# Patient Record
Sex: FEMALE | Race: OTHER | Employment: PART TIME | URBAN - METROPOLITAN AREA
[De-identification: names, ages, dates, MRNs, and addresses within clinical notes are randomized per-mention and may not be internally consistent; named-entity substitution may affect disease eponyms.]

---

## 2017-01-11 ENCOUNTER — HOSPITAL ENCOUNTER (OUTPATIENT)
Dept: ULTRASOUND IMAGING | Age: 62
Discharge: HOME | End: 2017-01-11
Attending: FAMILY MEDICINE | Admitting: FAMILY MEDICINE

## 2017-01-11 DIAGNOSIS — R74.8 ABNORMAL LEVELS OF OTHER SERUM ENZYMES: ICD-10-CM

## 2017-03-17 RX ORDER — METOPROLOL SUCCINATE 25 MG/1
TABLET, EXTENDED RELEASE ORAL
Qty: 60 TABLET | Refills: 1 | Status: SHIPPED | OUTPATIENT
Start: 2017-03-17 | End: 2017-09-05 | Stop reason: SDUPTHER

## 2017-09-05 ENCOUNTER — OFFICE VISIT (OUTPATIENT)
Dept: CARDIOLOGY CLINIC | Age: 62
End: 2017-09-05

## 2017-09-05 VITALS
HEART RATE: 68 BPM | BODY MASS INDEX: 33.97 KG/M2 | HEIGHT: 64 IN | SYSTOLIC BLOOD PRESSURE: 132 MMHG | WEIGHT: 199 LBS | DIASTOLIC BLOOD PRESSURE: 90 MMHG

## 2017-09-05 DIAGNOSIS — I10 ESSENTIAL HYPERTENSION: ICD-10-CM

## 2017-09-05 DIAGNOSIS — R00.2 PALPITATIONS: Primary | ICD-10-CM

## 2017-09-05 DIAGNOSIS — E78.2 MIXED HYPERLIPIDEMIA: ICD-10-CM

## 2017-09-05 PROCEDURE — 99215 OFFICE O/P EST HI 40 MIN: CPT | Performed by: INTERNAL MEDICINE

## 2017-09-05 RX ORDER — METOPROLOL SUCCINATE 25 MG/1
TABLET, EXTENDED RELEASE ORAL
Qty: 60 TABLET | Refills: 1
Start: 2017-09-05 | End: 2017-11-20 | Stop reason: SDUPTHER

## 2017-09-05 RX ORDER — AMLODIPINE BESYLATE 2.5 MG/1
2.5 TABLET ORAL DAILY
COMMUNITY
End: 2017-11-20 | Stop reason: SDUPTHER

## 2017-09-11 PROCEDURE — 93228 REMOTE 30 DAY ECG REV/REPORT: CPT | Performed by: INTERNAL MEDICINE

## 2017-11-20 ENCOUNTER — OFFICE VISIT (OUTPATIENT)
Dept: CARDIOLOGY CLINIC | Age: 62
End: 2017-11-20

## 2017-11-20 VITALS
SYSTOLIC BLOOD PRESSURE: 126 MMHG | BODY MASS INDEX: 34.43 KG/M2 | DIASTOLIC BLOOD PRESSURE: 74 MMHG | WEIGHT: 200.6 LBS | HEART RATE: 84 BPM

## 2017-11-20 DIAGNOSIS — E78.2 MIXED HYPERLIPIDEMIA: Primary | ICD-10-CM

## 2017-11-20 DIAGNOSIS — I10 ESSENTIAL HYPERTENSION: ICD-10-CM

## 2017-11-20 PROCEDURE — 3014F SCREEN MAMMO DOC REV: CPT | Performed by: INTERNAL MEDICINE

## 2017-11-20 PROCEDURE — G8427 DOCREV CUR MEDS BY ELIG CLIN: HCPCS | Performed by: INTERNAL MEDICINE

## 2017-11-20 PROCEDURE — 99214 OFFICE O/P EST MOD 30 MIN: CPT | Performed by: INTERNAL MEDICINE

## 2017-11-20 PROCEDURE — G8598 ASA/ANTIPLAT THER USED: HCPCS | Performed by: INTERNAL MEDICINE

## 2017-11-20 PROCEDURE — 3017F COLORECTAL CA SCREEN DOC REV: CPT | Performed by: INTERNAL MEDICINE

## 2017-11-20 PROCEDURE — G8484 FLU IMMUNIZE NO ADMIN: HCPCS | Performed by: INTERNAL MEDICINE

## 2017-11-20 PROCEDURE — G8417 CALC BMI ABV UP PARAM F/U: HCPCS | Performed by: INTERNAL MEDICINE

## 2017-11-20 PROCEDURE — 1036F TOBACCO NON-USER: CPT | Performed by: INTERNAL MEDICINE

## 2017-11-20 RX ORDER — METOPROLOL SUCCINATE 25 MG/1
TABLET, EXTENDED RELEASE ORAL
Qty: 90 TABLET | Refills: 3 | Status: SHIPPED | OUTPATIENT
Start: 2017-11-20 | End: 2017-12-11 | Stop reason: SDUPTHER

## 2017-11-20 RX ORDER — LOSARTAN POTASSIUM 25 MG/1
TABLET ORAL
Qty: 90 TABLET | Refills: 3 | Status: SHIPPED | OUTPATIENT
Start: 2017-11-20 | End: 2019-01-28 | Stop reason: SDUPTHER

## 2017-11-20 RX ORDER — ATORVASTATIN CALCIUM 40 MG/1
40 TABLET, FILM COATED ORAL DAILY
Qty: 90 TABLET | Refills: 3 | Status: SHIPPED | OUTPATIENT
Start: 2017-11-20 | End: 2018-12-07 | Stop reason: SDUPTHER

## 2017-11-20 RX ORDER — FUROSEMIDE 20 MG/1
TABLET ORAL
Qty: 90 TABLET | Refills: 1 | Status: SHIPPED | OUTPATIENT
Start: 2017-11-20 | End: 2018-07-05 | Stop reason: SDUPTHER

## 2017-11-20 RX ORDER — AMLODIPINE BESYLATE 2.5 MG/1
2.5 TABLET ORAL DAILY
Qty: 90 TABLET | Refills: 3 | Status: SHIPPED | OUTPATIENT
Start: 2017-11-20 | End: 2018-06-18 | Stop reason: ALTCHOICE

## 2017-11-20 NOTE — PROGRESS NOTES
daily Yes Historical Provider, MD   canagliflozin (INVOKANA) 100 MG TABS tablet Take 100 mg by mouth every morning (before breakfast) Yes Historical Provider, MD   metFORMIN (GLUCOPHAGE) 1000 MG tablet Take 1,000 mg by mouth 2 times daily (with meals) Yes Historical Provider, MD   metoprolol succinate (TOPROL XL) 25 MG extended release tablet TAKE ONE TABLET BY MOUTH DAILY FOR BLOOD PRESSURE AND HEART RATE Yes Ramon Cowart MD   losartan (COZAAR) 25 MG tablet TAKE ONE TABLET BY MOUTH DAILY Yes Yamile Hopkins MD   FREESTYLE LANCETS MISC USE ONE LANCET TO TEST BLOOD SUGAR TWICE A DAY Yes Yamile Hopkins MD   atorvastatin (LIPITOR) 40 MG tablet Take 1 tablet by mouth daily Yes Abran Birch MD   FANAPT 1 MG TABS tablet Take 1 tablet by mouth nightly Yes Historical Provider, MD   omeprazole (PRILOSEC) 20 MG capsule TAKE ONE CAPSULE BY MOUTH TWICE A DAY BEFORE MEALS Yes Yamile Hopkins MD   glucose monitoring kit (FREESTYLE) monitoring kit Test twice daily  Diag: E11.9 Yes Yamile Hopkins MD   glucose blood VI test strips (FREESTYLE LITE) strip TEST BLOOD SUGAR TWO TIMES A DAY  Diag: E11.9 Yes Yamile Hopkins MD   glimepiride (AMARYL) 1 MG tablet TAKE ONE TABLET BY MOUTH EVERY MORNING BEFORE BREAKFAST Yes Yamile Hopkins MD   albuterol (PROVENTIL HFA;VENTOLIN HFA) 108 (90 BASE) MCG/ACT inhaler Inhale 2 puffs into the lungs every 6 hours as needed for Wheezing Yes Yamile Hopkins MD   furosemide (LASIX) 20 MG tablet TAKE ONE TABLET BY MOUTH DAILY Yes Yamile Hopkins MD   aspirin 81 MG EC tablet Take 1 tablet by mouth daily.  Yes Yamile Hopkins MD   traZODone (DESYREL) 100 MG tablet Take 100 mg by mouth nightly Takes 1/2 pill nightly Yes Historical Provider, MD   duloxetine (CYMBALTA) 30 MG capsule Take 60 mg by mouth Takes 3 pills once daily Yes Historical Provider, MD   metFORMIN (GLUCOPHAGE) 500 MG tablet Take 1 tablet by mouth 2 times daily (with meals)  Yamile Hopkins MD     Family History  Family History   Problem Relation Age of Onset    Heart Disease Mother     High Blood Pressure Mother     Stroke Mother        Current Medications  Current Outpatient Prescriptions   Medication Sig Dispense Refill    ranitidine (ZANTAC) 300 MG tablet Take 1 tablet by mouth nightly 30 tablet 0    amLODIPine (NORVASC) 2.5 MG tablet Take 2.5 mg by mouth daily      canagliflozin (INVOKANA) 100 MG TABS tablet Take 100 mg by mouth every morning (before breakfast)      metFORMIN (GLUCOPHAGE) 1000 MG tablet Take 1,000 mg by mouth 2 times daily (with meals)      metoprolol succinate (TOPROL XL) 25 MG extended release tablet TAKE ONE TABLET BY MOUTH DAILY FOR BLOOD PRESSURE AND HEART RATE 60 tablet 1    losartan (COZAAR) 25 MG tablet TAKE ONE TABLET BY MOUTH DAILY 90 tablet 3    FREESTYLE LANCETS MISC USE ONE LANCET TO TEST BLOOD SUGAR TWICE A  each 3    atorvastatin (LIPITOR) 40 MG tablet Take 1 tablet by mouth daily 90 tablet 3    FANAPT 1 MG TABS tablet Take 1 tablet by mouth nightly      omeprazole (PRILOSEC) 20 MG capsule TAKE ONE CAPSULE BY MOUTH TWICE A DAY BEFORE MEALS 120 capsule 3    glucose monitoring kit (FREESTYLE) monitoring kit Test twice daily  Diag: E11.9 1 kit 0    glucose blood VI test strips (FREESTYLE LITE) strip TEST BLOOD SUGAR TWO TIMES A DAY  Diag: E11.9 100 each 4    glimepiride (AMARYL) 1 MG tablet TAKE ONE TABLET BY MOUTH EVERY MORNING BEFORE BREAKFAST 30 tablet 3    albuterol (PROVENTIL HFA;VENTOLIN HFA) 108 (90 BASE) MCG/ACT inhaler Inhale 2 puffs into the lungs every 6 hours as needed for Wheezing 1 Inhaler 1    furosemide (LASIX) 20 MG tablet TAKE ONE TABLET BY MOUTH DAILY 60 tablet 3    aspirin 81 MG EC tablet Take 1 tablet by mouth daily.  60 tablet 3    traZODone (DESYREL) 100 MG tablet Take 100 mg by mouth nightly Takes 1/2 pill nightly      duloxetine (CYMBALTA) 30 MG capsule Take 60 mg by mouth Takes 3 pills once daily      metFORMIN (GLUCOPHAGE) 500 MG tablet Take 1 tablet by mouth 2 times daily (with meals) 60 tablet 5     No current facility-administered medications for this visit. REVIEW OF SYSTEMS:    CONSTITUTIONAL: No major weight gain or loss, fatigue, weakness, night sweats or fever. HEENT: No new vision difficulties or ringing in the ears. RESPIRATORY: No new SOB, PND, orthopnea or cough. CARDIOVASCULAR: See HPI  GI: No nausea, vomiting, diarrhea, constipation, abdominal pain or changes in bowel habits. : No urinary frequency, urgency, incontinence hematuria or dysuria. SKIN: No cyanosis or skin lesions. MUSCULOSKELETAL: No new muscle or joint pain. NEUROLOGICAL: No syncope or TIA-like symptoms. PSYCHIATRIC: No anxiety, pain, insomnia or depression    Objective:   PHYSICAL EXAM:        VITALS:    Wt Readings from Last 3 Encounters:   11/20/17 200 lb 9.6 oz (91 kg)   10/26/17 206 lb 5.6 oz (93.6 kg)   09/05/17 199 lb (90.3 kg)     BP Readings from Last 3 Encounters:   11/20/17 126/74   10/26/17 (!) 134/94   09/05/17 (!) 132/90     Pulse Readings from Last 3 Encounters:   11/20/17 84   10/26/17 78   09/05/17 68       CONSTITUTIONAL: Cooperative, no apparent distress, and appears well nourished / developed  NEUROLOGIC:  Awake and orientated to person, place and time. PSYCH: Calm affect. SKIN: Warm and dry. HEENT: Sclera non-icteric, normocephalic, neck supple, no elevation of JVP, normal carotid pulses with no bruits and thyroid normal size. LUNGS:  No increased work of breathing and clear to auscultation, no crackles or wheezing  CARDIOVASCULAR:  Regular rate and rhythm with no murmurs, gallops, rubs, or abnormal heart sounds, normal PMI. The apical impulses not displaced  Heart tones are crisp and normal  Cervical veins are not engorged  The carotid upstroke is normal in amplitude and contour without delay or bruit  JVP is not elevated  ABDOMEN:  Normal bowel sounds, non-distended and non-tender to palpation  EXT: No edema, no calf tenderness.  Pulses are present bilaterally. DATA:    Lab Results   Component Value Date    ALT 21 10/26/2017    AST 29 10/26/2017    ALKPHOS 75 10/26/2017    BILITOT <0.2 10/26/2017     Lab Results   Component Value Date    CREATININE 0.8 10/26/2017    BUN 14 10/26/2017     10/26/2017    K 3.7 10/26/2017     10/26/2017    CO2 26 10/26/2017     Lab Results   Component Value Date    TSH 5.30 01/14/2013     Lab Results   Component Value Date    WBC 8.2 10/26/2017    HGB 12.5 10/26/2017    HCT 37.4 10/26/2017    MCV 85.4 10/26/2017     10/26/2017     No components found for: CHLPL  Lab Results   Component Value Date    TRIG 118 08/23/2016    TRIG 88 07/29/2015    TRIG 85 06/26/2014     Lab Results   Component Value Date    HDL 57 08/23/2016    HDL 61 (H) 07/29/2015    HDL 81 (H) 06/26/2014     Lab Results   Component Value Date    LDLCALC 72 08/23/2016    LDLCALC 93 07/29/2015    LDLCALC 110 (H) 06/26/2014     Lab Results   Component Value Date    LABVLDL 24 08/23/2016    LABVLDL 18 07/29/2015    LABVLDL 17 06/26/2014     Radiology Review:  Pertinent images / reports were reviewed as a part of this visit and reveals the following:  IMPRESSIONS and RECOMMENDATIONS:  Sleep srudy 11/11/17  Obstructive Sleep Apnea Syndrome [ICD-10 CM# G47.33]  Auto-CPAP at 6-9 cm H2O is recommended since supine R sleep was not seen at the final setting. Heated humidification will be arranged to improve compliance with positive pressure therapy. Adequate follow up is needed to assure compliance and response to treatment. Compliance with positive pressure therapy should be assessed at regular intervals. The patient will derive benefit from treatment as long as regular and adequate hours of usage are maintained. If the patient's symptoms recur or persist, a repeat titration study may be indicated.      PAP Device PAP* Level NREM (min) REM (min) Time (min) OA MA CA Ap Hyp RERA AHI Sup AHI Lat REM AHI Sup  REM AHI Sup  NREM AHI RDI # RDI Index Ar Index Min O2 Sat Ave           Last Echo:    Last Stress Test / Angiogram: 3/30/16  FINAL INTERPRETATION   Nondiagnostic with equivocal evidence for inferolateral ischemia plus equivocal evidence for    transient ischemic dilation. Overall study quality is fair. Jonathan Scout is study artifact related to    diaphragmatic attenuation. Test sensitivity is reduced with testing on antianginal drugs. Angiographic Findings:  Right dominant system CATH 4/  Left Main:  Minimal luminal irregularities.     Left Anterior Descending:  No significant CAD     Circumflex:  No significant CAD     Right Coronary:  No significant CAD     Left Ventriculogram:  LVEF 55-60%     Femoral Angiogram:  No plaque     Hemodynamics (mm Hg):  Left Ventricular Pressure:  132/2, 13  Central Aortic Pressure:  132/68     Conclusions:  -No significant CAD  -Normal LVEF  -Borderline LVEDP  Last ECG:    Assessment:    Sleep apnea with recent evaluation. Atypical chest discomfort. Plan:   I have advised her to make sure she receives the CPAP machine and to use it. Cardiac-wise there were no signs of ischemia on angiogram in July 2016. I think all else looks fairly stable we'll plan to see her back in about a year or sooner if problems develop. Please call if we can assist further [phone]. Christina Boyer.  Marta EDWARD, [facility]      This note was likely completed using voice recognition technology and may contain unintended errors

## 2017-12-08 ENCOUNTER — TELEPHONE (OUTPATIENT)
Dept: FAMILY MEDICINE CLINIC | Age: 62
End: 2017-12-08

## 2017-12-08 NOTE — TELEPHONE ENCOUNTER
Pt originally had her NP appointment on 12/1. It was cancelled via provider. It was r/s for jan 8th. Pt thought it was 12/8th. .      Pt is going out of the country for 3 1/2 months on wed 12/13. Pt will run out of her medications and she is a diabetic. Would there be any way we can see her for a NP appointment on Monday 12/11?     Please advise

## 2017-12-11 ENCOUNTER — OFFICE VISIT (OUTPATIENT)
Dept: FAMILY MEDICINE CLINIC | Age: 62
End: 2017-12-11

## 2017-12-11 ENCOUNTER — TELEPHONE (OUTPATIENT)
Dept: FAMILY MEDICINE CLINIC | Age: 62
End: 2017-12-11

## 2017-12-11 VITALS
BODY MASS INDEX: 35.97 KG/M2 | SYSTOLIC BLOOD PRESSURE: 124 MMHG | HEIGHT: 63 IN | DIASTOLIC BLOOD PRESSURE: 78 MMHG | WEIGHT: 203 LBS | TEMPERATURE: 98.7 F | HEART RATE: 86 BPM | RESPIRATION RATE: 16 BRPM

## 2017-12-11 DIAGNOSIS — K21.9 GASTROESOPHAGEAL REFLUX DISEASE, ESOPHAGITIS PRESENCE NOT SPECIFIED: ICD-10-CM

## 2017-12-11 DIAGNOSIS — E11.8 TYPE 2 DIABETES MELLITUS WITH COMPLICATION, UNSPECIFIED LONG TERM INSULIN USE STATUS: ICD-10-CM

## 2017-12-11 DIAGNOSIS — E11.8 TYPE 2 DIABETES MELLITUS WITH COMPLICATION, UNSPECIFIED LONG TERM INSULIN USE STATUS: Primary | ICD-10-CM

## 2017-12-11 DIAGNOSIS — E11.8 TYPE 2 DIABETES MELLITUS WITH COMPLICATION, WITHOUT LONG-TERM CURRENT USE OF INSULIN (HCC): Primary | ICD-10-CM

## 2017-12-11 DIAGNOSIS — G47.30 SLEEP APNEA, UNSPECIFIED TYPE: ICD-10-CM

## 2017-12-11 DIAGNOSIS — F39 MOOD DISORDER (HCC): ICD-10-CM

## 2017-12-11 DIAGNOSIS — I10 ESSENTIAL HYPERTENSION: ICD-10-CM

## 2017-12-11 LAB
A/G RATIO: 1.6 (ref 1.1–2.2)
ALBUMIN SERPL-MCNC: 4.2 G/DL (ref 3.4–5)
ALP BLD-CCNC: 86 U/L (ref 40–129)
ALT SERPL-CCNC: 23 U/L (ref 10–40)
ANION GAP SERPL CALCULATED.3IONS-SCNC: 14 MMOL/L (ref 3–16)
AST SERPL-CCNC: 25 U/L (ref 15–37)
BASOPHILS ABSOLUTE: 0 K/UL (ref 0–0.2)
BASOPHILS RELATIVE PERCENT: 0.5 %
BILIRUB SERPL-MCNC: <0.2 MG/DL (ref 0–1)
BUN BLDV-MCNC: 22 MG/DL (ref 7–20)
CALCIUM SERPL-MCNC: 10.2 MG/DL (ref 8.3–10.6)
CHLORIDE BLD-SCNC: 105 MMOL/L (ref 99–110)
CHOLESTEROL, TOTAL: 176 MG/DL (ref 0–199)
CO2: 23 MMOL/L (ref 21–32)
CREAT SERPL-MCNC: 0.9 MG/DL (ref 0.6–1.2)
CREATININE URINE: 44.6 MG/DL (ref 28–259)
EOSINOPHILS ABSOLUTE: 0.3 K/UL (ref 0–0.6)
EOSINOPHILS RELATIVE PERCENT: 4.1 %
ESTIMATED AVERAGE GLUCOSE: 205.9 MG/DL
GFR AFRICAN AMERICAN: >60
GFR NON-AFRICAN AMERICAN: >60
GLOBULIN: 2.7 G/DL
GLUCOSE BLD-MCNC: 213 MG/DL (ref 70–99)
HBA1C MFR BLD: 8.8 %
HCT VFR BLD CALC: 40.2 % (ref 36–48)
HDLC SERPL-MCNC: 74 MG/DL (ref 40–60)
HEMOGLOBIN: 13.1 G/DL (ref 12–16)
LDL CHOLESTEROL CALCULATED: 85 MG/DL
LYMPHOCYTES ABSOLUTE: 2.5 K/UL (ref 1–5.1)
LYMPHOCYTES RELATIVE PERCENT: 39 %
MCH RBC QN AUTO: 28.3 PG (ref 26–34)
MCHC RBC AUTO-ENTMCNC: 32.5 G/DL (ref 31–36)
MCV RBC AUTO: 86.9 FL (ref 80–100)
MICROALBUMIN UR-MCNC: <1.2 MG/DL
MICROALBUMIN/CREAT UR-RTO: NORMAL MG/G (ref 0–30)
MONOCYTES ABSOLUTE: 0.3 K/UL (ref 0–1.3)
MONOCYTES RELATIVE PERCENT: 5.4 %
NEUTROPHILS ABSOLUTE: 3.2 K/UL (ref 1.7–7.7)
NEUTROPHILS RELATIVE PERCENT: 51 %
PDW BLD-RTO: 13.4 % (ref 12.4–15.4)
PLATELET # BLD: 251 K/UL (ref 135–450)
PMV BLD AUTO: 8.8 FL (ref 5–10.5)
POTASSIUM SERPL-SCNC: 4.7 MMOL/L (ref 3.5–5.1)
RBC # BLD: 4.63 M/UL (ref 4–5.2)
SODIUM BLD-SCNC: 142 MMOL/L (ref 136–145)
TOTAL PROTEIN: 6.9 G/DL (ref 6.4–8.2)
TRIGL SERPL-MCNC: 83 MG/DL (ref 0–150)
TSH SERPL DL<=0.05 MIU/L-ACNC: 3.97 UIU/ML (ref 0.27–4.2)
VITAMIN B-12: 769 PG/ML (ref 211–911)
VLDLC SERPL CALC-MCNC: 17 MG/DL
WBC # BLD: 6.3 K/UL (ref 4–11)

## 2017-12-11 PROCEDURE — 99215 OFFICE O/P EST HI 40 MIN: CPT | Performed by: FAMILY MEDICINE

## 2017-12-11 RX ORDER — OMEPRAZOLE 40 MG/1
CAPSULE, DELAYED RELEASE ORAL
Qty: 90 CAPSULE | Refills: 1 | Status: SHIPPED | OUTPATIENT
Start: 2017-12-11 | End: 2018-03-28 | Stop reason: SDUPTHER

## 2017-12-11 RX ORDER — METOPROLOL SUCCINATE 25 MG/1
TABLET, EXTENDED RELEASE ORAL
Qty: 90 TABLET | Refills: 3 | Status: SHIPPED | OUTPATIENT
Start: 2017-12-11 | End: 2018-07-02 | Stop reason: DRUGHIGH

## 2017-12-11 ASSESSMENT — ENCOUNTER SYMPTOMS
VOMITING: 0
EYE REDNESS: 0
DIARRHEA: 0
COUGH: 0
SORE THROAT: 0
SHORTNESS OF BREATH: 0
COLOR CHANGE: 0
NAUSEA: 0
SINUS PRESSURE: 0

## 2017-12-11 ASSESSMENT — PATIENT HEALTH QUESTIONNAIRE - PHQ9
2. FEELING DOWN, DEPRESSED OR HOPELESS: 1
SUM OF ALL RESPONSES TO PHQ9 QUESTIONS 1 & 2: 2
1. LITTLE INTEREST OR PLEASURE IN DOING THINGS: 1
SUM OF ALL RESPONSES TO PHQ QUESTIONS 1-9: 2

## 2017-12-11 NOTE — PROGRESS NOTES
difficulty urinating. Skin: Negative for color change and rash. Neurological: Positive for weakness. Negative for dizziness, numbness and headaches. Psychiatric/Behavioral: Negative for confusion. The patient is not nervous/anxious. Prior to Visit Medications    Medication Sig Taking?  Authorizing Provider   omeprazole (PRILOSEC) 40 MG delayed release capsule TAKE ONE CAPSULE BY MOUTH TWICE A DAY BEFORE MEALS Yes Lizzette Finnegan MD   metoprolol succinate (TOPROL XL) 25 MG extended release tablet TAKE ONE TABLET BY MOUTH DAILY FOR BLOOD PRESSURE AND HEART RATE Yes Lizzette Finnegan MD   glucose blood VI test strips (FREESTYLE LITE) strip TEST BLOOD SUGAR TWO TIMES A DAY Diag: E11.9 Yes Lizzette Finnegan MD   dapagliflozin (FARXIGA) 10 MG tablet Take 1 tablet by mouth every morning Yes Silke Velasquez MD   naproxen (NAPROSYN) 500 MG tablet Take 1 tablet by mouth 2 times daily (with meals) As needed for pain Yes Ly Santana DO   ondansetron (ZOFRAN ODT) 4 MG disintegrating tablet Take 1 tablet by mouth every 6 hours as needed for Nausea or Vomiting Yes Ly Santana DO   amLODIPine (NORVASC) 2.5 MG tablet Take 1 tablet by mouth daily Yes Chasity Herrera MD   losartan (COZAAR) 25 MG tablet TAKE ONE TABLET BY MOUTH DAILY Yes Chasity Herrera MD   furosemide (LASIX) 20 MG tablet TAKE ONE TABLET BY MOUTH DAILY Yes Chasity Herrera MD   atorvastatin (LIPITOR) 40 MG tablet Take 1 tablet by mouth daily Yes Chasity Herrera MD   ranitidine (ZANTAC) 300 MG tablet Take 1 tablet by mouth nightly Yes Mackenzie Salas PA-C   FREESTYLE LANCETS MISC USE ONE LANCET TO TEST BLOOD SUGAR TWICE A DAY Yes Star Ny MD   FANAPT 1 MG TABS tablet Take 1 tablet by mouth nightly Yes Historical Provider, MD   metFORMIN (GLUCOPHAGE) 500 MG tablet Take 1 tablet by mouth 2 times daily (with meals)  Patient taking differently: Take 1,000 mg by mouth 2 times daily (with meals)  Yes Star Ny MD   glucose monitoring kit (FREESTYLE) Originally from \[facility]\""     is seen here    10 grandchildren     Allergies   Allergen Reactions    Codeine Nausea And Vomiting and Rash    Vicodin [Hydrocodone-Acetaminophen] Nausea And Vomiting    Oxycontin [Oxycodone Hcl] Itching     Health Maintenance   Topic Date Due    DTaP/Tdap/Td vaccine (1 - Tdap) 11/04/1973    Zostavax vaccine  11/04/2014    Diabetic foot exam  02/12/2015    Diabetic microalbuminuria test  02/12/2015    Diabetic retinal exam  02/12/2015    Cervical cancer screen  04/30/2016    Diabetic hemoglobin A1C test  08/23/2017    Lipid screen  08/23/2017    Breast cancer screen  08/18/2018    Colon cancer screen colonoscopy  07/15/2021    Flu vaccine  Completed    Pneumococcal med risk  Completed    Hepatitis C screen  Completed    HIV screen  Completed      Patient Active Problem List   Diagnosis    Hypertension    Bipolar 1 disorder, depressed ([address]    Diabetes mellitus ([address]    Hyperlipidemia    Obesity    Dystonia    Cerebral thrombosis with cerebral infarction ([address]    Sleep apnea    Right sided weakness    Trigger finger, acquired    Elevated CK    Primary osteoarthritis involving multiple joints    Primary osteoarthritis of both knees    Mood disorder ([address] - follows with Psych Dr. Anshul Roman'      LABS:  Lab Results   Component Value Date    GLUCOSE 218 (H) 10/26/2017     Lab Results   Component Value Date     10/26/2017    K 3.7 10/26/2017    CREATININE 0.8 10/26/2017     Cholesterol, Total   Date Value Ref Range Status   08/23/2016 153 0 - 199 mg/dL Final     LDL Calculated   Date Value Ref Range Status   08/23/2016 72 <100 mg/dL Final     HDL   Date Value Ref Range Status   08/23/2016 57 40 - 60 mg/dL Final   01/26/2012 56 40 - 60 mg/dl Final     Triglycerides   Date Value Ref Range Status   08/23/2016 118 0 - 150 mg/dL Final     Lab Results   Component Value Date    ALT 21 10/26/2017    AST 29 10/26/2017    ALKPHOS 75 10/26/2017    BILITOT

## 2017-12-11 NOTE — TELEPHONE ENCOUNTER
Called and left VM. Pt's labs show her diabetes number is elevated, so we need to start a new medicine called Januvia to help bring this down. I'd like her to start this before leaving for her trip, and then we can discuss this further when she is back in the country. Thanks.

## 2017-12-11 NOTE — LETTER
[address]  [address] [place] [address]  Phone: [phone]  Fax: [phone]    Kathleen Mcburney, MD    December 11, 2017     Patient: Hannah Wood   YOB: 1955   Date of Visit: 12/11/2017     To Whom it May Concern:    Migdalia Ho was seen in my clinic on 12/11/2017. She has a medical condition and benefits from the use of a medical device called a CPAP for a breathing condition during sleep. I would recommend her having this device on her upcoming trip that includes an airplane flight. If you have any questions or concerns, please don't hesitate to call.     Sincerely,         Kathleen Mcburney, MD

## 2017-12-28 ENCOUNTER — TELEPHONE (OUTPATIENT)
Dept: FAMILY MEDICINE CLINIC | Age: 62
End: 2017-12-28

## 2017-12-28 DIAGNOSIS — E11.8 TYPE 2 DIABETES MELLITUS WITH COMPLICATION, UNSPECIFIED LONG TERM INSULIN USE STATUS: Primary | ICD-10-CM

## 2018-03-23 DIAGNOSIS — E11.8 TYPE 2 DIABETES MELLITUS WITH COMPLICATION, WITHOUT LONG-TERM CURRENT USE OF INSULIN (HCC): ICD-10-CM

## 2018-03-26 ENCOUNTER — OFFICE VISIT (OUTPATIENT)
Dept: FAMILY MEDICINE CLINIC | Age: 63
End: 2018-03-26

## 2018-03-26 VITALS
TEMPERATURE: 97.9 F | DIASTOLIC BLOOD PRESSURE: 80 MMHG | HEIGHT: 63 IN | RESPIRATION RATE: 18 BRPM | OXYGEN SATURATION: 98 % | HEART RATE: 94 BPM | BODY MASS INDEX: 36.86 KG/M2 | SYSTOLIC BLOOD PRESSURE: 130 MMHG | WEIGHT: 208 LBS

## 2018-03-26 DIAGNOSIS — M16.11 PRIMARY OSTEOARTHRITIS OF RIGHT HIP: ICD-10-CM

## 2018-03-26 DIAGNOSIS — R11.0 NAUSEA: ICD-10-CM

## 2018-03-26 DIAGNOSIS — E78.2 MIXED HYPERLIPIDEMIA: ICD-10-CM

## 2018-03-26 DIAGNOSIS — I10 ESSENTIAL HYPERTENSION: ICD-10-CM

## 2018-03-26 DIAGNOSIS — E11.8 TYPE 2 DIABETES MELLITUS WITH COMPLICATION, UNSPECIFIED LONG TERM INSULIN USE STATUS: Primary | ICD-10-CM

## 2018-03-26 LAB — HBA1C MFR BLD: 9 %

## 2018-03-26 PROCEDURE — G8599 NO ASA/ANTIPLAT THER USE RNG: HCPCS | Performed by: FAMILY MEDICINE

## 2018-03-26 PROCEDURE — G8427 DOCREV CUR MEDS BY ELIG CLIN: HCPCS | Performed by: FAMILY MEDICINE

## 2018-03-26 PROCEDURE — G8482 FLU IMMUNIZE ORDER/ADMIN: HCPCS | Performed by: FAMILY MEDICINE

## 2018-03-26 PROCEDURE — G8417 CALC BMI ABV UP PARAM F/U: HCPCS | Performed by: FAMILY MEDICINE

## 2018-03-26 PROCEDURE — 3045F PR MOST RECENT HEMOGLOBIN A1C LEVEL 7.0-9.0%: CPT | Performed by: FAMILY MEDICINE

## 2018-03-26 PROCEDURE — 3017F COLORECTAL CA SCREEN DOC REV: CPT | Performed by: FAMILY MEDICINE

## 2018-03-26 PROCEDURE — 3014F SCREEN MAMMO DOC REV: CPT | Performed by: FAMILY MEDICINE

## 2018-03-26 PROCEDURE — 99214 OFFICE O/P EST MOD 30 MIN: CPT | Performed by: FAMILY MEDICINE

## 2018-03-26 PROCEDURE — 83036 HEMOGLOBIN GLYCOSYLATED A1C: CPT | Performed by: FAMILY MEDICINE

## 2018-03-26 PROCEDURE — 1036F TOBACCO NON-USER: CPT | Performed by: FAMILY MEDICINE

## 2018-03-26 ASSESSMENT — ENCOUNTER SYMPTOMS
NAUSEA: 1
BLOOD IN STOOL: 0

## 2018-03-26 NOTE — PROGRESS NOTES
3/26/2018    Jennifer Rodriguez is a 60 y.o. female here for follow up    HPI   Recently returned from a 3 month trip. DM - reports her blood sugars are running in the upper 100s, lower 200s. Occasionally 300. Is on max dose metformin. On Ruby Balloon. Reports has been out of [address] for a few weeks. Lab Results   Component Value Date    LABA1C 9.0 03/26/2018     Lab Results   Component Value Date    .9 12/11/2017   She has been on insulin in the past but did not like the way it made her feel. She does not want to be on insulin. Pain - in R hip/groin area. Going on for over a year or so. She has seen [facility] ortho and had an MRI, was told she had OA and marrow edema. Nausea - reports epigastric discomfort going on for the past couple of months. She is on Naproxen twice daily for pain. She was also put on a steroid a couple of months ago for leg pain. No vomiting. No blood in stool. Had a colonoscopy last year and reports needing a repeat in 3 years. HTN - well-controlled on current regimen. Review of Systems   Constitutional: Positive for fatigue. Negative for appetite change. Gastrointestinal: Positive for nausea. Negative for blood in stool. Musculoskeletal: Positive for arthralgias. Neurological: Negative for weakness. Patient Active Problem List   Diagnosis    Hypertension    Bipolar 1 disorder, depressed ([address]    Diabetes mellitus ([address]    Hyperlipidemia    Obesity    Dystonia    Cerebral thrombosis with cerebral infarction ([address]    Sleep apnea    Right sided weakness    Trigger finger, acquired    Elevated CK    Primary osteoarthritis involving multiple joints    Primary osteoarthritis of both knees    Mood disorder ([address] - follows with Psych Dr. Aurora Mayo'     Prior to Visit Medications    Medication Sig Taking?  Authorizing Provider   dapagliflozin (FARXIGA) 10 MG tablet Take 1 tablet by mouth every morning Yes Hung Finnegan MD   Dulaglutide 0.75 MG/0.5ML [facility] Historical Provider, MD   duloxetine (CYMBALTA) 30 MG capsule Take 60 mg by mouth Takes 3 pills once daily Yes Historical Provider, MD     Health Maintenance   Topic Date Due    DTaP/Tdap/Td vaccine (1 - Tdap) 11/04/1973    Shingles Vaccine (1 of 2 - 2 Dose Series) 11/04/2004    Diabetic foot exam  02/12/2015    Diabetic retinal exam  02/12/2015    Cervical cancer screen  04/30/2016    A1C test (Diabetic or Prediabetic)  03/11/2018    Breast cancer screen  08/18/2018    Diabetic microalbuminuria test  12/11/2018    Lipid screen  12/11/2018    Potassium monitoring  12/11/2018    Creatinine monitoring  12/11/2018    Colon cancer screen colonoscopy  07/15/2021    Flu vaccine  Completed    Pneumococcal med risk  Completed    Hepatitis C screen  Completed    HIV screen  Completed     Past Medical History:   Diagnosis Date    Arthritis     Diabetes     GERD (gastroesophageal reflux disease)     Hyperlipidemia     Hypertension     Lumbar disc disease     Sleep apnea     pt states does use a Cpap machine at night    Unspecified cerebral artery occlusion with cerebral infarction 2014    right side weak    Wears glasses      Social History     Social History    Marital status:      Spouse name: Guzman Canales, seen here    Number of children: 3    Years of education: N/A     Social History Main Topics    Smoking status: Former Smoker     Packs/day: 0.00     Quit date: 1/1/1992    Smokeless tobacco: Never Used    Alcohol use No    Drug use: No    Sexual activity: Yes     Partners: Male     Other Topics Concern    Not on file     Social History Narrative    Originally from [facility]     is seen here    10 grandchildren     Allergies   Allergen Reactions    Codeine Nausea And Vomiting and Rash    Vicodin [Hydrocodone-Acetaminophen] Nausea And Vomiting    Oxycontin [Oxycodone Hcl] Itching       LABS:  Lab Results   Component Value Date    GLUCOSE 213 (H) 12/11/2017     Lab Results   Component Value Date     12/11/2017    K 4.7 12/11/2017    CREATININE 0.9 12/11/2017     Cholesterol, Total   Date Value Ref Range Status   12/11/2017 176 0 - 199 mg/dL Final     LDL Calculated   Date Value Ref Range Status   12/11/2017 85 <100 mg/dL Final     HDL   Date Value Ref Range Status   12/11/2017 74 (H) 40 - 60 mg/dL Final   01/26/2012 56 40 - 60 mg/dl Final     Triglycerides   Date Value Ref Range Status   12/11/2017 83 0 - 150 mg/dL Final     Lab Results   Component Value Date    ALT 23 12/11/2017    AST 25 12/11/2017    ALKPHOS 86 12/11/2017    BILITOT <0.2 12/11/2017      Lab Results   Component Value Date    WBC 6.3 12/11/2017    HGB 13.1 12/11/2017    HCT 40.2 12/11/2017    MCV 86.9 12/11/2017     12/11/2017     TSH (uIU/mL)   Date Value   12/11/2017 3.97     Lab Results   Component Value Date    LABA1C 9.0 03/26/2018     No results found for: PSA, PSADIA      PHYSICAL EXAM  /80 (Site: Left Arm, Position: Sitting, Cuff Size: Large Adult)   Pulse 94   Temp 97.9 °F (36.6 °C) (Oral)   Resp 18   Ht 5' 3\" (1.6 m)   Wt 208 lb (94.3 kg)   SpO2 98%   BMI 36.85 kg/m²     BP Readings from Last 5 Encounters:   03/26/18 130/80   12/11/17 124/78   11/30/17 124/76   11/20/17 126/74   10/26/17 (!) 134/94       Wt Readings from Last 5 Encounters:   03/26/18 208 lb (94.3 kg)   12/11/17 203 lb (92.1 kg)   11/29/17 204 lb 9.4 oz (92.8 kg)   11/20/17 200 lb 9.6 oz (91 kg)   10/26/17 206 lb 5.6 oz (93.6 kg)        Physical Exam   Constitutional: She is oriented to person, place, and time. She appears well-developed and well-nourished. HENT:   Head: Normocephalic and atraumatic. Eyes: EOM are normal.   Neck: Normal range of motion. Cardiovascular: Normal rate, regular rhythm and normal heart sounds. Pulmonary/Chest: Effort normal and breath sounds normal.   Abdominal: Soft. There is no rebound and no guarding. Musculoskeletal: Normal range of motion.    Positive log roll test

## 2018-03-28 DIAGNOSIS — E11.8 TYPE 2 DIABETES MELLITUS WITH COMPLICATION, WITHOUT LONG-TERM CURRENT USE OF INSULIN (HCC): ICD-10-CM

## 2018-03-28 RX ORDER — OMEPRAZOLE 40 MG/1
CAPSULE, DELAYED RELEASE ORAL
Qty: 90 CAPSULE | Refills: 1 | Status: SHIPPED | OUTPATIENT
Start: 2018-03-28 | End: 2018-06-18 | Stop reason: SDUPTHER

## 2018-04-11 ENCOUNTER — OFFICE VISIT (OUTPATIENT)
Dept: FAMILY MEDICINE CLINIC | Age: 63
End: 2018-04-11

## 2018-04-11 ENCOUNTER — HOSPITAL ENCOUNTER (OUTPATIENT)
Dept: OTHER | Age: 63
Discharge: HOME | End: 2018-04-11
Attending: FAMILY MEDICINE | Admitting: FAMILY MEDICINE

## 2018-04-11 VITALS
HEART RATE: 80 BPM | WEIGHT: 205.8 LBS | TEMPERATURE: 98 F | BODY MASS INDEX: 36.46 KG/M2 | RESPIRATION RATE: 18 BRPM | HEIGHT: 63 IN | OXYGEN SATURATION: 98 % | SYSTOLIC BLOOD PRESSURE: 104 MMHG | DIASTOLIC BLOOD PRESSURE: 66 MMHG

## 2018-04-11 DIAGNOSIS — M25.511 ACUTE PAIN OF RIGHT SHOULDER: ICD-10-CM

## 2018-04-11 DIAGNOSIS — M62.838 TRAPEZIUS MUSCLE SPASM: ICD-10-CM

## 2018-04-11 DIAGNOSIS — M25.511 ACUTE PAIN OF RIGHT SHOULDER: Primary | ICD-10-CM

## 2018-04-11 PROCEDURE — 3017F COLORECTAL CA SCREEN DOC REV: CPT | Performed by: FAMILY MEDICINE

## 2018-04-11 PROCEDURE — G8599 NO ASA/ANTIPLAT THER USE RNG: HCPCS | Performed by: FAMILY MEDICINE

## 2018-04-11 PROCEDURE — G8417 CALC BMI ABV UP PARAM F/U: HCPCS | Performed by: FAMILY MEDICINE

## 2018-04-11 PROCEDURE — 99213 OFFICE O/P EST LOW 20 MIN: CPT | Performed by: FAMILY MEDICINE

## 2018-04-11 PROCEDURE — G8427 DOCREV CUR MEDS BY ELIG CLIN: HCPCS | Performed by: FAMILY MEDICINE

## 2018-04-11 PROCEDURE — 3014F SCREEN MAMMO DOC REV: CPT | Performed by: FAMILY MEDICINE

## 2018-04-11 PROCEDURE — 1036F TOBACCO NON-USER: CPT | Performed by: FAMILY MEDICINE

## 2018-04-11 RX ORDER — TIZANIDINE 4 MG/1
4 TABLET ORAL 3 TIMES DAILY
Qty: 60 TABLET | Refills: 0 | Status: SHIPPED | OUTPATIENT
Start: 2018-04-11 | End: 2019-01-28 | Stop reason: SDUPTHER

## 2018-04-11 RX ORDER — NAPROXEN 500 MG/1
500 TABLET ORAL 2 TIMES DAILY WITH MEALS
Qty: 60 TABLET | Refills: 0 | Status: SHIPPED | OUTPATIENT
Start: 2018-04-11 | End: 2018-05-07 | Stop reason: SDUPTHER

## 2018-04-13 ENCOUNTER — OFFICE VISIT (OUTPATIENT)
Dept: ORTHOPEDIC SURGERY | Age: 63
End: 2018-04-13

## 2018-04-13 VITALS
HEIGHT: 63 IN | BODY MASS INDEX: 36.48 KG/M2 | WEIGHT: 205.91 LBS | DIASTOLIC BLOOD PRESSURE: 78 MMHG | SYSTOLIC BLOOD PRESSURE: 116 MMHG | HEART RATE: 79 BPM

## 2018-04-13 DIAGNOSIS — M25.551 RIGHT HIP PAIN: ICD-10-CM

## 2018-04-13 DIAGNOSIS — M16.11 PRIMARY OSTEOARTHRITIS OF RIGHT HIP: Primary | ICD-10-CM

## 2018-04-13 PROCEDURE — G8427 DOCREV CUR MEDS BY ELIG CLIN: HCPCS | Performed by: ORTHOPAEDIC SURGERY

## 2018-04-13 PROCEDURE — 99214 OFFICE O/P EST MOD 30 MIN: CPT | Performed by: ORTHOPAEDIC SURGERY

## 2018-04-13 PROCEDURE — G8417 CALC BMI ABV UP PARAM F/U: HCPCS | Performed by: ORTHOPAEDIC SURGERY

## 2018-04-13 PROCEDURE — 3014F SCREEN MAMMO DOC REV: CPT | Performed by: ORTHOPAEDIC SURGERY

## 2018-04-13 PROCEDURE — 3017F COLORECTAL CA SCREEN DOC REV: CPT | Performed by: ORTHOPAEDIC SURGERY

## 2018-04-24 ENCOUNTER — OFFICE VISIT (OUTPATIENT)
Dept: ORTHOPEDIC SURGERY | Age: 63
End: 2018-04-24

## 2018-04-24 VITALS
BODY MASS INDEX: 33.8 KG/M2 | HEART RATE: 85 BPM | DIASTOLIC BLOOD PRESSURE: 80 MMHG | HEIGHT: 64 IN | SYSTOLIC BLOOD PRESSURE: 132 MMHG | WEIGHT: 198 LBS

## 2018-04-24 DIAGNOSIS — M16.11 PRIMARY OSTEOARTHRITIS OF RIGHT HIP: Primary | ICD-10-CM

## 2018-04-24 PROCEDURE — 20611 DRAIN/INJ JOINT/BURSA W/US: CPT | Performed by: ORTHOPAEDIC SURGERY

## 2018-04-24 PROCEDURE — 99999 PR OFFICE/OUTPT VISIT,PROCEDURE ONLY: CPT | Performed by: ORTHOPAEDIC SURGERY

## 2018-04-30 ENCOUNTER — OFFICE VISIT (OUTPATIENT)
Dept: FAMILY MEDICINE CLINIC | Age: 63
End: 2018-04-30

## 2018-04-30 VITALS
OXYGEN SATURATION: 97 % | DIASTOLIC BLOOD PRESSURE: 68 MMHG | WEIGHT: 204.6 LBS | HEIGHT: 64 IN | BODY MASS INDEX: 34.93 KG/M2 | SYSTOLIC BLOOD PRESSURE: 110 MMHG | RESPIRATION RATE: 18 BRPM | TEMPERATURE: 97.7 F | HEART RATE: 77 BPM

## 2018-04-30 DIAGNOSIS — E11.8 TYPE 2 DIABETES MELLITUS WITH COMPLICATION, UNSPECIFIED LONG TERM INSULIN USE STATUS: ICD-10-CM

## 2018-04-30 DIAGNOSIS — L02.429: ICD-10-CM

## 2018-04-30 DIAGNOSIS — M75.31 CALCIFIC TENDONITIS OF RIGHT SHOULDER: Primary | ICD-10-CM

## 2018-04-30 DIAGNOSIS — M54.6 ACUTE LEFT-SIDED THORACIC BACK PAIN: ICD-10-CM

## 2018-04-30 PROCEDURE — 3017F COLORECTAL CA SCREEN DOC REV: CPT | Performed by: FAMILY MEDICINE

## 2018-04-30 PROCEDURE — G8599 NO ASA/ANTIPLAT THER USE RNG: HCPCS | Performed by: FAMILY MEDICINE

## 2018-04-30 PROCEDURE — 99214 OFFICE O/P EST MOD 30 MIN: CPT | Performed by: FAMILY MEDICINE

## 2018-04-30 PROCEDURE — G8427 DOCREV CUR MEDS BY ELIG CLIN: HCPCS | Performed by: FAMILY MEDICINE

## 2018-04-30 PROCEDURE — 1036F TOBACCO NON-USER: CPT | Performed by: FAMILY MEDICINE

## 2018-04-30 PROCEDURE — G8417 CALC BMI ABV UP PARAM F/U: HCPCS | Performed by: FAMILY MEDICINE

## 2018-04-30 PROCEDURE — 3045F PR MOST RECENT HEMOGLOBIN A1C LEVEL 7.0-9.0%: CPT | Performed by: FAMILY MEDICINE

## 2018-04-30 PROCEDURE — 2022F DILAT RTA XM EVC RTNOPTHY: CPT | Performed by: FAMILY MEDICINE

## 2018-04-30 RX ORDER — SULFAMETHOXAZOLE AND TRIMETHOPRIM 800; 160 MG/1; MG/1
1 TABLET ORAL 2 TIMES DAILY
Qty: 14 TABLET | Refills: 0 | Status: SHIPPED | OUTPATIENT
Start: 2018-04-30 | End: 2018-05-07

## 2018-04-30 ASSESSMENT — ENCOUNTER SYMPTOMS
SHORTNESS OF BREATH: 0
COUGH: 0

## 2018-05-04 RX ORDER — OMEPRAZOLE 40 MG/1
CAPSULE, DELAYED RELEASE ORAL
Qty: 180 CAPSULE | Refills: 0 | Status: SHIPPED | OUTPATIENT
Start: 2018-05-04 | End: 2018-06-18

## 2018-05-07 ENCOUNTER — HOSPITAL ENCOUNTER (OUTPATIENT)
Dept: OTHER | Age: 63
Discharge: HOME | End: 2018-05-07
Attending: FAMILY MEDICINE | Admitting: FAMILY MEDICINE

## 2018-05-07 ENCOUNTER — OFFICE VISIT (OUTPATIENT)
Dept: FAMILY MEDICINE CLINIC | Age: 63
End: 2018-05-07

## 2018-05-07 VITALS
SYSTOLIC BLOOD PRESSURE: 128 MMHG | HEIGHT: 64 IN | BODY MASS INDEX: 34.72 KG/M2 | DIASTOLIC BLOOD PRESSURE: 76 MMHG | TEMPERATURE: 98.3 F | OXYGEN SATURATION: 97 % | RESPIRATION RATE: 18 BRPM | HEART RATE: 80 BPM | WEIGHT: 203.4 LBS

## 2018-05-07 DIAGNOSIS — M25.511 ACUTE PAIN OF RIGHT SHOULDER: ICD-10-CM

## 2018-05-07 DIAGNOSIS — L02.419 ABSCESS OF ARM: Primary | ICD-10-CM

## 2018-05-07 DIAGNOSIS — M54.6 ACUTE LEFT-SIDED THORACIC BACK PAIN: ICD-10-CM

## 2018-05-07 PROCEDURE — 10060 I&D ABSCESS SIMPLE/SINGLE: CPT | Performed by: FAMILY MEDICINE

## 2018-05-09 LAB
BODY FLUID CULTURE, STERILE: ABNORMAL
BODY FLUID CULTURE, STERILE: ABNORMAL
GRAM STAIN RESULT: ABNORMAL
ORGANISM: ABNORMAL

## 2018-05-11 ENCOUNTER — OFFICE VISIT (OUTPATIENT)
Dept: FAMILY MEDICINE CLINIC | Age: 63
End: 2018-05-11

## 2018-05-11 VITALS
BODY MASS INDEX: 34.79 KG/M2 | WEIGHT: 203.8 LBS | HEART RATE: 86 BPM | DIASTOLIC BLOOD PRESSURE: 78 MMHG | SYSTOLIC BLOOD PRESSURE: 116 MMHG | TEMPERATURE: 98.6 F | OXYGEN SATURATION: 97 % | HEIGHT: 64 IN | RESPIRATION RATE: 18 BRPM

## 2018-05-11 DIAGNOSIS — M75.31 CALCIFIC TENDONITIS OF RIGHT SHOULDER: ICD-10-CM

## 2018-05-11 DIAGNOSIS — L02.91 ABSCESS: Primary | ICD-10-CM

## 2018-05-11 PROCEDURE — 10060 I&D ABSCESS SIMPLE/SINGLE: CPT | Performed by: FAMILY MEDICINE

## 2018-05-15 ENCOUNTER — OFFICE VISIT (OUTPATIENT)
Dept: ORTHOPEDIC SURGERY | Age: 63
End: 2018-05-15

## 2018-05-15 VITALS
HEIGHT: 64 IN | HEART RATE: 89 BPM | WEIGHT: 200 LBS | BODY MASS INDEX: 34.15 KG/M2 | DIASTOLIC BLOOD PRESSURE: 81 MMHG | SYSTOLIC BLOOD PRESSURE: 126 MMHG

## 2018-05-15 DIAGNOSIS — M75.81 ROTATOR CUFF TENDINITIS, RIGHT: Primary | ICD-10-CM

## 2018-05-15 DIAGNOSIS — M75.21 BICEPS TENDINITIS, RIGHT: ICD-10-CM

## 2018-05-15 DIAGNOSIS — M75.31 CALCIFIC TENDINITIS OF RIGHT SHOULDER: ICD-10-CM

## 2018-05-15 PROCEDURE — 20610 DRAIN/INJ JOINT/BURSA W/O US: CPT | Performed by: ORTHOPAEDIC SURGERY

## 2018-05-15 PROCEDURE — G8427 DOCREV CUR MEDS BY ELIG CLIN: HCPCS | Performed by: ORTHOPAEDIC SURGERY

## 2018-05-15 PROCEDURE — G8417 CALC BMI ABV UP PARAM F/U: HCPCS | Performed by: ORTHOPAEDIC SURGERY

## 2018-05-15 PROCEDURE — 1036F TOBACCO NON-USER: CPT | Performed by: ORTHOPAEDIC SURGERY

## 2018-05-15 PROCEDURE — 99213 OFFICE O/P EST LOW 20 MIN: CPT | Performed by: ORTHOPAEDIC SURGERY

## 2018-05-15 PROCEDURE — G8599 NO ASA/ANTIPLAT THER USE RNG: HCPCS | Performed by: ORTHOPAEDIC SURGERY

## 2018-05-15 PROCEDURE — 3017F COLORECTAL CA SCREEN DOC REV: CPT | Performed by: ORTHOPAEDIC SURGERY

## 2018-05-17 ENCOUNTER — HOSPITAL ENCOUNTER (OUTPATIENT)
Dept: OTHER | Age: 63
Discharge: HOME | End: 2018-05-31
Attending: FAMILY MEDICINE | Admitting: FAMILY MEDICINE

## 2018-05-17 ASSESSMENT — PAIN DESCRIPTION - PROGRESSION: CLINICAL_PROGRESSION: GRADUALLY WORSENING

## 2018-05-17 ASSESSMENT — PAIN DESCRIPTION - FREQUENCY: FREQUENCY: CONTINUOUS

## 2018-05-17 ASSESSMENT — ACTIVITIES OF DAILY LIVING (ADL): EFFECT OF PAIN ON DAILY ACTIVITIES: LIMITS USE OF RIGHT UE

## 2018-05-17 ASSESSMENT — PAIN DESCRIPTION - PAIN TYPE: TYPE: CHRONIC PAIN

## 2018-05-17 ASSESSMENT — PAIN DESCRIPTION - LOCATION: LOCATION: SHOULDER

## 2018-05-17 ASSESSMENT — PAIN DESCRIPTION - ORIENTATION: ORIENTATION: RIGHT

## 2018-05-17 ASSESSMENT — PAIN DESCRIPTION - ONSET: ONSET: ON-GOING

## 2018-05-17 ASSESSMENT — PAIN SCALES - GENERAL: PAINLEVEL_OUTOF10: 9

## 2018-05-21 ENCOUNTER — OFFICE VISIT (OUTPATIENT)
Dept: ORTHOPEDIC SURGERY | Age: 63
End: 2018-05-21

## 2018-05-21 VITALS — WEIGHT: 199.96 LBS | HEIGHT: 64 IN | BODY MASS INDEX: 34.14 KG/M2

## 2018-05-21 DIAGNOSIS — M16.11 PRIMARY OSTEOARTHRITIS OF RIGHT HIP: Primary | ICD-10-CM

## 2018-05-21 PROCEDURE — 99213 OFFICE O/P EST LOW 20 MIN: CPT | Performed by: ORTHOPAEDIC SURGERY

## 2018-05-21 PROCEDURE — 3017F COLORECTAL CA SCREEN DOC REV: CPT | Performed by: ORTHOPAEDIC SURGERY

## 2018-05-21 PROCEDURE — G8599 NO ASA/ANTIPLAT THER USE RNG: HCPCS | Performed by: ORTHOPAEDIC SURGERY

## 2018-05-21 PROCEDURE — G8427 DOCREV CUR MEDS BY ELIG CLIN: HCPCS | Performed by: ORTHOPAEDIC SURGERY

## 2018-05-21 PROCEDURE — G8417 CALC BMI ABV UP PARAM F/U: HCPCS | Performed by: ORTHOPAEDIC SURGERY

## 2018-05-21 PROCEDURE — 1036F TOBACCO NON-USER: CPT | Performed by: ORTHOPAEDIC SURGERY

## 2018-05-22 ENCOUNTER — HOSPITAL ENCOUNTER (OUTPATIENT)
Dept: PHYSICAL THERAPY | Age: 63
Discharge: HOME | End: 2018-05-23
Admitting: FAMILY MEDICINE

## 2018-05-22 NOTE — FLOWSHEET NOTE
Physical Therapy Daily Treatment Note  Date:  2018    Patient Name:  Abimael Menjivar    :  1955  MRN: [id number]  Restrictions/Precautions:  VERY TENDER AROUND ENTIRE SHOULDER AND SCAP  GO EASY  Pertinent Medical History:  Medical/Treatment Diagnosis Information:  · Diagnosis: Calcific tendonitis of the right shoulder  · Treatment Diagnosis: decreased ability to use right ue for adl's  Insurance/Certification information:  PT Insurance Information: [facility]  Physician Information:  Referring Practitioner: Dr. Eunice Eli of care signed (Y/N):  routed  Visit# / total visits2/12  Pain level: 5-8/10     G-Code (if applicable):  CL,  18     Date / Visit # G-Code Applied:  /  PT G-Codes  Functional Assessment Tool Used: quick dash  Score: 45  Functional Limitation: Carrying, moving and handling objects  Carrying, Moving and Handling Objects Current Status (): At least 60 percent but less than 80 percent impaired, limited or restricted  Carrying, Moving and Handling Objects Goal Status (): At least 40 percent but less than 60 percent impaired, limited or restricted    Progress Note: []  Yes  []  No  Next due by: Visit #10      History of Injury: Pt is a 61 y/o female with a hx of right shoulder pain since . Her shoullder pain went away for a feew years and then it returned a month or so ago. She now c/o constant pain in her right shoulder, cerv , and thoracic area which is severe all of the time. She wakes due to pain. She gets a stabbing pain in her shoulder if she moves or touches it. She says her neck hurts all the time as well. She gets a little n=t on occasion. She says cortisone shots have helped in the past.  She had a shot on 5/15/18 but it did not help much. . She mainly does adl's using her left arm. She is not able to do much with her arm at this time.   She hpopes to decrease pain and resume normal activities    Subjective:   Pt states, \" I had this in the past and it returned about a month ago and has gotten severe \"  5/22/18  Pt states, \" doing a little better \"    Objective:  Initial- flex- 80, abd- 70, ir-50, er-30, ext- 10  Strength- 4-/5     Exercises:  Exercise/Equipment Resistance/Repetitions Other comments   Nu step if able S7 L2 x 5 min   Swiss ball roll if no nu step X 2 min    Overhead pulley X 2 min    Gentle prom All ranges    Cane flex X 20    isos End ranges x 10    Sl scap retraction     Sl ranger X 2 min    Add/ext Yellow x 20    Ir/er                         HEP     Codmans all motions X 30     scap retraction X 20    Table flex X 20    shrugs X 20                Other Therapeutic Activities:      Home Exercise Program:  Patient demonstrated proper technique, good tolerance,  and was given written instructions for the above exercises      Manual Treatments:  Very gentle prom or right shoulder, gentle mfr to rtc, traps x 12 min    Modalities: hp x 15 seated to shoulder and traps    Timed Code Treatment Minutes:  43    Total Treatment Minutes: 60     Treatment/Activity Tolerance:  [x] Patient tolerated treatment well [] Patient limited by fatigue  [] Patient limited by pain  [] Patient limited by other medical complications  [] Other:     Prognosis: [] Good [x] Fair  [] Poor    Patient Requires Follow-up: [x] Yes  [] No    Plan:   [] Continue per plan of care [] Alter current plan (see comments)  [x] Plan of care initiated [] Hold pending MD visit [] Discharge  Plan for Next Session:  Very gentle right shoulder rom, strength, mfr, mods for pain, will try us, scap strength, cervical stretching, hep, scap and shoulder stab.   GO EASY SHE COULD NOT TOLERATE MUCH PALPATION,  TRY US TO START, Seems to have an AC irritation, also has a cervical proble,m on the right    Electronically signed by:  Trevor Kwan, PT

## 2018-05-24 ENCOUNTER — HOSPITAL ENCOUNTER (OUTPATIENT)
Dept: PHYSICAL THERAPY | Age: 63
Discharge: HOME | End: 2018-05-25
Admitting: FAMILY MEDICINE

## 2018-05-24 NOTE — FLOWSHEET NOTE
Physical Therapy Daily Treatment Note  Date:  2018    Patient Name:  Abimael Menjivar    :  1955  MRN: [id number]  Restrictions/Precautions:  VERY TENDER AROUND ENTIRE SHOULDER AND SCAP  GO EASY  Pertinent Medical History:  Medical/Treatment Diagnosis Information:  · Diagnosis: Calcific tendonitis of the right shoulder  · Treatment Diagnosis: decreased ability to use right ue for adl's  Insurance/Certification information:  PT Insurance Information: [facility]  Physician Information:  Referring Practitioner: Dr. Eunice Eli of care signed (Y/N):  routed  Visit# / total visits3/12  Pain level: 5-8/10     G-Code (if applicable):  CL,  18     Date / Visit # G-Code Applied:  /  PT G-Codes  Functional Assessment Tool Used: quick dash  Score: 45  Functional Limitation: Carrying, moving and handling objects  Carrying, Moving and Handling Objects Current Status (): At least 60 percent but less than 80 percent impaired, limited or restricted  Carrying, Moving and Handling Objects Goal Status (): At least 40 percent but less than 60 percent impaired, limited or restricted    Progress Note: []  Yes  []  No  Next due by: Visit #10      History of Injury: Pt is a 61 y/o female with a hx of right shoulder pain since . Her shoullder pain went away for a feew years and then it returned a month or so ago. She now c/o constant pain in her right shoulder, cerv , and thoracic area which is severe all of the time. She wakes due to pain. She gets a stabbing pain in her shoulder if she moves or touches it. She says her neck hurts all the time as well. She gets a little n=t on occasion. She says cortisone shots have helped in the past.  She had a shot on 5/15/18 but it did not help much. . She mainly does adl's using her left arm. She is not able to do much with her arm at this time.   She hpopes to decrease pain and resume normal activities    Subjective:   Pt states, \" I had this in the past and it

## 2018-06-01 ENCOUNTER — HOSPITAL ENCOUNTER (OUTPATIENT)
Dept: OTHER | Age: 63
Discharge: HOME | End: 2018-06-30
Attending: FAMILY MEDICINE | Admitting: FAMILY MEDICINE

## 2018-06-05 ENCOUNTER — HOSPITAL ENCOUNTER (OUTPATIENT)
Dept: PHYSICAL THERAPY | Age: 63
Discharge: HOME | End: 2018-06-06
Admitting: FAMILY MEDICINE

## 2018-06-05 ENCOUNTER — OFFICE VISIT (OUTPATIENT)
Dept: ORTHOPEDIC SURGERY | Age: 63
End: 2018-06-05

## 2018-06-05 VITALS
DIASTOLIC BLOOD PRESSURE: 92 MMHG | HEART RATE: 72 BPM | WEIGHT: 200 LBS | SYSTOLIC BLOOD PRESSURE: 153 MMHG | HEIGHT: 64 IN | BODY MASS INDEX: 34.15 KG/M2

## 2018-06-05 DIAGNOSIS — M75.31 CALCIFIC TENDONITIS OF RIGHT SHOULDER: ICD-10-CM

## 2018-06-05 DIAGNOSIS — M25.511 ACUTE PAIN OF RIGHT SHOULDER: ICD-10-CM

## 2018-06-05 DIAGNOSIS — M75.21 BICEPS TENDINITIS, RIGHT: ICD-10-CM

## 2018-06-05 DIAGNOSIS — M75.81 ROTATOR CUFF TENDINITIS, RIGHT: ICD-10-CM

## 2018-06-05 PROBLEM — M75.101 TEAR OF RIGHT SUPRASPINATUS TENDON: Status: ACTIVE | Noted: 2018-06-05

## 2018-06-05 PROCEDURE — G8427 DOCREV CUR MEDS BY ELIG CLIN: HCPCS | Performed by: ORTHOPAEDIC SURGERY

## 2018-06-05 PROCEDURE — 99213 OFFICE O/P EST LOW 20 MIN: CPT | Performed by: ORTHOPAEDIC SURGERY

## 2018-06-05 PROCEDURE — G8599 NO ASA/ANTIPLAT THER USE RNG: HCPCS | Performed by: ORTHOPAEDIC SURGERY

## 2018-06-05 PROCEDURE — G8417 CALC BMI ABV UP PARAM F/U: HCPCS | Performed by: ORTHOPAEDIC SURGERY

## 2018-06-05 PROCEDURE — 1036F TOBACCO NON-USER: CPT | Performed by: ORTHOPAEDIC SURGERY

## 2018-06-05 PROCEDURE — 3017F COLORECTAL CA SCREEN DOC REV: CPT | Performed by: ORTHOPAEDIC SURGERY

## 2018-06-05 NOTE — FLOWSHEET NOTE
returned about a month ago and has gotten severe \"  5/22/18  Pt states, \" doing a little better \"  5/24/18  Pt states, \" sore from doing too much at home \"  5/29/18  Pt states, \" doing a little better \"  6/5/18  Pt states, \" MD says that I need surgery \"    Objective:  Initial- flex- 80, abd- 70, ir-50, er-30, ext- 10  Strength- 4-/5     Exercises:  Exercise/Equipment Resistance/Repetitions Other comments   Nu step if able S7 L2 x 5 min   Swiss ball roll if no nu step     Overhead pulley X 2 min    Gentle prom All ranges    supine flex/sl abd X 20    isos End ranges x 10     scap retraction 10 x 20    Sl ranger X 2 min    Add/ext Yellow x 20    Ir/er     Door stretch 30 x 3                   HEP     Codmans all motions X 30     scap retraction X 20    Table flex X 20    shrugs X 20    Wall slides X 10 5/29   Biceps stretch suline 60 x 3  5/29     Other Therapeutic Activities:      Home Exercise Program:  Patient demonstrated proper technique, good tolerance,  and was given written instructions for the above exercises      Manual Treatments:  Very gentle prom or right shoulder, gentle mfr to rtc, traps, ac mobs gr 3, pecs stretch in supine 30 x 3 to open ac joint x 15 min    Modalities: hp x 15 seated to shoulder and traps    Timed Code Treatment Minutes:  43    Total Treatment Minutes: 60     Treatment/Activity Tolerance:  [x] Patient tolerated treatment well [] Patient limited by fatigue  [] Patient limited by pain  [] Patient limited by other medical complications  [] Other:     Prognosis: [] Good [x] Fair  [] Poor    Patient Requires Follow-up: [x] Yes  [] No    Plan:   [] Continue per plan of care [] Alter current plan (see comments)  [x] Plan of care initiated [] Hold pending MD visit [] Discharge  Plan for Next Session:  Very gentle right shoulder rom, strength, mfr, mods for pain, will try us, scap strength, cervical stretching, hep, scap and shoulder stab.   GO EASY SHE COULD NOT TOLERATE MUCH PALPATION,  TRY

## 2018-06-06 ENCOUNTER — TELEPHONE (OUTPATIENT)
Dept: ORTHOPEDIC SURGERY | Age: 63
End: 2018-06-06

## 2018-06-06 ENCOUNTER — OFFICE VISIT (OUTPATIENT)
Dept: ORTHOPEDIC SURGERY | Age: 63
End: 2018-06-06

## 2018-06-06 ENCOUNTER — PRE-EVALUATION (OUTPATIENT)
Dept: ORTHOPEDIC SURGERY | Age: 63
End: 2018-06-06

## 2018-06-06 VITALS
WEIGHT: 199.96 LBS | SYSTOLIC BLOOD PRESSURE: 140 MMHG | HEIGHT: 64 IN | BODY MASS INDEX: 34.14 KG/M2 | DIASTOLIC BLOOD PRESSURE: 85 MMHG | HEART RATE: 78 BPM

## 2018-06-06 DIAGNOSIS — M75.101 TEAR OF RIGHT ROTATOR CUFF, UNSPECIFIED TEAR EXTENT: ICD-10-CM

## 2018-06-06 DIAGNOSIS — M19.019 ACROMIOCLAVICULAR JOINT ARTHRITIS: ICD-10-CM

## 2018-06-06 DIAGNOSIS — M75.121 COMPLETE TEAR OF RIGHT ROTATOR CUFF: Primary | ICD-10-CM

## 2018-06-06 PROCEDURE — G8417 CALC BMI ABV UP PARAM F/U: HCPCS | Performed by: ORTHOPAEDIC SURGERY

## 2018-06-06 PROCEDURE — 3017F COLORECTAL CA SCREEN DOC REV: CPT | Performed by: ORTHOPAEDIC SURGERY

## 2018-06-06 PROCEDURE — G8427 DOCREV CUR MEDS BY ELIG CLIN: HCPCS | Performed by: ORTHOPAEDIC SURGERY

## 2018-06-06 PROCEDURE — APPSS15 APP SPLIT SHARED TIME 0-15 MINUTES: Performed by: NURSE PRACTITIONER

## 2018-06-06 PROCEDURE — G8599 NO ASA/ANTIPLAT THER USE RNG: HCPCS | Performed by: ORTHOPAEDIC SURGERY

## 2018-06-06 PROCEDURE — 1036F TOBACCO NON-USER: CPT | Performed by: ORTHOPAEDIC SURGERY

## 2018-06-06 PROCEDURE — 99213 OFFICE O/P EST LOW 20 MIN: CPT | Performed by: ORTHOPAEDIC SURGERY

## 2018-06-07 ENCOUNTER — TELEPHONE (OUTPATIENT)
Dept: FAMILY MEDICINE CLINIC | Age: 63
End: 2018-06-07

## 2018-06-11 ENCOUNTER — OFFICE VISIT (OUTPATIENT)
Dept: ORTHOPEDIC SURGERY | Age: 63
End: 2018-06-11

## 2018-06-11 VITALS
DIASTOLIC BLOOD PRESSURE: 72 MMHG | HEART RATE: 85 BPM | WEIGHT: 199.96 LBS | BODY MASS INDEX: 34.14 KG/M2 | HEIGHT: 64 IN | SYSTOLIC BLOOD PRESSURE: 109 MMHG

## 2018-06-11 DIAGNOSIS — S43.431A SUPERIOR GLENOID LABRUM LESION OF RIGHT SHOULDER, INITIAL ENCOUNTER: ICD-10-CM

## 2018-06-11 DIAGNOSIS — M19.019 ACROMIOCLAVICULAR JOINT ARTHRITIS: ICD-10-CM

## 2018-06-11 DIAGNOSIS — M25.811 OS ACROMIALE OF RIGHT SHOULDER: ICD-10-CM

## 2018-06-11 DIAGNOSIS — M75.121 COMPLETE TEAR OF RIGHT ROTATOR CUFF: Primary | ICD-10-CM

## 2018-06-11 PROCEDURE — G8599 NO ASA/ANTIPLAT THER USE RNG: HCPCS | Performed by: ORTHOPAEDIC SURGERY

## 2018-06-11 PROCEDURE — G8427 DOCREV CUR MEDS BY ELIG CLIN: HCPCS | Performed by: ORTHOPAEDIC SURGERY

## 2018-06-11 PROCEDURE — 1036F TOBACCO NON-USER: CPT | Performed by: ORTHOPAEDIC SURGERY

## 2018-06-11 PROCEDURE — 99214 OFFICE O/P EST MOD 30 MIN: CPT | Performed by: ORTHOPAEDIC SURGERY

## 2018-06-11 PROCEDURE — G8417 CALC BMI ABV UP PARAM F/U: HCPCS | Performed by: ORTHOPAEDIC SURGERY

## 2018-06-11 PROCEDURE — 3017F COLORECTAL CA SCREEN DOC REV: CPT | Performed by: ORTHOPAEDIC SURGERY

## 2018-06-18 ENCOUNTER — OFFICE VISIT (OUTPATIENT)
Dept: FAMILY MEDICINE CLINIC | Age: 63
End: 2018-06-18

## 2018-06-18 ENCOUNTER — PAT TELEPHONE (OUTPATIENT)
Dept: PREADMISSION TESTING | Age: 63
End: 2018-06-18

## 2018-06-18 VITALS — WEIGHT: 200 LBS | HEIGHT: 64 IN | BODY MASS INDEX: 34.15 KG/M2

## 2018-06-18 VITALS
TEMPERATURE: 98.2 F | RESPIRATION RATE: 16 BRPM | OXYGEN SATURATION: 98 % | WEIGHT: 205.4 LBS | SYSTOLIC BLOOD PRESSURE: 116 MMHG | HEIGHT: 64 IN | BODY MASS INDEX: 35.07 KG/M2 | HEART RATE: 84 BPM | DIASTOLIC BLOOD PRESSURE: 84 MMHG

## 2018-06-18 DIAGNOSIS — M75.121 COMPLETE TEAR OF RIGHT ROTATOR CUFF: ICD-10-CM

## 2018-06-18 DIAGNOSIS — Z01.818 PRE-OP EXAM: Primary | ICD-10-CM

## 2018-06-18 DIAGNOSIS — E11.8 TYPE 2 DIABETES MELLITUS WITH COMPLICATION, WITHOUT LONG-TERM CURRENT USE OF INSULIN (HCC): ICD-10-CM

## 2018-06-18 DIAGNOSIS — I10 ESSENTIAL HYPERTENSION: ICD-10-CM

## 2018-06-18 PROCEDURE — 93000 ELECTROCARDIOGRAM COMPLETE: CPT | Performed by: FAMILY MEDICINE

## 2018-06-18 PROCEDURE — G8427 DOCREV CUR MEDS BY ELIG CLIN: HCPCS | Performed by: FAMILY MEDICINE

## 2018-06-18 PROCEDURE — 2022F DILAT RTA XM EVC RTNOPTHY: CPT | Performed by: FAMILY MEDICINE

## 2018-06-18 PROCEDURE — 99213 OFFICE O/P EST LOW 20 MIN: CPT | Performed by: FAMILY MEDICINE

## 2018-06-18 PROCEDURE — 3017F COLORECTAL CA SCREEN DOC REV: CPT | Performed by: FAMILY MEDICINE

## 2018-06-18 PROCEDURE — G8417 CALC BMI ABV UP PARAM F/U: HCPCS | Performed by: FAMILY MEDICINE

## 2018-06-18 RX ORDER — OMEPRAZOLE 40 MG/1
CAPSULE, DELAYED RELEASE ORAL
Qty: 180 CAPSULE | Refills: 1 | Status: SHIPPED | OUTPATIENT
Start: 2018-06-18 | End: 2019-01-28 | Stop reason: SDUPTHER

## 2018-06-18 ASSESSMENT — ENCOUNTER SYMPTOMS
COLOR CHANGE: 0
VOMITING: 0
DIARRHEA: 0
COUGH: 0
EYE REDNESS: 0
SORE THROAT: 0
SINUS PRESSURE: 0
NAUSEA: 1
SHORTNESS OF BREATH: 0

## 2018-06-18 ASSESSMENT — PAIN DESCRIPTION - PAIN TYPE: TYPE: CHRONIC PAIN

## 2018-06-18 ASSESSMENT — PAIN SCALES - GENERAL: PAINLEVEL_OUTOF10: 7

## 2018-06-18 ASSESSMENT — PAIN DESCRIPTION - ORIENTATION: ORIENTATION: RIGHT

## 2018-06-18 ASSESSMENT — PAIN - FUNCTIONAL ASSESSMENT: PAIN_FUNCTIONAL_ASSESSMENT: 0-10

## 2018-06-18 ASSESSMENT — PAIN DESCRIPTION - LOCATION: LOCATION: SHOULDER

## 2018-06-19 ENCOUNTER — TELEPHONE (OUTPATIENT)
Dept: FAMILY MEDICINE CLINIC | Age: 63
End: 2018-06-19

## 2018-06-19 ENCOUNTER — PAT TELEPHONE (OUTPATIENT)
Dept: PREADMISSION TESTING | Age: 63
End: 2018-06-19

## 2018-06-19 RX ORDER — ALBUTEROL SULFATE 90 UG/1
2 AEROSOL, METERED RESPIRATORY (INHALATION) EVERY 6 HOURS PRN
Qty: 1 INHALER | Refills: 1 | Status: SHIPPED | OUTPATIENT
Start: 2018-06-19 | End: 2019-06-25

## 2018-06-20 ENCOUNTER — HOSPITAL ENCOUNTER (OUTPATIENT)
Dept: SURGERY | Age: 63
Discharge: HOME | End: 2018-06-20
Attending: ORTHOPAEDIC SURGERY | Admitting: ORTHOPAEDIC SURGERY

## 2018-06-20 VITALS
SYSTOLIC BLOOD PRESSURE: 125 MMHG | DIASTOLIC BLOOD PRESSURE: 80 MMHG | HEART RATE: 81 BPM | WEIGHT: 204.92 LBS | OXYGEN SATURATION: 92 % | RESPIRATION RATE: 16 BRPM | BODY MASS INDEX: 34.98 KG/M2 | TEMPERATURE: 97.3 F | HEIGHT: 64 IN

## 2018-06-20 DIAGNOSIS — Z98.890 S/P ROTATOR CUFF REPAIR: Primary | ICD-10-CM

## 2018-06-20 LAB
ANION GAP SERPL CALCULATED.3IONS-SCNC: 13 MMOL/L (ref 3–16)
BUN BLDV-MCNC: 14 MG/DL (ref 7–20)
CALCIUM SERPL-MCNC: 9.5 MG/DL (ref 8.3–10.6)
CHLORIDE BLD-SCNC: 105 MMOL/L (ref 99–110)
CO2: 24 MMOL/L (ref 21–32)
CREAT SERPL-MCNC: 0.7 MG/DL (ref 0.6–1.2)
GFR AFRICAN AMERICAN: >60
GFR NON-AFRICAN AMERICAN: >60
GLUCOSE BLD-MCNC: 105 MG/DL (ref 70–99)
GLUCOSE BLD-MCNC: 107 MG/DL (ref 70–99)
GLUCOSE BLD-MCNC: 142 MG/DL (ref 70–99)
HCT VFR BLD CALC: 40.2 % (ref 36–48)
HEMOGLOBIN: 13.3 G/DL (ref 12–16)
MCH RBC QN AUTO: 27.8 PG (ref 26–34)
MCHC RBC AUTO-ENTMCNC: 33 G/DL (ref 31–36)
MCV RBC AUTO: 84.2 FL (ref 80–100)
PDW BLD-RTO: 13.4 % (ref 12.4–15.4)
PERFORMED ON: ABNORMAL
PERFORMED ON: ABNORMAL
PLATELET # BLD: 270 K/UL (ref 135–450)
PMV BLD AUTO: 7.8 FL (ref 5–10.5)
POTASSIUM REFLEX MAGNESIUM: 3.8 MMOL/L (ref 3.5–5.1)
RBC # BLD: 4.78 M/UL (ref 4–5.2)
SODIUM BLD-SCNC: 142 MMOL/L (ref 136–145)
WBC # BLD: 7 K/UL (ref 4–11)

## 2018-06-20 RX ORDER — IPRATROPIUM BROMIDE AND ALBUTEROL SULFATE 2.5; .5 MG/3ML; MG/3ML
1 SOLUTION RESPIRATORY (INHALATION)
Status: DISCONTINUED | OUTPATIENT
Start: 2018-06-20 | End: 2018-06-20 | Stop reason: CLARIF

## 2018-06-20 RX ORDER — FENTANYL CITRATE 50 UG/ML
25 INJECTION, SOLUTION INTRAMUSCULAR; INTRAVENOUS EVERY 5 MIN PRN
Status: DISCONTINUED | OUTPATIENT
Start: 2018-06-20 | End: 2018-06-21 | Stop reason: HOSPADM

## 2018-06-20 RX ORDER — ONDANSETRON 2 MG/ML
4 INJECTION INTRAMUSCULAR; INTRAVENOUS
Status: ACTIVE | OUTPATIENT
Start: 2018-06-20 | End: 2018-06-20

## 2018-06-20 RX ORDER — SODIUM CHLORIDE 0.9 % (FLUSH) 0.9 %
10 SYRINGE (ML) INJECTION EVERY 12 HOURS SCHEDULED
Status: DISCONTINUED | OUTPATIENT
Start: 2018-06-20 | End: 2018-06-21 | Stop reason: HOSPADM

## 2018-06-20 RX ORDER — OXYCODONE HYDROCHLORIDE AND ACETAMINOPHEN 5; 325 MG/1; MG/1
1-2 TABLET ORAL EVERY 6 HOURS PRN
Qty: 35 TABLET | Refills: 0 | Status: SHIPPED | OUTPATIENT
Start: 2018-06-20 | End: 2018-06-29 | Stop reason: SDUPTHER

## 2018-06-20 RX ORDER — SODIUM CHLORIDE 9 MG/ML
INJECTION, SOLUTION INTRAVENOUS CONTINUOUS
Status: DISCONTINUED | OUTPATIENT
Start: 2018-06-20 | End: 2018-06-21 | Stop reason: HOSPADM

## 2018-06-20 RX ORDER — SODIUM CHLORIDE 0.9 % (FLUSH) 0.9 %
10 SYRINGE (ML) INJECTION PRN
Status: DISCONTINUED | OUTPATIENT
Start: 2018-06-20 | End: 2018-06-21 | Stop reason: HOSPADM

## 2018-06-20 RX ADMIN — IPRATROPIUM BROMIDE AND ALBUTEROL SULFATE 1 AMPULE: 2.5; .5 SOLUTION RESPIRATORY (INHALATION) at 15:09

## 2018-06-20 RX ADMIN — SODIUM CHLORIDE: 9 INJECTION, SOLUTION INTRAVENOUS at 10:48

## 2018-06-20 ASSESSMENT — LIFESTYLE VARIABLES: SMOKING_STATUS: 0

## 2018-06-20 ASSESSMENT — PAIN DESCRIPTION - DESCRIPTORS: DESCRIPTORS: ACHING;SHOOTING;BURNING

## 2018-06-20 ASSESSMENT — ENCOUNTER SYMPTOMS: SHORTNESS OF BREATH: 1

## 2018-06-20 ASSESSMENT — PAIN - FUNCTIONAL ASSESSMENT: PAIN_FUNCTIONAL_ASSESSMENT: 0-10

## 2018-06-21 ENCOUNTER — TELEPHONE (OUTPATIENT)
Dept: ORTHOPEDIC SURGERY | Age: 63
End: 2018-06-21

## 2018-06-21 RX ORDER — CYCLOBENZAPRINE HCL 10 MG
10 TABLET ORAL 3 TIMES DAILY PRN
Qty: 40 TABLET | Refills: 0 | Status: SHIPPED | OUTPATIENT
Start: 2018-06-21 | End: 2018-07-01

## 2018-06-25 ENCOUNTER — TELEPHONE (OUTPATIENT)
Dept: FAMILY MEDICINE CLINIC | Age: 63
End: 2018-06-25

## 2018-06-27 ENCOUNTER — TELEPHONE (OUTPATIENT)
Dept: FAMILY MEDICINE CLINIC | Age: 63
End: 2018-06-27

## 2018-06-29 ENCOUNTER — TELEPHONE (OUTPATIENT)
Dept: ORTHOPEDIC SURGERY | Age: 63
End: 2018-06-29

## 2018-06-29 ENCOUNTER — OFFICE VISIT (OUTPATIENT)
Dept: ORTHOPEDIC SURGERY | Age: 63
End: 2018-06-29

## 2018-06-29 VITALS
SYSTOLIC BLOOD PRESSURE: 127 MMHG | HEIGHT: 64 IN | HEART RATE: 84 BPM | WEIGHT: 199 LBS | RESPIRATION RATE: 16 BRPM | BODY MASS INDEX: 33.97 KG/M2 | DIASTOLIC BLOOD PRESSURE: 71 MMHG

## 2018-06-29 DIAGNOSIS — M25.511 ACUTE PAIN OF RIGHT SHOULDER: Primary | ICD-10-CM

## 2018-06-29 DIAGNOSIS — Z98.890 S/P ROTATOR CUFF REPAIR: ICD-10-CM

## 2018-06-29 PROCEDURE — 99024 POSTOP FOLLOW-UP VISIT: CPT | Performed by: ORTHOPAEDIC SURGERY

## 2018-06-29 RX ORDER — OXYCODONE HYDROCHLORIDE AND ACETAMINOPHEN 5; 325 MG/1; MG/1
1-2 TABLET ORAL EVERY 8 HOURS PRN
Qty: 42 TABLET | Refills: 0 | Status: SHIPPED | OUTPATIENT
Start: 2018-06-29 | End: 2018-07-06

## 2018-06-29 RX ORDER — OXYCODONE HYDROCHLORIDE AND ACETAMINOPHEN 5; 325 MG/1; MG/1
1-2 TABLET ORAL EVERY 8 HOURS PRN
Qty: 42 TABLET | Refills: 0 | Status: SHIPPED | OUTPATIENT
Start: 2018-06-29 | End: 2018-06-29 | Stop reason: CLARIF

## 2018-06-29 RX ORDER — OXYCODONE HYDROCHLORIDE AND ACETAMINOPHEN 5; 325 MG/1; MG/1
1-2 TABLET ORAL EVERY 8 HOURS PRN
Qty: 42 TABLET | Refills: 0
Start: 2018-06-29 | End: 2018-06-29 | Stop reason: SDUPTHER

## 2018-06-29 RX ORDER — OXYCODONE HYDROCHLORIDE AND ACETAMINOPHEN 5; 325 MG/1; MG/1
1-2 TABLET ORAL EVERY 8 HOURS PRN
Qty: 42 TABLET | Refills: 0 | Status: SHIPPED | OUTPATIENT
Start: 2018-06-29 | End: 2018-06-29 | Stop reason: SDUPTHER

## 2018-07-01 ENCOUNTER — HOSPITAL ENCOUNTER (OUTPATIENT)
Dept: OTHER | Age: 63
Discharge: HOME | End: 2018-07-31
Attending: FAMILY MEDICINE | Admitting: FAMILY MEDICINE

## 2018-07-02 ENCOUNTER — HOSPITAL ENCOUNTER (OUTPATIENT)
Dept: OTHER | Age: 63
Discharge: HOME | End: 2018-07-31
Attending: ORTHOPAEDIC SURGERY | Admitting: ORTHOPAEDIC SURGERY

## 2018-07-02 ENCOUNTER — OFFICE VISIT (OUTPATIENT)
Dept: FAMILY MEDICINE CLINIC | Age: 63
End: 2018-07-02

## 2018-07-02 VITALS
BODY MASS INDEX: 37.25 KG/M2 | WEIGHT: 218.2 LBS | SYSTOLIC BLOOD PRESSURE: 100 MMHG | TEMPERATURE: 98 F | DIASTOLIC BLOOD PRESSURE: 68 MMHG | HEIGHT: 64 IN | OXYGEN SATURATION: 98 % | RESPIRATION RATE: 18 BRPM | HEART RATE: 78 BPM

## 2018-07-02 DIAGNOSIS — F39 MOOD DISORDER (HCC): ICD-10-CM

## 2018-07-02 DIAGNOSIS — R68.2 DRY MOUTH: ICD-10-CM

## 2018-07-02 DIAGNOSIS — I10 ESSENTIAL HYPERTENSION: ICD-10-CM

## 2018-07-02 DIAGNOSIS — E11.8 TYPE 2 DIABETES MELLITUS WITH COMPLICATION, WITHOUT LONG-TERM CURRENT USE OF INSULIN (HCC): Primary | ICD-10-CM

## 2018-07-02 DIAGNOSIS — R42 LIGHT HEADEDNESS: ICD-10-CM

## 2018-07-02 LAB — HBA1C MFR BLD: 8.3 %

## 2018-07-02 PROCEDURE — G0444 DEPRESSION SCREEN ANNUAL: HCPCS | Performed by: FAMILY MEDICINE

## 2018-07-02 PROCEDURE — 2022F DILAT RTA XM EVC RTNOPTHY: CPT | Performed by: FAMILY MEDICINE

## 2018-07-02 PROCEDURE — 3045F PR MOST RECENT HEMOGLOBIN A1C LEVEL 7.0-9.0%: CPT | Performed by: FAMILY MEDICINE

## 2018-07-02 PROCEDURE — G8599 NO ASA/ANTIPLAT THER USE RNG: HCPCS | Performed by: FAMILY MEDICINE

## 2018-07-02 PROCEDURE — 99214 OFFICE O/P EST MOD 30 MIN: CPT | Performed by: FAMILY MEDICINE

## 2018-07-02 PROCEDURE — 3017F COLORECTAL CA SCREEN DOC REV: CPT | Performed by: FAMILY MEDICINE

## 2018-07-02 PROCEDURE — G8417 CALC BMI ABV UP PARAM F/U: HCPCS | Performed by: FAMILY MEDICINE

## 2018-07-02 PROCEDURE — G8427 DOCREV CUR MEDS BY ELIG CLIN: HCPCS | Performed by: FAMILY MEDICINE

## 2018-07-02 PROCEDURE — 83036 HEMOGLOBIN GLYCOSYLATED A1C: CPT | Performed by: FAMILY MEDICINE

## 2018-07-02 PROCEDURE — 1036F TOBACCO NON-USER: CPT | Performed by: FAMILY MEDICINE

## 2018-07-02 RX ORDER — METOPROLOL SUCCINATE 25 MG/1
TABLET, EXTENDED RELEASE ORAL
Qty: 90 TABLET | Refills: 3 | Status: SHIPPED | OUTPATIENT
Start: 2018-07-02 | End: 2019-01-28 | Stop reason: SDUPTHER

## 2018-07-02 ASSESSMENT — PATIENT HEALTH QUESTIONNAIRE - PHQ9
4. FEELING TIRED OR HAVING LITTLE ENERGY: 3
SUM OF ALL RESPONSES TO PHQ QUESTIONS 1-9: 19
7. TROUBLE CONCENTRATING ON THINGS, SUCH AS READING THE NEWSPAPER OR WATCHING TELEVISION: 3
1. LITTLE INTEREST OR PLEASURE IN DOING THINGS: 3
9. THOUGHTS THAT YOU WOULD BE BETTER OFF DEAD, OR OF HURTING YOURSELF: 0
SUM OF ALL RESPONSES TO PHQ9 QUESTIONS 1 & 2: 6
5. POOR APPETITE OR OVEREATING: 1
6. FEELING BAD ABOUT YOURSELF - OR THAT YOU ARE A FAILURE OR HAVE LET YOURSELF OR YOUR FAMILY DOWN: 0
8. MOVING OR SPEAKING SO SLOWLY THAT OTHER PEOPLE COULD HAVE NOTICED. OR THE OPPOSITE, BEING SO FIGETY OR RESTLESS THAT YOU HAVE BEEN MOVING AROUND A LOT MORE THAN USUAL: 3
2. FEELING DOWN, DEPRESSED OR HOPELESS: 3
10. IF YOU CHECKED OFF ANY PROBLEMS, HOW DIFFICULT HAVE THESE PROBLEMS MADE IT FOR YOU TO DO YOUR WORK, TAKE CARE OF THINGS AT HOME, OR GET ALONG WITH OTHER PEOPLE: 2
3. TROUBLE FALLING OR STAYING ASLEEP: 3

## 2018-07-02 NOTE — PROGRESS NOTES
7/2/2018    Rob Monday is a 60 y.o. female here for follow up    HPI   DM meds - metformin, Januvia, dulaglutide  Urine Micro - on ARB  Statin - on statin  Blood sugars - usually runs 150s. Recently in the 200s with pain, recent surgery, etc.   Neuropathy - none  Foot exam - today  Eye exam - due  Lab Results   Component Value Date    LABA1C 8.3 07/02/2018     Lab Results   Component Value Date    .9 12/11/2017   Her A1C is improving now to 8.3.   - Has cut way back on soda  - Limits most carbs, has cut back on rice portion. HTN - since surgery has been running a little low - sometimes 50I systolic. Does get dizzy and light-headed at times when it is low. Mood - continues to follow with Psychiatry. Is on Cymbalta and trazodone. She is c/o dry mouth. Going on for over a year. Worse in the morning, gets better throughout the day. Does snore at night. Uses CPAP. Review of Systems   Constitutional: Negative for chills and fever. HENT: Positive for postnasal drip. Cardiovascular: Negative for chest pain and leg swelling. Skin: Negative for rash. Patient Active Problem List   Diagnosis    Hypertension    Bipolar 1 disorder, depressed ([address]    Diabetes mellitus ([address]    Hyperlipidemia    Obesity    Dystonia    Cerebral thrombosis with cerebral infarction ([address]    Sleep apnea    Right sided weakness    Trigger finger, acquired    Elevated CK    Primary osteoarthritis involving multiple joints    Primary osteoarthritis of both knees    Mood disorder ([address] - follows with Psych Dr. Tamela James'    Calcific tendonitis of right shoulder    Rotator cuff tendinitis, right    Biceps tendinitis, right    Tear of right supraspinatus tendon     Prior to Visit Medications    Medication Sig Taking?  Authorizing Provider   metoprolol succinate (TOPROL XL) 25 MG extended release tablet TAKE ONE HALF TABLET BY MOUTH DAILY FOR BLOOD PRESSURE AND HEART RATE Yes Cristino Finnegan MD   ibuprofen oxyCODONE-acetaminophen (PERCOCET) 5-325 MG per tablet Take 1-2 tablets by mouth every 8 hours as needed for Pain for up to 7 days. Erika Shell MD     Health Maintenance   Topic Date Due    DTaP/Tdap/Td vaccine (1 - Tdap) 11/04/1973    Shingles Vaccine (1 of 2 - 2 Dose Series) 11/04/2004    Diabetic foot exam  02/12/2015    Diabetic retinal exam  02/12/2015    Breast cancer screen  08/18/2018    Flu vaccine (1) 09/01/2018    Diabetic microalbuminuria test  12/11/2018    Lipid screen  12/11/2018    Cervical cancer screen  02/08/2019    A1C test (Diabetic or Prediabetic)  03/26/2019    Potassium monitoring  06/20/2019    Creatinine monitoring  06/20/2019    Colon cancer screen colonoscopy  07/15/2021    Pneumococcal med risk  Completed    Hepatitis C screen  Completed    HIV screen  Completed     Past Medical History:   Diagnosis Date    Arthritis     Diabetes     GERD (gastroesophageal reflux disease)     Hyperlipidemia     Hypertension     Lumbar disc disease     Sleep apnea     pt states does use a Cpap machine at night    Unspecified cerebral artery occlusion with cerebral infarction 2014    right side weak    Wears glasses      Social History     Social History    Marital status:      Spouse name: Freedom Quezada, seen here    Number of children: 3    Years of education: N/A     Social History Main Topics    Smoking status: Former Smoker     Packs/day: 0.00     Years: 3.00     Quit date: 1/1/1992    Smokeless tobacco: Never Used    Alcohol use No    Drug use: No    Sexual activity: Yes     Partners: Male     Other Topics Concern    Not on file     Social History Narrative    Originally from [facility]     is seen here    10 grandchildren     Allergies   Allergen Reactions    Codeine Nausea And Vomiting and Rash    Vicodin [Hydrocodone-Acetaminophen] Nausea And Vomiting    Oxycontin [Oxycodone Hcl] Itching       LABS:  Lab Results   Component Value

## 2018-07-02 NOTE — PLAN OF CARE
Outpatient Physical Therapy  [x] [facility]    Phone: [phone]   Fax: [phone]   [] [facility]  Phone: [phone]              Fax: [phone]  [] [facility]   Phone: [phone]   Fax: [phone]     To: Referring Practitioner: Luann Augustine      Patient: Ted Penn   : 1955   MRN: [id number]  Evaluation Date: 2018      Diagnosis Information:  · Diagnosis: shoulder scope, open amarilys, RCR - medium tear of entire SS and portion of the IS. · Treatment Diagnosis: Decreased functional mobility post right RCR     Physical Therapy Certification  Dear Dr. Luann Augustine  The following patient has been evaluated for physical therapy services and for therapy to continue, Medicare requires monthly physician review of the treatment plan. Please review the attached evaluation and/or summary of the patient's plan of care, and verify that you agree therapy should continue by signing the attached document and sending it back to our office. Plan of Care/Treatment to date:  [x] Therapeutic Exercise    [x] Modalities:  [x] Therapeutic Activity     [] Ultrasound  [] Electrical Stimulation  [] Gait Training      [] Cervical Traction [] Lumbar Traction  [] Neuromuscular Re-education    [] Cold/hotpack [] Iontophoresis   [x] Instruction in HEP     Other:  [x] Manual Therapy      []             [] Aquatic Therapy      []           ? Frequency/Duration:  # Days per week: [] 1 day # Weeks: [] 1 week [] 5 weeks     [x] 2 days? [] 2 weeks [] 6 weeks     [] 3 days   [] 3 weeks [] 7 weeks     [] 4 days   [] 4 weeks [x] 8 weeks    Rehab Potential: [x] Excellent [] Good [] Fair  [] Poor       Electronically signed by:  Shai Richard, PT  [id number]      If you have any questions or concerns, please don't hesitate to call.   Thank you for your referral.      Physician Signature:________________________________Date:__________________  By signing above, therapists plan is approved by physician

## 2018-07-02 NOTE — FLOWSHEET NOTE
Physical Therapy Daily Treatment Note  Date:  2018    Patient Name:  Keara Dobbs    :  1955  MRN: [id number]    Restrictions/Precautions: Position Activity Restriction  Other position/activity restrictions: PROM x 3 weeks, may begin AAROM in 3 weeks from , no sig fall risk    Pertinent Medical History: Additional Pertinent Hx: arthritis, DM, GERD, HLD, HTN, lumbar disc disease, CVA  with right side weakness CTrelease,     Medical/Treatment Diagnosis Information:  · Diagnosis: shoulder scope, open amarilys, RCR - medium tear of entire SS and portion of the IS. · Treatment Diagnosis: Decreased functional mobility post right RCR    Insurance/Certification information:  PT Insurance Information: [facility]  Physician Information:  Referring Practitioner: Joanell Aase of care signed (Y/N):       Visit# / total visits:    Pain level: 6-7/10     G-Code (if applicable):   49  CM   ]    History of Injury:    Pt post 18 RCR, currently in sling. Subjective:   Normally right hand dominant, unable to use RUE at this time. Reports difficulty sleeping, limited in all adl, self care and homemaking. Objective:  RUE General PROM: flexion 88 abduction  50, IR in neutral to stomach,  ER in neutral to 25 deg. PROM till  - then start AAROM  Exercise/Equipment Resistance/Repetitions Other comments        arom  Hand/wrist  Elbow  gripper   10 each  10 each 4 way PROM shoulder till         UE ranger With PT moving - onfloor              Recliner  PROM all dir per protocol   10 min         Shoulder shrugs add    scap retr add         Pulleys - facing Facing        add         premod with cp Recliner post exercise 15 min                 Other Therapeutic Activities:  Pt was educated on PT POC, Diagnosis, Prognosis, pathomechanics as well as frequency and duration of scheduling future physical therapy appointments.  Time was also taken on this day to answer all patient questions and participation in PT. Reviewed appointment policy in detail with patient and patient verbalized understanding. Home Exercise Program:  Patient instructed in the following for HEP:   Patient verbalized/demonstrated understanding and was issued written HEP.     Timed Code Treatment Minutes:  45    Total Treatment Minutes:  75    Treatment/Activity Tolerance:  [] Patient tolerated treatment well [] Patient limited by fatigue  [] Patient limited by pain  [] Patient limited by other medical complications  [] Other:     Prognosis: [x] Good [] Fair  [] Poor    Goals:    Short term goals  Time Frame for Short term goals: 2Weeks  Short term goal 1: inidep with hep - progress per protocol      Long term goals  Time Frame for Long term goals : discharge  Long term goal 1: Normalize PROM for indep dressing  Long term goal 2: Normalize AROM for indep self care  Long term goal 3: Normalize strength for indep homemaking  Long term goal 4: Decrease pain on VAS to occasional 2-3 to allow normal functional mobility with RUE     Patient Requires Follow-up: [x] Yes  [] No    Plan:   [] Continue per plan of care [] Alter current plan (see comments)  [x] Plan of care initiated [] Hold pending MD visit [] Discharge    Plan for Next Session:  Add above as stated    Electronically signed by:  Candace Mcneill, [address]

## 2018-07-02 NOTE — PROGRESS NOTES
Functions, Activity limitations: Decreased functional mobility ; Decreased ROM; Decreased strength;Decreased endurance  Assessment: Pt currently post right medium RCR. Prior function - indep with pain. Current function - limited self care, adl, homemaking  Treatment Diagnosis: Decreased functional mobility post right RCR  Prognosis: Good;Excellent  Decision Making: Medium Complexity  Patient Education: role of PT, anatomy, protocol  Barriers to Learning: none noted  REQUIRES PT FOLLOW UP: Yes  Treatment Initiated : POC  Activity Tolerance  Activity Tolerance: Patient limited by pain         Plan   Plan  Times per week: 2x/week for 8-12 weeks per protocol  Current Treatment Recommendations: Strengthening, ROM, Functional Mobility Training, Manual Therapy - Soft Tissue Mobilization, Pain Management, Safety Education & Training, Home Exercise Program    G-Code  PT G-Codes  Functional Assessment Tool Used: QuickDash  Score: 49  Functional Limitation: Carrying, moving and handling objects  Carrying, Moving and Handling Objects Current Status (): At least 80 percent but less than 100 percent impaired, limited or restricted  Carrying, Moving and Handling Objects Goal Status (): At least 60 percent but less than 80 percent impaired, limited or restricted                   QuickDASH Total Score: 49       QuickDASH Disability/Symptom Score : 86.36 %                 Goals  Short term goals  Time Frame for Short term goals: 2Weeks  Short term goal 1: inidep with hep - progress per protocol  Long term goals  Time Frame for Long term goals : discharge  Long term goal 1: Normalize PROM for indep dressing  Long term goal 2: Normalize AROM for indep self care  Long term goal 3: Normalize strength for indep homemaking  Long term goal 4: Decrease pain on VAS to occasional 2-3 to allow normal functional mobility with RUE  Patient Goals   Patient goals :  \"To get moving again\"       Timed Code Treatment Minutes:   45    Total

## 2018-07-03 NOTE — DISCHARGE SUMMARY
Outpatient Physical Therapy  [] [facility]    Phone: [phone]   Fax: [phone]   [x] [facility]  Phone: [phone]   Fax: [phone]  [] [facility]              Phone: [phone]   Fax: [phone]     Physical Therapy Progress/Discharge Note  Date: 7/3/2018        Patient Name:  Krishna Gaona    :  1955  MRN: [id number]  Restrictions/Precautions:  VERY TENDER AROUND ENTIRE SHOULDER AND SCAP  GO EASY  Pertinent Medical History:  Medical/Treatment Diagnosis Information:  · Diagnosis: Calcific tendonitis of the right shoulder  · Treatment Diagnosis: decreased ability to use right ue for adl's  Insurance/Certification information:  PT Insurance Information: [facility]  Physician Information:  Referring Practitioner: Dr. Candy Ley of care signed (Y/N):  routed  Visit# / total visits  Pain level:      5-8/10       G-Code (if applicable):  ,  18                                                Date / Visit # G-Code Applied:  /  PT G-Codes  Functional Assessment Tool Used: quick dash  Score: 45  Functional Limitation: Carrying, moving and handling objects  Carrying, Moving and Handling Objects Current Status (): At least 60 percent but less than 80 percent impaired, limited or restricted  Carrying, Moving and Handling Objects Goal Status (): At least 40 percent but less than 60 percent impaired, limited or restricted      Plan of Care/Treatment to date:  [x] Therapeutic Exercise    [] Modalities:  [x] Therapeutic Activity     [x] Ultrasound  [x] Electrical Stimulation  [] Gait Training      [] Cervical Traction    [] Lumbar Traction  [x] Neuromuscular Re-education  [x] Cold/hotpack [] Iontophoresis  [x] Instruction in HEP      Other:  [x] Manual Therapy       []    [] Aquatic Therapy       []                    ? Significant Findings At Last Visit/Comments:    Subjective:          Objective:  Not present     Assessment:  Pt was seen fro 5 rx with a little improvement.

## 2018-07-05 RX ORDER — FUROSEMIDE 20 MG/1
TABLET ORAL
Qty: 90 TABLET | Refills: 0 | Status: SHIPPED | OUTPATIENT
Start: 2018-07-05 | End: 2018-10-04 | Stop reason: SDUPTHER

## 2018-07-06 ENCOUNTER — HOSPITAL ENCOUNTER (OUTPATIENT)
Dept: PHYSICAL THERAPY | Age: 63
Discharge: HOME | End: 2018-07-07
Admitting: ORTHOPAEDIC SURGERY

## 2018-07-06 ENCOUNTER — OFFICE VISIT (OUTPATIENT)
Dept: ORTHOPEDIC SURGERY | Age: 63
End: 2018-07-06

## 2018-07-06 ENCOUNTER — TELEPHONE (OUTPATIENT)
Dept: ORTHOPEDIC SURGERY | Age: 63
End: 2018-07-06

## 2018-07-06 VITALS
HEART RATE: 89 BPM | SYSTOLIC BLOOD PRESSURE: 110 MMHG | BODY MASS INDEX: 34.15 KG/M2 | DIASTOLIC BLOOD PRESSURE: 77 MMHG | HEIGHT: 64 IN | WEIGHT: 200 LBS

## 2018-07-06 DIAGNOSIS — M16.11 PRIMARY OSTEOARTHRITIS OF RIGHT HIP: Primary | ICD-10-CM

## 2018-07-06 PROCEDURE — 99999 PR OFFICE/OUTPT VISIT,PROCEDURE ONLY: CPT | Performed by: ORTHOPAEDIC SURGERY

## 2018-07-06 PROCEDURE — 20611 DRAIN/INJ JOINT/BURSA W/US: CPT | Performed by: ORTHOPAEDIC SURGERY

## 2018-07-06 NOTE — PROGRESS NOTES
ULTRASOUND GUIDED HIP INJECTION    John Wood    July 6, 2018    Chief Complaint   Patient presents with    Hip Pain     RT Hip Injection with UltraSound   LOV 4/24/18       /77   Pulse 89   Ht 5' 4\" (1.626 m)   Wt 200 lb (90.7 kg)   BMI 34.33 kg/m²     John returns requesting a repeat ultrasound directed intra-articular steroid injection into her right hip. She last underwent a hip injection by myself in April and had good relief of her pain for over 2 months. Her pain has returned and she now desires another injection. She complains of pain in her right groin which is a constant ache 4 at rest and 8 with activities including walking. The pain does limit her activities of daily living. She is now approximately 2 weeks status post right shoulder rotator cuff repair by Dr. Igor Pinedo and does have some difficulty in transfers because of a combination of limitations imposed on her right shoulder and her painful right hip. Review of systems reviewed from patient history dated on  4/13/18  and available in the patient's chart under media tab. Physical Exam:   Examination of the righthip shows a positive logroll. No Lesion of the skin is noted over the injection site    Impression:  right hip osteoarthritis. Plan:  1. Injection with cortisone was recommended into  right   hip joint using ultrasound visualization. She understands the risks and benefits of the injection and describes no potential allergies. Time out was performed to verify correct person, correct procedure, and correct site. 2. Ultrasound visualization was first performed utilizing the [facility] Ultrasound unit using an 5/2 MHz Curved Probe. finding the femoral neck head junction. Next the femoral artery and vein were visualized with ultrasound medial to the femoral head.  The location of the needle insertion was determined well lateral to the neurovascular structures and the site was anesthetized with 5 mL of 1% Lidocaine. The site was then prepped with ChloraPrep. A sterile cover was placed over the transducer head and the femoral head neck junction once again visualized. A 20-gauge spinal needle was then introduced under ultrasound control to the head neck junction. Once the needle was intracapsular the hip joint was injected with 5 mL  of 1% Lidocaine mixed with 2 ml of 40 mg Depo Medrol. John tolerated the procedure well and  did report partial relief of  hip pain within 5 minutes of the injection. 3. She is returned to Dr. Horvath [facility].      Adrianna Vargas MD  7/6/2018     Depo-Medrol:  NDC: 9697-3884-57  Lot #: [id number]  Expiration Date: 5/20

## 2018-07-06 NOTE — FLOWSHEET NOTE
Physical Therapy Daily Treatment Note  Date:  2018    Patient Name:  Gagandeep Young    :  1955  MRN: [id number]    Restrictions/Precautions: Position Activity Restriction  Other position/activity restrictions: PROM x 3 weeks, may begin AAROM in 3 weeks from , no sig fall risk    Pertinent Medical History: Additional Pertinent Hx: arthritis, DM, GERD, HLD, HTN, lumbar disc disease, CVA  with right side weakness CTrelease,     Medical/Treatment Diagnosis Information:  · Diagnosis: shoulder scope, open amarilys, RCR - medium tear of entire SS and portion of the IS. · Treatment Diagnosis: Decreased functional mobility post right RCR    Insurance/Certification information:  PT Insurance Information: [facility] Dual  Physician Information:  Referring Practitioner: Partha Caputo of care signed (Y/N):       Visit# / total visits:    Pain level: /10     G-Code (if applicable):   49  CM   ]    History of Injury:    Pt post 18 RCR, currently in sling. Subjective:   Has been in pain, but is taking pain medication. Was sore when she left after the eval      Objective:  RUE General PROM: flexion 88 abduction  50, IR in neutral to stomach,  ER in neutral to 25 deg. PROM till  - then start AAROM  Exercise/Equipment Resistance/Repetitions Other comments        arom  Hand/wrist  Elbow  gripper   10 each  10 each 4 way  Yellow x 10 all / x 10 each PROM shoulder till         UE ranger With PT moving - onfloor              Recliner  PROM all dir per protocol   10x         Shoulder shrugs x10    scap retr x10         Pulleys - facing Facing       x10         premod with cp Recliner post exercise 15 min                 Other Therapeutic Activities:  Pt was educated on PT POC, Diagnosis, Prognosis, pathomechanics as well as frequency and duration of scheduling future physical therapy appointments.  Time was also taken on this day to answer all patient questions and participation in

## 2018-07-06 NOTE — TELEPHONE ENCOUNTER
Pt is calling about some type of chair that she is needed to send over a order for it. She stated that see saw DR Wilfredo Canales today, but she is not sure what to do from here.  [phone]

## 2018-07-06 NOTE — TELEPHONE ENCOUNTER
This message is for Dr. Tam Irwin team.  This patient had RTC surgery by Gilma Arvizu and she wants someone to send an order to her for a recliner as she was told in the hospital that she should sleep in a recliner. She said her insurance company needs a order as she thinks they will pay for one. Please contact patient. Dr. Melvin Gooden just injected her hip today but this message has nothing to do with getting a recliner for her shoulder surgery.

## 2018-07-23 ENCOUNTER — OFFICE VISIT (OUTPATIENT)
Dept: ORTHOPEDIC SURGERY | Age: 63
End: 2018-07-23

## 2018-07-23 VITALS — WEIGHT: 199 LBS | HEIGHT: 64 IN | BODY MASS INDEX: 33.97 KG/M2

## 2018-07-23 DIAGNOSIS — Z98.890 STATUS POST ROTATOR CUFF REPAIR: Primary | ICD-10-CM

## 2018-07-23 PROCEDURE — 99024 POSTOP FOLLOW-UP VISIT: CPT | Performed by: NURSE PRACTITIONER

## 2018-07-23 RX ORDER — CYCLOBENZAPRINE HCL 10 MG
10 TABLET ORAL NIGHTLY PRN
Qty: 20 TABLET | Refills: 0 | Status: SHIPPED | OUTPATIENT
Start: 2018-07-23 | End: 2018-08-02

## 2018-07-24 ENCOUNTER — HOSPITAL ENCOUNTER (OUTPATIENT)
Dept: PHYSICAL THERAPY | Age: 63
Discharge: HOME | End: 2018-07-25
Admitting: ORTHOPAEDIC SURGERY

## 2018-07-24 NOTE — FLOWSHEET NOTE
Physical Therapy Daily Treatment Note  Date:  2018    Patient Name:  Ranjeet Ferrell    :  1955  MRN: [id number]    Restrictions/Precautions: Position Activity Restriction  Other position/activity restrictions: PROM x 3 weeks, may begin AAROM in 3 weeks from , no sig fall risk    Pertinent Medical History: Additional Pertinent Hx: arthritis, DM, GERD, HLD, HTN, lumbar disc disease, CVA  with right side weakness CTrelease,     Medical/Treatment Diagnosis Information:  · Diagnosis: shoulder scope, open amarilys, RCR - medium tear of entire SS and portion of the IS. · Treatment Diagnosis: Decreased functional mobility post right RCR    Insurance/Certification information:  PT Insurance Information: [id number] [place] Dual  Physician Information:  Referring Practitioner: Blessing Reilly   - new order to continue  12 visits  Plan of care signed (Y/N):       Visit# / total visits:       Pain level: 3-4/10     G-Code (if applicable):   49  CM   ]    History of Injury:    Pt post 18 RCR, currently in sling. Subjective:   Pt states that she feels like she is getting better, but it still hurts. How bad it hurts depends on what she's doing    Objective:   PROM: flexion 140 abduction 90 , IR in neutral to stomach,  ER  45 deg at 45 deepak. Amanda Gobble PROM till  - then start [address]    Per  MD She will continue to avoid overhead activities and limit her pushing, pulling and lifting - discontinue use of sling. Limit activities till full ROM and comfort is regained. Exercise/Equipment Resistance/Repetitions Other comments   NT 0# with PT assist, right arm to body  2 min          UE ranger  4-way On floor x15 ea    Pulleys seated facing away 20    Wand ER/IR standing 10         standing elbow flex/ext 2x10    Recliner  PROM all dir per protocol - reclined   10x    AAROM all dir per protocol reclined  10x              arom wrist/hand.  gripper  1# x 10  Red   x 10               premod with cp

## 2018-07-25 ENCOUNTER — TELEPHONE (OUTPATIENT)
Dept: ORTHOPEDIC SURGERY | Age: 63
End: 2018-07-25

## 2018-07-27 ENCOUNTER — HOSPITAL ENCOUNTER (OUTPATIENT)
Dept: PHYSICAL THERAPY | Age: 63
Discharge: HOME | End: 2018-07-28
Admitting: ORTHOPAEDIC SURGERY

## 2018-07-31 ENCOUNTER — HOSPITAL ENCOUNTER (OUTPATIENT)
Dept: PHYSICAL THERAPY | Age: 63
Discharge: HOME | End: 2018-08-31
Admitting: ORTHOPAEDIC SURGERY

## 2018-08-01 ENCOUNTER — HOSPITAL ENCOUNTER (OUTPATIENT)
Dept: OTHER | Age: 63
Discharge: HOME | End: 2018-08-01
Attending: ORTHOPAEDIC SURGERY | Admitting: ORTHOPAEDIC SURGERY

## 2018-08-07 ENCOUNTER — HOSPITAL ENCOUNTER (OUTPATIENT)
Dept: PHYSICAL THERAPY | Age: 63
Discharge: HOME | End: 2018-08-08
Admitting: ORTHOPAEDIC SURGERY

## 2018-08-07 NOTE — FLOWSHEET NOTE
Physical Therapy Daily Treatment Note  Date:  2018    Patient Name:  Denisa Bhatti    :  1955  MRN: [id number]    Restrictions/Precautions: Position Activity Restriction  Other position/activity restrictions: PROM x 3 weeks, may begin AAROM in 3 weeks from , no sig fall risk    Pertinent Medical History: Additional Pertinent Hx: arthritis, DM, GERD, HLD, HTN, lumbar disc disease, CVA  with right side weakness CTrelease,     Medical/Treatment Diagnosis Information:  · Diagnosis: shoulder scope, open amarilys, RCR - medium tear of entire SS and portion of the IS. · Treatment Diagnosis: Decreased functional mobility post right RCR    Insurance/Certification information:  PT Insurance Information: [facility] Dual  Physician Information:  Referring Practitioner: Gianni Patton   - ciara order to continue  12 visits  Plan of care signed (Y/N):       Visit# / total visits:   /  [facility] next visit   MD on   Pain level: 4/10     G-Code (if applicable): visit 1   52  CM   ]    History of Injury:    Pt post 18 RCR, currently in sling. Subjective:   Still painful. Has still been trying to sleep on R shld, per her understanding of instructions per MD>   Pain has been worse since 8-3-18. Also only only using ice 1 x / day. Objective:   PROM: flexion 152 abduction 90 , IR 65 deg in 60 abd  ER  55 deg at 60 abd. Sabrina Doni PROM till  - then start [address]    Per  MD She will continue to avoid overhead activities and limit her pushing, pulling and lifting - discontinue use of sling. Limit activities till full ROM and comfort is regained.      Exercise/Equipment Resistance/Repetitions Other comments   NT 0# x 3 min     UBE X 2 min  L arm doing most of work    UE ranger  4-way X 10 ea  R on floor    Pulleys 20    Wand   ER/IR standing  Ext  IR     10  10  10    Tband  Rows  lats   Yellow x 12  Yellow x 12    Wall wash with LUE assist 10x ea     AAROM seated 4 way x 10

## 2018-08-14 ENCOUNTER — HOSPITAL ENCOUNTER (OUTPATIENT)
Dept: PHYSICAL THERAPY | Age: 63
Discharge: HOME | End: 2018-08-15
Admitting: ORTHOPAEDIC SURGERY

## 2018-08-14 NOTE — FLOWSHEET NOTE
Physical Therapy Daily Treatment Note  Date:  2018    Patient Name:  Ranjeet Ferrell    :  1955  MRN: [id number]    Restrictions/Precautions: Position Activity Restriction  Other position/activity restrictions: PROM x 3 weeks, may begin AAROM in 3 weeks from , no sig fall risk    Pertinent Medical History: Additional Pertinent Hx: arthritis, DM, GERD, HLD, HTN, lumbar disc disease, CVA  with right side weakness CTrelease,     Medical/Treatment Diagnosis Information:  · Diagnosis: shoulder scope, open amarilys, RCR - medium tear of entire SS and portion of the IS. · Treatment Diagnosis: Decreased functional mobility post right RCR    Insurance/Certification information:  PT Insurance Information: [id number] [employer] [place] Dual  Physician Information:  Referring Practitioner: Blessing Reilly   - new order to continue  12 visits  Plan of care signed (Y/N):       Visit# / total visits:  15 / 25    MD on   Pain level: 3/10     G-Code (if applicable): visit 10  Score 34  (40-59% disability)  CK   49  CM   ]    History of Injury:    Pt post 18 RCR, currently in sling. Subjective:     Pt notes soreness in lateral arm, rescheduled MD appt to this Thursday as she is scheduled to RTW next week and does not feel ready  Objective:   PROM: flexion 152 abduction 90 , IR 65 deg in 60 abd  ER  55 deg at 60 abd. .   - PROM    Flexion 158, abduction  150,  ER  72 at 90 abd,  IR  65 at 90 abd. PROM till  - then start [address]    Per  MD She will continue to avoid overhead activities and limit her pushing, pulling and lifting - discontinue use of sling. Limit activities till full ROM and comfort is regained.      Exercise/Equipment Resistance/Repetitions Other comments   NT 0# x 3 min     UBE X 2 min  L arm doing most of work    UE ranger  4-way X 10 ea  R on floor    Pulleys 20    Wand   Ext  IR  Pull across  IR with belt behind back     10  10  15  15    Tband  Rows  Lats  IR  ER   Red x 10  Red

## 2018-08-16 ENCOUNTER — OFFICE VISIT (OUTPATIENT)
Dept: ORTHOPEDIC SURGERY | Age: 63
End: 2018-08-16

## 2018-08-16 VITALS — HEIGHT: 64 IN | BODY MASS INDEX: 33.97 KG/M2 | WEIGHT: 199 LBS

## 2018-08-16 DIAGNOSIS — Z98.890 S/P RIGHT ROTATOR CUFF REPAIR: Primary | ICD-10-CM

## 2018-08-16 PROCEDURE — 99024 POSTOP FOLLOW-UP VISIT: CPT | Performed by: NURSE PRACTITIONER

## 2018-08-16 RX ORDER — DICLOFENAC SODIUM 75 MG/1
75 TABLET, DELAYED RELEASE ORAL 2 TIMES DAILY
Qty: 60 TABLET | Refills: 1 | Status: SHIPPED | OUTPATIENT
Start: 2018-08-16 | End: 2019-07-05

## 2018-08-16 NOTE — PROGRESS NOTES
Jessee AGGARWAL [facility]  [id number]  August 16, 2018    Chief Complaint   Patient presents with    Post-Op Check     Right shoulder RCR 6/20/18       History: The patient is here follow-up regarding her right shoulder. She is now 7 weeks status post right shoulder rotator cuff repair. She continues to have mild to moderate pain that is worse at night. She denies any numbness or tingling. She has remained out of work as a . She is working with physical therapy. She is not taking any pain medications. She is no longer using the sling. The patient's  past medical history, medications, allergies,  family history, social history, and review of systems have been reviewed, and dated and are recorded in the chart. Ht 5' 4\" (1.626 m)   Wt 199 lb (90.3 kg)   Breastfeeding? No   BMI 34.16 kg/m²     Physical:  Ms. Krishna Gaona appears well, she is in no apparent distress, she demonstrates appropriate mood & affect. She is alert and oriented to person, place and time. She has mild mid humeral swelling. She is neurovascularly intact distally. Sensation is intact in the axillary nerve distribution. right shoulder range of motion: 130 degrees abduction, 145 degrees forward flexion, 40 degrees external rotation and internal rotation to L5. She has minimal pain with light range of motion of the shoulder. The incisions are clean, dry and intact and without erythema. Strength in the shoulder is: 4/5    Impression: status post right shoulder arthroscopy, [place] and rotator cuff repair    Plan: She was given an extension of her physical therapy. She will remain out of work until 9/10/18 after which she will return light-duty. She was given a script for diclofenac. She will follow-up in 6 weeks. She is aware that activities should be limited until full range of motion and comfort have been regained. I have explained the time course and likely expectations for maximal recovery of motion and function.

## 2018-08-17 ENCOUNTER — HOSPITAL ENCOUNTER (OUTPATIENT)
Dept: PHYSICAL THERAPY | Age: 63
Discharge: HOME | End: 2018-08-18
Admitting: ORTHOPAEDIC SURGERY

## 2018-08-21 ENCOUNTER — HOSPITAL ENCOUNTER (OUTPATIENT)
Dept: PHYSICAL THERAPY | Age: 63
Discharge: HOME | End: 2018-08-22
Admitting: ORTHOPAEDIC SURGERY

## 2018-08-24 ENCOUNTER — HOSPITAL ENCOUNTER (OUTPATIENT)
Dept: PHYSICAL THERAPY | Age: 63
Discharge: HOME | End: 2018-08-25
Admitting: ORTHOPAEDIC SURGERY

## 2018-08-28 ENCOUNTER — HOSPITAL ENCOUNTER (OUTPATIENT)
Dept: PHYSICAL THERAPY | Age: 63
Discharge: HOME | End: 2018-08-29
Admitting: ORTHOPAEDIC SURGERY

## 2018-08-31 ENCOUNTER — HOSPITAL ENCOUNTER (OUTPATIENT)
Dept: PHYSICAL THERAPY | Age: 63
Discharge: HOME | End: 2018-09-01
Admitting: ORTHOPAEDIC SURGERY

## 2018-09-01 ENCOUNTER — HOSPITAL ENCOUNTER (OUTPATIENT)
Dept: OTHER | Age: 63
Discharge: HOME | End: 2018-09-01
Attending: ORTHOPAEDIC SURGERY | Admitting: ORTHOPAEDIC SURGERY

## 2018-09-04 ENCOUNTER — HOSPITAL ENCOUNTER (OUTPATIENT)
Dept: PHYSICAL THERAPY | Age: 63
Discharge: HOME | End: 2018-09-05
Admitting: ORTHOPAEDIC SURGERY

## 2018-09-04 NOTE — FLOWSHEET NOTE
Seated  Flexion  scaption    Supine  Flexion  0-90  Flexion   Serratus  ER/IR    Sidelying  Sleeper stretch    ER     1x10  1x10       2# 2 x 10      2#  2 x 10  2# 2 x 10  1# 2 x 10      2 min    1# 2 x  10      Supine  PROM   4 way x 10 R    premod with cp Post exercise 15 min  recliner                Other Therapeutic Activities:  Pt was educated on PT POC, Diagnosis, Prognosis, pathomechanics as well as frequency and duration of scheduling future physical therapy appointments. Time was also taken on this day to answer all patient questions and participation in PT. Reviewed appointment policy in detail with patient and patient verbalized understanding.   8-7-18  Instructed to not try to sleep on R shld. Reinforced use of ice 10-15 min multiple times a day. Home Exercise Program:  Patient instructed in the following for HEP:   Patient verbalized/demonstrated understanding and was issued written HEP.     Timed Code Treatment Minutes:35    Total Treatment Minutes: 50    Treatment/Activity Tolerance:  [x] Patient tolerated treatment well [] Patient limited by fatigue  [x] Patient limited by pain  [] Patient limited by other medical complications  [] Other:     Prognosis: [x] Good [] Fair  [] Poor    Goals:    Short term goals  Time Frame for Short term goals: 2Weeks  Short term goal 1: inidep with hep - progress per protocol      Long term goals  Time Frame for Long term goals : discharge  Long term goal 1: Normalize PROM for indep dressing  Long term goal 2: Normalize AROM for indep self care  Long term goal 3: Normalize strength for indep homemaking  Long term goal 4: Decrease pain on VAS to occasional 2-3 to allow normal functional mobility with RUE     Patient Requires Follow-up: [x] Yes  [] No    Plan:   [x] Continue per plan of care [] Alter current plan (see comments)  [] Plan of care initiated [] Hold pending MD visit [] Discharge    Plan for Next Session: add  standing scaption x 10    Electronically signed by:  Jess Patton, [address] [place] [address] Pky

## 2018-09-06 ENCOUNTER — HOSPITAL ENCOUNTER (OUTPATIENT)
Dept: PHYSICAL THERAPY | Age: 63
Discharge: HOME | End: 2018-09-07
Admitting: ORTHOPAEDIC SURGERY

## 2018-09-06 NOTE — FLOWSHEET NOTE
Physical Therapy Daily Treatment Note  Date:  2018    Patient Name:  Linwood Dutton    :  1955  MRN: [id number]    Restrictions/Precautions: Position Activity Restriction  Other position/activity restrictions: PROM x 3 weeks, may begin AAROM in 3 weeks from , no sig fall risk    Pertinent Medical History: Additional Pertinent Hx: arthritis, DM, GERD, HLD, HTN, lumbar disc disease, CVA  with right side weakness CTrelease,     Medical/Treatment Diagnosis Information:  · Diagnosis: shoulder scope, open amarilys, RCR - medium tear of entire SS and portion of the IS. · Treatment Diagnosis: Decreased functional mobility post right RCR    Insurance/Certification information:  PT Insurance Information: [facility] Dual  Physician Information:  Referring Practitioner: Joel Medicine of care signed (Y/N):       Visit# / total visits:       Pain level: 3/10     G-Code (if applicable): visit 10  Score 49  (40-59% disability)  CK   Goal  CM   ]    History of Injury:    Pt post 18 RCR, currently in sling. Subjective:    Still difficulty IR behind back. See MD 18    Objective:    - PROM    Flexion 170, abduction  165  ER  80 at 90 abd,  IR  65 at 90 abd. Strength     Flexion 4-/5, abduction 4-/5  IR 4/5  ER  4-/5    Per  MD She will continue to avoid overhead activities and limit her pushing, pulling and lifting - discontinue use of sling. Limit activities till full ROM and comfort is regained.      Exercise/Equipment Resistance/Repetitions Other comments     Right RCR     UBE X 2 min      UE ranger  4-way X 10 ea  R only 1#     Pulleys 20    Standing  IR with belt behind back   scaption   15    0# x 10 R    Tband  Rows  Lats  IR  ER   Green 3 x 10 B   green 3 x 10 B  Red   x  15 R  Red   x 15 R       Increase reps /sets as prosper   Seated  Flexion  scaption    Supine  Flexion  0-90  Flexion   Serratus  ER/IR    Sidelying  Sleeper stretch  ER     1x10 R

## 2018-09-07 ENCOUNTER — OFFICE VISIT (OUTPATIENT)
Dept: ORTHOPEDIC SURGERY | Age: 63
End: 2018-09-07

## 2018-09-07 ENCOUNTER — OFFICE VISIT (OUTPATIENT)
Dept: FAMILY MEDICINE CLINIC | Age: 63
End: 2018-09-07

## 2018-09-07 VITALS
SYSTOLIC BLOOD PRESSURE: 128 MMHG | TEMPERATURE: 99.4 F | DIASTOLIC BLOOD PRESSURE: 84 MMHG | HEIGHT: 64 IN | BODY MASS INDEX: 37.28 KG/M2 | RESPIRATION RATE: 20 BRPM | OXYGEN SATURATION: 98 % | HEART RATE: 104 BPM | WEIGHT: 218.4 LBS

## 2018-09-07 VITALS — HEIGHT: 64 IN | WEIGHT: 199 LBS | BODY MASS INDEX: 33.97 KG/M2

## 2018-09-07 DIAGNOSIS — Z12.31 ENCOUNTER FOR SCREENING MAMMOGRAM FOR BREAST CANCER: ICD-10-CM

## 2018-09-07 DIAGNOSIS — E11.8 TYPE 2 DIABETES MELLITUS WITH COMPLICATION, WITHOUT LONG-TERM CURRENT USE OF INSULIN (HCC): Primary | ICD-10-CM

## 2018-09-07 DIAGNOSIS — E78.2 MIXED HYPERLIPIDEMIA: ICD-10-CM

## 2018-09-07 DIAGNOSIS — F32.89 OTHER DEPRESSION: ICD-10-CM

## 2018-09-07 DIAGNOSIS — Z98.890 S/P RIGHT ROTATOR CUFF REPAIR: Primary | ICD-10-CM

## 2018-09-07 DIAGNOSIS — M19.90 ARTHRITIS: ICD-10-CM

## 2018-09-07 DIAGNOSIS — I10 ESSENTIAL HYPERTENSION: ICD-10-CM

## 2018-09-07 LAB — HBA1C MFR BLD: 8.8 %

## 2018-09-07 PROCEDURE — 83036 HEMOGLOBIN GLYCOSYLATED A1C: CPT | Performed by: FAMILY MEDICINE

## 2018-09-07 PROCEDURE — 3045F PR MOST RECENT HEMOGLOBIN A1C LEVEL 7.0-9.0%: CPT | Performed by: FAMILY MEDICINE

## 2018-09-07 PROCEDURE — G8599 NO ASA/ANTIPLAT THER USE RNG: HCPCS | Performed by: FAMILY MEDICINE

## 2018-09-07 PROCEDURE — 99024 POSTOP FOLLOW-UP VISIT: CPT | Performed by: NURSE PRACTITIONER

## 2018-09-07 PROCEDURE — 2022F DILAT RTA XM EVC RTNOPTHY: CPT | Performed by: FAMILY MEDICINE

## 2018-09-07 PROCEDURE — G8417 CALC BMI ABV UP PARAM F/U: HCPCS | Performed by: FAMILY MEDICINE

## 2018-09-07 PROCEDURE — G8427 DOCREV CUR MEDS BY ELIG CLIN: HCPCS | Performed by: FAMILY MEDICINE

## 2018-09-07 PROCEDURE — 1036F TOBACCO NON-USER: CPT | Performed by: FAMILY MEDICINE

## 2018-09-07 PROCEDURE — 3017F COLORECTAL CA SCREEN DOC REV: CPT | Performed by: FAMILY MEDICINE

## 2018-09-07 PROCEDURE — 99214 OFFICE O/P EST MOD 30 MIN: CPT | Performed by: FAMILY MEDICINE

## 2018-09-07 RX ORDER — DULOXETIN HYDROCHLORIDE 30 MG/1
CAPSULE, DELAYED RELEASE ORAL
Qty: 53 CAPSULE | Refills: 5 | Status: SHIPPED | OUTPATIENT
Start: 2018-09-07 | End: 2019-01-28 | Stop reason: SDUPTHER

## 2018-09-07 ASSESSMENT — ENCOUNTER SYMPTOMS
VOMITING: 1
SHORTNESS OF BREATH: 0
COUGH: 0
ABDOMINAL PAIN: 0

## 2018-09-07 NOTE — PROGRESS NOTES
Mary AGGARWAL [facility]  [id number]  September 7, 2018    Chief Complaint   Patient presents with    Follow-up     R shoulder RCR DOS: 6/20/18       History: The patient is here follow-up regarding her right shoulder. She is now 2.5-3 months status post right shoulder rotator cuff repair. The patient reports significant improvement of her pain. She notes mild discomfort shoulder mainly with hygiene movements. She has completed her physical therapy. She is not taking any pain medications other than that naproxen. She is scheduled to return to work on Monday which she feels she is ready for. The patient's  past medical history, medications, allergies,  family history, social history, and review of systems have been reviewed, and dated and are recorded in the chart. Ht 5' 4\" (1.626 m)   Wt 199 lb (90.3 kg)   BMI 34.16 kg/m²     Physical:  Ms. Robinson Barone appears well, she is in no apparent distress, she demonstrates appropriate mood & affect. She is alert and oriented to person, place and time. She has no further swelling. She is neurovascularly intact distally. Sensation is intact in the axillary nerve distribution. right shoulder range of motion: 180 degrees abduction, 180 degrees forward flexion, 45 degrees external rotation and internal rotation to L5. She has no pain with light range of motion of the shoulder. The incisions are clean, dry and intact and without erythema. Strength in the shoulder is: 4+/5    Impression: status post right shoulder arthroscopy, [place] and rotator cuff repair    Plan: The patient will continue on her naproxen. She will continue her home exercises. She is cleared to return to work on Monday. She'll follow-up with us in 6-8 weeks for final evaluation. Hopefully we'll be able to release her at that time.

## 2018-09-07 NOTE — PROGRESS NOTES
2 Dose Series) 11/04/2004    Diabetic retinal exam  02/12/2015    Flu vaccine (1) 09/01/2018    Breast cancer screen  08/18/2018    Diabetic microalbuminuria test  12/11/2018    Lipid screen  12/11/2018    Cervical cancer screen  02/08/2019    Potassium monitoring  06/20/2019    Creatinine monitoring  06/20/2019    Diabetic foot exam  07/02/2019    A1C test (Diabetic or Prediabetic)  07/02/2019    Colon cancer screen colonoscopy  07/15/2021    Pneumococcal med risk  Completed    Hepatitis C screen  Completed    HIV screen  Completed     Past Medical History:   Diagnosis Date    Arthritis     Diabetes     GERD (gastroesophageal reflux disease)     Hyperlipidemia     Hypertension     Lumbar disc disease     Sleep apnea     pt states does use a Cpap machine at night    Unspecified cerebral artery occlusion with cerebral infarction 2014    right side weak    Wears glasses      Social History     Social History    Marital status:      Spouse name: Freedom Quezada, seen here    Number of children: 3    Years of education: N/A     Social History Main Topics    Smoking status: Former Smoker     Packs/day: 0.00     Years: 3.00     Quit date: 1/1/1992    Smokeless tobacco: Never Used    Alcohol use No    Drug use: No    Sexual activity: Yes     Partners: Male     Other Topics Concern    Not on file     Social History Narrative    Originally from [facility]     is seen here    10 grandchildren     Allergies   Allergen Reactions    Codeine Nausea And Vomiting and Rash    Vicodin [Hydrocodone-Acetaminophen] Nausea And Vomiting    Oxycontin [Oxycodone Hcl] Itching       LABS:  Lab Results   Component Value Date    GLUCOSE 107 (H) 06/20/2018     Lab Results   Component Value Date     06/20/2018    K 3.8 06/20/2018    CREATININE 0.7 06/20/2018     Cholesterol, Total   Date Value Ref Range Status   12/11/2017 176 0 - 199 mg/dL Final     LDL Calculated   Date Value Ref Range

## 2018-09-12 ENCOUNTER — TELEPHONE (OUTPATIENT)
Dept: FAMILY MEDICINE CLINIC | Age: 63
End: 2018-09-12

## 2018-09-12 DIAGNOSIS — M19.90 ARTHRITIS: Primary | ICD-10-CM

## 2018-09-13 NOTE — TELEPHONE ENCOUNTER
Pt states she does need a RX for a walker.     RX needs to say \" [facility]"    Please print RX and I will fax it to # [phone]     Attn: Anastacio Salguero

## 2018-09-14 ENCOUNTER — TELEPHONE (OUTPATIENT)
Dept: ORTHOPEDIC SURGERY | Age: 63
End: 2018-09-14

## 2018-09-25 ENCOUNTER — HOSPITAL ENCOUNTER (OUTPATIENT)
Dept: PHYSICAL THERAPY | Age: 63
Setting detail: THERAPIES SERIES
Discharge: HOME | End: 2018-09-25
Payer: COMMERCIAL

## 2018-09-25 PROCEDURE — 97110 THERAPEUTIC EXERCISES: CPT

## 2018-09-25 PROCEDURE — G0283 ELEC STIM OTHER THAN WOUND: HCPCS

## 2018-09-28 ENCOUNTER — HOSPITAL ENCOUNTER (OUTPATIENT)
Dept: PHYSICAL THERAPY | Age: 63
Setting detail: THERAPIES SERIES
Discharge: HOME | End: 2018-09-28
Payer: COMMERCIAL

## 2018-09-28 PROCEDURE — G0283 ELEC STIM OTHER THAN WOUND: HCPCS | Performed by: CHIROPRACTOR

## 2018-09-28 PROCEDURE — 97110 THERAPEUTIC EXERCISES: CPT | Performed by: CHIROPRACTOR

## 2018-09-28 NOTE — FLOWSHEET NOTE
Physical Therapy Daily Treatment Note  Date:  2018    Patient Name:  Dimitri Dominguez    :  1955  MRN: [id number]    Restrictions/Precautions: Position Activity Restriction  Other position/activity restrictions: PROM x 3 weeks, may begin AAROM in 3 weeks from , no sig fall risk    Pertinent Medical History: Additional Pertinent Hx: arthritis, DM, GERD, HLD, HTN, lumbar disc disease, CVA  with right side weakness CTrelease,     Medical/Treatment Diagnosis Information:  · Diagnosis: shoulder scope, open amarilys, RCR - medium tear of entire SS and portion of the IS. · Treatment Diagnosis: Decreased functional mobility post right RCR    Insurance/Certification information:  PT Insurance Information: [id number] Dual  Physician Information:  Referring Practitioner: Bianca Molina  18 MD dimas +8     Visit# / total visits:         Pain level: 3/10     G-Code (if applicable): visit 20 Score 22 (20-39% disability. CJ   Goal  CI   ]    History of Injury:    Pt post 18 RCR,    Subjective:    Notes \"bone pain\" in the shoulder and down the arm for the past few days. Has been using ice and meds.  - noted that pain was much better after Friday visit , good through the weekend, now 3-4/10   9/21/18 doing a little bit better than she was  Objective:    - PROM    Flexion 170, abduction  165  ER  80 at 90 abd,  IR  65 at 90 abd.               Strength     Flexion 4-/5, abduction 4-/5  IR 4/5  ER  4-/5      Exercise/Equipment Resistance/Repetitions Other comments     Right RCR     UBE X 2 min      UE ranger  4-way X 10 ea  R only 1#     Pulleys flexion   20    Standing  Pull across  IR Pulleys     15  15        Tband  Rows  Lats  IR  ER   Green 2 x 10 B   green 2 x 10 B  Red  2 x 10  R  Red  2 x 10 R      Seated  Flexion  scaption    Supine  Flexion  0-90  Flexion   Serratus   ER/IR  Sleeper stretch    Sidelying       1x10 R  1 x 10 R           2# 2 x 10      2#  2 x 10  3# 2 x 10  1# 2 x 10  2  min      resume Friday 9/28     Supine  PROM, emphasis on IR    4 way x 10 R    STM left GH joint/lat brachial region    premod with cp 5 min       Post exercise 15 min  recliner                Other Therapeutic Activities:  Pt was educated on PT POC, Diagnosis, Prognosis, pathomechanics as well as frequency and duration of scheduling future physical therapy appointments. Time was also taken on this day to answer all patient questions and participation in PT. Reviewed appointment policy in detail with patient and patient verbalized understanding. Home Exercise Program:  Patient instructed in the following for HEP:   Patient verbalized/demonstrated understanding and was issued written HEP.     Timed Code Treatment Minutes: 35    Total Treatment Minutes: 50    Treatment/Activity Tolerance:  [x] Patient tolerated treatment well [] Patient limited by fatigue  [x] Patient limited by pain  [] Patient limited by other medical complications  [] Other:     Prognosis: [x] Good [] Fair  [] Poor    Goals:    Short term goals  Time Frame for Short term goals: 2Weeks  Short term goal 1: inidep with hep - progress per protocol      Long term goals  Time Frame for Long term goals : discharge  Long term goal 1: Normalize PROM for indep dressing  Long term goal 2: Normalize AROM for indep self care  Long term goal 3: Normalize strength for indep homemaking  Long term goal 4: Decrease pain on VAS to occasional 2-3 to allow normal functional mobility with RUE     Patient Requires Follow-up: [x] Yes  [] No    Plan:   [x] Continue per plan of care [] Alter current plan (see comments)  [] Plan of care initiated [] Hold pending MD visit [] Discharge    Plan for Next Session:   Assess pain, resume ex as indicated    Electronically signed by:  Doe VAZQUEZ#[id number]

## 2018-10-02 ENCOUNTER — HOSPITAL ENCOUNTER (OUTPATIENT)
Dept: PHYSICAL THERAPY | Age: 63
Setting detail: THERAPIES SERIES
Discharge: HOME | End: 2018-10-02
Payer: COMMERCIAL

## 2018-10-02 PROCEDURE — G0283 ELEC STIM OTHER THAN WOUND: HCPCS | Performed by: CHIROPRACTOR

## 2018-10-02 PROCEDURE — 97110 THERAPEUTIC EXERCISES: CPT | Performed by: CHIROPRACTOR

## 2018-10-04 ENCOUNTER — HOSPITAL ENCOUNTER (OUTPATIENT)
Dept: PHYSICAL THERAPY | Age: 63
Setting detail: THERAPIES SERIES
Discharge: HOME | End: 2018-10-04
Payer: COMMERCIAL

## 2018-10-04 ENCOUNTER — OFFICE VISIT (OUTPATIENT)
Dept: FAMILY MEDICINE CLINIC | Age: 63
End: 2018-10-04
Payer: COMMERCIAL

## 2018-10-04 VITALS
HEIGHT: 64 IN | DIASTOLIC BLOOD PRESSURE: 72 MMHG | OXYGEN SATURATION: 98 % | RESPIRATION RATE: 18 BRPM | WEIGHT: 220.8 LBS | HEART RATE: 82 BPM | TEMPERATURE: 98.3 F | BODY MASS INDEX: 37.69 KG/M2 | SYSTOLIC BLOOD PRESSURE: 106 MMHG

## 2018-10-04 DIAGNOSIS — Z12.31 ENCOUNTER FOR SCREENING MAMMOGRAM FOR BREAST CANCER: ICD-10-CM

## 2018-10-04 DIAGNOSIS — E11.8 TYPE 2 DIABETES MELLITUS WITH COMPLICATION, WITHOUT LONG-TERM CURRENT USE OF INSULIN (HCC): ICD-10-CM

## 2018-10-04 DIAGNOSIS — G89.29 OTHER CHRONIC PAIN: ICD-10-CM

## 2018-10-04 DIAGNOSIS — M17.0 PRIMARY OSTEOARTHRITIS OF BOTH KNEES: Primary | ICD-10-CM

## 2018-10-04 PROCEDURE — 97110 THERAPEUTIC EXERCISES: CPT | Performed by: CHIROPRACTOR

## 2018-10-04 PROCEDURE — 3045F PR MOST RECENT HEMOGLOBIN A1C LEVEL 7.0-9.0%: CPT | Performed by: FAMILY MEDICINE

## 2018-10-04 PROCEDURE — G8427 DOCREV CUR MEDS BY ELIG CLIN: HCPCS | Performed by: FAMILY MEDICINE

## 2018-10-04 PROCEDURE — 3017F COLORECTAL CA SCREEN DOC REV: CPT | Performed by: FAMILY MEDICINE

## 2018-10-04 PROCEDURE — G8417 CALC BMI ABV UP PARAM F/U: HCPCS | Performed by: FAMILY MEDICINE

## 2018-10-04 PROCEDURE — G8599 NO ASA/ANTIPLAT THER USE RNG: HCPCS | Performed by: FAMILY MEDICINE

## 2018-10-04 PROCEDURE — 90682 RIV4 VACC RECOMBINANT DNA IM: CPT | Performed by: FAMILY MEDICINE

## 2018-10-04 PROCEDURE — 2022F DILAT RTA XM EVC RTNOPTHY: CPT | Performed by: FAMILY MEDICINE

## 2018-10-04 PROCEDURE — 1036F TOBACCO NON-USER: CPT | Performed by: FAMILY MEDICINE

## 2018-10-04 PROCEDURE — G0283 ELEC STIM OTHER THAN WOUND: HCPCS | Performed by: CHIROPRACTOR

## 2018-10-04 PROCEDURE — 99213 OFFICE O/P EST LOW 20 MIN: CPT | Performed by: FAMILY MEDICINE

## 2018-10-04 PROCEDURE — G8482 FLU IMMUNIZE ORDER/ADMIN: HCPCS | Performed by: FAMILY MEDICINE

## 2018-10-04 PROCEDURE — G0008 ADMIN INFLUENZA VIRUS VAC: HCPCS | Performed by: FAMILY MEDICINE

## 2018-10-04 RX ORDER — FUROSEMIDE 20 MG/1
TABLET ORAL
Qty: 30 TABLET | Refills: 0 | Status: SHIPPED | OUTPATIENT
Start: 2018-10-04 | End: 2018-11-04 | Stop reason: SDUPTHER

## 2018-10-04 ASSESSMENT — ENCOUNTER SYMPTOMS
NAUSEA: 0
COUGH: 0
SHORTNESS OF BREATH: 0

## 2018-10-04 NOTE — PROGRESS NOTES
10/4/2018    Jade Ruiz is a 60 y.o. female here for follow up  Chief Complaint   Patient presents with    Diabetes     Pt is here for a DM f/u Pt's last A1c was 8.8 on 09/07/18    Other     pt c/o pain in both of her legs for the past two weeks. HPI  Diabetes   - on metformin, Januvia, and Trulicity. Recently increased dose of Trulicity less than one month ago. Reports doing well on these without side effects.  - on ARB, statin  - Reports blood sugars are usually around 130-140 in the morning, sometimes lower. - No soda, almost no bread, avoids fruit juice. Bilateral leg pain - hurts the most when she is trying to get up from a seated position if she has been resting for more than a few minutes. Feels like she gets stiff. Mostly knees, sometimes lower legs. Has been a chronic issue for years, but worse over the past few weeks. In the past she has had steroid injections in her knees which she feels like helped significantly. Reports has been more than a year since her last steroid injections. Believes she had this done twice. No fall or recent injury. Has known OA in both knees. - Takes Diclofenac as needed for shoulder pain. Review of Systems   Constitutional: Negative for chills and fever. Respiratory: Negative for cough and shortness of breath. Cardiovascular: Negative for chest pain. Gastrointestinal: Negative for nausea. Musculoskeletal: Positive for arthralgias.        Patient Active Problem List   Diagnosis    Hypertension    Bipolar 1 disorder, depressed ([address]    Diabetes mellitus ([address]    Hyperlipidemia    Obesity    Dystonia    Cerebral thrombosis with cerebral infarction ([address]    Sleep apnea    Right sided weakness    Trigger finger, acquired    Elevated CK    Primary osteoarthritis involving multiple joints    Primary osteoarthritis of both knees    Mood disorder ([address] - follows with Psych Dr. Real Cast Calcific tendonitis of right shoulder    Rotator cuff tendinitis, right    Biceps tendinitis, right    Tear of right supraspinatus tendon     Prior to Visit Medications    Medication Sig Taking? Authorizing Provider   Tens Unit MISC by Does not apply route Yes Huey Butsos MD   [facility].  Devices Homar Zhang) MISC One four wheel walker Yes Jack Finnegan MD   Dulaglutide 1.5 MG/0.5ML SOPN Inject 0.5 mLs into the skin once a week Yes Jack Finnegan MD   DULoxetine (CYMBALTA) 30 MG extended release capsule Take 1 capsule by mouth daily for one week, followed by 2 capsules daily Yes Jack Havesim Finnegan, MD   diclofenac (VOLTAREN) 75 MG EC tablet Take 1 tablet by mouth 2 times daily Yes JEISON Weir - CNP   metFORMIN (GLUCOPHAGE) 500 MG tablet Take 2 tablets by mouth 2 times daily (with meals) Yes Jack Havesim Finnegan MD   furosemide (LASIX) 20 MG tablet TAKE ONE TABLET BY MOUTH DAILY Yes Army Ryan MD   metoprolol succinate (TOPROL XL) 25 MG extended release tablet TAKE ONE HALF TABLET BY MOUTH DAILY FOR BLOOD PRESSURE AND HEART RATE Yes Jack Finnegan MD   albuterol sulfate  (90 Base) MCG/ACT inhaler Inhale 2 puffs into the lungs every 6 hours as needed for Wheezing Yes Jack Finnegan MD   omeprazole (PRILOSEC) 40 MG delayed release capsule TAKE ONE CAPSULE BY MOUTH TWICE A DAY BEFORE MEALS Yes Jack Havesim Finnegan MD   tiZANidine (ZANAFLEX) 4 MG tablet Take 1 tablet by mouth 3 times daily Yes Jack Finnegan MD   SITagliptin (JANUVIA) 100 MG tablet Take 1 tablet by mouth daily Yes Huey Bustos MD   glucose blood VI test strips (FREESTYLE LITE) strip TEST BLOOD SUGAR TWO TIMES A DAY Diag: E11.9 Yes Jack Finnegan MD   ondansetron (ZOFRAN ODT) 4 MG disintegrating tablet Take 1 tablet by mouth every 6 hours as needed for Nausea or Vomiting Yes Era Santana,    losartan (COZAAR) 25 MG tablet TAKE ONE TABLET BY MOUTH DAILY  Patient taking differently: every evening TAKE ONE TABLET BY MOUTH DAILY Yes Army Ryan MD   atorvastatin (LIPITOR) 40 MG tablet Take 1 tablet by mouth daily Yes Claudette Kurk

## 2018-10-09 ENCOUNTER — HOSPITAL ENCOUNTER (OUTPATIENT)
Dept: PHYSICAL THERAPY | Age: 63
Setting detail: THERAPIES SERIES
Discharge: HOME | End: 2018-10-09
Payer: COMMERCIAL

## 2018-10-09 PROCEDURE — G0283 ELEC STIM OTHER THAN WOUND: HCPCS

## 2018-10-09 PROCEDURE — 97110 THERAPEUTIC EXERCISES: CPT

## 2018-10-09 NOTE — FLOWSHEET NOTE
Tband  Rows  Lats  IR  ER   Green 2 x 10 B   green 2 x 10 B  Red  2 x 10  R  Red  2 x 10 R      Seated  Flexion  scaption    Supine  Flexion  0-90  Flexion   Serratus   ER/IR  Sleeper stretch    Sidelying       1x10 R  1 x 10 R           2# 2 x 10      2#  2 x 10  3# 2 x 10  1# 2 x 10  2  min      resume Friday 9/28     Supine  PROM, emphasis on IR    4 way x 10 R    STM left GH joint/lat brachial region    premod with cp 5 min       Post exercise 15 min  recliner                Other Therapeutic Activities:  Pt was educated on PT POC, Diagnosis, Prognosis, pathomechanics as well as frequency and duration of scheduling future physical therapy appointments. Time was also taken on this day to answer all patient questions and participation in PT. Reviewed appointment policy in detail with patient and patient verbalized understanding. Home Exercise Program:  Patient instructed in the following for HEP:   Patient verbalized/demonstrated understanding and was issued written HEP.     Timed Code Treatment Minutes: 45    Total Treatment Minutes: 60    Treatment/Activity Tolerance:  [x] Patient tolerated treatment well [] Patient limited by fatigue  [x] Patient limited by pain  [] Patient limited by other medical complications  [] Other:     Prognosis: [x] Good [] Fair  [] Poor    Goals:    Short term goals  Time Frame for Short term goals: 2Weeks  Short term goal 1: inidep with hep - progress per protocol      Long term goals  Time Frame for Long term goals : discharge  Long term goal 1: Normalize PROM for indep dressing  Long term goal 2: Normalize AROM for indep self care  Long term goal 3: Normalize strength for indep homemaking  Long term goal 4: Decrease pain on VAS to occasional 2-3 to allow normal functional mobility with RUE     Patient Requires Follow-up: [x] Yes  [] No    Plan:   [x] Continue per plan of care [] Alter current plan (see comments)  [] Plan of care initiated [] Hold pending MD visit []

## 2018-10-11 ENCOUNTER — HOSPITAL ENCOUNTER (OUTPATIENT)
Dept: PHYSICAL THERAPY | Age: 63
Setting detail: THERAPIES SERIES
Discharge: HOME | End: 2018-10-11
Payer: COMMERCIAL

## 2018-10-11 PROCEDURE — 97140 MANUAL THERAPY 1/> REGIONS: CPT

## 2018-10-11 PROCEDURE — 97110 THERAPEUTIC EXERCISES: CPT

## 2018-10-11 PROCEDURE — G0283 ELEC STIM OTHER THAN WOUND: HCPCS

## 2018-10-16 ENCOUNTER — HOSPITAL ENCOUNTER (OUTPATIENT)
Dept: PHYSICAL THERAPY | Age: 63
Setting detail: THERAPIES SERIES
Discharge: HOME | End: 2018-10-16
Payer: COMMERCIAL

## 2018-10-16 PROCEDURE — G0283 ELEC STIM OTHER THAN WOUND: HCPCS

## 2018-10-16 PROCEDURE — 97110 THERAPEUTIC EXERCISES: CPT

## 2018-10-16 NOTE — FLOWSHEET NOTE
Physical Therapy Daily Treatment Note  Date:  10/16/2018    Patient Name:  Baldev Osorio    :  1955  MRN: [id number]    Restrictions/Precautions: Position Activity Restriction  Other position/activity restrictions: PROM x 3 weeks, may begin AAROM in 3 weeks from , no sig fall risk    Pertinent Medical History: Additional Pertinent Hx: arthritis, DM, GERD, HLD, HTN, lumbar disc disease, CVA  with right side weakness CTrelease,     Medical/Treatment Diagnosis Information:  · Diagnosis: shoulder scope, open amarilys, RCR - medium tear of entire SS and portion of the IS. · Treatment Diagnosis: Decreased functional mobility post right RCR    Insurance/Certification information:  PT Insurance Information: [id number] [email or web address] [place] [employer]  Physician Information:  Referring Practitioner: Sanchez Thakur  18 MD dimas +8     Visit# / total visits: 30   Pain level: 3/10     G-Code (if applicable): visit 20 Score 22 (20-39% disability. CJ   Goal  CI   ]    History of Injury:    Pt post 18 RCR,    Subjective:    Notes \"bone pain\" in the shoulder and down the arm for the past few days. Has been using ice and meds.  - noted that pain was much better after Friday visit , good through the weekend, now 3-4/10   9/21/18 doing a little bit better than she was  10/2 - Can not attribute increased pain to anything specific   10/4 - Feeling better today  10/9/18 - Overall pain is better, more of a pressure over the weekend. Mostly c/o fatigue in shoulder with trying to do things. Still can't lay on R side. 10/11/18 - The job she has to do makes her use her arm a lot and it gets sore during/after work day. Objective:    - PROM    Flexion 170, abduction  165  ER  80 at 90 abd,  IR  65 at 90 abd.               Strength     Flexion 4-/5, abduction 4-/5  IR 4/5  ER  4-/5  10/16  PROM    Flexion   175            Abduction        170             ER   86        IR  75            Strength    Flexion   4/5

## 2018-10-18 ENCOUNTER — HOSPITAL ENCOUNTER (OUTPATIENT)
Dept: PHYSICAL THERAPY | Age: 63
Setting detail: THERAPIES SERIES
End: 2018-10-18
Payer: COMMERCIAL

## 2018-11-07 ENCOUNTER — OFFICE VISIT (OUTPATIENT)
Dept: ORTHOPEDIC SURGERY | Age: 63
End: 2018-11-07
Payer: COMMERCIAL

## 2018-11-07 VITALS
BODY MASS INDEX: 37.56 KG/M2 | WEIGHT: 220 LBS | SYSTOLIC BLOOD PRESSURE: 147 MMHG | HEIGHT: 64 IN | DIASTOLIC BLOOD PRESSURE: 85 MMHG | HEART RATE: 93 BPM

## 2018-11-07 DIAGNOSIS — Z98.890 S/P RIGHT ROTATOR CUFF REPAIR: Primary | ICD-10-CM

## 2018-11-07 PROCEDURE — 1036F TOBACCO NON-USER: CPT | Performed by: NURSE PRACTITIONER

## 2018-11-07 PROCEDURE — G8482 FLU IMMUNIZE ORDER/ADMIN: HCPCS | Performed by: NURSE PRACTITIONER

## 2018-11-07 PROCEDURE — 99213 OFFICE O/P EST LOW 20 MIN: CPT | Performed by: NURSE PRACTITIONER

## 2018-11-07 PROCEDURE — G8427 DOCREV CUR MEDS BY ELIG CLIN: HCPCS | Performed by: NURSE PRACTITIONER

## 2018-11-07 PROCEDURE — G8599 NO ASA/ANTIPLAT THER USE RNG: HCPCS | Performed by: NURSE PRACTITIONER

## 2018-11-07 PROCEDURE — 3017F COLORECTAL CA SCREEN DOC REV: CPT | Performed by: NURSE PRACTITIONER

## 2018-11-07 PROCEDURE — G8417 CALC BMI ABV UP PARAM F/U: HCPCS | Performed by: NURSE PRACTITIONER

## 2018-11-07 RX ORDER — METHYLPREDNISOLONE 4 MG/1
TABLET ORAL
Qty: 1 KIT | Refills: 0 | Status: SHIPPED | OUTPATIENT
Start: 2018-11-07 | End: 2018-12-07

## 2018-11-07 RX ORDER — FUROSEMIDE 20 MG/1
TABLET ORAL
Qty: 7 TABLET | Refills: 0 | Status: SHIPPED | OUTPATIENT
Start: 2018-11-07 | End: 2019-01-10 | Stop reason: SDUPTHER

## 2018-11-07 NOTE — PROGRESS NOTES
this and to avoid NSAIDs. She would like to return to physical therapy and she was given a new prescription for this. She will follow-up with us in 2-3 months for reevaluation. Could consider an injection at that time if she continues having significant pain.

## 2018-11-12 RX ORDER — FUROSEMIDE 20 MG/1
TABLET ORAL
Qty: 7 TABLET | Refills: 0 | OUTPATIENT
Start: 2018-11-12

## 2018-12-03 ENCOUNTER — HOSPITAL ENCOUNTER (OUTPATIENT)
Dept: PHYSICAL THERAPY | Age: 63
Setting detail: THERAPIES SERIES
Discharge: HOME | End: 2018-12-03
Payer: COMMERCIAL

## 2018-12-03 PROCEDURE — 97140 MANUAL THERAPY 1/> REGIONS: CPT

## 2018-12-03 PROCEDURE — 97035 APP MDLTY 1+ULTRASOUND EA 15: CPT

## 2018-12-03 PROCEDURE — 97110 THERAPEUTIC EXERCISES: CPT

## 2018-12-03 NOTE — FLOWSHEET NOTE
Physical Therapy Daily Treatment Note  Date:  12/3/2018    Patient Name:  Santino Kemp    :  1955  MRN: [id number]    Restrictions/Precautions: Position Activity Restriction  Other position/activity restrictions: PROM x 3 weeks, may begin AAROM in 3 weeks from , no sig fall risk    Pertinent Medical History: Additional Pertinent Hx: arthritis, DM, GERD, HLD, HTN, lumbar disc disease, CVA  with right side weakness CTrelease,     Medical/Treatment Diagnosis Information:  · Diagnosis: shoulder scope, open amarilys, RCR - medium tear of entire SS and portion of the IS. · Treatment Diagnosis: Decreased functional mobility post right RCR    Insurance/Certification information:  PT Insurance Information: [id number] [place] [employer]  Physician Information:  Referring Practitioner: Oneida Hernández  18 MD dimas +8     Visit# / total visits: 30   Pain level: 3/10     G-Code (if applicable): visit 20 Score 22 (20-39% disability. CJ   Goal  CI   ]    History of Injury:    Pt post 18 RCR,    Subjective:    Notes \"bone pain\" in the shoulder and down the arm for the past few days. Has been using ice and meds.  - noted that pain was much better after Friday visit , good through the weekend, now 3-4/10   9/21/18 doing a little bit better than she was  10/2 - Can not attribute increased pain to anything specific   10/4 - Feeling better today  10/9/18 - Overall pain is better, more of a pressure over the weekend. Mostly c/o fatigue in shoulder with trying to do things. Still can't lay on R side. 10/11/18 - The job she has to do makes her use her arm a lot and it gets sore during/after work day. Objective:    - PROM    Flexion 170, abduction  165  ER  80 at 90 abd,  IR  65 at 90 abd.               Strength     Flexion 4-/5, abduction 4-/5  IR 4/5  ER  4-/5  10/16  PROM    Flexion   175            Abduction        170             ER   86        IR  75            Strength    Flexion   4/5 occasional 2-3 to allow normal functional mobility with RUE     Patient Requires Follow-up: [x] Yes  [] No    Plan:   [x] Continue per plan of care [] Alter current plan (see comments)  [] Plan of care initiated [] Hold pending MD visit [] Discharge    Plan for Next Session:   Increase exer as tolerated; reeval per primary PT    Electronically signed by:   Dilcia [place] [id number]

## 2018-12-06 ENCOUNTER — HOSPITAL ENCOUNTER (OUTPATIENT)
Dept: PHYSICAL THERAPY | Age: 63
Setting detail: THERAPIES SERIES
Discharge: HOME | End: 2018-12-06
Payer: COMMERCIAL

## 2018-12-06 PROCEDURE — 97140 MANUAL THERAPY 1/> REGIONS: CPT | Performed by: CHIROPRACTOR

## 2018-12-06 PROCEDURE — 97035 APP MDLTY 1+ULTRASOUND EA 15: CPT | Performed by: CHIROPRACTOR

## 2018-12-06 PROCEDURE — 97110 THERAPEUTIC EXERCISES: CPT | Performed by: CHIROPRACTOR

## 2018-12-06 NOTE — FLOWSHEET NOTE
Physical Therapy Daily Treatment Note/Re-eval  Date:  2018    Patient Name:  Markus Fuller    :  1955  MRN: [id number]    Restrictions/Precautions: Position Activity Restriction  Other position/activity restrictions: PROM x 3 weeks, may begin AAROM in 3 weeks from , no sig fall risk    Pertinent Medical History: Additional Pertinent Hx: arthritis, DM, GERD, HLD, HTN, lumbar disc disease, CVA  with right side weakness CTrelease,     Medical/Treatment Diagnosis Information:  · Diagnosis: shoulder scope, open amarilys, RCR - medium tear of entire SS and portion of the IS. · Treatment Diagnosis: Decreased functional mobility post right RCR    Insurance/Certification information:  PT Insurance Information: [id number] [employer]  Physician Information:  Referring Practitioner: Dominique Paiz  18 MD dimas +8     Visit# / total visits: 30  +2  Pain level: 10     G-Code (if applicable): visit 20 Score 22 (20-39% disability. CJ   Goal  CI   ]    History of Injury:    Pt post 18 RCR,    Subjective:     Still c/o \"deep\" shoulder pain     Objective:    - PROM    Flexion 170, abduction  165  ER  80 at 90 abd,  IR  65 at 90 abd.               Strength     Flexion 4-/5, abduction 4-/5  IR 4/5  ER  4-/5  10/16  PROM    Flexion   175            Abduction        170             ER   86        IR  75            Strength    Flexion   4/5              Abduction      4-/5              ER 4/5       IR4+/5  12/3  AROM - flexion  175, abduction  175,  ER  88,  IR 75            Strength   Flexion  4/5, abduction  4/5,  ER  4/5,  IR  4+/5      Exercise/Equipment Resistance/Repetitions Other comments     Right RCR     UBE X 2 min      Pulleys flexion   20    Tband  Flexion to 90  ER /IR   Red x 10  Red x 10           Seated  Overhead press  scaption   2# - 1x10   2#- 1x10           US 1.5 w/cm2 100%  STM same   Right GH jnt.  8 min  Right GH jnt  10 min    MHP seated `15 min      Other Therapeutic

## 2018-12-07 ENCOUNTER — OFFICE VISIT (OUTPATIENT)
Dept: FAMILY MEDICINE CLINIC | Age: 63
End: 2018-12-07
Payer: COMMERCIAL

## 2018-12-07 VITALS
DIASTOLIC BLOOD PRESSURE: 70 MMHG | RESPIRATION RATE: 16 BRPM | SYSTOLIC BLOOD PRESSURE: 116 MMHG | HEIGHT: 64 IN | WEIGHT: 220.8 LBS | TEMPERATURE: 98.7 F | BODY MASS INDEX: 37.69 KG/M2 | OXYGEN SATURATION: 97 % | HEART RATE: 72 BPM

## 2018-12-07 DIAGNOSIS — Z13.220 SCREENING FOR HYPERLIPIDEMIA: ICD-10-CM

## 2018-12-07 DIAGNOSIS — R52 DIFFUSE PAIN: ICD-10-CM

## 2018-12-07 DIAGNOSIS — R92.8 ABNORMAL MAMMOGRAM: ICD-10-CM

## 2018-12-07 DIAGNOSIS — Z86.2 HISTORY OF ANEMIA: ICD-10-CM

## 2018-12-07 DIAGNOSIS — E11.8 TYPE 2 DIABETES MELLITUS WITH COMPLICATION, WITHOUT LONG-TERM CURRENT USE OF INSULIN (HCC): Primary | ICD-10-CM

## 2018-12-07 DIAGNOSIS — E11.8 TYPE 2 DIABETES MELLITUS WITH COMPLICATION, WITHOUT LONG-TERM CURRENT USE OF INSULIN (HCC): ICD-10-CM

## 2018-12-07 LAB
BASOPHILS ABSOLUTE: 0 K/UL (ref 0–0.2)
BASOPHILS RELATIVE PERCENT: 0.3 %
C-REACTIVE PROTEIN: 0.7 MG/L (ref 0–5.1)
CHOLESTEROL, TOTAL: 166 MG/DL (ref 0–199)
EOSINOPHILS ABSOLUTE: 0.4 K/UL (ref 0–0.6)
EOSINOPHILS RELATIVE PERCENT: 4.2 %
HBA1C MFR BLD: 8.9 %
HCT VFR BLD CALC: 40.5 % (ref 36–48)
HDLC SERPL-MCNC: 57 MG/DL (ref 40–60)
HEMOGLOBIN: 13.2 G/DL (ref 12–16)
LDL CHOLESTEROL CALCULATED: 90 MG/DL
LYMPHOCYTES ABSOLUTE: 4.7 K/UL (ref 1–5.1)
LYMPHOCYTES RELATIVE PERCENT: 56.3 %
MCH RBC QN AUTO: 27.9 PG (ref 26–34)
MCHC RBC AUTO-ENTMCNC: 32.5 G/DL (ref 31–36)
MCV RBC AUTO: 85.8 FL (ref 80–100)
MONOCYTES ABSOLUTE: 0.6 K/UL (ref 0–1.3)
MONOCYTES RELATIVE PERCENT: 6.9 %
NEUTROPHILS ABSOLUTE: 2.7 K/UL (ref 1.7–7.7)
NEUTROPHILS RELATIVE PERCENT: 32.3 %
PDW BLD-RTO: 13.1 % (ref 12.4–15.4)
PLATELET # BLD: 310 K/UL (ref 135–450)
PMV BLD AUTO: 8.1 FL (ref 5–10.5)
RBC # BLD: 4.72 M/UL (ref 4–5.2)
SEDIMENTATION RATE, ERYTHROCYTE: 14 MM/HR (ref 0–30)
TOTAL CK: 484 U/L (ref 26–192)
TRIGL SERPL-MCNC: 94 MG/DL (ref 0–150)
VLDLC SERPL CALC-MCNC: 19 MG/DL
WBC # BLD: 8.4 K/UL (ref 4–11)

## 2018-12-07 PROCEDURE — 99214 OFFICE O/P EST MOD 30 MIN: CPT | Performed by: FAMILY MEDICINE

## 2018-12-07 PROCEDURE — G8417 CALC BMI ABV UP PARAM F/U: HCPCS | Performed by: FAMILY MEDICINE

## 2018-12-07 PROCEDURE — 2022F DILAT RTA XM EVC RTNOPTHY: CPT | Performed by: FAMILY MEDICINE

## 2018-12-07 PROCEDURE — 83036 HEMOGLOBIN GLYCOSYLATED A1C: CPT | Performed by: FAMILY MEDICINE

## 2018-12-07 PROCEDURE — G8482 FLU IMMUNIZE ORDER/ADMIN: HCPCS | Performed by: FAMILY MEDICINE

## 2018-12-07 PROCEDURE — 3017F COLORECTAL CA SCREEN DOC REV: CPT | Performed by: FAMILY MEDICINE

## 2018-12-07 PROCEDURE — G8599 NO ASA/ANTIPLAT THER USE RNG: HCPCS | Performed by: FAMILY MEDICINE

## 2018-12-07 PROCEDURE — G8427 DOCREV CUR MEDS BY ELIG CLIN: HCPCS | Performed by: FAMILY MEDICINE

## 2018-12-07 PROCEDURE — 1036F TOBACCO NON-USER: CPT | Performed by: FAMILY MEDICINE

## 2018-12-07 PROCEDURE — 3045F PR MOST RECENT HEMOGLOBIN A1C LEVEL 7.0-9.0%: CPT | Performed by: FAMILY MEDICINE

## 2018-12-07 RX ORDER — GABAPENTIN 100 MG/1
100 CAPSULE ORAL 3 TIMES DAILY
Qty: 270 CAPSULE | Refills: 0 | Status: SHIPPED | OUTPATIENT
Start: 2018-12-07 | End: 2019-01-28 | Stop reason: SDUPTHER

## 2018-12-07 RX ORDER — ATORVASTATIN CALCIUM 20 MG/1
20 TABLET, FILM COATED ORAL DAILY
Qty: 90 TABLET | Refills: 1 | Status: SHIPPED | OUTPATIENT
Start: 2018-12-07 | End: 2019-05-15 | Stop reason: SDUPTHER

## 2018-12-09 ENCOUNTER — HOSPITAL ENCOUNTER (EMERGENCY)
Age: 63
Discharge: HOME | End: 2018-12-09
Attending: EMERGENCY MEDICINE
Payer: COMMERCIAL

## 2018-12-09 ENCOUNTER — APPOINTMENT (OUTPATIENT)
Dept: GENERAL RADIOLOGY | Age: 63
End: 2018-12-09
Payer: COMMERCIAL

## 2018-12-09 VITALS
WEIGHT: 220.68 LBS | BODY MASS INDEX: 37.68 KG/M2 | SYSTOLIC BLOOD PRESSURE: 136 MMHG | DIASTOLIC BLOOD PRESSURE: 77 MMHG | HEIGHT: 64 IN | RESPIRATION RATE: 16 BRPM | HEART RATE: 67 BPM | OXYGEN SATURATION: 97 % | TEMPERATURE: 97.6 F

## 2018-12-09 DIAGNOSIS — R07.89 CHEST WALL PAIN: Primary | ICD-10-CM

## 2018-12-09 LAB
A/G RATIO: 1.3 (ref 1.1–2.2)
ALBUMIN SERPL-MCNC: 4 G/DL (ref 3.4–5)
ALP BLD-CCNC: 79 U/L (ref 40–129)
ALT SERPL-CCNC: 28 U/L (ref 10–40)
ANION GAP SERPL CALCULATED.3IONS-SCNC: 14 MMOL/L (ref 3–16)
AST SERPL-CCNC: 27 U/L (ref 15–37)
BASOPHILS ABSOLUTE: 0.1 K/UL (ref 0–0.2)
BASOPHILS RELATIVE PERCENT: 1 %
BILIRUB SERPL-MCNC: <0.2 MG/DL (ref 0–1)
BUN BLDV-MCNC: 17 MG/DL (ref 7–20)
CALCIUM SERPL-MCNC: 10.1 MG/DL (ref 8.3–10.6)
CHLORIDE BLD-SCNC: 103 MMOL/L (ref 99–110)
CO2: 23 MMOL/L (ref 21–32)
CREAT SERPL-MCNC: 0.9 MG/DL (ref 0.6–1.2)
EOSINOPHILS ABSOLUTE: 0.3 K/UL (ref 0–0.6)
EOSINOPHILS RELATIVE PERCENT: 4.2 %
GFR AFRICAN AMERICAN: >60
GFR NON-AFRICAN AMERICAN: >60
GLOBULIN: 3 G/DL
GLUCOSE BLD-MCNC: 179 MG/DL (ref 70–99)
HCT VFR BLD CALC: 38 % (ref 36–48)
HEMOGLOBIN: 12.3 G/DL (ref 12–16)
LYMPHOCYTES ABSOLUTE: 3.2 K/UL (ref 1–5.1)
LYMPHOCYTES RELATIVE PERCENT: 44.3 %
MCH RBC QN AUTO: 27.7 PG (ref 26–34)
MCHC RBC AUTO-ENTMCNC: 32.4 G/DL (ref 31–36)
MCV RBC AUTO: 85.6 FL (ref 80–100)
MONOCYTES ABSOLUTE: 0.4 K/UL (ref 0–1.3)
MONOCYTES RELATIVE PERCENT: 6.1 %
NEUTROPHILS ABSOLUTE: 3.2 K/UL (ref 1.7–7.7)
NEUTROPHILS RELATIVE PERCENT: 44.4 %
PDW BLD-RTO: 13.2 % (ref 12.4–15.4)
PLATELET # BLD: 287 K/UL (ref 135–450)
PMV BLD AUTO: 8 FL (ref 5–10.5)
POTASSIUM REFLEX MAGNESIUM: 4.2 MMOL/L (ref 3.5–5.1)
RBC # BLD: 4.44 M/UL (ref 4–5.2)
SODIUM BLD-SCNC: 140 MMOL/L (ref 136–145)
TOTAL PROTEIN: 7 G/DL (ref 6.4–8.2)
TROPONIN: <0.01 NG/ML
WBC # BLD: 7.2 K/UL (ref 4–11)

## 2018-12-09 PROCEDURE — 85025 COMPLETE CBC W/AUTO DIFF WBC: CPT

## 2018-12-09 PROCEDURE — 84484 ASSAY OF TROPONIN QUANT: CPT

## 2018-12-09 PROCEDURE — 6360000002 HC RX W HCPCS: Performed by: EMERGENCY MEDICINE

## 2018-12-09 PROCEDURE — 93005 ELECTROCARDIOGRAM TRACING: CPT | Performed by: EMERGENCY MEDICINE

## 2018-12-09 PROCEDURE — 99284 EMERGENCY DEPT VISIT MOD MDM: CPT

## 2018-12-09 PROCEDURE — 96374 THER/PROPH/DIAG INJ IV PUSH: CPT

## 2018-12-09 PROCEDURE — 80053 COMPREHEN METABOLIC PANEL: CPT

## 2018-12-09 PROCEDURE — 71046 X-RAY EXAM CHEST 2 VIEWS: CPT

## 2018-12-09 RX ORDER — KETOROLAC TROMETHAMINE 30 MG/ML
30 INJECTION, SOLUTION INTRAMUSCULAR; INTRAVENOUS ONCE
Status: COMPLETED | OUTPATIENT
Start: 2018-12-09 | End: 2018-12-09

## 2018-12-09 RX ADMIN — KETOROLAC TROMETHAMINE 30 MG: 30 INJECTION, SOLUTION INTRAMUSCULAR at 11:32

## 2018-12-09 ASSESSMENT — PAIN SCALES - GENERAL
PAINLEVEL_OUTOF10: 8
PAINLEVEL_OUTOF10: 8

## 2018-12-09 ASSESSMENT — PAIN DESCRIPTION - LOCATION: LOCATION: CHEST

## 2018-12-09 ASSESSMENT — HEART SCORE: ECG: 0

## 2018-12-09 ASSESSMENT — PAIN DESCRIPTION - PAIN TYPE: TYPE: ACUTE PAIN

## 2018-12-09 NOTE — ED PROVIDER NOTES
Resp SpO2 Height Weight   -- -- -- -- -- -- -- --       Physical Exam    General: Alert and awake ×3. Nontoxic appearance. Well-developed well-nourished  HEENT: Normocephalic atraumatic. Neck is supple. Airway intact. No adenopathy  Cardiac: Regular rate and rhythm with no murmurs rubs or gallops. Her chest wall was tender over the breasts and on the left side. It is sharp pain. It is the same pain that she's been having. Pulmonary: Lungs are clear in all lung fields. No wheezing. No Rales. Abdomen: Soft and nontender. Negative hepatosplenomegaly. Bowel sounds are active  Extremities: Moving all extremities. No calf tenderness. Peripheral pulses all intact  Skin: No skin lesions. No rashes  Neurologic: Cranial nerves II through XII was grossly intact. Nonfocal neurological exam  Psychiatric: Patient is pleasant. Mood is appropriate. DIAGNOSTIC RESULTS     EKG (Per Emergency Physician):   Normal sinus rhythm ventricular rate of 77. There is no ischemic changes. Normal EKG. RADIOLOGY (Per Emergency Physician): Interpretation per the Radiologist below, if available at the time of this note:  Xr Chest Standard (2 Vw)    Result Date: 12/9/2018  EXAMINATION: TWO VIEWS OF THE CHEST 12/9/2018 10:20 am COMPARISON: 05/07/2018 HISTORY: ORDERING SYSTEM PROVIDED HISTORY:  TECHNOLOGIST PROVIDED HISTORY: Reason for exam:-> Ordering Physician Provided Reason for Exam: CP Acuity: Acute Type of Exam: Initial Additional signs and symptoms: Chest Pain (started 0630. HX MI and stroke. ) FINDINGS: There is left basilar and right apical scarring. The cardiac silhouette is within normal limits. There is no pneumothorax or pleural effusion. 1.  No acute abnormality.        ED BEDSIDE ULTRASOUND:   Performed by ED Physician - none    LABS:  Labs Reviewed   COMPREHENSIVE METABOLIC PANEL W/ REFLEX TO MG FOR LOW K - Abnormal; Notable for the following:        Result Value    Glucose 179 (*)     All

## 2018-12-10 ENCOUNTER — HOSPITAL ENCOUNTER (OUTPATIENT)
Dept: PHYSICAL THERAPY | Age: 63
Setting detail: THERAPIES SERIES
Discharge: HOME | End: 2018-12-10
Payer: COMMERCIAL

## 2018-12-10 LAB
EKG ATRIAL RATE: 77 BPM
EKG DIAGNOSIS: NORMAL
EKG P AXIS: 44 DEGREES
EKG P-R INTERVAL: 196 MS
EKG Q-T INTERVAL: 372 MS
EKG QRS DURATION: 78 MS
EKG QTC CALCULATION (BAZETT): 420 MS
EKG R AXIS: 37 DEGREES
EKG T AXIS: 42 DEGREES
EKG VENTRICULAR RATE: 77 BPM

## 2018-12-10 PROCEDURE — 93010 ELECTROCARDIOGRAM REPORT: CPT | Performed by: INTERNAL MEDICINE

## 2018-12-10 PROCEDURE — 97035 APP MDLTY 1+ULTRASOUND EA 15: CPT

## 2018-12-10 PROCEDURE — 97110 THERAPEUTIC EXERCISES: CPT

## 2018-12-10 PROCEDURE — 97140 MANUAL THERAPY 1/> REGIONS: CPT

## 2018-12-10 ASSESSMENT — ENCOUNTER SYMPTOMS
BACK PAIN: 1
SHORTNESS OF BREATH: 0
COUGH: 0

## 2018-12-10 NOTE — FLOWSHEET NOTE
Physical Therapy Daily Treatment Note/Re-eval  Date:  12/10/2018    Patient Name:  Kaushik Ibrahim    :  1955  MRN: [id number]    Restrictions/Precautions: Position Activity Restriction  Other position/activity restrictions: PROM x 3 weeks, may begin AAROM in 3 weeks from , no sig fall risk    Pertinent Medical History: Additional Pertinent Hx: arthritis, DM, GERD, HLD, HTN, lumbar disc disease, CVA  with right side weakness CTrelease,     Medical/Treatment Diagnosis Information:  · Diagnosis: shoulder scope, open amarilys, RCR - medium tear of entire SS and portion of the IS. · Treatment Diagnosis: Decreased functional mobility post right RCR    Insurance/Certification information:  PT Insurance Information: [id number] [employer]  Physician Information:  Referring Practitioner: Marvin Yusuf  18 MD dimas +8     Visit# / total visits: 32  +1  Pain level: 3/10     G-Code (if applicable): visit 20 Score 22 (20-39% disability. CJ   Goal  CI   ]    History of Injury:    Pt post 18 RCR,    Subjective:   Reports doing better. Objective:    - PROM    Flexion 170, abduction  165  ER  80 at 90 abd,  IR  65 at 90 abd.               Strength     Flexion 4-/5, abduction 4-/5  IR 4/5  ER  4-/5  10/16  PROM    Flexion   175            Abduction        170             ER   86        IR  75            Strength    Flexion   4/5              Abduction      4-/5              ER 4/5       IR4+/5  12/3  AROM - flexion  175, abduction  175,  ER  88,  IR 75            Strength   Flexion  4/5, abduction  4/5,  ER  4/5,  IR  4+/5      Exercise/Equipment Resistance/Repetitions Other comments     Right RCR     UBE F/B X 2 min      Pulleys flexion   20    Tband  Flexion to 90  ER /IR   Red x 10 R  Red x 10 R ea           Seated  Overhead press  scaption   2# - 1x10 R   2#- 1x10 R           US 1.5 w/cm2 100%  STM same   Right GH jnt.  8 min  Right GH jnt  10 min    MHP seated `15 min      Other Therapeutic Activities:  Pt was educated on PT POC, Diagnosis, Prognosis, pathomechanics as well as frequency and duration of scheduling future physical therapy appointments. Time was also taken on this day to answer all patient questions and participation in PT. Reviewed appointment policy in detail with patient and patient verbalized understanding. Home Exercise Program:  Patient instructed in the following for HEP:   Patient verbalized/demonstrated understanding and was issued written HEP.     Timed Code Treatment Minutes: 40  Total Treatment Minutes:55    Treatment/Activity Tolerance:  [x] Patient tolerated treatment well [] Patient limited by fatigue  [x] Patient limited by pain  [] Patient limited by other medical complications  [] Other:     Prognosis: [x] Good [] Fair  [] Poor    Goals:    Long term goals  Time Frame for Long term goals : discharge  Long term goal 3: Normalize strength for indep homemaking  Long term goal 4: Decrease pain on VAS to occasional 2-3 to allow normal functional mobility with RUE     Patient Requires Follow-up: [x] Yes  [] No    Plan:   [x] Continue per plan of care [] Alter current plan (see comments)  [] Plan of care initiated [] Hold pending MD visit [] Discharge    Plan for Next Session:  1 more     review HEP    Electronically signed by:  Moe Prado, PTA  [id number]

## 2018-12-13 ENCOUNTER — HOSPITAL ENCOUNTER (OUTPATIENT)
Dept: PHYSICAL THERAPY | Age: 63
Setting detail: THERAPIES SERIES
Discharge: HOME | End: 2018-12-13
Payer: COMMERCIAL

## 2018-12-13 DIAGNOSIS — E11.8 TYPE 2 DIABETES MELLITUS WITH COMPLICATION, WITHOUT LONG-TERM CURRENT USE OF INSULIN (HCC): ICD-10-CM

## 2018-12-13 PROCEDURE — G8986 CARRY D/C STATUS: HCPCS | Performed by: CHIROPRACTOR

## 2018-12-13 PROCEDURE — 97035 APP MDLTY 1+ULTRASOUND EA 15: CPT | Performed by: CHIROPRACTOR

## 2018-12-13 PROCEDURE — 97140 MANUAL THERAPY 1/> REGIONS: CPT | Performed by: CHIROPRACTOR

## 2018-12-13 PROCEDURE — 97110 THERAPEUTIC EXERCISES: CPT | Performed by: CHIROPRACTOR

## 2018-12-13 PROCEDURE — G8985 CARRY GOAL STATUS: HCPCS | Performed by: CHIROPRACTOR

## 2018-12-13 NOTE — FLOWSHEET NOTE
Physical Therapy Daily Treatment Note/Re-eval  Date:  2018    Patient Name:  Linwood Dutton    :  1955  MRN: [id number]    Restrictions/Precautions: Position Activity Restriction  Other position/activity restrictions: PROM x 3 weeks, may begin AAROM in 3 weeks from , no sig fall risk    Pertinent Medical History: Additional Pertinent Hx: arthritis, DM, GERD, HLD, HTN, lumbar disc disease, CVA  with right side weakness CTrelease,     Medical/Treatment Diagnosis Information:  · Diagnosis: shoulder scope, open amarilys, RCR - medium tear of entire SS and portion of the IS. · Treatment Diagnosis: Decreased functional mobility post right RCR    Insurance/Certification information:  PT Insurance Information: [id number] [employer]  Physician Information:  Referring Practitioner: Dariusz Gamino  18 MD dimas +8     Visit# / total visits: 30+4  Pain level: 10     G-Code (if applicable): visit 20 Score 22 (20-39% disability. CJ   Goal  CI   ]    History of Injury:    Pt post 18 RCR,    Subjective:   Reports doing better. Objective:    - PROM    Flexion 170, abduction  165  ER  80 at 90 abd,  IR  65 at 90 abd.               Strength     Flexion 4-/5, abduction 4-/5  IR 4/5  ER  4-/5  10/16  PROM    Flexion   175            Abduction        170             ER   86        IR  75            Strength    Flexion   4/5              Abduction      4-/5              ER 4/5       IR4+/5  12/3  AROM - flexion  175, abduction  175,  ER  88,  IR 75            Strength   Flexion  4/5, abduction  4/5,  ER  4/5,  IR  4+/5  18 -Strength/ROM as above      Exercise/Equipment Resistance/Repetitions Other comments     Right RCR     UBE F/B X 2 min      Pulleys flexion   20    Tband  Flexion to 90  ER /IR   Red x 10 R  Red x 10 R ea           Seated  Overhead press  scaption   2# - 1x10 R   2#- 1x10 R           US 1.5 w/cm2 100%  STM same   Right GH jnt.  8 min  Right GH jnt  10 min    MHP seated

## 2018-12-14 DIAGNOSIS — E11.8 TYPE 2 DIABETES MELLITUS WITH COMPLICATION, WITHOUT LONG-TERM CURRENT USE OF INSULIN (HCC): ICD-10-CM

## 2018-12-14 RX ORDER — SITAGLIPTIN 100 MG/1
TABLET, FILM COATED ORAL
Qty: 90 TABLET | Refills: 0 | Status: SHIPPED | OUTPATIENT
Start: 2018-12-14 | End: 2019-01-28 | Stop reason: SDUPTHER

## 2018-12-20 ENCOUNTER — TELEPHONE (OUTPATIENT)
Dept: FAMILY MEDICINE CLINIC | Age: 63
End: 2018-12-20

## 2018-12-20 DIAGNOSIS — E11.8 TYPE 2 DIABETES MELLITUS WITH COMPLICATION, WITHOUT LONG-TERM CURRENT USE OF INSULIN (HCC): Primary | ICD-10-CM

## 2018-12-21 RX ORDER — DIPHENHYDRAMINE HCL 25 MG
1 TABLET ORAL DAILY
Qty: 1 KIT | Refills: 0 | Status: SHIPPED | OUTPATIENT
Start: 2018-12-21 | End: 2019-01-28 | Stop reason: SDUPTHER

## 2018-12-28 ENCOUNTER — HOSPITAL ENCOUNTER (OUTPATIENT)
Dept: ULTRASOUND IMAGING | Age: 63
Discharge: HOME | End: 2018-12-28
Payer: COMMERCIAL

## 2018-12-28 ENCOUNTER — HOSPITAL ENCOUNTER (OUTPATIENT)
Dept: WOMENS IMAGING | Age: 63
Discharge: HOME | End: 2018-12-28
Payer: COMMERCIAL

## 2018-12-28 DIAGNOSIS — R22.32 AXILLARY LUMP, LEFT: ICD-10-CM

## 2018-12-28 DIAGNOSIS — R92.8 ABNORMAL MAMMOGRAM: ICD-10-CM

## 2018-12-28 PROCEDURE — 76641 ULTRASOUND BREAST COMPLETE: CPT

## 2018-12-28 PROCEDURE — 77066 DX MAMMO INCL CAD BI: CPT

## 2019-01-03 RX ORDER — LOSARTAN POTASSIUM 25 MG/1
TABLET ORAL
Qty: 90 TABLET | Refills: 2 | OUTPATIENT
Start: 2019-01-03

## 2019-01-03 RX ORDER — AMLODIPINE BESYLATE 2.5 MG/1
TABLET ORAL
Qty: 90 TABLET | Refills: 2 | OUTPATIENT
Start: 2019-01-03

## 2019-01-03 RX ORDER — ATORVASTATIN CALCIUM 40 MG/1
TABLET, FILM COATED ORAL
Qty: 90 TABLET | Refills: 2 | OUTPATIENT
Start: 2019-01-03

## 2019-01-07 ENCOUNTER — OFFICE VISIT (OUTPATIENT)
Dept: FAMILY MEDICINE CLINIC | Age: 64
End: 2019-01-07
Payer: COMMERCIAL

## 2019-01-07 VITALS
DIASTOLIC BLOOD PRESSURE: 78 MMHG | BODY MASS INDEX: 35.68 KG/M2 | HEART RATE: 86 BPM | HEIGHT: 64 IN | TEMPERATURE: 98.9 F | RESPIRATION RATE: 17 BRPM | WEIGHT: 209 LBS | SYSTOLIC BLOOD PRESSURE: 134 MMHG | OXYGEN SATURATION: 99 %

## 2019-01-07 DIAGNOSIS — M54.50 CHRONIC BILATERAL LOW BACK PAIN WITHOUT SCIATICA: ICD-10-CM

## 2019-01-07 DIAGNOSIS — E11.8 TYPE 2 DIABETES MELLITUS WITH COMPLICATION, WITHOUT LONG-TERM CURRENT USE OF INSULIN (HCC): Primary | ICD-10-CM

## 2019-01-07 DIAGNOSIS — G89.29 CHRONIC BILATERAL LOW BACK PAIN WITHOUT SCIATICA: ICD-10-CM

## 2019-01-07 LAB — HBA1C MFR BLD: 8.5 %

## 2019-01-07 PROCEDURE — G8482 FLU IMMUNIZE ORDER/ADMIN: HCPCS | Performed by: FAMILY MEDICINE

## 2019-01-07 PROCEDURE — 83036 HEMOGLOBIN GLYCOSYLATED A1C: CPT | Performed by: FAMILY MEDICINE

## 2019-01-07 PROCEDURE — 1036F TOBACCO NON-USER: CPT | Performed by: FAMILY MEDICINE

## 2019-01-07 PROCEDURE — G8417 CALC BMI ABV UP PARAM F/U: HCPCS | Performed by: FAMILY MEDICINE

## 2019-01-07 PROCEDURE — 3045F PR MOST RECENT HEMOGLOBIN A1C LEVEL 7.0-9.0%: CPT | Performed by: FAMILY MEDICINE

## 2019-01-07 PROCEDURE — 99214 OFFICE O/P EST MOD 30 MIN: CPT | Performed by: FAMILY MEDICINE

## 2019-01-07 PROCEDURE — G8427 DOCREV CUR MEDS BY ELIG CLIN: HCPCS | Performed by: FAMILY MEDICINE

## 2019-01-07 PROCEDURE — G8599 NO ASA/ANTIPLAT THER USE RNG: HCPCS | Performed by: FAMILY MEDICINE

## 2019-01-07 PROCEDURE — 2022F DILAT RTA XM EVC RTNOPTHY: CPT | Performed by: FAMILY MEDICINE

## 2019-01-07 PROCEDURE — 3017F COLORECTAL CA SCREEN DOC REV: CPT | Performed by: FAMILY MEDICINE

## 2019-01-09 ASSESSMENT — ENCOUNTER SYMPTOMS
BACK PAIN: 1
SHORTNESS OF BREATH: 0
COUGH: 0

## 2019-01-10 ENCOUNTER — OFFICE VISIT (OUTPATIENT)
Dept: ORTHOPEDIC SURGERY | Age: 64
End: 2019-01-10
Payer: COMMERCIAL

## 2019-01-10 ENCOUNTER — NURSE ONLY (OUTPATIENT)
Dept: FAMILY MEDICINE CLINIC | Age: 64
End: 2019-01-10

## 2019-01-10 VITALS
HEIGHT: 64 IN | DIASTOLIC BLOOD PRESSURE: 67 MMHG | WEIGHT: 209 LBS | BODY MASS INDEX: 35.68 KG/M2 | RESPIRATION RATE: 16 BRPM | HEART RATE: 92 BPM | SYSTOLIC BLOOD PRESSURE: 127 MMHG

## 2019-01-10 DIAGNOSIS — E13.8 DIABETES MELLITUS OF OTHER TYPE WITH COMPLICATION, UNSPECIFIED WHETHER LONG TERM INSULIN USE: Primary | ICD-10-CM

## 2019-01-10 DIAGNOSIS — R82.998 LEUKOCYTES IN URINE: ICD-10-CM

## 2019-01-10 DIAGNOSIS — R82.90 ABNORMAL URINALYSIS: Primary | ICD-10-CM

## 2019-01-10 DIAGNOSIS — M75.81 ROTATOR CUFF TENDINITIS, RIGHT: Primary | ICD-10-CM

## 2019-01-10 DIAGNOSIS — E11.69 TYPE 2 DIABETES MELLITUS WITH OTHER SPECIFIED COMPLICATION, WITHOUT LONG-TERM CURRENT USE OF INSULIN (HCC): ICD-10-CM

## 2019-01-10 LAB
BILIRUBIN, POC: ABNORMAL
BLOOD URINE, POC: ABNORMAL
CLARITY, POC: CLEAR
COLOR, POC: ABNORMAL
CREATININE URINE: 159.7 MG/DL (ref 28–259)
GLUCOSE URINE, POC: ABNORMAL
KETONES, POC: ABNORMAL
LEUKOCYTE EST, POC: ABNORMAL
MICROALBUMIN UR-MCNC: <1.2 MG/DL
MICROALBUMIN/CREAT UR-RTO: NORMAL MG/G (ref 0–30)
NITRITE, POC: ABNORMAL
PH, POC: 5.5
PROTEIN, POC: ABNORMAL
SPECIFIC GRAVITY, POC: 1.02
UROBILINOGEN, POC: 0.2

## 2019-01-10 PROCEDURE — G8482 FLU IMMUNIZE ORDER/ADMIN: HCPCS | Performed by: ORTHOPAEDIC SURGERY

## 2019-01-10 PROCEDURE — G8427 DOCREV CUR MEDS BY ELIG CLIN: HCPCS | Performed by: ORTHOPAEDIC SURGERY

## 2019-01-10 PROCEDURE — 20610 DRAIN/INJ JOINT/BURSA W/O US: CPT | Performed by: ORTHOPAEDIC SURGERY

## 2019-01-10 PROCEDURE — 3017F COLORECTAL CA SCREEN DOC REV: CPT | Performed by: ORTHOPAEDIC SURGERY

## 2019-01-10 PROCEDURE — G8599 NO ASA/ANTIPLAT THER USE RNG: HCPCS | Performed by: ORTHOPAEDIC SURGERY

## 2019-01-10 PROCEDURE — 1036F TOBACCO NON-USER: CPT | Performed by: ORTHOPAEDIC SURGERY

## 2019-01-10 PROCEDURE — 99213 OFFICE O/P EST LOW 20 MIN: CPT | Performed by: ORTHOPAEDIC SURGERY

## 2019-01-10 PROCEDURE — 81002 URINALYSIS NONAUTO W/O SCOPE: CPT | Performed by: FAMILY MEDICINE

## 2019-01-10 PROCEDURE — G8417 CALC BMI ABV UP PARAM F/U: HCPCS | Performed by: ORTHOPAEDIC SURGERY

## 2019-01-10 RX ORDER — METHYLPREDNISOLONE ACETATE 40 MG/ML
80 INJECTION, SUSPENSION INTRA-ARTICULAR; INTRALESIONAL; INTRAMUSCULAR; SOFT TISSUE ONCE
Status: COMPLETED | OUTPATIENT
Start: 2019-01-10 | End: 2019-01-10

## 2019-01-10 RX ORDER — FUROSEMIDE 20 MG/1
TABLET ORAL
Qty: 30 TABLET | Refills: 2 | Status: SHIPPED | OUTPATIENT
Start: 2019-01-10 | End: 2019-01-28 | Stop reason: SDUPTHER

## 2019-01-10 RX ADMIN — METHYLPREDNISOLONE ACETATE 80 MG: 40 INJECTION, SUSPENSION INTRA-ARTICULAR; INTRALESIONAL; INTRAMUSCULAR; SOFT TISSUE at 09:11

## 2019-01-12 LAB
ORGANISM: ABNORMAL
URINE CULTURE, ROUTINE: ABNORMAL
URINE CULTURE, ROUTINE: ABNORMAL

## 2019-01-28 ENCOUNTER — OFFICE VISIT (OUTPATIENT)
Dept: FAMILY MEDICINE CLINIC | Age: 64
End: 2019-01-28
Payer: COMMERCIAL

## 2019-01-28 VITALS
BODY MASS INDEX: 35.41 KG/M2 | HEART RATE: 88 BPM | DIASTOLIC BLOOD PRESSURE: 74 MMHG | WEIGHT: 207.4 LBS | TEMPERATURE: 98 F | SYSTOLIC BLOOD PRESSURE: 110 MMHG | RESPIRATION RATE: 18 BRPM | OXYGEN SATURATION: 97 % | HEIGHT: 64 IN

## 2019-01-28 DIAGNOSIS — E11.8 TYPE 2 DIABETES MELLITUS WITH COMPLICATION, WITHOUT LONG-TERM CURRENT USE OF INSULIN (HCC): Primary | ICD-10-CM

## 2019-01-28 DIAGNOSIS — F32.89 OTHER DEPRESSION: ICD-10-CM

## 2019-01-28 DIAGNOSIS — R52 DIFFUSE PAIN: ICD-10-CM

## 2019-01-28 DIAGNOSIS — F39 MOOD DISORDER (HCC): ICD-10-CM

## 2019-01-28 DIAGNOSIS — I10 ESSENTIAL HYPERTENSION: ICD-10-CM

## 2019-01-28 DIAGNOSIS — M62.838 TRAPEZIUS MUSCLE SPASM: ICD-10-CM

## 2019-01-28 PROCEDURE — 2022F DILAT RTA XM EVC RTNOPTHY: CPT | Performed by: FAMILY MEDICINE

## 2019-01-28 PROCEDURE — G8482 FLU IMMUNIZE ORDER/ADMIN: HCPCS | Performed by: FAMILY MEDICINE

## 2019-01-28 PROCEDURE — 1036F TOBACCO NON-USER: CPT | Performed by: FAMILY MEDICINE

## 2019-01-28 PROCEDURE — 3017F COLORECTAL CA SCREEN DOC REV: CPT | Performed by: FAMILY MEDICINE

## 2019-01-28 PROCEDURE — G8599 NO ASA/ANTIPLAT THER USE RNG: HCPCS | Performed by: FAMILY MEDICINE

## 2019-01-28 PROCEDURE — 3045F PR MOST RECENT HEMOGLOBIN A1C LEVEL 7.0-9.0%: CPT | Performed by: FAMILY MEDICINE

## 2019-01-28 PROCEDURE — G8428 CUR MEDS NOT DOCUMENT: HCPCS | Performed by: FAMILY MEDICINE

## 2019-01-28 PROCEDURE — 99214 OFFICE O/P EST MOD 30 MIN: CPT | Performed by: FAMILY MEDICINE

## 2019-01-28 PROCEDURE — G8417 CALC BMI ABV UP PARAM F/U: HCPCS | Performed by: FAMILY MEDICINE

## 2019-01-28 RX ORDER — DIPHENHYDRAMINE HCL 25 MG
1 TABLET ORAL DAILY
Qty: 1 KIT | Refills: 0 | Status: SHIPPED | OUTPATIENT
Start: 2019-01-28 | End: 2022-01-24

## 2019-01-28 RX ORDER — ONDANSETRON 4 MG/1
4 TABLET, ORALLY DISINTEGRATING ORAL EVERY 6 HOURS PRN
Qty: 12 TABLET | Refills: 0 | Status: SHIPPED | OUTPATIENT
Start: 2019-01-28 | End: 2019-06-05

## 2019-01-28 RX ORDER — OMEPRAZOLE 40 MG/1
CAPSULE, DELAYED RELEASE ORAL
Qty: 180 CAPSULE | Refills: 1 | Status: SHIPPED | OUTPATIENT
Start: 2019-01-28 | End: 2019-11-29 | Stop reason: SDUPTHER

## 2019-01-28 RX ORDER — METOPROLOL SUCCINATE 25 MG/1
TABLET, EXTENDED RELEASE ORAL
Qty: 90 TABLET | Refills: 3 | Status: SHIPPED | OUTPATIENT
Start: 2019-01-28 | End: 2020-01-31

## 2019-01-28 RX ORDER — LOSARTAN POTASSIUM 25 MG/1
TABLET ORAL
Qty: 90 TABLET | Refills: 3 | Status: SHIPPED | OUTPATIENT
Start: 2019-01-28 | End: 2019-05-15

## 2019-01-28 RX ORDER — FUROSEMIDE 20 MG/1
TABLET ORAL
Qty: 30 TABLET | Refills: 2 | Status: SHIPPED | OUTPATIENT
Start: 2019-01-28 | End: 2019-08-22 | Stop reason: SDUPTHER

## 2019-01-28 RX ORDER — GABAPENTIN 300 MG/1
300 CAPSULE ORAL 3 TIMES DAILY
Qty: 270 CAPSULE | Refills: 0 | Status: SHIPPED | OUTPATIENT
Start: 2019-01-28 | End: 2019-04-27 | Stop reason: SDUPTHER

## 2019-01-28 RX ORDER — DULOXETIN HYDROCHLORIDE 30 MG/1
CAPSULE, DELAYED RELEASE ORAL
Qty: 53 CAPSULE | Refills: 5 | Status: SHIPPED | OUTPATIENT
Start: 2019-01-28 | End: 2020-03-03

## 2019-01-28 RX ORDER — TIZANIDINE 4 MG/1
4 TABLET ORAL 3 TIMES DAILY
Qty: 60 TABLET | Refills: 0 | Status: SHIPPED | OUTPATIENT
Start: 2019-01-28 | End: 2019-07-05

## 2019-01-28 ASSESSMENT — ENCOUNTER SYMPTOMS
COUGH: 0
SHORTNESS OF BREATH: 0

## 2019-01-28 ASSESSMENT — PATIENT HEALTH QUESTIONNAIRE - PHQ9
SUM OF ALL RESPONSES TO PHQ9 QUESTIONS 1 & 2: 0
2. FEELING DOWN, DEPRESSED OR HOPELESS: 0
SUM OF ALL RESPONSES TO PHQ QUESTIONS 1-9: 0
SUM OF ALL RESPONSES TO PHQ QUESTIONS 1-9: 0
1. LITTLE INTEREST OR PLEASURE IN DOING THINGS: 0

## 2019-02-26 DIAGNOSIS — E11.8 TYPE 2 DIABETES MELLITUS WITH COMPLICATION, WITHOUT LONG-TERM CURRENT USE OF INSULIN (HCC): ICD-10-CM

## 2019-02-26 RX ORDER — DULAGLUTIDE 1.5 MG/.5ML
INJECTION, SOLUTION SUBCUTANEOUS
Qty: 4 PEN | Refills: 4 | Status: SHIPPED | OUTPATIENT
Start: 2019-02-26 | End: 2020-01-10 | Stop reason: SDUPTHER

## 2019-03-04 DIAGNOSIS — R52 DIFFUSE PAIN: ICD-10-CM

## 2019-03-04 RX ORDER — GABAPENTIN 100 MG/1
CAPSULE ORAL
Qty: 270 CAPSULE | Refills: 0 | OUTPATIENT
Start: 2019-03-04 | End: 2019-06-02

## 2019-04-27 DIAGNOSIS — R52 DIFFUSE PAIN: ICD-10-CM

## 2019-04-28 RX ORDER — GABAPENTIN 300 MG/1
CAPSULE ORAL
Qty: 270 CAPSULE | Refills: 0 | Status: SHIPPED | OUTPATIENT
Start: 2019-04-28 | End: 2019-08-03 | Stop reason: SDUPTHER

## 2019-05-08 ENCOUNTER — TELEPHONE (OUTPATIENT)
Dept: FAMILY MEDICINE CLINIC | Age: 64
End: 2019-05-08

## 2019-05-08 NOTE — LETTER
[facility]  [address]  [address] [place] [id number]  Phone: [phone]  Fax: [phone]    Aretha Tobin MD    May 13, 2019     Patient: Elise Rubio   YOB: 1955   Date of Visit: 5/8/2019       To Whom it May Concern:    Don Mata is a patient seen in my clinic. She has shown benefit for her pain problems with the use of a TENS unit and would continue to gain benefit from its use. If you have any questions or concerns, please don't hesitate to call.     Sincerely,       Aretha Tobin MD

## 2019-05-09 ENCOUNTER — OFFICE VISIT (OUTPATIENT)
Dept: ORTHOPEDIC SURGERY | Age: 64
End: 2019-05-09
Payer: COMMERCIAL

## 2019-05-09 DIAGNOSIS — Z98.890 S/P RIGHT ROTATOR CUFF REPAIR: Primary | ICD-10-CM

## 2019-05-09 PROCEDURE — 3017F COLORECTAL CA SCREEN DOC REV: CPT | Performed by: ORTHOPAEDIC SURGERY

## 2019-05-09 PROCEDURE — G8599 NO ASA/ANTIPLAT THER USE RNG: HCPCS | Performed by: ORTHOPAEDIC SURGERY

## 2019-05-09 PROCEDURE — 99213 OFFICE O/P EST LOW 20 MIN: CPT | Performed by: ORTHOPAEDIC SURGERY

## 2019-05-09 PROCEDURE — 1036F TOBACCO NON-USER: CPT | Performed by: ORTHOPAEDIC SURGERY

## 2019-05-09 PROCEDURE — G8417 CALC BMI ABV UP PARAM F/U: HCPCS | Performed by: ORTHOPAEDIC SURGERY

## 2019-05-09 PROCEDURE — G8427 DOCREV CUR MEDS BY ELIG CLIN: HCPCS | Performed by: ORTHOPAEDIC SURGERY

## 2019-05-09 RX ORDER — METHYLPREDNISOLONE 4 MG/1
TABLET ORAL
Qty: 1 KIT | Refills: 0 | Status: SHIPPED | OUTPATIENT
Start: 2019-05-09 | End: 2019-05-15

## 2019-05-09 RX ORDER — TRAMADOL HYDROCHLORIDE 50 MG/1
50 TABLET ORAL EVERY 8 HOURS PRN
Qty: 20 TABLET | Refills: 0 | Status: SHIPPED | OUTPATIENT
Start: 2019-05-09 | End: 2019-05-16

## 2019-05-09 NOTE — PROGRESS NOTES
Domenica AGGARWAL [facility]  [id number]  May 9, 2019    Chief Complaint   Patient presents with    Shoulder Pain     right shoulder        History: The patient is here follow-up regarding her right shoulder. She is now approximately 1 year status post right shoulder rotator cuff repair. she continues to have moderate right shoulder pain. She has regained great deal of motion. The injection at last office provided minimal relief. She has regained significant strength as well as range of motion. She has continued her home exercises. She does continue taking diclofenac and tizanidine. She feels that her pain seems to be worse at night especially when on that side. The patient's  past medical history, medications, allergies,  family history, social history, and review of systems have been reviewed, and dated and are recorded in the chart. There were no vitals taken for this visit. Physical:  Ms. Marito Del Toro appears well, she is in no apparent distress, she demonstrates appropriate mood & affect. She is alert and oriented to person, place and time. She has no further swelling. She is neurovascularly intact distally. Sensation is intact in the axillary nerve distribution. right shoulder range of motion: 170 degrees abduction, 170 degrees forward flexion, 35 degrees external rotation and internal rotation to L5. She has mild discomfort to palpation over the subacromial space. She has no pain with light range of motion of the shoulder. The incisions are clean, dry and intact and without erythema. Strength in the shoulder is: 5/5. Impression: status post right shoulder arthroscopy, [place] and rotator cuff repair    Plan: At this time, we will continue to treat the patient conservatively. The patient was given a Medrol Dosepak. She was encouraged to modify her activities. She was given a prescription for Ultram.  We will send the patient back to therapy.   The patient will follow up with me in

## 2019-05-09 NOTE — TELEPHONE ENCOUNTER
Could you write a letter for this dr mathis pt, saying that she needs the tens unit another 90 days so that I can fax it to [employer] so she can rent the machine through insurance and not pay out of pocket.   It helps her a lot

## 2019-05-09 NOTE — TELEPHONE ENCOUNTER
Are they able to accept last OV that discusses the TENS unit and patient feels benefit? If not, unfortunately will need to wait for Dr. Nissa Sharma to return next week to write letter. I have not seen patient, so would not able to to write letter of medical necessity.

## 2019-05-13 ENCOUNTER — HOSPITAL ENCOUNTER (OUTPATIENT)
Dept: PHYSICAL THERAPY | Age: 64
Setting detail: THERAPIES SERIES
Discharge: HOME | End: 2019-05-13
Payer: COMMERCIAL

## 2019-05-13 PROCEDURE — 97140 MANUAL THERAPY 1/> REGIONS: CPT

## 2019-05-13 PROCEDURE — 97530 THERAPEUTIC ACTIVITIES: CPT

## 2019-05-13 PROCEDURE — 97162 PT EVAL MOD COMPLEX 30 MIN: CPT

## 2019-05-13 PROCEDURE — 97035 APP MDLTY 1+ULTRASOUND EA 15: CPT

## 2019-05-13 NOTE — PLAN OF CARE
Outpatient Physical Therapy  [x] [facility]    Phone: [phone]   Fax: [phone]   [] [facility]  Phone: [phone]              Fax: [phone]  [] [facility]   Phone: [phone]   Fax: [phone]     To:        Patient: Jamie Navarro   : 1955   MRN: [id number]  Evaluation Date: 2019      Diagnosis Information:  ·  Dx - right shoulder pain  · Decreased functional mobility 2/2 right shoulder pain  ·       Physical Therapy Certification  Dear Dr. Rocio Rubio  The following patient has been evaluated for physical therapy services and for therapy to continue, Medicare requires monthly physician review of the treatment plan. Please review the attached evaluation and/or summary of the patient's plan of care, and verify that you agree therapy should continue by signing the attached document and sending it back to our office. Plan of Care/Treatment to date:  [x] Therapeutic Exercise    [x] Modalities:  [x] Therapeutic Activity     [] Ultrasound  [] Electrical Stimulation  [] Gait Training      [] Cervical Traction [] Lumbar Traction  [] Neuromuscular Re-education    [] Cold/hotpack [] Iontophoresis   [x] Instruction in HEP     Other:  [x] Manual Therapy      []             [] Aquatic Therapy      []           ? Frequency/Duration:  # Days per week: [] 1 day # Weeks: [] 1 week [] 5 weeks     [x] 2 days? [] 2 weeks [] 6 weeks     [] 3 days   [] 3 weeks [] 7 weeks     [] 4 days   [] 4 weeks [x] 8 weeks    Rehab Potential: [] Excellent [x] Good [] Fair  [] Poor       Electronically signed by:  Al Hector, PT [id number]      If you have any questions or concerns, please don't hesitate to call.   Thank you for your referral.      Physician Signature:________________________________Date:__________________  By signing above, therapists plan is approved by physician

## 2019-05-13 NOTE — FLOWSHEET NOTE
Physical Therapy Daily Treatment Note  Date:  2019    Patient Name:  Tate Smart    :  1955  MRN: [id number]    Restrictions/Precautions: Position Activity Restriction  Other position/activity restrictions: no significant fall risk    Pertinent Medical History: Additional Pertinent Hx: arthritis, diabetes, GERd, HLD, HTN, lumbar DD, sleep apnea,  CVA , carpal tunnel release gurmeet, trigger finger release gurmeet    Medical/Treatment Diagnosis Information:  · Diagnosis:  17  \"shoulder scope, open amarilys, RCR - medium tear of entire SS and portion of the IS. · Treatment Diagnosis: Decreased functional mobility 2/2 myofascial pain right shoulder    Insurance/Certification information:  PT Insurance Information: [facility] dual  Physician Information:  Referring Practitioner: Tayler Guzman of care signed (Y/N):  sent    Visit# / total visits:    Pain level: 3/10     Functional Assessment: at eval  Test:  UEFs  Score: 5/13    13.75      History of Injury:    Shoulder scope 18  34 PT treatments. Cortisone shot in 2018. At May visit this year given Medrol dose pack and Ultram, PT referral.  Cortisone shot didnt help, Pain was a 6. Pain down to about a 3 since Medrol dose pack and Ultram. Currently not working, had to quit [employer] due to the work load and continued pain. Currently  watching grandchildren. Subjective: At May visit this year given Medrol dose pack and Ultram, PT referral.  Cortisone shot didnt help, Pain was a 6. Pain down to about a 3 since Medrol dose pack and Ultram.  Increased pain with any RUE use. Objective:  AROM RUE (degrees)  RUE General AROM: flexion 165a/170p ER 65a/75p IR 70a/78p abduction [id number].     Strength RUE  Comment: flexion  4/5, abduction  4/5,  ER 4/5  IR  4+. 5 painful all motions      Exercise/Equipment Resistance/Repetitions Other comments             NT add    pulleys add    Shoulder rolls 10 Review for HEP   Corner stretch 10 sec x 3 US 1.5 w/cm2 100% Ant/lat shoulder 8 min          STM Ant/lat shoulder/ UT         CP 10 min                Other Therapeutic Activities:  Pt was educated on PT POC, Diagnosis, Prognosis, pathomechanics as well as frequency and duration of scheduling future physical therapy appointments. Time was also taken on this day to answer all patient questions and participation in PT. Reviewed appointment policy in detail with patient and patient verbalized understanding. Home Exercise Program:  Patient instructed in the following for HEP:   Patient verbalized/demonstrated understanding and was issued written HEP. Timed Code Treatment Minutes:  45    Total Treatment Minutes:  70    Treatment/Activity Tolerance:  [] Patient tolerated treatment well [] Patient limited by fatigue  [] Patient limited by pain  [] Patient limited by other medical complications  [] Other:     Prognosis: [x] Good [] Fair  [] Poor    Goals:    Short term goals  Time Frame for Short term goals: 2 weeks  Short term goal 1: Indep with HEP      Long term goals  Time Frame for Long term goals : discharge  Long term goal 1: Decrease pain to 2/10 on VAS to allow light activity  Long term goal 2: Full SHoulder ROM for increased ease with self care and ADL  Long term goal 3:  Increase strength to 4+/5 for homemaking and      Patient Requires Follow-up: [x] Yes  [] No    Plan:   [] Continue per plan of care [] Alter current plan (see comments)  [x] Plan of care initiated [] Hold pending MD visit [] Discharge    Plan for Next Session:  Add above as stated    Electronically signed by:  Horacio Whitmore, [facility] [address]

## 2019-05-15 ENCOUNTER — HOSPITAL ENCOUNTER (OUTPATIENT)
Dept: PHYSICAL THERAPY | Age: 64
Setting detail: THERAPIES SERIES
Discharge: HOME | End: 2019-05-15
Payer: COMMERCIAL

## 2019-05-15 ENCOUNTER — OFFICE VISIT (OUTPATIENT)
Dept: FAMILY MEDICINE CLINIC | Age: 64
End: 2019-05-15
Payer: COMMERCIAL

## 2019-05-15 VITALS
TEMPERATURE: 98.2 F | BODY MASS INDEX: 35.03 KG/M2 | HEART RATE: 88 BPM | HEIGHT: 64 IN | SYSTOLIC BLOOD PRESSURE: 126 MMHG | DIASTOLIC BLOOD PRESSURE: 84 MMHG | RESPIRATION RATE: 16 BRPM | WEIGHT: 205.2 LBS | OXYGEN SATURATION: 97 %

## 2019-05-15 DIAGNOSIS — E11.8 TYPE 2 DIABETES MELLITUS WITH COMPLICATION, WITH LONG-TERM CURRENT USE OF INSULIN (HCC): Primary | ICD-10-CM

## 2019-05-15 DIAGNOSIS — E78.2 MIXED HYPERLIPIDEMIA: ICD-10-CM

## 2019-05-15 DIAGNOSIS — Z79.4 TYPE 2 DIABETES MELLITUS WITH COMPLICATION, WITH LONG-TERM CURRENT USE OF INSULIN (HCC): Primary | ICD-10-CM

## 2019-05-15 DIAGNOSIS — I10 ESSENTIAL HYPERTENSION: ICD-10-CM

## 2019-05-15 LAB — HBA1C MFR BLD: 7.9 %

## 2019-05-15 PROCEDURE — 2022F DILAT RTA XM EVC RTNOPTHY: CPT | Performed by: FAMILY MEDICINE

## 2019-05-15 PROCEDURE — 3045F PR MOST RECENT HEMOGLOBIN A1C LEVEL 7.0-9.0%: CPT | Performed by: FAMILY MEDICINE

## 2019-05-15 PROCEDURE — 97035 APP MDLTY 1+ULTRASOUND EA 15: CPT

## 2019-05-15 PROCEDURE — 99214 OFFICE O/P EST MOD 30 MIN: CPT | Performed by: FAMILY MEDICINE

## 2019-05-15 PROCEDURE — 83036 HEMOGLOBIN GLYCOSYLATED A1C: CPT | Performed by: FAMILY MEDICINE

## 2019-05-15 PROCEDURE — G8427 DOCREV CUR MEDS BY ELIG CLIN: HCPCS | Performed by: FAMILY MEDICINE

## 2019-05-15 PROCEDURE — G8417 CALC BMI ABV UP PARAM F/U: HCPCS | Performed by: FAMILY MEDICINE

## 2019-05-15 PROCEDURE — G8599 NO ASA/ANTIPLAT THER USE RNG: HCPCS | Performed by: FAMILY MEDICINE

## 2019-05-15 PROCEDURE — 97140 MANUAL THERAPY 1/> REGIONS: CPT

## 2019-05-15 PROCEDURE — 3017F COLORECTAL CA SCREEN DOC REV: CPT | Performed by: FAMILY MEDICINE

## 2019-05-15 PROCEDURE — 1036F TOBACCO NON-USER: CPT | Performed by: FAMILY MEDICINE

## 2019-05-15 RX ORDER — ATORVASTATIN CALCIUM 20 MG/1
20 TABLET, FILM COATED ORAL DAILY
Qty: 90 TABLET | Refills: 1 | Status: SHIPPED | OUTPATIENT
Start: 2019-05-15 | End: 2020-02-05

## 2019-05-15 RX ORDER — VALSARTAN 80 MG/1
80 TABLET ORAL DAILY
Qty: 90 TABLET | Refills: 1 | Status: SHIPPED | OUTPATIENT
Start: 2019-05-15 | End: 2019-11-09 | Stop reason: SDUPTHER

## 2019-05-15 NOTE — PROGRESS NOTES
5/15/2019    Tate Smart is a 58 y.o. female here for follow up    HPI   Diabetes  Lab Results   Component Value Date    LABA1C 7.9 05/15/2019     Lab Results   Component Value Date    .9 12/11/2017   A1c today 7.9  - Trulicity once per week  - 14 units of Basaglar nightly  - metformin, Januvia  - Typically 120-130ss in the morning  - eats bananas, tries to avoid rice, eats a lot of veggies, beans    HTN  - would like to switch to different ARB based on recall  - BP here normal today  - no dizziness or light-headedness    HLD  - on Lipitor 20mg  Lab Results   Component Value Date    LDLCALC 90 12/07/2018   needs refill, doing well on this med     Review of Systems   Constitutional: Negative for chills and fever. Respiratory: Negative for cough and shortness of breath. Cardiovascular: Negative for chest pain. Patient Active Problem List   Diagnosis    Diabetes mellitus ([address]    Obesity    Dystonia    Cerebral thrombosis with cerebral infarction ([address]    Sleep apnea    Right sided weakness    Trigger finger, acquired    Elevated CK    Primary osteoarthritis involving multiple joints    Primary osteoarthritis of both knees    Mood disorder ([address] - follows with Psych Dr. Chiara Gomez Calcific tendonitis of right shoulder    Rotator cuff tendinitis, right    Biceps tendinitis, right    Tear of right supraspinatus tendon    Essential hypertension    Mixed hyperlipidemia     Prior to Visit Medications    Medication Sig Taking?  Authorizing Provider   valsartan (DIOVAN) 80 MG tablet Take 1 tablet by mouth daily Yes [place] MD Lorene   atorvastatin (LIPITOR) 20 MG tablet Take 1 tablet by mouth daily Yes Lisa Finnegan MD   gabapentin (NEURONTIN) 300 MG capsule TAKE ONE CAPSULE BY MOUTH THREE TIMES A DAY Yes MD CHARLEY Strickland 1.5 YF/2.8UB SOPN INJECT 1.5 MG UNDER THE SKIN ONCE WEEKLY Yes Lisa Finnegan MD   tiZANidine (ZANAFLEX) 4 MG tablet Take 1 tablet by mouth 3 times daily Yes Lisa Finnegan MD   ondansetron (ZOFRAN ODT) 4 MG disintegrating tablet Take 1 tablet by mouth every 6 hours as needed for Nausea or Vomiting Yes Hilary Finnegan MD   omeprazole (PRILOSEC) 40 MG delayed release capsule TAKE ONE CAPSULE BY MOUTH TWICE A DAY BEFORE MEALS Yes Hilary Finnegan MD   metoprolol succinate (TOPROL XL) 25 MG extended release tablet TAKE ONE HALF TABLET BY MOUTH DAILY FOR BLOOD PRESSURE AND HEART RATE Yes Hilary Finnegan MD   metFORMIN (GLUCOPHAGE) 500 MG tablet Take 2 tablets by mouth 2 times daily (with meals) Yes Hilary Finnegan MD   SITagliptin (JANUVIA) 100 MG tablet TAKE ONE TABLET BY MOUTH DAILY Yes Hilary Finnegan MD   Insulin Pen Needle ([employer] PEN NEEDLES) 31G X 6 MM MISC 1 each by Does not apply route daily Yes Ron Keller MD   insulin glargine (BASAGLAR KWIKPEN) 100 UNIT/ML injection pen Inject 12 Units into the skin nightly Yes Hilary Finnegan MD   furosemide (LASIX) 20 MG tablet Take 1 tab by mouth daily PRN leg swelling Yes Ron Keller MD   blood glucose test strips (FREESTYLE LITE) strip TEST BLOOD SUGAR TWO TIMES A DAY Diag: E11.9 Yes Ron Keller MD   Blood Glucose Monitoring Suppl (TRUE METRIX AIR GLUCOSE METER) w/Device KIT 1 each by Does not apply route daily Yes Ron Keller MD   DULoxetine (CYMBALTA) 30 MG extended release capsule Take 1 capsule by mouth daily for one week, followed by 2 capsules daily Yes Ron Keller MD   Tens Unit MISC by Does not apply route Yes Ron Keller MD   Misc.  Devices Arvella Auburn) MISC One four wheel walker Yes Hilary Finnegan MD   diclofenac (VOLTAREN) 75 MG EC tablet Take 1 tablet by mouth 2 times daily Yes Carmina Yusuf APRN - CNP   albuterol sulfate  (90 Base) MCG/ACT inhaler Inhale 2 puffs into the lungs every 6 hours as needed for Wheezing Yes Hilary Finnegan MD   FREESTYLE LANCETS MISC USE ONE LANCET TO TEST BLOOD SUGAR TWICE A DAY Yes Juana Perez MD   FANAPT 1 MG TABS tablet Take 1 tablet by mouth nightly Yes Historical Provider, MD   aspirin 81 MG EC tablet Take 1 tablet by Minutes per session: None    Stress: None   Relationships    Social connections:     Talks on phone: None     Gets together: None     Attends Confucianist service: None     Active member of club or organization: None     Attends meetings of clubs or organizations: None     Relationship status: None    Intimate partner violence:     Fear of current or ex partner: None     Emotionally abused: None     Physically abused: None     Forced sexual activity: None   Other Topics Concern    None   Social History Narrative    Originally from [address] [place] [address] is seen here    10 grandchildren     Allergies   Allergen Reactions    Codeine Nausea And Vomiting and Rash    Vicodin [Hydrocodone-Acetaminophen] Nausea And Vomiting    Oxycontin [Oxycodone Hcl] Itching       LABS:  Lab Results   Component Value Date    GLUCOSE 179 (H) 12/09/2018     Lab Results   Component Value Date     12/09/2018    K 4.2 12/09/2018    CREATININE 0.9 12/09/2018     Cholesterol, Total   Date Value Ref Range Status   12/07/2018 166 0 - 199 mg/dL Final     LDL Calculated   Date Value Ref Range Status   12/07/2018 90 <100 mg/dL Final     HDL   Date Value Ref Range Status   12/07/2018 57 40 - 60 mg/dL Final   01/26/2012 56 40 - 60 mg/dl Final     Triglycerides   Date Value Ref Range Status   12/07/2018 94 0 - 150 mg/dL Final     Lab Results   Component Value Date    ALT 28 12/09/2018    AST 27 12/09/2018    ALKPHOS 79 12/09/2018    BILITOT <0.2 12/09/2018     Lab Results   Component Value Date    WBC 7.2 12/09/2018    HGB 12.3 12/09/2018    HCT 38.0 12/09/2018    MCV 85.6 12/09/2018     12/09/2018     TSH (uIU/mL)   Date Value   12/11/2017 3.97     Lab Results   Component Value Date    LABA1C 7.9 05/15/2019     No results found for: PSA, PSADIA      PHYSICAL EXAM  /84 (Site: Right Upper Arm, Position: Sitting, Cuff Size: Medium Adult)   Pulse 88   Temp 98.2 °F (36.8 °C) (Oral)   Resp 16   Ht 5' 4\" (1.626 m)   Wt 205 lb 3.2 oz (93.1 kg)   SpO2 97%   BMI 35.22 kg/m²     BP Readings from Last 5 Encounters:   05/15/19 126/84   01/28/19 110/74   01/10/19 127/67   01/07/19 134/78   12/09/18 136/77       Wt Readings from Last 5 Encounters:   05/15/19 205 lb 3.2 oz (93.1 kg)   01/28/19 207 lb 6.4 oz (94.1 kg)   01/10/19 209 lb (94.8 kg)   01/07/19 209 lb (94.8 kg)   12/09/18 220 lb 10.9 oz (100.1 kg)         Physical Exam   Constitutional: She is oriented to person, place, and time. She appears well-developed and well-nourished. HENT:   Head: Normocephalic and atraumatic. Pulmonary/Chest: Effort normal.   Neurological: She is alert and oriented to person, place, and time. Skin: No pallor. Psychiatric: She has a normal mood and affect. ASSESSMENT/PLAN:  1. Type 2 diabetes mellitus with complication, with long-term current use of insulin (HCC)  A1c improving down to 7.9  Advised blood sugar log at least twice daily  Reviewed how to titrate insulin dose up as needed to avoid hypoglycemia  On ARB, Lipitor  - POCT glycosylated hemoglobin (Hb A1C)    2. Essential hypertension  Doing well on current regimen without side effects  - valsartan (DIOVAN) 80 MG tablet; Take 1 tablet by mouth daily  Dispense: 90 tablet; Refill: 1    3. Mixed hyperlipidemia  On statin, refill sent  - atorvastatin (LIPITOR) 20 MG tablet; Take 1 tablet by mouth daily  Dispense: 90 tablet; Refill: 1      Return in about 3 months (around 8/15/2019) for dm f/u.

## 2019-05-15 NOTE — FLOWSHEET NOTE
Physical Therapy Daily Treatment Note  Date:  5/15/2019    Patient Name:  Heather Benton    :  1955  MRN: [id number]    Restrictions/Precautions: Position Activity Restriction  Other position/activity restrictions: no significant fall risk    Pertinent Medical History: Additional Pertinent Hx: arthritis, diabetes, GERd, HLD, HTN, lumbar DD, sleep apnea,  CVA , carpal tunnel release gurmeet, trigger finger release gurmeet    Medical/Treatment Diagnosis Information:  · Diagnosis:  17  \"shoulder scope, open amarilys, RCR - medium tear of entire SS and portion of the IS. · Treatment Diagnosis: Decreased functional mobility 2/2 myofascial pain right shoulder    Insurance/Certification information:  PT Insurance Information: [facility] dual  Physician Information:  Referring Practitioner: Muna Daniel of care signed (Y/N):  sent    Visit# / total visits:   (10 min late)  Pain level: 3/10     Functional Assessment: at eval  Test:  UEFs  Score: 5/13    13.75      History of Injury:    Shoulder scope 18  34 PT treatments. Cortisone shot in 2018. At May visit this year given Medrol dose pack and Ultram, PT referral.  Cortisone shot didnt help, Pain was a 6. Pain down to about a 3 since Medrol dose pack and Ultram. Currently not working, had to quit [employer] due to the work load and continued pain. Currently  watching grandchildren. Subjective:   Reports doing a little better. Objective:  AROM RUE (degrees)  RUE General AROM: flexion 165a/170p ER 65a/75p IR 70a/78p abduction [id number].     Strength RUE  Comment: flexion  4/5, abduction  4/5,  ER 4/5  IR  4+. 5 painful all motions      Exercise/Equipment Resistance/Repetitions Other comments        NT 0# x 1 min  Increase time   pulleys X 10     Shoulder rolls bckwds x10 Reviewed for HEP   Scapular retraction add    Corner stretch 10 sec x 3 Reviewed for HEP                            US 1.5 w/cm2 100% Ant/lat shoulder 8 min     STM Ant/lat shoulder/ UT    CP 10 min      Other Therapeutic Activities:  Pt was educated on PT POC, Diagnosis, Prognosis, pathomechanics as well as frequency and duration of scheduling future physical therapy appointments. Time was also taken on this day to answer all patient questions and participation in PT. Reviewed appointment policy in detail with patient and patient verbalized understanding. Home Exercise Program:  Patient instructed in the following for HEP:   Patient verbalized/demonstrated understanding and was issued written HEP. Timed Code Treatment Minutes:  30    Total Treatment Minutes:  40    Treatment/Activity Tolerance:  [x] Patient tolerated treatment well [] Patient limited by fatigue  [] Patient limited by pain  [] Patient limited by other medical complications  [x] Other: reinforced posture, relaxation of r trap. Prognosis: [x] Good [] Fair  [] Poor    Goals:    Short term goals  Time Frame for Short term goals: 2 weeks  Short term goal 1: Indep with HEP      Long term goals  Time Frame for Long term goals : discharge  Long term goal 1: Decrease pain to 2/10 on VAS to allow light activity  Long term goal 2: Full SHoulder ROM for increased ease with self care and ADL  Long term goal 3:  Increase strength to 4+/5 for homemaking and      Patient Requires Follow-up: [x] Yes  [] No    Plan:   [] Continue per plan of care [] Alter current plan (see comments)  [x] Plan of care initiated [] Hold pending MD visit [] Discharge    Plan for Next Session:  Add above as stated    Electronically signed by:  Benito Newman,

## 2019-05-17 ENCOUNTER — HOSPITAL ENCOUNTER (OUTPATIENT)
Dept: PHYSICAL THERAPY | Age: 64
Setting detail: THERAPIES SERIES
Discharge: HOME | End: 2019-05-17
Payer: COMMERCIAL

## 2019-05-17 PROCEDURE — 97140 MANUAL THERAPY 1/> REGIONS: CPT

## 2019-05-17 PROCEDURE — 97035 APP MDLTY 1+ULTRASOUND EA 15: CPT

## 2019-05-17 ASSESSMENT — ENCOUNTER SYMPTOMS
COUGH: 0
SHORTNESS OF BREATH: 0

## 2019-05-17 NOTE — FLOWSHEET NOTE
Physical Therapy Daily Treatment Note  Date:  2019    Patient Name:  Radha Howard    :  1955  MRN: [id number]    Restrictions/Precautions: Position Activity Restriction  Other position/activity restrictions: no significant fall risk    Pertinent Medical History: Additional Pertinent Hx: arthritis, diabetes, GERd, HLD, HTN, lumbar DD, sleep apnea,  CVA , carpal tunnel release gurmeet, trigger finger release gurmeet    Medical/Treatment Diagnosis Information:  · Diagnosis:  18  \"shoulder scope, open amarilys, RCR - medium tear of entire SS and portion of the IS. · Treatment Diagnosis: Decreased functional mobility 2/2 myofascial pain right shoulder    Insurance/Certification information:  PT Insurance Information: [employer] dual  Physician Information:  Referring Practitioner: Cely Gamble of care signed (Y/N):  sent    Visit# / total visits:  3/16 HAVE TIARAIA SIGN STUFF NEXT VISIT !!!!!!!  Pain level: 3/10     Functional Assessment: at eval  Test:  UEFs  Score: 5/13    13.75      History of Injury:    Shoulder scope 18  34 PT treatments. Cortisone shot in 2018. At May visit this year given Medrol dose pack and Ultram, PT referral.  Cortisone shot didnt help, Pain was a 6. Pain down to about a 3 since Medrol dose pack and Ultram. Currently not working, had to quit [employer] due to the work load and continued pain. Currently  watching grandchildren. Subjective:   Reports doing a little better. Objective:  AROM RUE (degrees)  RUE General AROM: flexion 165a/170p ER 65a/75p IR 70a/78p abduction [id number].     Strength RUE  Comment: flexion  4/5, abduction  4/5,  ER 4/5  IR  4+. 5 painful all motions      Exercise/Equipment Resistance/Repetitions Other comments        NT 0# x 2 min  Increase time   pulleys X 10     Shoulder rolls bckwds x10 Reviewed for HEP   Scapular retraction add    Corner stretch 10 sec x 3 Reviewed for HEP        US 1.5 w/cm2 100% Ant/lat shoulder 8 min     STM

## 2019-05-20 ENCOUNTER — HOSPITAL ENCOUNTER (OUTPATIENT)
Dept: PHYSICAL THERAPY | Age: 64
Setting detail: THERAPIES SERIES
Discharge: HOME | End: 2019-05-20
Payer: COMMERCIAL

## 2019-05-20 PROCEDURE — 97035 APP MDLTY 1+ULTRASOUND EA 15: CPT

## 2019-05-20 PROCEDURE — 97140 MANUAL THERAPY 1/> REGIONS: CPT

## 2019-05-20 NOTE — FLOWSHEET NOTE
Physical Therapy Daily Treatment Note  Date:  2019    Patient Name:  Argelia Sanchez    :  1955  MRN: [id number]    Restrictions/Precautions: Position Activity Restriction  Other position/activity restrictions: no significant fall risk    Pertinent Medical History: Additional Pertinent Hx: arthritis, diabetes, GERd, HLD, HTN, lumbar DD, sleep apnea,  CVA , carpal tunnel release gurmeet, trigger finger release gurmeet    Medical/Treatment Diagnosis Information:  · Diagnosis:  18  \"shoulder scope, open amarilys, RCR - medium tear of entire SS and portion of the IS. · Treatment Diagnosis: Decreased functional mobility 2/2 myofascial pain right shoulder    Insurance/Certification information:  PT Insurance Information: [facility] dual  Physician Information:  Referring Practitioner: Presley Javed of care signed (Y/N):  sent    Visit# / total visits:          Pain level: 3/10     Functional Assessment: at eval  Test:  UEFs  Score: 5/13    13.75      History of Injury:    Shoulder scope 18  34 PT treatments. Cortisone shot in 2018. At May visit this year given Medrol dose pack and Ultram, PT referral.  Cortisone shot didnt help, Pain was a 6. Pain down to about a 3 since Medrol dose pack and Ultram. Currently not working, had to quit [employer] due to the work load and continued pain. Currently  watching grandchildren. Subjective:   Improving. Has been using ice at home. Objective:  AROM RUE (degrees)  RUE General AROM: flexion 165a/170p ER 65a/75p IR 70a/78p abduction [id number].     Strength RUE  Comment: flexion  4/5, abduction  4/5,  ER 4/5  IR  4+. 5 painful all motions      Exercise/Equipment Resistance/Repetitions Other comments        NT 0# x 2 min     pulleys X 20    T-band            rows add         Shoulder rolls bckwds x10 Reviewed for HEP   Scapular retraction 3 sec X 10     Corner stretch 10 sec x 3 Reviewed for HEP        US 1.5 w/cm2 100% Ant/lat shoulder 8 min     STM Ant/lat shoulder/ UT roldan    CP 10 min      Other Therapeutic Activities:  Pt was educated on PT POC, Diagnosis, Prognosis, pathomechanics as well as frequency and duration of scheduling future physical therapy appointments. Time was also taken on this day to answer all patient questions and participation in PT. Reviewed appointment policy in detail with patient and patient verbalized understanding. Home Exercise Program:  Patient instructed in the following for HEP:   Patient verbalized/demonstrated understanding and was issued written HEP. Timed Code Treatment Minutes:  30    Total Treatment Minutes:  40    Treatment/Activity Tolerance:  [x] Patient tolerated treatment well [] Patient limited by fatigue  [] Patient limited by pain  [] Patient limited by other medical complications  [x] Other: pt reports able to sleep better. Prognosis: [x] Good [] Fair  [] Poor    Goals:    Short term goals  Time Frame for Short term goals: 2 weeks  Short term goal 1: Indep with HEP      Long term goals  Time Frame for Long term goals : discharge  Long term goal 1: Decrease pain to 2/10 on VAS to allow light activity  Long term goal 2: Full SHoulder ROM for increased ease with self care and ADL  Long term goal 3:  Increase strength to 4+/5 for homemaking and      Patient Requires Follow-up: [x] Yes  [] No    Plan:   [x] Continue per plan of care [] Alter current plan (see comments)  [x] Plan of care initiated [] Hold pending MD visit [] Discharge    Plan for Next Session:  Add above as stated    Electronically signed by:  Kristen Vergara,

## 2019-05-22 ENCOUNTER — HOSPITAL ENCOUNTER (OUTPATIENT)
Dept: PHYSICAL THERAPY | Age: 64
Setting detail: THERAPIES SERIES
Discharge: HOME | End: 2019-05-22
Payer: COMMERCIAL

## 2019-05-22 PROCEDURE — 97140 MANUAL THERAPY 1/> REGIONS: CPT

## 2019-05-22 PROCEDURE — 97035 APP MDLTY 1+ULTRASOUND EA 15: CPT

## 2019-05-22 NOTE — FLOWSHEET NOTE
Physical Therapy Daily Treatment Note  Date:  2019    Patient Name:  Heather Benton    :  1955  MRN: [id number]    Restrictions/Precautions: Position Activity Restriction  Other position/activity restrictions: no significant fall risk    Pertinent Medical History: Additional Pertinent Hx: arthritis, diabetes, GERd, HLD, HTN, lumbar DD, sleep apnea,  CVA , carpal tunnel release gurmeet, trigger finger release gurmeet    Medical/Treatment Diagnosis Information:  · Diagnosis:  17  \" R  shoulder scope, open amarilys, RCR - medium tear of entire SS and portion of the IS. · Treatment Diagnosis: Decreased functional mobility 2/2 myofascial pain right shoulder    Insurance/Certification information:  PT Insurance Information: [employer] dual  Physician Information:  Referring Practitioner: Muna Daniel of care signed (Y/N):  sent    Visit# / total visits:          Pain level: 0/10     Functional Assessment: at eval  Test:  UEFs  Score: 5/13    13.75      History of Injury:   R  Shoulder scope 18  34 PT treatments. Cortisone shot in 2018. At May visit this year given Medrol dose pack and Ultram, PT referral.  Cortisone shot didnt help, Pain was a 6. Pain down to about a 3 since Medrol dose pack and Ultram. Currently not working, had to quit [employer] due to the work load and continued pain. Currently  watching grandchildren. Subjective:   No pain this am R shld. Feels shld more flexible. Objective:  AROM RUE (degrees)  RUE General AROM: flexion 165a/170p ER 65a/75p IR 70a/78p abduction [id number].     Strength RUE  Comment: flexion  4/5, abduction  4/5,  ER 4/5  IR  4+. 5 painful all motions      Exercise/Equipment Resistance/Repetitions Other comments        NT 0# x 2 min     Pulleys  Flex  X 20    T-band            Rows                          Ext  Red x 10 B  add         Shoulder rolls bckwds x10 Reviewed for HEP   Scapular retraction 3 sec X 10     Corner stretch 10 sec x 3 Reviewed

## 2019-05-28 ENCOUNTER — HOSPITAL ENCOUNTER (OUTPATIENT)
Dept: PHYSICAL THERAPY | Age: 64
Setting detail: THERAPIES SERIES
Discharge: HOME | End: 2019-05-28
Payer: COMMERCIAL

## 2019-05-28 PROCEDURE — 97140 MANUAL THERAPY 1/> REGIONS: CPT

## 2019-05-28 PROCEDURE — 97110 THERAPEUTIC EXERCISES: CPT

## 2019-05-28 NOTE — FLOWSHEET NOTE
Physical Therapy Daily Treatment Note  Date:  2019    Patient Name:  Tate Smart    :  1955  MRN: [id number]    Restrictions/Precautions: Position Activity Restriction  Other position/activity restrictions: no significant fall risk    Pertinent Medical History: Additional Pertinent Hx: arthritis, diabetes, GERd, HLD, HTN, lumbar DD, sleep apnea,  CVA , carpal tunnel release gurmeet, trigger finger release gurmeet    Medical/Treatment Diagnosis Information:  · Diagnosis:  18  \" R  shoulder scope, open amarilys, RCR - medium tear of entire SS and portion of the IS. · Treatment Diagnosis: Decreased functional mobility 2/2 myofascial pain right shoulder    Insurance/Certification information:  PT Insurance Information: [employer]  Physician Information:  Referring Practitioner: Tayler Guzman of care signed (Y/N):  sent    Visit# / total visits:          Pain level: 4/10     Functional Assessment: at eval  Test:  UEFs  Score: 5/13    13.75      History of Injury:   R  Shoulder scope 18  34 PT treatments. Cortisone shot in 2018. At May visit this year given Medrol dose pack and Ultram, PT referral.  Cortisone shot didnt help, Pain was a 6. Pain down to about a 3 since Medrol dose pack and Ultram. Currently not working, had to quit [employer] due to the work load and continued pain. Currently  watching grandchildren. Subjective:   More sore right now b/c she has been doing laundry, vacuuming and housework. Objective:  AROM RUE (degrees)  RUE General AROM: flexion 165a/170p ER 65a/75p IR 70a/78p abduction [id number].     Strength RUE  Comment: flexion  4/5, abduction  4/5,  ER 4/5  IR  4+. 5 painful all motions      Exercise/Equipment Resistance/Repetitions Other comments        NT 0# x 2 min     Pulleys  Flex  X 20    T-band            Rows                          Ext  Red x 10 B  add         Shoulder rolls bckwds Reviewed for HEP   Scapular retraction    Corner stretch 10 sec x 3 Reviewed for HEP        US 1.5 w/cm2 100% Ant/lat R shoulder 8 min     STM Ant/lat  R shoulder/ UT roldan Focus on knot upper arm   CP 10 min 2 packs ( 1 shoulder and 1 upper arm)     Other Therapeutic Activities:  Pt was educated on PT POC, Diagnosis, Prognosis, pathomechanics as well as frequency and duration of scheduling future physical therapy appointments. Time was also taken on this day to answer all patient questions and participation in PT. Reviewed appointment policy in detail with patient and patient verbalized understanding. Home Exercise Program:  Patient instructed in the following for HEP:   Patient verbalized/demonstrated understanding and was issued written HEP. Timed Code Treatment Minutes:  30    Total Treatment Minutes:  40    Treatment/Activity Tolerance:  [x] Patient tolerated treatment well [] Patient limited by fatigue  [] Patient limited by pain  [] Patient limited by other medical complications  [x] Other: pt reports able to sleep better; but cont soreness with trying to resume normal housework/chores      Prognosis: [x] Good [] Fair  [] Poor    Goals:    Short term goals  Time Frame for Short term goals: 2 weeks  Short term goal 1: Indep with HEP      Long term goals  Time Frame for Long term goals : discharge  Long term goal 1: Decrease pain to 2/10 on VAS to allow light activity  Long term goal 2: Full SHoulder ROM for increased ease with self care and ADL  Long term goal 3:  Increase strength to 4+/5 for homemaking and      Patient Requires Follow-up: [x] Yes  [] No    Plan:   [x] Continue per plan of care [] Alter current plan (see comments)  [x] Plan of care initiated [] Hold pending MD visit [] Discharge    Plan for Next Session:  Add above as stated; TB ext     Electronically signed by:  Lexi Sen, [address]

## 2019-05-29 ENCOUNTER — APPOINTMENT (OUTPATIENT)
Dept: PHYSICAL THERAPY | Age: 64
End: 2019-05-29
Payer: COMMERCIAL

## 2019-05-29 ENCOUNTER — HOSPITAL ENCOUNTER (OUTPATIENT)
Dept: PHYSICAL THERAPY | Age: 64
Setting detail: THERAPIES SERIES
Discharge: HOME | End: 2019-05-29
Payer: COMMERCIAL

## 2019-05-29 PROCEDURE — 97035 APP MDLTY 1+ULTRASOUND EA 15: CPT

## 2019-05-29 PROCEDURE — 97140 MANUAL THERAPY 1/> REGIONS: CPT

## 2019-05-29 NOTE — FLOWSHEET NOTE
Physical Therapy Daily Treatment Note  Date:  2019    Patient Name:  Jada Rae    :  1955  MRN: [id number]    Restrictions/Precautions: Position Activity Restriction  Other position/activity restrictions: no significant fall risk    Pertinent Medical History: Additional Pertinent Hx: arthritis, diabetes, GERd, HLD, HTN, lumbar DD, sleep apnea,  CVA , carpal tunnel release gurmeet, trigger finger release gurmeet    Medical/Treatment Diagnosis Information:  · Diagnosis:  18  \" R  shoulder scope, open amarilys, RCR - medium tear of entire SS and portion of the IS. · Treatment Diagnosis: Decreased functional mobility 2/2 myofascial pain right shoulder    Insurance/Certification information:  PT Insurance Information: [facility] dual  Physician Information:  Referring Practitioner: Nava Lentz of sandee signed (Y/N):  sent    Visit# / total visits:          Pain level: 3/10     Functional Assessment: at eval  Test:  UEFs  Score: 5/13    13.75      History of Injury:   R  Shoulder scope 18  34 PT treatments. Cortisone shot in 2018. At May visit this year given Medrol dose pack and Ultram, PT referral.  Cortisone shot didnt help, Pain was a 6. Pain down to about a 3 since Medrol dose pack and Ultram. Currently not working, had to quit [employer] due to the work load and continued pain. Currently  watching grandchildren. Subjective:  Doing a little better today. Objective:  AROM RUE (degrees)  RUE General AROM: flexion 165a/170p ER 65a/75p IR 70a/78p abduction [id number].     Strength RUE  Comment: flexion  4/5, abduction  4/5,  ER 4/5  IR  4+. 5 painful all motions      Exercise/Equipment Resistance/Repetitions Other comments        NT 0# x 2 min     Pulleys  Flex  X 20    T-band            Rows                          Ext                            IR                           ER Red x 10 B  Red x 10 B  Yellow      Add  Yellow      add         Shoulder rolls bckwds Reviewed for HEP Scapular retraction    Corner stretch 10 sec x 3 Reviewed for HEP        US 1.5 w/cm2 100% Ant/lat R shoulder 8 min     STM Ant/lat  R shoulder/ UT roldan Focus on knot upper arm   CP 10 min 2 packs ( 1 shoulder and 1 upper arm)     Other Therapeutic Activities:  Pt was educated on PT POC, Diagnosis, Prognosis, pathomechanics as well as frequency and duration of scheduling future physical therapy appointments. Time was also taken on this day to answer all patient questions and participation in PT. Reviewed appointment policy in detail with patient and patient verbalized understanding. Home Exercise Program:  Patient instructed in the following for HEP:   Patient verbalized/demonstrated understanding and was issued written HEP. Timed Code Treatment Minutes:  30    Total Treatment Minutes:  40    Treatment/Activity Tolerance:  [x] Patient tolerated treatment well [] Patient limited by fatigue  [] Patient limited by pain  [] Patient limited by other medical complications  [x] Other: improving slowly    Prognosis: [x] Good [] Fair  [] Poor    Goals:    Short term goals  Time Frame for Short term goals: 2 weeks  Short term goal 1: Indep with HEP      Long term goals  Time Frame for Long term goals : discharge  Long term goal 1: Decrease pain to 2/10 on VAS to allow light activity  Long term goal 2: Full SHoulder ROM for increased ease with self care and ADL  Long term goal 3:  Increase strength to 4+/5 for homemaking and      Patient Requires Follow-up: [x] Yes  [] No    Plan:   [x] Continue per plan of care [] Alter current plan (see comments)  [x] Plan of care initiated [] Hold pending MD visit [] Discharge    Plan for Next Session:  Add above as stated; TB IR/ER if prosper    Electronically signed by:  Lamar Rubin,

## 2019-05-31 ENCOUNTER — HOSPITAL ENCOUNTER (OUTPATIENT)
Dept: PHYSICAL THERAPY | Age: 64
Setting detail: THERAPIES SERIES
Discharge: HOME | End: 2019-05-31
Payer: COMMERCIAL

## 2019-06-03 ENCOUNTER — HOSPITAL ENCOUNTER (OUTPATIENT)
Dept: PHYSICAL THERAPY | Age: 64
Setting detail: THERAPIES SERIES
Discharge: HOME | End: 2019-06-03
Payer: COMMERCIAL

## 2019-06-03 PROCEDURE — 97140 MANUAL THERAPY 1/> REGIONS: CPT

## 2019-06-03 PROCEDURE — 97035 APP MDLTY 1+ULTRASOUND EA 15: CPT

## 2019-06-03 NOTE — FLOWSHEET NOTE
Physical Therapy Daily Treatment Note  Date:  6/3/2019    Patient Name:  Kyle Cowan    :  1955  MRN: [id number]    Restrictions/Precautions: Position Activity Restriction  Other position/activity restrictions: no significant fall risk    Pertinent Medical History: Additional Pertinent Hx: arthritis, diabetes, GERd, HLD, HTN, lumbar DD, sleep apnea,  CVA , carpal tunnel release gurmeet, trigger finger release gurmeet    Medical/Treatment Diagnosis Information:  · Diagnosis:  18  \" R  shoulder scope, open amarilys, RCR - medium tear of entire SS and portion of the IS. · Treatment Diagnosis: Decreased functional mobility 2/2 myofascial pain right shoulder    Insurance/Certification information:  PT Insurance Information: [employer]  Physician Information:  Referring Practitioner: Gregory Koch of care signed (Y/N):  sent    Visit# / total visits:          Pain level: 3/10     Functional Assessment: at eval  Test:  UEFs  Score: 5/13    13.75      History of Injury:   R  Shoulder scope 18  34 PT treatments. Cortisone shot in 2018. At May visit this year given Medrol dose pack and Ultram, PT referral.  Cortisone shot didnt help, Pain was a 6. Pain down to about a 3 since Medrol dose pack and Ultram. Currently not working, had to quit [employer] due to the work load and continued pain. Currently  watching grandchildren. Subjective:  Stomach pain x 5 days. R shld doing some better. Objective:  AROM RUE (degrees)  RUE General AROM: flexion 165a/170p ER 65a/75p IR 70a/78p abduction [id number].     Strength RUE  Comment: flexion  4/5, abduction  4/5,  ER 4/5  IR  4+. 5 painful all motions      Exercise/Equipment Resistance/Repetitions Other comments        NT 0# x 2 min     Pulleys  Flex  X 20    T-band            Rows                          Ext                            IR                           ER Red x 10 B  Red x 10 B  Yellow  X 10 R     Yellow  x 10  R      Increase reps Shoulder rolls bckwds HEP   Scapular retraction HEP   Corner stretch 20 sec x 3 Reviewed for HEP        US 1.5 w/cm2 100% Ant/lat R shoulder 8 min     STM Ant/lat  R shoulder/ UT roldan Focus on knot upper arm   CP 10 min 1 large      Other Therapeutic Activities:  Pt was educated on PT POC, Diagnosis, Prognosis, pathomechanics as well as frequency and duration of scheduling future physical therapy appointments. Time was also taken on this day to answer all patient questions and participation in PT. Reviewed appointment policy in detail with patient and patient verbalized understanding. Home Exercise Program:  Patient instructed in the following for HEP:   Patient verbalized/demonstrated understanding and was issued written HEP. Timed Code Treatment Minutes:  30    Total Treatment Minutes:  40    Treatment/Activity Tolerance:  [x] Patient tolerated treatment well [] Patient limited by fatigue  [] Patient limited by pain  [] Patient limited by other medical complications  [x] Other: improving slowly    Prognosis: [x] Good [] Fair  [] Poor    Goals:    Short term goals  Time Frame for Short term goals: 2 weeks  Short term goal 1: Indep with HEP      Long term goals  Time Frame for Long term goals : discharge  Long term goal 1: Decrease pain to 2/10 on VAS to allow light activity  Long term goal 2: Full SHoulder ROM for increased ease with self care and ADL  Long term goal 3:  Increase strength to 4+/5 for homemaking and      Patient Requires Follow-up: [x] Yes  [] No    Plan:   [x] Continue per plan of care [] Alter current plan (see comments)  [x] Plan of care initiated [] Hold pending MD visit [] Discharge    Plan for Next Session:  Add above as stated;     Electronically signed by:  Leila Pickering,

## 2019-06-04 ENCOUNTER — HOSPITAL ENCOUNTER (OUTPATIENT)
Dept: PHYSICAL THERAPY | Age: 64
Setting detail: THERAPIES SERIES
Discharge: HOME | End: 2019-06-04
Payer: COMMERCIAL

## 2019-06-05 ENCOUNTER — HOSPITAL ENCOUNTER (EMERGENCY)
Age: 64
Discharge: HOME | End: 2019-06-05
Attending: EMERGENCY MEDICINE
Payer: COMMERCIAL

## 2019-06-05 ENCOUNTER — APPOINTMENT (OUTPATIENT)
Dept: GENERAL RADIOLOGY | Age: 64
End: 2019-06-05
Payer: COMMERCIAL

## 2019-06-05 ENCOUNTER — APPOINTMENT (OUTPATIENT)
Dept: CT IMAGING | Age: 64
End: 2019-06-05
Payer: COMMERCIAL

## 2019-06-05 VITALS
HEART RATE: 67 BPM | HEIGHT: 64 IN | OXYGEN SATURATION: 97 % | SYSTOLIC BLOOD PRESSURE: 138 MMHG | TEMPERATURE: 98 F | RESPIRATION RATE: 15 BRPM | BODY MASS INDEX: 34.18 KG/M2 | WEIGHT: 200.18 LBS | DIASTOLIC BLOOD PRESSURE: 102 MMHG

## 2019-06-05 DIAGNOSIS — R10.13 ABDOMINAL PAIN, EPIGASTRIC: ICD-10-CM

## 2019-06-05 DIAGNOSIS — R11.2 NON-INTRACTABLE VOMITING WITH NAUSEA, UNSPECIFIED VOMITING TYPE: Primary | ICD-10-CM

## 2019-06-05 DIAGNOSIS — R19.7 DIARRHEA, UNSPECIFIED TYPE: ICD-10-CM

## 2019-06-05 LAB
A/G RATIO: 1.3 (ref 1.1–2.2)
ALBUMIN SERPL-MCNC: 4.2 G/DL (ref 3.4–5)
ALP BLD-CCNC: 92 U/L (ref 40–129)
ALT SERPL-CCNC: 27 U/L (ref 10–40)
ANION GAP SERPL CALCULATED.3IONS-SCNC: 12 MMOL/L (ref 3–16)
AST SERPL-CCNC: 29 U/L (ref 15–37)
BASOPHILS ABSOLUTE: 0 K/UL (ref 0–0.2)
BASOPHILS RELATIVE PERCENT: 0.4 %
BILIRUB SERPL-MCNC: 0.3 MG/DL (ref 0–1)
BILIRUBIN URINE: NEGATIVE
BLOOD, URINE: NEGATIVE
BUN BLDV-MCNC: 7 MG/DL (ref 7–20)
CALCIUM SERPL-MCNC: 10.1 MG/DL (ref 8.3–10.6)
CHLORIDE BLD-SCNC: 106 MMOL/L (ref 99–110)
CLARITY: CLEAR
CO2: 23 MMOL/L (ref 21–32)
COLOR: YELLOW
CREAT SERPL-MCNC: 0.8 MG/DL (ref 0.6–1.2)
EKG ATRIAL RATE: 78 BPM
EKG DIAGNOSIS: NORMAL
EKG P AXIS: 37 DEGREES
EKG P-R INTERVAL: 196 MS
EKG Q-T INTERVAL: 394 MS
EKG QRS DURATION: 74 MS
EKG QTC CALCULATION (BAZETT): 449 MS
EKG R AXIS: 19 DEGREES
EKG T AXIS: 31 DEGREES
EKG VENTRICULAR RATE: 78 BPM
EOSINOPHILS ABSOLUTE: 0.2 K/UL (ref 0–0.6)
EOSINOPHILS RELATIVE PERCENT: 3.3 %
GFR AFRICAN AMERICAN: >60
GFR NON-AFRICAN AMERICAN: >60
GLOBULIN: 3.2 G/DL
GLUCOSE BLD-MCNC: 126 MG/DL (ref 70–99)
GLUCOSE URINE: NEGATIVE MG/DL
HCT VFR BLD CALC: 41.4 % (ref 36–48)
HEMOGLOBIN: 13.5 G/DL (ref 12–16)
KETONES, URINE: NEGATIVE MG/DL
LEUKOCYTE ESTERASE, URINE: NEGATIVE
LIPASE: 91 U/L (ref 13–60)
LYMPHOCYTES ABSOLUTE: 3.2 K/UL (ref 1–5.1)
LYMPHOCYTES RELATIVE PERCENT: 52 %
MCH RBC QN AUTO: 27.7 PG (ref 26–34)
MCHC RBC AUTO-ENTMCNC: 32.6 G/DL (ref 31–36)
MCV RBC AUTO: 85 FL (ref 80–100)
MICROSCOPIC EXAMINATION: NORMAL
MONOCYTES ABSOLUTE: 0.4 K/UL (ref 0–1.3)
MONOCYTES RELATIVE PERCENT: 6.3 %
NEUTROPHILS ABSOLUTE: 2.4 K/UL (ref 1.7–7.7)
NEUTROPHILS RELATIVE PERCENT: 38 %
NITRITE, URINE: NEGATIVE
PDW BLD-RTO: 13.5 % (ref 12.4–15.4)
PH UA: 8 (ref 5–8)
PLATELET # BLD: 279 K/UL (ref 135–450)
PMV BLD AUTO: 8 FL (ref 5–10.5)
POTASSIUM REFLEX MAGNESIUM: 4.3 MMOL/L (ref 3.5–5.1)
PROTEIN UA: NEGATIVE MG/DL
RBC # BLD: 4.87 M/UL (ref 4–5.2)
SODIUM BLD-SCNC: 141 MMOL/L (ref 136–145)
SPECIFIC GRAVITY UA: >1.03 (ref 1–1.03)
TOTAL PROTEIN: 7.4 G/DL (ref 6.4–8.2)
TROPONIN: <0.01 NG/ML
URINE REFLEX TO CULTURE: NORMAL
URINE TYPE: NORMAL
UROBILINOGEN, URINE: 0.2 E.U./DL
WBC # BLD: 6.2 K/UL (ref 4–11)

## 2019-06-05 PROCEDURE — 74177 CT ABD & PELVIS W/CONTRAST: CPT

## 2019-06-05 PROCEDURE — 99284 EMERGENCY DEPT VISIT MOD MDM: CPT

## 2019-06-05 PROCEDURE — 96374 THER/PROPH/DIAG INJ IV PUSH: CPT

## 2019-06-05 PROCEDURE — 96375 TX/PRO/DX INJ NEW DRUG ADDON: CPT

## 2019-06-05 PROCEDURE — 93005 ELECTROCARDIOGRAM TRACING: CPT | Performed by: EMERGENCY MEDICINE

## 2019-06-05 PROCEDURE — 71045 X-RAY EXAM CHEST 1 VIEW: CPT

## 2019-06-05 PROCEDURE — 93010 ELECTROCARDIOGRAM REPORT: CPT | Performed by: INTERNAL MEDICINE

## 2019-06-05 PROCEDURE — 81003 URINALYSIS AUTO W/O SCOPE: CPT

## 2019-06-05 PROCEDURE — 84484 ASSAY OF TROPONIN QUANT: CPT

## 2019-06-05 PROCEDURE — 80053 COMPREHEN METABOLIC PANEL: CPT

## 2019-06-05 PROCEDURE — 2580000003 HC RX 258: Performed by: EMERGENCY MEDICINE

## 2019-06-05 PROCEDURE — 96361 HYDRATE IV INFUSION ADD-ON: CPT

## 2019-06-05 PROCEDURE — 83690 ASSAY OF LIPASE: CPT

## 2019-06-05 PROCEDURE — 6360000002 HC RX W HCPCS: Performed by: EMERGENCY MEDICINE

## 2019-06-05 PROCEDURE — 6360000004 HC RX CONTRAST MEDICATION: Performed by: EMERGENCY MEDICINE

## 2019-06-05 PROCEDURE — 85025 COMPLETE CBC W/AUTO DIFF WBC: CPT

## 2019-06-05 RX ORDER — MORPHINE SULFATE 2 MG/ML
4 INJECTION, SOLUTION INTRAMUSCULAR; INTRAVENOUS ONCE
Status: COMPLETED | OUTPATIENT
Start: 2019-06-05 | End: 2019-06-05

## 2019-06-05 RX ORDER — 0.9 % SODIUM CHLORIDE 0.9 %
1000 INTRAVENOUS SOLUTION INTRAVENOUS ONCE
Status: COMPLETED | OUTPATIENT
Start: 2019-06-05 | End: 2019-06-05

## 2019-06-05 RX ORDER — ONDANSETRON 2 MG/ML
4 INJECTION INTRAMUSCULAR; INTRAVENOUS ONCE
Status: COMPLETED | OUTPATIENT
Start: 2019-06-05 | End: 2019-06-05

## 2019-06-05 RX ORDER — ONDANSETRON 4 MG/1
4 TABLET, ORALLY DISINTEGRATING ORAL EVERY 8 HOURS PRN
Qty: 20 TABLET | Refills: 0 | Status: SHIPPED | OUTPATIENT
Start: 2019-06-05 | End: 2019-07-24

## 2019-06-05 RX ADMIN — SODIUM CHLORIDE 1000 ML: 9 INJECTION, SOLUTION INTRAVENOUS at 11:38

## 2019-06-05 RX ADMIN — MORPHINE SULFATE 4 MG: 2 INJECTION, SOLUTION INTRAMUSCULAR; INTRAVENOUS at 11:39

## 2019-06-05 RX ADMIN — ONDANSETRON 4 MG: 2 INJECTION INTRAMUSCULAR; INTRAVENOUS at 11:39

## 2019-06-05 RX ADMIN — IOPAMIDOL 75 ML: 755 INJECTION, SOLUTION INTRAVENOUS at 11:57

## 2019-06-05 ASSESSMENT — PAIN DESCRIPTION - LOCATION
LOCATION: ABDOMEN
LOCATION: ABDOMEN

## 2019-06-05 ASSESSMENT — PAIN DESCRIPTION - ORIENTATION: ORIENTATION: MID;UPPER

## 2019-06-05 ASSESSMENT — PAIN SCALES - GENERAL
PAINLEVEL_OUTOF10: 6
PAINLEVEL_OUTOF10: 7

## 2019-06-05 ASSESSMENT — PAIN DESCRIPTION - PAIN TYPE
TYPE: ACUTE PAIN
TYPE: ACUTE PAIN

## 2019-06-05 ASSESSMENT — PAIN DESCRIPTION - DESCRIPTORS: DESCRIPTORS: STABBING;BURNING

## 2019-06-05 ASSESSMENT — PAIN DESCRIPTION - FREQUENCY: FREQUENCY: CONTINUOUS

## 2019-06-05 NOTE — ED PROVIDER NOTES
are reviewed and are negative except for those listed above in the history of present illness and ROS. PAST MEDICAL HISTORY     Past Medical History:   Diagnosis Date    Arthritis     Diabetes     GERD (gastroesophageal reflux disease)     Hyperlipidemia     Hypertension     Lumbar disc disease     Sleep apnea     pt states does use a Cpap machine at night    Unspecified cerebral artery occlusion with cerebral infarction 2014    right side weak    Wears glasses          SURGICAL HISTORY       Past Surgical History:   Procedure Laterality Date    CARPAL TUNNEL RELEASE Left 9/3/15    CARPAL TUNNEL RELEASE Right 9/22/15    FINGER TRIGGER RELEASE Left 9/3/15    middle and ring fingers    FINGER TRIGGER RELEASE Right 9/22/15    middle and ring fingers    FOOT SURGERY Bilateral     litteltoe    HAND SURGERY Right     Ligament    PARTIAL HYSTERECTOMY  08/2003    SHOULDER ARTHROSCOPY Right 06/20/2018    Diagnostic scope, rcr, open [place]         CURRENT MEDICATIONS       Previous Medications    ALBUTEROL SULFATE  (90 BASE) MCG/ACT INHALER    Inhale 2 puffs into the lungs every 6 hours as needed for Wheezing    ASPIRIN 81 MG EC TABLET    Take 1 tablet by mouth daily.     ATORVASTATIN (LIPITOR) 20 MG TABLET    Take 1 tablet by mouth daily    BLOOD GLUCOSE MONITORING SUPPL (TRUE METRIX AIR GLUCOSE METER) W/DEVICE KIT    1 each by Does not apply route daily    BLOOD GLUCOSE TEST STRIPS (FREESTYLE LITE) STRIP    TEST BLOOD SUGAR TWO TIMES A DAY Diag: E11.9    DICLOFENAC (VOLTAREN) 75 MG EC TABLET    Take 1 tablet by mouth 2 times daily    DULOXETINE (CYMBALTA) 30 MG EXTENDED RELEASE CAPSULE    Take 1 capsule by mouth daily for one week, followed by 2 capsules daily    FANAPT 1 MG TABS TABLET    Take 1 tablet by mouth nightly    FREESTYLE LANCETS MISC    USE ONE LANCET TO TEST BLOOD SUGAR TWICE A DAY    FUROSEMIDE (LASIX) 20 MG TABLET    Take 1 tab by mouth daily PRN leg swelling    GABAPENTIN (NEURONTIN) 300 MG CAPSULE    TAKE ONE CAPSULE BY MOUTH THREE TIMES A DAY    INSULIN GLARGINE (BASAGLAR KWIKPEN) 100 UNIT/ML INJECTION PEN    Inject 12 Units into the skin nightly    INSULIN PEN NEEDLE ([employer] PEN NEEDLES) 31G X 6 MM MISC    1 each by Does not apply route daily    METFORMIN (GLUCOPHAGE) 500 MG TABLET    Take 2 tablets by mouth 2 times daily (with meals)    METOPROLOL SUCCINATE (TOPROL XL) 25 MG EXTENDED RELEASE TABLET    TAKE ONE HALF TABLET BY MOUTH DAILY FOR BLOOD PRESSURE AND HEART RATE    MISC. DEVICES (WALKER) MISC    One four wheel walker    OMEPRAZOLE (PRILOSEC) 40 MG DELAYED RELEASE CAPSULE    TAKE ONE CAPSULE BY MOUTH TWICE A DAY BEFORE MEALS    SITAGLIPTIN (JANUVIA) 100 MG TABLET    TAKE ONE TABLET BY MOUTH DAILY    TENS UNIT MISC    by Does not apply route    TIZANIDINE (ZANAFLEX) 4 MG TABLET    Take 1 tablet by mouth 3 times daily    TRAZODONE (DESYREL) 100 MG TABLET    Take 100 mg by mouth nightly Takes 1/2 pill nightly    TRULICITY 1.5 TB/7.5BW SOPN    INJECT 1.5 MG UNDER THE SKIN ONCE WEEKLY    VALSARTAN (DIOVAN) 80 MG TABLET    Take 1 tablet by mouth daily       ALLERGIES     Codeine; Vicodin [hydrocodone-acetaminophen];  Oxycontin [oxycodone hcl]; and Tramadol    FAMILY HISTORY       Family History   Problem Relation Age of Onset    Heart Disease Mother     High Blood Pressure Mother     Stroke Mother     Cancer Brother         lung cancer          SOCIAL HISTORY       Social History     Socioeconomic History    Marital status:      Spouse name: Lisa Lopez, seen here    Number of children: 3    Years of education: None    Highest education level: None   Occupational History    None   Social Needs    Financial resource strain: None    Food insecurity:     Worry: None     Inability: None    Transportation needs:     Medical: None     Non-medical: None   Tobacco Use    Smoking status: Former Smoker     Packs/day: 0.00     Years: 3.00     Pack years: 0.00     Last attempt to quit: 1992     Years since quittin.4    Smokeless tobacco: Never Used   Substance and Sexual Activity    Alcohol use: No    Drug use: No    Sexual activity: Yes     Partners: Male   Lifestyle    Physical activity:     Days per week: None     Minutes per session: None    Stress: None   Relationships    Social connections:     Talks on phone: None     Gets together: None     Attends Hinduism service: None     Active member of club or organization: None     Attends meetings of clubs or organizations: None     Relationship status: None    Intimate partner violence:     Fear of current or ex partner: None     Emotionally abused: None     Physically abused: None     Forced sexual activity: None   Other Topics Concern    None   Social History Narrative    Originally from [address] [place] [address] is seen here    10 grandchildren       SCREENINGS             PHYSICAL EXAM    (up to 7 for level 4, 8 or more for level 5)     ED Triage Vitals [19 1055]   BP Temp Temp Source Pulse Resp SpO2 Height Weight   (!) 135/90 98 °F (36.7 °C) Oral 90 18 100 % 5' 4\" (1.626 m) 200 lb 2.8 oz (90.8 kg)       Physical Exam   Constitutional: Awake and alert and oriented to person place and time. No apparent distress. Head: No visible evidence of trauma. Normocephalic. Eyes: Pupils equal and reactive. No photophobia. Conjunctiva normal.    HENT: Oral mucosa moist.  Airway patent. Neck:  Soft and supple. Heart:  Regular rate and rhythm. No murmur. Lungs:  Clear to auscultation. No wheezes, rales, or ronchi. No conversational dyspnea or accessory muscle use. Abdomen:  Soft, nondistended, bowel sounds present. Mild tenderness noted in the midepigastric area. No guarding rigidity or rebound. No masses. Musculoskeletal: Extremities non-tender with full range of motion. Neurological: Alert and oriented x 3. Speech clear. Cranial nerves II-XII intact. No facial droop.   No acute focal motor or sensory deficits. Skin: Skin is warm and dry. No rash. Lymphatic:  No lympadenopathy. Psychiatric: Normal mood and affect. Behavior is normal.         DIAGNOSTIC RESULTS     EKG: All EKG's are interpreted by the Emergency Department Physician who either signs or Co-signs this chart in the absence of a cardiologist.    Normal sinus rhythm. Rate 78. AR interval 196 ms. QRS duration 74 ms.  ms. R axis 19°. No ST elevation. EKG was identical in comparison to her previous EKG from December 9, 2018. RADIOLOGY:   Non-plain film images such as CT, Ultrasound and MRI are read by the radiologist. Plain radiographic images are visualized and preliminarily interpreted by the emergency physician with the below findings:        Interpretation per the Radiologist below, if available at the time of this note:    XR CHEST PORTABLE   Final Result   1. No acute abnormality. CT ABDOMEN PELVIS W IV CONTRAST Additional Contrast? None   Final Result   Mild ileus versus enteritis.                ED BEDSIDE ULTRASOUND:   Performed by ED Physician - none    LABS:  Labs Reviewed   COMPREHENSIVE METABOLIC PANEL W/ REFLEX TO MG FOR LOW K - Abnormal; Notable for the following components:       Result Value    Glucose 126 (*)     All other components within normal limits    Narrative:     Performed at:  [place] [address] [facility] [address] [address]   Phone [phone]   LIPASE - Abnormal; Notable for the following components:    Lipase 91.0 (*)     All other components within normal limits    Narrative:     Performed at:  [facility]  [address] [facility] [address] [address]   Phone [phone]   CBC WITH AUTO DIFFERENTIAL    Narrative:     Performed at:  [address] [address]   Phone [phone]   TROPONIN    Narrative:     Performed at:  [facility]  [address] [place], [place] [address]   Phone [phone]   URINE RT REFLEX TO CULTURE    Narrative:     Performed at:  [facility]  [address] [place], [place] [address]   Phone [phone]       All other labs were within normal range or not returned as of this dictation. EMERGENCY DEPARTMENT COURSE and DIFFERENTIAL DIAGNOSIS/MDM:   Vitals:    Vitals:    06/05/19 1055 06/05/19 1232   BP: (!) 135/90 (!) 140/98   Pulse: 90 69   Resp: 18 19   Temp: 98 °F (36.7 °C)    TempSrc: Oral    SpO2: 100% 97%   Weight: 200 lb 2.8 oz (90.8 kg)    Height: 5' 4\" (1.626 m)        The patient presented with nausea vomiting and diarrhea as noted above. She did have some mild epigastric tenderness. She rated her pain a 3/10 intensity. She was given morphine 4 mg IV and Zofran 4 mg IV as well as normal saline 1 L bolus. CT abdomen and pelvis was obtained which revealed ileus versus mild enteritis. She is producing diarrhea, therefore ileus seems unlikely. I suspect that she likely has an acute viral syndrome. She is feeling much improved at the time of disposition. Lipase was slightly elevated at 91 but there is no radiographic evidence of pancreatitis. I suspect this is likely secondary to persistent vomiting. She is stable for outpatient management. I advised her to follow-up with a primary care physician in one to 2 days for reexamination. She will be given a prescription for Zofran for nausea. Advised to drink plenty of fluids and follow a clear liquid diet for 24 hours and advance to a bland diet as tolerated. If her condition worsens or new symptoms develop, she was advised to return immediately to the emergency department. MDM      REASSESSMENT            CRITICAL CARE TIME   Total Critical Care time was 0 minutes, excluding separately reportable procedures.   There was a high probability of clinically significant/life threatening deterioration in the patient's

## 2019-06-05 NOTE — ED NOTES
Bed: S-48  Expected date:   Expected time:   Means of arrival:   Comments:  [id number] J[address] Marie RN  06/05/19 4078

## 2019-06-06 ENCOUNTER — OFFICE VISIT (OUTPATIENT)
Dept: FAMILY MEDICINE CLINIC | Age: 64
End: 2019-06-06
Payer: COMMERCIAL

## 2019-06-06 VITALS
BODY MASS INDEX: 33.51 KG/M2 | SYSTOLIC BLOOD PRESSURE: 122 MMHG | DIASTOLIC BLOOD PRESSURE: 88 MMHG | HEIGHT: 64 IN | HEART RATE: 90 BPM | OXYGEN SATURATION: 98 % | RESPIRATION RATE: 16 BRPM | TEMPERATURE: 97.6 F | WEIGHT: 196.3 LBS

## 2019-06-06 DIAGNOSIS — R11.10 VOMITING AND DIARRHEA: Primary | ICD-10-CM

## 2019-06-06 DIAGNOSIS — R19.7 VOMITING AND DIARRHEA: Primary | ICD-10-CM

## 2019-06-06 PROCEDURE — G8599 NO ASA/ANTIPLAT THER USE RNG: HCPCS | Performed by: FAMILY MEDICINE

## 2019-06-06 PROCEDURE — G8427 DOCREV CUR MEDS BY ELIG CLIN: HCPCS | Performed by: FAMILY MEDICINE

## 2019-06-06 PROCEDURE — G8417 CALC BMI ABV UP PARAM F/U: HCPCS | Performed by: FAMILY MEDICINE

## 2019-06-06 PROCEDURE — 1036F TOBACCO NON-USER: CPT | Performed by: FAMILY MEDICINE

## 2019-06-06 PROCEDURE — 99213 OFFICE O/P EST LOW 20 MIN: CPT | Performed by: FAMILY MEDICINE

## 2019-06-06 PROCEDURE — 3017F COLORECTAL CA SCREEN DOC REV: CPT | Performed by: FAMILY MEDICINE

## 2019-06-06 ASSESSMENT — ENCOUNTER SYMPTOMS
BLOOD IN STOOL: 0
DIARRHEA: 1
VOMITING: 1

## 2019-06-06 NOTE — PROGRESS NOTES
Wheezing Yes Jerilyn Finnegan MD   FREESTYLE LANCETS MISC USE ONE LANCET TO TEST BLOOD SUGAR TWICE A DAY Yes Pipe Ferraro MD   FANAPT 1 MG TABS tablet Take 1 tablet by mouth nightly Yes Historical Provider, MD   aspirin 81 MG EC tablet Take 1 tablet by mouth daily.  Yes Pipe Ferraro MD   traZODone (DESYREL) 100 MG tablet Take 100 mg by mouth nightly Takes 1/2 pill nightly Yes Historical Provider, MD     Health Maintenance   Topic Date Due    DTaP/Tdap/Td vaccine (1 - Tdap) 11/04/1973    Shingles Vaccine (1 of 2) 11/04/2004    Diabetic retinal exam  02/12/2015    Cervical cancer screen  02/08/2019    Diabetic foot exam  07/02/2019    Lipid screen  12/07/2019    Diabetic microalbuminuria test  01/10/2020    A1C test (Diabetic or Prediabetic)  05/15/2020    Potassium monitoring  06/05/2020    Creatinine monitoring  06/05/2020    Breast cancer screen  12/28/2020    Colon cancer screen colonoscopy  07/15/2021    Flu vaccine  Completed    Pneumococcal 0-64 years Vaccine  Completed    Hepatitis C screen  Completed    HIV screen  Completed     Past Medical History:   Diagnosis Date    Arthritis     Diabetes     GERD (gastroesophageal reflux disease)     Hyperlipidemia     Hypertension     Lumbar disc disease     Sleep apnea     pt states does use a Cpap machine at night    Unspecified cerebral artery occlusion with cerebral infarction 2014    right side weak    Wears glasses      Social History     Socioeconomic History    Marital status:      Spouse name: Amina Porter, seen here    Number of children: 3    Years of education: Not on file    Highest education level: Not on file   Occupational History    Not on file   Social Needs    Financial resource strain: Not on file    Food insecurity:     Worry: Not on file     Inability: Not on file    Transportation needs:     Medical: Not on file     Non-medical: Not on file   Tobacco Use    Smoking status: Former Smoker     Packs/day: 0.00 6.2 06/05/2019    HGB 13.5 06/05/2019    HCT 41.4 06/05/2019    MCV 85.0 06/05/2019     06/05/2019     TSH (uIU/mL)   Date Value   12/11/2017 3.97     Lab Results   Component Value Date    LABA1C 7.9 05/15/2019     No results found for: PSA, PSADIA      PHYSICAL EXAM  /88 (Site: Left Upper Arm, Position: Sitting, Cuff Size: Medium Adult)   Pulse 90   Temp 97.6 °F (36.4 °C) (Oral)   Resp 16   Ht 5' 4\" (1.626 m)   Wt 196 lb 4.8 oz (89 kg)   SpO2 98%   BMI 33.69 kg/m²     BP Readings from Last 5 Encounters:   06/06/19 122/88   06/05/19 (!) 138/102   05/15/19 126/84   01/28/19 110/74   01/10/19 127/67       Wt Readings from Last 5 Encounters:   06/06/19 196 lb 4.8 oz (89 kg)   06/05/19 200 lb 2.8 oz (90.8 kg)   05/15/19 205 lb 3.2 oz (93.1 kg)   01/28/19 207 lb 6.4 oz (94.1 kg)   01/10/19 209 lb (94.8 kg)         Physical Exam   Constitutional: She is oriented to person, place, and time. She appears well-developed and well-nourished. HENT:   Head: Normocephalic and atraumatic. Pulmonary/Chest: Effort normal.   Abdominal: Soft. She exhibits no distension. There is no rebound and no guarding. Bowel sounds hypoactive  Mild diffuse TTP   Neurological: She is alert and oriented to person, place, and time. Skin: No pallor. Psychiatric: She has a normal mood and affect. ASSESSMENT/PLAN:  1. Vomiting and diarrhea  Symptoms much improved. Likely viral in nature. Feel GLP-1 is not causing symptoms - has been on this for over 6 months, symptoms are now improving. Mildly elevated lipase likely from vomiting and dehydration. Discussed reintroducing foods and maintaining hydration with pt. If symptoms recur or worsen we will have lower threshold to stop Trulicity. Return if symptoms worsen or fail to improve.

## 2019-06-07 ENCOUNTER — HOSPITAL ENCOUNTER (OUTPATIENT)
Dept: PHYSICAL THERAPY | Age: 64
Setting detail: THERAPIES SERIES
Discharge: HOME | End: 2019-06-07
Payer: COMMERCIAL

## 2019-06-07 PROCEDURE — 97035 APP MDLTY 1+ULTRASOUND EA 15: CPT

## 2019-06-07 PROCEDURE — 97140 MANUAL THERAPY 1/> REGIONS: CPT

## 2019-06-07 NOTE — FLOWSHEET NOTE
Physical Therapy Daily Treatment Note  Date:  2019    Patient Name:  Justina Donald    :  1955  MRN: [id number]    Restrictions/Precautions: Position Activity Restriction  Other position/activity restrictions: no significant fall risk    Pertinent Medical History: Additional Pertinent Hx: arthritis, diabetes, GERd, HLD, HTN, lumbar DD, sleep apnea,  CVA , carpal tunnel release gurmeet, trigger finger release gurmeet    Medical/Treatment Diagnosis Information:  · Diagnosis:  18  \" R  shoulder scope, open amarilys, RCR - medium tear of entire SS and portion of the IS. · Treatment Diagnosis: Decreased functional mobility 2/2 myofascial pain right shoulder    Insurance/Certification information:  PT Insurance Information: [facility] dual  Physician Information:  Referring Practitioner: Mary Rao of care signed (Y/N):  sent    Visit# / total visits:          Pain level: 3/10     Functional Assessment: at eval  Test:  UEFs  Score: 5/13    13.75      History of Injury:   R  Shoulder scope 18  34 PT treatments. Cortisone shot in 2018. At May visit this year given Medrol dose pack and Ultram, PT referral.  Cortisone shot didnt help, Pain was a 6. Pain down to about a 3 since Medrol dose pack and Ultram. Currently not working, had to quit [employer] due to the work load and continued pain. Currently  watching grandchildren. Subjective: Right shoulder has been doing better,  3/10  Did not do anything the past few days due to being ill.       Objective:  AROM RUE (degrees)  RUE General AROM: flexion 165a/170p ER 65a/75p IR 70a/78p abduction [id number].     Strength RUE  Comment: flexion  4/5, abduction  4/5,  ER 4/5  IR  4+.5       Exercise/Equipment Resistance/Repetitions Other comments        NT 0# x 2 min     Pulleys  Flex  X 20    T-band            Rows                          Ext                            IR                           ER Red x 10 B  Red x 10 B  Yellow  X 10 R     Yellow x 10  R      Increase reps        US 1.5 w/cm2 100% Ant/lat R shoulder 8 min     STM Ant/lat  R shoulder/ UT roldan Focus on knot upper arm   CP (cervical) to right shlder      HEP 10 min      shlder rolls, retraction, corner stretch          Other Therapeutic Activities:  Pt was educated on PT POC, Diagnosis, Prognosis, pathomechanics as well as frequency and duration of scheduling future physical therapy appointments. Time was also taken on this day to answer all patient questions and participation in PT. Reviewed appointment policy in detail with patient and patient verbalized understanding. Home Exercise Program:  Patient instructed in the following for HEP:   Patient verbalized/demonstrated understanding and was issued written HEP. Timed Code Treatment Minutes:  30    Total Treatment Minutes:  40    Treatment/Activity Tolerance:  [x] Patient tolerated treatment well [] Patient limited by fatigue  [] Patient limited by pain  [] Patient limited by other medical complications  [x] Other: improving slowly    Prognosis: [x] Good [] Fair  [] Poor    Goals:    Short term goals  Time Frame for Short term goals: 2 weeks  Short term goal 1: Indep with HEP      Long term goals  Time Frame for Long term goals : discharge  Long term goal 1: Decrease pain to 2/10 on VAS to allow light activity  Long term goal 2: Full SHoulder ROM for increased ease with self care and ADL  Long term goal 3:  Increase strength to 4+/5 for homemaking and      Patient Requires Follow-up: [x] Yes  [] No    Plan:   [x] Continue per plan of care [] Alter current plan (see comments)  [] Plan of care initiated [] Hold pending MD visit [] Discharge    Plan for Next Session:  Add above as stated;     Electronically signed by:  Con Cuadra, [facility] [address] [facility] [address]

## 2019-06-10 ENCOUNTER — HOSPITAL ENCOUNTER (OUTPATIENT)
Dept: PHYSICAL THERAPY | Age: 64
Setting detail: THERAPIES SERIES
Discharge: HOME | End: 2019-06-10
Payer: COMMERCIAL

## 2019-06-10 PROCEDURE — 97110 THERAPEUTIC EXERCISES: CPT

## 2019-06-10 PROCEDURE — 97140 MANUAL THERAPY 1/> REGIONS: CPT

## 2019-06-10 NOTE — FLOWSHEET NOTE
Physical Therapy Daily Treatment Note  Date:  6/10/2019    Patient Name:  Kyle Cowan    :  1955  MRN: [id number]    Restrictions/Precautions: Position Activity Restriction  Other position/activity restrictions: no significant fall risk    Pertinent Medical History: Additional Pertinent Hx: arthritis, diabetes, GERd, HLD, HTN, lumbar DD, sleep apnea,  CVA , carpal tunnel release gurmeet, trigger finger release gurmeet    Medical/Treatment Diagnosis Information:  · Diagnosis:  18  \" R  shoulder scope, open amarilys, RCR - medium tear of entire SS and portion of the IS. · Treatment Diagnosis: Decreased functional mobility 2/2 myofascial pain right shoulder    Insurance/Certification information:  PT Insurance Information: [facility] dual  Physician Information:  Referring Practitioner: Gregory Koch of care signed (Y/N):  sent    Visit# / total visits:          Pain level: 2/10     Functional Assessment: at eval  Test:  UEFs  Score: 5/13    13.75      History of Injury:   R  Shoulder scope 18  34 PT treatments. Cortisone shot in 2018. At May visit this year given Medrol dose pack and Ultram, PT referral.  Cortisone shot didnt help, Pain was a 6. Pain down to about a 3 since Medrol dose pack and Ultram. Currently not working, had to quit [employer] due to the work load and continued pain. Currently  watching grandchildren. Subjective: Right shoulder has been doing better,  3/10  Did not do anything the past few days due to being ill.       Objective:  AROM RUE (degrees)  RUE General AROM: flexion 165a/170p ER 65a/75p IR 70a/78p abduction [id number].     Strength RUE  Comment: flexion  4/5, abduction  4/5,  ER 4/5  IR  4+.5       Exercise/Equipment Resistance/Repetitions Other comments        NT 0# x 2 min     Pulleys  Flex   UE ranger wall X 20  10 each 1/2lb    T-band            Rows                          Ext                            IR                           ER Red  2 x 10 B  Red 2 x 10 B  Yellow 2  X 10 R     Yellow 2  x 10  R      Increase reps        US 1.5 w/cm2 100% Ant/lat R shoulder 8 min     STM Ant/lat  R shoulder/ UT roldan Focus on knot upper arm   CP (cervical) to right shlder      HEP 10 min      shlder rolls, retraction, corner stretch          Other Therapeutic Activities:  Pt was educated on PT POC, Diagnosis, Prognosis, pathomechanics as well as frequency and duration of scheduling future physical therapy appointments. Time was also taken on this day to answer all patient questions and participation in PT. Reviewed appointment policy in detail with patient and patient verbalized understanding. Home Exercise Program:  Patient instructed in the following for HEP:   Patient verbalized/demonstrated understanding and was issued written HEP. Timed Code Treatment Minutes:  30    Total Treatment Minutes:  40    Treatment/Activity Tolerance:  [x] Patient tolerated treatment well [] Patient limited by fatigue  [] Patient limited by pain  [] Patient limited by other medical complications  [x] Other: improving slowly    Prognosis: [x] Good [] Fair  [] Poor    Goals:    Short term goals  Time Frame for Short term goals: 2 weeks  Short term goal 1: Indep with HEP      Long term goals  Time Frame for Long term goals : discharge  Long term goal 1: Decrease pain to 2/10 on VAS to allow light activity  Long term goal 2: Full SHoulder ROM for increased ease with self care and ADL  Long term goal 3:  Increase strength to 4+/5 for homemaking and      Patient Requires Follow-up: [x] Yes  [] No    Plan:   [x] Continue per plan of care [] Alter current plan (see comments)  [] Plan of care initiated [] Hold pending MD visit [] Discharge    Plan for Next Session:  Add above as stated;     Electronically signed by:  Neri Espinoza, [facility] [address] [facility] [address]

## 2019-06-14 ENCOUNTER — HOSPITAL ENCOUNTER (OUTPATIENT)
Dept: PHYSICAL THERAPY | Age: 64
Setting detail: THERAPIES SERIES
Discharge: HOME | End: 2019-06-14
Payer: COMMERCIAL

## 2019-06-14 PROCEDURE — 97110 THERAPEUTIC EXERCISES: CPT

## 2019-06-14 PROCEDURE — 97140 MANUAL THERAPY 1/> REGIONS: CPT

## 2019-06-14 NOTE — FLOWSHEET NOTE
ER Red  2 x 10 B  Red  2 x 10 B  Yellow 2  X 10 R     Yellow 2  x 10  R      Increase reps   Standing overhead press   1# x 10 R/L    US 1.5 w/cm2 50% Ant/lat R shoulder 8 min     STM Ant/lat  R shoulder/ UT roldan    CP (cervical) to right shlder      HEP 10 min      shlder rolls, retraction, corner stretch - written HEP          Other Therapeutic Activities:  Pt was educated on PT POC, Diagnosis, Prognosis, pathomechanics as well as frequency and duration of scheduling future physical therapy appointments. Time was also taken on this day to answer all patient questions and participation in PT. Reviewed appointment policy in detail with patient and patient verbalized understanding. Home Exercise Program:  Patient instructed in the following for HEP:   Patient verbalized/demonstrated understanding and was issued written HEP. Timed Code Treatment Minutes:  30    Total Treatment Minutes:  40    Treatment/Activity Tolerance:  [x] Patient tolerated treatment well [] Patient limited by fatigue  [] Patient limited by pain  [] Patient limited by other medical complications  [x] Other: improving slowly    Prognosis: [x] Good [] Fair  [] Poor    Goals:    Short term goals  Time Frame for Short term goals: 2 weeks  Short term goal 1: Indep with HEP      Long term goals  Time Frame for Long term goals : discharge  Long term goal 1: Decrease pain to 2/10 on VAS to allow light activity  Long term goal 2: Full SHoulder ROM for increased ease with self care and ADL  Long term goal 3:  Increase strength to 4+/5 for homemaking and      Patient Requires Follow-up: [x] Yes  [] No    Plan:   [x] Continue per plan of care [] Alter current plan (see comments)  [] Plan of care initiated [] Hold pending MD visit [] Discharge    Plan for Next Session:  Add above as stated;     Electronically signed by:  Eneida French, [address]

## 2019-06-17 ENCOUNTER — HOSPITAL ENCOUNTER (OUTPATIENT)
Dept: PHYSICAL THERAPY | Age: 64
Setting detail: THERAPIES SERIES
Discharge: HOME | End: 2019-06-17
Payer: COMMERCIAL

## 2019-06-17 PROCEDURE — 97110 THERAPEUTIC EXERCISES: CPT

## 2019-06-21 ENCOUNTER — HOSPITAL ENCOUNTER (OUTPATIENT)
Dept: PHYSICAL THERAPY | Age: 64
Setting detail: THERAPIES SERIES
Discharge: HOME | End: 2019-06-21
Payer: COMMERCIAL

## 2019-06-21 PROCEDURE — 97140 MANUAL THERAPY 1/> REGIONS: CPT

## 2019-06-21 PROCEDURE — 97110 THERAPEUTIC EXERCISES: CPT

## 2019-06-21 NOTE — FLOWSHEET NOTE
Physical Therapy Daily Treatment Note  Date:  2019    Patient Name:  Yolande Isabel    :  1955  MRN: [id number]    Restrictions/Precautions: Position Activity Restriction  Other position/activity restrictions: no significant fall risk    Pertinent Medical History: Additional Pertinent Hx: arthritis, diabetes, GERd, HLD, HTN, lumbar DD, sleep apnea,  CVA , carpal tunnel release gurmeet, trigger finger release gurmeet    Medical/Treatment Diagnosis Information:  · Diagnosis:  18  \" R  shoulder scope, open amarilys, RCR - medium tear of entire SS and portion of the IS. · Treatment Diagnosis: Decreased functional mobility 2/2 myofascial pain right shoulder    Insurance/Certification information:  PT Insurance Information: [facility] dual  Physician Information:  Referring Practitioner: Roslyn Payne of care signed (Y/N):  sent    Visit# / total visits:          Pain level: 2/10     Functional Assessment: at eval  Test:  UEFs  Score: 5/13    13.75      History of Injury:   R  Shoulder scope 18  34 PT treatments. Cortisone shot in 2018. At May visit this year given Medrol dose pack and Ultram, PT referral.  Cortisone shot didnt help, Pain was a 6. Pain down to about a 3 since Medrol dose pack and Ultram. Currently not working, had to quit [employer] due to the work load and continued pain. Currently  watching grandchildren.      Subjective:  Overall improved just a little soreness left    Objective:  1AROM RUE (degrees)  RUE General AROM: flexion 165a/170p ER 65a/75p IR 70a/78p abduction [id number].     Strength RUE  Comment: flexion  4/5, abduction  4/5,  ER 4/5  IR  4+.5       Exercise/Equipment Resistance/Repetitions Other comments        UBE 0# x 3 min     Pulleys  Flex   UE ranger wall X 20  10 each 1/2lb    T-band            Rows                          Ext                            IR                           ER Red  2 x 10 B  Red  2 x 10 B  Yellow 2  X 10 R     Yellow 2  x 10  R Door stretch 20 sec x 3    Lower trap set  red x 10         Standing overhead press   1# x 10 R/L    No sig diff without last treatment   STM Ant/lat  R shoulder/ UT roldan 10 min    Posterior glides  seated x 10 grade III    CP (cervical) to right shlder      HEP 10 min      shlder rolls, retraction, corner stretch - written HEP          Other Therapeutic Activities:  Pt was educated on PT POC, Diagnosis, Prognosis, pathomechanics as well as frequency and duration of scheduling future physical therapy appointments. Time was also taken on this day to answer all patient questions and participation in PT. Reviewed appointment policy in detail with patient and patient verbalized understanding. Home Exercise Program:  Patient instructed in the following for HEP:   Patient verbalized/demonstrated understanding and was issued written HEP. Timed Code Treatment Minutes:  30    Total Treatment Minutes:  40    Treatment/Activity Tolerance:  [x] Patient tolerated treatment well [] Patient limited by fatigue  [] Patient limited by pain  [] Patient limited by other medical complications  [x] Other: improving slowly    Prognosis: [x] Good [] Fair  [] Poor    Goals:    Short term goals  Time Frame for Short term goals: 2 weeks  Short term goal 1: Indep with HEP      Long term goals  Time Frame for Long term goals : discharge  Long term goal 1: Decrease pain to 2/10 on VAS to allow light activity met 6/21  Long term goal 2: Full SHoulder ROM for increased ease with self care and ADL met 6/21  Long term goal 3:  Increase strength to 4+/5 for homemaking and   Ongoing 6/21    Patient Requires Follow-up: [x] Yes  [] No    Plan:   [x] Continue per plan of care [] Alter current plan (see comments)  [] Plan of care initiated [] Hold pending MD visit [] Discharge    Plan for Next Session:  Add above as stated;     Electronically signed by:  Eusebia Becerra, [facility] [address] [facility] [address]

## 2019-06-24 ENCOUNTER — HOSPITAL ENCOUNTER (OUTPATIENT)
Dept: PHYSICAL THERAPY | Age: 64
Setting detail: THERAPIES SERIES
Discharge: HOME | End: 2019-06-24
Payer: COMMERCIAL

## 2019-06-24 PROCEDURE — 97110 THERAPEUTIC EXERCISES: CPT

## 2019-06-24 NOTE — FLOWSHEET NOTE
Physical Therapy Daily Treatment Note  Date:  2019    Patient Name:  Sunny Yap    :  1955  MRN: [id number]    Restrictions/Precautions: Position Activity Restriction  Other position/activity restrictions: no significant fall risk    Pertinent Medical History: Additional Pertinent Hx: arthritis, diabetes, GERd, HLD, HTN, lumbar DD, sleep apnea,  CVA , carpal tunnel release ugrmeet, trigger finger release gurmeet    Medical/Treatment Diagnosis Information:  · Diagnosis:  18  \" R  shoulder scope, open amarilys, RCR - medium tear of entire SS and portion of the IS. · Treatment Diagnosis: Decreased functional mobility 2/2 myofascial pain right shoulder    Insurance/Certification information:  PT Insurance Information: [employer] dual  Physician Information:  Referring Practitioner: Krishna Grubbs of care signed (Y/N):  sent    Visit# / total visits:     Patient 15 min late     Pain level: 3/10     Functional Assessment: at eval  Test:  UEFs  Score: 5/13    13.75      History of Injury:   R  Shoulder scope 18  34 PT treatments. Cortisone shot in 2018. At May visit this year given Medrol dose pack and Ultram, PT referral.  Cortisone shot didnt help, Pain was a 6. Pain down to about a 3 since Medrol dose pack and Ultram. Currently not working, had to quit [employer] due to the work load and continued pain. Currently  watching grandchildren.      Subjective:  Slept on right side was aggravated when she got up    Objective:  1AROM RUE (degrees)  RUE General AROM: flexion 165a/170p ER 65a/75p IR 70a/78p abduction [id number].     Strength RUE  Comment: flexion  4/5, abduction  4/5,  ER 4/5  IR  4+.5       Exercise/Equipment Resistance/Repetitions Other comments        UBE 0# x 3 min     Pulleys  Flex   UE ranger wall X 20  10 each 1/2lb    T-band            Rows                          Ext                            IR                           ER Red  2 x 10 B  Red  2 x 10 B  Red 2  X 10 R by:  Angela Soriano, [address]

## 2019-06-25 ENCOUNTER — APPOINTMENT (OUTPATIENT)
Dept: GENERAL RADIOLOGY | Age: 64
End: 2019-06-25
Payer: COMMERCIAL

## 2019-06-25 ENCOUNTER — HOSPITAL ENCOUNTER (EMERGENCY)
Age: 64
Discharge: HOME | End: 2019-06-25
Payer: COMMERCIAL

## 2019-06-25 VITALS
DIASTOLIC BLOOD PRESSURE: 91 MMHG | HEART RATE: 85 BPM | RESPIRATION RATE: 18 BRPM | WEIGHT: 201.72 LBS | TEMPERATURE: 97.4 F | SYSTOLIC BLOOD PRESSURE: 127 MMHG | HEIGHT: 64 IN | BODY MASS INDEX: 34.44 KG/M2 | OXYGEN SATURATION: 98 %

## 2019-06-25 DIAGNOSIS — J40 BRONCHITIS: Primary | ICD-10-CM

## 2019-06-25 DIAGNOSIS — J44.1 COPD WITH ACUTE EXACERBATION (HCC): ICD-10-CM

## 2019-06-25 LAB
A/G RATIO: 1.3 (ref 1.1–2.2)
ALBUMIN SERPL-MCNC: 4.4 G/DL (ref 3.4–5)
ALP BLD-CCNC: 78 U/L (ref 40–129)
ALT SERPL-CCNC: 18 U/L (ref 10–40)
ANION GAP SERPL CALCULATED.3IONS-SCNC: 16 MMOL/L (ref 3–16)
AST SERPL-CCNC: 24 U/L (ref 15–37)
BASOPHILS ABSOLUTE: 0 K/UL (ref 0–0.2)
BASOPHILS RELATIVE PERCENT: 0.3 %
BILIRUB SERPL-MCNC: <0.2 MG/DL (ref 0–1)
BUN BLDV-MCNC: 10 MG/DL (ref 7–20)
CALCIUM SERPL-MCNC: 10.8 MG/DL (ref 8.3–10.6)
CHLORIDE BLD-SCNC: 103 MMOL/L (ref 99–110)
CO2: 23 MMOL/L (ref 21–32)
CREAT SERPL-MCNC: 0.9 MG/DL (ref 0.6–1.2)
EKG ATRIAL RATE: 83 BPM
EKG DIAGNOSIS: NORMAL
EKG P AXIS: 46 DEGREES
EKG P-R INTERVAL: 200 MS
EKG Q-T INTERVAL: 380 MS
EKG QRS DURATION: 72 MS
EKG QTC CALCULATION (BAZETT): 446 MS
EKG R AXIS: 29 DEGREES
EKG T AXIS: 26 DEGREES
EKG VENTRICULAR RATE: 83 BPM
EOSINOPHILS ABSOLUTE: 0.3 K/UL (ref 0–0.6)
EOSINOPHILS RELATIVE PERCENT: 3.5 %
GFR AFRICAN AMERICAN: >60
GFR NON-AFRICAN AMERICAN: >60
GLOBULIN: 3.3 G/DL
GLUCOSE BLD-MCNC: 106 MG/DL (ref 70–99)
HCT VFR BLD CALC: 39.2 % (ref 36–48)
HEMOGLOBIN: 13 G/DL (ref 12–16)
LYMPHOCYTES ABSOLUTE: 2.9 K/UL (ref 1–5.1)
LYMPHOCYTES RELATIVE PERCENT: 39.4 %
MCH RBC QN AUTO: 28.1 PG (ref 26–34)
MCHC RBC AUTO-ENTMCNC: 33.1 G/DL (ref 31–36)
MCV RBC AUTO: 85 FL (ref 80–100)
MONOCYTES ABSOLUTE: 0.4 K/UL (ref 0–1.3)
MONOCYTES RELATIVE PERCENT: 5.9 %
NEUTROPHILS ABSOLUTE: 3.8 K/UL (ref 1.7–7.7)
NEUTROPHILS RELATIVE PERCENT: 50.9 %
PDW BLD-RTO: 13.3 % (ref 12.4–15.4)
PLATELET # BLD: 306 K/UL (ref 135–450)
PMV BLD AUTO: 8 FL (ref 5–10.5)
POTASSIUM REFLEX MAGNESIUM: 4 MMOL/L (ref 3.5–5.1)
PRO-BNP: 12 PG/ML (ref 0–124)
RBC # BLD: 4.61 M/UL (ref 4–5.2)
SODIUM BLD-SCNC: 142 MMOL/L (ref 136–145)
TOTAL PROTEIN: 7.7 G/DL (ref 6.4–8.2)
TROPONIN: <0.01 NG/ML
WBC # BLD: 7.5 K/UL (ref 4–11)

## 2019-06-25 PROCEDURE — 99285 EMERGENCY DEPT VISIT HI MDM: CPT

## 2019-06-25 PROCEDURE — 71046 X-RAY EXAM CHEST 2 VIEWS: CPT

## 2019-06-25 PROCEDURE — 84484 ASSAY OF TROPONIN QUANT: CPT

## 2019-06-25 PROCEDURE — 6370000000 HC RX 637 (ALT 250 FOR IP): Performed by: PHYSICIAN ASSISTANT

## 2019-06-25 PROCEDURE — 83880 ASSAY OF NATRIURETIC PEPTIDE: CPT

## 2019-06-25 PROCEDURE — 93010 ELECTROCARDIOGRAM REPORT: CPT | Performed by: INTERNAL MEDICINE

## 2019-06-25 PROCEDURE — 85025 COMPLETE CBC W/AUTO DIFF WBC: CPT

## 2019-06-25 PROCEDURE — 87040 BLOOD CULTURE FOR BACTERIA: CPT

## 2019-06-25 PROCEDURE — 80053 COMPREHEN METABOLIC PANEL: CPT

## 2019-06-25 PROCEDURE — 94640 AIRWAY INHALATION TREATMENT: CPT

## 2019-06-25 PROCEDURE — 6370000000 HC RX 637 (ALT 250 FOR IP): Performed by: EMERGENCY MEDICINE

## 2019-06-25 PROCEDURE — 93005 ELECTROCARDIOGRAM TRACING: CPT | Performed by: EMERGENCY MEDICINE

## 2019-06-25 PROCEDURE — 94760 N-INVAS EAR/PLS OXIMETRY 1: CPT

## 2019-06-25 RX ORDER — IPRATROPIUM BROMIDE AND ALBUTEROL SULFATE 2.5; .5 MG/3ML; MG/3ML
1 SOLUTION RESPIRATORY (INHALATION) ONCE
Status: COMPLETED | OUTPATIENT
Start: 2019-06-25 | End: 2019-06-25

## 2019-06-25 RX ORDER — BENZONATATE 100 MG/1
200 CAPSULE ORAL ONCE
Status: COMPLETED | OUTPATIENT
Start: 2019-06-25 | End: 2019-06-25

## 2019-06-25 RX ORDER — PREDNISONE 20 MG/1
60 TABLET ORAL ONCE
Status: COMPLETED | OUTPATIENT
Start: 2019-06-25 | End: 2019-06-25

## 2019-06-25 RX ORDER — DIPHENHYDRAMINE HCL 25 MG
25 TABLET ORAL
Status: DISCONTINUED | OUTPATIENT
Start: 2019-06-25 | End: 2019-06-25

## 2019-06-25 RX ORDER — OXYCODONE HYDROCHLORIDE AND ACETAMINOPHEN 5; 325 MG/1; MG/1
2 TABLET ORAL ONCE
Status: DISCONTINUED | OUTPATIENT
Start: 2019-06-25 | End: 2019-06-25

## 2019-06-25 RX ORDER — ALBUTEROL SULFATE 90 UG/1
2 AEROSOL, METERED RESPIRATORY (INHALATION) EVERY 6 HOURS PRN
Qty: 1 INHALER | Refills: 2 | Status: SHIPPED | OUTPATIENT
Start: 2019-06-25 | End: 2021-01-05 | Stop reason: SDUPTHER

## 2019-06-25 RX ORDER — BENZONATATE 200 MG/1
200 CAPSULE ORAL 3 TIMES DAILY PRN
Qty: 20 CAPSULE | Refills: 0 | Status: ON HOLD | OUTPATIENT
Start: 2019-06-25 | End: 2019-10-18

## 2019-06-25 RX ORDER — IPRATROPIUM BROMIDE AND ALBUTEROL SULFATE 2.5; .5 MG/3ML; MG/3ML
1 SOLUTION RESPIRATORY (INHALATION)
Status: COMPLETED | OUTPATIENT
Start: 2019-06-25 | End: 2019-06-25

## 2019-06-25 RX ORDER — AZITHROMYCIN 250 MG/1
TABLET, FILM COATED ORAL
Qty: 1 PACKET | Refills: 0 | Status: SHIPPED | OUTPATIENT
Start: 2019-06-25 | End: 2019-06-29

## 2019-06-25 RX ORDER — PREDNISONE 20 MG/1
40 TABLET ORAL DAILY
Qty: 8 TABLET | Refills: 0 | Status: SHIPPED | OUTPATIENT
Start: 2019-06-25 | End: 2019-06-29

## 2019-06-25 RX ADMIN — PREDNISONE 60 MG: 20 TABLET ORAL at 14:34

## 2019-06-25 RX ADMIN — IPRATROPIUM BROMIDE AND ALBUTEROL SULFATE 1 AMPULE: .5; 3 SOLUTION RESPIRATORY (INHALATION) at 12:00

## 2019-06-25 RX ADMIN — IPRATROPIUM BROMIDE AND ALBUTEROL SULFATE 1 AMPULE: .5; 3 SOLUTION RESPIRATORY (INHALATION) at 11:59

## 2019-06-25 RX ADMIN — IPRATROPIUM BROMIDE AND ALBUTEROL SULFATE 1 AMPULE: .5; 3 SOLUTION RESPIRATORY (INHALATION) at 14:43

## 2019-06-25 RX ADMIN — IPRATROPIUM BROMIDE AND ALBUTEROL SULFATE 1 AMPULE: .5; 3 SOLUTION RESPIRATORY (INHALATION) at 12:01

## 2019-06-25 RX ADMIN — BENZONATATE 200 MG: 100 CAPSULE ORAL at 14:34

## 2019-06-25 ASSESSMENT — PAIN DESCRIPTION - DESCRIPTORS: DESCRIPTORS: ACHING

## 2019-06-25 ASSESSMENT — ENCOUNTER SYMPTOMS
COUGH: 1
NAUSEA: 1
VOMITING: 0
SHORTNESS OF BREATH: 1
ABDOMINAL PAIN: 0

## 2019-06-25 ASSESSMENT — PAIN DESCRIPTION - PROGRESSION: CLINICAL_PROGRESSION: GRADUALLY WORSENING

## 2019-06-25 ASSESSMENT — PAIN DESCRIPTION - PAIN TYPE: TYPE: ACUTE PAIN

## 2019-06-25 ASSESSMENT — PAIN SCALES - GENERAL: PAINLEVEL_OUTOF10: 6

## 2019-06-25 ASSESSMENT — PAIN DESCRIPTION - LOCATION: LOCATION: CHEST

## 2019-06-25 ASSESSMENT — PAIN DESCRIPTION - ONSET: ONSET: PROGRESSIVE

## 2019-06-25 ASSESSMENT — PAIN - FUNCTIONAL ASSESSMENT: PAIN_FUNCTIONAL_ASSESSMENT: PREVENTS OR INTERFERES SOME ACTIVE ACTIVITIES AND ADLS

## 2019-06-25 ASSESSMENT — PAIN DESCRIPTION - FREQUENCY: FREQUENCY: CONTINUOUS

## 2019-06-25 NOTE — ED PROVIDER NOTES
[address] [facility]      Pt Name: Kory Wood  MRN: [id number]  [place] 1955  Date of evaluation: 6/25/2019  Provider: DO John Scott Israel was assessed by Dr. Laura Florez at triage. MALCOLM Carrington is a 58 y.o. female who presents with shortness of breath has been present for 1 week. She been coughing but is been nonproductive. She is having left sternal chest pain that increases with coughing. She denies any fevers or chills. She has a previous history. She denies use of oxygen at home. She has a previous history of chest pains or myocardial infarction. She is been coughing but is been nonproductive. She is been achy and has subjective fever. Nothing seems to make it better or worse. She states is gotten worse over the past 2 days. She describes her symptoms as severe. Constitutional: Well-developed, well-nourished, appears dyspneic speaking in 5-6 word sentences, nontoxic, activity: Lying reclined on the cart, speaking of 5-6 word sentences, appears mildly fatigued. Occasional coughing  Neck: Normal range of motion, No tenderness, Supple. Cardiovascular: Normal heart rate, Normal rhythm, no murmurs, no gallops, no rubs. Thorax & Lungs: Moderately decreased breath sounds, moderate respiratory distress speaking 5-6 word sentences, mild bilateral end expiratory wheezing, no rales, no rhonchi  Skin: Warm, Dry, No erythema, No rash. EKG Interpretation    Interpreted by emergency department physician  Time read: 1136    Rhythm: Sinus  Ventricular Rate: 83  QRS Axis: 29  Ectopy: None  Conduction: Normal sinus rhythm  ST Segments: normal  T Waves: normal  Q Waves: None    Compared to EKG on: None to compare    Clinical Impression: Normal sinus rhythm, normal EKG and I disagree with computer interpretation of remote infarct.     Siva Estevez      DDx at triage: Asthma, bronchitis, pneumonia, congestive heart failure,            Keisha Franco,   07/03/19 [id number]

## 2019-06-25 NOTE — ED PROVIDER NOTES
[address] [facility]      Pt Name: Radha Howard  MRN: [id number]  [place] 1955  Date of evaluation: 6/25/2019  Provider: SHANNAN Mahan    This patient was seen and evaluated by the attending physician Dr. Julian Powell       Chief Complaint   Patient presents with    Cough     began on Saturday with gradual worsening to present    Shortness of Breath    Fever    Generalized Body Aches         HISTORY OF PRESENT ILLNESS  (Location/Symptom, Timing/Onset, Context/Setting, Quality, Duration, Modifying Factors, Severity.)   Radha Howard is a 58 y.o. female who presents to the emergency department for productive cough for the past week and a half. It is productive of a yellow and brown sputum. No hemoptysis. Reports is getting worse. She is taking NyQuil and drinking tea with no relief. Does have COPD and has been using nebulizer at home. Does help a little bit. She reports she has had chest pain that is been constant for the past 3 days and is worse with coughing. Had a fever to 101 last night. Also reports myalgias. Reports nausea but denies vomiting abdominal pain. Started with shortness of breath yesterday. She has diabetes hyper lipidemia hypertension. Quit smoking over 20 years ago. Does have a positive family history of MI. Denies personal history of MI, CAD. Does have CHF. Nursing Notes were reviewed and I agree. REVIEW OF SYSTEMS    (2-9 systems for level 4, 10 or more for level 5)     Review of Systems   Constitutional: Positive for fever. Respiratory: Positive for cough and shortness of breath. +sputum   -hemoptysis   Cardiovascular: Positive for chest pain. Gastrointestinal: Positive for nausea. Negative for abdominal pain and vomiting. Except as noted above the remainder of the review of systems was reviewed and negative.        PAST MEDICAL HISTORY         Diagnosis Date    Arthritis     Diabetes     GERD (gastroesophageal reflux disease)     Hyperlipidemia     Hypertension     Lumbar disc disease     Sleep apnea     pt states does use a Cpap machine at night    Unspecified cerebral artery occlusion with cerebral infarction 2014    right side weak    Wears glasses        SURGICAL HISTORY           Procedure Laterality Date    CARPAL TUNNEL RELEASE Left 9/3/15    CARPAL TUNNEL RELEASE Right 9/22/15    FINGER TRIGGER RELEASE Left 9/3/15    middle and ring fingers    FINGER TRIGGER RELEASE Right 9/22/15    middle and ring fingers    FOOT SURGERY Bilateral     litteltoe    HAND SURGERY Right     Ligament    PARTIAL HYSTERECTOMY  08/2003    SHOULDER ARTHROSCOPY Right 06/20/2018    Diagnostic scope, rcr, open [address]       Discharge Medication List as of 6/25/2019  2:16 PM      CONTINUE these medications which have NOT CHANGED    Details   ondansetron (ZOFRAN ODT) 4 MG disintegrating tablet Take 1 tablet by mouth every 8 hours as needed for Nausea, Disp-20 tablet, R-0Print      valsartan (DIOVAN) 80 MG tablet Take 1 tablet by mouth daily, Disp-90 tablet, R-1Normal      atorvastatin (LIPITOR) 20 MG tablet Take 1 tablet by mouth daily, Disp-90 tablet, R-1Normal      gabapentin (NEURONTIN) 300 MG capsule TAKE ONE CAPSULE BY MOUTH THREE TIMES A DAY, Disp-270 capsule, [id number]      TRULICITY 1.5 JI/0.0LW SOPN INJECT 1.5 MG UNDER THE SKIN ONCE WEEKLY, Disp-4 pen, R-4Normal      tiZANidine (ZANAFLEX) 4 MG tablet Take 1 tablet by mouth 3 times daily, Disp-60 tablet, R-0Normal      omeprazole (PRILOSEC) 40 MG delayed release capsule TAKE ONE CAPSULE BY MOUTH TWICE A DAY BEFORE MEALS, Disp-180 capsule, R-1Normal      metoprolol succinate (TOPROL XL) 25 MG extended release tablet TAKE ONE HALF TABLET BY MOUTH DAILY FOR BLOOD PRESSURE AND HEART RATE, Disp-90 tablet, R-3Normal      metFORMIN (GLUCOPHAGE) 500 MG tablet Take 2 tablets by mouth 2 times daily (with meals), Disp-360 tablet, R-1Normal      SITagliptin (JANUVIA) 100 MG tablet TAKE ONE TABLET BY MOUTH DAILY, Disp-90 tablet, R-1Normal      Insulin Pen Needle ([employer] PEN NEEDLES) 31G X 6 MM MISC DAILY Starting Mon 2019, Disp-100 each, R-3, Normal      insulin glargine (BASAGLAR KWIKPEN) 100 UNIT/ML injection pen Inject 12 Units into the skin nightly, Disp-5 pen, R-3Normal      furosemide (LASIX) 20 MG tablet Take 1 tab by mouth daily PRN leg swelling, Disp-30 tablet, R-2Normal      blood glucose test strips (FREESTYLE LITE) strip Disp-100 each, R-4, NormalTEST BLOOD SUGAR TWO TIMES A DAY Diag: E11.9      Blood Glucose Monitoring Suppl (TRUE METRIX AIR GLUCOSE METER) w/Device KIT 1 each by Does not apply route daily, Disp-1 kit, R-0Normal      DULoxetine (CYMBALTA) 30 MG extended release capsule Take 1 capsule by mouth daily for one week, followed by 2 capsules daily, Disp-53 capsule, R-5Normal      Tens Unit MISC Starting Thu 10/4/2018, Disp-1 each, R-0, Print      Misc. Devices (WALKER) MISC Disp-1 each, R-0, PrintOne four wheel walker      diclofenac (VOLTAREN) 75 MG EC tablet Take 1 tablet by mouth 2 times daily, Disp-60 tablet, R-1Normal      FREESTYLE LANCETS MISC Disp-100 each, R-3, Normal      FANAPT 1 MG TABS tablet Take 1 tablet by mouth nightly, MEGAN      aspirin 81 MG EC tablet Take 1 tablet by mouth daily. , Disp-60 tablet, R-3      traZODone (DESYREL) 100 MG tablet Take 100 mg by mouth nightly Takes 1/2 pill nightly             ALLERGIES     Codeine; Vicodin [hydrocodone-acetaminophen]; Oxycontin [oxycodone hcl]; and Tramadol    FAMILY HISTORY           Problem Relation Age of Onset    Heart Disease Mother     High Blood Pressure Mother     Stroke Mother     Cancer Brother         lung cancer     Family Status   Relation Name Status    Mother      Father      Brother          SOCIAL HISTORY      reports that she quit smoking about 27 years ago.  She smoked CHEST STANDARD (2 VW)   Final Result   1. No acute abnormality.                LABS:  Results for orders placed or performed during the hospital encounter of 06/25/19   CBC Auto Differential   Result Value Ref Range    WBC 7.5 4.0 - 11.0 K/uL    RBC 4.61 4.00 - 5.20 M/uL    Hemoglobin 13.0 12.0 - 16.0 g/dL    Hematocrit 39.2 36.0 - 48.0 %    MCV 85.0 80.0 - 100.0 fL    MCH 28.1 26.0 - 34.0 pg    MCHC 33.1 31.0 - 36.0 g/dL    RDW 13.3 12.4 - 15.4 %    Platelets 633 887 - 705 K/uL    MPV 8.0 5.0 - 10.5 fL    Neutrophils % 50.9 %    Lymphocytes % 39.4 %    Monocytes % 5.9 %    Eosinophils % 3.5 %    Basophils % 0.3 %    Neutrophils # 3.8 1.7 - 7.7 K/uL    Lymphocytes # 2.9 1.0 - 5.1 K/uL    Monocytes # 0.4 0.0 - 1.3 K/uL    Eosinophils # 0.3 0.0 - 0.6 K/uL    Basophils # 0.0 0.0 - 0.2 K/uL   Comprehensive Metabolic Panel w/ Reflex to MG   Result Value Ref Range    Sodium 142 136 - 145 mmol/L    Potassium reflex Magnesium 4.0 3.5 - 5.1 mmol/L    Chloride 103 99 - 110 mmol/L    CO2 23 21 - 32 mmol/L    Anion Gap 16 3 - 16    Glucose 106 (H) 70 - 99 mg/dL    BUN 10 7 - 20 mg/dL    CREATININE 0.9 0.6 - 1.2 mg/dL    GFR Non-African American >60 >60    GFR African American >60 >60    Calcium 10.8 (H) 8.3 - 10.6 mg/dL    Total Protein 7.7 6.4 - 8.2 g/dL    Alb 4.4 3.4 - 5.0 g/dL    Albumin/Globulin Ratio 1.3 1.1 - 2.2    Total Bilirubin <0.2 0.0 - 1.0 mg/dL    Alkaline Phosphatase 78 40 - 129 U/L    ALT 18 10 - 40 U/L    AST 24 15 - 37 U/L    Globulin 3.3 g/dL   Troponin   Result Value Ref Range    Troponin <0.01 <0.01 ng/mL   Brain Natriuretic Peptide   Result Value Ref Range    Pro-BNP 12 0 - 124 pg/mL   EKG 12 Lead   Result Value Ref Range    Ventricular Rate 83 BPM    Atrial Rate 83 BPM    P-R Interval 200 ms    QRS Duration 72 ms    Q-T Interval 380 ms    QTc Calculation (Bazett) 446 ms    P Axis 46 degrees    R Axis 29 degrees    T Axis 26 degrees    Diagnosis       Normal sinus rhythmPoor R wave progressionAbnormal 300 [address] [place] [address]  [phone]    As needed, If symptoms worsen      DISCHARGE MEDICATIONS:  Discharge Medication List as of 6/25/2019  2:16 PM      START taking these medications    Details   predniSONE (DELTASONE) 20 MG tablet Take 2 tablets by mouth daily for 4 days, Disp-8 tablet, R-0Print      benzonatate (TESSALON) 200 MG capsule Take 1 capsule by mouth 3 times daily as needed for Cough, Disp-20 capsule, R-0Print      azithromycin (ZITHROMAX Z-JUAN) 250 MG tablet Take 2 tablets (500 mg) on Day 1, and then take 1 tablet (250 mg) on days 2 through 5., Disp-1 packet, R-0Print             (Please note that portions of this note werecompleted with a voice recognition program.  Efforts were made to edit the dictations but occasionally words are mis-transcribed.)    Ravinder Taylor, [address] [place] [address]  06/25/19 [id number]

## 2019-06-28 ENCOUNTER — HOSPITAL ENCOUNTER (OUTPATIENT)
Dept: PHYSICAL THERAPY | Age: 64
Setting detail: THERAPIES SERIES
Discharge: HOME | End: 2019-06-28
Payer: COMMERCIAL

## 2019-06-28 ENCOUNTER — SCHEDULED TELEPHONE ENCOUNTER (OUTPATIENT)
Dept: PHARMACY | Age: 64
End: 2019-06-28

## 2019-06-28 PROCEDURE — 97140 MANUAL THERAPY 1/> REGIONS: CPT

## 2019-06-28 PROCEDURE — 97110 THERAPEUTIC EXERCISES: CPT

## 2019-06-28 NOTE — TELEPHONE ENCOUNTER
I placed a call to patient to schedule an appointment in the [facility] for COPD following patient's recent visit to the ED. No answer, left VM to please call us back to schedule.     Jacqueline Cannon, [address]  Anticoagulation Service  COPD Service  Diabetes Service  [phone]

## 2019-06-28 NOTE — FLOWSHEET NOTE
Physical Therapy Daily Treatment Note  Date:  2019    Patient Name:  Kyle Cowan    :  1955  MRN: [id number]    Restrictions/Precautions: Position Activity Restriction  Other position/activity restrictions: no significant fall risk    Pertinent Medical History: Additional Pertinent Hx: arthritis, diabetes, GERd, HLD, HTN, lumbar DD, sleep apnea,  CVA , carpal tunnel release gurmeet, trigger finger release gurmeet    Medical/Treatment Diagnosis Information:  · Diagnosis:  18  \" R  shoulder scope, open amarilys, RCR - medium tear of entire SS and portion of the IS. · Treatment Diagnosis: Decreased functional mobility 2/2 myofascial pain right shoulder    Insurance/Certification information:  PT Insurance Information: [facility] dual  Physician Information:  Referring Practitioner: Gregory Koch of care signed (Y/N):  sent    Visit# / total visits:         Pain level: 1/10     Functional Assessment: at eval  Test:  UEFs  Score: 5/13    13.75      History of Injury:   R  Shoulder scope 18  34 PT treatments. Cortisone shot in 2018. At May visit this year given Medrol dose pack and Ultram, PT referral.  Cortisone shot didnt help, Pain was a 6. Pain down to about a 3 since Medrol dose pack and Ultram. Currently not working, had to quit [employer] due to the work load and continued pain. Currently  watching grandchildren.      Subjective:  Slept on right side was aggravated when she got up    Objective:  1AROM RUE (degrees)  RUE General AROM: flexion 165a/170p ER 65a/75p IR 70a/78p abduction [id number].     Strength RUE  Comment: flexion  4/5, abduction  4/5,  ER 4/5  IR  4+.5       Exercise/Equipment Resistance/Repetitions Other comments        UBE 0# x 3 min     Pulleys  Flex   UE ranger wall X 20  10 each 1/2lb    T-band            Rows                          Ext                            IR                           ER Red  2 x 10 B  Red  2 x 10 B  Red 2  X 10 R     Red 2  x 10  R Verbal and tactile cues to pull shoulder blades back and down  Given blue for HEP scap ex. Reviewed with pt     Door stretch 20 sec x 3    Lower trap set  red x 10         Standing overhead press   2 # x 10 R/L    STM Ant/lat  R shoulder/ UT roldan 10 min    Posterior glides  Inferior glides  seated x 10 grade III    CP (cervical) to right shlder      HEP 10 min      shlder rolls, retraction, corner stretch - written HEP          Other Therapeutic Activities:  Pt was educated on PT POC, Diagnosis, Prognosis, pathomechanics as well as frequency and duration of scheduling future physical therapy appointments. Time was also taken on this day to answer all patient questions and participation in PT. Reviewed appointment policy in detail with patient and patient verbalized understanding. Home Exercise Program:  Patient instructed in the following for HEP:   Patient verbalized/demonstrated understanding and was issued written HEP. Timed Code Treatment Minutes: 30    Total Treatment Minutes: 40    Treatment/Activity Tolerance:  [x] Patient tolerated treatment well [] Patient limited by fatigue  [] Patient limited by pain  [] Patient limited by other medical complications  [x] Other: improving slowly    Prognosis: [x] Good [] Fair  [] Poor    Goals:    Short term goals  Time Frame for Short term goals: 2 weeks  Short term goal 1: Indep with HEP      Long term goals  Time Frame for Long term goals : discharge  Long term goal 1: Decrease pain to 2/10 on VAS to allow light activity met 6/21  Long term goal 2: Full SHoulder ROM for increased ease with self care and ADL met 6/21  Long term goal 3:  Increase strength to 4+/5 for homemaking and   Ongoing 6/21    Patient Requires Follow-up: [x] Yes  [] No    Plan:   [x] Continue per plan of care [] Alter current plan (see comments)  [] Plan of care initiated [] Hold pending MD visit [] Discharge    Plan for Next Session:  Add above as stated;     Electronically

## 2019-06-30 LAB — BLOOD CULTURE, ROUTINE: NORMAL

## 2019-07-01 ENCOUNTER — HOSPITAL ENCOUNTER (OUTPATIENT)
Dept: PHYSICAL THERAPY | Age: 64
Setting detail: THERAPIES SERIES
Discharge: HOME | End: 2019-07-01
Payer: COMMERCIAL

## 2019-07-01 ENCOUNTER — TELEPHONE (OUTPATIENT)
Dept: PHARMACY | Age: 64
End: 2019-07-01

## 2019-07-01 ENCOUNTER — OFFICE VISIT (OUTPATIENT)
Dept: FAMILY MEDICINE CLINIC | Age: 64
End: 2019-07-01
Payer: COMMERCIAL

## 2019-07-01 VITALS
HEART RATE: 86 BPM | DIASTOLIC BLOOD PRESSURE: 68 MMHG | HEIGHT: 64 IN | OXYGEN SATURATION: 100 % | SYSTOLIC BLOOD PRESSURE: 112 MMHG | BODY MASS INDEX: 35.89 KG/M2 | RESPIRATION RATE: 20 BRPM | TEMPERATURE: 98.8 F | WEIGHT: 210.2 LBS

## 2019-07-01 DIAGNOSIS — R05.9 COUGH: ICD-10-CM

## 2019-07-01 DIAGNOSIS — R06.2 WHEEZING: Primary | ICD-10-CM

## 2019-07-01 PROCEDURE — 97110 THERAPEUTIC EXERCISES: CPT

## 2019-07-01 PROCEDURE — 1036F TOBACCO NON-USER: CPT | Performed by: FAMILY MEDICINE

## 2019-07-01 PROCEDURE — 3017F COLORECTAL CA SCREEN DOC REV: CPT | Performed by: FAMILY MEDICINE

## 2019-07-01 PROCEDURE — G8599 NO ASA/ANTIPLAT THER USE RNG: HCPCS | Performed by: FAMILY MEDICINE

## 2019-07-01 PROCEDURE — G8417 CALC BMI ABV UP PARAM F/U: HCPCS | Performed by: FAMILY MEDICINE

## 2019-07-01 PROCEDURE — G8427 DOCREV CUR MEDS BY ELIG CLIN: HCPCS | Performed by: FAMILY MEDICINE

## 2019-07-01 PROCEDURE — 99213 OFFICE O/P EST LOW 20 MIN: CPT | Performed by: FAMILY MEDICINE

## 2019-07-01 PROCEDURE — 97140 MANUAL THERAPY 1/> REGIONS: CPT

## 2019-07-01 RX ORDER — PREDNISONE 1 MG/1
TABLET ORAL
Qty: 20 TABLET | Refills: 0 | Status: SHIPPED | OUTPATIENT
Start: 2019-07-01 | End: 2019-08-13 | Stop reason: ALTCHOICE

## 2019-07-01 RX ORDER — DEXTROMETHORPHAN HYDROBROMIDE AND PROMETHAZINE HYDROCHLORIDE 15; 6.25 MG/5ML; MG/5ML
5 SYRUP ORAL 4 TIMES DAILY PRN
Qty: 118 ML | Refills: 0 | Status: SHIPPED | OUTPATIENT
Start: 2019-07-01 | End: 2019-07-08

## 2019-07-01 RX ORDER — AZITHROMYCIN 1 G
1 PACKET (EA) ORAL ONCE
COMMUNITY
End: 2019-07-05

## 2019-07-01 ASSESSMENT — ENCOUNTER SYMPTOMS
COUGH: 1
BACK PAIN: 1
SHORTNESS OF BREATH: 0
WHEEZING: 1

## 2019-07-01 NOTE — FLOWSHEET NOTE
Physical Therapy Daily Treatment Note  Date:  2019    Patient Name:  Elba Kohli    :  1955  MRN: [id number]    Restrictions/Precautions: Position Activity Restriction  Other position/activity restrictions: no significant fall risk    Pertinent Medical History: Additional Pertinent Hx: arthritis, diabetes, GERd, HLD, HTN, lumbar DD, sleep apnea,  CVA , carpal tunnel release gurmeet, trigger finger release gurmeet    Medical/Treatment Diagnosis Information:  · Diagnosis:  18  \" R  shoulder scope, open amarilys, RCR - medium tear of entire SS and portion of the IS. · Treatment Diagnosis: Decreased functional mobility 2/2 myofascial pain right shoulder    Insurance/Certification information:  PT Insurance Information: [facility] dual  Physician Information:  Referring Practitioner: Quin Goldman of care signed (Y/N):  sent    Visit# / total visits:  15/16       Pain level: 0-110     Functional Assessment: at eval  Test:  UEFs  Score: 5/13    13.75   DC  78.75      History of Injury:   R  Shoulder scope 18  34 PT treatments. Cortisone shot in 2018. At May visit this year given Medrol dose pack and Ultram, PT referral.  Cortisone shot didnt help, Pain was a 6. Pain down to about a 3 since Medrol dose pack and Ultram. Currently not working, had to quit [employer] due to the work load and continued pain. Currently  watching grandchildren. Subjective:  Notes she feels at least 80-90% better, no pain most of the time unless she overdoes.     Objective:  1AROM RUE (degrees)  RUE General AROM: flexion 165a/170p ER 65a/75p IR 70a/78p abduction [id number].     Strength RUE  Comment: flexion  4/5, abduction  4/5,  ER 4/5  IR  4+.5       Exercise/Equipment Resistance/Repetitions Other comments        UBE 0# x 3 min     Pulleys  Flex   UE ranger wall X 20  10 each 1/2lb    T-band            Rows                          Ext                            IR                           ER Red  2 x 10 B  Red

## 2019-07-01 NOTE — TELEPHONE ENCOUNTER
[facility]  COPD Service  [phone]      Follow up phone call:    NAME: Charan Jean RECORD NUMBER:  [id number] was referred to our [facility] by [facility] ED. Patient returned our call today and scheduled a new patient  appointment in the [facility] for COPD management. Please call any time with questions or concerns, especially with worsening symptoms. Do not wait to call. PLAN:   Scheduled appointment for 7/5. Appropriate staff has been notified with regards to any concerns noted above     [facility]  COPD Service  Phone: [phone]  Fax [phone]    We also have outpatient services in Heart Failure, Diabetes, Anticoagulation and Infusion.

## 2019-07-01 NOTE — PROGRESS NOTES
7/1/2019    This is a 58 y.o. female   HPI   Here for not feeling well for about a week  - cough and congestion  - went to the ED on 6/25 with wheezing, had normal CXR and blood work  - dx with wheezing and bronchitis, rx'd azithromycin and prednisone  - finished theses medications, still having some wheezing and cough  - no fever or chills  - feels low energy  - no known sick contacts    Review of Systems   Constitutional: Negative for chills and fever. HENT: Positive for congestion. Respiratory: Positive for cough and wheezing. Negative for shortness of breath. Musculoskeletal: Positive for back pain.        Patient Active Problem List   Diagnosis    Diabetes mellitus ([address]    Obesity    Dystonia    Cerebral thrombosis with cerebral infarction [facility]    Sleep apnea    Right sided weakness    Trigger finger, acquired    Elevated CK    Primary osteoarthritis involving multiple joints    Primary osteoarthritis of both knees    Mood disorder ([facility] - follows with Psych Dr. Garcia No Calcific tendonitis of right shoulder    Rotator cuff tendinitis, right    Biceps tendinitis, right    Tear of right supraspinatus tendon    Essential hypertension    Mixed hyperlipidemia        Past Medical History:   Diagnosis Date    Arthritis     Diabetes     GERD (gastroesophageal reflux disease)     Hyperlipidemia     Hypertension     Lumbar disc disease     Sleep apnea     pt states does use a Cpap machine at night    Unspecified cerebral artery occlusion with cerebral infarction 2014    right side weak    Wears glasses        Past Surgical History:   Procedure Laterality Date    CARPAL TUNNEL RELEASE Left 9/3/15    CARPAL TUNNEL RELEASE Right 9/22/15    FINGER TRIGGER RELEASE Left 9/3/15    middle and ring fingers    FINGER TRIGGER RELEASE Right 9/22/15    middle and ring fingers    FOOT SURGERY Bilateral     litteltoe    HAND SURGERY Right     Ligament    PARTIAL HYSTERECTOMY  08/2003   

## 2019-07-05 ENCOUNTER — OFFICE VISIT (OUTPATIENT)
Dept: PHARMACY | Age: 64
End: 2019-07-05
Payer: COMMERCIAL

## 2019-07-05 ENCOUNTER — HOSPITAL ENCOUNTER (OUTPATIENT)
Dept: PHYSICAL THERAPY | Age: 64
Setting detail: THERAPIES SERIES
Discharge: HOME | End: 2019-07-05
Payer: COMMERCIAL

## 2019-07-05 VITALS
BODY MASS INDEX: 35.05 KG/M2 | DIASTOLIC BLOOD PRESSURE: 79 MMHG | WEIGHT: 204.2 LBS | HEART RATE: 99 BPM | SYSTOLIC BLOOD PRESSURE: 107 MMHG

## 2019-07-05 DIAGNOSIS — J44.9 CHRONIC OBSTRUCTIVE PULMONARY DISEASE, UNSPECIFIED COPD TYPE (HCC): Primary | ICD-10-CM

## 2019-07-05 PROCEDURE — 99214 OFFICE O/P EST MOD 30 MIN: CPT

## 2019-07-05 PROCEDURE — 97140 MANUAL THERAPY 1/> REGIONS: CPT

## 2019-07-05 PROCEDURE — 97110 THERAPEUTIC EXERCISES: CPT

## 2019-07-05 RX ORDER — VITAMIN E 268 MG
400 CAPSULE ORAL DAILY
Status: ON HOLD | COMMUNITY
End: 2019-10-18

## 2019-07-05 RX ORDER — MULTIVIT-MIN/FA/LYCOPEN/LUTEIN .4-300-25
1 TABLET ORAL DAILY
Status: ON HOLD | COMMUNITY
End: 2019-10-18

## 2019-07-05 RX ORDER — MULTIVITAMIN WITH IRON
100 TABLET ORAL DAILY
Status: ON HOLD | COMMUNITY
End: 2019-10-18

## 2019-07-05 RX ORDER — POLYMYXIN B SULFATE AND TRIMETHOPRIM 1; 10000 MG/ML; [USP'U]/ML
1 SOLUTION OPHTHALMIC EVERY 4 HOURS PRN
COMMUNITY
End: 2021-05-11

## 2019-07-05 RX ORDER — TRAZODONE HYDROCHLORIDE 50 MG/1
50 TABLET ORAL NIGHTLY
COMMUNITY
End: 2020-03-03

## 2019-07-05 RX ORDER — AMLODIPINE BESYLATE 2.5 MG/1
2.5 TABLET ORAL DAILY
Status: ON HOLD | COMMUNITY
End: 2019-10-18

## 2019-07-05 NOTE — FLOWSHEET NOTE
Physical Therapy Daily Treatment Note  Date:  2019    Patient Name:  Mercedes Morrissey    :  1955  MRN: [id number]    Restrictions/Precautions: Position Activity Restriction  Other position/activity restrictions: no significant fall risk    Pertinent Medical History: Additional Pertinent Hx: arthritis, diabetes, GERd, HLD, HTN, lumbar DD, sleep apnea,  CVA , carpal tunnel release gurmeet, trigger finger release gurmeet    Medical/Treatment Diagnosis Information:  · Diagnosis:  18  \" R  shoulder scope, open amarilys, RCR - medium tear of entire SS and portion of the IS. · Treatment Diagnosis: Decreased functional mobility 2/2 myofascial pain right shoulder    Insurance/Certification information:  PT Insurance Information: [facility] dual  Physician Information:  Referring Practitioner: Luan Sun of care signed (Y/N):  sent    Visit# / total visits:         Pain level: 0-1/10     Functional Assessment: at eval  Test:  UEFs  Score: 513    13.75   DC  78.75      History of Injury:   R  Shoulder scope 18  34 PT treatments. Cortisone shot in 2018. At May visit this year given Medrol dose pack and Ultram, PT referral.  Cortisone shot didnt help, Pain was a 6. Pain down to about a 3 since Medrol dose pack and Ultram. Currently not working, had to quit [employer] due to the work load and continued pain. Currently  watching grandchildren. Subjective:  Notes she feels at least 80-90% better, no pain most of the time unless she overdoes. 7/5 - sometimes feels a little pressure in her shoulder, and that is it. Overall feeling much better.     Objective:  1AROM RUE (degrees)  RUE General AROM: flexion 170a/174p ER 75a/85p 70a/78p abduction 170a/178p     Strength RUE  Comment: flexion  4/5, abduction  4/5,  ER 4/5  IR  4+.5       Exercise/Equipment Resistance/Repetitions Other comments        UBE 0# x 3 min     Pulleys  Flex   UE ranger wall X 20  10 each 1/2lb    T-band            Rows comments)  [] Plan of care initiated [] Hold pending MD visit [x] Discharge      Electronically signed by:  Vladimir Byrd, [address] [facility]

## 2019-07-22 ENCOUNTER — TELEPHONE (OUTPATIENT)
Dept: FAMILY MEDICINE CLINIC | Age: 64
End: 2019-07-22

## 2019-07-23 NOTE — TELEPHONE ENCOUNTER
Called pt back and got more info Pt does want to work. 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 Pt states she would just like a letter stating that she can't lift over 20lb.      Please Advise    Thank You

## 2019-07-23 NOTE — TELEPHONE ENCOUNTER
No problem, please draft a letter stating that due to her medical conditions she is currently unable to lift packages greater than 20 pounds. Thanks.

## 2019-07-23 NOTE — TELEPHONE ENCOUNTER
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 The pt is saying that she some disk that is out of place and she had surgery on her right shoulder and overall depression. Please advise,    Thanks.

## 2019-07-24 ENCOUNTER — TELEPHONE (OUTPATIENT)
Dept: FAMILY MEDICINE CLINIC | Age: 64
End: 2019-07-24

## 2019-07-24 ENCOUNTER — APPOINTMENT (OUTPATIENT)
Dept: CT IMAGING | Age: 64
End: 2019-07-24
Payer: COMMERCIAL

## 2019-07-24 ENCOUNTER — HOSPITAL ENCOUNTER (EMERGENCY)
Age: 64
Discharge: HOME | End: 2019-07-24
Attending: EMERGENCY MEDICINE
Payer: COMMERCIAL

## 2019-07-24 ENCOUNTER — APPOINTMENT (OUTPATIENT)
Dept: GENERAL RADIOLOGY | Age: 64
End: 2019-07-24
Payer: COMMERCIAL

## 2019-07-24 VITALS
SYSTOLIC BLOOD PRESSURE: 159 MMHG | DIASTOLIC BLOOD PRESSURE: 84 MMHG | BODY MASS INDEX: 35.15 KG/M2 | RESPIRATION RATE: 18 BRPM | TEMPERATURE: 98.7 F | WEIGHT: 205.91 LBS | HEART RATE: 76 BPM | HEIGHT: 64 IN | OXYGEN SATURATION: 98 %

## 2019-07-24 DIAGNOSIS — R11.2 NON-INTRACTABLE VOMITING WITH NAUSEA, UNSPECIFIED VOMITING TYPE: Primary | ICD-10-CM

## 2019-07-24 DIAGNOSIS — L29.9 PRURITIC DISORDER: ICD-10-CM

## 2019-07-24 LAB
A/G RATIO: 1.5 (ref 1.1–2.2)
ALBUMIN SERPL-MCNC: 4.1 G/DL (ref 3.4–5)
ALP BLD-CCNC: 73 U/L (ref 40–129)
ALT SERPL-CCNC: 25 U/L (ref 10–40)
ANION GAP SERPL CALCULATED.3IONS-SCNC: 12 MMOL/L (ref 3–16)
AST SERPL-CCNC: 32 U/L (ref 15–37)
BASOPHILS ABSOLUTE: 0 K/UL (ref 0–0.2)
BASOPHILS RELATIVE PERCENT: 0.5 %
BILIRUB SERPL-MCNC: <0.2 MG/DL (ref 0–1)
BILIRUBIN URINE: NEGATIVE
BLOOD, URINE: NEGATIVE
BUN BLDV-MCNC: 11 MG/DL (ref 7–20)
CALCIUM SERPL-MCNC: 10.6 MG/DL (ref 8.3–10.6)
CHLORIDE BLD-SCNC: 102 MMOL/L (ref 99–110)
CLARITY: CLEAR
CO2: 26 MMOL/L (ref 21–32)
COLOR: YELLOW
CREAT SERPL-MCNC: 0.8 MG/DL (ref 0.6–1.2)
D DIMER: 213 NG/ML DDU (ref 0–229)
EOSINOPHILS ABSOLUTE: 0.1 K/UL (ref 0–0.6)
EOSINOPHILS RELATIVE PERCENT: 1.2 %
EPITHELIAL CELLS, UA: 1 /HPF (ref 0–5)
GFR AFRICAN AMERICAN: >60
GFR NON-AFRICAN AMERICAN: >60
GLOBULIN: 2.8 G/DL
GLUCOSE BLD-MCNC: 169 MG/DL (ref 70–99)
GLUCOSE URINE: NEGATIVE MG/DL
HCT VFR BLD CALC: 37.5 % (ref 36–48)
HEMOGLOBIN: 12 G/DL (ref 12–16)
HYALINE CASTS: 2 /LPF (ref 0–8)
KETONES, URINE: NEGATIVE MG/DL
LEUKOCYTE ESTERASE, URINE: ABNORMAL
LIPASE: 66 U/L (ref 13–60)
LYMPHOCYTES ABSOLUTE: 2.1 K/UL (ref 1–5.1)
LYMPHOCYTES RELATIVE PERCENT: 32.1 %
MCH RBC QN AUTO: 27.3 PG (ref 26–34)
MCHC RBC AUTO-ENTMCNC: 32 G/DL (ref 31–36)
MCV RBC AUTO: 85.3 FL (ref 80–100)
MICROSCOPIC EXAMINATION: YES
MONOCYTES ABSOLUTE: 0.3 K/UL (ref 0–1.3)
MONOCYTES RELATIVE PERCENT: 4.4 %
NEUTROPHILS ABSOLUTE: 4.1 K/UL (ref 1.7–7.7)
NEUTROPHILS RELATIVE PERCENT: 61.8 %
NITRITE, URINE: NEGATIVE
PDW BLD-RTO: 14.1 % (ref 12.4–15.4)
PH UA: 7.5 (ref 5–8)
PLATELET # BLD: 278 K/UL (ref 135–450)
PMV BLD AUTO: 7.8 FL (ref 5–10.5)
POTASSIUM REFLEX MAGNESIUM: 4 MMOL/L (ref 3.5–5.1)
PROTEIN UA: NEGATIVE MG/DL
RBC # BLD: 4.4 M/UL (ref 4–5.2)
RBC UA: 2 /HPF (ref 0–4)
SODIUM BLD-SCNC: 140 MMOL/L (ref 136–145)
SPECIFIC GRAVITY UA: 1.02 (ref 1–1.03)
TOTAL PROTEIN: 6.9 G/DL (ref 6.4–8.2)
TROPONIN: <0.01 NG/ML
URINE REFLEX TO CULTURE: YES
URINE TYPE: ABNORMAL
UROBILINOGEN, URINE: 0.2 E.U./DL
WBC # BLD: 6.7 K/UL (ref 4–11)
WBC UA: 14 /HPF (ref 0–5)

## 2019-07-24 PROCEDURE — 85379 FIBRIN DEGRADATION QUANT: CPT

## 2019-07-24 PROCEDURE — 71046 X-RAY EXAM CHEST 2 VIEWS: CPT

## 2019-07-24 PROCEDURE — 83690 ASSAY OF LIPASE: CPT

## 2019-07-24 PROCEDURE — 87086 URINE CULTURE/COLONY COUNT: CPT

## 2019-07-24 PROCEDURE — 6360000002 HC RX W HCPCS: Performed by: EMERGENCY MEDICINE

## 2019-07-24 PROCEDURE — 6370000000 HC RX 637 (ALT 250 FOR IP): Performed by: EMERGENCY MEDICINE

## 2019-07-24 PROCEDURE — 80053 COMPREHEN METABOLIC PANEL: CPT

## 2019-07-24 PROCEDURE — 84484 ASSAY OF TROPONIN QUANT: CPT

## 2019-07-24 PROCEDURE — 81001 URINALYSIS AUTO W/SCOPE: CPT

## 2019-07-24 PROCEDURE — 2580000003 HC RX 258: Performed by: EMERGENCY MEDICINE

## 2019-07-24 PROCEDURE — 96361 HYDRATE IV INFUSION ADD-ON: CPT

## 2019-07-24 PROCEDURE — 93005 ELECTROCARDIOGRAM TRACING: CPT | Performed by: EMERGENCY MEDICINE

## 2019-07-24 PROCEDURE — 99284 EMERGENCY DEPT VISIT MOD MDM: CPT

## 2019-07-24 PROCEDURE — 96374 THER/PROPH/DIAG INJ IV PUSH: CPT

## 2019-07-24 PROCEDURE — 85025 COMPLETE CBC W/AUTO DIFF WBC: CPT

## 2019-07-24 PROCEDURE — 74176 CT ABD & PELVIS W/O CONTRAST: CPT

## 2019-07-24 RX ORDER — DIPHENHYDRAMINE HCL 25 MG
25 CAPSULE ORAL EVERY 6 HOURS PRN
Qty: 1 CAPSULE | Refills: 0 | Status: SHIPPED | OUTPATIENT
Start: 2019-07-24 | End: 2019-08-03

## 2019-07-24 RX ORDER — 0.9 % SODIUM CHLORIDE 0.9 %
500 INTRAVENOUS SOLUTION INTRAVENOUS ONCE
Status: COMPLETED | OUTPATIENT
Start: 2019-07-24 | End: 2019-07-24

## 2019-07-24 RX ORDER — ONDANSETRON 2 MG/ML
4 INJECTION INTRAMUSCULAR; INTRAVENOUS ONCE
Status: COMPLETED | OUTPATIENT
Start: 2019-07-24 | End: 2019-07-24

## 2019-07-24 RX ORDER — ONDANSETRON 4 MG/1
4 TABLET, ORALLY DISINTEGRATING ORAL EVERY 8 HOURS PRN
Qty: 20 TABLET | Refills: 0 | Status: SHIPPED | OUTPATIENT
Start: 2019-07-24 | End: 2020-09-10

## 2019-07-24 RX ORDER — DIPHENHYDRAMINE HCL 25 MG
25 TABLET ORAL
Status: COMPLETED | OUTPATIENT
Start: 2019-07-24 | End: 2019-07-24

## 2019-07-24 RX ADMIN — DIPHENHYDRAMINE HCL 25 MG: 25 TABLET ORAL at 23:29

## 2019-07-24 RX ADMIN — SODIUM CHLORIDE 500 ML: 9 INJECTION, SOLUTION INTRAVENOUS at 19:34

## 2019-07-24 RX ADMIN — ONDANSETRON 4 MG: 2 INJECTION INTRAMUSCULAR; INTRAVENOUS at 19:34

## 2019-07-24 ASSESSMENT — ENCOUNTER SYMPTOMS
SHORTNESS OF BREATH: 0
EYE REDNESS: 0
DIARRHEA: 0
VOMITING: 1
COUGH: 1
RHINORRHEA: 0
ABDOMINAL PAIN: 1
NAUSEA: 1
SORE THROAT: 0

## 2019-07-24 ASSESSMENT — PAIN SCALES - GENERAL
PAINLEVEL_OUTOF10: 3
PAINLEVEL_OUTOF10: 6

## 2019-07-24 ASSESSMENT — PAIN DESCRIPTION - PROGRESSION: CLINICAL_PROGRESSION: NOT CHANGED

## 2019-07-24 ASSESSMENT — PAIN DESCRIPTION - FREQUENCY
FREQUENCY: CONTINUOUS
FREQUENCY: CONTINUOUS

## 2019-07-24 ASSESSMENT — PAIN DESCRIPTION - DESCRIPTORS: DESCRIPTORS: CONSTANT

## 2019-07-24 ASSESSMENT — PAIN DESCRIPTION - ORIENTATION
ORIENTATION: MID;LEFT
ORIENTATION: LEFT

## 2019-07-24 ASSESSMENT — PAIN DESCRIPTION - LOCATION
LOCATION: OTHER (COMMENT)
LOCATION: BACK

## 2019-07-24 ASSESSMENT — PAIN DESCRIPTION - PAIN TYPE: TYPE: ACUTE PAIN

## 2019-07-24 NOTE — ED NOTES
Attempted to ask patient to get up to use bathroom. States dizzy when she got up.      Mckenzie Ackerman RN  07/24/19 [id number]

## 2019-07-24 NOTE — ED PROVIDER NOTES
MONITORING SUPPL (TRUE METRIX AIR GLUCOSE METER) W/DEVICE KIT    1 each by Does not apply route daily    BLOOD GLUCOSE TEST STRIPS (FREESTYLE LITE) STRIP    TEST BLOOD SUGAR TWO TIMES A DAY Diag: E11.9    DULOXETINE (CYMBALTA) 30 MG EXTENDED RELEASE CAPSULE    Take 1 capsule by mouth daily for one week, followed by 2 capsules daily    FANAPT 1 MG TABS TABLET    Take 1 tablet by mouth nightly    FREESTYLE LANCETS MISC    USE ONE LANCET TO TEST BLOOD SUGAR TWICE A DAY    FUROSEMIDE (LASIX) 20 MG TABLET    Take 1 tab by mouth daily PRN leg swelling    GABAPENTIN (NEURONTIN) 300 MG CAPSULE    TAKE ONE CAPSULE BY MOUTH THREE TIMES A DAY    INSULIN GLARGINE (BASAGLAR KWIKPEN) 100 UNIT/ML INJECTION PEN    Inject 12 Units into the skin nightly    INSULIN PEN NEEDLE (KR[employer]ER PEN NEEDLES) 31G X 6 MM MISC    1 each by Does not apply route daily    METFORMIN (GLUCOPHAGE) 500 MG TABLET    Take 2 tablets by mouth 2 times daily (with meals)    METOPROLOL SUCCINATE (TOPROL XL) 25 MG EXTENDED RELEASE TABLET    TAKE ONE HALF TABLET BY MOUTH DAILY FOR BLOOD PRESSURE AND HEART RATE    MISC.  DEVICES (WALKER) MISC    One four wheel walker    MULTIPLE VITAMINS-MINERALS (CERTAVITE SENIOR/ANTIOXIDANT) TABS    Take 1 tablet by mouth daily    MULTIPLE VITAMINS-MINERALS (HAIR SKIN & NAILS ADVANCED PO)    Take 1 tablet by mouth daily    MULTIPLE VITAMINS-MINERALS (ONE DAILY MULTIVITAMIN WOMEN) TABS    Take 1 tablet by mouth daily    OMEPRAZOLE (PRILOSEC) 40 MG DELAYED RELEASE CAPSULE    TAKE ONE CAPSULE BY MOUTH TWICE A DAY BEFORE MEALS    PREDNISONE (DELTASONE) 5 MG TABLET    Take 4 tablets by mouth daily for 2 days, 3 tablets for 2 days, 2 tablets for 2 days, 1 tablet for 2 days    SITAGLIPTIN (JANUVIA) 100 MG TABLET    TAKE ONE TABLET BY MOUTH DAILY    TENS UNIT MISC    by Does not apply route    TRAZODONE (DESYREL) 50 MG TABLET    Take 50 mg by mouth nightly    TRIMETHOPRIM-POLYMYXIN B (POLYTRIM) 77263-9.1 UNIT/ML-% OPHTHALMIC SOLUTION    1 drop every 4 hours    TRULICITY 1.5 SA/5.7VZ SOPN    INJECT 1.5 MG UNDER THE SKIN ONCE WEEKLY    VALSARTAN (DIOVAN) 80 MG TABLET    Take 1 tablet by mouth daily    VITAMIN B-6 (PYRIDOXINE) 100 MG TABLET    Take 100 mg by mouth daily    VITAMIN E 400 UNIT CAPSULE    Take 400 Units by mouth daily       ALLERGIES     Codeine; Vicodin [hydrocodone-acetaminophen];  Oxycontin [oxycodone hcl]; and Tramadol    FAMILY HISTORY       Family History   Problem Relation Age of Onset    Heart Disease Mother     High Blood Pressure Mother     Stroke Mother     Cancer Brother         lung cancer          SOCIAL HISTORY       Social History     Socioeconomic History    Marital status:      Spouse name: Marylee Chaco, seen here    Number of children: 3    Years of education: Not on file    Highest education level: Not on file   Occupational History    Not on file   Social Needs    Financial resource strain: Not on file    Food insecurity:     Worry: Not on file     Inability: Not on file    Transportation needs:     Medical: Not on file     Non-medical: Not on file   Tobacco Use    Smoking status: Former Smoker     Packs/day: 0.00     Years: 3.00     Pack years: 0.00     Last attempt to quit: 1992     Years since quittin.5    Smokeless tobacco: Never Used   Substance and Sexual Activity    Alcohol use: No    Drug use: No    Sexual activity: Yes     Partners: Male   Lifestyle    Physical activity:     Days per week: Not on file     Minutes per session: Not on file    Stress: Not on file   Relationships    Social connections:     Talks on phone: Not on file     Gets together: Not on file     Attends Jehovah's witness service: Not on file     Active member of club or organization: Not on file     Attends meetings of clubs or organizations: Not on file     Relationship status: Not on file    Intimate partner violence:     Fear of current or ex partner: Not on file     Emotionally abused: Not on file     Physically

## 2019-07-25 LAB
EKG ATRIAL RATE: 69 BPM
EKG DIAGNOSIS: NORMAL
EKG P AXIS: 47 DEGREES
EKG P-R INTERVAL: 188 MS
EKG Q-T INTERVAL: 406 MS
EKG QRS DURATION: 78 MS
EKG QTC CALCULATION (BAZETT): 435 MS
EKG R AXIS: 16 DEGREES
EKG T AXIS: 29 DEGREES
EKG VENTRICULAR RATE: 69 BPM

## 2019-07-25 PROCEDURE — 93010 ELECTROCARDIOGRAM REPORT: CPT | Performed by: INTERNAL MEDICINE

## 2019-07-25 NOTE — ED NOTES
Asking for dose of benadryl before she is discharged because her pharmacy is closed.      Kalpana Malcolm RN  07/24/19 2861

## 2019-07-26 LAB — URINE CULTURE, ROUTINE: NORMAL

## 2019-08-03 DIAGNOSIS — E11.8 TYPE 2 DIABETES MELLITUS WITH COMPLICATION, WITHOUT LONG-TERM CURRENT USE OF INSULIN (HCC): ICD-10-CM

## 2019-08-03 DIAGNOSIS — R52 DIFFUSE PAIN: ICD-10-CM

## 2019-08-05 RX ORDER — GABAPENTIN 300 MG/1
CAPSULE ORAL
Qty: 270 CAPSULE | Refills: 0 | Status: SHIPPED | OUTPATIENT
Start: 2019-08-05 | End: 2019-11-01 | Stop reason: SDUPTHER

## 2019-08-13 ENCOUNTER — OFFICE VISIT (OUTPATIENT)
Dept: FAMILY MEDICINE CLINIC | Age: 64
End: 2019-08-13
Payer: COMMERCIAL

## 2019-08-13 VITALS
SYSTOLIC BLOOD PRESSURE: 132 MMHG | BODY MASS INDEX: 35.02 KG/M2 | DIASTOLIC BLOOD PRESSURE: 84 MMHG | HEART RATE: 70 BPM | WEIGHT: 204 LBS | RESPIRATION RATE: 14 BRPM | OXYGEN SATURATION: 98 %

## 2019-08-13 DIAGNOSIS — R55 PRE-SYNCOPE: ICD-10-CM

## 2019-08-13 DIAGNOSIS — M54.42 ACUTE BILATERAL LOW BACK PAIN WITH BILATERAL SCIATICA: Primary | ICD-10-CM
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
## 2019-08-13 DIAGNOSIS — M54.41 ACUTE BILATERAL LOW BACK PAIN WITH BILATERAL SCIATICA: Primary | ICD-10-CM

## 2019-08-13 PROCEDURE — 1036F TOBACCO NON-USER: CPT | Performed by: INTERNAL MEDICINE

## 2019-08-13 PROCEDURE — G8427 DOCREV CUR MEDS BY ELIG CLIN: HCPCS | Performed by: INTERNAL MEDICINE

## 2019-08-13 PROCEDURE — G8599 NO ASA/ANTIPLAT THER USE RNG: HCPCS | Performed by: INTERNAL MEDICINE

## 2019-08-13 PROCEDURE — 99214 OFFICE O/P EST MOD 30 MIN: CPT | Performed by: INTERNAL MEDICINE

## 2019-08-13 PROCEDURE — G8417 CALC BMI ABV UP PARAM F/U: HCPCS | Performed by: INTERNAL MEDICINE

## 2019-08-13 PROCEDURE — 3017F COLORECTAL CA SCREEN DOC REV: CPT | Performed by: INTERNAL MEDICINE

## 2019-08-13 PROCEDURE — 93000 ELECTROCARDIOGRAM COMPLETE: CPT | Performed by: INTERNAL MEDICINE

## 2019-08-13 RX ORDER — PREDNISONE 10 MG/1
TABLET ORAL
Qty: 32 TABLET | Refills: 0 | Status: ON HOLD | OUTPATIENT
Start: 2019-08-13 | End: 2019-10-18

## 2019-08-13 RX ORDER — LIDOCAINE 4 G/G
1 PATCH TOPICAL DAILY
Qty: 30 PATCH | Refills: 0 | Status: SHIPPED | OUTPATIENT
Start: 2019-08-13 | End: 2019-09-12

## 2019-08-13 RX ORDER — TIZANIDINE 2 MG/1
2 TABLET ORAL 3 TIMES DAILY PRN
Qty: 20 TABLET | Refills: 0 | Status: SHIPPED | OUTPATIENT
Start: 2019-08-13 | End: 2020-02-20

## 2019-08-13 RX ORDER — GABAPENTIN 100 MG/1
CAPSULE ORAL
Qty: 60 CAPSULE | Refills: 0 | Status: ON HOLD | OUTPATIENT
Start: 2019-08-13 | End: 2019-10-18 | Stop reason: ALTCHOICE

## 2019-08-13 RX ORDER — NAPROXEN 375 MG/1
375 TABLET ORAL 2 TIMES DAILY WITH MEALS
Qty: 60 TABLET | Refills: 5 | Status: SHIPPED | OUTPATIENT
Start: 2019-08-13 | End: 2020-02-05

## 2019-08-13 ASSESSMENT — ENCOUNTER SYMPTOMS
VOMITING: 0
NAUSEA: 0
BACK PAIN: 1

## 2019-08-13 NOTE — PROGRESS NOTES
Dayana Paulino, seen here    Number of children: 3    Years of education: Not on file    Highest education level: Not on file   Occupational History    Not on file   Social Needs    Financial resource strain: Not on file    Food insecurity:     Worry: Not on file     Inability: Not on file    Transportation needs:     Medical: Not on file     Non-medical: Not on file   Tobacco Use    Smoking status: Former Smoker     Packs/day: 0.00     Years: 3.00     Pack years: 0.00     Last attempt to quit: 1992     Years since quittin.6    Smokeless tobacco: Never Used   Substance and Sexual Activity    Alcohol use: No    Drug use: No    Sexual activity: Yes     Partners: Male   Lifestyle    Physical activity:     Days per week: Not on file     Minutes per session: Not on file    Stress: Not on file   Relationships    Social connections:     Talks on phone: Not on file     Gets together: Not on file     Attends [employer] service: Not on file     Active member of club or organization: Not on file     Attends meetings of clubs or organizations: Not on file     Relationship status: Not on file    Intimate partner violence:     Fear of current or ex partner: Not on file     Emotionally abused: Not on file     Physically abused: Not on file     Forced sexual activity: Not on file   Other Topics Concern    Not on file   Social History Narrative    Originally from [address] [place] [address] is seen here    10 grandchildren       Family History   Problem Relation Age of Onset    Heart Disease Mother     High Blood Pressure Mother     Stroke Mother     Cancer Brother         lung cancer       Current Outpatient Medications   Medication Sig Dispense Refill    metFORMIN (GLUCOPHAGE) 500 MG tablet TAKE TWO TABLETS BY MOUTH TWICE A DAY WITH MEALS 360 tablet 0    gabapentin (NEURONTIN) 300 MG capsule TAKE ONE CAPSULE BY MOUTH THREE TIMES A  capsule 0    amLODIPine (NORVASC) 2.5 MG tablet Take 2.5 mg by

## 2019-08-13 NOTE — Clinical Note
Tk Monte,Lowered the gabapentin due to side effects. Tracee Angelo , hit head on wall. I looked at ekg carefully after she left. She only had very slightly prolonged pr - did not discuss with her- incidental.She really looked uncomfortable from her back pain /sciatica but can't take narcotics.

## 2019-08-22 ENCOUNTER — OFFICE VISIT (OUTPATIENT)
Dept: FAMILY MEDICINE CLINIC | Age: 64
End: 2019-08-22
Payer: COMMERCIAL

## 2019-08-22 VITALS
DIASTOLIC BLOOD PRESSURE: 80 MMHG | OXYGEN SATURATION: 98 % | HEART RATE: 86 BPM | HEIGHT: 64 IN | SYSTOLIC BLOOD PRESSURE: 116 MMHG | TEMPERATURE: 97.2 F | WEIGHT: 214 LBS | RESPIRATION RATE: 16 BRPM | BODY MASS INDEX: 36.54 KG/M2

## 2019-08-22 DIAGNOSIS — I10 ESSENTIAL HYPERTENSION: ICD-10-CM

## 2019-08-22 DIAGNOSIS — Z79.4 TYPE 2 DIABETES MELLITUS WITH COMPLICATION, WITH LONG-TERM CURRENT USE OF INSULIN (HCC): ICD-10-CM

## 2019-08-22 DIAGNOSIS — M54.50 CHRONIC BILATERAL LOW BACK PAIN WITHOUT SCIATICA: Primary | ICD-10-CM

## 2019-08-22 DIAGNOSIS — G89.29 CHRONIC BILATERAL LOW BACK PAIN WITHOUT SCIATICA: Primary | ICD-10-CM

## 2019-08-22 DIAGNOSIS — R91.8 ABNORMAL CT SCAN OF LUNG: ICD-10-CM

## 2019-08-22 DIAGNOSIS — E11.8 TYPE 2 DIABETES MELLITUS WITH COMPLICATION, WITH LONG-TERM CURRENT USE OF INSULIN (HCC): ICD-10-CM

## 2019-08-22 PROBLEM — M75.21 BICEPS TENDINITIS, RIGHT: Status: RESOLVED | Noted: 2018-05-15 | Resolved: 2019-08-22

## 2019-08-22 LAB — HBA1C MFR BLD: 7.3 %

## 2019-08-22 PROCEDURE — 3017F COLORECTAL CA SCREEN DOC REV: CPT | Performed by: FAMILY MEDICINE

## 2019-08-22 PROCEDURE — 83036 HEMOGLOBIN GLYCOSYLATED A1C: CPT | Performed by: FAMILY MEDICINE

## 2019-08-22 PROCEDURE — 2022F DILAT RTA XM EVC RTNOPTHY: CPT | Performed by: FAMILY MEDICINE

## 2019-08-22 PROCEDURE — 1036F TOBACCO NON-USER: CPT | Performed by: FAMILY MEDICINE

## 2019-08-22 PROCEDURE — G8599 NO ASA/ANTIPLAT THER USE RNG: HCPCS | Performed by: FAMILY MEDICINE

## 2019-08-22 PROCEDURE — G8417 CALC BMI ABV UP PARAM F/U: HCPCS | Performed by: FAMILY MEDICINE

## 2019-08-22 PROCEDURE — 3045F PR MOST RECENT HEMOGLOBIN A1C LEVEL 7.0-9.0%: CPT | Performed by: FAMILY MEDICINE

## 2019-08-22 PROCEDURE — G8427 DOCREV CUR MEDS BY ELIG CLIN: HCPCS | Performed by: FAMILY MEDICINE

## 2019-08-22 PROCEDURE — 99214 OFFICE O/P EST MOD 30 MIN: CPT | Performed by: FAMILY MEDICINE

## 2019-08-22 RX ORDER — FUROSEMIDE 20 MG/1
TABLET ORAL
Qty: 30 TABLET | Refills: 2 | Status: SHIPPED | OUTPATIENT
Start: 2019-08-22 | End: 2019-11-20 | Stop reason: SDUPTHER

## 2019-08-22 ASSESSMENT — ENCOUNTER SYMPTOMS
COUGH: 0
SHORTNESS OF BREATH: 0
BACK PAIN: 1

## 2019-08-22 NOTE — PROGRESS NOTES
Physical activity:     Days per week: None     Minutes per session: None    Stress: None   Relationships    Social connections:     Talks on phone: None     Gets together: None     Attends Nondenominational service: None     Active member of club or organization: None     Attends meetings of clubs or organizations: None     Relationship status: None    Intimate partner violence:     Fear of current or ex partner: None     Emotionally abused: None     Physically abused: None     Forced sexual activity: None   Other Topics Concern    None   Social History Narrative    Originally from [address] [place] [address] is seen here    10 grandchildren     Allergies   Allergen Reactions    Codeine Nausea And Vomiting and Rash    Vicodin [Hydrocodone-Acetaminophen] Nausea And Vomiting    Oxycontin [Oxycodone Hcl] Itching    Tramadol Itching and Swelling       LABS:  Lab Results   Component Value Date    GLUCOSE 169 (H) 07/24/2019     Lab Results   Component Value Date     07/24/2019    K 4.0 07/24/2019    CREATININE 0.8 07/24/2019     Cholesterol, Total   Date Value Ref Range Status   12/07/2018 166 0 - 199 mg/dL Final     LDL Calculated   Date Value Ref Range Status   12/07/2018 90 <100 mg/dL Final     HDL   Date Value Ref Range Status   12/07/2018 57 40 - 60 mg/dL Final   01/26/2012 56 40 - 60 mg/dl Final     Triglycerides   Date Value Ref Range Status   12/07/2018 94 0 - 150 mg/dL Final     Lab Results   Component Value Date    ALT 25 07/24/2019    AST 32 07/24/2019    ALKPHOS 73 07/24/2019    BILITOT <0.2 07/24/2019     Lab Results   Component Value Date    WBC 6.7 07/24/2019    HGB 12.0 07/24/2019    HCT 37.5 07/24/2019    MCV 85.3 07/24/2019     07/24/2019     TSH (uIU/mL)   Date Value   12/11/2017 3.97     Lab Results   Component Value Date    LABA1C 7.9 05/15/2019     No results found for: PSA, PSADIA      PHYSICAL EXAM  /80 (Site: Left Upper Arm, Position: Sitting, Cuff Size: Large Adult)   Pulse

## 2019-08-27 ENCOUNTER — TELEPHONE (OUTPATIENT)
Dept: ORTHOPEDIC SURGERY | Age: 64
End: 2019-08-27

## 2019-08-29 ENCOUNTER — HOSPITAL ENCOUNTER (OUTPATIENT)
Dept: CT IMAGING | Age: 64
Discharge: HOME | End: 2019-08-29
Payer: COMMERCIAL

## 2019-08-29 ENCOUNTER — HOSPITAL ENCOUNTER (OUTPATIENT)
Dept: GENERAL RADIOLOGY | Age: 64
Discharge: HOME | End: 2019-08-29
Payer: COMMERCIAL

## 2019-08-29 DIAGNOSIS — M47.816 LUMBAR SPONDYLOSIS: ICD-10-CM

## 2019-08-29 DIAGNOSIS — R91.8 ABNORMAL CT SCAN OF LUNG: ICD-10-CM

## 2019-08-29 PROCEDURE — 71250 CT THORAX DX C-: CPT

## 2019-08-29 PROCEDURE — 72100 X-RAY EXAM L-S SPINE 2/3 VWS: CPT

## 2019-08-29 PROCEDURE — 72110 X-RAY EXAM L-2 SPINE 4/>VWS: CPT

## 2019-09-04 ENCOUNTER — HOSPITAL ENCOUNTER (EMERGENCY)
Age: 64
Discharge: HOME | End: 2019-09-04
Attending: EMERGENCY MEDICINE
Payer: MEDICARE

## 2019-09-04 ENCOUNTER — APPOINTMENT (OUTPATIENT)
Dept: CT IMAGING | Age: 64
End: 2019-09-04
Payer: MEDICARE

## 2019-09-04 VITALS
OXYGEN SATURATION: 98 % | DIASTOLIC BLOOD PRESSURE: 83 MMHG | HEART RATE: 76 BPM | HEIGHT: 64 IN | WEIGHT: 210.98 LBS | RESPIRATION RATE: 14 BRPM | SYSTOLIC BLOOD PRESSURE: 128 MMHG | BODY MASS INDEX: 36.02 KG/M2 | TEMPERATURE: 98.3 F

## 2019-09-04 DIAGNOSIS — M54.50 ACUTE EXACERBATION OF CHRONIC LOW BACK PAIN: Primary | ICD-10-CM

## 2019-09-04 DIAGNOSIS — M54.32 SCIATICA OF LEFT SIDE: ICD-10-CM

## 2019-09-04 DIAGNOSIS — G89.29 ACUTE EXACERBATION OF CHRONIC LOW BACK PAIN: Primary | ICD-10-CM

## 2019-09-04 PROCEDURE — 99283 EMERGENCY DEPT VISIT LOW MDM: CPT

## 2019-09-04 PROCEDURE — 6360000002 HC RX W HCPCS: Performed by: EMERGENCY MEDICINE

## 2019-09-04 PROCEDURE — 96372 THER/PROPH/DIAG INJ SC/IM: CPT

## 2019-09-04 PROCEDURE — 72131 CT LUMBAR SPINE W/O DYE: CPT

## 2019-09-04 PROCEDURE — 6370000000 HC RX 637 (ALT 250 FOR IP): Performed by: EMERGENCY MEDICINE

## 2019-09-04 RX ORDER — KETOROLAC TROMETHAMINE 30 MG/ML
30 INJECTION, SOLUTION INTRAMUSCULAR; INTRAVENOUS ONCE
Status: COMPLETED | OUTPATIENT
Start: 2019-09-04 | End: 2019-09-04

## 2019-09-04 RX ORDER — METHOCARBAMOL 750 MG/1
750 TABLET, FILM COATED ORAL 4 TIMES DAILY
Qty: 40 TABLET | Refills: 0 | Status: SHIPPED | OUTPATIENT
Start: 2019-09-04 | End: 2019-09-14

## 2019-09-04 RX ORDER — MORPHINE SULFATE 10 MG/ML
4 INJECTION, SOLUTION INTRAMUSCULAR; INTRAVENOUS ONCE
Status: COMPLETED | OUTPATIENT
Start: 2019-09-04 | End: 2019-09-04

## 2019-09-04 RX ORDER — ONDANSETRON 4 MG/1
4 TABLET, ORALLY DISINTEGRATING ORAL ONCE
Status: COMPLETED | OUTPATIENT
Start: 2019-09-04 | End: 2019-09-04

## 2019-09-04 RX ADMIN — MORPHINE SULFATE 4 MG: 10 INJECTION INTRAVENOUS at 13:31

## 2019-09-04 RX ADMIN — KETOROLAC TROMETHAMINE 30 MG: 30 INJECTION, SOLUTION INTRAMUSCULAR at 13:32

## 2019-09-04 RX ADMIN — ONDANSETRON 4 MG: 4 TABLET, ORALLY DISINTEGRATING ORAL at 13:31

## 2019-09-04 ASSESSMENT — PAIN DESCRIPTION - PAIN TYPE: TYPE: CHRONIC PAIN

## 2019-09-04 ASSESSMENT — PAIN SCALES - GENERAL
PAINLEVEL_OUTOF10: 6
PAINLEVEL_OUTOF10: 7
PAINLEVEL_OUTOF10: 9
PAINLEVEL_OUTOF10: 9

## 2019-09-04 ASSESSMENT — PAIN DESCRIPTION - FREQUENCY: FREQUENCY: CONTINUOUS

## 2019-09-04 ASSESSMENT — PAIN DESCRIPTION - DESCRIPTORS: DESCRIPTORS: SHOOTING;THROBBING;STABBING

## 2019-09-04 ASSESSMENT — PAIN DESCRIPTION - LOCATION: LOCATION: BACK

## 2019-09-04 ASSESSMENT — PAIN DESCRIPTION - PROGRESSION: CLINICAL_PROGRESSION: GRADUALLY WORSENING

## 2019-09-04 NOTE — ED NOTES
Med and repositoined  Awake alert  She states will have someone come pick her up she is aware she cannot drive     Antonette Wong, RN  09/04/19 2600

## 2019-09-04 NOTE — ED PROVIDER NOTES
[facility] ENCOUNTER      Pt Name: Mick Wood  MRN: [id number]  Modestagfanderson 1955  Date of evaluation: 9/4/2019  Provider: Thuy Beauchamp, [facility]       Chief Complaint   Patient presents with    Back Pain     [id number], pt exacerbated pain this morning while getting dressed at 97 [id number], pain to lower back 8/10. Awake alert  pain goes down both legs  She drove herself here Tear[place] due to pain         HISTORY OF PRESENT ILLNESS   (Location/Symptom, Timing/Onset, Context/Setting, Quality, Duration, Modifying Factors, Severity)  Note limiting factors. Herbert Padron is a 58 y.o. female who presents to the emergency department with complaint of low back pain. The patient has a long-standing history of chronic low back pain that occurs on a daily basis. 2 years ago she was getting injections in her back. She has recently been going to physical therapy. Today she was getting dressed after taking a bath and when she stood up she felt pain in her low back. The pain radiates to the posterior aspect of the left leg. She does have some slight tingling in the leg but denies any weakness. She denies any bowel or bladder difficulties. She denies any saddle anesthesia. No incontinence. She last urinated at 10:30 AM today. Her last bowel movement was at 8:30 AM today. She denies any weakness. She is able to walk. She was able to drive herself to the hospital.    She takes Naprosyn for her pain which usually helps. She does not currently taking muscle relaxants. She denies any fever or chills. She denies any recent trauma or injury. She does take aspirin but denies any use of any other anticoagulants. She denies any history of drug use. HPI    Nursing Notes were reviewed. REVIEW OF SYSTEMS    (2-9 systems for level 4, 10 or more for level 5)       Constitutional: Negative for fever or chills.    HENT: Negative for rhinorrhea and sore Base) MCG/ACT inhaler Inhale 2 puffs into the lungs every 6 hours as needed for Wheezing or Shortness of Breath May substitute ProAir MDI, Disp-1 Inhaler, R-2Print      valsartan (DIOVAN) 80 MG tablet Take 1 tablet by mouth daily, Disp-90 tablet, R-1Normal      atorvastatin (LIPITOR) 20 MG tablet Take 1 tablet by mouth daily, Disp-90 tablet, [id number]      TRULICITY 1.5 OD/2.3PS SOPN INJECT 1.5 MG UNDER THE SKIN ONCE WEEKLY, Disp-4 pen, R-4Normal      omeprazole (PRILOSEC) 40 MG delayed release capsule TAKE ONE CAPSULE BY MOUTH TWICE A DAY BEFORE MEALS, Disp-180 capsule, R-1Normal      metoprolol succinate (TOPROL XL) 25 MG extended release tablet TAKE ONE HALF TABLET BY MOUTH DAILY FOR BLOOD PRESSURE AND HEART RATE, Disp-90 tablet, R-3Normal      Insulin Pen Needle ([employer] PEN NEEDLES) 31G X 6 MM MISC DAILY Starting Mon 1/28/2019, Disp-100 each, R-3, Normal      insulin glargine (BASAGLAR KWIKPEN) 100 UNIT/ML injection pen Inject 12 Units into the skin nightly, Disp-5 pen, R-3Normal      blood glucose test strips (FREESTYLE LITE) strip Disp-100 each, R-4, NormalTEST BLOOD SUGAR TWO TIMES A DAY Diag: E11.9      Blood Glucose Monitoring Suppl (TRUE METRIX AIR GLUCOSE METER) w/Device KIT 1 each by Does not apply route daily, Disp-1 kit, R-0Normal      DULoxetine (CYMBALTA) 30 MG extended release capsule Take 1 capsule by mouth daily for one week, followed by 2 capsules daily, Disp-53 capsule, R-5Normal      Tens Unit MISC Starting Thu 10/4/2018, Disp-1 each, R-0, Print      Misc. Devices (WALKER) MISC Disp-1 each, R-0, PrintOne four wheel walker      FREESTYLE LANCETS MISC Disp-100 each, R-3, Normal      FANAPT 1 MG TABS tablet Take 1 tablet by mouth nightly, MEGAN      aspirin 81 MG EC tablet Take 1 tablet by mouth daily. , Disp-60 tablet, R-3       !! - Potential duplicate medications found. Please discuss with provider. ALLERGIES     Codeine; Vicodin [hydrocodone-acetaminophen];  Oxycontin [oxycodone hcl]; and program.  Efforts were made to edit the dictations but occasionally words are mis-transcribed. )    0729 Landon Marie DO (electronically signed)  Attending Emergency Physician          Ewa Ham DO  09/04/19 0306

## 2019-09-10 ENCOUNTER — TELEPHONE (OUTPATIENT)
Dept: FAMILY MEDICINE CLINIC | Age: 64
End: 2019-09-10

## 2019-09-11 NOTE — TELEPHONE ENCOUNTER
Spoke with Dr. Rosalia Lanes. Okay to schedule pt for next available time slot to discuss paperwork and if he can fill it out. Please call to schedule pt.     Thank You

## 2019-09-18 ENCOUNTER — OFFICE VISIT (OUTPATIENT)
Dept: FAMILY MEDICINE CLINIC | Age: 64
End: 2019-09-18
Payer: MEDICARE

## 2019-09-18 VITALS
OXYGEN SATURATION: 98 % | HEIGHT: 64 IN | BODY MASS INDEX: 36.06 KG/M2 | WEIGHT: 211.2 LBS | TEMPERATURE: 98.1 F | SYSTOLIC BLOOD PRESSURE: 126 MMHG | HEART RATE: 82 BPM | DIASTOLIC BLOOD PRESSURE: 88 MMHG | RESPIRATION RATE: 14 BRPM

## 2019-09-18 DIAGNOSIS — M54.50 CHRONIC LOW BACK PAIN WITHOUT SCIATICA, UNSPECIFIED BACK PAIN LATERALITY: ICD-10-CM

## 2019-09-18 DIAGNOSIS — M17.0 PRIMARY OSTEOARTHRITIS OF BOTH KNEES: Primary | ICD-10-CM

## 2019-09-18 DIAGNOSIS — G89.29 CHRONIC LOW BACK PAIN WITHOUT SCIATICA, UNSPECIFIED BACK PAIN LATERALITY: ICD-10-CM

## 2019-09-18 DIAGNOSIS — F39 MOOD DISORDER (HCC): ICD-10-CM

## 2019-09-18 PROCEDURE — 99213 OFFICE O/P EST LOW 20 MIN: CPT | Performed by: FAMILY MEDICINE

## 2019-09-18 PROCEDURE — G8427 DOCREV CUR MEDS BY ELIG CLIN: HCPCS | Performed by: FAMILY MEDICINE

## 2019-09-18 PROCEDURE — G8417 CALC BMI ABV UP PARAM F/U: HCPCS | Performed by: FAMILY MEDICINE

## 2019-09-18 PROCEDURE — G8599 NO ASA/ANTIPLAT THER USE RNG: HCPCS | Performed by: FAMILY MEDICINE

## 2019-09-18 PROCEDURE — 3017F COLORECTAL CA SCREEN DOC REV: CPT | Performed by: FAMILY MEDICINE

## 2019-09-18 PROCEDURE — 1036F TOBACCO NON-USER: CPT | Performed by: FAMILY MEDICINE

## 2019-09-18 RX ORDER — HYDROCODONE BITARTRATE AND ACETAMINOPHEN 5; 325 MG/1; MG/1
1 TABLET ORAL EVERY 12 HOURS PRN
Status: ON HOLD | COMMUNITY
End: 2019-10-18 | Stop reason: ALTCHOICE

## 2019-09-18 ASSESSMENT — ENCOUNTER SYMPTOMS
WHEEZING: 0
SHORTNESS OF BREATH: 0

## 2019-10-17 ENCOUNTER — APPOINTMENT (OUTPATIENT)
Dept: CT IMAGING | Age: 64
End: 2019-10-17
Payer: MEDICARE

## 2019-10-17 ENCOUNTER — APPOINTMENT (OUTPATIENT)
Dept: GENERAL RADIOLOGY | Age: 64
End: 2019-10-17
Payer: MEDICARE

## 2019-10-17 ENCOUNTER — TELEPHONE (OUTPATIENT)
Dept: FAMILY MEDICINE CLINIC | Age: 64
End: 2019-10-17

## 2019-10-17 ENCOUNTER — HOSPITAL ENCOUNTER (OUTPATIENT)
Age: 64
Setting detail: OBSERVATION
Discharge: HOME | End: 2019-10-19
Attending: EMERGENCY MEDICINE | Admitting: INTERNAL MEDICINE
Payer: MEDICARE

## 2019-10-17 DIAGNOSIS — R53.1 RIGHT SIDED WEAKNESS: Primary | ICD-10-CM

## 2019-10-17 DIAGNOSIS — R47.1 DYSARTHRIA: ICD-10-CM

## 2019-10-17 LAB
A/G RATIO: 2 (ref 1.1–2.2)
ALBUMIN SERPL-MCNC: 4.3 G/DL (ref 3.4–5)
ALP BLD-CCNC: 74 U/L (ref 40–129)
ALT SERPL-CCNC: 20 U/L (ref 10–40)
ANION GAP SERPL CALCULATED.3IONS-SCNC: 11 MMOL/L (ref 3–16)
AST SERPL-CCNC: 23 U/L (ref 15–37)
BASOPHILS ABSOLUTE: 0 K/UL (ref 0–0.2)
BASOPHILS RELATIVE PERCENT: 0.3 %
BILIRUB SERPL-MCNC: <0.2 MG/DL (ref 0–1)
BUN BLDV-MCNC: 18 MG/DL (ref 7–20)
CALCIUM SERPL-MCNC: 9.5 MG/DL (ref 8.3–10.6)
CHLORIDE BLD-SCNC: 102 MMOL/L (ref 99–110)
CO2: 25 MMOL/L (ref 21–32)
CREAT SERPL-MCNC: 0.8 MG/DL (ref 0.6–1.2)
EOSINOPHILS ABSOLUTE: 0.5 K/UL (ref 0–0.6)
EOSINOPHILS RELATIVE PERCENT: 6.1 %
GFR AFRICAN AMERICAN: >60
GFR NON-AFRICAN AMERICAN: >60
GLOBULIN: 2.2 G/DL
GLUCOSE BLD-MCNC: 141 MG/DL (ref 70–99)
GLUCOSE BLD-MCNC: 143 MG/DL (ref 70–99)
HCT VFR BLD CALC: 34.3 % (ref 36–48)
HEMOGLOBIN: 11.2 G/DL (ref 12–16)
LYMPHOCYTES ABSOLUTE: 3.4 K/UL (ref 1–5.1)
LYMPHOCYTES RELATIVE PERCENT: 40.8 %
MCH RBC QN AUTO: 27.7 PG (ref 26–34)
MCHC RBC AUTO-ENTMCNC: 32.6 G/DL (ref 31–36)
MCV RBC AUTO: 84.9 FL (ref 80–100)
MONOCYTES ABSOLUTE: 0.6 K/UL (ref 0–1.3)
MONOCYTES RELATIVE PERCENT: 7.8 %
NEUTROPHILS ABSOLUTE: 3.7 K/UL (ref 1.7–7.7)
NEUTROPHILS RELATIVE PERCENT: 45 %
PDW BLD-RTO: 13.3 % (ref 12.4–15.4)
PERFORMED ON: ABNORMAL
PLATELET # BLD: 253 K/UL (ref 135–450)
PMV BLD AUTO: 7.7 FL (ref 5–10.5)
POTASSIUM REFLEX MAGNESIUM: 3.9 MMOL/L (ref 3.5–5.1)
RBC # BLD: 4.04 M/UL (ref 4–5.2)
SODIUM BLD-SCNC: 138 MMOL/L (ref 136–145)
TOTAL PROTEIN: 6.5 G/DL (ref 6.4–8.2)
WBC # BLD: 8.2 K/UL (ref 4–11)

## 2019-10-17 PROCEDURE — 84484 ASSAY OF TROPONIN QUANT: CPT

## 2019-10-17 PROCEDURE — 36415 COLL VENOUS BLD VENIPUNCTURE: CPT

## 2019-10-17 PROCEDURE — 85025 COMPLETE CBC W/AUTO DIFF WBC: CPT

## 2019-10-17 PROCEDURE — 83690 ASSAY OF LIPASE: CPT

## 2019-10-17 PROCEDURE — 70450 CT HEAD/BRAIN W/O DYE: CPT

## 2019-10-17 PROCEDURE — 93005 ELECTROCARDIOGRAM TRACING: CPT | Performed by: EMERGENCY MEDICINE

## 2019-10-17 PROCEDURE — 71046 X-RAY EXAM CHEST 2 VIEWS: CPT

## 2019-10-17 PROCEDURE — 85730 THROMBOPLASTIN TIME PARTIAL: CPT

## 2019-10-17 PROCEDURE — 99285 EMERGENCY DEPT VISIT HI MDM: CPT

## 2019-10-17 PROCEDURE — 70496 CT ANGIOGRAPHY HEAD: CPT

## 2019-10-17 PROCEDURE — 80053 COMPREHEN METABOLIC PANEL: CPT

## 2019-10-17 PROCEDURE — 85610 PROTHROMBIN TIME: CPT

## 2019-10-17 PROCEDURE — 6360000004 HC RX CONTRAST MEDICATION: Performed by: EMERGENCY MEDICINE

## 2019-10-17 RX ORDER — GLUCOSAMINE HCL/CHONDROITIN SU 500-400 MG
CAPSULE ORAL
Qty: 100 STRIP | Refills: 5 | Status: SHIPPED | OUTPATIENT
Start: 2019-10-17 | End: 2021-01-18

## 2019-10-17 RX ORDER — ASPIRIN 325 MG
325 TABLET ORAL ONCE
Status: COMPLETED | OUTPATIENT
Start: 2019-10-17 | End: 2019-10-18

## 2019-10-17 RX ADMIN — IOPAMIDOL 75 ML: 755 INJECTION, SOLUTION INTRAVENOUS at 23:47

## 2019-10-18 ENCOUNTER — APPOINTMENT (OUTPATIENT)
Dept: MRI IMAGING | Age: 64
End: 2019-10-18
Payer: MEDICARE

## 2019-10-18 LAB
ANION GAP SERPL CALCULATED.3IONS-SCNC: 13 MMOL/L (ref 3–16)
APTT: 27.3 SEC (ref 26–36)
BILIRUBIN URINE: NEGATIVE
BLOOD, URINE: NEGATIVE
BUN BLDV-MCNC: 14 MG/DL (ref 7–20)
CALCIUM SERPL-MCNC: 9.9 MG/DL (ref 8.3–10.6)
CHLORIDE BLD-SCNC: 103 MMOL/L (ref 99–110)
CLARITY: CLEAR
CO2: 25 MMOL/L (ref 21–32)
COLOR: YELLOW
CREAT SERPL-MCNC: 0.8 MG/DL (ref 0.6–1.2)
EKG ATRIAL RATE: 63 BPM
EKG ATRIAL RATE: 71 BPM
EKG DIAGNOSIS: NORMAL
EKG DIAGNOSIS: NORMAL
EKG P AXIS: 118 DEGREES
EKG P AXIS: 44 DEGREES
EKG P-R INTERVAL: 176 MS
EKG P-R INTERVAL: 206 MS
EKG Q-T INTERVAL: 384 MS
EKG Q-T INTERVAL: 398 MS
EKG QRS DURATION: 72 MS
EKG QRS DURATION: 74 MS
EKG QTC CALCULATION (BAZETT): 407 MS
EKG QTC CALCULATION (BAZETT): 417 MS
EKG R AXIS: 19 DEGREES
EKG R AXIS: 2 DEGREES
EKG T AXIS: 14 DEGREES
EKG T AXIS: 29 DEGREES
EKG VENTRICULAR RATE: 63 BPM
EKG VENTRICULAR RATE: 71 BPM
GFR AFRICAN AMERICAN: >60
GFR NON-AFRICAN AMERICAN: >60
GLUCOSE BLD-MCNC: 104 MG/DL (ref 70–99)
GLUCOSE BLD-MCNC: 120 MG/DL (ref 70–99)
GLUCOSE BLD-MCNC: 120 MG/DL (ref 70–99)
GLUCOSE BLD-MCNC: 122 MG/DL (ref 70–99)
GLUCOSE BLD-MCNC: 175 MG/DL (ref 70–99)
GLUCOSE BLD-MCNC: 87 MG/DL (ref 70–99)
GLUCOSE URINE: NEGATIVE MG/DL
INR BLD: 0.94 (ref 0.86–1.14)
KETONES, URINE: NEGATIVE MG/DL
LEUKOCYTE ESTERASE, URINE: NEGATIVE
LIPASE: 59 U/L (ref 13–60)
MICROSCOPIC EXAMINATION: NORMAL
NITRITE, URINE: NEGATIVE
OCCULT BLOOD DIAGNOSTIC: NORMAL
PERFORMED ON: ABNORMAL
PERFORMED ON: NORMAL
PH UA: 7.5 (ref 5–8)
POTASSIUM REFLEX MAGNESIUM: 4.2 MMOL/L (ref 3.5–5.1)
PROTEIN UA: NEGATIVE MG/DL
PROTHROMBIN TIME: 10.7 SEC (ref 9.8–13)
SODIUM BLD-SCNC: 141 MMOL/L (ref 136–145)
SPECIFIC GRAVITY UA: 1.02 (ref 1–1.03)
TROPONIN: <0.01 NG/ML
URINE REFLEX TO CULTURE: NORMAL
URINE TYPE: NORMAL
UROBILINOGEN, URINE: 0.2 E.U./DL

## 2019-10-18 PROCEDURE — 93005 ELECTROCARDIOGRAM TRACING: CPT | Performed by: INTERNAL MEDICINE

## 2019-10-18 PROCEDURE — 6370000000 HC RX 637 (ALT 250 FOR IP): Performed by: INTERNAL MEDICINE

## 2019-10-18 PROCEDURE — G0328 FECAL BLOOD SCRN IMMUNOASSAY: HCPCS

## 2019-10-18 PROCEDURE — 93010 ELECTROCARDIOGRAM REPORT: CPT | Performed by: INTERNAL MEDICINE

## 2019-10-18 PROCEDURE — 70551 MRI BRAIN STEM W/O DYE: CPT

## 2019-10-18 PROCEDURE — 6370000000 HC RX 637 (ALT 250 FOR IP): Performed by: NURSE PRACTITIONER

## 2019-10-18 PROCEDURE — 92526 ORAL FUNCTION THERAPY: CPT

## 2019-10-18 PROCEDURE — 36415 COLL VENOUS BLD VENIPUNCTURE: CPT

## 2019-10-18 PROCEDURE — 97530 THERAPEUTIC ACTIVITIES: CPT

## 2019-10-18 PROCEDURE — G0378 HOSPITAL OBSERVATION PER HR: HCPCS

## 2019-10-18 PROCEDURE — 92610 EVALUATE SWALLOWING FUNCTION: CPT

## 2019-10-18 PROCEDURE — 97535 SELF CARE MNGMENT TRAINING: CPT

## 2019-10-18 PROCEDURE — 92523 SPEECH SOUND LANG COMPREHEN: CPT

## 2019-10-18 PROCEDURE — 80048 BASIC METABOLIC PNL TOTAL CA: CPT

## 2019-10-18 PROCEDURE — 97166 OT EVAL MOD COMPLEX 45 MIN: CPT

## 2019-10-18 PROCEDURE — 96372 THER/PROPH/DIAG INJ SC/IM: CPT

## 2019-10-18 PROCEDURE — 6360000002 HC RX W HCPCS: Performed by: NURSE PRACTITIONER

## 2019-10-18 PROCEDURE — 97162 PT EVAL MOD COMPLEX 30 MIN: CPT

## 2019-10-18 PROCEDURE — 94760 N-INVAS EAR/PLS OXIMETRY 1: CPT

## 2019-10-18 PROCEDURE — 81003 URINALYSIS AUTO W/O SCOPE: CPT

## 2019-10-18 PROCEDURE — 6370000000 HC RX 637 (ALT 250 FOR IP): Performed by: EMERGENCY MEDICINE

## 2019-10-18 PROCEDURE — 2580000003 HC RX 258: Performed by: NURSE PRACTITIONER

## 2019-10-18 RX ORDER — DEXTROSE MONOHYDRATE 25 G/50ML
12.5 INJECTION, SOLUTION INTRAVENOUS PRN
Status: DISCONTINUED | OUTPATIENT
Start: 2019-10-18 | End: 2019-10-19 | Stop reason: HOSPADM

## 2019-10-18 RX ORDER — DEXTROSE MONOHYDRATE 50 MG/ML
100 INJECTION, SOLUTION INTRAVENOUS PRN
Status: DISCONTINUED | OUTPATIENT
Start: 2019-10-18 | End: 2019-10-19 | Stop reason: HOSPADM

## 2019-10-18 RX ORDER — LABETALOL HYDROCHLORIDE 5 MG/ML
10 INJECTION, SOLUTION INTRAVENOUS EVERY 10 MIN PRN
Status: DISCONTINUED | OUTPATIENT
Start: 2019-10-18 | End: 2019-10-19 | Stop reason: HOSPADM

## 2019-10-18 RX ORDER — ALBUTEROL SULFATE 90 UG/1
2 AEROSOL, METERED RESPIRATORY (INHALATION) EVERY 6 HOURS PRN
Status: DISCONTINUED | OUTPATIENT
Start: 2019-10-18 | End: 2019-10-19 | Stop reason: HOSPADM

## 2019-10-18 RX ORDER — GABAPENTIN 300 MG/1
300 CAPSULE ORAL 3 TIMES DAILY
Status: DISCONTINUED | OUTPATIENT
Start: 2019-10-18 | End: 2019-10-19 | Stop reason: HOSPADM

## 2019-10-18 RX ORDER — ASPIRIN 300 MG/1
300 SUPPOSITORY RECTAL DAILY
Status: DISCONTINUED | OUTPATIENT
Start: 2019-10-18 | End: 2019-10-19 | Stop reason: HOSPADM

## 2019-10-18 RX ORDER — METOPROLOL SUCCINATE 25 MG/1
25 TABLET, EXTENDED RELEASE ORAL DAILY
Status: DISCONTINUED | OUTPATIENT
Start: 2019-10-18 | End: 2019-10-19 | Stop reason: HOSPADM

## 2019-10-18 RX ORDER — HYDROCODONE BITARTRATE AND ACETAMINOPHEN 5; 325 MG/1; MG/1
1 TABLET ORAL EVERY 12 HOURS PRN
Status: DISCONTINUED | OUTPATIENT
Start: 2019-10-18 | End: 2019-10-19 | Stop reason: HOSPADM

## 2019-10-18 RX ORDER — INSULIN GLARGINE 100 [IU]/ML
12 INJECTION, SOLUTION SUBCUTANEOUS NIGHTLY
Status: DISCONTINUED | OUTPATIENT
Start: 2019-10-18 | End: 2019-10-19 | Stop reason: HOSPADM

## 2019-10-18 RX ORDER — PANTOPRAZOLE SODIUM 40 MG/1
40 TABLET, DELAYED RELEASE ORAL
Status: DISCONTINUED | OUTPATIENT
Start: 2019-10-18 | End: 2019-10-19 | Stop reason: HOSPADM

## 2019-10-18 RX ORDER — GABAPENTIN 100 MG/1
200 CAPSULE ORAL NIGHTLY
Status: DISCONTINUED | OUTPATIENT
Start: 2019-10-18 | End: 2019-10-18 | Stop reason: SDUPTHER

## 2019-10-18 RX ORDER — ATORVASTATIN CALCIUM 80 MG/1
80 TABLET, FILM COATED ORAL NIGHTLY
Status: DISCONTINUED | OUTPATIENT
Start: 2019-10-18 | End: 2019-10-19 | Stop reason: HOSPADM

## 2019-10-18 RX ORDER — TRAZODONE HYDROCHLORIDE 50 MG/1
50 TABLET ORAL NIGHTLY
Status: DISCONTINUED | OUTPATIENT
Start: 2019-10-18 | End: 2019-10-19 | Stop reason: HOSPADM

## 2019-10-18 RX ORDER — SODIUM CHLORIDE 0.9 % (FLUSH) 0.9 %
10 SYRINGE (ML) INJECTION PRN
Status: DISCONTINUED | OUTPATIENT
Start: 2019-10-18 | End: 2019-10-19 | Stop reason: HOSPADM

## 2019-10-18 RX ORDER — NICOTINE POLACRILEX 4 MG
15 LOZENGE BUCCAL PRN
Status: DISCONTINUED | OUTPATIENT
Start: 2019-10-18 | End: 2019-10-19 | Stop reason: HOSPADM

## 2019-10-18 RX ORDER — DULOXETIN HYDROCHLORIDE 30 MG/1
30 CAPSULE, DELAYED RELEASE ORAL DAILY
Status: DISCONTINUED | OUTPATIENT
Start: 2019-10-18 | End: 2019-10-19 | Stop reason: HOSPADM

## 2019-10-18 RX ORDER — SODIUM CHLORIDE 0.9 % (FLUSH) 0.9 %
10 SYRINGE (ML) INJECTION EVERY 12 HOURS SCHEDULED
Status: DISCONTINUED | OUTPATIENT
Start: 2019-10-18 | End: 2019-10-19 | Stop reason: HOSPADM

## 2019-10-18 RX ORDER — POLYMYXIN B SULFATE AND TRIMETHOPRIM 1; 10000 MG/ML; [USP'U]/ML
1 SOLUTION OPHTHALMIC EVERY 4 HOURS
Status: DISCONTINUED | OUTPATIENT
Start: 2019-10-18 | End: 2019-10-18 | Stop reason: ALTCHOICE

## 2019-10-18 RX ORDER — ASPIRIN 81 MG/1
81 TABLET ORAL DAILY
Status: DISCONTINUED | OUTPATIENT
Start: 2019-10-18 | End: 2019-10-19 | Stop reason: HOSPADM

## 2019-10-18 RX ORDER — VALSARTAN 80 MG/1
80 TABLET ORAL DAILY
Status: DISCONTINUED | OUTPATIENT
Start: 2019-10-18 | End: 2019-10-19 | Stop reason: HOSPADM

## 2019-10-18 RX ORDER — ONDANSETRON 2 MG/ML
4 INJECTION INTRAMUSCULAR; INTRAVENOUS EVERY 6 HOURS PRN
Status: DISCONTINUED | OUTPATIENT
Start: 2019-10-18 | End: 2019-10-19 | Stop reason: HOSPADM

## 2019-10-18 RX ADMIN — ENOXAPARIN SODIUM 40 MG: 40 INJECTION SUBCUTANEOUS at 08:03

## 2019-10-18 RX ADMIN — INSULIN LISPRO 1 UNITS: 100 INJECTION, SOLUTION INTRAVENOUS; SUBCUTANEOUS at 21:07

## 2019-10-18 RX ADMIN — Medication 10 ML: at 19:47

## 2019-10-18 RX ADMIN — METOPROLOL SUCCINATE 25 MG: 25 TABLET, EXTENDED RELEASE ORAL at 08:03

## 2019-10-18 RX ADMIN — ASPIRIN 81 MG: 81 TABLET, COATED ORAL at 08:03

## 2019-10-18 RX ADMIN — INSULIN GLARGINE 12 UNITS: 100 INJECTION, SOLUTION SUBCUTANEOUS at 19:46

## 2019-10-18 RX ADMIN — TRAZODONE HYDROCHLORIDE 50 MG: 50 TABLET ORAL at 19:47

## 2019-10-18 RX ADMIN — GABAPENTIN 300 MG: 300 CAPSULE ORAL at 19:47

## 2019-10-18 RX ADMIN — HYDROCODONE BITARTRATE AND ACETAMINOPHEN 1 TABLET: 5; 325 TABLET ORAL at 12:28

## 2019-10-18 RX ADMIN — VALSARTAN 80 MG: 80 TABLET, FILM COATED ORAL at 09:27

## 2019-10-18 RX ADMIN — ASPIRIN 325 MG ORAL TABLET 325 MG: 325 PILL ORAL at 00:52

## 2019-10-18 RX ADMIN — PANTOPRAZOLE SODIUM 40 MG: 40 TABLET, DELAYED RELEASE ORAL at 06:27

## 2019-10-18 RX ADMIN — GABAPENTIN 300 MG: 300 CAPSULE ORAL at 08:03

## 2019-10-18 RX ADMIN — DULOXETINE HYDROCHLORIDE 30 MG: 30 CAPSULE, DELAYED RELEASE ORAL at 08:03

## 2019-10-18 RX ADMIN — GABAPENTIN 300 MG: 300 CAPSULE ORAL at 14:28

## 2019-10-18 RX ADMIN — Medication 10 ML: at 08:03

## 2019-10-18 RX ADMIN — ATORVASTATIN CALCIUM 80 MG: 80 TABLET, FILM COATED ORAL at 19:47

## 2019-10-18 ASSESSMENT — PAIN DESCRIPTION - PAIN TYPE
TYPE: ACUTE PAIN
TYPE: ACUTE PAIN

## 2019-10-18 ASSESSMENT — PAIN - FUNCTIONAL ASSESSMENT
PAIN_FUNCTIONAL_ASSESSMENT: ACTIVITIES ARE NOT PREVENTED
PAIN_FUNCTIONAL_ASSESSMENT: PREVENTS OR INTERFERES SOME ACTIVE ACTIVITIES AND ADLS

## 2019-10-18 ASSESSMENT — ENCOUNTER SYMPTOMS
TROUBLE SWALLOWING: 0
COLOR CHANGE: 0
ABDOMINAL PAIN: 0
SHORTNESS OF BREATH: 0
PHOTOPHOBIA: 0
VOMITING: 1
COUGH: 0
NAUSEA: 0

## 2019-10-18 ASSESSMENT — PAIN DESCRIPTION - ONSET
ONSET: SUDDEN
ONSET: ON-GOING

## 2019-10-18 ASSESSMENT — PAIN DESCRIPTION - ORIENTATION
ORIENTATION: LOWER;MID
ORIENTATION: LEFT;RIGHT;MID

## 2019-10-18 ASSESSMENT — PAIN SCALES - GENERAL
PAINLEVEL_OUTOF10: 7
PAINLEVEL_OUTOF10: 6
PAINLEVEL_OUTOF10: 0

## 2019-10-18 ASSESSMENT — PAIN DESCRIPTION - FREQUENCY
FREQUENCY: CONTINUOUS
FREQUENCY: INTERMITTENT

## 2019-10-18 ASSESSMENT — PAIN DESCRIPTION - DESCRIPTORS
DESCRIPTORS: ACHING;DISCOMFORT
DESCRIPTORS: ACHING

## 2019-10-18 ASSESSMENT — PAIN DESCRIPTION - LOCATION
LOCATION: CHEST;BACK
LOCATION: CHEST

## 2019-10-18 ASSESSMENT — PAIN DESCRIPTION - DIRECTION: RADIATING_TOWARDS: TO THE BACK

## 2019-10-19 VITALS
TEMPERATURE: 98.1 F | HEIGHT: 63 IN | OXYGEN SATURATION: 95 % | WEIGHT: 211.2 LBS | HEART RATE: 80 BPM | SYSTOLIC BLOOD PRESSURE: 145 MMHG | DIASTOLIC BLOOD PRESSURE: 90 MMHG | BODY MASS INDEX: 37.42 KG/M2 | RESPIRATION RATE: 16 BRPM

## 2019-10-19 LAB
CHOLESTEROL, TOTAL: 144 MG/DL (ref 0–199)
ESTIMATED AVERAGE GLUCOSE: 171.4 MG/DL
GLUCOSE BLD-MCNC: 104 MG/DL (ref 70–99)
GLUCOSE BLD-MCNC: 145 MG/DL (ref 70–99)
HBA1C MFR BLD: 7.6 %
HCT VFR BLD CALC: 38.3 % (ref 36–48)
HDLC SERPL-MCNC: 56 MG/DL (ref 40–60)
HEMOGLOBIN: 12.5 G/DL (ref 12–16)
LDL CHOLESTEROL CALCULATED: 73 MG/DL
LV EF: 58 %
LVEF MODALITY: NORMAL
MCH RBC QN AUTO: 27.7 PG (ref 26–34)
MCHC RBC AUTO-ENTMCNC: 32.6 G/DL (ref 31–36)
MCV RBC AUTO: 85 FL (ref 80–100)
PDW BLD-RTO: 13.3 % (ref 12.4–15.4)
PERFORMED ON: ABNORMAL
PERFORMED ON: ABNORMAL
PLATELET # BLD: 301 K/UL (ref 135–450)
PMV BLD AUTO: 7.8 FL (ref 5–10.5)
RBC # BLD: 4.51 M/UL (ref 4–5.2)
TRIGL SERPL-MCNC: 73 MG/DL (ref 0–150)
VLDLC SERPL CALC-MCNC: 15 MG/DL
WBC # BLD: 6.8 K/UL (ref 4–11)

## 2019-10-19 PROCEDURE — G0378 HOSPITAL OBSERVATION PER HR: HCPCS

## 2019-10-19 PROCEDURE — 6360000002 HC RX W HCPCS: Performed by: NURSE PRACTITIONER

## 2019-10-19 PROCEDURE — 94760 N-INVAS EAR/PLS OXIMETRY 1: CPT

## 2019-10-19 PROCEDURE — 6370000000 HC RX 637 (ALT 250 FOR IP): Performed by: NURSE PRACTITIONER

## 2019-10-19 PROCEDURE — 80061 LIPID PANEL: CPT

## 2019-10-19 PROCEDURE — 2580000003 HC RX 258: Performed by: NURSE PRACTITIONER

## 2019-10-19 PROCEDURE — 96372 THER/PROPH/DIAG INJ SC/IM: CPT

## 2019-10-19 PROCEDURE — 6370000000 HC RX 637 (ALT 250 FOR IP): Performed by: INTERNAL MEDICINE

## 2019-10-19 PROCEDURE — 83036 HEMOGLOBIN GLYCOSYLATED A1C: CPT

## 2019-10-19 PROCEDURE — 93306 TTE W/DOPPLER COMPLETE: CPT

## 2019-10-19 PROCEDURE — 85027 COMPLETE CBC AUTOMATED: CPT

## 2019-10-19 PROCEDURE — 36415 COLL VENOUS BLD VENIPUNCTURE: CPT

## 2019-10-19 RX ORDER — ACETAMINOPHEN, ASPIRIN AND CAFFEINE 250; 250; 65 MG/1; MG/1; MG/1
1 TABLET, FILM COATED ORAL ONCE
Status: COMPLETED | OUTPATIENT
Start: 2019-10-19 | End: 2019-10-19

## 2019-10-19 RX ADMIN — ASPIRIN 81 MG: 81 TABLET, COATED ORAL at 08:52

## 2019-10-19 RX ADMIN — PANTOPRAZOLE SODIUM 40 MG: 40 TABLET, DELAYED RELEASE ORAL at 05:54

## 2019-10-19 RX ADMIN — INSULIN LISPRO 2 UNITS: 100 INJECTION, SOLUTION INTRAVENOUS; SUBCUTANEOUS at 11:31

## 2019-10-19 RX ADMIN — ACETAMINOPHEN, ASPIRIN AND CAFFEINE 1 TABLET: 250; 250; 65 TABLET, FILM COATED ORAL at 13:22

## 2019-10-19 RX ADMIN — ENOXAPARIN SODIUM 40 MG: 40 INJECTION SUBCUTANEOUS at 08:53

## 2019-10-19 RX ADMIN — METOPROLOL SUCCINATE 25 MG: 25 TABLET, EXTENDED RELEASE ORAL at 08:52

## 2019-10-19 RX ADMIN — GABAPENTIN 300 MG: 300 CAPSULE ORAL at 08:52

## 2019-10-19 RX ADMIN — DULOXETINE HYDROCHLORIDE 30 MG: 30 CAPSULE, DELAYED RELEASE ORAL at 08:52

## 2019-10-19 RX ADMIN — Medication 10 ML: at 08:53

## 2019-10-19 RX ADMIN — GABAPENTIN 300 MG: 300 CAPSULE ORAL at 13:22

## 2019-10-19 RX ADMIN — VALSARTAN 80 MG: 80 TABLET, FILM COATED ORAL at 09:24

## 2019-10-19 ASSESSMENT — PAIN DESCRIPTION - LOCATION: LOCATION: HEAD

## 2019-10-19 ASSESSMENT — PAIN DESCRIPTION - PAIN TYPE: TYPE: ACUTE PAIN

## 2019-10-19 ASSESSMENT — PAIN - FUNCTIONAL ASSESSMENT: PAIN_FUNCTIONAL_ASSESSMENT: ACTIVITIES ARE NOT PREVENTED

## 2019-10-19 ASSESSMENT — PAIN DESCRIPTION - DESCRIPTORS: DESCRIPTORS: ACHING

## 2019-10-19 ASSESSMENT — PAIN DESCRIPTION - ORIENTATION: ORIENTATION: RIGHT;LEFT;MID

## 2019-10-19 ASSESSMENT — PAIN SCALES - GENERAL: PAINLEVEL_OUTOF10: 4

## 2019-10-19 ASSESSMENT — PAIN DESCRIPTION - FREQUENCY: FREQUENCY: INTERMITTENT

## 2019-10-28 ENCOUNTER — OFFICE VISIT (OUTPATIENT)
Dept: FAMILY MEDICINE CLINIC | Age: 64
End: 2019-10-28
Payer: MEDICARE

## 2019-10-28 VITALS
OXYGEN SATURATION: 98 % | RESPIRATION RATE: 18 BRPM | TEMPERATURE: 98.2 F | WEIGHT: 208 LBS | HEIGHT: 63 IN | SYSTOLIC BLOOD PRESSURE: 136 MMHG | DIASTOLIC BLOOD PRESSURE: 86 MMHG | HEART RATE: 78 BPM | BODY MASS INDEX: 36.86 KG/M2

## 2019-10-28 DIAGNOSIS — I10 ESSENTIAL HYPERTENSION: ICD-10-CM

## 2019-10-28 DIAGNOSIS — R35.0 URINARY FREQUENCY: ICD-10-CM

## 2019-10-28 DIAGNOSIS — R53.1 RIGHT SIDED WEAKNESS: Primary | ICD-10-CM

## 2019-10-28 PROBLEM — M75.81 ROTATOR CUFF TENDINITIS, RIGHT: Status: RESOLVED | Noted: 2018-05-15 | Resolved: 2019-10-28

## 2019-10-28 PROBLEM — M75.101 TEAR OF RIGHT SUPRASPINATUS TENDON: Status: RESOLVED | Noted: 2018-06-05 | Resolved: 2019-10-28

## 2019-10-28 PROCEDURE — 99214 OFFICE O/P EST MOD 30 MIN: CPT | Performed by: FAMILY MEDICINE

## 2019-10-28 PROCEDURE — G8598 ASA/ANTIPLAT THER USED: HCPCS | Performed by: FAMILY MEDICINE

## 2019-10-28 PROCEDURE — G0008 ADMIN INFLUENZA VIRUS VAC: HCPCS | Performed by: FAMILY MEDICINE

## 2019-10-28 PROCEDURE — G8482 FLU IMMUNIZE ORDER/ADMIN: HCPCS | Performed by: FAMILY MEDICINE

## 2019-10-28 PROCEDURE — 90686 IIV4 VACC NO PRSV 0.5 ML IM: CPT | Performed by: FAMILY MEDICINE

## 2019-10-28 PROCEDURE — 3017F COLORECTAL CA SCREEN DOC REV: CPT | Performed by: FAMILY MEDICINE

## 2019-10-28 PROCEDURE — G8427 DOCREV CUR MEDS BY ELIG CLIN: HCPCS | Performed by: FAMILY MEDICINE

## 2019-10-28 PROCEDURE — 1036F TOBACCO NON-USER: CPT | Performed by: FAMILY MEDICINE

## 2019-10-28 PROCEDURE — G8417 CALC BMI ABV UP PARAM F/U: HCPCS | Performed by: FAMILY MEDICINE

## 2019-10-28 PROCEDURE — 81002 URINALYSIS NONAUTO W/O SCOPE: CPT | Performed by: FAMILY MEDICINE

## 2019-10-28 RX ORDER — GUAIFENESIN 600 MG/1
600 TABLET, EXTENDED RELEASE ORAL 2 TIMES DAILY
Qty: 30 TABLET | Refills: 0 | Status: SHIPPED | OUTPATIENT
Start: 2019-10-28 | End: 2019-11-12

## 2019-10-29 ASSESSMENT — ENCOUNTER SYMPTOMS
COUGH: 0
SHORTNESS OF BREATH: 0

## 2019-11-01 DIAGNOSIS — E11.8 TYPE 2 DIABETES MELLITUS WITH COMPLICATION, WITHOUT LONG-TERM CURRENT USE OF INSULIN (HCC): ICD-10-CM

## 2019-11-01 DIAGNOSIS — R52 DIFFUSE PAIN: ICD-10-CM

## 2019-11-01 RX ORDER — GABAPENTIN 300 MG/1
CAPSULE ORAL
Qty: 270 CAPSULE | Refills: 0 | Status: SHIPPED | OUTPATIENT
Start: 2019-11-01 | End: 2019-12-11 | Stop reason: SDUPTHER

## 2019-11-09 DIAGNOSIS — I10 ESSENTIAL HYPERTENSION: ICD-10-CM

## 2019-11-11 RX ORDER — VALSARTAN 80 MG/1
TABLET ORAL
Qty: 90 TABLET | Refills: 1 | Status: SHIPPED
Start: 2019-11-11 | End: 2020-03-03 | Stop reason: ALTCHOICE

## 2019-11-20 RX ORDER — FUROSEMIDE 20 MG/1
TABLET ORAL
Qty: 30 TABLET | Refills: 5 | Status: SHIPPED | OUTPATIENT
Start: 2019-11-20 | End: 2020-05-18

## 2019-11-21 ENCOUNTER — OFFICE VISIT (OUTPATIENT)
Dept: FAMILY MEDICINE CLINIC | Age: 64
End: 2019-11-21
Payer: MEDICARE

## 2019-11-21 VITALS
RESPIRATION RATE: 18 BRPM | OXYGEN SATURATION: 98 % | SYSTOLIC BLOOD PRESSURE: 116 MMHG | TEMPERATURE: 98.6 F | WEIGHT: 203.4 LBS | HEART RATE: 92 BPM | BODY MASS INDEX: 36.04 KG/M2 | DIASTOLIC BLOOD PRESSURE: 74 MMHG | HEIGHT: 63 IN

## 2019-11-21 DIAGNOSIS — Z78.0 POSTMENOPAUSAL: ICD-10-CM

## 2019-11-21 DIAGNOSIS — Z23 NEED FOR STREPTOCOCCUS PNEUMONIAE VACCINATION: ICD-10-CM

## 2019-11-21 DIAGNOSIS — Z79.4 TYPE 2 DIABETES MELLITUS WITHOUT COMPLICATION, WITH LONG-TERM CURRENT USE OF INSULIN (HCC): Primary | ICD-10-CM

## 2019-11-21 DIAGNOSIS — E11.9 TYPE 2 DIABETES MELLITUS WITHOUT COMPLICATION, WITH LONG-TERM CURRENT USE OF INSULIN (HCC): Primary | ICD-10-CM

## 2019-11-21 DIAGNOSIS — E78.2 MIXED HYPERLIPIDEMIA: ICD-10-CM

## 2019-11-21 DIAGNOSIS — L85.3 DRY SKIN: ICD-10-CM

## 2019-11-21 DIAGNOSIS — I10 ESSENTIAL HYPERTENSION: ICD-10-CM

## 2019-11-21 LAB — HBA1C MFR BLD: 7.4 %

## 2019-11-21 PROCEDURE — 2022F DILAT RTA XM EVC RTNOPTHY: CPT | Performed by: FAMILY MEDICINE

## 2019-11-21 PROCEDURE — 3017F COLORECTAL CA SCREEN DOC REV: CPT | Performed by: FAMILY MEDICINE

## 2019-11-21 PROCEDURE — 1090F PRES/ABSN URINE INCON ASSESS: CPT | Performed by: FAMILY MEDICINE

## 2019-11-21 PROCEDURE — 99214 OFFICE O/P EST MOD 30 MIN: CPT | Performed by: FAMILY MEDICINE

## 2019-11-21 PROCEDURE — G8399 PT W/DXA RESULTS DOCUMENT: HCPCS | Performed by: FAMILY MEDICINE

## 2019-11-21 PROCEDURE — G8427 DOCREV CUR MEDS BY ELIG CLIN: HCPCS | Performed by: FAMILY MEDICINE

## 2019-11-21 PROCEDURE — 1036F TOBACCO NON-USER: CPT | Performed by: FAMILY MEDICINE

## 2019-11-21 PROCEDURE — 83036 HEMOGLOBIN GLYCOSYLATED A1C: CPT | Performed by: FAMILY MEDICINE

## 2019-11-21 PROCEDURE — G0009 ADMIN PNEUMOCOCCAL VACCINE: HCPCS | Performed by: FAMILY MEDICINE

## 2019-11-21 PROCEDURE — G8598 ASA/ANTIPLAT THER USED: HCPCS | Performed by: FAMILY MEDICINE

## 2019-11-21 PROCEDURE — G8482 FLU IMMUNIZE ORDER/ADMIN: HCPCS | Performed by: FAMILY MEDICINE

## 2019-11-21 PROCEDURE — 1123F ACP DISCUSS/DSCN MKR DOCD: CPT | Performed by: FAMILY MEDICINE

## 2019-11-21 PROCEDURE — 90732 PPSV23 VACC 2 YRS+ SUBQ/IM: CPT | Performed by: FAMILY MEDICINE

## 2019-11-21 PROCEDURE — 3051F HG A1C>EQUAL 7.0%<8.0%: CPT | Performed by: FAMILY MEDICINE

## 2019-11-21 PROCEDURE — 4040F PNEUMOC VAC/ADMIN/RCVD: CPT | Performed by: FAMILY MEDICINE

## 2019-11-21 PROCEDURE — G8417 CALC BMI ABV UP PARAM F/U: HCPCS | Performed by: FAMILY MEDICINE

## 2019-11-21 RX ORDER — AMMONIUM LACTATE 12 G/100G
LOTION TOPICAL
Qty: 1 BOTTLE | Refills: 1 | Status: SHIPPED | OUTPATIENT
Start: 2019-11-21

## 2019-11-21 ASSESSMENT — ENCOUNTER SYMPTOMS
COUGH: 0
SHORTNESS OF BREATH: 0
BACK PAIN: 1

## 2019-12-03 ENCOUNTER — OFFICE VISIT (OUTPATIENT)
Dept: FAMILY MEDICINE CLINIC | Age: 64
End: 2019-12-03
Payer: MEDICARE

## 2019-12-03 VITALS
HEIGHT: 63 IN | SYSTOLIC BLOOD PRESSURE: 110 MMHG | HEART RATE: 82 BPM | RESPIRATION RATE: 14 BRPM | DIASTOLIC BLOOD PRESSURE: 68 MMHG | BODY MASS INDEX: 36.25 KG/M2 | WEIGHT: 204.6 LBS | OXYGEN SATURATION: 95 %

## 2019-12-03 DIAGNOSIS — E11.9 TYPE 2 DIABETES MELLITUS WITHOUT COMPLICATION, WITH LONG-TERM CURRENT USE OF INSULIN (HCC): ICD-10-CM

## 2019-12-03 DIAGNOSIS — L03.116 CELLULITIS OF LEFT LOWER EXTREMITY: ICD-10-CM

## 2019-12-03 DIAGNOSIS — Z79.4 TYPE 2 DIABETES MELLITUS WITHOUT COMPLICATION, WITH LONG-TERM CURRENT USE OF INSULIN (HCC): ICD-10-CM

## 2019-12-03 DIAGNOSIS — R06.02 SOB (SHORTNESS OF BREATH) ON EXERTION: ICD-10-CM

## 2019-12-03 DIAGNOSIS — L97.929 ULCER OF LEFT LOWER EXTREMITY, UNSPECIFIED ULCER STAGE (HCC): Primary | ICD-10-CM

## 2019-12-03 PROCEDURE — 1123F ACP DISCUSS/DSCN MKR DOCD: CPT | Performed by: INTERNAL MEDICINE

## 2019-12-03 PROCEDURE — G8427 DOCREV CUR MEDS BY ELIG CLIN: HCPCS | Performed by: INTERNAL MEDICINE

## 2019-12-03 PROCEDURE — G8417 CALC BMI ABV UP PARAM F/U: HCPCS | Performed by: INTERNAL MEDICINE

## 2019-12-03 PROCEDURE — 3051F HG A1C>EQUAL 7.0%<8.0%: CPT | Performed by: INTERNAL MEDICINE

## 2019-12-03 PROCEDURE — 3017F COLORECTAL CA SCREEN DOC REV: CPT | Performed by: INTERNAL MEDICINE

## 2019-12-03 PROCEDURE — G8482 FLU IMMUNIZE ORDER/ADMIN: HCPCS | Performed by: INTERNAL MEDICINE

## 2019-12-03 PROCEDURE — 99214 OFFICE O/P EST MOD 30 MIN: CPT | Performed by: INTERNAL MEDICINE

## 2019-12-03 PROCEDURE — 1036F TOBACCO NON-USER: CPT | Performed by: INTERNAL MEDICINE

## 2019-12-03 PROCEDURE — 1090F PRES/ABSN URINE INCON ASSESS: CPT | Performed by: INTERNAL MEDICINE

## 2019-12-03 PROCEDURE — 4040F PNEUMOC VAC/ADMIN/RCVD: CPT | Performed by: INTERNAL MEDICINE

## 2019-12-03 PROCEDURE — G8399 PT W/DXA RESULTS DOCUMENT: HCPCS | Performed by: INTERNAL MEDICINE

## 2019-12-03 PROCEDURE — 2022F DILAT RTA XM EVC RTNOPTHY: CPT | Performed by: INTERNAL MEDICINE

## 2019-12-03 PROCEDURE — G8598 ASA/ANTIPLAT THER USED: HCPCS | Performed by: INTERNAL MEDICINE

## 2019-12-03 RX ORDER — DOXYCYCLINE HYCLATE 100 MG
100 TABLET ORAL 2 TIMES DAILY
Qty: 14 TABLET | Refills: 0 | Status: SHIPPED | OUTPATIENT
Start: 2019-12-03 | End: 2019-12-10

## 2019-12-03 ASSESSMENT — ENCOUNTER SYMPTOMS
WHEEZING: 0
COUGH: 1
VOMITING: 1
SHORTNESS OF BREATH: 1
NAUSEA: 0

## 2019-12-06 LAB
ANAEROBIC CULTURE: ABNORMAL
GRAM STAIN RESULT: ABNORMAL
ORGANISM: ABNORMAL
WOUND/ABSCESS: ABNORMAL

## 2019-12-11 ENCOUNTER — OFFICE VISIT (OUTPATIENT)
Dept: FAMILY MEDICINE CLINIC | Age: 64
End: 2019-12-11
Payer: MEDICARE

## 2019-12-11 VITALS
DIASTOLIC BLOOD PRESSURE: 76 MMHG | HEART RATE: 84 BPM | OXYGEN SATURATION: 98 % | RESPIRATION RATE: 18 BRPM | HEIGHT: 63 IN | TEMPERATURE: 97.7 F | BODY MASS INDEX: 36.21 KG/M2 | WEIGHT: 204.4 LBS | SYSTOLIC BLOOD PRESSURE: 112 MMHG

## 2019-12-11 DIAGNOSIS — I10 ESSENTIAL HYPERTENSION: ICD-10-CM

## 2019-12-11 DIAGNOSIS — L08.9 SKIN INFECTION: Primary | ICD-10-CM

## 2019-12-11 DIAGNOSIS — L72.9 CYST OF SUBCUTANEOUS TISSUE: ICD-10-CM

## 2019-12-11 DIAGNOSIS — R42 DIZZINESS: ICD-10-CM

## 2019-12-11 DIAGNOSIS — R52 DIFFUSE PAIN: ICD-10-CM

## 2019-12-11 PROCEDURE — 1090F PRES/ABSN URINE INCON ASSESS: CPT | Performed by: FAMILY MEDICINE

## 2019-12-11 PROCEDURE — G8399 PT W/DXA RESULTS DOCUMENT: HCPCS | Performed by: FAMILY MEDICINE

## 2019-12-11 PROCEDURE — 99214 OFFICE O/P EST MOD 30 MIN: CPT | Performed by: FAMILY MEDICINE

## 2019-12-11 PROCEDURE — 4040F PNEUMOC VAC/ADMIN/RCVD: CPT | Performed by: FAMILY MEDICINE

## 2019-12-11 PROCEDURE — 3017F COLORECTAL CA SCREEN DOC REV: CPT | Performed by: FAMILY MEDICINE

## 2019-12-11 PROCEDURE — 1123F ACP DISCUSS/DSCN MKR DOCD: CPT | Performed by: FAMILY MEDICINE

## 2019-12-11 PROCEDURE — G8417 CALC BMI ABV UP PARAM F/U: HCPCS | Performed by: FAMILY MEDICINE

## 2019-12-11 PROCEDURE — G8427 DOCREV CUR MEDS BY ELIG CLIN: HCPCS | Performed by: FAMILY MEDICINE

## 2019-12-11 PROCEDURE — G8482 FLU IMMUNIZE ORDER/ADMIN: HCPCS | Performed by: FAMILY MEDICINE

## 2019-12-11 PROCEDURE — 1036F TOBACCO NON-USER: CPT | Performed by: FAMILY MEDICINE

## 2019-12-11 PROCEDURE — G8598 ASA/ANTIPLAT THER USED: HCPCS | Performed by: FAMILY MEDICINE

## 2019-12-11 RX ORDER — GABAPENTIN 100 MG/1
CAPSULE ORAL
Qty: 90 CAPSULE | Refills: 0 | Status: SHIPPED | OUTPATIENT
Start: 2019-12-11 | End: 2020-03-11

## 2019-12-11 RX ORDER — GABAPENTIN 300 MG/1
300 CAPSULE ORAL NIGHTLY
Qty: 90 CAPSULE | Refills: 0
Start: 2019-12-11 | End: 2020-09-11 | Stop reason: SDUPTHER

## 2019-12-18 ENCOUNTER — HOSPITAL ENCOUNTER (OUTPATIENT)
Dept: PHYSICAL THERAPY | Age: 64
Setting detail: THERAPIES SERIES
Discharge: HOME | End: 2019-12-18
Payer: MEDICARE

## 2019-12-18 PROCEDURE — 97530 THERAPEUTIC ACTIVITIES: CPT

## 2019-12-18 PROCEDURE — 97162 PT EVAL MOD COMPLEX 30 MIN: CPT

## 2019-12-18 ASSESSMENT — PAIN - FUNCTIONAL ASSESSMENT: PAIN_FUNCTIONAL_ASSESSMENT: PREVENTS OR INTERFERES SOME ACTIVE ACTIVITIES AND ADLS

## 2019-12-18 ASSESSMENT — PAIN DESCRIPTION - PAIN TYPE: TYPE: CHRONIC PAIN

## 2019-12-18 ASSESSMENT — PAIN DESCRIPTION - PROGRESSION: CLINICAL_PROGRESSION: GRADUALLY WORSENING

## 2019-12-18 ASSESSMENT — PAIN DESCRIPTION - ORIENTATION: ORIENTATION: RIGHT

## 2019-12-18 ASSESSMENT — PAIN DESCRIPTION - FREQUENCY: FREQUENCY: CONTINUOUS

## 2019-12-18 ASSESSMENT — PAIN DESCRIPTION - DESCRIPTORS: DESCRIPTORS: BURNING;CONSTANT;PINS AND NEEDLES

## 2019-12-18 ASSESSMENT — PAIN DESCRIPTION - LOCATION: LOCATION: BACK;FOOT

## 2019-12-18 ASSESSMENT — PAIN DESCRIPTION - ONSET: ONSET: ON-GOING

## 2019-12-18 ASSESSMENT — PAIN SCALES - GENERAL: PAINLEVEL_OUTOF10: 9

## 2019-12-31 ENCOUNTER — HOSPITAL ENCOUNTER (OUTPATIENT)
Dept: PHYSICAL THERAPY | Age: 64
Setting detail: THERAPIES SERIES
End: 2019-12-31
Payer: MEDICARE

## 2019-12-31 ENCOUNTER — HOSPITAL ENCOUNTER (OUTPATIENT)
Dept: PHYSICAL THERAPY | Age: 64
Setting detail: THERAPIES SERIES
Discharge: HOME | End: 2019-12-31
Payer: MEDICARE

## 2019-12-31 PROCEDURE — 97110 THERAPEUTIC EXERCISES: CPT

## 2019-12-31 PROCEDURE — 97035 APP MDLTY 1+ULTRASOUND EA 15: CPT

## 2019-12-31 NOTE — FLOWSHEET NOTE
Physical Therapy Daily Treatment Note  Date:  2019    Patient Name:  Jimmie Newman    :  1955  MRN: [id number]    Restrictions/Precautions:  Restrictions/Precautions  Restrictions/Precautions: Fall Risk(none)  Pertinent Medical History: Additional Pertinent Hx: DM    Medical/Treatment Diagnosis Information:  · Diagnosis: lumbar radiculpathy  · Treatment Diagnosis: pain in foot    Insurance/Certification information:  PT Insurance Information: [facility]? Cleve Mccloud  Physician Information:  Referring Practitioner: Dr. Luann Pereira of care signed (Y/N):  Sent for cosign    Visit# / total visits:   Pain level: 7/10     Functional Outcomes Measure:  Test: LEFS  Score: 38    Progress Note: []  Yes  []  No  Next due by: Visit #10      History of Injury:Patient has a 10+ year history of back pain, has gotten injections in the past in the back, she reports having one 2 weeks ago with no change in symptoms. She also has diabetic neuropathy in toes of both feet. Increased pain with standing and walking, sometimes unable to tolerate  Subjective:   C/o pain lateral calf  Objective:   Observation:    Test measurements:  See eval    Exercises:  Exercise/Equipment Resistance/Repetitions Other comments   Lumbar radiculopathy/neuritis     Nustep  add   Kinesiotape Peroneal tendon/mm         Livia@[employer] 1.5 w/cm2 Lateral R foot         AROM - DF,PF,Inv, EV 10x each Max cues for inv/evr              iontophoresis   add                         Other Therapeutic Activities:      Home Exercise Program:  Patient given home exercise program and demonstrates correct technique. HEP booklet also issued. Manual Treatments:     Modalities:     Progression Towards Functional goals:  [] Patient is progressing as expected towards functional goals listed. [] Progression is slowed due to complexities listed. [] Progression has been slowed due to co-morbidities.   [x] Plan just implemented, too soon to assess goals progression  [] Other:    Charges: Therapeutic Exercise:  [] (50852) Provided verbal/tactile cueing for activities to restore or maintain strength, flexibility, endurance, ROM for improvements with self-care, mobility, lifting and ambulation. Neuromuscular Re-Education  [] (72108) Provided verbal/tactile cueing for activities to restore or maintain balance, coordination, kinesthetic sense, posture, motor skill, proprioception for self-care, mobility, lifting, and ambulation. Therapeutic Activities:    [x] (40477) Provided verbal/tactile cueing to address functional limitations related to loss of mobility, strength, balance, and coordination. Gait Training:  [] (38408) Provided training and instruction to the patient for proper postural muscle recruitment and positioning with ambulation re-education     Home Exercise Program:    [] (17613) Reviewed/Progressed HEP activities related to strengthening, flexibility, endurance, ROM for functional self-care, mobility, lifting and ambulation   [] (06561) Reviewed/Progressed HEP activities related to improving balance, coordination, kinesthetic sense, posture, motor skill, proprioception for self-care, mobility, lifting, and ambulation      Manual Treatments:  MFR / STM / Oscillations-Mobs:  G-I, II, III, IV / Manipulation / MLD  [] (21357) Provided manual therapy to mobilize  soft tissue/joints/fluid for the purpose of modulating pain, promoting relaxation, increasing ROM, reducing/eliminating soft tissue swelling/inflammation/restriction, improving soft tissue extensibility and allowing for proper ROM for normal function with self- care, mobility, lifting and ambulation.         Timed Code Treatment Minutes: 35   Total Treatment Minutes: 35     [] EVAL (LOW) 61014   [] EVAL (MOD) 61997   [] EVAL (HIGH) 03933   [] RE-EVAL   [x] TE (38949) x     [] Aquatic (07722) x  [] NMR (14664)   x  [] Aquatic Group (59060) x  [] Manual (15340) x    [x] Ultrasound (87658) x  [] TA (03423)

## 2020-01-02 ENCOUNTER — APPOINTMENT (OUTPATIENT)
Dept: PHYSICAL THERAPY | Age: 65
End: 2020-01-02
Payer: MEDICARE

## 2020-01-02 ENCOUNTER — HOSPITAL ENCOUNTER (OUTPATIENT)
Dept: PHYSICAL THERAPY | Age: 65
Setting detail: THERAPIES SERIES
Discharge: HOME | End: 2020-01-02
Payer: MEDICARE

## 2020-01-02 PROCEDURE — 97113 AQUATIC THERAPY/EXERCISES: CPT

## 2020-01-02 NOTE — FLOWSHEET NOTE
Physical Therapy Aquatic Flow Sheet   Date:  2020    Patient Name:  Ziyad Nolen    :  1955     Restrictions/Precautions:  Restrictions/Precautions  Restrictions/Precautions: Fall Risk(none)  Pertinent Medical History: Additional Pertinent Hx: DM     Medical/Treatment Diagnosis Information:  · Diagnosis: lumbar radiculpathy  · Treatment Diagnosis: pain in foot     Insurance/Certification information:  PT Insurance Information: [facility] Medicare/[place]  Physician Information:  Referring Practitioner: Dr. Dio Eller of care signed (Y/N):  Sent for cosign     Visit# / total visits: 3 /12  Pain level:      7/10  LB/ R lateral foot        Functional Outcomes Measure:  Test: LEFS  Score: 38     Progress Note:          []? Yes                        [x]? No              Next due by: Visit #10       History of Injury:Patient has a 10+ year history of back pain, has gotten injections in the past in the back, she reports having one 2 weeks ago with no change in symptoms.  She also has diabetic neuropathy in toes of both feet.  Increased pain with standing and walking, sometimes unable to tolerate    Progress Note: []  Yes  [x]  No      Subjective:  C/o of LBP and R lateral foot burning. Increased pain with standing/ walking.      Objective:   Observation:  See eval   Test measurements:      Key  B= Belt DB= Dumbells T= Theratube   G=Gloves N= Noodles W= Weights   P= Paddles WW=Water Walker K= Kickboard        Transfers:   steps ind      % Immersion: 75%            Ambulation:  laps ind Exercises UE:      Forwards 3 Shoulder Shrugs     Lateral  Shoulder circles     Marching    Scapular Retraction      Retro   Rolling      [place]  Push Downs      IR/ER      Punching    Stretching: B   30 sec  Rowing    Gastroc/Soleus  2 Elbow Flex/Ext    Hamstring   Shldr Flex/Ext     Knee flex stretch   Shldr Abd/Add    Piriformis   Horiz Abd/Add     Hip flexor    Arm Circles     SKTC   PNF Diagonals    DKTC  UE oscillations f/b, s/s    ITB   Wall Push-ups    Quad  Figure 8's    Mid back   Buoy pull/push downs    UT  Tband rows    Rhom  Tband lats    Post Shoulder  Lumbar Rot w/ paddles    Pec   Small ball pull downs                   diagonals             Cervical:       AROM Flex       AROM Extension     LEs exercises:  B AROM Side Bending    Marching  10 AROM Rotation    Heel Raises  10 Chin tucks    Toe Raises  10 Chin nods     Squats  10      Hamstring Curls  10      Hip Flexion  10 Balance:      Hip Abduction 10 SLS    Hip Circles 10 Tandem stance    TA set 10 NBOS eyes open    Glut Set 10 NBOS eyes closed    Hip Extension  Hand to Opposite Knee    Hip Adduction    Box Step     Hip IR   Noodle Stance     Hip ER  Stop/Go Gait    Fig 8's  Switch Gait                Seated: B Functional:    Ankle Pumps  Step up forward    Ankle circles  Step up lateral    Knee flex & ext  Step down    Hip Abd & Adduction  [place] squats    Bicycle   Crate Lifts    Add Set with ball  Lunges forward    LX stab with med ball throws  Lunges lateral    Ankle INV  Lunges retro    Ankle EV  Lower ab curl with noodle      Upper ab curl with ball      Med ball straight lifts      Med ball diagonal lifts      Hydrorider          Noodle:      Leg Press  Deep Water:    hang at wall 1 min    relief Jog    Noodle hang deep water  Jumping Jacks    Noodle Bicycle  Heel to toe      Hand to opposite knee      Cross country skier      Rocking Horse    Goals:    Short term goals  Time Frame for Short term goals: 2-3 weeeks  Short term goal 1: Decrease pain by 50% in the water and incidence by 25% out of the water  Short term goal 2: Patient independent with hep  Short term goal 3: Patient tolerates 45 minutes of water therapy      Long term goals  Time Frame for Long term goals : discharge  Long term goal 1: Decrease foot pain to 2-3/10 at worst  Long term goal 2: Patient understands importance of continuing exercises to maintain gains in therapy  Long term goal 3: patient able to walk 10-15 minutes without increased foot pain        Charges:  Individual Aquatic Therapy:  [x] (61741) Provided verbal and tactile cueing for strengthening, flexibility, ROM using the therapeutic properties of water (buoyancy, resistance) for improvements in core control, mobility and ambulation     Aquatic Group:  [] (60355) Provided intermittent verbal and tactile cueing for strengthening, endurance, flexibility and ROM for 2-4 individuals that do not require one-on one patient contact by the therapist     Individual Aquatic Minutes: 30   Aquatic Group Minutes:      [x] Aquatic (24063)            x2  [] Aquatic Group ([phone]) x    Treatment/Activity Tolerance:  [x] Patient tolerated treatment well [] Patient limited by fatique  [] Patient limited by pain  [] Patient limited by other medical complications  [x] Other: decreased pain 5/10 after aquatic ex. Prognosis: [x] Good [x] Fair  [] Poor    Patient Requires Follow-up: [x] Yes  [] No    Plan:   [x] Continue per plan of care [] Alter current plan (see comments)  [] Plan of care initiated [] Hold pending MD visit [] Discharge    Plan for Next Session:  Gradually progress L-S stabilization exercises as tolerated to increase strength, flexibility, ROM, and endurance to improve ADLS and function and decrease pain. ADD:  Increase gt, seated ex, DKTC as tolerated.        Electronically signed by: Barbie Arias,

## 2020-01-07 ENCOUNTER — APPOINTMENT (OUTPATIENT)
Dept: PHYSICAL THERAPY | Age: 65
End: 2020-01-07
Payer: MEDICARE

## 2020-01-07 ENCOUNTER — HOSPITAL ENCOUNTER (OUTPATIENT)
Dept: PHYSICAL THERAPY | Age: 65
Setting detail: THERAPIES SERIES
Discharge: HOME | End: 2020-01-07
Payer: MEDICARE

## 2020-01-07 PROCEDURE — 97113 AQUATIC THERAPY/EXERCISES: CPT

## 2020-01-07 NOTE — FLOWSHEET NOTE
Physical Therapy Aquatic Flow Sheet   Date:  2020    Patient Name:  Bryant Bender    :  1955     Restrictions/Precautions:  Restrictions/Precautions  Restrictions/Precautions: Fall Risk(none)  Pertinent Medical History: Additional Pertinent Hx: DM     Medical/Treatment Diagnosis Information:  · Diagnosis: lumbar radiculpathy  · Treatment Diagnosis: pain in foot     Insurance/Certification information:  PT Insurance Information: [facility] Medicare/[place]  Physician Information:  Referring Practitioner: Dr. Marilee Aguero of care signed (Y/N):  Sent for cosign     Visit# / total visits:   Pain level:      5/10  LB/ R lateral foot        Functional Outcomes Measure:  Test: LEFS  Score: 38     Progress Note:          []? Yes                        [x]? No              Next due by: Visit #10       History of Injury:Patient has a 10+ year history of back pain, has gotten injections in the past in the back, she reports having one 2 weeks ago with no change in symptoms.  She also has diabetic neuropathy in toes of both feet.  Increased pain with standing and walking, sometimes unable to tolerate    Progress Note: []  Yes  [x]  No      Subjective:  Reports relief x 1 day after first aquatic ex. Increased pain with standing/ walking.      Objective:   Observation:  See eval   Test measurements:      Key  B= Belt DB= Dumbells T= Theratube   G=Gloves N= Noodles W= Weights   P= Paddles WW=Water Walker K= Kickboard        Transfers:   steps ind      % Immersion: 75%            Ambulation:  laps ind Exercises UE:      Forwards 5 Shoulder Shrugs     Lateral  Shoulder circles     Marching    Scapular Retraction      Retro   Rolling      Cariocas  Push Downs      IR/ER      Punching    Stretching: B   30 sec  Rowing    Gastroc/Soleus  2 Elbow Flex/Ext    Hamstring  2 Shldr Flex/Ext     Knee flex stretch   Shldr Abd/Add    Piriformis   Horiz Abd/Add     Hip flexor    Arm Circles     SKTC   PNF Diagonals    DKTC

## 2020-01-08 ENCOUNTER — APPOINTMENT (OUTPATIENT)
Dept: PHYSICAL THERAPY | Age: 65
End: 2020-01-08
Payer: MEDICARE

## 2020-01-08 ENCOUNTER — HOSPITAL ENCOUNTER (OUTPATIENT)
Dept: WOMENS IMAGING | Age: 65
Discharge: HOME | End: 2020-01-08
Payer: MEDICARE

## 2020-01-08 PROCEDURE — 77067 SCR MAMMO BI INCL CAD: CPT

## 2020-01-09 ENCOUNTER — HOSPITAL ENCOUNTER (OUTPATIENT)
Dept: PHYSICAL THERAPY | Age: 65
Setting detail: THERAPIES SERIES
Discharge: HOME | End: 2020-01-09
Payer: MEDICARE

## 2020-01-09 ENCOUNTER — APPOINTMENT (OUTPATIENT)
Dept: PHYSICAL THERAPY | Age: 65
End: 2020-01-09
Payer: MEDICARE

## 2020-01-09 NOTE — FLOWSHEET NOTE
Physical Therapy  Cancellation/No-show Note  Patient Name:  Dwight Fuller  :  1955   Date:  2020  Cancelled visits to date: 1  No-shows to date: 0    For today's appointment patient:  [x]  Cancelled  []  Rescheduled appointment  []  No-show     Reason given by patient:  [x]  Patient ill  []  Conflicting appointment  []  No transportation    []  Conflict with work  []  No reason given  []  Other:     Comments:      Electronically signed by:  Andrew Alexandra, PTA  [id number]

## 2020-01-10 ENCOUNTER — APPOINTMENT (OUTPATIENT)
Dept: PHYSICAL THERAPY | Age: 65
End: 2020-01-10
Payer: MEDICARE

## 2020-01-10 NOTE — TELEPHONE ENCOUNTER
Patient requesting a medication refill.   Medication :TRULICITY 1.5 HU/7.5AE SOPN   Pharmacy: [facility] [facility] [phone]  Last office visit:12/11/2019  Next office visit: 1/15/2020

## 2020-01-13 ENCOUNTER — HOSPITAL ENCOUNTER (OUTPATIENT)
Dept: PHYSICAL THERAPY | Age: 65
Setting detail: THERAPIES SERIES
Discharge: HOME | End: 2020-01-13
Payer: MEDICARE

## 2020-01-13 PROCEDURE — 97110 THERAPEUTIC EXERCISES: CPT

## 2020-01-13 PROCEDURE — 97033 APP MDLTY 1+IONTPHRSIS EA 15: CPT

## 2020-01-13 PROCEDURE — 97035 APP MDLTY 1+ULTRASOUND EA 15: CPT

## 2020-01-13 NOTE — FLOWSHEET NOTE
x  [] Mech Traction (12026)  [] Ionto (74610)           [] ES (un) ([id number]):   [] Vasopump (49133                X Other:  Iontophorese\is    Assessment  [] Patient tolerated treatment well [] Patient limited by fatigue  [x] Patient limited by pain  [] Patient limited by other medical complications  [] Other:     Prognosis: [x] Good [x] Fair  [] Poor    Goals:    Short term goals  Time Frame for Short term goals: 2-3 weeeks  Short term goal 1: Decrease pain by 50% in the water and incidence by 25% out of the water  Short term goal 2: Patient independent with hep  Short term goal 3: Patient tolerates 45 minutes of water therapy      Long term goals  Time Frame for Long term goals : discharge  Long term goal 1: Decrease foot pain to 2-3/10 at worst  Long term goal 2: Patient understands importance of continuing exercises to maintain gains in therapy  Long term goal 3: patient able to walk 10-15 minutes without increased foot pain     Patient Requires Follow-up: [] Yes  [] No    Plan:   [x] Continue per plan of care [] Alter current plan (see comments)  [] Plan of care initiated [] Hold pending MD visit [] Discharge    Plan for Next Session:  Continue aquatics as able, land based after cyst removal - sent script to MD for dexamethasone    Electronically signed by:  Carlos Alberto Bundy

## 2020-01-14 ENCOUNTER — HOSPITAL ENCOUNTER (OUTPATIENT)
Dept: PHYSICAL THERAPY | Age: 65
Setting detail: THERAPIES SERIES
Discharge: HOME | End: 2020-01-14
Payer: MEDICARE

## 2020-01-14 PROCEDURE — 97150 GROUP THERAPEUTIC PROCEDURES: CPT

## 2020-01-14 PROCEDURE — 97113 AQUATIC THERAPY/EXERCISES: CPT

## 2020-01-14 NOTE — FLOWSHEET NOTE
Physical Therapy Aquatic Flow Sheet   Date:  2020    Patient Name:  Drew Hummel    :  1955     Restrictions/Precautions:  Restrictions/Precautions  Restrictions/Precautions: Fall Risk(none)  Pertinent Medical History: Additional Pertinent Hx: DM     Medical/Treatment Diagnosis Information:  · Diagnosis: lumbar radiculpathy  · Treatment Diagnosis: pain in foot     Insurance/Certification information:  PT Insurance Information: [facility] Medicare/[place]  Physician Information:  Referring Practitioner: Dr. Mellissa De Anda of care signed (Y/N):  Sent for cosign     Visit# / total visits:   Pain level:      5/10  LB/ R lateral foot        Functional Outcomes Measure:  Test: LEFS  Score: 38     Progress Note:          []? Yes                        [x]? No              Next due by: Visit #10       History of Injury:Patient has a 10+ year history of back pain, has gotten injections in the past in the back, she reports having one 2 weeks ago with no change in symptoms.  She also has diabetic neuropathy in toes of both feet.  Increased pain with standing and walking, sometimes unable to tolerate    Progress Note: []  Yes  [x]  No      Subjective:  LB is better, feels aquatic ex has been helpful. To have Sx on L calf 1-15-20. Objective:  20 removed ionto patch from yesterdays PT before aquatic ex.       Observation:  See eval   Test measurements:      Key  B= Belt DB= Dumbells T= Theratube   G=Gloves N= Noodles W= Weights   P= Paddles WW=Water Walker K= Kickboard        Transfers:   steps ind      % Immersion: 75%            Ambulation:  laps ind Exercises UE:  B    Forwards 3 Shoulder Shrugs     Lateral 1 Shoulder circles     Marching  1 Scapular Retraction      Retro   Rolling  10    Cariocas  Push Downs 10     IR/ER 10     Punching    Stretching: B   30 sec  Rowing 10   Gastroc/Soleus  2 Elbow Flex/Ext 10   Hamstring  2 Shldr Flex/Ext  10   Knee flex stretch   Shldr Abd/Add 10   Piriformis  1 Horiz Abd/Add  10   Hip flexor    Arm Circles  10   SKTC   PNF Diagonals 10   DKTC 1 min UE oscillations f/b, s/s    ITB   Wall Push-ups    Quad  Figure 8's    Mid back   Buoy pull/push downs    UT  Tband rows    Rhom  Tband lats    Post Shoulder  Lumbar Rot w/ paddles    Pec   Small ball pull downs                   diagonals             Cervical:       AROM Flex       AROM Extension     LEs exercises:  B AROM Side Bending    Marching  10 AROM Rotation    Heel Raises  10 Chin tucks    Toe Raises  10 Chin nods     Squats  10      Hamstring Curls  10      Hip Flexion  10 Balance:      Hip Abduction 10 SLS    Hip Circles 10 Tandem stance    TA set 10 NBOS eyes open    Glut Set 10 NBOS eyes closed    Hip Extension  Hand to Opposite Knee 10   Hip Adduction    Box Step     Hip IR   Noodle Stance     Hip ER  Stop/Go Gait    Fig 8's  Switch Gait                Seated: B Functional:    Ankle Pumps 20 Step up forward    Ankle circles 10 Step up lateral    Knee flex & ext 20 Step down    Hip Abd & Adduction 20 [place] squats    Bicycle  20 Crate Lifts    Add Set with ball 10 Lunges forward    LX stab with med ball throws  Lunges lateral    Ankle INV  Lunges retro    Ankle EV  Lower ab curl with noodle      Upper ab curl with ball      Med ball straight lifts      Med ball diagonal lifts      Hydrorider          Noodle:      Leg Press  Deep Water:    hang at wall 1 min    relief Jog    Noodle hang deep water  Jumping Jacks    Noodle Bicycle  Heel to toe      Hand to opposite knee      Cross country skier      Rocking Horse    Goals:    Short term goals  Time Frame for Short term goals: 2-3 weeeks  Short term goal 1: Decrease pain by 50% in the water and incidence by 25% out of the water  Short term goal 2: Patient independent with hep  Short term goal 3: Patient tolerates 45 minutes of water therapy      Long term goals  Time Frame for Long term goals : discharge  Long term goal 1: Decrease foot pain to 2-3/10 at

## 2020-01-15 ENCOUNTER — APPOINTMENT (OUTPATIENT)
Dept: PHYSICAL THERAPY | Age: 65
End: 2020-01-15
Payer: MEDICARE

## 2020-01-15 ENCOUNTER — OFFICE VISIT (OUTPATIENT)
Dept: FAMILY MEDICINE CLINIC | Age: 65
End: 2020-01-15
Payer: MEDICARE

## 2020-01-15 VITALS
HEART RATE: 86 BPM | RESPIRATION RATE: 18 BRPM | BODY MASS INDEX: 36.5 KG/M2 | TEMPERATURE: 98.9 F | SYSTOLIC BLOOD PRESSURE: 116 MMHG | DIASTOLIC BLOOD PRESSURE: 74 MMHG | HEIGHT: 63 IN | OXYGEN SATURATION: 98 % | WEIGHT: 206 LBS

## 2020-01-15 DIAGNOSIS — E11.8 TYPE 2 DIABETES MELLITUS WITH COMPLICATION, WITHOUT LONG-TERM CURRENT USE OF INSULIN (HCC): ICD-10-CM

## 2020-01-15 LAB
CREATININE URINE: 165.8 MG/DL (ref 28–259)
HBA1C MFR BLD: 7.2 %
MICROALBUMIN UR-MCNC: <1.2 MG/DL
MICROALBUMIN/CREAT UR-RTO: NORMAL MG/G (ref 0–30)

## 2020-01-15 PROCEDURE — 83036 HEMOGLOBIN GLYCOSYLATED A1C: CPT | Performed by: FAMILY MEDICINE

## 2020-01-15 PROCEDURE — 27618 EXC LEG/ANKLE TUM < 3 CM: CPT | Performed by: FAMILY MEDICINE

## 2020-01-15 ASSESSMENT — PATIENT HEALTH QUESTIONNAIRE - PHQ9
SUM OF ALL RESPONSES TO PHQ QUESTIONS 1-9: 0
SUM OF ALL RESPONSES TO PHQ9 QUESTIONS 1 & 2: 0
1. LITTLE INTEREST OR PLEASURE IN DOING THINGS: 0
2. FEELING DOWN, DEPRESSED OR HOPELESS: 0
SUM OF ALL RESPONSES TO PHQ QUESTIONS 1-9: 0

## 2020-01-16 ENCOUNTER — TELEPHONE (OUTPATIENT)
Dept: FAMILY MEDICINE CLINIC | Age: 65
End: 2020-01-16

## 2020-01-16 NOTE — TELEPHONE ENCOUNTER
[facility] lab calling to get the source and where the specimen was on the body of biopsy taken yesterday 01.15.20 by dr mathis.   Please call Vera at [address] lab at [phone]    Please advise    thanks

## 2020-01-17 ENCOUNTER — HOSPITAL ENCOUNTER (OUTPATIENT)
Dept: PHYSICAL THERAPY | Age: 65
Setting detail: THERAPIES SERIES
Discharge: HOME | End: 2020-01-17
Payer: MEDICARE

## 2020-01-17 PROCEDURE — 97140 MANUAL THERAPY 1/> REGIONS: CPT

## 2020-01-17 PROCEDURE — 97110 THERAPEUTIC EXERCISES: CPT

## 2020-01-17 PROCEDURE — 97033 APP MDLTY 1+IONTPHRSIS EA 15: CPT

## 2020-01-17 NOTE — FLOWSHEET NOTE
allowing for proper ROM for normal function with self- care, mobility, lifting and ambulation. Timed Code Treatment Minutes: 25   Total Treatment Minutes: 35     [] EVAL (LOW) 82202   [] EVAL (MOD) 32187   [] EVAL (HIGH) 37340   [] RE-EVAL   [x] TE (15418) x   1  [] Aquatic (41497) x  [] NMR (69687)   x  [] Aquatic Group (52579) x  [x] Manual (59294) x    [] Ultrasound (42406) x  [] TA (57464) x  [] Mech Traction (00588)  [x] Ionto (59938)           [] ES (un) (62965):   [] Vasopump (66345                  Assessment  [] Patient tolerated treatment well [] Patient limited by fatigue  [x] Patient limited by pain  [] Patient limited by other medical complications  [] Other:     Prognosis: [x] Good [x] Fair  [] Poor    Goals:    Short term goals  Time Frame for Short term goals: 2-3 weeeks  Short term goal 1: Decrease pain by 50% in the water and incidence by 25% out of the water  Short term goal 2: Patient independent with hep  Short term goal 3: Patient tolerates 45 minutes of water therapy      Long term goals  Time Frame for Long term goals : discharge  Long term goal 1: Decrease foot pain to 2-3/10 at worst  Long term goal 2: Patient understands importance of continuing exercises to maintain gains in therapy  Long term goal 3: patient able to walk 10-15 minutes without increased foot pain     Patient Requires Follow-up: [] Yes  [] No    Plan:   [x] Continue per plan of care [] Alter current plan (see comments)  [] Plan of care initiated [] Hold pending MD visit [] Discharge    Plan for Next Session:  Assess STM.   Re-eval next session    Electronically signed by:  Chas Garza , OMT-C,  [id number]

## 2020-01-22 ENCOUNTER — HOSPITAL ENCOUNTER (OUTPATIENT)
Dept: PHYSICAL THERAPY | Age: 65
Setting detail: THERAPIES SERIES
Discharge: HOME | End: 2020-01-22
Payer: MEDICARE

## 2020-01-22 PROCEDURE — 97033 APP MDLTY 1+IONTPHRSIS EA 15: CPT | Performed by: CHIROPRACTOR

## 2020-01-22 PROCEDURE — 97110 THERAPEUTIC EXERCISES: CPT | Performed by: CHIROPRACTOR
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
## 2020-01-22 NOTE — FLOWSHEET NOTE
Physical Therapy Daily Treatment Note  Date:  2020    Patient Name:  Jeanmarie Shanks    :  1955  MRN: [id number]    Restrictions/Precautions:  Fall Risk(none)  Pertinent Medical History: Additional Pertinent Hx: DM    Medical/Treatment Diagnosis Information:  · Diagnosis: lumbar radiculpathy  · Treatment Diagnosis: pain in foot    Insurance/Certification information:  PT Insurance Information: [address]  Physician Information:  Referring Practitioner: Dr. Lucila Guerrero of care signed (Y/N):  Sent for cosign (received)    Visit# / total visits:   Pain level: 4/10     Functional Outcomes Measure:  Test: LEFS  Score: 38    History of Injury:Patient has a 10+ year history of back pain, has gotten injections in the past in the back, she reports having one 2 weeks ago with no change in symptoms. She also has diabetic neuropathy in toes of both feet. Increased pain with standing and walking, sometimes unable to tolerate    Subjective: pain in foot is improving but still present. Notes a burning sensation in lateral part of the foot. Patient states ionto patch also helps. Objective:   Observation:    Test measurements:  See eval    Exercises:  Exercise/Equipment Resistance/Repetitions Other comments   Lumbar radiculopathy/neuritis     Nustep seat 7 L1 x5 mins         Ankle tband 4-way Red 10x each          STM R peroneal tendon x10 mins  Mod TTP noted   Ionto with dexamethasone 80 mA at 4.0 mA/min x20 mins   In clinic Base of 5th MTP  Treatment #3        Kinesiotape  \"I\" strip to peroneal tendon                Other Therapeutic Activities:  : Patient supplied medication examined by therapist.  Medication examined for contamination and/or impurities, including beyond use dating. Therapist also examined patient and medical record for contraindications to treatment. Patient educated on care, storage, and adverse reactions of treatment. Patient verbalized understanding.     Home Exercise

## 2020-01-24 ENCOUNTER — HOSPITAL ENCOUNTER (OUTPATIENT)
Dept: PHYSICAL THERAPY | Age: 65
Setting detail: THERAPIES SERIES
Discharge: HOME | End: 2020-01-24
Payer: MEDICARE

## 2020-01-24 ENCOUNTER — TELEPHONE (OUTPATIENT)
Dept: FAMILY MEDICINE CLINIC | Age: 65
End: 2020-01-24

## 2020-01-24 ENCOUNTER — OFFICE VISIT (OUTPATIENT)
Dept: FAMILY MEDICINE CLINIC | Age: 65
End: 2020-01-24

## 2020-01-24 VITALS — WEIGHT: 201 LBS | SYSTOLIC BLOOD PRESSURE: 112 MMHG | DIASTOLIC BLOOD PRESSURE: 78 MMHG | BODY MASS INDEX: 35.61 KG/M2

## 2020-01-24 PROCEDURE — 97110 THERAPEUTIC EXERCISES: CPT

## 2020-01-24 PROCEDURE — 97033 APP MDLTY 1+IONTPHRSIS EA 15: CPT

## 2020-01-24 PROCEDURE — 97140 MANUAL THERAPY 1/> REGIONS: CPT

## 2020-01-24 PROCEDURE — 99024 POSTOP FOLLOW-UP VISIT: CPT | Performed by: FAMILY MEDICINE

## 2020-01-24 NOTE — PROGRESS NOTES
Outpatient Physical Therapy  [x] [facility]    Phone: [phone]   Fax: [phone]   [] [facility]  Phone: [phone]   Fax: [phone]  [] [facility]              Phone: [phone]   Fax: [phone]     Physical Therapy Progress/Discharge Note  Date: 2020        Patient Name:  Roge Milligan    :  1955  MRN: [id number]    Diagnosis:  Lumbar Radiculopathy  Treatment Diagnosis:  Pain in foot    Insurance/Certification information:  [address]  Referring Physician:  Jaqueline Zee MD    Visit# / total visits: 8  Pain level: 7/10     Plan of Care/Treatment to date:  [x] Therapeutic Exercise    [x] Modalities:  [x] Therapeutic Activity     [x] Ultrasound  [] Electrical Stimulation  [] Gait Training      [] Cervical Traction    [] Lumbar Traction  [] Neuromuscular Re-education  [] Cold/hotpack [x] Iontophoresis  [x] Instruction in HEP      Other:  [x] Manual Therapy       []    [] Aquatic Therapy       []                          Significant Findings At Last Visit/Comments:    Subjective:     Felt really good after last session. States that today she feels pressure but not pain. Patient reports pain 90% improved overall. Pt states her walking tolerance is about 15 mins before increased pain starts. Patient had been tolerating 50 mins of aquatic exercises without increased c/o. Patient does state that when she gets foot pain now it can be 7/10 at its worst. Still has stitches at surgical site on L LE. Objective:  · Observation:Updated 20  § Palpation: mod TTP still present at base of 5th MT  · Test measurements:   § Ankle strength: 5/5     Assessment:   Summary: Patient demonstrates steady progress in PT. With the combination of aquatics and land PT, pain has decreased from a sharp constant pain to a dull, intermittent pain. Patient's core strength and ankle strength are also improving with exercises.   Believe patient would benefit from a few more weeks of PT in order to begin transition to HEP. Progression Towards Functional goals:  [x] Patient is progressing as expected towards functional goals listed. [] Progression is slowed due to complexities listed. [] Progression has been slowed due to co-morbidities. [] Plan just implemented, too soon to assess goals progression  [] Other:    Goals:  Short term goals  Time Frame for Short term goals: 2-3 weeeks  Short term goal 1: Decrease pain by 50% in the water and incidence by 25% out of the water 1/24 MET  Short term goal 2: Patient independent with hep 1/24 MET  Short term goal 3: Patient tolerates 45 minutes of water therapy 1/24 MET     Long term goals  Time Frame for Long term goals : discharge  Long term goal 1: Decrease foot pain to 2-3/10 at worst 1/24 NOT MET  Long term goal 2: Patient understands importance of continuing exercises to maintain gains in therapy 1/24 MET  Long term goal 3: patient able to walk 10-15 minutes without increased foot pain 1/24 MET    Rehab Potential: [] Excellent [x] Good [] Fair  [] Poor     Goal Status:  [] Achieved [x] Partially Achieved  [] Not Achieved     Current Frequency/Duration:  # Days per week: [] 1 day # Weeks: [] 1 week [x] 4 weeks      [x] 2 days   [] 2 weeks [] 5 weeks      [] 3 days   [] 3 weeks [] 6 weeks     Patient Status: [] Continue per initial plan of Care     [] Patient now discharged     [x] Additional visits requested, Please re-certify for additional visits:      Requested frequency/duration:  2x/week for 2-3 weeks    Electronically signed by:  Deedee Mitchell, PT , OMT-C,  [id number]      If you have any questions or concerns, please don't hesitate to call.   Thank you for your referral.    Physician Signature:________________________________Date:__________________  By signing above, therapists plan is approved by physician

## 2020-01-24 NOTE — FLOWSHEET NOTE
ambulation   [] (38844) Reviewed/Progressed HEP activities related to improving balance, coordination, kinesthetic sense, posture, motor skill, proprioception for self-care, mobility, lifting, and ambulation      Manual Treatments:  MFR / STM / Oscillations-Mobs:  G-I, II, III, IV / Manipulation / MLD  [x] (80065) Provided manual therapy to mobilize  soft tissue/joints/fluid for the purpose of modulating pain, promoting relaxation, increasing ROM, reducing/eliminating soft tissue swelling/inflammation/restriction, improving soft tissue extensibility and allowing for proper ROM for normal function with self- care, mobility, lifting and ambulation.         Timed Code Treatment Minutes: 30   Total Treatment Minutes: 50     [] EVAL (LOW) 69204   [] EVAL (MOD) 96614   [] EVAL (HIGH) 38436   [] RE-EVAL   [x] TE (71782) x   1  [] Aquatic (05629) x  [] NMR (69784)   x  [] Aquatic Group (26313) x  [x] Manual (79069) x    [] Ultrasound (27057) x  [] TA (74640) x  [] Mech Traction (30021)  [x] Ionto (82397)           [] ES (un) (40388):   [] Vasopump (23875                  Assessment  [] Patient tolerated treatment well [] Patient limited by fatigue  [x] Patient limited by pain  [] Patient limited by other medical complications  [] Other:     Prognosis: [x] Good [x] Fair  [] Poor    Goals:    Short term goals  Time Frame for Short term goals: 2-3 weeeks  Short term goal 1: Decrease pain by 50% in the water and incidence by 25% out of the water 1/24 MET  Short term goal 2: Patient independent with hep 1/24 MET  Short term goal 3: Patient tolerates 45 minutes of water therapy 1/24 MET     Long term goals  Time Frame for Long term goals : discharge  Long term goal 1: Decrease foot pain to 2-3/10 at worst 1/24 NOT MET  Long term goal 2: Patient understands importance of continuing exercises to maintain gains in therapy 1/24 MET  Long term goal 3: patient able to walk 10-15 minutes without increased foot pain 1/24 MET    Patient Requires Follow-up: [] Yes  [] No    Plan:   [x] Continue per plan of care [] Alter current plan (see comments)  [] Plan of care initiated [] Hold pending MD visit [] Discharge     Plan for Next Session:  Cont x2 more weeks with land and aquatics when able to get in due to recent surgery.      Electronically signed by:  Chas Roberts , OMT-C,  [id number]

## 2020-01-24 NOTE — TELEPHONE ENCOUNTER
Patient had a lipoma on the calf removed on 1/15/2020  Patient said dr. Mike Nobles told her a few days, and she is wanting to know when she can come in to get stiches removed. Patient has had them in for 9 days, and needing to know what day you want to see her.   Please advise

## 2020-01-27 NOTE — PROGRESS NOTES
Prior lipoma removal site healing well.  2 sutures removed without complication. No signs or symptoms of infection.   Okay to proceed with normal activity and can submerge wound

## 2020-01-29 ENCOUNTER — HOSPITAL ENCOUNTER (OUTPATIENT)
Dept: PHYSICAL THERAPY | Age: 65
Setting detail: THERAPIES SERIES
Discharge: HOME | End: 2020-01-29
Payer: MEDICARE

## 2020-01-29 ENCOUNTER — APPOINTMENT (OUTPATIENT)
Dept: PHYSICAL THERAPY | Age: 65
End: 2020-01-29
Payer: MEDICARE

## 2020-01-29 PROCEDURE — 97110 THERAPEUTIC EXERCISES: CPT

## 2020-01-29 PROCEDURE — 97033 APP MDLTY 1+IONTPHRSIS EA 15: CPT

## 2020-01-29 PROCEDURE — 97140 MANUAL THERAPY 1/> REGIONS: CPT

## 2020-01-29 NOTE — FLOWSHEET NOTE
Plan for Next Session:  Cont with land PT at this time per pt request    Electronically signed by:  Montrell Nava, PTA  [id number]

## 2020-01-31 ENCOUNTER — APPOINTMENT (OUTPATIENT)
Dept: PHYSICAL THERAPY | Age: 65
End: 2020-01-31
Payer: MEDICARE

## 2020-01-31 RX ORDER — METOPROLOL SUCCINATE 25 MG/1
TABLET, EXTENDED RELEASE ORAL
Qty: 90 TABLET | Refills: 1 | Status: SHIPPED | OUTPATIENT
Start: 2020-01-31 | End: 2020-07-27

## 2020-02-03 ENCOUNTER — HOSPITAL ENCOUNTER (OUTPATIENT)
Dept: PHYSICAL THERAPY | Age: 65
Setting detail: THERAPIES SERIES
Discharge: HOME | End: 2020-02-03
Payer: MEDICARE

## 2020-02-03 PROCEDURE — 97033 APP MDLTY 1+IONTPHRSIS EA 15: CPT

## 2020-02-03 PROCEDURE — 97140 MANUAL THERAPY 1/> REGIONS: CPT

## 2020-02-03 PROCEDURE — 97110 THERAPEUTIC EXERCISES: CPT

## 2020-02-05 ENCOUNTER — APPOINTMENT (OUTPATIENT)
Dept: PHYSICAL THERAPY | Age: 65
End: 2020-02-05
Payer: MEDICARE

## 2020-02-05 ENCOUNTER — HOSPITAL ENCOUNTER (OUTPATIENT)
Dept: PHYSICAL THERAPY | Age: 65
Setting detail: THERAPIES SERIES
Discharge: HOME | End: 2020-02-05
Payer: MEDICARE

## 2020-02-05 PROCEDURE — 97033 APP MDLTY 1+IONTPHRSIS EA 15: CPT

## 2020-02-05 PROCEDURE — 97140 MANUAL THERAPY 1/> REGIONS: CPT

## 2020-02-05 PROCEDURE — 97110 THERAPEUTIC EXERCISES: CPT

## 2020-02-05 RX ORDER — ATORVASTATIN CALCIUM 20 MG/1
TABLET, FILM COATED ORAL
Qty: 90 TABLET | Refills: 0 | Status: SHIPPED | OUTPATIENT
Start: 2020-02-05 | End: 2020-05-04

## 2020-02-05 NOTE — FLOWSHEET NOTE
Other Therapeutic Activities:   Patient supplied medication examined by therapist.  Medication examined for contamination and/or impurities, including beyond use dating. Therapist also examined patient and medical record for contraindications to treatment. Patient educated on care, storage, and adverse reactions of treatment. Patient verbalized understanding. 1/24/20: Re-eval measurements taken for MD CHADWICK. Discussed remaining deficits and continuation of skilled PT at this time. Patient agreeable. Home Exercise Program:  Patient given home exercise program and demonstrates correct technique. HEP booklet also issued. Progression Towards Functional goals:  [] Patient is progressing as expected towards functional goals listed. [x] Progression is slowed due to complexities listed. [] Progression has been slowed due to co-morbidities. [] Plan just implemented, too soon to assess goals progression   [x] Other:  Sleeping better. able to walk in store 15 min. Charges: Therapeutic Exercise:  [x] (29593) Provided verbal/tactile cueing for activities to restore or maintain strength, flexibility, endurance, ROM for improvements with self-care, mobility, lifting and ambulation. Neuromuscular Re-Education  [] (23898) Provided verbal/tactile cueing for activities to restore or maintain balance, coordination, kinesthetic sense, posture, motor skill, proprioception for self-care, mobility, lifting, and ambulation. Therapeutic Activities:    [] (59740) Provided verbal/tactile cueing to address functional limitations related to loss of mobility, strength, balance, and coordination.      Gait Training:  [] (17952) Provided training and instruction to the patient for proper postural muscle recruitment and positioning with ambulation re-education     Home Exercise Program:    [] (09230) Reviewed/Progressed HEP activities related to strengthening, flexibility, endurance, ROM for functional self-care, mobility, lifting and ambulation   [] (63664) Reviewed/Progressed HEP activities related to improving balance, coordination, kinesthetic sense, posture, motor skill, proprioception for self-care, mobility, lifting, and ambulation      Manual Treatments:  MFR / STM / Oscillations-Mobs:  G-I, II, III, IV / Manipulation / MLD  [x] (69633) Provided manual therapy to mobilize  soft tissue/joints/fluid for the purpose of modulating pain, promoting relaxation, increasing ROM, reducing/eliminating soft tissue swelling/inflammation/restriction, improving soft tissue extensibility and allowing for proper ROM for normal function with self- care, mobility, lifting and ambulation. Timed Code Treatment Minutes: 30   Total Treatment Minutes: 50     [] EVAL (LOW) 66320   [] EVAL (MOD) 37495   [] EVAL (HIGH) 82192   [] RE-EVAL   [x] TE (01838) x   1  [] Aquatic (42441) x  [] NMR (23011)   x  [] Aquatic Group (30121) x  [x] Manual (38600) x    [] Ultrasound (02957) x  [] TA (52712) x  [] Mech Traction (79431)  [x] Ionto (14250)           [] ES (un) (55253):   [] Vasopump (93538                  Assessment  [x] Patient tolerated treatment well [] Patient limited by fatigue  [] Patient limited by pain  [] Patient limited by other medical complications  [x] Other: tender/ pain R lateral foot, base of 5th mtatarsal.  Recommended wearing supportive shoes around the house, pt wearing slide on slippers.   Recommended ice/    Prognosis: [x] Good [x] Fair  [] Poor    Goals:    Short term goals  Time Frame for Short term goals: 2-3 weeeks  Short term goal 1: Decrease pain by 50% in the water and incidence by 25% out of the water 1/24 MET  Short term goal 2: Patient independent with hep 1/24 MET  Short term goal 3: Patient tolerates 45 minutes of water therapy 1/24 MET     Long term goals  Time Frame for Long term goals : discharge  Long term goal 1: Decrease foot pain to 2-3/10 at worst 1/24 NOT MET  Long term goal 2: Patient understands importance of continuing exercises to maintain gains in therapy 1/24 MET  Long term goal 3: patient able to walk 10-15 minutes without increased foot pain 1/24 MET    Patient Requires Follow-up: [x] Yes  [] No    Plan:   [x] Continue per plan of care [] Alter current plan (see comments)  [] Plan of care initiated [] Hold pending MD visit [] Discharge     Plan for Next Session:  Cont per PT    Electronically signed by:  Garima Doe, PTA  [id number]

## 2020-02-07 ENCOUNTER — APPOINTMENT (OUTPATIENT)
Dept: PHYSICAL THERAPY | Age: 65
End: 2020-02-07
Payer: MEDICARE

## 2020-02-10 ENCOUNTER — HOSPITAL ENCOUNTER (OUTPATIENT)
Dept: PHYSICAL THERAPY | Age: 65
Setting detail: THERAPIES SERIES
Discharge: HOME | End: 2020-02-10
Payer: MEDICARE

## 2020-02-10 PROCEDURE — 97110 THERAPEUTIC EXERCISES: CPT

## 2020-02-10 PROCEDURE — 97140 MANUAL THERAPY 1/> REGIONS: CPT

## 2020-02-10 PROCEDURE — 97033 APP MDLTY 1+IONTPHRSIS EA 15: CPT

## 2020-02-10 NOTE — FLOWSHEET NOTE
strengthening, flexibility, endurance, ROM for functional self-care, mobility, lifting and ambulation   [] (30645) Reviewed/Progressed HEP activities related to improving balance, coordination, kinesthetic sense, posture, motor skill, proprioception for self-care, mobility, lifting, and ambulation      Manual Treatments:  MFR / STM / Oscillations-Mobs:  G-I, II, III, IV / Manipulation / MLD  [x] (87197) Provided manual therapy to mobilize  soft tissue/joints/fluid for the purpose of modulating pain, promoting relaxation, increasing ROM, reducing/eliminating soft tissue swelling/inflammation/restriction, improving soft tissue extensibility and allowing for proper ROM for normal function with self- care, mobility, lifting and ambulation. Timed Code Treatment Minutes: 30   Total Treatment Minutes: 50     [] EVAL (LOW) 93389   [] EVAL (MOD) 45725   [] EVAL (HIGH) 97421   [] RE-EVAL   [x] TE (58558) x   1  [] Aquatic (78071) x  [] NMR (51774)   x  [] Aquatic Group (40074) x  [x] Manual (98041) x    [] Ultrasound (58897) x  [] TA (61369) x  [] Mech Traction (19855)  [x] Ionto (55637)           [] ES (un) (52058):   [] Vasopump (14872                  Assessment  [x] Patient tolerated treatment well [] Patient limited by fatigue  [] Patient limited by pain  [] Patient limited by other medical complications  [x] Other: tender/ pain R lateral foot, base of 5th mtatarsal.  Recommended wearing supportive shoes around the house, pt wearing slide on slippers.   Recommended ice/    Prognosis: [x] Good [x] Fair  [] Poor    Goals:    Short term goals  Time Frame for Short term goals: 2-3 weeeks  Short term goal 1: Decrease pain by 50% in the water and incidence by 25% out of the water 1/24 MET  Short term goal 2: Patient independent with hep 1/24 MET  Short term goal 3: Patient tolerates 45 minutes of water therapy 1/24 MET     Long term goals  Time Frame for Long term goals : discharge  Long term goal 1: Decrease foot pain to 2-3/10 at worst 1/24 NOT MET  Long term goal 2: Patient understands importance of continuing exercises to maintain gains in therapy 1/24 MET  Long term goal 3: patient able to walk 10-15 minutes without increased foot pain 1/24 MET    Patient Requires Follow-up: [x] Yes  [] No    Plan:   [x] Continue per plan of care [] Alter current plan (see comments)  [] Plan of care initiated [] Hold pending MD visit [] Discharge     Plan for Next Session:  Cont per PT    Electronically signed by:  Shakir Reza, PT  [id number]

## 2020-02-12 ENCOUNTER — HOSPITAL ENCOUNTER (OUTPATIENT)
Dept: PHYSICAL THERAPY | Age: 65
Setting detail: THERAPIES SERIES
Discharge: HOME | End: 2020-02-12
Payer: MEDICARE

## 2020-02-12 ENCOUNTER — APPOINTMENT (OUTPATIENT)
Dept: PHYSICAL THERAPY | Age: 65
End: 2020-02-12
Payer: MEDICARE

## 2020-02-12 PROCEDURE — 97110 THERAPEUTIC EXERCISES: CPT

## 2020-02-12 PROCEDURE — 97140 MANUAL THERAPY 1/> REGIONS: CPT

## 2020-02-14 ENCOUNTER — APPOINTMENT (OUTPATIENT)
Dept: PHYSICAL THERAPY | Age: 65
End: 2020-02-14
Payer: MEDICARE

## 2020-02-17 ENCOUNTER — HOSPITAL ENCOUNTER (OUTPATIENT)
Dept: PHYSICAL THERAPY | Age: 65
Setting detail: THERAPIES SERIES
Discharge: HOME | End: 2020-02-17
Payer: MEDICARE

## 2020-02-17 PROCEDURE — 97140 MANUAL THERAPY 1/> REGIONS: CPT

## 2020-02-17 PROCEDURE — 97110 THERAPEUTIC EXERCISES: CPT

## 2020-02-17 NOTE — FLOWSHEET NOTE
Physical Therapy Discharge  Date:  2020    Patient Name:  Cesar Schofield    :  1955  MRN: [id number]    Restrictions/Precautions:  Fall Risk(none)  Pertinent Medical History: Additional Pertinent Hx: DM    Medical/Treatment Diagnosis Information:  · Diagnosis: lumbar radiculpathy  · Treatment Diagnosis: pain in foot    Physician Information:  Referring Practitioner: Dr. Stella Estrada    Visit# / total visits:   Pain level: 0/10 LBP               1-3 /10  R foot. Functional Outcomes Measure:  Test: LEFS  Score: 38 (corrected 22)     DC  48     History of Injury:Patient has a 10+ year history of back pain, has gotten injections in the past in the back, she reports having one 2 weeks ago with no change in symptoms. She also has diabetic neuropathy in toes of both feet. Increased pain with standing and walking, sometimes unable to tolerate    Subjective:     - Feels better overall. Back is much better, foot about 70% better,  More of a pressure than a pain. Today is a 3/10  Had injection in foot last Wed, Feels like the injection helped a lot. Objective:  · Palpation: min TTP still present at base of 5th MT   Test measurements:    · Ankle strength: 5/5      Assessment -  Pt has met PT goals, notes she is much better, both in terms of foot and back pain.   Plans to continue with HEP        Plan:   [] Continue per plan of care [] Alter current plan (see comments)  [] Plan of care initiated [] Hold pending MD visit [x] Discharge       Electronically signed by:  Jennifer Dueñasr, [address]

## 2020-02-17 NOTE — FLOWSHEET NOTE
Physical Therapy Daily Treatment Note  Date:  2020    Patient Name:  Goran Enciso    :  1955  MRN: [id number]    Restrictions/Precautions:  Fall Risk(none)  Pertinent Medical History: Additional Pertinent Hx: DM    Medical/Treatment Diagnosis Information:  · Diagnosis: lumbar radiculpathy  · Treatment Diagnosis: pain in foot    Insurance/Certification information:  PT Insurance Information: [address]  Physician Information:  Referring Practitioner: Dr. Kelsey Prior of care signed (Y/N):  Sent for cosign (received), PN faxed     Visit# / total visits:   Pain level: 0/10 LBP               1/10  R foot. Functional Outcomes Measure:  Test: LEFS  Score: 38 (corrected 22)     DC  48     History of Injury:Patient has a 10+ year history of back pain, has gotten injections in the past in the back, she reports having one 2 weeks ago with no change in symptoms. She also has diabetic neuropathy in toes of both feet. Increased pain with standing and walking, sometimes unable to tolerate    Subjective:    20 R foot more sore today 3/10 due to increased activity. No c/o of LBP. No F/U with Dr. Derek Kay. To have injection ( R peroneal NB) on  R foot today by pain specialist Dr. Morena Ramirez, to see Darius Summers chiropractor today also. To have R SI joint injection Dr. Morena Ramirez 20 - Feels better overall. Back is much better, foot about 70% better,  More of a pressure than a pain. Today is a 3/10  Had injection in foot last Wed, Feels like the injection helped a lot.     Objective:   Observation: Updated 20  · Palpation: mod TTP still present at base of 5th MT   Test measurements:    · Ankle strength: 5/5    Exercises:  Exercise/Equipment Resistance/Repetitions Other comments   Lumbar radiculopathy/neuritis     Nustep seat 7 L1 x5 mins    Gastroc Incline   3x 20 secs     Tandem stance  gumdrop 30 sec R/L  4 way x 10 R    SLS L/R   30 sec each    HR gurmeet TR gurmeet  10         Ankle tband 4-way Lime  12x each  R Increase reps        STM R peroneal tendon x10 mins   Min TTP noted lateral foot/ base of 5th metatarsal.  Pt reports less than previous        Kinesiotape after ionto \"I\" strip to peroneal tendon Feels helpful     Other Therapeutic Activities:   Patient supplied medication examined by therapist.  Medication examined for contamination and/or impurities, including beyond use dating. Therapist also examined patient and medical record for contraindications to treatment. Patient educated on care, storage, and adverse reactions of treatment. Patient verbalized understanding. 1/24/20: Re-eval measurements taken for MD CHADWICK. Discussed remaining deficits and continuation of skilled PT at this time. Patient agreeable. Home Exercise Program:  Patient given home exercise program and demonstrates correct technique. HEP booklet also issued. Progression Towards Functional goals:  [] Patient is progressing as expected towards functional goals listed. [x] Progression is slowed due to complexities listed. [] Progression has been slowed due to co-morbidities. [] Plan just implemented, too soon to assess goals progression   [x] Other:  Sleeping better. able to walk in store 15 min., less burning in foot. Charges: Therapeutic Exercise:  [x] (41243) Provided verbal/tactile cueing for activities to restore or maintain strength, flexibility, endurance, ROM for improvements with self-care, mobility, lifting and ambulation. Neuromuscular Re-Education  [] (63132) Provided verbal/tactile cueing for activities to restore or maintain balance, coordination, kinesthetic sense, posture, motor skill, proprioception for self-care, mobility, lifting, and ambulation. Therapeutic Activities:    [] (45276) Provided verbal/tactile cueing to address functional limitations related to loss of mobility, strength, balance, and coordination.      Gait Training:  [] (31048) Provided training and discharge  Long term goal 1: Decrease foot pain to 2-3/10 at worst 1/24 NOT MET  Long term goal 2: Patient understands importance of continuing exercises to maintain gains in therapy 1/24 MET  Long term goal 3: patient able to walk 10-15 minutes without increased foot pain 1/24 MET    Patient Requires Follow-up: [x] Yes  [] No    Plan:   [x] Continue per plan of care [] Alter current plan (see comments)  [] Plan of care initiated [] Hold pending MD visit [x] Discharge       Electronically signed by:  Savanah Abad, [address] [facility] [address]

## 2020-02-19 ENCOUNTER — TELEPHONE (OUTPATIENT)
Dept: FAMILY MEDICINE CLINIC | Age: 65
End: 2020-02-19

## 2020-02-20 ENCOUNTER — APPOINTMENT (OUTPATIENT)
Dept: CT IMAGING | Age: 65
DRG: 103 | End: 2020-02-20
Payer: MEDICARE

## 2020-02-20 ENCOUNTER — HOSPITAL ENCOUNTER (INPATIENT)
Age: 65
LOS: 2 days | Discharge: HOME HEALTH | DRG: 103 | End: 2020-02-22
Attending: EMERGENCY MEDICINE | Admitting: HOSPITALIST
Payer: MEDICARE

## 2020-02-20 ENCOUNTER — APPOINTMENT (OUTPATIENT)
Dept: GENERAL RADIOLOGY | Age: 65
DRG: 103 | End: 2020-02-20
Payer: MEDICARE

## 2020-02-20 PROBLEM — G45.9 TIA (TRANSIENT ISCHEMIC ATTACK): Status: ACTIVE | Noted: 2020-02-20

## 2020-02-20 LAB
A/G RATIO: 1.3 (ref 1.1–2.2)
ALBUMIN SERPL-MCNC: 4.4 G/DL (ref 3.4–5)
ALP BLD-CCNC: 87 U/L (ref 40–129)
ALT SERPL-CCNC: 19 U/L (ref 10–40)
ANION GAP SERPL CALCULATED.3IONS-SCNC: 11 MMOL/L (ref 3–16)
AST SERPL-CCNC: 24 U/L (ref 15–37)
BASOPHILS ABSOLUTE: 0 K/UL (ref 0–0.2)
BASOPHILS RELATIVE PERCENT: 0.5 %
BILIRUB SERPL-MCNC: <0.2 MG/DL (ref 0–1)
BILIRUBIN URINE: NEGATIVE
BLOOD, URINE: NEGATIVE
BUN BLDV-MCNC: 14 MG/DL (ref 7–20)
CALCIUM SERPL-MCNC: 10.6 MG/DL (ref 8.3–10.6)
CHLORIDE BLD-SCNC: 102 MMOL/L (ref 99–110)
CLARITY: CLEAR
CO2: 25 MMOL/L (ref 21–32)
COLOR: YELLOW
CREAT SERPL-MCNC: 0.6 MG/DL (ref 0.6–1.2)
EKG ATRIAL RATE: 68 BPM
EKG DIAGNOSIS: NORMAL
EKG P AXIS: 47 DEGREES
EKG P-R INTERVAL: 198 MS
EKG Q-T INTERVAL: 392 MS
EKG QRS DURATION: 78 MS
EKG QTC CALCULATION (BAZETT): 416 MS
EKG R AXIS: 34 DEGREES
EKG T AXIS: 35 DEGREES
EKG VENTRICULAR RATE: 68 BPM
EOSINOPHILS ABSOLUTE: 0 K/UL (ref 0–0.6)
EOSINOPHILS RELATIVE PERCENT: 0.2 %
GFR AFRICAN AMERICAN: >60
GFR NON-AFRICAN AMERICAN: >60
GLOBULIN: 3.3 G/DL
GLUCOSE BLD-MCNC: 144 MG/DL (ref 70–99)
GLUCOSE BLD-MCNC: 244 MG/DL (ref 70–99)
GLUCOSE URINE: NEGATIVE MG/DL
HCT VFR BLD CALC: 38.8 % (ref 36–48)
HEMOGLOBIN: 12.8 G/DL (ref 12–16)
KETONES, URINE: NEGATIVE MG/DL
LEUKOCYTE ESTERASE, URINE: NEGATIVE
LYMPHOCYTES ABSOLUTE: 2.5 K/UL (ref 1–5.1)
LYMPHOCYTES RELATIVE PERCENT: 23.8 %
MCH RBC QN AUTO: 27.9 PG (ref 26–34)
MCHC RBC AUTO-ENTMCNC: 32.9 G/DL (ref 31–36)
MCV RBC AUTO: 84.9 FL (ref 80–100)
MICROSCOPIC EXAMINATION: NORMAL
MONOCYTES ABSOLUTE: 0.6 K/UL (ref 0–1.3)
MONOCYTES RELATIVE PERCENT: 5.6 %
NEUTROPHILS ABSOLUTE: 7.5 K/UL (ref 1.7–7.7)
NEUTROPHILS RELATIVE PERCENT: 69.9 %
NITRITE, URINE: NEGATIVE
PDW BLD-RTO: 13.5 % (ref 12.4–15.4)
PERFORMED ON: ABNORMAL
PH UA: 6.5 (ref 5–8)
PLATELET # BLD: 301 K/UL (ref 135–450)
PMV BLD AUTO: 7.7 FL (ref 5–10.5)
POTASSIUM REFLEX MAGNESIUM: 4.7 MMOL/L (ref 3.5–5.1)
PROTEIN UA: NEGATIVE MG/DL
RBC # BLD: 4.58 M/UL (ref 4–5.2)
SODIUM BLD-SCNC: 138 MMOL/L (ref 136–145)
SPECIFIC GRAVITY UA: >1.03 (ref 1–1.03)
TOTAL PROTEIN: 7.7 G/DL (ref 6.4–8.2)
TROPONIN: <0.01 NG/ML
URINE REFLEX TO CULTURE: NORMAL
URINE TYPE: NORMAL
UROBILINOGEN, URINE: 0.2 E.U./DL
WBC # BLD: 10.7 K/UL (ref 4–11)

## 2020-02-20 PROCEDURE — 96375 TX/PRO/DX INJ NEW DRUG ADDON: CPT

## 2020-02-20 PROCEDURE — 83036 HEMOGLOBIN GLYCOSYLATED A1C: CPT

## 2020-02-20 PROCEDURE — 92610 EVALUATE SWALLOWING FUNCTION: CPT

## 2020-02-20 PROCEDURE — 2580000003 HC RX 258: Performed by: EMERGENCY MEDICINE

## 2020-02-20 PROCEDURE — 6370000000 HC RX 637 (ALT 250 FOR IP): Performed by: HOSPITALIST

## 2020-02-20 PROCEDURE — 6370000000 HC RX 637 (ALT 250 FOR IP): Performed by: NURSE PRACTITIONER

## 2020-02-20 PROCEDURE — G0378 HOSPITAL OBSERVATION PER HR: HCPCS

## 2020-02-20 PROCEDURE — 1200000000 HC SEMI PRIVATE

## 2020-02-20 PROCEDURE — 6360000004 HC RX CONTRAST MEDICATION: Performed by: EMERGENCY MEDICINE

## 2020-02-20 PROCEDURE — 36415 COLL VENOUS BLD VENIPUNCTURE: CPT

## 2020-02-20 PROCEDURE — 6360000002 HC RX W HCPCS: Performed by: HOSPITALIST

## 2020-02-20 PROCEDURE — 96374 THER/PROPH/DIAG INJ IV PUSH: CPT

## 2020-02-20 PROCEDURE — 85025 COMPLETE CBC W/AUTO DIFF WBC: CPT

## 2020-02-20 PROCEDURE — 84484 ASSAY OF TROPONIN QUANT: CPT

## 2020-02-20 PROCEDURE — 2580000003 HC RX 258: Performed by: HOSPITALIST

## 2020-02-20 PROCEDURE — 70496 CT ANGIOGRAPHY HEAD: CPT

## 2020-02-20 PROCEDURE — 96372 THER/PROPH/DIAG INJ SC/IM: CPT

## 2020-02-20 PROCEDURE — 81003 URINALYSIS AUTO W/O SCOPE: CPT

## 2020-02-20 PROCEDURE — 6360000002 HC RX W HCPCS: Performed by: EMERGENCY MEDICINE

## 2020-02-20 PROCEDURE — 80053 COMPREHEN METABOLIC PANEL: CPT

## 2020-02-20 PROCEDURE — 71045 X-RAY EXAM CHEST 1 VIEW: CPT

## 2020-02-20 PROCEDURE — 93010 ELECTROCARDIOGRAM REPORT: CPT | Performed by: INTERNAL MEDICINE

## 2020-02-20 PROCEDURE — 93005 ELECTROCARDIOGRAM TRACING: CPT | Performed by: EMERGENCY MEDICINE

## 2020-02-20 PROCEDURE — 99285 EMERGENCY DEPT VISIT HI MDM: CPT

## 2020-02-20 PROCEDURE — 70450 CT HEAD/BRAIN W/O DYE: CPT

## 2020-02-20 RX ORDER — ASCORBIC ACID 500 MG
500 TABLET ORAL DAILY
COMMUNITY
End: 2021-05-11

## 2020-02-20 RX ORDER — METOPROLOL SUCCINATE 25 MG/1
25 TABLET, EXTENDED RELEASE ORAL DAILY
Status: DISCONTINUED | OUTPATIENT
Start: 2020-02-20 | End: 2020-02-22 | Stop reason: HOSPADM

## 2020-02-20 RX ORDER — ASCORBIC ACID 500 MG
500 TABLET ORAL DAILY
Status: DISCONTINUED | OUTPATIENT
Start: 2020-02-21 | End: 2020-02-22 | Stop reason: HOSPADM

## 2020-02-20 RX ORDER — GABAPENTIN 300 MG/1
300 CAPSULE ORAL NIGHTLY
Status: DISCONTINUED | OUTPATIENT
Start: 2020-02-20 | End: 2020-02-22 | Stop reason: HOSPADM

## 2020-02-20 RX ORDER — ASPIRIN 81 MG/1
81 TABLET ORAL DAILY
Status: DISCONTINUED | OUTPATIENT
Start: 2020-02-21 | End: 2020-02-22 | Stop reason: HOSPADM

## 2020-02-20 RX ORDER — ATORVASTATIN CALCIUM 20 MG/1
20 TABLET, FILM COATED ORAL DAILY
Status: DISCONTINUED | OUTPATIENT
Start: 2020-02-20 | End: 2020-02-22 | Stop reason: HOSPADM

## 2020-02-20 RX ORDER — SODIUM CHLORIDE 0.9 % (FLUSH) 0.9 %
10 SYRINGE (ML) INJECTION PRN
Status: DISCONTINUED | OUTPATIENT
Start: 2020-02-20 | End: 2020-02-22 | Stop reason: HOSPADM

## 2020-02-20 RX ORDER — PANTOPRAZOLE SODIUM 40 MG/1
40 TABLET, DELAYED RELEASE ORAL
Status: DISCONTINUED | OUTPATIENT
Start: 2020-02-21 | End: 2020-02-22 | Stop reason: HOSPADM

## 2020-02-20 RX ORDER — GABAPENTIN 100 MG/1
100 CAPSULE ORAL EVERY MORNING
Status: DISCONTINUED | OUTPATIENT
Start: 2020-02-21 | End: 2020-02-22 | Stop reason: HOSPADM

## 2020-02-20 RX ORDER — ALBUTEROL SULFATE 90 UG/1
2 AEROSOL, METERED RESPIRATORY (INHALATION) EVERY 6 HOURS PRN
Status: DISCONTINUED | OUTPATIENT
Start: 2020-02-20 | End: 2020-02-22 | Stop reason: HOSPADM

## 2020-02-20 RX ORDER — ONDANSETRON 4 MG/1
4 TABLET, ORALLY DISINTEGRATING ORAL EVERY 8 HOURS PRN
Status: DISCONTINUED | OUTPATIENT
Start: 2020-02-20 | End: 2020-02-22 | Stop reason: HOSPADM

## 2020-02-20 RX ORDER — KETOROLAC TROMETHAMINE 30 MG/ML
15 INJECTION, SOLUTION INTRAMUSCULAR; INTRAVENOUS ONCE
Status: COMPLETED | OUTPATIENT
Start: 2020-02-20 | End: 2020-02-20

## 2020-02-20 RX ORDER — DEXTROSE MONOHYDRATE 25 G/50ML
12.5 INJECTION, SOLUTION INTRAVENOUS PRN
Status: DISCONTINUED | OUTPATIENT
Start: 2020-02-20 | End: 2020-02-22 | Stop reason: HOSPADM

## 2020-02-20 RX ORDER — ACETAMINOPHEN AND CODEINE PHOSPHATE 300; 30 MG/1; MG/1
1 TABLET ORAL EVERY 12 HOURS PRN
COMMUNITY
Start: 2020-01-09 | End: 2021-01-05 | Stop reason: ALTCHOICE

## 2020-02-20 RX ORDER — DIPHENHYDRAMINE HYDROCHLORIDE 50 MG/ML
12.5 INJECTION INTRAMUSCULAR; INTRAVENOUS ONCE
Status: COMPLETED | OUTPATIENT
Start: 2020-02-20 | End: 2020-02-20

## 2020-02-20 RX ORDER — ONDANSETRON 2 MG/ML
4 INJECTION INTRAMUSCULAR; INTRAVENOUS EVERY 6 HOURS PRN
Status: DISCONTINUED | OUTPATIENT
Start: 2020-02-20 | End: 2020-02-22 | Stop reason: HOSPADM

## 2020-02-20 RX ORDER — ACETAMINOPHEN 325 MG/1
650 TABLET ORAL EVERY 4 HOURS PRN
Status: DISCONTINUED | OUTPATIENT
Start: 2020-02-20 | End: 2020-02-22 | Stop reason: HOSPADM

## 2020-02-20 RX ORDER — POLYETHYLENE GLYCOL 3350 17 G/17G
17 POWDER, FOR SOLUTION ORAL DAILY PRN
Status: DISCONTINUED | OUTPATIENT
Start: 2020-02-20 | End: 2020-02-22 | Stop reason: HOSPADM

## 2020-02-20 RX ORDER — AMMONIUM LACTATE 12 G/100G
LOTION TOPICAL PRN
Status: DISCONTINUED | OUTPATIENT
Start: 2020-02-20 | End: 2020-02-22 | Stop reason: HOSPADM

## 2020-02-20 RX ORDER — METOCLOPRAMIDE HYDROCHLORIDE 5 MG/ML
10 INJECTION INTRAMUSCULAR; INTRAVENOUS ONCE
Status: COMPLETED | OUTPATIENT
Start: 2020-02-20 | End: 2020-02-20

## 2020-02-20 RX ORDER — INSULIN GLARGINE 100 [IU]/ML
12 INJECTION, SOLUTION SUBCUTANEOUS NIGHTLY
Status: DISCONTINUED | OUTPATIENT
Start: 2020-02-20 | End: 2020-02-22 | Stop reason: HOSPADM

## 2020-02-20 RX ORDER — PROMETHAZINE HYDROCHLORIDE 25 MG/1
12.5 TABLET ORAL EVERY 6 HOURS PRN
Status: DISCONTINUED | OUTPATIENT
Start: 2020-02-20 | End: 2020-02-22 | Stop reason: HOSPADM

## 2020-02-20 RX ORDER — DULOXETIN HYDROCHLORIDE 30 MG/1
30 CAPSULE, DELAYED RELEASE ORAL DAILY
Status: DISCONTINUED | OUTPATIENT
Start: 2020-02-21 | End: 2020-02-22 | Stop reason: HOSPADM

## 2020-02-20 RX ORDER — ASPIRIN 300 MG/1
300 SUPPOSITORY RECTAL DAILY
Status: DISCONTINUED | OUTPATIENT
Start: 2020-02-21 | End: 2020-02-22 | Stop reason: HOSPADM

## 2020-02-20 RX ORDER — DEXTROSE MONOHYDRATE 50 MG/ML
100 INJECTION, SOLUTION INTRAVENOUS PRN
Status: DISCONTINUED | OUTPATIENT
Start: 2020-02-20 | End: 2020-02-22 | Stop reason: HOSPADM

## 2020-02-20 RX ORDER — 0.9 % SODIUM CHLORIDE 0.9 %
1000 INTRAVENOUS SOLUTION INTRAVENOUS ONCE
Status: COMPLETED | OUTPATIENT
Start: 2020-02-20 | End: 2020-02-20

## 2020-02-20 RX ORDER — TRAZODONE HYDROCHLORIDE 50 MG/1
50 TABLET ORAL NIGHTLY
Status: DISCONTINUED | OUTPATIENT
Start: 2020-02-20 | End: 2020-02-22 | Stop reason: HOSPADM

## 2020-02-20 RX ORDER — ROSUVASTATIN CALCIUM 10 MG/1
40 TABLET, COATED ORAL NIGHTLY
Status: DISCONTINUED | OUTPATIENT
Start: 2020-02-20 | End: 2020-02-20 | Stop reason: ALTCHOICE

## 2020-02-20 RX ORDER — SODIUM CHLORIDE 0.9 % (FLUSH) 0.9 %
10 SYRINGE (ML) INJECTION EVERY 12 HOURS SCHEDULED
Status: DISCONTINUED | OUTPATIENT
Start: 2020-02-20 | End: 2020-02-22 | Stop reason: HOSPADM

## 2020-02-20 RX ORDER — VALSARTAN 80 MG/1
80 TABLET ORAL DAILY
Status: DISCONTINUED | OUTPATIENT
Start: 2020-02-20 | End: 2020-02-22 | Stop reason: HOSPADM

## 2020-02-20 RX ORDER — NICOTINE POLACRILEX 4 MG
15 LOZENGE BUCCAL PRN
Status: DISCONTINUED | OUTPATIENT
Start: 2020-02-20 | End: 2020-02-22 | Stop reason: HOSPADM

## 2020-02-20 RX ADMIN — TRAZODONE HYDROCHLORIDE 50 MG: 50 TABLET ORAL at 20:24

## 2020-02-20 RX ADMIN — KETOROLAC TROMETHAMINE 15 MG: 30 INJECTION, SOLUTION INTRAMUSCULAR at 13:01

## 2020-02-20 RX ADMIN — METOCLOPRAMIDE HYDROCHLORIDE 10 MG: 5 INJECTION INTRAMUSCULAR; INTRAVENOUS at 11:28

## 2020-02-20 RX ADMIN — DIPHENHYDRAMINE HYDROCHLORIDE 12.5 MG: 50 INJECTION, SOLUTION INTRAMUSCULAR; INTRAVENOUS at 11:28

## 2020-02-20 RX ADMIN — SODIUM CHLORIDE 1000 ML: 9 INJECTION, SOLUTION INTRAVENOUS at 11:28

## 2020-02-20 RX ADMIN — METOPROLOL SUCCINATE 25 MG: 25 TABLET, EXTENDED RELEASE ORAL at 17:17

## 2020-02-20 RX ADMIN — INSULIN GLARGINE 12 UNITS: 100 INJECTION, SOLUTION SUBCUTANEOUS at 22:08

## 2020-02-20 RX ADMIN — VALSARTAN 80 MG: 80 TABLET, FILM COATED ORAL at 17:17

## 2020-02-20 RX ADMIN — ENOXAPARIN SODIUM 40 MG: 40 INJECTION SUBCUTANEOUS at 20:24

## 2020-02-20 RX ADMIN — ACETAMINOPHEN 650 MG: 325 TABLET ORAL at 20:24

## 2020-02-20 RX ADMIN — Medication 10 ML: at 20:25

## 2020-02-20 RX ADMIN — IOPAMIDOL 75 ML: 755 INJECTION, SOLUTION INTRAVENOUS at 12:22

## 2020-02-20 RX ADMIN — GABAPENTIN 300 MG: 300 CAPSULE ORAL at 20:24

## 2020-02-20 RX ADMIN — SODIUM CHLORIDE, PRESERVATIVE FREE 10 ML: 5 INJECTION INTRAVENOUS at 22:09

## 2020-02-20 ASSESSMENT — PAIN DESCRIPTION - PAIN TYPE
TYPE: ACUTE PAIN

## 2020-02-20 ASSESSMENT — PAIN DESCRIPTION - FREQUENCY
FREQUENCY: CONTINUOUS

## 2020-02-20 ASSESSMENT — PAIN DESCRIPTION - DIRECTION
RADIATING_TOWARDS: NO
RADIATING_TOWARDS: NO

## 2020-02-20 ASSESSMENT — PAIN DESCRIPTION - PROGRESSION
CLINICAL_PROGRESSION: GRADUALLY WORSENING
CLINICAL_PROGRESSION: NOT CHANGED
CLINICAL_PROGRESSION: GRADUALLY IMPROVING

## 2020-02-20 ASSESSMENT — PAIN DESCRIPTION - ORIENTATION
ORIENTATION: POSTERIOR
ORIENTATION: MID;POSTERIOR
ORIENTATION: POSTERIOR

## 2020-02-20 ASSESSMENT — PAIN - FUNCTIONAL ASSESSMENT
PAIN_FUNCTIONAL_ASSESSMENT: PREVENTS OR INTERFERES SOME ACTIVE ACTIVITIES AND ADLS
PAIN_FUNCTIONAL_ASSESSMENT: ACTIVITIES ARE NOT PREVENTED
PAIN_FUNCTIONAL_ASSESSMENT: PREVENTS OR INTERFERES SOME ACTIVE ACTIVITIES AND ADLS

## 2020-02-20 ASSESSMENT — PAIN SCALES - GENERAL
PAINLEVEL_OUTOF10: 6
PAINLEVEL_OUTOF10: 7
PAINLEVEL_OUTOF10: 5
PAINLEVEL_OUTOF10: 0
PAINLEVEL_OUTOF10: 7
PAINLEVEL_OUTOF10: 6

## 2020-02-20 ASSESSMENT — PAIN DESCRIPTION - ONSET
ONSET: ON-GOING

## 2020-02-20 ASSESSMENT — PAIN DESCRIPTION - DESCRIPTORS
DESCRIPTORS: ACHING;POUNDING
DESCRIPTORS: ACHING;POUNDING
DESCRIPTORS: ACHING
DESCRIPTORS: HEADACHE

## 2020-02-20 ASSESSMENT — PAIN DESCRIPTION - LOCATION
LOCATION: HEAD;NECK
LOCATION: HEAD
LOCATION: HEAD;NECK
LOCATION: NECK;HEAD
LOCATION: HEAD

## 2020-02-20 ASSESSMENT — ENCOUNTER SYMPTOMS
GASTROINTESTINAL NEGATIVE: 1
EYES NEGATIVE: 1
SHORTNESS OF BREATH: 0
COUGH: 0
NAUSEA: 0
RESPIRATORY NEGATIVE: 1
VOMITING: 0
ABDOMINAL PAIN: 0

## 2020-02-20 NOTE — ED PROVIDER NOTES
[facility]        Pt Name: Roseann Munoz  MRN: [id number]  [place] 1955  Date of evaluation: 2/20/2020  Provider: Brad Peña MD  PCP: Pierre Larson MD      [address] [place] [address]       Chief Complaint   Patient presents with    Headache       HISTORY OFPRESENT ILLNESS   (Location/Symptom, Timing/Onset, Context/Setting, Quality, Duration, Modifying Factors,Severity)  Note limiting factors. Roseann Munoz is a 71 y.o. female with history of prior CVA with residual right-sided weakness, reporting posterior headache and heaviness in the back of her head and neck, intermittent over the last several days since Saturday. She had similar symptoms whenever she had her stroke. She states that she has general weakness and fatigue in the same timeframe, unknown exact last known normal, likely since Saturday. She denies any chest pain or shortness of breath. No abdominal pains, nausea, vomiting. She occasionally has some flashes of light in her vision without a clear pattern. She denies any change in her visual acuity, no blurry vision or double vision. No spinning sensation. No complete loss of consciousness. No falls or other trauma. Nursing Notes were all reviewed and agreed with or any disagreements were addressed  in the HPI. REVIEW OF SYSTEMS    (2-9 systems for level 4, 10 or more for level 5)     Review of Systems   Constitutional: Positive for chills. Negative for fever. HENT: Negative. Eyes: Negative. Respiratory: Negative. Negative for cough and shortness of breath. Cardiovascular: Negative. Negative for chest pain and leg swelling. Gastrointestinal: Negative. Negative for abdominal pain, nausea and vomiting. Genitourinary: Negative. Negative for dysuria and flank pain. Musculoskeletal: Negative. Skin: Negative. Neurological: Positive for weakness (general) and headaches.  Negative for EXTENDED RELEASE CAPSULE    Take 1 capsule by mouth daily for one week, followed by 2 capsules daily    FANAPT 1 MG TABS TABLET    Take 1 mg by mouth nightly     FREESTYLE LANCETS MISC    USE ONE LANCET TO TEST BLOOD SUGAR TWICE A DAY    FUROSEMIDE (LASIX) 20 MG TABLET    TAKE ONE TABLET BY MOUTH DAILY AS NEEDED FOR LEG SWELLING    GABAPENTIN (NEURONTIN) 100 MG CAPSULE    TAKE ONE CAPSULE BY MOUTH IN the morning    GABAPENTIN (NEURONTIN) 300 MG CAPSULE    Take 1 capsule by mouth nightly for 90 days. INSULIN GLARGINE (BASAGLAR KWIKPEN) 100 UNIT/ML INJECTION PEN    Inject 12 Units into the skin nightly    INSULIN PEN NEEDLE ([employer] PEN NEEDLES) 31G X 6 MM MISC    1 each by Does not apply route daily    METFORMIN (GLUCOPHAGE) 500 MG TABLET    TAKE TWO TABLETS BY MOUTH TWICE A DAY WITH MEALS    METOPROLOL SUCCINATE (TOPROL XL) 25 MG EXTENDED RELEASE TABLET    TAKE ONE TABLET BY MOUTH DAILY FOR BLOOD PRESSURE AND HEART RATE    MISC. DEVICES (WALKER) MISC    One four wheel walker    NAPROXEN (NAPROSYN) 375 MG TABLET    Take 1 tablet by mouth 2 times daily as needed for Pain    OMEPRAZOLE (PRILOSEC) 40 MG DELAYED RELEASE CAPSULE    TAKE ONE CAPSULE BY MOUTH TWICE A DAY BEFORE MEALS    ONDANSETRON (ZOFRAN ODT) 4 MG DISINTEGRATING TABLET    Take 1 tablet by mouth every 8 hours as needed for Nausea    TENS UNIT MISC    by Does not apply route    TRAZODONE (DESYREL) 50 MG TABLET    Take 50 mg by mouth nightly    TRIMETHOPRIM-POLYMYXIN B (POLYTRIM) 51220-5.1 UNIT/ML-% OPHTHALMIC SOLUTION    1 drop every 4 hours as needed (redness)     VALSARTAN (DIOVAN) 80 MG TABLET    TAKE ONE TABLET BY MOUTH DAILY    VITAMIN C (ASCORBIC ACID) 500 MG TABLET    Take 500 mg by mouth daily       ALLERGIES     Codeine; Vicodin [hydrocodone-acetaminophen];  Oxycontin [oxycodone hcl]; and Tramadol    FAMILY HISTORY       Family History   Problem Relation Age of Onset    Heart Disease Mother     High Blood Pressure Mother     Stroke Mother    [employer] Cancer Brother         lung cancer          SOCIAL HISTORY       Social History     Socioeconomic History    Marital status:      Spouse name: Chrystal Schofield, seen here    Number of children: 3    Years of education: Not on file    Highest education level: Not on file   Occupational History    Not on file   Social Needs    Financial resource strain: Not on file    Food insecurity:     Worry: Not on file     Inability: Not on file    Transportation needs:     Medical: Not on file     Non-medical: Not on file   Tobacco Use    Smoking status: Former Smoker     Packs/day: 0.00     Years: 3.00     Pack years: 0.00     Last attempt to quit: 1992     Years since quittin.1    Smokeless tobacco: Never Used   Substance and Sexual Activity    Alcohol use: No    Drug use: No    Sexual activity: Yes     Partners: Male   Lifestyle    Physical activity:     Days per week: Not on file     Minutes per session: Not on file    Stress: Not on file   Relationships    Social connections:     Talks on phone: Not on file     Gets together: Not on file     Attends Yarsani service: Not on file     Active member of club or organization: Not on file     Attends meetings of clubs or organizations: Not on file     Relationship status: Not on file    Intimate partner violence:     Fear of current or ex partner: Not on file     Emotionally abused: Not on file     Physically abused: Not on file     Forced sexual activity: Not on file   Other Topics Concern    Not on file   Social History Narrative    Originally from [address] [place] [address] is seen here    10 grandchildren       SCREENINGS   NIH Stroke Scale  NIH Stroke Scale Assessed: Yes  Interval: Baseline  Level of Consciousness (1a. ): Alert  LOC Questions (1b. ):  Answers both correctly  LOC Commands (1c. ): Performs both tasks correctly  Best Gaze (2. ): Normal  Visual (3. ): No visual loss  Facial Palsy (4. ): Normal symmetrical movement  Motor Arm, Left (5a. ): No drift  Motor Arm, Right (5b. ): No drift  Motor Leg, Left (6a. ): No drift  Motor Leg, Right (6b. ): Drift, but does not hit bed  Limb Ataxia (7. ): (!) Present in one limb  Sensory (8. ): (!) Mild to Moderate  Best Language (9. ): No aphasia  Dysarthria (10. ): Normal  Extinction and Inattention (11): No abnormality  Total: 3         PHYSICAL EXAM    (up to 7 for level 4, 8 or more for level 5)     ED Triage Vitals [02/20/20 1049]   BP Temp Temp Source Pulse Resp SpO2 Height Weight   128/87 98.1 °F (36.7 °C) Oral 76 16 100 % 5' 4\" (1.626 m) 200 lb 6.4 oz (90.9 kg)      height is 5' 4\" (1.626 m) and weight is 200 lb 6.4 oz (90.9 kg). Her oral temperature is 98.1 °F (36.7 °C). Her blood pressure is 128/87 and her pulse is 68. Her respiration is 20 and oxygen saturation is 100%. Physical Exam  Constitutional: Appears well-developed and well-nourished. No distress. HENT:   Head: Normocephalic and atraumatic. Eyes: Conjunctivae and EOM are normal.   Neck: Neck supple. No JVD present. Cardiovascular: Normal rate and intact distal pulses. Pulmonary/Chest: Lungs clear to auscultation. Effort normal. No respiratory distress. Abdominal: Nontender, exhibits no distension. Neurological: Alert. Cranial nerves II to XII intact. Light touch sensation loss on the right, upper and lower extremity as well as right side of her face. Ataxia with finger-nose testing on the right, none on the left. No extinction. Pronator drift with right leg but does not hit bed. Identifies objects appropriately. Speech and comprehension intact. Musculoskeletal: No lower extremity edema. 4+ out of 5 strength with hip flexion and extension on the right, 5 out of 5 diffusely on the left. Skin: Skin is warm and dry. Psychiatric: Normal mood and affect. Behavior is normal.   Nursing note and vitals reviewed.       DIAGNOSTIC RESULTS   LABS:    Results for orders placed or performed during the hospital encounter of 02/20/20   CBC Auto Differential   Result Value Ref Range    WBC 10.7 4.0 - 11.0 K/uL    RBC 4.58 4.00 - 5.20 M/uL    Hemoglobin 12.8 12.0 - 16.0 g/dL    Hematocrit 38.8 36.0 - 48.0 %    MCV 84.9 80.0 - 100.0 fL    MCH 27.9 26.0 - 34.0 pg    MCHC 32.9 31.0 - 36.0 g/dL    RDW 13.5 12.4 - 15.4 %    Platelets 209 822 - 867 K/uL    MPV 7.7 5.0 - 10.5 fL    Neutrophils % 69.9 %    Lymphocytes % 23.8 %    Monocytes % 5.6 %    Eosinophils % 0.2 %    Basophils % 0.5 %    Neutrophils Absolute 7.5 1.7 - 7.7 K/uL    Lymphocytes Absolute 2.5 1.0 - 5.1 K/uL    Monocytes Absolute 0.6 0.0 - 1.3 K/uL    Eosinophils Absolute 0.0 0.0 - 0.6 K/uL    Basophils Absolute 0.0 0.0 - 0.2 K/uL   Comprehensive Metabolic Panel w/ Reflex to MG   Result Value Ref Range    Sodium 138 136 - 145 mmol/L    Potassium reflex Magnesium 4.7 3.5 - 5.1 mmol/L    Chloride 102 99 - 110 mmol/L    CO2 25 21 - 32 mmol/L    Anion Gap 11 3 - 16    Glucose 144 (H) 70 - 99 mg/dL    BUN 14 7 - 20 mg/dL    CREATININE 0.6 0.6 - 1.2 mg/dL    GFR Non-African American >60 >60    GFR African American >60 >60    Calcium 10.6 8.3 - 10.6 mg/dL    Total Protein 7.7 6.4 - 8.2 g/dL    Alb 4.4 3.4 - 5.0 g/dL    Albumin/Globulin Ratio 1.3 1.1 - 2.2    Total Bilirubin <0.2 0.0 - 1.0 mg/dL    Alkaline Phosphatase 87 40 - 129 U/L    ALT 19 10 - 40 U/L    AST 24 15 - 37 U/L    Globulin 3.3 g/dL   Troponin   Result Value Ref Range    Troponin <0.01 <0.01 ng/mL       All other labs were within normal range or not returned as of this dictation. EKG: All EKG's are interpreted by the Emergency Department Physician who either signs or co-signs this chart in the absence of a cardiologist.    EKG by my interpretation shows normal sinus rhythm with rate of 68, normal axis and intervals, no ST or T wave changes indicative of ischemia at this time.     RADIOLOGY:   plain film images such as CT, Ultrasound and MRI are read by the radiologist. Plain radiographic images are visualized and of acute intracranial abnormality. Xr Chest Portable    Result Date: 2/20/2020  EXAMINATION: ONE XRAY VIEW OF THE CHEST 2/20/2020 11:25 am COMPARISON: Chest radiograph October 17, 2019. HISTORY: ORDERING SYSTEM PROVIDED HISTORY: general weakness TECHNOLOGIST PROVIDED HISTORY: Reason for exam:->general weakness Reason for Exam: general weakness Acuity: Acute Type of Exam: Initial FINDINGS: The lungs are without acute focal process. There is no effusion or pneumothorax. The cardiomediastinal silhouette is without acute process. The osseous structures are without acute process. No significant change compared to prior. No acute process. Cta Head Neck W Contrast    Result Date: 2/20/2020  EXAMINATION: CTA OF THE HEAD AND NECK WITH CONTRAST 2/20/2020 12:03 pm TECHNIQUE: CTA of the head and neck was performed with the administration of intravenous contrast. Multiplanar reformatted images are provided for review. MIP images are provided for review. Stenosis of the internal carotid arteries measured using NASCET criteria. Dose modulation, iterative reconstruction, and/or weight based adjustment of the mA/kV was utilized to reduce the radiation dose to as low as reasonably achievable. 3D surface reformatted and curved MIP images were submitted for review. COMPARISON: CTA 10/17/2019 HISTORY: ORDERING SYSTEM PROVIDED HISTORY: posterior headache TECHNOLOGIST PROVIDED HISTORY: Reason for exam:->posterior headache FINDINGS: CTA NECK: AORTIC ARCH/ARCH VESSELS: No dissection or arterial injury. No significant stenosis of the brachiocephalic or subclavian arteries. CAROTID ARTERIES: No dissection, arterial injury, or hemodynamically significant stenosis by NASCET criteria. VERTEBRAL ARTERIES: No dissection, arterial injury, or significant stenosis. SOFT TISSUES: The lung apices are clear. No cervical or superior mediastinal lymphadenopathy. The larynx and pharynx are unremarkable.   No acute abnormality of the salivary and thyroid glands. BONES: No acute osseous abnormality. CTA HEAD: ANTERIOR CIRCULATION: No significant stenosis of the intracranial internal carotid, anterior cerebral, or middle cerebral arteries. No aneurysm. Anterior communicating artery is present. POSTERIOR CIRCULATION: No significant stenosis of the vertebral, basilar, or posterior cerebral arteries. No aneurysm. Fetal type origin of posterior cerebral arteries bilaterally. OTHER: No dural venous sinus thrombosis on this non-dedicated study. BRAIN: No mass effect or midline shift. No extra-axial fluid collection. The gray-white differentiation is maintained. Unremarkable CTA of the head and neck. PROCEDURES   Unless otherwise noted below, none     Procedures    CRITICAL CARE TIME   N/A    CONSULTS:  None    EMERGENCY DEPARTMENT COURSE and DIFFERENTIAL DIAGNOSIS/MDM:   Vitals:    Vitals:    02/20/20 1049 02/20/20 1218   BP: 128/87    Pulse: 76 68   Resp: 16 20   Temp: 98.1 °F (36.7 °C)    TempSrc: Oral    SpO2: 100% 100%   Weight: 200 lb 6.4 oz (90.9 kg)    Height: 5' 4\" (1.626 m)        Patient was given the following medications:  Medications   metoclopramide (REGLAN) injection 10 mg (10 mg Intravenous Given 2/20/20 1128)   diphenhydrAMINE (BENADRYL) injection 12.5 mg (12.5 mg Intravenous Given 2/20/20 1128)   0.9 % sodium chloride bolus (0 mLs Intravenous Stopped 2/20/20 1321)   iopamidol (ISOVUE-370) 76 % injection 75 mL (75 mLs Intravenous Given 2/20/20 1222)   ketorolac (TORADOL) injection 15 mg (15 mg Intravenous Given 2/20/20 1301)     NIHSS 3 on arrival, difficult to tell if this is new or not. She seems to have ataxia and right upper extremity, sensory loss on entire right side, as well as right lower extremity pronator drift. Patient somewhat unclear on if she had numbness originally, however states that if she did have numbness on the right that it completely resolved with time in rehab.     Unknown last known normal if this is a new numbness. Patient reports symptoms for around the last 3 days. She would be outside the window for administration of TPA or intervention for large vessel occlusion. Vital signs within normal limits. She reports some headache and neck pain and stiffness, however has negative jolt accentuation, no signs of meningismus by my interpretation. Low suspicion for meningitis, encephalitis. CT head, angiogram head and neck performed, by radiology interpretation shows no acute abnormality including no sign of hemorrhage, acute or subacute infarct. Labs unremarkable including electrolytes, renal function, LFTs, CBC. Patient remained symptomatic here in the ED, pain somewhat improved by Reglan, Toradol, Benadryl. Right-sided numbness persisted however. With possible new deficit versus exacerbation of existing deficit, plan will be to admit for further CVA evaluation. FINAL IMPRESSION      1. Acute nonintractable headache, unspecified headache type    2. Right sided numbness          DISPOSITION/PLAN   DISPOSITION Admitted 02/20/2020 01:35:17 PM      PATIENT REFERRED TO:  Krissy Finnegan MD  [facility] [place]  [phone]            DISCHARGE MEDICATIONS:  New Prescriptions    No medications on file       DISCONTINUED MEDICATIONS:  Discontinued Medications    TIZANIDINE (ZANAFLEX) 2 MG TABLET    Take 1 tablet by mouth 3 times daily as needed (for muscle spasm .    hold when taking the gabapentin)                (Please note that portions of this note were completed with a voice recognition program.  Efforts were made to edit the dictations but occasionally words are mis-transcribed.)    Monica Garibay MD (electronically signed)            Monica Garibay MD  02/20/20 5366

## 2020-02-20 NOTE — ED NOTES
Report called to  [facility]      RN   To Room   [id number]  Cardiac monitor on during transfer  Pt's pain level   Denies   VSS, Afebrile   IV site is clean, dry and intact, Normal saline locked   Family updated on transfer            Maria C Field RN  02/20/20 7613

## 2020-02-20 NOTE — PROGRESS NOTES
Speech Language Pathology  Facility/Department: [address] PROGRESSIVE CARE   CLINICAL BEDSIDE SWALLOW EVALUATION    NAME: Aury Wood  : 1955  MRN: [id number]    ADMISSION DATE: 2020  ADMITTING DIAGNOSIS: has Diabetes mellitus ([address] Obesity; Dystonia; Cerebral thrombosis with cerebral infarction [facility]); Sleep apnea; Right sided weakness; Trigger finger, acquired; Elevated CK; Primary osteoarthritis of both knees; Mood disorder (HCC) - follows with Psych Dr. Susan Mahmood; Calcific tendonitis of right shoulder; Essential hypertension; Mixed hyperlipidemia; Chronic low back pain without sciatica; and TIA (transient ischemic attack) on their problem list.  ONSET DATE: 2020    Recent Chest Xray 2020  Impression   No acute process. Preliminary CT Head 2020  Impression   No evidence of acute intracranial abnormality. Komal Pereira is a 71 y.o. female with history of prior CVA with residual right-sided weakness, reporting posterior headache and heaviness in the back of her head and neck, intermittent over the last several days since Saturday. She had similar symptoms whenever she had her stroke. She states that she has general weakness and fatigue in the same timeframe, unknown exact last known normal, likely since Saturday. She denies any chest pain or shortness of breath. No abdominal pains, nausea, vomiting. She occasionally has some flashes of light in her vision without a clear pattern. She denies any change in her visual acuity, no blurry vision or double vision. No spinning sensation. No complete loss of consciousness    Chart Review:  10/18/2019 SLP and CSE:Minimal to mild Oropharyngeal Dysphagia characterized by minimal right labial/buccal asym which may impact bolus prep of regular dry solid foods; potential delayed initiation of swallow; no overt clinical s/s of penetration or aspiration post swallow.  Recommend continue regular consistency diet  Cognitive and symptoms of aspiration and make recommendations regarding safe dietary consistencies, effective compensatory strategies, and safe eating environment. Impression  Dysphagia Diagnosis: Mild pharyngeal stage dysphagia  Dysphagia Impression : Mild pharyngeal dysphagia characterized by slow but effective mastication; functional bolus formation and oral clearance; decreased laryngeal elevation with variable effortful and (at times) delayed swallow  · no overt s/s of aspiration  · pt verbalized globus sensation of crumbs in throat (points near thyroid notch) in 1/3 trials, which is alleviated with liquid wash  · SLP eval to be completed; pt does report increased difficulty word finding since Saturday which comes and goes      Dysphagia Outcome Severity Scale: Level 5: Mild dysphagia- Distant supervision. May need one diet consistency restricted     Treatment Plan  Requires SLP Intervention: Yes  Duration/Frequency of Treatment: 3-5x/wk while on acute unit  D/C Recommendations: To be determined  Referral To: Speech Evaluation    Recommended Diet and Intervention  Diet Solids Recommendation: Regular  Liquid Consistency Recommendation: Thin  Recommended Form of Meds: PO(with liquid or puree as tolerated)  Recommendations: Dysphagia treatment  Therapeutic Interventions: Diet tolerance monitoring;Patient/Family education    Compensatory Swallowing Strategies  Compensatory Swallowing Strategies: Small bites/sips; Alternate solids and liquids; Remain upright for 30-45 minutes after meals;Upright as possible for all oral intake    Treatment/Goals  Short-term Goals  Timeframe for Short-term Goals: 1wk  Dysphagia Goals: The patient will tolerate recommended diet without observed clinical signs of aspiration; The patient will recall and perform compensatory strategies, with no cues.     General  Chart Reviewed: Yes  Subjective  Subjective: Pleasant and cooperative; agreeable to PO trials; endorses decreased word finding 'coming and going' since Saturday; as well as pressure/heaviness at back of head and neck, and seeing spots and getting dizzy when she gets up, especially too quickly   Behavior/Cognition: Alert; Cooperative;Pleasant mood  Respiratory Status: Room air  Communication Observation: Functional(for assessment needs with basic questions)  Follows Directions: Simple  Dentition: Adequate  Patient Positioning: Upright in bed  Baseline Vocal Quality: Normal  Consistencies Administered: Reg solid; Dysphagia Minced and Moist (Dysphagia II); Dysphagia Pureed (Dysphagia I); Nectar - cup; Thin - cup     Vision/Hearing  Vision  Vision: (has glasses)  Hearing  Hearing: [facility] for [facility] needs)    Oral Motor Deficits  Oral/Motor  Oral Motor: Exceptions to [facility]  Labial ROM: Reduced right(unclear if exacerbated, or at pt baseline)  Labial Symmetry: Abnormal symmetry right(unclear if exacerbated, or at pt baseline)  Lingual Symmetry: Abnormal symmetry right(unclear if exacerbated, or at pt baseline)  Lingual Coordination: Reduced    Oral Phase Dysfunction  Oral Phase  Oral Phase - Comment: slow but effective mastication; functional bolus formation and oral clearance     Indicators of Pharyngeal Phase Dysfunction   Pharyngeal Phase  Pharyngeal Phase: Exceptions  Indicators of Pharyngeal Phase Dysfunction  Decreased Laryngeal Elevation: All  Pharyngeal Phase   Pharyngeal: Decreased laryngeal elevation with variable effortful and at times delayed swallow; no overt s/s of aspiration; pt verbalized globus sensation of crumbs in throat (points near thyroid notch) in 1/3 trials, which is alleviated with liquid wash     Prognosis  Prognosis  Prognosis for safe diet advancement: good  Individuals consulted  Consulted and agree with results and recommendations: Patient; Family member;RN  Family member consulted:  at bedside    Education  Patient Education: results and recs  Patient Education Response: Verbalizes understanding      Therapy Time  SLP

## 2020-02-20 NOTE — PROGRESS NOTES
Patient arrived via stretcher from ED to room 4042. Heart monitor connected and verified with [facility].  VSS. Patient oriented to room and unit. All questions answered. Call light and bedside table within reach. Patient is resting quietly with no further questions at this time. Will continue to monitor.

## 2020-02-20 NOTE — PROGRESS NOTES
4 Eyes Skin Assessment     The patient is being assess for  Admission    I agree that 2 RN's have performed a thorough Head to Toe Skin Assessment on the patient. ALL assessment sites listed below have been assessed. Areas assessed by both nurses:   [x]   Head, Face, and Ears   [x]   Shoulders, Back, and Chest  [x]   Arms, Elbows, and Hands   [x]   Coccyx, Sacrum, and IschIum  [x]   Legs, Feet, and Heels        Does the Patient have Skin Breakdown?   No         [place] Prevention initiated:  No   Wound Care Orders initiated:  No      [facility] nurse consulted for Pressure Injury (Stage 3,4, Unstageable, DTI, NWPT, and Complex wounds), New and Established Ostomies:  No      Nurse 1 eSignature: Electronically signed by Shiva Mcdonald RN on 2/20/20 at 3:26 PM    **SHARE this note so that the co-signing nurse is able to place an eSignature**    Nurse 2 eSignature: Electronically signed by Staci Hermosillo RN on 2/20/20 at 4:01 PM

## 2020-02-20 NOTE — PROGRESS NOTES
NAME:  Vanessa Wood  YOB: 1955  MEDICAL RECORD NUMBER:  [id number]  TODAYS DATE:  2/20/2020    Discussed personal risk factors for Stroke /TIA with patient/family, and ways to reduce the risk for a recurrent stroke. Patient's personal risk factors which were identified are:     [] Alcohol Abuse: check with your physician before any alcohol consumption. [] Atrial fibrillation: may cause blood clots. [] Drug Abuse: Seek help, talk with your doctor  [] Clotting Disorder  [x] Diabetes  [] Family history of stroke or heart disease  [x] High Blood Pressure/Hypertension: work with your physician.  [] High cholesterol: monitor cholesterol levels with your physician.   [] Overweight/Obesity: work with your physician for your ideal body weight.  [] Physical Inactivity: get regular exercise as directed by your physician. [] Personal history of previous TIA or stroke  [] Poor Diet; decrease salt (sodium) in your diet, follow diet directed by physician. [] Smoking: Cigarette/Cigar: stop smoking. Advised pt. that you can reduce your risk for stroke/TIA by modifying/controlling the risk factors that you have. Pt.advised to take the medications as prescribed, which will be detailed in the discharge instructions, and to not stop taking them without consulting their physician. In addition, pt. advised to maintain a healthy diet, exercise regularly and to not smoke. [facility]'s Stroke treatment and prevention, Managing your recovery  notebook  provided and/or reviewed  with patient/family. The notebook includes, but not limited to, sections addressing warning signs & symptoms of a stroke, which are: sudden numbness or weakness especially on one side of the body, sudden confusion, difficulty speaking or understanding, sudden changes in vision, sudden dizziness or loss of balance/ coordination, or sudden severe headache.   The need to call EMS (911) immediately if signs & symptoms occur is emphasized . The notebook also provides education on Stroke community resources and stroke advocacy. The need for follow-up after discharge was highlighted with patient/family with them being able to repeat understanding of the importance of this.       Electronically signed by Williams Valencia RN on 2/20/2020 at 2:47 PM

## 2020-02-21 ENCOUNTER — APPOINTMENT (OUTPATIENT)
Dept: MRI IMAGING | Age: 65
DRG: 103 | End: 2020-02-21
Payer: MEDICARE

## 2020-02-21 ENCOUNTER — APPOINTMENT (OUTPATIENT)
Dept: PHYSICAL THERAPY | Age: 65
End: 2020-02-21
Payer: MEDICARE

## 2020-02-21 LAB
CHOLESTEROL, TOTAL: 145 MG/DL (ref 0–199)
ESTIMATED AVERAGE GLUCOSE: 145.6 MG/DL
ESTIMATED AVERAGE GLUCOSE: 148.5 MG/DL
GLUCOSE BLD-MCNC: 120 MG/DL (ref 70–99)
GLUCOSE BLD-MCNC: 126 MG/DL (ref 70–99)
GLUCOSE BLD-MCNC: 178 MG/DL (ref 70–99)
GLUCOSE BLD-MCNC: 187 MG/DL (ref 70–99)
GLUCOSE BLD-MCNC: 99 MG/DL (ref 70–99)
HBA1C MFR BLD: 6.7 %
HBA1C MFR BLD: 6.8 %
HCT VFR BLD CALC: 38.1 % (ref 36–48)
HDLC SERPL-MCNC: 57 MG/DL (ref 40–60)
HEMOGLOBIN: 12.5 G/DL (ref 12–16)
LDL CHOLESTEROL CALCULATED: 70 MG/DL
MCH RBC QN AUTO: 27.9 PG (ref 26–34)
MCHC RBC AUTO-ENTMCNC: 32.7 G/DL (ref 31–36)
MCV RBC AUTO: 85.1 FL (ref 80–100)
PDW BLD-RTO: 13.8 % (ref 12.4–15.4)
PERFORMED ON: ABNORMAL
PERFORMED ON: NORMAL
PLATELET # BLD: 288 K/UL (ref 135–450)
PMV BLD AUTO: 7.6 FL (ref 5–10.5)
RBC # BLD: 4.48 M/UL (ref 4–5.2)
TRIGL SERPL-MCNC: 92 MG/DL (ref 0–150)
VLDLC SERPL CALC-MCNC: 18 MG/DL
WBC # BLD: 8.4 K/UL (ref 4–11)

## 2020-02-21 PROCEDURE — 92523 SPEECH SOUND LANG COMPREHEN: CPT

## 2020-02-21 PROCEDURE — 72141 MRI NECK SPINE W/O DYE: CPT

## 2020-02-21 PROCEDURE — G0378 HOSPITAL OBSERVATION PER HR: HCPCS

## 2020-02-21 PROCEDURE — 94760 N-INVAS EAR/PLS OXIMETRY 1: CPT

## 2020-02-21 PROCEDURE — 97530 THERAPEUTIC ACTIVITIES: CPT

## 2020-02-21 PROCEDURE — 6360000002 HC RX W HCPCS: Performed by: HOSPITALIST

## 2020-02-21 PROCEDURE — 83036 HEMOGLOBIN GLYCOSYLATED A1C: CPT

## 2020-02-21 PROCEDURE — 70551 MRI BRAIN STEM W/O DYE: CPT

## 2020-02-21 PROCEDURE — 2580000003 HC RX 258: Performed by: HOSPITALIST

## 2020-02-21 PROCEDURE — 36415 COLL VENOUS BLD VENIPUNCTURE: CPT

## 2020-02-21 PROCEDURE — 6370000000 HC RX 637 (ALT 250 FOR IP): Performed by: HOSPITALIST

## 2020-02-21 PROCEDURE — 97162 PT EVAL MOD COMPLEX 30 MIN: CPT

## 2020-02-21 PROCEDURE — 97110 THERAPEUTIC EXERCISES: CPT

## 2020-02-21 PROCEDURE — 80061 LIPID PANEL: CPT

## 2020-02-21 PROCEDURE — 97165 OT EVAL LOW COMPLEX 30 MIN: CPT

## 2020-02-21 PROCEDURE — 96372 THER/PROPH/DIAG INJ SC/IM: CPT

## 2020-02-21 PROCEDURE — 94660 CPAP INITIATION&MGMT: CPT

## 2020-02-21 PROCEDURE — 1200000000 HC SEMI PRIVATE

## 2020-02-21 PROCEDURE — 85027 COMPLETE CBC AUTOMATED: CPT

## 2020-02-21 RX ADMIN — DULOXETINE HYDROCHLORIDE 30 MG: 30 CAPSULE, DELAYED RELEASE ORAL at 08:55

## 2020-02-21 RX ADMIN — ATORVASTATIN CALCIUM 20 MG: 20 TABLET, FILM COATED ORAL at 20:15

## 2020-02-21 RX ADMIN — METOPROLOL SUCCINATE 25 MG: 25 TABLET, EXTENDED RELEASE ORAL at 08:54

## 2020-02-21 RX ADMIN — SODIUM CHLORIDE, PRESERVATIVE FREE 10 ML: 5 INJECTION INTRAVENOUS at 20:16

## 2020-02-21 RX ADMIN — ASPIRIN 81 MG: 81 TABLET, COATED ORAL at 08:54

## 2020-02-21 RX ADMIN — GABAPENTIN 300 MG: 300 CAPSULE ORAL at 20:15

## 2020-02-21 RX ADMIN — VALSARTAN 80 MG: 80 TABLET, FILM COATED ORAL at 08:54

## 2020-02-21 RX ADMIN — SODIUM CHLORIDE, PRESERVATIVE FREE 10 ML: 5 INJECTION INTRAVENOUS at 08:55

## 2020-02-21 RX ADMIN — GABAPENTIN 100 MG: 100 CAPSULE ORAL at 08:54

## 2020-02-21 RX ADMIN — TRAZODONE HYDROCHLORIDE 50 MG: 50 TABLET ORAL at 20:14

## 2020-02-21 RX ADMIN — PANTOPRAZOLE SODIUM 40 MG: 40 TABLET, DELAYED RELEASE ORAL at 06:31

## 2020-02-21 RX ADMIN — OXYCODONE HYDROCHLORIDE AND ACETAMINOPHEN 500 MG: 500 TABLET ORAL at 08:54

## 2020-02-21 RX ADMIN — ENOXAPARIN SODIUM 40 MG: 40 INJECTION SUBCUTANEOUS at 20:18

## 2020-02-21 RX ADMIN — INSULIN GLARGINE 12 UNITS: 100 INJECTION, SOLUTION SUBCUTANEOUS at 20:15

## 2020-02-21 ASSESSMENT — PAIN SCALES - GENERAL
PAINLEVEL_OUTOF10: 0

## 2020-02-21 NOTE — PLAN OF CARE
Problem: HEMODYNAMIC STATUS  Goal: Patient has stable vital signs and fluid balance  Outcome: Ongoing     Problem: ACTIVITY INTOLERANCE/IMPAIRED MOBILITY  Goal: Mobility/activity is maintained at optimum level for patient  Outcome: Ongoing     Problem: COMMUNICATION IMPAIRMENT  Goal: Ability to express needs and understand communication  Outcome: Ongoing     Problem: Pain:  Goal: Pain level will decrease  Description  Pain level will decrease  Outcome: Ongoing  Goal: Control of acute pain  Description  Control of acute pain  Outcome: Ongoing  Goal: Control of chronic pain  Description  Control of chronic pain  Outcome: Ongoing     Problem: Falls - Risk of:  Goal: Will remain free from falls  Description  Will remain free from falls  Outcome: Ongoing  Goal: Absence of physical injury  Description  Absence of physical injury  Outcome: Ongoing

## 2020-02-21 NOTE — PROGRESS NOTES
Speech Language Pathology  Facility/Department: [facility] PROGRESSIVE CARE  Initial Speech/Language/Cognitive Assessment    NAME: Dimitri Wood  : 1955   MRN: [id number]  ADMISSION DATE: 2020  ADMITTING DIAGNOSIS: has Diabetes mellitus ([facility] [address] Obesity; Dystonia; Cerebral thrombosis with cerebral infarction [facility]); Sleep apnea; Right sided weakness; Trigger finger, acquired; Elevated CK; Primary osteoarthritis of both knees; Mood disorder (HCC) - follows with Psych Dr. Toni Manley; Calcific tendonitis of right shoulder; Essential hypertension; Mixed hyperlipidemia; Chronic low back pain without sciatica; and TIA (transient ischemic attack) on their problem list.        Misael Wood is a 71 y. o. female with history of prior CVA with residual right-sided weakness, reporting posterior headache and heaviness in the back of her head and neck, intermittent over the last several days since Saturday.  She had similar symptoms whenever she had her stroke. Snehal Sargent states that she has general weakness and fatigue in the same timeframe, unknown exact last known normal, likely since Saturday.  She denies any chest pain or shortness of breath.  No abdominal pains, nausea, vomiting. She occasionally has some flashes of light in her vision without a clear pattern.  She denies any change in her visual acuity, no blurry vision or double vision.  No spinning sensation.  No complete loss of consciousness    DATE ONSET: 2020    Date of Eval: 2020   Evaluating Therapist: Gopi Cotto MS CCC/SLP [id number]  Speech Language Pathologist      RECENT RESULTS  CT OF HEAD/MRI:  MRI: pending this date    CT 2020  Impression   No evidence of acute intracranial abnormality. Primary Complaint:  Pt gives lengthy explanation of why she came to ED, providing details for several days worth of information. Only complaint related to ST is decreased word finding.     Pain:  Pain Assessment  Pain Assessment: 0-10  Pain Level: 0      Assessment:  Cognitive Diagnosis: Mild memory deficits which pt feels is premorbid. Aphasia Diagnosis: No noted aphasia. Pt reports decreased word finding but shows no signs in conversation, is able to give accurate directions from hospital to her home, uses proper nouns accurately, and relays biographical information accurately. Communication Diagnosis: Appears WFL for functional needs. Diagnosis:   Pt chooses to complete evaluation in English  Pt reports decreased word finding, which is not noted. Pt presents with mildly impaired short term memory deficits that may be premorbid. Minor errors are noted with written expression, which pt reports is difficult for her in [place] premorbidly. Suspect pt is at or close to her premorbid level of functioning. Evaluation is not completed due to pt going to MRI  Pt is advised to notify MD of any cognitive communication concerns if she is d/c'd prior to being seen by Speech again. Speech will continue to follow while on acute. Recommendations:  Requires SLP Intervention: Yes  Duration/Frequency of Treatment: 3-5x/wk while on acute unit  D/C Recommendations: To be determined       Plan:   Goals:  Short-term Goals  Timeframe for Short-term Goals: 7-10 days  Goal 1: Pt will demonstrate reading [id number] for functional needs  Goal 2: Pt will demonstrate memory [employer] for daily events and information related to hospitalization  Goal 3: Pt will complete calculations [employer] for her needs  Goal 4: Pt will complete oral motor exercises WFL with written cues. Goal 5: Additional goal to be developed as indicated.    Patient/family involved in developing goals and treatment plan: n/a due to pt leaves for MRI    Subjective:   Previous level of function and limitations:   10/18/2019 SLP and CSE:Minimal to mild Oropharyngeal Dysphagia characterized by minimal right labial/buccal asym which may impact bolus prep of regular dry solid foods; potential delayed initiation of Reduced    Auditory Comprehension  Comprehension: Within Functional Limits  Assessed via conversation  Follows multi-unit commands accurately    Expression  Primary Mode of Expression: Verbal  Word fluency:  9 animals in 60 seconds; 15 foods in 60 seconds  Responsive namin/5  Biographical information:  Accurate  Provides directions from hospital to pt's home accurately with no noted word finding deficit    Written Expression  Dominant Hand: Right  Name:  Accurate  Sentence level:  Minor errors with spelling    Motor Speech  Motor Speech: Within Functional Limits    Pragmatics/Social Functioning  Pragmatics: Within functional limits    Cognition:      Orientation  Overall Orientation Status: Within Normal Limits  Memory  Memory: Exceptions to [facility]  People Encountered: Mild  Short-term Memory: Mild  Problem Solving  Problem Solving: Within Functional Limits  Numeric Reasoning  Numeric Reasoning: (To be assessed); pt manages finances and often pays bills by phone and checks balance by calling bank.   Safety/Judgement  Safety/Judgement: Within Functional Limits    Prognosis:  Speech Therapy Prognosis  Prognosis: Good  Individuals consulted  Consulted and agree with results and recommendations: Patient    Education:  Patient Education: results and recs  Patient Education Response: Verbalizes understanding  Safety Devices in place: Yes  Type of devices: Left in chair; transportation arrives to take to MRI    Therapy Time:   Individual Concurrent Group Co-treatment   Time In [facility] MS CCC/SLP 0695  Speech Language Pathologist  2020 12:48 PM

## 2020-02-21 NOTE — FLOWSHEET NOTE
02/21/20 1332   Encounter Summary   Services provided to: Patient   Referral/Consult From: Nurse   Support System Spouse; Children   Place of [employer]   Continue Visiting   (visit, prayer, jyotiing, AD doc discusion 2/21 CL)   Complexity of Encounter Moderate   Length of Encounter 30 minutes   Advance Care Planning Yes   Spiritual/Latter-day   Type Spiritual support   Assessment Calm; Approachable; Hopeful;Coping   Intervention Active listening;Explored feelings, thoughts, concerns;Prayer;Discussed belief system/Yarsanism practices/yuliana;Discussed illness/injury and it's impact   Outcome Engaged in conversation;Coping; Hopeful   Advance Directives (For Healthcare)   Healthcare Directive No, patient does not have an advance directive for healthcare treatment   Advance Directives Documents given;Documents explained  (AD doc protocol complete)   Electronically signed by Matthew Lizama on 2/21/2020 at 1:34 PM

## 2020-02-21 NOTE — PLAN OF CARE
Problem: HEMODYNAMIC STATUS  Goal: Patient has stable vital signs and fluid balance  2/21/2020 0757 by Vamshi Henao RN  Outcome: Ongoing  2/21/2020 0333 by Lillian Ryder RN  Outcome: Ongoing     Problem: ACTIVITY INTOLERANCE/IMPAIRED MOBILITY  Goal: Mobility/activity is maintained at optimum level for patient  2/21/2020 0757 by Vamshi Henao RN  Outcome: Ongoing  2/21/2020 0333 by Lillian Ryder RN  Outcome: Ongoing     Problem: COMMUNICATION IMPAIRMENT  Goal: Ability to express needs and understand communication  2/21/2020 0757 by Vamshi Henao RN  Outcome: Ongoing  2/21/2020 0333 by Lillian Ryder RN  Outcome: Ongoing     Problem: Pain:  Goal: Pain level will decrease  Description  Pain level will decrease  2/21/2020 0757 by Vamshi Henao RN  Outcome: Ongoing  2/21/2020 0333 by Lillian Ryder RN  Outcome: Ongoing  Goal: Control of acute pain  Description  Control of acute pain  2/21/2020 0757 by Vamshi Henao RN  Outcome: Ongoing  2/21/2020 0333 by Lillian Ryder RN  Outcome: Ongoing  Goal: Control of chronic pain  Description  Control of chronic pain  2/21/2020 0757 by Vamshi Henao RN  Outcome: Ongoing  2/21/2020 0333 by Lillian Ryder RN  Outcome: Ongoing     Problem: Falls - Risk of:  Goal: Will remain free from falls  Description  Will remain free from falls  2/21/2020 0757 by Vamshi Henao RN  Outcome: Ongoing  2/21/2020 0333 by Lillian Ryder RN  Outcome: Ongoing  Goal: Absence of physical injury  Description  Absence of physical injury  2/21/2020 0757 by Vamshi Henao RN  Outcome: Ongoing  2/21/2020 0333 by Lillian Ryder RN  Outcome: Ongoing

## 2020-02-21 NOTE — PROGRESS NOTES
Occupational Therapy   Occupational Therapy Initial Assessment  Should patient be discharged prior to another treatment session, this note shall serve as the discharge summary. Date: 2020   Patient Name: Jeanmarie Shanks  MRN: [id number]     : 1955    Date of Service: 2020    Discharge Recommendations:  Home with assist PRN, Home with Home health OT  OT Equipment Recommendations  Equipment Needed: No    Assessment   Performance deficits / Impairments: Decreased high-level IADLs;Decreased functional mobility ; Decreased ADL status; Decreased fine motor control;Decreased strength;Decreased balance  Assessment: Pt presents out of concern for TIA/CVA. Pt currently transferring with CGA, hand held assist.  Pt with some diminished fine motor control of R hand. She would benefit from acute OT to increase her independence and FM coord in order for d/c home with HHOT and PRN assist of family  Prognosis: Good  Decision Making: Medium Complexity  History: see above  Exam: ROM/MMT, FM coord, transfers, bed mob  Assistance / Modification: CGA  OT Education: OT Role;ADL Adaptive Strategies;Transfer Training;Plan of Care  Barriers to Learning: none  REQUIRES OT FOLLOW UP: Yes  Activity Tolerance  Activity Tolerance: Patient Tolerated treatment well  Safety Devices  Safety Devices in place: Yes  Type of devices: Nurse notified; Left in chair;Call light within reach           Patient Diagnosis(es): The primary encounter diagnosis was Acute nonintractable headache, unspecified headache type. A diagnosis of Right sided numbness was also pertinent to this visit. has a past medical history of Arthritis, Diabetes, GERD (gastroesophageal reflux disease), Hyperlipidemia, Hypertension, Lumbar disc disease, Sleep apnea, Unspecified cerebral artery occlusion with cerebral infarction, and Wears glasses. has a past surgical history that includes partial hysterectomy (cervix not removed) (2003);  Hand surgery (Right); Foot surgery (Bilateral); Carpal tunnel release (Left, 9/3/15); Finger trigger release (Left, 9/3/15); Carpal tunnel release (Right, 9/22/15); Finger trigger release (Right, 9/22/15); and Shoulder arthroscopy (Right, 06/20/2018). Restrictions  Restrictions/Precautions  Restrictions/Precautions: Fall Risk    Subjective   General  Chart Reviewed: Yes, Progress Notes, History and Physical, Orders  Patient assessed for rehabilitation services?: Yes  Additional Pertinent Hx: Per Dr. Sidney Almonte, \"The patient Staci Jordan is a 71 y. o.female With medical history significant for diabetes mellitus GERD hypertension hyperlipidemia degenerative disc disease and obstructive sleep apnea. Patient also has a history of CVA with residual right-sided weakness and some dysarthria Patient posterior headache and heaviness started on Saturday and has gradually gotten worse. Patient believes that she had similar symptoms when she had the stroke. Patient also reports generalized weakness and fatigue. Patient also complains of flashing lights in the visual fiel\"d  Family / Caregiver Present: Yes(dtr and friends in at the end of session)  Referring Practitioner: Dr. Sidney Almonte  Diagnosis: headache and \"Heaviness on neck\"  Subjective  Subjective: Pt agreed to OT, seen in room.   Pt stated she still has heaviness on neck but no pain currently    Social/Functional History  Social/Functional History  Lives With: Spouse  Type of Home: House  Home Layout: Multi-level  Home Access: Stairs to enter with rails  Entrance Stairs - Number of Steps: 4 and 4   Entrance Stairs - Rails: Left  Bathroom Shower/Tub: Tub/Shower unit, Shower chair with back  Bathroom Toilet: Standard  Bathroom Equipment: Shower chair  Home Equipment: 4 wheeled walker, Cane(CPAP)  Receives Help From: Family, Friend(s)  ADL Assistance: Independent  Homemaking Assistance: Needs assistance(pt not assisting currently; dtr and  assist)  Homemaking Responsibilities: No  Ambulation Assistance: Needs assistance(uses str cane in house, 4 ww when out)  Transfer Assistance: Independent  Active : Yes  Mode of Transportation: Car  Leisure & Hobbies: go to [employer], read Bible, housekeeping       Objective   Vision: Impaired  Vision Exceptions: Wears glasses at all times    Orientation  Overall Orientation Status: Within Normal Limits     Balance  Sitting Balance: Supervision  Standing Balance: Contact guard assistance  ADL  Feeding: Independent  Tone RUE  RUE Tone: Normotonic  Tone LUE  LUE Tone: Normotonic  Coordination  Movements Are Fluid And Coordinated: No  Coordination and Movement description: Fine motor impairments     Bed mobility  Supine to Sit: Stand by assistance  Sit to Supine: Unable to assess(left in chair at end of session)  Transfers  Sit to stand: Contact guard assistance  Stand to sit: Contact guard assistance  Transfer Comments: pt shaky on standing but no LOB; completed tranfers with hand held assist to bedside chair with CGA, no device     Cognition  Overall Cognitive Status: WFL        Sensation  Overall Sensation Status: WFL        LUE AROM (degrees)  LUE AROM : WFL  RUE AROM (degrees)  RUE AROM : WFL  LUE Strength  Gross LUE Strength: WFL  RUE Strength  Gross RUE Strength: Ex[facility]  RUE Strength Comment: pt with some weakness and FM difficulty but overall able to engage functionally                   Plan   Plan  Times per week: 2-3  Times per day: Daily  Plan weeks: less than 1  Current Treatment Recommendations: Strengthening, ROM, Patient/Caregiver Education & Training, Balance Training, Self-Care / ADL, Functional Mobility Training, Neuromuscular Re-education, Home Management Training, Endurance Training         OutComes Score    John JASEN Wood scored a 20/24 on the [facility] ADL Inpatient form. Current research shows that an AM-PAC score of 18 or greater is typically associated with a discharge to the patient's home setting.  Based on the

## 2020-02-21 NOTE — PROGRESS NOTES
GI: Non-tender. Non-distended. No hernia. Skin: Warm, dry, normal texture and turgor. Lymph: No cervical LAD. No supraclavicular LAD. M/S: / Ext. Right-sided hemiparesis  Neuro: CN 2-12 are intact,  no neurologic deficits noted. PT/INR: No results for input(s): PROTIME, INR in the last 72 hours. APTT: No results for input(s): APTT in the last 72 hours. CBC:   Recent Labs     02/20/20  1104 02/21/20  0633   WBC 10.7 8.4   HGB 12.8 12.5   HCT 38.8 38.1   MCV 84.9 85.1    288       BMP:   Recent Labs     02/20/20  1104      K 4.7      CO2 25   BUN 14   CREATININE 0.6       LIVER PROFILE:   Recent Labs     02/20/20  1104   ALKPHOS 87   AST 24   ALT 19   BILITOT <0.2     No results for input(s): AMYLASE in the last 72 hours. No results for input(s): LIPASE in the last 72 hours. UA:No results for input(s): NITRITE, LABCAST, WBCUA, RBCUA, MUCUS in the last 72 hours. TROPONIN:   Recent Labs     02/20/20  1104   TROPONINI <0.01       Lab Results   Component Value Date/Time    URRFLXCULT Not Indicated 02/20/2020 02:03 PM       No results for input(s): TSHREFLEX in the last 72 hours. No components found for: IRS7250  POC GLUCOSE:    Recent Labs     02/20/20  2124 02/21/20  0815   POCGLU 244* 99     Recent Labs     02/20/20  1104   LABA1C 6.7      Lab Results   Component Value Date    LABA1C 6.7 02/20/2020      Left ventricular cavity size is normal.   Ejection fraction is visually estimated to be 55-60%. Normal diastolic function. (10/17/2019)    ASSESSMENT AND PLAN  Headache  Imaging studies are negative including MRI of the brain  Chronic microvascular ischemic changes  Symptomatic and supportive treatment  Neurology consult is appreciated  Check MRI for cervical spine      Right hemiparesis  Continue with aspirin and statin     Anxiety depression F 41.9  Continue on home medication        Type 2 diabetes mellitus E11.9  Continue Lantus insulin  prandial insulin.   Insulin sliding scale  Check A1c                      Code Status: Full Code        Dispo -cc        The patient and / or the family were informed of the results of any tests, a time was given to answer questions, a plan was proposed and they agreed with plan. Jailyn Jensen MD    This note was transcribed using [employer]. Please disregard any translational errors.

## 2020-02-21 NOTE — H&P
(NAPROSYN) 375 MG tablet Take 1 tablet by mouth 2 times daily as needed for Pain 2/5/20  Yes Macho Finnegan MD   metFORMIN (GLUCOPHAGE) 500 MG tablet TAKE TWO TABLETS BY MOUTH TWICE A DAY WITH MEALS 1/31/20  Yes Macho Finnegan MD   metoprolol succinate (TOPROL XL) 25 MG extended release tablet TAKE ONE TABLET BY MOUTH DAILY FOR BLOOD PRESSURE AND HEART RATE 1/31/20  Yes Macho Finnegan MD   Dulaglutide (TRULICITY) 1.5 WQ/1.6SV SOPN INJECT 1.5 MG UNDER THE SKIN ONCE WEEKLY 1/15/20  Yes Macho Finnegan MD   gabapentin (NEURONTIN) 100 MG capsule TAKE ONE CAPSULE BY MOUTH IN the morning 12/11/19 3/10/20 Yes Macho Finnegan MD   gabapentin (NEURONTIN) 300 MG capsule Take 1 capsule by mouth nightly for 90 days. 12/11/19 3/10/20 Yes Macho Finnegan MD   omeprazole (PRILOSEC) 40 MG delayed release capsule TAKE ONE CAPSULE BY MOUTH TWICE A DAY BEFORE MEALS 12/2/19  Yes Macho Finnegan MD   furosemide (LASIX) 20 MG tablet TAKE ONE TABLET BY MOUTH DAILY AS NEEDED FOR LEG SWELLING 11/20/19  Yes Macho Finnegan MD   valsartan (DIOVAN) 80 MG tablet TAKE ONE TABLET BY MOUTH DAILY 11/11/19  Yes Macho Finnegan MD   traZODone (DESYREL) 50 MG tablet Take 50 mg by mouth nightly   Yes Historical Provider, MD   albuterol sulfate HFA (PROVENTIL HFA) 108 (90 Base) MCG/ACT inhaler Inhale 2 puffs into the lungs every 6 hours as needed for Wheezing or Shortness of Breath May substitute ProAir MDI 6/25/19  Yes SHANNAN Faustin   insulin glargine (BASAGLAR KWIKPEN) 100 UNIT/ML injection pen Inject 12 Units into the skin nightly  Patient taking differently: Inject 16 Units into the skin nightly  1/28/19  Yes Macho Finnegan MD   FANAPT 1 MG TABS tablet Take 1 mg by mouth nightly  1/13/16  Yes Historical Provider, MD   aspirin 81 MG EC tablet Take 1 tablet by mouth daily. 4/13/15  Yes Dc Barnett MD   acetaminophen-codeine (TYLENOL #3) 300-30 MG per tablet Take 1 tablet by mouth every 12 hours as needed for Pain.  1/9/20   Historical Provider, MD   ammonium lactate

## 2020-02-21 NOTE — PROGRESS NOTES
Physical Therapy    Facility/Department: [address] PROGRESSIVE CARE  Initial Assessment    NAME: Michelle Wood  : 1955  MRN: [id number]    Date of Service: 2020    Discharge Recommendations:  Home with assist PRN, 2-3 sessions per week, Home with Home health PT   PT Equipment Recommendations  Equipment Needed: No    Assessment   Body structures, Functions, Activity limitations: Decreased functional mobility ; Decreased balance;Decreased strength  Assessment: Pt is a 70 y/o female who presents with headache and heaviness. Pt currently demonstrates deficits in gait and transfers below baseline. Pt would benefit from continued skilled PT to address these limitations and allow for safe return home. Recommend PRN A and home PT upon discharge. Treatment Diagnosis: impaired transfers  Prognosis: Good  Decision Making: Medium Complexity  PT Education: Goals;PT Role;Plan of Care;General Safety  REQUIRES PT FOLLOW UP: Yes  Activity Tolerance  Activity Tolerance: Patient Tolerated treatment well       Patient Diagnosis(es): The primary encounter diagnosis was Acute nonintractable headache, unspecified headache type. A diagnosis of Right sided numbness was also pertinent to this visit. has a past medical history of Arthritis, Diabetes, GERD (gastroesophageal reflux disease), Hyperlipidemia, Hypertension, Lumbar disc disease, Sleep apnea, Unspecified cerebral artery occlusion with cerebral infarction, and Wears glasses. has a past surgical history that includes partial hysterectomy (cervix not removed) (2003); Hand surgery (Right); Foot surgery (Bilateral); Carpal tunnel release (Left, 9/3/15); Finger trigger release (Left, 9/3/15); Carpal tunnel release (Right, 9/22/15); Finger trigger release (Right, 9/22/15); and Shoulder arthroscopy (Right, 2018).     Restrictions  Restrictions/Precautions  Restrictions/Precautions: Fall Risk  Vision/Hearing  Vision: Impaired  Vision Exceptions: Wears glasses at [employer], read Bible, housekeeping  Objective  AROM RLE (degrees)  RLE AROM: WFL  AROM LLE (degrees)  LLE AROM : WFL  Strength RLE  Strength RLE: Exception  Comment: hip flex 3/5, knee ext 3+/5, ankle DF 3/5  Strength LLE  Comment: hip flexion 4/5, knee ext 4/5, ankle DF 4/5     Sensation  Overall Sensation Status: Impaired  Additional Comments: pt reports intermittent N/T in R leg and arm  Bed mobility  Supine to Sit: Stand by assistance  Sit to Supine: Unable to assess(up in chair at end of session)  Transfers  Sit to Stand: Contact guard assistance  Stand to sit: Contact guard assistance  Bed to Chair: Contact guard assistance  Ambulation  Ambulation?: Yes  Ambulation 1  Surface: level tile  Device: No Device  Assistance: Contact guard assistance  Quality of Gait: decreased step length bilaterally, unsteady at times but no loss of balance  Distance: 5'     Balance  Sitting - Static: Good  Sitting - Dynamic: Good  Standing - Static: Fair  Standing - Dynamic: [address]  Times per week: 3-5x/wk  Current Treatment Recommendations: Strengthening, Balance Training, Functional Mobility Training, Transfer Training, Gait Training, Patient/Caregiver Education & Training  Safety Devices  Type of devices: Left in chair(OT present at end of session)    AM-PAC Score  AM-PAC Inpatient Mobility Raw Score : 20 (02/21/20 1222)  AM-PAC Inpatient T-Scale Score : 47.67 (02/21/20 1222)  Mobility Inpatient CMS 0-100% Score: 35.83 (02/21/20 1222)  Mobility Inpatient CMS G-Code Modifier : Natali Alcazar (02/21/20 1222)        Goals  Short term goals  Time Frame for Short term goals: 5 days  Short term goal 1: Pt will perform bed mobility mod I  Short term goal 2: Pt will perform transfers with supervision  Short term goal 3: Pt will ambulate 100' with appropriate AD and supervision  Patient Goals   Patient goals : \"to go back home\"     Therapy Time   Individual Concurrent Group Co-treatment   Time In 1010         Time Out 1026

## 2020-02-21 NOTE — PROGRESS NOTES
NAME:  Dorothy Wood  YOB: 1955  MEDICAL RECORD NUMBER:  [id number]  TODAYS DATE:  2/21/2020    Discussed personal risk factors for Stroke /TIA with patient/family, and ways to reduce the risk for a recurrent stroke. Patient's personal risk factors which were identified are:     [] Alcohol Abuse: check with your physician before any alcohol consumption. [] Atrial fibrillation: may cause blood clots. [] Drug Abuse: Seek help, talk with your doctor  [] Clotting Disorder  [x] Diabetes  [] Family history of stroke or heart disease  [x] High Blood Pressure/Hypertension: work with your physician. [x] High cholesterol: monitor cholesterol levels with your physician.   [] Overweight/Obesity: work with your physician for your ideal body weight.  [] Physical Inactivity: get regular exercise as directed by your physician. [x] Personal history of previous TIA or stroke  [] Poor Diet; decrease salt (sodium) in your diet, follow diet directed by physician. [] Smoking: Cigarette/Cigar: stop smoking. Advised pt. that you can reduce your risk for stroke/TIA by modifying/controlling the risk factors that you have. Pt.advised to take the medications as prescribed, which will be detailed in the discharge instructions, and to not stop taking them without consulting their physician. In addition, pt. advised to maintain a healthy diet, exercise regularly and to not smoke. [facility]'s Stroke treatment and prevention, Managing your recovery  notebook  provided and/or reviewed  with patient/family. The notebook includes, but not limited to, sections addressing warning signs & symptoms of a stroke, which are: sudden numbness or weakness especially on one side of the body, sudden confusion, difficulty speaking or understanding, sudden changes in vision, sudden dizziness or loss of balance/ coordination, or sudden severe headache.   The need to call EMS (911) immediately if signs & symptoms occur is

## 2020-02-21 NOTE — CONSULTS
similar prior to her previous stroke. Of note, she tells me that she has been out of her depression medication (cymbalta, trazodone) for the past 2 months, just recently seeing Psych this past week though has not filled her prescription as of yet. She was evaluated by Neurology in October of last year for headache and neck pain. MRI pankaj and CTA head/neck were both unrevealing.       Medical History:  Past Medical History:   Diagnosis Date    Arthritis     Diabetes     GERD (gastroesophageal reflux disease)     Hyperlipidemia     Hypertension     Lumbar disc disease     Sleep apnea     pt states does use a Cpap machine at night    Unspecified cerebral artery occlusion with cerebral infarction 2014    right side weak    Wears glasses      Past Surgical History:   Procedure Laterality Date    CARPAL TUNNEL RELEASE Left 9/3/15    CARPAL TUNNEL RELEASE Right 9/22/15    FINGER TRIGGER RELEASE Left 9/3/15    middle and ring fingers    FINGER TRIGGER RELEASE Right 9/22/15    middle and ring fingers    FOOT SURGERY Bilateral     litteltoe    HAND SURGERY Right     Ligament    PARTIAL HYSTERECTOMY  08/2003    SHOULDER ARTHROSCOPY Right 06/20/2018    Diagnostic scope, rcr, open Blossom     Scheduled Meds:   atorvastatin  20 mg Oral Daily    DULoxetine  30 mg Oral Daily    gabapentin  100 mg Oral QAM    gabapentin  300 mg Oral Nightly    insulin glargine  12 Units Subcutaneous Nightly    metoprolol succinate  25 mg Oral Daily    pantoprazole  40 mg Oral QAM AC    traZODone  50 mg Oral Nightly    valsartan  80 mg Oral Daily    vitamin C  500 mg Oral Daily    sodium chloride flush  10 mL Intravenous 2 times per day    enoxaparin  40 mg Subcutaneous Nightly    aspirin  81 mg Oral Daily    Or    aspirin  300 mg Rectal Daily     Medications Prior to Admission:   vitamin C (ASCORBIC ACID) 500 MG tablet, Take 500 mg by mouth daily  atorvastatin (LIPITOR) 20 MG tablet, TAKE ONE TABLET BY MOUTH DAILY  naproxen (NAPROSYN) 375 MG tablet, Take 1 tablet by mouth 2 times daily as needed for Pain  metFORMIN (GLUCOPHAGE) 500 MG tablet, TAKE TWO TABLETS BY MOUTH TWICE A DAY WITH MEALS  metoprolol succinate (TOPROL XL) 25 MG extended release tablet, TAKE ONE TABLET BY MOUTH DAILY FOR BLOOD PRESSURE AND HEART RATE  Dulaglutide (TRULICITY) 1.5 IC/9.0BR SOPN, INJECT 1.5 MG UNDER THE SKIN ONCE WEEKLY  gabapentin (NEURONTIN) 100 MG capsule, TAKE ONE CAPSULE BY MOUTH IN the morning  gabapentin (NEURONTIN) 300 MG capsule, Take 1 capsule by mouth nightly for 90 days. omeprazole (PRILOSEC) 40 MG delayed release capsule, TAKE ONE CAPSULE BY MOUTH TWICE A DAY BEFORE MEALS  furosemide (LASIX) 20 MG tablet, TAKE ONE TABLET BY MOUTH DAILY AS NEEDED FOR LEG SWELLING  valsartan (DIOVAN) 80 MG tablet, TAKE ONE TABLET BY MOUTH DAILY  traZODone (DESYREL) 50 MG tablet, Take 50 mg by mouth nightly  albuterol sulfate HFA (PROVENTIL HFA) 108 (90 Base) MCG/ACT inhaler, Inhale 2 puffs into the lungs every 6 hours as needed for Wheezing or Shortness of Breath May substitute ProAir MDI  insulin glargine (BASAGLAR KWIKPEN) 100 UNIT/ML injection pen, Inject 12 Units into the skin nightly (Patient taking differently: Inject 16 Units into the skin nightly )  FANAPT 1 MG TABS tablet, Take 1 mg by mouth nightly   aspirin 81 MG EC tablet, Take 1 tablet by mouth daily. acetaminophen-codeine (TYLENOL #3) 300-30 MG per tablet, Take 1 tablet by mouth every 12 hours as needed for Pain. ammonium lactate (LAC-HYDRIN) 12 % lotion, Apply topically daily. blood glucose monitor strips, Test 2 times daily.  One Touch Brand DX E11.9  ondansetron (ZOFRAN ODT) 4 MG disintegrating tablet, Take 1 tablet by mouth every 8 hours as needed for Nausea  trimethoprim-polymyxin b (POLYTRIM) 54269-4.1 UNIT/ML-% ophthalmic solution, 1 drop every 4 hours as needed (redness)   Insulin Pen Needle ([employer] PEN NEEDLES) 31G X 6 MM MISC, 1 each by Does not apply route weight 200 lb 13.4 oz (91.1 kg), SpO2 100 %, not currently breastfeeding. Constitutional    Vital signs: BP, HR, and RR reviewed   General alert, no distress, well-nourished  Eyes: unable to visualize the fundi  Cardiovascular: pulses symmetric in all 4 extremities. Psychiatric: cooperative with examination, no psychotic behavior noted. Neurologic  Mental status:   orientation to person, place, time, situation. General fund of knowledge grossly intact   Memory grossly intact   Attention intact as able to attend well to the exam     Language fluent in conversation. No aphasia. Comprehension intact; follows simple commands  Cranial nerves:   CN2: visual fields full   CN 3,4,6: extraocular muscles intact. Pupils are equal, round, reactive bilaterally. CN5: mild right sided facial paresthesias. CN7: face symmetric without dysarthria  CN8: hearing grossly intact  CN9: palate elevated symmetrically  CN11: trap full strength on shoulder shrug  CN12: tongue midline with protrusion  Strength: mild RUE/RLE weakness. Reportedly a chronic issue though patient suggests that this is worse. May be a bit functional.  Good strength in her LUE/LLE. Deep tendon reflexes: normal in all 4 extremities. No hyperreflexia. Sensory: reported right sided paresthesias. Cerebellar/coordination: finger nose finger normal without ataxia  Tone: normal in all 4 extremities  Gait: deferred at this time for safety. Labs  Glucose 99  Na 138  K 4.7  BUN 14  Creatinine 0.6  LFTs normal    WBC 8.4K  Hg 12.5  Platelets 350    LDL 70  HgA1c 6.7    UA no evidence of infection    Studies  CT head w/o 2/20/20, independently reviewed  No acute abnormality. CTA head/neck 2/20/20, independently reviewed  Unremarkable. Impression  1. The patient reports multiple symptoms as noted above, primarily neck heaviness. The etiology is uncertain. My suspicion for stroke or any other acute neurologic event is low.   A neuromuscular disorder seems less likely. Consider musculoskeletal or cervical origin. 2.  Hx stroke w/ a bit of residual right sided weakness and paresthesias. The patient suggests this is worse. 3.  Dizziness, positional.    4.  Nausea. 5.  Lumbar radiculopathy, s/p recent JANETH. 6.  Depression. Has been out of medications for 2 months. Recommendations  1. MRI brain w/o.    2.  Might consider MRI cervical spine w/o.    3.  Continue asa, statin for stroke prevention. 4.  PT/OT evaluation.       [facility] [employer] NP  [address] [place] [address] [address] Neurology    A copy of this note was provided for Dr Desi Roa MD

## 2020-02-22 VITALS
RESPIRATION RATE: 16 BRPM | HEIGHT: 64 IN | OXYGEN SATURATION: 97 % | SYSTOLIC BLOOD PRESSURE: 150 MMHG | TEMPERATURE: 97.4 F | WEIGHT: 198.85 LBS | BODY MASS INDEX: 33.95 KG/M2 | HEART RATE: 63 BPM | DIASTOLIC BLOOD PRESSURE: 89 MMHG

## 2020-02-22 LAB
ANION GAP SERPL CALCULATED.3IONS-SCNC: 11 MMOL/L (ref 3–16)
BUN BLDV-MCNC: 13 MG/DL (ref 7–20)
CALCIUM SERPL-MCNC: 10.2 MG/DL (ref 8.3–10.6)
CHLORIDE BLD-SCNC: 105 MMOL/L (ref 99–110)
CO2: 25 MMOL/L (ref 21–32)
CREAT SERPL-MCNC: 0.8 MG/DL (ref 0.6–1.2)
GFR AFRICAN AMERICAN: >60
GFR NON-AFRICAN AMERICAN: >60
GLUCOSE BLD-MCNC: 107 MG/DL (ref 70–99)
GLUCOSE BLD-MCNC: 121 MG/DL (ref 70–99)
GLUCOSE BLD-MCNC: 164 MG/DL (ref 70–99)
HCT VFR BLD CALC: 37.4 % (ref 36–48)
HEMOGLOBIN: 12.4 G/DL (ref 12–16)
MCH RBC QN AUTO: 27.9 PG (ref 26–34)
MCHC RBC AUTO-ENTMCNC: 33.1 G/DL (ref 31–36)
MCV RBC AUTO: 84.4 FL (ref 80–100)
PDW BLD-RTO: 13.6 % (ref 12.4–15.4)
PERFORMED ON: ABNORMAL
PERFORMED ON: ABNORMAL
PLATELET # BLD: 278 K/UL (ref 135–450)
PMV BLD AUTO: 7.7 FL (ref 5–10.5)
POTASSIUM SERPL-SCNC: 4.4 MMOL/L (ref 3.5–5.1)
RBC # BLD: 4.43 M/UL (ref 4–5.2)
SODIUM BLD-SCNC: 141 MMOL/L (ref 136–145)
WBC # BLD: 6.6 K/UL (ref 4–11)

## 2020-02-22 PROCEDURE — 6370000000 HC RX 637 (ALT 250 FOR IP): Performed by: NURSE PRACTITIONER

## 2020-02-22 PROCEDURE — 94760 N-INVAS EAR/PLS OXIMETRY 1: CPT

## 2020-02-22 PROCEDURE — 85027 COMPLETE CBC AUTOMATED: CPT

## 2020-02-22 PROCEDURE — 2580000003 HC RX 258: Performed by: HOSPITALIST

## 2020-02-22 PROCEDURE — 94660 CPAP INITIATION&MGMT: CPT

## 2020-02-22 PROCEDURE — 6370000000 HC RX 637 (ALT 250 FOR IP): Performed by: HOSPITALIST

## 2020-02-22 PROCEDURE — 80048 BASIC METABOLIC PNL TOTAL CA: CPT

## 2020-02-22 PROCEDURE — G0378 HOSPITAL OBSERVATION PER HR: HCPCS

## 2020-02-22 RX ORDER — CELECOXIB 100 MG/1
100 CAPSULE ORAL 2 TIMES DAILY
Qty: 30 CAPSULE | Refills: 0 | Status: SHIPPED | OUTPATIENT
Start: 2020-02-22 | End: 2020-08-24 | Stop reason: SDUPTHER

## 2020-02-22 RX ADMIN — DULOXETINE HYDROCHLORIDE 30 MG: 30 CAPSULE, DELAYED RELEASE ORAL at 08:31

## 2020-02-22 RX ADMIN — VALSARTAN 80 MG: 80 TABLET, FILM COATED ORAL at 08:31

## 2020-02-22 RX ADMIN — SODIUM CHLORIDE, PRESERVATIVE FREE 10 ML: 5 INJECTION INTRAVENOUS at 08:32

## 2020-02-22 RX ADMIN — ASPIRIN 81 MG: 81 TABLET, COATED ORAL at 08:31

## 2020-02-22 RX ADMIN — METOPROLOL SUCCINATE 25 MG: 25 TABLET, EXTENDED RELEASE ORAL at 08:31

## 2020-02-22 RX ADMIN — PANTOPRAZOLE SODIUM 40 MG: 40 TABLET, DELAYED RELEASE ORAL at 08:31

## 2020-02-22 RX ADMIN — OXYCODONE HYDROCHLORIDE AND ACETAMINOPHEN 500 MG: 500 TABLET ORAL at 08:31

## 2020-02-22 RX ADMIN — ACETAMINOPHEN 650 MG: 325 TABLET ORAL at 08:31

## 2020-02-22 RX ADMIN — ACETAMINOPHEN 650 MG: 325 TABLET ORAL at 00:17

## 2020-02-22 RX ADMIN — GABAPENTIN 100 MG: 100 CAPSULE ORAL at 08:31

## 2020-02-22 ASSESSMENT — PAIN DESCRIPTION - ORIENTATION
ORIENTATION: POSTERIOR

## 2020-02-22 ASSESSMENT — PAIN DESCRIPTION - PROGRESSION
CLINICAL_PROGRESSION: GRADUALLY WORSENING

## 2020-02-22 ASSESSMENT — PAIN DESCRIPTION - DESCRIPTORS
DESCRIPTORS: PRESSURE
DESCRIPTORS: ACHING;HEADACHE
DESCRIPTORS: PRESSURE

## 2020-02-22 ASSESSMENT — PAIN DESCRIPTION - FREQUENCY
FREQUENCY: CONTINUOUS

## 2020-02-22 ASSESSMENT — PAIN - FUNCTIONAL ASSESSMENT
PAIN_FUNCTIONAL_ASSESSMENT: PREVENTS OR INTERFERES SOME ACTIVE ACTIVITIES AND ADLS

## 2020-02-22 ASSESSMENT — PAIN SCALES - GENERAL
PAINLEVEL_OUTOF10: 2
PAINLEVEL_OUTOF10: 3
PAINLEVEL_OUTOF10: 4
PAINLEVEL_OUTOF10: 6
PAINLEVEL_OUTOF10: 4

## 2020-02-22 ASSESSMENT — PAIN DESCRIPTION - LOCATION
LOCATION: HEAD;NECK

## 2020-02-22 ASSESSMENT — PAIN DESCRIPTION - PAIN TYPE
TYPE: ACUTE PAIN

## 2020-02-22 ASSESSMENT — PAIN DESCRIPTION - ONSET
ONSET: AWAKENED FROM SLEEP

## 2020-02-22 NOTE — PROGRESS NOTES
Discussed DC instructions with patient and family. Discussed meds and next doses due as well as follow up appointments. IV and telemetry removed. Meds waiting at pharmacy. Patient dressed and belongings packed. Patient to leave via wheelchair to home with family and all belongings when they arrive  .

## 2020-02-22 NOTE — CARE COORDINATION
INITIAL CASE MANAGEMENT ASSESSMENT    Reviewed chart, met with patient to assess possible discharge needs. Explained Case Management role/services. Living Situation: Patient lives in a multi-level house with her spouse. Stairs (4+4) to enter and rails on left. ADLs: IPTA     DME: CPAP, shower chair, 4 wheeled walker, cane    PT/OT Recs: C for PT/OT     Active Services: None currently. The Plan for Transition of Care is related to the following treatment goals: return to home    The Patient was provided with a choice of provider and agrees   with the discharge plan. [x] Yes [] No    Freedom of choice list was provided with basic dialogue that supports the patient's individualized plan of care/goals, treatment preferences and shares the quality data associated with the providers. [x] Yes [] No     Patient has chosen [address] for PT/OT at discharge. Transportation: Family. Medications: No barriers    PCP: Abraham Ricks MD      HD/PD: n/a    PLAN/COMMENTS: Patient would like to have [facility]. Referrel made. They can accept the patient. SW/CM provided contact information for patient or family to call with any questions. SW/CM will follow and assist as needed.     Electronically signed by Yifan Pedersen RN on 2/22/2020 at 2:41 PM

## 2020-02-22 NOTE — DISCHARGE INSTR - COC
Continuity of Care Form    Patient Name: Brianne Mcneal   :  1955  MRN:  [id number]    Admit date:  2020  Discharge date:  2020    Code Status Order: Full Code   Advance Directives:   Advance Care Flowsheet Documentation     Date/Time Healthcare Directive Type of Healthcare Directive Copy in [address] Agent's Name Healthcare Agent's Phone Number    20 1332  No, patient does not have an advance directive for healthcare treatment -- -- -- -- --    20 14:56:32  No, patient does not have an advance directive for healthcare treatment -- -- -- -- --          Admitting Physician:  Ponce Roa MD  PCP: Archie Velazquez MD    Discharging Nurse: Mario Thurman 23 Unit/Room#: I7Y-1203/1721-02  Discharging Unit Phone Number: [phone]    Emergency Contact:   Extended Emergency Contact Information  Primary Emergency Contact: [address] [place] [address] Phone: [phone]  Relation: Child  Secondary Emergency Contact: [address] [place] [address] Phone: [phone]  Relation: Child    Past Surgical History:  Past Surgical History:   Procedure Laterality Date    CARPAL TUNNEL RELEASE Left 9/3/15    CARPAL TUNNEL RELEASE Right 9/22/15    FINGER TRIGGER RELEASE Left 9/3/15    middle and ring fingers    FINGER TRIGGER RELEASE Right 9/22/15    middle and ring fingers    FOOT SURGERY Bilateral     litteltoe    HAND SURGERY Right     Ligament    PARTIAL HYSTERECTOMY  2003    SHOULDER ARTHROSCOPY Right 2018    Diagnostic scope, rcr, open [place]       Immunization History:   Immunization History   Administered Date(s) Administered    Influenza 2011, 10/05/2012, 2013    Influenza Virus Vaccine 2015, 10/30/2017    Influenza, Intradermal, Preservative free 10/29/2014    Influenza, Intradermal, Quadrivalent, Preservative Free 10/24/2016    Influenza, Quadv, IM, PF (6 mo and 0912  Gross per 24 hour   Intake 480 ml   Output 0 ml   Net 480 ml     I/O last 3 completed shifts: In: 240 [P.O.:240]  Out: 0     Safety Concerns: At Risk for Falls    Impairments/Disabilities:      Language Barrier - Chadian as Primary, English as Secondary    Nutrition Therapy:  Current Nutrition Therapy:   - Oral Diet:  Carb Control 4 carbs/meal (1800kcals/day)    Routes of Feeding: Oral  Liquids: Thin Liquids  Daily Fluid Restriction: no  Last Modified Barium Swallow with Video (Video Swallowing Test): not done    Treatments at the Time of Hospital Discharge:   Respiratory Treatments: See Med List  Oxygen Therapy:  is not on home oxygen therapy.   Ventilator:    - CPAP   only when sleeping and device from home    Rehab Therapies: Physical Therapy and Occupational Therapy  Weight Bearing Status/Restrictions: No weight bearing restirctions  Other Medical Equipment (for information only, NOT a DME order):  cane  Other Treatments:     Patient's personal belongings (please select all that are sent with patient):  None    RN SIGNATURE:  Electronically signed by Edward Duncan RN on 2/22/20 at 1:19 PM    CASE MANAGEMENT/SOCIAL WORK SECTION    Inpatient Status Date: 2/20/2020    Readmission Risk Assessment Score:  Readmission Risk              Risk of Unplanned Readmission:        13           Discharging to Facility/ Agency   · Name: [facility]  · Address:  · Phone: [phone]  · Fax: [phone]      / signature: Electronically signed by Payton Her RN on 2/22/2020 at 2:45 PM      PHYSICIAN SECTION    Prognosis: Good    Condition at Discharge: Stable    Rehab Potential (if transferring to Rehab): Good    Recommended Labs or Other Treatments After Discharge: OTPT    Physician Certification: I certify the above information and transfer of Gisel Davis  is necessary for the continuing treatment of the diagnosis listed and that she requires Home Care for less 30 days.     Update Admission H&P: {CHP DME Changes in DUESD:[id number]}    PHYSICIAN SIGNATURE:  Electronically signed by Lori Ryder MD on 2/22/20 at 2:07 PM

## 2020-02-23 NOTE — DISCHARGE SUMMARY
Hospital Medicine Discharge Summary      Patient ID: Arianne Garnett , [id number]     Patient's PCP: Derrell Florez MD    Admit Date: 2/20/2020     Discharge Date: 2/22/2020      Admitting Physician: Betsy Briggs MD    Discharge Physician: Han Peres MD     Discharge Diagnoses: Active Hospital Problems    Diagnosis Date Noted    TIA (transient ischemic attack) [G45.9] 02/20/2020         The patient was seen and examined on the day of discharge and this discharge summary is in conjunction with any daily progress note from day of discharge. Hospital Course:      Patient demographics:  The patient  Arianne Garnett is a 71 y.o. female      Significant past medical history:       Patient Active Problem List   Diagnosis    Diabetes mellitus ([address]    Obesity    Dystonia    Cerebral thrombosis with cerebral infarction ([address]    Sleep apnea    Right sided weakness    Trigger finger, acquired    Elevated CK    Primary osteoarthritis of both knees    Mood disorder [facility] - follows with Psych Dr. Erica Hong Calcific tendonitis of right shoulder    Essential hypertension    Mixed hyperlipidemia    Chronic low back pain without sciatica    TIA (transient ischemic attack)            Presenting symptoms:  Headache and heaviness on the back of the neck     Diagnostic workup:  CTA HEAD NECK W CONTRAST   CT Head WO Contrast               CONSULTS DURING ADMISSION :   IP CONSULT TO HOSPITALIST  IP CONSULT TO SPIRITUAL SERVICES  IP CONSULT TO NEUROLOGY        Patient was diagnosed with:  Headache  Anxiety depression  Type 2 diabetes mellitus E11.9        Treatment while inpatient:  Patient posterior headache and heaviness started on Saturday and has gradually gotten worse. Patient has history of CVA in the past with residual right-sided weakness and some dysarthria.            Patient CT scan of the head and CT angiogram were unremarkable          MRI of the brain showed microvascular ischemic changes but no evidence of acute infarct. Neurology was consulted and on their recommendation patient's MRI of the cervical spine was also done which showed mild spinal stenosis and degenerative disc disease but no acute findings. Most likely patient's occipital area headache is related to degenerative disc disease in the cervical spine     Discharge Condition:  stable      Discharged to:  Home      Activity:   as tolerated:     Follow Up: Follow-up with PCP in 1-2 weeks         Labs: For convenience and continuity at follow-up the following most recent labs are provided:      CBC:   Lab Results   Component Value Date    WBC 6.6 02/22/2020    HGB 12.4 02/22/2020    HCT 37.4 02/22/2020     02/22/2020       RENAL:   Lab Results   Component Value Date     02/22/2020    K 4.4 02/22/2020    K 4.7 02/20/2020     02/22/2020    CO2 25 02/22/2020    BUN 13 02/22/2020    CREATININE 0.8 02/22/2020           Discharge Medications:    [place], [address]   Home Medication Instructions VQI:[id number]    Printed on:02/22/20 1756   Medication Information                      acetaminophen-codeine (TYLENOL #3) 300-30 MG per tablet  Take 1 tablet by mouth every 12 hours as needed for Pain. albuterol sulfate HFA (PROVENTIL HFA) 108 (90 Base) MCG/ACT inhaler  Inhale 2 puffs into the lungs every 6 hours as needed for Wheezing or Shortness of Breath May substitute ProAir MDI             ammonium lactate (LAC-HYDRIN) 12 % lotion  Apply topically daily. aspirin 81 MG EC tablet  Take 1 tablet by mouth daily. atorvastatin (LIPITOR) 20 MG tablet  TAKE ONE TABLET BY MOUTH DAILY             blood glucose monitor strips  Test 2 times daily.  One Touch Brand DX E11.9             Blood Glucose Monitoring Suppl (TRUE METRIX AIR GLUCOSE METER) w/Device KIT  1 each by Does not apply route daily             blood glucose test strips (FREESTYLE LITE) strip  TEST BLOOD SUGAR TWO TIMES A DAY Diag: E11.9             celecoxib (CELEBREX) 100 MG capsule  Take 1 capsule by mouth 2 times daily for 15 days             Dulaglutide (TRULICITY) 1.5 OI/2.8NO SOPN  INJECT 1.5 MG UNDER THE SKIN ONCE WEEKLY             DULoxetine (CYMBALTA) 30 MG extended release capsule  Take 1 capsule by mouth daily for one week, followed by 2 capsules daily             FANAPT 1 MG TABS tablet  Take 1 mg by mouth nightly              FREESTYLE LANCETS MISC  USE ONE LANCET TO TEST BLOOD SUGAR TWICE A DAY             furosemide (LASIX) 20 MG tablet  TAKE ONE TABLET BY MOUTH DAILY AS NEEDED FOR LEG SWELLING             gabapentin (NEURONTIN) 100 MG capsule  TAKE ONE CAPSULE BY MOUTH IN the morning             gabapentin (NEURONTIN) 300 MG capsule  Take 1 capsule by mouth nightly for 90 days. insulin glargine (BASAGLAR KWIKPEN) 100 UNIT/ML injection pen  Inject 12 Units into the skin nightly             Insulin Pen Needle ([employer] PEN NEEDLES) 31G X 6 MM MISC  1 each by Does not apply route daily             metFORMIN (GLUCOPHAGE) 500 MG tablet  TAKE TWO TABLETS BY MOUTH TWICE A DAY WITH MEALS             metoprolol succinate (TOPROL XL) 25 MG extended release tablet  TAKE ONE TABLET BY MOUTH DAILY FOR BLOOD PRESSURE AND HEART RATE             Misc.  Devices (WALKER) MISC  One four wheel walker             naproxen (NAPROSYN) 375 MG tablet  Take 1 tablet by mouth 2 times daily as needed for Pain             omeprazole (PRILOSEC) 40 MG delayed release capsule  TAKE ONE CAPSULE BY MOUTH TWICE A DAY BEFORE MEALS             ondansetron (ZOFRAN ODT) 4 MG disintegrating tablet  Take 1 tablet by mouth every 8 hours as needed for Nausea             Tens Unit MISC  by Does not apply route             traZODone (DESYREL) 50 MG tablet  Take 50 mg by mouth nightly             trimethoprim-polymyxin b (POLYTRIM) 03240-6.1 UNIT/ML-% ophthalmic solution  1 drop every 4 hours as needed (redness)              valsartan (DIOVAN) 80 MG tablet  TAKE ONE TABLET BY MOUTH DAILY             vitamin C (ASCORBIC ACID) 500 MG tablet  Take 500 mg by mouth daily                    Time Spent on discharge is more than 30 min in the examination, evaluation, counseling and review of medications and discharge plan. Signed:  Felicity Olivarse MD   2/22/2020      Thank you Dasia Pedroza MD for the opportunity to be involved in this patient's care. If you have any questions or concerns please feel free to contact me at [phone]. This note was transcribed using [address]. Please disregard any translational errors.

## 2020-02-24 ENCOUNTER — TELEPHONE (OUTPATIENT)
Dept: FAMILY MEDICINE CLINIC | Age: 65
End: 2020-02-24

## 2020-03-02 RX ORDER — OMEPRAZOLE 40 MG/1
CAPSULE, DELAYED RELEASE ORAL
Qty: 180 CAPSULE | Refills: 0 | Status: SHIPPED | OUTPATIENT
Start: 2020-03-02 | End: 2020-05-29

## 2020-03-03 ENCOUNTER — OFFICE VISIT (OUTPATIENT)
Dept: FAMILY MEDICINE CLINIC | Age: 65
End: 2020-03-03
Payer: MEDICARE

## 2020-03-03 VITALS
WEIGHT: 203 LBS | HEIGHT: 64 IN | DIASTOLIC BLOOD PRESSURE: 84 MMHG | BODY MASS INDEX: 34.66 KG/M2 | HEART RATE: 74 BPM | RESPIRATION RATE: 10 BRPM | OXYGEN SATURATION: 98 % | SYSTOLIC BLOOD PRESSURE: 120 MMHG

## 2020-03-03 PROBLEM — I69.30 HISTORY OF CVA WITH RESIDUAL DEFICIT: Status: ACTIVE | Noted: 2020-03-03

## 2020-03-03 PROCEDURE — 3044F HG A1C LEVEL LT 7.0%: CPT | Performed by: NURSE PRACTITIONER

## 2020-03-03 PROCEDURE — 1111F DSCHRG MED/CURRENT MED MERGE: CPT | Performed by: NURSE PRACTITIONER

## 2020-03-03 PROCEDURE — 3017F COLORECTAL CA SCREEN DOC REV: CPT | Performed by: NURSE PRACTITIONER

## 2020-03-03 PROCEDURE — G8417 CALC BMI ABV UP PARAM F/U: HCPCS | Performed by: NURSE PRACTITIONER

## 2020-03-03 PROCEDURE — 99214 OFFICE O/P EST MOD 30 MIN: CPT | Performed by: NURSE PRACTITIONER

## 2020-03-03 PROCEDURE — 4040F PNEUMOC VAC/ADMIN/RCVD: CPT | Performed by: NURSE PRACTITIONER

## 2020-03-03 PROCEDURE — 2022F DILAT RTA XM EVC RTNOPTHY: CPT | Performed by: NURSE PRACTITIONER

## 2020-03-03 PROCEDURE — G8399 PT W/DXA RESULTS DOCUMENT: HCPCS | Performed by: NURSE PRACTITIONER

## 2020-03-03 PROCEDURE — G8482 FLU IMMUNIZE ORDER/ADMIN: HCPCS | Performed by: NURSE PRACTITIONER

## 2020-03-03 PROCEDURE — 1090F PRES/ABSN URINE INCON ASSESS: CPT | Performed by: NURSE PRACTITIONER

## 2020-03-03 PROCEDURE — G8427 DOCREV CUR MEDS BY ELIG CLIN: HCPCS | Performed by: NURSE PRACTITIONER

## 2020-03-03 PROCEDURE — 1036F TOBACCO NON-USER: CPT | Performed by: NURSE PRACTITIONER

## 2020-03-03 PROCEDURE — 1123F ACP DISCUSS/DSCN MKR DOCD: CPT | Performed by: NURSE PRACTITIONER

## 2020-03-03 RX ORDER — LOSARTAN POTASSIUM 25 MG/1
25 TABLET ORAL DAILY
COMMUNITY
End: 2020-04-16 | Stop reason: DRUGHIGH

## 2020-03-03 RX ORDER — TRAZODONE HYDROCHLORIDE 100 MG/1
100 TABLET ORAL NIGHTLY
COMMUNITY
End: 2021-07-15 | Stop reason: SDUPTHER

## 2020-03-03 RX ORDER — DULOXETIN HYDROCHLORIDE 60 MG/1
60 CAPSULE, DELAYED RELEASE ORAL DAILY
COMMUNITY
End: 2021-07-15 | Stop reason: DRUGHIGH

## 2020-03-03 ASSESSMENT — ENCOUNTER SYMPTOMS
COUGH: 0
NAUSEA: 0
SHORTNESS OF BREATH: 0
VOMITING: 0
DIARRHEA: 0

## 2020-03-03 NOTE — PROGRESS NOTES
3/3/2020    This is a 71 y.o. female   Chief Complaint   Patient presents with    Follow-Up from [facility] 2/20-2/22 TIA   . HPI  Patient is here for hospital f/u from 2/20 - 2/22. Patient reports that she was having some heaviness at the base of her skull. Developed dizziness and nausea. Went to hospital for evaluation due to she has history of CVA. Was concerned about possible TIA. She was not having and increased weakness or numbness. Had CTA of the head and neck that was unremarkable. CT head did not show acute intracranial abnormality. MRI brain did not show acute intracranial abnormality. Positive for chronic microvascular ischemic changes. MRI cervical spine showed mild spinal canal stenosis and multilevel neural foraminal narrowing. Has had mild residual right hemiparesis since CVA in 2014. This has continued. Had evaluation with neurology in hospital and was felt that symptoms were musculoskeletal in nature. Was sent home with PT/OT [facility]. She feels that her neck symptoms have improved. Likes the therapy. Diabetes Mellitus Type 2: Current symptoms/problems include neuropathy. Has neuropathy in feet. Feels that the gabapentin helps with symptoms. Home blood sugar records: fasting range: 104-120  Any episodes of hypoglycemia? no  Daily Aspirin? Yes    Hypertension:  Home blood pressure monitoring: Yes - 109/78. She is not adherent to a low sodium diet. Patient denies chest pain, shortness of breath, headache, peripheral edema, palpitations and dry cough. Antihypertensive medication side effects: no medication side effects noted. Hyperlipidemia:  No new myalgias or GI upset on atorvastatin (Lipitor).        Lab Results   Component Value Date    LABA1C 6.8 02/21/2020    LABA1C 6.7 02/20/2020    LABA1C 7.2 01/15/2020     Lab Results   Component Value Date    LABMICR Not Indicated 02/20/2020    CREATININE 0.8 02/22/2020     Lab Results Component Value Date    ALT 19 02/20/2020    AST 24 02/20/2020     Lab Results   Component Value Date    CHOL 145 02/21/2020    TRIG 92 02/21/2020    HDL 57 02/21/2020    LDLCALC 70 02/21/2020        Has been out of the cymbalta and trazodone for a couple of months. Did restart when seeing new psychiatrist. [facility]. Has had epidural injection in the past in lumbar spine. Has not had epidural injection in cervical spine. Reports that neck pain is better. Reports that there is a heaviness. Pain will radiate to shoulders. Denies numbness or increased muscle weakness.         Patient Active Problem List   Diagnosis    Diabetes mellitus ([address]    Obesity    Dystonia    Cerebral thrombosis with cerebral infarction ([address]    Sleep apnea    Right sided weakness    Trigger finger, acquired    Elevated CK    Primary osteoarthritis of both knees    Mood disorder ([address] - follows with Psych Dr. Maya Sierra Calcific tendonitis of right shoulder    Essential hypertension    Mixed hyperlipidemia    Chronic low back pain without sciatica    TIA (transient ischemic attack)          Current Outpatient Medications   Medication Sig Dispense Refill    DULoxetine (CYMBALTA) 60 MG extended release capsule Take 60 mg by mouth daily      traZODone (DESYREL) 100 MG tablet Take 100 mg by mouth nightly      losartan (COZAAR) 25 MG tablet Take 25 mg by mouth daily      omeprazole (PRILOSEC) 40 MG delayed release capsule TAKE ONE CAPSULE BY MOUTH TWICE A DAY BEFORE MEALS 180 capsule 0    insulin glargine (BASAGLAR KWIKPEN) 100 UNIT/ML injection pen Inject 16 Units into the skin nightly 4 pen 5    celecoxib (CELEBREX) 100 MG capsule Take 1 capsule by mouth 2 times daily for 15 days 30 capsule 0    vitamin C (ASCORBIC ACID) 500 MG tablet Take 500 mg by mouth daily      acetaminophen-codeine (TYLENOL #3) 300-30 MG per tablet Take 1 tablet by mouth

## 2020-03-19 ENCOUNTER — NURSE TRIAGE (OUTPATIENT)
Dept: OTHER | Facility: CLINIC | Age: 65
End: 2020-03-19

## 2020-03-19 ENCOUNTER — TELEPHONE (OUTPATIENT)
Dept: FAMILY MEDICINE CLINIC | Age: 65
End: 2020-03-19

## 2020-04-06 RX ORDER — DULAGLUTIDE 1.5 MG/.5ML
INJECTION, SOLUTION SUBCUTANEOUS
Refills: 3 | OUTPATIENT
Start: 2020-04-06

## 2020-04-13 RX ORDER — PEN NEEDLE, DIABETIC 31 G X1/4"
NEEDLE, DISPOSABLE MISCELLANEOUS
Qty: 100 EACH | Refills: 2 | Status: SHIPPED | OUTPATIENT
Start: 2020-04-13 | End: 2021-05-18 | Stop reason: SDUPTHER

## 2020-04-16 ENCOUNTER — VIRTUAL VISIT (OUTPATIENT)
Dept: FAMILY MEDICINE CLINIC | Age: 65
End: 2020-04-16
Payer: MEDICARE

## 2020-04-16 PROBLEM — F41.9 ANXIETY AND DEPRESSION: Status: ACTIVE | Noted: 2020-04-16

## 2020-04-16 PROBLEM — F32.A ANXIETY AND DEPRESSION: Status: ACTIVE | Noted: 2020-04-16

## 2020-04-16 PROCEDURE — G8428 CUR MEDS NOT DOCUMENT: HCPCS | Performed by: FAMILY MEDICINE

## 2020-04-16 PROCEDURE — 4040F PNEUMOC VAC/ADMIN/RCVD: CPT | Performed by: FAMILY MEDICINE

## 2020-04-16 PROCEDURE — 3017F COLORECTAL CA SCREEN DOC REV: CPT | Performed by: FAMILY MEDICINE

## 2020-04-16 PROCEDURE — 99214 OFFICE O/P EST MOD 30 MIN: CPT | Performed by: FAMILY MEDICINE

## 2020-04-16 PROCEDURE — G8399 PT W/DXA RESULTS DOCUMENT: HCPCS | Performed by: FAMILY MEDICINE

## 2020-04-16 PROCEDURE — 1090F PRES/ABSN URINE INCON ASSESS: CPT | Performed by: FAMILY MEDICINE

## 2020-04-16 PROCEDURE — 2022F DILAT RTA XM EVC RTNOPTHY: CPT | Performed by: FAMILY MEDICINE

## 2020-04-16 PROCEDURE — 3044F HG A1C LEVEL LT 7.0%: CPT | Performed by: FAMILY MEDICINE

## 2020-04-16 PROCEDURE — 1123F ACP DISCUSS/DSCN MKR DOCD: CPT | Performed by: FAMILY MEDICINE

## 2020-04-16 RX ORDER — INSULIN GLARGINE 100 [IU]/ML
14 INJECTION, SOLUTION SUBCUTANEOUS NIGHTLY
Qty: 4 PEN | Refills: 5 | Status: SHIPPED
Start: 2020-04-16 | End: 2021-03-19

## 2020-04-16 RX ORDER — LOSARTAN POTASSIUM 50 MG/1
50 TABLET ORAL DAILY
Qty: 90 TABLET | Refills: 1 | Status: SHIPPED | OUTPATIENT
Start: 2020-04-16 | End: 2020-09-11

## 2020-04-16 NOTE — PROGRESS NOTES
4/16/2020    Briana Schneider is a 71 y.o. female     HPI  Lab Results   Component Value Date    LABA1C 6.8 02/21/2020     Lab Results   Component Value Date    .5 61/89/0614   - Trulicity once per week  - 16 units of Basaglar nightly, metformin daily. Rare hypoglycemic episodes that she is always aware of and is able to eat something. Typically during the daytime when she hasn't eaten   - Typically low 100s in the morning  - reports she is down a few more pounds, to 199     HTN  BP Readings from Last 3 Encounters:   03/03/20 120/84   02/22/20 (!) 150/89   01/24/20 112/78     Lab Results   Component Value Date     02/22/2020    K 4.4 02/22/2020    CREATININE 0.8 02/22/2020   - currently on losartan 25mg and metoprolol  - taking medication as prescribed  - denies dizziness or light-headedness  - denies side effects from medications  - BP at home: ranging 162J systolic, but often having higher readings 888-862 systolic, 91X diastolic    HLD  - on Lipitor 20mg  Lab Results   Component Value Date    LDLCALC 70 02/21/2020   No side effects     Reports back pain is improving with therapy and injections    Mood/Sleep  - she reports her mood is good on Cymbalta. She went a couple months without it in the past and noted a significant decline in her mood and worsening depression. She takes 100mg of trazodone at night for sleep    Review of Systems  As per HPI, otherwise negative    Prior to Visit Medications    Medication Sig Taking?  Authorizing Provider   Insulin Pen Needle (PEN NEEDLES) 31G X 6 MM MISC USE TO INJECT INSULIN DAILY  Yvan Finnegan MD   gabapentin (NEURONTIN) 100 MG capsule TAKE ONE CAPSULE BY MOUTH EVERY MORNING  Yvan Finnegan MD   DULoxetine (CYMBALTA) 60 MG extended release capsule Take 60 mg by mouth daily  Historical Provider, MD   traZODone (DESYREL) 100 MG tablet Take 100 mg by mouth nightly  Historical Provider, MD   losartan (COZAAR) 25 MG tablet Take 25 mg by mouth daily  Historical Provider, 6.6 02/22/2020    HGB 12.4 02/22/2020    HCT 37.4 02/22/2020    MCV 84.4 02/22/2020     02/22/2020     Lab Results   Component Value Date    LABA1C 6.8 02/21/2020        PHYSICAL EXAM  There were no vitals taken for this visit. BP Readings from Last 5 Encounters:   03/03/20 120/84   02/22/20 (!) 150/89   01/24/20 112/78   01/15/20 116/74   12/11/19 112/76       Wt Readings from Last 5 Encounters:   03/03/20 203 lb (92.1 kg)   02/22/20 198 lb 13.7 oz (90.2 kg)   01/24/20 201 lb (91.2 kg)   01/15/20 206 lb (93.4 kg)   12/11/19 204 lb 6.4 oz (92.7 kg)      Physical Exam    ASSESSMENT/PLAN:  1. Type 2 diabetes mellitus with diabetic polyneuropathy, with long-term current use of insulin (HCC)  We will continue her current medications until we can check an A1c next visit. Decrease insulin from 16 to 14 units due to some morning hypoglycemia. Her last A1c two months ago was 6.8 which is a good level for her. Reviewed healthy eating habits    2. Essential hypertension  Significantly elevated at home. Increase losartan to 50mg daily    3. Mixed hyperlipidemia  Continue statin    4. History of CVA with residual deficit  More aggressive management of BP due to this. Continue statin, aspirin    5. Anxiety and depression  She is having mild night sweating due to her meds. She feels these SEs are tolerable given the fact that the Cymbalta makes her feel drastically better. Continue current medications. F/u 3 months diabetes    Pamela Knapp is a 71 y.o. female being evaluated by a Virtual Visit (video visit) encounter to address concerns as mentioned above. A caregiver was present when appropriate. Due to this being a TeleHealth encounter (During [place] [address] public health emergency), evaluation of the following organ systems was limited: Vitals/Constitutional/EENT/Resp/CV/GI//MS/Neuro/Skin/Heme-Lymph-Imm.   Pursuant to the emergency declaration under the [address] [facility] [place], [address] waiver

## 2020-05-04 RX ORDER — ATORVASTATIN CALCIUM 20 MG/1
TABLET, FILM COATED ORAL
Qty: 90 TABLET | Refills: 1 | Status: SHIPPED | OUTPATIENT
Start: 2020-05-04 | End: 2020-10-30

## 2020-05-29 RX ORDER — OMEPRAZOLE 40 MG/1
CAPSULE, DELAYED RELEASE ORAL
Qty: 180 CAPSULE | Refills: 1 | Status: SHIPPED | OUTPATIENT
Start: 2020-05-29 | End: 2020-11-27

## 2020-06-17 ENCOUNTER — APPOINTMENT (OUTPATIENT)
Dept: GENERAL RADIOLOGY | Age: 65
End: 2020-06-17
Payer: MEDICARE

## 2020-06-17 ENCOUNTER — HOSPITAL ENCOUNTER (EMERGENCY)
Age: 65
Discharge: HOME | End: 2020-06-17
Attending: EMERGENCY MEDICINE
Payer: MEDICARE

## 2020-06-17 VITALS
TEMPERATURE: 99.1 F | RESPIRATION RATE: 16 BRPM | SYSTOLIC BLOOD PRESSURE: 151 MMHG | BODY MASS INDEX: 34.29 KG/M2 | HEIGHT: 64 IN | WEIGHT: 200.84 LBS | OXYGEN SATURATION: 100 % | DIASTOLIC BLOOD PRESSURE: 92 MMHG | HEART RATE: 73 BPM

## 2020-06-17 LAB
A/G RATIO: 1.2 (ref 1.1–2.2)
ALBUMIN SERPL-MCNC: 4 G/DL (ref 3.4–5)
ALP BLD-CCNC: 87 U/L (ref 40–129)
ALT SERPL-CCNC: 19 U/L (ref 10–40)
ANION GAP SERPL CALCULATED.3IONS-SCNC: 12 MMOL/L (ref 3–16)
AST SERPL-CCNC: 37 U/L (ref 15–37)
BASOPHILS ABSOLUTE: 0 K/UL (ref 0–0.2)
BASOPHILS RELATIVE PERCENT: 0.8 %
BILIRUB SERPL-MCNC: 0.3 MG/DL (ref 0–1)
BILIRUBIN URINE: NEGATIVE
BLOOD, URINE: NEGATIVE
BUN BLDV-MCNC: 12 MG/DL (ref 7–20)
CALCIUM SERPL-MCNC: 10.9 MG/DL (ref 8.3–10.6)
CHLORIDE BLD-SCNC: 103 MMOL/L (ref 99–110)
CLARITY: CLEAR
CO2: 24 MMOL/L (ref 21–32)
COLOR: NORMAL
CREAT SERPL-MCNC: 0.8 MG/DL (ref 0.6–1.2)
D DIMER: 204 NG/ML DDU (ref 0–229)
EKG ATRIAL RATE: 72 BPM
EKG DIAGNOSIS: NORMAL
EKG P AXIS: 39 DEGREES
EKG P-R INTERVAL: 206 MS
EKG Q-T INTERVAL: 392 MS
EKG QRS DURATION: 72 MS
EKG QTC CALCULATION (BAZETT): 429 MS
EKG R AXIS: 19 DEGREES
EKG T AXIS: 32 DEGREES
EKG VENTRICULAR RATE: 72 BPM
EOSINOPHILS ABSOLUTE: 0.4 K/UL (ref 0–0.6)
EOSINOPHILS RELATIVE PERCENT: 5.7 %
GFR AFRICAN AMERICAN: >60
GFR NON-AFRICAN AMERICAN: >60
GLOBULIN: 3.3 G/DL
GLUCOSE BLD-MCNC: 89 MG/DL (ref 70–99)
GLUCOSE URINE: NEGATIVE MG/DL
HCT VFR BLD CALC: 36.5 % (ref 36–48)
HEMOGLOBIN: 11.9 G/DL (ref 12–16)
KETONES, URINE: NEGATIVE MG/DL
LEUKOCYTE ESTERASE, URINE: NEGATIVE
LYMPHOCYTES ABSOLUTE: 3.2 K/UL (ref 1–5.1)
LYMPHOCYTES RELATIVE PERCENT: 51.6 %
MCH RBC QN AUTO: 28.2 PG (ref 26–34)
MCHC RBC AUTO-ENTMCNC: 32.7 G/DL (ref 31–36)
MCV RBC AUTO: 86.3 FL (ref 80–100)
MICROSCOPIC EXAMINATION: NORMAL
MONOCYTES ABSOLUTE: 0.4 K/UL (ref 0–1.3)
MONOCYTES RELATIVE PERCENT: 7 %
NEUTROPHILS ABSOLUTE: 2.2 K/UL (ref 1.7–7.7)
NEUTROPHILS RELATIVE PERCENT: 34.9 %
NITRITE, URINE: NEGATIVE
PDW BLD-RTO: 13.6 % (ref 12.4–15.4)
PH UA: 6 (ref 5–8)
PLATELET # BLD: 288 K/UL (ref 135–450)
PMV BLD AUTO: 7.9 FL (ref 5–10.5)
POTASSIUM REFLEX MAGNESIUM: 4.6 MMOL/L (ref 3.5–5.1)
PRO-BNP: 77 PG/ML (ref 0–124)
PROTEIN UA: NEGATIVE MG/DL
RBC # BLD: 4.23 M/UL (ref 4–5.2)
SODIUM BLD-SCNC: 139 MMOL/L (ref 136–145)
SPECIFIC GRAVITY UA: 1.01 (ref 1–1.03)
TOTAL PROTEIN: 7.3 G/DL (ref 6.4–8.2)
TROPONIN: <0.01 NG/ML
URINE REFLEX TO CULTURE: NORMAL
URINE TYPE: NORMAL
UROBILINOGEN, URINE: 0.2 E.U./DL
WBC # BLD: 6.2 K/UL (ref 4–11)

## 2020-06-17 PROCEDURE — 83880 ASSAY OF NATRIURETIC PEPTIDE: CPT

## 2020-06-17 PROCEDURE — 85379 FIBRIN DEGRADATION QUANT: CPT

## 2020-06-17 PROCEDURE — 93005 ELECTROCARDIOGRAM TRACING: CPT | Performed by: EMERGENCY MEDICINE

## 2020-06-17 PROCEDURE — 84484 ASSAY OF TROPONIN QUANT: CPT

## 2020-06-17 PROCEDURE — 71045 X-RAY EXAM CHEST 1 VIEW: CPT

## 2020-06-17 PROCEDURE — 93010 ELECTROCARDIOGRAM REPORT: CPT | Performed by: INTERNAL MEDICINE

## 2020-06-17 PROCEDURE — 85025 COMPLETE CBC W/AUTO DIFF WBC: CPT

## 2020-06-17 PROCEDURE — 36415 COLL VENOUS BLD VENIPUNCTURE: CPT

## 2020-06-17 PROCEDURE — 81003 URINALYSIS AUTO W/O SCOPE: CPT

## 2020-06-17 PROCEDURE — U0003 INFECTIOUS AGENT DETECTION BY NUCLEIC ACID (DNA OR RNA); SEVERE ACUTE RESPIRATORY SYNDROME CORONAVIRUS 2 (SARS-COV-2) (CORONAVIRUS DISEASE [COVID-19]), AMPLIFIED PROBE TECHNIQUE, MAKING USE OF HIGH THROUGHPUT TECHNOLOGIES AS DESCRIBED BY CMS-2020-01-R: HCPCS

## 2020-06-17 PROCEDURE — U0002 COVID-19 LAB TEST NON-CDC: HCPCS

## 2020-06-17 PROCEDURE — 99285 EMERGENCY DEPT VISIT HI MDM: CPT

## 2020-06-17 PROCEDURE — 80053 COMPREHEN METABOLIC PANEL: CPT

## 2020-06-17 RX ORDER — AZITHROMYCIN 250 MG/1
TABLET, FILM COATED ORAL
Qty: 1 PACKET | Refills: 0 | Status: SHIPPED | OUTPATIENT
Start: 2020-06-17 | End: 2020-06-21

## 2020-06-17 ASSESSMENT — PAIN SCALES - GENERAL
PAINLEVEL_OUTOF10: 4
PAINLEVEL_OUTOF10: 8

## 2020-06-17 ASSESSMENT — PAIN DESCRIPTION - ONSET: ONSET: ON-GOING

## 2020-06-17 ASSESSMENT — PAIN DESCRIPTION - LOCATION: LOCATION: GENERALIZED

## 2020-06-17 ASSESSMENT — PAIN DESCRIPTION - DESCRIPTORS: DESCRIPTORS: ACHING

## 2020-06-17 ASSESSMENT — PAIN DESCRIPTION - PROGRESSION: CLINICAL_PROGRESSION: NOT CHANGED

## 2020-06-17 ASSESSMENT — PAIN DESCRIPTION - PAIN TYPE: TYPE: ACUTE PAIN

## 2020-06-17 ASSESSMENT — PAIN DESCRIPTION - FREQUENCY: FREQUENCY: CONTINUOUS

## 2020-06-17 NOTE — ED PROVIDER NOTES
WEEKLY    DULOXETINE (CYMBALTA) 60 MG EXTENDED RELEASE CAPSULE    Take 60 mg by mouth daily    FANAPT 1 MG TABS TABLET    Take 1 mg by mouth nightly     FREESTYLE LANCETS MISC    USE ONE LANCET TO TEST BLOOD SUGAR TWICE A DAY    FUROSEMIDE (LASIX) 20 MG TABLET    TAKE ONE TABLET BY MOUTH DAILY AS NEEDED FOR LEG SWELLING    GABAPENTIN (NEURONTIN) 100 MG CAPSULE    TAKE ONE CAPSULE BY MOUTH EVERY MORNING    GABAPENTIN (NEURONTIN) 300 MG CAPSULE    Take 1 capsule by mouth nightly for 90 days. INSULIN GLARGINE (BASAGLAR KWIKPEN) 100 UNIT/ML INJECTION PEN    Inject 14 Units into the skin nightly    INSULIN PEN NEEDLE (PEN NEEDLES) 31G X 6 MM MISC    USE TO INJECT INSULIN DAILY    LOSARTAN (COZAAR) 50 MG TABLET    Take 1 tablet by mouth daily    METFORMIN (GLUCOPHAGE) 500 MG TABLET    TAKE TWO TABLETS BY MOUTH TWICE A DAY WITH MEALS    METOPROLOL SUCCINATE (TOPROL XL) 25 MG EXTENDED RELEASE TABLET    TAKE ONE TABLET BY MOUTH DAILY FOR BLOOD PRESSURE AND HEART RATE    MISC. DEVICES (WALKER) MISC    One four wheel walker    NAPROXEN (NAPROSYN) 375 MG TABLET    Take 1 tablet by mouth 2 times daily as needed for Pain    OMEPRAZOLE (PRILOSEC) 40 MG DELAYED RELEASE CAPSULE    TAKE ONE CAPSULE BY MOUTH TWICE A DAY BEFORE MEALS    ONDANSETRON (ZOFRAN ODT) 4 MG DISINTEGRATING TABLET    Take 1 tablet by mouth every 8 hours as needed for Nausea    TENS UNIT MISC    by Does not apply route    TRAZODONE (DESYREL) 100 MG TABLET    Take 100 mg by mouth nightly    TRIMETHOPRIM-POLYMYXIN B (POLYTRIM) 07495-6.1 UNIT/ML-% OPHTHALMIC SOLUTION    1 drop every 4 hours as needed (redness)     VALSARTAN (DIOVAN) 80 MG TABLET    TAKE ONE TABLET BY MOUTH DAILY    VITAMIN C (ASCORBIC ACID) 500 MG TABLET    Take 500 mg by mouth daily       ALLERGIES     Codeine; Vicodin [hydrocodone-acetaminophen];  Oxycontin [oxycodone hcl]; and Tramadol    FAMILY HISTORY       Family History   Problem Relation Age of Onset    Heart Disease Mother     High reactive. No photophobia. Conjunctiva normal.    HENT: Oral mucosa moist.  Airway patent. Pharynx without erythema. Nares were clear. Neck:  Soft and supple. Nontender. Heart:  Regular rate and rhythm. No murmur. Lungs:  Clear to auscultation. No wheezes, rales, or ronchi. No conversational dyspnea or accessory muscle use. Chest: Chest wall non-tender. No evidence of trauma. Abdomen:  Soft, nondistended, bowel sounds present. Nontender. No guarding rigidity or rebound. No masses. Musculoskeletal: Extremities non-tender with full range of motion. Radial and dorsalis pedis pulses were intact. No calf tenderness erythema or edema. Neurological: Alert and oriented x 3. Speech clear. Cranial nerves II-XII intact. No facial droop. No acute focal motor or sensory deficits. Skin: Skin is warm and dry. No rash. Lymphatic:  No lympadenopathy. Psychiatric: Normal mood and affect. Behavior is normal.         DIAGNOSTIC RESULTS     EKG: All EKG's are interpreted by the Emergency Department Physician who either signs or Co-signs this chart in the absence of a cardiologist.    Normal sinus rhythm. Rate 72. OH interval 206 ms. First-degree AV block. QRS duration 72 ms. QTc 429 ms. R Axis XIX degrees. No ST elevation. No acute change.     RADIOLOGY:   Non-plain film images such as CT, Ultrasound and MRI are read by the radiologist. Plain radiographic images are visualized and preliminarily interpreted by the emergency physician with the below findings:        Interpretation per the Radiologist below, if available at the time of this note:    XR CHEST PORTABLE   Final Result   No evidence of acute cardiopulmonary disease or significant interval change   from prior study 02/20/2020               ED BEDSIDE ULTRASOUND:   Performed by ED Physician - none    LABS:  Labs Reviewed   CBC WITH AUTO DIFFERENTIAL - Abnormal; Notable for the following components:       Result Value    Hemoglobin 11.9 (*)

## 2020-06-18 ENCOUNTER — CARE COORDINATION (OUTPATIENT)
Dept: CARE COORDINATION | Age: 65
End: 2020-06-18

## 2020-06-18 LAB — SARS-COV-2, PCR: NOT DETECTED

## 2020-06-19 ENCOUNTER — TELEPHONE (OUTPATIENT)
Dept: FAMILY MEDICINE CLINIC | Age: 65
End: 2020-06-19

## 2020-06-19 ENCOUNTER — VIRTUAL VISIT (OUTPATIENT)
Dept: FAMILY MEDICINE CLINIC | Age: 65
End: 2020-06-19
Payer: MEDICARE

## 2020-06-19 PROCEDURE — 99213 OFFICE O/P EST LOW 20 MIN: CPT | Performed by: FAMILY MEDICINE

## 2020-06-19 PROCEDURE — 3017F COLORECTAL CA SCREEN DOC REV: CPT | Performed by: FAMILY MEDICINE

## 2020-06-19 PROCEDURE — G8427 DOCREV CUR MEDS BY ELIG CLIN: HCPCS | Performed by: FAMILY MEDICINE

## 2020-06-19 PROCEDURE — 4040F PNEUMOC VAC/ADMIN/RCVD: CPT | Performed by: FAMILY MEDICINE

## 2020-06-19 PROCEDURE — G8399 PT W/DXA RESULTS DOCUMENT: HCPCS | Performed by: FAMILY MEDICINE

## 2020-06-19 PROCEDURE — 1123F ACP DISCUSS/DSCN MKR DOCD: CPT | Performed by: FAMILY MEDICINE

## 2020-06-19 PROCEDURE — 1090F PRES/ABSN URINE INCON ASSESS: CPT | Performed by: FAMILY MEDICINE

## 2020-06-19 NOTE — PROGRESS NOTES
omeprazole (PRILOSEC) 40 MG delayed release capsule TAKE ONE CAPSULE BY MOUTH TWICE A DAY BEFORE MEALS 180 capsule 1    valsartan (DIOVAN) 80 MG tablet TAKE ONE TABLET BY MOUTH DAILY 90 tablet 1    furosemide (LASIX) 20 MG tablet TAKE ONE TABLET BY MOUTH DAILY AS NEEDED FOR LEG SWELLING 90 tablet 1    atorvastatin (LIPITOR) 20 MG tablet TAKE ONE TABLET BY MOUTH DAILY 90 tablet 1    losartan (COZAAR) 50 MG tablet Take 1 tablet by mouth daily 90 tablet 1    insulin glargine (BASAGLAR KWIKPEN) 100 UNIT/ML injection pen Inject 14 Units into the skin nightly 4 pen 5    Insulin Pen Needle (PEN NEEDLES) 31G X 6 MM MISC USE TO INJECT INSULIN DAILY 100 each 2    DULoxetine (CYMBALTA) 60 MG extended release capsule Take 60 mg by mouth daily      traZODone (DESYREL) 100 MG tablet Take 100 mg by mouth nightly      vitamin C (ASCORBIC ACID) 500 MG tablet Take 500 mg by mouth daily      acetaminophen-codeine (TYLENOL #3) 300-30 MG per tablet Take 1 tablet by mouth every 12 hours as needed for Pain.  naproxen (NAPROSYN) 375 MG tablet Take 1 tablet by mouth 2 times daily as needed for Pain 180 tablet 0    metFORMIN (GLUCOPHAGE) 500 MG tablet TAKE TWO TABLETS BY MOUTH TWICE A DAY WITH MEALS 360 tablet 1    metoprolol succinate (TOPROL XL) 25 MG extended release tablet TAKE ONE TABLET BY MOUTH DAILY FOR BLOOD PRESSURE AND HEART RATE 90 tablet 1    Dulaglutide (TRULICITY) 1.5 SV/2.8GK SOPN INJECT 1.5 MG UNDER THE SKIN ONCE WEEKLY 4 pen 5    ammonium lactate (LAC-HYDRIN) 12 % lotion Apply topically daily. 1 Bottle 1    blood glucose monitor strips Test 2 times daily.  One Touch Brand DX E11.9 100 strip 5    ondansetron (ZOFRAN ODT) 4 MG disintegrating tablet Take 1 tablet by mouth every 8 hours as needed for Nausea 20 tablet 0    trimethoprim-polymyxin b (POLYTRIM) 21134-4.1 UNIT/ML-% ophthalmic solution 1 drop every 4 hours as needed (redness)       albuterol sulfate HFA (PROVENTIL HFA) 108 (90 Base) MCG/ACT Thorough workup in ER with normal CXR, EKG, troponin, and d dimer. She reports her symptoms are still present but improving. Based on age and co-morbidities she is high risk. We discussed if she has worsening of SOB or fatigue to call or seek emergent evaluation. Based on her appearance today and the ER evaluation, she does not warrant hospitalization. We discussed time off work and she will send [facility] paperwork to fill out through July 1    Lydia Mir is a 71 y.o. female being evaluated by a Virtual Visit (video visit) encounter to address concerns as mentioned above. A caregiver was present when appropriate. Due to this being a TeleHealth encounter (During [facility] emergency), evaluation of the following organ systems was limited: Vitals/Constitutional/EENT/Resp/CV/GI//MS/Neuro/Skin/Heme-Lymph-Imm. Pursuant to the emergency declaration under the [address] [place] [address], [address] [place] [address] and the [employer] Act, this Virtual Visit was conducted with patient's (and/or legal guardian's) consent, to reduce the patient's risk of exposure to COVID-19 and provide necessary medical care. The patient (and/or legal guardian) has also been advised to contact this office for worsening conditions or problems, and seek emergency medical treatment and/or call 911 if deemed necessary. Patient identification was verified at the start of the visit: Yes    Total time spent for this encounter: 16 minutes    Services were provided through a video synchronous discussion virtually to substitute for in-person clinic visit. Patient and provider were located at their individual homes. --Claudell Byes, MD on 6/19/2020 at 10:12 AM    An electronic signature was used to authenticate this note.

## 2020-06-19 NOTE — TELEPHONE ENCOUNTER
PT NEEDS A NOTE FOR HER WORK TO STATE THAT SHE IS IN QUARANTINE DUE TO COVID    MAIL THE LETTER TO THE PT    PLEASE ADVISE

## 2020-06-23 ENCOUNTER — TELEPHONE (OUTPATIENT)
Dept: FAMILY MEDICINE CLINIC | Age: 65
End: 2020-06-23

## 2020-06-23 NOTE — TELEPHONE ENCOUNTER
Patient called stating would like a copy mailed to her about her negative covid results and she has to give her work a copy when goes back to work, and would like it mailed to her? Can we mail this result?   Please advise

## 2020-06-26 ENCOUNTER — OFFICE VISIT (OUTPATIENT)
Dept: PRIMARY CARE CLINIC | Age: 65
End: 2020-06-26
Payer: MEDICARE

## 2020-06-26 VITALS — HEART RATE: 70 BPM | TEMPERATURE: 98.4 F | OXYGEN SATURATION: 99 %

## 2020-06-26 PROCEDURE — 1090F PRES/ABSN URINE INCON ASSESS: CPT | Performed by: NURSE PRACTITIONER

## 2020-06-26 PROCEDURE — G8428 CUR MEDS NOT DOCUMENT: HCPCS | Performed by: NURSE PRACTITIONER

## 2020-06-26 PROCEDURE — 3017F COLORECTAL CA SCREEN DOC REV: CPT | Performed by: NURSE PRACTITIONER

## 2020-06-26 PROCEDURE — 1036F TOBACCO NON-USER: CPT | Performed by: NURSE PRACTITIONER

## 2020-06-26 PROCEDURE — 99213 OFFICE O/P EST LOW 20 MIN: CPT | Performed by: NURSE PRACTITIONER

## 2020-06-26 PROCEDURE — G8399 PT W/DXA RESULTS DOCUMENT: HCPCS | Performed by: NURSE PRACTITIONER

## 2020-06-26 PROCEDURE — G8417 CALC BMI ABV UP PARAM F/U: HCPCS | Performed by: NURSE PRACTITIONER

## 2020-06-26 PROCEDURE — 4040F PNEUMOC VAC/ADMIN/RCVD: CPT | Performed by: NURSE PRACTITIONER

## 2020-06-26 PROCEDURE — 1123F ACP DISCUSS/DSCN MKR DOCD: CPT | Performed by: NURSE PRACTITIONER

## 2020-06-26 NOTE — PROGRESS NOTES
was seen today for covid testing. Diagnoses and all orders for this visit:    Screening for viral disease  -     COVID-19 Ambulatory        Patient instructed to call PCP if symptoms worsen or fail to improve, and to go to the ED if develops red flags (these symptoms were reviewed with patient). Patient informed can also return to this site for reassessment of related symptoms. Patient expressed understanding. Discharge home with written instructions for:  [] Flu management including medication treatment if qualifies  [] CDC guildelines for self-quarantine in an asymptomatic patient with COVID-19 exposure   (2 weeks if no symptoms. If symptoms develop then patient follows isolation guidelines below.)  [x] CDC guidelines for isolation in symptomatic patient with assumed COVID-19;        TO END ISOLATION: (Patient understands these guidelines are subject to change)  1. No fever for at least 72 hours without medications AND  2. Symptoms have resolved AND  3. At least 10 days from initial symptom onset    Patient understands that a hands-on exam could not be completed during this high risk assessment due to the COVID-19 pandemic. This note will be routed to the patient's PCP to inform of this limited assessment. Written by Ramana Glass MA acting as a scribe for Laurene Ganser, CNP on 6/26/20 at 10:42 am   I, JEISON Ward CNP, personally performed the services described in the documentation as scribed by Jules Cherry CMA, in my presence and it is both accurate and complete.     Electronically signed by JEISON Ward CNP on 6/26/2020 at 1:35 PM.

## 2020-06-29 LAB
SARS-COV-2: NOT DETECTED
SOURCE: NORMAL

## 2020-07-27 RX ORDER — METOPROLOL SUCCINATE 25 MG/1
TABLET, EXTENDED RELEASE ORAL
Qty: 90 TABLET | Refills: 0 | Status: SHIPPED | OUTPATIENT
Start: 2020-07-27 | End: 2020-10-23

## 2020-08-24 ENCOUNTER — HOSPITAL ENCOUNTER (EMERGENCY)
Age: 65
Discharge: HOME | End: 2020-08-24
Attending: EMERGENCY MEDICINE
Payer: MEDICARE

## 2020-08-24 ENCOUNTER — NURSE TRIAGE (OUTPATIENT)
Dept: OTHER | Facility: CLINIC | Age: 65
End: 2020-08-24

## 2020-08-24 ENCOUNTER — APPOINTMENT (OUTPATIENT)
Dept: GENERAL RADIOLOGY | Age: 65
End: 2020-08-24
Payer: MEDICARE

## 2020-08-24 VITALS
WEIGHT: 204.15 LBS | HEIGHT: 64 IN | OXYGEN SATURATION: 100 % | SYSTOLIC BLOOD PRESSURE: 128 MMHG | HEART RATE: 75 BPM | RESPIRATION RATE: 17 BRPM | DIASTOLIC BLOOD PRESSURE: 81 MMHG | BODY MASS INDEX: 34.85 KG/M2 | TEMPERATURE: 98.2 F

## 2020-08-24 LAB
A/G RATIO: 1.3 (ref 1.1–2.2)
ALBUMIN SERPL-MCNC: 3.9 G/DL (ref 3.4–5)
ALP BLD-CCNC: 85 U/L (ref 40–129)
ALT SERPL-CCNC: 16 U/L (ref 10–40)
ANION GAP SERPL CALCULATED.3IONS-SCNC: 11 MMOL/L (ref 3–16)
AST SERPL-CCNC: 22 U/L (ref 15–37)
BASOPHILS ABSOLUTE: 0.1 K/UL (ref 0–0.2)
BASOPHILS RELATIVE PERCENT: 0.8 %
BILIRUB SERPL-MCNC: <0.2 MG/DL (ref 0–1)
BUN BLDV-MCNC: 20 MG/DL (ref 7–20)
CALCIUM SERPL-MCNC: 10.1 MG/DL (ref 8.3–10.6)
CHLORIDE BLD-SCNC: 106 MMOL/L (ref 99–110)
CO2: 26 MMOL/L (ref 21–32)
CREAT SERPL-MCNC: 0.9 MG/DL (ref 0.6–1.2)
D DIMER: 222 NG/ML DDU (ref 0–229)
EOSINOPHILS ABSOLUTE: 0.3 K/UL (ref 0–0.6)
EOSINOPHILS RELATIVE PERCENT: 3.5 %
GFR AFRICAN AMERICAN: >60
GFR NON-AFRICAN AMERICAN: >60
GLOBULIN: 3 G/DL
GLUCOSE BLD-MCNC: 130 MG/DL (ref 70–99)
HCT VFR BLD CALC: 33.1 % (ref 36–48)
HEMOGLOBIN: 10.8 G/DL (ref 12–16)
LYMPHOCYTES ABSOLUTE: 3.4 K/UL (ref 1–5.1)
LYMPHOCYTES RELATIVE PERCENT: 41.8 %
MCH RBC QN AUTO: 27.9 PG (ref 26–34)
MCHC RBC AUTO-ENTMCNC: 32.8 G/DL (ref 31–36)
MCV RBC AUTO: 85.1 FL (ref 80–100)
MONOCYTES ABSOLUTE: 0.6 K/UL (ref 0–1.3)
MONOCYTES RELATIVE PERCENT: 6.8 %
NEUTROPHILS ABSOLUTE: 3.9 K/UL (ref 1.7–7.7)
NEUTROPHILS RELATIVE PERCENT: 47.1 %
PDW BLD-RTO: 13.6 % (ref 12.4–15.4)
PLATELET # BLD: 272 K/UL (ref 135–450)
PMV BLD AUTO: 8.2 FL (ref 5–10.5)
POTASSIUM REFLEX MAGNESIUM: 4.3 MMOL/L (ref 3.5–5.1)
PRO-BNP: 19 PG/ML (ref 0–124)
RBC # BLD: 3.89 M/UL (ref 4–5.2)
SODIUM BLD-SCNC: 143 MMOL/L (ref 136–145)
TOTAL PROTEIN: 6.9 G/DL (ref 6.4–8.2)
TROPONIN: 0.01 NG/ML
TROPONIN: <0.01 NG/ML
WBC # BLD: 8.2 K/UL (ref 4–11)

## 2020-08-24 PROCEDURE — 84484 ASSAY OF TROPONIN QUANT: CPT

## 2020-08-24 PROCEDURE — 93005 ELECTROCARDIOGRAM TRACING: CPT | Performed by: EMERGENCY MEDICINE

## 2020-08-24 PROCEDURE — 85025 COMPLETE CBC W/AUTO DIFF WBC: CPT

## 2020-08-24 PROCEDURE — 36415 COLL VENOUS BLD VENIPUNCTURE: CPT

## 2020-08-24 PROCEDURE — 83880 ASSAY OF NATRIURETIC PEPTIDE: CPT

## 2020-08-24 PROCEDURE — 99285 EMERGENCY DEPT VISIT HI MDM: CPT

## 2020-08-24 PROCEDURE — 96374 THER/PROPH/DIAG INJ IV PUSH: CPT

## 2020-08-24 PROCEDURE — 71046 X-RAY EXAM CHEST 2 VIEWS: CPT

## 2020-08-24 PROCEDURE — 6360000002 HC RX W HCPCS: Performed by: EMERGENCY MEDICINE

## 2020-08-24 PROCEDURE — 85379 FIBRIN DEGRADATION QUANT: CPT

## 2020-08-24 PROCEDURE — 80053 COMPREHEN METABOLIC PANEL: CPT

## 2020-08-24 RX ORDER — KETOROLAC TROMETHAMINE 30 MG/ML
30 INJECTION, SOLUTION INTRAMUSCULAR; INTRAVENOUS ONCE
Status: COMPLETED | OUTPATIENT
Start: 2020-08-24 | End: 2020-08-24

## 2020-08-24 RX ORDER — CELECOXIB 100 MG/1
100 CAPSULE ORAL 2 TIMES DAILY
Qty: 60 CAPSULE | Refills: 0 | Status: SHIPPED | OUTPATIENT
Start: 2020-08-24 | End: 2020-09-10

## 2020-08-24 RX ADMIN — KETOROLAC TROMETHAMINE 30 MG: 30 INJECTION, SOLUTION INTRAMUSCULAR at 17:57

## 2020-08-24 ASSESSMENT — PAIN DESCRIPTION - LOCATION: LOCATION: CHEST

## 2020-08-24 ASSESSMENT — PAIN SCALES - GENERAL
PAINLEVEL_OUTOF10: 7
PAINLEVEL_OUTOF10: 5
PAINLEVEL_OUTOF10: 7
PAINLEVEL_OUTOF10: 5

## 2020-08-24 ASSESSMENT — PAIN DESCRIPTION - DESCRIPTORS: DESCRIPTORS: ACHING

## 2020-08-24 NOTE — ED NOTES
Reviewed AVS and discharge home care instructions with patient. Pt verbalized understanding and had no questions at this time. Pt sent home with prescription x1.      Carlos Avendaño RN  08/24/20 6926

## 2020-08-24 NOTE — TELEPHONE ENCOUNTER
not sure    10. OTHER SYMPTOMS: \"Do you have any other symptoms? \" (e.g., dizziness, nausea, vomiting, sweating, fever, difficulty breathing, cough)        SOB with activity     11. PREGNANCY: \"Is there any chance you are pregnant? \" \"When was your last menstrual period? \"        N/a    Protocols used: CHEST PAIN-ADULT-[place]    Pt is calling with c/o constant chest pain for the last month. Pt states that she is currently having the chest pain and it has lasted longer than five minutes. Recommended that pt hang up and call 911. Please do not reply to the triage nurse through this encounter.   Any subsequent communication should be directly with the patient

## 2020-08-24 NOTE — ED PROVIDER NOTES
[facility] Emergency Department    CHIEF COMPLAINT  Chief Complaint   Patient presents with    Chest Pain     chest wall pain, worse to touch 7/10 \"starting in June\" per pt. with inflammation in bilat arms        HISTORY OF PRESENT ILLNESS  Yamilet Oates is a 71 y.o. female  who presents to the ED complaining of constant chest \"inflammation\" since June. Seen here 6/17/20 for same complaints and the pain never really went away. Says the pain is worse with movement or trying to move something - when she uses her arms or lifts something heavy it hurts her chest more. She actually has no pain in the arms. She says the pain is in the center of the chest \"like a balloon full of water in there. \"  No back pains, SOB, abd pain, nausea, vomiting or diarrhea. No fevers objectively. Liz coughing. During her 6/17/20 visit,  Her CXR was nonacute, COVID-19 was negative, and labs were reassuring including negative ddimer and troponin. She completed the zpack given to her though she did not have any findings of bacterial infection convincingly). Has been on Tylenol #3's without improvement. Has not tried NSAIDs. History of HTN, HLD, DM. No history of DVT or PE. No other complaints, modifying factors or associated symptoms. I have reviewed the following from the nursing documentation.     Past Medical History:   Diagnosis Date    Arthritis     Diabetes     GERD (gastroesophageal reflux disease)     Hyperlipidemia     Hypertension     Lumbar disc disease     Sleep apnea     pt states does use a Cpap machine at night    Unspecified cerebral artery occlusion with cerebral infarction 2014    right side weak    Wears glasses      Past Surgical History:   Procedure Laterality Date    CARPAL TUNNEL RELEASE Left 9/3/15    CARPAL TUNNEL RELEASE Right 9/22/15    FINGER TRIGGER RELEASE Left 9/3/15    middle and ring fingers    FINGER TRIGGER RELEASE Right 9/22/15    middle and ring fingers    FOOT SURGERY Bilateral     litteltoe    HAND SURGERY Right     Ligament    PARTIAL HYSTERECTOMY  2003    SHOULDER ARTHROSCOPY Right 2018    Diagnostic scope, rcr, open Radha     Family History   Problem Relation Age of Onset    Heart Disease Mother     High Blood Pressure Mother     Stroke Mother     Cancer Brother         lung cancer     Social History     Socioeconomic History    Marital status:      Spouse name: Laz Rivera, seen here    Number of children: 3    Years of education: Not on file    Highest education level: Not on file   Occupational History    Not on file   Social Needs    Financial resource strain: Not on file    Food insecurity     Worry: Not on file     Inability: Not on file   [employer] needs     Medical: Not on file     Non-medical: Not on file   Tobacco Use    Smoking status: Former Smoker     Packs/day: 0.00     Years: 3.00     Pack years: 0.00     Last attempt to quit: 1992     Years since quittin.6    Smokeless tobacco: Never Used   Substance and Sexual Activity    Alcohol use: No    Drug use: No    Sexual activity: Yes     Partners: Male   Lifestyle    Physical activity     Days per week: Not on file     Minutes per session: Not on file    Stress: Not on file   Relationships    Social connections     Talks on phone: Not on file     Gets together: Not on file     Attends Anglican service: Not on file     Active member of club or organization: Not on file     Attends meetings of clubs or organizations: Not on file     Relationship status: Not on file    Intimate partner violence     Fear of current or ex partner: Not on file     Emotionally abused: Not on file     Physically abused: Not on file     Forced sexual activity: Not on file   Other Topics Concern    Not on file   Social History Narrative    Originally from [address] [place] [address] is seen here    10 grandchildren     No current facility-administered medications for this encounter. Current Outpatient Medications   Medication Sig Dispense Refill    celecoxib (CELEBREX) 100 MG capsule Take 1 capsule by mouth 2 times daily 60 capsule 0    metoprolol succinate (TOPROL XL) 25 MG extended release tablet TAKE ONE TABLET BY MOUTH DAILY FOR FOR BLOOD PRESSURE AND HEART RATE 90 tablet 0    metFORMIN (GLUCOPHAGE) 500 MG tablet TAKE TWO TABLETS BY MOUTH TWICE A DAY WITH MEALS 360 tablet 0    omeprazole (PRILOSEC) 40 MG delayed release capsule TAKE ONE CAPSULE BY MOUTH TWICE A DAY BEFORE MEALS 180 capsule 1    valsartan (DIOVAN) 80 MG tablet TAKE ONE TABLET BY MOUTH DAILY 90 tablet 1    furosemide (LASIX) 20 MG tablet TAKE ONE TABLET BY MOUTH DAILY AS NEEDED FOR LEG SWELLING 90 tablet 1    atorvastatin (LIPITOR) 20 MG tablet TAKE ONE TABLET BY MOUTH DAILY 90 tablet 1    losartan (COZAAR) 50 MG tablet Take 1 tablet by mouth daily 90 tablet 1    insulin glargine (BASAGLAR KWIKPEN) 100 UNIT/ML injection pen Inject 14 Units into the skin nightly 4 pen 5    Insulin Pen Needle (PEN NEEDLES) 31G X 6 MM MISC USE TO INJECT INSULIN DAILY 100 each 2    gabapentin (NEURONTIN) 100 MG capsule TAKE ONE CAPSULE BY MOUTH EVERY MORNING 90 capsule 2    DULoxetine (CYMBALTA) 60 MG extended release capsule Take 60 mg by mouth daily      traZODone (DESYREL) 100 MG tablet Take 100 mg by mouth nightly      vitamin C (ASCORBIC ACID) 500 MG tablet Take 500 mg by mouth daily      acetaminophen-codeine (TYLENOL #3) 300-30 MG per tablet Take 1 tablet by mouth every 12 hours as needed for Pain.  naproxen (NAPROSYN) 375 MG tablet Take 1 tablet by mouth 2 times daily as needed for Pain 180 tablet 0    Dulaglutide (TRULICITY) 1.5 DC/8.0FN SOPN INJECT 1.5 MG UNDER THE SKIN ONCE WEEKLY 4 pen 5    gabapentin (NEURONTIN) 300 MG capsule Take 1 capsule by mouth nightly for 90 days. 90 capsule 0    ammonium lactate (LAC-HYDRIN) 12 % lotion Apply topically daily.  1 Bottle 1    blood glucose monitor strips Soft. Non-distended. No masses. No organomegaly. No guarding or rebound. Normal bowel sounds throughout. MUSCULOSKELETAL: No extremity edema. Compartments soft. No deformity. No tenderness in the extremities. All extremities neurovascularly intact. SKIN: Warm and dry. No acute rashes. NEUROLOGICAL: Alert and oriented. CN's 2-12 intact. No gross facial drooping. Strength 5/5, sensation intact. 2 plus DTR's in knees bilaterally. Gait normal.  PSYCHIATRIC: Normal mood and affect. LABS  I have reviewed all labs for this visit.    Results for orders placed or performed during the hospital encounter of 08/24/20   CBC Auto Differential   Result Value Ref Range    WBC 8.2 4.0 - 11.0 K/uL    RBC 3.89 (L) 4.00 - 5.20 M/uL    Hemoglobin 10.8 (L) 12.0 - 16.0 g/dL    Hematocrit 33.1 (L) 36.0 - 48.0 %    MCV 85.1 80.0 - 100.0 fL    MCH 27.9 26.0 - 34.0 pg    MCHC 32.8 31.0 - 36.0 g/dL    RDW 13.6 12.4 - 15.4 %    Platelets 119 713 - 933 K/uL    MPV 8.2 5.0 - 10.5 fL    Neutrophils % 47.1 %    Lymphocytes % 41.8 %    Monocytes % 6.8 %    Eosinophils % 3.5 %    Basophils % 0.8 %    Neutrophils Absolute 3.9 1.7 - 7.7 K/uL    Lymphocytes Absolute 3.4 1.0 - 5.1 K/uL    Monocytes Absolute 0.6 0.0 - 1.3 K/uL    Eosinophils Absolute 0.3 0.0 - 0.6 K/uL    Basophils Absolute 0.1 0.0 - 0.2 K/uL   Comprehensive Metabolic Panel w/ Reflex to MG   Result Value Ref Range    Sodium 143 136 - 145 mmol/L    Potassium reflex Magnesium 4.3 3.5 - 5.1 mmol/L    Chloride 106 99 - 110 mmol/L    CO2 26 21 - 32 mmol/L    Anion Gap 11 3 - 16    Glucose 130 (H) 70 - 99 mg/dL    BUN 20 7 - 20 mg/dL    CREATININE 0.9 0.6 - 1.2 mg/dL    GFR Non-African American >60 >60    GFR African American >60 >60    Calcium 10.1 8.3 - 10.6 mg/dL    Total Protein 6.9 6.4 - 8.2 g/dL    Alb 3.9 3.4 - 5.0 g/dL    Albumin/Globulin Ratio 1.3 1.1 - 2.2    Total Bilirubin <0.2 0.0 - 1.0 mg/dL    Alkaline Phosphatase 85 40 - 129 U/L    ALT 16 10 - 40 U/L    AST 22 15 - 37 U/L    Globulin 3.0 g/dL   Troponin   Result Value Ref Range    Troponin 0.01 <0.01 ng/mL   Brain Natriuretic Peptide   Result Value Ref Range    Pro-BNP 19 0 - 124 pg/mL   D-Dimer, Quantitative   Result Value Ref Range    D-Dimer, Quant 222 0 - 229 ng/mL DDU   Troponin   Result Value Ref Range    Troponin <0.01 <0.01 ng/mL       The 12 lead EKG was interpreted by me as follows:  Rate: normal with a rate of 92  Rhythm: sinus  Axis: normal  Intervals: normal WV, narrow QRS, normal QTc  ST segments: no ST elevations or depressions  T waves: no abnormal inversions  Non-specific T wave changes: not present  Prior EKG comparison: EKG dated 6/17/20 is not significantly different    RADIOLOGY    Xr Chest (2 Vw)    Result Date: 8/24/2020  EXAMINATION: TWO XRAY VIEWS OF THE CHEST 8/24/2020 5:40 pm COMPARISON: 06/17/2020 radiograph HISTORY: ORDERING SYSTEM PROVIDED HISTORY: chest pain TECHNOLOGIST PROVIDED HISTORY: Reason for exam:->chest pain Reason for Exam: Chest Pain (chest wall pain, worse to touch 7/10 \"starting in June\" per pt. with inflammation in bilat arms Acuity: Acute Type of Exam: Initial FINDINGS: The heart, mediastinum and pulmonary vascularity are normal.  Lungs are well-expanded and clear. No skeletal abnormalities are present in the chest.     No significant findings in the chest.       ED COURSE/MDM  Patient seen and evaluated. Old records reviewed. Labs and imaging reviewed and results discussed with patient. After initial evaluation, differential diagnostic considerations included: acute coronary syndrome, pulmonary embolism, COPD/asthma, pneumonia, musculoskeletal, reflux/PUD/gastritis, pneumothorax, CHF, thoracic aortic dissection, anxiety    The patient's ED workup was notable for reproducible chest wall pain persistently since June. She had a negative work-up then. This included a covered swab that was negative.   She has had no ongoing respiratory symptoms at all but has lingering persistent chest wall discomfort. She has been on Tylenol threes chronically for back pain but has not been on any NSAIDs nor is she allergic to them and therefore I do feel that it would be reasonable to start her on an NSAID course and was given Toradol here. Although she does have cardiac comorbidities, given the persistent and atypical nature of her symptoms, also a normal EKG, I do not suspect ACS is the cause of her symptoms. Troponin today is 0.01 and was repeated and negative. Patient is relatively low risk for pulmonary embolism given persistent symptomatology since June. Patient is not tachycardic tachypneic or hypoxic or even short of breath. Therefore d-dimer was obtained and was negative. Chest x-ray clear. Lungs are clear. This sufficiently rules out PE. Very low suspicion for ACS considering her stable pain since June. Follow-up closely with primary care physician. May end up needing an outpatient stress test if symptoms continue to linger or persist.  However I do not feel she requires inpatient admission at this time to obtain this given low clinical suspicion and her reassuring ED assessment. During the patient's ED course, the patient was given:  Medications   ketorolac (TORADOL) injection 30 mg (30 mg Intravenous Given 8/24/20 1757)        CLINICAL IMPRESSION  1. Chest wall pain        Blood pressure 119/79, pulse 73, temperature 98.2 °F (36.8 °C), temperature source Oral, resp. rate 17, height 5' 4\" (1.626 m), weight 204 lb 2.3 oz (92.6 kg), SpO2 98 %, not currently breastfeeding. DISPOSITION  John Wood was discharged to home in stable condition. I have discussed the findings of today's workup with the patient and addressed the patient's questions and concerns. Important warning signs as well as new or worsening symptoms which would necessitate immediate return to the ED were discussed.   The plan is to discharge from the ED at this time, and the patient is in stable condition. The patient acknowledged understanding is agreeable with this plan. Patient was given scripts for the following medications. I counseled patient how to take these medications. New Prescriptions    CELECOXIB (CELEBREX) 100 MG CAPSULE    Take 1 capsule by mouth 2 times daily       Follow-up with:  MD Bernie Shelton [facility] [address] [facility]  [phone]    Schedule an appointment as soon as possible for a visit in 2 days  For symptom re-evaluation - consider an outpatient stress test if symptoms continue    [address] [facility] [address]  [phone]  Go to   If symptoms worsen      DISCLAIMER: This chart was created using Dragon dictation software. Efforts were made by me to ensure accuracy, however some errors may be present due to limitations of this technology and occasionally words are not transcribed correctly.         Chance Dominguez MD  08/24/20 3526

## 2020-08-24 NOTE — ED NOTES
Chest pain with movement that started in June. Pt says the pain never went away. Pt says that her chest pain is worse when she picks things up.       Mellissa Morrow RN  08/24/20 5970

## 2020-08-25 ENCOUNTER — CARE COORDINATION (OUTPATIENT)
Dept: CARE COORDINATION | Age: 65
End: 2020-08-25

## 2020-08-25 LAB
EKG ATRIAL RATE: 92 BPM
EKG DIAGNOSIS: NORMAL
EKG P AXIS: 34 DEGREES
EKG P-R INTERVAL: 182 MS
EKG Q-T INTERVAL: 364 MS
EKG QRS DURATION: 68 MS
EKG QTC CALCULATION (BAZETT): 450 MS
EKG R AXIS: 26 DEGREES
EKG T AXIS: 26 DEGREES
EKG VENTRICULAR RATE: 92 BPM

## 2020-08-25 PROCEDURE — 93010 ELECTROCARDIOGRAM REPORT: CPT | Performed by: INTERNAL MEDICINE

## 2020-08-25 NOTE — CARE COORDINATION
Patient contacted regarding recent discharge and COVID-19 risk. Care Transition Nurse/ Ambulatory Care Manager contacted the patient by telephone to perform post discharge assessment. Verified name and  with patient as identifiers. Patient has following risk factors of: diabetes and arthritis, GERD, HLD, HTN, DIPTI-CPAP. CTN/ACM reviewed discharge instructions, medical action plan and red flags related to discharge diagnosis. Reviewed and educated them on any new and changed medications related to discharge diagnosis. Advised obtaining a 90-day supply of all daily and as-needed medications. Education provided regarding infection prevention, and signs and symptoms of COVID-19 and when to seek medical attention with patient who verbalized understanding. Discussed exposure protocols and quarantine from [address] you at higher risk for severe illness  and given an opportunity for questions and concerns. The patient agrees to contact the COVID-19 hotline [phone] or PCP office for questions related to their healthcare. CTN/ACM provided contact information for future reference. From CDC: Are you at higher risk for severe illness?  Wash your hands often.  Avoid close contact (6 feet, which is about two arm lengths) with people who are sick.  Put distance between yourself and other people if COVID-19 is spreading in your community.  Clean and disinfect frequently touched surfaces.  Avoid all cruise travel and non-essential air travel.  Call your healthcare professional if you have concerns about COVID-19 and your underlying condition or if you are sick. For more information on steps you can take to protect yourself, see CDC's How to [id number] [place] [address] for follow-up call in 7-14 days based on severity of symptoms and risk factors. Spoke with patient using Korean speaking interpretor Raissa Topete #[id number]. Patient states her chest pain is better now after using celebrex. This am she experienced abdominal pain. She took medication last pm after eating dinner. Directed her to contact her PCP Dr. Raymundo Mcclain for follow up of chest pain and new abdominal pain. Patient did not have any other symptoms. Lengthy conversation with patient describing her on going chest discomfort. Patient did not have any questions or other concerns. Plan  ED FU in 2 weeks    Yumiko Banegas.  Beni Polanco RN, BSN, [address] [place] [address] Primary Care  [phone]

## 2020-09-03 NOTE — PROGRESS NOTES
In epic it shows that patient has a preferred Language of Syriac but not if an  is needed. I will schedule an  after we speak to the patient and if one is requested.

## 2020-09-09 ENCOUNTER — CARE COORDINATION (OUTPATIENT)
Dept: CARE COORDINATION | Age: 65
End: 2020-09-09

## 2020-09-09 NOTE — CARE COORDINATION
Your Patient resolved from the Care Transitions episode on 8/25/20    Patient/family has been provided the following resources and education related to COVID-19:                         Signs, symptoms and red flags related to COVID-19            CDC exposure and quarantine guidelines            [facility] exposure contact - [phone]            Contact for their local Department of Health                 Patient currently reports that the following symptoms have improved:  chestwall pain and soreness upon movement for one month and no new/worsening symptoms. No further outreach scheduled with this CTN/ACM. Episode of Care resolved. Patient has this CTN/ACM contact information if future needs arise. First phone call placed using Mohawk speaking interpretor Genoveva Bosworth #[id number] for 14 day ED FU. Patient then said she did not need interpretor and I had Genoveva Bosworth stay on phone call. I spoke to patient using [place]. She said that her chest pain \"comes and goes\". Asked if she had follow up appointment with her PCP and she said no. Encouraged her to FU with PCP. She said she would. Plan  Resolve 14 day ED FU    Polo Lopez.  Charmayne Helm, ANNIE, BSN, [address] [place] [address] Primary Care  [phone]

## 2020-09-10 NOTE — PROGRESS NOTES
Preoperative Screening for Elective Surgery/Invasive Procedures While COVID-19 present in the community     Have you tested positive or have been told to self-isolate for COVID-19 like symptoms within the past 28 days? no   Do you currently have any of the following symptoms?no  o Fever >100.0 F or 99.9 F in immunocompromised patients? o New onset cough, shortness of breath or difficulty breathing?  o New onset sore throat, myalgia (muscle aches and pains), headache, loss of taste/smell or diarrhea?  Have you had a potential exposure to COVID-19 within the past 14 days by:  o Close contact with a confirmed case?no  o Close contact with a healthcare worker,  or essential infrastructure worker (grocery store, [employer], gas station, public utilities or transportation)?no  o Do you reside in a congregate setting such as; skilled nursing facility, adult home, correctional facility, homeless shelter or other institutional setting?no  Have you had recent travel to a known COVID-19 hotspot? no  Indicate if the patient has a positive screen by answering yes to one or more of the above questions. Patients who test positive or screen positive prior to surgery or on the day of surgery should be evaluated in conjunction with the surgeon/proceduralist/anesthesiologist to determine the urgency of the procedure.

## 2020-09-10 NOTE — PROGRESS NOTES
Phone message left on home & cell #`s, to call PAT dept at [phone]  for history review and surgery instructions.

## 2020-09-10 NOTE — PROGRESS NOTES
C-Difficile admission screening and protocol:     * Admitted with diarrhea?no     *Prior history of C-Diff. In last 3 months?no     *Antibiotic use in the past 6-8 weeks?no     *Prior hospitalization or nursing home in the last month?   no       [address] time____1130per pt________        Surgery time______1340______    Take the following medications with a sip of water: Follow your MD/Surgeons pre-procedure instructions regarding your medications    Do not eat or drink anything after 12:00 midnight prior to your surgery. This includes water chewing gum, mints and ice chips. You may brush your teeth and gargle the morning of your surgery, but do not swallow the water     Please see your family doctor/pediatrician for a history and physical and/or concerning medications. Bring any test results/reports from your physicians office. If you are under the care of a heart doctor or specialist doctor, please be aware that you may be asked to them for clearance    You may be asked to stop blood thinners such as Coumadin, Plavix, Fragmin, Lovenox, etc., or any anti-inflammatories such as:  Aspirin, Ibuprofen, Advil, Naproxen prior to your surgery. We also ask that you stop any OTC medications such as fish oil, vitamin E, glucosamine, garlic, Multivitamins, COQ 10, etc.    We ask that you do not smoke 24 hours prior to surgery  We ask that you do not  drink any alcoholic beverages 24 hours prior to surgery     You must make arrangements for a responsible adult to take you home after your surgery. For your safety you will not be allowed to leave alone or drive yourself home. Your surgery will be cancelled if you do not have a ride home. Also for your safety, it is strongly suggested that someone stay with you the first 24 hours after your surgery.      A parent or legal guardian must accompany a child scheduled for surgery and plan to stay at the hospital until the child is discharged. Please do not bring other children with you. For your comfort, please wear simple loose fitting clothing to the hospital.  Please do not bring valuables. Do not wear any make-up or nail polish on your fingers or toes      For your safety, please do not wear any jewelry or body piercing's on the day of surgery. All jewelry must be removed. If you have dentures, they will be removed before going to operating room. For your convenience, we will provide you with a container. If you wear contact lenses or glasses, they will be removed, please bring a case for them. If you have a living will and a durable power of  for healthcare, please bring in a copy. As part of our patient safety program to minimize surgical site infections, we ask you to do the following:    · Please notify your surgeon if you develop any illness between         now and the  day of your surgery. · This includes a cough, cold, fever, sore throat, nausea,         or vomiting, and diarrhea, etc.  ·  Please notify your surgeon if you experience dizziness, shortness         of breath or blurred vision between now and the time of your surgery. Do not shave your operative site 96 hours prior to surgery. For face and neck surgery, men may use an electric razor 48 hours   prior to surgery. You may shower the night before surgery or the morning of   your surgery with an antibacterial soap. You will need to bring a photo ID and insurance card    [facility] has an onsite pharmacy, would you like to utilize our pharmacy     If you will be staying overnight and use a C-pap machine, please bring   your C-pap to hospital     Our goal is to provide you with excellent care, therefore, visitors will be limited to two(2) in the room at a time so that we may focus on providing this care for you. Please contact pre-admission testing if you have any further questions.                  [facility] DONOVAN [facility] phone number:  [phone] PAT fax number:  [phone]  Please note these are generalized instructions for all surgical cases, you may be provided with more specific instructions according to your surgery.

## 2020-09-11 ENCOUNTER — OFFICE VISIT (OUTPATIENT)
Dept: PRIMARY CARE CLINIC | Age: 65
End: 2020-09-11
Payer: MEDICARE

## 2020-09-11 ENCOUNTER — OFFICE VISIT (OUTPATIENT)
Dept: FAMILY MEDICINE CLINIC | Age: 65
End: 2020-09-11
Payer: MEDICARE

## 2020-09-11 VITALS
TEMPERATURE: 97 F | OXYGEN SATURATION: 98 % | HEIGHT: 62 IN | HEART RATE: 95 BPM | BODY MASS INDEX: 36.99 KG/M2 | WEIGHT: 201 LBS

## 2020-09-11 PROCEDURE — G8427 DOCREV CUR MEDS BY ELIG CLIN: HCPCS | Performed by: FAMILY MEDICINE

## 2020-09-11 PROCEDURE — 93000 ELECTROCARDIOGRAM COMPLETE: CPT | Performed by: FAMILY MEDICINE

## 2020-09-11 PROCEDURE — 99211 OFF/OP EST MAY X REQ PHY/QHP: CPT | Performed by: NURSE PRACTITIONER

## 2020-09-11 PROCEDURE — G8417 CALC BMI ABV UP PARAM F/U: HCPCS | Performed by: NURSE PRACTITIONER

## 2020-09-11 PROCEDURE — 99213 OFFICE O/P EST LOW 20 MIN: CPT | Performed by: FAMILY MEDICINE

## 2020-09-11 PROCEDURE — G8417 CALC BMI ABV UP PARAM F/U: HCPCS | Performed by: FAMILY MEDICINE

## 2020-09-11 PROCEDURE — 2022F DILAT RTA XM EVC RTNOPTHY: CPT | Performed by: FAMILY MEDICINE

## 2020-09-11 PROCEDURE — G8428 CUR MEDS NOT DOCUMENT: HCPCS | Performed by: NURSE PRACTITIONER

## 2020-09-11 PROCEDURE — 1090F PRES/ABSN URINE INCON ASSESS: CPT | Performed by: FAMILY MEDICINE

## 2020-09-11 RX ORDER — GABAPENTIN 300 MG/1
300 CAPSULE ORAL NIGHTLY
Qty: 90 CAPSULE | Refills: 0 | Status: SHIPPED | OUTPATIENT
Start: 2020-09-11 | End: 2020-12-09

## 2020-09-11 ASSESSMENT — ENCOUNTER SYMPTOMS
VOMITING: 0
EYE REDNESS: 0
NAUSEA: 0
SINUS PRESSURE: 0
COUGH: 0
COLOR CHANGE: 0
SHORTNESS OF BREATH: 0
SORE THROAT: 0
DIARRHEA: 0

## 2020-09-11 NOTE — PROGRESS NOTES
Patient presented to [facility] drive up clinic for preop testing. Patient was swabbed and given information advising them to remain isolated until procedure date.

## 2020-09-11 NOTE — PROGRESS NOTES
Subjective:     Chief Complaint   Patient presents with    Pre-op Exam     9/17 dr Justina Le removal of bone spur fax [phone]        [address] is a 71 y.o.  female who presents to the office today for a preoperative consultation at the request of surgeon Dr. Cristo Elena who plans on performing bone spur removal, arthrodesis of 1st MTP joint, and bone marrow harvest conc R tibie on 9/17/20. The problem warranting surgery is right foot arthritis and hallux limitus and has been going on for years    Risk factors include:  Prior stroke: yes  Diabetes: yes  Coronary Artery Disease: none known  COPD: no    Planned anesthesia is General.   History of anesthesia problems?  No, has tolerated general anesthesia well in the past  No history of bleeding disorder or clotting disorder    Patient Active Problem List   Diagnosis    Diabetes mellitus ([address]    Obesity    Dystonia    Cerebral thrombosis with cerebral infarction ([address]    Sleep apnea    Right hemiparesis (HCC)    Trigger finger, acquired    Elevated CK    Primary osteoarthritis of both knees    Mood disorder ([address] - follows with Psych Dr. Maty Arreola Calcific tendonitis of right shoulder    Essential hypertension    Mixed hyperlipidemia    Chronic low back pain without sciatica    History of CVA with residual deficit    Anxiety and depression     Past Medical History:   Diagnosis Date    Arthritis     Cardiomyopathy ([address]     Diabetes     GERD (gastroesophageal reflux disease)     Hyperlipidemia     Hypertension     Lumbar disc disease     Sleep apnea     pt states does use a Cpap machine at night    Unspecified cerebral artery occlusion with cerebral infarction 2014    right side weak    Wears glasses      Past Surgical History:   Procedure Laterality Date    CARPAL TUNNEL RELEASE Left 9/3/15    CARPAL TUNNEL RELEASE Right 9/22/15    COLONOSCOPY      FINGER TRIGGER RELEASE Left 9/3/15    middle and ring fingers    FINGER TRIGGER RELEASE Right 9/22/15    middle and ring fingers    FOOT SURGERY Bilateral     litteltoe    HAND SURGERY Right     Ligament    PARTIAL HYSTERECTOMY  2003    SHOULDER ARTHROSCOPY Right 2018    Diagnostic scope, rcr, open Radha     Family History   Problem Relation Age of Onset    Heart Disease Mother     High Blood Pressure Mother     Stroke Mother     Cancer Brother         lung cancer     Social History     Socioeconomic History    Marital status:      Spouse name: Autumn Smith, seen here    Number of children: 3    Years of education: None    Highest education level: None   Occupational History    None   Social Needs    Financial resource strain: None    Food insecurity     Worry: None     Inability: None    Transportation needs     Medical: None     Non-medical: None   Tobacco Use    Smoking status: Former Smoker     Packs/day: 1.50     Years: 3.00     Pack years: 4.50     Last attempt to quit: 1992     Years since quittin.7    Smokeless tobacco: Never Used   Substance and Sexual Activity    Alcohol use: No    Drug use: No    Sexual activity: Yes     Partners: Male   Lifestyle    Physical activity     Days per week: None     Minutes per session: None    Stress: None   Relationships    Social connections     Talks on phone: None     Gets together: None     Attends Islam service: None     Active member of club or organization: None     Attends meetings of clubs or organizations: None     Relationship status: None    Intimate partner violence     Fear of current or ex partner: None     Emotionally abused: None     Physically abused: None     Forced sexual activity: None   Other Topics Concern    None   Social History Narrative    Originally from [address] [place] [address] is seen here    10 grandchildren     Allergies   Allergen Reactions    Codeine Nausea And Vomiting and Rash    Vicodin [Hydrocodone-Acetaminophen] Nausea And Vomiting    Oxycontin [Oxycodone normal.   Neck:      Musculoskeletal: Normal range of motion and neck supple. Thyroid: No thyromegaly. Cardiovascular:      Rate and Rhythm: Normal rate and regular rhythm. Heart sounds: Normal heart sounds. No murmur. Pulmonary:      Effort: Pulmonary effort is normal.      Breath sounds: Normal breath sounds. No wheezing. Abdominal:      General: Bowel sounds are normal.      Palpations: Abdomen is soft. There is no mass. Tenderness: There is no abdominal tenderness. Musculoskeletal: Normal range of motion. Lymphadenopathy:      Cervical: No cervical adenopathy. Skin:     General: Skin is warm and dry. Findings: No rash. Comments: Normal turgor   Neurological:      Mental Status: She is alert and oriented to person, place, and time. Deep Tendon Reflexes: Reflexes normal.   Psychiatric:         Thought Content:  Thought content normal.       Cardiographics  ECG: recently obtained and reviewed by Cardiology 8/24/2020    Lab Review   Admission on 08/24/2020, Discharged on 08/24/2020   Component Date Value    Ventricular Rate 08/24/2020 92     Atrial Rate 08/24/2020 92     P-R Interval 08/24/2020 182     QRS Duration 08/24/2020 68     Q-T Interval 08/24/2020 364     QTc Calculation (Bazett) 08/24/2020 450     P Axis 08/24/2020 34     R Axis 08/24/2020 26     T Axis 08/24/2020 26     Diagnosis 08/24/2020 Normal sinus rhythmConfirmed by SAMUEL EDWARD, Whitney Patel ([id number]) on 8/25/2020 7:43:48 AM     WBC 08/24/2020 8.2     RBC 08/24/2020 3.89*    Hemoglobin 08/24/2020 10.8*    Hematocrit 08/24/2020 33.1*    MCV 08/24/2020 85.1     MCH 08/24/2020 27.9     MCHC 08/24/2020 32.8     RDW 08/24/2020 13.6     Platelets 32/70/0767 272     MPV 08/24/2020 8.2     Neutrophils % 08/24/2020 47.1     Lymphocytes % 08/24/2020 41.8     Monocytes % 08/24/2020 6.8     Eosinophils % 08/24/2020 3.5     Basophils % 08/24/2020 0.8     Neutrophils Absolute 08/24/2020 3.9     Lymphocytes

## 2020-09-12 LAB — SARS-COV-2, NAA: NOT DETECTED

## 2020-09-15 ENCOUNTER — ANESTHESIA EVENT (OUTPATIENT)
Dept: OPERATING ROOM | Age: 65
End: 2020-09-15
Payer: MEDICARE

## 2020-09-17 ENCOUNTER — APPOINTMENT (OUTPATIENT)
Dept: GENERAL RADIOLOGY | Age: 65
End: 2020-09-17
Attending: PODIATRIST
Payer: MEDICARE

## 2020-09-17 ENCOUNTER — ANESTHESIA (OUTPATIENT)
Dept: OPERATING ROOM | Age: 65
End: 2020-09-17
Payer: MEDICARE

## 2020-09-17 ENCOUNTER — HOSPITAL ENCOUNTER (OUTPATIENT)
Age: 65
Setting detail: OUTPATIENT SURGERY
Discharge: HOME | End: 2020-09-17
Attending: PODIATRIST | Admitting: PODIATRIST
Payer: MEDICARE

## 2020-09-17 VITALS
HEART RATE: 76 BPM | HEIGHT: 64 IN | WEIGHT: 205 LBS | RESPIRATION RATE: 16 BRPM | SYSTOLIC BLOOD PRESSURE: 149 MMHG | OXYGEN SATURATION: 96 % | BODY MASS INDEX: 35 KG/M2 | DIASTOLIC BLOOD PRESSURE: 85 MMHG | TEMPERATURE: 97 F

## 2020-09-17 VITALS
DIASTOLIC BLOOD PRESSURE: 69 MMHG | OXYGEN SATURATION: 98 % | RESPIRATION RATE: 15 BRPM | SYSTOLIC BLOOD PRESSURE: 108 MMHG | TEMPERATURE: 96.8 F

## 2020-09-17 LAB
GLUCOSE BLD-MCNC: 105 MG/DL (ref 70–99)
GLUCOSE BLD-MCNC: 132 MG/DL (ref 70–99)
PERFORMED ON: ABNORMAL
PERFORMED ON: ABNORMAL

## 2020-09-17 PROCEDURE — 2500000003 HC RX 250 WO HCPCS: Performed by: ANESTHESIOLOGY

## 2020-09-17 PROCEDURE — 2709999900 HC NON-CHARGEABLE SUPPLY: Performed by: PODIATRIST

## 2020-09-17 PROCEDURE — 2720000010 HC SURG SUPPLY STERILE: Performed by: PODIATRIST

## 2020-09-17 PROCEDURE — 7100000001 HC PACU RECOVERY - ADDTL 15 MIN: Performed by: PODIATRIST

## 2020-09-17 PROCEDURE — 3600000004 HC SURGERY LEVEL 4 BASE: Performed by: PODIATRIST

## 2020-09-17 PROCEDURE — 3700000001 HC ADD 15 MINUTES (ANESTHESIA): Performed by: PODIATRIST

## 2020-09-17 PROCEDURE — 6360000002 HC RX W HCPCS: Performed by: ANESTHESIOLOGY

## 2020-09-17 PROCEDURE — 7100000010 HC PHASE II RECOVERY - FIRST 15 MIN: Performed by: PODIATRIST

## 2020-09-17 PROCEDURE — 2580000003 HC RX 258: Performed by: ANESTHESIOLOGY

## 2020-09-17 PROCEDURE — 7100000000 HC PACU RECOVERY - FIRST 15 MIN: Performed by: PODIATRIST

## 2020-09-17 PROCEDURE — 6360000002 HC RX W HCPCS: Performed by: PODIATRIST

## 2020-09-17 PROCEDURE — C1776 JOINT DEVICE (IMPLANTABLE): HCPCS | Performed by: PODIATRIST

## 2020-09-17 PROCEDURE — 3600000014 HC SURGERY LEVEL 4 ADDTL 15MIN: Performed by: PODIATRIST

## 2020-09-17 PROCEDURE — C1713 ANCHOR/SCREW BN/BN,TIS/BN: HCPCS | Performed by: PODIATRIST

## 2020-09-17 PROCEDURE — 3700000000 HC ANESTHESIA ATTENDED CARE: Performed by: PODIATRIST

## 2020-09-17 PROCEDURE — 6360000002 HC RX W HCPCS: Performed by: NURSE ANESTHETIST, CERTIFIED REGISTERED

## 2020-09-17 PROCEDURE — 7100000011 HC PHASE II RECOVERY - ADDTL 15 MIN: Performed by: PODIATRIST

## 2020-09-17 PROCEDURE — 73630 X-RAY EXAM OF FOOT: CPT

## 2020-09-17 PROCEDURE — 64445 NJX AA&/STRD SCIATIC NRV IMG: CPT | Performed by: ANESTHESIOLOGY

## 2020-09-17 PROCEDURE — 2500000003 HC RX 250 WO HCPCS: Performed by: NURSE ANESTHETIST, CERTIFIED REGISTERED

## 2020-09-17 PROCEDURE — 2580000003 HC RX 258: Performed by: PODIATRIST

## 2020-09-17 PROCEDURE — 2500000003 HC RX 250 WO HCPCS: Performed by: PODIATRIST

## 2020-09-17 DEVICE — IMPLANTABLE DEVICE
Type: IMPLANTABLE DEVICE | Site: FOOT | Status: FUNCTIONAL
Brand: ORTHOLOC 3DI

## 2020-09-17 DEVICE — LOCKING LG HD SCREW 2.7X12MM ORTHOLOC™ 3DI PLATING SYSTEM
Type: IMPLANTABLE DEVICE | Site: FOOT | Status: FUNCTIONAL
Brand: ORTHOLOC 3DI

## 2020-09-17 DEVICE — MTP FUSION PLATE
Type: IMPLANTABLE DEVICE | Site: FOOT | Status: FUNCTIONAL
Brand: ORTHOLOC 3DI

## 2020-09-17 DEVICE — HEADLESS COMPRESSION SCREW
Type: IMPLANTABLE DEVICE | Site: FOOT | Status: FUNCTIONAL
Brand: DART-FIRE

## 2020-09-17 DEVICE — IMPLANTABLE DEVICE
Type: IMPLANTABLE DEVICE | Site: FOOT | Status: FUNCTIONAL
Brand: ORTHOLOC

## 2020-09-17 RX ORDER — FENTANYL CITRATE 50 UG/ML
INJECTION, SOLUTION INTRAMUSCULAR; INTRAVENOUS PRN
Status: DISCONTINUED | OUTPATIENT
Start: 2020-09-17 | End: 2020-09-17 | Stop reason: SDUPTHER

## 2020-09-17 RX ORDER — ONDANSETRON 2 MG/ML
INJECTION INTRAMUSCULAR; INTRAVENOUS PRN
Status: DISCONTINUED | OUTPATIENT
Start: 2020-09-17 | End: 2020-09-17 | Stop reason: SDUPTHER

## 2020-09-17 RX ORDER — CEPHALEXIN 500 MG/1
500 CAPSULE ORAL 4 TIMES DAILY
Qty: 28 CAPSULE | Refills: 0 | Status: SHIPPED | OUTPATIENT
Start: 2020-09-17 | End: 2020-09-24

## 2020-09-17 RX ORDER — MEPERIDINE HYDROCHLORIDE 25 MG/ML
12.5 INJECTION INTRAMUSCULAR; INTRAVENOUS; SUBCUTANEOUS EVERY 5 MIN PRN
Status: DISCONTINUED | OUTPATIENT
Start: 2020-09-17 | End: 2020-09-17 | Stop reason: HOSPADM

## 2020-09-17 RX ORDER — LIDOCAINE HYDROCHLORIDE 20 MG/ML
INJECTION, SOLUTION EPIDURAL; INFILTRATION; INTRACAUDAL; PERINEURAL PRN
Status: DISCONTINUED | OUTPATIENT
Start: 2020-09-17 | End: 2020-09-17 | Stop reason: SDUPTHER

## 2020-09-17 RX ORDER — MIDAZOLAM HYDROCHLORIDE 1 MG/ML
INJECTION INTRAMUSCULAR; INTRAVENOUS PRN
Status: DISCONTINUED | OUTPATIENT
Start: 2020-09-17 | End: 2020-09-17

## 2020-09-17 RX ORDER — EPHEDRINE SULFATE/0.9% NACL/PF 50 MG/5 ML
SYRINGE (ML) INTRAVENOUS PRN
Status: DISCONTINUED | OUTPATIENT
Start: 2020-09-17 | End: 2020-09-17 | Stop reason: SDUPTHER

## 2020-09-17 RX ORDER — SODIUM CHLORIDE 0.9 % (FLUSH) 0.9 %
10 SYRINGE (ML) INJECTION PRN
Status: DISCONTINUED | OUTPATIENT
Start: 2020-09-17 | End: 2020-09-17 | Stop reason: HOSPADM

## 2020-09-17 RX ORDER — SODIUM CHLORIDE 9 MG/ML
INJECTION, SOLUTION INTRAVENOUS CONTINUOUS
Status: DISCONTINUED | OUTPATIENT
Start: 2020-09-17 | End: 2020-09-17 | Stop reason: HOSPADM

## 2020-09-17 RX ORDER — IBUPROFEN 800 MG/1
800 TABLET ORAL 2 TIMES DAILY PRN
Qty: 60 TABLET | Refills: 5 | Status: SHIPPED | OUTPATIENT
Start: 2020-09-17 | End: 2021-05-11

## 2020-09-17 RX ORDER — LIDOCAINE HYDROCHLORIDE 20 MG/ML
INJECTION, SOLUTION INFILTRATION; PERINEURAL
Status: COMPLETED | OUTPATIENT
Start: 2020-09-17 | End: 2020-09-17

## 2020-09-17 RX ORDER — ACETAMINOPHEN AND CODEINE PHOSPHATE 300; 30 MG/1; MG/1
1 TABLET ORAL EVERY 4 HOURS PRN
Qty: 42 TABLET | Refills: 0 | Status: SHIPPED | OUTPATIENT
Start: 2020-09-17 | End: 2020-09-24

## 2020-09-17 RX ORDER — BUPIVACAINE HYDROCHLORIDE 2.5 MG/ML
INJECTION, SOLUTION EPIDURAL; INFILTRATION; INTRACAUDAL
Status: COMPLETED | OUTPATIENT
Start: 2020-09-17 | End: 2020-09-17

## 2020-09-17 RX ORDER — MIDAZOLAM HYDROCHLORIDE 1 MG/ML
INJECTION INTRAMUSCULAR; INTRAVENOUS PRN
Status: DISCONTINUED | OUTPATIENT
Start: 2020-09-17 | End: 2020-09-17 | Stop reason: SDUPTHER

## 2020-09-17 RX ORDER — EPHEDRINE SULFATE/0.9% NACL/PF 50 MG/5 ML
SYRINGE (ML) INTRAVENOUS PRN
Status: DISCONTINUED | OUTPATIENT
Start: 2020-09-17 | End: 2020-09-17

## 2020-09-17 RX ORDER — SODIUM CHLORIDE 0.9 % (FLUSH) 0.9 %
10 SYRINGE (ML) INJECTION EVERY 12 HOURS SCHEDULED
Status: DISCONTINUED | OUTPATIENT
Start: 2020-09-17 | End: 2020-09-17 | Stop reason: HOSPADM

## 2020-09-17 RX ORDER — PROMETHAZINE HYDROCHLORIDE 12.5 MG/1
12.5 TABLET ORAL 4 TIMES DAILY PRN
Qty: 20 TABLET | Refills: 0 | Status: SHIPPED | OUTPATIENT
Start: 2020-09-17 | End: 2020-09-24

## 2020-09-17 RX ORDER — ONDANSETRON 2 MG/ML
4 INJECTION INTRAMUSCULAR; INTRAVENOUS
Status: DISCONTINUED | OUTPATIENT
Start: 2020-09-17 | End: 2020-09-17 | Stop reason: HOSPADM

## 2020-09-17 RX ORDER — PROPOFOL 10 MG/ML
INJECTION, EMULSION INTRAVENOUS CONTINUOUS PRN
Status: DISCONTINUED | OUTPATIENT
Start: 2020-09-17 | End: 2020-09-17 | Stop reason: SDUPTHER

## 2020-09-17 RX ORDER — FENTANYL CITRATE 50 UG/ML
50 INJECTION, SOLUTION INTRAMUSCULAR; INTRAVENOUS EVERY 5 MIN PRN
Status: DISCONTINUED | OUTPATIENT
Start: 2020-09-17 | End: 2020-09-17 | Stop reason: HOSPADM

## 2020-09-17 RX ORDER — FENTANYL CITRATE 50 UG/ML
25 INJECTION, SOLUTION INTRAMUSCULAR; INTRAVENOUS EVERY 5 MIN PRN
Status: DISCONTINUED | OUTPATIENT
Start: 2020-09-17 | End: 2020-09-17 | Stop reason: HOSPADM

## 2020-09-17 RX ORDER — PROPOFOL 10 MG/ML
INJECTION, EMULSION INTRAVENOUS PRN
Status: DISCONTINUED | OUTPATIENT
Start: 2020-09-17 | End: 2020-09-17 | Stop reason: SDUPTHER

## 2020-09-17 RX ADMIN — ONDANSETRON 4 MG: 2 INJECTION INTRAMUSCULAR; INTRAVENOUS at 16:17

## 2020-09-17 RX ADMIN — PROPOFOL 100 MCG/KG/MIN: 10 INJECTION, EMULSION INTRAVENOUS at 15:21

## 2020-09-17 RX ADMIN — BUPIVACAINE HYDROCHLORIDE 20 ML: 2.5 INJECTION, SOLUTION EPIDURAL; INFILTRATION; INTRACAUDAL at 14:34

## 2020-09-17 RX ADMIN — LIDOCAINE HYDROCHLORIDE 60 MG: 20 INJECTION, SOLUTION EPIDURAL; INFILTRATION; INTRACAUDAL; PERINEURAL at 15:25

## 2020-09-17 RX ADMIN — PROPOFOL 70 MG: 10 INJECTION, EMULSION INTRAVENOUS at 15:29

## 2020-09-17 RX ADMIN — MIDAZOLAM 2 MG: 1 INJECTION INTRAMUSCULAR; INTRAVENOUS at 15:10

## 2020-09-17 RX ADMIN — Medication 20 MG: at 16:00

## 2020-09-17 RX ADMIN — FENTANYL CITRATE 50 MCG: 50 INJECTION, SOLUTION INTRAMUSCULAR; INTRAVENOUS at 17:38

## 2020-09-17 RX ADMIN — CEFAZOLIN SODIUM 2 G: 10 INJECTION, POWDER, FOR SOLUTION INTRAVENOUS at 15:19

## 2020-09-17 RX ADMIN — FENTANYL CITRATE 50 MCG: 50 INJECTION INTRAMUSCULAR; INTRAVENOUS at 15:21

## 2020-09-17 RX ADMIN — SODIUM CHLORIDE: 9 INJECTION, SOLUTION INTRAVENOUS at 12:40

## 2020-09-17 RX ADMIN — Medication 20 MG: at 16:08

## 2020-09-17 RX ADMIN — FENTANYL CITRATE 50 MCG: 50 INJECTION, SOLUTION INTRAMUSCULAR; INTRAVENOUS at 17:51

## 2020-09-17 RX ADMIN — Medication 10 MG: at 16:06

## 2020-09-17 ASSESSMENT — PULMONARY FUNCTION TESTS
PIF_VALUE: 1
PIF_VALUE: 10
PIF_VALUE: 10
PIF_VALUE: 25
PIF_VALUE: 1
PIF_VALUE: 0
PIF_VALUE: 13
PIF_VALUE: 16
PIF_VALUE: 1
PIF_VALUE: 10
PIF_VALUE: 1
PIF_VALUE: 10
PIF_VALUE: 13
PIF_VALUE: 1
PIF_VALUE: 10
PIF_VALUE: 10
PIF_VALUE: 13
PIF_VALUE: 10
PIF_VALUE: 10
PIF_VALUE: 9
PIF_VALUE: 13
PIF_VALUE: 10
PIF_VALUE: 10
PIF_VALUE: 8
PIF_VALUE: 8
PIF_VALUE: 9
PIF_VALUE: 10
PIF_VALUE: 9
PIF_VALUE: 1
PIF_VALUE: 10
PIF_VALUE: 1
PIF_VALUE: 10
PIF_VALUE: 13
PIF_VALUE: 0
PIF_VALUE: 10
PIF_VALUE: 1
PIF_VALUE: 10
PIF_VALUE: 13
PIF_VALUE: 13
PIF_VALUE: 5
PIF_VALUE: 10
PIF_VALUE: 13
PIF_VALUE: 10
PIF_VALUE: 13
PIF_VALUE: 10
PIF_VALUE: 9
PIF_VALUE: 10
PIF_VALUE: 10
PIF_VALUE: 1
PIF_VALUE: 0
PIF_VALUE: 10
PIF_VALUE: 10
PIF_VALUE: 3
PIF_VALUE: 13
PIF_VALUE: 10
PIF_VALUE: 10
PIF_VALUE: 9
PIF_VALUE: 10
PIF_VALUE: 9
PIF_VALUE: 4
PIF_VALUE: 13
PIF_VALUE: 10
PIF_VALUE: 1
PIF_VALUE: 1
PIF_VALUE: 25
PIF_VALUE: 13
PIF_VALUE: 28
PIF_VALUE: 13
PIF_VALUE: 29
PIF_VALUE: 10
PIF_VALUE: 10
PIF_VALUE: 13
PIF_VALUE: 13
PIF_VALUE: 1
PIF_VALUE: 13
PIF_VALUE: 1
PIF_VALUE: 1
PIF_VALUE: 10
PIF_VALUE: 1
PIF_VALUE: 4
PIF_VALUE: 17
PIF_VALUE: 10
PIF_VALUE: 1
PIF_VALUE: 1
PIF_VALUE: 10
PIF_VALUE: 9
PIF_VALUE: 10
PIF_VALUE: 30
PIF_VALUE: 13
PIF_VALUE: 1
PIF_VALUE: 1
PIF_VALUE: 13
PIF_VALUE: 13
PIF_VALUE: 1
PIF_VALUE: 13
PIF_VALUE: 10
PIF_VALUE: 1
PIF_VALUE: 1
PIF_VALUE: 10
PIF_VALUE: 13
PIF_VALUE: 9
PIF_VALUE: 10
PIF_VALUE: 13
PIF_VALUE: 1
PIF_VALUE: 13
PIF_VALUE: 16
PIF_VALUE: 1
PIF_VALUE: 9
PIF_VALUE: 13

## 2020-09-17 ASSESSMENT — PAIN DESCRIPTION - LOCATION
LOCATION: FOOT
LOCATION: FOOT

## 2020-09-17 ASSESSMENT — PAIN SCALES - GENERAL
PAINLEVEL_OUTOF10: 0
PAINLEVEL_OUTOF10: 10
PAINLEVEL_OUTOF10: 8
PAINLEVEL_OUTOF10: 0

## 2020-09-17 ASSESSMENT — PAIN DESCRIPTION - DESCRIPTORS
DESCRIPTORS: DISCOMFORT
DESCRIPTORS: PATIENT UNABLE TO DESCRIBE
DESCRIPTORS: PATIENT UNABLE TO DESCRIBE

## 2020-09-17 ASSESSMENT — LIFESTYLE VARIABLES: SMOKING_STATUS: 0

## 2020-09-17 ASSESSMENT — ENCOUNTER SYMPTOMS: SHORTNESS OF BREATH: 0

## 2020-09-17 ASSESSMENT — PAIN DESCRIPTION - ONSET
ONSET: ON-GOING
ONSET: AWAKENED FROM SLEEP

## 2020-09-17 ASSESSMENT — PAIN DESCRIPTION - PAIN TYPE
TYPE: SURGICAL PAIN
TYPE: SURGICAL PAIN

## 2020-09-17 ASSESSMENT — PAIN - FUNCTIONAL ASSESSMENT
PAIN_FUNCTIONAL_ASSESSMENT: 0-10
PAIN_FUNCTIONAL_ASSESSMENT: ACTIVITIES ARE NOT PREVENTED

## 2020-09-17 ASSESSMENT — PAIN DESCRIPTION - FREQUENCY
FREQUENCY: CONTINUOUS
FREQUENCY: CONTINUOUS

## 2020-09-17 ASSESSMENT — PAIN DESCRIPTION - ORIENTATION
ORIENTATION: RIGHT
ORIENTATION: RIGHT

## 2020-09-17 ASSESSMENT — PAIN DESCRIPTION - PROGRESSION: CLINICAL_PROGRESSION: GRADUALLY IMPROVING

## 2020-09-17 NOTE — ANESTHESIA PRE PROCEDURE
Department of Anesthesiology  Preprocedure Note       Name:  Sina Gudino   Age:  71 y.o.  :  1955                                          MRN:  [id number]         Date:  2020      Surgeon: Nathaniel Isaacs):  Bea Watkins DPM    Procedure: Procedure(s):  (RIGHT) REMOVAL OF BONE SPURRING AND OSSEOUS PROMINENCE FIRST METATARSAL, ARTHRODESIS OF FIRST METATARSOPHALANGEAL JOINT, BONE MARROW HARVEST AND CONCENTRATION RIGHT TIBIA    Medications prior to admission:   Prior to Admission medications    Medication Sig Start Date End Date Taking? Authorizing Provider   gabapentin (NEURONTIN) 300 MG capsule Take 1 capsule by mouth nightly for 90 days.  9/11/20 12/10/20 Yes Sheila Finnegan MD   metoprolol succinate (TOPROL XL) 25 MG extended release tablet TAKE ONE TABLET BY MOUTH DAILY FOR FOR BLOOD PRESSURE AND HEART RATE 20  Yes Sheila Finnegan MD   metFORMIN (GLUCOPHAGE) 500 MG tablet TAKE TWO TABLETS BY MOUTH TWICE A DAY WITH MEALS 20  Yes Sheila Finnegan MD   omeprazole (PRILOSEC) 40 MG delayed release capsule TAKE ONE CAPSULE BY MOUTH TWICE A DAY BEFORE MEALS 20  Yes Sheila Finnegan MD   valsartan (DIOVAN) 80 MG tablet TAKE ONE TABLET BY MOUTH DAILY 20  Yes Sheila Finnegan MD   furosemide (LASIX) 20 MG tablet TAKE ONE TABLET BY MOUTH DAILY AS NEEDED FOR LEG SWELLING 20  Yes Sheila Finnegan MD   atorvastatin (LIPITOR) 20 MG tablet TAKE ONE TABLET BY MOUTH DAILY 20  Yes Sheila Finnegan MD   insulin glargine [facility] AUTHORITY KWIKPEN) 100 UNIT/ML injection pen Inject 14 Units into the skin nightly 20  Yes Sheila Finnegan MD   Insulin Pen Needle (PEN NEEDLES) 31G X 6 MM MISC USE TO INJECT INSULIN DAILY 20  Yes Sheila Finnegan MD   gabapentin (NEURONTIN) 100 MG capsule TAKE ONE CAPSULE BY MOUTH EVERY MORNING 3/11/20 9/10/20 Yes Sheila Finnegan MD   DULoxetine (CYMBALTA) 60 MG extended release capsule Take 60 mg by mouth daily   Yes Historical Provider, MD   traZODone (DESYREL) 100 MG tablet Take 100 mg by mouth nightly   Yes Last Dose    ceFAZolin (ANCEF) 2 g in dextrose 5 % 100 mL IVPB  2 g Intravenous Once Guilherme Barone DPM        0.9 % sodium chloride infusion   Intravenous Continuous Jesse Gomez MD 75 mL/hr at 09/17/20 1240      sodium chloride flush 0.9 % injection 10 mL  10 mL Intravenous 2 times per day Jesse Gomez MD        sodium chloride flush 0.9 % injection 10 mL  10 mL Intravenous PRN Jesse Gomez MD           Allergies:     Allergies   Allergen Reactions    Codeine Nausea And Vomiting and Rash    Vicodin [Hydrocodone-Acetaminophen] Nausea And Vomiting    Oxycontin [Oxycodone Hcl] Itching    Tramadol Itching and Swelling       Problem List:    Patient Active Problem List   Diagnosis Code    Diabetes mellitus ([facility] E11.9    Obesity E66.9    Dystonia G24.9    Cerebral thrombosis with cerebral infarction ([facility] I63.30    Sleep apnea G47.30    Right hemiparesis (HCC) G81.91    Trigger finger, acquired M65.30    Elevated CK R74.8    Primary osteoarthritis of both knees M17.0    Mood disorder (HCC) - follows with Psych Dr. Leopold Rockers    Calcific tendonitis of right shoulder M75.31    Essential hypertension I10    Mixed hyperlipidemia E78.2    Chronic low back pain without sciatica M54.5, G89.29    History of CVA with residual deficit I69.30    Anxiety and depression F41.9, F32.9       Past Medical History:        Diagnosis Date    Arthritis     Cardiomyopathy ([facility]     Diabetes     GERD (gastroesophageal reflux disease)     Hyperlipidemia     Hypertension     Lumbar disc disease     Sleep apnea     pt states does use a Cpap machine at night    Unspecified cerebral artery occlusion with cerebral infarction 2014    right side weak    Wears glasses        Past Surgical History:        Procedure Laterality Date    CARPAL TUNNEL RELEASE Left 9/3/15    CARPAL TUNNEL RELEASE Right 9/22/15    COLONOSCOPY      FINGER TRIGGER RELEASE Left 9/3/15    middle and ring fingers    FINGER TRIGGER PROT 7.6 06/21/2012    CALCIUM 10.1 08/24/2020    BILITOT <0.2 08/24/2020    ALKPHOS 85 08/24/2020    AST 22 08/24/2020    ALT 16 08/24/2020       POC Tests:   Recent Labs     09/17/20  1238   POCGLU 105*       Coags:   Lab Results   Component Value Date    PROTIME 10.7 10/17/2019    INR 0.94 10/17/2019    APTT 27.3 10/17/2019       HCG (If Applicable): No results found for: PREGTESTUR, PREGSERUM, HCG, HCGQUANT     ABGs: No results found for: PHART, PO2ART, LPD9FWF, BYT6XXU, BEART, C1PMAKIX     Type & Screen (If Applicable):  No results found for: LABABO, LABRH    Drug/Infectious Status (If Applicable):  No results found for: HIV, HEPCAB    COVID-19 Screening (If Applicable):   Lab Results   Component Value Date    COVID19 NOT DETECTED 09/11/2020    COVID19 Not Detected 06/17/2020         Anesthesia Evaluation  Patient summary reviewed and Nursing notes reviewed no history of anesthetic complications:   Airway: Mallampati: III  TM distance: >3 FB   Neck ROM: full  Mouth opening: > = 3 FB Dental:      Comment: Missing teeth    Pulmonary:   (+) sleep apnea: on CPAP,      (-) pneumonia, COPD, asthma, shortness of breath, recent URI and not a current smoker                           Cardiovascular:  Exercise tolerance: good (>4 METS),   (+) hypertension:, hyperlipidemia    (-) pacemaker, valvular problems/murmurs, past MI, CAD, CABG/stent, dysrhythmias,  angina,  CHF, orthopnea and  JAVIER      Rhythm: regular                      Neuro/Psych:   (+) CVA (2015 CVA):, psychiatric history:   (-) seizures, neuromuscular disease, TIA, headaches and depression/anxiety            GI/Hepatic/Renal:   (+) GERD:,      (-) hiatal hernia, PUD, hepatitis, liver disease, no renal disease, bowel prep and no morbid obesity       Endo/Other:    (+) DiabetesType II DM, , : arthritis: OA., .    (-) hypothyroidism, hyperthyroidism, blood dyscrasia               Abdominal:           Vascular: negative vascular ROS. Anesthesia Plan      MAC and regional     ASA 3     (Ultrasound guided sciatic nerve block.  present for interview)  Induction: intravenous. MIPS: Prophylactic antiemetics administered. Anesthetic plan and risks discussed with patient. Plan discussed with CRNA. Diamante Gardner MD   9/17/2020      This pre-anesthesia assessment may be used as a history and physical.    DOS STAFF ADDENDUM:    Pt seen and examined, chart reviewed (including anesthesia, drug and allergy history). No interval changes to history and physical examination. Anesthetic plan, risks, benefits, alternatives, and personnel involved discussed with patient. Patient verbalized an understanding and agrees to proceed.       Diamante Gardner MD  September 17, 2020  12:54 PM

## 2020-09-17 NOTE — PROGRESS NOTES
Arrives to PACU, vital signs stable, IV infusing without complications, OPA in place, respirations easy and even  ,

## 2020-09-17 NOTE — BRIEF OP NOTE
Brief Postoperative Note      Patient: John Wood  YOB: 1955  MRN: [id number]    Date of Procedure: 9/17/2020    Pre-Op Diagnosis: HALLUX LIMITUS, RIGHT, ARTHRITIS RIGHT FOOT, PAIN RIGHT FOOT    Post-Op Diagnosis: Same       Procedure(s):  (RIGHT) REMOVAL OF BONE SPURRING AND OSSEOUS PROMINENCE FIRST METATARSAL, ARTHRODESIS OF FIRST METATARSOPHALANGEAL JOINT, BONE MARROW HARVEST AND CONCENTRATION RIGHT TIBIA    Surgeon(s):  Glendell Primrose, DPM    Assistant:  Mary Reyes PGY2    Anesthesia: Monitor Anesthesia Care    Injectables: Preop Saphenous block, Intraop 20 mL 1% Lidocaine plain, Postop 10 mL 0.5% Marcaine plain    Hemostasis: Right pneumatic calf tourniquet, 250 mmHg - 92 minutes    Materials: 3-0 Vicryl, 4-0 Vicryl, 5-0 Monocryl, BMA,  Garibay MPT fusion 0 degree plate, 4.1P95CO screw, 2.7x16mm, 2 x 2.7x18 x 2.7 x 20mm    Estimated Blood Loss: less than 50     Complications: None    Specimens:   * No specimens in log *    Implants:  Implant Name Type Inv.  Item Serial No.  Lot No. LRB No. Used Action   IMPL LEG MTP FUSION MED 0 DEG RT Leg/Ankle/Foot/Toe IMPL LEG MTP FUSION MED 0 DEG RT  [employer]  Right 1 Implanted   SCREW DARTFIRE HEADLESS 3.0X26MM Screw/Plate/Nail/Edison SCREW DARTFIRE HEADLESS 3.0X26MM  [employer]  Right 1 Implanted   SCREW LOPRO CORTCL 2.7X16MM Screw/Plate/Nail/Edison SCREW LOPRO CORTCL 2.7X16MM  [employer]  Right 1 Implanted   SCREW LK 2.7X12MM Screw/Plate/Nail/Edison SCREW LK 2.7X12MM  [employer]  Right 1 Implanted   SCREW ORTHOLOC LK 2.7X18MM Screw/Plate/Nail/Edison SCREW ORTHOLOC LK 2.7X18MM  [employer]  Right 2 Implanted   SCREW LK LG HD 2.7X20MM Screw/Plate/Nail/Edison SCREW LK LG HD 2.7X20MM  [employer]  Right 1 Implanted         Drains: * No LDAs found *    Findings: See op report    Electronically signed by Maggi Arrington DPM on 9/17/2020 at 5:30 PM

## 2020-09-17 NOTE — PROGRESS NOTES
Pre-Operative Biomechanical Exam      Biomechanics:   Gait Analysis:  Angle of Gait    R: 7  L:  7     Base of Gait (centimeters)  3.0cm     Stance Phase (any abnormal findings):Pes planus with hallux rigidis R>L     Swing Phase (any abnormal findings): Antalgic      Postural Considerations (limb length/assymetry):***     Ankle Joint: Dorsiflexion (KE):  R: 10 ***  L:  10***     Dorsiflexion (KF):  R: >10*** L:  >10***     Subtalar: Inversion:   R:  20***  L:  20***     Eversion:   R:  10 *** L:  10***     Neutral Position ((inv+ev)/3): R:  *** L:  *** (if > eversion it's its inverted, if less it's everted)     Midtarsal: 1-5 Position:   R: 0 *** Varus/Valgus  L: 0 *** Varus/Valgus     First Ray: Dorsiflexion:   R:5mm L: 5mm     Plantarflexion:   R:5mm L: 5mm     First MPJ:  Dorsiflexion (unloaded): R: 65*** L: 65***     Dorsiflexion (loaded):  R: 65*** L: 65***     Static Stance: RCSP (calc bisection): R: ***  L:  ***     NCSP: (STJNP + TI)  R: *** L:  ***     Tibial Influence (Leg to grnd): R: *** L:  ***      Assessment:   ***     Plan:  - Patient was seen and examined this AM.  - *** procedure  - This patient has signs and symptoms clinically  consistent with the above mentioned preoperative diagnosis. Having failed conservative treatment, it was determined that the patient would benefit from surgical intervention. All potential risks, benefits, and complications were discussed with the patient prior to the scheduling of surgery. All the patient's questions were answered and no guarantees were given. The patient wished to proceed with surgery, and informed written consent was obtained. - Patient will benefit from a Tello cutout with a functional orthotic.       Rosetta Maddox DPM   Podiatric Resident, PGY-1  Pager: [phone] or Perfect serve

## 2020-09-17 NOTE — OP NOTE
Operative Note      Patient: Toni Wood  YOB: 1955  MRN: [id number]    Date of Procedure: 9/17/2020    Pre-Op Diagnosis: HALLUX LIMITUS, RIGHT, ARTHRITIS RIGHT FOOT, PAIN RIGHT FOOT    Post-Op Diagnosis: Same       Procedure(s):  (RIGHT) REMOVAL OF BONE SPURRING AND OSSEOUS PROMINENCE FIRST METATARSAL, ARTHRODESIS OF FIRST METATARSOPHALANGEAL JOINT, BONE MARROW HARVEST AND CONCENTRATION RIGHT TIBIA    Surgeon(s):  Henry Bowen DPM    Assistant:  Greg Linn PGY2    Anesthesia: Monitor Anesthesia Care    Injectables: Preop Saphenous block, Intraop 20 mL 1% Lidocaine plain, Postop 10 mL 0.5% Marcaine plain    Hemostasis: Right pneumatic calf tourniquet, 250 mmHg - 92 minutes    Materials: 3-0 Vicryl, 4-0 Vicryl, 5-0 Monocryl, BMA,  Garibay MPT fusion 0 degree plate, see link below    Estimated Blood Loss: less than 50     Complications: None    Specimens:   * No specimens in log *    Implants:  Implant Name Type Inv.  Item Serial No.  Lot No. LRB No. Used Action   IMPL LEG MTP FUSION MED 0 DEG RT Leg/Ankle/Foot/Toe IMPL LEG MTP FUSION MED 0 DEG RT  [employer]  Right 1 Implanted   SCREW DARTFIRE HEADLESS 3.0X26MM Screw/Plate/Nail/Edison SCREW DARTFIRE HEADLESS 3.0X26MM  [employer]  Right 1 Implanted   SCREW LOPRO CORTCL 2.7X16MM Screw/Plate/Nail/Edison SCREW LOPRO CORTCL 2.7X16MM  [employer]  Right 1 Implanted   SCREW LK 2.7X12MM Screw/Plate/Nail/Edison SCREW LK 2.7X12MM  [employer]  Right 1 Implanted   SCREW ORTHOLOC LK 2.7X18MM Screw/Plate/Nail/Edison SCREW ORTHOLOC LK 2.7X18MM  [employer]  Right 2 Implanted   SCREW LK LG HD 2.7X20MM Screw/Plate/Nail/Edison SCREW LK LG HD 2.7X20MM  [employer]  Right 1 Implanted         Drains: * No LDAs found *    Findings: See op report    INDICATIONS FOR PROCEDURE: This patient has signs and symptoms clinically  consistent with the above mentioned preoperative diagnosis. Having failed conservative treatment, it was determined that the patient would benefit from surgical intervention. All potential risks, benefits, and complications were discussed with the patient prior to the scheduling of surgery. All the patient's questions were answered and no guarantees were given. The patient wished to proceed with surgery, and informed written consent was obtained. Detail of Procedure: The patient was brought from the preoperative area and placed on the operating table in the supine position. Following induction of Monitor Anesthesia Care a local block consisting of a total of 10 mL of 1% lidocaine plain to anesthetize the patients surgical limb in an Mccall block fashion. A time-out was performed. The patient, procedure and operative site were confirmed and the following procedure was then performed. Detail of Procedure #1 BONE MARROW HARVEST Right lower extremity:  [TIBIA] Using a #15 blade, a small stab incision was made on the anterior medial aspect of the right tibia. A curved mosquito hemostat was used to carry the incision down through the subcutaneous and deep tissues down to the level of bone. Care was taken to avoid the saphenous vein during this dissection. Next, a Jamshidi needle was inserted into the right tibia using a mallet. A large syringe was used to remove approximately 30 cc of bone marrow aspirate from this site. The needle was then removed. Surgical site irrigated with saline. And the skin was closed using 3-0 nylon suture. Next, applied a well padded sterile pneumatic calf tourniquet to the patients right lower extremity. The right lower extremity was exsanguinated using an esmark bandage and the right pneumatic calf tourniquet was inflated to 250 mmHg. Detail of procedure #2 REMOVAL OF BONE SPURRING AND OSSEOUS PROMINENCE FIRST METATARSAL, ARTHRODESIS OF FIRST METATARSOPHALANGEAL JOINT, Right foot:    At this time, attention was directed to the dorsal aspect of the patients right foot. Using a #15 blade, a 6 cm curvilinear incision was made over the dorsal aspect of the first metatarsal, centered over the first metatarsophalangeal joint. Using sharp and blunt dissection the incision was deepened through the subcutaneous layer with care being taken to identify and retract all vital neurovascular structures. All venous tributaries were electrocoagulated as necessary. Sharp and blunt dissection was continued to the level of the joint capsule. At this time, a linear capsulotomy was performed over the dorsal aspect of the first metatarsophalangeal joint. At this time the patient right lower extremity was moving during dissection so another 10 mL of 1% Lidocaine plain was injection. No more further complications noted. Next, the periosteal and capsular structures were then carefully dissected free of their osseous attachments and reflected medially and laterally thereby exposing the head of the first metatarsal and the base of the proximal phalanx. The contour of the bone adjacent to the MPJ both proximally and distally was noted to be grossly irregular and to have resulted in the formation of periarticular osteophytes. The range of motion of the MPJ was assessed and noted to be markedly limited in dorsiflexion. At this time, a sagittal bone saw with a #138 blade was used to resect all areas of bony prominence at the metatarsal head dorsal, medial prominences, and phalangeal base. All loose osteophytic fragments were excised and passed from the operative site. A reciprocating power rasp was then used to smooth the areas of resected bone until flush with the adjacent bone. This process was carried out until normal bony contour was restored. The MPJ ROM was then assessed and noted to have improved to approximately 65 degrees of dorsiflexion.       [REAMING]  Next, using the [facility] set, a guide wire was placed through the head of the first metatarsal and down the medullary canal. Fluoroscopy was utilized to confirm adequate placement. Overtop the guidewire, a 18mm sized concave reamer was placed and the 1st metatarsal head was reamed until all articular cartilage was removed. A #138 blade was used to resect any overhang from the reamed site. The guide wire was removed. The guide wire was then placed through the proximal phalanx and down the medullary canal. Fluoroscopy was utilized to confirm adequate placement. Overtop the guidewire, a 10mm sized convex reamer was placed and the proximal phalangeal base was reamed until all articular cartilage was removed. A #138 blade was used to resect any overhang from the reamed site. The joint was now flushed with copious amounts of sterile saline. The guide wire was removed and used to fenestrate the joint surfaces. Next, the hallux was then aligned on the 1st metatarsal head with the distal pulp of the toe just off the weight-bearing surface, approximately 15 degrees of dorsiflexion from the ground, and parallel to the 2nd toe with no frontal plane rotation. This position was temporarily fixated using a K-wire. C-arm fluoroscopy revealed excellent alignment of the 1st metatarsophalangeal joint and position of the arthrodesis. [PLATE]  Next a 30 x [employer] cannulated screw was placed over the guide wire using modified AO technique and the manufacturers insertion recommendations. Excellent compression was noted. Again, proper position was confirmed using c-arm. Next, a Garibay first metatarsal phalangeal joint  Zero degree locking plate was placed on the dorsal aspect of the joint. The plate was secured to the underlying bony surfaces using a combination of locking and non-locking screws. Excellent stabilization was noted. Proper plate position was confirmed using live intraoperative fluoroscopy. The temporary fixation was then removed and the area was irrigated.  The capsular structures were then closed with 2-0 vicryl in a simple interrupted fashion. The subcutaneous layer was then closed with 4-0 vicryl in a continuous running fashion. The Subcuticular layer was then closed with 5-0 Monocryl in a continuous running fashion. End of Procedure: At this time, a local anesthetic was injected about the incision sites consisting of 10 mL of 0.5% Marcaine plain, for the patient's postoperative comfort. A soft sterile dressing was applied consisting of adaptic, gauze, cast padding, and Coban. The Right pneumatic calf  tourniquet was deflated after a total time of 92 minutes and a prompt hyperemic response was noted on all aspects of the patient's right lower extremity. The patient tolerated the procedure and anesthesia well. The patient left the OR with vital signs stable and vascular status intact to all remaining digits of the right foot. Following a period of post-operative monitoring, the patient will be discharged home with written and oral wound care and follow-up instructions per Dr. Charli Riddle. The patient is to follow-up with Dr. Charli Riddle in his private office within 5 days. The patient is to keep dressing clean, dry and intact at all times. The patient is to call with if any complications occur. All counts were correct and attending was present throughout entire case. Dictated on behalf of ANABELLA Young DPM   Podiatric Resident, PGY-2        I was present and scrubbed through the duration of the case. The resident performed portions of the procedure under my direct supervision.     Segun Christian DPM  [facility]  Pager: [phone]  Office: [phone]  Fax: [phone]                    Electronically signed by Gume Varela DPM on 9/17/2020 at 5:37 PM

## 2020-09-17 NOTE — PROGRESS NOTES
Pt alert and oriented, vitals within normal limits. Pt's family- Humberto Nielsen at bedside. Pt's pain in foot 6/10. Pt has full movement in BLE. Pt states she has odd feeling in toes on right foot, no numbness/tingling. Belongings in locker- pt has CPAP. Pt has , able to understand basic questions without .

## 2020-09-17 NOTE — ANESTHESIA POSTPROCEDURE EVALUATION
Department of Anesthesiology  Postprocedure Note    Patient: Yvette Griffiths  MRN: [id number]  YOB: 1955  Date of evaluation: 9/17/2020  Time:  6:12 PM     Procedure Summary     Date:  09/17/20 Room / Location:  [address]    Anesthesia Start:  1520 Anesthesia Stop:  5905    Procedure:  (RIGHT) REMOVAL OF BONE SPURRING AND OSSEOUS PROMINENCE FIRST METATARSAL, ARTHRODESIS OF FIRST METATARSOPHALANGEAL JOINT, BONE MARROW HARVEST AND CONCENTRATION RIGHT TIBIA (Right ) Diagnosis:       Arthritis of right foot      Pain in right foot      (HALLUX LIMITUS, RIGHT, ARTHRITIS RIGHT FOOT, PAIN RIGHT FOOT)    Surgeon:  Oscar Wong DPM Responsible Provider:  Nitza Berger MD    Anesthesia Type:  MAC, regional ASA Status:  3          Anesthesia Type: MAC, regional    Mayo Phase I: Mayo Score: 9    Mayo Phase II:      Last vitals: Reviewed and per EMR flowsheets.        Anesthesia Post Evaluation    Patient location during evaluation: PACU  Level of consciousness: awake and alert  Airway patency: patent  Nausea & Vomiting: no nausea and no vomiting  Complications: no  Cardiovascular status: blood pressure returned to baseline  Respiratory status: acceptable  Hydration status: euvolemic  Comments: Postoperative Anesthesia Note    Name:    Yvette Griffiths  MRN:      [id number]    Patient Vitals in the past 12 hrs:  09/17/20 1806, Temp:97 °F (36.1 °C), Temp src:Temporal  09/17/20 1800, BP:123/84, Pulse:70, Resp:12, SpO2:98 %  09/17/20 1755, Pulse:70, Resp:10, SpO2:96 %  09/17/20 1750, BP:116/75, Pulse:67, Resp:14, SpO2:97 %  09/17/20 1745, BP:127/77, Pulse:66, Resp:12, SpO2:98 %  09/17/20 1740, BP:121/83, Pulse:65, Resp:16, SpO2:100 %  09/17/20 1735, BP:124/82, Pulse:68, Resp:18, SpO2:100 %  09/17/20 1733, BP:128/78, Temp:97.1 °F (36.2 °C), Temp src:Temporal, Pulse:67, Resp:16, SpO2:98 %  09/17/20 1154, BP:(!) 149/82, Temp:96.2 °F (35.7 °C), Temp src:Temporal, Pulse:72, Resp:15, SpO2:97 %, Height:5' 4\" (1.626 m), Weight:205 lb (93 kg)     LABS:    CBC  Lab Results       Component                Value               Date/Time                  WBC                      8.2                 08/24/2020 05:49 PM        HGB                      10.8 (L)            08/24/2020 05:49 PM        HCT                      33.1 (L)            08/24/2020 05:49 PM        PLT                      272                 08/24/2020 05:49 PM   RENAL  Lab Results       Component                Value               Date/Time                  NA                       143                 08/24/2020 05:49 PM        K                        4.3                 08/24/2020 05:49 PM        CL                       106                 08/24/2020 05:49 PM        CO2                      26                  08/24/2020 05:49 PM        BUN                      20                  08/24/2020 05:49 PM        CREATININE               0.9                 08/24/2020 05:49 PM        GLUCOSE                  130 (H)             08/24/2020 05:49 PM   COAGS  Lab Results       Component                Value               Date/Time                  PROTIME                  10.7                10/17/2019 11:17 PM        INR                      0.94                10/17/2019 11:17 PM        APTT                     27.3                10/17/2019 11:17 PM     Intake & Output:  @98NXUS@    Nausea & Vomiting:  No    Level of Consciousness:  Awake    Pain Assessment:  Adequate analgesia    Anesthesia Complications:  No apparent anesthetic complications    SUMMARY      Vital signs stable  OK to discharge from Stage I post anesthesia care.   Care transferred from Anesthesiology department on discharge from perioperative area

## 2020-09-17 NOTE — LETTER
[address]  Phone: [phone]            September 17, 2020     Patient: Karie Braun   YOB: 1955   Date of Visit: 8/28/2020       To Whom It May Concern: It is my medical opinion that Sushant Reeks should refrain from work activities until 12/10/2020. This is an estimate of time to return to work. Patient will need to remain non-weightbearing until that time. If you have any questions or concerns, please don't hesitate to call.     Sincerely,        Lawyer Osei DPM  [facility]  Pager: [phone]  Office: [phone]  Fax: [phone]

## 2020-09-18 ENCOUNTER — TELEPHONE (OUTPATIENT)
Dept: FAMILY MEDICINE CLINIC | Age: 65
End: 2020-09-18

## 2020-10-23 RX ORDER — METOPROLOL SUCCINATE 25 MG/1
TABLET, EXTENDED RELEASE ORAL
Qty: 90 TABLET | Refills: 0 | Status: SHIPPED | OUTPATIENT
Start: 2020-10-23 | End: 2021-01-25

## 2020-10-30 RX ORDER — ATORVASTATIN CALCIUM 20 MG/1
TABLET, FILM COATED ORAL
Qty: 90 TABLET | Refills: 0 | Status: SHIPPED | OUTPATIENT
Start: 2020-10-30 | End: 2021-01-27

## 2020-11-13 RX ORDER — FUROSEMIDE 20 MG/1
TABLET ORAL
Qty: 90 TABLET | Refills: 0 | Status: SHIPPED | OUTPATIENT
Start: 2020-11-13 | End: 2021-02-10

## 2020-11-13 RX ORDER — VALSARTAN 80 MG/1
TABLET ORAL
Qty: 90 TABLET | Refills: 0 | Status: SHIPPED | OUTPATIENT
Start: 2020-11-13 | End: 2021-02-10

## 2020-11-19 ENCOUNTER — NURSE ONLY (OUTPATIENT)
Dept: FAMILY MEDICINE CLINIC | Age: 65
End: 2020-11-19
Payer: MEDICARE

## 2020-11-19 PROCEDURE — G0008 ADMIN INFLUENZA VIRUS VAC: HCPCS | Performed by: FAMILY MEDICINE

## 2020-11-19 PROCEDURE — 90694 VACC AIIV4 NO PRSRV 0.5ML IM: CPT | Performed by: FAMILY MEDICINE

## 2020-11-27 RX ORDER — OMEPRAZOLE 40 MG/1
CAPSULE, DELAYED RELEASE ORAL
Qty: 180 CAPSULE | Refills: 0 | Status: SHIPPED | OUTPATIENT
Start: 2020-11-27 | End: 2021-03-03

## 2020-12-04 ENCOUNTER — OFFICE VISIT (OUTPATIENT)
Dept: ORTHOPEDIC SURGERY | Age: 65
End: 2020-12-04
Payer: MEDICARE

## 2020-12-04 VITALS — WEIGHT: 204 LBS | HEIGHT: 64 IN | BODY MASS INDEX: 34.83 KG/M2 | TEMPERATURE: 97.2 F

## 2020-12-04 PROCEDURE — 1036F TOBACCO NON-USER: CPT | Performed by: ORTHOPAEDIC SURGERY

## 2020-12-04 PROCEDURE — 1090F PRES/ABSN URINE INCON ASSESS: CPT | Performed by: ORTHOPAEDIC SURGERY

## 2020-12-04 PROCEDURE — 1123F ACP DISCUSS/DSCN MKR DOCD: CPT | Performed by: ORTHOPAEDIC SURGERY

## 2020-12-04 PROCEDURE — 4040F PNEUMOC VAC/ADMIN/RCVD: CPT | Performed by: ORTHOPAEDIC SURGERY

## 2020-12-04 PROCEDURE — 3017F COLORECTAL CA SCREEN DOC REV: CPT | Performed by: ORTHOPAEDIC SURGERY

## 2020-12-04 PROCEDURE — G8399 PT W/DXA RESULTS DOCUMENT: HCPCS | Performed by: ORTHOPAEDIC SURGERY

## 2020-12-04 PROCEDURE — G8427 DOCREV CUR MEDS BY ELIG CLIN: HCPCS | Performed by: ORTHOPAEDIC SURGERY

## 2020-12-04 PROCEDURE — G8417 CALC BMI ABV UP PARAM F/U: HCPCS | Performed by: ORTHOPAEDIC SURGERY

## 2020-12-04 PROCEDURE — G8484 FLU IMMUNIZE NO ADMIN: HCPCS | Performed by: ORTHOPAEDIC SURGERY

## 2020-12-04 PROCEDURE — 99214 OFFICE O/P EST MOD 30 MIN: CPT | Performed by: ORTHOPAEDIC SURGERY

## 2020-12-04 NOTE — PROGRESS NOTES
Mick AGGARWAL [facility]  [id number]  December 4, 2020    Chief Complaint   Patient presents with    Hip Pain     Right       History: The patient is a 79-year-old female who is here for evaluation of her right hip. She has had moderate to severe right hip pain/groin pain for the past month. She did receive a right hip injection back in April 2018. She has never had any prior surgery on this right hip. She cannot recall a specific injury. It should be noted the patient does have a significant past medical history remarkable for prior cerebrovascular accident with partial right hemiparesis. She also has a significant history of anxiety and depression. She does have diabetes. She did recently have right foot surgery and is currently in a boot. The patient does report doing well with regards to her right shoulder. I performed a right shoulder rotator cuff repair back in 2019. The patient's  past medical history, medications, allergies,  family history, social history, and have been reviewed, and dated and are recorded in the chart. Pertinent items are noted in HPI. Review of systems reviewed from Pertinent History Form dated on 12/4/2020 and available in the patient's chart under the Media tab. Vitals:  Temp 97.2 °F (36.2 °C) (Temporal)   Ht 5' 4\" (1.626 m)   Wt 204 lb (92.5 kg)   BMI 35.02 kg/m²     Physical: Physical: Ms. Renita Snell appears well, she is in no apparent distress, she demonstrates appropriate mood & affect. She is alert and oriented to person, place and time. She has moderate pain with internal rotation of the right hip. Range of motion of the right hip is : 40 degrees abduction, 30 degrees adduction, 35 degrees of external rotation and 10 degrees of internal rotation. Range of motion of the opposite hip is full. She is non tender laterally about the hips. Trendelenburg test is negative bilaterally. Lorence Jackelyn test is negative bilaterally.  She is non tender about the Sacroiliac joint bilaterally. Leg length discrepancy: none. Examination of the skin reveals no rashes, ulcerations, or lesions, bilaterally in the lower extremities. Sensation to both lower extremities is grossly intact. Exam of both feet reveals pedal pulses intact and brisk cap refill. Patient is able to dorsiflex and wiggle all toes. Deep tendon reflexes of the lower extremities are normal and symmetric. She is non tender to palpation of the lumbar spine. X-rays: AP pelvis and 2 views of the right hip were obtained and demonstrate moderate osteoarthritis. We did compare these x-rays to x-rays performed back in 2018. There has been slight progression of the arthritis. Impression: right Hip Osteoarthritis      Plan: At this time, we will go ahead and send her to Dr. Natali Fuller for a right hip intra-articular injection. The patient was encouraged to continue working on weight loss. She will continue modifying her activities. Patient will follow up with me in approximately 6 weeks and we will reassess her then. If she continues to have severe right groin pain, we will consider total hip arthroplasty.

## 2020-12-09 RX ORDER — GABAPENTIN 300 MG/1
CAPSULE ORAL
Qty: 90 CAPSULE | Refills: 0 | Status: SHIPPED | OUTPATIENT
Start: 2020-12-09 | End: 2021-03-11

## 2020-12-11 RX ORDER — GABAPENTIN 100 MG/1
CAPSULE ORAL
Qty: 90 CAPSULE | Refills: 1 | OUTPATIENT
Start: 2020-12-11 | End: 2021-06-09

## 2020-12-15 ENCOUNTER — OFFICE VISIT (OUTPATIENT)
Dept: ORTHOPEDIC SURGERY | Age: 65
End: 2020-12-15
Payer: MEDICARE

## 2020-12-15 VITALS — BODY MASS INDEX: 33.8 KG/M2 | HEIGHT: 64 IN | TEMPERATURE: 97.8 F | WEIGHT: 198 LBS

## 2020-12-15 PROCEDURE — 20611 DRAIN/INJ JOINT/BURSA W/US: CPT | Performed by: ORTHOPAEDIC SURGERY

## 2020-12-15 RX ORDER — LIDOCAINE HYDROCHLORIDE 10 MG/ML
5 INJECTION, SOLUTION INFILTRATION; PERINEURAL ONCE
Status: COMPLETED | OUTPATIENT
Start: 2020-12-15 | End: 2020-12-15

## 2020-12-15 RX ORDER — METHYLPREDNISOLONE ACETATE 40 MG/ML
80 INJECTION, SUSPENSION INTRA-ARTICULAR; INTRALESIONAL; INTRAMUSCULAR; SOFT TISSUE ONCE
Status: COMPLETED | OUTPATIENT
Start: 2020-12-15 | End: 2020-12-15

## 2020-12-15 RX ADMIN — METHYLPREDNISOLONE ACETATE 80 MG: 40 INJECTION, SUSPENSION INTRA-ARTICULAR; INTRALESIONAL; INTRAMUSCULAR; SOFT TISSUE at 11:41

## 2020-12-15 RX ADMIN — LIDOCAINE HYDROCHLORIDE 5 ML: 10 INJECTION, SOLUTION INFILTRATION; PERINEURAL at 11:40

## 2020-12-15 NOTE — PATIENT INSTRUCTIONS
Impression:  right hip osteoarthritis. Plan:  1. Injection with cortisone was recommended into  right   hip joint using ultrasound visualization. She understands the risks and benefits of the injection and describes no potential allergies. Time out was performed to verify correct person, correct procedure, and correct site. 2. Ultrasound visualization was first performed utilizing the [facility] Ultrasound unit using an 5/2 MHz Curved Probe. finding the femoral neck head junction. Next the femoral artery and vein were visualized with ultrasound medial to the femoral head. The location of the needle insertion was determined well lateral to the neurovascular structures and the site was anesthetized with 3 mL of 1% Lidocaine. The site was then prepped with ChloraPrep. A sterile cover was placed over the transducer head and the femoral head neck junction once again visualized. A 20-gauge spinal needle was then introduced under ultrasound control to the head neck junction. Once the needle was intracapsular the hip joint was injected with 2 mL  of 1% Lidocaine mixed with 2 ml of 40 mg Depo Medrol. John tolerated the procedure well and  did report that her right hip pain which much better  within 5 minutes of the injection. 3.  She is return to Dr. L[facility].       Everitt Angelucci, MD  12/15/2020

## 2020-12-15 NOTE — PROGRESS NOTES
ULTRASOUND GUIDED HIP INJECTION    John Wood    December 15, 2020    Chief Complaint   Patient presents with    Hip Pain     RT Hip Ultrasound INjection, last one done on 7/6/2018       Temp 97.8 °F (36.6 °C)   Ht 5' 4\" (1.626 m)   Wt 198 lb (89.8 kg)   BMI 33.99 kg/m²     John returns for an ultrasound directed repeat intra-articular steroid injection of  her right hip. She is status post 2 previous right hip injections performed by myself in 2018. She states she has had recurrence of her right hip pain in her right groin. It is now a constant ache 6 at rest and a 7 with transfers and ambulation. She does require a walker for ambulation. In addition she has had recent right foot surgery and utilizes a right foot walking boot. I have today reviewed with Andrew Keating the clinically relevant past medical history, medications, allergies, family history, and Review of Systems from the patients most recent history form, and I have documented any details relevant to today's presenting complaints in my history above. The patient's self recorded documents concerning the above have been scanned  into the chart under the \"Media\" tab. Physical Exam:   Examination of the righthip shows a positive logroll. No Lesion of the skin is noted over the injection site    Impression:  right hip osteoarthritis. Plan:  1. Injection with cortisone was recommended into  right   hip joint using ultrasound visualization. She understands the risks and benefits of the injection and describes no potential allergies. Time out was performed to verify correct person, correct procedure, and correct site.

## 2020-12-17 NOTE — PLAN OF CARE
[address] [facility] Outpatient Rehabilitation and Therapy, [facility]  [address] [facility]  Phone: [phone]   Fax:     [phone]                                                       Physical Therapy Certification    Dear Referring Practitioner: Morgan Davis DPM,    We had the pleasure of evaluating the following patient for physical therapy services at [facility]. A summary of our findings can be found in the initial assessment below. This includes our plan of care. If you have any questions or concerns regarding these findings, please do not hesitate to contact me at the office phone number checked above. Thank you for the referral.       Physician Signature:_______________________________Date:__________________  By signing above (or electronic signature), therapists plan is approved by physician              Patient: Annamarie Lin   : 1955   MRN: [id number]       Referring Physician: Referring Practitioner: Morgan Davis DPM      Evaluation Date: 2020         Medical Diagnosis Information:  Diagnosis: Foot pain      Decreased functional mobility 2/2 right foot post op status                                        Insurance information:  Percilla Cheli     Precautions/ Contra-indications: no limitations - foot out of boot    Latex Allergy:  [x]NO      []YES  Preferred Language for Healthcare:   [x]English       []other:    C-SSRS Triggered by Intake questionnaire (Past 2 wk assessment ):   [] No, Questionnaire did not trigger screening. [x] Yes, Patient intake triggered C-SSRS Screening      [x] C-SSRS Screening completed  [] PCP notified via Epic     SUBJECTIVE:  History of foot pain.     2020 Right foot removal of bone spurring and osseous prominence first metatarsal, arthrodesis of first mtp, bone marrow harvest and concentration right tibia Relevant Medical History: arthritis, DM, HLD, HTN, CVA 2014, cardiomyopathy, GERD, l umbar disc disease, sleep apnea, right shoulder RCR, gurmeet CTS, left and right trigger finger release. Functional Scale/Score: LEFS =  15  80-99%  Disability     Pain Scale  5: /10  Easing factors:  Rest ice  Provocative factors:   Walking, activity    Type: [x]Constant   []Intermittent  []Radiating []Localized []other:     Numbness/Tingling: occasionally in bottom of foot    Occupation/School:  Was working in a store prior to surgery    Living Status/Prior Level of Function: Independent with ADLs and IADLs,     OBJECTIVE:   Palpation:  No specific ttp noted, min edema     Functional Mobility/Transfers: Mod i    Posture: nl    Bandages/Dressings/Incisions: Well healed surgical incision    Gait: antalgic gait, decreased stance time on the right foot, wearing boot, using 4 WW    ROM LEFT RIGHT   HIP Flex wfl all wfl all   HIP Abd     HIP Ext     HIP IR     HIP ER     Knee ext     Knee Flex     Ankle PF  38a/40p   Ankle DF  5a/8p   Ankle In  10a/p   Ankle Ev    First mtp        To neutral a/p    No arom    PROM - 10 extension  To neutral flexion   Strength  LEFT RIGHT   HIP Flexors wfl all wfl all   HIP Abductors     HIP Ext     Hip ER     Knee EXT (quad)     Knee Flex (HS)     Ankle DF  3+/5   Ankle PF  3+/5   Ankle Inv  3+/5   Ankle EV  3+/5        Circumference  MTP  Mid lat malleolus     24 cm  29 cm     24.5 cm  30 cm       Reflexes/Sensation:    [x]Dermatomes/Myotomes intact    [x]Reflexes equal and normal bilaterally   []Other:    Joint mobility:    []Normal    [x]Hypo decreased MTP mobility and mtp/toe mobility   []Hyper    Orthopedic Special Tests:                        [x] Patient history, allergies, meds reviewed. Medical chart reviewed. See intake form.      Review Of Systems (ROS): [x]Decreased LE functional ROM   []Decreased core/proximal hip strength and neuromuscular control   [x]Decreased LE functional strength   [x]Reduced balance/proprioceptive control   []other:      Functional Activity Limitations (from functional questionnaire and intake)   [x]Reduced ability to tolerate prolonged functional positions   [x]Reduced ability or difficulty with changes of positions or transfers between positions   [x]Reduced ability to maintain good posture and demonstrate good body mechanics with sitting, bending, and lifting   []Reduced ability to sleep   [x] Reduced ability or tolerance with driving and/or computer work   [x]Reduced ability to perform lifting, carrying tasks   [x]Reduced ability to squat   []Reduced ability to forward bend   [x]Reduced ability to ambulate prolonged functional periods/distances/surfaces   [x]Reduced ability to ascend/descend stairs   [x]Reduced ability to run, hop or jump   []other:     Participation Restrictions   [x]Reduced participation in self care activities   [x]Reduced participation in home management activities   [x]Reduced participation in work activities   [x]Reduced participation in social activities. []Reduced participation in sport activities. Classification :    [x]Signs/symptoms consistent with post-surgical status including decreased ROM, strength and function.    []Signs/symptoms consistent with joint sprain/strain   []Signs/symptoms consistent with patella-femoral syndrome   []Signs/symptoms consistent with knee OA/hip OA   []Signs/symptoms consistent with internal derangement of knee/Hip   []Signs/symptoms consistent with functional hip weakness/NMR control      []Signs/symptoms consistent with tendinitis/tendinosis    []signs/symptoms consistent with pathology which may benefit from Dry needling      []other:      Prognosis/Rehab Potential:      []Excellent   [x]Good    []Fair   []Poor    Tolerance of evaluation/treatment:    []Excellent [x]Good    []Fair   []Poor    Physical Therapy Evaluation Complexity Justification  [x] A history of present problem with:  [] no personal factors and/or comorbidities that impact the plan of care;  [x]1-2 personal factors and/or comorbidities that impact the plan of care  []3 personal factors and/or comorbidities that impact the plan of care  [x] An examination of body systems using standardized tests and measures addressing any of the following: body structures and functions (impairments), activity limitations, and/or participation restrictions;:  [x] a total of 1-2 or more elements   [] a total of 3 or more elements   [] a total of 4 or more elements   [x] A clinical presentation with:  [x] stable and/or uncomplicated characteristics   [] evolving clinical presentation with changing characteristics  [] unstable and unpredictable characteristics;   [x] Clinical decision making of [] low, [] moderate, [] high complexity using standardized patient assessment instrument and/or measurable assessment of functional outcome. [x] EVAL (LOW) 53273 (typically 20 minutes face-to-face)  [] EVAL (MOD) 07288 (typically 30 minutes face-to-face)  [] EVAL (HIGH) 07294 (typically 45 minutes face-to-face)  [] RE-EVAL     PLAN:  Frequency/Duration:   2days per week for8  Weeks:  Interventions:  [x]  Therapeutic exercise including: strength training, ROM, for Lower extremity and core   [x]  NMR activation and proprioception for LE, Glutes and Core   [x]  Manual therapy as indicated for LE, Hip and spine to include: Dry Needling/IASTM, STM, PROM, Gr I-IV mobilizations, manipulation. [x] Modalities as needed that may include: thermal agents, E-stim, Biofeedback, US, iontophoresis as indicated  [x] Patient education on joint protection, postural re-education, activity modification, progression of HEP.   [x] Aquatic exercise including: strength training, ROM, and balance for Lower extremity and core HEP instruction: see flowsheet    GOALS:  Patient stated goal: get back to driving  [] Progressing: [] Met: [] Not Met: [] Adjusted    Therapist goals for Patient:   Short Term Goals: To be achieved in: 2 weeks  1. Independent in HEP and progression per patient tolerance, in order to prevent re-injury. [] Progressing: [] Met: [] Not Met: [] Adjusted  2. Patient will have a decrease in pain to facilitate improvement in movement, function, and ADLs as indicated by Functional Deficits. [] Progressing: [] Met: [] Not Met: [] Adjusted    Long Term Goals: To be achieved in:  weeks  1. Disability index score of  20% or less for the LEFS to assist with reaching prior level of function. [] Progressing: [] Met: [] Not Met: [] Adjusted  2. Patient will demonstrate increased AROM to  to allow for proper joint functioning as indicated by patients Functional Deficits. [] Progressing: [] Met: [] Not Met: [] Adjusted  3. Patient will demonstrate an increase in Strength to good proximal hip strength and control, within 5lb HHD in LE to allow for proper functional mobility as indicated by patients Functional Deficits. [] Progressing: [] Met: [] Not Met: [] Adjusted  4. Patient will return to  functional activities without increased symptoms or restriction. [] Progressing: [] Met: [] Not Met: [] Adjusted  5. To be able to get around without the walker   [] Progressing: [] Met: [] Not Met: [] Adjusted     Electronically signed by:  Yoly Pearce PT DPT, MS  [id number]      Note: If patient does not return for scheduled/recommended follow up visits, this note will serve as a discharge from care along with the most recent update on progress.

## 2020-12-21 ENCOUNTER — HOSPITAL ENCOUNTER (OUTPATIENT)
Dept: PHYSICAL THERAPY | Age: 65
Setting detail: THERAPIES SERIES
Discharge: HOME | End: 2020-12-21
Payer: MEDICARE

## 2020-12-21 PROCEDURE — 97140 MANUAL THERAPY 1/> REGIONS: CPT

## 2020-12-21 PROCEDURE — 97110 THERAPEUTIC EXERCISES: CPT

## 2020-12-21 PROCEDURE — 97161 PT EVAL LOW COMPLEX 20 MIN: CPT

## 2020-12-21 NOTE — FLOWSHEET NOTE
[facility]  Outpatient Rehabilitation and Therapy, [facility]  [address] [facility]  Phone: [phone]   Fax:     [phone]      Physical Therapy Treatment Note/ Progress Report:     Date:  2020    Patient Name:  Monica Hawk    :  1955  MRN: [id number]    Medical/Treatment Diagnosis Information:  · Dx - Post first MTJ fusion  ·  Decreased functional mobility 2/2 right foot pain    Insurance/Certification information:   [facility] medicare  Physician Information:  Referring Practitioner: Audra Lucero DPM  Plan of care signed (Y/N): inbox    Date of Patient follow up with Physician:      Progress Report: []  Yes  [x]  No     Date Range for reporting period:  Beginnin2020  Ending:      Progress report due (10 Rx/or 30 days whichever is less): 68    Recertification due (POC duration/ or 90 days whichever is less):21    Visit # POC/Insurance Allowable Auth Needed   1 Bomn []Yes   []No     Latex Allergy:  [x]NO      []YES  Preferred Language for Healthcare:   [x]English       []Other:    SUBJECTIVE:  History of foot pain. 2020 Right foot removal of bone spurring and osseous prominence first metatarsal, arthrodesis of first mtp, bone marrow harvest and concentration right tibia      Relevant Medical History: arthritis, DM, HLD, HTN, CVA , cardiomyopathy, GERD, l umbar disc disease, sleep apnea, right shoulder RCR, gurmeet CTS, left and right trigger finger release.        Functional Scale/Score: LEFS =  15  80-99%  Disability      Pain Scale  5: /10  Easing factors:  Rest ice  Provocative factors:   Walking, activity  SUBJECTIVE:  History of foot pain.     2020 Right foot removal of bone spurring and osseous prominence first metatarsal, arthrodesis of first mtp, bone marrow harvest and concentration right tibia     [x] (91569) Provided verbal/tactile cueing for activities related to improving balance, coordination, kinesthetic sense, posture, motor skill, proprioception  to assist with LE, proximal hip, and core control in self care, mobility, lifting, ambulation and eccentric single leg control. [address] and Therapeutic Activities:    [x] (82716 or 38695) Provided verbal/tactile cueing for activities related to improving balance, coordination, kinesthetic sense, posture, motor skill, proprioception and motor activation to allow for proper function of core, proximal hip and LE with self care and ADLs and functional mobility. [x] (94177) Gait Re-education- Provided training and instruction to the patient for proper LE, core and proximal hip recruitment and positioning and eccentric body weight control with ambulation re-education including up and down stairs     Home Exercise Program:    [x] (41874) Reviewed/Progressed HEP activities related to strengthening, flexibility, endurance, ROM of core, proximal hip and LE for functional self-care, mobility, lifting and ambulation/stair navigation   [x] (43220)Reviewed/Progressed HEP activities related to improving balance, coordination, kinesthetic sense, posture, motor skill, proprioception of core, proximal hip and LE for self care, mobility, lifting, and ambulation/stair navigation      Manual Treatments:  PROM / STM / Oscillations-Mobs:  G-I, II, III, IV (PA's, Inf., Post.)  [x] (90694) Provided manual therapy to mobilize LE, proximal hip and/or LS spine soft tissue/joints for the purpose of modulating pain, promoting relaxation,  increasing ROM, reducing/eliminating soft tissue swelling/inflammation/restriction, improving soft tissue extensibility and allowing for proper ROM for normal function with self care, mobility, lifting and ambulation.        Charges:  Timed Code Treatment Minutes: 30   Total Treatment Minutes: 54

## 2020-12-22 ENCOUNTER — HOSPITAL ENCOUNTER (OUTPATIENT)
Dept: PHYSICAL THERAPY | Age: 65
Setting detail: THERAPIES SERIES
Discharge: HOME | End: 2020-12-22
Payer: MEDICARE

## 2020-12-22 PROCEDURE — 97530 THERAPEUTIC ACTIVITIES: CPT

## 2020-12-22 PROCEDURE — 97140 MANUAL THERAPY 1/> REGIONS: CPT

## 2020-12-22 PROCEDURE — 97110 THERAPEUTIC EXERCISES: CPT

## 2020-12-22 PROCEDURE — 97116 GAIT TRAINING THERAPY: CPT

## 2020-12-22 NOTE — FLOWSHEET NOTE
[facility]  Outpatient Rehabilitation and Therapy, [facility]  [address] [facility], [facility]  Phone: [phone]   Fax:     [phone]      Physical Therapy Treatment Note/ Progress Report:     Date:  2020    Patient Name:  Jj Lora    :  1955  MRN: [id number]    Medical/Treatment Diagnosis Information:  · Dx - Post first MTJ fusion  ·  Decreased functional mobility 2/2 right foot pain    Insurance/Certification information:   [facility] medicare  Physician Information:  Referring Practitioner: Radha Cameron DPM  Plan of care signed (Y/N): inbox    Date of Patient follow up with Physician:      Progress Report: []  Yes  [x]  No     Date Range for reporting period:  Beginnin2020  Ending:      Progress report due (10 Rx/or 30 days whichever is less): 5/92/15    Recertification due (POC duration/ or 90 days whichever is less):21    Visit # POC/Insurance Allowable Auth Needed   2 Bomn []Yes   []No     Latex Allergy:  [x]NO      []YES  Preferred Language for Healthcare:   [x]English       []Other:    SUBJECTIVE:  History of foot pain. 2020 Right foot removal of bone spurring and osseous prominence first metatarsal, arthrodesis of first mtp, bone marrow harvest and concentration right tibia      Relevant Medical History: arthritis, DM, HLD, HTN, CVA , cardiomyopathy, GERD, l umbar disc disease, sleep apnea, right shoulder RCR, gurmeet CTS, left and right trigger finger release.        Functional Scale/Score: LEFS =  15  80-99%  Disability      Pain Scale  5: /10  Easing factors:  Rest ice  Provocative factors:   Walking, activity  SUBJECTIVE:  History of foot pain.     2020 Right foot removal of bone spurring and osseous prominence first metatarsal, arthrodesis of first mtp, bone marrow harvest and concentration right tibia     Relevant Medical History: arthritis, DM, HLD, HTN, CVA 2014, cardiomyopathy, GERD, l umbar disc disease, sleep apnea, right shoulder RCR, gurmeet CTS, left and right trigger finger release.        Functional Scale/Score: LEFS =  15  80-99%  Disability    Pain Scale  5: /10    Ankle PF   38a/40p   Ankle DF   5a/8p   Ankle In   10a/p   Ankle Ev     First mtp            To neutral a/p     No arom    PROM - 10 extension  To neutral flexion     Ankle DF   3+/5   Ankle PF   3+/5   Ankle Inv   3+/5   Ankle EV   3+/5           Circumference  MTP  Mid lat malleolus       24 cm  29 cm       24.5 cm  30 cm         Exercises/Interventions:     Therapeutic Ex (87119)   Min:   15 Reps/Resistance Notes/CUES   Nustep  Seat 7 UE 7 In shoes 5 min     GSS towel 10 sec x 6    Seated HR/TR 10 each  gurmeet    Seated toe DIP extension          Therapeutic Activity (23485) Min:   10     Standing wt shift 1 min    Gait- no boot 1/2 lap in gym With 4 [address]        NMR re-education (46751)  Min:   5   CUES NEEDED   Gumdrop ROM seated  4 way x 10 with PT assist         Manual Intervention (30915) Min: 10     MT mobs All MTP  joints    PROM first toe  PROM/AAROM all toes X 20  X 20    Gentle effleurage right foot 5 min              Modalities  Min:          CP after exercises cryocuff ankle/foot 10 min With pillowcase               Other Therapeutic Activities: Pt was educated on PT POC, Diagnosis, Prognosis, pathomechanics as well as frequency and duration of scheduling future physical therapy appointments. Time was also taken on this day to answer all patient questions and participation in PT. Reviewed appointment policy in detail with patient and patient verbalized understanding. Home Exercise Program: Patient was instructed in the following for HEP:     . Patient verbalized/demonstrated understanding and was issued written handout.       Therapeutic Exercise and NMR EXR [x] (77598) Provided verbal/tactile cueing for activities related to strengthening, flexibility, endurance, ROM for improvements in LE, proximal hip, and core control with self care, mobility, lifting, ambulation. [x] (41591) Provided verbal/tactile cueing for activities related to improving balance, coordination, kinesthetic sense, posture, motor skill, proprioception  to assist with LE, proximal hip, and core control in self care, mobility, lifting, ambulation and eccentric single leg control. [address] and Therapeutic Activities:    [x] (25269 or 71456) Provided verbal/tactile cueing for activities related to improving balance, coordination, kinesthetic sense, posture, motor skill, proprioception and motor activation to allow for proper function of core, proximal hip and LE with self care and ADLs and functional mobility.    [x] (23498) Gait Re-education- Provided training and instruction to the patient for proper LE, core and proximal hip recruitment and positioning and eccentric body weight control with ambulation re-education including up and down stairs     Home Exercise Program:    [x] (29258) Reviewed/Progressed HEP activities related to strengthening, flexibility, endurance, ROM of core, proximal hip and LE for functional self-care, mobility, lifting and ambulation/stair navigation   [x] (00947)Reviewed/Progressed HEP activities related to improving balance, coordination, kinesthetic sense, posture, motor skill, proprioception of core, proximal hip and LE for self care, mobility, lifting, and ambulation/stair navigation      Manual Treatments:  PROM / STM / Oscillations-Mobs:  G-I, II, III, IV (PA's, Inf., Post.) [x] (56187) Provided manual therapy to mobilize LE, proximal hip and/or LS spine soft tissue/joints for the purpose of modulating pain, promoting relaxation,  increasing ROM, reducing/eliminating soft tissue swelling/inflammation/restriction, improving soft tissue extensibility and allowing for proper ROM for normal function with self care, mobility, lifting and ambulation. Charges:  Timed Code Treatment Minutes: 40   Total Treatment Minutes: 52      [] EVAL (LOW) 07723 (typically 20 minutes face-to-face)  [] EVAL (MOD) 69604 (typically 30 minutes face-to-face)  [] EVAL (HIGH) 82981 (typically 45 minutes face-to-face)  [] RE-EVAL     [x] HL(47115) x   1  [] Dry needle 1 or 2 Muscles (88571)  [] NMR (01548) x    [] Dry needle 3+ Muscles (51359)  [x] Manual (16359) x   1  [] Ultrasound (56038) x  [x] TA (15521) x   1  [] Mech Traction (94047)  [] ES(attended) (99648)     [] ES (un) (51095):   [] Vasopump (79534) [] Ionto (02747)   [] Other:      GOALS:  Patient stated goal: get back to driving  []? Progressing: []? Met: []? Not Met: []? Adjusted     Therapist goals for Patient:   Short Term Goals: To be achieved in: 2 weeks  1. Independent in HEP and progression per patient tolerance, in order to prevent re-injury. []? Progressing: []? Met: []? Not Met: []? Adjusted  2. Patient will have a decrease in pain to facilitate improvement in movement, function, and ADLs as indicated by Functional Deficits. []? Progressing: []? Met: []? Not Met: []? Adjusted     Long Term Goals: To be achieved in:  weeks  1. Disability index score of  20% or less for the LEFS to assist with reaching prior level of function. []? Progressing: []? Met: []? Not Met: []? Adjusted  2. Patient will demonstrate increased AROM to  to allow for proper joint functioning as indicated by patients Functional Deficits. []? Progressing: []? Met: []? Not Met: []?  Adjusted 3. Patient will demonstrate an increase in Strength to good proximal hip strength and control, within 5lb HHD in LE to allow for proper functional mobility as indicated by patients Functional Deficits. []? Progressing: []? Met: []? Not Met: []? Adjusted  4. Patient will return to  functional activities without increased symptoms or restriction. []? Progressing: []? Met: []? Not Met: []? Adjusted  5. To be able to get around without the walker   []? Progressing: []? Met: []? Not Met: []? Adjusted            ASSESSMENT: Pt post op first MTJ fusion - demonstrates deficits in functional mobility, ROM, strength and gait    Treatment/Activity Tolerance:  [x] Patient tolerated treatment well [] Patient limited by fatique  [] Patient limited by pain  [] Patient limited by other medical complications  [] Other:     Overall Progression Towards Functional goals/ Treatment Progress Update:  [] Patient is progressing as expected towards functional goals listed. [] Progression is slowed due to complexities/Impairments listed. [] Progression has been slowed due to co-morbidities. [x] Plan just implemented, too soon to assess goals progression <30days   [] Goals require adjustment due to lack of progress  [] Patient is not progressing as expected and requires additional follow up with physician  [] Other    Prognosis for POC: [x] Good [] Fair  [] Poor    Patient requires continued skilled intervention: [x] Yes  [] No        PLAN:   [] Continue per plan of care [] Alter current plan (see comments)  [x] Plan of care initiated [] Hold pending MD visit [] Discharge    Electronically signed by: Alissa Nance, PT DPT, MS  [id number]    Note: If patient does not return for scheduled/recommended follow up visits, this note will serve as a discharge from care along with the most recent update on progress.

## 2020-12-24 ENCOUNTER — HOSPITAL ENCOUNTER (OUTPATIENT)
Dept: PHYSICAL THERAPY | Age: 65
Setting detail: THERAPIES SERIES
Discharge: HOME | End: 2020-12-24
Payer: MEDICARE

## 2020-12-24 NOTE — FLOWSHEET NOTE
[place] and [facility], [facility]  [address] [facility] [place], [facility]  Phone: [phone]   Fax:     [phone]    Physical Therapy  Cancellation/No-show Note  Patient Name:  Char Mcrae  :  1955   Date:  2020  Cancelled visits to date: 1  No-shows to date: 0    Patient status for today's appointment patient:  [x]  Cancelled  []  Rescheduled appointment  []  No-show     Reason given by patient:  []  Patient ill  []  Conflicting appointment  [x]  No transportation    []  Conflict with work  []  No reason given  []  Other:     Comments:      Phone call information:   []  Phone call made today to patient at _ time at number provided:      []  Patient answered, conversation as follows:    []  Patient did not answer, message left as follows:  []  Phone call not made today    Electronically signed by:  Meena Gutierrez PTA

## 2020-12-29 ENCOUNTER — HOSPITAL ENCOUNTER (OUTPATIENT)
Dept: PHYSICAL THERAPY | Age: 65
Setting detail: THERAPIES SERIES
Discharge: HOME | End: 2020-12-29
Payer: MEDICARE

## 2020-12-29 PROCEDURE — 97140 MANUAL THERAPY 1/> REGIONS: CPT

## 2020-12-29 PROCEDURE — 97530 THERAPEUTIC ACTIVITIES: CPT

## 2020-12-29 PROCEDURE — 97110 THERAPEUTIC EXERCISES: CPT

## 2020-12-29 NOTE — FLOWSHEET NOTE
[facility]  Outpatient Rehabilitation and Therapy, [facility]  [address] [facility], [facility]  Phone: [phone]   Fax:     [phone]      Physical Therapy Treatment Note/ Progress Report:     Date:  2020    Patient Name:  Bahman Cardona    :  1955  MRN: [id number]    Medical/Treatment Diagnosis Information:  · Dx - Post first MTJ fusion  ·  Decreased functional mobility 2/2 right foot pain    Insurance/Certification information:   [facility] medicare  Physician Information:  Referring Practitioner: Shasha Hall DPM  Plan of care signed (Y/N): inbox    Date of Patient follow up with Physician:      Progress Report: []  Yes  [x]  No     Date Range for reporting period:  Beginnin2020  Ending:      Progress report due (10 Rx/or 30 days whichever is less):     Recertification due (POC duration/ or 90 days whichever is less):21    Visit # POC/Insurance Allowable Auth Needed   3 Bomn []Yes   []No     Latex Allergy:  [x]NO      []YES  Preferred Language for Healthcare:   [x]English       []Other:    SUBJECTIVE:  History of foot pain. 2020 Right foot removal of bone spurring and osseous prominence first metatarsal, arthrodesis of first mtp, bone marrow harvest and concentration right tibia   :   Tolerated last appt well     Relevant Medical History: arthritis, DM, HLD, HTN, CVA , cardiomyopathy, GERD, l umbar disc disease, sleep apnea, right shoulder RCR, gurmeet CTS, left and right trigger finger release.        Functional Scale/Score: LEFS =  15  80-99%  Disability      Pain Scale  5: /10  Easing factors:  Rest ice  Provocative factors:   Walking, activity      Ankle PF   38a/40p   Ankle DF   5a/8p   Ankle In   10a/p   Ankle Ev     First mtp            To neutral a/p     No arom    PROM - 10 extension  To neutral flexion     Ankle DF   3+/5   Ankle PF   3+/5   Ankle Inv   3+/5   Ankle EV   3+/5         Circumference  MTP  Mid lat malleolus       24 cm  29 cm       24.5 cm  30 cm         Exercises/Interventions:     Therapeutic Ex (64721)   Min:   15 Reps/Resistance Notes/CUES   Nustep  Seat 7 UE 7 In shoes 5 min     GSS towel 10 sec x 6    Seated HR/TR 10 each  gurmeet    Seated toe DIP extension 10x         Therapeutic Activity (15631) Min:   10     Standing wt shift 1 min    Gait- no boot 1/2 lap in gym With 4 [address]        NMR re-education (57582)  Min:   5   CUES NEEDED   Gumdrop ROM seated  4 way x 10          Manual Intervention (87420) Min: 10     MT mobs All MTP  joints    PROM first toe  PROM/AAROM all toes X 20  X 20    Gentle effleurage right foot 5 min              Modalities  Min:          CP after exercises cryocuff ankle/foot 10 min With pillowcase               Other Therapeutic Activities: Pt was educated on PT POC, Diagnosis, Prognosis, pathomechanics as well as frequency and duration of scheduling future physical therapy appointments. Time was also taken on this day to answer all patient questions and participation in PT. Reviewed appointment policy in detail with patient and patient verbalized understanding. Home Exercise Program: Patient was instructed in the following for HEP:     . Patient verbalized/demonstrated understanding and was issued written handout. Therapeutic Exercise and NMR EXR  [x] (12195) Provided verbal/tactile cueing for activities related to strengthening, flexibility, endurance, ROM for improvements in LE, proximal hip, and core control with self care, mobility, lifting, ambulation. [x] (89415) Provided verbal/tactile cueing for activities related to improving balance, coordination, kinesthetic sense, posture, motor skill, proprioception  to assist with LE, proximal hip, and core control in self care, mobility, lifting, ambulation and eccentric single leg control.  83985    NMR and Therapeutic Activities: Overall Progression Towards Functional goals/ Treatment Progress Update:  [] Patient is progressing as expected towards functional goals listed. [] Progression is slowed due to complexities/Impairments listed. [] Progression has been slowed due to co-morbidities. [x] Plan just implemented, too soon to assess goals progression <30days   [] Goals require adjustment due to lack of progress  [] Patient is not progressing as expected and requires additional follow up with physician  [] Other    Prognosis for POC: [x] Good [] Fair  [] Poor    Patient requires continued skilled intervention: [x] Yes  [] No        PLAN:   [] Continue per plan of care [] Alter current plan (see comments)  [x] Plan of care initiated [] Hold pending MD visit [] Discharge    Electronically signed by: Meena Gutierrez PTA     Note: If patient does not return for scheduled/recommended follow up visits, this note will serve as a discharge from care along with the most recent update on progress.

## 2021-01-05 ENCOUNTER — NURSE TRIAGE (OUTPATIENT)
Dept: OTHER | Facility: CLINIC | Age: 66
End: 2021-01-05

## 2021-01-05 ENCOUNTER — HOSPITAL ENCOUNTER (EMERGENCY)
Age: 66
Discharge: HOME | End: 2021-01-05
Attending: EMERGENCY MEDICINE
Payer: MEDICARE

## 2021-01-05 ENCOUNTER — APPOINTMENT (OUTPATIENT)
Dept: GENERAL RADIOLOGY | Age: 66
End: 2021-01-05
Payer: MEDICARE

## 2021-01-05 ENCOUNTER — HOSPITAL ENCOUNTER (OUTPATIENT)
Dept: PHYSICAL THERAPY | Age: 66
Setting detail: THERAPIES SERIES
Discharge: HOME | End: 2021-01-05
Payer: MEDICARE

## 2021-01-05 VITALS
HEART RATE: 94 BPM | HEIGHT: 64 IN | OXYGEN SATURATION: 96 % | RESPIRATION RATE: 17 BRPM | SYSTOLIC BLOOD PRESSURE: 129 MMHG | WEIGHT: 198 LBS | BODY MASS INDEX: 33.8 KG/M2 | DIASTOLIC BLOOD PRESSURE: 98 MMHG | TEMPERATURE: 98.3 F

## 2021-01-05 DIAGNOSIS — J20.9 ACUTE BRONCHITIS, UNSPECIFIED ORGANISM: Primary | ICD-10-CM

## 2021-01-05 LAB
A/G RATIO: 1.3 (ref 1.1–2.2)
ALBUMIN SERPL-MCNC: 3.9 G/DL (ref 3.4–5)
ALP BLD-CCNC: 117 U/L (ref 40–129)
ALT SERPL-CCNC: 10 U/L (ref 10–40)
ANION GAP SERPL CALCULATED.3IONS-SCNC: 10 MMOL/L (ref 3–16)
AST SERPL-CCNC: 15 U/L (ref 15–37)
BASE EXCESS VENOUS: 2.9 MMOL/L (ref -3–3)
BASOPHILS ABSOLUTE: 0.1 K/UL (ref 0–0.2)
BASOPHILS RELATIVE PERCENT: 1 %
BILIRUB SERPL-MCNC: <0.2 MG/DL (ref 0–1)
BILIRUBIN URINE: NEGATIVE
BLOOD, URINE: NEGATIVE
BUN BLDV-MCNC: 8 MG/DL (ref 7–20)
CALCIUM SERPL-MCNC: 10.1 MG/DL (ref 8.3–10.6)
CHLORIDE BLD-SCNC: 106 MMOL/L (ref 99–110)
CLARITY: CLEAR
CO2: 26 MMOL/L (ref 21–32)
COLOR: YELLOW
CREAT SERPL-MCNC: 0.7 MG/DL (ref 0.6–1.2)
D DIMER: 224 NG/ML DDU (ref 0–229)
EKG ATRIAL RATE: 74 BPM
EKG DIAGNOSIS: NORMAL
EKG P AXIS: 51 DEGREES
EKG P-R INTERVAL: 190 MS
EKG Q-T INTERVAL: 396 MS
EKG QRS DURATION: 74 MS
EKG QTC CALCULATION (BAZETT): 439 MS
EKG R AXIS: 23 DEGREES
EKG T AXIS: 26 DEGREES
EKG VENTRICULAR RATE: 74 BPM
EOSINOPHILS ABSOLUTE: 0.2 K/UL (ref 0–0.6)
EOSINOPHILS RELATIVE PERCENT: 3.6 %
GFR AFRICAN AMERICAN: >60
GFR NON-AFRICAN AMERICAN: >60
GLOBULIN: 3.1 G/DL
GLUCOSE BLD-MCNC: 120 MG/DL (ref 70–99)
GLUCOSE URINE: NEGATIVE MG/DL
HCO3 VENOUS: 28 MMOL/L (ref 23–29)
HCT VFR BLD CALC: 36.1 % (ref 36–48)
HEMOGLOBIN: 11.3 G/DL (ref 12–16)
KETONES, URINE: NEGATIVE MG/DL
LACTIC ACID: 1.7 MMOL/L (ref 0.4–2)
LEUKOCYTE ESTERASE, URINE: NEGATIVE
LYMPHOCYTES ABSOLUTE: 3.2 K/UL (ref 1–5.1)
LYMPHOCYTES RELATIVE PERCENT: 50.1 %
MCH RBC QN AUTO: 27 PG (ref 26–34)
MCHC RBC AUTO-ENTMCNC: 31.4 G/DL (ref 31–36)
MCV RBC AUTO: 85.8 FL (ref 80–100)
MICROSCOPIC EXAMINATION: NORMAL
MONOCYTES ABSOLUTE: 0.5 K/UL (ref 0–1.3)
MONOCYTES RELATIVE PERCENT: 7.1 %
NEUTROPHILS ABSOLUTE: 2.4 K/UL (ref 1.7–7.7)
NEUTROPHILS RELATIVE PERCENT: 38.2 %
NITRITE, URINE: NEGATIVE
O2 SAT, VEN: 95 %
O2 THERAPY: ABNORMAL
PCO2, VEN: 46.7 MMHG (ref 40–50)
PDW BLD-RTO: 14.3 % (ref 12.4–15.4)
PH UA: 5.5 (ref 5–8)
PH VENOUS: 7.38 (ref 7.35–7.45)
PLATELET # BLD: 283 K/UL (ref 135–450)
PMV BLD AUTO: 7.9 FL (ref 5–10.5)
PO2, VEN: 77.3 MMHG (ref 25–40)
POTASSIUM REFLEX MAGNESIUM: 4.2 MMOL/L (ref 3.5–5.1)
PRO-BNP: 49 PG/ML (ref 0–124)
PROTEIN UA: NEGATIVE MG/DL
RBC # BLD: 4.2 M/UL (ref 4–5.2)
SODIUM BLD-SCNC: 142 MMOL/L (ref 136–145)
SPECIFIC GRAVITY UA: 1.01 (ref 1–1.03)
TCO2 CALC VENOUS: 29 MMOL/L
TOTAL PROTEIN: 7 G/DL (ref 6.4–8.2)
TROPONIN: <0.01 NG/ML
URINE REFLEX TO CULTURE: NORMAL
URINE TYPE: NORMAL
UROBILINOGEN, URINE: 0.2 E.U./DL
WBC # BLD: 6.4 K/UL (ref 4–11)

## 2021-01-05 PROCEDURE — 82803 BLOOD GASES ANY COMBINATION: CPT

## 2021-01-05 PROCEDURE — 71045 X-RAY EXAM CHEST 1 VIEW: CPT

## 2021-01-05 PROCEDURE — 84484 ASSAY OF TROPONIN QUANT: CPT

## 2021-01-05 PROCEDURE — 6360000002 HC RX W HCPCS: Performed by: EMERGENCY MEDICINE

## 2021-01-05 PROCEDURE — 36415 COLL VENOUS BLD VENIPUNCTURE: CPT

## 2021-01-05 PROCEDURE — 85379 FIBRIN DEGRADATION QUANT: CPT

## 2021-01-05 PROCEDURE — 6370000000 HC RX 637 (ALT 250 FOR IP): Performed by: EMERGENCY MEDICINE

## 2021-01-05 PROCEDURE — 83880 ASSAY OF NATRIURETIC PEPTIDE: CPT

## 2021-01-05 PROCEDURE — 93005 ELECTROCARDIOGRAM TRACING: CPT | Performed by: EMERGENCY MEDICINE

## 2021-01-05 PROCEDURE — 99283 EMERGENCY DEPT VISIT LOW MDM: CPT

## 2021-01-05 PROCEDURE — 85025 COMPLETE CBC W/AUTO DIFF WBC: CPT

## 2021-01-05 PROCEDURE — 93010 ELECTROCARDIOGRAM REPORT: CPT | Performed by: INTERNAL MEDICINE

## 2021-01-05 PROCEDURE — 80053 COMPREHEN METABOLIC PANEL: CPT

## 2021-01-05 PROCEDURE — U0003 INFECTIOUS AGENT DETECTION BY NUCLEIC ACID (DNA OR RNA); SEVERE ACUTE RESPIRATORY SYNDROME CORONAVIRUS 2 (SARS-COV-2) (CORONAVIRUS DISEASE [COVID-19]), AMPLIFIED PROBE TECHNIQUE, MAKING USE OF HIGH THROUGHPUT TECHNOLOGIES AS DESCRIBED BY CMS-2020-01-R: HCPCS

## 2021-01-05 PROCEDURE — 81003 URINALYSIS AUTO W/O SCOPE: CPT

## 2021-01-05 PROCEDURE — 96374 THER/PROPH/DIAG INJ IV PUSH: CPT

## 2021-01-05 PROCEDURE — 2580000003 HC RX 258: Performed by: EMERGENCY MEDICINE

## 2021-01-05 PROCEDURE — 83605 ASSAY OF LACTIC ACID: CPT

## 2021-01-05 PROCEDURE — 96375 TX/PRO/DX INJ NEW DRUG ADDON: CPT

## 2021-01-05 RX ORDER — PREDNISONE 20 MG/1
40 TABLET ORAL DAILY
Qty: 10 TABLET | Refills: 0 | Status: SHIPPED | OUTPATIENT
Start: 2021-01-05 | End: 2021-01-10

## 2021-01-05 RX ORDER — 0.9 % SODIUM CHLORIDE 0.9 %
1000 INTRAVENOUS SOLUTION INTRAVENOUS ONCE
Status: COMPLETED | OUTPATIENT
Start: 2021-01-05 | End: 2021-01-05

## 2021-01-05 RX ORDER — ALBUTEROL SULFATE 90 UG/1
2 AEROSOL, METERED RESPIRATORY (INHALATION) EVERY 4 HOURS PRN
Qty: 1 INHALER | Refills: 5 | Status: SHIPPED | OUTPATIENT
Start: 2021-01-05

## 2021-01-05 RX ORDER — AZITHROMYCIN 250 MG/1
TABLET, FILM COATED ORAL
Qty: 1 PACKET | Refills: 0 | Status: SHIPPED | OUTPATIENT
Start: 2021-01-05 | End: 2021-01-09

## 2021-01-05 RX ORDER — SOFT LENS DISINFECTANT
SOLUTION, NON-ORAL MISCELLANEOUS
Qty: 1 DEVICE | Refills: 0 | Status: SHIPPED | OUTPATIENT
Start: 2021-01-05 | End: 2021-05-06

## 2021-01-05 RX ORDER — ACETAMINOPHEN 500 MG
1000 TABLET ORAL ONCE
Status: COMPLETED | OUTPATIENT
Start: 2021-01-05 | End: 2021-01-05

## 2021-01-05 RX ORDER — IPRATROPIUM BROMIDE AND ALBUTEROL SULFATE 2.5; .5 MG/3ML; MG/3ML
1 SOLUTION RESPIRATORY (INHALATION) EVERY 4 HOURS PRN
Qty: 360 ML | Refills: 5 | Status: SHIPPED | OUTPATIENT
Start: 2021-01-05

## 2021-01-05 RX ORDER — METHYLPREDNISOLONE SODIUM SUCCINATE 125 MG/2ML
125 INJECTION, POWDER, LYOPHILIZED, FOR SOLUTION INTRAMUSCULAR; INTRAVENOUS ONCE
Status: COMPLETED | OUTPATIENT
Start: 2021-01-05 | End: 2021-01-05

## 2021-01-05 RX ORDER — IPRATROPIUM BROMIDE AND ALBUTEROL SULFATE 2.5; .5 MG/3ML; MG/3ML
1 SOLUTION RESPIRATORY (INHALATION) ONCE
Status: COMPLETED | OUTPATIENT
Start: 2021-01-05 | End: 2021-01-05

## 2021-01-05 RX ORDER — KETOROLAC TROMETHAMINE 30 MG/ML
15 INJECTION, SOLUTION INTRAMUSCULAR; INTRAVENOUS ONCE
Status: COMPLETED | OUTPATIENT
Start: 2021-01-05 | End: 2021-01-05

## 2021-01-05 RX ADMIN — IPRATROPIUM BROMIDE AND ALBUTEROL SULFATE 1 AMPULE: .5; 3 SOLUTION RESPIRATORY (INHALATION) at 12:15

## 2021-01-05 RX ADMIN — SODIUM CHLORIDE 1000 ML: 9 INJECTION, SOLUTION INTRAVENOUS at 11:51

## 2021-01-05 RX ADMIN — KETOROLAC TROMETHAMINE 15 MG: 30 INJECTION, SOLUTION INTRAMUSCULAR at 11:53

## 2021-01-05 RX ADMIN — ACETAMINOPHEN 1000 MG: 500 TABLET ORAL at 11:54

## 2021-01-05 RX ADMIN — METHYLPREDNISOLONE SODIUM SUCCINATE 125 MG: 125 INJECTION, POWDER, FOR SOLUTION INTRAMUSCULAR; INTRAVENOUS at 13:46

## 2021-01-05 ASSESSMENT — ENCOUNTER SYMPTOMS
RHINORRHEA: 0
EYE PAIN: 0
SHORTNESS OF BREATH: 1
SORE THROAT: 0
EYE DISCHARGE: 0
COUGH: 1
VOMITING: 0
WHEEZING: 1
DIARRHEA: 0
NAUSEA: 1
ABDOMINAL PAIN: 0
BACK PAIN: 0

## 2021-01-05 ASSESSMENT — PAIN DESCRIPTION - ONSET: ONSET: PROGRESSIVE

## 2021-01-05 ASSESSMENT — PAIN DESCRIPTION - PAIN TYPE: TYPE: ACUTE PAIN

## 2021-01-05 ASSESSMENT — PAIN SCALES - GENERAL: PAINLEVEL_OUTOF10: 8

## 2021-01-05 NOTE — ED PROVIDER NOTES
[address] [facility]  eMERGENCY dEPARTMENT eNCOUnter        Pt Name: Ilia Ovalel  MRN: [id number]  Modestagfanderson 1955  Date of evaluation: 1/5/2021  Provider: Ryan Mayo MD  PCP: Massiel Lentz MD      [address] [place] [address]       Chief Complaint   Patient presents with    Cough     pt c/o cough headache, and bodyaches past 4 days       HISTORY OFPRESENT ILLNESS   (Location/Symptom, Timing/Onset, Context/Setting, Quality, Duration, Modifying Factors,Severity)  Note limiting factors. Ilia Ovalle is a 76 y.o. female       Location/Symptom: Cough headache fatigue  Timing/Onset: 4 days ago on Saturday. Context/Setting: She does have several medical problems including diabetes. Quality: Generalized aches and pains  Duration: 4 days  Modifying Factors: None  Severity: 8 out of 10    Nursing Noteswere all reviewed and agreed with or any disagreements were addressed  in the HPI. REVIEW OF SYSTEMS    (2-9 systems for level 4, 10 or more for level 5)     Review of Systems   Constitutional: Positive for activity change and fatigue. Negative for chills and fever. HENT: Negative for ear pain, rhinorrhea and sore throat. Eyes: Negative for pain, discharge and visual disturbance. Respiratory: Positive for cough, shortness of breath and wheezing. Cardiovascular: Positive for chest pain. Negative for palpitations and leg swelling. Gastrointestinal: Positive for nausea. Negative for abdominal pain, diarrhea and vomiting. Genitourinary: Negative for difficulty urinating, dysuria, pelvic pain and vaginal discharge. Musculoskeletal: Positive for myalgias. Negative for arthralgias, back pain, joint swelling and neck pain. Patient had foot and ankle surgery 4 months ago and is wearing a walking boot   Skin: Negative for rash. Allergic/Immunologic: Negative for environmental allergies. Neurological: Positive for headaches. Negative for dizziness, seizures and syncope. Hematological: Negative for adenopathy. Psychiatric/Behavioral: Negative for dysphoric mood and suicidal ideas. The patient is not nervous/anxious.           PAST MEDICAL HISTORY     Past Medical History:   Diagnosis Date    Arthritis     Cardiomyopathy ([address]     Diabetes     GERD (gastroesophageal reflux disease)     Hyperlipidemia     Hypertension     Lumbar disc disease     Sleep apnea     pt states does use a Cpap machine at night    Unspecified cerebral artery occlusion with cerebral infarction 2014    right side weak    Wears glasses          SURGICAL HISTORY     Past Surgical History:   Procedure Laterality Date    ARTHRODESIS Right 9/17/2020    (RIGHT) REMOVAL OF BONE SPURRING AND OSSEOUS PROMINENCE FIRST METATARSAL, ARTHRODESIS OF FIRST METATARSOPHALANGEAL JOINT, BONE MARROW HARVEST AND CONCENTRATION RIGHT TIBIA performed by Deonte Piedra DPM at  [facility] Left 9/3/15    CARPAL TUNNEL RELEASE Right 9/22/15    COLONOSCOPY      FINGER TRIGGER RELEASE Left 9/3/15    middle and ring fingers    FINGER TRIGGER RELEASE Right 9/22/15    middle and ring fingers    FOOT SURGERY Bilateral     litteltoe    HAND SURGERY Right     Ligament    PARTIAL HYSTERECTOMY  08/2003    SHOULDER ARTHROSCOPY Right 06/20/2018    Diagnostic scope, rcr, open Mahendra Moneta       Discharge Medication List as of 1/5/2021  1:47 PM      CONTINUE these medications which have NOT CHANGED    Details   gabapentin (NEURONTIN) 300 MG capsule TAKE ONE CAPSULE BY MOUTH ONCE NIGHTLY, Disp-90 capsule, R-0Normal      Dulaglutide (TRULICITY) 1.5 ER/8.9SH SOPN INJECT 1.5 MG UNDER THE SKIN ONCE WEEKLY *APPOINTMENT NEEDED*, Disp-2 mL,R-1Normal      omeprazole (PRILOSEC) 40 MG delayed release capsule TAKE ONE CAPSULE BY MOUTH TWICE A DAY BEFORE MEALS, Disp-180 capsule,R-0Normal      valsartan (DIOVAN) 80 MG tablet TAKE ONE TABLET BY MOUTH DAILY, Disp-90 tablet,R-0Normal      furosemide nightly , DAWHistorical Med      aspirin 81 MG EC tablet Take 1 tablet by mouth daily. , Disp-60 tablet, R-3       !! - Potential duplicate medications found. Please discuss with provider.           ALLERGIES     Codeine, Vicodin [hydrocodone-acetaminophen], Oxycontin [oxycodone hcl], and Tramadol    FAMILY HISTORY       Family History   Problem Relation Age of Onset    Heart Disease Mother     High Blood Pressure Mother     Stroke Mother     Cancer Brother         lung cancer          SOCIAL HISTORY       Social History     Socioeconomic History    Marital status:      Spouse name: Rivera Iqbal, seen here    Number of children: 3    Years of education: Not on file    Highest education level: Not on file   Occupational History    Not on file   Social Needs    Financial resource strain: Not on file    Food insecurity     Worry: Not on file     Inability: Not on file   Scopely needs     Medical: Not on file     Non-medical: Not on file   Tobacco Use    Smoking status: Former Smoker     Packs/day: 1.50     Years: 3.00     Pack years: 4.50     Quit date: 1992     Years since quittin.0    Smokeless tobacco: Never Used   Substance and Sexual Activity    Alcohol use: No    Drug use: No    Sexual activity: Yes     Partners: Male   Lifestyle    Physical activity     Days per week: Not on file     Minutes per session: Not on file    Stress: Not on file   Relationships    Social connections     Talks on phone: Not on file     Gets together: Not on file     Attends [employer] service: Not on file     Active member of club or organization: Not on file     Attends meetings of clubs or organizations: Not on file     Relationship status: Not on file    Intimate partner violence     Fear of current or ex partner: Not on file     Emotionally abused: Not on file     Physically abused: Not on file     Forced sexual activity: Not on file   Other Topics Concern    Not on file   Social History Content:  Thought content normal.         DIAGNOSTIC RESULTS   LABS:    Results for orders placed or performed during the hospital encounter of 01/05/21   CBC Auto Differential   Result Value Ref Range    WBC 6.4 4.0 - 11.0 K/uL    RBC 4.20 4.00 - 5.20 M/uL    Hemoglobin 11.3 (L) 12.0 - 16.0 g/dL    Hematocrit 36.1 36.0 - 48.0 %    MCV 85.8 80.0 - 100.0 fL    MCH 27.0 26.0 - 34.0 pg    MCHC 31.4 31.0 - 36.0 g/dL    RDW 14.3 12.4 - 15.4 %    Platelets 836 396 - 738 K/uL    MPV 7.9 5.0 - 10.5 fL    Neutrophils % 38.2 %    Lymphocytes % 50.1 %    Monocytes % 7.1 %    Eosinophils % 3.6 %    Basophils % 1.0 %    Neutrophils Absolute 2.4 1.7 - 7.7 K/uL    Lymphocytes Absolute 3.2 1.0 - 5.1 K/uL    Monocytes Absolute 0.5 0.0 - 1.3 K/uL    Eosinophils Absolute 0.2 0.0 - 0.6 K/uL    Basophils Absolute 0.1 0.0 - 0.2 K/uL   Comprehensive Metabolic Panel w/ Reflex to MG   Result Value Ref Range    Sodium 142 136 - 145 mmol/L    Potassium reflex Magnesium 4.2 3.5 - 5.1 mmol/L    Chloride 106 99 - 110 mmol/L    CO2 26 21 - 32 mmol/L    Anion Gap 10 3 - 16    Glucose 120 (H) 70 - 99 mg/dL    BUN 8 7 - 20 mg/dL    CREATININE 0.7 0.6 - 1.2 mg/dL    GFR Non-African American >60 >60    GFR African American >60 >60    Calcium 10.1 8.3 - 10.6 mg/dL    Total Protein 7.0 6.4 - 8.2 g/dL    Alb 3.9 3.4 - 5.0 g/dL    Albumin/Globulin Ratio 1.3 1.1 - 2.2    Total Bilirubin <0.2 0.0 - 1.0 mg/dL    Alkaline Phosphatase 117 40 - 129 U/L    ALT 10 10 - 40 U/L    AST 15 15 - 37 U/L    Globulin 3.1 g/dL   Troponin   Result Value Ref Range    Troponin <0.01 <0.01 ng/mL   Brain Natriuretic Peptide   Result Value Ref Range    Pro-BNP 49 0 - 124 pg/mL   D-Dimer, Quantitative   Result Value Ref Range    D-Dimer, Quant 224 0 - 229 ng/mL DDU   Blood Gas, Venous   Result Value Ref Range    pH, Ernie 7.385 7.350 - 7.450    pCO2, Ernie 46.7 40.0 - 50.0 mmHg    pO2, Ernie 77.3 (H) 25.0 - 40.0 mmHg    HCO3, Venous 28.0 23.0 - 29.0 mmol/L    Base Excess, Ernie 2.9 -3.0 - 3.0 mmol/L    O2 Sat, Ernie 95 Not Established %    TC02 (Calc), Ernie 29 Not Established mmol/L    O2 Therapy Unknown    Urinalysis Reflex to Culture    Specimen: Urine, clean catch   Result Value Ref Range    Color, UA Yellow Straw/Yellow    Clarity, UA Clear Clear    Glucose, Ur Negative Negative mg/dL    Bilirubin Urine Negative Negative    Ketones, Urine Negative Negative mg/dL    Specific Gravity, UA 1.015 1.005 - 1.030    Blood, Urine Negative Negative    pH, UA 5.5 5.0 - 8.0    Protein, UA Negative Negative mg/dL    Urobilinogen, Urine 0.2 <2.0 E.U./dL    Nitrite, Urine Negative Negative    Leukocyte Esterase, Urine Negative Negative    Microscopic Examination Not Indicated     Urine Type NotGiven     Urine Reflex to Culture Not Indicated    Lactic Acid, Plasma   Result Value Ref Range    Lactic Acid 1.7 0.4 - 2.0 mmol/L   COVID-19   Result Value Ref Range    SARS-CoV-2 Not Detected Not detected   EKG 12 Lead   Result Value Ref Range    Ventricular Rate 74 BPM    Atrial Rate 74 BPM    P-R Interval 190 ms    QRS Duration 74 ms    Q-T Interval 396 ms    QTc Calculation (Bazett) 439 ms    P Axis 51 degrees    R Axis 23 degrees    T Axis 26 degrees    Diagnosis       Normal sinus rhythmNormal ECGWhen compared with ECG of 24-AUG-2020 17:14,No significant change was foundConfirmed by Sheila PATIÑO MD ([id number]) on 1/5/2021 4:42:23 PM       All other labs were within normal range or not returned as of this dictation. EKG: All EKG's are interpreted by the Emergency Department Physician who either signs orCo-signs this chart in the absence of a cardiologist.    EKG visualized preliminarily interpreted by myself showing sinus rhythm. The rate is 74. The axis is normal at 23. One nonspecific T wave finding in lead V2 otherwise all other intervals ST and T waves are within normal limits. No acute pathology.     RADIOLOGY:   Non-plain film images such as CT, Ultrasound and MRI are read by the radiologist. Jennifer Gong radiographic images are visualized and preliminarily interpreted by the  EDProvider with the below findings:    Xr Chest Portable    Result Date: 1/5/2021  EXAMINATION: ONE XRAY VIEW OF THE CHEST 1/5/2021 11:22 am COMPARISON: None. HISTORY: ORDERING SYSTEM PROVIDED HISTORY: cough/SOB/possible Covid TECHNOLOGIST PROVIDED HISTORY: Reason for exam:->cough/SOB/possible Covid Reason for Exam: Cough (pt c/o cough headache, and bodyaches past 4 days) Acuity: Acute Type of Exam: Initial FINDINGS: The lungs are clear. The mediastinum and cardiac silhouette are unremarkable. No acute abnormality. PROCEDURES   Unless otherwise noted below, none     Procedures    CRITICAL CARE TIME   N/A    CONSULTS:  None    EMERGENCY DEPARTMENT COURSE and DIFFERENTIAL DIAGNOSIS/MDM:   Vitals:    Vitals:    01/05/21 1006 01/05/21 1034 01/05/21 1259   BP: (!) 149/78  (!) 129/98   Pulse: 94  94   Resp: 17     Temp: 98.3 °F (36.8 °C) 98.3 °F (36.8 °C)    TempSrc: Oral     SpO2: 96%     Weight: 198 lb (89.8 kg)     Height: 5' 4\" (1.626 m)         Patient was given the following medications:  Medications   0.9 % sodium chloride bolus (0 mLs Intravenous Stopped 1/5/21 1300)   acetaminophen (TYLENOL) tablet 1,000 mg (1,000 mg Oral Given 1/5/21 1154)   ketorolac (TORADOL) injection 15 mg (15 mg Intravenous Given 1/5/21 1153)   ipratropium-albuterol (DUONEB) nebulizer solution 1 ampule (1 ampule Inhalation Given 1/5/21 1215)   methylPREDNISolone sodium (SOLU-MEDROL) injection 125 mg (125 mg Intravenous Given 1/5/21 1346)       Remained stable. She did voice improvement after the breathing treatment. So, at this time she seems to be ventilating quite well oxygenating well. No evidence of pulmonary embolus or acute coronary syndrome. Chest x-ray is clear. For now, will attempt to manage her as an outpatient. FINAL IMPRESSION      1.  Acute bronchitis, unspecified organism          DISPOSITION/PLAN    DISPOSITION Decision To Discharge 01/05/2021 01:32:05 PM      PATIENT REFERRED TO:  Genny Finnegan MD  [place] [address]  [phone]    In 2 days        DISCHARGE MEDICATIONS:  Discharge Medication List as of 1/5/2021  1:47 PM      START taking these medications    Details   predniSONE (DELTASONE) 20 MG tablet Take 2 tablets by mouth daily for 5 days, Disp-10 tablet, R-0Normal      azithromycin (ZITHROMAX Z-JUAN) 250 MG tablet Take 2 tablets (500 mg) on Day 1, and then take 1 tablet (250 mg) on days 2 through 5., Disp-1 packet, R-0Normal      ipratropium-albuterol (DUONEB) 0.5-2.5 (3) MG/3ML SOLN nebulizer solution Inhale 3 mLs into the lungs every 4 hours as needed for Shortness of Breath (wheezing coughing), Disp-360 mL, R-5Normal      Respiratory Therapy Supplies (NEBULIZER) MAXIMILIANO Disp-1 Device, R-0, PrintUsed to give yourself breathing treatment             DISCONTINUED MEDICATIONS:  Discharge Medication List as of 1/5/2021  1:47 PM                 (Please note that portions of this note were completed with a voice recognition program.  Efforts were made to editthe dictations but occasionally words are mis-transcribed.)    Edward Ferrari MD (electronically signed)            Edward Ferrari MD  01/06/21 0055

## 2021-01-05 NOTE — FLOWSHEET NOTE
[place] [facility], [facility]  [address] [facility] [place], [facility]  Phone: [phone]   Fax:     [phone]    Physical Therapy  Cancellation/No-show Note  Patient Name:  Hui Alexandra  :  1955   Date:  2021  Cancelled visits to date: 2  No-shows to date: 0    Patient status for today's appointment patient:  [x]  Cancelled  []  Rescheduled appointment  []  No-show     Reason given by patient:  [x]  Patient ill  []  Conflicting appointment  []  No transportation    []  Conflict with work  []  No reason given  [x]  Other:     Comments:    In ER with possible covid symptoms    Phone call information:   []  Phone call made today to patient at _ time at number provided:      []  Patient answered, conversation as follows:    []  Patient did not answer, message left as follows:  []  Phone call not made today    Electronically signed by:  Saurav Lloyd, PTA  [id number]

## 2021-01-05 NOTE — TELEPHONE ENCOUNTER
Reason for Disposition   MILD difficulty breathing (e.g., minimal/no SOB at rest, SOB with walking, pulse <100)    Answer Assessment - Initial Assessment Questions  1. COVID-19 DIAGNOSIS: \"Who made your Coronavirus (COVID-19) diagnosis? \" \"Was it confirmed by a positive lab test?\" If not diagnosed by a HCP, ask \"Are there lots of cases (community spread) where you live? \" (See public health department website, if unsure)      Not tested    2. COVID-19 EXPOSURE: \"Was there any known exposure to COVID before the symptoms began? \" CDC Definition of close contact: within 6 feet (2 meters) for a total of 15 minutes or more over a 24-hour period. No      3. ONSET: \"When did the COVID-19 symptoms start? \"       Saturday    4. WORST SYMPTOM: \"What is your worst symptom? \" (e.g., cough, fever, shortness of breath, muscle aches)      Sob    5. COUGH: \"Do you have a cough? \" If so, ask: \"How bad is the cough? \"        Yes coughing fits    6. FEVER: \"Do you have a fever? \" If so, ask: \"What is your temperature, how was it measured, and when did it start? \"      No    7. RESPIRATORY STATUS: \"Describe your breathing? \" (e.g., shortness of breath, wheezing, unable to speak)       Sob wheezing     8. BETTER-SAME-WORSE: Eather Rich you getting better, staying the same or getting worse compared to yesterday? \"  If getting worse, ask, \"In what way? \"      Better     9. HIGH RISK DISEASE: \"Do you have any chronic medical problems? \" (e.g., asthma, heart or lung disease, weak immune system, obesity, etc.)      Diabetes    10. PREGNANCY: \"Is there any chance you are pregnant? \" \"When was your last menstrual period? \"        N/a    11. OTHER SYMPTOMS: \"Do you have any other symptoms? \"  (e.g., chills, fatigue, headache, loss of smell or taste, muscle pain, sore throat; new loss of smell or taste especially support the diagnosis of COVID-19)        Fatigue, body aches, headache,    Protocols used: CORONAVIRUS (COVID-19) DIAGNOSED OR SUSPECTED-ADULT-AH    Caller provided care advice and instructed to call back with worsening symptoms. Attention Provider: Thank you for allowing me to participate in the care of your patient. The patient was connected to triage in response to information provided to the ECC. Please do not respond through this encounter as the response is not directed to a shared pool.      Pt to ed

## 2021-01-06 LAB — SARS-COV-2: NOT DETECTED

## 2021-01-07 ENCOUNTER — CARE COORDINATION (OUTPATIENT)
Dept: CARE COORDINATION | Age: 66
End: 2021-01-07

## 2021-01-07 ENCOUNTER — APPOINTMENT (OUTPATIENT)
Dept: PHYSICAL THERAPY | Age: 66
End: 2021-01-07
Payer: MEDICARE

## 2021-01-07 NOTE — CARE COORDINATION
Outreach call made with the help of  #[id number]. Patient contacted regarding recent discharge and COVID-19 risk. Discussed COVID-19 related testing which was available at this time. Test results were negative. Patient informed of results, if available? Yes     Care Transition Nurse/ Ambulatory Care Manager contacted the patient by telephone to perform post discharge assessment. Verified name and  with patient as identifiers. Patient has following risk factors of: COPD, diabetes and CVD. CTN/ACM reviewed discharge instructions, medical action plan and red flags related to discharge diagnosis. Reviewed and educated them on any new and changed medications related to discharge diagnosis. Advised obtaining a 90-day supply of all daily and as-needed medications. Education provided regarding infection prevention, and signs and symptoms of COVID-19 and when to seek medical attention with patient who verbalized understanding. Discussed exposure protocols and quarantine from [address] you at higher risk for severe illness  and given an opportunity for questions and concerns. The patient agrees to contact the COVID-19 hotline 563-031-3512 or PCP office for questions related to their healthcare. CTN/ACM provided contact information for future reference. From CDC: Are you at higher risk for severe illness?  Wash your hands often.  Avoid close contact (6 feet, which is about two arm lengths) with people who are sick.  Put distance between yourself and other people if COVID-19 is spreading in your community.  Clean and disinfect frequently touched surfaces.  Avoid all cruise travel and non-essential air travel.  Call your healthcare professional if you have concerns about COVID-19 and your underlying condition or if you are sick.     For more information on steps you can take to protect yourself, see CDC's How to Protect Yourself    Pt will be further monitored by COVID Loop Team based on severity of symptoms and risk factors.

## 2021-01-12 ENCOUNTER — HOSPITAL ENCOUNTER (OUTPATIENT)
Dept: PHYSICAL THERAPY | Age: 66
Setting detail: THERAPIES SERIES
Discharge: HOME | End: 2021-01-12
Payer: MEDICARE

## 2021-01-12 PROCEDURE — 97140 MANUAL THERAPY 1/> REGIONS: CPT

## 2021-01-12 PROCEDURE — 97530 THERAPEUTIC ACTIVITIES: CPT

## 2021-01-12 PROCEDURE — 97110 THERAPEUTIC EXERCISES: CPT

## 2021-01-12 NOTE — FLOWSHEET NOTE
[facility] - Outpatient Rehabilitation and Therapy, [facility]  [address] [facility], [facility]  Phone: [phone]   Fax:     [phone]      Physical Therapy Treatment Note/ Progress Report:     Date:  2021    Patient Name:  Juan Dumas    :  1955  MRN: [id number]    Medical/Treatment Diagnosis Information:  · Dx - Post first MTJ fusion  ·  Decreased functional mobility 2/2 right foot pain    Insurance/Certification information:   [facility] medicare  Physician Information:  Referring Practitioner: Miah Sanchez DPM  Plan of care signed (Y/N): inbox    Date of Patient follow up with Physician:      Progress Report: []  Yes  [x]  No     Date Range for reporting period:  Beginnin2021  Ending:      Progress report due (10 Rx/or 30 days whichever is less): 01    Recertification due (POC duration/ or 90 days whichever is less):21    Visit # POC/Insurance Allowable Auth Needed   4 Bomn []Yes   []No     Latex Allergy:  [x]NO      []YES  Preferred Language for Healthcare:   [x]English       []Other:    SUBJECTIVE:  History of foot pain. 2020 Right foot removal of bone spurring and osseous prominence first metatarsal, arthrodesis of first mtp, bone marrow harvest and concentration right tibia     : Tolerated last appt well  21  Resuming Rx after illness. Did not have Covid. At home walking with house shoe. Outdoors using boot.  F/U with MD 21.           Relevant Medical History: arthritis, DM, HLD, HTN, CVA , cardiomyopathy, GERD, l umbar disc disease, sleep apnea, right shoulder RCR, gurmeet CTS, left and right trigger finger release.        Functional Scale/Score: LEFS =  15  80-99%  Disability      Pain Scale  4 /10  Easing factors:  Rest ice  Provocative factors:   Walking, activity      Ankle PF   38a/40p   Ankle DF   5a/8p   Ankle In   10a/p   Ankle Ev     First mtp            To neutral a/p     No arom    PROM - 10 extension  To neutral flexion     Ankle DF   3+/5   Ankle PF   3+/5   Ankle Inv   3+/5   Ankle EV   3+/5           Circumference  MTP  Mid lat malleolus       24 cm  29 cm       24.5 cm  30 cm         Exercises/Interventions:     Therapeutic Ex (23955)   Min:   15 Reps/Resistance Notes/CUES   Nustep  Seat 7 UE 7 In shoes 5 min     GSS towel stretch 20 sec x 3 R    Seated HR/TR 10 each  gurmeet    Seated toe DIP extension 10x         Therapeutic Activity (37710) Min:   10     Standing wt shift/ shoe S/S x30 sec   F/B x 30 sec     Gait- no boot  1 1/2  lap in gym With 4 WW with mild limp        NMR re-education (11573)  Min:   5   CUES NEEDED   Gumdrop ROM seated  4 way x 10  R         Manual Intervention (82035) Min: 10     MT/ DIP mobs All MT  joints    PROM first toe  PROM/AAROM all toes X 20  X 20    Gentle effleurage right foot 5 min              Modalities  Min:          CP after exercises cryocuff ankle/foot 10 min With pillowcase               Other Therapeutic Activities: Pt was educated on PT POC, Diagnosis, Prognosis, pathomechanics as well as frequency and duration of scheduling future physical therapy appointments. Time was also taken on this day to answer all patient questions and participation in PT. Reviewed appointment policy in detail with patient and patient verbalized understanding. Home Exercise Program: Patient was instructed in the following for HEP:     . Patient verbalized/demonstrated understanding and was issued written handout. Therapeutic Exercise and NMR EXR  [x] (15955) Provided verbal/tactile cueing for activities related to strengthening, flexibility, endurance, ROM for improvements in LE, proximal hip, and core control with self care, mobility, lifting, ambulation.   [x] (79774) Provided verbal/tactile cueing for activities related to improving balance, coordination, kinesthetic sense, posture, motor skill, proprioception  to assist with LE, proximal hip, and core control in self care, mobility, lifting, ambulation and eccentric single leg control. [address] and Therapeutic Activities:    [x] (18568 or 59769) Provided verbal/tactile cueing for activities related to improving balance, coordination, kinesthetic sense, posture, motor skill, proprioception and motor activation to allow for proper function of core, proximal hip and LE with self care and ADLs and functional mobility. [x] (69505) Gait Re-education- Provided training and instruction to the patient for proper LE, core and proximal hip recruitment and positioning and eccentric body weight control with ambulation re-education including up and down stairs     Home Exercise Program:    [x] (12801) Reviewed/Progressed HEP activities related to strengthening, flexibility, endurance, ROM of core, proximal hip and LE for functional self-care, mobility, lifting and ambulation/stair navigation   [x] (03612)Reviewed/Progressed HEP activities related to improving balance, coordination, kinesthetic sense, posture, motor skill, proprioception of core, proximal hip and LE for self care, mobility, lifting, and ambulation/stair navigation      Manual Treatments:  PROM / STM / Oscillations-Mobs:  G-I, II, III, IV (PA's, Inf., Post.)  [x] (08390) Provided manual therapy to mobilize LE, proximal hip and/or LS spine soft tissue/joints for the purpose of modulating pain, promoting relaxation,  increasing ROM, reducing/eliminating soft tissue swelling/inflammation/restriction, improving soft tissue extensibility and allowing for proper ROM for normal function with self care, mobility, lifting and ambulation.        Charges:  Timed Code Treatment Minutes: 40   Total Treatment Minutes: 50      [] EVAL (LOW) 20780 (typically 20 minutes face-to-face)  [] EVAL (MOD) 72143 (typically 30 minutes face-to-face)  [] EVAL (HIGH) 15669 (typically 45 minutes face-to-face)  [] RE-EVAL     [x] LJ(74041) x   1  [] Dry needle 1 or 2 Muscles (18127)  [] NMR (00798) x    [] Dry needle 3+ Muscles (31675)  [x] Manual (62272) x   1  [] Ultrasound (28450) x  [x] TA (51301) x   1  [] Mech Traction (13579)  [] ES(attended) (73669)     [] ES (un) (82840):   [] Vasopump (74706) [] Ionto (25669)   [] Other:      GOALS:  Patient stated goal: get back to driving  []? Progressing: []? Met: []? Not Met: []? Adjusted     Therapist goals for Patient:   Short Term Goals: To be achieved in: 2 weeks  1. Independent in HEP and progression per patient tolerance, in order to prevent re-injury. []? Progressing: []? Met: []? Not Met: []? Adjusted  2. Patient will have a decrease in pain to facilitate improvement in movement, function, and ADLs as indicated by Functional Deficits. []? Progressing: []? Met: []? Not Met: []? Adjusted     Long Term Goals: To be achieved in:  weeks  1. Disability index score of  20% or less for the LEFS to assist with reaching prior level of function. []? Progressing: []? Met: []? Not Met: []? Adjusted  2. Patient will demonstrate increased AROM to  to allow for proper joint functioning as indicated by patients Functional Deficits. []? Progressing: []? Met: []? Not Met: []? Adjusted  3. Patient will demonstrate an increase in Strength to good proximal hip strength and control, within 5lb HHD in LE to allow for proper functional mobility as indicated by patients Functional Deficits. []? Progressing: []? Met: []? Not Met: []? Adjusted  4. Patient will return to  functional activities without increased symptoms or restriction. []? Progressing: []? Met: []? Not Met: []? Adjusted  5. To be able to get around without the walker   []? Progressing: []? Met: []? Not Met: []?  Adjusted            ASSESSMENT: Pt post op first MTJ fusion - demonstrates deficits in functional mobility, ROM, strength and gait    Treatment/Activity Tolerance:  [x] Patient tolerated treatment well [] Patient limited by fatique  [] Patient limited by pain  [] Patient limited by other medical complications  [] Other:     Overall Progression Towards Functional goals/ Treatment Progress Update:  [] Patient is progressing as expected towards functional goals listed. [] Progression is slowed due to complexities/Impairments listed. [] Progression has been slowed due to co-morbidities. [x] Plan just implemented, too soon to assess goals progression <30days   [] Goals require adjustment due to lack of progress  [] Patient is not progressing as expected and requires additional follow up with physician  [] Other    Prognosis for POC: [x] Good [] Fair  [] Poor    Patient requires continued skilled intervention: [x] Yes  [] No        PLAN:   [] Continue per plan of care [] Alter current plan (see comments)  [x] Plan of care initiated [] Hold pending MD visit [] Discharge    Electronically signed by: Alia Suggs PTA     Note: If patient does not return for scheduled/recommended follow up visits, this note will serve as a discharge from care along with the most recent update on progress.

## 2021-01-13 ENCOUNTER — OFFICE VISIT (OUTPATIENT)
Dept: FAMILY MEDICINE CLINIC | Age: 66
End: 2021-01-13
Payer: MEDICARE

## 2021-01-13 VITALS
WEIGHT: 213 LBS | RESPIRATION RATE: 16 BRPM | SYSTOLIC BLOOD PRESSURE: 132 MMHG | DIASTOLIC BLOOD PRESSURE: 84 MMHG | OXYGEN SATURATION: 98 % | TEMPERATURE: 97.7 F | BODY MASS INDEX: 36.37 KG/M2 | HEART RATE: 87 BPM | HEIGHT: 64 IN

## 2021-01-13 DIAGNOSIS — J40 BRONCHITIS: ICD-10-CM

## 2021-01-13 DIAGNOSIS — E78.2 MIXED HYPERLIPIDEMIA: ICD-10-CM

## 2021-01-13 DIAGNOSIS — Z79.4 TYPE 2 DIABETES MELLITUS WITH DIABETIC POLYNEUROPATHY, WITH LONG-TERM CURRENT USE OF INSULIN (HCC): Primary | ICD-10-CM

## 2021-01-13 DIAGNOSIS — E11.42 TYPE 2 DIABETES MELLITUS WITH DIABETIC POLYNEUROPATHY, WITH LONG-TERM CURRENT USE OF INSULIN (HCC): Primary | ICD-10-CM

## 2021-01-13 DIAGNOSIS — R13.10 DYSPHAGIA, UNSPECIFIED TYPE: ICD-10-CM

## 2021-01-13 DIAGNOSIS — I10 ESSENTIAL HYPERTENSION: ICD-10-CM

## 2021-01-13 LAB — HBA1C MFR BLD: 7.6 %

## 2021-01-13 PROCEDURE — G8399 PT W/DXA RESULTS DOCUMENT: HCPCS | Performed by: FAMILY MEDICINE

## 2021-01-13 PROCEDURE — 3017F COLORECTAL CA SCREEN DOC REV: CPT | Performed by: FAMILY MEDICINE

## 2021-01-13 PROCEDURE — 83036 HEMOGLOBIN GLYCOSYLATED A1C: CPT | Performed by: FAMILY MEDICINE

## 2021-01-13 PROCEDURE — G8417 CALC BMI ABV UP PARAM F/U: HCPCS | Performed by: FAMILY MEDICINE

## 2021-01-13 PROCEDURE — G8427 DOCREV CUR MEDS BY ELIG CLIN: HCPCS | Performed by: FAMILY MEDICINE

## 2021-01-13 PROCEDURE — 1123F ACP DISCUSS/DSCN MKR DOCD: CPT | Performed by: FAMILY MEDICINE

## 2021-01-13 PROCEDURE — G8484 FLU IMMUNIZE NO ADMIN: HCPCS | Performed by: FAMILY MEDICINE

## 2021-01-13 PROCEDURE — 1036F TOBACCO NON-USER: CPT | Performed by: FAMILY MEDICINE

## 2021-01-13 PROCEDURE — 1090F PRES/ABSN URINE INCON ASSESS: CPT | Performed by: FAMILY MEDICINE

## 2021-01-13 PROCEDURE — 2022F DILAT RTA XM EVC RTNOPTHY: CPT | Performed by: FAMILY MEDICINE

## 2021-01-13 PROCEDURE — 4040F PNEUMOC VAC/ADMIN/RCVD: CPT | Performed by: FAMILY MEDICINE

## 2021-01-13 PROCEDURE — 99214 OFFICE O/P EST MOD 30 MIN: CPT | Performed by: FAMILY MEDICINE

## 2021-01-13 PROCEDURE — 3051F HG A1C>EQUAL 7.0%<8.0%: CPT | Performed by: FAMILY MEDICINE

## 2021-01-13 NOTE — PROGRESS NOTES
1/13/2021    This is a 76 y.o. female   Chief Complaint   Patient presents with    Follow-up     ED / shortness of breath / cough / body aches started 01/02/21     HPI    Review of Systems     Past Medical History:   Diagnosis Date    Arthritis     Cardiomyopathy ([address]     Diabetes     GERD (gastroesophageal reflux disease)     Hyperlipidemia     Hypertension     Lumbar disc disease     Sleep apnea     pt states does use a Cpap machine at night    Unspecified cerebral artery occlusion with cerebral infarction 2014    right side weak    Wears glasses        Past Surgical History:   Procedure Laterality Date    ARTHRODESIS Right 9/17/2020    (RIGHT) REMOVAL OF BONE SPURRING AND OSSEOUS PROMINENCE FIRST METATARSAL, ARTHRODESIS OF FIRST METATARSOPHALANGEAL JOINT, BONE MARROW HARVEST AND CONCENTRATION RIGHT TIBIA performed by Teresa Arguelles DPM at [facility] Left 9/3/15    CARPAL TUNNEL RELEASE Right 9/22/15    COLONOSCOPY      FINGER TRIGGER RELEASE Left 9/3/15    middle and ring fingers    FINGER TRIGGER RELEASE Right 9/22/15    middle and ring fingers    FOOT SURGERY Bilateral     litteltoe    HAND SURGERY Right     Ligament    PARTIAL HYSTERECTOMY  08/2003    SHOULDER ARTHROSCOPY Right 06/20/2018    Diagnostic scope, rcr, open [place]       Family History   Problem Relation Age of Onset    Heart Disease Mother     High Blood Pressure Mother     Stroke Mother     Cancer Brother         lung cancer       Current Outpatient Medications   Medication Sig Dispense Refill    albuterol sulfate HFA (PROVENTIL HFA) 108 (90 Base) MCG/ACT inhaler Inhale 2 puffs into the lungs every 4 hours as needed for Wheezing or Shortness of Breath May substitute ProAir MDI 1 Inhaler 5    ipratropium-albuterol (DUONEB) 0.5-2.5 (3) MG/3ML SOLN nebulizer solution Inhale 3 mLs into the lungs every 4 hours as needed for Shortness of Breath (wheezing coughing) 360 mL 5    Respiratory Therapy Supplies (NEBULIZER) MAXIMILIANO Used to give yourself breathing treatment 1 Device 0    gabapentin (NEURONTIN) 300 MG capsule TAKE ONE CAPSULE BY MOUTH ONCE NIGHTLY 90 capsule 0    Dulaglutide (TRULICITY) 1.5 SK/8.9WB SOPN INJECT 1.5 MG UNDER THE SKIN ONCE WEEKLY *APPOINTMENT NEEDED* 2 mL 1    omeprazole (PRILOSEC) 40 MG delayed release capsule TAKE ONE CAPSULE BY MOUTH TWICE A DAY BEFORE MEALS 180 capsule 0    valsartan (DIOVAN) 80 MG tablet TAKE ONE TABLET BY MOUTH DAILY 90 tablet 0    furosemide (LASIX) 20 MG tablet TAKE ONE TABLET BY MOUTH DAILY AS NEEDED FOR LEG SWELLING 90 tablet 0    atorvastatin (LIPITOR) 20 MG tablet TAKE ONE TABLET BY MOUTH DAILY 90 tablet 0    metoprolol succinate (TOPROL XL) 25 MG extended release tablet TAKE ONE TABLET BY MOUTH DAILY FOR BLOOD PRESSURE AND HEART RATE 90 tablet 0    metFORMIN (GLUCOPHAGE) 500 MG tablet TAKE TWO TABLETS BY MOUTH TWICE A DAY WITH MEALS 360 tablet 0    ibuprofen (ADVIL;MOTRIN) 800 MG tablet Take 1 tablet by mouth 2 times daily as needed for Pain 60 tablet 5    insulin glargine (BASAGLAR KWIKPEN) 100 UNIT/ML injection pen Inject 14 Units into the skin nightly 4 pen 5    Insulin Pen Needle (PEN NEEDLES) 31G X 6 MM MISC USE TO INJECT INSULIN DAILY 100 each 2    gabapentin (NEURONTIN) 100 MG capsule TAKE ONE CAPSULE BY MOUTH EVERY MORNING 90 capsule 2    DULoxetine (CYMBALTA) 60 MG extended release capsule Take 60 mg by mouth daily      traZODone (DESYREL) 100 MG tablet Take 100 mg by mouth nightly      vitamin C (ASCORBIC ACID) 500 MG tablet Take 500 mg by mouth daily      ammonium lactate (LAC-HYDRIN) 12 % lotion Apply topically daily. 1 Bottle 1    blood glucose monitor strips Test 2 times daily.  One Touch Brand DX E11.9 100 strip 5    trimethoprim-polymyxin b (POLYTRIM) 03073-7.1 UNIT/ML-% ophthalmic solution 1 drop every 4 hours as needed (redness)       blood glucose test strips (FREESTYLE LITE) strip TEST BLOOD SUGAR TWO TIMES A DAY Diag: E11.9 100 each 4    Blood Glucose Monitoring Suppl (TRUE METRIX AIR GLUCOSE METER) w/Device KIT 1 each by Does not apply route daily 1 kit 0    Tens Unit MISC by Does not apply route 1 each 0    Misc. Devices (WALKER) MISC One four wheel walker 1 each 0    FREESTYLE LANCETS MISC USE ONE LANCET TO TEST BLOOD SUGAR TWICE A  each 3    FANAPT 1 MG TABS tablet Take 1 mg by mouth nightly       aspirin 81 MG EC tablet Take 1 tablet by mouth daily. 60 tablet 3     No current facility-administered medications for this visit. There were no vitals taken for this visit. Physical Exam    Wt Readings from Last 3 Encounters:   01/05/21 198 lb (89.8 kg)   12/15/20 198 lb (89.8 kg)   12/04/20 204 lb (92.5 kg)       BP Readings from Last 3 Encounters:   01/05/21 (!) 129/98   09/17/20 108/69   09/17/20 (!) 149/85         Assessment/Plan:  There are no diagnoses linked to this encounter. No follow-ups on file.

## 2021-01-13 NOTE — PROGRESS NOTES
1/13/2021    Rox Agarwal is a 76 y.o. female here for ED visit  follow up  Chief Complaint   Patient presents with    Follow-up     ED / shortness of breath / cough / body aches started 01/02/21       HPI     Went to urgent care on 1/05 due to shortness of breath, muscle aches, and headache. She denies fever. She was tested for COVID which was negative. She was given azithromycin, albuterol, prednisone. She states that she has felt much better since that visit and that the antibiotics have helped her greatly. She did not take nebulized albuetrol because the pharmacy could not give her a machine. She did take inhaled albuterol. She still is taking the prednisone and is finished with antibiotics. She feels that her breathing is still reduced, but it is better. She still has pain in her back. Denies current headache. She has had some diarrhea which she contributes to antibiotics. Diabetes   -blood sugars have been up and down she states that it is around 180 after dinner and around 140 fasting.   - having lows maybe 1-2 x per week. Usually mid morning if she hasn't eaten her usual amount  -denies tingling in feet but does that there is pain   -no issues with insulin   -concerned about metformin with her kidney function   -current A1c 7.6     Sleep  -takes trazodone but still has problems sleeping through the night  -has appointment with psychiatrist today. She reports some dysphagia, mostly with meds and solids, like food gets stuck. Sometimes liquids. She feels like it is high up in the back of her throat. Sometimes has choking / coughing    Review of Systems  Denies fever, headache, swelling in legs. Denies heart palpitations and numbness. Denies chest pain. Prior to Visit Medications    Medication Sig Taking?  Authorizing Provider   Nebulizers (COMPRESSOR/NEBULIZER) MISC Use to take medication Q4H PRN Yes Rc Finnegan MD   albuterol sulfate HFA (PROVENTIL HFA) 108 (90 Base) MCG/ACT inhaler Inhale 2 puffs into the lungs every 4 hours as needed for Wheezing or Shortness of Breath May substitute ProAir MDI Yes Luis Arizmendi MD   ipratropium-albuterol (DUONEB) 0.5-2.5 (3) MG/3ML SOLN nebulizer solution Inhale 3 mLs into the lungs every 4 hours as needed for Shortness of Breath (wheezing coughing) Yes Luis Arizmendi MD   Respiratory Therapy Supplies (NEBULIZER) MAXIMILIANO Used to give yourself breathing treatment Yes Luis Arizmendi MD   gabapentin (NEURONTIN) 300 MG capsule TAKE ONE CAPSULE BY MOUTH ONCE NIGHTLY Yes Marcel Finnegan MD   Dulaglutide (TRULICITY) 1.5 [place]/0.1BA SOPN INJECT 1.5 MG UNDER THE SKIN ONCE WEEKLY *APPOINTMENT NEEDED* Yes Marcel Finnegan MD   omeprazole (PRILOSEC) 40 MG delayed release capsule TAKE ONE CAPSULE BY MOUTH TWICE A DAY BEFORE MEALS Yes Nadean Kocher, APRN - CNP   valsartan (DIOVAN) 80 MG tablet TAKE ONE TABLET BY MOUTH DAILY Yes Marcel Finnegan MD   furosemide (LASIX) 20 MG tablet TAKE ONE TABLET BY MOUTH DAILY AS NEEDED FOR LEG SWELLING Yes Marcel Finnegan MD   atorvastatin (LIPITOR) 20 MG tablet TAKE ONE TABLET BY MOUTH DAILY Yes Marcel Finnegan MD   metoprolol succinate (TOPROL XL) 25 MG extended release tablet TAKE ONE TABLET BY MOUTH DAILY FOR BLOOD PRESSURE AND HEART RATE Yes Marcel Finnegan MD   metFORMIN (GLUCOPHAGE) 500 MG tablet TAKE TWO TABLETS BY MOUTH TWICE A DAY WITH MEALS Yes Marcel Finnegan MD   ibuprofen (ADVIL;MOTRIN) 800 MG tablet Take 1 tablet by mouth 2 times daily as needed for Pain Yes Lady Eric DPM   insulin glargine (BASAGLAR KWIKPEN) 100 UNIT/ML injection pen Inject 14 Units into the skin nightly Yes Marcel Finnegan MD   Insulin Pen Needle (PEN NEEDLES) 31G X 6 MM MISC USE TO INJECT INSULIN DAILY Yes Marcel Finnegan MD   DULoxetine (CYMBALTA) 60 MG extended release capsule Take 60 mg by mouth daily Yes Historical Provider, MD   traZODone (DESYREL) 100 MG tablet Take 100 mg by mouth nightly Yes Historical Provider, MD   vitamin C (ASCORBIC ACID) 500 MG tablet Take 500 mg by mouth daily Yes Historical Provider, MD   ammonium lactate (LAC-HYDRIN) 12 % lotion Apply topically daily. Yes Tiffanie Mckoy MD   blood glucose monitor strips Test 2 times daily. One Touch Brand DX E11.9 Yes Tiffanie Mckoy MD   trimethoprim-polymyxin b (POLYTRIM) 47615-8.1 UNIT/ML-% ophthalmic solution 1 drop every 4 hours as needed (redness)  Yes Historical Provider, MD   blood glucose test strips (FREESTYLE LITE) strip TEST BLOOD SUGAR TWO TIMES A DAY Diag: E11.9 Yes Tiffanie Mckoy MD   Blood Glucose Monitoring Suppl (TRUE METRIX AIR GLUCOSE METER) w/Device KIT 1 each by Does not apply route daily Yes Tiffanie Mckoy MD   Tens Unit MISC by Does not apply route Yes Tiffanie Mckoy MD   Misc. Devices Gauri Crea) MISC One four wheel walker Yes Cony Finnegan MD   FREESTYLE LANCETS MISC USE ONE LANCET TO TEST BLOOD SUGAR TWICE A DAY Yes Susanna Aparicio MD   FANAPT 1 MG TABS tablet Take 1 mg by mouth nightly  Yes Historical Provider, MD   aspirin 81 MG EC tablet Take 1 tablet by mouth daily.  Yes Susanna Aparicio MD   gabapentin (NEURONTIN) 100 MG capsule TAKE ONE CAPSULE BY MOUTH EVERY MORNING  Cony Finnegan MD     Past Medical History:   Diagnosis Date    Arthritis     Cardiomyopathy ([address]     Diabetes     GERD (gastroesophageal reflux disease)     Hyperlipidemia     Hypertension     Lumbar disc disease     Sleep apnea     pt states does use a Cpap machine at night    Unspecified cerebral artery occlusion with cerebral infarction 2014    right side weak    Wears glasses      Family History   Problem Relation Age of Onset    Heart Disease Mother     High Blood Pressure Mother     Stroke Mother     Cancer Brother         lung cancer       LABS:  Lab Results   Component Value Date     01/05/2021    K 4.2 01/05/2021    CREATININE 0.7 01/05/2021     Cholesterol, Total   Date Value Ref Range Status   02/21/2020 145 0 - 199 mg/dL Final     LDL Calculated   Date Value Ref Range Status   02/21/2020 70 <100 mg/dL Final     HDL Date Value Ref Range Status   02/21/2020 57 40 - 60 mg/dL Final   01/26/2012 56 40 - 60 mg/dl Final     Triglycerides   Date Value Ref Range Status   02/21/2020 92 0 - 150 mg/dL Final     Lab Results   Component Value Date    ALT 10 01/05/2021    AST 15 01/05/2021    ALKPHOS 117 01/05/2021    BILITOT <0.2 01/05/2021     Lab Results   Component Value Date    WBC 6.4 01/05/2021    HGB 11.3 (L) 01/05/2021    HCT 36.1 01/05/2021    MCV 85.8 01/05/2021     01/05/2021     Lab Results   Component Value Date    LABA1C 7.6 01/13/2021        PHYSICAL EXAM  /84 (Site: Right Upper Arm, Position: Sitting, Cuff Size: Large Adult)   Pulse 87   Temp 97.7 °F (36.5 °C) (Temporal)   Resp 16   Ht 5' 4\" (1.626 m)   Wt 213 lb (96.6 kg)   SpO2 98%   BMI 36.56 kg/m²     BP Readings from Last 5 Encounters:   01/13/21 132/84   01/05/21 (!) 129/98   09/17/20 108/69   09/17/20 (!) 149/85   08/24/20 128/81       Wt Readings from Last 5 Encounters:   01/13/21 213 lb (96.6 kg)   01/05/21 198 lb (89.8 kg)   12/15/20 198 lb (89.8 kg)   12/04/20 204 lb (92.5 kg)   09/17/20 205 lb (93 kg)        Physical Exam  Constitutional:       Appearance: Normal appearance. Neck:      Musculoskeletal: Normal range of motion. No neck rigidity or muscular tenderness. Cardiovascular:      Rate and Rhythm: Normal rate and regular rhythm. Pulses: Normal pulses. Heart sounds: Normal heart sounds. Pulmonary:      Effort: Pulmonary effort is normal.      Breath sounds: Normal breath sounds. Musculoskeletal: Normal range of motion. Lumbar back: She exhibits tenderness and pain. Neurological:      Mental Status: She is alert. ASSESSMENT/PLAN:  1. Type 2 diabetes mellitus with diabetic polyneuropathy, with long-term current use of insulin ([facility])  - POCT glycosylated hemoglobin (Hb A1C)    2. Mixed hyperlipidemia    3. Essential hypertension    4. Bronchitis    5.  Dysphagia, unspecified type  - SLP video swallow; Future     A1c up a bit. She is having some hypoglycemic episodes at night. I asked her to decrease her nighttime insulin dose if she is eating a smaller dinner breakfast.  Otherwise carb management and continue current medications. Her dysphagia sounds like it might be in the oral phase and does not sound esophageal.  We will check a swallow study due to her history of stroke    Return in about 3 months (around 4/13/2021) for dm f/u.

## 2021-01-14 ENCOUNTER — OFFICE VISIT (OUTPATIENT)
Dept: ORTHOPEDIC SURGERY | Age: 66
End: 2021-01-14
Payer: MEDICARE

## 2021-01-14 ENCOUNTER — TELEPHONE (OUTPATIENT)
Dept: FAMILY MEDICINE CLINIC | Age: 66
End: 2021-01-14

## 2021-01-14 ENCOUNTER — HOSPITAL ENCOUNTER (OUTPATIENT)
Dept: PHYSICAL THERAPY | Age: 66
Setting detail: THERAPIES SERIES
Discharge: HOME | End: 2021-01-14
Payer: MEDICARE

## 2021-01-14 VITALS — BODY MASS INDEX: 32.27 KG/M2 | TEMPERATURE: 96.4 F | HEIGHT: 64 IN | WEIGHT: 189 LBS

## 2021-01-14 DIAGNOSIS — M16.11 PRIMARY OSTEOARTHRITIS OF RIGHT HIP: Primary | ICD-10-CM

## 2021-01-14 PROCEDURE — 1123F ACP DISCUSS/DSCN MKR DOCD: CPT | Performed by: ORTHOPAEDIC SURGERY

## 2021-01-14 PROCEDURE — G8427 DOCREV CUR MEDS BY ELIG CLIN: HCPCS | Performed by: ORTHOPAEDIC SURGERY

## 2021-01-14 PROCEDURE — 99213 OFFICE O/P EST LOW 20 MIN: CPT | Performed by: ORTHOPAEDIC SURGERY

## 2021-01-14 PROCEDURE — 1036F TOBACCO NON-USER: CPT | Performed by: ORTHOPAEDIC SURGERY

## 2021-01-14 PROCEDURE — 4040F PNEUMOC VAC/ADMIN/RCVD: CPT | Performed by: ORTHOPAEDIC SURGERY

## 2021-01-14 PROCEDURE — 97110 THERAPEUTIC EXERCISES: CPT

## 2021-01-14 PROCEDURE — G8484 FLU IMMUNIZE NO ADMIN: HCPCS | Performed by: ORTHOPAEDIC SURGERY

## 2021-01-14 PROCEDURE — G8417 CALC BMI ABV UP PARAM F/U: HCPCS | Performed by: ORTHOPAEDIC SURGERY

## 2021-01-14 PROCEDURE — 1090F PRES/ABSN URINE INCON ASSESS: CPT | Performed by: ORTHOPAEDIC SURGERY

## 2021-01-14 PROCEDURE — 3017F COLORECTAL CA SCREEN DOC REV: CPT | Performed by: ORTHOPAEDIC SURGERY

## 2021-01-14 PROCEDURE — 97530 THERAPEUTIC ACTIVITIES: CPT

## 2021-01-14 PROCEDURE — 97140 MANUAL THERAPY 1/> REGIONS: CPT

## 2021-01-14 PROCEDURE — G8399 PT W/DXA RESULTS DOCUMENT: HCPCS | Performed by: ORTHOPAEDIC SURGERY

## 2021-01-14 NOTE — PROGRESS NOTES
[facility]  [id number]  January 14, 2021    Chief Complaint   Patient presents with    Follow-up     Right hip. had ultrasound injection on 12/15/20       History: The patient is a 70-year-old female who is here for evaluation of her right hip. She received a right hip intra-articular injection by Dr. Micael Sacks 1 month ago. The injection has helped significantly. She did have a surgery on her right foot and has been wearing a boot. This seems to be affecting her gait. Overall she reports a 75% improvement in her hip pain. It should be noted the patient does have a significant past medical history remarkable for prior cerebrovascular accident with partial right hemiparesis. She also has a significant history of anxiety and depression. She does have diabetes. The patient's  past medical history, medications, allergies,  family history, social history, and have been reviewed, and dated and are recorded in the chart. Pertinent items are noted in HPI. Review of systems reviewed from Pertinent History Form dated on 12/4/2020 and available in the patient's chart under the Media tab. Vitals:  Temp 96.4 °F (35.8 °C)   Ht 5' 4\" (1.626 m)   Wt 189 lb (85.7 kg)   BMI 32.44 kg/m²     Physical: Physical: Ms. Rolando Albrecht appears well, she is in no apparent distress, she demonstrates appropriate mood & affect. She is alert and oriented to person, place and time. She has minimal pain with internal rotation of the right hip. Range of motion of the right hip is: 40 degrees abduction, 30 degrees adduction, 35 degrees of external rotation and 15 degrees of internal rotation. Range of motion of the opposite hip is full. She is non tender laterally about the hips. Trendelenburg test is negative bilaterally. Tresea Brod test is negative bilaterally. She is non tender about the Sacroiliac joint bilaterally. Leg length discrepancy: none.    Examination of the skin reveals no rashes, ulcerations, or lesions, bilaterally in the lower extremities. Sensation to both lower extremities is grossly intact. Exam of both feet reveals pedal pulses intact and brisk cap refill. Patient is able to dorsiflex and wiggle all toes. Deep tendon reflexes of the lower extremities are normal and symmetric. She is non tender to palpation of the lumbar spine. X-rays: AP pelvis and 2 views of the right hip obtained at last visit demonstrate moderate osteoarthritis. Impression: right Hip Osteoarthritis      Plan: At this time, we will continue our current treatment. The patient was encouraged to modify her activities. She plans on getting out of the boot rather soon. I do feel this will also help with her pain level and improve her gait pattern. She was instructed to follow-up with me in approximately 2 months and we will reassess her then. If the patient has continued pain, we certainly could consider a repeat intra-articular right hip injection.

## 2021-01-14 NOTE — FLOWSHEET NOTE
[facility]  Outpatient Rehabilitation and Therapy, [facility]  [address] [facility], [facility]  Phone: [phone]   Fax:     [phone]      Physical Therapy Treatment Note/ Progress Report:     Date:  2021    Patient Name:  Yashira Smith    :  1955  MRN: [id number]    Medical/Treatment Diagnosis Information:  · Dx - Post first MTJ fusion  ·  Decreased functional mobility 2/2 right foot pain    Insurance/Certification information:   [facility] medicare  Physician Information:  Referring Practitioner: Ted Cameron DPM  Plan of care signed (Y/N): inbox    Date of Patient follow up with Physician:      Progress Report: []  Yes  [x]  No     Date Range for reporting period:  Beginnin2021  Ending:      Progress report due (10 Rx/or 30 days whichever is less):     Recertification due (POC duration/ or 90 days whichever is less):21    Visit # POC/Insurance Allowable Auth Needed   5 Bomn []Yes   []No     Latex Allergy:  [x]NO      []YES  Preferred Language for Healthcare:   [x]English       []Other:    SUBJECTIVE:  History of foot pain. 2020 Right foot removal of bone spurring and osseous prominence first metatarsal, arthrodesis of first mtp, bone marrow harvest and concentration right tibia     : Tolerated last appt well  21  Resuming Rx after illness. Did not have Covid. At home walking with house shoe. Outdoors using boot. F/U with MD 21.  21 Saw Dr. Juan Leonard re: R hip received cortisone injection. Saw Dr. Lani Estrada , D/C boot. resume activities/ driving as tolerated.   To PT with shoes and st cane.            Relevant Medical History: arthritis, DM, HLD, HTN, CVA , cardiomyopathy, GERD, l umbar disc disease, sleep apnea, right shoulder RCR, gurmeet CTS, left and right trigger finger release.        Functional Scale/Score: LEFS =  15  80-99%  Disability      Pain Scale  1 /10    Easing factors:  Rest ice  Provocative factors:   Walking, activity      Ankle PF   38a/40p   Ankle DF   5a/8p   Ankle In   10a/p   Ankle Ev     First mtp            To neutral a/p     No arom    PROM - 10 extension  To neutral flexion     Ankle DF   3+/5   Ankle PF   3+/5   Ankle Inv   3+/5   Ankle EV   3+/5           Circumference  MTP  Mid lat malleolus       24 cm  29 cm       24.5 cm  30 cm         Exercises/Interventions:     Therapeutic Ex (83689)   Min:   15 Reps/Resistance Notes/CUES   Nustep  Seat 7 UE 7 L-0 x 5 min   In shoes   GSS slant 20 sec x 3 B    Seated HR/TR 20 each  gurmeet    Seated toe DIP extension 10x    Seated AROM  DF/PF, INV/EV, circles  CW/CCW X 10 ea R    Therapeutic Activity (09423) Min:   10     Standing wt shift/ shoe S/S x30 sec   F/B x 30 sec     Gait- no boot/ st cane In dept and 1 lap Instructed to use st cane in L hand. Does well only minimal limp   Step ups fwds add    NMR re-education (36452)  Min:   5   CUES NEEDED   Gumdrop ROM standing   4 way x 10  R    NBOS balance 30 sec    Tandem balance add         Manual Intervention (03236) Min: 10     MT/ DIP mobs All MT  joints    PROM first toe  PROM/AAROM all toes X 20  X 20    Gentle effleurage right foot 5 min              Modalities  Min:          CP after exercises cryocuff ankle/foot x10 min With pillowcase               Other Therapeutic Activities: Pt was educated on PT POC, Diagnosis, Prognosis, pathomechanics as well as frequency and duration of scheduling future physical therapy appointments. Time was also taken on this day to answer all patient questions and participation in PT. Reviewed appointment policy in detail with patient and patient verbalized understanding. Home Exercise Program: Patient was instructed in the following for HEP:     . Patient verbalized/demonstrated understanding and was issued written handout.       Therapeutic Exercise and NMR EXR  [x] (17003) Provided verbal/tactile cueing for activities related to strengthening, flexibility, endurance, ROM for improvements in LE, proximal hip, and core control with self care, mobility, lifting, ambulation. [x] (83050) Provided verbal/tactile cueing for activities related to improving balance, coordination, kinesthetic sense, posture, motor skill, proprioception  to assist with LE, proximal hip, and core control in self care, mobility, lifting, ambulation and eccentric single leg control. [address] and Therapeutic Activities:    [x] (26964 or 73207) Provided verbal/tactile cueing for activities related to improving balance, coordination, kinesthetic sense, posture, motor skill, proprioception and motor activation to allow for proper function of core, proximal hip and LE with self care and ADLs and functional mobility. [x] (00792) Gait Re-education- Provided training and instruction to the patient for proper LE, core and proximal hip recruitment and positioning and eccentric body weight control with ambulation re-education including up and down stairs     Home Exercise Program:    [x] (98941) Reviewed/Progressed HEP activities related to strengthening, flexibility, endurance, ROM of core, proximal hip and LE for functional self-care, mobility, lifting and ambulation/stair navigation   [x] (07666)Reviewed/Progressed HEP activities related to improving balance, coordination, kinesthetic sense, posture, motor skill, proprioception of core, proximal hip and LE for self care, mobility, lifting, and ambulation/stair navigation      Manual Treatments:  PROM / STM / Oscillations-Mobs:  G-I, II, III, IV (PA's, Inf., Post.)  [x] (59133) Provided manual therapy to mobilize LE, proximal hip and/or LS spine soft tissue/joints for the purpose of modulating pain, promoting relaxation,  increasing ROM, reducing/eliminating soft tissue swelling/inflammation/restriction, improving soft tissue extensibility and allowing for proper ROM for normal function with self care, mobility, lifting and ambulation. Charges:  Timed Code Treatment Minutes: 40   Total Treatment Minutes: 50      [] EVAL (LOW) 89048 (typically 20 minutes face-to-face)  [] EVAL (MOD) 24840 (typically 30 minutes face-to-face)  [] EVAL (HIGH) 52260 (typically 45 minutes face-to-face)  [] RE-EVAL     [x] JE(12801) x   1  [] Dry needle 1 or 2 Muscles (54138)  [] NMR (06717) x    [] Dry needle 3+ Muscles (24255)  [x] Manual (56099) x   1  [] Ultrasound (09643) x  [x] TA (71902) x   1  [] Mech Traction (01264)  [] ES(attended) (57167)     [] ES (un) (03006):   [] Vasopump (02660) [] Ionto (58224)   [] Other:      GOALS:  Patient stated goal: get back to driving  []? Progressing: []? Met: []? Not Met: []? Adjusted     Therapist goals for Patient:   Short Term Goals: To be achieved in: 2 weeks  1. Independent in HEP and progression per patient tolerance, in order to prevent re-injury. []? Progressing: []? Met: []? Not Met: []? Adjusted  2. Patient will have a decrease in pain to facilitate improvement in movement, function, and ADLs as indicated by Functional Deficits. []? Progressing: []? Met: []? Not Met: []? Adjusted     Long Term Goals: To be achieved in:  weeks  1. Disability index score of  20% or less for the LEFS to assist with reaching prior level of function. []? Progressing: []? Met: []? Not Met: []? Adjusted  2. Patient will demonstrate increased AROM to  to allow for proper joint functioning as indicated by patients Functional Deficits. []? Progressing: []? Met: []? Not Met: []? Adjusted  3. Patient will demonstrate an increase in Strength to good proximal hip strength and control, within 5lb HHD in LE to allow for proper functional mobility as indicated by patients Functional Deficits. []? Progressing: []? Met: []? Not Met: []? Adjusted  4. Patient will return to  functional activities without increased symptoms or restriction. []? Progressing: []? Met: []? Not Met: []? Adjusted  5.  To be able to get around without the walker []? Progressing: []? Met: []? Not Met: []? Adjusted            ASSESSMENT: Pt post op first MTJ fusion - demonstrates deficits in functional mobility, ROM, strength and gait    Treatment/Activity Tolerance:  [x] Patient tolerated treatment well [] Patient limited by fatique  [] Patient limited by pain  [] Patient limited by other medical complications  [] Other:     Overall Progression Towards Functional goals/ Treatment Progress Update:  [] Patient is progressing as expected towards functional goals listed. [] Progression is slowed due to complexities/Impairments listed. [] Progression has been slowed due to co-morbidities. [x] Plan just implemented, too soon to assess goals progression <30days   [] Goals require adjustment due to lack of progress  [] Patient is not progressing as expected and requires additional follow up with physician  [] Other     Prognosis for POC: [x] Good [] Fair  [] Poor    Patient requires continued skilled intervention: [x] Yes  [] No        PLAN:   [] Continue per plan of care [] Alter current plan (see comments)  [x] Plan of care initiated [] Hold pending MD visit [] Discharge    Electronically signed by: Neli Call, PTA  [id number]    Note: If patient does not return for scheduled/recommended follow up visits, this note will serve as a discharge from care along with the most recent update on progress.

## 2021-01-14 NOTE — TELEPHONE ENCOUNTER
MARTINEZ Marcus returning her call to please call back.    As to what is she needing it to be changed to

## 2021-01-15 ENCOUNTER — TELEPHONE (OUTPATIENT)
Dept: FAMILY MEDICINE CLINIC | Age: 66
End: 2021-01-15

## 2021-01-15 NOTE — TELEPHONE ENCOUNTER
PT WENT TO PHARMACY TO GET HER NEBULIZER THEY STATED SHE NEEDS A PA THE PT NEEDS THIS SOON SHE IS HAVING ISSUES       PLEASE ADVISE

## 2021-01-18 RX ORDER — BLOOD SUGAR DIAGNOSTIC
STRIP MISCELLANEOUS
Qty: 100 STRIP | Refills: 4 | Status: SHIPPED | OUTPATIENT
Start: 2021-01-18 | End: 2022-01-24

## 2021-01-19 ENCOUNTER — HOSPITAL ENCOUNTER (OUTPATIENT)
Dept: PHYSICAL THERAPY | Age: 66
Setting detail: THERAPIES SERIES
Discharge: HOME | End: 2021-01-19
Payer: MEDICARE

## 2021-01-19 PROCEDURE — 97140 MANUAL THERAPY 1/> REGIONS: CPT

## 2021-01-19 PROCEDURE — 97530 THERAPEUTIC ACTIVITIES: CPT

## 2021-01-19 PROCEDURE — 97110 THERAPEUTIC EXERCISES: CPT

## 2021-01-19 NOTE — FLOWSHEET NOTE
[facility]  Outpatient Rehabilitation and Therapy, [facility]  [address] [facility], [facility]  Phone: [phone]   Fax:     [phone]      Physical Therapy Treatment Note/ Progress Report:     Date:  2021    Patient Name:  Oriana Paige    :  1955  MRN: [id number]    Medical/Treatment Diagnosis Information:  · Dx - Post first MTJ fusion  ·  Decreased functional mobility 2/2 right foot pain    Insurance/Certification information:   [facility] medicare  Physician Information:  Referring Practitioner: Guerrero Bird DPM  Plan of care signed (Y/N): inbox    Date of Patient follow up with Physician:      Progress Report: []  Yes  [x]  No     Date Range for reporting period:  Beginnin2021  Ending:      Progress report due (10 Rx/or 30 days whichever is less): 26    Recertification due (POC duration/ or 90 days whichever is less):21    Visit # POC/Insurance Allowable Auth Needed   6 Bomn []Yes   []No     Latex Allergy:  [x]NO      []YES  Preferred Language for Healthcare:   [x]English       []Other:    SUBJECTIVE:  History of foot pain. 2020 Right foot removal of bone spurring and osseous prominence first metatarsal, arthrodesis of first mtp, bone marrow harvest and concentration right tibia     : Tolerated last appt well  21  Resuming Rx after illness. Did not have Covid. At home walking with house shoe. Outdoors using boot. F/U with MD 21.  21 Saw Dr. Borrero Back re: R hip received cortisone injection. Saw Dr. Faraz Lazo , D/C boot. resume activities/ driving as tolerated. To PT with shoes and st cane.     - off boot 8 days - to PT - cane too high lowered appropriately         Relevant Medical History: arthritis, DM, HLD, HTN, CVA , cardiomyopathy, GERD, l umbar disc disease, sleep apnea, right shoulder RCR, gurmeet CTS, left and right trigger finger release.        Functional Scale/Score: LEFS =  15 80-99%  Disability      Pain Scale  1 /10    Easing factors:  Rest ice  Provocative factors:   Walking, activity      Ankle PF   38a/40p   Ankle DF   5a/8p   Ankle In   10a/p   Ankle Ev     First mtp            To neutral a/p     No arom    PROM - 10 extension  To neutral flexion     Ankle DF   3+/5   Ankle PF   3+/5   Ankle Inv   3+/5   Ankle EV   3+/5           Circumference  MTP  Mid lat malleolus       24 cm  29 cm       24.5 cm  30 cm         Exercises/Interventions:     Therapeutic Ex (93791)   Min:   15 Reps/Resistance Notes/CUES   Nustep  Seat 7 UE 7 L-0 x 5 min   In shoes   GSS incline 30 sec x 2    Airex  Heel raises standing  NBOS   10  1 min    Rocker brd  F/b s/s 30 sec ea              Seated HR/TR 20 each  gurmeet    Seated toe DIP extension 10x    Seated AROM  DF/PF, INV/EV, circles  CW/CCW X 10 ea R    Therapeutic Activity (41738) Min:   10     Standing wt shift/ shoe S/S x30 sec   F/B x 30 sec     Step ups fwds 4\" x 10    NMR re-education (23850)  Min:   5   CUES NEEDED   Gumdrop ROM standing   4 way x 10  R    NBOS balance 30 sec    Tandem balance 30 sec r/l         Manual Intervention (66590) Min: 10     MT/ DIP mobs All MT  joints    PROM first toe  PROM/AAROM all toes X 20  X 20    Gentle effleurage right foot 5 min              Modalities  Min:          CP after exercises cryocuff ankle/foot x10 min With pillowcase               Other Therapeutic Activities: Pt was educated on PT POC, Diagnosis, Prognosis, pathomechanics as well as frequency and duration of scheduling future physical therapy appointments. Time was also taken on this day to answer all patient questions and participation in PT. Reviewed appointment policy in detail with patient and patient verbalized understanding. Home Exercise Program: Patient was instructed in the following for HEP:     . Patient verbalized/demonstrated understanding and was issued written handout.       Therapeutic Exercise and NMR EXR  [x] (12595) Provided verbal/tactile cueing for activities related to strengthening, flexibility, endurance, ROM for improvements in LE, proximal hip, and core control with self care, mobility, lifting, ambulation. [x] (60460) Provided verbal/tactile cueing for activities related to improving balance, coordination, kinesthetic sense, posture, motor skill, proprioception  to assist with LE, proximal hip, and core control in self care, mobility, lifting, ambulation and eccentric single leg control. [address] and Therapeutic Activities:    [x] (09978 or 32206) Provided verbal/tactile cueing for activities related to improving balance, coordination, kinesthetic sense, posture, motor skill, proprioception and motor activation to allow for proper function of core, proximal hip and LE with self care and ADLs and functional mobility.    [x] (84958) Gait Re-education- Provided training and instruction to the patient for proper LE, core and proximal hip recruitment and positioning and eccentric body weight control with ambulation re-education including up and down stairs     Home Exercise Program:    [x] (29350) Reviewed/Progressed HEP activities related to strengthening, flexibility, endurance, ROM of core, proximal hip and LE for functional self-care, mobility, lifting and ambulation/stair navigation   [x] (60622)Reviewed/Progressed HEP activities related to improving balance, coordination, kinesthetic sense, posture, motor skill, proprioception of core, proximal hip and LE for self care, mobility, lifting, and ambulation/stair navigation      Manual Treatments:  PROM / STM / Oscillations-Mobs:  G-I, II, III, IV (PA's, Inf., Post.)  [x] (60279) Provided manual therapy to mobilize LE, proximal hip and/or LS spine soft tissue/joints for the purpose of modulating pain, promoting relaxation,  increasing ROM, reducing/eliminating soft tissue swelling/inflammation/restriction, improving soft tissue extensibility and allowing for proper ROM for normal function with self care, mobility, lifting and ambulation. Charges:  Timed Code Treatment Minutes: 40   Total Treatment Minutes: 50      [] EVAL (LOW) 78829 (typically 20 minutes face-to-face)  [] EVAL (MOD) 02270 (typically 30 minutes face-to-face)  [] EVAL (HIGH) 99659 (typically 45 minutes face-to-face)  [] RE-EVAL     [x] WR(48325) x   1  [] Dry needle 1 or 2 Muscles (12463)  [] NMR (56492) x    [] Dry needle 3+ Muscles (76151)  [x] Manual (47660) x   1  [] Ultrasound (56335) x  [x] TA (41364) x   1  [] Mech Traction (64191)  [] ES(attended) (51838)     [] ES (un) (86695):   [] Vasopump (89258) [] Ionto (82646)   [] Other:      GOALS:  Patient stated goal: get back to driving  []? Progressing: []? Met: []? Not Met: []? Adjusted     Therapist goals for Patient:   Short Term Goals: To be achieved in: 2 weeks  1. Independent in HEP and progression per patient tolerance, in order to prevent re-injury. []? Progressing: []? Met: []? Not Met: []? Adjusted  2. Patient will have a decrease in pain to facilitate improvement in movement, function, and ADLs as indicated by Functional Deficits. []? Progressing: []? Met: []? Not Met: []? Adjusted     Long Term Goals: To be achieved in:  weeks  1. Disability index score of  20% or less for the LEFS to assist with reaching prior level of function. []? Progressing: []? Met: []? Not Met: []? Adjusted  2. Patient will demonstrate increased AROM to  to allow for proper joint functioning as indicated by patients Functional Deficits. []? Progressing: []? Met: []? Not Met: []? Adjusted  3. Patient will demonstrate an increase in Strength to good proximal hip strength and control, within 5lb HHD in LE to allow for proper functional mobility as indicated by patients Functional Deficits. []? Progressing: []? Met: []? Not Met: []? Adjusted  4. Patient will return to  functional activities without increased symptoms or restriction. []? Progressing: []? Met: []?  Not Met: []? Adjusted  5. To be able to get around without the walker   []? Progressing: []? Met: []? Not Met: []? Adjusted            ASSESSMENT: Pt post op first MTJ fusion - demonstrates deficits in functional mobility, ROM, strength and gait    Treatment/Activity Tolerance:  [x] Patient tolerated treatment well [] Patient limited by fatique  [] Patient limited by pain  [] Patient limited by other medical complications  [] Other:     Overall Progression Towards Functional goals/ Treatment Progress Update:  [] Patient is progressing as expected towards functional goals listed. [] Progression is slowed due to complexities/Impairments listed. [] Progression has been slowed due to co-morbidities. [x] Plan just implemented, too soon to assess goals progression <30days   [] Goals require adjustment due to lack of progress  [] Patient is not progressing as expected and requires additional follow up with physician  [] Other     Prognosis for POC: [x] Good [] Fair  [] Poor    Patient requires continued skilled intervention: [x] Yes  [] No        PLAN:   [] Continue per plan of care [] Alter current plan (see comments)  [x] Plan of care initiated [] Hold pending MD visit [] Discharge    Electronically signed by: Ketan Mccray, PT DPT, MS  [id number]    Note: If patient does not return for scheduled/recommended follow up visits, this note will serve as a discharge from care along with the most recent update on progress.

## 2021-01-20 ENCOUNTER — NURSE TRIAGE (OUTPATIENT)
Dept: OTHER | Facility: CLINIC | Age: 66
End: 2021-01-20

## 2021-01-20 NOTE — TELEPHONE ENCOUNTER
When she takes it she feels like it stuck in throat. No sensation at this time. Has been going on for 2 months. Has been seen for this, it is just continuing. Reason for Disposition   Difficulty swallowing is a chronic symptom (recurrent or ongoing AND present > 4 weeks)    Answer Assessment - Initial Assessment Questions  1. SYMPTOM: Clearnce Idler you having difficulty swallowing liquids, solids, or both? \"        Medication and sometimes food    2. ONSET: \"When did the swallowing problems begin? \"         See above    3. CAUSE: \"What do you think is causing the problem? \"         Unsure    4. CHRONIC/RECURRENT: \"Is this a new problem for you? \"  If no, ask: \"How long have you had this problem? \" (e.g., days, weeks, months)         No  See above    5. OTHER SYMPTOMS: \"Do you have any other symptoms? \" (e.g., difficulty breathing, sore throat, swollen tongue, chest pain)        No    6. PREGNANCY: \"Is there any chance you are pregnant? \" \"When was your last menstrual period? \"        No    Protocols used: SWALLOWING DIFFICULTY-ADULT-    Caller provided care advice and instructed to call back with worsening symptoms. Attention Provider: Thank you for allowing me to participate in the care of your patient. The patient was connected to triage in response to information provided to the [facility]. Please do not respond through this encounter as the response is not directed to a shared pool. Warm transfer to Eusebia Lora at [facility] ([facility]).

## 2021-01-21 ENCOUNTER — HOSPITAL ENCOUNTER (OUTPATIENT)
Dept: PHYSICAL THERAPY | Age: 66
Setting detail: THERAPIES SERIES
Discharge: HOME | End: 2021-01-21
Payer: MEDICARE

## 2021-01-21 PROCEDURE — 97140 MANUAL THERAPY 1/> REGIONS: CPT

## 2021-01-21 PROCEDURE — 97112 NEUROMUSCULAR REEDUCATION: CPT

## 2021-01-21 PROCEDURE — 97110 THERAPEUTIC EXERCISES: CPT

## 2021-01-21 NOTE — FLOWSHEET NOTE
[facility]  Outpatient Rehabilitation and Therapy, [facility]  [address] [facility]  Phone: [phone]   Fax:     [phone]      Physical Therapy Treatment Note/ Progress Report:     Date:  2021    Patient Name:  Noam Calvillo    :  1955  MRN: [id number]    Medical/Treatment Diagnosis Information:  · Dx - Post first MTJ fusion  ·  Decreased functional mobility 2/2 right foot pain    Insurance/Certification information:   [facility] medicare  Physician Information:  Referring Practitioner: Amor Luciano DPM  Plan of care signed (Y/N): inbox    Date of Patient follow up with Physician:      Progress Report: []  Yes  [x]  No     Date Range for reporting period:  Beginnin2021  Ending:      Progress report due (10 Rx/or 30 days whichever is less):     Recertification due (POC duration/ or 90 days whichever is less):21    Visit # POC/Insurance Allowable Auth Needed   7 Bomn []Yes   []No     Latex Allergy:  [x]NO      []YES  Preferred Language for Healthcare:   [x]English       []Other:    SUBJECTIVE:  History of foot pain. 2020 Right foot removal of bone spurring and osseous prominence first metatarsal, arthrodesis of first mtp, bone marrow harvest and concentration right tibia     : Tolerated last appt well  21  Resuming Rx after illness. Did not have Covid. At home walking with house shoe. Outdoors using boot. F/U with MD 21.  21 Saw Dr. Derrek He re: R hip received cortisone injection. Saw Dr. Paula Avendaño , D/C boot. resume activities/ driving as tolerated. To PT with shoes and st cane.     - off boot 8 days - to PT - cane too high lowered appropriately  :  Hurting today secondary to hitting foot on something.        Relevant Medical History: arthritis, DM, HLD, HTN, CVA , cardiomyopathy, GERD, l umbar disc disease, sleep apnea, right shoulder RCR, gurmeet CTS, left and right trigger verbalized/demonstrated understanding and was issued written handout. Therapeutic Exercise and NMR EXR  [x] (26390) Provided verbal/tactile cueing for activities related to strengthening, flexibility, endurance, ROM for improvements in LE, proximal hip, and core control with self care, mobility, lifting, ambulation. [x] (78561) Provided verbal/tactile cueing for activities related to improving balance, coordination, kinesthetic sense, posture, motor skill, proprioception  to assist with LE, proximal hip, and core control in self care, mobility, lifting, ambulation and eccentric single leg control.  [address] and Therapeutic Activities:    [x] (10863 or 87954) Provided verbal/tactile cueing for activities related to improving balance, coordination, kinesthetic sense, posture, motor skill, proprioception and motor activation to allow for proper function of core, proximal hip and LE with self care and ADLs and functional mobility.   [] (75656) Gait Re-education- Provided training and instruction to the patient for proper LE, core and proximal hip recruitment and positioning and eccentric body weight control with ambulation re-education including up and down stairs     Home Exercise Program:    [x] (73757) Reviewed/Progressed HEP activities related to strengthening, flexibility, endurance, ROM of core, proximal hip and LE for functional self-care, mobility, lifting and ambulation/stair navigation   [x] (00370)Reviewed/Progressed HEP activities related to improving balance, coordination, kinesthetic sense, posture, motor skill, proprioception of core, proximal hip and LE for self care, mobility, lifting, and ambulation/stair navigation      Manual Treatments:  PROM / STM / Oscillations-Mobs:  G-I, II, III, IV (PA's, Inf., Post.)  [x] (44655) Provided manual therapy to mobilize LE, proximal hip and/or LS spine soft tissue/joints for the purpose of modulating pain, promoting relaxation,  increasing ROM, reducing/eliminating soft tissue swelling/inflammation/restriction, improving soft tissue extensibility and allowing for proper ROM for normal function with self care, mobility, lifting and ambulation. Charges:  Timed Code Treatment Minutes: 38   Total Treatment Minutes: 48      [] EVAL (LOW) 35460 (typically 20 minutes face-to-face)  [] EVAL (MOD) 56725 (typically 30 minutes face-to-face)  [] EVAL (HIGH) 33898 (typically 45 minutes face-to-face)  [] RE-EVAL     [x] QN(70815) x   1  [] Dry needle 1 or 2 Muscles (18503)  [x] NMR (25169) x    [] Dry needle 3+ Muscles (32735)  [x] Manual (64090) x   1  [] Ultrasound (70872) x  [] TA (73895) x   1  [] Mech Traction (57539)  [] ES(attended) (58902)     [] ES (un) (46334):   [] Vasopump (32264) [] Ionto (78399)   [] Other:      GOALS:  Patient stated goal: get back to driving  []? Progressing: []? Met: []? Not Met: []? Adjusted     Therapist goals for Patient:   Short Term Goals: To be achieved in: 2 weeks  1. Independent in HEP and progression per patient tolerance, in order to prevent re-injury. []? Progressing: []? Met: []? Not Met: []? Adjusted  2. Patient will have a decrease in pain to facilitate improvement in movement, function, and ADLs as indicated by Functional Deficits. []? Progressing: []? Met: []? Not Met: []? Adjusted     Long Term Goals: To be achieved in:  weeks  1. Disability index score of  20% or less for the LEFS to assist with reaching prior level of function. []? Progressing: []? Met: []? Not Met: []? Adjusted  2. Patient will demonstrate increased AROM to  to allow for proper joint functioning as indicated by patients Functional Deficits. []? Progressing: []? Met: []? Not Met: []? Adjusted  3. Patient will demonstrate an increase in Strength to good proximal hip strength and control, within 5lb HHD in LE to allow for proper functional mobility as indicated by patients Functional Deficits. []? Progressing: []? Met: []?  Not Met: []? Adjusted  4. Patient will return to  functional activities without increased symptoms or restriction. []? Progressing: []? Met: []? Not Met: []? Adjusted  5. To be able to get around without the walker   []? Progressing: []? Met: []? Not Met: []? Adjusted            ASSESSMENT: Pt post op first MTJ fusion - demonstrates deficits in functional mobility, ROM, strength and gait    Treatment/Activity Tolerance:  [x] Patient tolerated treatment well [] Patient limited by fatique  [] Patient limited by pain  [] Patient limited by other medical complications  [] Other:     Overall Progression Towards Functional goals/ Treatment Progress Update:  [] Patient is progressing as expected towards functional goals listed. [] Progression is slowed due to complexities/Impairments listed. [] Progression has been slowed due to co-morbidities. [x] Plan just implemented, too soon to assess goals progression <30days   [] Goals require adjustment due to lack of progress  [] Patient is not progressing as expected and requires additional follow up with physician  [] Other     Prognosis for POC: [x] Good [] Fair  [] Poor    Patient requires continued skilled intervention: [x] Yes  [] No        PLAN:   [x] Continue per plan of care [] Alter current plan (see comments)  [] Plan of care initiated [] Hold pending MD visit [] Discharge    Electronically signed by: Jimmie Perez, PT , OMT-C,  [id number]      Note: If patient does not return for scheduled/recommended follow up visits, this note will serve as a discharge from care along with the most recent update on progress.

## 2021-01-23 DIAGNOSIS — I10 ESSENTIAL HYPERTENSION: ICD-10-CM

## 2021-01-23 DIAGNOSIS — E11.8 TYPE 2 DIABETES MELLITUS WITH COMPLICATION, WITHOUT LONG-TERM CURRENT USE OF INSULIN (HCC): ICD-10-CM

## 2021-01-25 ENCOUNTER — TELEPHONE (OUTPATIENT)
Dept: FAMILY MEDICINE CLINIC | Age: 66
End: 2021-01-25

## 2021-01-25 ENCOUNTER — VIRTUAL VISIT (OUTPATIENT)
Dept: FAMILY MEDICINE CLINIC | Age: 66
End: 2021-01-25
Payer: MEDICARE

## 2021-01-25 DIAGNOSIS — R13.10 DYSPHAGIA, UNSPECIFIED TYPE: Primary | ICD-10-CM

## 2021-01-25 DIAGNOSIS — S29.012A MUSCLE STRAIN OF RIGHT UPPER BACK, INITIAL ENCOUNTER: ICD-10-CM

## 2021-01-25 PROCEDURE — 1090F PRES/ABSN URINE INCON ASSESS: CPT | Performed by: FAMILY MEDICINE

## 2021-01-25 PROCEDURE — 4040F PNEUMOC VAC/ADMIN/RCVD: CPT | Performed by: FAMILY MEDICINE

## 2021-01-25 PROCEDURE — 1123F ACP DISCUSS/DSCN MKR DOCD: CPT | Performed by: FAMILY MEDICINE

## 2021-01-25 PROCEDURE — 99213 OFFICE O/P EST LOW 20 MIN: CPT | Performed by: FAMILY MEDICINE

## 2021-01-25 PROCEDURE — 3017F COLORECTAL CA SCREEN DOC REV: CPT | Performed by: FAMILY MEDICINE

## 2021-01-25 PROCEDURE — G8427 DOCREV CUR MEDS BY ELIG CLIN: HCPCS | Performed by: FAMILY MEDICINE

## 2021-01-25 PROCEDURE — G8399 PT W/DXA RESULTS DOCUMENT: HCPCS | Performed by: FAMILY MEDICINE

## 2021-01-25 RX ORDER — TIZANIDINE 4 MG/1
4 TABLET ORAL NIGHTLY PRN
Qty: 30 TABLET | Refills: 0 | Status: SHIPPED | OUTPATIENT
Start: 2021-01-25 | End: 2021-05-06

## 2021-01-25 RX ORDER — METOPROLOL SUCCINATE 25 MG/1
TABLET, EXTENDED RELEASE ORAL
Qty: 90 TABLET | Refills: 0 | Status: SHIPPED | OUTPATIENT
Start: 2021-01-25 | End: 2021-04-22

## 2021-01-25 NOTE — TELEPHONE ENCOUNTER
[place] from [facility] is calling to request the pts current medication list Faxed to [phone] and tel:[phone].

## 2021-01-25 NOTE — PROGRESS NOTES
1/25/2021    This is a 76 y.o. female   Chief Complaint   Patient presents with    Pharyngitis     sore only right side come and go about 10 days    Neck Injury     on right side 10 days / come and go     [facility]    Virtual visit for concern for swallowing issues  - this was discussed at prior visit a couple of weeks ago  - she feels like her food gets stuck in the R side of her throat. Sometimes feels like she has to help massage her upper neck to help the food go down. Sometimes notes this sensation when drinking liquid. Does have coughing with this. She has a prior hx of CVA    She is also experiencing some R-sided upper back pain. It sometimes radiates toward her neck. Sometimes bothers her at night when she is trying to sleep. Pain is sharp, not numb or tingling. Going on for the past 2 weeks or so.  No known inciting injury or event    Review of Systems   As per HPI, otherwise negative    Past Medical History:   Diagnosis Date    Arthritis     Cardiomyopathy ([address]     Diabetes     GERD (gastroesophageal reflux disease)     Hyperlipidemia     Hypertension     Lumbar disc disease     Sleep apnea     pt states does use a Cpap machine at night    Unspecified cerebral artery occlusion with cerebral infarction 2014    right side weak    Wears glasses        Past Surgical History:   Procedure Laterality Date    ARTHRODESIS Right 9/17/2020    (RIGHT) REMOVAL OF BONE SPURRING AND OSSEOUS PROMINENCE FIRST METATARSAL, ARTHRODESIS OF FIRST METATARSOPHALANGEAL JOINT, BONE MARROW HARVEST AND CONCENTRATION RIGHT TIBIA performed by Sadaf Olson DPM at  [facility] Left 9/3/15    CARPAL TUNNEL RELEASE Right 9/22/15    COLONOSCOPY      FINGER TRIGGER RELEASE Left 9/3/15    middle and ring fingers    FINGER TRIGGER RELEASE Right 9/22/15    middle and ring fingers    FOOT SURGERY Bilateral     litteltoe    HAND SURGERY Right     Ligament    PARTIAL HYSTERECTOMY  08/2003    SHOULDER ARTHROSCOPY Right 06/20/2018    Diagnostic scope, rcr, open [place]       Family History   Problem Relation Age of Onset    Heart Disease Mother     High Blood Pressure Mother     Stroke Mother     Cancer Brother         lung cancer       Current Outpatient Medications   Medication Sig Dispense Refill    tiZANidine (ZANAFLEX) 4 MG tablet Take 1 tablet by mouth nightly as needed (back pain) 30 tablet 0    blood glucose test strips (ONETOUCH ULTRA) strip TEST TWO TIMES A  strip 4    Nebulizers (COMPRESSOR/NEBULIZER) MISC Use to take medication Q4H PRN 1 each 3    ipratropium-albuterol (DUONEB) 0.5-2.5 (3) MG/3ML SOLN nebulizer solution Inhale 3 mLs into the lungs every 4 hours as needed for Shortness of Breath (wheezing coughing) 360 mL 5    Respiratory Therapy Supplies (NEBULIZER) MAXIMILIANO Used to give yourself breathing treatment 1 Device 0    gabapentin (NEURONTIN) 300 MG capsule TAKE ONE CAPSULE BY MOUTH ONCE NIGHTLY 90 capsule 0    Dulaglutide (TRULICITY) 1.5 OJ/9.1KW SOPN INJECT 1.5 MG UNDER THE SKIN ONCE WEEKLY *APPOINTMENT NEEDED* 2 mL 1    omeprazole (PRILOSEC) 40 MG delayed release capsule TAKE ONE CAPSULE BY MOUTH TWICE A DAY BEFORE MEALS 180 capsule 0    valsartan (DIOVAN) 80 MG tablet TAKE ONE TABLET BY MOUTH DAILY 90 tablet 0    furosemide (LASIX) 20 MG tablet TAKE ONE TABLET BY MOUTH DAILY AS NEEDED FOR LEG SWELLING 90 tablet 0    atorvastatin (LIPITOR) 20 MG tablet TAKE ONE TABLET BY MOUTH DAILY 90 tablet 0    metoprolol succinate (TOPROL XL) 25 MG extended release tablet TAKE ONE TABLET BY MOUTH DAILY FOR BLOOD PRESSURE AND HEART RATE 90 tablet 0    metFORMIN (GLUCOPHAGE) 500 MG tablet TAKE TWO TABLETS BY MOUTH TWICE A DAY WITH MEALS 360 tablet 0    ibuprofen (ADVIL;MOTRIN) 800 MG tablet Take 1 tablet by mouth 2 times daily as needed for Pain 60 tablet 5    insulin glargine (BASAGLAR KWIKPEN) 100 UNIT/ML injection pen Inject 14 Units into the skin nightly 4 pen 5    Insulin Pen Needle (PEN NEEDLES) 31G X 6 MM MISC USE TO INJECT INSULIN DAILY 100 each 2    gabapentin (NEURONTIN) 100 MG capsule TAKE ONE CAPSULE BY MOUTH EVERY MORNING 90 capsule 2    DULoxetine (CYMBALTA) 60 MG extended release capsule Take 60 mg by mouth daily      traZODone (DESYREL) 100 MG tablet Take 100 mg by mouth nightly      vitamin C (ASCORBIC ACID) 500 MG tablet Take 500 mg by mouth daily      trimethoprim-polymyxin b (POLYTRIM) 32867-0.1 UNIT/ML-% ophthalmic solution 1 drop every 4 hours as needed (redness)       blood glucose test strips (FREESTYLE LITE) strip TEST BLOOD SUGAR TWO TIMES A DAY Diag: E11.9 100 each 4    Blood Glucose Monitoring Suppl (TRUE METRIX AIR GLUCOSE METER) w/Device KIT 1 each by Does not apply route daily 1 kit 0    Tens Unit MISC by Does not apply route 1 each 0    Misc. Devices (WALKER) MISC One four wheel walker 1 each 0    FREESTYLE LANCETS MISC USE ONE LANCET TO TEST BLOOD SUGAR TWICE A  each 3    FANAPT 1 MG TABS tablet Take 1 mg by mouth nightly       aspirin 81 MG EC tablet Take 1 tablet by mouth daily. 60 tablet 3    albuterol sulfate HFA (PROVENTIL HFA) 108 (90 Base) MCG/ACT inhaler Inhale 2 puffs into the lungs every 4 hours as needed for Wheezing or Shortness of Breath May substitute ProAir MDI (Patient not taking: Reported on 1/25/2021) 1 Inhaler 5    ammonium lactate (LAC-HYDRIN) 12 % lotion Apply topically daily. (Patient not taking: Reported on 1/25/2021) 1 Bottle 1     No current facility-administered medications for this visit. There were no vitals taken for this visit. Physical Exam    Wt Readings from Last 3 Encounters:   01/14/21 189 lb (85.7 kg)   01/13/21 213 lb (96.6 kg)   01/05/21 198 lb (89.8 kg)     BP Readings from Last 3 Encounters:   01/13/21 132/84   01/05/21 (!) 129/98   09/17/20 108/69       Assessment/Plan:  1.  Dysphagia, unspecified type  She had difficulty scheduling the swallow study so we will assist her with this  - SLP video swallow; Future    2. Muscle strain of right upper back, initial encounter  Denies neuropathic pain. We will use a muscle relaxer at night and discussed potential sedative SEs. Advised applying heat and gentle stretching. If not improving next diabetes f/u we can do an exam to further eval  - tiZANidine (ZANAFLEX) 4 MG tablet; Take 1 tablet by mouth nightly as needed (back pain)  Dispense: 30 tablet; Refill: 0    F/u 3 months diabetes    Rox Agarwal is a 76 y.o. female being evaluated by a Virtual Visit (video visit) encounter to address concerns as mentioned above. A caregiver was present when appropriate. Due to this being a TeleHealth encounter (During IOCSB-02 public health emergency), evaluation of the following organ systems was limited: Vitals/Constitutional/EENT/Resp/CV/GI//MS/Neuro/Skin/Heme-Lymph-Imm. Pursuant to the emergency declaration under the [address] [place] [address] and the [employer] Act, this Virtual Visit was conducted with patient's (and/or legal guardian's) consent, to reduce the patient's risk of exposure to COVID-19 and provide necessary medical care. The patient (and/or legal guardian) has also been advised to contact this office for worsening conditions or problems, and seek emergency medical treatment and/or call 911 if deemed necessary. Patient identification was verified at the start of the visit: yes    Total time spent for this encounter: not billed on time    Services were provided through a video synchronous discussion virtually to substitute for in-person clinic visit. Patient and provider were located at their individual homes. --Carl Garza MD on 1/25/2021 at 8:54 AM    An electronic signature was used to authenticate this note.

## 2021-01-26 ENCOUNTER — TELEPHONE (OUTPATIENT)
Dept: FAMILY MEDICINE CLINIC | Age: 66
End: 2021-01-26

## 2021-01-26 ENCOUNTER — HOSPITAL ENCOUNTER (OUTPATIENT)
Dept: PHYSICAL THERAPY | Age: 66
Setting detail: THERAPIES SERIES
Discharge: HOME | End: 2021-01-26
Payer: MEDICARE

## 2021-01-26 PROCEDURE — 97530 THERAPEUTIC ACTIVITIES: CPT

## 2021-01-26 PROCEDURE — 97140 MANUAL THERAPY 1/> REGIONS: CPT

## 2021-01-26 PROCEDURE — 97112 NEUROMUSCULAR REEDUCATION: CPT

## 2021-01-26 PROCEDURE — 97110 THERAPEUTIC EXERCISES: CPT

## 2021-01-26 NOTE — FLOWSHEET NOTE
[facility]  Outpatient Rehabilitation and Therapy, [facility]  [address] [facility], [facility]  Phone: [phone]   Fax:     [phone]      Physical Therapy Treatment Note/ Progress Report:     Date:  2021    Patient Name:  Massiel Hart    :  1955  MRN: [id number]    Medical/Treatment Diagnosis Information:  · Dx - Post first MTJ fusion  ·  Decreased functional mobility 2/2 right foot pain    Insurance/Certification information:   [facility] medicare  Physician Information:  Referring Practitioner: Shimon Victor DPM  Plan of care signed (Y/N): inbox    Date of Patient follow up with Physician:      Progress Report: []  Yes  [x]  No     Date Range for reporting period:  Beginnin2021  Ending:      Progress report due (10 Rx/or 30 days whichever is less):     Recertification due (POC duration/ or 90 days whichever is less):21    Visit # POC/Insurance Allowable Auth Needed   8 Bomn []Yes   []No     Latex Allergy:  [x]NO      []YES  Preferred Language for Healthcare:   [x]English       []Other:    SUBJECTIVE:  History of foot pain. 2020 Right foot removal of bone spurring and osseous prominence first metatarsal, arthrodesis of first mtp, bone marrow harvest and concentration right tibia     : Tolerated last appt well  21  Resuming Rx after illness. Did not have Covid. At home walking with house shoe. Outdoors using boot. F/U with MD 21.  21 Saw Dr. Tima Pedroza re: R hip received cortisone injection. Saw Dr. Guille Albert , D/C boot. resume activities/ driving as tolerated. To PT with shoes and st cane.     - off boot 8 days - to PT - cane too high lowered appropriately  :  Hurting today secondary to hitting foot on something.        Relevant Medical History: arthritis, DM, HLD, HTN, CVA , cardiomyopathy, GERD, l umbar disc disease, sleep apnea, right shoulder RCR, gurmeet CTS, left and right trigger finger release.        Functional Scale/Score: LEFS =  15  80-99%  Disability        Pain Scale  0-1/10  Pressure, no pain    Easing factors:  Rest ice  Provocative factors:   Walking, activity                                                                          eval 12/21  Ankle PF   38a/40p   Ankle DF   5a/8p   Ankle In   10a/p   Ankle Ev     First mtp            To neutral a/p     No arom    PROM - 10 extension    To neutral flexion     Ankle DF   3+/5   Ankle PF   3+/5   Ankle Inv   3+/5   Ankle EV   3+/5           Circumference  MTP  Mid lat malleolus       24 cm  29 cm       24.5 cm  30 cm         Exercises/Interventions:     Therapeutic Ex (77983)   Min:   15 Reps/Resistance Notes/CUES   Nustep  Seat 7 UE 7 L-0 x 5 min   In shoes   GSS incline 30 sec x 2    Heel raises standing     10x      Rocker brd  F/b s/s x1 min ea    BAPS 4-way ADD          Seated   HR/TR 20x each  gurmeet    Seated toe DIP extension 10x    Seated ankle tband 4-way Lime green 10x ea R         Therapeutic Activity (77982) Min:   5     Tandem gait 2 laps no hand hold    Step ups fwds 6\" 10x R         NMR re-education (09324)  Min:   8  CUES NEEDED   Gumdrop: ROM standing                    balance 4 way x 10  R  x1 min    NBOS balance w/ EC x1 min    Tandem balance x1 min LR    AirEx:  *NBOS    x1 min         Manual Intervention (40655) Min: 10     MT/ DIP mobs All MT  joints    PROM first toe  PROM/AAROM all toes 20x  20x    Gentle effleurage right foot 5 min              Modalities  Min:          Cryocuff after exercises cryocuff ankle/foot x10 min With pillowcase               Other Therapeutic Activities: Pt was educated on PT POC, Diagnosis, Prognosis, pathomechanics as well as frequency and duration of scheduling future physical therapy appointments. Time was also taken on this day to answer all patient questions and participation in PT. Reviewed appointment policy in detail with patient and patient verbalized understanding. Home Exercise Program: Patient was instructed in the following for HEP:     . Patient verbalized/demonstrated understanding and was issued written handout. Therapeutic Exercise and NMR EXR  [x] (60445) Provided verbal/tactile cueing for activities related to strengthening, flexibility, endurance, ROM for improvements in LE, proximal hip, and core control with self care, mobility, lifting, ambulation. [x] (17871) Provided verbal/tactile cueing for activities related to improving balance, coordination, kinesthetic sense, posture, motor skill, proprioception  to assist with LE, proximal hip, and core control in self care, mobility, lifting, ambulation and eccentric single leg control.  [address] and Therapeutic Activities:    [x] (79972 or 67645) Provided verbal/tactile cueing for activities related to improving balance, coordination, kinesthetic sense, posture, motor skill, proprioception and motor activation to allow for proper function of core, proximal hip and LE with self care and ADLs and functional mobility.   [] (73464) Gait Re-education- Provided training and instruction to the patient for proper LE, core and proximal hip recruitment and positioning and eccentric body weight control with ambulation re-education including up and down stairs     Home Exercise Program:    [x] (08766) Reviewed/Progressed HEP activities related to strengthening, flexibility, endurance, ROM of core, proximal hip and LE for functional self-care, mobility, lifting and ambulation/stair navigation   [x] (76516)Reviewed/Progressed HEP activities related to improving balance, coordination, kinesthetic sense, posture, motor skill, proprioception of core, proximal hip and LE for self care, mobility, lifting, and ambulation/stair navigation      Manual Treatments:  PROM / STM / Oscillations-Mobs:  G-I, II, III, IV (PA's, Inf., Post.)  [x] (22874) Provided manual therapy to mobilize LE, proximal hip and/or LS spine soft tissue/joints for the purpose of modulating pain, promoting relaxation,  increasing ROM, reducing/eliminating soft tissue swelling/inflammation/restriction, improving soft tissue extensibility and allowing for proper ROM for normal function with self care, mobility, lifting and ambulation. Charges:  Timed Code Treatment Minutes: 38   Total Treatment Minutes: 48      [] EVAL (LOW) 51923 (typically 20 minutes face-to-face)  [] EVAL (MOD) 52192 (typically 30 minutes face-to-face)  [] EVAL (HIGH) 39332 (typically 45 minutes face-to-face)  [] RE-EVAL     [x] JU(30088) x   1  [] Dry needle 1 or 2 Muscles (80714)  [x] NMR (83289) x    [] Dry needle 3+ Muscles (47235)  [x] Manual (25378) x   1  [] Ultrasound (68340) x  [] TA (77325) x   1  [] Mech Traction (96008)  [] ES(attended) (33357)     [] ES (un) (23687):   [] Vasopump (21676) [] Ionto (54193)   [] Other:      GOALS:  Patient stated goal: get back to driving  []? Progressing: []? Met: []? Not Met: []? Adjusted     Therapist goals for Patient:   Short Term Goals: To be achieved in: 2 weeks  1. Independent in HEP and progression per patient tolerance, in order to prevent re-injury. [x]? Progressing: []? Met: []? Not Met: []? Adjusted  2. Patient will have a decrease in pain to facilitate improvement in movement, function, and ADLs as indicated by Functional Deficits. [x]? Progressing: []? Met: []? Not Met: []? Adjusted     Long Term Goals: To be achieved in:  weeks  1. Disability index score of  20% or less for the LEFS to assist with reaching prior level of function. [x]? Progressing: []? Met: []? Not Met: []? Adjusted  2. Patient will demonstrate increased AROM to  to allow for proper joint functioning as indicated by patients Functional Deficits. [x]? Progressing: []? Met: []? Not Met: []? Adjusted  3.  Patient will demonstrate an increase in Strength to good proximal hip strength and control, within 5lb HHD in LE to allow for proper functional mobility as indicated by patients Functional Deficits. [x]? Progressing: []? Met: []? Not Met: []? Adjusted  4. Patient will return to  functional activities without increased symptoms or restriction. [x]? Progressing: []? Met: []? Not Met: []? Adjusted  5. To be able to get around without the walker   [x]? Progressing: []? Met: []? Not Met: []? Adjusted            ASSESSMENT: Pt post op first MTJ fusion - demonstrates deficits in functional mobility, ROM, strength and gait    Treatment/Activity Tolerance:  [x] Patient tolerated treatment well [] Patient limited by fatique  [] Patient limited by pain  [] Patient limited by other medical complications  [] Other:     Overall Progression Towards Functional goals/ Treatment Progress Update:  [] Patient is progressing as expected towards functional goals listed. [] Progression is slowed due to complexities/Impairments listed. [] Progression has been slowed due to co-morbidities. [x] Plan just implemented, too soon to assess goals progression <30days   [] Goals require adjustment due to lack of progress  [] Patient is not progressing as expected and requires additional follow up with physician  [] Other     Prognosis for POC: [x] Good [] Fair  [] Poor    Patient requires continued skilled intervention: [x] Yes  [] No        PLAN:   [x] Continue per plan of care [] Alter current plan (see comments)  [] Plan of care initiated [] Hold pending MD visit [] Discharge    Electronically signed by: Jefe Duffy PT , DPT, MS  [id number]      Note: If patient does not return for scheduled/recommended follow up visits, this note will serve as a discharge from care along with the most recent update on progress.

## 2021-01-26 NOTE — TELEPHONE ENCOUNTER
Alicia called from [facility] stating that this pt needs a new nebulizer and a prescription needs to be faxed over to:    [employer] (supplier)  Fax - [phone]  Phone - [phone]    Please Advise

## 2021-01-27 ENCOUNTER — TELEPHONE (OUTPATIENT)
Dept: FAMILY MEDICINE CLINIC | Age: 66
End: 2021-01-27

## 2021-01-27 DIAGNOSIS — E78.2 MIXED HYPERLIPIDEMIA: ICD-10-CM

## 2021-01-27 RX ORDER — ATORVASTATIN CALCIUM 20 MG/1
TABLET, FILM COATED ORAL
Qty: 90 TABLET | Refills: 0 | Status: SHIPPED | OUTPATIENT
Start: 2021-01-27 | End: 2021-04-26

## 2021-01-27 NOTE — TELEPHONE ENCOUNTER
Filemon Akbar called from [facility] stating she received a prescription for a nebuilzer with tubing & mouth piece for this pt    She still needs documents about the most recent face to face visit and demographic information including insurance    Please fax to [phone]    Please Advise

## 2021-01-28 ENCOUNTER — HOSPITAL ENCOUNTER (OUTPATIENT)
Dept: PHYSICAL THERAPY | Age: 66
Setting detail: THERAPIES SERIES
Discharge: HOME | End: 2021-01-28
Payer: MEDICARE

## 2021-01-28 PROCEDURE — 97110 THERAPEUTIC EXERCISES: CPT

## 2021-01-28 PROCEDURE — 97140 MANUAL THERAPY 1/> REGIONS: CPT

## 2021-01-28 PROCEDURE — 97112 NEUROMUSCULAR REEDUCATION: CPT

## 2021-01-28 NOTE — FLOWSHEET NOTE
[facility]  Outpatient Rehabilitation and Therapy, [place]  [address] [facility]  Phone: [phone]   Fax:     [phone]      Physical Therapy Treatment Note/ Progress Report:     Date:  2021    Patient Name:  Noam Calvillo    :  1955  MRN: [id number]    Medical/Treatment Diagnosis Information:  · Dx - Post first MTJ fusion  ·  Decreased functional mobility 2/2 right foot pain    Insurance/Certification information:   [facility] medicare  Physician Information:  Referring Practitioner: Amor Luciano DPM  Plan of care signed (Y/N): inbox    Date of Patient follow up with Physician:      Progress Report: []  Yes  [x]  No     Date Range for reporting period:  Beginnin2021  Ending:      Progress report due (10 Rx/or 30 days whichever is less): 09    Recertification due (POC duration/ or 90 days whichever is less):21    Visit # POC/Insurance Allowable Auth Needed   9 Bomn []Yes   []No     Latex Allergy:  [x]NO      []YES  Preferred Language for Healthcare:   [x]English       []Other:    SUBJECTIVE:  History of foot pain. 2020 Right foot removal of bone spurring and osseous prominence first metatarsal, arthrodesis of first mtp, bone marrow harvest and concentration right tibia     : Tolerated last appt well  21  Resuming Rx after illness. Did not have Covid. At home walking with house shoe. Outdoors using boot. F/U with MD 21.  21 Saw Dr. Derrek He re: R hip received cortisone injection. Saw Dr. Paula Avendaño , D/C boot. resume activities/ driving as tolerated. To PT with shoes and st cane.  - off boot 8 days - to PT - cane too high lowered appropriately  :  Hurting today secondary to hitting foot on something.   : stiff and sore today.   Thinks it's because of the cold weather.        Relevant Medical History: arthritis, DM, HLD, HTN, CVA , cardiomyopathy, GERD, l umbar disc disease, sleep apnea, right shoulder RCR, gurmeet CTS, left and right trigger finger release.        Functional Scale/Score: LEFS =  15  80-99%  Disability        Pain Scale  3-4/10     Easing factors:  Rest ice  Provocative factors:   Walking, activity                                                                          eval 12/21  Ankle PF   38a/40p   Ankle DF   5a/8p   Ankle In   10a/p   Ankle Ev     First mtp            To neutral a/p     No arom    PROM - 10 extension    To neutral flexion     Ankle DF   3+/5   Ankle PF   3+/5   Ankle Inv   3+/5   Ankle EV   3+/5           Circumference  MTP  Mid lat malleolus       24 cm  29 cm       24.5 cm  30 cm         Exercises/Interventions:     Therapeutic Ex (47197)   Min:   15 Reps/Resistance Notes/CUES   Nustep  Seat 7 UE 7 L-0 x 5 min   In shoes   GSS incline 30 sec x 2    Heel raises standing     10x      Rocker brd  F/b s/s x1 min ea    BAPS 4-way ADD          Seated   HR/TR 20x each  gurmeet    Seated toe DIP extension 10x    Seated ankle tband 4-way Lime green 10x ea R         Therapeutic Activity (70922) Min:   5     Tandem gait 2 laps no hand hold    Step ups fwds 6\" 10x R         NMR re-education (43515)  Min:   10  CUES NEEDED   Gumdrop: ROM standing                    balance 4 way x 10  R  x1 min    NBOS balance w/ EC x1 min    Tandem balance x1 min LR    AirEx:  *NBOS w/EC  *step up/down on   x1 min  10x R    GT flexion isometrics 10x 5 sec holds         Manual Intervention (07088) Min: 10     MT/ DIP mobs All MT  joints    PROM first toe  PROM/AAROM all toes 20x  20x    Gentle effleurage right foot 5 min              Modalities  Min:          Cryocuff after exercises cryocuff ankle/foot x10 min With pillowcase               Other Therapeutic Activities: Pt was educated on PT POC, Diagnosis, Prognosis, pathomechanics as well as frequency and duration of scheduling future physical therapy appointments.  Time was also taken on this day to answer all patient questions and participation in PT. Reviewed appointment policy in detail with patient and patient verbalized understanding. Home Exercise Program: Patient was instructed in the following for HEP:     . Patient verbalized/demonstrated understanding and was issued written handout. Therapeutic Exercise and NMR EXR  [x] (36949) Provided verbal/tactile cueing for activities related to strengthening, flexibility, endurance, ROM for improvements in LE, proximal hip, and core control with self care, mobility, lifting, ambulation. [x] (51841) Provided verbal/tactile cueing for activities related to improving balance, coordination, kinesthetic sense, posture, motor skill, proprioception  to assist with LE, proximal hip, and core control in self care, mobility, lifting, ambulation and eccentric single leg control.  [address] and Therapeutic Activities:    [x] (31699 or 18041) Provided verbal/tactile cueing for activities related to improving balance, coordination, kinesthetic sense, posture, motor skill, proprioception and motor activation to allow for proper function of core, proximal hip and LE with self care and ADLs and functional mobility.   [] (94030) Gait Re-education- Provided training and instruction to the patient for proper LE, core and proximal hip recruitment and positioning and eccentric body weight control with ambulation re-education including up and down stairs     Home Exercise Program:    [x] (06112) Reviewed/Progressed HEP activities related to strengthening, flexibility, endurance, ROM of core, proximal hip and LE for functional self-care, mobility, lifting and ambulation/stair navigation   [x] (33871)Reviewed/Progressed HEP activities related to improving balance, coordination, kinesthetic sense, posture, motor skill, proprioception of core, proximal hip and LE for self care, mobility, lifting, and ambulation/stair navigation      Manual Treatments:  PROM / STM / Oscillations-Mobs:  G-I, II, III, IV (Zeke, Inf., Post.)  [x] (49377) Provided manual therapy to mobilize LE, proximal hip and/or LS spine soft tissue/joints for the purpose of modulating pain, promoting relaxation,  increasing ROM, reducing/eliminating soft tissue swelling/inflammation/restriction, improving soft tissue extensibility and allowing for proper ROM for normal function with self care, mobility, lifting and ambulation. Charges:  Timed Code Treatment Minutes: 40   Total Treatment Minutes: 50      [] EVAL (LOW) 85775 (typically 20 minutes face-to-face)  [] EVAL (MOD) 27854 (typically 30 minutes face-to-face)  [] EVAL (HIGH) 62979 (typically 45 minutes face-to-face)  [] RE-EVAL     [x] IN(78993) x   1  [] Dry needle 1 or 2 Muscles (66231)  [x] NMR (28052) x    [] Dry needle 3+ Muscles (34911)  [x] Manual (53881) x   1  [] Ultrasound (87049) x  [] TA (74849) x   1  [] Mech Traction (36376)  [] ES(attended) (47993)     [] ES (un) (94451):   [] Vasopump (81923) [] Ionto (67428)   [] Other:      GOALS:  Patient stated goal: get back to driving  []? Progressing: []? Met: []? Not Met: []? Adjusted     Therapist goals for Patient:   Short Term Goals: To be achieved in: 2 weeks  1. Independent in HEP and progression per patient tolerance, in order to prevent re-injury. [x]? Progressing: []? Met: []? Not Met: []? Adjusted  2. Patient will have a decrease in pain to facilitate improvement in movement, function, and ADLs as indicated by Functional Deficits. [x]? Progressing: []? Met: []? Not Met: []? Adjusted     Long Term Goals: To be achieved in:  weeks  1. Disability index score of  20% or less for the LEFS to assist with reaching prior level of function. [x]? Progressing: []? Met: []? Not Met: []? Adjusted  2. Patient will demonstrate increased AROM to  to allow for proper joint functioning as indicated by patients Functional Deficits. [x]? Progressing: []? Met: []? Not Met: []? Adjusted  3.  Patient will demonstrate an increase in Strength to good proximal hip strength and control, within 5lb HHD in LE to allow for proper functional mobility as indicated by patients Functional Deficits. [x]? Progressing: []? Met: []? Not Met: []? Adjusted  4. Patient will return to  functional activities without increased symptoms or restriction. [x]? Progressing: []? Met: []? Not Met: []? Adjusted  5. To be able to get around without the walker   [x]? Progressing: []? Met: []? Not Met: []? Adjusted            ASSESSMENT: Pt post op first MTJ fusion - demonstrates deficits in functional mobility, ROM, strength and gait    Treatment/Activity Tolerance:  [x] Patient tolerated treatment well [] Patient limited by fatique  [] Patient limited by pain  [] Patient limited by other medical complications  [] Other:     Overall Progression Towards Functional goals/ Treatment Progress Update:  [] Patient is progressing as expected towards functional goals listed. [] Progression is slowed due to complexities/Impairments listed. [] Progression has been slowed due to co-morbidities. [x] Plan just implemented, too soon to assess goals progression <30days   [] Goals require adjustment due to lack of progress  [] Patient is not progressing as expected and requires additional follow up with physician  [] Other     Prognosis for POC: [x] Good [] Fair  [] Poor    Patient requires continued skilled intervention: [x] Yes  [] No        PLAN:   [x] Continue per plan of care [] Alter current plan (see comments)  [] Plan of care initiated [] Hold pending MD visit [] Discharge    Electronically signed by: Dorina Mendosa, PT , OMT-C,  [id number]      Note: If patient does not return for scheduled/recommended follow up visits, this note will serve as a discharge from care along with the most recent update on progress.

## 2021-01-29 DIAGNOSIS — E11.8 TYPE 2 DIABETES MELLITUS WITH COMPLICATION, WITHOUT LONG-TERM CURRENT USE OF INSULIN (HCC): ICD-10-CM

## 2021-01-29 RX ORDER — DULAGLUTIDE 1.5 MG/.5ML
INJECTION, SOLUTION SUBCUTANEOUS
Qty: 2 ML | Refills: 3 | Status: SHIPPED | OUTPATIENT
Start: 2021-01-29 | End: 2021-05-19 | Stop reason: SDUPTHER

## 2021-02-02 ENCOUNTER — HOSPITAL ENCOUNTER (OUTPATIENT)
Dept: PHYSICAL THERAPY | Age: 66
Setting detail: THERAPIES SERIES
Discharge: HOME | End: 2021-02-02
Payer: MEDICARE

## 2021-02-02 PROCEDURE — 97140 MANUAL THERAPY 1/> REGIONS: CPT

## 2021-02-02 NOTE — PROGRESS NOTES
[facility]  Outpatient Rehabilitation and Therapy, [facility]  [address] [facility], [facility]  Phone: [phone]   Fax:     [phone]      Physical Therapy Treatment Note/ Progress Report:     Date:  2021    Patient Name:  Bety Gray    :  1955  MRN: [id number]    Medical/Treatment Diagnosis Information:  · Dx - Post first MTJ fusion  ·  Decreased functional mobility  right foot pain    Insurance/Certification information:   [place] medicare  Physician Information:  Referring Practitioner: Kelly Carr DPM  Plan of care signed (Y/N):sent at [facility] - sent again     Date of Patient follow up with Physician:      Progress Report: [x]  Yes  []  No     Date Range for reporting period:  Beginnin2021 -21  Ending:      Progress report due (10 Rx/or 30 days whichever is less):     Recertification due (POC duration/ or 90 days whichever is less):21    Visit # POC/Insurance Allowable Auth Needed   10 Bomn []Yes   []No     Latex Allergy:  [x]NO      []YES  Preferred Language for Healthcare:   [x]English       []Other:    SUBJECTIVE:  History of foot pain. 2020 Right foot removal of bone spurring and osseous prominence first metatarsal, arthrodesis of first mtp, bone marrow harvest and concentration right tibia     : Tolerated last appt well  21  Resuming Rx after illness. Did not have Covid. At home walking with house shoe. Outdoors using boot. F/U with MD 21.  21 Saw Dr. Karen Rea re: R hip received cortisone injection. Saw Dr. Lily Shah , D/C boot. resume activities/ driving as tolerated. To PT with shoes and st cane.  - off boot 8 days - to PT - cane too high lowered appropriately  :  Hurting today secondary to hitting foot on something.   : stiff and sore today. Thinks it's because of the cold weather.     - came limping today - no AD, forgot it,  New onset sudden pain yesterday, uncertain cause, although she notes hitting brake hard on Sunday.     Relevant Medical History: arthritis, DM, HLD, HTN, CVA 2014, cardiomyopathy, GERD, l umbar disc disease, sleep apnea, right shoulder RCR, gurmeet CTS, left and right trigger finger release.      Functional Scale/Score: LEFS =  15  80-99%  Disability   Functional Scale   LEFs  =      28   60-79% Disability  - Function prior to new onset pain yesterday     Pain Scale  7-8/10 new onset pain yesterday - not sure if due to weather, or hitting brake hard on Sunday    Easing factors:  Rest ice  Provocative factors:   Walking, activity                                                                          eval 12/21               Ankle PF   38a/40p   Ankle DF   5a/8p   Ankle In   10a/p   Ankle Ev     First mtp            To neutral a/p     No arom    PROM - 10 extension    To neutral flexion     Ankle DF   3+/5   Ankle PF   3+/5   Ankle Inv   3+/5   Ankle EV   3+/5           Circumference  MTP  Mid lat malleolus       24 cm  29 cm       24.5 cm  30 cm         Exercises/Interventions:  2/2 No exercise 2/2 new onset pain - resume as prosper    Therapeutic Ex (17766)   Min:   15 Reps/Resistance Notes/CUES   Manual Intervention (17955) Min: 25     MT/ DIP mobs All MT  joints    PROM first toe  PROM/AAROM all toes 20x  20x    Gentle effleurage right foot 15              Modalities  Min:          Cryocuff after exercises cryocuff ankle/foot x15 min With pillowcase               Other Therapeutic Activities: Pt was educated on PT POC, Diagnosis, Prognosis, pathomechanics as well as frequency and duration of scheduling future physical therapy appointments. Time was also taken on this day to answer all patient questions and participation in PT. Reviewed appointment policy in detail with patient and patient verbalized understanding.      Home Exercise Program: Patient was instructed in the following for HEP:     . Patient verbalized/demonstrated understanding and was issued written handout. Therapeutic Exercise and NMR EXR  [x] (37417) Provided verbal/tactile cueing for activities related to strengthening, flexibility, endurance, ROM for improvements in LE, proximal hip, and core control with self care, mobility, lifting, ambulation. [x] (65178) Provided verbal/tactile cueing for activities related to improving balance, coordination, kinesthetic sense, posture, motor skill, proprioception  to assist with LE, proximal hip, and core control in self care, mobility, lifting, ambulation and eccentric single leg control.  [address] and Therapeutic Activities:    [x] (40895 or 19388) Provided verbal/tactile cueing for activities related to improving balance, coordination, kinesthetic sense, posture, motor skill, proprioception and motor activation to allow for proper function of core, proximal hip and LE with self care and ADLs and functional mobility.   [] (08648) Gait Re-education- Provided training and instruction to the patient for proper LE, core and proximal hip recruitment and positioning and eccentric body weight control with ambulation re-education including up and down stairs     Home Exercise Program:    [x] (94442) Reviewed/Progressed HEP activities related to strengthening, flexibility, endurance, ROM of core, proximal hip and LE for functional self-care, mobility, lifting and ambulation/stair navigation   [x] (17814)Reviewed/Progressed HEP activities related to improving balance, coordination, kinesthetic sense, posture, motor skill, proprioception of core, proximal hip and LE for self care, mobility, lifting, and ambulation/stair navigation      Manual Treatments:  PROM / STM / Oscillations-Mobs:  G-I, II, III, IV (PA's, Inf., Post.)  [x] (12362) Provided manual therapy to mobilize LE, proximal hip and/or LS spine soft tissue/joints for the purpose of modulating pain, promoting relaxation,  increasing ROM, reducing/eliminating soft tissue swelling/inflammation/restriction, improving soft tissue extensibility and allowing for proper ROM for normal function with self care, mobility, lifting and ambulation. Charges:  Timed Code Treatment Minutes: 25   Total Treatment Minutes: 40      [] EVAL (LOW) 00125 (typically 20 minutes face-to-face)  [] EVAL (MOD) 20363 (typically 30 minutes face-to-face)  [] EVAL (HIGH) 17330 (typically 45 minutes face-to-face)  [] RE-EVAL     [] MG(72175) x    [] Dry needle 1 or 2 Muscles (21485)  [] NMR (24775) x    [] Dry needle 3+ Muscles (82480)  [x] Manual (61584) x   2 [] Ultrasound (75650) x  [] TA (72719) x   1  [] Mech Traction (49348)  [] ES(attended) (61218)     [] ES (un) (68270):   [] Vasopump (75678) [] Ionto (81181)   [] Other:      GOALS:  Patient stated goal: get back to driving  []? Progressing: []? Met: []? Not Met: []? Adjusted     Therapist goals for Patient:   Short Term Goals: To be achieved in: 2 weeks  1. Independent in HEP and progression per patient tolerance, in order to prevent re-injury. [x]? Progressing: []? Met: []? Not Met: []? Adjusted  2. Patient will have a decrease in pain to facilitate improvement in movement, function, and ADLs as indicated by Functional Deficits. [x]? Progressing: []? Met: []? Not Met: []? Adjusted     Long Term Goals: To be achieved in:  weeks  1. Disability index score of  20% or less for the LEFS to assist with reaching prior level of function. [x]? Progressing: []? Met: []? Not Met: []? Adjusted  2. Patient will demonstrate increased AROM to  to allow for proper joint functioning as indicated by patients Functional Deficits. [x]? Progressing: []? Met: []? Not Met: []? Adjusted  3. Patient will demonstrate an increase in Strength to good proximal hip strength and control, within 5lb HHD in LE to allow for proper functional mobility as indicated by patients Functional Deficits. [x]? Progressing: []? Met: []? Not Met: []? Adjusted  4.  Patient will return to functional activities without increased symptoms or restriction. [x]? Progressing: []? Met: []? Not Met: []? Adjusted  5. To be able to get around without the walker   [x]? Progressing: []? Met: []? Not Met: []? Adjusted             ASSESSMENT: Pt post op first MTJ fusion - demonstrates deficits in functional mobility, ROM, strength and gait    Treatment/Activity Tolerance:  [] Patient tolerated treatment well [] Patient limited by fatique  [x] Patient limited by pain  [] Patient limited by other medical complications  [] Other:  Felt better after STM/ PROM/ ice - Walked pt to car with walker - told her to use to offload for the next few days. Overall Progression Towards Functional goals/ Treatment Progress Update:  [x] Patient is progressing as expected towards functional goals listed. [] Progression is slowed due to complexities/Impairments listed. [] Progression has been slowed due to co-morbidities. [] Plan just implemented, too soon to assess goals progression <30days   [] Goals require adjustment due to lack of progress  [] Patient is not progressing as expected and requires additional follow up with physician  [] Other     Prognosis for POC: [x] Good [] Fair  [] Poor    Patient requires continued skilled intervention: [x] Yes  [] No        PLAN: Take msmts for progress note next week when pt feels better. [x] Continue per plan of care [] Alter current plan (see comments)  [] Plan of care initiated [] Hold pending MD visit [] Discharge    Electronically signed by: Charlie Richards, PT , DPT, MS  [id number]      Note: If patient does not return for scheduled/recommended follow up visits, this note will serve as a discharge from care along with the most recent update on progress.

## 2021-02-04 ENCOUNTER — TELEPHONE (OUTPATIENT)
Dept: FAMILY MEDICINE CLINIC | Age: 66
End: 2021-02-04

## 2021-02-04 NOTE — TELEPHONE ENCOUNTER
Pt called in was last seen on 1/25/21 was told Dr Day would order a swallow procedure there is nothing in the chart     Please call and advise

## 2021-02-04 NOTE — TELEPHONE ENCOUNTER
This has been ordered  Called Therapy department and spoke to [facility] who will assist with scheduling pt for procedure  Pt given number to call [phone]

## 2021-02-05 ENCOUNTER — HOSPITAL ENCOUNTER (OUTPATIENT)
Dept: PHYSICAL THERAPY | Age: 66
Setting detail: THERAPIES SERIES
Discharge: HOME | End: 2021-02-05
Payer: MEDICARE

## 2021-02-05 PROCEDURE — 97112 NEUROMUSCULAR REEDUCATION: CPT

## 2021-02-05 PROCEDURE — 97140 MANUAL THERAPY 1/> REGIONS: CPT

## 2021-02-05 PROCEDURE — 97110 THERAPEUTIC EXERCISES: CPT

## 2021-02-05 NOTE — FLOWSHEET NOTE
[facility]  Outpatient Rehabilitation and Therapy, [facility]  [address] [facility], [facility]  Phone: [phone]   Fax:     [phone]      Physical Therapy Treatment Note/ Progress Report:     Date:  2021    Patient Name:  Carina Godfrey    :  1955  MRN: [id number]    Medical/Treatment Diagnosis Information:  · Dx - Post first MTJ fusion  ·  Decreased functional mobility / right foot pain    Insurance/Certification information:   [facility] medicare  Physician Information:  Referring Practitioner: Christian Hart DPM  Plan of care signed (Y/N):sent at eval - sent again     Date of Patient follow up with Physician:      Progress Report: [x]  Yes  []  No     Date Range for reporting period:  Beginnin2021 -21  Ending:      Progress report due (10 Rx/or 30 days whichever is less): 55    Recertification due (POC duration/ or 90 days whichever is less):21    Visit # POC/Insurance Allowable Auth Needed   10 Bomn []Yes   []No     Latex Allergy:  [x]NO      []YES  Preferred Language for Healthcare:   [x]English       []Other:    SUBJECTIVE:  History of foot pain. 2020 Right foot removal of bone spurring and osseous prominence first metatarsal, arthrodesis of first mtp, bone marrow harvest and concentration right tibia     : Tolerated last appt well  21  Resuming Rx after illness. Did not have Covid. At home walking with house shoe. Outdoors using boot. F/U with MD 21.  21 Saw Dr. Oseas Dubon re: R hip received cortisone injection. Saw Dr. Awilda Alvarenga , D/C boot. resume activities/ driving as tolerated. To PT with shoes and st cane.  - off boot 8 days - to PT - cane too high lowered appropriately  :  Hurting today secondary to hitting foot on something.   : stiff and sore today. Thinks it's because of the cold weather.     - came limping today - no AD, forgot it,  New onset sudden pain yesterday, uncertain cause, although she notes hitting brake hard on Sunday. 2/5: feels much better today. Notes she just has pressure today, not really pain.  Has been using cane for ambulation past few days which has been helpful.      Relevant Medical History: arthritis, DM, HLD, HTN, CVA 2014, cardiomyopathy, GERD, l umbar disc disease, sleep apnea, right shoulder RCR, gurmeet CTS, left and right trigger finger release.      Functional Scale/Score: LEFS =  15  80-99%  Disability   Functional Scale   LEFs  =      28   60-79% Disability  - Function prior to new onset pain yesterday     Pain Scale  3/10     Easing factors:  Rest ice  Provocative factors:   Walking, activity                                                                          eval 12/21               Ankle PF   38a/40p   Ankle DF   5a/8p   Ankle In   10a/p   Ankle Ev     First mtp            To neutral a/p     No arom    PROM - 10 extension    To neutral flexion     Ankle DF   3+/5   Ankle PF   3+/5   Ankle Inv   3+/5   Ankle EV   3+/5           Circumference  MTP  Mid lat malleolus       24 cm  29 cm       24.5 cm  30 cm         Exercises/Interventions:  2/2 No exercise 2/2 new onset pain - resume as prosper    Therapeutic Ex (50541)   Min:   15 Reps/Resistance Notes/CUES   Nustep  Seat 7 UE 7 L-0 x 5 min   GSS incline 30 sec x 2 Heel raises standing     10x   Rocker brd  F/b s/s x1 min ea BAPS 4-way L2 10x each  With min A from PT for technique   Seated   HR/TR 20x each  gurmeet Seated toe DIP extension 10x Seated ankle tband 4-way Lime green 10x ea R   Therapeutic Activity (27666) Min:   5  Tandem gait 2 laps no hand hold Step ups fwds 6\" 10x R NMR re-education (14170)  Min:   10 CUES NEEDEDGumdrop: ROM standing                    balance 4 way x 10  R  x1 min NBOS balance w/ EC x1 min Tandem balance x1 min LR AirEx:  *NBOS w/EC  *step up/down on   x1 min  10x R GT flexion isometrics 10x 5 sec holds Manual Intervention (60083) Min: 10     MT/ DIP mobs All MT  joints    PROM first toe  PROM/AAROM all toes 20x  20x    Gentle effleurage right foot x3 mins              Modalities  Min:          Cryocuff after exercises cryocuff ankle/foot x10 min With pillowcase               Other Therapeutic Activities: Pt was educated on PT POC, Diagnosis, Prognosis, pathomechanics as well as frequency and duration of scheduling future physical therapy appointments. Time was also taken on this day to answer all patient questions and participation in PT. Reviewed appointment policy in detail with patient and patient verbalized understanding. Home Exercise Program: Patient was instructed in the following for HEP:     . Patient verbalized/demonstrated understanding and was issued written handout. Therapeutic Exercise and NMR EXR  [x] (63574) Provided verbal/tactile cueing for activities related to strengthening, flexibility, endurance, ROM for improvements in LE, proximal hip, and core control with self care, mobility, lifting, ambulation. [x] (93681) Provided verbal/tactile cueing for activities related to improving balance, coordination, kinesthetic sense, posture, motor skill, proprioception  to assist with LE, proximal hip, and core control in self care, mobility, lifting, ambulation and eccentric single leg control.  [address] and Therapeutic Activities:    [x] (28032 or 75014) Provided verbal/tactile cueing for activities related to improving balance, coordination, kinesthetic sense, posture, motor skill, proprioception and motor activation to allow for proper function of core, proximal hip and LE with self care and ADLs and functional mobility.   [] (15962) Gait Re-education- Provided training and instruction to the patient for proper LE, core and proximal hip recruitment and positioning and eccentric body weight control with ambulation re-education including up and down stairs     Home Exercise Program:    [] (53774) Reviewed/Progressed HEP activities related to strengthening, flexibility, endurance, ROM of core, proximal hip and LE for functional self-care, mobility, lifting and ambulation/stair navigation   [] (77872)Reviewed/Progressed HEP activities related to improving balance, coordination, kinesthetic sense, posture, motor skill, proprioception of core, proximal hip and LE for self care, mobility, lifting, and ambulation/stair navigation      Manual Treatments:  PROM / STM / Oscillations-Mobs:  G-I, II, III, IV (PA's, Inf., Post.)  [x] (78027) Provided manual therapy to mobilize LE, proximal hip and/or LS spine soft tissue/joints for the purpose of modulating pain, promoting relaxation,  increasing ROM, reducing/eliminating soft tissue swelling/inflammation/restriction, improving soft tissue extensibility and allowing for proper ROM for normal function with self care, mobility, lifting and ambulation. Charges:  Timed Code Treatment Minutes: 40   Total Treatment Minutes: 50      [] EVAL (LOW) 27191 (typically 20 minutes face-to-face)  [] EVAL (MOD) 26714 (typically 30 minutes face-to-face)  [] EVAL (HIGH) 27855 (typically 45 minutes face-to-face)  [] RE-EVAL     [x] GT(19446) x  1  [] Dry needle 1 or 2 Muscles (39454)  [x] NMR (01316) x  1  [] Dry needle 3+ Muscles (67221)  [x] Manual (67101) x1     [] Ultrasound (14800) x  [] TA (84487) x   1  [] Mech Traction (62390)  [] ES(attended) (10652)     [] ES (un) (59798):   [] Vasopump (74380) [] Ionto (58113)   [] Other:      GOALS:  Patient stated goal: get back to driving  []? Progressing: []? Met: []? Not Met: []? Adjusted     Therapist goals for Patient:   Short Term Goals: To be achieved in: 2 weeks  1. Independent in HEP and progression per patient tolerance, in order to prevent re-injury. [x]? Progressing: []? Met: []? Not Met: []? Adjusted  2. Patient will have a decrease in pain to facilitate improvement in movement, function, and ADLs as indicated by Functional Deficits. [x]? Progressing: []?  Met: []? Not Met: []? Adjusted     Long Term Goals: To be achieved in:  weeks  1. Disability index score of  20% or less for the LEFS to assist with reaching prior level of function. [x]? Progressing: []? Met: []? Not Met: []? Adjusted  2. Patient will demonstrate increased AROM to  to allow for proper joint functioning as indicated by patients Functional Deficits. [x]? Progressing: []? Met: []? Not Met: []? Adjusted  3. Patient will demonstrate an increase in Strength to good proximal hip strength and control, within 5lb HHD in LE to allow for proper functional mobility as indicated by patients Functional Deficits. [x]? Progressing: []? Met: []? Not Met: []? Adjusted  4. Patient will return to  functional activities without increased symptoms or restriction. [x]? Progressing: []? Met: []? Not Met: []? Adjusted  5. To be able to get around without the walker   [x]? Progressing: []? Met: []? Not Met: []? Adjusted             ASSESSMENT: Pt with increased tolerance to PT session today. Balance is improving. Limited 1st MT mobility limiting gait. Treatment/Activity Tolerance:  [] Patient tolerated treatment well [] Patient limited by fatique  [x] Patient limited by pain  [] Patient limited by other medical complications  [] Other:      Overall Progression Towards Functional goals/ Treatment Progress Update:  [x] Patient is progressing as expected towards functional goals listed. [] Progression is slowed due to complexities/Impairments listed. [] Progression has been slowed due to co-morbidities. [] Plan just implemented, too soon to assess goals progression <30days   [] Goals require adjustment due to lack of progress  [] Patient is not progressing as expected and requires additional follow up with physician  [] Other     Prognosis for POC: [x] Good [] Fair  [] Poor    Patient requires continued skilled intervention: [x] Yes  [] No        PLAN: Take msmts for progress note next week when pt feels better.   [x] Continue per plan of care [] Alter current plan (see comments)  [] Plan of care initiated [] Hold pending MD visit [] Discharge    Electronically signed by: Jimmie Perez PT , OMT-C,  [id number]    Note: If patient does not return for scheduled/recommended follow up visits, this note will serve as a discharge from care along with the most recent update on progress.

## 2021-02-09 ENCOUNTER — HOSPITAL ENCOUNTER (OUTPATIENT)
Dept: PHYSICAL THERAPY | Age: 66
Setting detail: THERAPIES SERIES
Discharge: HOME | End: 2021-02-09
Payer: MEDICARE

## 2021-02-09 NOTE — FLOWSHEET NOTE
[place] [facility], [facility]  [address] [facility] [place]  Phone: [phone]   Fax:     [phone]    Physical Therapy  Cancellation/No-show Note  Patient Name:  Rox Agarwal  :  1955   Date:  2021  Cancelled visits to date: 3  No-shows to date: 0    Patient status for today's appointment patient:  [x]  Cancelled  []  Rescheduled appointment  []  No-show     Reason given by patient:  []  Patient ill  []  Conflicting appointment  []  No transportation    []  Conflict with work  []  No reason given  [x]  Other:     Comments:   Cancelled by PT due to weather     Phone call information:   []  Phone call made today to patient at _ time at number provided:      []  Patient answered, conversation as follows:    []  Patient did not answer, message left as follows:  []  Phone call not made today    Electronically signed by:  Alisha GEIGER#[id number]

## 2021-02-10 DIAGNOSIS — I10 ESSENTIAL HYPERTENSION: ICD-10-CM

## 2021-02-10 RX ORDER — VALSARTAN 80 MG/1
TABLET ORAL
Qty: 90 TABLET | Refills: 0 | Status: SHIPPED | OUTPATIENT
Start: 2021-02-10 | End: 2021-09-08

## 2021-02-10 RX ORDER — FUROSEMIDE 20 MG/1
TABLET ORAL
Qty: 90 TABLET | Refills: 0 | Status: SHIPPED | OUTPATIENT
Start: 2021-02-10 | End: 2021-05-18 | Stop reason: SDUPTHER

## 2021-02-12 ENCOUNTER — HOSPITAL ENCOUNTER (OUTPATIENT)
Dept: GENERAL RADIOLOGY | Age: 66
Discharge: HOME | End: 2021-02-12
Payer: MEDICARE

## 2021-02-12 ENCOUNTER — HOSPITAL ENCOUNTER (OUTPATIENT)
Dept: SPEECH THERAPY | Age: 66
Setting detail: THERAPIES SERIES
Discharge: HOME | End: 2021-02-12
Payer: MEDICARE

## 2021-02-12 ENCOUNTER — TELEPHONE (OUTPATIENT)
Dept: FAMILY MEDICINE CLINIC | Age: 66
End: 2021-02-12

## 2021-02-12 DIAGNOSIS — R13.12 OROPHARYNGEAL DYSPHAGIA: Primary | ICD-10-CM

## 2021-02-12 DIAGNOSIS — R13.10 DYSPHAGIA, UNSPECIFIED TYPE: ICD-10-CM

## 2021-02-12 PROCEDURE — 92611 MOTION FLUOROSCOPY/SWALLOW: CPT

## 2021-02-12 PROCEDURE — 74230 X-RAY XM SWLNG FUNCJ C+: CPT

## 2021-02-12 NOTE — PROCEDURES
[facility]   Speech Therapy   MODIFIED BARIUM SWALLOW      John Wood  AGE: 76 y.o. GENDER: female  : 1955  [id number]  EPISODE DATE:  2021  Ordering MD:  Ordering Physician: Dr Finnegan  Radiologist:  Radiologist: Dr Rene Clement  Date of Eval:  2021     Type of Study: Modified Barium Swallow    ONSET DATE: 2021       MEDICAL DIAGNOSIS: Dysphagia  TREATMENT DIAGNOSIS: Oropharyngeal Dysphagia    PAST MEDICAL HISTORY   has a past medical history of Arthritis, Cardiomyopathy ([address] Diabetes, GERD (gastroesophageal reflux disease), Hyperlipidemia, Hypertension, Lumbar disc disease, Sleep apnea, Unspecified cerebral artery occlusion with cerebral infarction (), and Wears glasses.     PAST SURGICAL HISTORY  Past Surgical History:   Procedure Laterality Date    ARTHRODESIS Right 2020    (RIGHT) REMOVAL OF BONE SPURRING AND OSSEOUS PROMINENCE FIRST METATARSAL, ARTHRODESIS OF FIRST METATARSOPHALANGEAL JOINT, BONE MARROW HARVEST AND CONCENTRATION RIGHT TIBIA performed by Sadaf Olson DPM at  [facility] Left 9/3/15    CARPAL TUNNEL RELEASE Right 9/22/15    COLONOSCOPY      FINGER TRIGGER RELEASE Left 9/3/15    middle and ring fingers    FINGER TRIGGER RELEASE Right 9/22/15    middle and ring fingers    FOOT SURGERY Bilateral     litteltoe    HAND SURGERY Right     Ligament    PARTIAL HYSTERECTOMY  2003    SHOULDER ARTHROSCOPY Right 2018    Diagnostic scope, rcr, open [place]         ALLERGIES  Allergies   Allergen Reactions    Codeine Nausea And Vomiting and Rash    Vicodin [Hydrocodone-Acetaminophen] Nausea And Vomiting    Oxycontin [Oxycodone Hcl] Itching    Tramadol Itching and Swelling           Subjective:     Reason for referral: [facility] was referred for a Modified Barium Swallow study to assess  the efficiency of swallow function, rule out aspiration and make recommendations regarding safe dietary consistencies, effective compensatory strategies, and safe eating environment. PATIENT AND FAMILY / STAFF COMPLAINTS:   Pt endorses food and pills sticking in throat; chronic since CVA in 2014, but exacerbated over past 2-3 months   Diet prior to study:   Current Diet Solid Consistency: Regular  Current Diet Liquid Consistency: Thin    Objective: Impressions and Recommendations     IMPRESSIONS:    1. Behavior/Cognition: Alert, Cooperative, Pleasant mood    2. Dysphagia Diagnosis: Mild-moderate oral and minimal pharyngeal phase dysphagia characterized by prolonged but effective mastication, reduced lingual manipulation, piecemeal deglutition, and delayed swallow initiation. · Oral phase deficits (ie, prolonged chew, piecemeal deglutition with multiple swallows to clear textured solids from oral cavity) with impact on pharyngeal phase  · Premature bolus loss with all trials; episodic vallecular and pyriform sinus pooling with thin liquids  · NO penetration or aspiration; NO pharyngeal residue with any trials; pt does report globus with solids during exam    Dysphagia Score: Dysphagia Outcome Severity Scale: Level 5: Mild dysphagia- Distant supervision. May need one diet consistency restricted    Aspiration/Penetration Risk:   Penetration-Aspiration Scale (PAS): 1 - Material does not enter the airway    Diet Recommendations:  Solid consistency: Regular(as desired; consider chopping food and adding sauce/gravy/moisture)   Liquid consistency:  Thin   Medication administration: PO     Compensatory Swallow Strategies:   Compensatory Swallowing Strategies: Small bites/sips, Upright as possible for all oral intake, Remain upright for 30-45 minutes after meals    Therapy:  Requires SLP Intervention: No     Prognosis:  Prognosis for safe diet advancement: fair  Barriers to reach goals: time post onset  Consulted and agree with results and recommendations: Patient    Education:  Consulted and agree with results and recommendations: Patient  Patient Education: results, recommendations  Patient Education Response: Verbalizes understanding    D/C Recommendations:     Supervision: Independent   If pt complaint persists/worsens: may consider further assessment of esophageal phase    Assessment: Test Data   General  Cognitive/Behavior  Behavior/Cognition  Behavior/Cognition: Alert; Cooperative;Pleasant mood    Vision/Hearing  Vision  Vision Exceptions: Wears glasses at all times  Hearing  Hearing: Within functional limits    Consistencies Assessed    Consistencies Administered: Dysphagia Soft and Bite-Sized (Dysphagia III), Reg solid, Dysphagia Minced and Moist (Dysphagia II), Dysphagia Pureed (Dysphagia I), Thin straw, Thin cup    Positioning   Upright 90 degrees in chair    Indicators of Oral Phase Dysfunction   Oral Preparation / Oral Phase  Oral Phase: Impaired  Oral Phase - Major Contributing Deficits  Reduced Bolus Control: All  Decreased Bolus Cohesion: All  Piecemeal Swallowing: Reg solid, Soft solid, Mechanical soft solid  Premature Bolus Loss to Pharynx: Puree, Mechanical soft solid, Soft solid, Reg solid    Indicators of Pharyngeal Phase Dysfunction  Pharyngeal Phase  Pharyngeal Phase: Impaired  Pharyngeal Phase: Impaired  Pharyngeal Phase - Major Contributing Deficits  Delayed Swallow Initiation: All  Premature Spillage to Valleculae: Thin cup; Thin straw  Premature Spillage to Pyriform: Thin cup; Thin straw  Pooling Valleculae: Thin cup; Thin straw  Pooling Pyriform: Thin cup; Thin straw    Upper Esophageal Phase   Upper Esophageal Screen  Esophageal Screen: [facility]  [facility]       MINUTES/CHARGES  Time In: 1100  SLP Individual Minutes  Time In: 1100  Time Out: 1140  Minutes: 40  Coded treatment time  5     Electronically signed by  Lilian Olivera MS, CCC-SLP #[id number]  Speech Language Pathologist   on 2/12/2021 at 12:13 PM

## 2021-02-12 NOTE — TELEPHONE ENCOUNTER
Please let Lisa Horton know that her swallow study overall showed good news - no concerns for choking or aspiration. However, part of her swallowing is slightly slowed down in the esophagus. So, they recommended being sure to take small bites and chew for appropriate amount of time, always eat in upright position, try not to lie down within 30 minutes of eating.

## 2021-02-16 ENCOUNTER — HOSPITAL ENCOUNTER (OUTPATIENT)
Dept: PHYSICAL THERAPY | Age: 66
Setting detail: THERAPIES SERIES
Discharge: HOME | End: 2021-02-16
Payer: MEDICARE

## 2021-02-16 NOTE — FLOWSHEET NOTE
[facility], [facility]  [address] [facility] [place]  Phone: [phone]   Fax:     [phone]    Physical Therapy  Cancellation/No-show Note  Patient Name:  Roman Box  :  1955   Date:  2021  Cancelled visits to date: 4  No-shows to date: 0    Patient status for today's appointment patient:  [x]  Cancelled  []  Rescheduled appointment  []  No-show     Reason given by patient:  []  Patient ill  []  Conflicting appointment  []  No transportation    []  Conflict with work  []  No reason given  [x]  Other:     Comments:  Snow    Phone call information:   []  Phone call made today to patient at _ time at number provided:      []  Patient answered, conversation as follows:    []  Patient did not answer, message left as follows:  []  Phone call not made today    Electronically signed by:  Sabrina Hughes, [facility] , [facility],  [id number]

## 2021-02-19 ENCOUNTER — HOSPITAL ENCOUNTER (OUTPATIENT)
Dept: PHYSICAL THERAPY | Age: 66
Setting detail: THERAPIES SERIES
Discharge: HOME | End: 2021-02-19
Payer: MEDICARE

## 2021-02-19 PROCEDURE — 97140 MANUAL THERAPY 1/> REGIONS: CPT

## 2021-02-19 PROCEDURE — 97110 THERAPEUTIC EXERCISES: CPT

## 2021-02-19 PROCEDURE — 97112 NEUROMUSCULAR REEDUCATION: CPT

## 2021-02-19 NOTE — FLOWSHEET NOTE
yesterday, uncertain cause, although she notes hitting brake hard on Sunday. 2/5: feels much better today. Notes she just has pressure today, not really pain. Has been using cane for ambulation past few days which has been helpful.    2/19  20  Min late - feeling better with walking, just a bit sore today  3/10     Relevant Medical History: arthritis, DM, HLD, HTN, CVA 2014, cardiomyopathy, GERD, l umbar disc disease, sleep apnea, right shoulder RCR, gurmeet CTS, left and right trigger finger release.      Functional Scale/Score: LEFS =  15  80-99%  Disability   Functional Scale   LEFs  =      28   60-79% Disability       Pain Scale  3/10     Easing factors:  Rest ice  Provocative factors:   Walking, activity                                                                          eval 12/21 2/19/21  Ankle PF   38a/40p                            48a/50p   Ankle DF   5a/8p                             7a/10p   Ankle In   10a/p                                25a/30p   Ankle Ev     First mtp            To neutral a/p                       10a/0      No arom                                   PROM - 10 extension               To neutral flexion                                                                     eval 12/21 2/19/21  Ankle DF   3+/5                                    4/5   Ankle PF   3+/5                                     4-/5   Ankle Inv   3+/5                                      4/5   Ankle EV   3+/5                                    4/5           Circumference  MTP  Mid lat malleolus       24 cm  29 cm       24.5 cm  30 cm         Exercises/Interventions:     Therapeutic Ex (21926)   Min:   15 Reps/Resistance Notes/CUES   Nustep  Seat 7 UE 7 L-0 x 5 min   GSS incline 30 sec x 2 Heel raises standing     10x   Rocker brd  F/b s/s x1 min ea BAPS 4-way L2 10x each  With min A from PT for technique to get edges of baps down   Seated   HR/TR 20x each  gurmeet Seated toe DIP extension 10x Seated ankle tband 4-way Lime green 10x ea R   Therapeutic Activity (19628) Min:   5  Tandem gait 2 laps no hand hold Step ups fwds 6\" 10x R Steps reciprocal gait 3 x 4 steps gurmeet hand rails. NMR re-education (16139)  Min:   10 CUES NEEDEDGumdrop: ROM standing                    balance 4 way x 10  R  x1 min NBOS balance w/ EC x1 min Tandem balance x1 min LR AirEx:  *NBOS w/EC  *step up/down on   x1 min  10x R GT flexion isometrics 10x 5 sec holds Manual Intervention (31128) Min: 10     MT/ DIP mobs All MT  joints    PROM first toe  PROM/AAROM all toes 20x  20x    Gentle effleurage right foot x3 mins              Modalities  Min:          CP with pillowcase wrapped around mid to forefoot 10  min prosper well               Other Therapeutic Activities: Pt was educated on PT POC, Diagnosis, Prognosis, pathomechanics as well as frequency and duration of scheduling future physical therapy appointments. Time was also taken on this day to answer all patient questions and participation in PT. Reviewed appointment policy in detail with patient and patient verbalized understanding. Home Exercise Program: Patient was instructed in the following for HEP:     . Patient verbalized/demonstrated understanding and was issued written handout. Therapeutic Exercise and NMR EXR  [x] (02112) Provided verbal/tactile cueing for activities related to strengthening, flexibility, endurance, ROM for improvements in LE, proximal hip, and core control with self care, mobility, lifting, ambulation. [x] (30846) Provided verbal/tactile cueing for activities related to improving balance, coordination, kinesthetic sense, posture, motor skill, proprioception  to assist with LE, proximal hip, and core control in self care, mobility, lifting, ambulation and eccentric single leg control.  [address] and Therapeutic Activities:    [x] (44054 or 44770) Provided verbal/tactile cueing for activities related to improving balance, coordination, kinesthetic sense, posture, motor skill, proprioception and motor activation to allow for proper function of core, proximal hip and LE with self care and ADLs and functional mobility.   [] (85000) Gait Re-education- Provided training and instruction to the patient for proper LE, core and proximal hip recruitment and positioning and eccentric body weight control with ambulation re-education including up and down stairs     Home Exercise Program:    [] (47384) Reviewed/Progressed HEP activities related to strengthening, flexibility, endurance, ROM of core, proximal hip and LE for functional self-care, mobility, lifting and ambulation/stair navigation   [] (74656)Reviewed/Progressed HEP activities related to improving balance, coordination, kinesthetic sense, posture, motor skill, proprioception of core, proximal hip and LE for self care, mobility, lifting, and ambulation/stair navigation      Manual Treatments:  PROM / STM / Oscillations-Mobs:  G-I, II, III, IV (PA's, Inf., Post.)  [x] (71989) Provided manual therapy to mobilize LE, proximal hip and/or LS spine soft tissue/joints for the purpose of modulating pain, promoting relaxation,  increasing ROM, reducing/eliminating soft tissue swelling/inflammation/restriction, improving soft tissue extensibility and allowing for proper ROM for normal function with self care, mobility, lifting and ambulation.        Charges:  Timed Code Treatment Minutes: 40   Total Treatment Minutes: 50      [] EVAL (LOW) 20154 (typically 20 minutes face-to-face)  [] EVAL (MOD) 99468 (typically 30 minutes face-to-face)  [] EVAL (HIGH) 07061 (typically 45 minutes face-to-face)  [] RE-EVAL     [x] WD(99256) x  1  [] Dry needle 1 or 2 Muscles (54511)  [x] NMR (38795) x  1  [] Dry needle 3+ Muscles (87288)  [x] Manual (90753) x1     [] Ultrasound (28279) x  [] TA (57400) x   1  [] Mech Traction (24267)  [] ES(attended) (71959)     [] ES (un) (66012):   [] Vasopump (26367) [] Ionto (89526)   [] Other:      GOALS:  Patient stated goal: get back to driving  []? Progressing: []? Met: []? Not Met: []? Adjusted     Therapist goals for Patient:   Short Term Goals: To be achieved in: 2 weeks  1. Independent in HEP and progression per patient tolerance, in order to prevent re-injury. [x]? Progressing: []? Met: []? Not Met: []? Adjusted  2. Patient will have a decrease in pain to facilitate improvement in movement, function, and ADLs as indicated by Functional Deficits. [x]? Progressing: []? Met: []? Not Met: []? Adjusted     Long Term Goals: To be achieved in:  weeks  1. Disability index score of  20% or less for the LEFS to assist with reaching prior level of function. [x]? Progressing: []? Met: []? Not Met: []? Adjusted  2. Patient will demonstrate increased AROM to  to allow for proper joint functioning as indicated by patients Functional Deficits. [x]? Progressing: []? Met: []? Not Met: []? Adjusted  3. Patient will demonstrate an increase in Strength to good proximal hip strength and control, within 5lb HHD in LE to allow for proper functional mobility as indicated by patients Functional Deficits. [x]? Progressing: []? Met: []? Not Met: []? Adjusted  4. Patient will return to  functional activities without increased symptoms or restriction. [x]? Progressing: []? Met: []? Not Met: []? Adjusted  5. To be able to get around without the walker   [x]? Progressing: []? Met: []? Not Met: []? Adjusted             ASSESSMENT: Pt with increased tolerance to PT session today. Balance is improving. Limited 1st MT mobility limiting gait. Treatment/Activity Tolerance:  [] Patient tolerated treatment well [] Patient limited by fatique  [x] Patient limited by pain  [] Patient limited by other medical complications  [] Other:      Overall Progression Towards Functional goals/ Treatment Progress Update:  [x] Patient is progressing as expected towards functional goals listed.     [] Progression is slowed due to complexities/Impairments listed. [] Progression has been slowed due to co-morbidities. [] Plan just implemented, too soon to assess goals progression <30days   [] Goals require adjustment due to lack of progress  [] Patient is not progressing as expected and requires additional follow up with physician  [] Other     Prognosis for POC: [x] Good [] Fair  [] Poor    Patient requires continued skilled intervention: [x] Yes  [] No        PLAN: Take msmts for progress note next week when pt feels better. [x] Continue per plan of care [] Alter current plan (see comments)  [] Plan of care initiated [] Hold pending MD visit [] Discharge    Electronically signed by: Lo Maynard, PT , OMT-C,  [id number]    Note: If patient does not return for scheduled/recommended follow up visits, this note will serve as a discharge from care along with the most recent update on progress.

## 2021-02-23 ENCOUNTER — HOSPITAL ENCOUNTER (OUTPATIENT)
Dept: WOMENS IMAGING | Age: 66
Discharge: HOME | End: 2021-02-23
Payer: MEDICARE

## 2021-02-23 ENCOUNTER — HOSPITAL ENCOUNTER (OUTPATIENT)
Dept: PHYSICAL THERAPY | Age: 66
Setting detail: THERAPIES SERIES
Discharge: HOME | End: 2021-02-23
Payer: MEDICARE

## 2021-02-23 ENCOUNTER — TELEPHONE (OUTPATIENT)
Dept: FAMILY MEDICINE CLINIC | Age: 66
End: 2021-02-23

## 2021-02-23 DIAGNOSIS — Z12.31 VISIT FOR SCREENING MAMMOGRAM: ICD-10-CM

## 2021-02-23 PROCEDURE — 97110 THERAPEUTIC EXERCISES: CPT

## 2021-02-23 PROCEDURE — 97140 MANUAL THERAPY 1/> REGIONS: CPT

## 2021-02-23 PROCEDURE — 77063 BREAST TOMOSYNTHESIS BI: CPT

## 2021-02-23 PROCEDURE — 97112 NEUROMUSCULAR REEDUCATION: CPT

## 2021-02-23 NOTE — TELEPHONE ENCOUNTER
Pt feels dizzy  Pt is throwing up  Pt is sweating and weak   Pt feels like her BP is off     Pt stated the medication may be causing this the muscle relaxer's   Pt wants a appointment to be seen     Please advise

## 2021-02-23 NOTE — TELEPHONE ENCOUNTER
Pt states she doesn't think she is running a fever, but she is sweating a lot. She thinks it may have to do with her medication. [facility]? Last time she took a muscle relaxer was last week.

## 2021-02-23 NOTE — TELEPHONE ENCOUNTER
Yes agree that med taken that long ago should not cause these symptoms and [facility] eval is warranted.

## 2021-02-23 NOTE — FLOWSHEET NOTE
[facility]  Outpatient Rehabilitation and Therapy, [facility]  [address] [facility], [facility]  Phone: [phone]   Fax:     [phone]      Physical Therapy Treatment Note/ Progress Report:     Date:  2021    Patient Name:  Valentín Arnold    :  1955  MRN: [id number]    Medical/Treatment Diagnosis Information:  · Dx - Post first MTJ fusion  ·  Decreased functional mobility 2/2 right foot pain    Insurance/Certification information:   [facility] medicare  Physician Information:  Referring Practitioner: Mirella Orellana DPM  Plan of care signed (Y/N):sent at eval - sent again     Date of Patient follow up with Physician:      Progress Report: [x]  Yes  []  No     Date Range for reporting period:  Beginnin2021 -21  Ending:      Progress report due (10 Rx/or 30 days whichever is less):     Recertification due (POC duration/ or 90 days whichever is less):21    Visit # POC/Insurance Allowable Auth Needed   13 Bomn []Yes   []No     Latex Allergy:  [x]NO      []YES  Preferred Language for Healthcare:   [x]English       []Other:    SUBJECTIVE:  History of foot pain. 2020 Right foot removal of bone spurring and osseous prominence first metatarsal, arthrodesis of first mtp, bone marrow harvest and concentration right tibia     : Tolerated last appt well  21  Resuming Rx after illness. Did not have Covid. At home walking with house shoe. Outdoors using boot. F/U with MD 21.  21 Saw Dr. Tracie Almendarez re: R hip received cortisone injection. Saw Dr. Devan Edwards , D/C boot. resume activities/ driving as tolerated. To PT with shoes and st cane.  - off boot 8 days - to PT - cane too high lowered appropriately  :  Hurting today secondary to hitting foot on something.   : stiff and sore today. Thinks it's because of the cold weather.     - came limping today - no AD, forgot it,  New onset sudden pain yesterday, uncertain cause, although she notes hitting brake hard on Sunday. 2/5: feels much better today. Notes she just has pressure today, not really pain. Has been using cane for ambulation past few days which has been helpful. 2/19  20  Min late - feeling better with walking, just a bit sore today  3/10  2/23 - amb without AD , notes arch is a bit tender behind the fusion and across the foot.  Recommended arch supports and self stm.     Relevant Medical History: arthritis, DM, HLD, HTN, CVA 2014, cardiomyopathy, GERD, l umbar disc disease, sleep apnea, right shoulder RCR, gurmeet CTS, left and right trigger finger release.      Functional Scale/Score: LEFS =  15  80-99%  Disability   Functional Scale   LEFs  =      28   60-79% Disability       Pain Scale  3/10  Arch region    Easing factors:  Rest ice  Provocative factors:   Walking, activity                                                                          eval 12/21 2/19/21  Ankle PF   38a/40p                            48a/50p   Ankle DF   5a/8p                               7a/10p   Ankle In   10a/p                                25a/30p   Ankle Ev     First mtp            To neutral a/p                       10a/0      No arom                                   PROM - 10 extension               To neutral flexion                                                                     eval 12/21 2/19/21  Ankle DF   3+/5                                      4/5   Ankle PF   3+/5                                      4-/5   Ankle Inv   3+/5                                       4/5   Ankle EV   3+/5                                      4/5           Circumference  MTP  Mid lat malleolus       24 cm  29 cm                                    24.5 cm                            30 cm         Exercises/Interventions:     Therapeutic Ex (74816)   Min:   15 Reps/Resistance Notes/CUES   Nustep  Seat 7 UE 7 L-0 x 5 min   GSS incline 30 sec x 2 Heel raises standing     10x   Rocker brd  F/b s/s x1 min ea BAPS 4-way L2 10x each  With min A from PT for technique to get edges of baps down   Seated   HR/TR 20x each  gurmeet Seated toe DIP extension 10x Seated ankle tband 4-way Lime green 10x ea R   Therapeutic Activity (41110) Min:   5  Tandem gait 2 laps no hand hold Step ups fwds 6\" 10x R Steps reciprocal gait 3 x 4 steps gurmeet hand rails. NMR re-education (18285)  Min:   10 CUES NEEDEDGumdrop: ROM standing                    balance 4 way x 10  R  x1 min NBOS balance w/ EC x1 min Tandem balance x1 min LR AirEx:  *NBOS w/EC  *step up/down on   x1 min  10x R GT flexion isometrics  GT extension isometrics 10x 5 sec holds Manual Intervention (19607) Min: 10     MT/ DIP mobs All MT  joints    PROM first toe  PROM/AAROM all toes 20x  20x    Gentle effleurage right foot x3 mins              Modalities  Min:  No chrg          CP with pillowcase wrapped around mid to forefoot 10  min prosper well               Other Therapeutic Activities: Pt was educated on PT POC, Diagnosis, Prognosis, pathomechanics as well as frequency and duration of scheduling future physical therapy appointments. Time was also taken on this day to answer all patient questions and participation in PT. Reviewed appointment policy in detail with patient and patient verbalized understanding. Home Exercise Program: Patient was instructed in the following for HEP:     . Patient verbalized/demonstrated understanding and was issued written handout. Therapeutic Exercise and NMR EXR  [x] (43742) Provided verbal/tactile cueing for activities related to strengthening, flexibility, endurance, ROM for improvements in LE, proximal hip, and core control with self care, mobility, lifting, ambulation.   [x] (85576) Provided verbal/tactile cueing for activities related to improving balance, coordination, kinesthetic sense, posture, motor skill, proprioception  to assist with LE, proximal hip, and core control in self care, mobility, lifting, ambulation and eccentric single leg control. [address] and Therapeutic Activities:    [x] (19498 or 64461) Provided verbal/tactile cueing for activities related to improving balance, coordination, kinesthetic sense, posture, motor skill, proprioception and motor activation to allow for proper function of core, proximal hip and LE with self care and ADLs and functional mobility.   [] (39021) Gait Re-education- Provided training and instruction to the patient for proper LE, core and proximal hip recruitment and positioning and eccentric body weight control with ambulation re-education including up and down stairs     Home Exercise Program:    [] (86792) Reviewed/Progressed HEP activities related to strengthening, flexibility, endurance, ROM of core, proximal hip and LE for functional self-care, mobility, lifting and ambulation/stair navigation   [] (85827)Reviewed/Progressed HEP activities related to improving balance, coordination, kinesthetic sense, posture, motor skill, proprioception of core, proximal hip and LE for self care, mobility, lifting, and ambulation/stair navigation      Manual Treatments:  PROM / STM / Oscillations-Mobs:  G-I, II, III, IV (PA's, Inf., Post.)  [x] (84826) Provided manual therapy to mobilize LE, proximal hip and/or LS spine soft tissue/joints for the purpose of modulating pain, promoting relaxation,  increasing ROM, reducing/eliminating soft tissue swelling/inflammation/restriction, improving soft tissue extensibility and allowing for proper ROM for normal function with self care, mobility, lifting and ambulation.        Charges:  Timed Code Treatment Minutes: 40   Total Treatment Minutes: 50      [] EVAL (LOW) 94761 (typically 20 minutes face-to-face)  [] EVAL (MOD) 13367 (typically 30 minutes face-to-face)  [] EVAL (HIGH) 57826 (typically 45 minutes face-to-face)  [] RE-EVAL     [x] NM(58163) x  1  [] Dry needle 1 or 2 Muscles (34978)  [x] NMR (69278) x  1  [] Dry needle 3+ Muscles (91121)  [x] Manual (53739) x1     [] Ultrasound (84093) x  [] TA (12852) x   1  [] Mech Traction (01310)  [] ES(attended) (64854)     [] ES (un) (79078):   [] Vasopump (62919) [] Ionto (56041)   [] Other:      GOALS:  Patient stated goal: get back to driving  []? Progressing: []? Met: []? Not Met: []? Adjusted     Therapist goals for Patient:   Short Term Goals: To be achieved in: 2 weeks  1. Independent in HEP and progression per patient tolerance, in order to prevent re-injury. [x]? Progressing: []? Met: []? Not Met: []? Adjusted  2. Patient will have a decrease in pain to facilitate improvement in movement, function, and ADLs as indicated by Functional Deficits. [x]? Progressing: []? Met: []? Not Met: []? Adjusted     Long Term Goals: To be achieved in:  weeks  1. Disability index score of  20% or less for the LEFS to assist with reaching prior level of function. [x]? Progressing: []? Met: []? Not Met: []? Adjusted  2. Patient will demonstrate increased AROM to  to allow for proper joint functioning as indicated by patients Functional Deficits. [x]? Progressing: []? Met: []? Not Met: []? Adjusted  3. Patient will demonstrate an increase in Strength to good proximal hip strength and control, within 5lb HHD in LE to allow for proper functional mobility as indicated by patients Functional Deficits. [x]? Progressing: []? Met: []? Not Met: []? Adjusted  4. Patient will return to  functional activities without increased symptoms or restriction. [x]? Progressing: []? Met: []? Not Met: []? Adjusted  5. To be able to get around without the walker   [x]? Progressing: []? Met: []? Not Met: []? Adjusted             ASSESSMENT: Pt with increased tolerance to PT session today. Balance is improving. Limited 1st MT mobility limiting gait.      Treatment/Activity Tolerance:  [] Patient tolerated treatment well [] Patient limited by mar  [x] Patient limited by pain  [] Patient limited by other medical complications  [] Other:      Overall Progression Towards Functional goals/ Treatment Progress Update:  [x] Patient is progressing as expected towards functional goals listed. [] Progression is slowed due to complexities/Impairments listed. [] Progression has been slowed due to co-morbidities. [] Plan just implemented, too soon to assess goals progression <30days   [] Goals require adjustment due to lack of progress  [] Patient is not progressing as expected and requires additional follow up with physician  [] Other     Prognosis for POC: [x] Good [] Fair  [] Poor    Patient requires continued skilled intervention: [x] Yes  [] No        PLAN: Take msmts for progress note next week when pt feels better. [x] Continue per plan of care [] Alter current plan (see comments)  [] Plan of care initiated [] Hold pending MD visit [] Discharge    Electronically signed by: Melissa Almanza PT , OMT-C,  [id number]    Note: If patient does not return for scheduled/recommended follow up visits, this note will serve as a discharge from care along with the most recent update on progress.

## 2021-02-24 ENCOUNTER — OFFICE VISIT (OUTPATIENT)
Dept: ORTHOPEDIC SURGERY | Age: 66
End: 2021-02-24
Payer: MEDICARE

## 2021-02-24 VITALS
SYSTOLIC BLOOD PRESSURE: 110 MMHG | WEIGHT: 213 LBS | HEART RATE: 75 BPM | HEIGHT: 64 IN | TEMPERATURE: 97 F | DIASTOLIC BLOOD PRESSURE: 67 MMHG | BODY MASS INDEX: 36.37 KG/M2

## 2021-02-24 DIAGNOSIS — M25.561 ACUTE PAIN OF RIGHT KNEE: Primary | ICD-10-CM

## 2021-02-24 DIAGNOSIS — M25.562 ACUTE PAIN OF LEFT KNEE: ICD-10-CM

## 2021-02-24 PROCEDURE — G8484 FLU IMMUNIZE NO ADMIN: HCPCS | Performed by: ORTHOPAEDIC SURGERY

## 2021-02-24 PROCEDURE — 1036F TOBACCO NON-USER: CPT | Performed by: ORTHOPAEDIC SURGERY

## 2021-02-24 PROCEDURE — 1090F PRES/ABSN URINE INCON ASSESS: CPT | Performed by: ORTHOPAEDIC SURGERY

## 2021-02-24 PROCEDURE — 3017F COLORECTAL CA SCREEN DOC REV: CPT | Performed by: ORTHOPAEDIC SURGERY

## 2021-02-24 PROCEDURE — 1123F ACP DISCUSS/DSCN MKR DOCD: CPT | Performed by: ORTHOPAEDIC SURGERY

## 2021-02-24 PROCEDURE — G8399 PT W/DXA RESULTS DOCUMENT: HCPCS | Performed by: ORTHOPAEDIC SURGERY

## 2021-02-24 PROCEDURE — 20610 DRAIN/INJ JOINT/BURSA W/O US: CPT | Performed by: ORTHOPAEDIC SURGERY

## 2021-02-24 PROCEDURE — 99214 OFFICE O/P EST MOD 30 MIN: CPT | Performed by: ORTHOPAEDIC SURGERY

## 2021-02-24 PROCEDURE — G8427 DOCREV CUR MEDS BY ELIG CLIN: HCPCS | Performed by: ORTHOPAEDIC SURGERY

## 2021-02-24 PROCEDURE — 4040F PNEUMOC VAC/ADMIN/RCVD: CPT | Performed by: ORTHOPAEDIC SURGERY

## 2021-02-24 PROCEDURE — G8417 CALC BMI ABV UP PARAM F/U: HCPCS | Performed by: ORTHOPAEDIC SURGERY

## 2021-02-24 RX ORDER — METHYLPREDNISOLONE ACETATE 40 MG/ML
80 INJECTION, SUSPENSION INTRA-ARTICULAR; INTRALESIONAL; INTRAMUSCULAR; SOFT TISSUE ONCE
Status: COMPLETED | OUTPATIENT
Start: 2021-02-24 | End: 2021-02-24

## 2021-02-24 RX ORDER — BUPIVACAINE HYDROCHLORIDE 2.5 MG/ML
3 INJECTION, SOLUTION INFILTRATION; PERINEURAL ONCE
Status: COMPLETED | OUTPATIENT
Start: 2021-02-24 | End: 2021-02-24

## 2021-02-24 RX ADMIN — METHYLPREDNISOLONE ACETATE 80 MG: 40 INJECTION, SUSPENSION INTRA-ARTICULAR; INTRALESIONAL; INTRAMUSCULAR; SOFT TISSUE at 13:40

## 2021-02-24 RX ADMIN — BUPIVACAINE HYDROCHLORIDE 7.5 MG: 2.5 INJECTION, SOLUTION INFILTRATION; PERINEURAL at 13:40

## 2021-02-24 RX ADMIN — METHYLPREDNISOLONE ACETATE 80 MG: 40 INJECTION, SUSPENSION INTRA-ARTICULAR; INTRALESIONAL; INTRAMUSCULAR; SOFT TISSUE at 13:41

## 2021-02-24 RX ADMIN — BUPIVACAINE HYDROCHLORIDE 7.5 MG: 2.5 INJECTION, SOLUTION INFILTRATION; PERINEURAL at 13:39

## 2021-02-24 NOTE — PROGRESS NOTES
Nilo AGGARWAL [facility]  [id number]  February 24, 2021    Chief Complaint   Patient presents with    Knee Pain     B knee pain        History: The patient is a 58-year-old pleasant female who is here for evaluation of both knees. We have seen the patient in the past for other orthopedic ailments. The patient reports having bilateral knee pain for nearly 15 years. The pain has been tolerable, but drastically increased over the past month. She does report receiving injections in the remote past with some improvement. Her left knee seems to bother her more than the right. The patient does take ibuprofen on a regular basis. She does use heat for the knees. She has difficulty getting up from a seated position. She does have pain at nighttime. The patient does have a significant past medical history remarkable for CVA with residual right hemiparesis, anxiety, depression, sleep apnea and diabetes. The patient's  past medical history, medications, allergies,  family history, social history, and have been reviewed, and dated and are recorded in the chart. Pertinent items are noted in HPI. Review of systems reviewed from Pertinent History Form dated on 2/24/2021 and available in the patient's chart under the Media tab. Vitals:  /67   Pulse 75   Temp 97 °F (36.1 °C) (Temporal)   Ht 5' 4\" (1.626 m)   Wt 213 lb (96.6 kg)   BMI 36.56 kg/m²     Physical: Ms. Rolando Albrecht appears well, she is in no apparent distress, she demonstrates appropriate mood & affect. She is alert and oriented to person, place and time. Examination of the bilateral lower extremity reveals no pain with range of motion of the hip. She has generalized tenderness to palpation about the joint line of the bilateral knee. Range of motion is from 0 degrees to 120 degrees bilaterally. Strength is 4+/5 for all muscle groups about the right knee and 5/5 for the left knee.  There is patellofemoral crepitus with range of motion of the bilateral knee. Varus and valgus stressing of the knees reveals no evidence of instability. There is a small effusion in the bilateral knee. Anterior drawer and Lachman are negative bilaterally. Examination of the skin reveals no rashes, ulceration, or lesion, bilaterally in the lower extremities. Sensation to both lower extremities is grossly intact. Exam of both feet reveals pedal pulses intact and brisk cap refill. Patient is able to dorsiflex and wiggle all toes. Deep tendon reflexes of the lower extremities are normal and symmetric. X-rays: 4 views of both knees obtained in the office today were extensively reviewed. The x-rays confirm severe osteoarthritis of both knees. The changes are most severe medially and within the patellofemoral compartments. Impression: Bilateral knee osteoarthritis    Plan: At this time, the bilateral knees were injected under sterile conditions. After a Betadine prep of the joints, 3 cc of 0.25% Marcaine and 2 cc of 40 mg Depo-medrol were injected into the knees. The patient tolerated the injections rather well. The patient was instructed to follow up for any signs of infection. Natural history and expected course discussed. Questions answered. Reduction in offending activity. OTC analgesics as needed. The patient will follow up with me in 6 weeks. We did encourage the patient to modify her activities. We instructed the patient on a weight loss program.  If she continues to have pain, we will need to consider total knee arthroplasty.

## 2021-02-26 ENCOUNTER — HOSPITAL ENCOUNTER (OUTPATIENT)
Dept: PHYSICAL THERAPY | Age: 66
Setting detail: THERAPIES SERIES
Discharge: HOME | End: 2021-02-26
Payer: MEDICARE

## 2021-02-26 PROCEDURE — 97140 MANUAL THERAPY 1/> REGIONS: CPT

## 2021-02-26 PROCEDURE — 97110 THERAPEUTIC EXERCISES: CPT

## 2021-02-26 PROCEDURE — 97112 NEUROMUSCULAR REEDUCATION: CPT

## 2021-02-26 NOTE — FLOWSHEET NOTE
[facility]  Outpatient Rehabilitation and Therapy, [facility]  [address] [facility], [facility]  Phone: [phone]   Fax:     [phone]      Physical Therapy Treatment Note/ Progress Report:     Date:  2021    Patient Name:  Rox Agarwal    :  1955  MRN: [id number]    Medical/Treatment Diagnosis Information:  · Dx - Post first MTJ fusion  ·  Decreased functional mobility 2/2 right foot pain    Insurance/Certification information:   [facility] medicare  Physician Information:  Referring Practitioner: Kimber Castellon DPM  Plan of care signed (Y/N):sent at eval - sent again     Date of Patient follow up with Physician:      Progress Report: [x]  Yes  []  No     Date Range for reporting period:  Beginnin2021 -21  Ending:      Progress report due (10 Rx/or 30 days whichever is less): 52    Recertification due (POC duration/ or 90 days whichever is less):21    Visit # POC/Insurance Allowable Auth Needed   14 Bomn []Yes   []No     Latex Allergy:  [x]NO      []YES  Preferred Language for Healthcare:   [x]English       []Other:    SUBJECTIVE:  History of foot pain. 2020 Right foot removal of bone spurring and osseous prominence first metatarsal, arthrodesis of first mtp, bone marrow harvest and concentration right tibia     : Tolerated last appt well  21  Resuming Rx after illness. Did not have Covid. At home walking with house shoe. Outdoors using boot. F/U with MD 21.  21 Saw Dr. Tierra Connolly re: R hip received cortisone injection. Saw Dr. Glenys Carter , D/C boot. resume activities/ driving as tolerated. To PT with shoes and st cane.  - off boot 8 days - to PT - cane too high lowered appropriately  :  Hurting today secondary to hitting foot on something.   : stiff and sore today. Thinks it's because of the cold weather.     - came limping today - no AD, forgot it,  New onset sudden pain 30 sec x 2 Heel raises standing     10x   Rocker brd  F/b s/s x1 min ea BAPS 4-way L2 10x each  With min A from PT for technique to get edges of baps down   Seated   HR/TR 20x each  gurmeet Seated toe DIP extension 10x Seated ankle tband 4-way Lime green 10x ea R   Therapeutic Activity (49271) Min:   5  Tandem gait 2 laps no hand hold Step ups fwds 8\" 10x R Steps reciprocal gait 3 x 4 steps gurmeet hand rails. NMR re-education (40171)  Min:   10 CUES NEEDEDGumdrop: ROM standing                    balance 4 way x 10  R  x1 min NBOS balance w/ EC x1 min Tandem balance x1 min LR AirEx:  *NBOS w/EC  *step up/down on   x1 min  10x R GT flexion isometrics  GT extension isometrics 10x 5 sec holds Manual Intervention (62595) Min: 10     MT/ DIP mobs All MT  joints    PROM first toe  PROM/AAROM all toes 20x  20x    Gentle effleurage right foot x3 mins              Modalities  Min:  No chrg          CP with pillowcase wrapped around mid to forefoot 10  min prosper well               Other Therapeutic Activities: Pt was educated on PT POC, Diagnosis, Prognosis, pathomechanics as well as frequency and duration of scheduling future physical therapy appointments. Time was also taken on this day to answer all patient questions and participation in PT. Reviewed appointment policy in detail with patient and patient verbalized understanding. Home Exercise Program: Patient was instructed in the following for HEP:     . Patient verbalized/demonstrated understanding and was issued written handout. Therapeutic Exercise and NMR EXR  [x] (15034) Provided verbal/tactile cueing for activities related to strengthening, flexibility, endurance, ROM for improvements in LE, proximal hip, and core control with self care, mobility, lifting, ambulation.   [x] (39889) Provided verbal/tactile cueing for activities related to improving balance, coordination, kinesthetic sense, posture, motor skill, proprioception  to assist with LE, proximal hip, and core control in self care, mobility, lifting, ambulation and eccentric single leg control. [address] and Therapeutic Activities:    [x] (71748 or 07910) Provided verbal/tactile cueing for activities related to improving balance, coordination, kinesthetic sense, posture, motor skill, proprioception and motor activation to allow for proper function of core, proximal hip and LE with self care and ADLs and functional mobility.   [] (31654) Gait Re-education- Provided training and instruction to the patient for proper LE, core and proximal hip recruitment and positioning and eccentric body weight control with ambulation re-education including up and down stairs     Home Exercise Program:    [] (74919) Reviewed/Progressed HEP activities related to strengthening, flexibility, endurance, ROM of core, proximal hip and LE for functional self-care, mobility, lifting and ambulation/stair navigation   [] (87424)Reviewed/Progressed HEP activities related to improving balance, coordination, kinesthetic sense, posture, motor skill, proprioception of core, proximal hip and LE for self care, mobility, lifting, and ambulation/stair navigation      Manual Treatments:  PROM / STM / Oscillations-Mobs:  G-I, II, III, IV (PA's, Inf., Post.)  [x] (13606) Provided manual therapy to mobilize LE, proximal hip and/or LS spine soft tissue/joints for the purpose of modulating pain, promoting relaxation,  increasing ROM, reducing/eliminating soft tissue swelling/inflammation/restriction, improving soft tissue extensibility and allowing for proper ROM for normal function with self care, mobility, lifting and ambulation.        Charges:  Timed Code Treatment Minutes: 40   Total Treatment Minutes: 50      [] EVAL (LOW) 72142 (typically 20 minutes face-to-face)  [] EVAL (MOD) 54599 (typically 30 minutes face-to-face)  [] EVAL (HIGH) 80879 (typically 45 minutes face-to-face)  [] RE-EVAL     [x] (78671) x  1  [] Dry needle 1 or 2 Muscles (06177)  [x] NMR (51652) x  1  [] Dry needle 3+ Muscles (09800)  [x] Manual (46091) x1     [] Ultrasound (04015) x  [] TA (59862) x   1  [] Mech Traction (66921)  [] ES(attended) (22016)     [] ES (un) (47563):   [] Vasopump (59699) [] Ionto (59731)   [] Other:      GOALS:  Patient stated goal: get back to driving  []? Progressing: []? Met: []? Not Met: []? Adjusted     Therapist goals for Patient:   Short Term Goals: To be achieved in: 2 weeks  1. Independent in HEP and progression per patient tolerance, in order to prevent re-injury. [x]? Progressing: []? Met: []? Not Met: []? Adjusted  2. Patient will have a decrease in pain to facilitate improvement in movement, function, and ADLs as indicated by Functional Deficits. [x]? Progressing: []? Met: []? Not Met: []? Adjusted     Long Term Goals: To be achieved in:  weeks  1. Disability index score of  20% or less for the LEFS to assist with reaching prior level of function. [x]? Progressing: []? Met: []? Not Met: []? Adjusted  2. Patient will demonstrate increased AROM to  to allow for proper joint functioning as indicated by patients Functional Deficits. [x]? Progressing: []? Met: []? Not Met: []? Adjusted  3. Patient will demonstrate an increase in Strength to good proximal hip strength and control, within 5lb HHD in LE to allow for proper functional mobility as indicated by patients Functional Deficits. [x]? Progressing: []? Met: []? Not Met: []? Adjusted  4. Patient will return to  functional activities without increased symptoms or restriction. [x]? Progressing: []? Met: []? Not Met: []? Adjusted  5. To be able to get around without the walker   [x]? Progressing: []? Met: []? Not Met: []? Adjusted             ASSESSMENT: Pt with increased tolerance to PT session today. Balance is improving. Limited 1st MT mobility limiting gait.      Treatment/Activity Tolerance:  [] Patient tolerated treatment well [] Patient limited by fatique  [x] Patient limited by pain  [] Patient limited by other medical complications  [] Other:      Overall Progression Towards Functional goals/ Treatment Progress Update:  [x] Patient is progressing as expected towards functional goals listed. [] Progression is slowed due to complexities/Impairments listed. [] Progression has been slowed due to co-morbidities. [] Plan just implemented, too soon to assess goals progression <30days   [] Goals require adjustment due to lack of progress  [] Patient is not progressing as expected and requires additional follow up with physician  [] Other     Prognosis for POC: [x] Good [] Fair  [] Poor    Patient requires continued skilled intervention: [x] Yes  [] No        PLAN: Take msmts for progress note next week when pt feels better. [x] Continue per plan of care [] Alter current plan (see comments)  [] Plan of care initiated [] Hold pending MD visit [] Discharge    Electronically signed by: Shanti Pritchard PT , OMT-C,  [id number]    Note: If patient does not return for scheduled/recommended follow up visits, this note will serve as a discharge from care along with the most recent update on progress.

## 2021-03-02 ENCOUNTER — APPOINTMENT (OUTPATIENT)
Dept: PHYSICAL THERAPY | Age: 66
End: 2021-03-02
Payer: MEDICARE

## 2021-03-03 RX ORDER — OMEPRAZOLE 40 MG/1
CAPSULE, DELAYED RELEASE ORAL
Qty: 180 CAPSULE | Refills: 0 | Status: SHIPPED | OUTPATIENT
Start: 2021-03-03 | End: 2021-04-20

## 2021-03-05 ENCOUNTER — TELEPHONE (OUTPATIENT)
Dept: FAMILY MEDICINE CLINIC | Age: 66
End: 2021-03-05

## 2021-03-05 ENCOUNTER — HOSPITAL ENCOUNTER (OUTPATIENT)
Dept: PHYSICAL THERAPY | Age: 66
Setting detail: THERAPIES SERIES
Discharge: HOME | End: 2021-03-05
Payer: MEDICARE

## 2021-03-05 ENCOUNTER — APPOINTMENT (OUTPATIENT)
Dept: PHYSICAL THERAPY | Age: 66
End: 2021-03-05
Payer: MEDICARE

## 2021-03-05 PROCEDURE — 97112 NEUROMUSCULAR REEDUCATION: CPT

## 2021-03-05 PROCEDURE — 97140 MANUAL THERAPY 1/> REGIONS: CPT

## 2021-03-05 PROCEDURE — 97110 THERAPEUTIC EXERCISES: CPT

## 2021-03-05 NOTE — FLOWSHEET NOTE
[facility]  Outpatient Rehabilitation and Therapy, [facility]  [address] [place] [facility], [facility]  Phone: [phone]   Fax:     [phone]      Physical Therapy Treatment Note/ Progress Report:     Date:  3/5/2021    Patient Name:  Larissa Maki    :  1955  MRN: [id number]    Medical/Treatment Diagnosis Information:  · Dx - Post first MTJ fusion  ·  Decreased functional mobility 2/2 right foot pain    Insurance/Certification information:   [facility] medicare  Physician Information:  Referring Practitioner: Ko Jimenez DPM  Plan of care signed (Y/N):sent at eval - sent again     Date of Patient follow up with Physician:      Progress Report: [x]  Yes  []  No     Date Range for reporting period:  Beginnin2021 -21  Ending:      Progress report due (10 Rx/or 30 days whichever is less): 20    Recertification due (POC duration/ or 90 days whichever is less):21    Visit # POC/Insurance Allowable Auth Needed   15 Bomn []Yes   []No     Latex Allergy:  [x]NO      []YES  Preferred Language for Healthcare:   [x]English       []Other:    SUBJECTIVE:  History of foot pain. 2020 Right foot removal of bone spurring and osseous prominence first metatarsal, arthrodesis of first mtp, bone marrow harvest and concentration right tibia     : Tolerated last appt well  21  Resuming Rx after illness. Did not have Covid. At home walking with house shoe. Outdoors using boot. F/U with MD 21.  21 Saw Dr. Roselia Davis re: R hip received cortisone injection. Saw Dr. Pastrana Listen , D/C boot. resume activities/ driving as tolerated. To PT with shoes and st cane.  - off boot 8 days - to PT - cane too high lowered appropriately  :  Hurting today secondary to hitting foot on something.   : stiff and sore today. Thinks it's because of the cold weather.     - came limping today - no AD, forgot it,  New onset sudden pain in the following for HEP:     . Patient verbalized/demonstrated understanding and was issued written handout. Therapeutic Exercise and NMR EXR  [x] (39163) Provided verbal/tactile cueing for activities related to strengthening, flexibility, endurance, ROM for improvements in LE, proximal hip, and core control with self care, mobility, lifting, ambulation. [x] (12400) Provided verbal/tactile cueing for activities related to improving balance, coordination, kinesthetic sense, posture, motor skill, proprioception  to assist with LE, proximal hip, and core control in self care, mobility, lifting, ambulation and eccentric single leg control.  [address] and Therapeutic Activities:    [x] (55375 or 94280) Provided verbal/tactile cueing for activities related to improving balance, coordination, kinesthetic sense, posture, motor skill, proprioception and motor activation to allow for proper function of core, proximal hip and LE with self care and ADLs and functional mobility.   [] (94264) Gait Re-education- Provided training and instruction to the patient for proper LE, core and proximal hip recruitment and positioning and eccentric body weight control with ambulation re-education including up and down stairs     Home Exercise Program:    [] (14450) Reviewed/Progressed HEP activities related to strengthening, flexibility, endurance, ROM of core, proximal hip and LE for functional self-care, mobility, lifting and ambulation/stair navigation   [] (69180)Reviewed/Progressed HEP activities related to improving balance, coordination, kinesthetic sense, posture, motor skill, proprioception of core, proximal hip and LE for self care, mobility, lifting, and ambulation/stair navigation      Manual Treatments:  PROM / STM / Oscillations-Mobs:  G-I, II, III, IV (PA's, Inf., Post.)  [x] (47946) Provided manual therapy to mobilize LE, proximal hip and/or LS spine soft tissue/joints for the purpose of modulating pain, promoting relaxation,  increasing ROM, reducing/eliminating soft tissue swelling/inflammation/restriction, improving soft tissue extensibility and allowing for proper ROM for normal function with self care, mobility, lifting and ambulation. Charges:  Timed Code Treatment Minutes: 40   Total Treatment Minutes: 50      [] EVAL (LOW) 21980 (typically 20 minutes face-to-face)  [] EVAL (MOD) 93009 (typically 30 minutes face-to-face)  [] EVAL (HIGH) 78339 (typically 45 minutes face-to-face)  [] RE-EVAL     [x] BB(43403) x  1  [] Dry needle 1 or 2 Muscles (79514)  [x] NMR (18638) x  1  [] Dry needle 3+ Muscles (50722)  [x] Manual (89915) x1     [] Ultrasound (73299) x  [] TA (09321) x   1  [] Mech Traction (51952)  [] ES(attended) (79005)     [] ES (un) (68253):   [] Vasopump (72933) [] Ionto (97677)   [] Other:      GOALS:  Patient stated goal: get back to driving  []? Progressing: []? Met: []? Not Met: []? Adjusted     Therapist goals for Patient:   Short Term Goals: To be achieved in: 2 weeks  1. Independent in HEP and progression per patient tolerance, in order to prevent re-injury. [x]? Progressing: [x]? Met: []? Not Met: []? Adjusted  2. Patient will have a decrease in pain to facilitate improvement in movement, function, and ADLs as indicated by Functional Deficits. [x]? Progressing: [x]? Met: []? Not Met: []? Adjusted     Long Term Goals: To be achieved in:  weeks  1. Disability index score of  20% or less for the LEFS to assist with reaching prior level of function. [x]? Progressing: [x]? Met: []? Not Met: []? Adjusted  2. Patient will demonstrate increased AROM to  to allow for proper joint functioning as indicated by patients Functional Deficits. [x]? Progressing: [x]? Met: []? Not Met: []? Adjusted  3. Patient will demonstrate an increase in Strength to good proximal hip strength and control, within 5lb HHD in LE to allow for proper functional mobility as indicated by patients Functional Deficits. [x]? Progressing: [x]? Met: []? Not Met: []? Adjusted  4. Patient will return to  functional activities without increased symptoms or restriction. [x]? Progressing: [x]? Met: []? Not Met: []? Adjusted  5. To be able to get around without the walker   [x]? Progressing: [x]? Met: []? Not Met: []? Adjusted             ASSESSMENT: Pt with increased tolerance to PT session today. Balance is improving. Limited 1st MT mobility limiting gait. Treatment/Activity Tolerance:  [] Patient tolerated treatment well [] Patient limited by fatique  [x] Patient limited by pain  [] Patient limited by other medical complications  [] Other:      Overall Progression Towards Functional goals/ Treatment Progress Update:  [x] Patient is progressing as expected towards functional goals listed. [] Progression is slowed due to complexities/Impairments listed. [] Progression has been slowed due to co-morbidities. [] Plan just implemented, too soon to assess goals progression <30days   [] Goals require adjustment due to lack of progress  [] Patient is not progressing as expected and requires additional follow up with physician  [] Other     Prognosis for POC: [x] Good [] Fair  [] Poor    Patient requires continued skilled intervention: [x] Yes  [] No        PLAN: Take msmts for progress note next week when pt feels better. [x] Continue per plan of care [] Alter current plan (see comments)  [] Plan of care initiated [] Hold pending MD visit [] Discharge    Electronically signed by: Darliss Curling, PT , OMT-C,  [id number]    Note: If patient does not return for scheduled/recommended follow up visits, this note will serve as a discharge from care along with the most recent update on progress.

## 2021-03-09 ENCOUNTER — APPOINTMENT (OUTPATIENT)
Dept: PHYSICAL THERAPY | Age: 66
End: 2021-03-09
Payer: MEDICARE

## 2021-03-09 NOTE — PLAN OF CARE
[address] [facility] Outpatient Rehabilitation and Therapy, [facility]  [address] [facility]  Phone: [phone]   Fax:     [phone]      Physical Therapy Discharge Summary    Dear Dr Devan Edwards,    We had the pleasure of treating the following patient for physical therapy services at [address]. A summary of our findings can be found in the discharge summary below. This includes our final assessment. If you have any questions or concerns regarding these findings, please do not hesitate to contact me at the office phone number checked above.   Thank you for the referral.       Physician Signature:________________________________Date:__________________  By signing above, therapists plan is approved by physician          Patient: Valentín Arnold   : 1955   MRN: [id number]     Referring Physician:  Mirella Orellana DPM      Evaluation Date: 3/9/2021        Medical Diagnosis Information:  · Dx - first MTP fusion  · Decreased functional mobility 2/2 decreased mobility     Insurance/Certification information:   [facility]    Date Range of Treatment: -3/5  Total visits:15    Functional Outcomes Measure: at discharge  Test:  Score:  48  40-59%    SUBJECTIVE:  0/10  Notes no pain just a little pressure in the arch area       Pain Scale:010    Functional Limitations: []Sitting []Standing []Walking    []Squatting []Stairs            []ADL's  []Driving [x]Sports/Recreation  []Other:      OBJECTIVE:                                                          eval 12/21               2/19/21                       3/5  Ankle PF   38a/40p                            48a/50p                50/52   Ankle DF   5a/8p                               7a/10p                      8/10   Ankle In   10a/p                                25a/30p                   27/30     Ankle Ev     First mtp            To neutral a/p 10a /10                 10/10     No arom                                                           PROM - 10 extension                                  45 ext                                                                          25 flex   To neutral flexion                                                                     eval 12/21 2/19/21                 3/5  Ankle DF   3+/5                                      4/5                     4+/5   Ankle PF   3+/5                                      4-/5                    4/5   Ankle Inv   3+/5                                       4/5                   4/5   Ankle EV   3+/5                                      4/5                      4/5           ASSESSMENT: Pt has made good gains with ROM, strength, functional mobility and gait. Response to Treatment:   [x]Patient met all goals and has responded well to treatment and will continue HEP   []Patient should continue to improve in reasonable time if they continue HEP   []Patient has plateaued and is no longer responding to skilled PT intervention    []Patient is getting worse and would benefit from return to referring MD   []Patient unable to adhere to initial POC         GOALS:  Patient stated goal: get back to driving  []? ? Progressing: [x]? ? Met: []?? Not Met: []?? Adjusted     Therapist goals for Patient:   Short Term Goals: To be achieved in: 2 weeks  1. Independent in HEP and progression per patient tolerance, in order to prevent re-injury. [x]? ? Progressing: [x]? ? Met: []?? Not Met: []?? Adjusted  2. Patient will have a decrease in pain to facilitate improvement in movement, function, and ADLs as indicated by Functional Deficits. [x]? ? Progressing: [x]? ? Met: []?? Not Met: []?? Adjusted     Long Term Goals: To be achieved in:  weeks  1. Disability index score of  20% or less for the LEFS to assist with reaching prior level of function. [x]? ? Progressing: [x]? ? Met: []?? Not Met: []?? Adjusted  2. Patient will demonstrate increased AROM to  to allow for proper joint functioning as indicated by patients Functional Deficits. [x]? ? Progressing: [x]? ? Met: []?? Not Met: []?? Adjusted  3. Patient will demonstrate an increase in Strength to good proximal hip strength and control, within 5lb HHD in LE to allow for proper functional mobility as indicated by patients Functional Deficits. [x]? ? Progressing: [x]? ? Met: []?? Not Met: []?? Adjusted  4. Patient will return to [address] activities without increased symptoms or restriction. [x]? ? Progressing: [x]? ? Met: []?? Not Met: []?? Adjusted  5. To be able to get around without the walker   [x]? ? Progressing: [x]? ? Met: []?? Not Met: []?? Adjusted                 PLAN: DC from acute PT  . Patient will need to maintain their HEP in order to prevent re-occurrence.       Reason for Discharge:  [x] Goals Met   [] Patient did not return after initial evaluation   [] Progress Plateaued  [] Missed _____ scheduled appointments   [] No insurance coverage [] Patient terminated therapy   [] MD discharged from PT [] Financial Limitations  [] Other:      Electronically signed by:  Harriett Anaya DPT, [id number]

## 2021-03-18 ENCOUNTER — OFFICE VISIT (OUTPATIENT)
Dept: ORTHOPEDIC SURGERY | Age: 66
End: 2021-03-18
Payer: MEDICARE

## 2021-03-18 VITALS
HEIGHT: 64 IN | DIASTOLIC BLOOD PRESSURE: 78 MMHG | HEART RATE: 81 BPM | TEMPERATURE: 96.4 F | BODY MASS INDEX: 36.37 KG/M2 | SYSTOLIC BLOOD PRESSURE: 116 MMHG | WEIGHT: 213 LBS

## 2021-03-18 DIAGNOSIS — M16.11 PRIMARY OSTEOARTHRITIS OF RIGHT HIP: Primary | ICD-10-CM

## 2021-03-18 DIAGNOSIS — M17.11 PRIMARY OSTEOARTHRITIS OF RIGHT KNEE: ICD-10-CM

## 2021-03-18 DIAGNOSIS — M17.12 PRIMARY OSTEOARTHRITIS OF LEFT KNEE: ICD-10-CM

## 2021-03-18 DIAGNOSIS — E11.8 TYPE 2 DIABETES MELLITUS WITH COMPLICATION, WITHOUT LONG-TERM CURRENT USE OF INSULIN (HCC): ICD-10-CM

## 2021-03-18 PROCEDURE — 3017F COLORECTAL CA SCREEN DOC REV: CPT | Performed by: ORTHOPAEDIC SURGERY

## 2021-03-18 PROCEDURE — 1036F TOBACCO NON-USER: CPT | Performed by: ORTHOPAEDIC SURGERY

## 2021-03-18 PROCEDURE — 1123F ACP DISCUSS/DSCN MKR DOCD: CPT | Performed by: ORTHOPAEDIC SURGERY

## 2021-03-18 PROCEDURE — G8399 PT W/DXA RESULTS DOCUMENT: HCPCS | Performed by: ORTHOPAEDIC SURGERY

## 2021-03-18 PROCEDURE — 99214 OFFICE O/P EST MOD 30 MIN: CPT | Performed by: ORTHOPAEDIC SURGERY

## 2021-03-18 PROCEDURE — 1090F PRES/ABSN URINE INCON ASSESS: CPT | Performed by: ORTHOPAEDIC SURGERY

## 2021-03-18 PROCEDURE — 4040F PNEUMOC VAC/ADMIN/RCVD: CPT | Performed by: ORTHOPAEDIC SURGERY

## 2021-03-18 PROCEDURE — G8417 CALC BMI ABV UP PARAM F/U: HCPCS | Performed by: ORTHOPAEDIC SURGERY

## 2021-03-18 PROCEDURE — G8484 FLU IMMUNIZE NO ADMIN: HCPCS | Performed by: ORTHOPAEDIC SURGERY

## 2021-03-18 PROCEDURE — G8427 DOCREV CUR MEDS BY ELIG CLIN: HCPCS | Performed by: ORTHOPAEDIC SURGERY

## 2021-03-18 RX ORDER — IBUPROFEN 600 MG/1
600 TABLET ORAL 2 TIMES DAILY WITH MEALS
Qty: 60 TABLET | Refills: 1 | Status: SHIPPED | OUTPATIENT
Start: 2021-03-18 | End: 2021-05-13

## 2021-03-18 NOTE — PROGRESS NOTES
[facility]ath[facility]ARW[facility]  [id number]  March 18, 2021    Chief Complaint   Patient presents with    Follow-up     Right hip       History: The patient is a 59-year-old pleasant female who is here for evaluation of both knees and her right hip. The right hip injection received back in December by Dr. Bailey Olivarez did provide some relief. She is having mild right hip pain. She continues to complain of rather severe left knee pain. She did receive bilateral knee injections back in late February the knee did provide some relief. Her left knee pain is gradually returning. She has minimal right knee pain. She currently is not taking any inflammatories. She denies any numbness or tingling. She does have difficulty getting up from a seated position due to the left knee issues. She occasionally has left knee pain at night. The patient's  past medical history, medications, allergies,  family history, social history, and have been reviewed, and dated and are recorded in the chart. Pertinent items are noted in HPI. Review of systems reviewed from Pertinent History Form dated on 2/24/2021 and available in the patient's chart under the Media tab. Vitals:  /78   Pulse 81   Temp 96.4 °F (35.8 °C)   Ht 5' 4\" (1.626 m)   Wt 213 lb (96.6 kg)   BMI 36.56 kg/m²     Physical: Ms. Bety Gray appears well, she is in no apparent distress, she demonstrates appropriate mood & affect. She is alert and oriented to person, place and time. Examination of the bilateral lower extremity reveals minimal pain with range of motion of the right hip. She has generalized tenderness to palpation about the joint line of the bilateral knee. Range of motion is from 0 degrees to 120 degrees bilaterally. Strength is 4+/5 for all muscle groups about the right knee and 5/5 for the left knee. There is patellofemoral crepitus with range of motion of the bilateral knee.  Varus and valgus stressing of the knees reveals no evidence of instability. There is a small effusion in the bilateral knee. Anterior drawer and Lachman are negative bilaterally. We internally rotated the right hip to approximately 20 degrees. She flexes both hips to 95 degrees without much pain. Examination of the skin reveals no rashes, ulceration, or lesion, bilaterally in the lower extremities. Sensation to both lower extremities is grossly intact. Exam of both feet reveals pedal pulses intact and brisk cap refill. Patient is able to dorsiflex and wiggle all toes. Deep tendon reflexes of the lower extremities are normal and symmetric. X-rays: 4 views of both knees obtained in the office previously were extensively reviewed. The x-rays confirm severe osteoarthritis of both knees. The changes are most severe medially and within the patellofemoral compartments. AP pelvis and 2 views of the right hip obtained previously were extensively reviewed. There is mild to moderate right hip osteoarthritis. Impression: Bilateral knee osteoarthritis #2 right hip osteoarthritis    Plan: At this time, we will continue to treat the patient conservatively. She is aware that she will ultimately require bilateral total knee arthroplasty. I explained to the patient that a great deal of difficulty are related to the severe arthritis of her knees. She was given a prescription for ibuprofen 600 mg twice a day with food. She was encouraged to modify her activities. She will work on general strengthening. The patient will follow up with me in 6 weeks. If she continues to have the left knee pain, we will consider total knee arthroplasty versus injection.

## 2021-03-19 RX ORDER — INSULIN GLARGINE 100 [IU]/ML
INJECTION, SOLUTION SUBCUTANEOUS
Qty: 5 PEN | Refills: 4 | Status: SHIPPED | OUTPATIENT
Start: 2021-03-19 | End: 2021-05-18 | Stop reason: SDUPTHER

## 2021-04-16 ENCOUNTER — APPOINTMENT (OUTPATIENT)
Dept: GENERAL RADIOLOGY | Age: 66
End: 2021-04-16
Payer: MEDICARE

## 2021-04-16 ENCOUNTER — HOSPITAL ENCOUNTER (EMERGENCY)
Age: 66
Discharge: HOME | End: 2021-04-16
Attending: EMERGENCY MEDICINE
Payer: MEDICARE

## 2021-04-16 ENCOUNTER — APPOINTMENT (OUTPATIENT)
Dept: CT IMAGING | Age: 66
End: 2021-04-16
Payer: MEDICARE

## 2021-04-16 VITALS
WEIGHT: 205.5 LBS | TEMPERATURE: 97.3 F | BODY MASS INDEX: 35.08 KG/M2 | RESPIRATION RATE: 12 BRPM | OXYGEN SATURATION: 99 % | DIASTOLIC BLOOD PRESSURE: 88 MMHG | HEIGHT: 64 IN | SYSTOLIC BLOOD PRESSURE: 132 MMHG | HEART RATE: 76 BPM

## 2021-04-16 DIAGNOSIS — Z20.822 SUSPECTED COVID-19 VIRUS INFECTION: Primary | ICD-10-CM

## 2021-04-16 DIAGNOSIS — R51.9 NONINTRACTABLE EPISODIC HEADACHE, UNSPECIFIED HEADACHE TYPE: ICD-10-CM

## 2021-04-16 DIAGNOSIS — R10.9 ACUTE ABDOMINAL PAIN: ICD-10-CM

## 2021-04-16 LAB
A/G RATIO: 1.2 (ref 1.1–2.2)
ALBUMIN SERPL-MCNC: 4.2 G/DL (ref 3.4–5)
ALP BLD-CCNC: 101 U/L (ref 40–129)
ALT SERPL-CCNC: 22 U/L (ref 10–40)
ANION GAP SERPL CALCULATED.3IONS-SCNC: 11 MMOL/L (ref 3–16)
AST SERPL-CCNC: 35 U/L (ref 15–37)
BASOPHILS ABSOLUTE: 0 K/UL (ref 0–0.2)
BASOPHILS RELATIVE PERCENT: 0.6 %
BILIRUB SERPL-MCNC: <0.2 MG/DL (ref 0–1)
BILIRUBIN URINE: NEGATIVE
BLOOD, URINE: NEGATIVE
BUN BLDV-MCNC: 10 MG/DL (ref 7–20)
CALCIUM SERPL-MCNC: 11 MG/DL (ref 8.3–10.6)
CHLORIDE BLD-SCNC: 101 MMOL/L (ref 99–110)
CLARITY: CLEAR
CO2: 27 MMOL/L (ref 21–32)
COLOR: ABNORMAL
CREAT SERPL-MCNC: 0.9 MG/DL (ref 0.6–1.2)
EKG ATRIAL RATE: 66 BPM
EKG DIAGNOSIS: NORMAL
EKG P AXIS: 45 DEGREES
EKG P-R INTERVAL: 192 MS
EKG Q-T INTERVAL: 408 MS
EKG QRS DURATION: 70 MS
EKG QTC CALCULATION (BAZETT): 427 MS
EKG R AXIS: 15 DEGREES
EKG T AXIS: 23 DEGREES
EKG VENTRICULAR RATE: 66 BPM
EOSINOPHILS ABSOLUTE: 0.3 K/UL (ref 0–0.6)
EOSINOPHILS RELATIVE PERCENT: 3.5 %
EPITHELIAL CELLS, UA: NORMAL /HPF (ref 0–5)
GFR AFRICAN AMERICAN: >60
GFR NON-AFRICAN AMERICAN: >60
GLOBULIN: 3.5 G/DL
GLUCOSE BLD-MCNC: 72 MG/DL (ref 70–99)
GLUCOSE URINE: NEGATIVE MG/DL
HCT VFR BLD CALC: 38.9 % (ref 36–48)
HEMOGLOBIN: 12.6 G/DL (ref 12–16)
KETONES, URINE: NEGATIVE MG/DL
LEUKOCYTE ESTERASE, URINE: ABNORMAL
LIPASE: 86 U/L (ref 13–60)
LYMPHOCYTES ABSOLUTE: 3.3 K/UL (ref 1–5.1)
LYMPHOCYTES RELATIVE PERCENT: 42.9 %
MCH RBC QN AUTO: 27.3 PG (ref 26–34)
MCHC RBC AUTO-ENTMCNC: 32.4 G/DL (ref 31–36)
MCV RBC AUTO: 84.2 FL (ref 80–100)
MICROSCOPIC EXAMINATION: YES
MONOCYTES ABSOLUTE: 0.4 K/UL (ref 0–1.3)
MONOCYTES RELATIVE PERCENT: 5.5 %
NEUTROPHILS ABSOLUTE: 3.6 K/UL (ref 1.7–7.7)
NEUTROPHILS RELATIVE PERCENT: 47.5 %
NITRITE, URINE: NEGATIVE
PDW BLD-RTO: 14.7 % (ref 12.4–15.4)
PH UA: 6.5 (ref 5–8)
PLATELET # BLD: 345 K/UL (ref 135–450)
PMV BLD AUTO: 7.5 FL (ref 5–10.5)
POTASSIUM REFLEX MAGNESIUM: 4.2 MMOL/L (ref 3.5–5.1)
PROTEIN UA: NEGATIVE MG/DL
RBC # BLD: 4.63 M/UL (ref 4–5.2)
RBC UA: NORMAL /HPF (ref 0–4)
SODIUM BLD-SCNC: 139 MMOL/L (ref 136–145)
SPECIFIC GRAVITY UA: 1.01 (ref 1–1.03)
TOTAL PROTEIN: 7.7 G/DL (ref 6.4–8.2)
TROPONIN: <0.01 NG/ML
URINE REFLEX TO CULTURE: ABNORMAL
URINE TYPE: ABNORMAL
UROBILINOGEN, URINE: 0.2 E.U./DL
WBC # BLD: 7.7 K/UL (ref 4–11)
WBC UA: NORMAL /HPF (ref 0–5)

## 2021-04-16 PROCEDURE — 80053 COMPREHEN METABOLIC PANEL: CPT

## 2021-04-16 PROCEDURE — U0005 INFEC AGEN DETEC AMPLI PROBE: HCPCS

## 2021-04-16 PROCEDURE — 99284 EMERGENCY DEPT VISIT MOD MDM: CPT

## 2021-04-16 PROCEDURE — 36415 COLL VENOUS BLD VENIPUNCTURE: CPT

## 2021-04-16 PROCEDURE — 74177 CT ABD & PELVIS W/CONTRAST: CPT

## 2021-04-16 PROCEDURE — 85025 COMPLETE CBC W/AUTO DIFF WBC: CPT

## 2021-04-16 PROCEDURE — 71045 X-RAY EXAM CHEST 1 VIEW: CPT

## 2021-04-16 PROCEDURE — 6360000004 HC RX CONTRAST MEDICATION: Performed by: EMERGENCY MEDICINE

## 2021-04-16 PROCEDURE — 83690 ASSAY OF LIPASE: CPT

## 2021-04-16 PROCEDURE — 93010 ELECTROCARDIOGRAM REPORT: CPT | Performed by: INTERNAL MEDICINE

## 2021-04-16 PROCEDURE — U0003 INFECTIOUS AGENT DETECTION BY NUCLEIC ACID (DNA OR RNA); SEVERE ACUTE RESPIRATORY SYNDROME CORONAVIRUS 2 (SARS-COV-2) (CORONAVIRUS DISEASE [COVID-19]), AMPLIFIED PROBE TECHNIQUE, MAKING USE OF HIGH THROUGHPUT TECHNOLOGIES AS DESCRIBED BY CMS-2020-01-R: HCPCS

## 2021-04-16 PROCEDURE — 93005 ELECTROCARDIOGRAM TRACING: CPT | Performed by: EMERGENCY MEDICINE

## 2021-04-16 PROCEDURE — 81001 URINALYSIS AUTO W/SCOPE: CPT

## 2021-04-16 PROCEDURE — 70450 CT HEAD/BRAIN W/O DYE: CPT

## 2021-04-16 PROCEDURE — 84484 ASSAY OF TROPONIN QUANT: CPT

## 2021-04-16 RX ORDER — ONDANSETRON 4 MG/1
4 TABLET, ORALLY DISINTEGRATING ORAL EVERY 8 HOURS PRN
Qty: 20 TABLET | Refills: 0 | Status: SHIPPED | OUTPATIENT
Start: 2021-04-16 | End: 2021-05-11

## 2021-04-16 RX ADMIN — IOPAMIDOL 100 ML: 755 INJECTION, SOLUTION INTRAVENOUS at 14:38

## 2021-04-16 ASSESSMENT — PAIN DESCRIPTION - DESCRIPTORS: DESCRIPTORS: ACHING;HEADACHE

## 2021-04-16 ASSESSMENT — PAIN SCALES - GENERAL: PAINLEVEL_OUTOF10: 6

## 2021-04-16 ASSESSMENT — PAIN DESCRIPTION - LOCATION: LOCATION: HEAD

## 2021-04-16 ASSESSMENT — PAIN DESCRIPTION - PAIN TYPE: TYPE: ACUTE PAIN

## 2021-04-16 NOTE — ED PROVIDER NOTES
[facility] ENCOUNTER      Pt Name: Rupinder Wood  MRN: [id number]  Rm 1955  Date of evaluation: 4/16/2021  Provider: Chayo Marie, [facility]       Chief Complaint   Patient presents with    Concern For COVID-19     received 2nd vaccine last week. Presents with H/A, chills, fever, body aches, nasal congestion         HISTORY OF PRESENT ILLNESS   (Location/Symptom, Timing/Onset, Context/Setting, Quality, Duration, Modifying Factors, Severity)  Note limiting factors. Angelique Sweeney is a 76 y.o. female who presents to the emergency department with a complaint of onset of body aches, chills, nasal congestion, mild headache, nausea, abdominal pain, diarrhea that began on Thursday of last week, 8 days ago. She states that she received her second Covid vaccination on Wednesday the day prior. She had some slight itching after the Covid vaccination but denies any other adverse symptoms immediately. She does report that yesterday evening she had a temperature of 100.0. She has had some intermittent chills. She describes a mild generalized headache but denies any severe headache, neck pain, stiffness, photophobia, focal weakness or numbness. She states that the headache has been relatively constant since last week. She denies any vision change, speech change, facial droop, difficulty speaking, or difficulty walking. She describes some aching intermittently sharp epigastric abdominal pain that comes and goes. She has been nauseated and has had some loose stools but denies any vomiting. Last bowel movement was earlier today. She reports 3 loose stools over the last 24 hours. She denies any melena medic easier hematemesis. She denies any dysuria hematuria frequency urgency. No trauma or injury. She denies any chest pain heaviness pressure or tightness. She denies any shortness of breath or dyspnea on exertion.   She denies any rash.  She is able to swallow liquids. She denies any sore throat. She denies any earache. Medical history is significant for hypertension, hyperlipidemia, and diabetes. She denies any personal or family history of coronary artery disease or thromboembolic disease. She does not smoke. She denies any history of COPD or asthma. Nursing Notes were reviewed. HPI        REVIEW OF SYSTEMS    (2-9 systems for level 4, 10 or more for level 5)           Eyes: Negative for redness or drainage. Respiratory: Negative for shortness of breath or dyspnea on exertion. Cardiovascular: Negative for chest pain. Musculoskeletal:  Negative edema. Hematological: Negative for adenopathy. All systems are reviewed and are negative except for those listed above in the history of present illness and ROS.         PAST MEDICAL HISTORY     Past Medical History:   Diagnosis Date    Arthritis     Cardiomyopathy ([address]     Diabetes     GERD (gastroesophageal reflux disease)     Hyperlipidemia     Hypertension     Lumbar disc disease     Sleep apnea     pt states does use a Cpap machine at night    Unspecified cerebral artery occlusion with cerebral infarction 2014    right side weak    Wears glasses          SURGICAL HISTORY       Past Surgical History:   Procedure Laterality Date    ARTHRODESIS Right 9/17/2020    (RIGHT) REMOVAL OF BONE SPURRING AND OSSEOUS PROMINENCE FIRST METATARSAL, ARTHRODESIS OF FIRST METATARSOPHALANGEAL JOINT, BONE MARROW HARVEST AND CONCENTRATION RIGHT TIBIA performed by Harrietta Goldberg, DPM at [address] [place] [address] Left 9/3/15    CARPAL TUNNEL RELEASE Right 9/22/15    COLONOSCOPY      FINGER TRIGGER RELEASE Left 9/3/15    middle and ring fingers    FINGER TRIGGER RELEASE Right 9/22/15    middle and ring fingers    FOOT SURGERY Bilateral     litteltoe    HAND SURGERY Right     Ligament    PARTIAL HYSTERECTOMY  08/2003    SHOULDER ARTHROSCOPY Right 06/20/2018 Diagnostic scope, rcr, open Apex         CURRENT MEDICATIONS       Previous Medications    ALBUTEROL SULFATE HFA (PROVENTIL HFA) 108 (90 BASE) MCG/ACT INHALER    Inhale 2 puffs into the lungs every 4 hours as needed for Wheezing or Shortness of Breath May substitute ProAir MDI    AMMONIUM LACTATE (LAC-HYDRIN) 12 % LOTION    Apply topically daily. ASPIRIN 81 MG EC TABLET    Take 1 tablet by mouth daily.     ATORVASTATIN (LIPITOR) 20 MG TABLET    TAKE ONE TABLET BY MOUTH DAILY    BLOOD GLUCOSE MONITORING SUPPL (TRUE METRIX AIR GLUCOSE METER) W/DEVICE KIT    1 each by Does not apply route daily    BLOOD GLUCOSE TEST STRIPS (FREESTYLE LITE) STRIP    TEST BLOOD SUGAR TWO TIMES A DAY Diag: E11.9    BLOOD GLUCOSE TEST STRIPS (ONETOUCH ULTRA) STRIP    TEST TWO TIMES A DAY    DULAGLUTIDE (TRULICITY) 1.5 FR/9.8AV SOPN    INJECT 1.5 MG UNDER THE SKIN ONCE WEEKLY    DULOXETINE (CYMBALTA) 60 MG EXTENDED RELEASE CAPSULE    Take 60 mg by mouth daily    FANAPT 1 MG TABS TABLET    Take 1 mg by mouth nightly     FREESTYLE LANCETS MISC    USE ONE LANCET TO TEST BLOOD SUGAR TWICE A DAY    FUROSEMIDE (LASIX) 20 MG TABLET    TAKE ONE TABLET BY MOUTH DAILY AS NEEDED FOR LEG SWELLING    GABAPENTIN (NEURONTIN) 300 MG CAPSULE    TAKE ONE CAPSULE BY MOUTH ONCE NIGHTLY    IBUPROFEN (ADVIL;MOTRIN) 600 MG TABLET    Take 1 tablet by mouth 2 times daily (with meals)    IBUPROFEN (ADVIL;MOTRIN) 800 MG TABLET    Take 1 tablet by mouth 2 times daily as needed for Pain    INSULIN PEN NEEDLE (PEN NEEDLES) 31G X 6 MM MISC    USE TO INJECT INSULIN DAILY    IPRATROPIUM-ALBUTEROL (DUONEB) 0.5-2.5 (3) MG/3ML SOLN NEBULIZER SOLUTION    Inhale 3 mLs into the lungs every 4 hours as needed for Shortness of Breath (wheezing coughing)    LANTUS SOLOSTAR 100 UNIT/ML INJECTION PEN    INJECT 16 UNITS UNDER THE SKIN ONCE NIGHTLY    METFORMIN (GLUCOPHAGE) 500 MG TABLET    TAKE TWO TABLETS BY MOUTH TWICE A DAY WITH MEALS    METOPROLOL SUCCINATE (TOPROL XL) 25 MG EXTENDED RELEASE TABLET    TAKE ONE TABLET BY MOUTH DAILY FOR BLOOD PRESSURE AND HEART    MISC.  DEVICES (WALKER) MISC    One four wheel walker    NEBULIZERS (COMPRESSOR/NEBULIZER) MISC    Use to take medication Q4H PRN    OMEPRAZOLE (PRILOSEC) 40 MG DELAYED RELEASE CAPSULE    TAKE ONE CAPSULE BY MOUTH TWICE A DAY BEFORE MEALS    RESPIRATORY THERAPY SUPPLIES (NEBULIZER) MAXIMILIANO    Used to give yourself breathing treatment    TENS UNIT MISC    by Does not apply route    TIZANIDINE (ZANAFLEX) 4 MG TABLET    Take 1 tablet by mouth nightly as needed (back pain)    TRAZODONE (DESYREL) 100 MG TABLET    Take 100 mg by mouth nightly    TRIMETHOPRIM-POLYMYXIN B (POLYTRIM) 77472-8.1 UNIT/ML-% OPHTHALMIC SOLUTION    1 drop every 4 hours as needed (redness)     VALSARTAN (DIOVAN) 80 MG TABLET    TAKE ONE TABLET BY MOUTH DAILY    VITAMIN C (ASCORBIC ACID) 500 MG TABLET    Take 500 mg by mouth daily       ALLERGIES     Codeine, Vicodin [hydrocodone-acetaminophen], Oxycontin [oxycodone hcl], and Tramadol    FAMILY HISTORY       Family History   Problem Relation Age of Onset    Heart Disease Mother     High Blood Pressure Mother     Stroke Mother     Cancer Brother         lung cancer          SOCIAL HISTORY       Social History     Socioeconomic History    Marital status:      Spouse name: Robert Clark, seen here    Number of children: 3    Years of education: None    Highest education level: None   Occupational History    None   Social Needs    Financial resource strain: None    Food insecurity     Worry: None     Inability: None    Transportation needs     Medical: None     Non-medical: None   Tobacco Use    Smoking status: Former Smoker     Packs/day: 1.50     Years: 3.00     Pack years: 4.50     Quit date: 1992     Years since quittin.3    Smokeless tobacco: Never Used   Substance and Sexual Activity    Alcohol use: No    Drug use: No    Sexual activity: Yes     Partners: Male   Lifestyle    Physical activity     Days per week: None     Minutes per session: None    Stress: None   Relationships    Social connections     Talks on phone: None     Gets together: None     Attends [employer] service: None     Active member of club or organization: None     Attends meetings of clubs or organizations: None     Relationship status: None    Intimate partner violence     Fear of current or ex partner: None     Emotionally abused: None     Physically abused: None     Forced sexual activity: None   Other Topics Concern    None   Social History Narrative    Originally from [facility] [address] is seen here    10 grandchildren       SCREENINGS             PHYSICAL EXAM    (up to 7 for level 4, 8 or more for level 5)     ED Triage Vitals [04/16/21 1137]   BP Temp Temp Source Pulse Resp SpO2 Height Weight   (!) 136/92 97.3 °F (36.3 °C) Skin 84 14 98 % 5' 4\" (1.626 m) 205 lb 8 oz (93.2 kg)         Physical Exam   Constitutional: Awake and alert. Nontoxic. Not ill-appearing. Head: No visible evidence of trauma. Normocephalic. Eyes: Pupils equal and reactive. No photophobia. Conjunctiva normal.    HENT: Oral mucosa moist.  Airway patent. Pharynx without erythema. Nares were clear. Tympanic membranes intact bilaterally without erythema. Neck:  Soft and supple. Nontender. No meningeal signs. Heart:  Regular rate and rhythm. No murmur. Lungs:  Clear to auscultation. No wheezes, rales, or ronchi. No conversational dyspnea or accessory muscle use. Chest: Chest wall non-tender. No evidence of trauma. Abdomen:  Soft, nondistended, bowel sounds present. Mild to moderate mid abdominal tenderness in the midline just above the umbilicus. No guarding rigidity or rebound. No masses. Musculoskeletal: Extremities non-tender with full range of motion. Radial and dorsalis pedis pulses were intact. No calf tenderness erythema or edema. Neurological: Alert and oriented x 3. Speech clear.   Cranial nerves II-XII intact. No facial droop. No acute focal motor or sensory deficits. Alert and oriented x3. GCS 15. Cranial nerves II through XII were intact. No facial droop. No dysarthria. No aphasia. No pronator drift. No acute focal motor or sensory deficits. Negative finger-to-nose. Negative heel-to-shin. Deep tendon reflexes were 2/4 in the upper and lower extremities bilaterally. Gait steady. No visual field deficits. Skin: Skin is warm and dry. No rash. Lymphatic:  No lympadenopathy. Psychiatric: Normal mood and affect. Behavior is normal.         DIAGNOSTIC RESULTS     EKG: All EKG's are interpreted by the Emergency Department Physician who either signs or Co-signs this chart in the absence of a cardiologist.    Normal sinus rhythm. Rate 66. WI interval 192 ms. QRS duration 70 ms. QTc 427 ms. Axis XV degrees. There was no ST elevation. Otherwise normal EKG. RADIOLOGY:   Non-plain film images such as CT, Ultrasound and MRI are read by the radiologist. Plain radiographic images are visualized and preliminarily interpreted by the emergency physician with the below findings:        Interpretation per the Radiologist below, if available at the time of this note:    CT ABDOMEN PELVIS W IV CONTRAST Additional Contrast? None   Final Result   1. No acute abnormality. CT Head WO Contrast   Final Result   No acute intracranial abnormality. No change from prior study. XR CHEST PORTABLE   Final Result   No evidence of an acute cardiopulmonary process.                ED BEDSIDE ULTRASOUND:   Performed by ED Physician - none    LABS:  Labs Reviewed   COMPREHENSIVE METABOLIC PANEL W/ REFLEX TO MG FOR LOW K - Abnormal; Notable for the following components:       Result Value    Calcium 11.0 (*)     All other components within normal limits    Narrative:     Performed at:  [facility]) - [facility]  [address] [facility]   Phone [phone]   LIPASE - Abnormal; Notable for the following components:    Lipase 86.0 (*)     All other components within normal limits    Narrative:     Performed at:  [facility]  [address] [place], [place]   Phone [phone]   URINE RT REFLEX TO CULTURE - Abnormal; Notable for the following components:    Leukocyte Esterase, Urine TRACE (*)     All other components within normal limits    Narrative:     Performed at:  [facility]  [address] [place]   Phone [phone]   CBC WITH AUTO DIFFERENTIAL    Narrative:     Performed at:  [facility]  [address] [place], [place]   Phone [phone]   TROPONIN    Narrative:     Performed at:  [address] [facility]  [address] [place], [place]   Phone [phone]   MICROSCOPIC URINALYSIS    Narrative:     Performed at:  [address] [facility]  [address], [place]   Phone [phone]       All other labs were within normal range or not returned as of this dictation. EMERGENCY DEPARTMENT COURSE and DIFFERENTIAL DIAGNOSIS/MDM:   Vitals:    Vitals:    04/16/21 1137   BP: (!) 136/92   Pulse: 84   Resp: 14   Temp: 97.3 °F (36.3 °C)   TempSrc: Skin   SpO2: 98%   Weight: 205 lb 8 oz (93.2 kg)   Height: 5' 4\" (1.626 m)         MDM      The patient presents with multiple flulike symptoms as noted above. She is not ill-appearing. She appears comfortable. She is neurologically intact. At the time of exam she rated her headache a 1 or 2/10 intensity. She did have tenderness in the midepigastric area but no guarding or rebound. She did not require pain medication.   She was sent for CT head without contrast as well as CT abdomen and pelvis with IV contrast.    Differential instructions and COVID-19 general instructions that are attached. Watch for chest pain, shortness of breath, or worsening symptoms. If condition worsens or new symptoms develop, return immediately to the emergency department. Drink plenty of fluids. Recommend Tylenol or ibuprofen as directed for pain or fever. CRITICAL CARE TIME   Total Critical Care time was 0 minutes, excluding separately reportable procedures. There was a high probability of clinically significant/life threatening deterioration in the patient's condition which required my urgent intervention. CONSULTS:  None    PROCEDURES:  Unless otherwise noted below, none     Procedures        FINAL IMPRESSION      1. Suspected COVID-19 virus infection    2. Acute abdominal pain    3. Nonintractable episodic headache, unspecified headache type          DISPOSITION/PLAN   DISPOSITION        PATIENT REFERRED TO:  Army Goltz Day, MD  [place] [address]  [phone]    Call today        DISCHARGE MEDICATIONS:  New Prescriptions    ONDANSETRON (ZOFRAN ODT) 4 MG DISINTEGRATING TABLET    Take 1 tablet by mouth every 8 hours as needed for Nausea     Controlled Substances Monitoring:     RX Monitoring 8/13/2019   Attestation -   Periodic Controlled Substance Monitoring No signs of potential drug abuse or diversion identified. (Please note that portions of this note were completed with a voice recognition program.  Efforts were made to edit the dictations but occasionally words are mis-transcribed. )    Markus Lawson DO (electronically signed)  Attending Emergency Physician          Mike Ham DO  04/16/21 [address] [place] [address],   04/16/21 0193

## 2021-04-17 ENCOUNTER — CARE COORDINATION (OUTPATIENT)
Dept: CARE COORDINATION | Age: 66
End: 2021-04-17

## 2021-04-17 LAB — SARS-COV-2: NOT DETECTED

## 2021-04-17 NOTE — CARE COORDINATION
Patient contacted regarding recent discharge and COVID-19 risk. Discussed COVID-19 related testing which was available at this time. Test results were negative. Patient informed of results, if available? Yes     Care Transition Nurse/ Ambulatory Care Manager contacted the patient by telephone to perform post discharge assessment. Verified name and  with patient as identifiers. Patient has following risk factors of: diabetes. CTN/ACM reviewed discharge instructions, medical action plan and red flags related to discharge diagnosis. Reviewed and educated them on any new and changed medications related to discharge diagnosis. Advised obtaining a 90-day supply of all daily and as-needed medications. pt reports feelin \" about the same\" reviewed dc instructions supportive care and when to seek care. With vu  Covid test results reviewed   Education provided regarding infection prevention, and signs and symptoms of COVID-19 and when to seek medical attention with patient who verbalized understanding. Discussed exposure protocols and quarantine from [address] you at higher risk for severe illness  and given an opportunity for questions and concerns. The patient agrees to contact the COVID-19 hotline [phone] or PCP office for questions related to their healthcare. CTN/ACM provided contact information for future reference. From CDC: Are you at higher risk for severe illness?  Wash your hands often.  Avoid close contact (6 feet, which is about two arm lengths) with people who are sick.  Put distance between yourself and other people if COVID-19 is spreading in your community.  Clean and disinfect frequently touched surfaces.  Avoid all cruise travel and non-essential air travel.  Call your healthcare professional if you have concerns about COVID-19 and your underlying condition or if you are sick.     For more information on steps you can take to protect yourself, see CDC's How to Protect Yourself    Pt will be further monitored by COVID Loop Team based on severity of symptoms and risk factors. Email :[email or web address]  Phone:  [phone]

## 2021-04-20 ENCOUNTER — APPOINTMENT (OUTPATIENT)
Dept: CT IMAGING | Age: 66
End: 2021-04-20
Payer: MEDICARE

## 2021-04-20 ENCOUNTER — HOSPITAL ENCOUNTER (EMERGENCY)
Age: 66
Discharge: HOME | End: 2021-04-20
Attending: STUDENT IN AN ORGANIZED HEALTH CARE EDUCATION/TRAINING PROGRAM
Payer: MEDICARE

## 2021-04-20 ENCOUNTER — APPOINTMENT (OUTPATIENT)
Dept: GENERAL RADIOLOGY | Age: 66
End: 2021-04-20
Payer: MEDICARE

## 2021-04-20 VITALS
BODY MASS INDEX: 35.15 KG/M2 | SYSTOLIC BLOOD PRESSURE: 123 MMHG | HEART RATE: 83 BPM | DIASTOLIC BLOOD PRESSURE: 74 MMHG | WEIGHT: 205.91 LBS | RESPIRATION RATE: 18 BRPM | TEMPERATURE: 98.3 F | HEIGHT: 64 IN | OXYGEN SATURATION: 99 %

## 2021-04-20 DIAGNOSIS — E86.0 DEHYDRATION: ICD-10-CM

## 2021-04-20 DIAGNOSIS — R10.12 LEFT UPPER QUADRANT ABDOMINAL PAIN: ICD-10-CM

## 2021-04-20 DIAGNOSIS — B34.9 VIRAL ILLNESS: Primary | ICD-10-CM

## 2021-04-20 LAB
ALBUMIN SERPL-MCNC: 4.2 G/DL (ref 3.4–5)
ALP BLD-CCNC: 99 U/L (ref 40–129)
ALT SERPL-CCNC: 16 U/L (ref 10–40)
ANION GAP SERPL CALCULATED.3IONS-SCNC: 10 MMOL/L (ref 3–16)
AST SERPL-CCNC: 20 U/L (ref 15–37)
BASOPHILS ABSOLUTE: 0 K/UL (ref 0–0.2)
BASOPHILS RELATIVE PERCENT: 0.5 %
BILIRUB SERPL-MCNC: <0.2 MG/DL (ref 0–1)
BILIRUBIN DIRECT: <0.2 MG/DL (ref 0–0.3)
BILIRUBIN URINE: NEGATIVE
BILIRUBIN, INDIRECT: NORMAL MG/DL (ref 0–1)
BLOOD, URINE: NEGATIVE
BUN BLDV-MCNC: 10 MG/DL (ref 7–20)
CALCIUM SERPL-MCNC: 9.6 MG/DL (ref 8.3–10.6)
CHLORIDE BLD-SCNC: 102 MMOL/L (ref 99–110)
CLARITY: CLEAR
CO2: 24 MMOL/L (ref 21–32)
COLOR: YELLOW
CREAT SERPL-MCNC: 0.9 MG/DL (ref 0.6–1.2)
EKG ATRIAL RATE: 65 BPM
EKG DIAGNOSIS: NORMAL
EKG P AXIS: 55 DEGREES
EKG P-R INTERVAL: 198 MS
EKG Q-T INTERVAL: 428 MS
EKG QRS DURATION: 78 MS
EKG QTC CALCULATION (BAZETT): 445 MS
EKG R AXIS: 42 DEGREES
EKG T AXIS: 44 DEGREES
EKG VENTRICULAR RATE: 65 BPM
EOSINOPHILS ABSOLUTE: 0.3 K/UL (ref 0–0.6)
EOSINOPHILS RELATIVE PERCENT: 5.4 %
EPITHELIAL CELLS, UA: 1 /HPF (ref 0–5)
GFR AFRICAN AMERICAN: >60
GFR NON-AFRICAN AMERICAN: >60
GLUCOSE BLD-MCNC: 191 MG/DL (ref 70–99)
GLUCOSE URINE: NEGATIVE MG/DL
HCT VFR BLD CALC: 36.6 % (ref 36–48)
HEMOGLOBIN: 12.2 G/DL (ref 12–16)
HYALINE CASTS: 1 /LPF (ref 0–8)
KETONES, URINE: NEGATIVE MG/DL
LEUKOCYTE ESTERASE, URINE: ABNORMAL
LIPASE: 105 U/L (ref 13–60)
LYMPHOCYTES ABSOLUTE: 2.9 K/UL (ref 1–5.1)
LYMPHOCYTES RELATIVE PERCENT: 46.1 %
MCH RBC QN AUTO: 27.9 PG (ref 26–34)
MCHC RBC AUTO-ENTMCNC: 33.4 G/DL (ref 31–36)
MCV RBC AUTO: 83.4 FL (ref 80–100)
MICROSCOPIC EXAMINATION: YES
MONOCYTES ABSOLUTE: 0.4 K/UL (ref 0–1.3)
MONOCYTES RELATIVE PERCENT: 6.6 %
NEUTROPHILS ABSOLUTE: 2.6 K/UL (ref 1.7–7.7)
NEUTROPHILS RELATIVE PERCENT: 41.4 %
NITRITE, URINE: NEGATIVE
PDW BLD-RTO: 14.4 % (ref 12.4–15.4)
PH UA: 6 (ref 5–8)
PLATELET # BLD: 328 K/UL (ref 135–450)
PMV BLD AUTO: 7.5 FL (ref 5–10.5)
POTASSIUM REFLEX MAGNESIUM: 4.8 MMOL/L (ref 3.5–5.1)
PRO-BNP: 14 PG/ML (ref 0–124)
PROTEIN UA: NEGATIVE MG/DL
RBC # BLD: 4.39 M/UL (ref 4–5.2)
RBC UA: 1 /HPF (ref 0–4)
SODIUM BLD-SCNC: 136 MMOL/L (ref 136–145)
SPECIFIC GRAVITY UA: 1.01 (ref 1–1.03)
TOTAL CK: 272 U/L (ref 26–192)
TOTAL PROTEIN: 7.3 G/DL (ref 6.4–8.2)
TROPONIN: <0.01 NG/ML
URINE REFLEX TO CULTURE: ABNORMAL
URINE TYPE: ABNORMAL
UROBILINOGEN, URINE: 0.2 E.U./DL
WBC # BLD: 6.2 K/UL (ref 4–11)
WBC UA: 2 /HPF (ref 0–5)

## 2021-04-20 PROCEDURE — 96374 THER/PROPH/DIAG INJ IV PUSH: CPT

## 2021-04-20 PROCEDURE — 80048 BASIC METABOLIC PNL TOTAL CA: CPT

## 2021-04-20 PROCEDURE — 96361 HYDRATE IV INFUSION ADD-ON: CPT

## 2021-04-20 PROCEDURE — 6370000000 HC RX 637 (ALT 250 FOR IP): Performed by: STUDENT IN AN ORGANIZED HEALTH CARE EDUCATION/TRAINING PROGRAM

## 2021-04-20 PROCEDURE — 6360000004 HC RX CONTRAST MEDICATION: Performed by: STUDENT IN AN ORGANIZED HEALTH CARE EDUCATION/TRAINING PROGRAM

## 2021-04-20 PROCEDURE — 82550 ASSAY OF CK (CPK): CPT

## 2021-04-20 PROCEDURE — 80076 HEPATIC FUNCTION PANEL: CPT

## 2021-04-20 PROCEDURE — 71045 X-RAY EXAM CHEST 1 VIEW: CPT

## 2021-04-20 PROCEDURE — 6360000002 HC RX W HCPCS: Performed by: STUDENT IN AN ORGANIZED HEALTH CARE EDUCATION/TRAINING PROGRAM

## 2021-04-20 PROCEDURE — 85025 COMPLETE CBC W/AUTO DIFF WBC: CPT

## 2021-04-20 PROCEDURE — 84484 ASSAY OF TROPONIN QUANT: CPT

## 2021-04-20 PROCEDURE — 96375 TX/PRO/DX INJ NEW DRUG ADDON: CPT

## 2021-04-20 PROCEDURE — 99284 EMERGENCY DEPT VISIT MOD MDM: CPT

## 2021-04-20 PROCEDURE — 83690 ASSAY OF LIPASE: CPT

## 2021-04-20 PROCEDURE — 93010 ELECTROCARDIOGRAM REPORT: CPT | Performed by: INTERNAL MEDICINE

## 2021-04-20 PROCEDURE — 83880 ASSAY OF NATRIURETIC PEPTIDE: CPT

## 2021-04-20 PROCEDURE — 93005 ELECTROCARDIOGRAM TRACING: CPT | Performed by: STUDENT IN AN ORGANIZED HEALTH CARE EDUCATION/TRAINING PROGRAM

## 2021-04-20 PROCEDURE — 2580000003 HC RX 258: Performed by: STUDENT IN AN ORGANIZED HEALTH CARE EDUCATION/TRAINING PROGRAM

## 2021-04-20 PROCEDURE — 81001 URINALYSIS AUTO W/SCOPE: CPT

## 2021-04-20 PROCEDURE — 74177 CT ABD & PELVIS W/CONTRAST: CPT

## 2021-04-20 PROCEDURE — 96360 HYDRATION IV INFUSION INIT: CPT

## 2021-04-20 RX ORDER — KETOROLAC TROMETHAMINE 30 MG/ML
15 INJECTION, SOLUTION INTRAMUSCULAR; INTRAVENOUS ONCE
Status: COMPLETED | OUTPATIENT
Start: 2021-04-20 | End: 2021-04-20

## 2021-04-20 RX ORDER — FAMOTIDINE 20 MG/1
20 TABLET, FILM COATED ORAL 2 TIMES DAILY
Qty: 60 TABLET | Refills: 3 | Status: SHIPPED | OUTPATIENT
Start: 2021-04-20 | End: 2021-09-25

## 2021-04-20 RX ORDER — MAGNESIUM HYDROXIDE/ALUMINUM HYDROXICE/SIMETHICONE 120; 1200; 1200 MG/30ML; MG/30ML; MG/30ML
30 SUSPENSION ORAL ONCE
Status: COMPLETED | OUTPATIENT
Start: 2021-04-20 | End: 2021-04-20

## 2021-04-20 RX ORDER — ACETAMINOPHEN 500 MG
1000 TABLET ORAL ONCE
Status: COMPLETED | OUTPATIENT
Start: 2021-04-20 | End: 2021-04-20

## 2021-04-20 RX ORDER — PROCHLORPERAZINE EDISYLATE 5 MG/ML
10 INJECTION INTRAMUSCULAR; INTRAVENOUS ONCE
Status: COMPLETED | OUTPATIENT
Start: 2021-04-20 | End: 2021-04-20

## 2021-04-20 RX ORDER — 0.9 % SODIUM CHLORIDE 0.9 %
1000 INTRAVENOUS SOLUTION INTRAVENOUS ONCE
Status: COMPLETED | OUTPATIENT
Start: 2021-04-20 | End: 2021-04-20

## 2021-04-20 RX ADMIN — IOPAMIDOL 75 ML: 755 INJECTION, SOLUTION INTRAVENOUS at 09:22

## 2021-04-20 RX ADMIN — ACETAMINOPHEN 1000 MG: 500 TABLET ORAL at 08:03

## 2021-04-20 RX ADMIN — KETOROLAC TROMETHAMINE 15 MG: 30 INJECTION, SOLUTION INTRAMUSCULAR at 08:04

## 2021-04-20 RX ADMIN — MAGNESIUM HYDROXIDE/ALUMINUM HYDROXICE/SIMETHICONE 30 ML: 120; 1200; 1200 SUSPENSION ORAL at 08:04

## 2021-04-20 RX ADMIN — SODIUM CHLORIDE 1000 ML: 9 INJECTION, SOLUTION INTRAVENOUS at 08:00

## 2021-04-20 RX ADMIN — PROCHLORPERAZINE EDISYLATE 10 MG: 5 INJECTION INTRAMUSCULAR; INTRAVENOUS at 08:04

## 2021-04-20 ASSESSMENT — PAIN SCALES - GENERAL
PAINLEVEL_OUTOF10: 3
PAINLEVEL_OUTOF10: 7

## 2021-04-20 ASSESSMENT — PAIN DESCRIPTION - PAIN TYPE: TYPE: ACUTE PAIN

## 2021-04-20 ASSESSMENT — PAIN DESCRIPTION - LOCATION: LOCATION: GENERALIZED

## 2021-04-20 ASSESSMENT — PAIN DESCRIPTION - DESCRIPTORS: DESCRIPTORS: ACHING

## 2021-04-20 NOTE — ED PROVIDER NOTES
[facility]      Pt Name: Carmen Smith  MRN: [id number]  [place] 1955  Date of evaluation: 4/20/2021  Provider: Chapincito Daniel MD    CHIEF COMPLAINT       Chief Complaint   Patient presents with    Fatigue     states she has not felt good since she had the covid vaccine she states she was seen in the ED at [address]  4 days ago and still feels bad          HISTORY OF PRESENT ILLNESS   (Location/Symptom, Timing/Onset,Context/Setting, Quality, Duration, Modifying Factors, Severity)  Note limiting factors. Carmen Smith is a 76 y.o. female who presents to the emergency department c/o generalized weakness for the past 5-6 days. Onset gradual, now constant and not better since she was seen and evaluated at [facility] last week for similar symptoms. Generalized weakness described as fatigue, lack of energy, associated with chest pain and LUQ abdominal pain that she describes as pressure, associated with nausea and vomiting x 2 yesterday, no emesis today. Further associated with myalgias, body aches and loose stools, mild to moderate headache for the past 6 days as well, attributes her symptoms to the recent COVID vaccine she received on 4/7/2021. She had CTH and CT a/p performed at [facility] ED during her evaluation which were both negative for acute process. Symptoms not otherwise alleviated or exacerbated by other factors. NursingNotes were reviewed. REVIEW OF SYSTEMS    (2-9 systems for level 4, 10 or more for level 5)       Constitutional: No fever, + chills. Eye: No visual disturbances. No eye pain. Ear/Nose/Mouth/Throat: No nasal congestion. No sore throat. Respiratory: No cough, No shortness of breath, No sputum production. Cardiovascular: + chest pain. No palpitations. Gastrointestinal: + abdominal pain. + nausea or vomiting. + diarrhea. Genitourinary: No dysuria. No hematuria.   Increased frequency. Hematology/Lymphatics: No bleeding or bruising tendency. Immunologic: + malaise. No swollen glands. Musculoskeletal: No back pain. No joint pain. Integumentary: No rash. No abrasions. Neurologic: + headache. No focal numbness or weakness. PAST MEDICAL HISTORY     Past Medical History:   Diagnosis Date    Arthritis     Cardiomyopathy ([facility] [address]     Diabetes     GERD (gastroesophageal reflux disease)     Hyperlipidemia     Hypertension     Lumbar disc disease     Sleep apnea     pt states does use a Cpap machine at night    Unspecified cerebral artery occlusion with cerebral infarction 2014    right side weak    Wears glasses          SURGICALHISTORY       Past Surgical History:   Procedure Laterality Date    ARTHRODESIS Right 9/17/2020    (RIGHT) REMOVAL OF BONE SPURRING AND OSSEOUS PROMINENCE FIRST METATARSAL, ARTHRODESIS OF FIRST METATARSOPHALANGEAL JOINT, BONE MARROW HARVEST AND CONCENTRATION RIGHT TIBIA performed by Shankar Fall DPM at [place] [address]. Left 9/3/15    CARPAL TUNNEL RELEASE Right 9/22/15    COLONOSCOPY      FINGER TRIGGER RELEASE Left 9/3/15    middle and ring fingers    FINGER TRIGGER RELEASE Right 9/22/15    middle and ring fingers    FOOT SURGERY Bilateral     litteltoe    HAND SURGERY Right     Ligament    PARTIAL HYSTERECTOMY  08/2003    SHOULDER ARTHROSCOPY Right 06/20/2018    Diagnostic scope, rcr, open [place]         CURRENT MEDICATIONS       Previous Medications    ALBUTEROL SULFATE HFA (PROVENTIL HFA) 108 (90 BASE) MCG/ACT INHALER    Inhale 2 puffs into the lungs every 4 hours as needed for Wheezing or Shortness of Breath May substitute ProAir MDI    AMMONIUM LACTATE (LAC-HYDRIN) 12 % LOTION    Apply topically daily. ASPIRIN 81 MG EC TABLET    Take 1 tablet by mouth daily.     ATORVASTATIN (LIPITOR) 20 MG TABLET    TAKE ONE TABLET BY MOUTH DAILY    BLOOD GLUCOSE MONITORING SUPPL (TRUE METRIX AIR GLUCOSE METER) W/DEVICE KIT    1 each by Does not apply route daily    BLOOD GLUCOSE TEST STRIPS (FREESTYLE LITE) STRIP    TEST BLOOD SUGAR TWO TIMES A DAY Diag: E11.9    BLOOD GLUCOSE TEST STRIPS (ONETOUCH ULTRA) STRIP    TEST TWO TIMES A DAY    DULAGLUTIDE (TRULICITY) 1.5 AN/8.1LZ SOPN    INJECT 1.5 MG UNDER THE SKIN ONCE WEEKLY    DULOXETINE (CYMBALTA) 60 MG EXTENDED RELEASE CAPSULE    Take 60 mg by mouth daily    FANAPT 1 MG TABS TABLET    Take 1 mg by mouth nightly     FREESTYLE LANCETS MISC    USE ONE LANCET TO TEST BLOOD SUGAR TWICE A DAY    FUROSEMIDE (LASIX) 20 MG TABLET    TAKE ONE TABLET BY MOUTH DAILY AS NEEDED FOR LEG SWELLING    GABAPENTIN (NEURONTIN) 300 MG CAPSULE    TAKE ONE CAPSULE BY MOUTH ONCE NIGHTLY    IBUPROFEN (ADVIL;MOTRIN) 600 MG TABLET    Take 1 tablet by mouth 2 times daily (with meals)    IBUPROFEN (ADVIL;MOTRIN) 800 MG TABLET    Take 1 tablet by mouth 2 times daily as needed for Pain    INSULIN PEN NEEDLE (PEN NEEDLES) 31G X 6 MM MISC    USE TO INJECT INSULIN DAILY    IPRATROPIUM-ALBUTEROL (DUONEB) 0.5-2.5 (3) MG/3ML SOLN NEBULIZER SOLUTION    Inhale 3 mLs into the lungs every 4 hours as needed for Shortness of Breath (wheezing coughing)    LANTUS SOLOSTAR 100 UNIT/ML INJECTION PEN    INJECT 16 UNITS UNDER THE SKIN ONCE NIGHTLY    METFORMIN (GLUCOPHAGE) 500 MG TABLET    TAKE TWO TABLETS BY MOUTH TWICE A DAY WITH MEALS    METOPROLOL SUCCINATE (TOPROL XL) 25 MG EXTENDED RELEASE TABLET    TAKE ONE TABLET BY MOUTH DAILY FOR BLOOD PRESSURE AND HEART    MISC.  DEVICES (WALKER) MISC    One four wheel walker    NEBULIZERS (COMPRESSOR/NEBULIZER) MISC    Use to take medication Q4H PRN    OMEPRAZOLE (PRILOSEC) 40 MG DELAYED RELEASE CAPSULE    TAKE ONE CAPSULE BY MOUTH TWICE A DAY BEFORE MEALS    ONDANSETRON (ZOFRAN ODT) 4 MG DISINTEGRATING TABLET    Take 1 tablet by mouth every 8 hours as needed for Nausea    RESPIRATORY THERAPY SUPPLIES (NEBULIZER) MAXIMILIANO    Used to give yourself breathing treatment    TENS UNIT MIS    by Does not apply route    TIZANIDINE (ZANAFLEX) 4 MG TABLET    Take 1 tablet by mouth nightly as needed (back pain)    TRAZODONE (DESYREL) 100 MG TABLET    Take 100 mg by mouth nightly    TRIMETHOPRIM-POLYMYXIN B (POLYTRIM) 86113-3.1 UNIT/ML-% OPHTHALMIC SOLUTION    1 drop every 4 hours as needed (redness)     VALSARTAN (DIOVAN) 80 MG TABLET    TAKE ONE TABLET BY MOUTH DAILY    VITAMIN C (ASCORBIC ACID) 500 MG TABLET    Take 500 mg by mouth daily       ALLERGIES     Codeine, Vicodin [hydrocodone-acetaminophen], Oxycontin [oxycodone hcl], and Tramadol    FAMILY HISTORY       Family History   Problem Relation Age of Onset    Heart Disease Mother     High Blood Pressure Mother     Stroke Mother     Cancer Brother         lung cancer          SOCIAL HISTORY       Social History     Socioeconomic History    Marital status:      Spouse name: Yoel Garibay, seen here    Number of children: 3    Years of education: None    Highest education level: None   Occupational History    None   Social Needs    Financial resource strain: None    Food insecurity     Worry: None     Inability: None    Transportation needs     Medical: None     Non-medical: None   Tobacco Use    Smoking status: Former Smoker     Packs/day: 1.50     Years: 3.00     Pack years: 4.50     Quit date: 1992     Years since quittin.3    Smokeless tobacco: Never Used   Substance and Sexual Activity    Alcohol use: No    Drug use: No    Sexual activity: Yes     Partners: Male   Lifestyle    Physical activity     Days per week: None     Minutes per session: None    Stress: None   Relationships    Social connections     Talks on phone: None     Gets together: None     Attends Gnosticism service: None     Active member of club or organization: None     Attends meetings of clubs or organizations: None     Relationship status: None    Intimate partner violence     Fear of current or ex partner: None     Emotionally abused: None     Physically abused: None     Forced sexual activity: None   Other Topics Concern    None   Social History Narrative    Originally from [address] [place] [address] is seen here    10 grandchildren       SCREENINGS             PHYSICAL EXAM    (up to 7 for level 4, 8 or more for level 5)     ED Triage Vitals [04/20/21 0745]   BP Temp Temp src Pulse Resp SpO2 Height Weight   126/87 98.3 F -- 73 20 100 % 5' 4\" (1.626 m) 205 lb 14.6 oz (93.4 kg)       General: Alert and oriented appropriately for age, No acute distress. Eye: Normal conjunctiva. Pupils equal and reactive. HENT: Oral mucosa is moist.  Neck: supple, no rigidity, no LAD  Respiratory: Respirations even and non-labored. Lungs CTAB. Cardiovascular: Normal rate, Regular rhythm. Chest wall ttp reproduces character of the pt's pain. Gastrointestinal: Soft, Moderate ttp in the LUQ, Non-distended. Musculoskeletal: No swelling. Integumentary: Warm, Dry. Neurologic: Alert and appropriate for age. GCS 15, CN II-XII intact, strength 5/5 throughout, sensation intact throughout. No ataxia. No focal deficits. Psychiatric: Cooperative. DIAGNOSTIC RESULTS     EKG: All EKG's are interpreted by the Emergency Department Physician who either signs or Co-signsthis chart in the absence of a cardiologist.    The Ekg interpreted by me shows  normal sinus rhythm with a rate of 65 bpm, possible left atrial enlargement. Axis is   Normal  QTc is  normal  Intervals and Durations are unremarkable. ST Segments: normal and no acute change  No significant change from prior EKG dated April 16, 2021.           RADIOLOGY:   Non-plain filmimages such as CT, Ultrasound and MRI are read by the radiologist. Plain radiographic images are visualized and preliminarily interpreted by the emergency physician with the below findings:      Interpretation per the Radiologist below, if available at the time ofthis note:    CT ABDOMEN PELVIS W IV CONTRAST Additional Contrast? None   Preliminary Result No acute process within the abdomen or pelvis. XR CHEST PORTABLE   Preliminary Result   No acute cardiopulmonary process. ED BEDSIDE ULTRASOUND:   Performed by ED Physician - none    LABS:  Labs Reviewed   BASIC METABOLIC PANEL W/ REFLEX TO MG FOR LOW K - Abnormal; Notable for the following components:       Result Value    Glucose 191 (*)     All other components within normal limits    Narrative:     Performed at:  [facility]  [address], [place] [address]   Phone [phone]   LIPASE - Abnormal; Notable for the following components:    Lipase 105.0 (*)     All other components within normal limits    Narrative:     Performed at:  [facility]  [address], [facility] [place] [address]   Phone [phone]   URINE RT REFLEX TO CULTURE - Abnormal; Notable for the following components:    Leukocyte Esterase, Urine TRACE (*)     All other components within normal limits    Narrative:     Performed at:  [facility]  [address], [place] [address]   Phone [phone]   CK - Abnormal; Notable for the following components:     Total  (*)     All other components within normal limits    Narrative:     Performed at:  [facility]  [address], [facility] [place] [address]   Phone [phone]   CBC WITH AUTO DIFFERENTIAL    Narrative:     Performed at:  [facility]  [address], [place] [address]   Phone [phone]   HEPATIC FUNCTION PANEL    Narrative:     Performed at:  [facility]  [address], [place] [address]   Phone [phone]   TROPONIN    Narrative:     Performed at:  [facility]  [address], [place] [address]   Phone [phone]   BRAIN NATRIURETIC PEPTIDE    Narrative: Performed at:  [facility]  [address] [place] [address]   Phone [phone]   MICROSCOPIC URINALYSIS    Narrative:     Performed at:  [facility]  [address] [place], [place] [address]   Phone [phone]       All other labs were within normal range or not returned as of this dictation. EMERGENCY DEPARTMENT COURSE and DIFFERENTIAL DIAGNOSIS/MDM:   Vitals:    Vitals:    21 0745   BP: 126/87   Pulse: 73   Resp: 20   SpO2: 100%   Weight: 205 lb 14.6 oz (93.4 kg)   Height: 5' 4\" (1.626 m)         Medical decision makin yo F with diffuse myalgias, body aches, headache, LUQ pain rad to the chest, loose stools, recent w/u for similar sxs, COVID PCR -ve from that visit, chest pain described as largely LUQ pain and pt with significant ttp on exam, think largely gastritis/enteritis. Suspect viral etiology, CTs from QC reviewed and without abnormalities. HDS, afebrile, no increased WOB, will attempt IVF hydration, medications for her symptoms and reassessment. Medications   0.9 % sodium chloride bolus (0 mLs Intravenous Stopped 21 1056)   prochlorperazine (COMPAZINE) injection 10 mg (10 mg Intravenous Given 21 08)   ketorolac (TORADOL) injection 15 mg (15 mg Intravenous Given 21 08)   acetaminophen (TYLENOL) tablet 1,000 mg (1,000 mg Oral Given 21 0803)   aluminum & magnesium hydroxide-simethicone (MAALOX) 200-200-20 MG/5ML suspension 30 mL (30 mLs Oral Given 21 08)   iopamidol (ISOVUE-370) 76 % injection 75 mL (75 mLs Intravenous Given 21 0922)     Pt still with significant abd ttp on reassessment, getting CT a/p to ensure no development of acute process as lipase slightly elevated compared to prior as well.       CT neg acute, on subsequent reassessment, pt states pain much improved, meds likely with more time to take effect, do not think cardiac etiology of her pain given degree of abdominal ttp on initial assessment and overall viral prodromal symptoms. Pt remains HDS, neuro intact, EKG NSR without ischemic changes and troponin undetectable. No other significant lab abnls, pt stable for and amenable to d/c home. Given d/c instructions and return precautions, pt voices understanding. D/c home, ambulated steadily from the ED. FINAL IMPRESSION      1. Viral illness    2. Left upper quadrant abdominal pain    3. Dehydration          DISPOSITION/PLAN   DISPOSITION  Home.       PATIENT REFERRED TO:  Arsenio Finnegan MD  [address] [address]  [phone]    In 2 days      [facility] Emergency Department  [address] [facility]  [phone]    If symptoms worsen      DISCHARGE MEDICATIONS:  Discharge Medication List as of 4/20/2021 11:03 AM      START taking these medications    Details   famotidine (PEPCID) 20 MG tablet Take 1 tablet by mouth 2 times daily, Disp-60 tablet, R-3Normal                (Please note that portions of this note were completed with a voice recognition program.Efforts were made to edit the dictations but occasionally words are mis-transcribed.)    Jada Canales MD (electronically signed)  Attending Emergency Physician          Jada Canales MD  04/21/21 8490

## 2021-04-20 NOTE — ED NOTES
D/C: Order noted for d/c. Pt confirmed d/c paperwork & 1 RX have correct name. Discharge and education instructions reviewed with patient. Teach-back successful. Pt verbalized understanding and signed d/c papers. Pt denied questions at this time. No acute distress noted. Patient instructed to follow-up as noted - return to emergency department if symptoms worsen. Patient verbalized understanding. Discharged per EDMD with discharge instructions. Pt discharged to private vehicle. Patient stable upon departure. Thanked patient for choosing [facility] for care. Provider aware of patient pain at time of discharge.        Agustina Plummer, RN  04/20/21 0829

## 2021-04-21 ENCOUNTER — CARE COORDINATION (OUTPATIENT)
Dept: CARE COORDINATION | Age: 66
End: 2021-04-21

## 2021-04-21 NOTE — CARE COORDINATION
Pt presented to ed again with same complaints as well as fatigue, pt line busy, pt follow ed by Loop team

## 2021-04-22 ENCOUNTER — TELEPHONE (OUTPATIENT)
Dept: FAMILY MEDICINE CLINIC | Age: 66
End: 2021-04-22

## 2021-04-22 DIAGNOSIS — E11.8 TYPE 2 DIABETES MELLITUS WITH COMPLICATION, WITHOUT LONG-TERM CURRENT USE OF INSULIN (HCC): ICD-10-CM

## 2021-04-22 DIAGNOSIS — I10 ESSENTIAL HYPERTENSION: ICD-10-CM

## 2021-04-22 RX ORDER — METOPROLOL SUCCINATE 25 MG/1
TABLET, EXTENDED RELEASE ORAL
Qty: 90 TABLET | Refills: 0 | Status: SHIPPED
Start: 2021-04-22 | End: 2021-05-19 | Stop reason: ALTCHOICE

## 2021-04-22 NOTE — TELEPHONE ENCOUNTER
Tian Blanchard from [address] [facility]) called stating that this pt was feeling dizzy this morning when she went for her visit.  Her BP was 90/60 & 80/60, with an A1C of 6.1 & her sugar level of 272    Please Advise

## 2021-04-22 NOTE — TELEPHONE ENCOUNTER
Called pt she states she is only taking 1/2 of the metoprolol now, and has been for a long time. So would you like her to just stop?

## 2021-04-24 DIAGNOSIS — E78.2 MIXED HYPERLIPIDEMIA: ICD-10-CM

## 2021-04-26 RX ORDER — ATORVASTATIN CALCIUM 20 MG/1
TABLET, FILM COATED ORAL
Qty: 90 TABLET | Refills: 0 | Status: SHIPPED | OUTPATIENT
Start: 2021-04-26 | End: 2021-07-15

## 2021-04-29 ENCOUNTER — OFFICE VISIT (OUTPATIENT)
Dept: ORTHOPEDIC SURGERY | Age: 66
End: 2021-04-29
Payer: MEDICARE

## 2021-04-29 VITALS — WEIGHT: 205 LBS | HEIGHT: 64 IN | BODY MASS INDEX: 35 KG/M2

## 2021-04-29 DIAGNOSIS — M17.12 PRIMARY OSTEOARTHRITIS OF LEFT KNEE: ICD-10-CM

## 2021-04-29 DIAGNOSIS — M17.11 PRIMARY OSTEOARTHRITIS OF RIGHT KNEE: Primary | ICD-10-CM

## 2021-04-29 PROCEDURE — G8427 DOCREV CUR MEDS BY ELIG CLIN: HCPCS | Performed by: ORTHOPAEDIC SURGERY

## 2021-04-29 PROCEDURE — 1090F PRES/ABSN URINE INCON ASSESS: CPT | Performed by: ORTHOPAEDIC SURGERY

## 2021-04-29 PROCEDURE — 1036F TOBACCO NON-USER: CPT | Performed by: ORTHOPAEDIC SURGERY

## 2021-04-29 PROCEDURE — 3017F COLORECTAL CA SCREEN DOC REV: CPT | Performed by: ORTHOPAEDIC SURGERY

## 2021-04-29 PROCEDURE — 4040F PNEUMOC VAC/ADMIN/RCVD: CPT | Performed by: ORTHOPAEDIC SURGERY

## 2021-04-29 PROCEDURE — G8417 CALC BMI ABV UP PARAM F/U: HCPCS | Performed by: ORTHOPAEDIC SURGERY

## 2021-04-29 PROCEDURE — G8399 PT W/DXA RESULTS DOCUMENT: HCPCS | Performed by: ORTHOPAEDIC SURGERY

## 2021-04-29 PROCEDURE — 99213 OFFICE O/P EST LOW 20 MIN: CPT | Performed by: ORTHOPAEDIC SURGERY

## 2021-04-29 PROCEDURE — 20610 DRAIN/INJ JOINT/BURSA W/O US: CPT | Performed by: ORTHOPAEDIC SURGERY

## 2021-04-29 PROCEDURE — 1123F ACP DISCUSS/DSCN MKR DOCD: CPT | Performed by: ORTHOPAEDIC SURGERY

## 2021-04-29 RX ORDER — METHYLPREDNISOLONE ACETATE 40 MG/ML
80 INJECTION, SUSPENSION INTRA-ARTICULAR; INTRALESIONAL; INTRAMUSCULAR; SOFT TISSUE ONCE
Status: COMPLETED | OUTPATIENT
Start: 2021-04-29 | End: 2021-04-29

## 2021-04-29 RX ORDER — METHYLPREDNISOLONE ACETATE 40 MG/ML
40 INJECTION, SUSPENSION INTRA-ARTICULAR; INTRALESIONAL; INTRAMUSCULAR; SOFT TISSUE ONCE
Status: DISCONTINUED | OUTPATIENT
Start: 2021-04-29 | End: 2021-04-29

## 2021-04-29 RX ORDER — BUPIVACAINE HYDROCHLORIDE 2.5 MG/ML
3 INJECTION, SOLUTION INFILTRATION; PERINEURAL ONCE
Status: COMPLETED | OUTPATIENT
Start: 2021-04-29 | End: 2021-04-29

## 2021-04-29 RX ADMIN — BUPIVACAINE HYDROCHLORIDE 7.5 MG: 2.5 INJECTION, SOLUTION INFILTRATION; PERINEURAL at 09:26

## 2021-04-29 RX ADMIN — METHYLPREDNISOLONE ACETATE 80 MG: 40 INJECTION, SUSPENSION INTRA-ARTICULAR; INTRALESIONAL; INTRAMUSCULAR; SOFT TISSUE at 09:28

## 2021-04-29 RX ADMIN — BUPIVACAINE HYDROCHLORIDE 7.5 MG: 2.5 INJECTION, SOLUTION INFILTRATION; PERINEURAL at 09:27

## 2021-05-06 ENCOUNTER — OFFICE VISIT (OUTPATIENT)
Dept: FAMILY MEDICINE CLINIC | Age: 66
End: 2021-05-06
Payer: MEDICARE

## 2021-05-06 VITALS
RESPIRATION RATE: 10 BRPM | OXYGEN SATURATION: 99 % | HEART RATE: 90 BPM | WEIGHT: 203 LBS | BODY MASS INDEX: 34.84 KG/M2 | DIASTOLIC BLOOD PRESSURE: 100 MMHG | SYSTOLIC BLOOD PRESSURE: 150 MMHG

## 2021-05-06 DIAGNOSIS — R06.02 SHORTNESS OF BREATH: ICD-10-CM

## 2021-05-06 DIAGNOSIS — G47.33 OSA (OBSTRUCTIVE SLEEP APNEA): ICD-10-CM

## 2021-05-06 DIAGNOSIS — R07.9 CHEST PAIN, UNSPECIFIED TYPE: Primary | ICD-10-CM

## 2021-05-06 DIAGNOSIS — E11.8 TYPE 2 DIABETES MELLITUS WITH COMPLICATION, WITHOUT LONG-TERM CURRENT USE OF INSULIN (HCC): ICD-10-CM

## 2021-05-06 LAB — HBA1C MFR BLD: 6.8 %

## 2021-05-06 PROCEDURE — 1090F PRES/ABSN URINE INCON ASSESS: CPT | Performed by: FAMILY MEDICINE

## 2021-05-06 PROCEDURE — 3017F COLORECTAL CA SCREEN DOC REV: CPT | Performed by: FAMILY MEDICINE

## 2021-05-06 PROCEDURE — 83036 HEMOGLOBIN GLYCOSYLATED A1C: CPT | Performed by: FAMILY MEDICINE

## 2021-05-06 PROCEDURE — 3044F HG A1C LEVEL LT 7.0%: CPT | Performed by: FAMILY MEDICINE

## 2021-05-06 PROCEDURE — 4040F PNEUMOC VAC/ADMIN/RCVD: CPT | Performed by: FAMILY MEDICINE

## 2021-05-06 PROCEDURE — 1123F ACP DISCUSS/DSCN MKR DOCD: CPT | Performed by: FAMILY MEDICINE

## 2021-05-06 PROCEDURE — 99214 OFFICE O/P EST MOD 30 MIN: CPT | Performed by: FAMILY MEDICINE

## 2021-05-06 PROCEDURE — 1036F TOBACCO NON-USER: CPT | Performed by: FAMILY MEDICINE

## 2021-05-06 PROCEDURE — G8427 DOCREV CUR MEDS BY ELIG CLIN: HCPCS | Performed by: FAMILY MEDICINE

## 2021-05-06 PROCEDURE — G8399 PT W/DXA RESULTS DOCUMENT: HCPCS | Performed by: FAMILY MEDICINE

## 2021-05-06 PROCEDURE — 2022F DILAT RTA XM EVC RTNOPTHY: CPT | Performed by: FAMILY MEDICINE

## 2021-05-06 PROCEDURE — G8417 CALC BMI ABV UP PARAM F/U: HCPCS | Performed by: FAMILY MEDICINE

## 2021-05-06 NOTE — PATIENT INSTRUCTIONS
[address] [place] [address] MD  [address] , [address], [address] [facility]  [phone]    [facility]  [phone]

## 2021-05-06 NOTE — PROGRESS NOTES
5/6/2021    This is a 76 y.o. female   Chief Complaint   Patient presents with    Diabetes    Shortness of Breath     feels pressure in chest and nausea     HPI    John reports not feeling well recently  - she has had intermittent bouts of chest discomfort. With this she will get SOB and nauseous. - she reports getting SOB with walking up and down stairs, or even walking more than a block. Some days this will not be present, other days it will be. This has been going on for a few months. She has had two ER visits with a negative workup for COVID and a negative CT-PE  - she had a cardiac cath in 2016 without significant CAD    Diabetes  Lab Results   Component Value Date    LABA1C 6.8 05/06/2021     Lab Results   Component Value Date    .5 02/21/2020   - on metformin, Trulicity weekly  - 62-98 units of Lantus nightly  - no significant hypoglycemia since decreasing down to 12 units of Lantus nightly.  No episodes of hypoglycemia during the night    HTN  BP Readings from Last 3 Encounters:   05/06/21 (!) 150/100   04/20/21 123/74   04/16/21 132/88   - on valsartan 80mg  - metoprolol was stopped due to dizziness/low BP readings    DIPTI  - she would like referral to a [facility] provider closer to her    Review of Systems     Past Medical History:   Diagnosis Date    Arthritis     Cardiomyopathy ([address]     Diabetes     GERD (gastroesophageal reflux disease)     Hyperlipidemia     Hypertension     Lumbar disc disease     Sleep apnea     pt states does use a Cpap machine at night    Unspecified cerebral artery occlusion with cerebral infarction 2014    right side weak    Wears glasses        Past Surgical History:   Procedure Laterality Date    ARTHRODESIS Right 9/17/2020    (RIGHT) REMOVAL OF BONE SPURRING AND OSSEOUS PROMINENCE FIRST METATARSAL, ARTHRODESIS OF FIRST METATARSOPHALANGEAL JOINT, BONE MARROW HARVEST AND CONCENTRATION RIGHT TIBIA performed by Cathryn Strauss DPM at . [place] [address] RELEASE Left 9/3/15    CARPAL TUNNEL RELEASE Right 9/22/15    COLONOSCOPY      FINGER TRIGGER RELEASE Left 9/3/15    middle and ring fingers    FINGER TRIGGER RELEASE Right 9/22/15    middle and ring fingers    FOOT SURGERY Bilateral     litteltoe    HAND SURGERY Right     Ligament    PARTIAL HYSTERECTOMY  08/2003    SHOULDER ARTHROSCOPY Right 06/20/2018    Diagnostic scope, rcr, open [place]       Family History   Problem Relation Age of Onset    Heart Disease Mother     High Blood Pressure Mother     Stroke Mother     Cancer Brother         lung cancer       Current Outpatient Medications   Medication Sig Dispense Refill    atorvastatin (LIPITOR) 20 MG tablet TAKE ONE TABLET BY MOUTH DAILY 90 tablet 0    metFORMIN (GLUCOPHAGE) 500 MG tablet TAKE TWO TABLETS BY MOUTH TWICE A DAY WITH MEALS 360 tablet 0    famotidine (PEPCID) 20 MG tablet Take 1 tablet by mouth 2 times daily 60 tablet 3    LANTUS SOLOSTAR 100 UNIT/ML injection pen INJECT 16 UNITS UNDER THE SKIN ONCE NIGHTLY 5 pen 4    ibuprofen (ADVIL;MOTRIN) 600 MG tablet Take 1 tablet by mouth 2 times daily (with meals) 60 tablet 1    gabapentin (NEURONTIN) 300 MG capsule TAKE ONE CAPSULE BY MOUTH ONCE NIGHTLY 90 capsule 0    furosemide (LASIX) 20 MG tablet TAKE ONE TABLET BY MOUTH DAILY AS NEEDED FOR LEG SWELLING 90 tablet 0    valsartan (DIOVAN) 80 MG tablet TAKE ONE TABLET BY MOUTH DAILY 90 tablet 0    Dulaglutide (TRULICITY) 1.5 VS/6.8ML SOPN INJECT 1.5 MG UNDER THE SKIN ONCE WEEKLY 2 mL 3    blood glucose test strips (ONETOUCH ULTRA) strip TEST TWO TIMES A  strip 4    albuterol sulfate HFA (PROVENTIL HFA) 108 (90 Base) MCG/ACT inhaler Inhale 2 puffs into the lungs every 4 hours as needed for Wheezing or Shortness of Breath May substitute ProAir MDI 1 Inhaler 5    ipratropium-albuterol (DUONEB) 0.5-2.5 (3) MG/3ML SOLN nebulizer solution Inhale 3 mLs into the lungs every 4 hours as needed for Shortness of Breath (wheezing coughing) 360 mL 5    Insulin Pen Needle (PEN NEEDLES) 31G X 6 MM MISC USE TO INJECT INSULIN DAILY 100 each 2    DULoxetine (CYMBALTA) 60 MG extended release capsule Take 60 mg by mouth daily      traZODone (DESYREL) 100 MG tablet Take 100 mg by mouth nightly      vitamin C (ASCORBIC ACID) 500 MG tablet Take 500 mg by mouth daily      ammonium lactate (LAC-HYDRIN) 12 % lotion Apply topically daily. 1 Bottle 1    blood glucose test strips (FREESTYLE LITE) strip TEST BLOOD SUGAR TWO TIMES A DAY Diag: E11.9 100 each 4    Blood Glucose Monitoring Suppl (TRUE METRIX AIR GLUCOSE METER) w/Device KIT 1 each by Does not apply route daily 1 kit 0    FREESTYLE LANCETS MISC USE ONE LANCET TO TEST BLOOD SUGAR TWICE A  each 3    FANAPT 1 MG TABS tablet Take 1 mg by mouth nightly       aspirin 81 MG EC tablet Take 1 tablet by mouth daily. 60 tablet 3    metoprolol succinate (TOPROL XL) 25 MG extended release tablet TAKE ONE TABLET BY MOUTH DAILY FOR BLOOD PRESSURE AND HEART (Patient not taking: Reported on 5/6/2021) 90 tablet 0    ondansetron (ZOFRAN ODT) 4 MG disintegrating tablet Take 1 tablet by mouth every 8 hours as needed for Nausea (Patient not taking: Reported on 5/6/2021) 20 tablet 0    ibuprofen (ADVIL;MOTRIN) 800 MG tablet Take 1 tablet by mouth 2 times daily as needed for Pain (Patient not taking: Reported on 5/6/2021) 60 tablet 5    trimethoprim-polymyxin b (POLYTRIM) 08900-7.1 UNIT/ML-% ophthalmic solution 1 drop every 4 hours as needed (redness)        No current facility-administered medications for this visit.       BP (!) 150/100 (Site: Left Upper Arm, Position: Sitting, Cuff Size: Medium Adult)   Pulse 90   Resp 10   Wt 203 lb (92.1 kg)   SpO2 99%   BMI 34.84 kg/m²     Physical Exam    Wt Readings from Last 3 Encounters:   05/06/21 203 lb (92.1 kg)   04/29/21 205 lb (93 kg)   04/20/21 205 lb 14.6 oz (93.4 kg)     BP Readings from Last 3 Encounters:   05/06/21 (!) 150/100   04/20/21

## 2021-05-11 ENCOUNTER — HOSPITAL ENCOUNTER (OUTPATIENT)
Age: 66
Setting detail: OBSERVATION
Discharge: HOME | End: 2021-05-12
Attending: INTERNAL MEDICINE | Admitting: INTERNAL MEDICINE
Payer: MEDICARE

## 2021-05-11 ENCOUNTER — APPOINTMENT (OUTPATIENT)
Dept: GENERAL RADIOLOGY | Age: 66
End: 2021-05-11
Payer: MEDICARE

## 2021-05-11 DIAGNOSIS — R07.9 CHEST PAIN, UNSPECIFIED TYPE: Primary | ICD-10-CM

## 2021-05-11 LAB
A/G RATIO: 1.3 (ref 1.1–2.2)
ALBUMIN SERPL-MCNC: 4 G/DL (ref 3.4–5)
ALP BLD-CCNC: 90 U/L (ref 40–129)
ALT SERPL-CCNC: 21 U/L (ref 10–40)
ANION GAP SERPL CALCULATED.3IONS-SCNC: 11 MMOL/L (ref 3–16)
AST SERPL-CCNC: 25 U/L (ref 15–37)
BASOPHILS ABSOLUTE: 0.1 K/UL (ref 0–0.2)
BASOPHILS RELATIVE PERCENT: 0.7 %
BILIRUB SERPL-MCNC: <0.2 MG/DL (ref 0–1)
BUN BLDV-MCNC: 21 MG/DL (ref 7–20)
CALCIUM SERPL-MCNC: 10.1 MG/DL (ref 8.3–10.6)
CHLORIDE BLD-SCNC: 104 MMOL/L (ref 99–110)
CO2: 25 MMOL/L (ref 21–32)
CREAT SERPL-MCNC: 0.7 MG/DL (ref 0.6–1.2)
EOSINOPHILS ABSOLUTE: 0.2 K/UL (ref 0–0.6)
EOSINOPHILS RELATIVE PERCENT: 2.7 %
GFR AFRICAN AMERICAN: >60
GFR NON-AFRICAN AMERICAN: >60
GLOBULIN: 3.1 G/DL
GLUCOSE BLD-MCNC: 140 MG/DL (ref 70–99)
GLUCOSE BLD-MCNC: 143 MG/DL (ref 70–99)
HCT VFR BLD CALC: 33.9 % (ref 36–48)
HEMOGLOBIN: 11.2 G/DL (ref 12–16)
LYMPHOCYTES ABSOLUTE: 3.2 K/UL (ref 1–5.1)
LYMPHOCYTES RELATIVE PERCENT: 40.2 %
MCH RBC QN AUTO: 27.9 PG (ref 26–34)
MCHC RBC AUTO-ENTMCNC: 33 G/DL (ref 31–36)
MCV RBC AUTO: 84.5 FL (ref 80–100)
MONOCYTES ABSOLUTE: 0.4 K/UL (ref 0–1.3)
MONOCYTES RELATIVE PERCENT: 5.4 %
NEUTROPHILS ABSOLUTE: 4.1 K/UL (ref 1.7–7.7)
NEUTROPHILS RELATIVE PERCENT: 51 %
PDW BLD-RTO: 14.9 % (ref 12.4–15.4)
PERFORMED ON: ABNORMAL
PLATELET # BLD: 290 K/UL (ref 135–450)
PMV BLD AUTO: 7.5 FL (ref 5–10.5)
POTASSIUM REFLEX MAGNESIUM: 3.8 MMOL/L (ref 3.5–5.1)
RBC # BLD: 4.01 M/UL (ref 4–5.2)
SODIUM BLD-SCNC: 140 MMOL/L (ref 136–145)
TOTAL PROTEIN: 7.1 G/DL (ref 6.4–8.2)
TROPONIN: <0.01 NG/ML
TROPONIN: <0.01 NG/ML
WBC # BLD: 8.1 K/UL (ref 4–11)

## 2021-05-11 PROCEDURE — 71046 X-RAY EXAM CHEST 2 VIEWS: CPT

## 2021-05-11 PROCEDURE — 6360000002 HC RX W HCPCS: Performed by: PHYSICIAN ASSISTANT

## 2021-05-11 PROCEDURE — 80053 COMPREHEN METABOLIC PANEL: CPT

## 2021-05-11 PROCEDURE — 96375 TX/PRO/DX INJ NEW DRUG ADDON: CPT

## 2021-05-11 PROCEDURE — G0378 HOSPITAL OBSERVATION PER HR: HCPCS

## 2021-05-11 PROCEDURE — 2580000003 HC RX 258: Performed by: INTERNAL MEDICINE

## 2021-05-11 PROCEDURE — 84484 ASSAY OF TROPONIN QUANT: CPT

## 2021-05-11 PROCEDURE — 99285 EMERGENCY DEPT VISIT HI MDM: CPT

## 2021-05-11 PROCEDURE — 85025 COMPLETE CBC W/AUTO DIFF WBC: CPT

## 2021-05-11 PROCEDURE — 96374 THER/PROPH/DIAG INJ IV PUSH: CPT

## 2021-05-11 PROCEDURE — 6370000000 HC RX 637 (ALT 250 FOR IP): Performed by: INTERNAL MEDICINE

## 2021-05-11 PROCEDURE — 36415 COLL VENOUS BLD VENIPUNCTURE: CPT

## 2021-05-11 PROCEDURE — 93005 ELECTROCARDIOGRAM TRACING: CPT | Performed by: EMERGENCY MEDICINE

## 2021-05-11 RX ORDER — SODIUM CHLORIDE 9 MG/ML
25 INJECTION, SOLUTION INTRAVENOUS PRN
Status: DISCONTINUED | OUTPATIENT
Start: 2021-05-11 | End: 2021-05-12 | Stop reason: HOSPADM

## 2021-05-11 RX ORDER — ATORVASTATIN CALCIUM 40 MG/1
40 TABLET, FILM COATED ORAL NIGHTLY
Status: DISCONTINUED | OUTPATIENT
Start: 2021-05-11 | End: 2021-05-11 | Stop reason: SDUPTHER

## 2021-05-11 RX ORDER — INSULIN GLARGINE 100 [IU]/ML
16 INJECTION, SOLUTION SUBCUTANEOUS NIGHTLY
Status: DISCONTINUED | OUTPATIENT
Start: 2021-05-11 | End: 2021-05-12 | Stop reason: HOSPADM

## 2021-05-11 RX ORDER — DULOXETIN HYDROCHLORIDE 60 MG/1
60 CAPSULE, DELAYED RELEASE ORAL DAILY
Status: DISCONTINUED | OUTPATIENT
Start: 2021-05-12 | End: 2021-05-12 | Stop reason: HOSPADM

## 2021-05-11 RX ORDER — VALSARTAN 80 MG/1
80 TABLET ORAL DAILY
Status: DISCONTINUED | OUTPATIENT
Start: 2021-05-12 | End: 2021-05-12 | Stop reason: HOSPADM

## 2021-05-11 RX ORDER — TRAZODONE HYDROCHLORIDE 50 MG/1
100 TABLET ORAL NIGHTLY
Status: DISCONTINUED | OUTPATIENT
Start: 2021-05-11 | End: 2021-05-12 | Stop reason: HOSPADM

## 2021-05-11 RX ORDER — ONDANSETRON 2 MG/ML
4 INJECTION INTRAMUSCULAR; INTRAVENOUS ONCE
Status: COMPLETED | OUTPATIENT
Start: 2021-05-11 | End: 2021-05-11

## 2021-05-11 RX ORDER — MORPHINE SULFATE 4 MG/ML
4 INJECTION, SOLUTION INTRAMUSCULAR; INTRAVENOUS ONCE
Status: COMPLETED | OUTPATIENT
Start: 2021-05-11 | End: 2021-05-11

## 2021-05-11 RX ORDER — ASPIRIN 81 MG/1
81 TABLET, CHEWABLE ORAL DAILY
Status: DISCONTINUED | OUTPATIENT
Start: 2021-05-12 | End: 2021-05-11 | Stop reason: SDUPTHER

## 2021-05-11 RX ORDER — SODIUM CHLORIDE 0.9 % (FLUSH) 0.9 %
5-40 SYRINGE (ML) INJECTION EVERY 12 HOURS SCHEDULED
Status: DISCONTINUED | OUTPATIENT
Start: 2021-05-11 | End: 2021-05-12 | Stop reason: HOSPADM

## 2021-05-11 RX ORDER — ONDANSETRON 2 MG/ML
4 INJECTION INTRAMUSCULAR; INTRAVENOUS EVERY 6 HOURS PRN
Status: DISCONTINUED | OUTPATIENT
Start: 2021-05-11 | End: 2021-05-12 | Stop reason: HOSPADM

## 2021-05-11 RX ORDER — ASPIRIN 81 MG/1
81 TABLET ORAL DAILY
Status: DISCONTINUED | OUTPATIENT
Start: 2021-05-12 | End: 2021-05-12 | Stop reason: HOSPADM

## 2021-05-11 RX ORDER — PROMETHAZINE HYDROCHLORIDE 25 MG/1
12.5 TABLET ORAL EVERY 6 HOURS PRN
Status: DISCONTINUED | OUTPATIENT
Start: 2021-05-11 | End: 2021-05-12 | Stop reason: HOSPADM

## 2021-05-11 RX ORDER — ATORVASTATIN CALCIUM 20 MG/1
20 TABLET, FILM COATED ORAL DAILY
Status: DISCONTINUED | OUTPATIENT
Start: 2021-05-12 | End: 2021-05-12 | Stop reason: HOSPADM

## 2021-05-11 RX ORDER — MAGNESIUM SULFATE IN WATER 40 MG/ML
2000 INJECTION, SOLUTION INTRAVENOUS PRN
Status: DISCONTINUED | OUTPATIENT
Start: 2021-05-11 | End: 2021-05-12 | Stop reason: HOSPADM

## 2021-05-11 RX ORDER — METOPROLOL SUCCINATE 25 MG/1
25 TABLET, EXTENDED RELEASE ORAL DAILY
Status: DISCONTINUED | OUTPATIENT
Start: 2021-05-12 | End: 2021-05-12 | Stop reason: HOSPADM

## 2021-05-11 RX ORDER — POTASSIUM CHLORIDE 7.45 MG/ML
10 INJECTION INTRAVENOUS PRN
Status: DISCONTINUED | OUTPATIENT
Start: 2021-05-11 | End: 2021-05-12 | Stop reason: HOSPADM

## 2021-05-11 RX ORDER — SODIUM CHLORIDE 0.9 % (FLUSH) 0.9 %
5-40 SYRINGE (ML) INJECTION PRN
Status: DISCONTINUED | OUTPATIENT
Start: 2021-05-11 | End: 2021-05-12 | Stop reason: HOSPADM

## 2021-05-11 RX ADMIN — MORPHINE SULFATE 4 MG: 4 INJECTION, SOLUTION INTRAMUSCULAR; INTRAVENOUS at 19:50

## 2021-05-11 RX ADMIN — INSULIN GLARGINE 16 UNITS: 100 INJECTION, SOLUTION SUBCUTANEOUS at 23:20

## 2021-05-11 RX ADMIN — ONDANSETRON 4 MG: 2 INJECTION INTRAMUSCULAR; INTRAVENOUS at 19:50

## 2021-05-11 RX ADMIN — Medication 10 ML: at 23:02

## 2021-05-11 RX ADMIN — TRAZODONE HYDROCHLORIDE 100 MG: 50 TABLET ORAL at 23:00

## 2021-05-11 ASSESSMENT — ENCOUNTER SYMPTOMS
VOMITING: 0
EYE DISCHARGE: 0
ABDOMINAL PAIN: 0
EYE REDNESS: 0
NAUSEA: 1
SORE THROAT: 0
CHOKING: 0
SHORTNESS OF BREATH: 1
BACK PAIN: 0
APNEA: 0
FACIAL SWELLING: 0

## 2021-05-11 ASSESSMENT — PAIN DESCRIPTION - DESCRIPTORS: DESCRIPTORS: PRESSURE

## 2021-05-11 ASSESSMENT — PAIN SCALES - GENERAL
PAINLEVEL_OUTOF10: 0
PAINLEVEL_OUTOF10: 8

## 2021-05-11 ASSESSMENT — PAIN DESCRIPTION - LOCATION: LOCATION: CHEST

## 2021-05-11 ASSESSMENT — PAIN DESCRIPTION - PAIN TYPE: TYPE: ACUTE PAIN

## 2021-05-11 NOTE — ED PROVIDER NOTES
**ADVANCED PRACTICE PROVIDER, I HAVE EVALUATED THIS PATIENT**        [address] ENCOUNTER      Pt Name: Carmen PURCELLYN:[id number]  [place] 1955  Date of evaluation: 5/11/2021  Provider: Tressa Tamayo PA-C      Chief Complaint:    Chief Complaint   Patient presents with    Chest Pain     worsening over the past 3 days    Shortness of Breath     worsening over the past 3 days       Nursing Notes, Past Medical Hx, Past Surgical Hx, Social Hx, Allergies, and Family Hx were all reviewed and agreed with or any disagreements were addressed in the HPI.    HPI:  (Location, Duration, Timing, Severity, Quality, Assoc Sx, Context, Modifying factors)  This is a  76 y.o. female complaint of chest pain x1 week mid left chest.  Planing of shortness of breath. When she tries to lay down she gets increased shortness of breath. No nausea vomiting. Denies fever. Slight cough she says. Does not smoke. Denies abdominal pain, no weaknesses. Patient has a history of hypertension, diabetes and hyperlipidemia. Also history of cardiomyopathy.         PastMedical/Surgical History:      Diagnosis Date    Arthritis     Cardiomyopathy ([address]     Diabetes     GERD (gastroesophageal reflux disease)     Hyperlipidemia     Hypertension     Lumbar disc disease     Sleep apnea     pt states does use a Cpap machine at night    Unspecified cerebral artery occlusion with cerebral infarction 2014    right side weak    Wears glasses          Procedure Laterality Date    ARTHRODESIS Right 9/17/2020    (RIGHT) REMOVAL OF BONE SPURRING AND OSSEOUS PROMINENCE FIRST METATARSAL, ARTHRODESIS OF FIRST METATARSOPHALANGEAL JOINT, BONE MARROW HARVEST AND CONCENTRATION RIGHT TIBIA performed by Harrietta Goldberg, DPM at [facility] Left 9/3/15    CARPAL TUNNEL RELEASE Right 9/22/15    COLONOSCOPY      FINGER TRIGGER RELEASE Left 9/3/15    middle and ring INJECTION PEN    INJECT 16 UNITS UNDER THE SKIN ONCE NIGHTLY    METFORMIN (GLUCOPHAGE) 500 MG TABLET    TAKE TWO TABLETS BY MOUTH TWICE A DAY WITH MEALS    METOPROLOL SUCCINATE (TOPROL XL) 25 MG EXTENDED RELEASE TABLET    TAKE ONE TABLET BY MOUTH DAILY FOR BLOOD PRESSURE AND HEART    TRAZODONE (DESYREL) 100 MG TABLET    Take 100 mg by mouth nightly    VALSARTAN (DIOVAN) 80 MG TABLET    TAKE ONE TABLET BY MOUTH DAILY         Review of Systems:  Review of Systems   Constitutional: Negative for chills and fever. HENT: Negative for congestion, facial swelling and sore throat. Eyes: Negative for discharge and redness. Respiratory: Positive for shortness of breath. Negative for apnea and choking. Cardiovascular: Positive for chest pain. Gastrointestinal: Positive for nausea. Negative for abdominal pain and vomiting. Genitourinary: Negative for dysuria. Musculoskeletal: Negative for back pain, neck pain and neck stiffness. Neurological: Negative for dizziness, tremors, seizures, weakness and headaches. All other systems reviewed and are negative. Positives and Pertinent negatives as per HPI. Except as noted above in the ROS, problem specific ROS was completed and is negative. Physical Exam:  Physical Exam  Vitals signs and nursing note reviewed. Constitutional:       Appearance: She is well-developed. She is not diaphoretic. HENT:      Head: Normocephalic and atraumatic. Nose: Nose normal.      Mouth/Throat:      Mouth: Mucous membranes are moist.      Pharynx: Oropharynx is clear. Eyes:      General:         Right eye: No discharge. Left eye: No discharge. Extraocular Movements: Extraocular movements intact. Conjunctiva/sclera: Conjunctivae normal.      Pupils: Pupils are equal, round, and reactive to light. Neck:      Musculoskeletal: Normal range of motion and neck supple. Cardiovascular:      Rate and Rhythm: Normal rate and regular rhythm.       Heart sounds: hydrocephalus. ORBITS: The visualized portion of the orbits demonstrate no acute abnormality. SINUSES: The visualized paranasal sinuses and mastoid air cells demonstrate no acute abnormality. SOFT TISSUES/SKULL:  No acute abnormality of the visualized skull or soft tissues. No acute intracranial abnormality. No change from prior study. Ct Abdomen Pelvis W Iv Contrast Additional Contrast? None    Result Date: 4/20/2021  EXAMINATION: CT OF THE ABDOMEN AND PELVIS WITH CONTRAST 4/20/2021 9:22 am TECHNIQUE: CT of the abdomen and pelvis was performed with the administration of intravenous contrast. Multiplanar reformatted images are provided for review. Dose modulation, iterative reconstruction, and/or weight based adjustment of the mA/kV was utilized to reduce the radiation dose to as low as reasonably achievable. COMPARISON: 04/16/2021, 07/24/2019, 10/07/2013 HISTORY: ORDERING SYSTEM PROVIDED HISTORY: persistent diffuse upper abdominal ttp despite trial of medications TECHNOLOGIST PROVIDED HISTORY: Reason for exam:->persistent diffuse upper abdominal ttp despite trial of medications Additional Contrast?->None Decision Support Exception->Emergency Medical Condition (MA) Reason for Exam:  persistent diffuse upper abdominal ttp despite trial of medications FINDINGS: Lower Chest: There is mild bibasilar atelectasis and parenchymal scar. Organs: The liver is normal.  The gallbladder is unremarkable. The spleen is mildly atrophic, though otherwise unremarkable. The pancreas is normal.  The adrenal glands are normal bilaterally. The bilateral kidneys are unremarkable, without evidence of inflammatory change, renal/ureteral calculus, or hydronephrosis. GI/Bowel: Evaluation of the hollow GI tract demonstrates no evidence of abnormal bowel wall thickening, dilatation, or evidence of obstruction. The appendix is normal. Pelvis:  The urinary bladder is partially collapsed, though otherwise normal. The patient is status of motion without tenderness. No midline tender cervical, thoracic or lumbar spine. Cardiovascular regular rhythm, lungs are clear. No wheeze rales or rhonchi noted. She does have some mild reproducible chest wall pain on the mid upper sternum. No bruising noted. Abdomen soft nontender. Normal bowel sounds all 4 quadrants. Full range of motion extremity. Bilateral lower extremities show no edema. Good strength against resisted plantar dorsiflexion. Ambulatory with no difficulty. Alert oriented x4. Patient heart score is 5. Lab results today shows:  CBC within normal limits with a white count of 8.1. CMP shows sodium 140, potassium 3.8, chloride 104 with a BUN of 21 and creatinine 0.7. Normal transaminases. Troponin less than 0.01. Chest x-ray shows no acute radiographic abnormality in the chest.    Did talk to patient regarding her results. And questionable with her chest pressure she was given morphine and Zofran. And she does not describe this as a burning type pain. With her age and cardiac risk factors and her history prefer that she be admitted for cardiac rule out. She was okay with this plan. I will place a call out to hospitalist service for admission. The patient tolerated their visit well. I evaluated the patient. The physician was available for consultation as needed. The patient and / or the family were informed of the results of any tests, a time was given to answer questions, a plan was proposed and they agreed with plan. CLINICAL IMPRESSION:  1. Chest pain, unspecified type        DISPOSITION Decision To Admit 05/11/2021 07:51:38 PM      PATIENT REFERRED TO:  No follow-up provider specified.     DISCHARGE MEDICATIONS:  New Prescriptions    No medications on file       DISCONTINUED MEDICATIONS:  Discontinued Medications    IBUPROFEN (ADVIL;MOTRIN) 800 MG TABLET    Take 1 tablet by mouth 2 times daily as needed for Pain    ONDANSETRON (ZOFRAN ODT) 4 MG DISINTEGRATING TABLET    Take 1 tablet by mouth every 8 hours as needed for Nausea    TRIMETHOPRIM-POLYMYXIN B (POLYTRIM) 91028-0.1 UNIT/ML-% OPHTHALMIC SOLUTION    1 drop every 4 hours as needed (redness)     VITAMIN C (ASCORBIC ACID) 500 MG TABLET    Take 500 mg by mouth daily              (Please note the MDM and HPI sections of this note were completed with a voice recognition program.  Efforts were made to edit the dictations but occasionally words are mis-transcribed.)    Electronically signed, Betsy Nguyen PA-C,          Betsy Nguyen PA-C  05/11/21 8514

## 2021-05-12 ENCOUNTER — APPOINTMENT (OUTPATIENT)
Dept: CT IMAGING | Age: 66
End: 2021-05-12
Payer: MEDICARE

## 2021-05-12 VITALS
DIASTOLIC BLOOD PRESSURE: 80 MMHG | HEIGHT: 64 IN | SYSTOLIC BLOOD PRESSURE: 140 MMHG | BODY MASS INDEX: 35 KG/M2 | OXYGEN SATURATION: 95 % | HEART RATE: 72 BPM | TEMPERATURE: 98 F | WEIGHT: 205.03 LBS | RESPIRATION RATE: 18 BRPM

## 2021-05-12 LAB
EKG ATRIAL RATE: 81 BPM
EKG DIAGNOSIS: NORMAL
EKG P AXIS: 49 DEGREES
EKG P-R INTERVAL: 182 MS
EKG Q-T INTERVAL: 366 MS
EKG QRS DURATION: 68 MS
EKG QTC CALCULATION (BAZETT): 425 MS
EKG R AXIS: 43 DEGREES
EKG T AXIS: 37 DEGREES
EKG VENTRICULAR RATE: 81 BPM
ESTIMATED AVERAGE GLUCOSE: 159.9 MG/DL
GLUCOSE BLD-MCNC: 131 MG/DL (ref 70–99)
HBA1C MFR BLD: 7.2 %
LV EF: 70 %
LVEF MODALITY: NORMAL
PERFORMED ON: ABNORMAL
TROPONIN: <0.01 NG/ML
TROPONIN: <0.01 NG/ML

## 2021-05-12 PROCEDURE — 6370000000 HC RX 637 (ALT 250 FOR IP): Performed by: INTERNAL MEDICINE

## 2021-05-12 PROCEDURE — 6360000002 HC RX W HCPCS: Performed by: INTERNAL MEDICINE

## 2021-05-12 PROCEDURE — 2580000003 HC RX 258: Performed by: INTERNAL MEDICINE

## 2021-05-12 PROCEDURE — 97116 GAIT TRAINING THERAPY: CPT

## 2021-05-12 PROCEDURE — 36415 COLL VENOUS BLD VENIPUNCTURE: CPT

## 2021-05-12 PROCEDURE — G0378 HOSPITAL OBSERVATION PER HR: HCPCS

## 2021-05-12 PROCEDURE — 97161 PT EVAL LOW COMPLEX 20 MIN: CPT

## 2021-05-12 PROCEDURE — A9502 TC99M TETROFOSMIN: HCPCS | Performed by: INTERNAL MEDICINE

## 2021-05-12 PROCEDURE — 93017 CV STRESS TEST TRACING ONLY: CPT

## 2021-05-12 PROCEDURE — 97166 OT EVAL MOD COMPLEX 45 MIN: CPT

## 2021-05-12 PROCEDURE — 93010 ELECTROCARDIOGRAM REPORT: CPT | Performed by: INTERNAL MEDICINE

## 2021-05-12 PROCEDURE — 84484 ASSAY OF TROPONIN QUANT: CPT

## 2021-05-12 PROCEDURE — 78452 HT MUSCLE IMAGE SPECT MULT: CPT

## 2021-05-12 PROCEDURE — 3430000000 HC RX DIAGNOSTIC RADIOPHARMACEUTICAL: Performed by: INTERNAL MEDICINE

## 2021-05-12 PROCEDURE — 97535 SELF CARE MNGMENT TRAINING: CPT

## 2021-05-12 PROCEDURE — 70450 CT HEAD/BRAIN W/O DYE: CPT

## 2021-05-12 PROCEDURE — 83036 HEMOGLOBIN GLYCOSYLATED A1C: CPT

## 2021-05-12 RX ORDER — DEXTROSE MONOHYDRATE 50 MG/ML
100 INJECTION, SOLUTION INTRAVENOUS PRN
Status: DISCONTINUED | OUTPATIENT
Start: 2021-05-12 | End: 2021-05-12 | Stop reason: HOSPADM

## 2021-05-12 RX ORDER — DEXTROSE MONOHYDRATE 25 G/50ML
12.5 INJECTION, SOLUTION INTRAVENOUS PRN
Status: DISCONTINUED | OUTPATIENT
Start: 2021-05-12 | End: 2021-05-12 | Stop reason: HOSPADM

## 2021-05-12 RX ORDER — NICOTINE POLACRILEX 4 MG
15 LOZENGE BUCCAL PRN
Status: DISCONTINUED | OUTPATIENT
Start: 2021-05-12 | End: 2021-05-12 | Stop reason: HOSPADM

## 2021-05-12 RX ADMIN — ATORVASTATIN CALCIUM 20 MG: 20 TABLET, FILM COATED ORAL at 08:53

## 2021-05-12 RX ADMIN — VALSARTAN 80 MG: 80 TABLET, FILM COATED ORAL at 08:53

## 2021-05-12 RX ADMIN — REGADENOSON 0.4 MG: 0.08 INJECTION, SOLUTION INTRAVENOUS at 10:27

## 2021-05-12 RX ADMIN — Medication 10 ML: at 08:54

## 2021-05-12 RX ADMIN — DULOXETINE HYDROCHLORIDE 60 MG: 60 CAPSULE, DELAYED RELEASE ORAL at 08:53

## 2021-05-12 RX ADMIN — TETROFOSMIN 30 MILLICURIE: 1.38 INJECTION, POWDER, LYOPHILIZED, FOR SOLUTION INTRAVENOUS at 10:39

## 2021-05-12 RX ADMIN — ENOXAPARIN SODIUM 40 MG: 40 INJECTION SUBCUTANEOUS at 08:53

## 2021-05-12 RX ADMIN — TETROFOSMIN 10 MILLICURIE: 1.38 INJECTION, POWDER, LYOPHILIZED, FOR SOLUTION INTRAVENOUS at 09:11

## 2021-05-12 RX ADMIN — ASPIRIN 81 MG: 81 TABLET, COATED ORAL at 08:53

## 2021-05-12 NOTE — H&P
appropriately. HEENT:  Eyes:  Pupils are reactive to light. ENT:  Extraocular muscle  movements are intact. RESPIRATORY:  No obvious rales, rubs, or rhonchi. CARDIOVASCULAR:  S1, S2 are heard. No murmurs or rubs. ABDOMEN:  Nondistended. MUSCULOSKELETAL:  No acute obvious deformities. SKIN:  Without rashes or lesions. DIAGNOSTIC DATA:  CBC showed white count of 8.1, hemoglobin 12.2,  hematocrit 33.9, platelets of 521. Troponin less than 0.01. BUN 21,  creatinine 0.7. Sodium 140, potassium 3.8. Chest x-ray shows no significant findings. EKG was interpreted by the  Emergency Room with no acute ST-T wave changes. REVIEW OF PREVIOUS MEDICAL RECORDS:  Shows coronary angiography that was  done on 04/20/2016 that showed no evidence of coronary artery disease. ASSESSMENT:  1. Atypical chest pain. 2.  Obesity with a BMI of 35.38 kg/m2. 3.  Type 2 diabetes. 4.  Hypertension. 5.  Dyslipidemia. 6.  Depression. PLAN OF CARE:  The patient is admitted to Observation. Telemetry will  be continued. The patient's home dose of her aspirin, atorvastatin,  Cymbalta, Toprol XL, Diovan, and Lantus will be continued. The  patient's iloperidone will be continued. DVT prophylaxis with Lovenox. Serial troponins have been requested. CODE STATUS:  Full. EXPECTED LENGTH OF STAY:  Less than two midnights based on the plan of  care above. DISPOSITION:  Observation.         Liz Blackman MD    D: 05/12/2021 1:28:13       T: 05/12/2021 3:01:42     [email or web address]  Job#: [id number]     Doc#: [id number]    CC:

## 2021-05-12 NOTE — PROGRESS NOTES
Pt complains of dizziness when she stands up. Checked blood pressure while sitting 148/79, blood pressure while standing 146/85. Heart rate did not fluctuate during assessment. Placed bed alarm asked pt to call before getting up. Pt states this is not new for her.

## 2021-05-12 NOTE — PROGRESS NOTES
Patient admitted to 4111 after presented to ER with chest pains and shortness of breath. Patient reports feeling a \"lump\" in the middle of her chest. She told the MD about it. Patient to have a stress test tomorrow. Patient oriented to room and floor. Medications given.

## 2021-05-12 NOTE — PROGRESS NOTES
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  Additional Comments: denies recent falls       Objective        Orientation  Overall Orientation Status: Within Functional Limits     Balance  Sitting Balance: Supervision  Standing Balance: Stand by assistance  Standing Balance  Time: 3+3 mins  Activity: mobility in/ out of bathroom  Functional Mobility  Functional - Mobility Device: Cane  Activity: To/from bathroom  Assist Level: Stand by assistance  Functional Mobility Comments: increased time to complete however no LOB  Toilet Transfers  Toilet - Technique: Ambulating  Equipment Used: Standard bedside commode  Toilet Transfer: Stand by assistance  ADL  Grooming: Stand by assistance(washing face and hands at sink)  Toileting: Stand by assistance           Transfers  Sit to stand: Stand by assistance  Stand to sit: Stand by assistance     Cognition  Overall Cognitive Status: WFL        Sensation  Overall Sensation Status: Impaired  Additional Comments: reports decreased sensation in B feet with the R being worse than the L(neuropathy)        LUE AROM (degrees)  LUE AROM : WFL  Left Hand AROM (degrees)  Left Hand AROM: WFL  RUE AROM (degrees)  RUE AROM : WFL  Right Hand AROM (degrees)  Right Hand AROM: WFL  LUE Strength  LUE Strength Comment: grossly demonstrating 4/5  RUE Strength  RUE Strength Comment: grossly demonstrating 3/5          Treatment included functional transfer training, ADLs, and patient education.             Plan   Plan  Times per week: 1-2 more times  Current Treatment Recommendations: Self-Care / ADL    AM-PAC Score        AM-PAC Inpatient Daily Activity Raw Score: 21 (05/12/21 6455)  AM-PAC Inpatient ADL T-Scale Score : 44.27 (05/12/21 1446)  ADL Inpatient CMS 0-100% Score: 32.79 (05/12/21 1446)  ADL Inpatient CMS G-Code Modifier : CJ (05/12/21 1446)    Goals  Short term goals  Time Frame for Short term goals: by dc  Short term goal 1: Pt will complete LB dressing with mod I  Short term goal 2: Pt will complete toileting with mod I  Short term goal 3: Pt will complete standing level grooming at mod I  Patient Goals   Patient goals : to go home       Therapy Time   Individual Concurrent Group Co-treatment   Time In 1408         Time Out 1435         Minutes 27         Timed Code Treatment Minutes: 12 Minutes(+15 min eval)    Jada MCPHERSON  [address] [facility], OTR/L [id number]

## 2021-05-12 NOTE — DISCHARGE INSTR - COC
Continuity of Care Form    Patient Name: Sharyn Curtis   :  1955  MRN:  [id number]    Admit date:  2021  Discharge date:  ***    Code Status Order: Full Code   Advance Directives:   Advance Care Flowsheet Documentation       Date/Time Healthcare Directive Type of Healthcare Directive Copy in [address] Agent's Name Healthcare Agent's Phone Number    21 [phone]  No, patient does not have an advance directive for healthcare treatment -- -- -- -- --            Admitting Physician:  Jae Calderón MD  PCP: Rachel Steen MD    Discharging Nurse: [facility] Unit/Room#: P3C-2379/8708-77  Discharging Unit Phone Number: ***    Emergency Contact:   Extended Emergency Contact Information  Primary Emergency Contact: [address] Phone: [phone]  Relation: Child  Secondary Emergency Contact: [address] [place] [address] Phone: [phone]  Relation: Child    Past Surgical History:  Past Surgical History:   Procedure Laterality Date    ARTHRODESIS Right 2020    (RIGHT) REMOVAL OF BONE SPURRING AND OSSEOUS PROMINENCE FIRST METATARSAL, ARTHRODESIS OF FIRST METATARSOPHALANGEAL JOINT, BONE MARROW HARVEST AND CONCENTRATION RIGHT TIBIA performed by Marvin Martel DPM at [facility] Left 9/3/15    CARPAL TUNNEL RELEASE Right 9/22/15    COLONOSCOPY      FINGER TRIGGER RELEASE Left 9/3/15    middle and ring fingers    FINGER TRIGGER RELEASE Right 9/22/15    middle and ring fingers    FOOT SURGERY Bilateral     litteltoe    HAND SURGERY Right     Ligament    PARTIAL HYSTERECTOMY  2003    SHOULDER ARTHROSCOPY Right 2018    Diagnostic scope, rcr, open [place]       Immunization History:   Immunization History   Administered Date(s) Administered    Influenza 2011, 10/05/2012, 2013    Influenza Virus Vaccine 2015, 10/30/2017    Influenza, Intradermal, Preservative free 10/29/2014    Influenza, Intradermal, Quadrivalent, Preservative Free 10/24/2016    Influenza, Quadv, IM, PF (6 mo and older Fluzone, Flulaval, Fluarix, and 3 yrs and older Afluria) 10/28/2019    Influenza, Quadv, Recombinant, IM PF (Flublok 18 yrs and older) 10/04/2018    Influenza, Quadv, adjuvanted, 65 yrs +, IM, PF (Fluad) 11/19/2020    Pneumococcal Polysaccharide (Agwlapbyb28) 05/01/2013, 11/21/2019       Active Problems:  Patient Active Problem List   Diagnosis Code    Diabetes mellitus ([facility] E11.9    Obesity E66.9    Dystonia G24.9    Cerebral thrombosis with cerebral infarction ([facility] I63.30    Sleep apnea G47.30    Right hemiparesis ([facility] G81.91    Trigger finger, acquired M65.30    Elevated CK R74.8    Primary osteoarthritis of both knees M17.0    Mood disorder ([facility] - follows with Psych Dr. Michelle Guerra    Calcific tendonitis of right shoulder M75.31    Essential hypertension I10    Mixed hyperlipidemia E78.2    Chronic low back pain without sciatica M54.5, G89.29    History of CVA with residual deficit I69.30    Anxiety and depression F41.9, F32.9    Chest pain R07.9       Isolation/Infection:   Isolation            No Isolation          Patient Infection Status       Infection Onset Added Last Indicated Last Indicated By Review Planned Expiration Resolved Resolved By    None active    Resolved    COVID-19 Rule Out 04/16/21 04/16/21 04/16/21 COVID-19 (Ordered)   04/17/21 Rule-Out Test Resulted    COVID-19 Rule Out 01/05/21 01/05/21 01/05/21 COVID-19 (Ordered)   01/06/21 Rule-Out Test Resulted    COVID-19 Rule Out 06/17/20 06/17/20 06/17/20 COVID-19 (Ordered)   06/18/20 Rule-Out Test Resulted            Nurse Assessment:  Last Vital Signs: BP (!) 140/80   Pulse 72   Temp 98 °F (36.7 °C) (Oral)   Resp 18   Ht 5' 4\" (1.626 m)   Wt 205 lb 0.4 oz (93 kg)   SpO2 95%   BMI 35.19 kg/m²     Last documented pain score (0-10 scale): Pain Level: 0  Last Weight:   Wt Readings from Last 1 Encounters:   05/12/21 205 lb 0.4 oz (93 kg)     Mental Status:  {IP PT MENTAL STATUS::::0}    IV Access:  { KATIE IV ACCESS:917445473:::0}    Nursing Mobility/ADLs:  Walking   {CHP DME ADLs:837340520:::0}  Transfer  {CHP DME ADLs:241541200:::0}  Bathing  {CHP DME ADLs:799385365:::0}  Dressing  {CHP DME ADLs:105977071:::0}  Toileting  {CHP DME ADLs:100890859:::0}  Feeding  {CHP DME ADLs:508257673:::0}  Med Admin  {CHP DME ADLs:897187949:::0}  Med Delivery   { KATIE MED Delivery:025615609:::0}    Wound Care Documentation and Therapy:        Elimination:  Continence:    Bowel: {YES / PZ:33365}  Bladder: {YES / EL:22236}  Urinary Catheter: {Urinary Catheter:379981172:::0}   Colostomy/Ileostomy/Ileal Conduit: {YES / JV:02360}       Date of Last BM: ***    Intake/Output Summary (Last 24 hours) at 2021 1657  Last data filed at 2021 1610  Gross per 24 hour   Intake 240 ml   Output 1550 ml   Net -1310 ml     I/O last 3 completed shifts:  In: -   Out: 2581 [Urine:1550]    Safety Concerns:     [address] [employer] Safety Concerns:858417613:::0}    Impairments/Disabilities:      [address] [employer] Impairments/Disabilities:750883749:::0}    Nutrition Therapy:  Current Nutrition Therapy:   [address] [employer] Diet List:470439320:::0}    Routes of Feeding: {CHP DME Other Feedings:446362522:::0}  Liquids: {Slp liquid thickness:55332}  Daily Fluid Restriction: {CHP DME Yes amt example:712903316:::0}  Last Modified Barium Swallow with Video (Video Swallowing Test): {Done Not Done SISW:238398953:::0}    Treatments at the Time of Hospital Discharge:   Respiratory Treatments: ***  Oxygen Therapy:  {Therapy; copd oxygen:78976:::0}  Ventilator:    { ANISHA Vent List:562794133:::0}    Rehab Therapies: {THERAPEUTIC INTERVENTION:1452831850}  Weight Bearing Status/Restrictions: [address] T[address] LANCASTER Weight Bearin:::0}  Other Medical Equipment (for information only, NOT a DME order):  {EQUIPMENT:785427831}  Other Treatments: ***    Patient's personal belongings (please select all that are sent with patient):  {CHP DME Belongings:865801920:::0}    RN SIGNATURE:  {Esignature:220324417:::0}    CASE MANAGEMENT/SOCIAL WORK SECTION    Inpatient Status Date: ***    Readmission Risk Assessment Score:  Readmission Risk              Risk of Unplanned Readmission:        0           Discharging to Facility/ Agency   Name:   Address:  Phone:  Fax:    Dialysis Facility (if applicable)   Name:  Address:  Dialysis Schedule:  Phone:  Fax:    / signature: {Esignature:882671664:::0}    PHYSICIAN SECTION    Prognosis: {Prognosis:9886313889:::0}    Condition at Discharge: Cris Brown Patient Condition:457878926:::0}    Rehab Potential (if transferring to Rehab): {Prognosis:3592109878:::0}    Recommended Labs or Other Treatments After Discharge: ***    Physician Certification: I certify the above information and transfer of Lolis Morillo  is necessary for the continuing treatment of the diagnosis listed and that she requires {Admit to Appropriate Level of Care:55122:::0} for {GREATER/LESS:635109196} 30 days.      Update Admission H&P: {CHP DME Changes in HandP:648226411:::0}    PHYSICIAN SIGNATURE:  {Esignature:080076510:::0}

## 2021-05-12 NOTE — PROGRESS NOTES
Physical Therapy    Facility/Department: [address] MED SURG  Initial Assessment  If pt. is D/C'd prior to next visit please refer back to last daily progress note for D/C status. NAME: Checo Wood  : 1955  MRN: [id number]    Date of Service: 2021    Discharge Recommendations:  Home with assist PRN(the pt declines home PT)   John Wood scored a 21/24 on the AM-PAC short mobility form. At this time, no further PT is recommended upon discharge due to the pt declining the need. Recommend patient returns to prior setting with prior services. PT Equipment Recommendations  Equipment Needed: No    Assessment   Assessment: The pt is a 77 yo female who came to the ED with a few day h/o of intermittent L sided chest pain associated with SOB. The pt reports she lives with  and was indep in ambulation with a cane PTA but sometimes needed asisst for self-care. PMHx: DM, obesity, sleep apnea, HTN, CVA (per pt report)      Today, the pt demonstrated that she is most ikely functioning close to her baseline and anticipate that she will be safe for home with the prn asisst of her family. The pt does not feel she will need further skilled PT at d/c. The pt does demonstrate decreased strength in B LE's with R being weaker than the L but the pt reports this is from an old CVA. If the pt is not d/c to home today, then will con't to see while she is on acute care floor and assess her ability on the stairs. Prognosis: Good  Decision Making: Low Complexity  History: see above  Exam: see above  Clinical Presentation: stable  PT Education: PT Role;General Safety  Barriers to Learning: none  REQUIRES PT FOLLOW UP: Yes  Activity Tolerance  Activity Tolerance: Patient Tolerated treatment well       Patient Diagnosis(es): The encounter diagnosis was Chest pain, unspecified type.      has a past medical history of Arthritis, Cardiomyopathy ([address] Diabetes, GERD (gastroesophageal reflux disease), Hyperlipidemia, Hypertension, Lumbar disc disease, Sleep apnea, Unspecified cerebral artery occlusion with cerebral infarction, and Wears glasses. has a past surgical history that includes partial hysterectomy (cervix not removed) (08/2003); Hand surgery (Right); Foot surgery (Bilateral); Carpal tunnel release (Left, 9/3/15); Finger trigger release (Left, 9/3/15); Carpal tunnel release (Right, 9/22/15); Finger trigger release (Right, 9/22/15); Shoulder arthroscopy (Right, 06/20/2018); Colonoscopy; and arthrodesis (Right, 9/17/2020). Restrictions  Restrictions/Precautions  Restrictions/Precautions: Fall Risk  Position Activity Restriction  Other position/activity restrictions: up with assist  Vision/Hearing  Vision: Impaired  Vision Exceptions: Wears glasses at all times  Hearing: Within functional limits     Subjective  General  Chart Reviewed: Yes  Additional Pertinent Hx: Per H&P on 5- of Rebekah Koch MD: The pt is a 75 yo female who came to the ED with a few day h/o of intermittent L sided chest pain associated with SOB.     PMHx: DM, obesity, sleep apnea, HTN, CVA (per pt report)  Response To Previous Treatment: Not applicable  Family / Caregiver Present: Yes  Referring Practitioner: Charis Rosas MD  Referral Date : 05/12/21  Diagnosis: atypical chest pain  Follows Commands: Within Functional Limits  Subjective  Subjective: the pt was found to be in the bed; she reports 5/10 mid chest pain and going to the L side and into the back; reports dizziness when getting up  Pain Screening  Patient Currently in Pain: Denies          Orientation  Orientation  Overall Orientation Status: Within Functional Limits  Orientation Level: Oriented to person;Oriented to place;Oriented to time;Oriented to situation  Social/Functional History  Social/Functional History  Lives With: Spouse  Type of Home: House  Home Layout: Multi-level, Able to Live on Main level with bedroom/bathroom, Laundry in basement(bedroom on the main floor with full bath)  Home Access: Stairs to enter with rails  Entrance Stairs - Number of Steps: 4+4  Entrance Stairs - Rails: Left  Bathroom Shower/Tub: Shower chair with back, Walk-in shower  Bathroom Toilet: Standard(RTS w/arms)  Bathroom Equipment: Shower chair, Grab bars in shower, Hand-held shower, Toilet raiser  Bathroom Accessibility: Walker accessible  Home Equipment: 4 wheeled walker, Cane  ADL Assistance: Needs assistance( or dgt assist with shower and dressing if needed; toilets on her own)  Homemaking 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  Additional Comments: denies recent falls  Cognition   Cognition  Overall Cognitive Status: WFL    Objective  AROM RLE (degrees)  RLE AROM: WFL  AROM LLE (degrees)  LLE AROM : WFL  Strength RLE  Comment: functionally fair  Strength LLE  Strength LLE: WFL     Sensation  Overall Sensation Status: Impaired  Additional Comments: reports decreased sensation in B feet with the R being worse than the L(neuropathy)  Bed mobility  Supine to Sit: Stand by assistance(HOB elevated)  Sit to Supine: Unable to assess  Scooting: Stand by assistance  Transfers  Sit to Stand: Stand by assistance  Stand to sit: Stand by assistance  Ambulation  Ambulation?: Yes  Ambulation 1  Surface: level tile  Device: Single point cane  Assistance: Stand by assistance  Quality of Gait: slowed pace, no LOB, good B foot clearance  Distance: 20-25 feet x 2     Balance  Sitting - Static: Good  Sitting - Dynamic: Good  Standing - Static: Good  Standing - Dynamic: Good  Comments: stood at the sink x 3-4 minutes with SBA to wash hands and face        Plan   Plan  Times per

## 2021-05-12 NOTE — PROGRESS NOTES
4 Eyes Skin Assessment     NAME:  John Wood  YOB: 1955  MEDICAL RECORD NUMBER:  [id number]    The patient is being assess for  Admission    I agree that 2 RN's have performed a thorough Head to Toe Skin Assessment on the patient. ALL assessment sites listed below have been assessed. Areas assessed by both nurses:    Head, Face, Ears, Shoulders, Back, Chest, Arms, Elbows, Hands, Sacrum. Buttock, Coccyx, Ischium and Legs. Feet and Heels        Does the Patient have a Wound?  No noted wound(s)       [place] Prevention initiated:  No   Wound Care Orders initiated:  No    Pressure Injury (Stage 3,4, Unstageable, DTI, NWPT, and Complex wounds) if present place consult order under [de-identified] No    New and Established Ostomies if present place consult order under : No      Nurse 1 eSignature: Electronically signed by Aramis Collazo RN on 5/11/21 at 11:40 PM     Nurse 2 eSignature: Electronically signed by Golden Katz RN on 5/12/2021 at 2:25 AM

## 2021-05-12 NOTE — PLAN OF CARE
Problem: Falls - Risk of:  Goal: Will remain free from falls  Description: Will remain free from falls  Outcome: Ongoing  Note: Explained to patient need to call for assist when needing to get out of bed. Patient verbalized understanding. Bed in low, locked position with alarm activated. Call light within reach.    Goal: Absence of physical injury  Description: Absence of physical injury  Outcome: Ongoing

## 2021-05-13 ENCOUNTER — TELEPHONE (OUTPATIENT)
Dept: FAMILY MEDICINE CLINIC | Age: 66
End: 2021-05-13

## 2021-05-13 RX ORDER — IBUPROFEN 600 MG/1
TABLET ORAL
Qty: 60 TABLET | Refills: 0 | Status: SHIPPED | OUTPATIENT
Start: 2021-05-13 | End: 2021-06-16

## 2021-05-18 DIAGNOSIS — E11.8 TYPE 2 DIABETES MELLITUS WITH COMPLICATION, WITHOUT LONG-TERM CURRENT USE OF INSULIN (HCC): ICD-10-CM

## 2021-05-18 NOTE — PROGRESS NOTES
[facility]     Outpatient Cardiology         Chief Complaint   Patient presents with    Chest Pain     midsternal,radiating to the left then to the back    Palpitations       HPI     Sidia A Chentefordis a 76 y.o. female here for chest pain. Hx of DM II, HTN, HLD, DIPTI. \Hypertension, well-controlled current medications, no changes needed or side effects. Hyperlipidemia on a statin, no side effects. Sleep apnea, using her CPAP most nights. Palpitations, located in the center of her chest, mild to moderate, daily, no particular triggering relieving factors, most nights. Sometimes associated with chest tightness. Chest pain, precordial, tight dull/moderate, no radiation, no associated symptoms besides palpitations and symptom shortness of breath. So far had multiple stress test in the past with normal limits.       PMH  Past Medical History:   Diagnosis Date    Arthritis     Cardiomyopathy ([address]     Diabetes     GERD (gastroesophageal reflux disease)     Hyperlipidemia     Hypertension     Lumbar disc disease     Sleep apnea     pt states does use a Cpap machine at night    Unspecified cerebral artery occlusion with cerebral infarction 2014    right side weak    Wears glasses        PSH  Past Surgical History:   Procedure Laterality Date    ARTHRODESIS Right 9/17/2020    (RIGHT) REMOVAL OF BONE SPURRING AND OSSEOUS PROMINENCE FIRST METATARSAL, ARTHRODESIS OF FIRST METATARSOPHALANGEAL JOINT, BONE MARROW HARVEST AND CONCENTRATION RIGHT TIBIA performed by Ken Swain DPM at [facility] Left 9/3/15    CARPAL TUNNEL RELEASE Right 9/22/15    COLONOSCOPY      FINGER TRIGGER RELEASE Left 9/3/15    middle and ring fingers    FINGER TRIGGER RELEASE Right 9/22/15    middle and ring fingers    FOOT SURGERY Bilateral     litteltoe    HAND SURGERY Right     Ligament    PARTIAL HYSTERECTOMY  08/2003    SHOULDER ARTHROSCOPY Right 06/20/2018    Diagnostic scope, rcr, [facility]        Social HIstory  Social History     Tobacco Use    Smoking status: Former Smoker     Packs/day: 1.50     Years: 3.00     Pack years: 4.50     Quit date: 1992     Years since quittin.4    Smokeless tobacco: Never Used   Vaping Use    Vaping Use: Never used   Substance Use Topics    Alcohol use: No    Drug use: No       Family History  Family History   Problem Relation Age of Onset    Heart Disease Mother     High Blood Pressure Mother     Stroke Mother     Cancer Brother         lung cancer       Allergies   Allergies   Allergen Reactions    Codeine Nausea And Vomiting and Rash    Vicodin [Hydrocodone-Acetaminophen] Nausea And Vomiting    Lipitor [Atorvastatin]      Myalgias    Oxycontin [Oxycodone Hcl] Itching    Tramadol Itching and Swelling       Medications:     Home Medications:  Were reviewed and are listed in nursing record. and/or listed below    Prior to Admission medications    Medication Sig Start Date End Date Taking?  Authorizing Provider   Dulaglutide (TRULICITY) 1.5 QR/2.7FI SOPN INJECT 1.5 MG UNDER THE SKIN ONCE WEEKLY 21  Yes Norma Finnegan MD   Insulin Pen Needle (PEN NEEDLES) 31G X 6 MM MISC USE TO INJECT INSULIN DAILY 21  Yes Norma Finnegan MD   insulin glargine (LANTUS SOLOSTAR) 100 UNIT/ML injection pen INJECT 16 UNITS UNDER THE SKIN ONCE NIGHTLY 21  Yes Norma Finnegan MD   furosemide (LASIX) 20 MG tablet TAKE ONE TABLET BY MOUTH DAILY AS NEEDED FOR LEG SWELLING 21  Yes Norma Finnegan MD   ibuprofen (ADVIL;MOTRIN) 600 MG tablet TAKE ONE TABLET BY MOUTH TWICE A DAY WITH MEALS 21  Yes Zuri Conway MD   atorvastatin (LIPITOR) 20 MG tablet TAKE ONE TABLET BY MOUTH DAILY 21  Yes Norma Finnegan MD   metFORMIN (GLUCOPHAGE) 500 MG tablet TAKE TWO TABLETS BY MOUTH TWICE A DAY WITH MEALS 21  Yes Norma Finnegan MD   famotidine (PEPCID) 20 MG tablet Take 1 tablet by mouth 2 times daily 21  Yes Tyree Lu MD   gabapentin (NEURONTIN) 300 MG capsule TAKE ONE CAPSULE BY MOUTH ONCE NIGHTLY  Patient taking differently: daily. 3/11/21 6/9/21 Yes Shaista Finnegan MD   valsartan (DIOVAN) 80 MG tablet TAKE ONE TABLET BY MOUTH DAILY 2/10/21  Yes Shaista Finnegan MD   blood glucose test strips (ONETOUCH ULTRA) strip TEST TWO TIMES A DAY 1/18/21  Yes Shaista Finnegan MD   albuterol sulfate HFA (PROVENTIL HFA) 108 (90 Base) MCG/ACT inhaler Inhale 2 puffs into the lungs every 4 hours as needed for Wheezing or Shortness of Breath May substitute ProAir MDI 1/5/21  Yes Jian Barnard MD   ipratropium-albuterol (DUONEB) 0.5-2.5 (3) MG/3ML SOLN nebulizer solution Inhale 3 mLs into the lungs every 4 hours as needed for Shortness of Breath (wheezing coughing) 1/5/21  Yes Jian Barnard MD   DULoxetine (CYMBALTA) 60 MG extended release capsule Take 60 mg by mouth daily   Yes Historical Provider, MD   traZODone (DESYREL) 100 MG tablet Take 100 mg by mouth nightly   Yes Historical Provider, MD   ammonium lactate (LAC-HYDRIN) 12 % lotion Apply topically daily. 11/21/19  Yes Millicent Fowler MD   blood glucose test strips (FREESTYLE LITE) strip TEST BLOOD SUGAR TWO TIMES A DAY Diag: E11.9 1/28/19  Yes Shaista Finnegan MD   Blood Glucose Monitoring Suppl (TRUE METRIX AIR GLUCOSE METER) w/Device KIT 1 each by Does not apply route daily 1/28/19  Yes Shaista Finnegan MD   FREESTYLE LANCETS MISC USE ONE LANCET TO TEST BLOOD SUGAR TWICE A DAY 8/30/16  Yes Keli Vazquez MD   FANAPT 1 MG TABS tablet Take 1 mg by mouth nightly  1/13/16  Yes Historical Provider, MD   aspirin 81 MG EC tablet Take 1 tablet by mouth daily. 4/13/15  Yes Keli Vazquez MD        Review of Systems   Constitutional: Negative for activity change, appetite change, diaphoresis, fatigue, fever and unexpected weight change. HENT: Negative for congestion, facial swelling, mouth sores and nosebleeds. Eyes: Negative for discharge and visual disturbance.    Respiratory: Negative for cough, chest tightness, shortness of breath and wheezing. Cardiovascular: Positive for chest pain and palpitations. Negative for leg swelling. Gastrointestinal: Negative for abdominal distention, abdominal pain, blood in stool and vomiting. Endocrine: Negative for cold intolerance, heat intolerance and polyuria. Genitourinary: Negative for difficulty urinating, dysuria, frequency and hematuria. Musculoskeletal: Negative for back pain, joint swelling, myalgias and neck pain. Skin: Negative for color change, pallor and rash. Allergic/Immunologic: Negative for immunocompromised state. Neurological: Negative for dizziness, syncope, weakness, light-headedness, numbness and headaches. Hematological: Negative for adenopathy. Does not bruise/bleed easily. Psychiatric/Behavioral: Negative for behavioral problems, confusion, decreased concentration and suicidal ideas. The patient is not nervous/anxious. Vitals:    05/19/21 1506   BP: 110/80   Pulse: 82    Weight: 207 lb (93.9 kg)       Vitals:    05/19/21 1506   BP: 110/80   Pulse: 82   Weight: 207 lb (93.9 kg)   Height: 5' 4\" (1.626 m)       BP Readings from Last 3 Encounters:   05/19/21 110/80   05/19/21 130/80   05/12/21 (!) 140/80       Wt Readings from Last 3 Encounters:   05/19/21 207 lb (93.9 kg)   05/19/21 206 lb (93.4 kg)   05/12/21 205 lb 0.4 oz (93 kg)       Physical Exam  Constitutional:       General: She is not in acute distress. Appearance: She is well-developed. She is not diaphoretic. HENT:      Head: Normocephalic and atraumatic. Eyes:      Pupils: Pupils are equal, round, and reactive to light. Neck:      Thyroid: No thyromegaly. Vascular: No JVD. Cardiovascular:      Rate and Rhythm: Normal rate and regular rhythm. Chest Wall: PMI is not displaced. Heart sounds: Normal heart sounds, S1 normal and S2 normal. No murmur heard. No friction rub. No gallop. Pulmonary:      Effort: Pulmonary effort is normal. No respiratory distress. Breath sounds: Normal breath sounds. No stridor. No wheezing or rales. Chest:      Chest wall: No tenderness. Abdominal:      General: Bowel sounds are normal. There is no distension. Palpations: Abdomen is soft. Tenderness: There is no abdominal tenderness. There is no guarding or rebound. Musculoskeletal:         General: No tenderness. Normal range of motion. Cervical back: Normal range of motion. Lymphadenopathy:      Cervical: No cervical adenopathy. Skin:     General: Skin is warm and dry. Findings: No erythema or rash. Neurological:      Mental Status: She is alert and oriented to person, place, and time. Coordination: Coordination normal.   Psychiatric:         Behavior: Behavior normal.         Thought Content:  Thought content normal.         Judgment: Judgment normal.         Labs:       Lab Results   Component Value Date    WBC 8.1 05/11/2021    HGB 11.2 (L) 05/11/2021    HCT 33.9 (L) 05/11/2021    MCV 84.5 05/11/2021     05/11/2021     Lab Results   Component Value Date     05/11/2021    K 3.8 05/11/2021     05/11/2021    CO2 25 05/11/2021    BUN 21 (H) 05/11/2021    CREATININE 0.7 05/11/2021    GLUCOSE 140 (H) 05/11/2021    CALCIUM 10.1 05/11/2021    PROT 7.1 05/11/2021    LABALBU 4.0 05/11/2021    BILITOT <0.2 05/11/2021    ALKPHOS 90 05/11/2021    AST 25 05/11/2021    ALT 21 05/11/2021    LABGLOM >60 05/11/2021    GFRAA >60 05/11/2021    AGRATIO 1.3 05/11/2021    GLOB 3.1 05/11/2021         Lab Results   Component Value Date    CHOL 145 02/21/2020    CHOL 144 10/19/2019    CHOL 166 12/07/2018     Lab Results   Component Value Date    TRIG 92 02/21/2020    TRIG 73 10/19/2019    TRIG 94 12/07/2018     Lab Results   Component Value Date    HDL 57 02/21/2020    HDL 56 10/19/2019    HDL 57 12/07/2018     Lab Results   Component Value Date    LDLCALC 70 02/21/2020    LDLCALC 73 10/19/2019    LDLCALC 90 12/07/2018     Lab Results   Component Value Date LABVLDL 18 02/21/2020    LABVLDL 15 10/19/2019    LABVLDL 19 12/07/2018     No results found for: [facility]    Lab Results   Component Value Date    INR 0.94 10/17/2019    INR 0.87 04/19/2016    INR 0.95 04/21/2014    PROTIME 10.7 10/17/2019    PROTIME 9.9 04/19/2016    PROTIME 10.7 04/21/2014       The ASCVD Risk score (Braulio Becerril, et al., 2013) failed to calculate for the following reasons: The patient has a prior MI or stroke diagnosis      Imaging:       Last ECG (if available):  NSR, NSST changes     Last Monitor/Holter    Last Stress (if available): 5/12/21  Summary    Pharmacological Stress/MPI Results:        1. Technically a satisfactory study.    2. No evidence of Ischemia by Myocardial Perfusion Imaging.    3. Gated Study shows normal LV size and Systolic function; EF is 70 %. Last Cath (if available):    Last TTE/TUSHAR(if available): 10/17/19   Summary   There is mildly increased left ventricular wall thickness. Left ventricular cavity size is normal.   Ejection fraction is visually estimated to be 55-60%. Normal diastolic function. The aortic valve is structurally normal.   No evidence of aortic valve regurgitation. The mitral valve is normal in structure and function. Tricuspid valve is structurally normal.   No evidence of tricuspid regurgitation. Last CMR  (if available):      Assessment / Plan:     Chest pain  Normal stress test.  We will get a monitor for us to reassess possible arrhythmias. If monitor normal consider CTA    Essential hypertension  Well-controlled on medications    Mixed hyperlipidemia  Continue statin. No side effects. Palpitations  Daily episodes of palpitations associated with chest discomfort. Plan for 5-day monitor. Follow up in 1 months. I had the opportunity to review the clinical symptoms and presentation of [address] Patient's allergies and medications were reviewed and updated.  Patient's past medical, surgical, social and family history were reviewed and updated. Patient's testing including laboratory, ECGs, monitor, imaging (TTE,TUSHAR,CMR,cath) were reviewed. Tobacco use was discussed with the patient and educated on the negative effects. I have asked the patient to not utilize these agents. All questions and concerns were addressed to the patient/family. Alternatives to my treatment were discussed. The note was completed using EMR. Every effort wasmade to ensure accuracy; however, inadvertent computerized transcription errors may be present. Thank you for allowing me to participate in thecare or Daphnie Haro MD, [facility]

## 2021-05-19 ENCOUNTER — OFFICE VISIT (OUTPATIENT)
Dept: FAMILY MEDICINE CLINIC | Age: 66
End: 2021-05-19
Payer: MEDICARE

## 2021-05-19 ENCOUNTER — OFFICE VISIT (OUTPATIENT)
Dept: CARDIOLOGY CLINIC | Age: 66
End: 2021-05-19
Payer: MEDICARE

## 2021-05-19 ENCOUNTER — TELEPHONE (OUTPATIENT)
Dept: CARDIOLOGY CLINIC | Age: 66
End: 2021-05-19

## 2021-05-19 VITALS
BODY MASS INDEX: 35.34 KG/M2 | WEIGHT: 207 LBS | SYSTOLIC BLOOD PRESSURE: 110 MMHG | DIASTOLIC BLOOD PRESSURE: 80 MMHG | HEART RATE: 82 BPM | HEIGHT: 64 IN

## 2021-05-19 VITALS
TEMPERATURE: 98.3 F | DIASTOLIC BLOOD PRESSURE: 80 MMHG | WEIGHT: 206 LBS | SYSTOLIC BLOOD PRESSURE: 130 MMHG | HEART RATE: 86 BPM | RESPIRATION RATE: 10 BRPM | OXYGEN SATURATION: 98 % | BODY MASS INDEX: 35.36 KG/M2

## 2021-05-19 DIAGNOSIS — T46.6X5A MYALGIA DUE TO STATIN: ICD-10-CM

## 2021-05-19 DIAGNOSIS — Z09 HOSPITAL DISCHARGE FOLLOW-UP: Primary | ICD-10-CM

## 2021-05-19 DIAGNOSIS — R07.9 CHEST PAIN, UNSPECIFIED TYPE: ICD-10-CM

## 2021-05-19 DIAGNOSIS — R42 DIZZINESS: ICD-10-CM

## 2021-05-19 DIAGNOSIS — E78.2 MIXED HYPERLIPIDEMIA: ICD-10-CM

## 2021-05-19 DIAGNOSIS — R00.2 PALPITATIONS: ICD-10-CM

## 2021-05-19 DIAGNOSIS — I10 ESSENTIAL HYPERTENSION: ICD-10-CM

## 2021-05-19 DIAGNOSIS — M79.10 MYALGIA DUE TO STATIN: ICD-10-CM

## 2021-05-19 DIAGNOSIS — G47.30 SLEEP APNEA, UNSPECIFIED TYPE: Primary | ICD-10-CM

## 2021-05-19 PROCEDURE — 93242 EXT ECG>48HR<7D RECORDING: CPT | Performed by: INTERNAL MEDICINE

## 2021-05-19 PROCEDURE — 93000 ELECTROCARDIOGRAM COMPLETE: CPT | Performed by: INTERNAL MEDICINE

## 2021-05-19 PROCEDURE — G8399 PT W/DXA RESULTS DOCUMENT: HCPCS | Performed by: INTERNAL MEDICINE

## 2021-05-19 PROCEDURE — 1090F PRES/ABSN URINE INCON ASSESS: CPT | Performed by: INTERNAL MEDICINE

## 2021-05-19 PROCEDURE — 99495 TRANSJ CARE MGMT MOD F2F 14D: CPT | Performed by: FAMILY MEDICINE

## 2021-05-19 PROCEDURE — 1123F ACP DISCUSS/DSCN MKR DOCD: CPT | Performed by: INTERNAL MEDICINE

## 2021-05-19 PROCEDURE — 3017F COLORECTAL CA SCREEN DOC REV: CPT | Performed by: INTERNAL MEDICINE

## 2021-05-19 PROCEDURE — 99204 OFFICE O/P NEW MOD 45 MIN: CPT | Performed by: INTERNAL MEDICINE

## 2021-05-19 PROCEDURE — G8427 DOCREV CUR MEDS BY ELIG CLIN: HCPCS | Performed by: INTERNAL MEDICINE

## 2021-05-19 PROCEDURE — 4040F PNEUMOC VAC/ADMIN/RCVD: CPT | Performed by: INTERNAL MEDICINE

## 2021-05-19 PROCEDURE — 1111F DSCHRG MED/CURRENT MED MERGE: CPT | Performed by: FAMILY MEDICINE

## 2021-05-19 PROCEDURE — G8417 CALC BMI ABV UP PARAM F/U: HCPCS | Performed by: INTERNAL MEDICINE

## 2021-05-19 PROCEDURE — 1036F TOBACCO NON-USER: CPT | Performed by: INTERNAL MEDICINE

## 2021-05-19 RX ORDER — PEN NEEDLE, DIABETIC 31 G X1/4"
NEEDLE, DISPOSABLE MISCELLANEOUS
Qty: 100 EACH | Refills: 1 | Status: SHIPPED | OUTPATIENT
Start: 2021-05-19 | End: 2022-01-24

## 2021-05-19 RX ORDER — FUROSEMIDE 20 MG/1
TABLET ORAL
Qty: 90 TABLET | Refills: 1 | Status: SHIPPED | OUTPATIENT
Start: 2021-05-19 | End: 2021-09-13

## 2021-05-19 RX ORDER — INSULIN GLARGINE 100 [IU]/ML
INJECTION, SOLUTION SUBCUTANEOUS
Qty: 5 PEN | Refills: 1 | Status: SHIPPED | OUTPATIENT
Start: 2021-05-19 | End: 2021-09-22

## 2021-05-19 RX ORDER — DULAGLUTIDE 1.5 MG/.5ML
INJECTION, SOLUTION SUBCUTANEOUS
Qty: 6 ML | Refills: 1 | Status: SHIPPED | OUTPATIENT
Start: 2021-05-19 | End: 2021-05-26

## 2021-05-19 ASSESSMENT — ENCOUNTER SYMPTOMS
EYE DISCHARGE: 0
COLOR CHANGE: 0
VOMITING: 0
BLOOD IN STOOL: 0
SHORTNESS OF BREATH: 0
ABDOMINAL DISTENTION: 0
CHEST TIGHTNESS: 0
WHEEZING: 0
ABDOMINAL PAIN: 0
BACK PAIN: 0
COUGH: 0
FACIAL SWELLING: 0

## 2021-05-19 NOTE — PROGRESS NOTES
Post-Discharge Transitional Care Management Services or Hospital Follow Up      [address]    YOB: 1955    Date of Office Visit:  5/19/2021  Date of Hospital Admission: 5/11/21  Date of Hospital Discharge: 5/12/21  Readmission Risk Score(high >=14%. Medium >=10%):No data recorded    Care management risk score Rising risk (score 2-5) and Complex Care (Scores >=6): 3     Non face to face  following discharge, date last encounter closed (first attempt may have been earlier): 5/13/2021  5:07 PM 5/13/2021  5:07 PM    Call initiated 2 business days of discharge: Yes     Patient Active Problem List   Diagnosis    Diabetes mellitus ([address]    Obesity    Dystonia    Cerebral thrombosis with cerebral infarction ([address]    Sleep apnea    Right hemiparesis ([address]    Trigger finger, acquired    Elevated CK    Primary osteoarthritis of both knees    Mood disorder ([address] - follows with Psych Dr. Minnie Clinton Calcific tendonitis of right shoulder    Essential hypertension    Mixed hyperlipidemia    Chronic low back pain without sciatica    History of CVA with residual deficit    Anxiety and depression    Chest pain    Myalgia due to statin    Palpitations       Allergies   Allergen Reactions    Codeine Nausea And Vomiting and Rash    Vicodin [Hydrocodone-Acetaminophen] Nausea And Vomiting    Lipitor [Atorvastatin]      Myalgias    Oxycontin [Oxycodone Hcl] Itching    Tramadol Itching and Swelling       Medications listed as ordered at the time of discharge from hospital   [facility]   Home Medication Instructions SARAY:    Printed on:05/20/21 4593   Medication Information                      albuterol sulfate HFA (PROVENTIL HFA) 108 (90 Base) MCG/ACT inhaler  Inhale 2 puffs into the lungs every 4 hours as needed for Wheezing or Shortness of Breath May substitute ProAir MDI             ammonium lactate (LAC-HYDRIN) 12 % lotion  Apply topically daily.              aspirin 81 MG EC tablet  Take 1 tablet by mouth daily.              atorvastatin (LIPITOR) 20 MG tablet  TAKE ONE TABLET BY MOUTH DAILY             Blood Glucose Monitoring Suppl (TRUE METRIX AIR GLUCOSE METER) w/Device KIT  1 each by Does not apply route daily             blood glucose test strips (FREESTYLE LITE) strip  TEST BLOOD SUGAR TWO TIMES A DAY Diag: E11.9             blood glucose test strips (ONETOUCH ULTRA) strip  TEST TWO TIMES A DAY             Dulaglutide (TRULICITY) 1.5 ZM/9.2ZE SOPN  INJECT 1.5 MG UNDER THE SKIN ONCE WEEKLY             DULoxetine (CYMBALTA) 60 MG extended release capsule  Take 60 mg by mouth daily             famotidine (PEPCID) 20 MG tablet  Take 1 tablet by mouth 2 times daily             FANAPT 1 MG TABS tablet  Take 1 mg by mouth nightly              FREESTYLE LANCETS MISC  USE ONE LANCET TO TEST BLOOD SUGAR TWICE A DAY             furosemide (LASIX) 20 MG tablet  TAKE ONE TABLET BY MOUTH DAILY AS NEEDED FOR LEG SWELLING             gabapentin (NEURONTIN) 300 MG capsule  TAKE ONE CAPSULE BY MOUTH ONCE NIGHTLY             ibuprofen (ADVIL;MOTRIN) 600 MG tablet  TAKE ONE TABLET BY MOUTH TWICE A DAY WITH MEALS             insulin glargine (LANTUS SOLOSTAR) 100 UNIT/ML injection pen  INJECT 16 UNITS UNDER THE SKIN ONCE NIGHTLY             Insulin Pen Needle (PEN NEEDLES) 31G X 6 MM MISC  USE TO INJECT INSULIN DAILY             ipratropium-albuterol (DUONEB) 0.5-2.5 (3) MG/3ML SOLN nebulizer solution  Inhale 3 mLs into the lungs every 4 hours as needed for Shortness of Breath (wheezing coughing)             metFORMIN (GLUCOPHAGE) 500 MG tablet  TAKE TWO TABLETS BY MOUTH TWICE A DAY WITH MEALS             traZODone (DESYREL) 100 MG tablet  Take 100 mg by mouth nightly             valsartan (DIOVAN) 80 MG tablet  TAKE ONE TABLET BY MOUTH DAILY                   Medications marked \"taking\" at this time  Outpatient Medications Marked as Taking for the 5/19/21 encounter (Office Visit) with Jg Mina MD   Medication Sig Dispense Refill    ibuprofen (ADVIL;MOTRIN) 600 MG tablet TAKE ONE TABLET BY MOUTH TWICE A DAY WITH MEALS 60 tablet 0    metFORMIN (GLUCOPHAGE) 500 MG tablet TAKE TWO TABLETS BY MOUTH TWICE A DAY WITH MEALS 360 tablet 0    famotidine (PEPCID) 20 MG tablet Take 1 tablet by mouth 2 times daily 60 tablet 3    [DISCONTINUED] LANTUS SOLOSTAR 100 UNIT/ML injection pen INJECT 16 UNITS UNDER THE SKIN ONCE NIGHTLY 5 pen 4    gabapentin (NEURONTIN) 300 MG capsule TAKE ONE CAPSULE BY MOUTH ONCE NIGHTLY (Patient taking differently: daily. ) 90 capsule 0    valsartan (DIOVAN) 80 MG tablet TAKE ONE TABLET BY MOUTH DAILY 90 tablet 0    [DISCONTINUED] furosemide (LASIX) 20 MG tablet TAKE ONE TABLET BY MOUTH DAILY AS NEEDED FOR LEG SWELLING 90 tablet 0    [DISCONTINUED] Dulaglutide (TRULICITY) 1.5 SE/8.2NN SOPN INJECT 1.5 MG UNDER THE SKIN ONCE WEEKLY 2 mL 3    blood glucose test strips (ONETOUCH ULTRA) strip TEST TWO TIMES A  strip 4    albuterol sulfate HFA (PROVENTIL HFA) 108 (90 Base) MCG/ACT inhaler Inhale 2 puffs into the lungs every 4 hours as needed for Wheezing or Shortness of Breath May substitute ProAir MDI 1 Inhaler 5    ipratropium-albuterol (DUONEB) 0.5-2.5 (3) MG/3ML SOLN nebulizer solution Inhale 3 mLs into the lungs every 4 hours as needed for Shortness of Breath (wheezing coughing) 360 mL 5    [DISCONTINUED] Insulin Pen Needle (PEN NEEDLES) 31G X 6 MM MISC USE TO INJECT INSULIN DAILY 100 each 2    DULoxetine (CYMBALTA) 60 MG extended release capsule Take 60 mg by mouth daily      traZODone (DESYREL) 100 MG tablet Take 100 mg by mouth nightly      ammonium lactate (LAC-HYDRIN) 12 % lotion Apply topically daily.  1 Bottle 1    blood glucose test strips (FREESTYLE LITE) strip TEST BLOOD SUGAR TWO TIMES A DAY Diag: E11.9 100 each 4    Blood Glucose Monitoring Suppl (TRUE METRIX AIR GLUCOSE METER) w/Device KIT 1 each by Does not apply route daily 1 kit 0    FREESTYLE LANCETS MISC USE ONE LANCET TO TEST BLOOD SUGAR TWICE A  each 3    FANAPT 1 MG TABS tablet Take 1 mg by mouth nightly       aspirin 81 MG EC tablet Take 1 tablet by mouth daily. 60 tablet 3        Medications patient taking as of now reconciled against medications ordered at time of hospital discharge: Yes    Chief Complaint   Patient presents with   [address] [place] [facility] [place]     [facility]11-5[facility]12 [facility] [address] [facility] [address] for chest pain       HPI    Inpatient course: Discharge summary reviewed- see chart. Interval history/Current status:   She was admitted for chest pain. She had a negative stress test and was discharged. Her chest pain has improved    Dizziness has been present intermittently for over a month. While inpatient, she had a normal CT head on 5/12/21.   - she notes dizziness that typically occurs with positional changes and it will last for 2-3 minutes at a time. When this occurs, she feels like her heart is beating too fast. It happens multiple times per day. - reports drinking plenty of water    She was also having significant myalgias and this improved quite a bit after stopping Lipitor    Review of Systems    Vitals:    05/19/21 1037   BP: 130/80   Site: Right Upper Arm   Position: Sitting   Cuff Size: Medium Adult   Pulse: 86   Resp: 10   Temp: 98.3 °F (36.8 °C)   TempSrc: Oral   SpO2: 98%   Weight: 206 lb (93.4 kg)     Body mass index is 35.36 kg/m². Wt Readings from Last 3 Encounters:   05/19/21 207 lb (93.9 kg)   05/19/21 206 lb (93.4 kg)   05/12/21 205 lb 0.4 oz (93 kg)     BP Readings from Last 3 Encounters:   05/19/21 110/80   05/19/21 130/80   05/12/21 (!) 140/80       Physical Exam  Constitutional:       Appearance: She is well-developed. HENT:      Head: Normocephalic and atraumatic. Cardiovascular:      Rate and Rhythm: Normal rate and regular rhythm. Pulmonary:      Effort: Pulmonary effort is normal.      Breath sounds: Normal breath sounds. Skin:     Coloration: Skin is not pale.    Neurological: Mental Status: She is alert and oriented to person, place, and time. Assessment/Plan:  1. Hospital discharge follow-up  - [place] DISCHARGE MEDS RECONCILED W/ CURRENT OUTPATIENT MED LIST    2. Dizziness  - Holter Monitor 24 Hour; Future    3. Myalgia due to statin    4. Palpitations  - Holter Monitor 24 Hour; Future      Overall Sidia is feeling better from her recent hospitalization. However, she is having some palpitations and dizziness and would benefit from a monitor. This was ordered and she is also seeing cardiology later today.     Her myalgias have improved off of her statin    Medical Decision Making: moderate complexity

## 2021-05-20 DIAGNOSIS — I10 ESSENTIAL HYPERTENSION: ICD-10-CM

## 2021-05-20 DIAGNOSIS — E11.8 TYPE 2 DIABETES MELLITUS WITH COMPLICATION, WITHOUT LONG-TERM CURRENT USE OF INSULIN (HCC): ICD-10-CM

## 2021-05-20 RX ORDER — FUROSEMIDE 20 MG/1
TABLET ORAL
Qty: 90 TABLET | Refills: 0 | OUTPATIENT
Start: 2021-05-20

## 2021-05-20 RX ORDER — DULAGLUTIDE 1.5 MG/.5ML
INJECTION, SOLUTION SUBCUTANEOUS
Refills: 2 | OUTPATIENT
Start: 2021-05-20

## 2021-05-20 RX ORDER — VALSARTAN 80 MG/1
TABLET ORAL
Qty: 90 TABLET | Refills: 0 | OUTPATIENT
Start: 2021-05-20

## 2021-05-20 NOTE — DISCHARGE SUMMARY
Hospital Medicine Discharge Summary    Patient ID: Shira Fay      Patient's PCP: Rory Danielle MD    Admit Date: 5/11/2021     Discharge Date: 5/12/2021     Admitting Physician: Uday Reagan MD     Discharge Physician: Silvio Rodriguez MD     Discharge Diagnoses: Active Hospital Problems    Diagnosis Date Noted    Chest pain [R07.9] 05/11/2021       The patient was seen and examined on day of discharge and this discharge summary is in conjunction with any daily progress note from day of discharge. Condition at discharge - stable    Hospital Course: patient seen and evaluated on the day of discharge. Patient informed about following up with appointments. Patient verbalized understanding for follow-up appointments. All questions of the patient answered, patient is in agreement with the discharge plan. Medical reconciliation performed. Patient will be discharged stable condition. ACS was ruled out, patient intructed to follow up with Cardiologist, patient verbalized understanding    Discharge diagosis  1. Atypical chest pain. 2.  Obesity with a BMI of 35.38 kg/m2. 3.  Type 2 diabetes. 4.  Hypertension. 5.  Dyslipidemia. 6.  Depression. Exam:     BP (!) 140/80   Pulse 72   Temp 98 °F (36.7 °C) (Oral)   Resp 18   Ht 5' 4\" (1.626 m)   Wt 205 lb 0.4 oz (93 kg)   SpO2 95%   BMI 35.19 kg/m²     General appearance: No apparent distress  HEENT:  Conjunctivae/corneas clear. Neck: Supple, No jugular venous distention. Respiratory:  Normal respiratory effort. Clear to auscultation, bilaterally without Rales/Wheezes/Rhonchi. Cardiovascular: Regular rate and rhythm with normal S1/S2 without murmurs, rubs or gallops. Abdomen: Soft, non-tender, non-distended, normal bowel sounds. Musculoskelatal: No clubbing, cyanosis or edema bilaterally. Skin: Skin color, texture, turgor normal.   Neurologic: no focal neurologic deficits.  grossly non-focal.  Psychiatric: Alert and oriented, normal mood    Consults:     None      Code Status:  Prior    Activity: activity as tolerated    Labs:  For convenience and continuity at follow-up the following most recent labs are provided:      CBC:    Lab Results   Component Value Date    WBC 8.1 05/11/2021    HGB 11.2 05/11/2021    HCT 33.9 05/11/2021     05/11/2021       Renal:    Lab Results   Component Value Date     05/11/2021    K 3.8 05/11/2021     05/11/2021    CO2 25 05/11/2021    BUN 21 05/11/2021    CREATININE 0.7 05/11/2021    CALCIUM 10.1 05/11/2021       Discharge Medications:     Discharge Medication List as of 5/12/2021  4:56 PM           Details   atorvastatin (LIPITOR) 20 MG tablet TAKE ONE TABLET BY MOUTH DAILY, Disp-90 tablet, R-0Normal      metFORMIN (GLUCOPHAGE) 500 MG tablet TAKE TWO TABLETS BY MOUTH TWICE A DAY WITH MEALS, Disp-360 tablet, R-0Normal      metoprolol succinate (TOPROL XL) 25 MG extended release tablet TAKE ONE TABLET BY MOUTH DAILY FOR BLOOD PRESSURE AND HEART, Disp-90 tablet, R-0Normal      famotidine (PEPCID) 20 MG tablet Take 1 tablet by mouth 2 times daily, Disp-60 tablet, R-3Normal      LANTUS SOLOSTAR 100 UNIT/ML injection pen INJECT 16 UNITS UNDER THE SKIN ONCE NIGHTLY, Disp-5 pen, R-4Normal      ibuprofen (ADVIL;MOTRIN) 600 MG tablet Take 1 tablet by mouth 2 times daily (with meals), Disp-60 tablet, R-1Normal      gabapentin (NEURONTIN) 300 MG capsule TAKE ONE CAPSULE BY MOUTH ONCE NIGHTLY, Disp-90 capsule, R-0Normal      valsartan (DIOVAN) 80 MG tablet TAKE ONE TABLET BY MOUTH DAILY, Disp-90 tablet, R-0Normal      furosemide (LASIX) 20 MG tablet TAKE ONE TABLET BY MOUTH DAILY AS NEEDED FOR LEG SWELLING, Disp-90 tablet, R-0Normal      Dulaglutide (TRULICITY) 1.5 YY/6.0BL SOPN INJECT 1.5 MG UNDER THE SKIN ONCE WEEKLY, Disp-2 mL, R-3Normal      !! blood glucose test strips (ONETOUCH ULTRA) strip TEST TWO TIMES A DAY, Disp-100 strip, R-4Normal      albuterol sulfate HFA (PROVENTIL HFA) 108 (90 Base) MCG/ACT inhaler Inhale 2 puffs into the lungs every 4 hours as needed for Wheezing or Shortness of Breath May substitute ProAir MDI, Disp-1 Inhaler, R-5Normal      ipratropium-albuterol (DUONEB) 0.5-2.5 (3) MG/3ML SOLN nebulizer solution Inhale 3 mLs into the lungs every 4 hours as needed for Shortness of Breath (wheezing coughing), Disp-360 mL, R-5Normal      Insulin Pen Needle (PEN NEEDLES) 31G X 6 MM MISC Disp-100 each, R-2, Normal      DULoxetine (CYMBALTA) 60 MG extended release capsule Take 60 mg by mouth dailyHistorical Med      traZODone (DESYREL) 100 MG tablet Take 100 mg by mouth nightlyHistorical Med      ammonium lactate (LAC-HYDRIN) 12 % lotion Apply topically daily. , Disp-1 Bottle, R-1, Normal      !! blood glucose test strips (FREESTYLE LITE) strip Disp-100 each, R-4, NormalTEST BLOOD SUGAR TWO TIMES A DAY Diag: E11.9      Blood Glucose Monitoring Suppl (TRUE METRIX AIR GLUCOSE METER) w/Device KIT 1 each by Does not apply route daily, Disp-1 kit, R-0Normal      FREESTYLE LANCETS MISC Disp-100 each, R-3, Normal      FANAPT 1 MG TABS tablet Take 1 mg by mouth nightly , DAWHistorical Med      aspirin 81 MG EC tablet Take 1 tablet by mouth daily. , Disp-60 tablet, R-3       !! - Potential duplicate medications found. Please discuss with provider. Time Spent on discharge is more than 30 mints in the examination, evaluation, counseling and review of medications and discharge plan. Signed:    Jaron Melgar MD   5/20/2021      Thank you Feliciano Al MD for the opportunity to be involved in this patient's care. If you have any questions or concerns please feel free to contact me at [phone].

## 2021-05-20 NOTE — TELEPHONE ENCOUNTER
I spoke with patient to clarify which pharmacy. Patient states they were to go to [facility] Rx.

## 2021-05-26 DIAGNOSIS — E11.8 TYPE 2 DIABETES MELLITUS WITH COMPLICATION, WITHOUT LONG-TERM CURRENT USE OF INSULIN (HCC): ICD-10-CM

## 2021-05-26 RX ORDER — DULAGLUTIDE 1.5 MG/.5ML
INJECTION, SOLUTION SUBCUTANEOUS
Qty: 4 PEN | Refills: 5 | Status: SHIPPED | OUTPATIENT
Start: 2021-05-26 | End: 2021-09-01

## 2021-06-02 ENCOUNTER — CLINICAL DOCUMENTATION (OUTPATIENT)
Dept: OTHER | Age: 66
End: 2021-06-02

## 2021-06-05 ENCOUNTER — NURSE TRIAGE (OUTPATIENT)
Dept: OTHER | Facility: CLINIC | Age: 66
End: 2021-06-05

## 2021-06-05 ENCOUNTER — APPOINTMENT (OUTPATIENT)
Dept: GENERAL RADIOLOGY | Age: 66
End: 2021-06-05
Payer: MEDICARE

## 2021-06-05 ENCOUNTER — HOSPITAL ENCOUNTER (EMERGENCY)
Age: 66
Discharge: HOME | End: 2021-06-05
Payer: MEDICARE

## 2021-06-05 VITALS
DIASTOLIC BLOOD PRESSURE: 82 MMHG | WEIGHT: 198.19 LBS | HEART RATE: 98 BPM | OXYGEN SATURATION: 95 % | SYSTOLIC BLOOD PRESSURE: 122 MMHG | RESPIRATION RATE: 16 BRPM | TEMPERATURE: 96.5 F | BODY MASS INDEX: 34.02 KG/M2

## 2021-06-05 DIAGNOSIS — M54.50 LOW BACK PAIN POTENTIALLY ASSOCIATED WITH RADICULOPATHY: Primary | ICD-10-CM

## 2021-06-05 LAB
A/G RATIO: 1.2 (ref 1.1–2.2)
ALBUMIN SERPL-MCNC: 4.4 G/DL (ref 3.4–5)
ALP BLD-CCNC: 87 U/L (ref 40–129)
ALT SERPL-CCNC: 15 U/L (ref 10–40)
ANION GAP SERPL CALCULATED.3IONS-SCNC: 13 MMOL/L (ref 3–16)
AST SERPL-CCNC: 24 U/L (ref 15–37)
BILIRUB SERPL-MCNC: <0.2 MG/DL (ref 0–1)
BUN BLDV-MCNC: 11 MG/DL (ref 7–20)
CALCIUM SERPL-MCNC: 10.4 MG/DL (ref 8.3–10.6)
CHLORIDE BLD-SCNC: 97 MMOL/L (ref 99–110)
CO2: 24 MMOL/L (ref 21–32)
CREAT SERPL-MCNC: 0.8 MG/DL (ref 0.6–1.2)
GFR AFRICAN AMERICAN: >60
GFR NON-AFRICAN AMERICAN: >60
GLOBULIN: 3.6 G/DL
GLUCOSE BLD-MCNC: 111 MG/DL (ref 70–99)
POTASSIUM REFLEX MAGNESIUM: 4.6 MMOL/L (ref 3.5–5.1)
SODIUM BLD-SCNC: 134 MMOL/L (ref 136–145)
TOTAL PROTEIN: 8 G/DL (ref 6.4–8.2)

## 2021-06-05 PROCEDURE — 6370000000 HC RX 637 (ALT 250 FOR IP): Performed by: PHYSICIAN ASSISTANT

## 2021-06-05 PROCEDURE — 80053 COMPREHEN METABOLIC PANEL: CPT

## 2021-06-05 PROCEDURE — 99284 EMERGENCY DEPT VISIT MOD MDM: CPT

## 2021-06-05 PROCEDURE — 72100 X-RAY EXAM L-S SPINE 2/3 VWS: CPT

## 2021-06-05 PROCEDURE — 6360000002 HC RX W HCPCS: Performed by: PHYSICIAN ASSISTANT

## 2021-06-05 PROCEDURE — 96372 THER/PROPH/DIAG INJ SC/IM: CPT

## 2021-06-05 RX ORDER — HYDROCODONE BITARTRATE AND ACETAMINOPHEN 5; 325 MG/1; MG/1
1 TABLET ORAL ONCE
Status: DISCONTINUED | OUTPATIENT
Start: 2021-06-05 | End: 2021-06-05

## 2021-06-05 RX ORDER — LIDOCAINE 4 G/G
1 PATCH TOPICAL DAILY
Status: DISCONTINUED | OUTPATIENT
Start: 2021-06-05 | End: 2021-06-05 | Stop reason: HOSPADM

## 2021-06-05 RX ORDER — LIDOCAINE 4 G/G
PATCH TOPICAL
Qty: 5 PATCH | Refills: 0 | Status: SHIPPED | OUTPATIENT
Start: 2021-06-05 | End: 2022-06-24

## 2021-06-05 RX ADMIN — HYDROMORPHONE HYDROCHLORIDE 0.5 MG: 1 INJECTION, SOLUTION INTRAMUSCULAR; INTRAVENOUS; SUBCUTANEOUS at 16:30

## 2021-06-05 ASSESSMENT — PAIN DESCRIPTION - DESCRIPTORS: DESCRIPTORS: CRAMPING

## 2021-06-05 ASSESSMENT — PAIN DESCRIPTION - ORIENTATION: ORIENTATION: RIGHT;LEFT

## 2021-06-05 ASSESSMENT — PAIN DESCRIPTION - LOCATION: LOCATION: LEG

## 2021-06-05 ASSESSMENT — PAIN DESCRIPTION - PAIN TYPE: TYPE: ACUTE PAIN

## 2021-06-05 ASSESSMENT — PAIN SCALES - GENERAL
PAINLEVEL_OUTOF10: 9
PAINLEVEL_OUTOF10: 9

## 2021-06-05 NOTE — LETTER
[facility]  [address] [facility] [address]  Phone: [phone]               June 5, 2021    Patient: Felicia Jansen   YOB: 1955   Date of Visit: 6/5/2021       To Whom It May Concern:    Vince Angulo was seen and treated in our emergency department on 6/5/2021. She may return to work on 6/8/2021.       Sincerely,       Eugenio Mcdowell PA-C         Signature:__________________________________

## 2021-06-05 NOTE — ED PROVIDER NOTES
9/22/15    middle and ring fingers    FOOT SURGERY Bilateral     litteltoe    HAND SURGERY Right     Ligament    PARTIAL HYSTERECTOMY  08/2003    SHOULDER ARTHROSCOPY Right 06/20/2018    Diagnostic scope, rcr, open [place]       Medications:  Previous Medications    ALBUTEROL SULFATE HFA (PROVENTIL HFA) 108 (90 BASE) MCG/ACT INHALER    Inhale 2 puffs into the lungs every 4 hours as needed for Wheezing or Shortness of Breath May substitute ProAir MDI    AMMONIUM LACTATE (LAC-HYDRIN) 12 % LOTION    Apply topically daily. ASPIRIN 81 MG EC TABLET    Take 1 tablet by mouth daily.     ATORVASTATIN (LIPITOR) 20 MG TABLET    TAKE ONE TABLET BY MOUTH DAILY    BLOOD GLUCOSE MONITORING SUPPL (TRUE METRIX AIR GLUCOSE METER) W/DEVICE KIT    1 each by Does not apply route daily    BLOOD GLUCOSE TEST STRIPS (FREESTYLE LITE) STRIP    TEST BLOOD SUGAR TWO TIMES A DAY Diag: E11.9    BLOOD GLUCOSE TEST STRIPS (ONETOUCH ULTRA) STRIP    TEST TWO TIMES A DAY    DULOXETINE (CYMBALTA) 60 MG EXTENDED RELEASE CAPSULE    Take 60 mg by mouth daily    FAMOTIDINE (PEPCID) 20 MG TABLET    Take 1 tablet by mouth 2 times daily    FANAPT 1 MG TABS TABLET    Take 1 mg by mouth nightly     FREESTYLE LANCETS MISC    USE ONE LANCET TO TEST BLOOD SUGAR TWICE A DAY    FUROSEMIDE (LASIX) 20 MG TABLET    TAKE ONE TABLET BY MOUTH DAILY AS NEEDED FOR LEG SWELLING    GABAPENTIN (NEURONTIN) 300 MG CAPSULE    TAKE ONE CAPSULE BY MOUTH ONCE NIGHTLY    IBUPROFEN (ADVIL;MOTRIN) 600 MG TABLET    TAKE ONE TABLET BY MOUTH TWICE A DAY WITH MEALS    INSULIN GLARGINE (LANTUS SOLOSTAR) 100 UNIT/ML INJECTION PEN    INJECT 16 UNITS UNDER THE SKIN ONCE NIGHTLY    INSULIN PEN NEEDLE (PEN NEEDLES) 31G X 6 MM MISC    USE TO INJECT INSULIN DAILY    IPRATROPIUM-ALBUTEROL (DUONEB) 0.5-2.5 (3) MG/3ML SOLN NEBULIZER SOLUTION    Inhale 3 mLs into the lungs every 4 hours as needed for Shortness of Breath (wheezing coughing)    METFORMIN (GLUCOPHAGE) 500 MG TABLET    TAKE TWO TABLETS BY MOUTH TWICE A DAY WITH MEALS    TRAZODONE (DESYREL) 100 MG TABLET    Take 100 mg by mouth nightly    TRULICITY 1.5 DH/8.2UI SOPN    INJECT 1.5 MG UNDER THE SKIN ONCE WEEKLY    VALSARTAN (DIOVAN) 80 MG TABLET    TAKE ONE TABLET BY MOUTH DAILY         Review of Systems:  (2-9 systems needed)  Review of Systems   Constitutional: Negative for chills and fever. HENT: Negative for congestion, facial swelling and sore throat. Eyes: Negative for discharge and redness. Respiratory: Negative for apnea, choking and shortness of breath. Cardiovascular: Negative for chest pain. Gastrointestinal: Negative for abdominal pain, nausea and vomiting. Genitourinary: Negative for dysuria. Musculoskeletal: Positive for back pain. Negative for neck pain and neck stiffness. Neurological: Negative for dizziness, tremors, seizures, weakness and headaches. All other systems reviewed and are negative. Physical Exam:  Physical Exam  Vitals and nursing note reviewed. Constitutional:       Appearance: She is well-developed. She is not diaphoretic. HENT:      Head: Normocephalic and atraumatic. Nose: Nose normal.   Eyes:      General:         Right eye: No discharge. Left eye: No discharge. Cardiovascular:      Rate and Rhythm: Normal rate and regular rhythm. Heart sounds: Normal heart sounds. No murmur heard. No friction rub. No gallop. Pulmonary:      Effort: Pulmonary effort is normal. No respiratory distress. Breath sounds: Normal breath sounds. No wheezing or rales. Chest:      Chest wall: No tenderness. Abdominal:      General: Abdomen is flat. Bowel sounds are normal. There is no distension. Palpations: Abdomen is soft. There is no mass. Tenderness: There is no abdominal tenderness. There is no guarding or rebound. Musculoskeletal:      Cervical back: Normal, normal range of motion and neck supple.       Thoracic back: Normal.      Lumbar back: Tenderness present. No swelling, edema, deformity, spasms or bony tenderness. Normal range of motion. Positive right straight leg raise test and positive left straight leg raise test.   Skin:     General: Skin is warm and dry. Neurological:      Mental Status: She is alert and oriented to person, place, and time. Psychiatric:         Behavior: Behavior normal.         MEDICAL DECISION MAKING    Vitals:    Vitals:    06/05/21 1421 06/05/21 1530   BP: 124/80 122/82   Pulse: 80 98   Resp: 15 16   Temp: 96.5 °F (35.8 °C)    TempSrc: Oral    SpO2: 96% 95%   Weight: 198 lb 3.1 oz (89.9 kg)        LABS:  Labs Reviewed   COMPREHENSIVE METABOLIC PANEL W/ REFLEX TO MG FOR LOW K - Abnormal; Notable for the following components:       Result Value    Sodium 134 (*)     Chloride 97 (*)     Glucose 111 (*)     All other components within normal limits    Narrative:     Performed at:  [address] [address]   Phone [phone]        Remainder of labs reviewed and were negative at this time or not returned at the time of this note. RADIOLOGY:   Non-plain film images such as CT, Ultrasound and MRI are read by the radiologist. Rich Montgomery PA-C have directly visualized the radiologic plain film image(s) with the below findings:      Interpretation per the Radiologist below, if available at the time of this note:    XR LUMBAR SPINE (2-3 VIEWS)   Final Result   1. Mild degenerative changes lumbar spine. 2. Bones appear osteoporotic. Bone densitometry correlation recommended if   not performed recently. 3. No lumbar fracture or listhesis. 4. Mild, bilaterally symmetrical SI joint osteoarthritis is noted.               XR CHEST (2 VW)    Result Date: 5/11/2021  EXAMINATION: TWO XRAY VIEWS OF THE CHEST 5/11/2021 4:17 pm COMPARISON: 04/20/2021 radiograph HISTORY: ORDERING SYSTEM PROVIDED HISTORY: Chest Pain TECHNOLOGIST PROVIDED HISTORY: Reason for exam:->Chest activity, former tobacco  use, treated hypercholesterolemia, treated hypertension, insulin treated  diabetes mellitus, dyslipidemia and (pack years: 5 years not smokin). Conclusions   Summary  Pharmacological Stress/MPI Results:   1. Technically a satisfactory study. 2. No evidence of Ischemia by Myocardial Perfusion Imaging. 3. Gated Study shows normal LV size and Systolic function; EF is 70 %. Stress Protocols   Resting ECG  Normal sinus rhythm with normal ST segment. Resting HR:60 bpm     Resting BP:149/84 mmHg   Pre-stress physical exam: Complaint of dull  left chest pain 5/10. Heart and lung sounds  unremarkable. O2 saturation 98% room air. Troponin T negative x 3. Limited mobility;  to proceed with Lexiscan Myoview stress  test.  Stress Protocol:Pharmacologic - Lexiscan's  Peak HR:90 bpm                   HR/BP product:[id number]  Peak BP:148/74 mmHg  Predicted HR: 154 bpm  % of predicted HR: 58  Test duration: 1 min and 40 sec  Reason for termination:Completed   ECG Findings  No ischemic ST changes. Arrhythmias  None   Symptoms  Lexiscan 0.4mg IV given followed by mild abdominal  discomfort. Dull chest discomfort 5/10 to 4/10. O2  saturation increased to 100% on room air. Complications  Procedure complication was none. Stress Interpretation  Negative pharmacological stress portion of the study. Procedure Medications   - Lexiscan I.V. 0.4 mg.10 sec  Imaging Protocols   - One Day   Rest                       Stress   Isotope:Myoview            Isotope: Myoview  Isotope dose:11.3 mCi      Isotope dose:31.2 mCi  Administration Route:I.V.   Administration Route:I.V.  Date:2021 00:00      Date:2021 00:00                              Technique:     Gated  Imaging Results     Study artifacts     1) Breast    attenuation     Summed scores     - Summed stress score: 1     - Summed rest score: 6     - Summed difference score:    0     Stress ejection    Ejection fraction:84 %    EDV :58 ml

## 2021-06-05 NOTE — TELEPHONE ENCOUNTER
Reason for Disposition   Unable to walk    Answer Assessment - Initial Assessment Questions  1. ONSET: \"When did the pain start? \"       2 days    2. LOCATION: \"Where is the pain located? \"       Lower legs bilaterally    3. PAIN: \"How bad is the pain? \"    (Scale 1-10; or mild, moderate, severe)    -  MILD (1-3): doesn't interfere with normal activities     -  MODERATE (4-7): interferes with normal activities (e.g., work or school) or awakens from sleep, limping     -  SEVERE (8-10): excruciating pain, unable to do any normal activities, unable to walk     10    4. WORK OR EXERCISE: \"Has there been any recent work or exercise that involved this part of the body? \"       Nothing more than usual working 4 days per week    5. CAUSE: \"What do you think is causing the leg pain? \"      Unsure- feels like cramps per pt    6. OTHER SYMPTOMS: \"Do you have any other symptoms? \" (e.g., chest pain, back pain, breathing difficulty, swelling, rash, fever, numbness, weakness)     denies    7. PREGNANCY: \"Is there any chance you are pregnant? \" \"When was your last menstrual period? \"     N/a due to age    Protocols used: LEG PAIN-ADULT-AH    [facility] with Red Flag Complaint. Brief description of triage: Pt called with concern of severe pain in lower limbs, below knees x 2 days. Triage indicates for patient to ED now. Care advice provided, patient verbalizes understanding; denies any other questions or concerns; instructed to call back for any new or worsening symptoms. Attention Provider: Thank you for allowing me to participate in the care of your patient. The patient was connected to triage in response to information provided to the [facility]. Please do not respond through this encounter as the response is not directed to a shared pool.

## 2021-06-06 ASSESSMENT — ENCOUNTER SYMPTOMS
BACK PAIN: 1
SHORTNESS OF BREATH: 0
FACIAL SWELLING: 0
APNEA: 0
CHOKING: 0
ABDOMINAL PAIN: 0
EYE REDNESS: 0
VOMITING: 0
NAUSEA: 0
SORE THROAT: 0
EYE DISCHARGE: 0

## 2021-06-07 RX ORDER — OMEPRAZOLE 40 MG/1
CAPSULE, DELAYED RELEASE ORAL
Qty: 180 CAPSULE | Refills: 0 | OUTPATIENT
Start: 2021-06-07

## 2021-06-12 DIAGNOSIS — R52 DIFFUSE PAIN: ICD-10-CM

## 2021-06-14 ENCOUNTER — OFFICE VISIT (OUTPATIENT)
Dept: SLEEP MEDICINE | Age: 66
End: 2021-06-14
Payer: MEDICARE

## 2021-06-14 VITALS
TEMPERATURE: 98.6 F | HEART RATE: 84 BPM | SYSTOLIC BLOOD PRESSURE: 122 MMHG | DIASTOLIC BLOOD PRESSURE: 90 MMHG | WEIGHT: 204.8 LBS | BODY MASS INDEX: 40.21 KG/M2 | OXYGEN SATURATION: 98 % | RESPIRATION RATE: 16 BRPM | HEIGHT: 60 IN

## 2021-06-14 DIAGNOSIS — I10 ESSENTIAL HYPERTENSION: ICD-10-CM

## 2021-06-14 DIAGNOSIS — E66.01 CLASS 3 SEVERE OBESITY DUE TO EXCESS CALORIES WITH SERIOUS COMORBIDITY AND BODY MASS INDEX (BMI) OF 40.0 TO 44.9 IN ADULT (HCC): ICD-10-CM

## 2021-06-14 DIAGNOSIS — G47.33 OBSTRUCTIVE SLEEP APNEA: Primary | ICD-10-CM

## 2021-06-14 DIAGNOSIS — Z86.73 H/O: STROKE: ICD-10-CM

## 2021-06-14 PROCEDURE — 3017F COLORECTAL CA SCREEN DOC REV: CPT | Performed by: PSYCHIATRY & NEUROLOGY

## 2021-06-14 PROCEDURE — 1090F PRES/ABSN URINE INCON ASSESS: CPT | Performed by: PSYCHIATRY & NEUROLOGY

## 2021-06-14 PROCEDURE — 4040F PNEUMOC VAC/ADMIN/RCVD: CPT | Performed by: PSYCHIATRY & NEUROLOGY

## 2021-06-14 PROCEDURE — G8427 DOCREV CUR MEDS BY ELIG CLIN: HCPCS | Performed by: PSYCHIATRY & NEUROLOGY

## 2021-06-14 PROCEDURE — G8399 PT W/DXA RESULTS DOCUMENT: HCPCS | Performed by: PSYCHIATRY & NEUROLOGY

## 2021-06-14 PROCEDURE — G8417 CALC BMI ABV UP PARAM F/U: HCPCS | Performed by: PSYCHIATRY & NEUROLOGY

## 2021-06-14 PROCEDURE — 1123F ACP DISCUSS/DSCN MKR DOCD: CPT | Performed by: PSYCHIATRY & NEUROLOGY

## 2021-06-14 PROCEDURE — 1036F TOBACCO NON-USER: CPT | Performed by: PSYCHIATRY & NEUROLOGY

## 2021-06-14 PROCEDURE — 99203 OFFICE O/P NEW LOW 30 MIN: CPT | Performed by: PSYCHIATRY & NEUROLOGY

## 2021-06-14 RX ORDER — GABAPENTIN 300 MG/1
300 CAPSULE ORAL NIGHTLY
Qty: 90 CAPSULE | Refills: 0 | Status: SHIPPED | OUTPATIENT
Start: 2021-06-14 | End: 2021-07-15

## 2021-06-14 ASSESSMENT — SLEEP AND FATIGUE QUESTIONNAIRES
HOW LIKELY ARE YOU TO NOD OFF OR FALL ASLEEP WHILE SITTING QUIETLY AFTER LUNCH WITHOUT ALCOHOL: 0
ESS TOTAL SCORE: 9
HOW LIKELY ARE YOU TO NOD OFF OR FALL ASLEEP WHILE SITTING AND TALKING TO SOMEONE: 0
HOW LIKELY ARE YOU TO NOD OFF OR FALL ASLEEP WHILE SITTING AND READING: 2
HOW LIKELY ARE YOU TO NOD OFF OR FALL ASLEEP IN A CAR, WHILE STOPPED FOR A FEW MINUTES IN TRAFFIC: 0
NECK CIRCUMFERENCE (INCHES): 16
HOW LIKELY ARE YOU TO NOD OFF OR FALL ASLEEP WHILE SITTING INACTIVE IN A PUBLIC PLACE: 1
HOW LIKELY ARE YOU TO NOD OFF OR FALL ASLEEP WHILE WATCHING TV: 3
HOW LIKELY ARE YOU TO NOD OFF OR FALL ASLEEP WHILE LYING DOWN TO REST IN THE AFTERNOON WHEN CIRCUMSTANCES PERMIT: 3
HOW LIKELY ARE YOU TO NOD OFF OR FALL ASLEEP WHEN YOU ARE A PASSENGER IN A CAR FOR AN HOUR WITHOUT A BREAK: 0

## 2021-06-14 ASSESSMENT — ENCOUNTER SYMPTOMS
ALLERGIC/IMMUNOLOGIC NEGATIVE: 1
GASTROINTESTINAL NEGATIVE: 1
EYES NEGATIVE: 1
APNEA: 1
CHOKING: 1

## 2021-06-14 NOTE — PROGRESS NOTES
MD GASTON Carrasco Board Certified in Sleep Medicine  Certified [facility] Sleep Medicine  Board Certified in Neurology [address]  [address] [facility] [address] [address] [facility], [address] [facility]  [address], [address]           [address]  [address] [address] [phone]    Subjective:     Patient ID: Ruddy Levine is a 76 y.o. female. Chief Complaint   Patient presents with    Sleep Apnea       HPI:        Ruddy Levine is a 76 y.o. female referred by Dr. Lit Perkins for a sleep evaluation. Patient  was diagnosed with mild obstructive sleep apnea about 10 years ago, currently of PAP machine at [facility], last sleep study was 11 years ago, last PAP titration about 11 years ago. Patient quit the CPAP sindy weeks ago because water in the hose This patient has lost about 9 pounds since last PAP titration    SLEEP SCHEDULE: Goes to bed around 10:30 PM in the weekdays and 11 PM in the weekends. It usually takes the patient 60 minutes to fall asleep. The patient gets up 2 per night to go to the bathroom. The Patient finally gets up at 6 AM during the weekdays and 6 AM in the weekends. patient wakes up with dry mouth and sometimes morning headache. . the headache usually dull headache lasts 30-60 minutes. The patient has restless sleep with frequent arousals in addition to the Patient has significant daytime sleepiness. The Patient scored Total score: 9 on Washington Sleepiness Scale ( more than 10 is indicative of daytime sleepiness)and 54 in fatigue scale ( more than 36 is indicative of daytime fatigue). The patient takes no naps. Previous evaluation and treatment has included- PSG and PSG with C PAP titration. Had study done on 05/08/2011 which showed an AHI - 12.9/hr with Low SaO2 - 76% and time below 90% of 3.5min. This is consistent with mild DIPTI (327.23)    DOT/CDL - No  FAA/'slicense - No  The patient HTN is stable. H/o stroke 2015 with right side weakness (arm and leg)    Previous Report(s) Reviewed: historical medical records       Social History     Socioeconomic History    Marital status:      Spouse name: Sea David, seen here    Number of children: 3    Years of education: Not on file    Highest education level: Not on file   Occupational History    Not on file   Tobacco Use    Smoking status: Former Smoker     Packs/day: 1.50     Years: 3.00     Pack years: 4.50     Quit date: 1992     Years since quittin.4    Smokeless tobacco: Never Used   Vaping Use    Vaping Use: Never used   Substance and Sexual Activity    Alcohol use: No    Drug use: No    Sexual activity: Yes     Partners: Male   Other Topics Concern    Not on file   Social History Narrative    Originally from [facility]     is seen here    10 grandchildren     Social Determinants of Health     Financial Resource Strain: Low Risk     Difficulty of Paying Living Expenses: Not very hard   Food Insecurity: No Food Insecurity    Worried About Running Out of Food in the Last Year: Never true    Megha of Food in the Last Year: Never true   Transportation Needs:     Lack of Transportation (Medical):      Lack of Transportation (Non-Medical):    Physical Activity:     Days of Exercise per Week:     Minutes of Exercise per Session:    Stress:     Feeling of Stress :    Social Connections:     Frequency of Communication with Friends and Family:     Frequency of Social Gatherings with Friends and Family:     Attends Christianity Services:     Active Member of Clubs or Organizations:     Attends Club or Organization Meetings:     Marital Status:    Intimate Partner Violence:     Fear of Current or Ex-Partner:     Emotionally Abused:     Physically Abused:     Sexually Abused:        Prior to Admission medications    Medication Sig Start Date End Date Taking? Authorizing Provider   lidocaine 4 % external patch Apply patch to lower mid back. Leave on for 12 hours and remove.   Leave off for 12 hours before applying another one. 6/5/21  Yes Eugenio Mcdowell PA-C   TRULICITY 1.5 CA/8.9FN SOPN INJECT 1.5 MG UNDER THE SKIN ONCE WEEKLY 5/26/21  Yes Nilson Finnegan MD   Insulin Pen Needle (PEN NEEDLES) 31G X 6 MM MISC USE TO INJECT INSULIN DAILY 5/19/21  Yes Nilson Finnegan MD   insulin glargine (LANTUS SOLOSTAR) 100 UNIT/ML injection pen INJECT 16 UNITS UNDER THE SKIN ONCE NIGHTLY 5/19/21  Yes Nilson Finnegan MD   furosemide (LASIX) 20 MG tablet TAKE ONE TABLET BY MOUTH DAILY AS NEEDED FOR LEG SWELLING 5/19/21  Yes Nilson Finnegan MD   ibuprofen (ADVIL;MOTRIN) 600 MG tablet TAKE ONE TABLET BY MOUTH TWICE A DAY WITH MEALS 5/13/21  Yes Francesca Lawler MD   atorvastatin (LIPITOR) 20 MG tablet TAKE ONE TABLET BY MOUTH DAILY 4/26/21  Yes Nilson Finnegan MD   metFORMIN (GLUCOPHAGE) 500 MG tablet TAKE TWO TABLETS BY MOUTH TWICE A DAY WITH MEALS 4/22/21  Yes Nilson Finnegan MD   famotidine (PEPCID) 20 MG tablet Take 1 tablet by mouth 2 times daily 4/20/21  Yes Anastasia Navarro MD   valsartan (DIOVAN) 80 MG tablet TAKE ONE TABLET BY MOUTH DAILY 2/10/21  Yes Nilson Finnegan MD   blood glucose test strips (ONETOUCH ULTRA) strip TEST TWO TIMES A DAY 1/18/21  Yes Nilson Finnegan MD   albuterol sulfate HFA (PROVENTIL HFA) 108 (90 Base) MCG/ACT inhaler Inhale 2 puffs into the lungs every 4 hours as needed for Wheezing or Shortness of Breath May substitute ProAir MDI 1/5/21  Yes Stas Leyva MD   ipratropium-albuterol (DUONEB) 0.5-2.5 (3) MG/3ML SOLN nebulizer solution Inhale 3 mLs into the lungs every 4 hours as needed for Shortness of Breath (wheezing coughing) 1/5/21  Yes Stas Leyva MD   DULoxetine (CYMBALTA) 60 MG extended release capsule Take 60 mg by mouth daily   Yes Historical Provider, MD   traZODone (DESYREL) 100 MG tablet Take 100 mg by mouth nightly   Yes Historical Provider, MD   ammonium lactate (LAC-HYDRIN) 12 % lotion Apply topically daily. 11/21/19  Yes Ned Loza MD   blood glucose test strips (FREESTYLE LITE) strip TEST BLOOD SUGAR TWO TIMES A DAY Diag: E11.9 1/28/19  Yes Preston Finnegan MD   Blood Glucose Monitoring Suppl (TRUE METRIX AIR GLUCOSE METER) w/Device KIT 1 each by Does not apply route daily 1/28/19  Yes Preston Finnegan MD   FREESTYLE LANCETS MISC USE ONE LANCET TO TEST BLOOD SUGAR TWICE A DAY 8/30/16  Yes Ashia Cadet MD   FANAPT 1 MG TABS tablet Take 1 mg by mouth nightly  1/13/16  Yes Historical Provider, MD   aspirin 81 MG EC tablet Take 1 tablet by mouth daily. 4/13/15  Yes Ashia Cadet MD   gabapentin (NEURONTIN) 300 MG capsule TAKE ONE CAPSULE BY MOUTH ONCE NIGHTLY  Patient taking differently: daily.   3/11/21 6/9/21  Preston Finnegan MD       Allergies as of 06/14/2021 - Fully Reviewed 06/14/2021   Allergen Reaction Noted    Codeine Nausea And Vomiting and Rash 02/15/2011    Vicodin [hydrocodone-acetaminophen] Nausea And Vomiting 02/15/2011    Lipitor [atorvastatin]  05/19/2021    Oxycontin [oxycodone hcl] Itching 04/19/2016    Tramadol Itching and Swelling 06/05/2019       Patient Active Problem List   Diagnosis    Diabetes mellitus ([address]    Obesity    Dystonia    Cerebral thrombosis with cerebral infarction ([address]    Sleep apnea    Right hemiparesis ([address]    Trigger finger, acquired    Elevated CK    Primary osteoarthritis of both knees    Mood disorder ([address] - follows with Psych Dr. Partha Barbour Calcific tendonitis of right shoulder    Essential hypertension    Mixed hyperlipidemia    Chronic low back pain without sciatica    History of CVA with residual deficit    Anxiety and depression    Chest pain    Myalgia due to statin    Palpitations       Past Medical History:   Diagnosis Date    Arthritis     Cardiomyopathy ([address]     Diabetes     GERD (gastroesophageal reflux disease)     Hyperlipidemia     Hypertension     Lumbar disc circumference: 16     BP HR SaO2   BP Readings from Last 3 Encounters:   06/14/21 (!) 122/90   06/05/21 122/82   05/19/21 110/80    Pulse Readings from Last 3 Encounters:   06/14/21 84   06/05/21 98   05/19/21 82    SpO2 Readings from Last 3 Encounters:   06/14/21 98%   06/05/21 95%   05/19/21 98%        The mandibular molar Class :   []1 []2 []3      Mallampati I Airway Classification:   []1 []2 []3 []4        Physical Exam  Vitals and nursing note reviewed. Constitutional:       Appearance: Normal appearance. HENT:      Head: Atraumatic. Nose: Nose normal.      Mouth/Throat:      Comments: Mallampati class 4, no retrognathia or hypognathia , normal airflow in bilateral nostrils, no septum deviation , crowded oropharynx with low soft palate, high arched hard palate,no tonsils enlargement. Eyes:      Extraocular Movements: Extraocular movements intact. Cardiovascular:      Rate and Rhythm: Regular rhythm. Heart sounds: Normal heart sounds. Pulmonary:      Effort: Pulmonary effort is normal.      Breath sounds: Normal breath sounds. Musculoskeletal:         General: Normal range of motion. Cervical back: Normal range of motion and neck supple. Skin:     General: Skin is warm. Neurological:      Mental Status: Mental status is at baseline. Comments: decreased MU in right arm and leg   Psychiatric:         Mood and Affect: Mood normal.         Assessment:   Obstructive sleep apnea , could not use the current PAP machine              Diagnosis Orders   1. Obstructive sleep apnea  Sleep Study with PAP Titration   2. Essential hypertension  Sleep Study with PAP Titration   3. H/O: stroke  Sleep Study with PAP Titration   4. Class 3 severe obesity due to excess calories with serious comorbidity and body mass index (BMI) of 40.0 to 44.9 in adult [facility])  Sleep Study with PAP Titration     Plan:   CPAP/BiPAP titration.   Patient was counseled about the pathophysiology of obstructive sleep apnea syndrome and the methods for evaluating its presence and severity. Patient was counseled to avoid driving and other potentially hazardous circumstances if the patient is experiencing excessive sleepiness. Treatment considerations include the use of nasal CPAP, oral dental appliance or a surgical intervention, which should be based on otolarygologic findings, In the meantime, the patient should be cautioned to avoid the use of alcohol or other depressant medications because of potential for increasing the duration and severity of apnea and cautioned regarding driving or operating and dangerous equipment if the patient is experiencing daytime sleepiness. .    Most likely treating the DIPTI will have position impact on HTN and stroke control. We discussed the proportionality between weight and AHI. With 10% weight change, the AHI has a 27% proportionate change. With 20% weight change, the AHI has a 45-50% proportionate change. The Patient accepts referral to bariatrics for further consideration of weight loss methods. Orders Placed This Encounter   Procedures    Sleep Study with PAP Titration       No follow-ups on file.     Tamsen Holter, MD  Medical Director [address] [place] [address]

## 2021-06-14 NOTE — PATIENT INSTRUCTIONS
Orders Placed This Encounter   Procedures    Sleep Study with PAP Titration     Standing Status:   Future     Standing Expiration Date:   6/14/2022     Scheduling Instructions:      Right side weakness     Order Specific Question:   Sleep Study Titration Type     Answer:   CPAP     Order Specific Question:   Location For Sleep Study     Answer:   [facility]     Order Specific Question:   Select Sleep Lab Location     Answer:   [facility]        Patient Education        Learning About CPAP for Sleep Apnea  What is CPAP? CPAP is a small machine that you use at home every night while you sleep. It increases air pressure in your throat to keep your airway open. When you have sleep apnea, this can help you sleep better so you feel much better. CPAP stands for \"continuous positive airway pressure. \"  The CPAP machine will have one of the following:  · A mask that covers your nose and mouth  · Prongs that fit into your nose  · A mask that covers your nose only, which is the most common type. This type is called NCPAP. The N stands for \"nasal.\"  Why is it done? CPAP is usually the best treatment for obstructive sleep apnea. It is the first treatment choice and the most widely used. CPAP:  · Helps you have more normal sleep, so you feel less sleepy and more alert during the daytime. · May help keep heart failure or other heart problems from getting worse. · May help lower your blood pressure. If you use CPAP, your bed partner may also sleep better. That's because you aren't snoring or restless. Your doctor may suggest CPAP if you have:  · Moderate to severe sleep apnea. · Sleep apnea and coronary artery disease (CAD). · Sleep apnea and heart failure. What are the side effects? Some people who use CPAP have:  · A dry or stuffy nose and a sore throat. · Irritated skin on the face. · Sore eyes. · Bloating. How can you care for yourself?   If using CPAP is not comfortable, or if you have certain side

## 2021-06-15 PROCEDURE — 93244 EXT ECG>48HR<7D REV&INTERPJ: CPT | Performed by: INTERNAL MEDICINE

## 2021-06-16 ENCOUNTER — TELEPHONE (OUTPATIENT)
Dept: CARDIOLOGY CLINIC | Age: 66
End: 2021-06-16

## 2021-06-16 RX ORDER — IBUPROFEN 600 MG/1
TABLET ORAL
Qty: 60 TABLET | Refills: 0 | Status: ON HOLD | OUTPATIENT
Start: 2021-06-16 | End: 2021-07-26 | Stop reason: SDUPTHER

## 2021-06-18 ENCOUNTER — TELEPHONE (OUTPATIENT)
Dept: ORTHOPEDIC SURGERY | Age: 66
End: 2021-06-18

## 2021-06-21 NOTE — TELEPHONE ENCOUNTER
I called the patient and made her an apt on 6/24/21. I did offer her a sooner apt that she declined.

## 2021-06-24 ENCOUNTER — OFFICE VISIT (OUTPATIENT)
Dept: ORTHOPEDIC SURGERY | Age: 66
End: 2021-06-24
Payer: MEDICARE

## 2021-06-24 ENCOUNTER — PREP FOR PROCEDURE (OUTPATIENT)
Dept: ORTHOPEDIC SURGERY | Age: 66
End: 2021-06-24

## 2021-06-24 VITALS
SYSTOLIC BLOOD PRESSURE: 142 MMHG | DIASTOLIC BLOOD PRESSURE: 82 MMHG | HEART RATE: 99 BPM | WEIGHT: 200 LBS | BODY MASS INDEX: 34.15 KG/M2 | HEIGHT: 64 IN

## 2021-06-24 DIAGNOSIS — Z01.818 PREOP TESTING: Primary | ICD-10-CM

## 2021-06-24 DIAGNOSIS — M17.12 PRIMARY OSTEOARTHRITIS OF LEFT KNEE: ICD-10-CM

## 2021-06-24 DIAGNOSIS — M17.11 PRIMARY OSTEOARTHRITIS OF RIGHT KNEE: Primary | ICD-10-CM

## 2021-06-24 PROCEDURE — 1036F TOBACCO NON-USER: CPT | Performed by: ORTHOPAEDIC SURGERY

## 2021-06-24 PROCEDURE — 1090F PRES/ABSN URINE INCON ASSESS: CPT | Performed by: ORTHOPAEDIC SURGERY

## 2021-06-24 PROCEDURE — 99214 OFFICE O/P EST MOD 30 MIN: CPT | Performed by: ORTHOPAEDIC SURGERY

## 2021-06-24 PROCEDURE — 3017F COLORECTAL CA SCREEN DOC REV: CPT | Performed by: ORTHOPAEDIC SURGERY

## 2021-06-24 PROCEDURE — 1123F ACP DISCUSS/DSCN MKR DOCD: CPT | Performed by: ORTHOPAEDIC SURGERY

## 2021-06-24 PROCEDURE — G8399 PT W/DXA RESULTS DOCUMENT: HCPCS | Performed by: ORTHOPAEDIC SURGERY

## 2021-06-24 PROCEDURE — 4040F PNEUMOC VAC/ADMIN/RCVD: CPT | Performed by: ORTHOPAEDIC SURGERY

## 2021-06-24 PROCEDURE — G8427 DOCREV CUR MEDS BY ELIG CLIN: HCPCS | Performed by: ORTHOPAEDIC SURGERY

## 2021-06-24 PROCEDURE — G8417 CALC BMI ABV UP PARAM F/U: HCPCS | Performed by: ORTHOPAEDIC SURGERY

## 2021-06-24 NOTE — LETTER
[facility]: [phone]: [phone]  Surgery Scheduling Form:  DEMOGRAPHICS:                                                                                                              .    Patient Name:  Ave Irwin    Patient :  1955   Patient SS#:        Patient Phone:  [phone] (home)      Patient Address:  [address] [place] [address] [address] [address]    Insurance:    Payor/Plan Subscr  Sex Relation Sub. Ins. ID Effective Group Num   1. [employer] MEDICARE Fabian Drummond A 1955 Female Self [id number]                                   [address]   2. [address] [facility] A 1955 Female Self [id number]                                   [address] [place] [address]   DIAGNOSIS & PROCEDURE:                                                                                            .    Diagnosis:  Osteoarthritis- right knee M17.11    Operation:  Total knee replacement- right knee Robotic Assisted [id number]    Location:  [facility]    Surgeon:  Guilherme Robertson    SCHEDULING INFORMATION:                                                                                         .    Requested Date:  21 OR Time: 9:55 am  Patient Arrival Time: 7:55 am     OR Time Required:  120  Minutes         Anesthesia:  General    SA Required:  Yes x 2    Equipment:  Snehal JJ Advanced    Status:  same day admit    PAT Required:  Yes COVID19 test:  Vaccinated     Latex Allergy:  no Defibrilator or Pacemaker:  no    Isolation Precautions:  no                      Francisco Javier Gamboa MD     21   BILLING INFORMATION:                                                                                                    .                          CPT Code Modifier  Total knee    Prior auth:pending   Name: Ave Irwin  : 1955  Procedure:  Total knee replacement- right         Date of Surgery:21             Date of JET:21    Allergies: Codeine, Vicodin [hydrocodone-acetaminophen], Lipitor [atorvastatin], Oxycontin [oxycodone hcl], and Tramadol    Ht Readings from Last 1 Encounters:   06/24/21 5' 4\" (1.626 m)      Wt Readings from Last 1 Encounters:   06/24/21 200 lb (90.7 kg)         TOTAL KNEE REPLACEMENT PHYSICIANS ORDERS   I hereby authorize the pharmacy, under the formulary system to dispense a different brand of drug identical composition and comparable quality unless the brand name I have prescribed is circled on this order sheet  All orders without checkboxes will be processed automatically unless crossed out. Orders with checkboxes MUST be checked in order to be carried out. PRE-OP La.Najjar  [ ] X-ray operative site-standing view  Taylor.Aquino ] CBC without differential [X] Albumin  Taylor.Aquino ] BMP      [ ] Coag profile  [X] PT/INR   [ ] Sed rate on revisions of total joints only  Taylor.Aquino ] Type and screen  Taylor.Aquino ] EKG      Taylor.Aquino ] UAR     [X] A1C  Taylor.Aquino ] Clean catch urine for routine analysis, if positive for nitrites and/or leukocytes, do urine for C & S  Taylor.Aquino ] Nasal culture for MRSA  Taylor.Aquino ] Physical therapy evaluation and teaching  Tyalor.Aquino ] Occupational therapy evaluation and teaching  Taylor.Aquino ] Inform patient to stop all anti-inflammatory medications including aspirin for 7 days before surgery    DAY OF SURGERY  Taylor.Aquino ] Cefazolin: 1 gram IVPB; if patient weighs > 80 kg and serum creatinine <2.5 mg/dl give 2 gram dose within 1 hour of incision. If patient has a minor allergy to Penicillin, still give this.    OR  If the pre-op nasal culture for MRSA was positive, repeat nasal swab before surgery and give: Vancomycin 2 gram IVPB, reduce dose of Vancomycin to 500 mg IVPB if PT < 55 kg or serum creatinine > 2 mg/dl (Vancomycin must be administered over 1 hour)& Cefazolin 1 gram IVPB; if patient weighs>80 kg and serum creatinine <2.3 mg/dl give 2 gram dose within 1 hour of incision  OR  If patient has a true severe allergy and underwent allergy testing to Penicillin or Cephalosporins give: Clindamycin 900mg IVPB, then administer 2 more doses post op.     Gem ] Apply knee high antiembolic hose and pneumonboots to unoperative leg   D/C after 2 weeks    Other order: Mobic 15mg pre-op     Gem ] Type and screen    T.O: _____________________________/___________________Date:_______Time:_______   Physician    RN    Physician Signature:               Date/Time:  6/24/2021 12:00 PM

## 2021-06-24 NOTE — PROGRESS NOTES
RAPT  RISK ASSESSMENT and PREDICTION TOOL    Name: Anna Wood  YOB: 1955  Surgeon: Lexus Vance MD         Value Score    1). What is your age group? 50 - 65 years  = 2      66 - 75 years = 1     > 75 years = 0       Your score = 1   2). Gender? Male = 2     Female = 1       Your score = 1   3). How far on average can you walk? Two blocks or more (+/- rest) = 2    (a block is 200 fkfgwm=332 ft)  1 - 2 blocks (+/- rest) = 1     Housebound (most of time) = 0       Your score = 1   4). Which gait aid do you use? None = 2    (more often than not) Single-point stick = 1     Crutches/walker = 0       Your score = 2   5). Do you use community supports? None or one per week = 1    (home help, meals on wheels, district nursing) Two or more per week = 0       Your score = 1   6). Will you live with someone who can care for you after your operation? yes = 3     no = 0       Your score = 3    Your Total Score (out of 12) = 9       Key: Destination at discharge from acute care predicted by score. Score < 6  = extended inpatient rehabilitation  Score 6 - 9  = additional intervention to discharge directly home (Rehab in the home)  Score > 9  = directly home      Patient's Preference Prediction Score Agreed destination   open 9 home   [address]  Date: 6/24/21        to be at home after surgery.

## 2021-06-24 NOTE — PROGRESS NOTES
[facility]  [id number]  June 24, 2021    Chief Complaint   Patient presents with    Follow-up     Bilateral knee        History: The patient is a 74-year-old pleasant female who is here for evaluation of both knees. The knee injections provided minimal to no relief. She has had numerous injections. She has participated in therapy and continues to have severe bilateral knee pain. The right knee seems to bother her more than the left. She has difficulty with stairs. She has difficulty getting up from a seated position. She does occasionally have pain at nighttime. The patient's  past medical history, medications, allergies,  family history, social history, and have been reviewed, and dated and are recorded in the chart. Pertinent items are noted in HPI. Review of systems reviewed from Pertinent History Form dated on 2/24/2021 and available in the patient's chart under the Media tab. Vitals:  BP (!) 142/82   Pulse 99   Ht 5' 4\" (1.626 m)   Wt 200 lb (90.7 kg)   BMI 34.33 kg/m²     Physical: Ms. Enedina Anthony appears well, she is in no apparent distress, she demonstrates appropriate mood & affect. She is alert and oriented to person, place and time. Examination of the bilateral lower extremity reveals no pain with range of motion of the hip. She has generalized tenderness to palpation about the joint line of the bilateral knee. Range of motion is from 0 degrees to 120 degrees bilaterally. Strength is 4+/5 for all muscle groups about the right knee and 5/5 for the left knee. There is patellofemoral crepitus with range of motion of the bilateral knee. Varus and valgus stressing of the knees reveals no evidence of instability. There is a small effusion in the bilateral knee. Anterior drawer and Lachman are negative bilaterally. Examination of the skin reveals no rashes, ulceration, or lesion, bilaterally in the lower extremities. Sensation to both lower extremities is grossly intact.  Exam of both feet reveals pedal pulses intact and brisk cap refill. Patient is able to dorsiflex and wiggle all toes. Deep tendon reflexes of the lower extremities are normal and symmetric. X-rays: 4 views of both knees obtained in the office previously were extensively reviewed. The x-rays confirm severe osteoarthritis of both knees. The changes are most severe medially and within the patellofemoral compartments. Impression: Bilateral knee osteoarthritis    Plan: At this time, the patient will be scheduled for a right total knee arthroplasty. All risks including but not limited to blood loss, infection, persistent pain,stiffness, weakness,loosening,deep vein thrombosis,neurovascular injury, and the risks of anesthesia were discussed. The patient understands all risks and benefits of the procedure and agrees to proceed. The patient will see her primary care physician,Lavell Finnegan MD, for medical clearance. If the patient is on NSAIDS or blood thinners these medications will need to be discontinued one week prior to surgery. Will plan on 81mg ASA BID for 14 days post-op.

## 2021-06-28 DIAGNOSIS — R00.2 PALPITATIONS: ICD-10-CM

## 2021-06-30 ENCOUNTER — TELEPHONE (OUTPATIENT)
Dept: ORTHOPEDIC SURGERY | Age: 66
End: 2021-06-30

## 2021-06-30 NOTE — TELEPHONE ENCOUNTER
CPT: [id number]  BODY PART: right knee  AUTHORIZATION: approved    Submitted online with [facility] 6/30/21    Approved # [id number]  1 day

## 2021-07-01 ENCOUNTER — HOSPITAL ENCOUNTER (OUTPATIENT)
Dept: GENERAL RADIOLOGY | Age: 66
Discharge: HOME | End: 2021-07-01
Payer: MEDICARE

## 2021-07-01 ENCOUNTER — OFFICE VISIT (OUTPATIENT)
Dept: CARDIOLOGY CLINIC | Age: 66
End: 2021-07-01
Payer: MEDICARE

## 2021-07-01 VITALS
BODY MASS INDEX: 35.02 KG/M2 | HEART RATE: 96 BPM | OXYGEN SATURATION: 98 % | WEIGHT: 204 LBS | SYSTOLIC BLOOD PRESSURE: 132 MMHG | DIASTOLIC BLOOD PRESSURE: 80 MMHG

## 2021-07-01 DIAGNOSIS — M17.11 PRIMARY OSTEOARTHRITIS OF RIGHT KNEE: ICD-10-CM

## 2021-07-01 DIAGNOSIS — I10 ESSENTIAL HYPERTENSION: ICD-10-CM

## 2021-07-01 DIAGNOSIS — R07.9 CHEST PAIN, UNSPECIFIED TYPE: ICD-10-CM

## 2021-07-01 DIAGNOSIS — R00.2 PALPITATIONS: ICD-10-CM

## 2021-07-01 DIAGNOSIS — G47.30 SLEEP APNEA, UNSPECIFIED TYPE: ICD-10-CM

## 2021-07-01 DIAGNOSIS — E78.2 MIXED HYPERLIPIDEMIA: ICD-10-CM

## 2021-07-01 PROCEDURE — G8399 PT W/DXA RESULTS DOCUMENT: HCPCS | Performed by: INTERNAL MEDICINE

## 2021-07-01 PROCEDURE — 3017F COLORECTAL CA SCREEN DOC REV: CPT | Performed by: INTERNAL MEDICINE

## 2021-07-01 PROCEDURE — 4040F PNEUMOC VAC/ADMIN/RCVD: CPT | Performed by: INTERNAL MEDICINE

## 2021-07-01 PROCEDURE — 99214 OFFICE O/P EST MOD 30 MIN: CPT | Performed by: INTERNAL MEDICINE

## 2021-07-01 PROCEDURE — 1123F ACP DISCUSS/DSCN MKR DOCD: CPT | Performed by: INTERNAL MEDICINE

## 2021-07-01 PROCEDURE — 1036F TOBACCO NON-USER: CPT | Performed by: INTERNAL MEDICINE

## 2021-07-01 PROCEDURE — G8417 CALC BMI ABV UP PARAM F/U: HCPCS | Performed by: INTERNAL MEDICINE

## 2021-07-01 PROCEDURE — 77073 BONE LENGTH STUDIES: CPT

## 2021-07-01 PROCEDURE — G8427 DOCREV CUR MEDS BY ELIG CLIN: HCPCS | Performed by: INTERNAL MEDICINE

## 2021-07-01 PROCEDURE — 1090F PRES/ABSN URINE INCON ASSESS: CPT | Performed by: INTERNAL MEDICINE

## 2021-07-01 ASSESSMENT — ENCOUNTER SYMPTOMS
FACIAL SWELLING: 0
BACK PAIN: 0
ABDOMINAL DISTENTION: 0
CHEST TIGHTNESS: 0
EYE DISCHARGE: 0
VOMITING: 0
COUGH: 0
BLOOD IN STOOL: 0
WHEEZING: 0
COLOR CHANGE: 0
ABDOMINAL PAIN: 0
SHORTNESS OF BREATH: 0

## 2021-07-01 NOTE — PROGRESS NOTES
[facility]     Outpatient Cardiology         Chief Complaint   Patient presents with    Follow-up     s/p 7 day Zio    Cardiac Clearance     cardiac risk assessment for knee sx on 7/26/21    Shortness of Breath     when climbing up steps       HPI     John Clarke a 76 y.o. female here for follow up for monitor results. Hx of DM II, HTN, HLD, DIPTI. Hypertension, well-controlled current medications, no changes needed or side effects. Hyperlipidemia on a statin, no side effects. Sleep apnea, using her CPAP most nights. Palpitations, benign monitor, most symptoms correlated with normal sinus rhythm.   Chest pain, noncardiac in nature, multiple negative stress test in the past.      PMH  Past Medical History:   Diagnosis Date    Arthritis     Cardiomyopathy ([address]     Diabetes     GERD (gastroesophageal reflux disease)     Hyperlipidemia     Hypertension     Lumbar disc disease     Sleep apnea     pt states does use a Cpap machine at night    Unspecified cerebral artery occlusion with cerebral infarction 2014    right side weak    Wears glasses        PSH  Past Surgical History:   Procedure Laterality Date    ARTHRODESIS Right 9/17/2020    (RIGHT) REMOVAL OF BONE SPURRING AND OSSEOUS PROMINENCE FIRST METATARSAL, ARTHRODESIS OF FIRST METATARSOPHALANGEAL JOINT, BONE MARROW HARVEST AND CONCENTRATION RIGHT TIBIA performed by Figueroa Obando DPM [facility] Left 9/3/15    CARPAL TUNNEL RELEASE Right 9/22/15    COLONOSCOPY      FINGER TRIGGER RELEASE Left 9/3/15    middle and ring fingers    FINGER TRIGGER RELEASE Right 9/22/15    middle and ring fingers    FOOT SURGERY Bilateral     litteltoe    HAND SURGERY Right     Ligament    PARTIAL HYSTERECTOMY  08/2003    SHOULDER ARTHROSCOPY Right 06/20/2018    Diagnostic scope, rcr, open [place]        Social HIstory  Social History     Tobacco Use    Smoking status: Former Smoker     Packs/day: 1.50 Years: 3.00     Pack years: 4.50     Quit date: 1992     Years since quittin.5    Smokeless tobacco: Never Used   Vaping Use    Vaping Use: Never used   Substance Use Topics    Alcohol use: No    Drug use: No       Family History  Family History   Problem Relation Age of Onset    Heart Disease Mother     High Blood Pressure Mother     Stroke Mother     Cancer Brother         lung cancer       Allergies   Allergies   Allergen Reactions    Codeine Nausea And Vomiting and Rash    Vicodin [Hydrocodone-Acetaminophen] Nausea And Vomiting    Lipitor [Atorvastatin]      Myalgias    Oxycontin [Oxycodone Hcl] Itching    Tramadol Itching and Swelling       Medications:     Home Medications:  Were reviewed and are listed in nursing record. and/or listed below    Prior to Admission medications    Medication Sig Start Date End Date Taking? Authorizing Provider   ibuprofen (ADVIL;MOTRIN) 600 MG tablet TAKE ONE TABLET BY MOUTH TWICE A DAY WITH MEALS 21  Yes Fausto Cottrell MD   lidocaine 4 % external patch Apply patch to lower mid back. Leave on for 12 hours and remove.   Leave off for 12 hours before applying another one. 21  Yes Sonny Negron PA-C   TRULICITY 1.5 PG/9.2NZ SOPN INJECT 1.5 MG UNDER THE SKIN ONCE WEEKLY 21  Yes Alejandro Finnegan MD   Insulin Pen Needle (PEN NEEDLES) 31G X 6 MM MISC USE TO INJECT INSULIN DAILY 21  Yes Alejandro Finnegan MD   insulin glargine (LANTUS SOLOSTAR) 100 UNIT/ML injection pen INJECT 16 UNITS UNDER THE SKIN ONCE NIGHTLY 21  Yes Alejandro Finnegan MD   furosemide (LASIX) 20 MG tablet TAKE ONE TABLET BY MOUTH DAILY AS NEEDED FOR LEG SWELLING 21  Yes Alejandro Finnegan MD   atorvastatin (LIPITOR) 20 MG tablet TAKE ONE TABLET BY MOUTH DAILY 21  Yes Alejandro Finnegan MD   metFORMIN (GLUCOPHAGE) 500 MG tablet TAKE TWO TABLETS BY MOUTH TWICE A DAY WITH MEALS 21  Yes Alejandro Finnegan MD   famotidine (PEPCID) 20 MG tablet Take 1 tablet by mouth 2 times daily 21  Yes Temi Thomas MD   valsartan (DIOVAN) 80 MG tablet TAKE ONE TABLET BY MOUTH DAILY 2/10/21  Yes Abena Finnegan MD   blood glucose test strips (ONETOUCH ULTRA) strip TEST TWO TIMES A DAY 1/18/21  Yes Abena Finnegan MD   albuterol sulfate HFA (PROVENTIL HFA) 108 (90 Base) MCG/ACT inhaler Inhale 2 puffs into the lungs every 4 hours as needed for Wheezing or Shortness of Breath May substitute ProAir MDI 1/5/21  Yes Waqas Stephen MD   ipratropium-albuterol (DUONEB) 0.5-2.5 (3) MG/3ML SOLN nebulizer solution Inhale 3 mLs into the lungs every 4 hours as needed for Shortness of Breath (wheezing coughing) 1/5/21  Yes Waqas Stephen MD   DULoxetine (CYMBALTA) 60 MG extended release capsule Take 60 mg by mouth daily   Yes Historical Provider, MD   traZODone (DESYREL) 100 MG tablet Take 100 mg by mouth nightly   Yes Historical Provider, MD   ammonium lactate (LAC-HYDRIN) 12 % lotion Apply topically daily. 11/21/19  Yes Lyle Ramirez MD   blood glucose test strips (FREESTYLE LITE) strip TEST BLOOD SUGAR TWO TIMES A DAY Diag: E11.9 1/28/19  Yes Abena Finnegan MD   Blood Glucose Monitoring Suppl (TRUE METRIX AIR GLUCOSE METER) w/Device KIT 1 each by Does not apply route daily 1/28/19  Yes Abena Finnegan MD   FREESTYLE LANCETS MISC USE ONE LANCET TO TEST BLOOD SUGAR TWICE A DAY 8/30/16  Yes Hernandez Root MD   FANAPT 1 MG TABS tablet Take 1 mg by mouth nightly  1/13/16  Yes Historical Provider, MD   aspirin 81 MG EC tablet Take 1 tablet by mouth daily. 4/13/15  Yes Hernandez Root MD   gabapentin (NEURONTIN) 300 MG capsule Take 1 capsule by mouth nightly for 90 days. Patient not taking: Reported on 7/1/2021 6/14/21 9/12/21  Abena Finnegan MD        Review of Systems   Constitutional: Negative for activity change, appetite change, diaphoresis, fatigue, fever and unexpected weight change. HENT: Negative for congestion, facial swelling, mouth sores and nosebleeds. Eyes: Negative for discharge and visual disturbance.    Respiratory: Negative for cough, chest tightness, shortness of breath and wheezing. Cardiovascular: Positive for chest pain and palpitations. Negative for leg swelling. Gastrointestinal: Negative for abdominal distention, abdominal pain, blood in stool and vomiting. Endocrine: Negative for cold intolerance, heat intolerance and polyuria. Genitourinary: Negative for difficulty urinating, dysuria, frequency and hematuria. Musculoskeletal: Negative for back pain, joint swelling, myalgias and neck pain. Skin: Negative for color change, pallor and rash. Allergic/Immunologic: Negative for immunocompromised state. Neurological: Negative for dizziness, syncope, weakness, light-headedness, numbness and headaches. Hematological: Negative for adenopathy. Does not bruise/bleed easily. Psychiatric/Behavioral: Negative for behavioral problems, confusion, decreased concentration and suicidal ideas. The patient is not nervous/anxious. Vitals:    07/01/21 1529   BP: 132/80   Pulse: 96   SpO2: 98%    Weight: 204 lb (92.5 kg)       Vitals:    07/01/21 1529   BP: 132/80   Pulse: 96   SpO2: 98%   Weight: 204 lb (92.5 kg)       BP Readings from Last 3 Encounters:   07/01/21 132/80   06/24/21 (!) 142/82   06/14/21 (!) 122/90       Wt Readings from Last 3 Encounters:   07/01/21 204 lb (92.5 kg)   06/24/21 200 lb (90.7 kg)   06/14/21 204 lb 12.8 oz (92.9 kg)       Physical Exam  Constitutional:       General: She is not in acute distress. Appearance: She is well-developed. She is not diaphoretic. HENT:      Head: Normocephalic and atraumatic. Eyes:      Pupils: Pupils are equal, round, and reactive to light. Neck:      Thyroid: No thyromegaly. Vascular: No JVD. Cardiovascular:      Rate and Rhythm: Normal rate and regular rhythm. Chest Wall: PMI is not displaced. Heart sounds: Normal heart sounds, S1 normal and S2 normal. No murmur heard. No friction rub. No gallop.     Pulmonary:      Effort: Pulmonary effort is normal. No respiratory distress. Breath sounds: Normal breath sounds. No stridor. No wheezing or rales. Chest:      Chest wall: No tenderness. Abdominal:      General: Bowel sounds are normal. There is no distension. Palpations: Abdomen is soft. Tenderness: There is no abdominal tenderness. There is no guarding or rebound. Musculoskeletal:         General: No tenderness. Normal range of motion. Cervical back: Normal range of motion. Lymphadenopathy:      Cervical: No cervical adenopathy. Skin:     General: Skin is warm and dry. Findings: No erythema or rash. Neurological:      Mental Status: She is alert and oriented to person, place, and time. Coordination: Coordination normal.   Psychiatric:         Behavior: Behavior normal.         Thought Content:  Thought content normal.         Judgment: Judgment normal.         Labs:       Lab Results   Component Value Date    WBC 8.1 05/11/2021    HGB 11.2 (L) 05/11/2021    HCT 33.9 (L) 05/11/2021    MCV 84.5 05/11/2021     05/11/2021     Lab Results   Component Value Date     (L) 06/05/2021    K 4.6 06/05/2021    CL 97 (L) 06/05/2021    CO2 24 06/05/2021    BUN 11 06/05/2021    CREATININE 0.8 06/05/2021    GLUCOSE 111 (H) 06/05/2021    CALCIUM 10.4 06/05/2021    PROT 8.0 06/05/2021    LABALBU 4.4 06/05/2021    BILITOT <0.2 06/05/2021    ALKPHOS 87 06/05/2021    AST 24 06/05/2021    ALT 15 06/05/2021    LABGLOM >60 06/05/2021    GFRAA >60 06/05/2021    AGRATIO 1.2 06/05/2021    GLOB 3.6 06/05/2021         Lab Results   Component Value Date    CHOL 145 02/21/2020    CHOL 144 10/19/2019    CHOL 166 12/07/2018     Lab Results   Component Value Date    TRIG 92 02/21/2020    TRIG 73 10/19/2019    TRIG 94 12/07/2018     Lab Results   Component Value Date    HDL 57 02/21/2020    HDL 56 10/19/2019    HDL 57 12/07/2018     Lab Results   Component Value Date    LDLCALC 70 02/21/2020    LDLCALC 73 10/19/2019 LDLCALC 90 12/07/2018     Lab Results   Component Value Date    LABVLDL 18 02/21/2020    LABVLDL 15 10/19/2019    LABVLDL 19 12/07/2018     No results found for: [facility]    Lab Results   Component Value Date    INR 0.94 10/17/2019    INR 0.87 04/19/2016    INR 0.95 04/21/2014    PROTIME 10.7 10/17/2019    PROTIME 9.9 04/19/2016    PROTIME 10.7 04/21/2014       The ASCVD Risk score (Lisa Francois, et al., 2013) failed to calculate for the following reasons: The patient has a prior MI or stroke diagnosis      Imaging:       Last ECG (if available):  NSR, NSST changes     Last Monitor/Holter    Last Stress (if available): 5/12/21  Summary    Pharmacological Stress/MPI Results:        1. Technically a satisfactory study.    2. No evidence of Ischemia by Myocardial Perfusion Imaging.    3. Gated Study shows normal LV size and Systolic function; EF is 70 %. Last Cath (if available):    Last TTE/TUSHAR(if available): 10/17/19   Summary   There is mildly increased left ventricular wall thickness. Left ventricular cavity size is normal.   Ejection fraction is visually estimated to be 55-60%. Normal diastolic function. The aortic valve is structurally normal.   No evidence of aortic valve regurgitation. The mitral valve is normal in structure and function. Tricuspid valve is structurally normal.   No evidence of tricuspid regurgitation. Last CMR  (if available):      Assessment / Plan:     Palpitations  Benign monitor, most episodes correlated with normal sinus rhythm. Chest pain   Noncardiac    Essential hypertension   Well-controlled current medications. No changes    Mixed hyperlipidemia   Continue statins. Sleep apnea   CPAP most nights. Okay to proceed with knee surgery without further testing. Follow up in 12 months. I had the opportunity to review the clinical symptoms and presentation of [address] Patient's allergies and medications were reviewed and updated.  Patient's

## 2021-07-06 ENCOUNTER — PREP FOR PROCEDURE (OUTPATIENT)
Dept: ORTHOPEDICS UNIT | Age: 66
End: 2021-07-06

## 2021-07-06 RX ORDER — MELOXICAM 7.5 MG/1
15 TABLET ORAL ONCE
Status: CANCELLED | OUTPATIENT
Start: 2021-07-06 | End: 2021-07-06

## 2021-07-07 ENCOUNTER — HOSPITAL ENCOUNTER (OUTPATIENT)
Dept: SLEEP CENTER | Age: 66
Discharge: HOME | End: 2021-07-07
Payer: MEDICARE

## 2021-07-07 DIAGNOSIS — Z86.73 H/O: STROKE: ICD-10-CM

## 2021-07-07 DIAGNOSIS — I10 ESSENTIAL HYPERTENSION: ICD-10-CM

## 2021-07-07 DIAGNOSIS — E66.01 CLASS 3 SEVERE OBESITY DUE TO EXCESS CALORIES WITH SERIOUS COMORBIDITY AND BODY MASS INDEX (BMI) OF 40.0 TO 44.9 IN ADULT (HCC): ICD-10-CM

## 2021-07-07 DIAGNOSIS — G47.33 OBSTRUCTIVE SLEEP APNEA: ICD-10-CM

## 2021-07-07 PROCEDURE — 95811 POLYSOM 6/>YRS CPAP 4/> PARM: CPT | Performed by: PSYCHIATRY & NEUROLOGY

## 2021-07-07 PROCEDURE — 95811 POLYSOM 6/>YRS CPAP 4/> PARM: CPT

## 2021-07-08 ENCOUNTER — HOSPITAL ENCOUNTER (OUTPATIENT)
Dept: PHYSICAL THERAPY | Age: 66
Setting detail: THERAPIES SERIES
Discharge: HOME | End: 2021-07-08
Payer: MEDICARE

## 2021-07-08 ENCOUNTER — TELEPHONE (OUTPATIENT)
Dept: ORTHOPEDIC SURGERY | Age: 66
End: 2021-07-08

## 2021-07-08 PROCEDURE — 97161 PT EVAL LOW COMPLEX 20 MIN: CPT

## 2021-07-08 PROCEDURE — 97110 THERAPEUTIC EXERCISES: CPT

## 2021-07-08 PROCEDURE — 97530 THERAPEUTIC ACTIVITIES: CPT

## 2021-07-08 NOTE — TELEPHONE ENCOUNTER
Surgery and/or Procedure Scheduling     Contact Name: Jane Canela  Surgical/Procedure Request: R KNEE [address]  Patient Contact Number: [phone]   [phone]  PATIENT HAS QUESTION REGARD HER SURGERY.  SHE WOULD LIKE TO SPEAK WITH Caroline Hobbs [phone]

## 2021-07-08 NOTE — PROGRESS NOTES
John Wood         : 1955  [x] [address]     [] [address]      [] [facility]     [][employer]    [] [employer]  [] [employer]   [] Other:  Diagnosis: [x] DIPTI (G47.33) [] CSA (G47.31) [] Apnea (G47.30)   Length of Need: [] 12 Months [x] 99 Months [] Other:    Machine (MEGAN!): [] Respironics Dream Station      Auto [x] ResMed AirSense     Auto [] Other:     [x]  CPAP () [] Bilevel ()   Mode: [x] Auto [] Spontaneous    Mode: [] Auto [] Spontaneous           Between 5 and 20 cm                 Comfort Settings:   - Ramp Pressure: 5 cmH2O                                        - Ramp time: 15 min                                     -  Flex/EPR - 3 full time                                    - For ResMed Bilevel (TiMax-4 sec   TiMin- 0.2 sec)     Humidifier: [x] Heated ()        [x] Water chamber replacement ()/ 1 per 6 months        Mask:  Please always start with the mask the patient used during the titraion   [] Nasal () /1 per 3 months [x] Full Face () /1 per 3 months   [] Patient choice -Size and fit mask [x] Patient Choice - Size and fit mask   [] Dispense:  [x] Dispense: F20 medium   [] Headgear () / 1 per 3 months [x] Headgear () / 1 per 3 months   [] Replacement Nasal Cushion ()/2 per month [x] Interface Replacement ()/1 per month   [] Replacement Nasal Pillows ()/2 per month         Tubing: [x] Heated ()/1 per 3 months    [] Standard ()/1 per 3 months [] Other:           Filters: [x] Non-disposable ()/1 per 6 months     [x] Ultra-Fine, Disposable ()/2 per month        Miscellaneous: [x] Chin Strap ()/ 1 per 6 months [] O2 bleed-in:       LPM   [] Oximetry on CPAP/Bilevel []  Other:          Start Order Date: 21    MEDICAL JUSTIFICATION:  I, the undersigned, certify that the above prescribed supplies are medically necessary for this patients wellbeing.   In my opinion, the supplies are both reasonable and necessary in

## 2021-07-08 NOTE — FLOWSHEET NOTE
[facility], [facility]  [address] [place], [facility] [place]  Phone: [phone]   Fax:     [phone]                                                      Physical Therapy Treatment Note/ Progress Report:   Date:  2021    Patient Name:  Keyanna Osorio    :  1955  MRN: [id number]    Pertinent Medical History: HTN, DM, Cerebral Infarction, Cardiomyopathy, R foot arthrodesis, R shoulder RTC repair    Medical/Treatment Diagnosis Information:  · Diagnosis: R TKR  · Treatment Diagnosis: decreased mobility, strength    Insurance/Certification information:  PT Insurance Information: UHC Medicare Dual / [facility]  Physician Information:  Referring Practitioner: Timo Yepez MD  Plan of care signed (Y/N):  Sent for cosign     Date of Patient follow up with Physician:      Progress Report: []  Yes  [x]  No     Date Range for reporting period:  Beginnin2021  Ending:      Progress report due (10 Rx/or 30 days whichever is less): after surgery     Recertification due (POC duration/ or 90 days whichever is less): after surgery    Visit # POC/Insurance Allowable Auth Needed   1 1 []Yes   []No     Latex Allergy:  [x]NO      []YES  Preferred Language for Healthcare:   [x]English       []Other:    Functional Scale:       Date assessed: at eval  Test: WOMAC  Score: 83/96=85% disability    Pain level:  6/10     History of Injury: reports chronic history of R knee pain that recently got worse. Patient is scheduled for R TKR on 21. Patient currently lives with  who is trying to take off work following surgery to care for her. Daughter is also around who came come over for a few hours a day. SUBJECTIVE:  See eval    OBJECTIVE:   · Observation:  · Functional Mobility/Transfers: minimal squat on R, able to complete sit to stand and bed mobility independently.   · Bandages/Dressings/Incisions: NA  · Gait: (include devices/WB status) Patient ambulates without AD in clinic demonstrating mild antalgic gait on R.    Test measurements:    ROM:  Date           Knee Flexion       Knee Extension    Active Passive Active Passive    Eval 7/8/21 120  0               Strength:  Date Hip flexion Hip abduction Quad Hamstring Ankle DF Ankle PF   Eval 7/8 4+/5 4+/5 4/5 4+/5 5/5 5/5                RESTRICTIONS/PRECAUTIONS: none    Exercises/Interventions:     Therapeutic Ex (98411)   Min: 15 Reps/Resistance Notes/CUES        SL hip abd 0# 1x10 R    bridge 10x    SLR flexion 0# 1x10     Supine heelslides 10x    Long sitting knee extension stretch 2x 30 sec holds    Calf stretch with strap 2x 30 sec holds         Seated LAQ 0# 1x10 R         NMR re-education (68901)  Min:     Quad Set 10x 6 sec holds              Therapeutic Activity (85009)  Min: 10     Review of post-op expectations  See below    Gait Training (73626)  Min:           Manual Intervention (95322) Min:                Modalities  Min:            Other Therapeutic Activities:   Patient was thoroughly educated on this date regarding prehabilitation goals, importance of PT sessions in improving overall ROM, strength and stability prior to surgery, and how prehabilitation will facilitate improved post-operative outcomes. The patient was educated on and instructed in HEP as listed. The patient was given a detailed handout for exercises to initiate in the hospital post-operatively as well as at home. The discharge plan from the hospital was reviewed with the patient; specifically, to reduce length of hospital stay and to minimize time before reinitiating outpatient physical therapy after surgery. Education regarding early mobility post-operatively in the hospital and emphasis on working with both physical therapy and nursing staff to achieve ambulation goal was provided. The patient was highly encouraged to attend joint class in hospital prior to surgery for further instructions on pre and post-surgical care. Also, education regarding goals and expectations was provided; specifically, knee flexion range of motion greater than or equal to 90 degrees and 0 degrees knee extension within 2 weeks to promote improved gait mechanics and ambulation. The patient was encouraged to utilize ice/cold pack after surgery to address pain, minimize swelling as often as possible. It is in my medical opinion that this patient is clear from all physical barriers prior to consideration for surgery, activity modifications prior to and post operatively have been discussed with this patient as well as discharge planning and is cleared for surgery from physical therapy perspective. Home Exercise Program:  Patient instructed in the above exercises for HEP. Patient verbalized/demonstrated understanding and was issued written handout. Therapeutic Exercise and NMR EXR  [x] (90033) Provided verbal/tactile cueing for activities related to strengthening, flexibility, endurance, ROM for improvements in LE, proximal hip, and core control with self care, mobility, lifting, ambulation.  [] (75576) Provided verbal/tactile cueing for activities related to improving balance, coordination, kinesthetic sense, posture, motor skill, proprioception  to assist with LE, proximal hip, and core control in self care, mobility, lifting, ambulation and eccentric single leg control.  [address] and Therapeutic Activities:    [x] (62894 or 55391) Provided verbal/tactile cueing for activities related to improving balance, coordination, kinesthetic sense, posture, motor skill, proprioception and motor activation to allow for proper function of core, proximal hip and LE with self care and ADLs and functional mobility.   [] (99872) Gait Re-education- Provided training and instruction to the patient for proper LE, core and proximal hip recruitment and positioning and eccentric body weight control with ambulation re-education including up and down stairs     Gait Training:  [] (01136) Provided training and instruction to the patient for proper postural muscle recruitment and positioning with ambulation re-education     Home Exercise Program:    [x] (03655) Reviewed/Progressed HEP activities related to strengthening, flexibility, endurance, ROM of core, proximal hip and LE for functional self-care, mobility, lifting and ambulation/stair navigation   [] (51792)Reviewed/Progressed HEP activities related to improving balance, coordination, kinesthetic sense, posture, motor skill, proprioception of core, proximal hip and LE for self care, mobility, lifting, and ambulation/stair navigation      Manual Treatments:  PROM / STM / Oscillations-Mobs:  G-I, II, III, IV (PA's, Inf., Post.)  [] (26254) Provided manual therapy to mobilize LE, proximal hip and/or LS spine soft tissue/joints for the purpose of modulating pain, promoting relaxation,  increasing ROM, reducing/eliminating soft tissue swelling/inflammation/restriction, improving soft tissue extensibility and allowing for proper ROM for normal function with self care, mobility, lifting and ambulation. Charges:  Timed Code Treatment Minutes: 25   Total Treatment Minutes: 50      [x] EVAL (LOW) 59544 (typically 20 minutes face-to-face)  [] EVAL (MOD) 26207 (typically 30 minutes face-to-face)  [] EVAL (HIGH) 40496 (typically 45 minutes face-to-face)  [] RE-EVAL     [x] ZW(09475) x     [] Dry needle 1 or 2 Muscles (86378)  [] NMR (00996) x     [] Dry needle 3+ Muscles (06777)  [] Manual (19981) x     [] Ultrasound (89484) x  [x] TA (50188) x     [] Mech Traction (01891)  [] ES(attended) (08198)     [] ES (un) (74602):   [] Vasopump (30224) [] Ionto (59497)   [] Gait (62590)  [] Other:    GOALS:  Patient stated goal: \"get exercises to help prepare for upcoming surgery\"  []? Progressing: [x]? Met: []? Not Met: []? Adjusted     Therapist goals for Patient:   Short Term Goals: To be achieved in: 1 visit  1.  Independent in HEP and

## 2021-07-08 NOTE — PLAN OF CARE
[facility] [place] [facility], [facility]  [address] [facility] [place], [facility]  Phone: [phone]   Fax:     [phone]                                                       Physical Therapy Certification    Dear Referring Practitioner: Baudilio Valencia MD,    We had the pleasure of evaluating the following patient for physical therapy services at [facility]. A summary of our findings can be found in the initial assessment below. This includes our plan of care. If you have any questions or concerns regarding these findings, please do not hesitate to contact me at the office phone number checked above. Thank you for the referral.       Physician Signature:_______________________________Date:__________________  By signing above (or electronic signature), therapists plan is approved by physician        Patient: Meng Andres   : 1955   MRN: [id number]  Referring Physician: Referring Practitioner: Baudilio Valencia MD      Evaluation Date: 2021      Medical Diagnosis Information:  Diagnosis: R TKR   Treatment Diagnosis: decreased mobility, strength                                         Insurance information: PT Insurance Information: [facility] Medicare Dual / [employer]     Precautions/ Contra-indications: none  Latex Allergy:  [x]NO      []YES  Preferred Language for Healthcare:   [x]English       []other:    C-SSRS Triggered by Intake questionnaire (Past 2 wk assessment ):   [x] No, Questionnaire did not trigger screening.   [] Yes, Patient intake triggered C-SSRS Screening      [] C-SSRS Screening completed  [] PCP notified via Epic     SUBJECTIVE: Patient stated complaint: Patient reports chronic history of R knee pain that recently got worse. Patient is scheduled for R TKR on 21. Patient currently lives with  who is trying to take off work following surgery to care for her.   Daughter is also around who came come over for a few hours a day. Relevant Medical History: HTN, DM, Cerebral Infarction, Cardiomyopathy, R foot arthrodesis, R shoulder RTC repair  Functional Outcome: WOMAC: score 83/96 = 85%    Pain Scale: 6/10  Easing factors:rest  Provocative factors: prolonged walking and standing    Type: [x]Constant   []Intermittent  []Radiating []Localized []other:     Numbness/Tingling: pt denies    Occupation/School:  retired    Hospital:    [] Total Hip Replacement [] Right  [] Left  DOS: 7/26/21  [] Not yet scheduled  [x] Total Knee Replacement [x] Right  [] Left    [x] Prehab Eval  [] Prehab D/C  [] Post - OP Visit             Weeks from sx [] Post-op D/C    DME ASSESSMENT:   Current available equipment:    [] Std. Donnamae Dhaval       [x] Rolling walker      [] 4 wheeled walker     [] [place]     [x] Straight cane     [] Crutches   [] Reacher            [] Sock Aid              [] Shower chair            [] Leg           [] Long handle shoe horn   [] Other:     Equipment needed at discharge from hospital:   [] Std. Donnamae Dhaval       [] Rolling walker      [] 4 wheeled walker     [] [place]     [] Straight cane     [] Crutches   [x] Reacher            [x] Sock Aid              [] Shower chair            [x] Leg           [x] Long handle shoe horn   [] Other:       SOCIAL ASSESSMENT:  1. Will you live with someone who can care for you after surgery? [x] Yes  [] No   around   2. Where is the bathroom located in your post-surgery place of residence? [x] 1st Floor [] 2nd Floor  3. Where is the bedroom located in your post-surgery place of residence? [] 1st Floor [x] 2nd Floor - going to try and stay on the main floor initially  4. Do you use community supports (home help, meals-on-wheels, district nurse)? [x] None or 1 per week  [] 2 or more per week  5. How many stairs will you have to climb to get in to your place of residence? [] Less than 5  [x] More than 5 - 10 steps to get into the house  6.  Have you had a fall in the past year? [] Yes  [x] No     Notes:     Available PT Visits:      BMI:    Height 5'4   Weight 204#     FUNCTIONAL ASSESSMENT:   1. Do you use ambulatory aids? [] None [x] Single Point Cane  [] Crutches/Walker/Wheelchair  2. How far on average can you walk? [] 2 Blocks or more  [x] 1-2 Blocks  [] House bound most of the time  3. What is your physical activity level? [] Highly Active [] Active  [x] Somewhat active  [] Sedentary     OBJECTIVE:   Functional Mobility/Transfers: minimal squat on R, able to complete sit to stand and bed mobility independently. Bandages/Dressings/Incisions: NA    Gait: (include devices/WB status) Patient ambulates without AD in clinic demonstrating mild antalgic gait on R. Reflexes/Sensation:    [x]Dermatomes/Myotomes intact    [x]Reflexes equal and normal bilaterally   []Other:       PROM AROM    L R L R   Knee Flexion   130 120   Knee Extension   0 0       Strength (0-5) Left Right   Hip Flexion 4+/5 4+/5   Hip Abduction 4+/5 4+/5   Quads 4+/5 4/5   Hamstrings 4+/5 4+/5   Ankle DF 5/5 5/5   Ankle PF 5/5 5/5             Girth     Mid patella     Suprapatellar         Joint mobility: patellofemoral    [x]Normal    []Hypo   []Hyper       Functional testing Prehab        Date  7/8/21    4 week f/u   Date    8 week f/u  Date    D/C  Date          TUG 17.15 secs      30 second sit to stand 6 reps      6 minute walk 128 meters             Balance       Narrow DOMINIK 10      Semi tandem 10      Tandem  10      SLS 5             Knee AROM 0-120      Knee Extension MMT R/L L=11#  R=14#      Hip aBduction MMT R/L L=15#  R=17#      WOMAC 83                               [x] Patient history, allergies, meds reviewed. Medical chart reviewed. See intake form. Review Of Systems (ROS):  [x]Performed Review of systems (Integumentary, CardioPulmonary, Neurological) by intake and observation. Intake form has been scanned into medical record.  Patient has been instructed to contact their primary care physician regarding ROS issues if not already being addressed at this time. Co-morbidities/Complexities (which will affect course of rehabilitation):   []None           Arthritic conditions   []Rheumatoid arthritis (M05.9)  []Osteoarthritis (M19.91)   Cardiovascular conditions   [x]Hypertension (I10)  []Hyperlipidemia (E78.5)  []Angina pectoris (I20)  [x]Cardiomyopathy   Musculoskeletal conditions   []Disc pathology   []Congenital spine pathologies   [x]Prior surgical intervention  []Osteoporosis (M81.8)  []Osteopenia (M85.8)   Endocrine conditions   []Hypothyroid (E03.9)  []Hyperthyroid Gastrointestinal conditions   []Constipation (E30.45)   Metabolic conditions   []Morbid obesity (E66.01)  [x]Diabetes type 1(E10.65) or 2 (E11.65)   []Neuropathy (G60.9)     Pulmonary conditions   []Asthma (J45)  []Coughing   []COPD (J44.9)   Psychological Disorders  []Anxiety (F41.9)  []Depression (F32.9)   []Other:   [x]Other:   Cerebral infarction         Barriers to/and or personal factors that will affect rehab potential:              []Age  []Sex    []Smoker              []Motivation/Lack of Motivation                        [x]Co-Morbidities              []Cognitive Function, education/learning barriers              []Environmental, home barriers              []profession/work barriers  []past PT/medical experience  []other:  Justification:     Falls Risk Assessment (30 days):   [x] Falls Risk assessed and no intervention required.   [] Falls Risk assessed and Patient requires intervention due to being higher risk   TUG score (>12s at risk):     [] Falls education provided, including         ASSESSMENT:   Functional Impairments:     []Noted lumbar/proximal hip/LE joint hypomobility   []Decreased LE functional ROM   []Decreased core/proximal hip strength and neuromuscular control   [x]Decreased LE functional strength   [x]Reduced balance/proprioceptive control   []other:      Functional Activity Limitations (from functional questionnaire and intake)   [x]Reduced ability to tolerate prolonged functional positions   [x]Reduced ability or difficulty with changes of positions or transfers between positions   []Reduced ability to maintain good posture and demonstrate good body mechanics with sitting, bending, and lifting   []Reduced ability to sleep   [] Reduced ability or tolerance with driving and/or computer work   []Reduced ability to perform lifting, carrying tasks   [x]Reduced ability to squat   []Reduced ability to forward bend   [x]Reduced ability to ambulate prolonged functional periods/distances/surfaces   [x]Reduced ability to ascend/descend stairs   [x]Reduced ability to run, hop, cut or jump   []other:    Participation Restrictions   [x]Reduced participation in self care activities   [x]Reduced participation in home management activities   []Reduced participation in work activities   [x]Reduced participation in social activities. []Reduced participation in sport/recreation activities. Classification :    []Signs/symptoms consistent with post-surgical status including decreased ROM, strength and function.    []Signs/symptoms consistent with joint sprain/strain   []Signs/symptoms consistent with patella-femoral syndrome   [x]Signs/symptoms consistent with knee OA/hip OA   []Signs/symptoms consistent with internal derangement of knee/Hip   []Signs/symptoms consistent with functional hip weakness/NMR control      []Signs/symptoms consistent with tendinitis/tendinosis    []signs/symptoms consistent with pathology which may benefit from Dry needling      []other:      Prognosis/Rehab Potential:      []Excellent   [x]Good    []Fair   []Poor    Tolerance of evaluation/treatment:    [x]Excellent   []Good    []Fair   []Poor    Physical Therapy Evaluation Complexity Justification  [x] A history of present problem with:  [] no personal factors and/or comorbidities that impact the plan of care;  [x]1-2 personal factors and/or comorbidities that impact the plan of care  []3 personal factors and/or comorbidities that impact the plan of care  [x] An examination of body systems using standardized tests and measures addressing any of the following: body structures and functions (impairments), activity limitations, and/or participation restrictions;:  [x] a total of 1-2 or more elements   [] a total of 3 or more elements   [] a total of 4 or more elements   [x] A clinical presentation with:  [x] stable and/or uncomplicated characteristics   [] evolving clinical presentation with changing characteristics  [] unstable and unpredictable characteristics;   [x] Clinical decision making of [x] low , [] moderate, [] high complexity using standardized patient assessment instrument and/or measurable assessment of functional outcome. [x] EVAL (LOW) 63823 (typically 30 minutes face-to-face)  [] EVAL (MOD) 53505 (typically 30 minutes face-to-face)  [] EVAL (HIGH) 27859 (typically 45 minutes face-to-face)  [] RE-EVAL     PLAN:   Frequency/Duration:  1 visit for HEP instruction  Interventions:  [x]  Therapeutic exercise including: strength training, ROM, for Lower extremity and core   [x]  NMR activation and proprioception for LE, Glutes and Core   [x]  Manual therapy as indicated for LE, Hip and spine to include: Dry Needling/IASTM, STM, PROM, Gr I-IV mobilizations, manipulation. [x] Modalities as needed that may include: thermal agents, E-stim, Biofeedback, US, iontophoresis as indicated  [x] Patient education on joint protection, postural re-education, activity modification, progression of HEP.   [x] Aquatic exercise including: strength training, ROM, and balance for lower extremity and core     HEP instruction: written HEP instructions provided and discussed    Patient education:  Patient was thoroughly educated on this date regarding prehabilitation goals, importance of PT sessions in improving overall ROM, strength and stability prior to surgery, and how prehabilitation will facilitate improved post-operative outcomes. The patient was educated on and instructed in HEP as listed. The patient was given a detailed handout for exercises to initiate in the hospital post-operatively as well as at home. The discharge plan from the hospital was reviewed with the patient; specifically, to reduce length of hospital stay and to minimize time before reinitiating outpatient physical therapy after surgery. Education regarding early mobility post-operatively in the hospital and emphasis on working with both physical therapy and nursing staff to achieve ambulation goal  was provided. The patient was highly encouraged to attend joint class in hospital prior to surgery for further instructions on pre and post-surgical care. Also, education regarding goals and expectations was provided; specifically, knee flexion range of motion greater than or equal to 90 degrees and 0 degrees knee extension to promote improved gait mechanics and ambulation. The patient was encouraged to utilize ice/cold pack after surgery to address pain, minimize swelling as often as possible. It is in my medical opinion that this patient is clear from all physical barriers prior to consideration for surgery, activity modifications prior to and post operatively have been discussed with this patient as well as discharge planning and is cleared for surgery from physical therapy perspective. GOALS:  Patient stated goal: \"get exercises to help prepare for upcoming surgery\"  [] Progressing: [x] Met: [] Not Met: [] Adjusted    Therapist goals for Patient:   Short Term Goals: To be achieved in: 1 visit  1. Independent in HEP and progression per patient tolerance, in order to prevent re-injury. [] Progressing: [x] Met: [] Not Met: [] Adjusted  2. All patient questions regarding expectations for rehab following upcoming surgery are answered.    [] Progressing: [x] Met: [] Not Met: [] Adjusted    Long Term Goals: long term goals to be determined at re-eval following upcoming surgery    Electronically signed by:  Jairo Morillo, [employer] , OMT-C,  [id number]

## 2021-07-09 NOTE — TELEPHONE ENCOUNTER
I left a message for the patient to call back.     I did remind the patient in the message that she has JET class Monday 7/12/21 at [facility] AT [address], arrival time of 11:45 am.

## 2021-07-12 ENCOUNTER — HOSPITAL ENCOUNTER (OUTPATIENT)
Dept: PREADMISSION TESTING | Age: 66
Discharge: HOME | End: 2021-07-16
Payer: MEDICARE

## 2021-07-12 DIAGNOSIS — Z01.818 PREOP TESTING: ICD-10-CM

## 2021-07-12 DIAGNOSIS — R11.10 VOMITING AND DIARRHEA: ICD-10-CM

## 2021-07-12 DIAGNOSIS — R19.7 VOMITING AND DIARRHEA: ICD-10-CM

## 2021-07-12 DIAGNOSIS — R61 NIGHT SWEATS: ICD-10-CM

## 2021-07-12 LAB
A/G RATIO: 1.2 (ref 1.1–2.2)
ABO/RH: NORMAL
ALBUMIN SERPL-MCNC: 4.1 G/DL (ref 3.4–5)
ALP BLD-CCNC: 77 U/L (ref 40–129)
ALT SERPL-CCNC: 13 U/L (ref 10–40)
ANION GAP SERPL CALCULATED.3IONS-SCNC: 13 MMOL/L (ref 3–16)
ANTIBODY SCREEN: NORMAL
AST SERPL-CCNC: 22 U/L (ref 15–37)
BASOPHILS ABSOLUTE: 0.1 K/UL (ref 0–0.2)
BASOPHILS RELATIVE PERCENT: 0.8 %
BILIRUB SERPL-MCNC: <0.2 MG/DL (ref 0–1)
BILIRUBIN URINE: NEGATIVE
BLOOD, URINE: NEGATIVE
BUN BLDV-MCNC: 10 MG/DL (ref 7–20)
C-REACTIVE PROTEIN: <3 MG/L (ref 0–5.1)
CALCIUM SERPL-MCNC: 10.4 MG/DL (ref 8.3–10.6)
CHLORIDE BLD-SCNC: 108 MMOL/L (ref 99–110)
CLARITY: CLEAR
CO2: 23 MMOL/L (ref 21–32)
COLOR: YELLOW
CREAT SERPL-MCNC: 0.9 MG/DL (ref 0.6–1.2)
EKG ATRIAL RATE: 83 BPM
EKG DIAGNOSIS: NORMAL
EKG P AXIS: 47 DEGREES
EKG P-R INTERVAL: 186 MS
EKG Q-T INTERVAL: 382 MS
EKG QRS DURATION: 78 MS
EKG QTC CALCULATION (BAZETT): 448 MS
EKG R AXIS: 16 DEGREES
EKG T AXIS: 34 DEGREES
EKG VENTRICULAR RATE: 83 BPM
EOSINOPHILS ABSOLUTE: 0.2 K/UL (ref 0–0.6)
EOSINOPHILS RELATIVE PERCENT: 3 %
EPITHELIAL CELLS, UA: 1 /HPF (ref 0–5)
GFR AFRICAN AMERICAN: >60
GFR NON-AFRICAN AMERICAN: >60
GLOBULIN: 3.4 G/DL
GLUCOSE BLD-MCNC: 87 MG/DL (ref 70–99)
GLUCOSE URINE: NEGATIVE MG/DL
HCT VFR BLD CALC: 36.5 % (ref 36–48)
HEMOGLOBIN: 11.7 G/DL (ref 12–16)
HYALINE CASTS: 2 /LPF (ref 0–8)
INR BLD: 0.9 (ref 0.88–1.12)
KETONES, URINE: NEGATIVE MG/DL
LEUKOCYTE ESTERASE, URINE: ABNORMAL
LYMPHOCYTES ABSOLUTE: 2.7 K/UL (ref 1–5.1)
LYMPHOCYTES RELATIVE PERCENT: 43 %
MAGNESIUM: 1.7 MG/DL (ref 1.8–2.4)
MCH RBC QN AUTO: 27 PG (ref 26–34)
MCHC RBC AUTO-ENTMCNC: 32 G/DL (ref 31–36)
MCV RBC AUTO: 84.1 FL (ref 80–100)
MICROSCOPIC EXAMINATION: YES
MONOCYTES ABSOLUTE: 0.3 K/UL (ref 0–1.3)
MONOCYTES RELATIVE PERCENT: 4.2 %
MRSA SCREEN RT-PCR: NORMAL
NEUTROPHILS ABSOLUTE: 3.1 K/UL (ref 1.7–7.7)
NEUTROPHILS RELATIVE PERCENT: 49 %
NITRITE, URINE: NEGATIVE
PDW BLD-RTO: 14.4 % (ref 12.4–15.4)
PH UA: 5.5 (ref 5–8)
PLATELET # BLD: 336 K/UL (ref 135–450)
PMV BLD AUTO: 8.1 FL (ref 5–10.5)
POTASSIUM SERPL-SCNC: 4.2 MMOL/L (ref 3.5–5.1)
PROTEIN UA: NEGATIVE MG/DL
PROTHROMBIN TIME: 10.1 SEC (ref 9.9–12.7)
RBC # BLD: 4.34 M/UL (ref 4–5.2)
RBC UA: 1 /HPF (ref 0–4)
SODIUM BLD-SCNC: 144 MMOL/L (ref 136–145)
SPECIFIC GRAVITY UA: 1.01 (ref 1–1.03)
TOTAL PROTEIN: 7.5 G/DL (ref 6.4–8.2)
URINE REFLEX TO CULTURE: ABNORMAL
URINE TYPE: ABNORMAL
UROBILINOGEN, URINE: 0.2 E.U./DL
WBC # BLD: 6.3 K/UL (ref 4–11)
WBC UA: 7 /HPF (ref 0–5)

## 2021-07-12 PROCEDURE — 83735 ASSAY OF MAGNESIUM: CPT

## 2021-07-12 PROCEDURE — 93010 ELECTROCARDIOGRAM REPORT: CPT | Performed by: INTERNAL MEDICINE

## 2021-07-12 PROCEDURE — 93005 ELECTROCARDIOGRAM TRACING: CPT

## 2021-07-12 PROCEDURE — 81001 URINALYSIS AUTO W/SCOPE: CPT

## 2021-07-12 PROCEDURE — 80053 COMPREHEN METABOLIC PANEL: CPT

## 2021-07-12 PROCEDURE — 85025 COMPLETE CBC W/AUTO DIFF WBC: CPT

## 2021-07-12 PROCEDURE — 85610 PROTHROMBIN TIME: CPT

## 2021-07-12 PROCEDURE — 86140 C-REACTIVE PROTEIN: CPT

## 2021-07-12 PROCEDURE — 86900 BLOOD TYPING SEROLOGIC ABO: CPT

## 2021-07-12 PROCEDURE — 87641 MR-STAPH DNA AMP PROBE: CPT

## 2021-07-12 PROCEDURE — 86901 BLOOD TYPING SEROLOGIC RH(D): CPT

## 2021-07-12 PROCEDURE — 86850 RBC ANTIBODY SCREEN: CPT

## 2021-07-12 PROCEDURE — 83036 HEMOGLOBIN GLYCOSYLATED A1C: CPT

## 2021-07-12 RX ORDER — MAGNESIUM 200 MG
200 TABLET ORAL DAILY
Qty: 90 TABLET | Refills: 1 | Status: SHIPPED | OUTPATIENT
Start: 2021-07-12 | End: 2021-09-08

## 2021-07-12 NOTE — PROGRESS NOTES
Patient attended JET class on 7/12/2021. Patient verified surgery for Total knee replacement and received patient information and educational JET folder including education handouts on covid-19 restrictions, ERAS, hand hygiene and preventing constipation. Interviews completed by PT, OT, and PAT. Labs and Tests completed after jet presentation as ordered/necessary. Patient given handout instructions on Pre-operative Showering techniques and the use of anti-septic 3 days before surgery. Anti-septic bottle given to patient to take home. Patient states no further questions or concerns. Patient provided orthopedic office and nurse navigator contact information. Patient provided with home health care agency list and skilled nursing facility list.    DOS: 7/26/21  Dr Mary Zuñiga: home with  álvaro garcia [facility] [address], stated her  should be able to stay the day of discharge but will not have all day and night home support, stated her daughter can stay with her a few hours a day but feels she may need more help/facility. Per Patient Will see/Saw PCP on 7/15/21. Saw Cardio on 7/1/21. Saw her sdleep specialist on 6/14/21 and 7/7/21. Pt expressed a strong need for SNF/rehab care after surgery, referred to case management re: insurance questions and coverage  Verbalized w patient the need to have a home care plan as back up for up coming surgery.      Electronically signed by Bandar Daniel RN on 7/12/2021 at 2:58 PM

## 2021-07-13 LAB
ESTIMATED AVERAGE GLUCOSE: 154.2 MG/DL
HBA1C MFR BLD: 7 %

## 2021-07-15 ENCOUNTER — OFFICE VISIT (OUTPATIENT)
Dept: FAMILY MEDICINE CLINIC | Age: 66
End: 2021-07-15
Payer: MEDICARE

## 2021-07-15 VITALS
HEIGHT: 64 IN | HEART RATE: 60 BPM | BODY MASS INDEX: 34.01 KG/M2 | RESPIRATION RATE: 16 BRPM | OXYGEN SATURATION: 99 % | DIASTOLIC BLOOD PRESSURE: 70 MMHG | WEIGHT: 199.2 LBS | SYSTOLIC BLOOD PRESSURE: 130 MMHG

## 2021-07-15 DIAGNOSIS — M17.11 OSTEOARTHRITIS OF RIGHT KNEE, UNSPECIFIED OSTEOARTHRITIS TYPE: ICD-10-CM

## 2021-07-15 DIAGNOSIS — Z01.818 PREOP EXAMINATION: Primary | ICD-10-CM

## 2021-07-15 DIAGNOSIS — F39 MOOD DISORDER (HCC): ICD-10-CM

## 2021-07-15 PROCEDURE — 99213 OFFICE O/P EST LOW 20 MIN: CPT | Performed by: FAMILY MEDICINE

## 2021-07-15 PROCEDURE — G8427 DOCREV CUR MEDS BY ELIG CLIN: HCPCS | Performed by: FAMILY MEDICINE

## 2021-07-15 PROCEDURE — 1090F PRES/ABSN URINE INCON ASSESS: CPT | Performed by: FAMILY MEDICINE

## 2021-07-15 PROCEDURE — G8417 CALC BMI ABV UP PARAM F/U: HCPCS | Performed by: FAMILY MEDICINE

## 2021-07-15 RX ORDER — TRAZODONE HYDROCHLORIDE 100 MG/1
100 TABLET ORAL NIGHTLY
Qty: 90 TABLET | Refills: 0 | Status: SHIPPED | OUTPATIENT
Start: 2021-07-15 | End: 2021-07-15 | Stop reason: SDUPTHER

## 2021-07-15 RX ORDER — ILOPERIDONE 1 MG/1
1 TABLET ORAL NIGHTLY
Qty: 90 TABLET | Refills: 0 | Status: SHIPPED | OUTPATIENT
Start: 2021-07-15 | End: 2022-03-16 | Stop reason: SDUPTHER

## 2021-07-15 RX ORDER — TRAZODONE HYDROCHLORIDE 100 MG/1
100 TABLET ORAL NIGHTLY
Qty: 90 TABLET | Refills: 0 | Status: SHIPPED | OUTPATIENT
Start: 2021-07-15 | End: 2021-09-15

## 2021-07-15 RX ORDER — DULOXETIN HYDROCHLORIDE 60 MG/1
60 CAPSULE, DELAYED RELEASE ORAL DAILY
Qty: 90 CAPSULE | Refills: 0 | Status: SHIPPED | OUTPATIENT
Start: 2021-07-15 | End: 2021-07-15 | Stop reason: SDUPTHER

## 2021-07-15 RX ORDER — DULOXETIN HYDROCHLORIDE 30 MG/1
30 CAPSULE, DELAYED RELEASE ORAL DAILY
Qty: 30 CAPSULE | Refills: 0 | Status: SHIPPED
Start: 2021-07-15 | End: 2021-10-01 | Stop reason: DRUGHIGH

## 2021-07-15 RX ORDER — DULOXETIN HYDROCHLORIDE 60 MG/1
60 CAPSULE, DELAYED RELEASE ORAL DAILY
Qty: 90 CAPSULE | Refills: 0 | Status: SHIPPED | OUTPATIENT
Start: 2021-07-15 | End: 2021-09-15

## 2021-07-15 ASSESSMENT — ENCOUNTER SYMPTOMS
SORE THROAT: 0
SINUS PRESSURE: 0
EYE REDNESS: 0
COLOR CHANGE: 0
VOMITING: 0
DIARRHEA: 0
COUGH: 0
SHORTNESS OF BREATH: 0
NAUSEA: 0

## 2021-07-15 NOTE — PROGRESS NOTES
Subjective:     Chief Complaint   Patient presents with    Pre-op Exam     Pt is getting right knee sugery done on 07/26. The surgeon is Miguel Serna. Jj Lora is a 76 y.o.  female who presents to the office today for a preoperative consultation at the request of surgeon Dr. Cassia Whitehead who plans on performing robotic assisted total right knee replacement on 7/26/21. The problem warranting surgery is right knee OA and chronic pain and has been going on for years    Risk factors include:    Diabetes: yes. Lab Results   Component Value Date    LABA1C 7.0 07/12/2021   Coronary Artery Disease: none known  COPD: no  Tobacco use? no    Planned anesthesia is General.   History of anesthesia problems?  No, has tolerated general anesthesia well in the past  No history of bleeding disorder or clotting disorder    Patient Active Problem List   Diagnosis    Diabetes mellitus ([address]    Obesity    Dystonia    Cerebral thrombosis with cerebral infarction ([address]    Obstructive sleep apnea    Right hemiparesis (HCC)    Trigger finger, acquired    Elevated CK    Primary osteoarthritis of both knees    Mood disorder ([address] - follows with Psych Dr. Alexia Jarrell Calcific tendonitis of right shoulder    Essential hypertension    Mixed hyperlipidemia    Chronic low back pain without sciatica    History of CVA with residual deficit    Anxiety and depression    Chest pain    Myalgia due to statin    Palpitations     Past Surgical History:   Procedure Laterality Date    ARTHRODESIS Right 9/17/2020    (RIGHT) REMOVAL OF BONE SPURRING AND OSSEOUS PROMINENCE FIRST METATARSAL, ARTHRODESIS OF FIRST METATARSOPHALANGEAL JOINT, BONE MARROW HARVEST AND CONCENTRATION RIGHT TIBIA performed by Osbaldo Rosen DPM at [address] [facility], [address] Left 9/3/15    CARPAL TUNNEL RELEASE Right 9/22/15    COLONOSCOPY      FINGER TRIGGER RELEASE Left 9/3/15    middle and ring fingers    FINGER TRIGGER RELEASE Right 9/22/15 middle and ring fingers    FOOT SURGERY Bilateral     litteltoe    HAND SURGERY Right     Ligament    PARTIAL HYSTERECTOMY  08/2003    SHOULDER ARTHROSCOPY Right 06/20/2018    Diagnostic scope, rcr, open Radha     Family History   Problem Relation Age of Onset    Heart Disease Mother     High Blood Pressure Mother     Stroke Mother     Cancer Brother         lung cancer     Allergies   Allergen Reactions    Codeine Nausea And Vomiting and Rash    Vicodin [Hydrocodone-Acetaminophen] Nausea And Vomiting    Lipitor [Atorvastatin]      Myalgias    Oxycontin [Oxycodone Hcl] Itching    Tramadol Itching and Swelling     Prior to Visit Medications    Medication Sig Taking? Authorizing Provider   magnesium 200 MG TABS tablet Take 1 tablet by mouth daily Yes Will Wilkins MD   ibuprofen (ADVIL;MOTRIN) 600 MG tablet TAKE ONE TABLET BY MOUTH TWICE A DAY WITH MEALS Yes Edgar Alvarado MD   lidocaine 4 % external patch Apply patch to lower mid back. Leave on for 12 hours and remove. Leave off for 12 hours before applying another one.  Yes Coralee Gaucher, PA-C   TRULICITY 1.5 CM/5.9HY SOPN INJECT 1.5 MG UNDER THE SKIN ONCE WEEKLY Yes Nicolasa Finnegan MD   Insulin Pen Needle (PEN NEEDLES) 31G X 6 MM MISC USE TO INJECT INSULIN DAILY Yes Nicolasa Finnegan MD   insulin glargine (LANTUS SOLOSTAR) 100 UNIT/ML injection pen INJECT 16 UNITS UNDER THE SKIN ONCE NIGHTLY Yes Nicolasa Finnegan MD   furosemide (LASIX) 20 MG tablet TAKE ONE TABLET BY MOUTH DAILY AS NEEDED FOR LEG SWELLING Yes Nicolasa Finnegan MD   metFORMIN (GLUCOPHAGE) 500 MG tablet TAKE TWO TABLETS BY MOUTH TWICE A DAY WITH MEALS Yes Nicolasa Finnegan MD   famotidine (PEPCID) 20 MG tablet Take 1 tablet by mouth 2 times daily Yes Susanna Capone MD   valsartan (DIOVAN) 80 MG tablet TAKE ONE TABLET BY MOUTH DAILY Yes Nicolasa Finnegan MD   blood glucose test strips (ONETOUCH ULTRA) strip TEST TWO TIMES A DAY Yes Nicolasa Finnegan MD   albuterol sulfate HFA (PROVENTIL HFA) 108 (90 Base) MCG/ACT inhaler Inhale 2 puffs into the lungs every 4 hours as needed for Wheezing or Shortness of Breath May substitute ProAir MDI Yes Waqas Stephen MD   ipratropium-albuterol (DUONEB) 0.5-2.5 (3) MG/3ML SOLN nebulizer solution Inhale 3 mLs into the lungs every 4 hours as needed for Shortness of Breath (wheezing coughing) Yes Waqas Stephen MD   traZODone (DESYREL) 100 MG tablet Take 100 mg by mouth nightly Yes Historical Provider, MD   ammonium lactate (LAC-HYDRIN) 12 % lotion Apply topically daily. Yes Lyle Ramirez MD   blood glucose test strips (FREESTYLE LITE) strip TEST BLOOD SUGAR TWO TIMES A DAY Diag: E11.9 Yes Abena Finnegan MD   Blood Glucose Monitoring Suppl (TRUE METRIX AIR GLUCOSE METER) w/Device KIT 1 each by Does not apply route daily Yes Lyle Ramirez MD   FREESTYLE LANCETS MISC USE ONE LANCET TO TEST BLOOD SUGAR TWICE A DAY Yes Hernandez Root MD   FANAPT 1 MG TABS tablet Take 1 mg by mouth nightly  Yes Historical Provider, MD   aspirin 81 MG EC tablet Take 1 tablet by mouth daily. Yes Hernandez Root MD   gabapentin (NEURONTIN) 300 MG capsule Take 1 capsule by mouth nightly for 90 days. Patient not taking: Reported on 7/1/2021  Abena Finnegan MD   atorvastatin (LIPITOR) 20 MG tablet TAKE ONE TABLET BY MOUTH DAILY  Patient not taking: Reported on 7/15/2021  Abena Finnegan MD   DULoxetine (CYMBALTA) 60 MG extended release capsule Take 60 mg by mouth daily  Patient not taking: Reported on 7/15/2021  Historical Provider, MD     Review of Systems   Constitutional: Negative for chills and fever. HENT: Negative for congestion, sinus pressure and sore throat. Eyes: Negative for redness and visual disturbance. Respiratory: Negative for cough and shortness of breath. Cardiovascular: Negative for chest pain and leg swelling. Gastrointestinal: Negative for diarrhea, nausea and vomiting. Genitourinary: Negative for difficulty urinating. Musculoskeletal: Positive for arthralgias.    Skin: Negative for color DVT     Pt advised to stop all Rx the AM of surgery    Patient advised to hold blood thinning medication (including aspirin and NSAIDs) 7 days prior to procedure. Prophylaxis for cardiac events with perioperative beta-blockers: not indicated    Deep vein thrombosis prophylaxis postoperatively: regimen to be chosen by surgical team if applicable. Other measures: Postoperative incentive spirometry to prevent pneumonia. Thank you for assisting me in the care of this patient.

## 2021-07-15 NOTE — PATIENT INSTRUCTIONS
Do not take any medicines the morning of your surgery    Take 1/2 of your insulin dose the night before surgery (6 units)    No ibuprofen or Aleve one week before surgery

## 2021-07-20 ENCOUNTER — TELEPHONE (OUTPATIENT)
Dept: ORTHOPEDIC SURGERY | Age: 66
End: 2021-07-20

## 2021-07-20 DIAGNOSIS — I10 ESSENTIAL HYPERTENSION: ICD-10-CM

## 2021-07-20 RX ORDER — SULFAMETHOXAZOLE AND TRIMETHOPRIM 800; 160 MG/1; MG/1
1 TABLET ORAL 2 TIMES DAILY
Qty: 10 TABLET | Refills: 0 | Status: SHIPPED | OUTPATIENT
Start: 2021-07-20 | End: 2021-07-25

## 2021-07-20 RX ORDER — METOPROLOL SUCCINATE 25 MG/1
TABLET, EXTENDED RELEASE ORAL
Qty: 90 TABLET | Refills: 0 | OUTPATIENT
Start: 2021-07-20

## 2021-07-20 RX ORDER — CIPROFLOXACIN 500 MG/1
500 TABLET, FILM COATED ORAL 2 TIMES DAILY
Qty: 14 TABLET | Refills: 0 | Status: SHIPPED
Start: 2021-07-20 | End: 2021-07-20 | Stop reason: SINTOL

## 2021-07-20 NOTE — CARE COORDINATION
DATE OF JET CLASS INTERVIEW:7/20/21  FIRST JOINT REPLACEMENT? yes     CHOICES FOR HHC, DME VENDORS AND SKILLED/ REHAB FACILITIES PROVIDED TO PT DURING THIS INTERVIEW. DISCHARGE PLAN:/ INCLUDING WHO WILL BE STAYING WITH YOU AT HOME? Lives at home with her -- there are 10STE and she states she plans to stay on the main level in a recliner--there is a bathroom on the main level. She states her  works and he is waitng to hear from his work if he is approved to be off work the week after her surgery    [address] PT, APPROPRIATE TO HIS/ HER PROCEDURE. PT HAS BEEN INFORMED THAT THEY WILL BE DISCHARGED WHEN THE PHYSICIAN DEEMS THEM MEDICALLY READY. MOST PATIENTS CAN EXPECT  TO BE IN THE HOSPITAL ONE NIGHT AS AN OBSERVATION ONLY, OR 1-2 DAYS AS AN ADMISSION FOR THOSE WITH MEDICAL HEALTH  ISSUES/COMPLICATIONS. HOME CARE:  Prefers [facility]    SNF/REHAB:  If her  is unable to get off work to be home to assist her she prefers SNF  Referrals made to [facility] and [facility] --    SNF will require precert     DME:has a 4 WW --will need RW and RTS - agreeable to referral to [facility]     PT AGREEABLE TO MEDS TO BEDS FROM [facility]. TRANSPORTATION:  can transport     Contact information for case management provided to pt. Will follow with therapies and social service. The Plan for Transition of Care is related to the following treatment goals: home    The Patient and/or patient representative  was provided with a choice of provider and agrees   with the discharge plan. [x] Yes [] No    Freedom of choice list was provided with basic dialogue that supports the patient's individualized plan of care/goals, treatment preferences and shares the quality data associated with the providers.  [x] Yes [] No    Electronically signed by Alejandra Ruiz on 7/20/2021 at 2:54 PM  #[phone]

## 2021-07-20 NOTE — TELEPHONE ENCOUNTER
I left a message to inform the patient that her urine shows a small UTI. Cipro was sent to her pharmacy. She should start this today and take until the day of surgery.

## 2021-07-20 NOTE — TELEPHONE ENCOUNTER
[address] [facility] called stating the Cipro would cause an interaction with her psych meds and asking for something else to be called in. I spoke with Dr. Candance Long. He would like Bactrim DS PO BID for 5 days. Medication was sent. The pharmacy was informed.

## 2021-07-21 ENCOUNTER — TELEPHONE (OUTPATIENT)
Dept: FAMILY MEDICINE CLINIC | Age: 66
End: 2021-07-21

## 2021-07-21 NOTE — PROGRESS NOTES
Preoperative Screening for Elective Surgery/Invasive Procedures While COVID-19 present in the community     Have you tested positive or have been told to self-isolate for COVID-19 like symptoms within the past 28 days? no   Do you currently have any of the following symptoms?no  o Fever >100.0 F or 99.9 F in immunocompromised patients? o New onset cough, shortness of breath or difficulty breathing?  o New onset sore throat, myalgia (muscle aches and pains), headache, loss of taste/smell or diarrhea?  Have you had a potential exposure to COVID-19 within the past 14 days by:  o Close contact with a confirmed case? o Close contact with a healthcare worker,  or essential infrastructure worker (grocery store, [employer], gas station, public utilities or transportation)? o Do you reside in a congregate setting such as; skilled nursing facility, adult home, correctional facility, homeless shelter or other institutional setting?  o Have you had recent travel to a known COVID-19 hotspot? Indicate if the patient has a positive screen by answering yes to one or more of the above questions. Patients who test positive or screen positive prior to surgery or on the day of surgery should be evaluated in conjunction with the surgeon/proceduralist/anesthesiologist to determine the urgency of the procedure. SAFETY FIRST. .call before you fall      PRE-OP INSTRUCTIONS     · Do not eat or drink anything after 12:00 midnight prior to surgery. This includes water, chewing gum, mints and ice chips. You may brush your teeth and gargle the morning of surgery but DO  NOT SWALLOW THE WATER. Take the following medications with a small sip of water on the morning of surgery: Follow your Doctor's pre procedure instructions regarding medications        · If you use an inhaler, please use it the morning of surgery and bring with you to hospital.    · You may be asked to stop blood thinners such as:  Coumadin, Plavix, Fragmin and lovenox. Please check with your doctor before stopping these or any other medications. · Aspirin, ibuprofen, advil and naproxen, any anti-inflammatory products should be stopped for a week prior to your surgery. · Do not smoke and do not drink any alcoholic beverages 24 hours prior to your surgery. · Please do not wear any jewelry or body piercings on the day of surgery. · Please wear something simple, loose fitting clothing to the hospital.  Do not wear any make-up(including eye make-up) or nail polish on your fingers and toes. · As part of our patient safety program to minimize surgical infections, we ask you to do the followin. Please notify your surgeon if you develop any illness between now                          and the day of your surgery. This includes a cough, cold, fever, sore                       throat, nausea, vomiting, diarrhea, etc.  Also please notify your                                  surgeon if you experience dizziness, shortness of breath or                       Blurred vision between now and the time of your surgery. 2.  Please notify your surgeon of any open or redden areas that may                        look infected                     3.  DO NOT shave your operative site 96 hours(four days) prior to                                  surgery. 4.  Shower the week before surgery with an antibacterial soap, such as                       dial, safeguard, etc.                         5.  Three(3) days prior to your surgery, cleanse the operative site with                          Hibiclens(anti-microbial soap). This soap may dry your skin, please                          do not apply any oils or lotions     · Please bring your insurance card and picture ID day of surgery    · If you have a living will or durable power of attorny. Please bring in a copy of your advanced directives.     · If you have dentures, they will be removed before going to the OR, we will provide you with a container. If you wear contact lenses or glasses, they will be removes, please bring a case for them. · Have you seen your family doctor for a pre-op history and physical.      · Surgery scheduler will call you 48 hours prior to your surgery to notify you of the time of your surgery and the time you will need to be at hospital...patients are asked to arrive 21/2 hours prior to surgery. ·  Please call Pre-Admission testing if you have any further questions. [id number] [facility] testing phone number:  [phone]      Thank You for choosing [facility]!!  C-Difficile admission screening and protocol:  * Admitted with diarrhea? no  *Prior history of C-Diff. In last 3 months? no  *Antibiotic use in the past 6-8 weeks? .yes  *Prior hospitalization or nursing home in the last month?  no

## 2021-07-21 NOTE — TELEPHONE ENCOUNTER
----- Message from Tonya Law sent at 7/16/2021  1:59 PM EDT -----  Please call and setup with CW.   ----- Message -----  From: Natasha Avelar MD  Sent: 7/15/2021   9:06 AM EDT  To: Shila Doss Psychiatrist retired- she'd like to see Maranda Marcella is possible. Thanks.

## 2021-07-22 NOTE — PROGRESS NOTES
Per Christina's office- no need for sleep spec. Clear.   Pt instructed to bring in cpap machine per RN

## 2021-07-23 ENCOUNTER — ANESTHESIA EVENT (OUTPATIENT)
Dept: OPERATING ROOM | Age: 66
DRG: 470 | End: 2021-07-23
Payer: MEDICARE

## 2021-07-25 NOTE — DISCHARGE INSTR - COC
[facility] ([facility]) Continuity of Care Form    Patient Name:  Shefali Aly  : 1955    MRN:  [id number]    Admit date:  (Not on file)  Discharge date:  2021    Code Status Order: Prior  Advance Directives: No    Admitting Physician: Calista Isabel MD  PCP: Janet Martinez MD    Discharging Nurse: [facility] Unit/Room#: No information available for this encounter.   Discharging Unit Phone Number: [phone]    Emergency Contact:        Past Surgical History:  Past Surgical History:   Procedure Laterality Date    ARTHRODESIS Right 2020    (RIGHT) REMOVAL OF BONE SPURRING AND OSSEOUS PROMINENCE FIRST METATARSAL, ARTHRODESIS OF FIRST METATARSOPHALANGEAL JOINT, BONE MARROW HARVEST AND CONCENTRATION RIGHT TIBIA performed by Yumiko Mejia DPM at [facility] Left 9/3/15    CARPAL TUNNEL RELEASE Right 9/22/15    COLONOSCOPY      FINGER TRIGGER RELEASE Left 9/3/15    middle and ring fingers    FINGER TRIGGER RELEASE Right 9/22/15    middle and ring fingers    FOOT SURGERY Bilateral     litteltoe    HAND SURGERY Right     Ligament    PARTIAL HYSTERECTOMY  2003    SHOULDER ARTHROSCOPY Right 2018    Diagnostic scope, rcr, open Taswell       Immunization History:   Immunization History   Administered Date(s) Administered    COVID-19, Pfizer, PF, 30mcg/0.3mL 2021, 2021    Influenza 2011, 10/05/2012, 2013    Influenza Virus Vaccine 2015, 10/30/2017    Influenza, Intradermal, Preservative free 10/29/2014    Influenza, Intradermal, Quadrivalent, Preservative Free 10/24/2016    Influenza, Quadv, IM, PF (6 mo and older Fluzone, Flulaval, Fluarix, and 3 yrs and older Afluria) 10/28/2019    Influenza, Quadv, Recombinant, IM PF (Flublok 18 yrs and older) 10/04/2018    Influenza, Quadv, adjuvanted, 65 yrs +, IM, PF (Fluad) 2020    Pneumococcal Polysaccharide ([id number]) 2007, 2013, 2019       Active Problems:  Active Problems:    * No active hospital problems. *  Resolved Problems:    * No resolved hospital problems. *      Isolation/Infection:       Nurse Assessment:  Last Vital Signs: There were no vitals taken for this visit. Last documented pain score (0-10 scale):    Last Weight:   Wt Readings from Last 1 Encounters:   07/15/21 199 lb 3.2 oz (90.4 kg)     Mental Status:  oriented and alert     IV Access:  - None    Nursing Mobility/ADLs:  Walking   Assisted  Transfer  Assisted  Bathing  Assisted  Dressing  Assisted  Toileting  Assisted  Feeding  [facility] Delivery   whole    Wound Care Documentation and Therapy:  Keep glued Prineo dressing clean and intact. DO NOT remove. This is waterproof for showering. Please do not use lotion on dressing. The separate lower tan dressing can be removed in 2 days and no dressing is needed on the small wound. Removal of Prineo dressing will be discussed at postop visit in 2 weeks at office. Elimination:  Urinary Catheter: None   Colostomy/Ileostomy: No  Continence:   · Bowel: Yes  · Bladder: Yes  Date of Last BM: 7/26/2021    Intake/Output Summary (Last 24 hours)   No intake or output data in the 24 hours ending 07/25/21 1324  Safety Concerns: At Risk for Falls    Impairments/Disabilities:      Vision    Nutrition Therapy:  Current Nutrition Therapy: low sodium  Routes of Feeding: Oral  Liquids: No Restrictions  Daily Fluid Restriction: no  Last Modified Barium Swallow with Video (Video Swallowing Test): not done    Treatments at the Time of Hospital Discharge:   Respiratory Treatments:   Oxygen Therapy:  is not on home oxygen therapy. Ventilator:    - No ventilator support    Lab orders for discharge:        Rehab Therapies: Physical Therapy, Occupational Therapy and nursing care. See pt 4-5 times a week for the first week then 3 times a week thereafter.    Weight Bearing Status/Restrictions: No weight bearing low chairs or toilets without raised seats.  Keep knees apart. Sleep with a pillow between your legs.  Do not cross your legs, especially when putting on shoes and socks.  WOUND CARE:Do not scrub wound. Pat it dry with a soft towel.  Dont apply any lotions or creams to your wound.  Check the incision every day for redness, swelling, or increase in drainage. Diet:   You can resume your normal diet. There are no limits on your diet due to your surgery.  Pain pills and activity changes may lead to constipation. To prevent this, use prune juice or bran cereals liberally. You may need to use a laxative such as Dulcolax, Senokot, or Milk of Magnesia.  Drink plenty of fluids. Medications:   Take pain pills as ordered to maintain comfort.  Never drive while taking pain medicine.  Avoid over the counter medications until checking with your doctor.  Resume previous medications as instructed by your doctor.  Take blood thinners as directed and until completed. Call Your Doctor If:  Vesta Pae have increased pain not controlled by medications.  Excessive swelling in your ankle.  You develop numbness, tingling, or decreased movement.  You have a fever greater than 100 degrees for a day or over 101 degrees at any one time.  Your wound becomes more reddened, starts draining, or opens.  If you fall. You have any questions about your recovery. ? Inform your family doctor/dentist or any other doctor who cares for you in the future that you have a joint replacement. They may want to order antibiotics for dental or surgical procedures. ? If you have required the use of insulin to control your blood sugar after surgery, follow up with your family doctor. ? Call your surgeons office to schedule your appointment to be seen 2 to 3 weeks after surgery. ? Make your appointment to continue physical therapy per doctors orders. ?  Smoking cessation assistance can be obtained from your family doctor or by calling [facility] @ [phone]    _______________________________   _____   _______________________  ____                Patient Signature              Date      Witness                               Date

## 2021-07-26 ENCOUNTER — HOSPITAL ENCOUNTER (INPATIENT)
Age: 66
LOS: 1 days | Discharge: HOME HEALTH | DRG: 470 | End: 2021-07-27
Attending: ORTHOPAEDIC SURGERY | Admitting: ORTHOPAEDIC SURGERY
Payer: MEDICARE

## 2021-07-26 ENCOUNTER — APPOINTMENT (OUTPATIENT)
Dept: GENERAL RADIOLOGY | Age: 66
DRG: 470 | End: 2021-07-26
Attending: ORTHOPAEDIC SURGERY
Payer: MEDICARE

## 2021-07-26 ENCOUNTER — ANESTHESIA (OUTPATIENT)
Dept: OPERATING ROOM | Age: 66
DRG: 470 | End: 2021-07-26
Payer: MEDICARE

## 2021-07-26 VITALS
TEMPERATURE: 96.3 F | DIASTOLIC BLOOD PRESSURE: 92 MMHG | RESPIRATION RATE: 13 BRPM | OXYGEN SATURATION: 94 % | SYSTOLIC BLOOD PRESSURE: 109 MMHG

## 2021-07-26 DIAGNOSIS — M17.11 OSTEOARTHRITIS OF RIGHT KNEE, UNSPECIFIED OSTEOARTHRITIS TYPE: ICD-10-CM

## 2021-07-26 DIAGNOSIS — M17.11 PRIMARY OSTEOARTHRITIS OF RIGHT KNEE: Primary | ICD-10-CM

## 2021-07-26 LAB
ABO/RH: NORMAL
ANTIBODY SCREEN: NORMAL
GLUCOSE BLD-MCNC: 101 MG/DL (ref 70–99)
GLUCOSE BLD-MCNC: 133 MG/DL (ref 70–99)
GLUCOSE BLD-MCNC: 175 MG/DL (ref 70–99)
GLUCOSE BLD-MCNC: 179 MG/DL (ref 70–99)
PERFORMED ON: ABNORMAL

## 2021-07-26 PROCEDURE — 7100000000 HC PACU RECOVERY - FIRST 15 MIN: Performed by: ORTHOPAEDIC SURGERY

## 2021-07-26 PROCEDURE — 2580000003 HC RX 258: Performed by: ANESTHESIOLOGY

## 2021-07-26 PROCEDURE — 88311 DECALCIFY TISSUE: CPT

## 2021-07-26 PROCEDURE — 97530 THERAPEUTIC ACTIVITIES: CPT

## 2021-07-26 PROCEDURE — 0SRC0J9 REPLACEMENT OF RIGHT KNEE JOINT WITH SYNTHETIC SUBSTITUTE, CEMENTED, OPEN APPROACH: ICD-10-PCS | Performed by: ORTHOPAEDIC SURGERY

## 2021-07-26 PROCEDURE — 6370000000 HC RX 637 (ALT 250 FOR IP): Performed by: ORTHOPAEDIC SURGERY

## 2021-07-26 PROCEDURE — 86900 BLOOD TYPING SEROLOGIC ABO: CPT

## 2021-07-26 PROCEDURE — C1713 ANCHOR/SCREW BN/BN,TIS/BN: HCPCS | Performed by: ORTHOPAEDIC SURGERY

## 2021-07-26 PROCEDURE — 86901 BLOOD TYPING SEROLOGIC RH(D): CPT

## 2021-07-26 PROCEDURE — 6360000002 HC RX W HCPCS: Performed by: ORTHOPAEDIC SURGERY

## 2021-07-26 PROCEDURE — 2580000003 HC RX 258: Performed by: ORTHOPAEDIC SURGERY

## 2021-07-26 PROCEDURE — 3700000000 HC ANESTHESIA ATTENDED CARE: Performed by: ORTHOPAEDIC SURGERY

## 2021-07-26 PROCEDURE — 2500000003 HC RX 250 WO HCPCS: Performed by: NURSE ANESTHETIST, CERTIFIED REGISTERED

## 2021-07-26 PROCEDURE — 97162 PT EVAL MOD COMPLEX 30 MIN: CPT

## 2021-07-26 PROCEDURE — 94150 VITAL CAPACITY TEST: CPT

## 2021-07-26 PROCEDURE — 6360000002 HC RX W HCPCS: Performed by: NURSE ANESTHETIST, CERTIFIED REGISTERED

## 2021-07-26 PROCEDURE — 88305 TISSUE EXAM BY PATHOLOGIST: CPT

## 2021-07-26 PROCEDURE — 7100000001 HC PACU RECOVERY - ADDTL 15 MIN: Performed by: ORTHOPAEDIC SURGERY

## 2021-07-26 PROCEDURE — 2720000010 HC SURG SUPPLY STERILE: Performed by: ORTHOPAEDIC SURGERY

## 2021-07-26 PROCEDURE — 86850 RBC ANTIBODY SCREEN: CPT

## 2021-07-26 PROCEDURE — 3600000005 HC SURGERY LEVEL 5 BASE: Performed by: ORTHOPAEDIC SURGERY

## 2021-07-26 PROCEDURE — 6360000002 HC RX W HCPCS: Performed by: ANESTHESIOLOGY

## 2021-07-26 PROCEDURE — C1776 JOINT DEVICE (IMPLANTABLE): HCPCS | Performed by: ORTHOPAEDIC SURGERY

## 2021-07-26 PROCEDURE — 97535 SELF CARE MNGMENT TRAINING: CPT

## 2021-07-26 PROCEDURE — 36415 COLL VENOUS BLD VENIPUNCTURE: CPT

## 2021-07-26 PROCEDURE — 8E0Y0CZ ROBOTIC ASSISTED PROCEDURE OF LOWER EXTREMITY, OPEN APPROACH: ICD-10-PCS | Performed by: ORTHOPAEDIC SURGERY

## 2021-07-26 PROCEDURE — 97165 OT EVAL LOW COMPLEX 30 MIN: CPT

## 2021-07-26 PROCEDURE — 97116 GAIT TRAINING THERAPY: CPT

## 2021-07-26 PROCEDURE — 83036 HEMOGLOBIN GLYCOSYLATED A1C: CPT

## 2021-07-26 PROCEDURE — 3600000015 HC SURGERY LEVEL 5 ADDTL 15MIN: Performed by: ORTHOPAEDIC SURGERY

## 2021-07-26 PROCEDURE — 2709999900 HC NON-CHARGEABLE SUPPLY: Performed by: ORTHOPAEDIC SURGERY

## 2021-07-26 PROCEDURE — 3700000001 HC ADD 15 MINUTES (ANESTHESIA): Performed by: ORTHOPAEDIC SURGERY

## 2021-07-26 PROCEDURE — 1200000000 HC SEMI PRIVATE

## 2021-07-26 PROCEDURE — 2500000003 HC RX 250 WO HCPCS: Performed by: ORTHOPAEDIC SURGERY

## 2021-07-26 PROCEDURE — 73560 X-RAY EXAM OF KNEE 1 OR 2: CPT

## 2021-07-26 DEVICE — COMPONENT ARTC SURF PS 6-9 EF 10 MM RT TIB KNEE VIVACIT-E: Type: IMPLANTABLE DEVICE | Site: KNEE | Status: FUNCTIONAL

## 2021-07-26 DEVICE — COMPONENT PAT DIA29MM THK8MM KNEE POLY CEM CONVENTIONAL: Type: IMPLANTABLE DEVICE | Site: KNEE | Status: FUNCTIONAL

## 2021-07-26 DEVICE — BONE SCREW 6.5X25 SELF-TAP: Type: IMPLANTABLE DEVICE | Site: KNEE | Status: FUNCTIONAL

## 2021-07-26 DEVICE — COMPONENT FEM SZ 8 R KNEE CO CHROM NAR POST STBL CEM: Type: IMPLANTABLE DEVICE | Site: KNEE | Status: FUNCTIONAL

## 2021-07-26 DEVICE — PSN TIB STM 5 DEG SZ E R: Type: IMPLANTABLE DEVICE | Site: KNEE | Status: FUNCTIONAL

## 2021-07-26 DEVICE — SCREW BNE L48MM CONSTRN CNDYL KNEE HEX HD STEM FOR LEG NXGN: Type: IMPLANTABLE DEVICE | Site: KNEE | Status: FUNCTIONAL

## 2021-07-26 DEVICE — CEMENT BNE 40GM HI VISC RADPQ FOR REV SURG: Type: IMPLANTABLE DEVICE | Site: KNEE | Status: FUNCTIONAL

## 2021-07-26 RX ORDER — SODIUM CHLORIDE 0.9 % (FLUSH) 0.9 %
10 SYRINGE (ML) INJECTION EVERY 12 HOURS SCHEDULED
Status: DISCONTINUED | OUTPATIENT
Start: 2021-07-26 | End: 2021-07-26 | Stop reason: HOSPADM

## 2021-07-26 RX ORDER — LIDOCAINE HYDROCHLORIDE 20 MG/ML
INJECTION, SOLUTION EPIDURAL; INFILTRATION; INTRACAUDAL; PERINEURAL PRN
Status: DISCONTINUED | OUTPATIENT
Start: 2021-07-26 | End: 2021-07-26 | Stop reason: SDUPTHER

## 2021-07-26 RX ORDER — DEXTROSE MONOHYDRATE 50 MG/ML
100 INJECTION, SOLUTION INTRAVENOUS PRN
Status: DISCONTINUED | OUTPATIENT
Start: 2021-07-26 | End: 2021-07-27 | Stop reason: HOSPADM

## 2021-07-26 RX ORDER — ALBUTEROL SULFATE 90 UG/1
2 AEROSOL, METERED RESPIRATORY (INHALATION) EVERY 4 HOURS PRN
Status: DISCONTINUED | OUTPATIENT
Start: 2021-07-26 | End: 2021-07-27 | Stop reason: HOSPADM

## 2021-07-26 RX ORDER — VALSARTAN 80 MG/1
80 TABLET ORAL DAILY
Status: DISCONTINUED | OUTPATIENT
Start: 2021-07-27 | End: 2021-07-27 | Stop reason: HOSPADM

## 2021-07-26 RX ORDER — DIPHENHYDRAMINE HCL 25 MG
25 TABLET ORAL EVERY 6 HOURS PRN
Status: DISCONTINUED | OUTPATIENT
Start: 2021-07-26 | End: 2021-07-27 | Stop reason: HOSPADM

## 2021-07-26 RX ORDER — SODIUM CHLORIDE 0.9 % (FLUSH) 0.9 %
10 SYRINGE (ML) INJECTION PRN
Status: DISCONTINUED | OUTPATIENT
Start: 2021-07-26 | End: 2021-07-26 | Stop reason: HOSPADM

## 2021-07-26 RX ORDER — TRANEXAMIC ACID 100 MG/ML
INJECTION, SOLUTION INTRAVENOUS
Status: COMPLETED | OUTPATIENT
Start: 2021-07-26 | End: 2021-07-26

## 2021-07-26 RX ORDER — FENTANYL CITRATE 50 UG/ML
50 INJECTION, SOLUTION INTRAMUSCULAR; INTRAVENOUS EVERY 5 MIN PRN
Status: DISCONTINUED | OUTPATIENT
Start: 2021-07-26 | End: 2021-07-26 | Stop reason: HOSPADM

## 2021-07-26 RX ORDER — SENNA AND DOCUSATE SODIUM 50; 8.6 MG/1; MG/1
1 TABLET, FILM COATED ORAL 2 TIMES DAILY
Status: DISCONTINUED | OUTPATIENT
Start: 2021-07-26 | End: 2021-07-27 | Stop reason: HOSPADM

## 2021-07-26 RX ORDER — SODIUM CHLORIDE 9 MG/ML
INJECTION, SOLUTION INTRAVENOUS CONTINUOUS
Status: DISCONTINUED | OUTPATIENT
Start: 2021-07-26 | End: 2021-07-27

## 2021-07-26 RX ORDER — MELOXICAM 7.5 MG/1
15 TABLET ORAL ONCE
Status: COMPLETED | OUTPATIENT
Start: 2021-07-26 | End: 2021-07-26

## 2021-07-26 RX ORDER — ROCURONIUM BROMIDE 10 MG/ML
INJECTION, SOLUTION INTRAVENOUS PRN
Status: DISCONTINUED | OUTPATIENT
Start: 2021-07-26 | End: 2021-07-26 | Stop reason: SDUPTHER

## 2021-07-26 RX ORDER — AMMONIUM LACTATE 12 G/100G
LOTION TOPICAL PRN
Status: DISCONTINUED | OUTPATIENT
Start: 2021-07-26 | End: 2021-07-27 | Stop reason: HOSPADM

## 2021-07-26 RX ORDER — MIDAZOLAM HYDROCHLORIDE 1 MG/ML
INJECTION INTRAMUSCULAR; INTRAVENOUS PRN
Status: DISCONTINUED | OUTPATIENT
Start: 2021-07-26 | End: 2021-07-26 | Stop reason: SDUPTHER

## 2021-07-26 RX ORDER — SODIUM CHLORIDE 9 MG/ML
25 INJECTION, SOLUTION INTRAVENOUS PRN
Status: DISCONTINUED | OUTPATIENT
Start: 2021-07-26 | End: 2021-07-26 | Stop reason: HOSPADM

## 2021-07-26 RX ORDER — SODIUM CHLORIDE 9 MG/ML
INJECTION, SOLUTION INTRAVENOUS CONTINUOUS
Status: DISCONTINUED | OUTPATIENT
Start: 2021-07-26 | End: 2021-07-26

## 2021-07-26 RX ORDER — IBUPROFEN 600 MG/1
600 TABLET ORAL EVERY 8 HOURS PRN
Qty: 60 TABLET | Refills: 0 | Status: SHIPPED | OUTPATIENT
Start: 2021-07-26 | End: 2021-10-26

## 2021-07-26 RX ORDER — LANOLIN ALCOHOL/MO/W.PET/CERES
200 CREAM (GRAM) TOPICAL DAILY
Status: DISCONTINUED | OUTPATIENT
Start: 2021-07-27 | End: 2021-07-27 | Stop reason: HOSPADM

## 2021-07-26 RX ORDER — DEXTROSE MONOHYDRATE 25 G/50ML
12.5 INJECTION, SOLUTION INTRAVENOUS PRN
Status: DISCONTINUED | OUTPATIENT
Start: 2021-07-26 | End: 2021-07-27 | Stop reason: HOSPADM

## 2021-07-26 RX ORDER — KETAMINE HCL IN NACL, ISO-OSM 100MG/10ML
SYRINGE (ML) INJECTION PRN
Status: DISCONTINUED | OUTPATIENT
Start: 2021-07-26 | End: 2021-07-26 | Stop reason: SDUPTHER

## 2021-07-26 RX ORDER — EPHEDRINE SULFATE 50 MG/ML
INJECTION INTRAVENOUS PRN
Status: DISCONTINUED | OUTPATIENT
Start: 2021-07-26 | End: 2021-07-26 | Stop reason: SDUPTHER

## 2021-07-26 RX ORDER — SODIUM CHLORIDE 9 MG/ML
25 INJECTION, SOLUTION INTRAVENOUS PRN
Status: DISCONTINUED | OUTPATIENT
Start: 2021-07-26 | End: 2021-07-27 | Stop reason: HOSPADM

## 2021-07-26 RX ORDER — PROPOFOL 10 MG/ML
INJECTION, EMULSION INTRAVENOUS PRN
Status: DISCONTINUED | OUTPATIENT
Start: 2021-07-26 | End: 2021-07-26 | Stop reason: SDUPTHER

## 2021-07-26 RX ORDER — ASPIRIN 81 MG/1
81 TABLET ORAL 2 TIMES DAILY
Qty: 28 TABLET | Refills: 0 | Status: SHIPPED | OUTPATIENT
Start: 2021-07-26 | End: 2022-01-24

## 2021-07-26 RX ORDER — ONDANSETRON 2 MG/ML
4 INJECTION INTRAMUSCULAR; INTRAVENOUS EVERY 6 HOURS PRN
Status: DISCONTINUED | OUTPATIENT
Start: 2021-07-26 | End: 2021-07-27 | Stop reason: HOSPADM

## 2021-07-26 RX ORDER — DEXAMETHASONE SODIUM PHOSPHATE 4 MG/ML
INJECTION, SOLUTION INTRA-ARTICULAR; INTRALESIONAL; INTRAMUSCULAR; INTRAVENOUS; SOFT TISSUE PRN
Status: DISCONTINUED | OUTPATIENT
Start: 2021-07-26 | End: 2021-07-26 | Stop reason: SDUPTHER

## 2021-07-26 RX ORDER — OXYCODONE HYDROCHLORIDE 5 MG/1
5 TABLET ORAL EVERY 4 HOURS PRN
Status: DISCONTINUED | OUTPATIENT
Start: 2021-07-26 | End: 2021-07-27 | Stop reason: HOSPADM

## 2021-07-26 RX ORDER — PROMETHAZINE HYDROCHLORIDE 25 MG/ML
6.25 INJECTION, SOLUTION INTRAMUSCULAR; INTRAVENOUS
Status: DISCONTINUED | OUTPATIENT
Start: 2021-07-26 | End: 2021-07-26 | Stop reason: HOSPADM

## 2021-07-26 RX ORDER — PHENYLEPHRINE HCL IN 0.9% NACL 1 MG/10 ML
SYRINGE (ML) INTRAVENOUS PRN
Status: DISCONTINUED | OUTPATIENT
Start: 2021-07-26 | End: 2021-07-26 | Stop reason: SDUPTHER

## 2021-07-26 RX ORDER — ONDANSETRON 2 MG/ML
4 INJECTION INTRAMUSCULAR; INTRAVENOUS
Status: DISCONTINUED | OUTPATIENT
Start: 2021-07-26 | End: 2021-07-26 | Stop reason: HOSPADM

## 2021-07-26 RX ORDER — IPRATROPIUM BROMIDE AND ALBUTEROL SULFATE 2.5; .5 MG/3ML; MG/3ML
1 SOLUTION RESPIRATORY (INHALATION) EVERY 4 HOURS PRN
Status: DISCONTINUED | OUTPATIENT
Start: 2021-07-26 | End: 2021-07-27 | Stop reason: HOSPADM

## 2021-07-26 RX ORDER — FENTANYL CITRATE 50 UG/ML
25 INJECTION, SOLUTION INTRAMUSCULAR; INTRAVENOUS EVERY 5 MIN PRN
Status: DISCONTINUED | OUTPATIENT
Start: 2021-07-26 | End: 2021-07-26 | Stop reason: HOSPADM

## 2021-07-26 RX ORDER — MAGNESIUM HYDROXIDE 1200 MG/15ML
LIQUID ORAL CONTINUOUS PRN
Status: COMPLETED | OUTPATIENT
Start: 2021-07-26 | End: 2021-07-26

## 2021-07-26 RX ORDER — PROMETHAZINE HYDROCHLORIDE 25 MG/1
12.5 TABLET ORAL EVERY 6 HOURS PRN
Status: DISCONTINUED | OUTPATIENT
Start: 2021-07-26 | End: 2021-07-27 | Stop reason: HOSPADM

## 2021-07-26 RX ORDER — ACETAMINOPHEN 325 MG/1
650 TABLET ORAL EVERY 6 HOURS
Status: DISCONTINUED | OUTPATIENT
Start: 2021-07-26 | End: 2021-07-27 | Stop reason: HOSPADM

## 2021-07-26 RX ORDER — DULOXETIN HYDROCHLORIDE 30 MG/1
30 CAPSULE, DELAYED RELEASE ORAL DAILY
Status: DISCONTINUED | OUTPATIENT
Start: 2021-07-27 | End: 2021-07-27 | Stop reason: HOSPADM

## 2021-07-26 RX ORDER — TRAZODONE HYDROCHLORIDE 100 MG/1
100 TABLET ORAL NIGHTLY
Status: DISCONTINUED | OUTPATIENT
Start: 2021-07-26 | End: 2021-07-27 | Stop reason: HOSPADM

## 2021-07-26 RX ORDER — SODIUM CHLORIDE 0.9 % (FLUSH) 0.9 %
5-40 SYRINGE (ML) INJECTION EVERY 12 HOURS SCHEDULED
Status: DISCONTINUED | OUTPATIENT
Start: 2021-07-26 | End: 2021-07-27 | Stop reason: HOSPADM

## 2021-07-26 RX ORDER — OXYCODONE HYDROCHLORIDE 5 MG/1
5 TABLET ORAL EVERY 6 HOURS PRN
Qty: 40 TABLET | Refills: 0 | Status: SHIPPED | OUTPATIENT
Start: 2021-07-26 | End: 2021-08-04 | Stop reason: SDUPTHER

## 2021-07-26 RX ORDER — ASPIRIN 81 MG/1
81 TABLET ORAL DAILY
Status: DISCONTINUED | OUTPATIENT
Start: 2021-07-27 | End: 2021-07-27 | Stop reason: HOSPADM

## 2021-07-26 RX ORDER — NICOTINE POLACRILEX 4 MG
15 LOZENGE BUCCAL PRN
Status: DISCONTINUED | OUTPATIENT
Start: 2021-07-26 | End: 2021-07-27 | Stop reason: HOSPADM

## 2021-07-26 RX ORDER — FAMOTIDINE 20 MG/1
20 TABLET, FILM COATED ORAL 2 TIMES DAILY
Status: DISCONTINUED | OUTPATIENT
Start: 2021-07-26 | End: 2021-07-27 | Stop reason: HOSPADM

## 2021-07-26 RX ORDER — MEPERIDINE HYDROCHLORIDE 25 MG/ML
12.5 INJECTION INTRAMUSCULAR; INTRAVENOUS; SUBCUTANEOUS
Status: DISCONTINUED | OUTPATIENT
Start: 2021-07-26 | End: 2021-07-26 | Stop reason: HOSPADM

## 2021-07-26 RX ORDER — SODIUM CHLORIDE 0.9 % (FLUSH) 0.9 %
5-40 SYRINGE (ML) INJECTION PRN
Status: DISCONTINUED | OUTPATIENT
Start: 2021-07-26 | End: 2021-07-27 | Stop reason: HOSPADM

## 2021-07-26 RX ORDER — OXYCODONE HYDROCHLORIDE 10 MG/1
10 TABLET ORAL EVERY 4 HOURS PRN
Status: DISCONTINUED | OUTPATIENT
Start: 2021-07-26 | End: 2021-07-27 | Stop reason: HOSPADM

## 2021-07-26 RX ORDER — FUROSEMIDE 20 MG/1
20 TABLET ORAL DAILY
Status: DISCONTINUED | OUTPATIENT
Start: 2021-07-27 | End: 2021-07-27 | Stop reason: HOSPADM

## 2021-07-26 RX ORDER — INSULIN GLARGINE 100 [IU]/ML
0.25 INJECTION, SOLUTION SUBCUTANEOUS NIGHTLY
Status: DISCONTINUED | OUTPATIENT
Start: 2021-07-26 | End: 2021-07-27

## 2021-07-26 RX ORDER — FENTANYL CITRATE 50 UG/ML
INJECTION, SOLUTION INTRAMUSCULAR; INTRAVENOUS PRN
Status: DISCONTINUED | OUTPATIENT
Start: 2021-07-26 | End: 2021-07-26 | Stop reason: SDUPTHER

## 2021-07-26 RX ORDER — HYDRALAZINE HYDROCHLORIDE 20 MG/ML
5 INJECTION INTRAMUSCULAR; INTRAVENOUS EVERY 10 MIN PRN
Status: DISCONTINUED | OUTPATIENT
Start: 2021-07-26 | End: 2021-07-26 | Stop reason: HOSPADM

## 2021-07-26 RX ORDER — ONDANSETRON 2 MG/ML
INJECTION INTRAMUSCULAR; INTRAVENOUS PRN
Status: DISCONTINUED | OUTPATIENT
Start: 2021-07-26 | End: 2021-07-26 | Stop reason: SDUPTHER

## 2021-07-26 RX ADMIN — FENTANYL CITRATE 50 MCG: 50 INJECTION, SOLUTION INTRAMUSCULAR; INTRAVENOUS at 12:26

## 2021-07-26 RX ADMIN — FAMOTIDINE 20 MG: 20 TABLET, FILM COATED ORAL at 21:11

## 2021-07-26 RX ADMIN — ACETAMINOPHEN 650 MG: 325 TABLET ORAL at 15:56

## 2021-07-26 RX ADMIN — DIPHENHYDRAMINE HCL 25 MG: 25 TABLET ORAL at 15:56

## 2021-07-26 RX ADMIN — ROCURONIUM BROMIDE 10 MG: 10 INJECTION INTRAVENOUS at 10:18

## 2021-07-26 RX ADMIN — MIDAZOLAM 1 MG: 1 INJECTION INTRAMUSCULAR; INTRAVENOUS at 09:15

## 2021-07-26 RX ADMIN — FENTANYL CITRATE 25 MCG: 50 INJECTION, SOLUTION INTRAMUSCULAR; INTRAVENOUS at 12:53

## 2021-07-26 RX ADMIN — FENTANYL CITRATE 50 MCG: 50 INJECTION INTRAMUSCULAR; INTRAVENOUS at 09:16

## 2021-07-26 RX ADMIN — LIDOCAINE HYDROCHLORIDE 80 MG: 20 INJECTION, SOLUTION EPIDURAL; INFILTRATION; INTRACAUDAL; PERINEURAL at 09:16

## 2021-07-26 RX ADMIN — DIPHENHYDRAMINE HCL 25 MG: 25 TABLET ORAL at 22:05

## 2021-07-26 RX ADMIN — EPHEDRINE SULFATE 20 MG: 50 INJECTION INTRAVENOUS at 11:44

## 2021-07-26 RX ADMIN — FENTANYL CITRATE 50 MCG: 50 INJECTION INTRAMUSCULAR; INTRAVENOUS at 10:49

## 2021-07-26 RX ADMIN — PROPOFOL 150 MG: 10 INJECTION, EMULSION INTRAVENOUS at 09:16

## 2021-07-26 RX ADMIN — HYDROMORPHONE HYDROCHLORIDE 0.5 MG: 1 INJECTION, SOLUTION INTRAMUSCULAR; INTRAVENOUS; SUBCUTANEOUS at 11:12

## 2021-07-26 RX ADMIN — FENTANYL CITRATE 50 MCG: 50 INJECTION, SOLUTION INTRAMUSCULAR; INTRAVENOUS at 12:18

## 2021-07-26 RX ADMIN — FENTANYL CITRATE 25 MCG: 50 INJECTION, SOLUTION INTRAMUSCULAR; INTRAVENOUS at 12:37

## 2021-07-26 RX ADMIN — ROCURONIUM BROMIDE 40 MG: 10 INJECTION INTRAVENOUS at 09:17

## 2021-07-26 RX ADMIN — Medication 20 MG: at 09:15

## 2021-07-26 RX ADMIN — TRAZODONE HYDROCHLORIDE 100 MG: 100 TABLET ORAL at 21:11

## 2021-07-26 RX ADMIN — CEFAZOLIN 2000 MG: 10 INJECTION, POWDER, FOR SOLUTION INTRAVENOUS at 18:21

## 2021-07-26 RX ADMIN — OXYCODONE HYDROCHLORIDE 10 MG: 10 TABLET ORAL at 18:18

## 2021-07-26 RX ADMIN — OXYCODONE HYDROCHLORIDE 10 MG: 10 TABLET ORAL at 22:05

## 2021-07-26 RX ADMIN — ONDANSETRON 4 MG: 2 INJECTION INTRAMUSCULAR; INTRAVENOUS at 15:55

## 2021-07-26 RX ADMIN — ROCURONIUM BROMIDE 10 MG: 10 INJECTION INTRAVENOUS at 09:33

## 2021-07-26 RX ADMIN — HYDROMORPHONE HYDROCHLORIDE 0.5 MG: 1 INJECTION, SOLUTION INTRAMUSCULAR; INTRAVENOUS; SUBCUTANEOUS at 15:55

## 2021-07-26 RX ADMIN — Medication 200 MCG: at 09:49

## 2021-07-26 RX ADMIN — CEFAZOLIN 2000 MG: 10 INJECTION, POWDER, FOR SOLUTION INTRAVENOUS at 09:19

## 2021-07-26 RX ADMIN — MIDAZOLAM 1 MG: 1 INJECTION INTRAMUSCULAR; INTRAVENOUS at 09:10

## 2021-07-26 RX ADMIN — Medication 10 MG: at 10:31

## 2021-07-26 RX ADMIN — SODIUM CHLORIDE: 9 INJECTION, SOLUTION INTRAVENOUS at 10:42

## 2021-07-26 RX ADMIN — SUGAMMADEX 200 MG: 100 INJECTION, SOLUTION INTRAVENOUS at 10:45

## 2021-07-26 RX ADMIN — Medication 100 MCG: at 09:24

## 2021-07-26 RX ADMIN — SODIUM CHLORIDE: 9 INJECTION, SOLUTION INTRAVENOUS at 22:03

## 2021-07-26 RX ADMIN — MELOXICAM 15 MG: 7.5 TABLET ORAL at 08:09

## 2021-07-26 RX ADMIN — DEXAMETHASONE SODIUM PHOSPHATE 4 MG: 4 INJECTION, SOLUTION INTRAMUSCULAR; INTRAVENOUS at 09:29

## 2021-07-26 RX ADMIN — SODIUM CHLORIDE: 9 INJECTION, SOLUTION INTRAVENOUS at 08:07

## 2021-07-26 RX ADMIN — DOCUSATE SODIUM 50 MG AND SENNOSIDES 8.6 MG 1 TABLET: 8.6; 5 TABLET, FILM COATED ORAL at 21:11

## 2021-07-26 RX ADMIN — ACETAMINOPHEN 650 MG: 325 TABLET ORAL at 21:11

## 2021-07-26 RX ADMIN — Medication 200 MCG: at 09:28

## 2021-07-26 RX ADMIN — ONDANSETRON 4 MG: 2 INJECTION INTRAMUSCULAR; INTRAVENOUS at 09:29

## 2021-07-26 ASSESSMENT — PAIN DESCRIPTION - ORIENTATION
ORIENTATION: RIGHT

## 2021-07-26 ASSESSMENT — PULMONARY FUNCTION TESTS
PIF_VALUE: 20
PIF_VALUE: 19
PIF_VALUE: 20
PIF_VALUE: 19
PIF_VALUE: 0
PIF_VALUE: 20
PIF_VALUE: 3
PIF_VALUE: 20
PIF_VALUE: 19
PIF_VALUE: 20
PIF_VALUE: 18
PIF_VALUE: 20
PIF_VALUE: 20
PIF_VALUE: 3
PIF_VALUE: 19
PIF_VALUE: 3
PIF_VALUE: 20
PIF_VALUE: 1
PIF_VALUE: 20
PIF_VALUE: 0
PIF_VALUE: 15
PIF_VALUE: 20
PIF_VALUE: 19
PIF_VALUE: 20
PIF_VALUE: 19
PIF_VALUE: 3
PIF_VALUE: 3
PIF_VALUE: 20
PIF_VALUE: 3
PIF_VALUE: 13
PIF_VALUE: 3
PIF_VALUE: 19
PIF_VALUE: 19
PIF_VALUE: 20
PIF_VALUE: 15
PIF_VALUE: 19
PIF_VALUE: 19
PIF_VALUE: 20
PIF_VALUE: 5
PIF_VALUE: 2
PIF_VALUE: 13
PIF_VALUE: 28
PIF_VALUE: 13
PIF_VALUE: 3
PIF_VALUE: 19
PIF_VALUE: 1
PIF_VALUE: 20
PIF_VALUE: 3
PIF_VALUE: 3
PIF_VALUE: 11
PIF_VALUE: 19
PIF_VALUE: 20
PIF_VALUE: 21
PIF_VALUE: 3
PIF_VALUE: 19
PIF_VALUE: 2
PIF_VALUE: 1
PIF_VALUE: 21
PIF_VALUE: 20
PIF_VALUE: 21
PIF_VALUE: 20
PIF_VALUE: 3
PIF_VALUE: 13
PIF_VALUE: 0
PIF_VALUE: 1
PIF_VALUE: 20
PIF_VALUE: 20
PIF_VALUE: 2
PIF_VALUE: 8
PIF_VALUE: 3
PIF_VALUE: 3
PIF_VALUE: 19
PIF_VALUE: 19
PIF_VALUE: 20
PIF_VALUE: 19
PIF_VALUE: 20
PIF_VALUE: 2
PIF_VALUE: 19
PIF_VALUE: 19
PIF_VALUE: 2
PIF_VALUE: 3
PIF_VALUE: 19
PIF_VALUE: 19
PIF_VALUE: 3
PIF_VALUE: 20
PIF_VALUE: 19
PIF_VALUE: 1
PIF_VALUE: 20
PIF_VALUE: 16
PIF_VALUE: 15
PIF_VALUE: 3
PIF_VALUE: 25
PIF_VALUE: 20
PIF_VALUE: 1
PIF_VALUE: 20
PIF_VALUE: 0
PIF_VALUE: 3
PIF_VALUE: 20
PIF_VALUE: 3
PIF_VALUE: 19
PIF_VALUE: 20
PIF_VALUE: 19
PIF_VALUE: 20
PIF_VALUE: 15
PIF_VALUE: 21
PIF_VALUE: 20
PIF_VALUE: 19
PIF_VALUE: 3
PIF_VALUE: 3
PIF_VALUE: 22
PIF_VALUE: 20
PIF_VALUE: 22
PIF_VALUE: 20
PIF_VALUE: 19
PIF_VALUE: 19
PIF_VALUE: 2
PIF_VALUE: 19

## 2021-07-26 ASSESSMENT — PAIN SCALES - GENERAL
PAINLEVEL_OUTOF10: 7
PAINLEVEL_OUTOF10: 7
PAINLEVEL_OUTOF10: 5
PAINLEVEL_OUTOF10: 9
PAINLEVEL_OUTOF10: 6
PAINLEVEL_OUTOF10: 5
PAINLEVEL_OUTOF10: 5
PAINLEVEL_OUTOF10: 4
PAINLEVEL_OUTOF10: 4
PAINLEVEL_OUTOF10: 6
PAINLEVEL_OUTOF10: 7
PAINLEVEL_OUTOF10: 5
PAINLEVEL_OUTOF10: 7
PAINLEVEL_OUTOF10: 8
PAINLEVEL_OUTOF10: 6

## 2021-07-26 ASSESSMENT — PAIN DESCRIPTION - DESCRIPTORS
DESCRIPTORS: ACHING;CONSTANT;DISCOMFORT
DESCRIPTORS: THROBBING
DESCRIPTORS: ACHING
DESCRIPTORS: THROBBING
DESCRIPTORS: THROBBING;PRESSURE
DESCRIPTORS: THROBBING
DESCRIPTORS: THROBBING

## 2021-07-26 ASSESSMENT — PAIN - FUNCTIONAL ASSESSMENT
PAIN_FUNCTIONAL_ASSESSMENT: ACTIVITIES ARE NOT PREVENTED
PAIN_FUNCTIONAL_ASSESSMENT: PREVENTS OR INTERFERES SOME ACTIVE ACTIVITIES AND ADLS
PAIN_FUNCTIONAL_ASSESSMENT: ACTIVITIES ARE NOT PREVENTED
PAIN_FUNCTIONAL_ASSESSMENT: 0-10

## 2021-07-26 ASSESSMENT — PAIN DESCRIPTION - FREQUENCY
FREQUENCY: CONTINUOUS

## 2021-07-26 ASSESSMENT — PAIN DESCRIPTION - PAIN TYPE
TYPE: SURGICAL PAIN
TYPE: ACUTE PAIN;SURGICAL PAIN
TYPE: SURGICAL PAIN

## 2021-07-26 ASSESSMENT — PAIN DESCRIPTION - LOCATION
LOCATION: KNEE

## 2021-07-26 ASSESSMENT — PAIN DESCRIPTION - ONSET
ONSET: ON-GOING

## 2021-07-26 ASSESSMENT — PAIN DESCRIPTION - PROGRESSION
CLINICAL_PROGRESSION: NOT CHANGED

## 2021-07-26 NOTE — ANESTHESIA POSTPROCEDURE EVALUATION
Department of Anesthesiology  Postprocedure Note    Patient: Iván Lamb  MRN: [id number]  YOB: 1955  Date of evaluation: 7/26/2021  Time:  3:36 PM     Procedure Summary     Date: 07/26/21 Room / Location: [address]    Anesthesia Start: 9580 Anesthesia Stop: 6037    Procedure: ROBOTIC ASSISTED TOTAL RIGHT KNEE REPLACEMENT (Right Knee) Diagnosis:       Osteoarthritis of right knee, unspecified osteoarthritis type      (OSTEOARTHRITIS RIGHT KNEE)    Surgeons: Geovanny Hand MD Responsible Provider: Fausto Sheffield MD    Anesthesia Type: general ASA Status: 3          Anesthesia Type: general    Mayo Phase I: Mayo Score: 4    Mayo Phase II:      Last vitals: Reviewed and per EMR flowsheets.        Anesthesia Post Evaluation    Patient location during evaluation: PACU  Patient participation: complete - patient participated  Level of consciousness: awake and alert  Pain score: 3  Airway patency: patent  Nausea & Vomiting: no nausea and no vomiting  Complications: no  Cardiovascular status: blood pressure returned to baseline  Respiratory status: acceptable  Hydration status: euvolemic

## 2021-07-26 NOTE — LETTER
[facility]  Phone: [phone]    No name on file. July 27, 2021     Patient: Monica Hawk   YOB: 1955   Date of Visit: 6/24/2021       To Whom It May Concern: It is my medical opinion that Tim Ugarte should remain out of work until 8/26/21. If you have any questions or concerns, please don't hesitate to call.     Sincerely,

## 2021-07-26 NOTE — PROGRESS NOTES
Occupational Therapy   Occupational Therapy Initial Assessment  Date: 2021   Patient Name: Jhoana Amador  MRN: [id number]     : 1955    Date of Service: 2021    Discharge Recommendations:  2-3 sessions per week, Patient would benefit from continued therapy after discharge  OT Equipment Recommendations  Equipment Needed: Yes  Mobility Devices: Radha Flatter: Rolling    John Wood scored a 19/24 on the AM-PAC ADL Inpatient form. Current research shows that an AM-PAC score of 18 or greater is typically associated with a discharge to the patient's home setting. Based on the patient's AM-PAC score, and their current ADL deficits, it is recommended that the patient have 2-3 sessions per week of Occupational Therapy at d/c to increase the patient's independence. At this time, this patient demonstrates the endurance and safety to discharge home with Rob Lamb (home vs OP services) and a follow up treatment frequency of 2-3x/wk. Please see assessment section for further patient specific details. If patient discharges prior to next session this note will serve as a discharge summary. Please see below for the latest assessment towards goals. Assessment   Performance deficits / Impairments: Decreased functional mobility ; Decreased endurance;Decreased ADL status; Decreased high-level IADLs;Decreased ROM; Decreased balance  Assessment: 77 yo female admitted  for s/p  R TKA. PMH: CVA  r sided weakness, HTN, arthritis. PTA, pt lives with spouse who assists with ADLs and shares IADLs, and use of 4WW with fxl mobility. Today, pt functioning below baseline d/t above deficits, most limited by RLE pain and anxiety. Pt CGA for bed mobility, transfers with RW, fxl mobility with RW, and toileting. Pt stands sink side for hand washing with SBA. WFL BUE ROM for self care. Anticipate CGA/Min A LB and set up seated UB ADL based on balance, endurance, cognition, pain and PLOF.  Cont acute OT to address goals and rec OT 2-3x/week with 24 hr supervision  Treatment Diagnosis: impaired ADL and fxl mobility  Prognosis: Good  Decision Making: Low Complexity  OT Education: OT Role;Transfer Training;Plan of Care  REQUIRES OT FOLLOW UP: Yes  Activity Tolerance  Activity Tolerance: Patient limited by pain; Patient limited by fatigue  Safety Devices  Safety Devices in place: Yes  Type of devices: Nurse notified;Gait belt;Call light within reach; Chair alarm in place; Left in chair;Patient at risk for falls         Patient Diagnosis(es): The encounter diagnosis was Osteoarthritis of right knee, unspecified osteoarthritis type. has a past medical history of Anesthesia complication, Arthritis, Cardiomyopathy ([facility] [address] Diabetes, GERD (gastroesophageal reflux disease), Hyperlipidemia, Hypertension, Lumbar disc disease, Prolonged emergence from general anesthesia, Sleep apnea, Unspecified cerebral artery occlusion with cerebral infarction, and Wears glasses. has a past surgical history that includes partial hysterectomy (cervix not removed) (08/2003); Hand surgery (Right); Foot surgery (Bilateral); Carpal tunnel release (Left, 9/3/15); Finger trigger release (Left, 9/3/15); Carpal tunnel release (Right, 9/22/15); Finger trigger release (Right, 9/22/15); Shoulder arthroscopy (Right, 06/20/2018); Colonoscopy; arthrodesis (Right, 9/17/2020); and Knee Arthroplasty (Right, 7/26/2021). Treatment Diagnosis: impaired ADL and fxl mobility    Restrictions  Restrictions/Precautions  Restrictions/Precautions: Weight Bearing, Fall Risk  Lower Extremity Weight Bearing Restrictions  Right Lower Extremity Weight Bearing: Weight Bearing As Tolerated    Subjective   General  Chart Reviewed: Yes  Patient assessed for rehabilitation services?: Yes  Additional Pertinent Hx: 77 yo female admitted 7/26 for s/p 7/26 R TKA.  PMH: CVA 2014 r sided weakness, HTN, arthritis,  Family / Caregiver Present: Yes ()  Referring Practitioner: Mariano Vargas Lorenza Zhou MD  Diagnosis: s/p 7/26 R TKA  Subjective  Subjective: Pt resting in bed upon arrival and agreeable to OT/PT eval. Pt with some anxiety regarding mobility and pain. pt report 7/10 R knee pain adter ambulation. General Comment  Comments: RN ok to see    Social/Functional History  Social/Functional History  Lives With: Spouse (works 8hr/day. off 7/27)  Type of Home: House  Home Layout: Two level, Laundry in basement, Able to Live on Main level with bedroom/bathroom  Home Access: Stairs to enter with rails  Entrance Stairs - Number of Steps: 10  Entrance Stairs - Rails: Left  Bathroom Shower/Tub: Curtain, Shower chair with back, Walk-in shower  Bathroom Toilet: Standard  Bathroom Equipment: Grab bars in shower, Hand-held shower, Grab bars around toilet  Bathroom Accessibility: Walker accessible  Home Equipment: 4 wheeled walker, Cane, Crutches  ADL Assistance: Needs assistance (PRN assist LB and bathing)  Homemaking Assistance: Needs assistance (shares.  does grocery and meals)  Ambulation Assistance: Needs assistance (cane in home. 3PH in community)  Active : Yes       Objective   Vision: Impaired  Vision Exceptions: Wears glasses at all times  Hearing: Within functional limits    Orientation  Orientation Level: Oriented X4     Balance  Sitting Balance: Stand by assistance  Standing Balance: Contact guard assistance  Standing Balance  Time: ~1 min + 2 min  Activity: managing brief with CGA unilateral to no support on RW + sink side hand washing SBA unilateral support on sink  Functional Mobility  Functional - Mobility Device: Rolling Walker  Activity:  (EOB>bathroom>recliner)  Assist Level: Contact guard assistance  Functional Mobility Comments: BUE used to off weight RLE knee d/t pain. slower gait.  therapist manages IV pole  Toilet Transfers  Toilet - Technique: Ambulating  Equipment Used: Grab bars  Toilet Transfer: Contact guard assistance  Toilet Transfers Comments: cues for grab bar Minutes 60         Timed Code Treatment Minutes: 45 Minutes (15 eval, 30 ADL, 15 TA)       Danielle Fulton, EMILY, OTR/L

## 2021-07-26 NOTE — H&P
The patient was interviewed and examined and there have been no changes since the documented History and Physical.  I have presented reasonable alternatives to the patient's proposed care, treatment and services. The discussion I have done encompassed risks, benefits and side effects related to the alternatives and the risks related to not receiving the proposed care, treatment and services.     Electronically signed by Malena Arboleda MD on 7/26/2021 at 7:33 AM

## 2021-07-26 NOTE — PROGRESS NOTES
Pt arrived to pacu from OR sedate with opa. NBP 71/45 other VSS on monitor. DerekCRNA at bedside pushes meds IV. BP remains low. O2 on 5L nc. Dressing Right leg cdi elevated Good circ check to RLE ice applied. Pt wakes opa removed. Pt falls easily back to sleep.

## 2021-07-26 NOTE — PROGRESS NOTES
Met with patient and family at bedside, patient is alert and orientedx4. discussed role of nurse navigator. Reviewed reasons to call with questions or concerns, importance of TEDS, Incentive spirometer, pain medication, and physical and occupational therapy. 2/4 bed rails up, bed in lowest position, fall precautions in place, call light within reach. Pulses present bilaterally +2 pedal, no drainage or odor noted at surgical dressing right knee. ace Dressing clean, dry, and intact. Ice in place. Daniel and scds on LLE. Neurovascular checks performed and WNLs, patient denies numbness or tingling. DC Plan: home with  and [facility]. Per patient she contacted her transportation comp-any through [employer] (patient unsure of transportation company name) to transport patient. DME needs:needs rolling walker and raised toilet seat (covered by secondary insurance per ritesh), agreeable to [employer] and ritesh informed.      Lorena Burns  Orthopedic Nurse Navigator  Phone number: [phone]    Future Appointments   Date Time Provider Maximo Bradford   8/5/2021  8:00 AM Concepcion Finnegan MD [facility]   8/12/2021 10:00 AM Eneida Goodman MD W ORTH[facility]   9/3/2021 10:00 AM JEISON Alvarenga - ALL[facility]   7/7/2022  2:00 PM MD Allan Handley [address] [facility]     Electronically signed by Montez Horner RN on 7/26/2021 at 4:18 PM

## 2021-07-26 NOTE — CARE COORDINATION
Chula/Annie received referral from  for [address] [place] w/[employer]. Will need PT/OT notes and DME Orders. Will verify patient's insurance and follow up with patient to deliver the ordered item(s) prior to discharge.     Thank you for the referral.  Electronically signed by Aureliano Oquendo on 7/26/2021 at 4:21 PM  Cell ph# [phone]

## 2021-07-26 NOTE — OP NOTE
Operative Note      Patient: Chente Rangel  YOB: 1955  MRN: [id number]    Date of Procedure: 7/26/2021    Pre-Op Diagnosis: OSTEOARTHRITIS RIGHT KNEE    Post-Op Diagnosis: Same       Procedure(s):  ROBOTIC ASSISTED TOTAL RIGHT KNEE REPLACEMENT    Surgeon(s):  Kelly Dillon MD    Assistant:   Surgical Assistant: Gus Velasquez    Anesthesia: General    Estimated Blood Loss (mL): 687     Complications: None    Specimens:   * No specimens in log *    Implants:  * No implants in log *      Drains: * No LDAs found *    Findings: severe OA right knee. Detailed Description of Procedure:     PREOPERATIVE DIAGNOSIS: right knee osteoarthritis. POSTOPERATIVE DIAGNOSIS: right knee osteoarthritis. PROCEDURE: Robotic assisted right total knee arthroplasty. SURGEON: Yosvany Rasmussen MD.       ANESTHESIA: General endotracheal anesthesia. IV FLUIDS: Crystalloid. ESTIMATED BLOOD LOSS: 077 ml     COMPLICATIONS: None. The patient tolerated the procedure quite well. COMPONENTS: A Snehal size 8 narrow cemented persona femur, size E cemented tibial tray, size 29 patellar button, and a size 10 mm PS ultra-high molecular weight polyethylene spacer. INDICATIONS: The patient is a 76year old female with a longstanding history   of right  knee pain. History of injury: repetitive injury . She failed all conservative measures including injections, PT and NSAIDs . X-rays confirmed severe osteoarthritis. Due to this fact, the patient was ultimately cleared and scheduled for right total knee arthroplasty. DESCRIPTION OF PROCEDURE: The patient was identified preoperatively. The proper extremity was marked. The patient was then taken to the operating room and general endotracheal anesthesia was administered by Anesthesia. Appropriate preoperative antibiotics were administered. Nonsterile tourniquet was applied to the thigh.  The right lower extremity was then chloraprepped in the standard fashion. We then draped out in standard fashion. We sealed off the skin with the Ioban. We then   elevated the tourniquet to 250 mmHg. We then made a standard anterior longitudinal midline incision. We incised skin and coagulated all bleeders with Bovie electrocautery. We dissected down and did perform a medial parapatellar arthrotomy in standard fashion. We did perform a medial release due to the varus deformity. We then excised the fat pad. We then brought the Siri/robotic arm and registered the robotic arm. We then inserted our femoral and tibial pins. The femoral pins were intra-incisional.  The tibial pins were extra incisional.  The first pin was placed approximately 3 finger breaths below the tibial tubercle. We then went ahead and registered the right lower extremity and went through all checkpoints for the femur and the tibia. We first identified the femoral hip center of rotation. We then went through all checkpoints for the femur. We then went through all checkpoints for the tibia. We then did our soft tissue/laxity evaluation both in flexion and in extension. We then were ready for referral to the computer and we finalized our final cuts. We did make several adjustments. The patient started off with a 1.5 degree varus deformity and 5 degree flexion contracture. The adjustments made were: 7 degree slope on the tibia, we took off 0.5 mm more from the tibia and 1.5 mm more off the posterior femur, our external rotation was 3.5 degrees for the femur and 1 degree of varus was set and the tibia. We then brought in the robotic arm and pinned our cutting block for the distal femoral cut. We then made a nice flat cut on the femur. We then removed the bone. We then brought in the cutting block again and pinned for 3.5 degrees of external rotation for a 8 femur. We then attached our 4 in 1 cutting block and this was seated without difficulty.   We then made our cuts and the bone was removed. We then brought in the robotic arm and pinned the cutting block for our proximal tibia cut. We made a nice flat cut and the bone was removed. We removed the medial and lateral meniscus without difficulty. We then sized our tibial component and pinned the appropriate trial.  We made sure to externally rotate the tibial component towards the medial one third of the tibial tubercle. We then drilled and broached for the stem of the component accordingly. We then went ahead and seated the actual trial femur. The box cutting guide was then pinned and we then cut the box for our PS components. We then snapped in a trial surface. We then went ahead and finished the patella. We measured our patellar thickness. We then used the appropriate reamer and reamed down accordingly. We drilled our peg holes in superior medial fashion. The patella tracked rather well. We then removed all trial components. We injected the posterior capsule and myofascial planes posteriorly with the Ortho mix. We then irrigated the bone and the knee rather copiously. We then were ready for cementing of our components. We first cemented the tibia. We then cemented the femur. We snapped in our trial surface and allowed the knee out to full extension. We then cemented the patella. We allowed the cement to harden. We then went ahead and removed all excess cement without difficulty. The knee was symmetrically balanced. The PCL was excised. The patella tracked rather well. We injected the posterior capsule and myofascial planes posteriorly with the Ortho mix. We then irrigated the bone and the knee rather copiously. The knee was symmetrically balanced. We then went ahead and snapped in the size 10 mm PS highly cross-linked polyethylene surface. There was no liftoff in flexion. The knee did get out to full extension. The patient ended up with a 1.5 degree varus alignment.   Our gaps in extension and flexion were well balanced and symmetric both medially and laterally. We then injected the subcutaneous tissues and myofascial planes with the Ortho mix. We sprayed the knee with the Irrisept. The patient was given IV tranexamic acid 1 g pre-and post operatively. We then were ready for closure. We closed our arthrotomy with interrupted figure-of-eight #1 Vicryl stitches. We then closed the subcutaneous tissues with running strata fix. We then closed the skin with the Prineo. Sterile dressings were applied. There were no complications.        Electronically signed by Rodriguez Barclay MD on 7/26/2021 at 7:34 AM

## 2021-07-26 NOTE — CARE COORDINATION
Referral per clinical path. Met with patient and confirmed plans to return to home with spouse who has taken one week off work and [employer]. Verified demographics and that the following DME is needed:RW and RTS. Patient denies any other needs or concerns. Referred to [facility] and [facility] who will follow up.      Electronically signed by AYLA Pop, IMAN, Case Management on 7/26/2021 at 3:50 PM  [place] [phone]

## 2021-07-26 NOTE — PROGRESS NOTES
fanapt not supplied by inpatient pharmacy, patient made aware and stated she does not have with her here and does not have someone to bring in, stated she will resume once home.  Electronically signed by Nava Harmon RN on 7/26/2021 at 4:24 PM

## 2021-07-26 NOTE — PROGRESS NOTES
Physical Therapy    Facility/Department: [address] ORTHOPEDICS  Initial Assessment  This note serves as patient discharge summary if pt discharges prior to next PT visit      NAME: Cristy Daly  : 1955  MRN: [id number]    Date of Service: 2021    Discharge Recommendations:  Continue to assess pending progress, S Level 1, 24 hour supervision or assist   John Wood scored a 16/24 on the AM-PAC short mobility form. Current research shows that an AM-PAC score of 18 or greater is typically associated with a discharge to the patient's home setting. Based on the patient's AM-PAC score and their current functional mobility deficits, it is recommended that the patient have 2-3 sessions per week of Physical Therapy at d/c to increase the patient's independence. At this time, this patient demonstrates the endurance and safety to discharge home with home therapy services and a follow up treatment frequency of 2-3x/wk. Please see assessment section for further patient specific details. PT Equipment Recommendations  Equipment Needed: Yes  Mobility Devices: Noemy [place]: Rolling    Assessment   Body structures, Functions, Activity limitations: Decreased functional mobility ; Decreased sensation;Decreased ROM; Increased pain;Decreased balance  Assessment: Prior to elective R TKR via Dr. Jamee Monsalve 2021, patient reports living in home with  who works, independence with ADLs, transfers, and ambulation with rollator. Status 2021: Bed mobility extra time, effortful, and CGA. Transfers CGA and cues. Gait RW 24' with CGA with Fair tolerance and quality. Anticipate patient will be appropriate for DC to home with family support and home PT, level 1, when therapy goals are met. She requires a RW to promote safe and quality ambulation. Will continue to see and assess.   Treatment Diagnosis: Impaired functional mobility  Prognosis: Good  Decision Making: Medium Complexity  History: as noted  Clinical Presentation: Evolving  PT Education: Goals;PT Role;Plan of Care;Transfer Training;Gait Training;General Safety; Functional Mobility Training;Weight-bearing Education  REQUIRES PT FOLLOW UP: Yes  Activity Tolerance  Activity Tolerance: Patient limited by pain; Patient Tolerated treatment well  Activity Tolerance: 90% HR 72 room air at rest. Following activity, on 2.5 L O2, 100%. O2 removed, and sats 94%. Patient Diagnosis(es): The encounter diagnosis was Osteoarthritis of right knee, unspecified osteoarthritis type. has a past medical history of Anesthesia complication, Arthritis, Cardiomyopathy ([address] Diabetes, GERD (gastroesophageal reflux disease), Hyperlipidemia, Hypertension, Lumbar disc disease, Prolonged emergence from general anesthesia, Sleep apnea, Unspecified cerebral artery occlusion with cerebral infarction, and Wears glasses. has a past surgical history that includes partial hysterectomy (cervix not removed) (08/2003); Hand surgery (Right); Foot surgery (Bilateral); Carpal tunnel release (Left, 9/3/15); Finger trigger release (Left, 9/3/15); Carpal tunnel release (Right, 9/22/15); Finger trigger release (Right, 9/22/15); Shoulder arthroscopy (Right, 06/20/2018); Colonoscopy; arthrodesis (Right, 9/17/2020); and Knee Arthroplasty (Right, 7/26/2021). Restrictions  Restrictions/Precautions  Restrictions/Precautions: Weight Bearing, Fall Risk  Lower Extremity Weight Bearing Restrictions  Right Lower Extremity Weight Bearing: Weight Bearing As Tolerated     Vision/Hearing  Vision: Impaired  Vision Exceptions: Wears glasses at all times  Hearing: Within functional limits       Subjective  General  Chart Reviewed: Yes  Patient assessed for rehabilitation services?: Yes  Additional Pertinent Hx: 77 yo female to hospital 7- for elective R TKR robot assisted, via Dr. Krysten Segundo. Other PMHx as noted, including patient reporting OA L knee, also requiring replacement.   Response To Previous Treatment: Not applicable  Family / Caregiver Present: Yes  Referring Practitioner: Dr. Maren Gtz  Referral Date : 07/26/21  Subjective  Subjective: Patient in bed. Agreeable to therapy. Rates R knee pain at 7/10 with activity. Pain Screening  Patient Currently in Pain: Yes    Orientation  Orientation  Overall Orientation Status: Within Functional Limits     Social/Functional History  Social/Functional History  Lives With: Spouse (works 8hr/day. off 7/27)  Type of Home: House  Home Layout: Two level, Laundry in basement, Able to Live on Main level with bedroom/bathroom  Home Access: Stairs to enter with rails  Entrance Stairs - Number of Steps: 10  Entrance Stairs - Rails: Left  Bathroom Shower/Tub: Curtain, Shower chair with back, Walk-in shower  Bathroom Toilet: Standard  Bathroom Equipment: Grab bars in shower, Hand-held shower, Grab bars around toilet  Bathroom Accessibility: Walker accessible  Home Equipment: 4 wheeled walker, Cane, Crutches  ADL Assistance: Needs assistance (PRN assist LB and bathing)  Homemaking Assistance: Needs assistance (shares.  does grocery and meals)  Ambulation Assistance: Needs assistance (cane in home. 5JV in community)  Active : Yes     Cognition   Cognition  Overall Cognitive Status: WNL    Objective  AROM RLE (degrees)  RLE AROM: WFL  RLE General AROM: except knee grossly 15-45. Guarded  AROM LLE (degrees)  LLE AROM : WFL  Strength RLE  Comment: functionally 3+/5  Strength LLE  Strength LLE: WFL  Sensation  Overall Sensation Status:  (Numbness R quad region)  Bed mobility  Supine to Sit: Contact guard assistance (Min extra time. Use of looped belt as leg .  HOB up, 1 rail)  Sit to Supine: Unable to assess (left inBS chair at end of session.)  Transfers  Sit to Stand: Contact guard assistance (cues for hand placement)  Stand to sit: Contact guard assistance (v cues for hand placement, and R LE placement to avoid excessive bearing.)  Ambulation  Ambulation?: PT  Electronically signed by Errol Oropeza, [address] [place] [address] Drive [phone])  on 7/26/2021 at 4:05 PM

## 2021-07-26 NOTE — PROGRESS NOTES
Renetta at bedside given meds for pt's low BP. BP up to 90s/60s. Pt stable wakes to v/o. Pt falls easily back to sleep.

## 2021-07-26 NOTE — ANESTHESIA PRE PROCEDURE
[facility] Department of Anesthesiology  Pre-Anesthesia Evaluation/Consultation       Name:  Kari Rome  : 1955  Age:  76 y.o.                                            MRN:  [id number]  Date: 2021           Surgeon: Surgeon(s):  Josias Trimble MD    Procedure: Procedure(s):  ROBOTIC ASSISTED TOTAL RIGHT KNEE REPLACEMENT     Allergies   Allergen Reactions    Codeine Nausea And Vomiting and Rash    Vicodin [Hydrocodone-Acetaminophen] Nausea And Vomiting    Lipitor [Atorvastatin]      Myalgias    Oxycontin [Oxycodone Hcl] Itching    Tramadol Itching and Swelling     Patient Active Problem List   Diagnosis    Diabetes mellitus ([address]    Obesity    Dystonia    Cerebral thrombosis with cerebral infarction ([address]    Obstructive sleep apnea    Right hemiparesis ([address]    Trigger finger, acquired    Elevated CK    Primary osteoarthritis of both knees    Mood disorder ([address] - follows with Psych Dr. Lori Maria Calcific tendonitis of right shoulder    Essential hypertension    Mixed hyperlipidemia    Chronic low back pain without sciatica    History of CVA with residual deficit    Anxiety and depression    Chest pain    Myalgia due to statin    Palpitations     Past Medical History:   Diagnosis Date    Anesthesia complication     \" shaking\"    Arthritis     Cardiomyopathy ([address]     Diabetes     GERD (gastroesophageal reflux disease)     Hyperlipidemia     Hypertension     Lumbar disc disease     Prolonged emergence from general anesthesia     Sleep apnea     pt states does use a Cpap machine at night    Unspecified cerebral artery occlusion with cerebral infarction     right side weak    Wears glasses      Past Surgical History:   Procedure Laterality Date    ARTHRODESIS Right 2020    (RIGHT) REMOVAL OF BONE SPURRING AND OSSEOUS PROMINENCE FIRST METATARSAL, ARTHRODESIS OF FIRST METATARSOPHALANGEAL JOINT, BONE MARROW HARVEST AND CONCENTRATION RIGHT TIBIA performed by Jadene Boast, DPM at [address] Left 9/3/15    CARPAL TUNNEL RELEASE Right 9/22/15    COLONOSCOPY      FINGER TRIGGER RELEASE Left 9/3/15    middle and ring fingers    FINGER TRIGGER RELEASE Right 9/22/15    middle and ring fingers    FOOT SURGERY Bilateral     litteltoe    HAND SURGERY Right     Ligament    PARTIAL HYSTERECTOMY  2003    SHOULDER ARTHROSCOPY Right 2018    Diagnostic scope, rcr, open Titus     Social History     Tobacco Use    Smoking status: Former Smoker     Packs/day: 1.50     Years: 3.00     Pack years: 4.50     Quit date: 1992     Years since quittin.5    Smokeless tobacco: Never Used   Vaping Use    Vaping Use: Never used   Substance Use Topics    Alcohol use: No    Drug use: No     Medications  No current facility-administered medications on file prior to encounter. Current Outpatient Medications on File Prior to Encounter   Medication Sig Dispense Refill    lidocaine 4 % external patch Apply patch to lower mid back. Leave on for 12 hours and remove. Leave off for 12 hours before applying another one.  5 patch 0    TRULICITY 1.5 WG/0.2LC SOPN INJECT 1.5 MG UNDER THE SKIN ONCE WEEKLY 4 pen 5    Insulin Pen Needle (PEN NEEDLES) 31G X 6 MM MISC USE TO INJECT INSULIN DAILY 100 each 1    insulin glargine (LANTUS SOLOSTAR) 100 UNIT/ML injection pen INJECT 16 UNITS UNDER THE SKIN ONCE NIGHTLY (Patient taking differently: Patient stated she is taking  12 UNITS UNDER THE SKIN ONCE NIGHTLY) 5 pen 1    furosemide (LASIX) 20 MG tablet TAKE ONE TABLET BY MOUTH DAILY AS NEEDED FOR LEG SWELLING 90 tablet 1    metFORMIN (GLUCOPHAGE) 500 MG tablet TAKE TWO TABLETS BY MOUTH TWICE A DAY WITH MEALS 360 tablet 0    famotidine (PEPCID) 20 MG tablet Take 1 tablet by mouth 2 times daily 60 tablet 3    valsartan (DIOVAN) 80 MG tablet TAKE ONE TABLET BY MOUTH DAILY 90 tablet 0    blood glucose test strips (ONETOUCH ULTRA) strip TEST TWO TIMES A  strip 4    albuterol sulfate HFA (PROVENTIL HFA) 108 (90 Base) MCG/ACT inhaler Inhale 2 puffs into the lungs every 4 hours as needed for Wheezing or Shortness of Breath May substitute ProAir MDI 1 Inhaler 5    ipratropium-albuterol (DUONEB) 0.5-2.5 (3) MG/3ML SOLN nebulizer solution Inhale 3 mLs into the lungs every 4 hours as needed for Shortness of Breath (wheezing coughing) 360 mL 5    ammonium lactate (LAC-HYDRIN) 12 % lotion Apply topically daily. 1 Bottle 1    blood glucose test strips (FREESTYLE LITE) strip TEST BLOOD SUGAR TWO TIMES A DAY Diag: E11.9 100 each 4    Blood Glucose Monitoring Suppl (TRUE METRIX AIR GLUCOSE METER) w/Device KIT 1 each by Does not apply route daily 1 kit 0    FREESTYLE LANCETS MISC USE ONE LANCET TO TEST BLOOD SUGAR TWICE A  each 3    ibuprofen (ADVIL;MOTRIN) 600 MG tablet TAKE ONE TABLET BY MOUTH TWICE A DAY WITH MEALS 60 tablet 0    aspirin 81 MG EC tablet Take 1 tablet by mouth daily.  60 tablet 3     Current Facility-Administered Medications   Medication Dose Route Frequency Provider Last Rate Last Admin    0.9 % sodium chloride infusion   Intravenous Continuous Jeremy Multani MD        sodium chloride flush 0.9 % injection 10 mL  10 mL Intravenous 2 times per day Jeremy Multani MD        sodium chloride flush 0.9 % injection 10 mL  10 mL Intravenous PRN Jeremy Multani MD        0.9 % sodium chloride infusion  25 mL Intravenous PRN Jeremy Multani MD        ceFAZolin (ANCEF) 2000 mg in dextrose 5 % 100 mL IVPB  2,000 mg Intravenous Once Kelly Dillon MD        meloxicam ZARI WOOTEN [facility]) tablet 15 mg  15 mg Oral Once Kelly Dillon MD         Vital Signs (Current)   Vitals:    21 0742   BP: 109/67   Pulse: 89   Resp: 15   Temp: 97 °F (36.1 °C)   TempSrc: Temporal   SpO2: 97%   Weight: 199 lb (90.3 kg)   Height: 5' 4\" (1.626 m)                                            Vital Signs Statistics (for past 48 hrs)     Temp  Av °F (36.1 °C)  Min: 97 °F (36.1 °C)   Min taken time: 21  Max: 97 °F (36.1 °C)   Max taken time: 21  Pulse  Av  Min: 80   Min taken time: 21  Max: 80   Max taken time: 21  Resp  Avg: 15  Min: 13   Min taken time: 21  Max: 13   Max taken time: 21  BP  Min: 109/67   Min taken time: 21  Max: 109/67   Max taken time: 21  SpO2  Av %  Min: 97 %   Min taken time: 21  Max: 97 %   Max taken time: 21  BP Readings from Last 3 Encounters:   21 109/67   07/15/21 130/70   21 132/80       BMI  Body mass index is 34.16 kg/m². Estimated body mass index is 34.16 kg/m² as calculated from the following:    Height as of this encounter: 5' 4\" (1.626 m). Weight as of this encounter: 199 lb (90.3 kg). CBC   Lab Results   Component Value Date    WBC 6.3 2021    RBC 4.34 2021    HGB 11.7 2021    HCT 36.5 2021    MCV 84.1 2021    RDW 14.4 2021     2021     CMP    Lab Results   Component Value Date     2021    K 4.2 2021    K 4.6 2021     2021    CO2 23 2021    BUN 10 2021    CREATININE 0.9 2021    GFRAA >60 2021    GFRAA >60 2013    AGRATIO 1.2 2021    LABGLOM >60 2021    GLUCOSE 87 2021    PROT 7.5 2021    PROT 7.6 2012    CALCIUM 10.4 2021    BILITOT <0.2 2021    ALKPHOS 77 2021    AST 22 2021    ALT 13 2021     BMP    Lab Results   Component Value Date     2021    K 4.2 2021    K 4.6 2021     2021    CO2 23 2021    BUN 10 2021    CREATININE 0.9 2021    CALCIUM 10.4 2021    GFRAA >60 2021    GFRAA >60 2013    LABGLOM >60 2021    GLUCOSE 87 2021     POCGlucose  No results for input(s): GLUCOSE in the last 72 hours.    [facility]    Lab Results   Component Value Date PROTIME 10.1 07/12/2021    INR 0.90 07/12/2021    APTT 27.3 95/27/7866     HCG (If Applicable) No results found for: PREGTESTUR, PREGSERUM, HCG, HCGQUANT   ABGs No results found for: PHART, PO2ART, EFS0KWD, KGX8OKS, BEART, Z6ZQQQRR   Type & Screen (If Applicable)  No results found for: LABABO, LABRH                         BMI: Wt Readings from Last 3 Encounters:       NPO Status:   Date of last liquid consumption: 07/26/21   Time of last liquid consumption: 0600 (half glass water with meds)   Date of last solid food consumption: 07/25/21      Time of last solid consumption: 1900       Anesthesia Evaluation  Patient summary reviewed no history of anesthetic complications:   Airway: Mallampati: II  TM distance: >3 FB   Neck ROM: full  Mouth opening: > = 3 FB Dental:          Pulmonary: breath sounds clear to auscultation  (+) sleep apnea:      (-) COPD and asthma                           Cardiovascular:    (+) hypertension:,     (-) past MI, CABG/stent and no hyperlipidemia        Rate: normal                    Neuro/Psych:   (+) CVA (Right hemiparesis):, psychiatric history:            GI/Hepatic/Renal:   (+) GERD:,      (-) liver disease and no renal disease       Endo/Other:    (+) Diabetes, .    (-) hypothyroidism               Abdominal:             Vascular: Other Findings:           Anesthesia Plan      general     ASA 3       Induction: intravenous. MIPS: Postoperative opioids intended and Prophylactic antiemetics administered. Anesthetic plan and risks discussed with patient. Plan discussed with CRNA. This pre-anesthesia assessment may be used as a history and physical.    DOS STAFF ADDENDUM:    Pt seen and examined, chart reviewed (including anesthesia, drug and allergy history). No interval changes to history and physical examination. Anesthetic plan, risks, benefits, alternatives, and personnel involved discussed with patient.   Patient verbalized an understanding and agrees to proceed.       Milad Park MD  July 26, 2021  8:06 AM

## 2021-07-27 VITALS
TEMPERATURE: 97.6 F | SYSTOLIC BLOOD PRESSURE: 118 MMHG | HEART RATE: 79 BPM | WEIGHT: 214.51 LBS | DIASTOLIC BLOOD PRESSURE: 61 MMHG | RESPIRATION RATE: 12 BRPM | HEIGHT: 64 IN | OXYGEN SATURATION: 94 % | BODY MASS INDEX: 36.62 KG/M2

## 2021-07-27 LAB
ESTIMATED AVERAGE GLUCOSE: 154.2 MG/DL
GLUCOSE BLD-MCNC: 80 MG/DL (ref 70–99)
GLUCOSE BLD-MCNC: 91 MG/DL (ref 70–99)
HBA1C MFR BLD: 7 %
HCT VFR BLD CALC: 29.8 % (ref 36–48)
HEMOGLOBIN: 9.7 G/DL (ref 12–16)
PERFORMED ON: NORMAL
PERFORMED ON: NORMAL

## 2021-07-27 PROCEDURE — 6360000002 HC RX W HCPCS: Performed by: ORTHOPAEDIC SURGERY

## 2021-07-27 PROCEDURE — 97530 THERAPEUTIC ACTIVITIES: CPT

## 2021-07-27 PROCEDURE — 6370000000 HC RX 637 (ALT 250 FOR IP): Performed by: ORTHOPAEDIC SURGERY

## 2021-07-27 PROCEDURE — 36415 COLL VENOUS BLD VENIPUNCTURE: CPT

## 2021-07-27 PROCEDURE — 2580000003 HC RX 258: Performed by: ORTHOPAEDIC SURGERY

## 2021-07-27 PROCEDURE — 94760 N-INVAS EAR/PLS OXIMETRY 1: CPT

## 2021-07-27 PROCEDURE — 85018 HEMOGLOBIN: CPT

## 2021-07-27 PROCEDURE — 97110 THERAPEUTIC EXERCISES: CPT

## 2021-07-27 PROCEDURE — 97116 GAIT TRAINING THERAPY: CPT

## 2021-07-27 PROCEDURE — 97535 SELF CARE MNGMENT TRAINING: CPT

## 2021-07-27 PROCEDURE — 85014 HEMATOCRIT: CPT

## 2021-07-27 RX ORDER — ERYTHROMYCIN 5 MG/G
OINTMENT OPHTHALMIC NIGHTLY
COMMUNITY
End: 2022-01-07

## 2021-07-27 RX ORDER — LATANOPROST 50 UG/ML
1 SOLUTION/ DROPS OPHTHALMIC NIGHTLY
COMMUNITY
End: 2022-09-09

## 2021-07-27 RX ADMIN — Medication 200 MG: at 08:07

## 2021-07-27 RX ADMIN — SODIUM CHLORIDE, PRESERVATIVE FREE 10 ML: 5 INJECTION INTRAVENOUS at 08:07

## 2021-07-27 RX ADMIN — HYDROMORPHONE HYDROCHLORIDE 0.25 MG: 1 INJECTION, SOLUTION INTRAMUSCULAR; INTRAVENOUS; SUBCUTANEOUS at 08:07

## 2021-07-27 RX ADMIN — ACETAMINOPHEN 650 MG: 325 TABLET ORAL at 08:07

## 2021-07-27 RX ADMIN — CEFAZOLIN 2000 MG: 10 INJECTION, POWDER, FOR SOLUTION INTRAVENOUS at 01:51

## 2021-07-27 RX ADMIN — DIPHENHYDRAMINE HCL 25 MG: 25 TABLET ORAL at 04:09

## 2021-07-27 RX ADMIN — DULOXETINE HYDROCHLORIDE 30 MG: 30 CAPSULE, DELAYED RELEASE ORAL at 08:07

## 2021-07-27 RX ADMIN — OXYCODONE HYDROCHLORIDE 10 MG: 10 TABLET ORAL at 10:10

## 2021-07-27 RX ADMIN — VALSARTAN 80 MG: 80 TABLET, FILM COATED ORAL at 08:07

## 2021-07-27 RX ADMIN — FUROSEMIDE 20 MG: 20 TABLET ORAL at 08:06

## 2021-07-27 RX ADMIN — FAMOTIDINE 20 MG: 20 TABLET, FILM COATED ORAL at 08:06

## 2021-07-27 RX ADMIN — DOCUSATE SODIUM 50 MG AND SENNOSIDES 8.6 MG 1 TABLET: 8.6; 5 TABLET, FILM COATED ORAL at 08:07

## 2021-07-27 RX ADMIN — ASPIRIN 81 MG: 81 TABLET, COATED ORAL at 08:07

## 2021-07-27 RX ADMIN — ACETAMINOPHEN 650 MG: 325 TABLET ORAL at 01:51

## 2021-07-27 RX ADMIN — OXYCODONE HYDROCHLORIDE 10 MG: 10 TABLET ORAL at 04:09

## 2021-07-27 RX ADMIN — DIPHENHYDRAMINE HCL 25 MG: 25 TABLET ORAL at 10:10

## 2021-07-27 RX ADMIN — INSULIN LISPRO 7 UNITS: 100 INJECTION, SOLUTION INTRAVENOUS; SUBCUTANEOUS at 08:07

## 2021-07-27 ASSESSMENT — PAIN DESCRIPTION - ONSET
ONSET: ON-GOING

## 2021-07-27 ASSESSMENT — PAIN DESCRIPTION - PAIN TYPE
TYPE: SURGICAL PAIN

## 2021-07-27 ASSESSMENT — PAIN DESCRIPTION - ORIENTATION
ORIENTATION: RIGHT

## 2021-07-27 ASSESSMENT — PAIN DESCRIPTION - DESCRIPTORS
DESCRIPTORS: ACHING

## 2021-07-27 ASSESSMENT — PAIN - FUNCTIONAL ASSESSMENT
PAIN_FUNCTIONAL_ASSESSMENT: PREVENTS OR INTERFERES SOME ACTIVE ACTIVITIES AND ADLS
PAIN_FUNCTIONAL_ASSESSMENT: ACTIVITIES ARE NOT PREVENTED
PAIN_FUNCTIONAL_ASSESSMENT: ACTIVITIES ARE NOT PREVENTED
PAIN_FUNCTIONAL_ASSESSMENT: PREVENTS OR INTERFERES SOME ACTIVE ACTIVITIES AND ADLS

## 2021-07-27 ASSESSMENT — PAIN DESCRIPTION - FREQUENCY
FREQUENCY: CONTINUOUS

## 2021-07-27 ASSESSMENT — PAIN DESCRIPTION - LOCATION
LOCATION: KNEE

## 2021-07-27 ASSESSMENT — PAIN SCALES - GENERAL
PAINLEVEL_OUTOF10: 5
PAINLEVEL_OUTOF10: 5
PAINLEVEL_OUTOF10: 6
PAINLEVEL_OUTOF10: 7
PAINLEVEL_OUTOF10: 7
PAINLEVEL_OUTOF10: 8

## 2021-07-27 ASSESSMENT — PAIN DESCRIPTION - PROGRESSION
CLINICAL_PROGRESSION: NOT CHANGED
CLINICAL_PROGRESSION: GRADUALLY WORSENING
CLINICAL_PROGRESSION: NOT CHANGED
CLINICAL_PROGRESSION: GRADUALLY IMPROVING

## 2021-07-27 ASSESSMENT — PAIN SCALES - WONG BAKER: WONGBAKER_NUMERICALRESPONSE: 0

## 2021-07-27 NOTE — PROGRESS NOTES
Patient wheeled out for discharge, medical transport picked up patient, discharged with documented belongings and dme.  Electronically signed by Maribell Arellano RN on 7/27/2021 at 11:04 AM

## 2021-07-27 NOTE — PROGRESS NOTES
Pt AAO x4 per this shift. VSS. Pt tolerating diet and PO fluids. Pt able to ambulate to bathroom and in room per this shift with walker. Pt having some moderate ongoing pain to right knee. Managed with Prn medication per MD orders, elevation, rest, emotional support. Pt opting to take  Prn oxycodone  with Q 6 hours  PRN benadryl to relieve itching per MD orders. Skin also cleansed and moisturized per this shift to assist with itching. Pt stating effective. Ace wrap and dry bandage removed from R knee. Prineo in place with scant serous drainage. EMIR hose and ice applied. Pedal pulses palpable, cap refill brink and extremities warm to touch.  remains at bedside for support. No futher needs voiced at this time. Fall precautions in place. Bed alarm on. Call light within reach. Will continue to round.  Electronically signed by Johann Wright RN on 7/27/2021 at 5:04 AM  .

## 2021-07-27 NOTE — PLAN OF CARE
Problem: Serum Glucose Level - Abnormal:  Goal: Ability to maintain appropriate glucose levels has stabilized  Description: Ability to maintain appropriate glucose levels has stabilized  7/27/2021 0743 by Jocy Lou RN  Outcome: Ongoing  Note: Patients ability to maintain appropriate glucose levels has stabilized. Will monitor and assess. 7/26/2021 2354 by Kareen Villatoro RN  Outcome: Ongoing  Note: Pt monitored blood glucose levels per Md orders and medication given as ordered. Sings/ and symptoms of hypo/hyperglycemia discussed. No values and good wound healing promoted. Pt stated understanding       Problem: Discharge Planning:  Goal: Discharged to appropriate level of care  Description: Discharged to appropriate level of care  7/27/2021 0743 by Jocy Lou RN  Outcome: Ongoing  Note: Patient will be discharged to appropriate level of care. Will monitor and assess. 7/26/2021 2354 by Kareen Villatoro RN  Outcome: Ongoing  Note: Pt planning to d/c home with family in AM.      Problem: Mobility - Impaired:  Goal: Mobility will improve  Description: Mobility will improve  7/27/2021 0743 by Jocy Lou RN  Outcome: Ongoing  Note: Patients mobility will improve. Will monitor and assess. 7/26/2021 2354 by Kareen Villatoro RN  Outcome: Ongoing  Note: Early and frequent ambulqtion encouraged. Educated patient on importance of early ambulation. Patient assisted with ambulation. Will continue to monitor. Problem: Infection - Surgical Site:  Goal: Will show no infection signs and symptoms  Description: Will show no infection signs and symptoms  7/27/2021 0743 by Jocy Lou RN  Outcome: Ongoing  Note: Pt is free of signs and symptoms of infection. Incision and dressing are clean, dry and intact. Vital signs stable. Will monitor. 7/26/2021 2354 by Kareen Villatoro RN  Outcome: Ongoing  Note: Pt assessed for infection, No signs or symptoms of surgical site noted. VVS, WBC being monitored. Reviewed information with pt and family, pt verbalized understanding        Problem: Pain - Acute:  Goal: Pain level will decrease  Description: Pain level will decrease  7/27/2021 0743 by Elizabeth Menezes RN  Outcome: Ongoing  Note: Pt assessed for pain. Pt in pain and assessed with 0-10 pain rating scale. Pt given prescribed analgesic for pain. (See eMar) Pt satisfied with pain relief thus far. Will reassess and continue to monitor. 7/26/2021 2354 by Shanell Guillory RN  Outcome: Ongoing  Note: Pain /discomfort being managed with PRN analgesics per MD orders, ice, rest, elevation, repositioning, emotional support. Patient able to express presence and absence of pain and rate pain appropriately using numerical scale. Problem: Falls - Risk of:  Goal: Will remain free from falls  Description: Will remain free from falls  7/27/2021 0743 by Elizabeth Menezes RN  Outcome: Ongoing  Note: Fall risk assessment completed. Fall precautions in place. Call light within reach. Pt educated on calling for assistance before getting up. Walkway free of clutter. Will continue to monitor. 7/26/2021 2354 by Shanell Guillory RN  Outcome: Met This Shift  Note: Patient educated on fall prevention. Call light is within reach, bed locked in lowest position, personal items within reach, and bed alarm is on. Will round on patient per unit guidelines. Goal: Absence of physical injury  Description: Absence of physical injury  7/27/2021 0743 by Elizabeth Menezes RN  Outcome: Ongoing  Note: Patient will remain free from physical injury. Safety precautions in place. Will monitor and assess. 7/26/2021 2354 by Shanell Guillory RN  Outcome: Met This Shift  Note: Pt is free of injury. No injury noted. Fall precautions in place. Call light within reach. Will monitor.         Problem: Pain:  Goal: Pain level will decrease  Description: Pain level will decrease  7/27/2021 0743 by Elizabeth Menezes RN  Outcome: Ongoing  Note: Pt assessed for pain. Pt in pain and assessed with 0-10 pain rating scale. Pt given prescribed analgesic for pain. (See eMar) Pt satisfied with pain relief thus far. Will reassess and continue to monitor. 7/26/2021 2354 by Kareen Villatoro RN  Outcome: Ongoing  Note: Pain /discomfort being managed with PRN analgesics per MD orders, ice, rest, elevation, repositioning, emotional support. Patient able to express presence and absence of pain and rate pain appropriately using numerical scale. Goal: Control of acute pain  Description: Control of acute pain  7/27/2021 0743 by Jocy Lou RN  Outcome: Ongoing  Note: Pt assessed for pain. Pt in pain and assessed with 0-10 pain rating scale. Pt given prescribed analgesic for pain. (See eMar) Pt satisfied with pain relief thus far. Will reassess and continue to monitor. 7/26/2021 2354 by Kareen Villatoro RN  Outcome: Ongoing  Note: Pain /discomfort being managed with PRN analgesics per MD orders, ice, rest, elevation, repositioning, emotional support. Patient able to express presence and absence of pain and rate pain appropriately using numerical scale. Goal: Control of chronic pain  Description: Control of chronic pain  7/27/2021 0743 by Jocy Lou RN  Outcome: Ongoing  Note: Pt assessed for pain. Pt in pain and assessed with 0-10 pain rating scale. Pt given prescribed analgesic for pain. (See eMar) Pt satisfied with pain relief thus far. Will reassess and continue to monitor. 7/26/2021 2354 by Kareen Villatoro RN  Outcome: Ongoing  Note: Pain /discomfort being managed with PRN analgesics per MD orders, ice, rest, elevation, repositioning, emotional support. Patient able to express presence and absence of pain and rate pain appropriately using numerical scale.

## 2021-07-27 NOTE — PROGRESS NOTES
Occupational Therapy  Facility/Department: [address] ORTHOPEDICS  Daily Treatment Note  NAME: John Wood  : 1955  MRN: [id number]    Date of Service: 2021    Discharge Recommendations:  2-3 sessions per week, Patient would benefit from continued therapy after discharge  OT Equipment Recommendations  Equipment Needed: Yes  Mobility Devices: ADL Assistive Devices  ADL Assistive Devices: Toileting - Raised Toilet Seat with arms  Other: Patient requires raised commode with rails for safe transfers and toileting     [address] scored a 20/24 on the AM-PAC ADL Inpatient form. Current research shows that an AM-PAC score of 18 or greater is typically associated with a discharge to the patient's home setting. Based on the patient's AM-PAC score, and their current ADL deficits, it is recommended that the patient have 2-3 sessions per week of Occupational Therapy at d/c to increase the patient's independence. At this time, this patient demonstrates the endurance and safety to discharge home with home (home vs OP services) and a follow up treatment frequency of 2-3x/wk. Please see assessment section for further patient specific details. If patient discharges prior to next session this note will serve as a discharge summary. Please see below for the latest assessment towards goals.      HOME HEALTH CARE: LEVEL 3 SAFETY        -Initial home health evaluation to occur within 24-48 hours, in patient home    -Home health agency to establish plan of care for patient over 60 day period    -Medication Reconciliation    -PT/OT/Speech evaluations in home within 24-48 hours of discharge; including  -DME and home safety    -Frontload therapy 5 days, then 3x a week    -OT to evaluate if patient has [address] needs for personal care    - evaluation within 24-48 hours, includes evaluation of resources   and insurance to determine [place], [place], LTC, and Medicaid options    -PCP Visit scheduled within session  Family / Caregiver Present: Yes ()  Referring Practitioner: Irene Bernal MD  Diagnosis: s/p 7/26 R TKA  Subjective  Subjective: Patient supine in bed upon arrival to room. Patient agreeable to therapy. Reports pain 8/10, ice placed at end of session  General Comment  Comments: RN ok to see      Orientation  Orientation  Overall Orientation Status: Within Functional Limits  Objective    ADL  UE Dressing: Modified independent   LE Dressing: Stand by assistance (slight assist with right shoe)        Balance  Sitting Balance: Supervision  Standing Balance: Stand by assistance  Standing Balance  Activity: Stood for ADL tasks  Functional Mobility  Functional - Mobility Device: Rolling Walker  Activity: Other  Assist Level: Stand by assistance  Functional Mobility Comments: Functional mobility with RW with SBA, slow steady gait with no overt LOB noted  Bed mobility  Supine to Sit: Contact guard assistance (HOB elevated and side rail)  Sit to Supine: Unable to assess (up in chair at end of session)  Transfers  Sit to stand: Stand by assistance  Stand to sit: Stand by assistance  Transfer Comments: SBA for sit<>stand from EOB to RW to recliner chair mild cues for hand placement     Cognition  Overall Cognitive Status: WNL     Assessment   Performance deficits / Impairments: Decreased functional mobility ; Decreased endurance;Decreased ADL status; Decreased high-level IADLs;Decreased ROM; Decreased balance  Assessment: Discussed with OTR am pac score is 20. Anticipate that patient will be safe to return home with family support and home OT. Patient able to complete functional mobility with RW with SBA, slow steady gait with no overt LOB noted. SBA for sit<>stand from EOB to RW to recliner chair with mild cues for hand placement. Dressed lower body with slight assist for right shoe. Plan is for discharge to home today.   OT Education: OT Role;Transfer Training;Plan of Care;ADL Adaptive Strategies  Patient Education: ice therapy, importance of mobility and marco hose for marco hose  REQUIRES OT FOLLOW UP: Yes  Activity Tolerance  Activity Tolerance: Patient Tolerated treatment well  Safety Devices  Safety Devices in place: Yes  Type of devices: Call light within reach; Left in chair;Nurse notified        Plan   Plan  Times per week: plan is for discharge to home today  Times per day: Daily  Current Treatment Recommendations: Strengthening, Endurance Training, Patient/Caregiver Education & Training, Self-Care / ADL, Balance Training, Pain Management, Functional Mobility Training, Safety Education & Training, Positioning    AM-PAC Score        AM-PAC Inpatient Daily Activity Raw Score: 20 (07/27/21 1042)  AM-PAC Inpatient ADL T-Scale Score : 42.03 (07/27/21 1042)  ADL Inpatient CMS 0-100% Score: 38.32 (07/27/21 1042)  ADL Inpatient CMS G-Code Modifier : Hardeep Keating (07/27/21 1042)    Goals  Short term goals  Time Frame for Short term goals: prior to d/c: all goals ongoing  Short term goal 1: ADl tx SUP  Short term goal 2: toileting SUP  Short term goal 3: LB dressing (pants SBA, marco hose Mod A)  Short term goal 4: tolerate 5 min fxl standing task SUP  Short term goal 5: UB dressing set up only  Patient Goals   Patient goals : recovery well for other knee       Therapy Time   Individual Concurrent Group Co-treatment   Time In 1040         Time Out 1005         Minutes 40               Electronically signed by MALIKA [id number] on 7/27/2021 at 10:57 AM

## 2021-07-27 NOTE — PROGRESS NOTES
CLINICAL PHARMACY NOTE: MEDS TO BEDS    Total # of Prescriptions Filled: 2   The following medications were delivered to the patient:  Current Discharge Medication List      START taking these medications    Details   oxyCODONE (ROXICODONE) 5 MG immediate release tablet Take 1 tablet by mouth every 6 hours as needed for Pain for up to 5 days.   Qty: 40 tablet, Refills: 0    Comments: Reduce doses taken as pain becomes manageable  Associated Diagnoses: Primary osteoarthritis of right knee         ·   aspirin 81 MG EC tablet On chart    Dose: 81 mg Take 1 tablet by mouth 2 times daily for 14 days Take twice a day for 14 days after knee surgery then can resume daily dosing     ·     Additional Documentation:

## 2021-07-27 NOTE — PROGRESS NOTES
Physical Therapy    Facility/Department: [address] ORTHOPEDICS  Daily Treatment / Discharge      NAME: Wei Wood  : 1955  MRN: [id number]    Date of Service: 2021    Discharge Recommendations:  S Level 1, 24 hour supervision or assist   John Wood scored a 19/24 on the AM-PAC short mobility form. Current research shows that an AM-PAC score of 18 or greater is typically associated with a discharge to the patient's home setting. Based on the patient's AM-PAC score and their current functional mobility deficits, it is recommended that the patient have 2-3 sessions per week of Physical Therapy at d/c to increase the patient's independence. At this time, this patient demonstrates the endurance and safety to discharge home with home therapy services and a follow up treatment frequency of 2-3x/wk. Please see assessment section for further patient specific details. PT Equipment Recommendations  Equipment Needed: Yes  Walker: Rolling    Assessment   Body structures, Functions, Activity limitations: Decreased functional mobility ; Decreased sensation;Decreased ROM; Increased pain;Decreased balance  Assessment: Prior to elective R TKR via Dr. Lorenza Zhou 2021, patient reports living in home with  who works, independence with ADLs, transfers, and ambulation with rollator. Status 2021: Transfers SBA, extra time, effortful, trace cues. Gait RW 21' with SBA with Fair tolerance and quality, due to pain and time limitations (her transport home had arrived). Patient performed 4 stairs with L rail and R str cane with SBA-CGA and cues. Patient provided with written TKR HEP, including positioning to assure full extension and flexion ROM return, activity expectations, safety at home, and use of ice for pain control. Slovenian Ipad  used throughout session to assure  understands instructions. Patient appears appropriate for DC to home with family support and home PT, level 1.  She requires a RW to promote safe and quality ambulation. Treatment Diagnosis: Impaired functional mobility  Prognosis: Good  History: as noted  Clinical Presentation: Evolving  PT Education: Goals;PT Role;Plan of Care;Transfer Training;Gait Training;General Safety; Functional Mobility Training;Weight-bearing Education  Patient Education: positioning of knee into full extension and flexion throughout day. REQUIRES PT FOLLOW UP: Yes  Activity Tolerance  Activity Tolerance: Patient Tolerated treatment well;Patient limited by pain       Patient Diagnosis(es): The primary encounter diagnosis was Primary osteoarthritis of right knee. A diagnosis of Osteoarthritis of right knee, unspecified osteoarthritis type was also pertinent to this visit. has a past medical history of Anesthesia complication, Arthritis, Cardiomyopathy ([address] Diabetes, GERD (gastroesophageal reflux disease), Hyperlipidemia, Hypertension, Lumbar disc disease, Prolonged emergence from general anesthesia, Sleep apnea, Unspecified cerebral artery occlusion with cerebral infarction, and Wears glasses. has a past surgical history that includes partial hysterectomy (cervix not removed) (08/2003); Hand surgery (Right); Foot surgery (Bilateral); Carpal tunnel release (Left, 9/3/15); Finger trigger release (Left, 9/3/15); Carpal tunnel release (Right, 9/22/15); Finger trigger release (Right, 9/22/15); Shoulder arthroscopy (Right, 06/20/2018); Colonoscopy; arthrodesis (Right, 9/17/2020); and Knee Arthroplasty (Right, 7/26/2021). Restrictions  Restrictions/Precautions  Restrictions/Precautions: Weight Bearing, Fall Risk  Lower Extremity Weight Bearing Restrictions  Right Lower Extremity Weight Bearing: Weight Bearing As Tolerated        Subjective  General  Chart Reviewed: Yes  Additional Pertinent Hx: 75 yo female to hospital 7- for elective R TKR robot assisted, via Dr. Coty Guzmán.   Other PMHx as noted, including patient reporting OA L knee, also requiring replacement. Response To Previous Treatment: Patient with no complaints from previous session. Family / Caregiver Present: Yes ()  Referring Practitioner: Dr. Dusty Steinberg  Comments:  seems to not speak English well. Per discussion with wife, and wife agreeance, language ipad used throughout session so  would understand instructions regarding perioperateive activity expectations, positioning to maximize Knee flexion and extension ROM return, and safety in the home. Subjective  Subjective: Patient in bedside chair. Agreeable to therapy. Rates R knee pain at 7/10 with activity, primarily bending of knee. Pain Screening  Patient Currently in Pain: Yes    Orientation  Orientation  Overall Orientation Status: Within Normal Limits     Social/Functional History  Social/Functional History  Lives With: Spouse (works 8hr/day. off 7/27)  Type of Home: House  Home Layout: Two level, Laundry in basement, Able to Live on Main level with bedroom/bathroom  Home Access: Stairs to enter with rails  Entrance Stairs - Number of Steps: 10  Entrance Stairs - Rails: Left  Bathroom Shower/Tub: Curtain, Shower chair with back, Walk-in shower  Bathroom Toilet: Standard  Bathroom Equipment: Grab bars in shower, Hand-held shower, Grab bars around toilet  Bathroom Accessibility: Walker accessible  Home Equipment: 4 wheeled walker, Cane, Crutches  ADL Assistance: Needs assistance (PRN assist LB and bathing)  Homemaking Assistance: Needs assistance (shares.  does grocery and meals)  Ambulation Assistance: Needs assistance (cane in home.  4SH in community)  Active : Yes     Cognition   Cognition  Overall Cognitive Status: WNL    Objective  Transfers  Sit to Stand: Stand by assistance (slow, guarded, effortful.)  Stand to sit: Stand by assistance (cues for R LE positioning, and hand positioning.)  Comment: Therapist verbally instructs pt/ in correct car transfer technique (with stairs L rail, CGA-Min and cues. 7- goal met  Short term goal 4: Tolerates 5-10 reps initial TKR HEP exs, with cues  Patient Goals   Patient goals : \"To recover, and be able to have the other knee replaced. \"    Therapy Time   Individual Concurrent Group Co-treatment   Time In 4976         Time Out 1057         Minutes 55            Gt 15; TE 15; TA 25    Isamar Dave PT  Electronically signed by Rosmery Aguirre, [address] [place] [address] [phone])  on 7/27/2021 at 12:08 PM

## 2021-07-27 NOTE — PROGRESS NOTES
Patient is resting in bed and is alert and oriented x4. Patient is complaining of pain in right knee that is a 6/10. Pain medication given per MD orders (see eMAR). Patient takes Benadryl with Oxy because of itching that Oxy causes. Ice is applied to right knee and it is elevated in bed.  remains at bedside. Bilateral pneumonic compression device remains in place on left leg. EMIR hose on BLE. Neuro checks are done and are WDL. Pedal pulses are palpable. Cap refill is less than 3 seconds and extremities are warm. Incision is clean, dry, and intact. Prineo in place with scant drainage. Patient had BM last night and has urinated twice this morning. All morning meds are given and head to toe assessment is done. All needs are met and safety precautions are in place. No further questions or concerns at this time. Will continue to monitor and assess.   Electronically signed by Migdalia Urbina RN on 7/27/2021 at 9:02 AM

## 2021-07-27 NOTE — PROGRESS NOTES
dme delivered to patient: rolling 2 wheeled walker and elevated toilet seat with rails, demonstrated how to store and use equipment to patient and  and both verbalized understanding. Work letter provided to patient.  Electronically signed by Efra Mansfield RN on 7/27/2021 at 10:34 AM

## 2021-07-27 NOTE — PROGRESS NOTES
Jona performed this morning including Nurse navigator Robert Holman, Physical therapist Lyle Tao, and Occupational therapist lizet. Discussed plan of care, discharge plan, and dme needs if applicable for orthopedic total joint patient.   Electronically signed by Fly Maddox RN on 7/27/2021 at 9:39 AM

## 2021-07-27 NOTE — PLAN OF CARE
Problem: Serum Glucose Level - Abnormal:  Goal: Ability to maintain appropriate glucose levels has stabilized  Description: Ability to maintain appropriate glucose levels has stabilized  Outcome: Ongoing  Note: Pt monitored blood glucose levels per Md orders and medication given as ordered. Sings/ and symptoms of hypo/hyperglycemia discussed. No values and good wound healing promoted. Pt stated understanding       Problem: Discharge Planning:  Goal: Discharged to appropriate level of care  Description: Discharged to appropriate level of care  Outcome: Ongoing  Note: Pt planning to d/c home with family in AM.      Problem: Mobility - Impaired:  Goal: Mobility will improve  Description: Mobility will improve  Outcome: Ongoing  Note: Early and frequent ambulqtion encouraged. Educated patient on importance of early ambulation. Patient assisted with ambulation. Will continue to monitor. Problem: Infection - Surgical Site:  Goal: Will show no infection signs and symptoms  Description: Will show no infection signs and symptoms  Outcome: Ongoing  Note: Pt assessed for infection, No signs or symptoms of surgical site noted. VVS, WBC being monitored. Reviewed information with pt and family, pt verbalized understanding        Problem: Pain - Acute:  Goal: Pain level will decrease  Description: Pain level will decrease  Outcome: Ongoing  Note: Pain /discomfort being managed with PRN analgesics per MD orders, ice, rest, elevation, repositioning, emotional support. Patient able to express presence and absence of pain and rate pain appropriately using numerical scale. Problem: Falls - Risk of:  Goal: Will remain free from falls  Description: Will remain free from falls  Outcome: Met This Shift  Note: Patient educated on fall prevention. Call light is within reach, bed locked in lowest position, personal items within reach, and bed alarm is on. Will round on patient per unit guidelines.     Goal: Absence of physical injury  Description: Absence of physical injury  Outcome: Met This Shift  Note: Pt is free of injury. No injury noted. Fall precautions in place. Call light within reach. Will monitor. Problem: Pain:  Goal: Pain level will decrease  Description: Pain level will decrease  Outcome: Ongoing  Note: Pain /discomfort being managed with PRN analgesics per MD orders, ice, rest, elevation, repositioning, emotional support. Patient able to express presence and absence of pain and rate pain appropriately using numerical scale. Goal: Control of acute pain  Description: Control of acute pain  Outcome: Ongoing  Note: Pain /discomfort being managed with PRN analgesics per MD orders, ice, rest, elevation, repositioning, emotional support. Patient able to express presence and absence of pain and rate pain appropriately using numerical scale. Goal: Control of chronic pain  Description: Control of chronic pain  Outcome: Ongoing  Note: Pain /discomfort being managed with PRN analgesics per MD orders, ice, rest, elevation, repositioning, emotional support. Patient able to express presence and absence of pain and rate pain appropriately using numerical scale.

## 2021-07-27 NOTE — CARE COORDINATION
Aware of referral--patient wanting resources for counseling. Provided patient with resources for several counseling options.    Electronically signed by AYLA Penn, IMAN, Case Management on 7/27/2021 at 10:41 AM  [facility] [phone]

## 2021-07-27 NOTE — PROGRESS NOTES
Data- discharge order received, patient verbalized agreement to discharge, disposition to previous residence, needs noted for HHC/DME and informed Ernie Herrera NP. Action- discharge instructions prepared and given to patient and , patient verbalized understanding. Medication information packet given r/t NEW and/or CHANGED prescriptions emphasizing name/purpose/side effects, pt verbalized understanding. Discharge instruction summary: Diet- general, Activity- wbat, Primary Care Physician as follows: Will Wilkins -[phone]. f/u appointment with orthopedic office noted below, immunizations reviewed and discussed with patient, prescription medications to be filled by retail pharmacy and then delivered. Inpatient surgical procedure precautions reviewed: . Neurovascular check performed and patient is WNLs, denies numbness/tingling in extremties. Incision site  prineo glue dressing assessed and is  clean,dry, and intact, no signs of redness, drainage, or odor noted. mepilex border in place over distal incision site and is clean, dry, and intact. patient's bedside RN nicki notified of patient completing discharge instructions. Nurse Navigator and Orthopedic Office contact information on discharge instructions and provided to patient. Response- Medications to be delivered to patient via meds to bed program. Disposition is home with [facility] (DME to be delivered to patient by Fausto Mcdonough with [facility]), to be transported with family.      Future Appointments   Date Time Provider Maximo Bradford   8/5/2021  8:00 AM Nicolasa Finnegan MD [facility]   8/12/2021 10:00 AM Maryann Valle MD W ORTHO [facility]   9/3/2021 10:00 AM JEISON Escalona - ALLEN [facility]   7/7/2022  2:00 PM Gian Pike MD [address]           Electronically signed by Tash Reich RN on 7/27/2021 at 9:42 AM

## 2021-07-27 NOTE — PROGRESS NOTES
Clinical Pharmacy Note  Medication Counseling    Reviewed new medications started during hospital admission: ASA 81mg bid x 2 weeks, Oxycodone. Indications and side effects were emphasized during counseling. All medication-related questions addressed. Patient verbalized understanding of education. Should the patient express any additional questions or concerns regarding their medications, please do not hesitate to contact the pharmacy department. Patient/caregiver aware they may refuse medications during hospital stay. 10 minutes spent educating patient regarding medications.   Jordan Hawk, [facility], [address] 7/27/2021 9:19 AM

## 2021-07-27 NOTE — PROGRESS NOTES
The patient is resting in bed postoperative day #1 status post robotic assisted right total knee arthroplasty. Her pain is well controlled. On examination, she is alert and oriented x3. She is neurovascularly intact. There is no evidence of DVT. The incision is clean and dry. Lab Results   Component Value Date    HGB 9.7 (L) 07/27/2021   X-rays: The prosthesis is well aligned. There is no evidence of fracture. Acute blood loss anemia - expected after surgery. Will monitor Hgb. The patient does have several steps to get into her home. The patient may be discharged later this morning. She will follow-up with me in 10 to 14 days.

## 2021-07-27 NOTE — PROGRESS NOTES
[facility]   Progress Note      S/P :  SUBJECTIVE  In bed. Has been up to BR with staff. . Pain is   described in right knee and with the intensity of moderate. Pain is described as aching. Less pain at rest.       OBJECTIVE              Physical                      VITALS:  /61   Pulse 79   Temp 97.6 °F (36.4 °C) (Axillary)   Resp 12   Ht 5' 4\" (1.626 m)   Wt 214 lb 8.1 oz (97.3 kg)   SpO2 94%   BMI 36.82 kg/m²                     MUSCULOSKELETAL:  right foot NVI. Wiggles toes to command. Pedal pulses are palpable. NEUROLOGIC:                                  Sensory:  Touch:  Right Lower Extremity:  normal                                                 Surgical wound appears clean and dry right knee with Prineo dressing. EMIR hose on. Ice packs on.      Data       CBC:   Lab Results   Component Value Date    WBC 6.3 07/12/2021    RBC 4.34 07/12/2021    HGB 9.7 07/27/2021    HCT 29.8 07/27/2021    MCV 84.1 07/12/2021    MCH 27.0 07/12/2021    MCHC 32.0 07/12/2021    RDW 14.4 07/12/2021     07/12/2021    MPV 8.1 07/12/2021        WBC:    Lab Results   Component Value Date    WBC 6.3 07/12/2021        Hemoglobin/Hematocrit:    Lab Results   Component Value Date    HGB 9.7 07/27/2021    HCT 29.8 07/27/2021        PT/INR:    Lab Results   Component Value Date    PROTIME 10.1 07/12/2021    INR 0.90 07/12/2021              Current Inpatient Medications             Current Facility-Administered Medications: albuterol sulfate  (90 Base) MCG/ACT inhaler 2 puff, 2 puff, Inhalation, Q4H PRN  ammonium lactate (LAC-HYDRIN) 12 % lotion, , Topical, PRN  aspirin EC tablet 81 mg, 81 mg, Oral, Daily  DULoxetine (CYMBALTA) extended release capsule 30 mg, 30 mg, Oral, Daily  famotidine (PEPCID) tablet 20 mg, 20 mg, Oral, BID  iloperidone (FANAPT) tablet 1 mg, 1 mg, Oral, Nightly  furosemide (LASIX) tablet 20 mg, 20 mg, Oral, Daily  ipratropium-albuterol (DUONEB) nebulizer solution 3 mL, 1 vial, Inhalation, Q4H PRN  magnesium oxide (MAG-OX) tablet 200 mg, 200 mg, Oral, Daily  traZODone (DESYREL) tablet 100 mg, 100 mg, Oral, Nightly  valsartan (DIOVAN) tablet 80 mg, 80 mg, Oral, Daily  insulin lispro (HUMALOG) injection vial 0-6 Units, 0-6 Units, Subcutaneous, TID WC  insulin lispro (HUMALOG) injection vial 0-3 Units, 0-3 Units, Subcutaneous, Nightly  glucose (GLUTOSE) 40 % oral gel 15 g, 15 g, Oral, PRN  dextrose 50 % IV solution, 12.5 g, Intravenous, PRN  glucagon (rDNA) injection 1 mg, 1 mg, Intramuscular, PRN  dextrose 5 % solution, 100 mL/hr, Intravenous, PRN  sodium chloride flush 0.9 % injection 5-40 mL, 5-40 mL, Intravenous, 2 times per day  sodium chloride flush 0.9 % injection 5-40 mL, 5-40 mL, Intravenous, PRN  0.9 % sodium chloride infusion, 25 mL, Intravenous, PRN  acetaminophen (TYLENOL) tablet 650 mg, 650 mg, Oral, Q6H  HYDROmorphone (DILAUDID) injection 0.25 mg, 0.25 mg, Intravenous, Q3H PRN **OR** HYDROmorphone (DILAUDID) injection 0.5 mg, 0.5 mg, Intravenous, Q3H PRN  sennosides-docusate sodium (SENOKOT-S) 8.6-50 MG tablet 1 tablet, 1 tablet, Oral, BID  magnesium hydroxide (MILK OF MAGNESIA) 400 MG/5ML suspension 30 mL, 30 mL, Oral, Daily PRN  oxyCODONE (ROXICODONE) immediate release tablet 5 mg, 5 mg, Oral, Q4H PRN **OR** oxyCODONE HCl (OXY-IR) immediate release tablet 10 mg, 10 mg, Oral, Q4H PRN  promethazine (PHENERGAN) tablet 12.5 mg, 12.5 mg, Oral, Q6H PRN **OR** ondansetron (ZOFRAN) injection 4 mg, 4 mg, Intravenous, Q6H PRN  diphenhydrAMINE (BENADRYL) tablet 25 mg, 25 mg, Oral, Q6H PRN    ASSESSMENT AND PLAN      Post right TKA, stable exam  DVT prophylaxis ordered, ASA 81mg twice at day for 14 days for DVT prophylaxis  PT OT for ADL's and ambulation as tolerated  SS for DC planning, home with home care today  IV or PO pain med as ordered    Aleksandra Cedeno, JEISON - CNP  7/27/2021  8:55 AM

## 2021-07-27 NOTE — CARE COORDINATION
7/27 met with patient at bedside to discuss transportation home-- states she has transport home arranged with her insurance carrier--[facility] [employer] for 11:00am  Called [facility] Medicare Transportation services # [phone] to confirm scheduled transport - patient does have transport scheduled with [facility] Medicare transport - ride scheduled for 10:45AM-11:15AM - patient must be in hospital lobby for pickup. Nurse [id number] notified .   Electronically signed by Ashly Schmitz on 7/27/2021 at 9:58 AM'#[phone]

## 2021-07-28 ENCOUNTER — TELEPHONE (OUTPATIENT)
Dept: ORTHOPEDICS UNIT | Age: 66
End: 2021-07-28

## 2021-07-28 NOTE — TELEPHONE ENCOUNTER
Spoke with patient regarding post discharge from hospital.    Incision status: No drainage, odor, or redness noted per patient    Edema/Swelling/Teds: edema noted, wearing teds    Pain level and status: 9/10 currently after therapy , stating pain is not tolerable - I will send a notification to Dr Perla Dumont    Use of pain medications: yes ; Patient stated they are taking their pain medication oxycodone as prescribed. Use of ice: yes    Blood thinner: aspirin ; Verified with patient that they are taking their anticoagulant as prescribed twice a day. Bowels: no bm yet , passing flatus, educated patient to start a laxative, patient verbalized understanding. Home Care Agency active: yes ; Claims already worked with home therapist .  Outpatient therapy: n/a    Do you have all of your medications: yes    Changes in medications: no    Ortho Vitals: n/a      No other questions/concerns at this time. Encouraged patient to call Orthopedic Nurse Navigator Bandar Marinelli or Orthopedic office if has any questions/concerns.       Follow up appointments:    Future Appointments   Date Time Provider Maximo Bradford   8/5/2021  8:00 AM Sarika Finnegan MD [facility]   8/12/2021 10:00 AM Sony Barron MD W ORTHO [facility]   9/3/2021 10:00 AM Yumiko Mcdaniel APRN - CNP [facility]   7/7/2022  2:00 PM Nelida Perkins MD [place] [id number] [facility]     Electronically signed by Ruth Alicia RN on 7/28/2021 at 3:32 PM

## 2021-07-30 NOTE — DISCHARGE SUMMARY
Physician Discharge Summary     Patient ID:  Wilfred Gomez  [id number]  76 y.o.  1955    Admit date: 7/26/2021    Discharge date and time: 7/27/2021 10:50 AM     Admitting Physician: Anita Ruby MD     Discharge Physician: Shayne Hill    Admission Diagnoses: Osteoarthritis of right knee, unspecified osteoarthritis type [M17.11]  Primary osteoarthritis of right knee [M17.11]    Discharge Diagnoses: right  Knee OA    Admission Condition: good    Discharged Condition: good    Indication for Admission: Failed conservative treatment as outpatient for joint pain including PT and pain meds. This patient was then electively scheduled for total joint replacement surgery    Surgical procedure: right TKA    Consults: PT OT SS    This patient had no postoperative complications. They has PT and OT for ADL's . IV and PO pain med for pain control and was eventually DC in stable condition    Treatments: analgesia,  therapies: PT OT,  and surgery      Disposition: home    Patient Instructions:   [unfilled]  Activity: activity as tolerated  Diet: regular diet  Wound Care: keep wound clean and dry    Follow-up with Shayne Hill in 2 weeks.     Signed:  JEISON Arredondo CNP  7/30/2021  2:51 PM  ,

## 2021-08-02 ENCOUNTER — TELEPHONE (OUTPATIENT)
Dept: ORTHOPEDIC SURGERY | Age: 66
End: 2021-08-02

## 2021-08-02 ENCOUNTER — HOSPITAL ENCOUNTER (OUTPATIENT)
Dept: VASCULAR LAB | Age: 66
Discharge: HOME | End: 2021-08-02
Payer: MEDICARE

## 2021-08-02 DIAGNOSIS — Z96.651 STATUS POST TOTAL RIGHT KNEE REPLACEMENT: Primary | ICD-10-CM

## 2021-08-02 PROCEDURE — 93971 EXTREMITY STUDY: CPT

## 2021-08-02 NOTE — TELEPHONE ENCOUNTER
Negative for DVT per ankush at vascular lab. She wanted to inform Dr. Tre Santos that she cried the whole time and that she is in a lot of pain. Dr. Tre Santos please advise if there is anything more that can be done to help her.

## 2021-08-02 NOTE — TELEPHONE ENCOUNTER
General Question     Subject: RT CALF PAIN  Patient and /or Facility Request: John Wood  Contact Number:     [facility] PATIENT    PATIENT HAS PAIN IN HER RT CALF, THE SAME LEG THAT SHE HAD TOTAL  KNEE REPLACEMENT SURGERY ON July 26, 2021. HOME HEALTH AIDE STATE IT'S PAINFULLY TO TOUCH.     [facility] PHONE CALL

## 2021-08-02 NOTE — TELEPHONE ENCOUNTER
I spoke with Patient and told her that she was scheduled for a Venous Ultrasound today at 2:30. She said her  was at work and will try to take off so he can take her. She was not sure if she will be there by 2:30. I told her to get there as fast as she can. She understood. The order was done.

## 2021-08-02 NOTE — TELEPHONE ENCOUNTER
I spoke with [facility] and let nurse know we would be setting patient up for a STAT doppler study to rule out DVT. Message was also sent to Dr. Celine Mendoza.

## 2021-08-04 ENCOUNTER — TELEPHONE (OUTPATIENT)
Dept: PULMONOLOGY | Age: 66
End: 2021-08-04

## 2021-08-04 DIAGNOSIS — Z96.651 S/P TOTAL KNEE ARTHROPLASTY, RIGHT: Primary | ICD-10-CM

## 2021-08-04 DIAGNOSIS — M17.11 PRIMARY OSTEOARTHRITIS OF RIGHT KNEE: ICD-10-CM

## 2021-08-04 RX ORDER — OXYCODONE HYDROCHLORIDE 5 MG/1
5-10 TABLET ORAL EVERY 6 HOURS PRN
Qty: 40 TABLET | Refills: 0 | Status: SHIPPED | OUTPATIENT
Start: 2021-08-04 | End: 2021-08-12 | Stop reason: SDUPTHER

## 2021-08-04 NOTE — TELEPHONE ENCOUNTER
[facility] [address] [facility] advised her they have not received the prescription.  please advise patient [phone]

## 2021-08-04 NOTE — TELEPHONE ENCOUNTER
Message left for patient to call back. Unable to find her baseline study and need to know who her DME was before she saw Dr. Coby Dance. Is [address] a new DME for her.

## 2021-08-04 NOTE — TELEPHONE ENCOUNTER
I called the patient and informed her that it was sent to [facility] [address]. She would like any future refills to be sent to [facility] on [address]. Her pharmacy was changed.

## 2021-08-05 NOTE — TELEPHONE ENCOUNTER
Patient calls back and states she has been using [address] as her DME for a long time. I did find her initial study from back in 2011 (see media). Order will be sent to [address]

## 2021-08-12 ENCOUNTER — OFFICE VISIT (OUTPATIENT)
Dept: ORTHOPEDIC SURGERY | Age: 66
End: 2021-08-12

## 2021-08-12 VITALS — HEIGHT: 64 IN | WEIGHT: 198 LBS | BODY MASS INDEX: 33.8 KG/M2

## 2021-08-12 DIAGNOSIS — Z96.651 STATUS POST TOTAL RIGHT KNEE REPLACEMENT: Primary | ICD-10-CM

## 2021-08-12 DIAGNOSIS — Z96.651 S/P TOTAL KNEE ARTHROPLASTY, RIGHT: ICD-10-CM

## 2021-08-12 PROCEDURE — 99024 POSTOP FOLLOW-UP VISIT: CPT | Performed by: ORTHOPAEDIC SURGERY

## 2021-08-12 RX ORDER — OXYCODONE HYDROCHLORIDE 5 MG/1
5-10 TABLET ORAL
Qty: 40 TABLET | Refills: 0 | Status: SHIPPED | OUTPATIENT
Start: 2021-08-12 | End: 2021-08-19

## 2021-08-12 NOTE — PROGRESS NOTES
[place]  [id number]  August 12, 2021    Chief Complaint   Patient presents with    Follow-up     Rt TKA done on 7/26/21             History: The patient is here in follow-up regarding her right knee. She is now 2 weeks status post right total knee arthroplasty. She is having moderate pain. She did have a Doppler a few days postoperatively and this was negative. The patient's  past medical history, medications, allergies,  family history, social history, and review of systems have been reviewed, and dated and are recorded in the chart. Ht 5' 4\" (1.626 m)   Wt 198 lb (89.8 kg)   BMI 33.99 kg/m²     Physical: Ms. Kari Rome appears well, she is in no apparent distress, she demonstrates appropriate mood & affect. She is alert and oriented to person, place and time. She has moderate swelling. There is No evidence of DVT seen on physical exam.. She is neurovascularly intact distally. Range of motion is from -5 degrees to 100 degrees. The incision is  clean, dry and intact and without erythema. Strength in the knee is 3+/5. There is no instability with varus and valgus stressing of the knee. There is no pain with range of motion of the hips. Xrays: Three views of the right knee were obtained and reviewed. The prosthesis is well aligned and there is no evidence of loosening. Impression: Status post right Total Knee Arthroplasty,Doing well postoperatively. Plan:  She will continue to work aggressively on range of motion and strengthening: Natural history and expected course discussed. Questions answered. Quad strengthening exercises. The patient will gradually transition to an outpatient physical therapy program. The patient will follow up with me in 4 weeks. She was given a note to remain off work until follow-up.

## 2021-08-12 NOTE — LETTER
[facility]  [place] [address] [address] [address]  Phone: [phone]  Fax: [phone]    Renae Samuels MD        August 12, 2021     Patient: Dharmesh Maier   YOB: 1955   Date of Visit: 8/12/2021       To Whom It May Concern: It is my medical opinion that Angie Schafer should remain out of work until her follow up in 4 weeks. If you have any questions or concerns, please don't hesitate to call.     Sincerely,          Renae Samuels MD

## 2021-08-17 ENCOUNTER — HOSPITAL ENCOUNTER (OUTPATIENT)
Dept: PHYSICAL THERAPY | Age: 66
Setting detail: THERAPIES SERIES
Discharge: HOME | End: 2021-08-17
Payer: MEDICARE

## 2021-08-17 PROCEDURE — 97110 THERAPEUTIC EXERCISES: CPT

## 2021-08-17 PROCEDURE — 97530 THERAPEUTIC ACTIVITIES: CPT

## 2021-08-17 PROCEDURE — G0283 ELEC STIM OTHER THAN WOUND: HCPCS

## 2021-08-17 NOTE — PLAN OF CARE
[facility], [facility]  [address] [facility], [facility]  Phone: [phone]   Fax:     [phone]                                                          Physical Therapy Re-Certification Plan of Care/MD UPDATE      Dear Dr. Pamela Leach,    We had the pleasure of treating the following patient for physical therapy services at [address]. A summary of our findings can be found in the updated assessment below. This includes our plan of care. If you have any questions or concerns regarding these findings, please do not hesitate to contact me at the office phone number checked above. Thank you for the referral.     Physician Signature:________________________________Date:__________________  By signing above (or electronic signature), therapists plan is approved by physician    Date Range Of Visits: post op eval for R TKR completed on 21  Total Visits to Date: eval 21  Overall Response to Treatment:   []Patient is responding well to treatment and improvement is noted with regards  to goals   []Patient should continue to improve in reasonable time if they continue HEP   []Patient has plateaued and is no longer responding to skilled PT intervention    []Patient is getting worse and would benefit from return to referring MD   []Patient unable to adhere to initial POC   [x]Other: Pt reassessed for PT following R TKR on 21.   ROM -8 -104;  Strength R LE hip flexion 3+, Qd 3-/5, HS 4-/5 and DF 4+/5; pt amb to PT w/ wh walker; rating pain at 5/10       Recommendation:    [x]Begin PT 2-3x / wk for 4-8 weeks for strength, ROM, gait, balance and pain control following R TKR    []Hold PT, pending MD visit        Physical Therapy Treatment Note/ Progress Report:   Date:  2021    Patient Name:  Sole Jeffries    :  1955  MRN: [id number]    Pertinent Medical History: HTN, DM, Cerebral Infarction ( R side weakness) , Cardiomyopathy, R foot arthrodesis, R shoulder RTC repair, RA, anxiety, depression      C-SSRS Triggered by Intake questionnaire: Pt has appt for psychiatric consult next month      YES NO   In the past month, have you wished you were dead or wished you could go to sleep and not wake up? X   In the past month, have you had any thoughts of killing yourself? X                    Lifetime   YES NO   Have you ever done anything, started to do anything, or prepared to do anything to end your life? X             Past 3 months    X       Medical/Treatment Diagnosis Information:  · Diagnosis: R TKR  · Treatment Diagnosis: decreased mobility, strength    Insurance/Certification information:  PT Insurance Information: [facility] Medicare Dual / Estefani Grace  Physician Information:  Referring Practitioner: Mary Jane Doran MD  Plan of care signed (Y/N):  Sent for cosign     Date of Patient follow up with Physician:       Progress Report: []  Yes  [x]  No     Date Range for reporting period:  Beginnin2021  Ending:      Progress report due (10 Rx/or 30 days whichever is less): after surgery     Recertification due (POC duration/ or 90 days whichever is less): after surgery    Visit # POC/Insurance Allowable Auth Needed   1/ (post- op) BOMN []Yes   [x]No     Latex Allergy:  [x]NO      []YES  Preferred Language for Healthcare:   [x]English       []Other:    Functional Scale:       Date assessed: at eval  Test: WOMAC  Score: 74    Pain level:  5/10     History of Injury: reports chronic history of R knee pain that recently got worse. Patient is scheduled for R TKR on 21. Patient currently lives with  who is trying to take off work following surgery to care for her. Daughter is also around who came come over for a few hours a day. SUBJECTIVE:    : Pt to PT for Prehab reeval after having R TKR on 21. She is amb with wheeled walker and rating pain at 5/10.   Pt states she is doing hep, icing, and elevating daily. Pt TTP all over R knee, incision healing well with steri strips in place. Patella mobility decreased with pain and edema. OBJECTIVE:   · Observation:  · Functional Mobility/Transfers: minimal squat on R, able to complete sit to stand and bed mobility independently.   · Bandages/Dressings/Incisions: NA  · Gait: (include devices/WB status) Patient ambulates without AD in clinic demonstrating mild antalgic gait on R.    Test measurements:    ROM:  Date           Knee Flexion       Knee Extension    Active Passive Active Passive    Eval 7/8/21 120  0    8/17 104  -8               Strength:  Date Hip flexion Hip abduction Quad Hamstring Ankle DF Ankle PF   Eval 7/8 4+/5 4+/5 4/5 4+/5 5/5 5/5   8/17 3+  3- 4- 4+                 Functional testing Prehab        Date  7/8/21     4 week f/u   Date   8/23/21  8 week f/u  Date     D/C  Date               TUG 17.15 secs         30 second sit to stand 6 reps         6 minute walk 128 meters                     Balance           Narrow DOMINIK 10         Semi tandem 10         Tandem  10         SLS 5                     Knee AROM 0-120         Knee Extension MMT R/L L=11#  R=14#         Hip aBduction MMT R/L L=15#  R=17#         WOMAC 83                     RESTRICTIONS/PRECAUTIONS: none    Exercises/Interventions:     Therapeutic Ex (80113)   Min: 10 Reps/Resistance Notes/CUES  R TKR 7/26   Nustep  add    Step flex/ext str Add     GSS incline str  add    TKE with TB      HR/TR  Mini squat           Seated LAQ     HS curl with TB           SLR flexion     Supine heelslides 10x    Long sitting knee extension stretch 2x 30 sec holds    Calf stretch with strap 2x 30 sec holds                        Supine ext str with heel on roll X 2 min          NMR re-education (47465)  Min:5     Quad Set 10x 6 sec holds B LE QS for carry over; max cues w/ rolled sheet under knee              Therapeutic Activity (56068)  Min: 23 See p/o reassessment          Gait Training (71370)  Min: 15     Manual Intervention (11333) Min: 2     Pat mobs X 2 min          Modalities  Min:15     IFC with CP 4 pads R knee x 15 min  Pt supine with LE on wedge      Other Therapeutic Activities:   Patient was thoroughly educated on this date regarding prehabilitation goals, importance of PT sessions in improving overall ROM, strength and stability prior to surgery, and how prehabilitation will facilitate improved post-operative outcomes. The patient was educated on and instructed in HEP as listed. The patient was given a detailed handout for exercises to initiate in the hospital post-operatively as well as at home. The discharge plan from the hospital was reviewed with the patient; specifically, to reduce length of hospital stay and to minimize time before reinitiating outpatient physical therapy after surgery. Education regarding early mobility post-operatively in the hospital and emphasis on working with both physical therapy and nursing staff to achieve ambulation goal was provided. The patient was highly encouraged to attend joint class in hospital prior to surgery for further instructions on pre and post-surgical care. Also, education regarding goals and expectations was provided; specifically, knee flexion range of motion greater than or equal to 90 degrees and 0 degrees knee extension within 2 weeks to promote improved gait mechanics and ambulation. The patient was encouraged to utilize ice/cold pack after surgery to address pain, minimize swelling as often as possible. It is in my medical opinion that this patient is clear from all physical barriers prior to consideration for surgery, activity modifications prior to and post operatively have been discussed with this patient as well as discharge planning and is cleared for surgery from physical therapy perspective. Home Exercise Program:  Patient instructed in the above exercises for HEP.    Patient verbalized/demonstrated understanding and was issued written handout. 8/17: Supine roll ext str, long sit HSS, calf GSS     Therapeutic Exercise and NMR EXR  [x] (49274) Provided verbal/tactile cueing for activities related to strengthening, flexibility, endurance, ROM for improvements in LE, proximal hip, and core control with self care, mobility, lifting, ambulation.  [] (67997) Provided verbal/tactile cueing for activities related to improving balance, coordination, kinesthetic sense, posture, motor skill, proprioception  to assist with LE, proximal hip, and core control in self care, mobility, lifting, ambulation and eccentric single leg control.  [address] and Therapeutic Activities:    [x] (85883 or 60578) Provided verbal/tactile cueing for activities related to improving balance, coordination, kinesthetic sense, posture, motor skill, proprioception and motor activation to allow for proper function of core, proximal hip and LE with self care and ADLs and functional mobility.   [] (89020) Gait Re-education- Provided training and instruction to the patient for proper LE, core and proximal hip recruitment and positioning and eccentric body weight control with ambulation re-education including up and down stairs     Gait Training:  [] (10349) Provided training and instruction to the patient for proper postural muscle recruitment and positioning with ambulation re-education     Home Exercise Program:    [x] (91987) Reviewed/Progressed HEP activities related to strengthening, flexibility, endurance, ROM of core, proximal hip and LE for functional self-care, mobility, lifting and ambulation/stair navigation   [] (21286)Reviewed/Progressed HEP activities related to improving balance, coordination, kinesthetic sense, posture, motor skill, proprioception of core, proximal hip and LE for self care, mobility, lifting, and ambulation/stair navigation      Manual Treatments:  PROM / STM / Oscillations-Mobs:  G-I, II, III, IV (PA's, Inf., Post.)  [] (91698) Provided manual therapy to mobilize LE, proximal hip and/or LS spine soft tissue/joints for the purpose of modulating pain, promoting relaxation,  increasing ROM, reducing/eliminating soft tissue swelling/inflammation/restriction, improving soft tissue extensibility and allowing for proper ROM for normal function with self care, mobility, lifting and ambulation. Charges:  Timed Code Treatment Minutes: 40   Total Treatment Minutes: 55      [] EVAL (LOW) 43954 (typically 20 minutes face-to-face)  [] EVAL (MOD) 90691 (typically 30 minutes face-to-face)  [] EVAL (HIGH) 04070 (typically 45 minutes face-to-face)  [] RE-EVAL     [x] RI(11350) x     [] Dry needle 1 or 2 Muscles (31200)  [] NMR (85186) x     [] Dry needle 3+ Muscles (54282)  [] Manual (31448) x     [] Ultrasound (22998) x  [x] TA (03481) x2     [] Mech Traction (69357)  [] ES(attended) (46620)     [x] ES (un) (02707):   [] Vasopump (15815) [] Ionto (04139)   [] Gait (40694)  [] Other:    GOALS:updated goals 8/17  Patient stated goal: \"back to normal\"  Short Term Goals: 2 wks  1. Pt independent with hep   -?x Progressing: -? Met: -? Not Met: -? Adjusted  2. Pt rates pain improvement of 20-30% overall for ease with sleep   -?x Progressing: -? Met: -? Not Met: -? Adjusted  3. R knee ROM -4 - 110 for ease with dressing   -?x Progressing: -? Met: -? Not Met: -? Adjusted  4. Pt amb with cane in community    -?x Progressing: -? Met: -? Not Met: -? Adjusted         Long Term Goals: at discharge   1. WOMAC score of 74  -?x Progressing: -? Met: -? Not Met: -? Adjusted   2. Pt rates pain improvement of 40-50% overall for ease with sleep   -?x Progressing: -? Met: -? Not Met: -? Adjusted  3. R knee ROM -0 - 115 for ease with transfers   -?x Progressing: -? Met: -? Not Met: -? Adjusted   4. Pt amb without AD in community    -?x Progressing: -? Met: -? Not Met: -? Adjusted   5. Pt demonstrates 5/5 MMT R knee for ease with steps     -?x Progressing: -? Met: -? Not Met: -? Adjusted    ASSESSMENT:  See eval    Treatment/Activity Tolerance:  [x] Patient tolerated treatment well [] Patient limited by fatique  [] Patient limited by pain  [] Patient limited by other medical complications  [] Other:     Overall Progression Towards Functional goals/ Treatment Progress Update:  [] Patient is progressing as expected towards functional goals listed. [] Progression is slowed due to complexities/Impairments listed. [] Progression has been slowed due to co-morbidities. [x] Plan just implemented, too soon to assess goals progression <30days   [] Goals require adjustment due to lack of progress  [] Patient is not progressing as expected and requires additional follow up with physician  [] Other    Prognosis for POC: [x] Good [] Fair  [] Poor    Patient requires continued skilled intervention: [] Yes  [x] No        PLAN:PT resumed after TKR for strength, ROM and gait      [x] Continue per plan of care [] Alter current plan (see comments)  [] Plan of care initiated [] Hold pending upcoming surgery [] Discharge    Electronically signed by: Dennis Baptiste, PT , 2011      Note: If patient does not return for scheduled/recommended follow up visits, this note will serve as a discharge from care along with the most recent update on progress.

## 2021-08-19 ENCOUNTER — HOSPITAL ENCOUNTER (OUTPATIENT)
Dept: PHYSICAL THERAPY | Age: 66
Setting detail: THERAPIES SERIES
Discharge: HOME | End: 2021-08-19
Payer: MEDICARE

## 2021-08-19 PROCEDURE — G0283 ELEC STIM OTHER THAN WOUND: HCPCS

## 2021-08-19 PROCEDURE — 97110 THERAPEUTIC EXERCISES: CPT

## 2021-08-19 PROCEDURE — 97140 MANUAL THERAPY 1/> REGIONS: CPT

## 2021-08-19 NOTE — FLOWSHEET NOTE
[facility], [facility]  [address] [facility] [place]  Phone: [phone]   Fax:     [phone]                                                        Physical Therapy Treatment Note/ Progress Report:   Date:  2021    Patient Name:  Jhoana Amador    :  1955  MRN: [id number]    Pertinent Medical History: HTN, DM, Cerebral Infarction ( R side weakness) , Cardiomyopathy, R foot arthrodesis, R shoulder RTC repair, RA, anxiety, depression      C-SSRS Triggered by Intake questionnaire: Pt has appt for psychiatric consult next month      YES NO   In the past month, have you wished you were dead or wished you could go to sleep and not wake up? X   In the past month, have you had any thoughts of killing yourself? X                    Lifetime   YES NO   Have you ever done anything, started to do anything, or prepared to do anything to end your life?    X             Past 3 months    X       Medical/Treatment Diagnosis Information:  · Diagnosis: R TKR  · Treatment Diagnosis: decreased mobility, strength    Insurance/Certification information:  PT Insurance Information: [facility] Medicare Dual / [place]  Physician Information:  Referring Practitioner: Alec Ray MD  Plan of care signed (Y/N):  Sent for cosign     Date of Patient follow up with Physician:       Progress Report: []  Yes  [x]  No     Date Range for reporting period:  Beginnin2021  Ending:      Progress report due (10 Rx/or 30 days whichever is less): after surgery     Recertification due (POC duration/ or 90 days whichever is less): after surgery    Visit # POC/Insurance Allowable Auth Needed   2/ (post- op) BOMN []Yes   [x]No     Latex Allergy:  [x]NO      []YES  Preferred Language for Healthcare:   [x]English       []Other:    Functional Scale:       Date assessed: at eval  Test: WOMAC  Score: 74    Pain level:  5/10     History of Injury: reports chronic history of R knee pain that recently got worse. Patient is scheduled for R TKR on 7/26/21. Patient currently lives with  who is trying to take off work following surgery to care for her. Daughter is also around who came come over for a few hours a day. SUBJECTIVE:    8/17: Pt to PT for Prehab reeval after having R TKR on 7/26/21. She is amb with wheeled walker and rating pain at 5/10. Pt states she is doing hep, icing, and elevating daily. Pt TTP all over R knee, incision healing well with steri strips in place. Patella mobility decreased with pain and edema. 8/19:  Not doing so good. Just over 3 weeks post-op. Did feel a little more flexible after last session and after doing the exercises    OBJECTIVE:   · Observation:  · Functional Mobility/Transfers: minimal squat on R, able to complete sit to stand and bed mobility independently.   · Bandages/Dressings/Incisions: NA  · Gait: (include devices/WB status) Patient ambulates without AD in clinic demonstrating mild antalgic gait on R.    Test measurements:    ROM:  Date           Knee Flexion       Knee Extension    Active Passive Active Passive    Eval 7/8/21 120  0    8/17 104  -8               Strength:  Date Hip flexion Hip abduction Quad Hamstring Ankle DF Ankle PF   Eval 7/8 4+/5 4+/5 4/5 4+/5 5/5 5/5   8/17 3+  3- 4- 4+                 Functional testing Prehab        Date  7/8/21     4 week f/u   Date   8/23/21  8 week f/u  Date     D/C  Date               TUG 17.15 secs         30 second sit to stand 6 reps         6 minute walk 128 meters                     Balance           Narrow DOMINIK 10         Semi tandem 10         Tandem  10         SLS 5                     Knee AROM 0-120         Knee Extension MMT R/L L=11#  R=14#         Hip aBduction MMT R/L L=15#  R=17#         WOMAC 83                     RESTRICTIONS/PRECAUTIONS: none    Exercises/Interventions:     Therapeutic Ex (07333)   Min: 30 Reps/Resistance Notes/CUES  R TKR 7/26   Nustep  5 min #9   Step flex/ext str [id number]    GSS incline str  [id number]    TKE with TB      HR/TR  Mini squat  x10 ea         Seated LAQ x10 R    HS curl with TB           SLR flexion     Supine heelslides 10x    Long sitting knee extension stretch 2x 30 sec holds    Calf stretch with strap 2x 30 sec holds                        Supine ext str with heel on roll X 2 min          NMR re-education (74215)  Min:     Quad Set 10x 6 sec holds B LE QS for carry over; max cues w/ rolled sheet under knee              Therapeutic Activity (46261)  Min:  See p/o reassessment          Gait Training (56662)  Min:           Manual Intervention (07629) Min: 8     Pat mobs X 2 min       gentle STM to lat knee due to increased pain after exercises    Modalities  Min:15     IFC with CP 4 pads R knee x 15 min  Pt supine with LE on wedge      Other Therapeutic Activities:   Patient was thoroughly educated on this date regarding prehabilitation goals, importance of PT sessions in improving overall ROM, strength and stability prior to surgery, and how prehabilitation will facilitate improved post-operative outcomes. The patient was educated on and instructed in HEP as listed. The patient was given a detailed handout for exercises to initiate in the hospital post-operatively as well as at home. The discharge plan from the hospital was reviewed with the patient; specifically, to reduce length of hospital stay and to minimize time before reinitiating outpatient physical therapy after surgery. Education regarding early mobility post-operatively in the hospital and emphasis on working with both physical therapy and nursing staff to achieve ambulation goal was provided. The patient was highly encouraged to attend joint class in hospital prior to surgery for further instructions on pre and post-surgical care.  Also, education regarding goals and expectations was provided; specifically, knee flexion range of motion greater than or equal to 90 degrees and 0 degrees knee extension within 2 weeks to promote improved gait mechanics and ambulation. The patient was encouraged to utilize ice/cold pack after surgery to address pain, minimize swelling as often as possible. It is in my medical opinion that this patient is clear from all physical barriers prior to consideration for surgery, activity modifications prior to and post operatively have been discussed with this patient as well as discharge planning and is cleared for surgery from physical therapy perspective. Home Exercise Program:  Patient instructed in the above exercises for HEP. Patient verbalized/demonstrated understanding and was issued written handout. 8/17: Supine roll ext str, long sit HSS, calf GSS     Therapeutic Exercise and NMR EXR  [x] (33923) Provided verbal/tactile cueing for activities related to strengthening, flexibility, endurance, ROM for improvements in LE, proximal hip, and core control with self care, mobility, lifting, ambulation.  [] (02810) Provided verbal/tactile cueing for activities related to improving balance, coordination, kinesthetic sense, posture, motor skill, proprioception  to assist with LE, proximal hip, and core control in self care, mobility, lifting, ambulation and eccentric single leg control.  [address] and Therapeutic Activities:    [x] (00124 or 25535) Provided verbal/tactile cueing for activities related to improving balance, coordination, kinesthetic sense, posture, motor skill, proprioception and motor activation to allow for proper function of core, proximal hip and LE with self care and ADLs and functional mobility.   [] (54230) Gait Re-education- Provided training and instruction to the patient for proper LE, core and proximal hip recruitment and positioning and eccentric body weight control with ambulation re-education including up and down stairs     Gait Training:  [] (87467) Provided training and instruction to the patient for proper postural muscle recruitment and positioning with ambulation re-education     Home Exercise Program:    [x] (28115) Reviewed/Progressed HEP activities related to strengthening, flexibility, endurance, ROM of core, proximal hip and LE for functional self-care, mobility, lifting and ambulation/stair navigation   [] (45023)Reviewed/Progressed HEP activities related to improving balance, coordination, kinesthetic sense, posture, motor skill, proprioception of core, proximal hip and LE for self care, mobility, lifting, and ambulation/stair navigation      Manual Treatments:  PROM / STM / Oscillations-Mobs:  G-I, II, III, IV (PA's, Inf., Post.)  [] (29455) Provided manual therapy to mobilize LE, proximal hip and/or LS spine soft tissue/joints for the purpose of modulating pain, promoting relaxation,  increasing ROM, reducing/eliminating soft tissue swelling/inflammation/restriction, improving soft tissue extensibility and allowing for proper ROM for normal function with self care, mobility, lifting and ambulation. Charges:  Timed Code Treatment Minutes: 40   Total Treatment Minutes: 55      [] EVAL (LOW) 65446 (typically 20 minutes face-to-face)  [] EVAL (MOD) 70275 (typically 30 minutes face-to-face)  [] EVAL (HIGH) 79221 (typically 45 minutes face-to-face)  [] RE-EVAL     [x] VX(06725) x     [] Dry needle 1 or 2 Muscles (89650)  [] NMR (79395) x     [] Dry needle 3+ Muscles (98551)  [x] Manual (69712) x     [] Ultrasound (45524) x  [] TA (80858) x     [] Mech Traction (44682)  [] ES(attended) (90465)     [x] ES (un) (79005):   [] Vasopump (20210) [] Ionto (76880)   [] Gait (15461)  [] Other:    GOALS:updated goals 8/17  Patient stated goal: \"back to normal\"  Short Term Goals: 2 wks  1. Pt independent with hep   -?x Progressing: -? Met: -? Not Met: -? Adjusted  2. Pt rates pain improvement of 20-30% overall for ease with sleep   -?x Progressing: -? Met: -? Not Met: -? Adjusted  3. R knee ROM -4 - 110 for ease with dressing   -?x Progressing: -? Met: -? Not Met: -? Adjusted  4. Pt amb with cane in community    -?x Progressing: -? Met: -? Not Met: -? Adjusted         Long Term Goals: at discharge   1. WOMAC score of 74  -?x Progressing: -? Met: -? Not Met: -? Adjusted   2. Pt rates pain improvement of 40-50% overall for ease with sleep   -?x Progressing: -? Met: -? Not Met: -? Adjusted  3. R knee ROM -0 - 115 for ease with transfers   -?x Progressing: -? Met: -? Not Met: -? Adjusted   4. Pt amb without AD in community    -?x Progressing: -? Met: -? Not Met: -? Adjusted   5. Pt demonstrates 5/5 MMT R knee for ease with steps     -?x Progressing: -? Met: -? Not Met: -? Adjusted    ASSESSMENT:  See eval    Treatment/Activity Tolerance:  [x] Patient tolerated treatment well [] Patient limited by fatique  [x] Patient limited by pain  [] Patient limited by other medical complications  [] Other:     Overall Progression Towards Functional goals/ Treatment Progress Update:  [] Patient is progressing as expected towards functional goals listed. [] Progression is slowed due to complexities/Impairments listed. [] Progression has been slowed due to co-morbidities. [x] Plan just implemented, too soon to assess goals progression <30days   [] Goals require adjustment due to lack of progress  [] Patient is not progressing as expected and requires additional follow up with physician  [] Other    Prognosis for POC: [x] Good [] Fair  [] Poor    Patient requires continued skilled intervention: [] Yes  [x] No        PLAN:PT resumed after TKR for strength, ROM and gait      [x] Continue per plan of care [] Alter current plan (see comments)  [] Plan of care initiated [] Hold pending upcoming surgery [] Discharge    Electronically signed by: Dione Villagran PTA       Note: If patient does not return for scheduled/recommended follow up visits, this note will serve as a discharge from care along with the most recent update on progress.

## 2021-08-20 ENCOUNTER — HOSPITAL ENCOUNTER (OUTPATIENT)
Dept: PHYSICAL THERAPY | Age: 66
Setting detail: THERAPIES SERIES
End: 2021-08-20
Payer: MEDICARE

## 2021-08-24 ENCOUNTER — HOSPITAL ENCOUNTER (OUTPATIENT)
Dept: PHYSICAL THERAPY | Age: 66
Setting detail: THERAPIES SERIES
Discharge: HOME | End: 2021-08-24
Payer: MEDICARE

## 2021-08-24 PROCEDURE — 97140 MANUAL THERAPY 1/> REGIONS: CPT

## 2021-08-24 PROCEDURE — G0283 ELEC STIM OTHER THAN WOUND: HCPCS

## 2021-08-24 PROCEDURE — 97110 THERAPEUTIC EXERCISES: CPT

## 2021-08-24 NOTE — FLOWSHEET NOTE
[facility], [facility]  [address] [facility], [facility]  Phone: [phone]   Fax:     [phone]                                                        Physical Therapy Treatment Note/ Progress Report:   Date:  2021    Patient Name:  Kaitlyn Nash    :  1955  MRN: [id number]    Pertinent Medical History: HTN, DM, Cerebral Infarction ( R side weakness) , Cardiomyopathy, R foot arthrodesis, R shoulder RTC repair, RA, anxiety, depression      C-SSRS Triggered by Intake questionnaire: Pt has appt for psychiatric consult next month      YES NO   In the past month, have you wished you were dead or wished you could go to sleep and not wake up? X   In the past month, have you had any thoughts of killing yourself? X                    Lifetime   YES NO   Have you ever done anything, started to do anything, or prepared to do anything to end your life?    X             Past 3 months    X       Medical/Treatment Diagnosis Information:  · Diagnosis: R TKR  · Treatment Diagnosis: decreased mobility, strength    Insurance/Certification information:  PT Insurance Information: [facility] Medicare Dual / [place]  Physician Information:  Referring Practitioner: Brian Dior MD  Plan of care signed (Y/N):  Sent for cosign     Date of Patient follow up with Physician:       Progress Report: []  Yes  [x]  No     Date Range for reporting period:  Beginnin2021  Ending:      Progress report due (10 Rx/or 30 days whichever is less): after surgery     Recertification due (POC duration/ or 90 days whichever is less): after surgery    Visit # POC/Insurance Allowable Auth Needed   3/ (post- op) BOMN []Yes   [x]No     Latex Allergy:  [x]NO      []YES  Preferred Language for Healthcare:   [x]English       []Other:    Functional Scale:       Date assessed: at eval  Test: WOMAC  Score: 74    Pain level:  5/10     History of Injury: reports chronic history of R knee pain that recently got worse. Patient is scheduled for R TKR on 7/26/21. Patient currently lives with  who is trying to take off work following surgery to care for her. Daughter is also around who came come over for a few hours a day. SUBJECTIVE:    8/17: Pt to PT for Prehab reeval after having R TKR on 7/26/21. She is amb with wheeled walker and rating pain at 5/10. Pt states she is doing hep, icing, and elevating daily. Pt TTP all over R knee, incision healing well with steri strips in place. Patella mobility decreased with pain and edema. 8/19:  Not doing so good. Just over 3 weeks post-op. Did feel a little more flexible after last session and after doing the exercises  8/24: very sore and tender lateral and inferior knee    OBJECTIVE:   · Observation:  · Functional Mobility/Transfers: minimal squat on R, able to complete sit to stand and bed mobility independently.   · Bandages/Dressings/Incisions: NA  · Gait: (include devices/WB status) Patient ambulates without AD in clinic demonstrating mild antalgic gait on R.    Test measurements:    ROM:  Date           Knee Flexion       Knee Extension    Active Passive Active Passive    Eval 7/8/21 120  0    8/17 104  -8               Strength:  Date Hip flexion Hip abduction Quad Hamstring Ankle DF Ankle PF   Eval 7/8 4+/5 4+/5 4/5 4+/5 5/5 5/5   8/17 3+  3- 4- 4+                 Functional testing Prehab        Date  7/8/21     4 week f/u   Date   8/23/21  8 week f/u  Date     D/C  Date               TUG 17.15 secs         30 second sit to stand 6 reps         6 minute walk 128 meters                     Balance           Narrow DOMINIK 10         Semi tandem 10         Tandem  10         SLS 5                     Knee AROM 0-120         Knee Extension MMT R/L L=11#  R=14#         Hip aBduction MMT R/L L=15#  R=17#         WOMAC 83                     RESTRICTIONS/PRECAUTIONS: none    Exercises/Interventions:     Therapeutic Ex (79609)   Min: 30 Reps/Resistance Notes/CUES  R TKR 7/26   Nustep  5 min #9   Step flex/ext str [id number]    GSS incline str  [id number]    TKE with TB  Lime 10x 5sec    HR/TR  Mini squat  x10 ea  x10   cues        Seated LAQ x10 R    HS curl with TB  Orange x 10 R         SLR flexion x10 w/ min A    Supine heelslides 10x    Long sitting knee extension stretch 2x 30 sec holds    Calf stretch with strap 2x 30 sec holds                   PROM x10    Supine ext str with heel on roll X 2 min          NMR re-education (71293)  Min: 3     Quad Set 10x 6 sec holds B LE QS for carry over; max cues w/ rolled sheet under knee              Therapeutic Activity (92190)  Min:  See p/o reassessment          Gait Training (18061)  Min:           Manual Intervention (16198) Min: 12     Pat mobs X 2 min     STM lat knee and hamstrings    Modalities  Min:15     IFC with CP 4 pads R knee x 15 min  Pt supine with LE on wedge      Other Therapeutic Activities:   Patient was thoroughly educated on this date regarding prehabilitation goals, importance of PT sessions in improving overall ROM, strength and stability prior to surgery, and how prehabilitation will facilitate improved post-operative outcomes. The patient was educated on and instructed in HEP as listed. The patient was given a detailed handout for exercises to initiate in the hospital post-operatively as well as at home. The discharge plan from the hospital was reviewed with the patient; specifically, to reduce length of hospital stay and to minimize time before reinitiating outpatient physical therapy after surgery. Education regarding early mobility post-operatively in the hospital and emphasis on working with both physical therapy and nursing staff to achieve ambulation goal was provided. The patient was highly encouraged to attend joint class in hospital prior to surgery for further instructions on pre and post-surgical care.  Also, education regarding goals and expectations was provided; Training:  [] (39298) Provided training and instruction to the patient for proper postural muscle recruitment and positioning with ambulation re-education     Home Exercise Program:    [x] (27684) Reviewed/Progressed HEP activities related to strengthening, flexibility, endurance, ROM of core, proximal hip and LE for functional self-care, mobility, lifting and ambulation/stair navigation   [] (84199)Reviewed/Progressed HEP activities related to improving balance, coordination, kinesthetic sense, posture, motor skill, proprioception of core, proximal hip and LE for self care, mobility, lifting, and ambulation/stair navigation      Manual Treatments:  PROM / STM / Oscillations-Mobs:  G-I, II, III, IV (PA's, Inf., Post.)  [] (95796) Provided manual therapy to mobilize LE, proximal hip and/or LS spine soft tissue/joints for the purpose of modulating pain, promoting relaxation,  increasing ROM, reducing/eliminating soft tissue swelling/inflammation/restriction, improving soft tissue extensibility and allowing for proper ROM for normal function with self care, mobility, lifting and ambulation. Charges:  Timed Code Treatment Minutes: 45   Total Treatment Minutes: 60      [] EVAL (LOW) 65035 (typically 20 minutes face-to-face)  [] EVAL (MOD) 62754 (typically 30 minutes face-to-face)  [] EVAL (HIGH) 72103 (typically 45 minutes face-to-face)  [] RE-EVAL     [x] WOLF(89847) x     [] Dry needle 1 or 2 Muscles (69462)  [] NMR (83544) x     [] Dry needle 3+ Muscles (24802)  [x] Manual (79294) x     [] Ultrasound (64152) x  [] TA (30186) x     [] Mech Traction (64277)  [] ES(attended) (16536)     [x] ES (un) (02620):   [] Vasopump (25361) [] Ionto (18115)   [] Gait (64463)  [] Other:    GOALS:updated goals 8/17  Patient stated goal: \"back to normal\"  Short Term Goals: 2 wks  1. Pt independent with hep   -?x Progressing: -? Met: -? Not Met: -? Adjusted  2.  Pt rates pain improvement of 20-30% overall for ease with sleep   -?x Progressing: -? Met: -? Not Met: -? Adjusted  3. R knee ROM -4 - 110 for ease with dressing   -?x Progressing: -? Met: -? Not Met: -? Adjusted  4. Pt amb with cane in community    -?x Progressing: -? Met: -? Not Met: -? Adjusted         Long Term Goals: at discharge   1. WOMAC score of 74  -?x Progressing: -? Met: -? Not Met: -? Adjusted   2. Pt rates pain improvement of 40-50% overall for ease with sleep   -?x Progressing: -? Met: -? Not Met: -? Adjusted  3. R knee ROM -0 - 115 for ease with transfers   -?x Progressing: -? Met: -? Not Met: -? Adjusted   4. Pt amb without AD in community    -?x Progressing: -? Met: -? Not Met: -? Adjusted   5. Pt demonstrates 5/5 MMT R knee for ease with steps     -?x Progressing: -? Met: -? Not Met: -? Adjusted    ASSESSMENT:  See eval    Treatment/Activity Tolerance:  [x] Patient tolerated treatment well [] Patient limited by fatique  [x] Patient limited by pain  [] Patient limited by other medical complications  [] Other:     Overall Progression Towards Functional goals/ Treatment Progress Update:  [] Patient is progressing as expected towards functional goals listed. [] Progression is slowed due to complexities/Impairments listed. [] Progression has been slowed due to co-morbidities.   [x] Plan just implemented, too soon to assess goals progression <30days   [] Goals require adjustment due to lack of progress  [] Patient is not progressing as expected and requires additional follow up with physician  [] Other    Prognosis for POC: [x] Good [] Fair  [] Poor    Patient requires continued skilled intervention: [] Yes  [x] No        PLAN:PT resumed after TKR for strength, ROM and gait      [x] Continue per plan of care [] Alter current plan (see comments)  [] Plan of care initiated [] Hold pending upcoming surgery [] Discharge    Electronically signed by: Neha Marion PTA       Note: If patient does not return for scheduled/recommended follow up visits, this note will serve as a discharge from care along with the most recent update on progress.

## 2021-08-26 ENCOUNTER — HOSPITAL ENCOUNTER (OUTPATIENT)
Dept: PHYSICAL THERAPY | Age: 66
Setting detail: THERAPIES SERIES
Discharge: HOME | End: 2021-08-26
Payer: MEDICARE

## 2021-08-26 PROCEDURE — 97112 NEUROMUSCULAR REEDUCATION: CPT

## 2021-08-26 PROCEDURE — 97110 THERAPEUTIC EXERCISES: CPT

## 2021-08-26 PROCEDURE — G0283 ELEC STIM OTHER THAN WOUND: HCPCS

## 2021-08-26 PROCEDURE — 97140 MANUAL THERAPY 1/> REGIONS: CPT

## 2021-08-26 NOTE — FLOWSHEET NOTE
[facility]  [address] [facility]  Phone: [phone]   Fax:     [phone]                                                        Physical Therapy Treatment Note/ Progress Report:   Date:  2021    Patient Name:  Fabiano Hamilton    :  1955  MRN: [id number]    Pertinent Medical History: HTN, DM, Cerebral Infarction ( R side weakness) , Cardiomyopathy, R foot arthrodesis, R shoulder RTC repair, RA, anxiety, depression      C-SSRS Triggered by Intake questionnaire: Pt has appt for psychiatric consult next month      YES NO   In the past month, have you wished you were dead or wished you could go to sleep and not wake up? X   In the past month, have you had any thoughts of killing yourself? X                    Lifetime   YES NO   Have you ever done anything, started to do anything, or prepared to do anything to end your life?    X             Past 3 months    X       Medical/Treatment Diagnosis Information:  · Diagnosis: R TKR  · Treatment Diagnosis: decreased mobility, strength    Insurance/Certification information:  PT Insurance Information: [facility] Medicare Dual / [employer]  Physician Information:  Referring Practitioner: Kushal Sheppard MD  Plan of care signed (Y/N):  Sent to inbox    Date of Patient follow up with Physician:       Progress Report: []  Yes  [x]  No     Date Range for reporting period:  Beginnin2021  Ending:      Progress report due (10 Rx/or 30 days whichever is less): after surgery     Recertification due (POC duration/ or 90 days whichever is less): after surgery    Visit # POC/Insurance Allowable Auth Needed    (post- op) BOMN []Yes   [x]No     Latex Allergy:  [x]NO      []YES  Preferred Language for Healthcare:   [x]English       []Other:    Functional Scale:       Date assessed: at eval  Test: WOMAC  Score: 74    Pain level:  3-4/10     History of Injury: reports chronic history of R knee pain that recently got worse. Patient is scheduled for R TKR on 7/26/21. Patient currently lives with  who is trying to take off work following surgery to care for her. Daughter is also around who came come over for a few hours a day. SUBJECTIVE:    8/17: Pt to PT for Prehab reeval after having R TKR on 7/26/21. She is amb with wheeled walker and rating pain at 5/10. Pt states she is doing hep, icing, and elevating daily. Pt TTP all over R knee, incision healing well with steri strips in place. Patella mobility decreased with pain and edema. 8/19:  Not doing so good. Just over 3 weeks post-op. Did feel a little more flexible after last session and after doing the exercises  8/24: very sore and tender lateral and inferior knee  8/26: \"feeling much better today\"    OBJECTIVE:   · Observation:  · Functional Mobility/Transfers: minimal squat on R, able to complete sit to stand and bed mobility independently.   · Bandages/Dressings/Incisions: NA  · Gait: (include devices/WB status) Patient ambulates without AD in clinic demonstrating mild antalgic gait on R.    Test measurements:    ROM:  Date           Knee Flexion       Knee Extension    Active Passive Active Passive    preop Eval 7/8/21 120  0    Post op 8/17 104  -8    8/26 117  -4        Strength:  Date Hip flexion Hip abduction Quad Hamstring Ankle DF Ankle PF   Eval 7/8 4+/5 4+/5 4/5 4+/5 5/5 5/5   8/17 3+  3- 4- 4+                 Functional testing Prehab        Date  7/8/21     4 week f/u   Date   8/24/21  8 week f/u  Date    9/20 D/C  Date               TUG 17.15 secs NT        30 second sit to stand 6 reps NT        6 minute walk 128 meters NT                    Balance           Narrow DOMINIK 10  10       Semi tandem 10 10       Tandem  10  9       SLS 5  NT                   Knee AROM 0-120 -4 - 117       Knee Extension MMT R/L L=11#  R=14#    R=12#       Hip aBduction MMT R/L L=15#  R=17#    R=NT       WOMAC 83  74                 RESTRICTIONS/PRECAUTIONS: none    Exercises/Interventions:     Therapeutic Ex (01468)   Min: 30 Reps/Resistance Notes/CUES  R TKR 7/26   Nustep  5 min #9   Step flex  Step ext str On/off x 3 min   1e34fns    GSS incline str  9p03stk    TKE with TB  Lime 10 x 5sec    HR/TR  Mini squat  x10 ea  x10   cues   Standing march                  Hip abd  0# x 10 L/R  0# x 10 L/R         Seated LAQ 1# x10 R    HS curl with TB  Orange x 10 R         SAQ 0# x 10     SLR flexion x10 w/ min A    Supine heelslides 10x    Long sitting knee extension stretch hep   Calf stretch with strap hep                       Supine ext str with heel on roll X 2 min          NMR re-education (16122)  Min: 3     Quad Set 10x 6 sec holds B LE QS for carry over; max cues w/ rolled sheet under knee    SLS R          Therapeutic Activity (52739)  Min:  See p/o reassessment     Step up fwd     Step down               Gait Training (56205)  Min: 5     ll bars:  * amb with 1 HR X 2 lengths Min cues   amb with std cane in dept add    Manual Intervention (07736) Min: 12     Pat mobs X 2 min     PROM  *supine flex  *supine ext    X 5 min     STM lat knee and hamstrings x 5 min     Modalities  Min:15     IFC with CP 4 pads R knee x 15 min  Pt supine with LE on wedge      Other Therapeutic Activities:   Patient was thoroughly educated on this date regarding prehabilitation goals, importance of PT sessions in improving overall ROM, strength and stability prior to surgery, and how prehabilitation will facilitate improved post-operative outcomes. The patient was educated on and instructed in HEP as listed. The patient was given a detailed handout for exercises to initiate in the hospital post-operatively as well as at home. The discharge plan from the hospital was reviewed with the patient; specifically, to reduce length of hospital stay and to minimize time before reinitiating outpatient physical therapy after surgery.  Education regarding early mobility post-operatively in the hospital and emphasis on working with both physical therapy and nursing staff to achieve ambulation goal was provided. The patient was highly encouraged to attend joint class in hospital prior to surgery for further instructions on pre and post-surgical care. Also, education regarding goals and expectations was provided; specifically, knee flexion range of motion greater than or equal to 90 degrees and 0 degrees knee extension within 2 weeks to promote improved gait mechanics and ambulation. The patient was encouraged to utilize ice/cold pack after surgery to address pain, minimize swelling as often as possible. It is in my medical opinion that this patient is clear from all physical barriers prior to consideration for surgery, activity modifications prior to and post operatively have been discussed with this patient as well as discharge planning and is cleared for surgery from physical therapy perspective. Home Exercise Program:  Patient instructed in the above exercises for HEP. Patient verbalized/demonstrated understanding and was issued written handout. 8/17: Supine roll ext str, long sit HSS, calf GSS  8/26: SAQ, standing HR, squat, march and abd     Therapeutic Exercise and NMR EXR  [x] (92994) Provided verbal/tactile cueing for activities related to strengthening, flexibility, endurance, ROM for improvements in LE, proximal hip, and core control with self care, mobility, lifting, ambulation.  [] (84689) Provided verbal/tactile cueing for activities related to improving balance, coordination, kinesthetic sense, posture, motor skill, proprioception  to assist with LE, proximal hip, and core control in self care, mobility, lifting, ambulation and eccentric single leg control.  [facility] The[facility] Activities:    [x] (72061 or 49724) Provided verbal/tactile cueing for activities related to improving balance, coordination, kinesthetic sense, posture, motor skill, proprioception and motor activation to allow for proper function of core, proximal hip and LE with self care and ADLs and functional mobility.   [] (40566) Gait Re-education- Provided training and instruction to the patient for proper LE, core and proximal hip recruitment and positioning and eccentric body weight control with ambulation re-education including up and down stairs     Gait Training:  [] (07622) Provided training and instruction to the patient for proper postural muscle recruitment and positioning with ambulation re-education     Home Exercise Program:    [x] (13239) Reviewed/Progressed HEP activities related to strengthening, flexibility, endurance, ROM of core, proximal hip and LE for functional self-care, mobility, lifting and ambulation/stair navigation   [] (03146)Reviewed/Progressed HEP activities related to improving balance, coordination, kinesthetic sense, posture, motor skill, proprioception of core, proximal hip and LE for self care, mobility, lifting, and ambulation/stair navigation      Manual Treatments:  PROM / STM / Oscillations-Mobs:  G-I, II, III, IV (PA's, Inf., Post.)  [] (22223) Provided manual therapy to mobilize LE, proximal hip and/or LS spine soft tissue/joints for the purpose of modulating pain, promoting relaxation,  increasing ROM, reducing/eliminating soft tissue swelling/inflammation/restriction, improving soft tissue extensibility and allowing for proper ROM for normal function with self care, mobility, lifting and ambulation.      Charges:  Timed Code Treatment Minutes: 50   Total Treatment Minutes: 65      [] EVAL (LOW) 42993 (typically 20 minutes face-to-face)  [] EVAL (MOD) 19839 (typically 30 minutes face-to-face)  [] EVAL (HIGH) 56766 (typically 45 minutes face-to-face)  [] RE-EVAL     [x] GS(36927) x     [] Dry needle 1 or 2 Muscles (46769)  [x] NMR (36207) x     [] Dry needle 3+ Muscles (72560)  [x] Manual (30877) x     [] Ultrasound (78459) x  [] TA (82275) x [] [facility] (70905[facility]  [] ES(attended) (23676)     [x] ES (un) (57001):   [] Vasopump (79083) [] Ionto (98936)   [] Gait (62505)  [] Other:    GOALS:updated goals 8/17  Patient stated goal: \"back to normal\"  Short Term Goals: 2 wks  1. Pt independent with hep   -?x Progressing: -? Met: -? Not Met: -? Adjusted  2. Pt rates pain improvement of 20-30% overall for ease with sleep   -?x Progressing: -? Met: -? Not Met: -? Adjusted  3. R knee ROM -4 - 110 for ease with dressing   -?x Progressing: -? Met: -? Not Met: -? Adjusted  4. Pt amb with cane in community    -?x Progressing: -? Met: -? Not Met: -? Adjusted         Long Term Goals: at discharge   1. WOMAC score of 74  -?x Progressing: -? Met: -? Not Met: -? Adjusted   2. Pt rates pain improvement of 40-50% overall for ease with sleep   -?x Progressing: -? Met: -? Not Met: -? Adjusted  3. R knee ROM -0 - 115 for ease with transfers   -?x Progressing: -? Met: -? Not Met: -? Adjusted   4. Pt amb without AD in community    -?x Progressing: -? Met: -? Not Met: -? Adjusted   5. Pt demonstrates 5/5 MMT R knee for ease with steps     -?x Progressing: -? Met: -? Not Met: -? Adjusted    ASSESSMENT:  See eval    Treatment/Activity Tolerance:  [x] Patient tolerated treatment well [] Patient limited by fatique  [x] Patient limited by pain  [] Patient limited by other medical complications  [] Other:     Overall Progression Towards Functional goals/ Treatment Progress Update:  [] Patient is progressing as expected towards functional goals listed. [] Progression is slowed due to complexities/Impairments listed. [] Progression has been slowed due to co-morbidities.   [x] Plan just implemented, too soon to assess goals progression <30days   [] Goals require adjustment due to lack of progress  [] Patient is not progressing as expected and requires additional follow up with physician  [] Other    Prognosis for POC: [x] Good [] Fair  [] Poor    Patient requires continued skilled intervention: [] Yes  [x] No        PLAN:PT resumed after TKR for strength, ROM and gait      [x] Continue per plan of care [] Alter current plan (see comments)  [] Plan of care initiated [] Hold pending upcoming surgery [] Discharge    Electronically signed by: Gayle Dumont, PT [id number]      Note: If patient does not return for scheduled/recommended follow up visits, this note will serve as a discharge from care along with the most recent update on progress.

## 2021-08-31 ENCOUNTER — HOSPITAL ENCOUNTER (OUTPATIENT)
Dept: PHYSICAL THERAPY | Age: 66
Setting detail: THERAPIES SERIES
Discharge: HOME | End: 2021-08-31
Payer: MEDICARE

## 2021-08-31 DIAGNOSIS — E11.8 TYPE 2 DIABETES MELLITUS WITH COMPLICATION, WITHOUT LONG-TERM CURRENT USE OF INSULIN (HCC): ICD-10-CM

## 2021-08-31 PROCEDURE — 97140 MANUAL THERAPY 1/> REGIONS: CPT

## 2021-08-31 PROCEDURE — G0283 ELEC STIM OTHER THAN WOUND: HCPCS

## 2021-08-31 PROCEDURE — 97110 THERAPEUTIC EXERCISES: CPT

## 2021-08-31 PROCEDURE — 97112 NEUROMUSCULAR REEDUCATION: CPT

## 2021-08-31 NOTE — FLOWSHEET NOTE
[facility]  [address] [facility]  Phone: [phone]   Fax:     [phone]                                                        Physical Therapy Treatment Note/ Progress Report:   Date:  2021    Patient Name:  Annamarie Lin    :  1955  MRN: [id number]    Pertinent Medical History: HTN, DM, Cerebral Infarction ( R side weakness) , Cardiomyopathy, R foot arthrodesis, R shoulder RTC repair, RA, anxiety, depression      C-SSRS Triggered by Intake questionnaire: Pt has appt for psychiatric consult next month      YES NO   In the past month, have you wished you were dead or wished you could go to sleep and not wake up? X   In the past month, have you had any thoughts of killing yourself? X                    Lifetime   YES NO   Have you ever done anything, started to do anything, or prepared to do anything to end your life?    X             Past 3 months    X       Medical/Treatment Diagnosis Information:  · Diagnosis: R TKR  · Treatment Diagnosis: decreased mobility, strength    Insurance/Certification information:  PT Insurance Information: [facility] Medicare Dual / [place] [place]  Physician Information:  Referring Practitioner: Trupti Diaz MD  Plan of care signed (Y/N):  Sent to inbox    Date of Patient follow up with Physician:       Progress Report: []  Yes  [x]  No     Date Range for reporting period:  Beginnin2021  Ending:      Progress report due (10 Rx/or 30 days whichever is less): after surgery     Recertification due (POC duration/ or 90 days whichever is less): after surgery    Visit # POC/Insurance Allowable Auth Needed    (post- op) BOMN []Yes   [x]No     Latex Allergy:  [x]NO      []YES  Preferred Language for Healthcare:   [x]English       []Other:    Functional Scale:       Date assessed: at eval  Test: WOMAC  Score: 74    Pain level:  3-4/10     History of Injury: reports chronic history of R knee pain that recently got worse. Patient is scheduled for R TKR on 7/26/21. Patient currently lives with  who is trying to take off work following surgery to care for her. Daughter is also around who came come over for a few hours a day. SUBJECTIVE:    8/17: Pt to PT for Prehab reeval after having R TKR on 7/26/21. She is amb with wheeled walker and rating pain at 5/10. Pt states she is doing hep, icing, and elevating daily. Pt TTP all over R knee, incision healing well with steri strips in place. Patella mobility decreased with pain and edema. 8/19:  Not doing so good. Just over 3 weeks post-op. Did feel a little more flexible after last session and after doing the exercises  8/24: very sore and tender lateral and inferior knee  8/26: \"feeling much better today\"  8/31: it's getting better, pain is there but not severe     OBJECTIVE:   · Observation:  · Functional Mobility/Transfers: minimal squat on R, able to complete sit to stand and bed mobility independently.   · Bandages/Dressings/Incisions: NA  · Gait: (include devices/WB status) Patient ambulates without AD in clinic demonstrating mild antalgic gait on R.    Test measurements:    ROM:  Date           Knee Flexion       Knee Extension    Active Passive Active Passive    preop Eval 7/8/21 120  0    Post op 8/17 104  -8    8/26 117  -4        Strength:  Date Hip flexion Hip abduction Quad Hamstring Ankle DF Ankle PF   Eval 7/8 4+/5 4+/5 4/5 4+/5 5/5 5/5   8/17 3+  3- 4- 4+                 Functional testing Prehab        Date  7/8/21     4 week f/u   Date   8/24/21  8 week f/u  Date    9/20 D/C  Date               TUG 17.15 secs NT        30 second sit to stand 6 reps NT        6 minute walk 128 meters NT                    Balance           Narrow DOMINIK 10  10       Semi tandem 10 10       Tandem  10  9       SLS 5  NT                   Knee AROM 0-120 -4 - 117       Knee Extension MMT R/L L=11#  R=14#    R=12#       Hip aBduction MMT R/L L=15#  R=17#    R=NT       WOMAC 83  74                   RESTRICTIONS/PRECAUTIONS: none    Exercises/Interventions:     Therapeutic Ex (44742)   Min: 30 Reps/Resistance Notes/CUES  R TKR 7/26   Nustep  5 min #7-6 moved closer after a few minutes for inc knee ROM   Step flex  Step ext str On/off x 3 min   [id number]    GSS incline str  [id number]    TKE with TB  Lime 10 x 5sec    HR/TR  Mini squat  x10 ea  x10   cues   Standing march                  Hip abd  0# x 10 L/R  0# x 10 L/R         Seated LAQ 1# 2x10 R    HS curl with TB  Orange 2 x 10 R         SAQ 1# 2 x 10     SLR flexion x10  Pt notes less pain with exercise today    Supine heelslides  hep   Long sitting knee extension stretch hep   Calf stretch with strap hep                       Supine ext str with heel on roll X 2 min          NMR re-education (49934)  Min: 3     Quad Set 10x 6 sec holds B LE QS for carry over; max cues w/ rolled sheet under knee    SLS R add         Therapeutic Activity (30283)  Min:       Step up fwd 4\" x 5     Step down               Gait Training (54139)  Min: 5     ll bars:  * amb with 1 HR  * retro    X 3 lengths  X 3 lengths Min cues   reebok f/b s/s add    amb with std cane in dept From ll bars to low mat  SBA with cues    Manual Intervention (60976) Min: 12     Pat mobs X 2 min     PROM  *supine flex  *supine ext    X 5 min     STM lat knee and hamstrings x 5 min     Modalities  Min:15     IFC with CP 4 pads R knee x 15 min  Pt supine with LE on wedge      Other Therapeutic Activities:   Patient was thoroughly educated on this date regarding prehabilitation goals, importance of PT sessions in improving overall ROM, strength and stability prior to surgery, and how prehabilitation will facilitate improved post-operative outcomes. The patient was educated on and instructed in HEP as listed. The patient was given a detailed handout for exercises to initiate in the hospital post-operatively as well as at home.  The discharge plan from the hospital was reviewed with the patient; specifically, to reduce length of hospital stay and to minimize time before reinitiating outpatient physical therapy after surgery. Education regarding early mobility post-operatively in the hospital and emphasis on working with both physical therapy and nursing staff to achieve ambulation goal was provided. The patient was highly encouraged to attend joint class in hospital prior to surgery for further instructions on pre and post-surgical care. Also, education regarding goals and expectations was provided; specifically, knee flexion range of motion greater than or equal to 90 degrees and 0 degrees knee extension within 2 weeks to promote improved gait mechanics and ambulation. The patient was encouraged to utilize ice/cold pack after surgery to address pain, minimize swelling as often as possible. It is in my medical opinion that this patient is clear from all physical barriers prior to consideration for surgery, activity modifications prior to and post operatively have been discussed with this patient as well as discharge planning and is cleared for surgery from physical therapy perspective. Home Exercise Program:  Patient instructed in the above exercises for HEP. Patient verbalized/demonstrated understanding and was issued written handout. 8/17: Supine roll ext str, long sit HSS, calf GSS  8/26: SAQ, standing HR, squat, march and abd     Therapeutic Exercise and NMR EXR  [x] (25131) Provided verbal/tactile cueing for activities related to strengthening, flexibility, endurance, ROM for improvements in LE, proximal hip, and core control with self care, mobility, lifting, ambulation.  [] (98499) Provided verbal/tactile cueing for activities related to improving balance, coordination, kinesthetic sense, posture, motor skill, proprioception  to assist with LE, proximal hip, and core control in self care, mobility, lifting, ambulation and eccentric single leg control. [address] and Therapeutic Activities:    [x] (06146 or 75593) Provided verbal/tactile cueing for activities related to improving balance, coordination, kinesthetic sense, posture, motor skill, proprioception and motor activation to allow for proper function of core, proximal hip and LE with self care and ADLs and functional mobility.   [] (82801) Gait Re-education- Provided training and instruction to the patient for proper LE, core and proximal hip recruitment and positioning and eccentric body weight control with ambulation re-education including up and down stairs     Gait Training:  [] (41513) Provided training and instruction to the patient for proper postural muscle recruitment and positioning with ambulation re-education     Home Exercise Program:    [x] (98926) Reviewed/Progressed HEP activities related to strengthening, flexibility, endurance, ROM of core, proximal hip and LE for functional self-care, mobility, lifting and ambulation/stair navigation   [] (52259)Reviewed/Progressed HEP activities related to improving balance, coordination, kinesthetic sense, posture, motor skill, proprioception of core, proximal hip and LE for self care, mobility, lifting, and ambulation/stair navigation      Manual Treatments:  PROM / STM / Oscillations-Mobs:  G-I, II, III, IV (PA's, Inf., Post.)  [] (55502) Provided manual therapy to mobilize LE, proximal hip and/or LS spine soft tissue/joints for the purpose of modulating pain, promoting relaxation,  increasing ROM, reducing/eliminating soft tissue swelling/inflammation/restriction, improving soft tissue extensibility and allowing for proper ROM for normal function with self care, mobility, lifting and ambulation.      Charges:  Timed Code Treatment Minutes: 50   Total Treatment Minutes: 65      [] EVAL (LOW) 21979 (typically 20 minutes face-to-face)  [] EVAL (MOD) 79673 (typically 30 minutes face-to-face)  [] EVAL (HIGH) 48821 (typically 45 minutes face-to-face)  [] RE-EVAL     [x] HS(02322) x     [] Dry needle 1 or 2 Muscles (18567)  [x] NMR (76650) x     [] Dry needle 3+ Muscles (09322)  [x] Manual (89097) x     [] Ultrasound (97691) x  [] TA (96160) x     [] Mech Traction (65273)  [] ES(attended) (52527)     [x] ES (un) (04201):   [] Vasopump (30910) [] Ionto (20161)   [] Gait (75538)  [] Other:    GOALS:updated goals 8/17  Patient stated goal: \"back to normal\"  Short Term Goals: 2 wks  1. Pt independent with hep   -?x Progressing: -? Met: -? Not Met: -? Adjusted  2. Pt rates pain improvement of 20-30% overall for ease with sleep   -?x Progressing: -? Met: -? Not Met: -? Adjusted  3. R knee ROM -4 - 110 for ease with dressing   -?x Progressing: -? Met: -? Not Met: -? Adjusted  4. Pt amb with cane in community    -?x Progressing: -? Met: -? Not Met: -? Adjusted         Long Term Goals: at discharge   1. WOMAC score of 74  -?x Progressing: -? Met: -? Not Met: -? Adjusted   2. Pt rates pain improvement of 40-50% overall for ease with sleep   -?x Progressing: -? Met: -? Not Met: -? Adjusted  3. R knee ROM -0 - 115 for ease with transfers   -?x Progressing: -? Met: -? Not Met: -? Adjusted   4. Pt amb without AD in community    -?x Progressing: -? Met: -? Not Met: -? Adjusted   5. Pt demonstrates 5/5 MMT R knee for ease with steps     -?x Progressing: -? Met: -? Not Met: -? Adjusted    ASSESSMENT:  See eval    Treatment/Activity Tolerance:  [x] Patient tolerated treatment well [] Patient limited by fatique  [x] Patient limited by pain  [] Patient limited by other medical complications  [] Other:     Overall Progression Towards Functional goals/ Treatment Progress Update:  [] Patient is progressing as expected towards functional goals listed. [] Progression is slowed due to complexities/Impairments listed. [] Progression has been slowed due to co-morbidities.   [x] Plan just implemented, too soon to assess goals progression <30days   [] Goals require adjustment due to lack of progress  [] Patient is not progressing as expected and requires additional follow up with physician  [] Other    Prognosis for POC: [x] Good [] Fair  [] Poor    Patient requires continued skilled intervention: [] Yes  [x] No        PLAN:PT resumed after TKR for strength, ROM and gait      [x] Continue per plan of care [] Alter current plan (see comments)  [] Plan of care initiated [] Hold pending upcoming surgery [] Discharge    Electronically signed by: Rivera Mendoza, PT [id number]      Note: If patient does not return for scheduled/recommended follow up visits, this note will serve as a discharge from care along with the most recent update on progress.

## 2021-09-01 RX ORDER — DULAGLUTIDE 1.5 MG/.5ML
INJECTION, SOLUTION SUBCUTANEOUS
Qty: 6 ML | Refills: 3 | Status: SHIPPED | OUTPATIENT
Start: 2021-09-01 | End: 2022-06-28

## 2021-09-02 ENCOUNTER — HOSPITAL ENCOUNTER (OUTPATIENT)
Dept: PHYSICAL THERAPY | Age: 66
Setting detail: THERAPIES SERIES
Discharge: HOME | End: 2021-09-02
Payer: MEDICARE

## 2021-09-02 PROCEDURE — G0283 ELEC STIM OTHER THAN WOUND: HCPCS

## 2021-09-02 PROCEDURE — 97112 NEUROMUSCULAR REEDUCATION: CPT

## 2021-09-02 PROCEDURE — 97140 MANUAL THERAPY 1/> REGIONS: CPT

## 2021-09-02 PROCEDURE — 97110 THERAPEUTIC EXERCISES: CPT

## 2021-09-02 NOTE — FLOWSHEET NOTE
[place] [facility], [facility]  [address] [place], [facility]  Phone: [phone]   Fax:     [phone]                                                        Physical Therapy Treatment Note/ Progress Report:   Date:  2021    Patient Name:  Alexis Baig    :  1955  MRN: [id number]    Pertinent Medical History: HTN, DM, Cerebral Infarction ( R side weakness) , Cardiomyopathy, R foot arthrodesis, R shoulder RTC repair, RA, anxiety, depression      C-SSRS Triggered by Intake questionnaire: Pt has appt for psychiatric consult next month      YES NO   In the past month, have you wished you were dead or wished you could go to sleep and not wake up? X   In the past month, have you had any thoughts of killing yourself? X                    Lifetime   YES NO   Have you ever done anything, started to do anything, or prepared to do anything to end your life?    X             Past 3 months    X       Medical/Treatment Diagnosis Information:  · Diagnosis: R TKR  · Treatment Diagnosis: decreased mobility, strength    Insurance/Certification information:  PT Insurance Information: [facility] Medicare Dual / [place]  Physician Information:  Referring Practitioner: Shasha Oliver MD  Plan of care signed (Y/N):  Sent to inbox    Date of Patient follow up with Physician:       Progress Report: []  Yes  [x]  No     Date Range for reporting period:  Beginnin2021  Ending:      Progress report due (10 Rx/or 30 days whichever is less): after surgery     Recertification due (POC duration/ or 90 days whichever is less): after surgery    Visit # POC/Insurance Allowable Auth Needed    (post- op) BOMN []Yes   [x]No     Latex Allergy:  [x]NO      []YES  Preferred Language for Healthcare:   [x]English       []Other:    Functional Scale:       Date assessed: at eval  Test: WOMAC  Score: 74    Pain level:  6/10     History of Injury: reports chronic history of R knee pain that recently got worse. Patient is scheduled for R TKR on 7/26/21. Patient currently lives with  who is trying to take off work following surgery to care for her. Daughter is also around who came come over for a few hours a day. SUBJECTIVE:    8/17: Pt to PT for Prehab reeval after having R TKR on 7/26/21. She is amb with wheeled walker and rating pain at 5/10. Pt states she is doing hep, icing, and elevating daily. Pt TTP all over R knee, incision healing well with steri strips in place. Patella mobility decreased with pain and edema. 8/19:  Not doing so good. Just over 3 weeks post-op. Did feel a little more flexible after last session and after doing the exercises  8/24: very sore and tender lateral and inferior knee  8/26: \"feeling much better today\"  8/31: it's getting better, pain is there but not severe   9/2: c/o inc in knee pain since Tues without known cause    OBJECTIVE:   · Observation:  · Functional Mobility/Transfers: minimal squat on R, able to complete sit to stand and bed mobility independently.   · Bandages/Dressings/Incisions: NA  · Gait: (include devices/WB status) Patient ambulates without AD in clinic demonstrating mild antalgic gait on R.    Test measurements:    ROM:  Date           Knee Flexion       Knee Extension    Active Passive Active Passive    preop Eval 7/8/21 120  0    Post op 8/17 104  -8    8/26 117  -4        Strength:  Date Hip flexion Hip abduction Quad Hamstring Ankle DF Ankle PF   Eval 7/8 4+/5 4+/5 4/5 4+/5 5/5 5/5   8/17 3+  3- 4- 4+                 Functional testing Prehab        Date  7/8/21     4 week f/u   Date   8/24/21  8 week f/u  Date    9/20 D/C  Date               TUG 17.15 secs NT        30 second sit to stand 6 reps NT        6 minute walk 128 meters NT                    Balance           Narrow DOMINIK 10  10       Semi tandem 10 10       Tandem  10  9       SLS 5  NT                   Knee AROM 0-120 -4 - 117       Knee Extension MMT R/L L=11#  R=14#    R=12#       Hip aBduction MMT R/L L=15#  R=17#    R=NT       WOMAC 83  74                   RESTRICTIONS/PRECAUTIONS: none    Exercises/Interventions:     Therapeutic Ex (04910)   Min: 30 Reps/Resistance Notes/CUES  R TKR 7/26   Nustep  5 min seat 7 only #7-6 moved closer after a few minutes for inc knee ROM   Step flex  Step ext str 3 x 20 sec   3 x 20sec    GSS incline str  2 x 30sec    TKE with TB  Lime 10 x 5sec    HR/TR  Mini squat  x10 ea  x10   cues   Standing march                  Hip abd  0# x 10 L/R  0# x 10 L/R         Seated LAQ 1# 2x10 R    HS curl with TB  Orange 2 x 10 R         SAQ 1# 2 x 10     SLR flexion x10  Pt notes less pain with exercise today    Supine heelslides  hep   Long sitting knee extension stretch hep   Calf stretch with strap hep                       Supine ext str with heel on roll X 2 min          NMR re-education (20715)  Min: 3     Quad Set 10x 6 sec holds B LE QS for carry over; max cues w/ rolled sheet under knee    SLS R add         Therapeutic Activity (00867)  Min: 2      Step up fwd 4\" x 7     Step down               Gait Training (68378)  Min: 5     ll bars:  * amb with 1 HR  * retro    X 3 lengths  X 3 lengths Min cues   reebok f/b s/s add    amb with std cane in dept From stairs to  ll bars and ll bars to low mat  SBA with cues to inc knee flex   Manual Intervention (61407) Min: 10     Pat mobs X 2 min     PROM  *supine flex  *supine ext    X 5 min     STM lat knee and hamstrings x 5 min     Modalities  Min:15     IFC with CP 4 pads R knee x 15 min  Pt supine with LE on wedge      Other Therapeutic Activities:   Patient was thoroughly educated on this date regarding prehabilitation goals, importance of PT sessions in improving overall ROM, strength and stability prior to surgery, and how prehabilitation will facilitate improved post-operative outcomes. The patient was educated on and instructed in HEP as listed.  The patient was given a detailed handout for exercises to initiate in the hospital post-operatively as well as at home. The discharge plan from the hospital was reviewed with the patient; specifically, to reduce length of hospital stay and to minimize time before reinitiating outpatient physical therapy after surgery. Education regarding early mobility post-operatively in the hospital and emphasis on working with both physical therapy and nursing staff to achieve ambulation goal was provided. The patient was highly encouraged to attend joint class in hospital prior to surgery for further instructions on pre and post-surgical care. Also, education regarding goals and expectations was provided; specifically, knee flexion range of motion greater than or equal to 90 degrees and 0 degrees knee extension within 2 weeks to promote improved gait mechanics and ambulation. The patient was encouraged to utilize ice/cold pack after surgery to address pain, minimize swelling as often as possible. It is in my medical opinion that this patient is clear from all physical barriers prior to consideration for surgery, activity modifications prior to and post operatively have been discussed with this patient as well as discharge planning and is cleared for surgery from physical therapy perspective. Home Exercise Program:  Patient instructed in the above exercises for HEP. Patient verbalized/demonstrated understanding and was issued written handout.       8/17: Supine roll ext str, long sit HSS, calf GSS  8/26: SAQ, standing HR, squat, march and abd     Therapeutic Exercise and NMR EXR  [x] (81467) Provided verbal/tactile cueing for activities related to strengthening, flexibility, endurance, ROM for improvements in LE, proximal hip, and core control with self care, mobility, lifting, ambulation.  [] (98456) Provided verbal/tactile cueing for activities related to improving balance, coordination, kinesthetic sense, posture, motor skill, proprioception  to assist with LE, proximal hip, and core control in self care, mobility, lifting, ambulation and eccentric single leg control. [address] and Therapeutic Activities:    [x] (72061 or 32911) Provided verbal/tactile cueing for activities related to improving balance, coordination, kinesthetic sense, posture, motor skill, proprioception and motor activation to allow for proper function of core, proximal hip and LE with self care and ADLs and functional mobility.   [] (10249) Gait Re-education- Provided training and instruction to the patient for proper LE, core and proximal hip recruitment and positioning and eccentric body weight control with ambulation re-education including up and down stairs     Gait Training:  [] (49876) Provided training and instruction to the patient for proper postural muscle recruitment and positioning with ambulation re-education     Home Exercise Program:    [x] (51390) Reviewed/Progressed HEP activities related to strengthening, flexibility, endurance, ROM of core, proximal hip and LE for functional self-care, mobility, lifting and ambulation/stair navigation   [] (25994)Reviewed/Progressed HEP activities related to improving balance, coordination, kinesthetic sense, posture, motor skill, proprioception of core, proximal hip and LE for self care, mobility, lifting, and ambulation/stair navigation      Manual Treatments:  PROM / STM / Oscillations-Mobs:  G-I, II, III, IV (PA's, Inf., Post.)  [] (01461) Provided manual therapy to mobilize LE, proximal hip and/or LS spine soft tissue/joints for the purpose of modulating pain, promoting relaxation,  increasing ROM, reducing/eliminating soft tissue swelling/inflammation/restriction, improving soft tissue extensibility and allowing for proper ROM for normal function with self care, mobility, lifting and ambulation.      Charges:  Timed Code Treatment Minutes: 50   Total Treatment Minutes: 65      [] EVAL (LOW) 44787 (typically 20 minutes face-to-face)  [] EVAL just implemented, too soon to assess goals progression <30days   [] Goals require adjustment due to lack of progress  [] Patient is not progressing as expected and requires additional follow up with physician  [] Other    Prognosis for POC: [x] Good [] Fair  [] Poor    Patient requires continued skilled intervention: [] Yes  [x] No        PLAN: resume ex as able; progress as indicated and as able      [x] Continue per plan of care [] Alter current plan (see comments)  [] Plan of care initiated [] Hold pending upcoming surgery [] Discharge    Electronically signed by: Urbano Rodriguez, PT [id number]      Note: If patient does not return for scheduled/recommended follow up visits, this note will serve as a discharge from care along with the most recent update on progress.

## 2021-09-03 ENCOUNTER — CLINICAL DOCUMENTATION (OUTPATIENT)
Dept: PSYCHIATRY | Age: 66
End: 2021-09-03

## 2021-09-03 NOTE — PROGRESS NOTES
Was noted that pt entered [employer] me virtual waiting room at 1040am (40 mins after today's scheduled 10am initial eval vv)    Pt was sent the following text via doxy:    I apologize but since it is 40 minutes past your scheduled appointment time, I am unable to see you today.   If you wish to reschedule, will need to be as an in person visit at the [place] [address] office since you missed your initial virtual appointment

## 2021-09-07 ENCOUNTER — HOSPITAL ENCOUNTER (OUTPATIENT)
Dept: PHYSICAL THERAPY | Age: 66
Setting detail: THERAPIES SERIES
Discharge: HOME | End: 2021-09-07
Payer: MEDICARE

## 2021-09-07 PROCEDURE — 97140 MANUAL THERAPY 1/> REGIONS: CPT

## 2021-09-07 PROCEDURE — 97112 NEUROMUSCULAR REEDUCATION: CPT

## 2021-09-07 PROCEDURE — 97110 THERAPEUTIC EXERCISES: CPT

## 2021-09-07 PROCEDURE — G0283 ELEC STIM OTHER THAN WOUND: HCPCS

## 2021-09-07 NOTE — FLOWSHEET NOTE
[facility], [facility]  [address] [facility], [facility]  Phone: [phone]   Fax:     [phone]                                                        Physical Therapy Treatment Note/ Progress Report:   Date:  2021    Patient Name:  Leslee Lagunas    :  1955  MRN: [id number]    Pertinent Medical History: HTN, DM, Cerebral Infarction ( R side weakness) , Cardiomyopathy, R foot arthrodesis, R shoulder RTC repair, RA, anxiety, depression      C-SSRS Triggered by Intake questionnaire: Pt has appt for psychiatric consult next month      YES NO   In the past month, have you wished you were dead or wished you could go to sleep and not wake up? X   In the past month, have you had any thoughts of killing yourself? X                    Lifetime   YES NO   Have you ever done anything, started to do anything, or prepared to do anything to end your life?    X             Past 3 months    X       Medical/Treatment Diagnosis Information:  · Diagnosis: R TKR  · Treatment Diagnosis: decreased mobility, strength    Insurance/Certification information:  PT Insurance Information: [facility] Medicare Dual / [employer]  Physician Information:  Referring Practitioner: Deanne Park MD  Plan of care signed (Y/N):  Sent to inbox    Date of Patient follow up with Physician:       Progress Report: []  Yes  [x]  No     Date Range for reporting period:  Beginnin2021  Ending:      Progress report due (10 Rx/or 30 days whichever is less): after surgery     Recertification due (POC duration/ or 90 days whichever is less): after surgery    Visit # POC/Insurance Allowable Auth Needed    (post- op) BOMN []Yes   [x]No     Latex Allergy:  [x]NO      []YES  Preferred Language for Healthcare:   [x]English       []Other:    Functional Scale:       Date assessed: at eval  Test: WOMAC  Score: 74    Pain level:  5/10     History of Injury: reports chronic history of R knee pain that recently got worse. Patient is scheduled for R TKR on 7/26/21. Patient currently lives with  who is trying to take off work following surgery to care for her. Daughter is also around who came come over for a few hours a day. SUBJECTIVE:    8/17: Pt to PT for Prehab reeval after having R TKR on 7/26/21. She is amb with wheeled walker and rating pain at 5/10. Pt states she is doing hep, icing, and elevating daily. Pt TTP all over R knee, incision healing well with steri strips in place. Patella mobility decreased with pain and edema. 8/19:  Not doing so good. Just over 3 weeks post-op. Did feel a little more flexible after last session and after doing the exercises  8/24: very sore and tender lateral and inferior knee  8/26: \"feeling much better today\"  8/31: it's getting better, pain is there but not severe   9/2: c/o inc in knee pain since Tues without known cause  9/7: pt feels cont pain but notes she is doing a lot at home and trying to walk around without AD; pt advised to dec activity levels at home so she can rest knee and dec pain levels **40 min into treatment pt expresses concerns about 2 slightly pink areas on knee incision and if they could be infected. She states that she has had chills and a fever as high as 103 since Friday. Pt denies any other symptoms. Pt has 2 slightly pink areas on incision (distal and mid incision) that are also slightly tender. There is no drainage or edema at either site. Pt states she has an appt with her PCP Dr. Tian Osborn tomorrow for her regular follow up. Pt advised to discuss current symptoms and concerns. Pt states she is has had her Covid vaccination. Pt told that she needs to be fever free x 24 hours before returning to PT and if she cont with fever 9/8 we will reschedule 9/9 visit.      OBJECTIVE:   · Observation:  · Functional Mobility/Transfers: minimal squat on R, able to complete sit to stand and bed mobility independently.   · Bandages/Dressings/Incisions: NA  · Gait: (include devices/WB status) Patient ambulates without AD in clinic demonstrating mild antalgic gait on R.    Test measurements:    ROM:  Date           Knee Flexion       Knee Extension    Active Passive Active Passive    preop Eval 7/8/21 120  0    Post op 8/17 104  -8    8/26 117  -4        Strength:  Date Hip flexion Hip abduction Quad Hamstring Ankle DF Ankle PF   Eval 7/8 4+/5 4+/5 4/5 4+/5 5/5 5/5   8/17 3+  3- 4- 4+                 Functional testing Prehab        Date  7/8/21     4 week f/u   Date   8/24/21  8 week f/u  Date    9/20 D/C  Date               TUG 17.15 secs NT        30 second sit to stand 6 reps NT        6 minute walk 128 meters NT                    Balance           Narrow DOMINIK 10  10       Semi tandem 10 10       Tandem  10  9       SLS 5  NT                   Knee AROM 0-120 -4 - 117       Knee Extension MMT R/L L=11#  R=14#    R=12#       Hip aBduction MMT R/L L=15#  R=17#    R=NT       WOMAC 83  74                   RESTRICTIONS/PRECAUTIONS: none    Exercises/Interventions:     Therapeutic Ex (28021)   Min: 30 Reps/Resistance Notes/CUES  R TKR 7/26   Nustep  5 min  Seat #6   Step flex  Step ext str 3 x 20 sec   3 x 20sec    GSS incline str  2 x 30sec    TKE with TB  Lime 10 x 5sec    HR/TR  Mini squat  x10 ea  x10      Standing march                  Hip abd  0# x 10 L/R  0# x 10 L/R    Fitter f/b     Leg press               Seated LAQ 1# 2x10 R    HS curl with TB  Orange 2 x 10 R         SAQ 1# 2 x 10     SLR flexion x10  Pt notes less pain with exercise today    Supine heelslides  hep   Long sitting knee extension stretch hep   Calf stretch with strap hep                       Supine ext str with heel on roll           NMR re-education (53558)  Min: 3     Quad Set 10x 6 sec holds B LE QS for carry over; max cues w/ rolled sheet under knee    SLS R add         Therapeutic Activity (73781)  Min: 2      Step up fwd 4\" x 10 modifications prior to and post operatively have been discussed with this patient as well as discharge planning and is cleared for surgery from physical therapy perspective. Home Exercise Program:  Patient instructed in the above exercises for HEP. Patient verbalized/demonstrated understanding and was issued written handout. 8/17: Supine roll ext str, long sit HSS, calf GSS  8/26: SAQ, standing HR, squat, march and abd     Therapeutic Exercise and NMR EXR  [x] (10080) Provided verbal/tactile cueing for activities related to strengthening, flexibility, endurance, ROM for improvements in LE, proximal hip, and core control with self care, mobility, lifting, ambulation.  [] (52763) Provided verbal/tactile cueing for activities related to improving balance, coordination, kinesthetic sense, posture, motor skill, proprioception  to assist with LE, proximal hip, and core control in self care, mobility, lifting, ambulation and eccentric single leg control.  [address] and Therapeutic Activities:    [x] (75561 or 35886) Provided verbal/tactile cueing for activities related to improving balance, coordination, kinesthetic sense, posture, motor skill, proprioception and motor activation to allow for proper function of core, proximal hip and LE with self care and ADLs and functional mobility.   [] (20725) Gait Re-education- Provided training and instruction to the patient for proper LE, core and proximal hip recruitment and positioning and eccentric body weight control with ambulation re-education including up and down stairs     Gait Training:  [] (50878) Provided training and instruction to the patient for proper postural muscle recruitment and positioning with ambulation re-education     Home Exercise Program:    [x] (26318) Reviewed/Progressed HEP activities related to strengthening, flexibility, endurance, ROM of core, proximal hip and LE for functional self-care, mobility, lifting and ambulation/stair navigation [] (04779)Reviewed/Progressed HEP activities related to improving balance, coordination, kinesthetic sense, posture, motor skill, proprioception of core, proximal hip and LE for self care, mobility, lifting, and ambulation/stair navigation      Manual Treatments:  PROM / STM / Oscillations-Mobs:  G-I, II, III, IV (PA's, Inf., Post.)  [] (72986) Provided manual therapy to mobilize LE, proximal hip and/or LS spine soft tissue/joints for the purpose of modulating pain, promoting relaxation,  increasing ROM, reducing/eliminating soft tissue swelling/inflammation/restriction, improving soft tissue extensibility and allowing for proper ROM for normal function with self care, mobility, lifting and ambulation. Charges:  Timed Code Treatment Minutes: 50   Total Treatment Minutes: 65      [] EVAL (LOW) 10461 (typically 20 minutes face-to-face)  [] EVAL (MOD) 80158 (typically 30 minutes face-to-face)  [] EVAL (HIGH) 81565 (typically 45 minutes face-to-face)  [] RE-EVAL     [x] ZJ(97414) x     [] Dry needle 1 or 2 Muscles (87802)  [x] NMR (06300) x     [] Dry needle 3+ Muscles (56587)  [x] Manual (90652) x     [] Ultrasound (76266) x  [] TA (13868) x     [] Mech Traction (77621)  [] ES(attended) (04301)     [x] ES (un) (29368):   [] Vasopump (76792) [] Ionto (50304)   [] Gait (86724)  [] Other:    GOALS:updated goals 8/17  Patient stated goal: \"back to normal\"  Short Term Goals: 2 wks  1. Pt independent with hep   -?x Progressing: -? Met: -? Not Met: -? Adjusted  2. Pt rates pain improvement of 20-30% overall for ease with sleep   -?x Progressing: -? Met: -? Not Met: -? Adjusted  3. R knee ROM -4 - 110 for ease with dressing   -?x Progressing: -? Met: -? Not Met: -? Adjusted  4. Pt amb with cane in community    -?x Progressing: -? Met: -? Not Met: -? Adjusted         Long Term Goals: at discharge   1. WOMAC score of 74  -?x Progressing: -? Met: -? Not Met: -? Adjusted   2.  Pt rates pain improvement of 40-50% overall for ease with sleep   -?x Progressing: -? Met: -? Not Met: -? Adjusted  3. R knee ROM -0 - 115 for ease with transfers   -?x Progressing: -? Met: -? Not Met: -? Adjusted   4. Pt amb without AD in community    -?x Progressing: -? Met: -? Not Met: -? Adjusted   5. Pt demonstrates 5/5 MMT R knee for ease with steps     -?x Progressing: -? Met: -? Not Met: -? Adjusted    ASSESSMENT:  See eval    Treatment/Activity Tolerance:  [x] Patient tolerated treatment well [] Patient limited by fatique  [x] Patient limited by pain  [] Patient limited by other medical complications  [] Other:     Overall Progression Towards Functional goals/ Treatment Progress Update:  [] Patient is progressing as expected towards functional goals listed. [] Progression is slowed due to complexities/Impairments listed. [] Progression has been slowed due to co-morbidities. [x] Plan just implemented, too soon to assess goals progression <30days   [] Goals require adjustment due to lack of progress  [] Patient is not progressing as expected and requires additional follow up with physician  [] Other    Prognosis for POC: [x] Good [] Fair  [] Poor    Patient requires continued skilled intervention: [] Yes  [x] No        PLAN: resume ex as able and progress as able/indicated as pain subsides; assess new c/o    [x] Continue per plan of care [] Alter current plan (see comments)  [] Plan of care initiated [] Hold pending upcoming surgery [] Discharge    Electronically signed by: Aj Mclean, PT [id number]      Note: If patient does not return for scheduled/recommended follow up visits, this note will serve as a discharge from care along with the most recent update on progress.

## 2021-09-08 ENCOUNTER — OFFICE VISIT (OUTPATIENT)
Dept: FAMILY MEDICINE CLINIC | Age: 66
End: 2021-09-08
Payer: MEDICARE

## 2021-09-08 VITALS
BODY MASS INDEX: 33.46 KG/M2 | WEIGHT: 196 LBS | SYSTOLIC BLOOD PRESSURE: 105 MMHG | DIASTOLIC BLOOD PRESSURE: 70 MMHG | RESPIRATION RATE: 18 BRPM | HEIGHT: 64 IN | OXYGEN SATURATION: 95 % | HEART RATE: 103 BPM

## 2021-09-08 DIAGNOSIS — R63.0 DECREASED APPETITE: ICD-10-CM

## 2021-09-08 DIAGNOSIS — R30.0 DYSURIA: Primary | ICD-10-CM

## 2021-09-08 DIAGNOSIS — I10 ESSENTIAL HYPERTENSION: ICD-10-CM

## 2021-09-08 LAB
BILIRUBIN, POC: NEGATIVE
BLOOD URINE, POC: ABNORMAL
CLARITY, POC: ABNORMAL
COLOR, POC: ABNORMAL
GLUCOSE URINE, POC: NEGATIVE
KETONES, POC: NEGATIVE
LEUKOCYTE EST, POC: ABNORMAL
NITRITE, POC: NEGATIVE
PH, POC: 7
PROTEIN, POC: ABNORMAL
SPECIFIC GRAVITY, POC: >=1.03
UROBILINOGEN, POC: ABNORMAL

## 2021-09-08 PROCEDURE — G8399 PT W/DXA RESULTS DOCUMENT: HCPCS | Performed by: FAMILY MEDICINE

## 2021-09-08 PROCEDURE — G8427 DOCREV CUR MEDS BY ELIG CLIN: HCPCS | Performed by: FAMILY MEDICINE

## 2021-09-08 PROCEDURE — 1090F PRES/ABSN URINE INCON ASSESS: CPT | Performed by: FAMILY MEDICINE

## 2021-09-08 PROCEDURE — G8417 CALC BMI ABV UP PARAM F/U: HCPCS | Performed by: FAMILY MEDICINE

## 2021-09-08 PROCEDURE — 81002 URINALYSIS NONAUTO W/O SCOPE: CPT | Performed by: FAMILY MEDICINE

## 2021-09-08 PROCEDURE — 3017F COLORECTAL CA SCREEN DOC REV: CPT | Performed by: FAMILY MEDICINE

## 2021-09-08 PROCEDURE — 1123F ACP DISCUSS/DSCN MKR DOCD: CPT | Performed by: FAMILY MEDICINE

## 2021-09-08 PROCEDURE — 1036F TOBACCO NON-USER: CPT | Performed by: FAMILY MEDICINE

## 2021-09-08 PROCEDURE — 99214 OFFICE O/P EST MOD 30 MIN: CPT | Performed by: FAMILY MEDICINE

## 2021-09-08 PROCEDURE — 4040F PNEUMOC VAC/ADMIN/RCVD: CPT | Performed by: FAMILY MEDICINE

## 2021-09-08 RX ORDER — OMEPRAZOLE 20 MG/1
1 CAPSULE, DELAYED RELEASE ORAL
COMMUNITY
End: 2021-10-01

## 2021-09-08 RX ORDER — VALSARTAN 80 MG/1
40 TABLET ORAL DAILY
Qty: 45 TABLET | Refills: 0
Start: 2021-09-08 | End: 2021-10-26 | Stop reason: SDUPTHER

## 2021-09-08 RX ORDER — AMOXICILLIN AND CLAVULANATE POTASSIUM 875; 125 MG/1; MG/1
1 TABLET, FILM COATED ORAL 2 TIMES DAILY
Qty: 14 TABLET | Refills: 0 | Status: SHIPPED | OUTPATIENT
Start: 2021-09-08 | End: 2021-09-15

## 2021-09-08 NOTE — PROGRESS NOTES
9/8/2021    This is a 76 y.o. female     HPI     Recently had her L knee replaced. Is working with PT    Reports some recent dysuria over the week. Some frequency as well. No fever or significant systemic symptoms. Since surgery, she reports less of an appetite. She feels this may have been related to her pain medications. She is on Trulicity but has been on this for quite some time. No other medication changes. HTN  BP Readings from Last 3 Encounters:   09/08/21 105/70   07/27/21 118/61   07/26/21 (!) 109/92       Review of Systems     Current Outpatient Medications   Medication Sig Dispense Refill    dextran 70-hypromellose (TEARS NATURALE) 0.1-0.3 % SOLN opthalmic solution as needed for itching      omeprazole (PRILOSEC) 20 MG delayed release capsule Take 1 capsule by mouth      valsartan (DIOVAN) 80 MG tablet Take 0.5 tablets by mouth daily 45 tablet 0    Dulaglutide (TRULICITY) 1.5 IG/9.7CH SOPN INJECT THE CONTENTS OF ONE  PEN SUBCUTANEOUSLY WEEKLY 6 mL 3    erythromycin (ROMYCIN) 5 MG/GM ophthalmic ointment nightly      aspirin 81 MG EC tablet Take 1 tablet by mouth 2 times daily for 14 days Take twice a day for 14 days after knee surgery then can resume daily dosing 28 tablet 0    FANAPT 1 MG TABS tablet Take 1 tablet by mouth nightly 90 tablet 0    DULoxetine (CYMBALTA) 60 MG extended release capsule Take 1 capsule by mouth daily 90 capsule 0    traZODone (DESYREL) 100 MG tablet Take 1 tablet by mouth nightly 90 tablet 0    lidocaine 4 % external patch Apply patch to lower mid back. Leave on for 12 hours and remove. Leave off for 12 hours before applying another one.  5 patch 0    Insulin Pen Needle (PEN NEEDLES) 31G X 6 MM MISC USE TO INJECT INSULIN DAILY 100 each 1    insulin glargine (LANTUS SOLOSTAR) 100 UNIT/ML injection pen INJECT 16 UNITS UNDER THE SKIN ONCE NIGHTLY (Patient taking differently: Patient stated she is taking  12 UNITS UNDER THE SKIN ONCE NIGHTLY) 5 pen 1    furosemide (LASIX) 20 MG tablet TAKE ONE TABLET BY MOUTH DAILY AS NEEDED FOR LEG SWELLING 90 tablet 1    metFORMIN (GLUCOPHAGE) 500 MG tablet TAKE TWO TABLETS BY MOUTH TWICE A DAY WITH MEALS 360 tablet 0    blood glucose test strips (ONETOUCH ULTRA) strip TEST TWO TIMES A  strip 4    albuterol sulfate HFA (PROVENTIL HFA) 108 (90 Base) MCG/ACT inhaler Inhale 2 puffs into the lungs every 4 hours as needed for Wheezing or Shortness of Breath May substitute ProAir MDI 1 Inhaler 5    ipratropium-albuterol (DUONEB) 0.5-2.5 (3) MG/3ML SOLN nebulizer solution Inhale 3 mLs into the lungs every 4 hours as needed for Shortness of Breath (wheezing coughing) 360 mL 5    ammonium lactate (LAC-HYDRIN) 12 % lotion Apply topically daily. 1 Bottle 1    blood glucose test strips (FREESTYLE LITE) strip TEST BLOOD SUGAR TWO TIMES A DAY Diag: E11.9 100 each 4    Blood Glucose Monitoring Suppl (TRUE METRIX AIR GLUCOSE METER) w/Device KIT 1 each by Does not apply route daily 1 kit 0    FREESTYLE LANCETS MISC USE ONE LANCET TO TEST BLOOD SUGAR TWICE A  each 3    latanoprost (XALATAN) 0.005 % ophthalmic solution Place 1 drop into both eyes nightly (Patient not taking: Reported on 9/8/2021)      ibuprofen (ADVIL;MOTRIN) 600 MG tablet Take 1 tablet by mouth every 8 hours as needed for Pain HOLD for 14 days after knee surgery (Patient not taking: Reported on 9/8/2021) 60 tablet 0    DULoxetine (CYMBALTA) 30 MG extended release capsule Take 1 capsule by mouth daily (Patient not taking: Reported on 9/8/2021) 30 capsule 0    famotidine (PEPCID) 20 MG tablet Take 1 tablet by mouth 2 times daily (Patient not taking: Reported on 9/8/2021) 60 tablet 3     No current facility-administered medications for this visit.        /70 (Site: Left Upper Arm, Position: Sitting, Cuff Size: Large Adult)   Pulse 103   Resp 18   Ht 5' 4\" (1.626 m)   Wt 196 lb (88.9 kg)   SpO2 95%   BMI 33.64 kg/m²     Physical Exam    Wt Readings from Last 3 Encounters:   09/08/21 196 lb (88.9 kg)   08/12/21 198 lb (89.8 kg)   07/27/21 214 lb 8.1 oz (97.3 kg)       BP Readings from Last 3 Encounters:   09/08/21 105/70   07/27/21 118/61   07/26/21 (!) 109/92       Assessment/Plan:  1. Dysuria  Treat and follow culture  - POCT Urinalysis no Micro  - Culture, Urine    2. Decreased appetite  Multifactorial -stress, postop. No red flag symptoms. Trulicity may be contributing which we discussed. We will monitor this    3. Essential hypertension  She has borderline low blood pressure. Decreasing valsartan to half tablet for a total of 40 mg daily  - valsartan (DIOVAN) 80 MG tablet; Take 0.5 tablets by mouth daily  Dispense: 45 tablet;  Refill: 0    Follow-up as scheduled

## 2021-09-09 ENCOUNTER — APPOINTMENT (OUTPATIENT)
Dept: PHYSICAL THERAPY | Age: 66
End: 2021-09-09
Payer: MEDICARE

## 2021-09-10 ENCOUNTER — TELEPHONE (OUTPATIENT)
Dept: FAMILY MEDICINE CLINIC | Age: 66
End: 2021-09-10

## 2021-09-10 ENCOUNTER — OFFICE VISIT (OUTPATIENT)
Dept: ORTHOPEDIC SURGERY | Age: 66
End: 2021-09-10

## 2021-09-10 VITALS — BODY MASS INDEX: 33.46 KG/M2 | WEIGHT: 196 LBS | HEIGHT: 64 IN

## 2021-09-10 DIAGNOSIS — Z96.651 STATUS POST TOTAL RIGHT KNEE REPLACEMENT: Primary | ICD-10-CM

## 2021-09-10 LAB
ORGANISM: ABNORMAL
URINE CULTURE, ROUTINE: ABNORMAL

## 2021-09-10 PROCEDURE — 99024 POSTOP FOLLOW-UP VISIT: CPT | Performed by: ORTHOPAEDIC SURGERY

## 2021-09-10 RX ORDER — CIPROFLOXACIN 250 MG/1
250 TABLET, FILM COATED ORAL 2 TIMES DAILY
Qty: 14 TABLET | Refills: 0 | Status: SHIPPED | OUTPATIENT
Start: 2021-09-10 | End: 2021-09-17

## 2021-09-10 RX ORDER — METHYLPREDNISOLONE 4 MG/1
TABLET ORAL
Qty: 1 KIT | Refills: 0 | Status: SHIPPED | OUTPATIENT
Start: 2021-09-10 | End: 2021-09-16

## 2021-09-10 NOTE — LETTER
[facility]  [facility] [address] [address]  Phone: [phone]  Fax: [phone]    Eneida Pickering MD        September 10, 2021     Patient: Edy Rousseau   YOB: 1955   Date of Visit: 9/10/2021       To Whom It May Concern: It is my medical opinion that Bertrand Heck may return to work on 10/11/21 to full duty with no restrictions. If you have any questions or concerns, please don't hesitate to call.     Sincerely,          Eneida Pickering MD

## 2021-09-10 NOTE — TELEPHONE ENCOUNTER
Please let Alyce Lugo know I sent in cipro antibiotic for her UTI  Augmentin antibiotic had been sent in - stop this one as it is less likely to treat the particular bacteria causing it

## 2021-09-10 NOTE — PROGRESS NOTES
Justine Wood  [id number]  September 10, 2021    Chief Complaint   Patient presents with   [facility] Post-Op Check     7/26/21 R TKA             History: The patient is here in follow-up regarding her right knee. She is now 6 weeks status post right total knee arthroplasty. She is having moderate pain. She was progressing rather well. She reportedly was in therapy a few days ago and she developed increasing pain. She is taking Tylenol for the pain. She also reports having a fever. She recently had a urinalysis ordered, but does not know the results. The patient's  past medical history, medications, allergies,  family history, social history, and review of systems have been reviewed, and dated and are recorded in the chart. Ht 5' 4\" (1.626 m)   Wt 196 lb (88.9 kg)   BMI 33.64 kg/m²     Physical: Ms. Rasheeda Frazier appears well, she is in no apparent distress, she demonstrates appropriate mood & affect. She is alert and oriented to person, place and time. She has moderate swelling. There is No evidence of DVT seen on physical exam. She is neurovascularly intact distally. Range of motion is from 0 degrees to 120 degrees. The incision is  clean, dry and intact and without erythema. Strength in the knee is 4/5. There is no instability with varus and valgus stressing of the knee. There is no pain with range of motion of the hips. She does have diffuse tenderness to palpation over the patellar tendon. She has mild pain with resisted right knee extension. Xrays: Three views of the right knee were obtained at last visit were reviewed. The prosthesis is well aligned and there is no evidence of loosening. Impression: Status post right Total Knee Arthroplasty,Doing well postoperatively. Plan:  She will continue to work aggressively on range of motion and strengthening: Natural history and expected course discussed. Questions answered. Quad strengthening exercises.   The patient does work in retail. She will remain off work for 1 more month. She was given a Medrol Dosepak. She was encouraged to modify her activities. We instructed her to follow-up with her primary care physician regarding the urinalysis and fever work-up. She does report being vaccinated for Covid.

## 2021-09-10 NOTE — TELEPHONE ENCOUNTER
Pt stopped in saying she called yesterday wanting to know her test results from the other day. 9/8/2021 I didn't see a message in her chart.  Please call her with her test results    Please Advise

## 2021-09-13 RX ORDER — FUROSEMIDE 20 MG/1
TABLET ORAL
Qty: 90 TABLET | Refills: 3 | Status: SHIPPED | OUTPATIENT
Start: 2021-09-13 | End: 2022-07-29

## 2021-09-14 ENCOUNTER — HOSPITAL ENCOUNTER (OUTPATIENT)
Dept: PHYSICAL THERAPY | Age: 66
Setting detail: THERAPIES SERIES
Discharge: HOME | End: 2021-09-14
Payer: MEDICARE

## 2021-09-14 PROCEDURE — 97110 THERAPEUTIC EXERCISES: CPT

## 2021-09-14 PROCEDURE — 97140 MANUAL THERAPY 1/> REGIONS: CPT

## 2021-09-14 PROCEDURE — G0283 ELEC STIM OTHER THAN WOUND: HCPCS

## 2021-09-14 PROCEDURE — 97112 NEUROMUSCULAR REEDUCATION: CPT

## 2021-09-14 NOTE — FLOWSHEET NOTE
[facility], [facility]  [address] [facility] [place], [facility]  Phone: [phone]   Fax:     [phone]                                                        Physical Therapy Treatment Note/ Progress Report:   Date:  2021    Patient Name:  Elise Leavitt    :  1955  MRN: [id number]    Pertinent Medical History: HTN, DM, Cerebral Infarction ( R side weakness) , Cardiomyopathy, R foot arthrodesis, R shoulder RTC repair, RA, anxiety, depression      C-SSRS Triggered by Intake questionnaire: Pt has appt for psychiatric consult next month      YES NO   In the past month, have you wished you were dead or wished you could go to sleep and not wake up? X   In the past month, have you had any thoughts of killing yourself? X                    Lifetime   YES NO   Have you ever done anything, started to do anything, or prepared to do anything to end your life?    X             Past 3 months    X       Medical/Treatment Diagnosis Information:  · Diagnosis: R TKR  · Treatment Diagnosis: decreased mobility, strength    Insurance/Certification information:  PT Insurance Information: [facility] Medicare Dual / [employer] [facility]  Physician Information:  Referring Practitioner: Mark Davies MD  Plan of care signed (Y/N):  Sent to inbox    Date of Patient follow up with Physician:       Progress Report: []  Yes  [x]  No     Date Range for reporting period:  Beginnin2021  Ending:      Progress report due (10 Rx/or 30 days whichever is less):      Recertification due (POC duration/ or 90 days whichever is less):     Visit # POC/Insurance Allowable Auth Needed    (post- op) BOMN []Yes   [x]No     Latex Allergy:  [x]NO      []YES  Preferred Language for Healthcare:   [x]English       []Other:    Functional Scale:       Date assessed: at eval  Test: WOMAC  Score: 74    Pain level:  4/10     History of Injury: reports chronic history of R knee pain that recently got worse. Patient is scheduled for R TKR on 7/26/21. Patient currently lives with  who is trying to take off work following surgery to care for her. Daughter is also around who came come over for a few hours a day. SUBJECTIVE:    8/17: Pt to PT for Prehab reeval after having R TKR on 7/26/21. She is amb with wheeled walker and rating pain at 5/10. Pt states she is doing hep, icing, and elevating daily. Pt TTP all over R knee, incision healing well with steri strips in place. Patella mobility decreased with pain and edema. 8/19:  Not doing so good. Just over 3 weeks post-op. Did feel a little more flexible after last session and after doing the exercises  8/24: very sore and tender lateral and inferior knee  8/26: \"feeling much better today\"  8/31: it's getting better, pain is there but not severe   9/2: c/o inc in knee pain since Tues without known cause  9/7: pt feels cont pain but notes she is doing a lot at home and trying to walk around without AD; pt advised to dec activity levels at home so she can rest knee and dec pain levels **40 min into treatment pt expresses concerns about 2 slightly pink areas on knee incision and if they could be infected. She states that she has had chills and a fever as high as 103 since Friday. Pt denies any other symptoms. Pt has 2 slightly pink areas on incision (distal and mid incision) that are also slightly tender. There is no drainage or edema at either site. Pt states she has an appt with her PCP Dr. Elijah Lopez tomorrow for her regular follow up. Pt advised to discuss current symptoms and concerns. Pt states she is has had her Covid vaccination. Pt told that she needs to be fever free x 24 hours before returning to PT and if she cont with fever 9/8 we will reschedule 9/9 visit.    9/14: Saw both PCP and Dr. Laurence Gitelman and was dx with a UTI and prescribed antibiotic from Dr. Elijah Lopez and also Medrol dose pack from Dr. Laurence Gitelman for cont knee pain; pt states pain in the knee is still there but is not as sharp and is better     OBJECTIVE:   · Observation:  · Functional Mobility/Transfers: minimal squat on R, able to complete sit to stand and bed mobility independently.   · Bandages/Dressings/Incisions: NA  · Gait: (include devices/WB status) Patient ambulates without AD in clinic demonstrating mild antalgic gait on R.    Test measurements:    ROM:  Date           Knee Flexion       Knee Extension    Active Passive Active Passive    preop Eval 7/8/21 120  0    Post op 8/17 104  -8    8/26 117  -4        Strength:  Date Hip flexion Hip abduction Quad Hamstring Ankle DF Ankle PF   Eval 7/8 4+/5 4+/5 4/5 4+/5 5/5 5/5   8/17 3+  3- 4- 4+                 Functional testing Prehab        Date  7/8/21     4 week f/u   Date   8/24/21  8 week f/u  Date    9/20 D/C  Date               TUG 17.15 secs NT        30 second sit to stand 6 reps NT        6 minute walk 128 meters NT                    Balance           Narrow DOMINIK 10  10       Semi tandem 10 10       Tandem  10  9       SLS 5  NT                   Knee AROM 0-120 -4 - 117       Knee Extension MMT R/L L=11#  R=14#    R=12#       Hip aBduction MMT R/L L=15#  R=17#    R=NT       WOMAC 83  74                   RESTRICTIONS/PRECAUTIONS: none    Exercises/Interventions:     Therapeutic Ex (17062)   Min: 30 Reps/Resistance Notes/CUES  R TKR 7/26   Nustep  5 min  Seat #6   Step flex  Step ext str 3 x 20 sec   3 x 20sec    GSS incline str  2 x 30sec    TKE with TB  Lime 10 x 5sec    HR/TR  Mini squat  x10 ea R>L        Standing march                  Hip abd  1# x 10 L/R  1# x 10 L/R    Fitter f/b     Leg press 30# 1 x 10 R>L              FAQ  HS curl 11# 1x10 R>L  11# 1 x 10 R>L              SAQ 1# 2 x 10     SLR flexion x10  Pt notes less pain with exercise today    Supine heelslides  hep   Long sitting knee extension stretch hep   Calf stretch with strap hep                       Supine ext str with heel on roll           NMR re-education (51645)  Min: 3     Balance:  * tandem stance  * SLS     Quad Set 10x 10 sec holds              Therapeutic Activity (90918)  Min: 2      Step up fwd 4\" x 10    Step down 4\" x 10  Mod cues             Gait Training (59389)  Min: 5     ll bars:  * amb without HR  * retro    X 2 lengths  X 2 lengths Min cues   reebok f/b s/s F/b s/s x 30 sec each     amb with std cane in dept B/w ex with cane  cues to inc knee flex   Manual Intervention (30935) Min: 10     Pat mobs X 2 min     PROM  *supine flex  *supine ext    X 5 min     STM lat knee and hamstrings x 3 min     Modalities  Min:15     IFC with CP 4 pads R knee x 15 min  Pt supine with LE on wedge      Other Therapeutic Activities:   Patient was thoroughly educated on this date regarding prehabilitation goals, importance of PT sessions in improving overall ROM, strength and stability prior to surgery, and how prehabilitation will facilitate improved post-operative outcomes. The patient was educated on and instructed in HEP as listed. The patient was given a detailed handout for exercises to initiate in the hospital post-operatively as well as at home. The discharge plan from the hospital was reviewed with the patient; specifically, to reduce length of hospital stay and to minimize time before reinitiating outpatient physical therapy after surgery. Education regarding early mobility post-operatively in the hospital and emphasis on working with both physical therapy and nursing staff to achieve ambulation goal was provided. The patient was highly encouraged to attend joint class in hospital prior to surgery for further instructions on pre and post-surgical care. Also, education regarding goals and expectations was provided; specifically, knee flexion range of motion greater than or equal to 90 degrees and 0 degrees knee extension within 2 weeks to promote improved gait mechanics and ambulation.  The patient was encouraged to utilize ice/cold pack after surgery to address pain, minimize swelling as often as possible. It is in my medical opinion that this patient is clear from all physical barriers prior to consideration for surgery, activity modifications prior to and post operatively have been discussed with this patient as well as discharge planning and is cleared for surgery from physical therapy perspective. Home Exercise Program:  Patient instructed in the above exercises for HEP. Patient verbalized/demonstrated understanding and was issued written handout. 8/17: Supine roll ext str, long sit HSS, calf GSS  8/26: SAQ, standing HR, squat, march and abd     Therapeutic Exercise and NMR EXR  [x] (79334) Provided verbal/tactile cueing for activities related to strengthening, flexibility, endurance, ROM for improvements in LE, proximal hip, and core control with self care, mobility, lifting, ambulation.  [] (15718) Provided verbal/tactile cueing for activities related to improving balance, coordination, kinesthetic sense, posture, motor skill, proprioception  to assist with LE, proximal hip, and core control in self care, mobility, lifting, ambulation and eccentric single leg control.  [address] and Therapeutic Activities:    [x] (88572 or 61882) Provided verbal/tactile cueing for activities related to improving balance, coordination, kinesthetic sense, posture, motor skill, proprioception and motor activation to allow for proper function of core, proximal hip and LE with self care and ADLs and functional mobility.   [] (00666) Gait Re-education- Provided training and instruction to the patient for proper LE, core and proximal hip recruitment and positioning and eccentric body weight control with ambulation re-education including up and down stairs     Gait Training:  [] (96549) Provided training and instruction to the patient for proper postural muscle recruitment and positioning with ambulation re-education     Home Exercise Program:    [x] (43172) Reviewed/Progressed HEP activities related to strengthening, flexibility, endurance, ROM of core, proximal hip and LE for functional self-care, mobility, lifting and ambulation/stair navigation   [] (26945)Reviewed/Progressed HEP activities related to improving balance, coordination, kinesthetic sense, posture, motor skill, proprioception of core, proximal hip and LE for self care, mobility, lifting, and ambulation/stair navigation      Manual Treatments:  PROM / STM / Oscillations-Mobs:  G-I, II, III, IV (PA's, Inf., Post.)  [] (84410) Provided manual therapy to mobilize LE, proximal hip and/or LS spine soft tissue/joints for the purpose of modulating pain, promoting relaxation,  increasing ROM, reducing/eliminating soft tissue swelling/inflammation/restriction, improving soft tissue extensibility and allowing for proper ROM for normal function with self care, mobility, lifting and ambulation. Charges:  Timed Code Treatment Minutes: 50   Total Treatment Minutes: 65      [] EVAL (LOW) 41634 (typically 20 minutes face-to-face)  [] EVAL (MOD) 57141 (typically 30 minutes face-to-face)  [] EVAL (HIGH) 51301 (typically 45 minutes face-to-face)  [] RE-EVAL     [x] YE(33875) x     [] Dry needle 1 or 2 Muscles (48194)  [x] NMR (87901) x     [] Dry needle 3+ Muscles (45475)  [x] Manual (28972) x     [] Ultrasound (80373) x  [] TA (09508) x     [] Mech Traction (01719)  [] ES(attended) (34567)     [x] ES (un) (53054):   [] Vasopump (27673) [] Ionto (82972)   [] Gait (47847)  [] Other:    GOALS:updated goals 8/17  Patient stated goal: \"back to normal\"  Short Term Goals: 2 wks  1. Pt independent with hep   -?x Progressing: -? Met: -? Not Met: -? Adjusted  2. Pt rates pain improvement of 20-30% overall for ease with sleep   -?x Progressing: -? Met: -? Not Met: -? Adjusted  3. R knee ROM -4 - 110 for ease with dressing   -?x Progressing: -? Met: -? Not Met: -? Adjusted  4. Pt amb with cane in community    -?x Progressing: -? Met: -? Not Met: -? Adjusted         Long Term Goals: at discharge   1. WOMAC score of 74  -?x Progressing: -? Met: -? Not Met: -? Adjusted   2. Pt rates pain improvement of 40-50% overall for ease with sleep   -?x Progressing: -? Met: -? Not Met: -? Adjusted  3. R knee ROM -0 - 115 for ease with transfers   -?x Progressing: -? Met: -? Not Met: -? Adjusted   4. Pt amb without AD in community    -?x Progressing: -? Met: -? Not Met: -? Adjusted   5. Pt demonstrates 5/5 MMT R knee for ease with steps     -?x Progressing: -? Met: -? Not Met: -? Adjusted    ASSESSMENT:  See eval    Treatment/Activity Tolerance:  [x] Patient tolerated treatment well [] Patient limited by fatique  [x] Patient limited by pain  [] Patient limited by other medical complications  [] Other:     Overall Progression Towards Functional goals/ Treatment Progress Update:  [] Patient is progressing as expected towards functional goals listed. [] Progression is slowed due to complexities/Impairments listed. [] Progression has been slowed due to co-morbidities. [x] Plan just implemented, too soon to assess goals progression <30days   [] Goals require adjustment due to lack of progress  [] Patient is not progressing as expected and requires additional follow up with physician  [] Other    Prognosis for POC: [x] Good [] Fair  [] Poor    Patient requires continued skilled intervention: [] Yes  [x] No        PLAN:progress strength     [x] Continue per plan of care [] Alter current plan (see comments)  [] Plan of care initiated [] Hold pending upcoming surgery [] Discharge    Electronically signed by: Ibrahima Guzman, PT [id number]      Note: If patient does not return for scheduled/recommended follow up visits, this note will serve as a discharge from care along with the most recent update on progress.

## 2021-09-15 DIAGNOSIS — F39 MOOD DISORDER (HCC): ICD-10-CM

## 2021-09-15 RX ORDER — DULOXETIN HYDROCHLORIDE 60 MG/1
60 CAPSULE, DELAYED RELEASE ORAL DAILY
Qty: 90 CAPSULE | Refills: 1 | Status: SHIPPED | OUTPATIENT
Start: 2021-09-15 | End: 2022-01-28 | Stop reason: SDUPTHER

## 2021-09-15 RX ORDER — TRAZODONE HYDROCHLORIDE 100 MG/1
TABLET ORAL
Qty: 90 TABLET | Refills: 1 | Status: SHIPPED | OUTPATIENT
Start: 2021-09-15 | End: 2022-01-28 | Stop reason: SDUPTHER

## 2021-09-16 ENCOUNTER — HOSPITAL ENCOUNTER (OUTPATIENT)
Dept: PHYSICAL THERAPY | Age: 66
Setting detail: THERAPIES SERIES
Discharge: HOME | End: 2021-09-16
Payer: MEDICARE

## 2021-09-16 PROCEDURE — 97110 THERAPEUTIC EXERCISES: CPT

## 2021-09-16 PROCEDURE — 97140 MANUAL THERAPY 1/> REGIONS: CPT

## 2021-09-16 PROCEDURE — 97530 THERAPEUTIC ACTIVITIES: CPT

## 2021-09-16 PROCEDURE — G0283 ELEC STIM OTHER THAN WOUND: HCPCS

## 2021-09-16 NOTE — FLOWSHEET NOTE
[facility], [facility]  [address] [facility], [facility]  Phone: [phone]   Fax:     [phone]                                                          Physical Therapy Re-Certification Plan of Care/MD UPDATE      Dear Dr. Krysten Segundo,    We had the pleasure of treating the following patient for physical therapy services at [address]. A summary of our findings can be found in the updated assessment below. This includes our plan of care. If you have any questions or concerns regarding these findings, please do not hesitate to contact me at the office phone number checked above. Thank you for the referral.     Physician Signature:________________________________Date:__________________  By signing above (or electronic signature), therapists plan is approved by physician    Date Range Of Visits: 21-21  Total Visits to Date: 8  Overall Response to Treatment:   [x]Patient is responding well to treatment and improvement is noted with regards  to goals   []Patient should continue to improve in reasonable time if they continue HEP   []Patient has plateaued and is no longer responding to skilled PT intervention    []Patient is getting worse and would benefit from return to referring MD   []Patient unable to adhere to initial POC   [x]Other: see goal reassessment below; needs cont PT for gait and strength progression       Recommendation:    [x]Continue PT 2x / wk for 4 weeks.     []Hold PT, pending MD visit        Physical Therapy Treatment Note/ Progress Report:   Date:  2021    Patient Name:  Shefali Aly    :  1955  MRN: [id number]    Pertinent Medical History: HTN, DM, Cerebral Infarction ( R side weakness) , Cardiomyopathy, R foot arthrodesis, R shoulder RTC repair, RA, anxiety, depression      C-SSRS Triggered by Intake questionnaire: Pt has appt for psychiatric consult next month      YES NO   In the past month, have you wished you were dead or wished you could go to sleep and not wake up? X   In the past month, have you had any thoughts of killing yourself? X                    Lifetime   YES NO   Have you ever done anything, started to do anything, or prepared to do anything to end your life? X             Past 3 months    X       Medical/Treatment Diagnosis Information:  · Diagnosis: R TKR  · Treatment Diagnosis: decreased mobility, strength    Insurance/Certification information:  PT Insurance Information: [facility] Medicare Dual / Destini Colon  Physician Information:  Referring Practitioner: Saad Loera MD  Plan of care signed (Y/N):  Sent to inbox    Date of Patient follow up with Physician:       Progress Report: [x]  Yes   []  No     Date Range for reporting period:  Beginnin2021  Ending:      Progress report due (10 Rx/or 30 days whichever is less):      Recertification due (POC duration/ or 90 days whichever is less):     Visit # POC/Insurance Allowable Auth Needed   10/20 (post- op) BOMN []Yes   [x]No     Latex Allergy:  [x]NO      []YES  Preferred Language for Healthcare:   [x]English       []Other:    Functional Scale:       Date assessed: at eval  Test: WOMAC  Score: 74  *WOMAC 41 on 21    Pain level:  4/10     History of Injury: reports chronic history of R knee pain that recently got worse. Patient is scheduled for R TKR on 21. Patient currently lives with  who is trying to take off work following surgery to care for her. Daughter is also around who came come over for a few hours a day. SUBJECTIVE:    : Pt to PT for Prehab reeval after having R TKR on 21. She is amb with wheeled walker and rating pain at 5/10. Pt states she is doing hep, icing, and elevating daily. Pt TTP all over R knee, incision healing well with steri strips in place. Patella mobility decreased with pain and edema. :  Not doing so good. Just over 3 weeks post-op.  Did feel a little more flexible after last session and after doing the exercises  8/24: very sore and tender lateral and inferior knee  8/26: \"feeling much better today\"  8/31: it's getting better, pain is there but not severe   9/2: c/o inc in knee pain since Tues without known cause  9/7: pt feels cont pain but notes she is doing a lot at home and trying to walk around without AD; pt advised to dec activity levels at home so she can rest knee and dec pain levels **40 min into treatment pt expresses concerns about 2 slightly pink areas on knee incision and if they could be infected. She states that she has had chills and a fever as high as 103 since Friday. Pt denies any other symptoms. Pt has 2 slightly pink areas on incision (distal and mid incision) that are also slightly tender. There is no drainage or edema at either site. Pt states she has an appt with her PCP Dr. Hermelinda Estevez tomorrow for her regular follow up. Pt advised to discuss current symptoms and concerns. Pt states she is has had her Covid vaccination. Pt told that she needs to be fever free x 24 hours before returning to PT and if she cont with fever 9/8 we will reschedule 9/9 visit. 9/14: Saw both PCP and Dr. Maren Gtz and was dx with a UTI and prescribed antibiotic from Dr. Hermelinda Estevez and also Medrol dose pack from Dr. Maren Gtz for cont knee pain; pt states pain in the knee is still there but is not as sharp and is better   9/16: better than last week, but still having knee pain    OBJECTIVE:   · Observation:  · Functional Mobility/Transfers: minimal squat on R, able to complete sit to stand and bed mobility independently.   · Bandages/Dressings/Incisions: NA  · Gait: (include devices/WB status) Patient ambulates without AD in clinic demonstrating mild antalgic gait on R.    Test measurements:    ROM:  Date           Knee Flexion       Knee Extension    Active Passive Active Passive    preop Eval 7/8/21 120  0    Post op 8/17 104  -8    8/26 117  -4    9/16 123  0 Strength:  Date Hip flexion Hip abduction Quad Hamstring Ankle DF Ankle PF   Eval 7/8 4+/5 4+/5 4/5 4+/5 5/5 5/5   8/17 3+  3- 4- 4+    9/16 5  5- 5- 5                 Functional testing Prehab        Date  7/8/21     4 week f/u   Date   8/24/21  8 week f/u  Date    9/20 D/C  Date               TUG 17.15 secs NT   NT     30 second sit to stand 6 reps NT   NT     6 minute walk 128 meters NT   NT                 Balance           Narrow DOMINIK 10  10  10     Semi tandem 10 10  10     Tandem  10  9  10     SLS 5  NT  10                 Knee AROM 0-120 -4 - 117 0 - 123      Knee Extension MMT R/L L=11#  R=14#    R=12#    R 21.7     Hip aBduction MMT R/L L=15#  R=17#    R=NT    R 25.2     WOMAC 83  74  41                 RESTRICTIONS/PRECAUTIONS: none    Exercises/Interventions:     Therapeutic Ex (25951)   Min: 25 Reps/Resistance Notes/CUES  R TKR 7/26   Nustep  5 min  Seat #6   Step flex  Step ext str 3 x 20 sec   3 x 20sec    GSS incline str  2 x 30sec    TKE with TB  Lime 10 x 5sec    HR/TR  Mini squat  x10 ea R>L        Standing march                  Hip abd  1# x 10 L/R  1# x 10 L/R    Fitter f/b     Leg press 30# 1 x 15 R>L              FAQ  HS curl 11# 1x10 R>L  11# 1 x 10 R>L              SAQ 1# 2 x 10     SLR flexion x10  Pt notes less pain with exercise today    Supine heelslides  hep   Long sitting knee extension stretch hep   Calf stretch with strap hep                       Supine ext str with heel on roll           NMR re-education (94786)  Min: 3     Balance:  * tandem stance  * SLS   X 20 sec L/R  2 x 10 sec R    Quad Set               Therapeutic Activity (28238)  Min: 17 See reassessment below    Step up fwd 4\" x 10    Step down 4\" x 10  Mod cues             ll bars:  * amb without HR  * retro    X 4 lengths  X 2 lengths Min cues   reebok f/b s/s F/b s/s x 30 sec each     amb with std cane in dept B/w ex with cane  cues to inc knee flex   Manual Intervention (83191) Min: 10     Pat mobs X 2 min PROM  *supine flex  *supine ext    X 5 min     STM lat knee and hamstrings x 3 min     Modalities  Min:15     IFC with CP 4 pads R knee x 15 min  Pt supine with LE on wedge      Other Therapeutic Activities:   Patient was thoroughly educated on this date regarding prehabilitation goals, importance of PT sessions in improving overall ROM, strength and stability prior to surgery, and how prehabilitation will facilitate improved post-operative outcomes. The patient was educated on and instructed in HEP as listed. The patient was given a detailed handout for exercises to initiate in the hospital post-operatively as well as at home. The discharge plan from the hospital was reviewed with the patient; specifically, to reduce length of hospital stay and to minimize time before reinitiating outpatient physical therapy after surgery. Education regarding early mobility post-operatively in the hospital and emphasis on working with both physical therapy and nursing staff to achieve ambulation goal was provided. The patient was highly encouraged to attend joint class in hospital prior to surgery for further instructions on pre and post-surgical care. Also, education regarding goals and expectations was provided; specifically, knee flexion range of motion greater than or equal to 90 degrees and 0 degrees knee extension within 2 weeks to promote improved gait mechanics and ambulation. The patient was encouraged to utilize ice/cold pack after surgery to address pain, minimize swelling as often as possible. It is in my medical opinion that this patient is clear from all physical barriers prior to consideration for surgery, activity modifications prior to and post operatively have been discussed with this patient as well as discharge planning and is cleared for surgery from physical therapy perspective. Home Exercise Program:  Patient instructed in the above exercises for HEP.    Patient verbalized/demonstrated understanding and was issued written handout. 8/17: Supine roll ext str, long sit HSS, calf GSS  8/26: SAQ, standing HR, squat, march and abd     Therapeutic Exercise and NMR EXR  [x] (61961) Provided verbal/tactile cueing for activities related to strengthening, flexibility, endurance, ROM for improvements in LE, proximal hip, and core control with self care, mobility, lifting, ambulation.  [] (07539) Provided verbal/tactile cueing for activities related to improving balance, coordination, kinesthetic sense, posture, motor skill, proprioception  to assist with LE, proximal hip, and core control in self care, mobility, lifting, ambulation and eccentric single leg control.  [address] and Therapeutic Activities:    [x] (34944 or 31374) Provided verbal/tactile cueing for activities related to improving balance, coordination, kinesthetic sense, posture, motor skill, proprioception and motor activation to allow for proper function of core, proximal hip and LE with self care and ADLs and functional mobility.   [] (90103) Gait Re-education- Provided training and instruction to the patient for proper LE, core and proximal hip recruitment and positioning and eccentric body weight control with ambulation re-education including up and down stairs     Gait Training:  [] (00021) Provided training and instruction to the patient for proper postural muscle recruitment and positioning with ambulation re-education     Home Exercise Program:    [x] (72335) Reviewed/Progressed HEP activities related to strengthening, flexibility, endurance, ROM of core, proximal hip and LE for functional self-care, mobility, lifting and ambulation/stair navigation   [] (65312)Reviewed/Progressed HEP activities related to improving balance, coordination, kinesthetic sense, posture, motor skill, proprioception of core, proximal hip and LE for self care, mobility, lifting, and ambulation/stair navigation      Manual Treatments:  PROM / STM / Oscillations-Mobs:  G-I, II, III, IV (Zeke, Inf., Post.)  [] (06457) Provided manual therapy to mobilize LE, proximal hip and/or LS spine soft tissue/joints for the purpose of modulating pain, promoting relaxation,  increasing ROM, reducing/eliminating soft tissue swelling/inflammation/restriction, improving soft tissue extensibility and allowing for proper ROM for normal function with self care, mobility, lifting and ambulation. Charges:  Timed Code Treatment Minutes: 55   Total Treatment Minutes: 70      [] EVAL (LOW) 53070 (typically 20 minutes face-to-face)  [] EVAL (MOD) 29563 (typically 30 minutes face-to-face)  [] EVAL (HIGH) 87178 (typically 45 minutes face-to-face)  [] RE-EVAL     [x] WV(57525) x   2  [] Dry needle 1 or 2 Muscles (68710)  [] NMR (36354) x     [] Dry needle 3+ Muscles (71660)  [x] Manual (42177) x     [] Ultrasound (57497) x  [x] TA (76104) x     [] Mech Traction (87737)  [] ES(attended) (27148)     [x] ES (un) (62371):   [] Vasopump (54169) [] Ionto (68089)   [] Gait (87084)  [] Other:    GOALS:updated9/16  * pt feels 50% improved overall  * states pain avg about 4/10 with ADLs and sleep  * doing hep and icing daily   * amb with walker out in community and std cane at home, but doesn't feel quite steady with std cane out, so she is going to purchase a [employer] for community ambulation right now  * gross R LE strength 5-/5 - 5/5   * R knee ROM 0-123    Patient stated goal: \"back to normal\"  Short Term Goals: 2 wks  1. Pt independent with hep  - met  -?x Progressing: -? Met: -? Not Met: -? Adjusted  2. Pt rates pain improvement of 20-30% overall for ease with sleep - met  -?x Progressing: -? Met: -? Not Met: -? Adjusted  3. R knee ROM -4 - 110 for ease with dressing - met  -?x Progressing: -? Met: -? Not Met: -? Adjusted  4. Pt amb with cane in community - progressing  -?x Progressing: -? Met: -? Not Met: -? Adjusted         Long Term Goals: at discharge   1. WOMAC score of 74 - met  -?x Progressing: -? Met: -?  Not Met: -? Adjusted   2. Pt rates pain improvement of 40-50% overall for ease with sleep - met  -?x Progressing: -? Met: -? Not Met: -? Adjusted  3. R knee ROM -0 - 115 for ease with transfers - met  -?x Progressing: -? Met: -? Not Met: -? Adjusted   4. Pt amb without AD in community  - progressing   -?x Progressing: -? Met: -? Not Met: -? Adjusted   5. Pt demonstrates 5/5 MMT R knee for ease with steps  - progressing   -?x Progressing: -? Met: -? Not Met: -? Adjusted    ASSESSMENT:  See eval    Treatment/Activity Tolerance:  [x] Patient tolerated treatment well [] Patient limited by fatique  [x] Patient limited by pain  [] Patient limited by other medical complications  [] Other:     Overall Progression Towards Functional goals/ Treatment Progress Update:  [] Patient is progressing as expected towards functional goals listed. [] Progression is slowed due to complexities/Impairments listed. [] Progression has been slowed due to co-morbidities. [x] Plan just implemented, too soon to assess goals progression <30days   [] Goals require adjustment due to lack of progress  [] Patient is not progressing as expected and requires additional follow up with physician  [] Other    Prognosis for POC: [x] Good [] Fair  [] Poor    Patient requires continued skilled intervention: [] Yes  [x] No        PLAN:progress strength, gait and function     [x] Continue per plan of care [] Alter current plan (see comments)  [] Plan of care initiated [] Hold pending upcoming surgery [] Discharge    Electronically signed by: Zoila Steele, PT MPT [id number]      Note: If patient does not return for scheduled/recommended follow up visits, this note will serve as a discharge from care along with the most recent update on progress.

## 2021-09-21 ENCOUNTER — HOSPITAL ENCOUNTER (OUTPATIENT)
Dept: PHYSICAL THERAPY | Age: 66
Setting detail: THERAPIES SERIES
Discharge: HOME | End: 2021-09-21
Payer: MEDICARE

## 2021-09-21 DIAGNOSIS — E11.8 TYPE 2 DIABETES MELLITUS WITH COMPLICATION, WITHOUT LONG-TERM CURRENT USE OF INSULIN (HCC): ICD-10-CM

## 2021-09-21 PROCEDURE — G0283 ELEC STIM OTHER THAN WOUND: HCPCS

## 2021-09-21 PROCEDURE — 97530 THERAPEUTIC ACTIVITIES: CPT

## 2021-09-21 PROCEDURE — 97110 THERAPEUTIC EXERCISES: CPT

## 2021-09-21 NOTE — FLOWSHEET NOTE
[facility] [place] [facility], [facility]  [address] [place] [address], [facility]  Phone: [phone]   Fax:     [phone]                                                          Physical Therapy Treatment Note/ Progress Report:   Date:  2021    Patient Name:  Mary Jansen    :  1955  MRN: [id number]    Pertinent Medical History: HTN, DM, Cerebral Infarction ( R side weakness) , Cardiomyopathy, R foot arthrodesis, R shoulder RTC repair, RA, anxiety, depression      C-SSRS Triggered by Intake questionnaire: Pt has appt for psychiatric consult next month      YES NO   In the past month, have you wished you were dead or wished you could go to sleep and not wake up? X   In the past month, have you had any thoughts of killing yourself? X                    Lifetime   YES NO   Have you ever done anything, started to do anything, or prepared to do anything to end your life?    X             Past 3 months    X       Medical/Treatment Diagnosis Information:  · Diagnosis: R TKR  · Treatment Diagnosis: decreased mobility, strength    Insurance/Certification information:  PT Insurance Information: [facility] Medicare Dual / Sharyn Powell  Physician Information:  Referring Practitioner: Tim Franco MD  Plan of care signed (Y/N):  Sent to inbox    Date of Patient follow up with Physician:       Progress Report: []  Yes   [x]  No     Date Range for reporting period:  Beginnin2021  Ending:      Progress report due (10 Rx/or 30 days whichever is less): 7/65/15     Recertification due (POC duration/ or 90 days whichever is less):     Visit # POC/Insurance Allowable Auth Needed    (post- op) Rhea Chavez []Yes   [x]No     Latex Allergy:  [x]NO      []YES  Preferred Language for Healthcare:   [x]English       []Other:    Functional Scale:       Date assessed: at eval  Test: WOMAC  Score: 74  *WOMAC 41 on 21    Pain level:  1-2/10     History of Injury: reports chronic history of R states pain in the knee is still there but is not as sharp and is better   9/16: better than last week, but still having knee pain  9/21: feeling much better; low pain levels; amb to PT with std cane     OBJECTIVE:   · Observation:  · Functional Mobility/Transfers: minimal squat on R, able to complete sit to stand and bed mobility independently.   · Bandages/Dressings/Incisions: NA  · Gait: (include devices/WB status) Patient ambulates without AD in clinic demonstrating mild antalgic gait on R.    Test measurements:    ROM:  Date           Knee Flexion       Knee Extension    Active Passive Active Passive    preop Eval 7/8/21 120  0    Post op 8/17 104  -8    8/26 117  -4    9/16 123  0               Strength:  Date Hip flexion Hip abduction Quad Hamstring Ankle DF Ankle PF   Eval 7/8 4+/5 4+/5 4/5 4+/5 5/5 5/5   8/17 3+  3- 4- 4+    9/16 5  5- 5- 5                 Functional testing Prehab        Date  7/8/21     4 week f/u   Date   8/24/21  8 week f/u  Date    9/20 D/C  Date               TUG 17.15 secs NT   NT     30 second sit to stand 6 reps NT   NT     6 minute walk 128 meters NT   NT                 Balance           Narrow DOMINIK 10  10  10     Semi tandem 10 10  10     Tandem  10  9  10     SLS 5  NT  10                 Knee AROM 0-120 -4 - 117 0 - 123      Knee Extension MMT R/L L=11#  R=14#    R=12#    R 21.7     Hip aBduction MMT R/L L=15#  R=17#    R=NT    R 25.2     WOMAC 83  74  41                 RESTRICTIONS/PRECAUTIONS: none    Exercises/Interventions:     Therapeutic Ex (04182)   Min: 35 Reps/Resistance Notes/CUES  R TKR 7/26   Nustep  L1 5 min  Seat #6   Step flex  Step ext str 2 x 30 sec   2 x 30sec    GSS incline str  2 x 30sec    TKE with TB  Lime 10 x 5 sec    HR/TR  Mini squat  x10 ea R>L        Standing march                  Hip abd  1# x 10 L/R  1# x 10 L/R    Fitter f/b add         Leg press 30# 1 x 15 R>L    FAQ  HS curl 11# 1x10 R only   11# 1 x 10 R only               SAQ 1# 2 x 10 SLR flexion 1# x 10     Supine heelslides  hep   Long sitting knee extension stretch hep   Calf stretch with strap hep   Supine ext str with heel on roll           NMR re-education (59813)  Min: 3     Balance:  * tandem stance  * SLS   X 30 sec L/R  X 30 sec R    Airex:  *NBOS EC add    Quad Set     Therapeutic Activity (69694)  Min: 7     Step up fwd               lat 4\" x 10  4\" x 10     Step down 4\" x 10  Min cues        ll bars:  * amb without HR  * retro   * lat amb with orange looped band   X 2 lengths  X 2 lengths  X 2 lengths Min cues   reebok f/b s/s F/b s/s x 30 sec each     Manual Intervention (58195) Min: 5     Pat mobs X 2 min     PROM  *supine flex  *supine ext    X 3 min     STM     Modalities  Min:15     IFC with CP 4 pads R knee x 15 min  Pt supine with LE on wedge      Other Therapeutic Activities:   Patient was thoroughly educated on this date regarding prehabilitation goals, importance of PT sessions in improving overall ROM, strength and stability prior to surgery, and how prehabilitation will facilitate improved post-operative outcomes. The patient was educated on and instructed in HEP as listed. The patient was given a detailed handout for exercises to initiate in the hospital post-operatively as well as at home. The discharge plan from the hospital was reviewed with the patient; specifically, to reduce length of hospital stay and to minimize time before reinitiating outpatient physical therapy after surgery. Education regarding early mobility post-operatively in the hospital and emphasis on working with both physical therapy and nursing staff to achieve ambulation goal was provided. The patient was highly encouraged to attend joint class in hospital prior to surgery for further instructions on pre and post-surgical care.  Also, education regarding goals and expectations was provided; specifically, knee flexion range of motion greater than or equal to 90 degrees and 0 degrees knee extension within 2 weeks to promote improved gait mechanics and ambulation. The patient was encouraged to utilize ice/cold pack after surgery to address pain, minimize swelling as often as possible. It is in my medical opinion that this patient is clear from all physical barriers prior to consideration for surgery, activity modifications prior to and post operatively have been discussed with this patient as well as discharge planning and is cleared for surgery from physical therapy perspective. Home Exercise Program:  Patient instructed in the above exercises for HEP. Patient verbalized/demonstrated understanding and was issued written handout. 8/17: Supine roll ext str, long sit HSS, calf GSS  8/26: SAQ, standing HR, squat, march and abd     Therapeutic Exercise and NMR EXR  [x] (33262) Provided verbal/tactile cueing for activities related to strengthening, flexibility, endurance, ROM for improvements in LE, proximal hip, and core control with self care, mobility, lifting, ambulation.  [] (36572) Provided verbal/tactile cueing for activities related to improving balance, coordination, kinesthetic sense, posture, motor skill, proprioception  to assist with LE, proximal hip, and core control in self care, mobility, lifting, ambulation and eccentric single leg control.  [address] and Therapeutic Activities:    [x] (20078 or 27935) Provided verbal/tactile cueing for activities related to improving balance, coordination, kinesthetic sense, posture, motor skill, proprioception and motor activation to allow for proper function of core, proximal hip and LE with self care and ADLs and functional mobility.   [] (13550) Gait Re-education- Provided training and instruction to the patient for proper LE, core and proximal hip recruitment and positioning and eccentric body weight control with ambulation re-education including up and down stairs     Gait Training:  [] (37843) Provided training and instruction to the patient for proper postural muscle recruitment and positioning with ambulation re-education     Home Exercise Program:    [x] (46060) Reviewed/Progressed HEP activities related to strengthening, flexibility, endurance, ROM of core, proximal hip and LE for functional self-care, mobility, lifting and ambulation/stair navigation   [] (55852)Reviewed/Progressed HEP activities related to improving balance, coordination, kinesthetic sense, posture, motor skill, proprioception of core, proximal hip and LE for self care, mobility, lifting, and ambulation/stair navigation      Manual Treatments:  PROM / STM / Oscillations-Mobs:  G-I, II, III, IV (PA's, Inf., Post.)  [] (80999) Provided manual therapy to mobilize LE, proximal hip and/or LS spine soft tissue/joints for the purpose of modulating pain, promoting relaxation,  increasing ROM, reducing/eliminating soft tissue swelling/inflammation/restriction, improving soft tissue extensibility and allowing for proper ROM for normal function with self care, mobility, lifting and ambulation.      Charges:  Timed Code Treatment Minutes: 50   Total Treatment Minutes: 65      [] EVAL (LOW) 17907 (typically 20 minutes face-to-face)  [] EVAL (MOD) 26148 (typically 30 minutes face-to-face)  [] EVAL (HIGH) 54490 (typically 45 minutes face-to-face)  [] RE-EVAL     [x] TJ(24183) x   2  [] Dry needle 1 or 2 Muscles (62697)  [] NMR (13278) x     [] Dry needle 3+ Muscles (12410)  [] Manual (06843) x     [] Ultrasound (05345) x  [x] TA (16225) x     [] Mech Traction (33954)  [] ES(attended) (52276)     [x] ES (un) (15967):   [] Vasopump (40383) [] Ionto (02042)   [] Gait (71650)  [] Other:    GOALS:updated9/16  * pt feels 50% improved overall  * states pain avg about 4/10 with ADLs and sleep  * doing hep and icing daily   * amb with walker out in community and std cane at home, but doesn't feel quite steady with std cane out, so she is going to purchase a [employer] [place] for community ambulation right now  * cindy CARLSON LE strength 5-/5 - 5/5   * R knee ROM 0-123    Patient stated goal: \"back to normal\"  Short Term Goals: 2 wks  1. Pt independent with hep  - met  -?x Progressing: -? Met: -? Not Met: -? Adjusted  2. Pt rates pain improvement of 20-30% overall for ease with sleep - met  -?x Progressing: -? Met: -? Not Met: -? Adjusted  3. R knee ROM -4 - 110 for ease with dressing - met  -?x Progressing: -? Met: -? Not Met: -? Adjusted  4. Pt amb with cane in community - progressing  -?x Progressing: -? Met: -? Not Met: -? Adjusted         Long Term Goals: at discharge   1. WOMAC score of 74 - met  -?x Progressing: -? Met: -? Not Met: -? Adjusted   2. Pt rates pain improvement of 40-50% overall for ease with sleep - met  -?x Progressing: -? Met: -? Not Met: -? Adjusted  3. R knee ROM -0 - 115 for ease with transfers - met  -?x Progressing: -? Met: -? Not Met: -? Adjusted   4. Pt amb without AD in community  - progressing   -?x Progressing: -? Met: -? Not Met: -? Adjusted   5. Pt demonstrates 5/5 MMT R knee for ease with steps  - progressing   -?x Progressing: -? Met: -? Not Met: -? Adjusted    ASSESSMENT:  See eval    Treatment/Activity Tolerance:  [x] Patient tolerated treatment well [] Patient limited by fatique  [x] Patient limited by pain  [] Patient limited by other medical complications  [] Other:     Overall Progression Towards Functional goals/ Treatment Progress Update:  [x] Patient is progressing as expected towards functional goals listed. [] Progression is slowed due to complexities/Impairments listed. [] Progression has been slowed due to co-morbidities.   [] Plan just implemented, too soon to assess goals progression <30days   [] Goals require adjustment due to lack of progress  [] Patient is not progressing as expected and requires additional follow up with physician  [] Other    Prognosis for POC: [x] Good [] Fair  [] Poor    Patient requires continued skilled intervention: [] Yes  [x] No        PLAN:progress strength, gait and function     [x] Continue per plan of care [] Alter current plan (see comments)  [] Plan of care initiated [] Hold pending upcoming surgery [] Discharge    Electronically signed by: Bhavik De Guzman, PT MPT [id number]      Note: If patient does not return for scheduled/recommended follow up visits, this note will serve as a discharge from care along with the most recent update on progress.

## 2021-09-22 RX ORDER — INSULIN GLARGINE 100 [IU]/ML
INJECTION, SOLUTION SUBCUTANEOUS
Qty: 15 ML | Refills: 1 | Status: SHIPPED | OUTPATIENT
Start: 2021-09-22 | End: 2022-04-13

## 2021-09-23 ENCOUNTER — HOSPITAL ENCOUNTER (OUTPATIENT)
Dept: PHYSICAL THERAPY | Age: 66
Setting detail: THERAPIES SERIES
Discharge: HOME | End: 2021-09-23
Payer: MEDICARE

## 2021-09-23 PROCEDURE — 97110 THERAPEUTIC EXERCISES: CPT

## 2021-09-23 PROCEDURE — 97530 THERAPEUTIC ACTIVITIES: CPT

## 2021-09-23 PROCEDURE — G0283 ELEC STIM OTHER THAN WOUND: HCPCS

## 2021-09-25 ENCOUNTER — APPOINTMENT (OUTPATIENT)
Dept: CT IMAGING | Age: 66
End: 2021-09-25
Payer: MEDICARE

## 2021-09-25 ENCOUNTER — APPOINTMENT (OUTPATIENT)
Dept: GENERAL RADIOLOGY | Age: 66
End: 2021-09-25
Payer: MEDICARE

## 2021-09-25 ENCOUNTER — HOSPITAL ENCOUNTER (EMERGENCY)
Age: 66
Discharge: HOME | End: 2021-09-25
Payer: MEDICARE

## 2021-09-25 VITALS
OXYGEN SATURATION: 100 % | TEMPERATURE: 98.2 F | DIASTOLIC BLOOD PRESSURE: 56 MMHG | HEART RATE: 86 BPM | RESPIRATION RATE: 22 BRPM | SYSTOLIC BLOOD PRESSURE: 103 MMHG

## 2021-09-25 DIAGNOSIS — R10.13 EPIGASTRIC PAIN: Primary | ICD-10-CM

## 2021-09-25 LAB
A/G RATIO: 1.2 (ref 1.1–2.2)
ALBUMIN SERPL-MCNC: 4.2 G/DL (ref 3.4–5)
ALP BLD-CCNC: 92 U/L (ref 40–129)
ALT SERPL-CCNC: 13 U/L (ref 10–40)
ANION GAP SERPL CALCULATED.3IONS-SCNC: 12 MMOL/L (ref 3–16)
AST SERPL-CCNC: 23 U/L (ref 15–37)
BASOPHILS ABSOLUTE: 0 K/UL (ref 0–0.2)
BASOPHILS RELATIVE PERCENT: 0.5 %
BILIRUB SERPL-MCNC: <0.2 MG/DL (ref 0–1)
BILIRUBIN URINE: NEGATIVE
BLOOD, URINE: NEGATIVE
BUN BLDV-MCNC: 14 MG/DL (ref 7–20)
CALCIUM SERPL-MCNC: 10.2 MG/DL (ref 8.3–10.6)
CHLORIDE BLD-SCNC: 97 MMOL/L (ref 99–110)
CLARITY: CLEAR
CO2: 25 MMOL/L (ref 21–32)
COLOR: YELLOW
CREAT SERPL-MCNC: 1 MG/DL (ref 0.6–1.2)
D DIMER: 923 NG/ML DDU (ref 0–229)
EOSINOPHILS ABSOLUTE: 0.1 K/UL (ref 0–0.6)
EOSINOPHILS RELATIVE PERCENT: 2 %
EPITHELIAL CELLS, UA: 1 /HPF (ref 0–5)
GFR AFRICAN AMERICAN: >60
GFR NON-AFRICAN AMERICAN: 55
GLOBULIN: 3.6 G/DL
GLUCOSE BLD-MCNC: 129 MG/DL (ref 70–99)
GLUCOSE URINE: NEGATIVE MG/DL
HCT VFR BLD CALC: 41.7 % (ref 36–48)
HEMOGLOBIN: 13.3 G/DL (ref 12–16)
HYALINE CASTS: 1 /LPF (ref 0–8)
KETONES, URINE: NEGATIVE MG/DL
LEUKOCYTE ESTERASE, URINE: ABNORMAL
LIPASE: 75 U/L (ref 13–60)
LYMPHOCYTES ABSOLUTE: 3.2 K/UL (ref 1–5.1)
LYMPHOCYTES RELATIVE PERCENT: 45.8 %
MCH RBC QN AUTO: 26.5 PG (ref 26–34)
MCHC RBC AUTO-ENTMCNC: 32 G/DL (ref 31–36)
MCV RBC AUTO: 82.8 FL (ref 80–100)
MICROSCOPIC EXAMINATION: YES
MONOCYTES ABSOLUTE: 0.4 K/UL (ref 0–1.3)
MONOCYTES RELATIVE PERCENT: 5.5 %
NEUTROPHILS ABSOLUTE: 3.2 K/UL (ref 1.7–7.7)
NEUTROPHILS RELATIVE PERCENT: 46.2 %
NITRITE, URINE: NEGATIVE
PDW BLD-RTO: 14.5 % (ref 12.4–15.4)
PH UA: 7.5 (ref 5–8)
PLATELET # BLD: 373 K/UL (ref 135–450)
PMV BLD AUTO: 7.5 FL (ref 5–10.5)
POTASSIUM REFLEX MAGNESIUM: 5.1 MMOL/L (ref 3.5–5.1)
PRO-BNP: 25 PG/ML (ref 0–124)
PROTEIN UA: NEGATIVE MG/DL
RBC # BLD: 5.03 M/UL (ref 4–5.2)
RBC UA: 1 /HPF (ref 0–4)
SLIDE REVIEW: NORMAL
SODIUM BLD-SCNC: 134 MMOL/L (ref 136–145)
SPECIFIC GRAVITY UA: 1.01 (ref 1–1.03)
TOTAL PROTEIN: 7.8 G/DL (ref 6.4–8.2)
TROPONIN: <0.01 NG/ML
URINE REFLEX TO CULTURE: ABNORMAL
URINE TYPE: ABNORMAL
UROBILINOGEN, URINE: 0.2 E.U./DL
WBC # BLD: 7 K/UL (ref 4–11)
WBC UA: 1 /HPF (ref 0–5)

## 2021-09-25 PROCEDURE — 99284 EMERGENCY DEPT VISIT MOD MDM: CPT

## 2021-09-25 PROCEDURE — 85379 FIBRIN DEGRADATION QUANT: CPT

## 2021-09-25 PROCEDURE — 81001 URINALYSIS AUTO W/SCOPE: CPT

## 2021-09-25 PROCEDURE — 6360000004 HC RX CONTRAST MEDICATION: Performed by: NURSE PRACTITIONER

## 2021-09-25 PROCEDURE — 6370000000 HC RX 637 (ALT 250 FOR IP): Performed by: NURSE PRACTITIONER

## 2021-09-25 PROCEDURE — 80053 COMPREHEN METABOLIC PANEL: CPT

## 2021-09-25 PROCEDURE — 96374 THER/PROPH/DIAG INJ IV PUSH: CPT

## 2021-09-25 PROCEDURE — 71045 X-RAY EXAM CHEST 1 VIEW: CPT

## 2021-09-25 PROCEDURE — 74177 CT ABD & PELVIS W/CONTRAST: CPT

## 2021-09-25 PROCEDURE — 36415 COLL VENOUS BLD VENIPUNCTURE: CPT

## 2021-09-25 PROCEDURE — 83880 ASSAY OF NATRIURETIC PEPTIDE: CPT

## 2021-09-25 PROCEDURE — 85025 COMPLETE CBC W/AUTO DIFF WBC: CPT

## 2021-09-25 PROCEDURE — 2580000003 HC RX 258: Performed by: NURSE PRACTITIONER

## 2021-09-25 PROCEDURE — 83690 ASSAY OF LIPASE: CPT

## 2021-09-25 PROCEDURE — 71260 CT THORAX DX C+: CPT

## 2021-09-25 PROCEDURE — 84484 ASSAY OF TROPONIN QUANT: CPT

## 2021-09-25 PROCEDURE — 2500000003 HC RX 250 WO HCPCS: Performed by: NURSE PRACTITIONER

## 2021-09-25 PROCEDURE — 93005 ELECTROCARDIOGRAM TRACING: CPT | Performed by: NURSE PRACTITIONER

## 2021-09-25 RX ORDER — DICYCLOMINE HYDROCHLORIDE 10 MG/1
10 CAPSULE ORAL EVERY 6 HOURS PRN
Qty: 20 CAPSULE | Refills: 0 | Status: SHIPPED | OUTPATIENT
Start: 2021-09-25 | End: 2021-10-01

## 2021-09-25 RX ORDER — FAMOTIDINE 20 MG/1
20 TABLET, FILM COATED ORAL 2 TIMES DAILY
Qty: 60 TABLET | Refills: 0 | Status: SHIPPED | OUTPATIENT
Start: 2021-09-25 | End: 2021-10-01

## 2021-09-25 RX ORDER — SUCRALFATE 1 G/1
1 TABLET ORAL ONCE
Status: COMPLETED | OUTPATIENT
Start: 2021-09-25 | End: 2021-09-25

## 2021-09-25 RX ORDER — 0.9 % SODIUM CHLORIDE 0.9 %
500 INTRAVENOUS SOLUTION INTRAVENOUS ONCE
Status: COMPLETED | OUTPATIENT
Start: 2021-09-25 | End: 2021-09-25

## 2021-09-25 RX ORDER — ONDANSETRON 4 MG/1
4-8 TABLET, ORALLY DISINTEGRATING ORAL EVERY 12 HOURS PRN
Qty: 12 TABLET | Refills: 0 | Status: SHIPPED | OUTPATIENT
Start: 2021-09-25 | End: 2021-10-26 | Stop reason: SDUPTHER

## 2021-09-25 RX ORDER — SUCRALFATE ORAL 1 G/10ML
1 SUSPENSION ORAL 4 TIMES DAILY
Qty: 1200 ML | Refills: 0 | Status: SHIPPED | OUTPATIENT
Start: 2021-09-25 | End: 2021-10-26

## 2021-09-25 RX ADMIN — SUCRALFATE 1 G: 1 TABLET ORAL at 09:01

## 2021-09-25 RX ADMIN — SODIUM CHLORIDE 500 ML: 9 INJECTION, SOLUTION INTRAVENOUS at 09:04

## 2021-09-25 RX ADMIN — FAMOTIDINE 20 MG: 10 INJECTION, SOLUTION INTRAVENOUS at 08:56

## 2021-09-25 RX ADMIN — LIDOCAINE HYDROCHLORIDE: 20 SOLUTION ORAL; TOPICAL at 09:00

## 2021-09-25 RX ADMIN — IOPAMIDOL 75 ML: 755 INJECTION, SOLUTION INTRAVENOUS at 10:32

## 2021-09-25 ASSESSMENT — PAIN SCALES - GENERAL: PAINLEVEL_OUTOF10: 7

## 2021-09-25 ASSESSMENT — PAIN DESCRIPTION - PAIN TYPE: TYPE: ACUTE PAIN

## 2021-09-25 ASSESSMENT — PAIN DESCRIPTION - DESCRIPTORS: DESCRIPTORS: BURNING

## 2021-09-25 ASSESSMENT — PAIN DESCRIPTION - ORIENTATION: ORIENTATION: MID;UPPER

## 2021-09-25 ASSESSMENT — PAIN DESCRIPTION - LOCATION: LOCATION: ABDOMEN

## 2021-09-25 NOTE — ED TRIAGE NOTES
Patient to ED via private car c/o epigastric buring sensation pain up through chest since yesterday. reports bloating started few days ago. pt with knee surgery 2 months ago. taking nsaids tid. denies bloody stools. reports chronic shortness of breath.

## 2021-09-25 NOTE — ED NOTES
Spoke with ortho doctor on call due to critical best practice advisory, states patient is ok for discharge.       Lena Rm RN  09/25/21 2601

## 2021-09-25 NOTE — ED PROVIDER NOTES
[address]  [address] [address]  Dept: [phone]  Loc: [address] ENCOUNTER        This patient was not seen or evaluated by the attending physician. I evaluated this patient, the attending physician was available for consultation. CHIEF COMPLAINT    Chief Complaint   Patient presents with    Abdominal Pain     epigastric buring sensation pain up through chest since yesterday. reports bloating started few days ago. pt with knee surgery 2 months ago. taking nsaids tid. denies bloody stools. reports chronic shortness of breath. HPI    Edy Rousseau is a 76 y.o. female who presents with abdominal pain localized in the epigastric region of the abdomen, that radiates up with associated burning in her chest.  She does have some shortness of breath with the history of a right total knee replacement 2 months ago. Onset was several days ago. Worsened overnight. The duration has been intermittent since the onset. The pain is associated with a burning-like sensation that causes her to be nauseous, no vomiting or diarrhea. No melena, hematemesis or hematochezia. The pain is 3/10 in severity. The quality of the pain is sharp and cramping. The context is that the symptoms started spontaneously. There are no alleviating factors. No cough, nasal congestion, fevers or chills or Covid-like symptoms. Came to the ED for further evaluation and treatment. REVIEW OF SYSTEMS    GI: see HPI, no vomiting, no diarrhea, no hematochezia  Cardiac: No chest pain, shortness of breath, palpitations or syncope  Pulmonary: No difficulty breathing or new cough  General: No fevers  : No dysuria, No hematuria  See HPI for further details. All other systems reviewed and are negative.     PAST MEDICAL & SURGICAL HISTORY    Past Medical History:   Diagnosis Date    Anesthesia complication     \" shaking\"    Arthritis     Cardiomyopathy ([facility] [address]     Diabetes     GERD (gastroesophageal reflux disease)     Hyperlipidemia     Hypertension     Lumbar disc disease     Prolonged emergence from general anesthesia     Sleep apnea     pt states does use a Cpap machine at night    Unspecified cerebral artery occlusion with cerebral infarction 2014    right side weak    Wears glasses      Past Surgical History:   Procedure Laterality Date    ARTHRODESIS Right 9/17/2020    (RIGHT) REMOVAL OF BONE SPURRING AND OSSEOUS PROMINENCE FIRST METATARSAL, ARTHRODESIS OF FIRST METATARSOPHALANGEAL JOINT, BONE MARROW HARVEST AND CONCENTRATION RIGHT TIBIA performed by Liset Larios DPM at [address] [facility], [address] Left 9/3/15    CARPAL TUNNEL RELEASE Right 9/22/15    COLONOSCOPY      FINGER TRIGGER RELEASE Left 9/3/15    middle and ring fingers    FINGER TRIGGER RELEASE Right 9/22/15    middle and ring fingers    FOOT SURGERY Bilateral     litteltoe    HAND SURGERY Right     Ligament    KNEE ARTHROPLASTY Right 7/26/2021    ROBOTIC ASSISTED TOTAL RIGHT KNEE REPLACEMENT performed by Markus Carrington MD at [address] [place] [address]  08/2003    SHOULDER ARTHROSCOPY Right 06/20/2018    Diagnostic scope, rcr, open [address]  (may include discharge medications prescribed in the ED)  Current Outpatient Rx   Medication Sig Dispense Refill    famotidine (PEPCID) 20 MG tablet Take 1 tablet by mouth 2 times daily 60 tablet 0    sucralfate (CARAFATE) 1 GM/10ML suspension Take 10 mLs by mouth 4 times daily 1200 mL 0    ondansetron (ZOFRAN ODT) 4 MG disintegrating tablet Take 1-2 tablets by mouth every 12 hours as needed for Nausea or Vomiting May Sub regular tablet (non-ODT) if insurance does not cover ODT.  12 tablet 0    dicyclomine (BENTYL) 10 MG capsule Take 1 capsule by mouth every 6 hours as needed (cramps) 20 capsule 0    insulin glargine (LANTUS SOLOSTAR) 100 UNIT/ML injection pen Patient stated she is taking  12 UNITS UNDER THE SKIN ONCE NIGHTLY 15 mL 1    traZODone (DESYREL) 100 MG tablet TAKE 1 TABLET BY MOUTH AT  NIGHT 90 tablet 1    DULoxetine (CYMBALTA) 60 MG extended release capsule TAKE 1 CAPSULE BY MOUTH  DAILY 90 capsule 1    furosemide (LASIX) 20 MG tablet TAKE 1 TABLET BY MOUTH  DAILY AS NEEDED FOR LEG  SWELLING 90 tablet 3    dextran 70-hypromellose (TEARS NATURALE) 0.1-0.3 % SOLN opthalmic solution as needed for itching      omeprazole (PRILOSEC) 20 MG delayed release capsule Take 1 capsule by mouth      valsartan (DIOVAN) 80 MG tablet Take 0.5 tablets by mouth daily 45 tablet 0    Dulaglutide (TRULICITY) 1.5 JF/0.1RG SOPN INJECT THE CONTENTS OF ONE  PEN SUBCUTANEOUSLY WEEKLY 6 mL 3    latanoprost (XALATAN) 0.005 % ophthalmic solution Place 1 drop into both eyes nightly (Patient not taking: Reported on 9/8/2021)      erythromycin (ROMYCIN) 5 MG/GM ophthalmic ointment nightly      aspirin 81 MG EC tablet Take 1 tablet by mouth 2 times daily for 14 days Take twice a day for 14 days after knee surgery then can resume daily dosing 28 tablet 0    ibuprofen (ADVIL;MOTRIN) 600 MG tablet Take 1 tablet by mouth every 8 hours as needed for Pain HOLD for 14 days after knee surgery (Patient not taking: Reported on 9/8/2021) 60 tablet 0    DULoxetine (CYMBALTA) 30 MG extended release capsule Take 1 capsule by mouth daily (Patient not taking: Reported on 9/8/2021) 30 capsule 0    FANAPT 1 MG TABS tablet Take 1 tablet by mouth nightly 90 tablet 0    lidocaine 4 % external patch Apply patch to lower mid back. Leave on for 12 hours and remove. Leave off for 12 hours before applying another one.  5 patch 0    Insulin Pen Needle (PEN NEEDLES) 31G X 6 MM MISC USE TO INJECT INSULIN DAILY 100 each 1    metFORMIN (GLUCOPHAGE) 500 MG tablet TAKE TWO TABLETS BY MOUTH TWICE A DAY WITH MEALS 360 tablet 0    blood glucose test strips (ONETOUCH ULTRA) strip TEST TWO TIMES A  strip 4    Insecurity    Worried About Running Out of Food in the Last Year: Never true    Megha of Food in the Last Year: Never true   Transportation Needs:     Lack of Transportation (Medical):  Lack of Transportation (Non-Medical):    Physical Activity:     Days of Exercise per Week:     Minutes of Exercise per Session:    Stress:     Feeling of Stress :    Social Connections:     Frequency of Communication with Friends and Family:     Frequency of Social Gatherings with Friends and Family:     Attends [employer] Services:     Active Member of Clubs or Organizations:     Attends Club or Organization Meetings:     Marital Status:    Intimate Partner Violence:     Fear of Current or Ex-Partner:     Emotionally Abused:     Physically Abused:     Sexually Abused:      Family History   Problem Relation Age of Onset    Heart Disease Mother     High Blood Pressure Mother     Stroke Mother     Cancer Brother         lung cancer       PHYSICAL EXAM    VITAL SIGNS: /66   Pulse 85   Temp 98.2 °F (36.8 °C) (Oral)   Resp 15   SpO2 100%   Constitutional:  Well developed, well nourished, no acute distress  Eyes:  Sclera nonicteric, conjunctiva moist  HENT:  Atraumatic, nose normal  Neck: no JVD  Respiratory:  No retractions, no accessory muscle use, normal breath sounds   Cardiovascular:  regular rate, no murmurs  GI: +epigastric abdominal tenderness to palpation, soft, no guarding, bowel sounds present, no audible bruits or palpable pulsatile masses  Back: no CVA tenderness  Musculoskeletal:  No edema, no acute deformities  Vascular: Radial and DP pulses 2+ and equal bilaterally  Integument: No rashes, skin dry  Neurologic:  Alert & oriented, no slurred speech  Psychiatric: Cooperative, pleasant affect     EKG    Please see the ED Physician note for EKG interpretation. RADIOLOGY/PROCEDURES    CT CHEST PULMONARY EMBOLISM W CONTRAST   Final Result   Chest:      No pulmonary embolus identified.   There is mild coronary artery calcification. Mild bronchiectasis. Scattered subpleural reticular opacities are also seen,   suggesting scarring or nonspecific pulmonary fibrosis. Abdomen and pelvis:      No acute intra-abdominal abnormality         CT ABDOMEN PELVIS W IV CONTRAST Additional Contrast? None   Final Result   Chest:      No pulmonary embolus identified. There is mild coronary artery calcification. Mild bronchiectasis. Scattered subpleural reticular opacities are also seen,   suggesting scarring or nonspecific pulmonary fibrosis. Abdomen and pelvis:      No acute intra-abdominal abnormality         XR CHEST PORTABLE   Final Result   No acute cardiopulmonary disease. ED COURSE & MEDICAL DECISION MAKING    Pertinent Labs & Imaging studies reviewed and interpreted. (See chart for details)     See chart for details of medications given during the ED stay. Vitals:    09/25/21 0915 09/25/21 0930 09/25/21 1015 09/25/21 1030   BP: 105/60 119/74 128/66    Pulse: 88 90  85   Resp: 19 16  15   Temp:       TempSrc:       SpO2: 100% 100%  100%       Differential diagnosis: Abdominal Aortic Aneurysm, Acute Coronary Syndrome, Ischemic Bowel, Bowel Obstruction (including Gastric Outlet Obstruction), PUD, GERD, Acute Cholecystitis, Pancreatitis, Hepatitis, Colitis, other      Patient is afebrile and nontoxic in appearance. Labs reveal no leukocytosis or anemia. Metabolic panel unremarkable. Lipase is slightly elevated at 75 but LFTs are within normal limits. Urinalysis unremarkable. EKG officially interpreted per my attending, sinus rhythm. Troponin is negative. Given that she did recently have a right total knee and is complaining of upper abdominal pain/burning throughout her chest I did place an order for D-dimer. This was elevated, therefore CTA PE study was ordered. Full radiology read of his above for this    Plain film as read by the radiologist as above.     CT findings as above. Reevaluation at 1130: Patient is resting comfortably. Symptoms are much improved. She has remained afebrile and hemodynamically stable throughout her entire ED course. I do not believe that the burning in her chest is cardiac in etiology, likely secondary to some dyspepsia given that she has mostly pain in the epigastric region. I will give her medications such as Pepcid and Carafate and have her follow-up with PCP. She is in agreement with this plan as well as the plan of discharge including strict return parameters back to the ED for any new or worsening symptoms. Will be discharged home in stable condition.     Results for orders placed or performed during the hospital encounter of 09/25/21   CBC Auto Differential   Result Value Ref Range    WBC 7.0 4.0 - 11.0 K/uL    RBC 5.03 4.00 - 5.20 M/uL    Hemoglobin 13.3 12.0 - 16.0 g/dL    Hematocrit 41.7 36.0 - 48.0 %    MCV 82.8 80.0 - 100.0 fL    MCH 26.5 26.0 - 34.0 pg    MCHC 32.0 31.0 - 36.0 g/dL    RDW 14.5 12.4 - 15.4 %    Platelets 451 574 - 864 K/uL    MPV 7.5 5.0 - 10.5 fL    SLIDE REVIEW see below     Neutrophils % 46.2 %    Lymphocytes % 45.8 %    Monocytes % 5.5 %    Eosinophils % 2.0 %    Basophils % 0.5 %    Neutrophils Absolute 3.2 1.7 - 7.7 K/uL    Lymphocytes Absolute 3.2 1.0 - 5.1 K/uL    Monocytes Absolute 0.4 0.0 - 1.3 K/uL    Eosinophils Absolute 0.1 0.0 - 0.6 K/uL    Basophils Absolute 0.0 0.0 - 0.2 K/uL   Comprehensive Metabolic Panel w/ Reflex to MG   Result Value Ref Range    Sodium 134 (L) 136 - 145 mmol/L    Potassium reflex Magnesium 5.1 3.5 - 5.1 mmol/L    Chloride 97 (L) 99 - 110 mmol/L    CO2 25 21 - 32 mmol/L    Anion Gap 12 3 - 16    Glucose 129 (H) 70 - 99 mg/dL    BUN 14 7 - 20 mg/dL    CREATININE 1.0 0.6 - 1.2 mg/dL    GFR Non-African American 55 (A) >60    GFR African American >60 >60    Calcium 10.2 8.3 - 10.6 mg/dL    Total Protein 7.8 6.4 - 8.2 g/dL    Albumin 4.2 3.4 - 5.0 g/dL    Albumin/Globulin Ratio 1.2 1.1 - 2.2    Total Bilirubin <0.2 0.0 - 1.0 mg/dL    Alkaline Phosphatase 92 40 - 129 U/L    ALT 13 10 - 40 U/L    AST 23 15 - 37 U/L    Globulin 3.6 g/dL   Lipase   Result Value Ref Range    Lipase 75.0 (H) 13.0 - 60.0 U/L   Troponin   Result Value Ref Range    Troponin <0.01 <0.01 ng/mL   Brain Natriuretic Peptide   Result Value Ref Range    Pro-BNP 25 0 - 124 pg/mL   D-Dimer, Quantitative   Result Value Ref Range    D-Dimer, Quant 923 (H) 0 - 229 ng/mL DDU   Urinalysis Reflex to Culture    Specimen: Urine, clean catch   Result Value Ref Range    Color, UA YELLOW Straw/Yellow    Clarity, UA Clear Clear    Glucose, Ur Negative Negative mg/dL    Bilirubin Urine Negative Negative    Ketones, Urine Negative Negative mg/dL    Specific Gravity, UA 1.008 1.005 - 1.030    Blood, Urine Negative Negative    pH, UA 7.5 5.0 - 8.0    Protein, UA Negative Negative mg/dL    Urobilinogen, Urine 0.2 <2.0 E.U./dL    Nitrite, Urine Negative Negative    Leukocyte Esterase, Urine TRACE (A) Negative    Microscopic Examination YES     Urine Type NotGiven     Urine Reflex to Culture Not Indicated    Microscopic Urinalysis   Result Value Ref Range    Hyaline Casts, UA 1 0 - 8 /LPF    WBC, UA 1 0 - 5 /HPF    RBC, UA 1 0 - 4 /HPF    Epithelial Cells, UA 1 0 - 5 /HPF         I estimate there is LOW risk for ACUTE APPENDICITIS, BOWEL OBSTRUCTION, CHOLECYSTITIS, DIVERTICULITIS, INCARCERATED HERNIA, PANCREATITIS, or PERFORATED BOWEL or ULCER, thus I consider the discharge disposition reasonable. Also, there is no evidence or peritonitis, sepsis, or toxicity. John Wood and I have discussed the diagnosis and risks, and we agree with discharging home to follow-up with their primary doctor in 2-3 days. We also discussed returning to the Emergency Department immediately if new or worsening symptoms occur.  We have discussed the symptoms which are most concerning (e.g., bloody stool, fever, changing or worsening pain, vomiting) that necessitate immediate return. Verbalized understanding, they have no further questions or concerns in agreement with this plan as well as the plan of discharge. FINAL Impression    1. Epigastric pain        Blood pressure 128/66, pulse 85, temperature 98.2 °F (36.8 °C), temperature source Oral, resp. rate 15, SpO2 100 %, not currently breastfeeding.      PLAN  Discharge with outpatient follow-up    (Please note that this note was completed with a voice recognition program.  Every attempt was made to edit the dictations, but inevitably there remain words that are mis-transcribed.)           JEISON Valero - CNP  09/25/21 1142

## 2021-09-26 LAB
EKG ATRIAL RATE: 88 BPM
EKG DIAGNOSIS: NORMAL
EKG P AXIS: 46 DEGREES
EKG P-R INTERVAL: 178 MS
EKG Q-T INTERVAL: 364 MS
EKG QRS DURATION: 68 MS
EKG QTC CALCULATION (BAZETT): 440 MS
EKG R AXIS: 39 DEGREES
EKG T AXIS: 33 DEGREES
EKG VENTRICULAR RATE: 88 BPM

## 2021-09-26 PROCEDURE — 93010 ELECTROCARDIOGRAM REPORT: CPT | Performed by: INTERNAL MEDICINE

## 2021-09-28 ENCOUNTER — HOSPITAL ENCOUNTER (OUTPATIENT)
Dept: PHYSICAL THERAPY | Age: 66
Setting detail: THERAPIES SERIES
Discharge: HOME | End: 2021-09-28
Payer: MEDICARE

## 2021-09-28 PROCEDURE — 97112 NEUROMUSCULAR REEDUCATION: CPT

## 2021-09-28 PROCEDURE — 97530 THERAPEUTIC ACTIVITIES: CPT

## 2021-09-28 PROCEDURE — G0283 ELEC STIM OTHER THAN WOUND: HCPCS

## 2021-09-28 PROCEDURE — 97110 THERAPEUTIC EXERCISES: CPT

## 2021-09-28 NOTE — FLOWSHEET NOTE
[facility] [place] [facility], [facility]  [address] [facility], [facility]  Phone: [phone]   Fax:     [phone]                                                          Physical Therapy Treatment Note/ Progress Report:   Date:  2021    Patient Name:  Nancyann Cranker    :  1955  MRN: [id number]    Pertinent Medical History: HTN, DM, Cerebral Infarction ( R side weakness) , Cardiomyopathy, R foot arthrodesis, R shoulder RTC repair, RA, anxiety, depression      C-SSRS Triggered by Intake questionnaire: Pt has appt for psychiatric consult next month      YES NO   In the past month, have you wished you were dead or wished you could go to sleep and not wake up? X   In the past month, have you had any thoughts of killing yourself? X                    Lifetime   YES NO   Have you ever done anything, started to do anything, or prepared to do anything to end your life?    X             Past 3 months    X       Medical/Treatment Diagnosis Information:  · Diagnosis: R TKR  · Treatment Diagnosis: decreased mobility, strength    Insurance/Certification information:  PT Insurance Information: [employer] Dual [facility]  Physician Information:  Referring Practitioner: Hank Peterson MD  Plan of care signed (Y/N):  Sent to inbox    Date of Patient follow up with Physician:       Progress Report: []  Yes   [x]  No     Date Range for reporting period:  Beginnin2021  Ending:      Progress report due (10 Rx/or 30 days whichever is less):      Recertification due (POC duration/ or 90 days whichever is less):     Visit # POC/Insurance Allowable Auth Needed    (post- op) BOMN []Yes   [x]No     Latex Allergy:  [x]NO      []YES  Preferred Language for Healthcare:   [x]English       []Other:    Functional Scale:       Date assessed: at eval  Test: WOMAC  Score: 74  *WOMAC 41 on 21    Pain level:  0/10     History of Injury: reports chronic history of R knee pain that recently got worse. Patient is scheduled for R TKR on 7/26/21. Patient currently lives with  who is trying to take off work following surgery to care for her. Daughter is also around who came come over for a few hours a day. SUBJECTIVE:    8/17: Pt to PT for Prehab reeval after having R TKR on 7/26/21. She is amb with wheeled walker and rating pain at 5/10. Pt states she is doing hep, icing, and elevating daily. Pt TTP all over R knee, incision healing well with steri strips in place. Patella mobility decreased with pain and edema. 8/19:  Not doing so good. Just over 3 weeks post-op. Did feel a little more flexible after last session and after doing the exercises  8/24: very sore and tender lateral and inferior knee  8/26: \"feeling much better today\"  8/31: it's getting better, pain is there but not severe   9/2: c/o inc in knee pain since Tues without known cause  9/7: pt feels cont pain but notes she is doing a lot at home and trying to walk around without AD; pt advised to dec activity levels at home so she can rest knee and dec pain levels **40 min into treatment pt expresses concerns about 2 slightly pink areas on knee incision and if they could be infected. She states that she has had chills and a fever as high as 103 since Friday. Pt denies any other symptoms. Pt has 2 slightly pink areas on incision (distal and mid incision) that are also slightly tender. There is no drainage or edema at either site. Pt states she has an appt with her PCP Dr. Ashok Zazueta tomorrow for her regular follow up. Pt advised to discuss current symptoms and concerns. Pt states she is has had her Covid vaccination. Pt told that she needs to be fever free x 24 hours before returning to PT and if she cont with fever 9/8 we will reschedule 9/9 visit.    9/14: Saw both PCP and Dr. Katja Romero and was dx with a UTI and prescribed antibiotic from Dr. Ashok Zazueta and also Medrol dose pack from Dr. Katja Romero for cont knee pain; pt sec    HR/TR  Mini squat  x10 ea R>L        Standing march                  Hip abd  1# x 10 L/R  1# x 10 L/R    Fitter f/b add         Leg press 30# 1 x 15 R>L    FAQ  HS curl 11# 1x10 R only   11# 1 x 10 R only               SAQ 1# 2 x 10     SLR flexion 1# x 10     Supine heelslides  hep   Long sitting knee extension stretch hep   Calf stretch with strap hep   Supine ext str with heel on roll           NMR re-education (11832)  Min: 8     Balance:  * tandem stance  * SLS   X 30 sec L/R  X 30 sec R    Airex:  *NBOS EC   X 30 sec     SBA   Quad Set     Therapeutic Activity (89364)  Min: 10     Step up fwd               lat 6\" x 5  4\" x 10     Step down 6\" x 5 Min cues        ll bars:  * amb without HR  * retro   * lat amb with orange looped band   X 2 lengths  X 2 lengths  X 2 lengths Min cues     reebok f/b s/s F/b s/s x 30 sec each     Manual Intervention (41075) Min:      Pat mobs    PROM  *supine flex  *supine ext     STM     Modalities  Min:15     IFC with CP 4 pads R knee x 15 min  Pt supine with LE on wedge      Other Therapeutic Activities:   Patient was thoroughly educated on this date regarding prehabilitation goals, importance of PT sessions in improving overall ROM, strength and stability prior to surgery, and how prehabilitation will facilitate improved post-operative outcomes. The patient was educated on and instructed in HEP as listed. The patient was given a detailed handout for exercises to initiate in the hospital post-operatively as well as at home. The discharge plan from the hospital was reviewed with the patient; specifically, to reduce length of hospital stay and to minimize time before reinitiating outpatient physical therapy after surgery. Education regarding early mobility post-operatively in the hospital and emphasis on working with both physical therapy and nursing staff to achieve ambulation goal was provided.  The patient was highly encouraged to attend joint class in hospital prior to surgery for further instructions on pre and post-surgical care. Also, education regarding goals and expectations was provided; specifically, knee flexion range of motion greater than or equal to 90 degrees and 0 degrees knee extension within 2 weeks to promote improved gait mechanics and ambulation. The patient was encouraged to utilize ice/cold pack after surgery to address pain, minimize swelling as often as possible. It is in my medical opinion that this patient is clear from all physical barriers prior to consideration for surgery, activity modifications prior to and post operatively have been discussed with this patient as well as discharge planning and is cleared for surgery from physical therapy perspective. Home Exercise Program:  Patient instructed in the above exercises for HEP. Patient verbalized/demonstrated understanding and was issued written handout. 8/17: Supine roll ext str, long sit HSS, calf GSS  8/26: SAQ, standing HR, squat, march and abd     Therapeutic Exercise and NMR EXR  [x] (85261) Provided verbal/tactile cueing for activities related to strengthening, flexibility, endurance, ROM for improvements in LE, proximal hip, and core control with self care, mobility, lifting, ambulation.  [] (93963) Provided verbal/tactile cueing for activities related to improving balance, coordination, kinesthetic sense, posture, motor skill, proprioception  to assist with LE, proximal hip, and core control in self care, mobility, lifting, ambulation and eccentric single leg control.  [facility] Activities:    [x] (38283 or 02726) Provided verbal/tactile cueing for activities related to improving balance, coordination, kinesthetic sense, posture, motor skill, proprioception and motor activation to allow for proper function of core, proximal hip and LE with self care and ADLs and functional mobility.   [] (99614) Gait Re-education- Provided training and instruction to the patient for with ADLs and sleep  * doing hep and icing daily   * amb with walker out in community and std cane at home, but doesn't feel quite steady with std cane out, so she is going to purchase a Qustodio[employer] for community ambulation right now  * gross R LE strength 5-/5 - 5/5   * R knee ROM 0-123    Patient stated goal: \"back to normal\"  Short Term Goals: 2 wks  1. Pt independent with hep  - met  -?x Progressing: -? Met: -? Not Met: -? Adjusted  2. Pt rates pain improvement of 20-30% overall for ease with sleep - met  -?x Progressing: -? Met: -? Not Met: -? Adjusted  3. R knee ROM -4 - 110 for ease with dressing - met  -?x Progressing: -? Met: -? Not Met: -? Adjusted  4. Pt amb with cane in community - progressing  -?x Progressing: -? Met: -? Not Met: -? Adjusted         Long Term Goals: at discharge   1. WOMAC score of 74 - met  -?x Progressing: -? Met: -? Not Met: -? Adjusted   2. Pt rates pain improvement of 40-50% overall for ease with sleep - met  -?x Progressing: -? Met: -? Not Met: -? Adjusted  3. R knee ROM -0 - 115 for ease with transfers - met  -?x Progressing: -? Met: -? Not Met: -? Adjusted   4. Pt amb without AD in community  - progressing   -?x Progressing: -? Met: -? Not Met: -? Adjusted   5. Pt demonstrates 5/5 MMT R knee for ease with steps  - progressing   -?x Progressing: -? Met: -? Not Met: -? Adjusted    ASSESSMENT:  See eval    Treatment/Activity Tolerance:  [x] Patient tolerated treatment well [] Patient limited by fatique  [x] Patient limited by pain  [] Patient limited by other medical complications  [] Other:     Overall Progression Towards Functional goals/ Treatment Progress Update:  [x] Patient is progressing as expected towards functional goals listed. [] Progression is slowed due to complexities/Impairments listed. [] Progression has been slowed due to co-morbidities.   [] Plan just implemented, too soon to assess goals progression <30days   [] Goals require adjustment due to lack of progress  [] Patient is not progressing as expected and requires additional follow up with physician  [] Other    Prognosis for POC: [x] Good [] Fair  [] Poor    Patient requires continued skilled intervention: [] Yes  [x] No        PLAN:progress strength, gait and function     [x] Continue per plan of care [] Alter current plan (see comments)  [] Plan of care initiated [] Hold pending upcoming surgery [] Discharge    Electronically signed by: Dennis Baptiste, PT MPT [id number]      Note: If patient does not return for scheduled/recommended follow up visits, this note will serve as a discharge from care along with the most recent update on progress.

## 2021-10-01 ENCOUNTER — OFFICE VISIT (OUTPATIENT)
Dept: FAMILY MEDICINE CLINIC | Age: 66
End: 2021-10-01
Payer: MEDICARE

## 2021-10-01 ENCOUNTER — HOSPITAL ENCOUNTER (OUTPATIENT)
Dept: PHYSICAL THERAPY | Age: 66
Setting detail: THERAPIES SERIES
Discharge: HOME | End: 2021-10-01
Payer: MEDICARE

## 2021-10-01 VITALS
TEMPERATURE: 99.1 F | HEART RATE: 102 BPM | WEIGHT: 194 LBS | SYSTOLIC BLOOD PRESSURE: 130 MMHG | BODY MASS INDEX: 33.3 KG/M2 | RESPIRATION RATE: 14 BRPM | DIASTOLIC BLOOD PRESSURE: 98 MMHG

## 2021-10-01 DIAGNOSIS — R10.13 EPIGASTRIC PAIN: Primary | ICD-10-CM

## 2021-10-01 DIAGNOSIS — K21.9 GASTROESOPHAGEAL REFLUX DISEASE, UNSPECIFIED WHETHER ESOPHAGITIS PRESENT: ICD-10-CM

## 2021-10-01 PROCEDURE — G0283 ELEC STIM OTHER THAN WOUND: HCPCS | Performed by: CHIROPRACTOR

## 2021-10-01 PROCEDURE — G8417 CALC BMI ABV UP PARAM F/U: HCPCS | Performed by: FAMILY MEDICINE

## 2021-10-01 PROCEDURE — 97530 THERAPEUTIC ACTIVITIES: CPT | Performed by: CHIROPRACTOR

## 2021-10-01 PROCEDURE — G8427 DOCREV CUR MEDS BY ELIG CLIN: HCPCS | Performed by: FAMILY MEDICINE

## 2021-10-01 PROCEDURE — G8399 PT W/DXA RESULTS DOCUMENT: HCPCS | Performed by: FAMILY MEDICINE

## 2021-10-01 PROCEDURE — 1123F ACP DISCUSS/DSCN MKR DOCD: CPT | Performed by: FAMILY MEDICINE

## 2021-10-01 PROCEDURE — 99213 OFFICE O/P EST LOW 20 MIN: CPT | Performed by: FAMILY MEDICINE

## 2021-10-01 PROCEDURE — 1036F TOBACCO NON-USER: CPT | Performed by: FAMILY MEDICINE

## 2021-10-01 PROCEDURE — 3017F COLORECTAL CA SCREEN DOC REV: CPT | Performed by: FAMILY MEDICINE

## 2021-10-01 PROCEDURE — 97112 NEUROMUSCULAR REEDUCATION: CPT | Performed by: CHIROPRACTOR

## 2021-10-01 PROCEDURE — 1090F PRES/ABSN URINE INCON ASSESS: CPT | Performed by: FAMILY MEDICINE

## 2021-10-01 PROCEDURE — 97110 THERAPEUTIC EXERCISES: CPT | Performed by: CHIROPRACTOR

## 2021-10-01 PROCEDURE — G8484 FLU IMMUNIZE NO ADMIN: HCPCS | Performed by: FAMILY MEDICINE

## 2021-10-01 PROCEDURE — 4040F PNEUMOC VAC/ADMIN/RCVD: CPT | Performed by: FAMILY MEDICINE

## 2021-10-01 RX ORDER — OMEPRAZOLE 40 MG/1
40 CAPSULE, DELAYED RELEASE ORAL
Qty: 90 CAPSULE | Refills: 1 | Status: SHIPPED | OUTPATIENT
Start: 2021-10-01 | End: 2022-04-04

## 2021-10-01 NOTE — PROGRESS NOTES
10/1/2021    This is a 76 y.o. female   Chief Complaint   Patient presents with    Abdominal Pain     in ED on 09/25, 4 episodes of vomiting 9/30,      HPI    Here for concerns for abdominal pain, not feeling well  - pain has been present for over a week. Went to the ER 6 days ago on 9/25. Had a CT chest, abdomen, and pelvis without any acute findings  - points to upper abdomen for where the pain is. Feels 'burning, fire, and tight'. Sometimes travels all the way up to her neck. - she was started on Pepcid and Carafate and reports no improvement in her symptoms  - eating makes it worse, makes her afraid to eat. No matter what kind of food, it worsens. Cold water seems to help some  - very bad at night for her  - took course of Augmentin on 9/8 for UTI and medrol dose pack for knee pain starting on 9/10  - she denies frequent NSAID use. Takes Tylenol as needed for pain    Was on PPI (omeprazole) but hasn't had this for a couple of weeks. Her symptoms actually began after she stopped this medication. She was told to discontinue this medication by a nurse from her insurance who told her this medication could have some complications. Review of Systems     Current Outpatient Medications   Medication Sig Dispense Refill    omeprazole (PRILOSEC) 40 MG delayed release capsule Take 1 capsule by mouth every morning (before breakfast) 90 capsule 1    sucralfate (CARAFATE) 1 GM/10ML suspension Take 10 mLs by mouth 4 times daily 1200 mL 0    ondansetron (ZOFRAN ODT) 4 MG disintegrating tablet Take 1-2 tablets by mouth every 12 hours as needed for Nausea or Vomiting May Sub regular tablet (non-ODT) if insurance does not cover ODT.  12 tablet 0    insulin glargine (LANTUS SOLOSTAR) 100 UNIT/ML injection pen Patient stated she is taking  12 UNITS UNDER THE SKIN ONCE NIGHTLY 15 mL 1    traZODone (DESYREL) 100 MG tablet TAKE 1 TABLET BY MOUTH AT  NIGHT 90 tablet 1    DULoxetine (CYMBALTA) 60 MG extended release capsule TAKE 1 CAPSULE BY MOUTH  DAILY 90 capsule 1    furosemide (LASIX) 20 MG tablet TAKE 1 TABLET BY MOUTH  DAILY AS NEEDED FOR LEG  SWELLING 90 tablet 3    dextran 70-hypromellose (TEARS NATURALE) 0.1-0.3 % SOLN opthalmic solution as needed for itching      valsartan (DIOVAN) 80 MG tablet Take 0.5 tablets by mouth daily 45 tablet 0    Dulaglutide (TRULICITY) 1.5 HI/6.1JM SOPN INJECT THE CONTENTS OF ONE  PEN SUBCUTANEOUSLY WEEKLY 6 mL 3    latanoprost (XALATAN) 0.005 % ophthalmic solution Place 1 drop into both eyes nightly       erythromycin (ROMYCIN) 5 MG/GM ophthalmic ointment nightly      aspirin 81 MG EC tablet Take 1 tablet by mouth 2 times daily for 14 days Take twice a day for 14 days after knee surgery then can resume daily dosing 28 tablet 0    FANAPT 1 MG TABS tablet Take 1 tablet by mouth nightly 90 tablet 0    Insulin Pen Needle (PEN NEEDLES) 31G X 6 MM MISC USE TO INJECT INSULIN DAILY 100 each 1    metFORMIN (GLUCOPHAGE) 500 MG tablet TAKE TWO TABLETS BY MOUTH TWICE A DAY WITH MEALS 360 tablet 0    blood glucose test strips (ONETOUCH ULTRA) strip TEST TWO TIMES A  strip 4    albuterol sulfate HFA (PROVENTIL HFA) 108 (90 Base) MCG/ACT inhaler Inhale 2 puffs into the lungs every 4 hours as needed for Wheezing or Shortness of Breath May substitute ProAir MDI 1 Inhaler 5    ipratropium-albuterol (DUONEB) 0.5-2.5 (3) MG/3ML SOLN nebulizer solution Inhale 3 mLs into the lungs every 4 hours as needed for Shortness of Breath (wheezing coughing) 360 mL 5    ammonium lactate (LAC-HYDRIN) 12 % lotion Apply topically daily.  1 Bottle 1    blood glucose test strips (FREESTYLE LITE) strip TEST BLOOD SUGAR TWO TIMES A DAY Diag: E11.9 100 each 4    Blood Glucose Monitoring Suppl (TRUE METRIX AIR GLUCOSE METER) w/Device KIT 1 each by Does not apply route daily 1 kit 0    FREESTYLE LANCETS MISC USE ONE LANCET TO TEST BLOOD SUGAR TWICE A  each 3    ibuprofen (ADVIL;MOTRIN) 600 MG tablet Take 1 tablet by mouth every 8 hours as needed for Pain HOLD for 14 days after knee surgery (Patient not taking: Reported on 9/8/2021) 60 tablet 0    lidocaine 4 % external patch Apply patch to lower mid back. Leave on for 12 hours and remove. Leave off for 12 hours before applying another one. (Patient not taking: Reported on 10/1/2021) 5 patch 0     No current facility-administered medications for this visit. BP (!) 130/98 (Site: Right Upper Arm)   Pulse 102   Temp 99.1 °F (37.3 °C)   Resp 14   Wt 194 lb (88 kg)   BMI 33.30 kg/m²     Physical Exam  Constitutional:       Appearance: She is well-developed. HENT:      Head: Normocephalic and atraumatic. Pulmonary:      Effort: Pulmonary effort is normal.   Abdominal:      Comments: Mild TTP epigastric region and LUQ   Skin:     Coloration: Skin is not pale. Neurological:      Mental Status: She is alert and oriented to person, place, and time. Wt Readings from Last 3 Encounters:   10/01/21 194 lb (88 kg)   09/10/21 196 lb (88.9 kg)   09/08/21 196 lb (88.9 kg)       BP Readings from Last 3 Encounters:   10/01/21 (!) 130/98   09/25/21 (!) 103/56   09/08/21 105/70         Assessment/Plan:  1. Epigastric pain  - omeprazole (PRILOSEC) 40 MG delayed release capsule; Take 1 capsule by mouth every morning (before breakfast)  Dispense: 90 capsule; Refill: 1    2. Gastroesophageal reflux disease, unspecified whether esophagitis present  - omeprazole (PRILOSEC) 40 MG delayed release capsule; Take 1 capsule by mouth every morning (before breakfast)  Dispense: 90 capsule; Refill: 1    Her symptoms actually began soon after she stopped her PPI that she has been on for years. She had a negative work-up in the ER including CT chest, abdomen, and pelvis. I am restarting her on a PPI at a higher dose. Return if symptoms worsen or fail to improve.

## 2021-10-01 NOTE — FLOWSHEET NOTE
[facility], [facility]  [address] [facility], [facility]  Phone: [phone]   Fax:     [phone]                                                          Physical Therapy Treatment Note/ Progress Report:   Date:  10/1/2021    Patient Name:  Jaime Mahajan    :  1955  MRN: [id number]    Pertinent Medical History: HTN, DM, Cerebral Infarction ( R side weakness) , Cardiomyopathy, R foot arthrodesis, R shoulder RTC repair, RA, anxiety, depression      C-SSRS Triggered by Intake questionnaire: Pt has appt for psychiatric consult next month      YES NO   In the past month, have you wished you were dead or wished you could go to sleep and not wake up? X   In the past month, have you had any thoughts of killing yourself? X                    Lifetime   YES NO   Have you ever done anything, started to do anything, or prepared to do anything to end your life?    X             Past 3 months    X       Medical/Treatment Diagnosis Information:  · Diagnosis: R TKR  · Treatment Diagnosis: decreased mobility, strength    Insurance/Certification information:  PT Insurance Information: [facility] Medicare Dual / Maureen Thompson  Physician Information:  Referring Practitioner: Fred Franz MD  Plan of care signed (Y/N):  Sent to inbox    Date of Patient follow up with Physician:       Progress Report: []  Yes   [x]  No     Date Range for reporting period:  Beginnin2021  Ending:      Progress report due (10 Rx/or 30 days whichever is less): 13     Recertification due (POC duration/ or 90 days whichever is less):     Visit # POC/Insurance Allowable Auth Needed    (post- op) BOMN []Yes   [x]No     Latex Allergy:  [x]NO      []YES  Preferred Language for Healthcare:   [x]English       []Other:    Functional Scale:       Date assessed: at eval  Test: WOMAC  Score: 74  *WOMAC 41 on 21    Pain level:  1/10     History of Injury: reports chronic history of R knee pain that recently got worse. Patient is scheduled for R TKR on 7/26/21. Patient currently lives with  who is trying to take off work following surgery to care for her. Daughter is also around who came come over for a few hours a day. SUBJECTIVE:    8/17: Pt to PT for Prehab reeval after having R TKR on 7/26/21. She is amb with wheeled walker and rating pain at 5/10. Pt states she is doing hep, icing, and elevating daily. Pt TTP all over R knee, incision healing well with steri strips in place. Patella mobility decreased with pain and edema. 8/19:  Not doing so good. Just over 3 weeks post-op. Did feel a little more flexible after last session and after doing the exercises  8/24: very sore and tender lateral and inferior knee  8/26: \"feeling much better today\"  8/31: it's getting better, pain is there but not severe   9/2: c/o inc in knee pain since Tues without known cause  9/7: pt feels cont pain but notes she is doing a lot at home and trying to walk around without AD; pt advised to dec activity levels at home so she can rest knee and dec pain levels **40 min into treatment pt expresses concerns about 2 slightly pink areas on knee incision and if they could be infected. She states that she has had chills and a fever as high as 103 since Friday. Pt denies any other symptoms. Pt has 2 slightly pink areas on incision (distal and mid incision) that are also slightly tender. There is no drainage or edema at either site. Pt states she has an appt with her PCP Dr. Maureen Yang tomorrow for her regular follow up. Pt advised to discuss current symptoms and concerns. Pt states she is has had her Covid vaccination. Pt told that she needs to be fever free x 24 hours before returning to PT and if she cont with fever 9/8 we will reschedule 9/9 visit.    9/14: Saw both PCP and Dr. Juan Diego Cruz and was dx with a UTI and prescribed antibiotic from Dr. Maureen Yang and also Medrol dose pack from Dr. Juan Diego Cruz for cont knee pain; pt states pain in the knee is still there but is not as sharp and is better   9/16: better than last week, but still having knee pain  9/21: feeling much better; low pain levels; amb to PT with std cane  9/23: \"bone pain\" today in knee per pt; possible from rain and colder temperatures; pt amb with wheeled walker today due to inc soreness; states tolerated last session well without c/o   9/28: \" I feel pressure, but I really don't have any pain\"  10/1/21 - Tired from having to come up to PT via the stairs    OBJECTIVE:   · Observation:  · Functional Mobility/Transfers: minimal squat on R, able to complete sit to stand and bed mobility independently.   · Bandages/Dressings/Incisions: NA  · Gait: (include devices/WB status) Patient ambulates without AD in clinic demonstrating mild antalgic gait on R.    Test measurements:    ROM:  Date           Knee Flexion       Knee Extension    Active Passive Active Passive    preop Eval 7/8/21 120  0    Post op 8/17 104  -8    8/26 117  -4    9/16 123  0               Strength:  Date Hip flexion Hip abduction Quad Hamstring Ankle DF Ankle PF   Eval 7/8 4+/5 4+/5 4/5 4+/5 5/5 5/5   8/17 3+  3- 4- 4+    9/16 5  5- 5- 5                 Functional testing Prehab        Date  7/8/21     4 week f/u   Date   8/24/21  8 week f/u  Date    9/20 D/C  Date               TUG 17.15 secs NT   NT     30 second sit to stand 6 reps NT   NT     6 minute walk 128 meters NT   NT                 Balance           Narrow DOMINIK 10  10  10     Semi tandem 10 10  10     Tandem  10  9  10     SLS 5  NT  10                 Knee AROM 0-120 -4 - 117 0 - 123      Knee Extension MMT R/L L=11#  R=14#    R=12#    R 21.7     Hip aBduction MMT R/L L=15#  R=17#    R=NT    R 25.2     WOMAC 83  74  41                 RESTRICTIONS/PRECAUTIONS: none    Exercises/Interventions:     Therapeutic Ex (27415)   Min: 27 Reps/Resistance Notes/CUES  R TKR 7/26   Nustep  L1 5 min  Seat #5   Step flex  Step ext str 2 x 30 sec   2 x 30sec    GSS incline str  2 x 30sec    TKE with TB  Lime 10 x 5 sec    HR/TR  Mini squat  x10 ea R>L        Standing march                  Hip abd  1# x 10 L/R  1# x 10 L/R    Fitter f/b add         Leg press 30# 1 x 15 R>L    FAQ  HS curl 11# 1x10 R only   11# 1 x 10 R only               SAQ 1# 2 x 10     SLR flexion 1# x 10     Supine heelslides  hep   Long sitting knee extension stretch hep   Calf stretch with strap hep   Supine ext str with heel on roll           NMR re-education (83012)  Min: 8     Balance:  * tandem stance  * SLS   X 30 sec L/R  X 30 sec R    Airex:  *NBOS EC   X 30 sec     SBA   Quad Set     Therapeutic Activity (24952)  Min: 10     Step up fwd               lat 6\" x 5  4\" x 10     Step down 6\" x 5 Min cues        ll bars:  * amb without HR  * retro   * lat amb with orange looped band   X 2 lengths  X 2 lengths  X 2 lengths Min cues     reebok f/b s/s F/b s/s x 30 sec each     Manual Intervention (98889) Min:      Pat mobs    PROM  *supine flex  *supine ext     STM     Modalities  Min:15     IFC with CP 4 pads R knee x 15 min  Minimal soreness after exer  Pt supine with LE on wedge      Other Therapeutic Activities:   Patient was thoroughly educated on this date regarding prehabilitation goals, importance of PT sessions in improving overall ROM, strength and stability prior to surgery, and how prehabilitation will facilitate improved post-operative outcomes. The patient was educated on and instructed in HEP as listed. The patient was given a detailed handout for exercises to initiate in the hospital post-operatively as well as at home. The discharge plan from the hospital was reviewed with the patient; specifically, to reduce length of hospital stay and to minimize time before reinitiating outpatient physical therapy after surgery.  Education regarding early mobility post-operatively in the hospital and emphasis on working with both physical therapy and nursing staff to achieve ambulation goal was provided. The patient was highly encouraged to attend joint class in hospital prior to surgery for further instructions on pre and post-surgical care. Also, education regarding goals and expectations was provided; specifically, knee flexion range of motion greater than or equal to 90 degrees and 0 degrees knee extension within 2 weeks to promote improved gait mechanics and ambulation. The patient was encouraged to utilize ice/cold pack after surgery to address pain, minimize swelling as often as possible. It is in my medical opinion that this patient is clear from all physical barriers prior to consideration for surgery, activity modifications prior to and post operatively have been discussed with this patient as well as discharge planning and is cleared for surgery from physical therapy perspective. Home Exercise Program:  Patient instructed in the above exercises for HEP. Patient verbalized/demonstrated understanding and was issued written handout. 8/17: Supine roll ext str, long sit HSS, calf GSS  8/26: SAQ, standing HR, squat, march and abd     Therapeutic Exercise and NMR EXR  [x] (44261) Provided verbal/tactile cueing for activities related to strengthening, flexibility, endurance, ROM for improvements in LE, proximal hip, and core control with self care, mobility, lifting, ambulation.  [] (28634) Provided verbal/tactile cueing for activities related to improving balance, coordination, kinesthetic sense, posture, motor skill, proprioception  to assist with LE, proximal hip, and core control in self care, mobility, lifting, ambulation and eccentric single leg control.  [address] and Therapeutic Activities:    [x] (40296 or 57490) Provided verbal/tactile cueing for activities related to improving balance, coordination, kinesthetic sense, posture, motor skill, proprioception and motor activation to allow for proper function of core, proximal hip and LE with self care and ADLs and functional mobility.   [] (38608) Gait Re-education- Provided training and instruction to the patient for proper LE, core and proximal hip recruitment and positioning and eccentric body weight control with ambulation re-education including up and down stairs     Gait Training:  [] (65117) Provided training and instruction to the patient for proper postural muscle recruitment and positioning with ambulation re-education     Home Exercise Program:    [x] (08495) Reviewed/Progressed HEP activities related to strengthening, flexibility, endurance, ROM of core, proximal hip and LE for functional self-care, mobility, lifting and ambulation/stair navigation   [] (68694)Reviewed/Progressed HEP activities related to improving balance, coordination, kinesthetic sense, posture, motor skill, proprioception of core, proximal hip and LE for self care, mobility, lifting, and ambulation/stair navigation      Manual Treatments:  PROM / STM / Oscillations-Mobs:  G-I, II, III, IV (PA's, Inf., Post.)  [] (47468) Provided manual therapy to mobilize LE, proximal hip and/or LS spine soft tissue/joints for the purpose of modulating pain, promoting relaxation,  increasing ROM, reducing/eliminating soft tissue swelling/inflammation/restriction, improving soft tissue extensibility and allowing for proper ROM for normal function with self care, mobility, lifting and ambulation.      Charges:  Timed Code Treatment Minutes: 50   Total Treatment Minutes: 65      [] EVAL (LOW) 00777 (typically 20 minutes face-to-face)  [] EVAL (MOD) 36515 (typically 30 minutes face-to-face)  [] EVAL (HIGH) 35091 (typically 45 minutes face-to-face)  [] RE-EVAL     [x] QE(43791) x     [] Dry needle 1 or 2 Muscles (14812)  [x] NMR (11920) x     [] Dry needle 3+ Muscles (56057)  [] Manual (74218) x     [] Ultrasound (32798) x  [x] TA (61359) x     [] Mech Traction (49748)  [] ES(attended) (08481)     [x] ES (un) (39612):   [] Vasopump (18737) [] Ionto (15603)   [] Gait (90679)  [] Other:    [place] Ash  * pt feels 50% improved overall  * states pain avg about 4/10 with ADLs and sleep  * doing hep and icing daily   * amb with walker out in community and std cane at home, but doesn't feel quite steady with std cane out, so she is going to purchase a [employer] for community ambulation right now  * gross R LE strength 5-/5 - 5/5   * R knee ROM 0-123    Patient stated goal: \"back to normal\"  Short Term Goals: 2 wks  1. Pt independent with hep  - met  -?x Progressing: -? Met: -? Not Met: -? Adjusted  2. Pt rates pain improvement of 20-30% overall for ease with sleep - met  -?x Progressing: -? Met: -? Not Met: -? Adjusted  3. R knee ROM -4 - 110 for ease with dressing - met  -?x Progressing: -? Met: -? Not Met: -? Adjusted  4. Pt amb with cane in community - progressing  -?x Progressing: -? Met: -? Not Met: -? Adjusted         Long Term Goals: at discharge   1. WOMAC score of 74 - met  -?x Progressing: -? Met: -? Not Met: -? Adjusted   2. Pt rates pain improvement of 40-50% overall for ease with sleep - met  -?x Progressing: -? Met: -? Not Met: -? Adjusted  3. R knee ROM -0 - 115 for ease with transfers - met  -?x Progressing: -? Met: -? Not Met: -? Adjusted   4. Pt amb without AD in community  - progressing   -?x Progressing: -? Met: -? Not Met: -? Adjusted   5. Pt demonstrates 5/5 MMT R knee for ease with steps  - progressing   -?x Progressing: -? Met: -? Not Met: -? Adjusted    ASSESSMENT:  See eval    Treatment/Activity Tolerance:  [x] Patient tolerated treatment well [] Patient limited by fatique  [x] Patient limited by pain  [] Patient limited by other medical complications  [] Other:     Overall Progression Towards Functional goals/ Treatment Progress Update:  [x] Patient is progressing as expected towards functional goals listed. [] Progression is slowed due to complexities/Impairments listed. [] Progression has been slowed due to co-morbidities.   [] Plan just implemented, too soon to assess goals progression <30days   [] Goals require adjustment due to lack of progress  [] Patient is not progressing as expected and requires additional follow up with physician  [] Other    Prognosis for POC: [x] Good [] Fair  [] Poor    Patient requires continued skilled intervention: [] Yes  [x] No        PLAN:progress strength, gait and function     [x] Continue per plan of care [] Alter current plan (see comments)  [] Plan of care initiated [] Hold pending upcoming surgery [] Discharge    Electronically signed by: Kalyani Akhtar LEE#[id number]      Note: If patient does not return for scheduled/recommended follow up visits, this note will serve as a discharge from care along with the most recent update on progress.

## 2021-10-06 ENCOUNTER — HOSPITAL ENCOUNTER (OUTPATIENT)
Dept: PHYSICAL THERAPY | Age: 66
Setting detail: THERAPIES SERIES
Discharge: HOME | End: 2021-10-06
Payer: MEDICARE

## 2021-10-06 PROCEDURE — 97110 THERAPEUTIC EXERCISES: CPT

## 2021-10-06 PROCEDURE — 97112 NEUROMUSCULAR REEDUCATION: CPT

## 2021-10-06 PROCEDURE — 97530 THERAPEUTIC ACTIVITIES: CPT

## 2021-10-06 PROCEDURE — G0283 ELEC STIM OTHER THAN WOUND: HCPCS

## 2021-10-06 NOTE — FLOWSHEET NOTE
[facility] [place] [facility], [facility]  [address] [facility]  Phone: [phone]   Fax:     [phone]                                                       Physical Therapy Re-Certification Plan of Care/MD UPDATE      Dear Dr. Ashlee Currie,    We had the pleasure of treating the following patient for physical therapy services at [address]. A summary of our findings can be found in the updated assessment below. This includes our plan of care. If you have any questions or concerns regarding these findings, please do not hesitate to contact me at the office phone number checked above. Thank you for the referral.     Physician Signature:________________________________Date:__________________  By signing above (or electronic signature), therapists plan is approved by physician    Date Range Of Visits: 21-10/6/21  Total Visits to Date: 13  Overall Response to Treatment:   [x]Patient is responding well to treatment and improvement is noted with regards  to goals   []Patient should continue to improve in reasonable time if they continue HEP   []Patient has plateaued and is no longer responding to skilled PT intervention    []Patient is getting worse and would benefit from return to referring MD   []Patient unable to adhere to initial POC   [x]Other: see goal reassessment below; 70% improvement noted; pain ranging from 1-4/10 depending on activity; ROM 0-125; strength grossly 5/5; progression functional strength joshua with gait and steps; pt amb mostly with cane at home and 50% of the time with the cane in the community and 50% with the walker in the community depending on the weather and how she feels       Recommendation:    [x]Continue PT 2x / wk for 2-4 weeks.     []Hold PT, pending MD visit           Physical Therapy Treatment Note/ Progress Report:   Date:  10/6/2021    Patient Name:  Karly Parish    :  1955  MRN: [id number]    Pertinent Medical History: HTN, DM, Cerebral Infarction ( R side weakness) , Cardiomyopathy, R foot arthrodesis, R shoulder RTC repair, RA, anxiety, depression      C-SSRS Triggered by Intake questionnaire: Pt has appt for psychiatric consult next month      YES NO   In the past month, have you wished you were dead or wished you could go to sleep and not wake up? X   In the past month, have you had any thoughts of killing yourself? X                    Lifetime   YES NO   Have you ever done anything, started to do anything, or prepared to do anything to end your life? X             Past 3 months    X       Medical/Treatment Diagnosis Information:  · Diagnosis: R TKR  · Treatment Diagnosis: decreased mobility, strength    Insurance/Certification information:  PT Insurance Information: [facility] Medicare Dual / Renae Savage  Physician Information:  Referring Practitioner: Anurag Hartman MD  Plan of care signed (Y/N):  Sent to inbox    Date of Patient follow up with Physician:       Progress Report: [x]  Yes 10/6  []  No     Date Range for reporting period:  Beginnin2021  Ending:      Progress report due (10 Rx/or 30 days whichever is less):      Recertification due (POC duration/ or 90 days whichever is less):     Visit # POC/Insurance Allowable Auth Needed   15/20 (post- op) BOMN []Yes   [x]No     Latex Allergy:  [x]NO      []YES  Preferred Language for Healthcare:   [x]English       []Other:    Functional Scale:       Date assessed: at eval  Test: WOMAC  Score: 74  *WOMAC 41 on 21    Pain level:  3-4/10     History of Injury: reports chronic history of R knee pain that recently got worse. Patient is scheduled for R TKR on 21. Patient currently lives with  who is trying to take off work following surgery to care for her. Daughter is also around who came come over for a few hours a day. SUBJECTIVE:    : Pt to PT for Prehab reeval after having R TKR on 21.   She is amb with wheeled walker and rating pain at 5/10. Pt states she is doing hep, icing, and elevating daily. Pt TTP all over R knee, incision healing well with steri strips in place. Patella mobility decreased with pain and edema. 8/19:  Not doing so good. Just over 3 weeks post-op. Did feel a little more flexible after last session and after doing the exercises  8/24: very sore and tender lateral and inferior knee  8/26: \"feeling much better today\"  8/31: it's getting better, pain is there but not severe   9/2: c/o inc in knee pain since Tues without known cause  9/7: pt feels cont pain but notes she is doing a lot at home and trying to walk around without AD; pt advised to dec activity levels at home so she can rest knee and dec pain levels **40 min into treatment pt expresses concerns about 2 slightly pink areas on knee incision and if they could be infected. She states that she has had chills and a fever as high as 103 since Friday. Pt denies any other symptoms. Pt has 2 slightly pink areas on incision (distal and mid incision) that are also slightly tender. There is no drainage or edema at either site. Pt states she has an appt with her PCP Dr. Nellie Nayak tomorrow for her regular follow up. Pt advised to discuss current symptoms and concerns. Pt states she is has had her Covid vaccination. Pt told that she needs to be fever free x 24 hours before returning to PT and if she cont with fever 9/8 we will reschedule 9/9 visit.    9/14: Saw both PCP and Dr. Ashlee Currie and was dx with a UTI and prescribed antibiotic from Dr. Nellie Nayak and also Medrol dose pack from Dr. Ashlee Currie for cont knee pain; pt states pain in the knee is still there but is not as sharp and is better   9/16: better than last week, but still having knee pain  9/21: feeling much better; low pain levels; amb to PT with std cane  9/23: \"bone pain\" today in knee per pt; possible from rain and colder temperatures; pt amb with wheeled walker today due to inc soreness; states tolerated last session well without c/o   9/28: \" I feel pressure, but I really don't have any pain\"  10/1/21 - Tired from having to come up to PT via the stairs  10/6: \"pressure\" and tightness in knee today; pt to PT using walker today b/c c/o feeling dizzy from low blood pressure and high blood sugar this morning though she states she did take all routine medication this morning; BP before starting /82     OBJECTIVE:   · Observation:  · Functional Mobility/Transfers: minimal squat on R, able to complete sit to stand and bed mobility independently.   · Bandages/Dressings/Incisions: NA  · Gait: (include devices/WB status) Patient ambulates without AD in clinic demonstrating mild antalgic gait on R.    Test measurements:    ROM:  Date           Knee Flexion       Knee Extension    Active Passive Active Passive    preop Eval 7/8/21 120  0    Post op 8/17 104  -8    8/26 117  -4    9/16 123  0    10/6 125  0               Strength:  Date Hip flexion Hip abduction Quad Hamstring Ankle DF Ankle PF   Eval 7/8 4+/5 4+/5 4/5 4+/5 5/5 5/5   8/17 3+  3- 4- 4+    9/16 5  5- 5- 5    10/6 5  5 5 5                 Functional testing Prehab        Date  7/8/21     4 week f/u   Date   8/24/21  8 week f/u  Date    9/20 D/C  Date               TUG 17.15 secs NT   NT     30 second sit to stand 6 reps NT   NT     6 minute walk 128 meters NT   NT                 Balance           Narrow DOMINIK 10  10  10     Semi tandem 10 10  10     Tandem  10  9  10     SLS 5  NT  10                 Knee AROM 0-120 -4 - 117 0 - 123      Knee Extension MMT R/L L=11#  R=14#    R=12#    R 21.7     Hip aBduction MMT R/L L=15#  R=17#    R=NT    R 25.2     WOMAC 83  74  41                 RESTRICTIONS/PRECAUTIONS: none    Exercises/Interventions:     Therapeutic Ex (96813)   Min: 28 Reps/Resistance Notes/CUES  R TKR 7/26   Nustep  L1 5 min  Seat #5   Step flex  Step ext str 2 x 30 sec   2 x 30sec    GSS incline str  2 x 30sec    TKE with TB  Lime 10 x 5 sec    HR/TR  Mini squat  x10 ea R>L        Standing march                  Hip abd  1# x 10 L/R  1# x 10 L/R    Fitter f/b          Leg press 30# 1 x 15 R>L    FAQ  HS curl 11# 1x10 R only   11# 1 x 10 R only               SAQ 1# 2 x 10     SLR flexion 1# x 10     Supine heelslides  hep   Long sitting knee extension stretch hep   Calf stretch with strap hep   Supine ext str with heel on roll           NMR re-education (83296)  Min: 8     Balance:  * tandem stance  * SLS   X 30 sec L/R  X 30 sec R    Airex:  *NBOS EC   X 30 sec     SBA   Quad Set     Therapeutic Activity (68242)  Min: 22 See reassessment below    Step up fwd               lat 6\" x 10  4\" x 10     Step down 6\" x 10 Min cues        ll bars:  * amb without HR  * retro/line walk   * lat amb with orange looped band   X 2 lengths  X 2 lengths  X 2 lengths Min cues     reebok f/b s/s F/b s/s x 30 sec each     *PT assisted pt amb down all [employer] steps With HR and hand held assist due to elevator out of order Manual Intervention (30329) Min: 2     Pat mobs    PROM  *supine flex  *supine ext     STM      Purple roller    Massage to distal ITB x 2 min w/ pt in SL    Modalities  Min:15     IFC with CP 4 pads R knee x 15 min  - helps with soreness after exercise per pt   Pt supine with LE on wedge      Other Therapeutic Activities:   Patient was thoroughly educated on this date regarding prehabilitation goals, importance of PT sessions in improving overall ROM, strength and stability prior to surgery, and how prehabilitation will facilitate improved post-operative outcomes. The patient was educated on and instructed in HEP as listed. The patient was given a detailed handout for exercises to initiate in the hospital post-operatively as well as at home. The discharge plan from the hospital was reviewed with the patient; specifically, to reduce length of hospital stay and to minimize time before reinitiating outpatient physical therapy after surgery. Education regarding early mobility post-operatively in the hospital and emphasis on working with both physical therapy and nursing staff to achieve ambulation goal was provided. The patient was highly encouraged to attend joint class in hospital prior to surgery for further instructions on pre and post-surgical care. Also, education regarding goals and expectations was provided; specifically, knee flexion range of motion greater than or equal to 90 degrees and 0 degrees knee extension within 2 weeks to promote improved gait mechanics and ambulation. The patient was encouraged to utilize ice/cold pack after surgery to address pain, minimize swelling as often as possible. It is in my medical opinion that this patient is clear from all physical barriers prior to consideration for surgery, activity modifications prior to and post operatively have been discussed with this patient as well as discharge planning and is cleared for surgery from physical therapy perspective. Home Exercise Program:  Patient instructed in the above exercises for HEP. Patient verbalized/demonstrated understanding and was issued written handout. 8/17: Supine roll ext str, long sit HSS, calf GSS  8/26: SAQ, standing HR, squat, march and abd     Therapeutic Exercise and NMR EXR  [x] (83401) Provided verbal/tactile cueing for activities related to strengthening, flexibility, endurance, ROM for improvements in LE, proximal hip, and core control with self care, mobility, lifting, ambulation.  [] (09882) Provided verbal/tactile cueing for activities related to improving balance, coordination, kinesthetic sense, posture, motor skill, proprioception  to assist with LE, proximal hip, and core control in self care, mobility, lifting, ambulation and eccentric single leg control.  [facility] and Therapeutic Activities:    [x] (41799 or 82360) Provided verbal/tactile cueing for activities related to improving balance, coordination, kinesthetic sense, posture, motor skill, proprioception and motor activation to allow for proper function of core, proximal hip and LE with self care and ADLs and functional mobility.   [] (25566) Gait Re-education- Provided training and instruction to the patient for proper LE, core and proximal hip recruitment and positioning and eccentric body weight control with ambulation re-education including up and down stairs     Gait Training:  [] (52432) Provided training and instruction to the patient for proper postural muscle recruitment and positioning with ambulation re-education     Home Exercise Program:    [x] (72204) Reviewed/Progressed HEP activities related to strengthening, flexibility, endurance, ROM of core, proximal hip and LE for functional self-care, mobility, lifting and ambulation/stair navigation   [] (53799)Reviewed/Progressed HEP activities related to improving balance, coordination, kinesthetic sense, posture, motor skill, proprioception of core, proximal hip and LE for self care, mobility, lifting, and ambulation/stair navigation      Manual Treatments:  PROM / STM / Oscillations-Mobs:  G-I, II, III, IV (PA's, Inf., Post.)  [] (32617) Provided manual therapy to mobilize LE, proximal hip and/or LS spine soft tissue/joints for the purpose of modulating pain, promoting relaxation,  increasing ROM, reducing/eliminating soft tissue swelling/inflammation/restriction, improving soft tissue extensibility and allowing for proper ROM for normal function with self care, mobility, lifting and ambulation.      Charges:  Timed Code Treatment Minutes: 60   Total Treatment Minutes: 75      [] EVAL (LOW) 90762 (typically 20 minutes face-to-face)  [] EVAL (MOD) 38397 (typically 30 minutes face-to-face)  [] EVAL (HIGH) 88521 (typically 45 minutes face-to-face)  [] RE-EVAL     [x] MG(24944) x 2    [] Dry needle 1 or 2 Muscles (52492)  [x] NMR (36280) x     [] Dry needle 3+ Muscles (50022)  [] Manual (29228) x     [] Ultrasound (12458) x  [x] TA (98596) x     [] Mech Traction (05426)  [] ES(attended) (25976)     [x] ES (un) (85646):   [] Vasopump (26375) [] Ionto (31759)   [] Gait (22023)  [] Other:    GOALS:updated 10/6  * pt feels 70% improved overall  * pt notes improvement functionally - moving better, doing more ADLs easier, showering easier  * steps are still the most difficult   * states pain ranges anywhere from 0/10 to 4/10 depending on the day and her activity  * doing hep and icing daily   * amb with walker out in community and std cane at home, but doesn't feel quite steady with std cane out, so she is going to purchase a [employer] for community ambulation right now  * gross R LE strength 5/5    * R knee ROM 0-125    Patient stated goal: \"back to normal\"  Short Term Goals: 2 wks  1. Pt independent with hep  - met  -?x Progressing: -? Met: -? Not Met: -? Adjusted  2. Pt rates pain improvement of 20-30% overall for ease with sleep - met  -?x Progressing: -? Met: -? Not Met: -? Adjusted  3. R knee ROM -4 - 110 for ease with dressing - met  -?x Progressing: -? Met: -? Not Met: -? Adjusted  4. Pt amb with cane in community - progressing  -?x Progressing: -? Met: -? Not Met: -? Adjusted         Long Term Goals: at discharge   1. WOMAC score of 74 - met  -?x Progressing: -? Met: -? Not Met: -? Adjusted   2. Pt rates pain improvement of 40-50% overall for ease with sleep - met  -?x Progressing: -? Met: -? Not Met: -? Adjusted  3. R knee ROM -0 - 115 for ease with transfers - met  -?x Progressing: -? Met: -? Not Met: -? Adjusted   4. Pt amb without AD in community  - progressing   -?x Progressing: -? Met: -? Not Met: -? Adjusted   5. Pt demonstrates 5/5 MMT R knee for ease with steps  - progressing   -?x Progressing: -? Met: -? Not Met: -?  Adjusted    ASSESSMENT:  See eval    Treatment/Activity Tolerance:  [x] Patient tolerated treatment well [] Patient limited by fatique  [x] Patient limited by pain  [] Patient limited by other medical complications  [] Other:     Overall Progression Towards Functional goals/ Treatment Progress Update:  [x] Patient is progressing as expected towards functional goals listed. [] Progression is slowed due to complexities/Impairments listed. [] Progression has been slowed due to co-morbidities. [] Plan just implemented, too soon to assess goals progression <30days   [] Goals require adjustment due to lack of progress  [] Patient is not progressing as expected and requires additional follow up with physician  [] Other    Prognosis for POC: [x] Good [] Fair  [] Poor    Patient requires continued skilled intervention: [] Yes  [x] No        PLAN:progress strength, gait and function     [x] Continue per plan of care [] Alter current plan (see comments)  [] Plan of care initiated [] Hold pending upcoming surgery [] Discharge    Electronically signed by: Rupali Crowe, PT MPT [id number]      Note: If patient does not return for scheduled/recommended follow up visits, this note will serve as a discharge from care along with the most recent update on progress.

## 2021-10-07 ENCOUNTER — OFFICE VISIT (OUTPATIENT)
Dept: ORTHOPEDIC SURGERY | Age: 66
End: 2021-10-07

## 2021-10-07 VITALS — WEIGHT: 194 LBS | BODY MASS INDEX: 33.12 KG/M2 | HEIGHT: 64 IN

## 2021-10-07 DIAGNOSIS — Z96.651 S/P TOTAL KNEE ARTHROPLASTY, RIGHT: Primary | ICD-10-CM

## 2021-10-07 PROCEDURE — 99024 POSTOP FOLLOW-UP VISIT: CPT | Performed by: ORTHOPAEDIC SURGERY

## 2021-10-07 NOTE — LETTER
[facility]  [place] [address] [address] [address]  Phone: [phone]  Fax: [phone]    Anam Salcido MD        October 7, 2021     Patient: Rosalva Wood   YOB: 1955   Date of Visit: 10/7/2021       To Whom It May Concern: It is my medical opinion that Marvia Hammans may return to work on 10/11/21. If you have any questions or concerns, please don't hesitate to call.     Sincerely,          Anam Salcido MD

## 2021-10-07 NOTE — LETTER
[facility]  [address] [address]  Phone: [phone]  Fax: [phone]    Venessa Mendez MD         October 7, 2021     Patient: Terrie Wood   YOB: 1955   Date of Visit: 10/7/2021       To Whom It May Concern: It is my medical opinion that Fara Canales requires a disability parking placard for the following reasons:  She has limited walking ability due to an orthopedic condition. Duration of need: 6 months    If you have any questions or concerns, please don't hesitate to call.     Sincerely,          Venessa Mendez MD

## 2021-10-07 NOTE — PROGRESS NOTES
Luz Wood  [id number]  October 7, 2021    Chief Complaint   Patient presents with    Post-Op Check     Right TKA; DOS 7/26/21. History: The patient is here in follow-up regarding her right knee. She is now 10 weeks status post right total knee arthroplasty. She is having mild pain. She was progressing rather well. She reports a significant improvement. She is working hard in her therapy. She continues to have some mild lateral knee pain extending into her calf. She does have some balance issues and she continues to use her cane. The patient's  past medical history, medications, allergies,  family history, social history, and review of systems have been reviewed, and dated and are recorded in the chart. Ht 5' 4\" (1.626 m)   Wt 194 lb (88 kg)   BMI 33.30 kg/m²     Physical: Ms. Kamari Eng appears well, she is in no apparent distress, she demonstrates appropriate mood & affect. She is alert and oriented to person, place and time. She has moderate swelling. There is No evidence of DVT seen on physical exam. She is neurovascularly intact distally. Range of motion is from 0 degrees to 120 degrees. The incision is  clean, dry and intact and without erythema. Strength in the knee is 4+/5. There is no instability with varus and valgus stressing of the knee. There is no pain with range of motion of the hips. She does have diffuse tenderness to palpation over the patellar tendon. She has mild pain with resisted right knee extension. Xrays: Three views of the right knee obtained in the office today were extensively reviewed. The prosthesis is well aligned and there is no evidence of loosening. Impression: Status post right Total Knee Arthroplasty,Doing well postoperatively. Plan:  She will continue to work aggressively on range of motion and strengthening: Natural history and expected course discussed. Questions answered. Quad strengthening exercises.   The patient does work at [address] [place] [address] and typically works at [employer]. She may return to work next week. She will continue 2 more weeks of physical therapy. She was given a handicap placard. She will gradually discontinue the cane. The patient can follow-up with me in 3 months and we will reassess her then. At follow-up, 3 views of the right knee will be obtained.

## 2021-10-08 ENCOUNTER — HOSPITAL ENCOUNTER (OUTPATIENT)
Dept: PHYSICAL THERAPY | Age: 66
Setting detail: THERAPIES SERIES
Discharge: HOME | End: 2021-10-08
Payer: MEDICARE

## 2021-10-08 PROCEDURE — G0283 ELEC STIM OTHER THAN WOUND: HCPCS

## 2021-10-08 PROCEDURE — 97530 THERAPEUTIC ACTIVITIES: CPT

## 2021-10-08 PROCEDURE — 97112 NEUROMUSCULAR REEDUCATION: CPT

## 2021-10-08 PROCEDURE — 97110 THERAPEUTIC EXERCISES: CPT

## 2021-10-08 NOTE — FLOWSHEET NOTE
[facility], [facility]  [address] [facility], [facility]  Phone: [phone]   Fax:     [phone]                                                               Physical Therapy Treatment Note/ Progress Report:   Date:  10/8/2021    Patient Name:  Moi Baum    :  1955  MRN: [id number]    Pertinent Medical History: HTN, DM, Cerebral Infarction ( R side weakness) , Cardiomyopathy, R foot arthrodesis, R shoulder RTC repair, RA, anxiety, depression      C-SSRS Triggered by Intake questionnaire: Pt has appt for psychiatric consult next month      YES NO   In the past month, have you wished you were dead or wished you could go to sleep and not wake up? X   In the past month, have you had any thoughts of killing yourself? X                    Lifetime   YES NO   Have you ever done anything, started to do anything, or prepared to do anything to end your life?    X             Past 3 months    X       Medical/Treatment Diagnosis Information:  · Diagnosis: R TKR  · Treatment Diagnosis: decreased mobility, strength    Insurance/Certification information:  PT Insurance Information: [facility] Medicare Dual / Renae Kitten  Physician Information:  Referring Practitioner: Anurag Hartman MD  Plan of care signed (Y/N):  Sent to inbox    Date of Patient follow up with Physician:       Progress Report: [x]  Yes 10/6  []  No     Date Range for reporting period:  Beginnin2021  Ending:      Progress report due (10 Rx/or 30 days whichever is less):      Recertification due (POC duration/ or 90 days whichever is less):     Visit # POC/Insurance Allowable Auth Needed    (post- op) BOMN []Yes   [x]No     Latex Allergy:  [x]NO      []YES  Preferred Language for Healthcare:   [x]English       []Other:    Functional Scale:       Date assessed: at eval  Test: WOMAC  Score: 74  *WOMAC 41 on 21    Pain level:  3-4/10     History of Injury: reports chronic history of R knee pain that recently got worse. Patient is scheduled for R TKR on 7/26/21. Patient currently lives with  who is trying to take off work following surgery to care for her. Daughter is also around who came come over for a few hours a day. SUBJECTIVE:    8/17: Pt to PT for Prehab reeval after having R TKR on 7/26/21. She is amb with wheeled walker and rating pain at 5/10. Pt states she is doing hep, icing, and elevating daily. Pt TTP all over R knee, incision healing well with steri strips in place. Patella mobility decreased with pain and edema. 8/19:  Not doing so good. Just over 3 weeks post-op. Did feel a little more flexible after last session and after doing the exercises  8/24: very sore and tender lateral and inferior knee  8/26: \"feeling much better today\"  8/31: it's getting better, pain is there but not severe   9/2: c/o inc in knee pain since Tues without known cause  9/7: pt feels cont pain but notes she is doing a lot at home and trying to walk around without AD; pt advised to dec activity levels at home so she can rest knee and dec pain levels **40 min into treatment pt expresses concerns about 2 slightly pink areas on knee incision and if they could be infected. She states that she has had chills and a fever as high as 103 since Friday. Pt denies any other symptoms. Pt has 2 slightly pink areas on incision (distal and mid incision) that are also slightly tender. There is no drainage or edema at either site. Pt states she has an appt with her PCP Dr. Carmela Mike tomorrow for her regular follow up. Pt advised to discuss current symptoms and concerns. Pt states she is has had her Covid vaccination. Pt told that she needs to be fever free x 24 hours before returning to PT and if she cont with fever 9/8 we will reschedule 9/9 visit.    9/14: Saw both PCP and Dr. Beti Prather and was dx with a UTI and prescribed antibiotic from Dr. Carmela Mike and also Medrol dose pack from Dr. Beti Prather for cont knee 10  9  10     SLS 5  NT  10                 Knee AROM 0-120 -4 - 117 0 - 123      Knee Extension MMT R/L L=11#  R=14#    R=12#    R 21.7     Hip aBduction MMT R/L L=15#  R=17#    R=NT    R 25.2     WOMAC 83  74  41                 RESTRICTIONS/PRECAUTIONS: none    Exercises/Interventions:     Therapeutic Ex (70985)   Min: 22 Reps/Resistance Notes/CUES  R TKR 7/26   Nustep  L1 5 min  Seat #5   Step flex  Step ext str 2 x 30 sec   2 x 30sec    GSS incline str  2 x 30sec    TKE with TB  Lime 10 x 5 sec    HR/TR  Mini squat  x10 ea R>L        Standing march                  Hip abd  1# x 10 L/R  1# x 10 L/R    Fitter f/b          Leg press 30# 1 x 15 R>L    FAQ  HS curl 11# 1x10 R only   11# 1 x 10 R only               SAQ 1# 2 x 10     SLR flexion 1# 1 x 15     Supine heelslides  hep   Long sitting knee extension stretch hep   Calf stretch with strap hep   Supine ext str with heel on roll           NMR re-education (49576)  Min: 8     Balance:  * tandem stance  * SLS   X 30 sec (R behind)  X 30 sec R    Airex:  *NBOS EC   X 30 sec     SBA   Quad Set     Therapeutic Activity (36030)  Min: 10     Step up fwd               lat 6\" x 10  4\" x 10     Step down 6\" x 10 Min cues        ll bars:  * amb without HR  * retro/line walk   * lat amb with orange looped band   X 2 lengths  X 2 lengths  X 2 lengths Min cues     reebok f/b s/s F/b s/s x 30 sec each     *PT assisted pt amb down all [employer] steps With HR and hand held assist due to elevator out of order Manual Intervention (88469) Min: 5     Pat mobs    PROM  *supine flex  *supine ext     STM      Purple roller    Massage to distal ITB, lat HS and calf x 5 min w/ pt in L SL    Modalities  Min:15     IFC with CP 4 pads R knee x 15 min  - helps with soreness after exercise per pt   Pt supine with LE on wedge      Other Therapeutic Activities:   Patient was thoroughly educated on this date regarding prehabilitation goals, importance of PT sessions in improving overall ROM, strength and stability prior to surgery, and how prehabilitation will facilitate improved post-operative outcomes. The patient was educated on and instructed in HEP as listed. The patient was given a detailed handout for exercises to initiate in the hospital post-operatively as well as at home. The discharge plan from the hospital was reviewed with the patient; specifically, to reduce length of hospital stay and to minimize time before reinitiating outpatient physical therapy after surgery. Education regarding early mobility post-operatively in the hospital and emphasis on working with both physical therapy and nursing staff to achieve ambulation goal was provided. The patient was highly encouraged to attend joint class in hospital prior to surgery for further instructions on pre and post-surgical care. Also, education regarding goals and expectations was provided; specifically, knee flexion range of motion greater than or equal to 90 degrees and 0 degrees knee extension within 2 weeks to promote improved gait mechanics and ambulation. The patient was encouraged to utilize ice/cold pack after surgery to address pain, minimize swelling as often as possible. It is in my medical opinion that this patient is clear from all physical barriers prior to consideration for surgery, activity modifications prior to and post operatively have been discussed with this patient as well as discharge planning and is cleared for surgery from physical therapy perspective. Home Exercise Program:  Patient instructed in the above exercises for HEP. Patient verbalized/demonstrated understanding and was issued written handout.       8/17: Supine roll ext str, long sit HSS, calf GSS  8/26: SAQ, standing HR, squat, march and abd     Therapeutic Exercise and NMR EXR  [x] (57660) Provided verbal/tactile cueing for activities related to strengthening, flexibility, endurance, ROM for improvements in LE, proximal hip, and core control with self care, mobility, lifting, ambulation.  [] (26576) Provided verbal/tactile cueing for activities related to improving balance, coordination, kinesthetic sense, posture, motor skill, proprioception  to assist with LE, proximal hip, and core control in self care, mobility, lifting, ambulation and eccentric single leg control.  [address] and Therapeutic Activities:    [x] (67262 or 06367) Provided verbal/tactile cueing for activities related to improving balance, coordination, kinesthetic sense, posture, motor skill, proprioception and motor activation to allow for proper function of core, proximal hip and LE with self care and ADLs and functional mobility.   [] (67847) Gait Re-education- Provided training and instruction to the patient for proper LE, core and proximal hip recruitment and positioning and eccentric body weight control with ambulation re-education including up and down stairs     Gait Training:  [] (45874) Provided training and instruction to the patient for proper postural muscle recruitment and positioning with ambulation re-education     Home Exercise Program:    [x] (01983) Reviewed/Progressed HEP activities related to strengthening, flexibility, endurance, ROM of core, proximal hip and LE for functional self-care, mobility, lifting and ambulation/stair navigation   [] (74099)Reviewed/Progressed HEP activities related to improving balance, coordination, kinesthetic sense, posture, motor skill, proprioception of core, proximal hip and LE for self care, mobility, lifting, and ambulation/stair navigation      Manual Treatments:  PROM / STM / Oscillations-Mobs:  G-I, II, III, IV (PA's, Inf., Post.)  [] (22686) Provided manual therapy to mobilize LE, proximal hip and/or LS spine soft tissue/joints for the purpose of modulating pain, promoting relaxation,  increasing ROM, reducing/eliminating soft tissue swelling/inflammation/restriction, improving soft tissue extensibility and allowing for proper ROM for normal function with self care, mobility, lifting and ambulation. Charges:  Timed Code Treatment Minutes: 50   Total Treatment Minutes: 65      [] EVAL (LOW) 04878 (typically 20 minutes face-to-face)  [] EVAL (MOD) 13449 (typically 30 minutes face-to-face)  [] EVAL (HIGH) 32280 (typically 45 minutes face-to-face)  [] RE-EVAL     [x] KD(74378) x     [] Dry needle 1 or 2 Muscles (53466)  [x] NMR (00081) x     [] Dry needle 3+ Muscles (67849)  [] Manual (91982) x     [] Ultrasound (14353) x  [x] TA (52970) x     [] Mech Traction (70755)  [] ES(attended) (03349)     [x] ES (un) (58918):   [] Vasopump (29146) [] Ionto (15756)   [] Gait (21471)  [] Other:    GOALS:updated 10/6  * pt feels 70% improved overall  * pt notes improvement functionally - moving better, doing more ADLs easier, showering easier  * steps are still the most difficult   * states pain ranges anywhere from 0/10 to 4/10 depending on the day and her activity  * doing hep and icing daily   * amb with walker out in community and std cane at home, but doesn't feel quite steady with std cane out, so she is going to purchase a [employer] for community ambulation right now  * gross R LE strength 5/5    * R knee ROM 0-125    Patient stated goal: \"back to normal\"  Short Term Goals: 2 wks  1. Pt independent with hep  - met  -?x Progressing: -? Met: -? Not Met: -? Adjusted  2. Pt rates pain improvement of 20-30% overall for ease with sleep - met  -?x Progressing: -? Met: -? Not Met: -? Adjusted  3. R knee ROM -4 - 110 for ease with dressing - met  -?x Progressing: -? Met: -? Not Met: -? Adjusted  4. Pt amb with cane in community - progressing  -?x Progressing: -? Met: -? Not Met: -? Adjusted         Long Term Goals: at discharge   1. WOMAC score of 74 - met  -?x Progressing: -? Met: -? Not Met: -? Adjusted   2. Pt rates pain improvement of 40-50% overall for ease with sleep - met  -?x Progressing: -? Met: -? Not Met: -?  Adjusted  3. R knee ROM -0 - 115 for ease with transfers - met  -?x Progressing: -? Met: -? Not Met: -? Adjusted   4. Pt amb without AD in community  - progressing   -?x Progressing: -? Met: -? Not Met: -? Adjusted   5. Pt demonstrates 5/5 MMT R knee for ease with steps  - progressing   -?x Progressing: -? Met: -? Not Met: -? Adjusted    ASSESSMENT:  See jovna    Treatment/Activity Tolerance:  [x] Patient tolerated treatment well [] Patient limited by fatique  [x] Patient limited by pain  [] Patient limited by other medical complications  [x] Other:  Intermittent flare ups of pain and stiffness limit tolerance to and progression of exercises at times     Overall Progression Towards Functional goals/ Treatment Progress Update:  [x] Patient is progressing as expected towards functional goals listed. [] Progression is slowed due to complexities/Impairments listed. [] Progression has been slowed due to co-morbidities. [] Plan just implemented, too soon to assess goals progression <30days   [] Goals require adjustment due to lack of progress  [] Patient is not progressing as expected and requires additional follow up with physician  [] Other    Prognosis for POC: [x] Good [] Fair  [] Poor    Patient requires continued skilled intervention: [] Yes  [x] No        PLAN:progress strength, gait and function as prosper   [x] Continue per plan of care [] Alter current plan (see comments)  [] Plan of care initiated [] Hold pending upcoming surgery [] Discharge    Electronically signed by: Michel Agustin, PT MPT [id number]      Note: If patient does not return for scheduled/recommended follow up visits, this note will serve as a discharge from care along with the most recent update on progress.

## 2021-10-12 ENCOUNTER — HOSPITAL ENCOUNTER (OUTPATIENT)
Dept: PHYSICAL THERAPY | Age: 66
Setting detail: THERAPIES SERIES
Discharge: HOME | End: 2021-10-12
Payer: MEDICARE

## 2021-10-12 PROCEDURE — G0283 ELEC STIM OTHER THAN WOUND: HCPCS

## 2021-10-12 PROCEDURE — 97110 THERAPEUTIC EXERCISES: CPT

## 2021-10-12 PROCEDURE — 97530 THERAPEUTIC ACTIVITIES: CPT

## 2021-10-12 PROCEDURE — 97112 NEUROMUSCULAR REEDUCATION: CPT

## 2021-10-14 ENCOUNTER — HOSPITAL ENCOUNTER (OUTPATIENT)
Dept: PHYSICAL THERAPY | Age: 66
Setting detail: THERAPIES SERIES
Discharge: HOME | End: 2021-10-14
Payer: MEDICARE

## 2021-10-14 PROCEDURE — 97110 THERAPEUTIC EXERCISES: CPT

## 2021-10-14 PROCEDURE — 97530 THERAPEUTIC ACTIVITIES: CPT

## 2021-10-14 PROCEDURE — G0283 ELEC STIM OTHER THAN WOUND: HCPCS

## 2021-10-14 PROCEDURE — 97140 MANUAL THERAPY 1/> REGIONS: CPT

## 2021-10-14 NOTE — PLAN OF CARE
around who came come over for a few hours a day. SUBJECTIVE:    8/17: Pt to PT for Prehab reeval after having R TKR on 7/26/21. She is amb with wheeled walker and rating pain at 5/10. Pt states she is doing hep, icing, and elevating daily. Pt TTP all over R knee, incision healing well with steri strips in place. Patella mobility decreased with pain and edema. 8/19:  Not doing so good. Just over 3 weeks post-op. Did feel a little more flexible after last session and after doing the exercises  8/24: very sore and tender lateral and inferior knee  8/26: \"feeling much better today\"  8/31: it's getting better, pain is there but not severe   9/2: c/o inc in knee pain since Tues without known cause  9/7: pt feels cont pain but notes she is doing a lot at home and trying to walk around without AD; pt advised to dec activity levels at home so she can rest knee and dec pain levels **40 min into treatment pt expresses concerns about 2 slightly pink areas on knee incision and if they could be infected. She states that she has had chills and a fever as high as 103 since Friday. Pt denies any other symptoms. Pt has 2 slightly pink areas on incision (distal and mid incision) that are also slightly tender. There is no drainage or edema at either site. Pt states she has an appt with her PCP Dr. Gretchen Tobin tomorrow for her regular follow up. Pt advised to discuss current symptoms and concerns. Pt states she is has had her Covid vaccination. Pt told that she needs to be fever free x 24 hours before returning to PT and if she cont with fever 9/8 we will reschedule 9/9 visit.    9/14: Saw both PCP and Dr. Marquita Alvarado and was dx with a UTI and prescribed antibiotic from Dr. Gretchen Tobin and also Medrol dose pack from Dr. Marquita Alvarado for cont knee pain; pt states pain in the knee is still there but is not as sharp and is better   9/16: better than last week, but still having knee pain  9/21: feeling much better; low pain levels; amb to PT with std cane  9/23: \"bone pain\" today in knee per pt; possible from rain and colder temperatures; pt amb with wheeled walker today due to inc soreness; states tolerated last session well without c/o   9/28: \" I feel pressure, but I really don't have any pain\"  10/1/21 - Tired from having to come up to PT via the stairs  10/6: \"pressure\" and tightness in knee today; pt to PT using walker today b/c c/o feeling dizzy from low blood pressure and high blood sugar this morning though she states she did take all routine medication this morning; BP before starting /82   10/8: \"very stiff this morning\"; saw MD pleased with her progress, ok to return to work next week, cont for 2 more weeks of PT then d/c to hep   10/12: inc soreness today b/c it was her first day back to work   10/14: this is pt's first week back to work and she works 8 hour days x 4 days a week at [employer] where she has to stand the entire time; b/c of this pt is c/o an inc in pain, edema and tightness especially lateral knee, ITB, and calf; pt questioning whether to call MD or not and pt instructed that perhaps MD can create an order for pt to return to work half shifts where she only works 4 hours a day and then progresses up as tolerated     OBJECTIVE:   · Observation:  · Functional Mobility/Transfers: minimal squat on R, able to complete sit to stand and bed mobility independently.   · Bandages/Dressings/Incisions: NA  · Gait: (include devices/WB status) Patient ambulates without AD in clinic demonstrating mild antalgic gait on R.    Test measurements:    ROM:  Date           Knee Flexion       Knee Extension    Active Passive Active Passive    preop Eval 7/8/21 120  0    Post op 8/17 104  -8    8/26 117  -4    9/16 123  0    10/6 125  0               Strength:  Date Hip flexion Hip abduction Quad Hamstring Ankle DF Ankle PF   Eval 7/8 4+/5 4+/5 4/5 4+/5 5/5 5/5   8/17 3+  3- 4- 4+    9/16 5  5- 5- 5    10/6 5  5 5 5                 Functional retro/line walk   * lat amb with orange looped band  * 10/12 - held today due to fatigue and soreness from RTW; resume next visit as able     reebok f/b s/s    *PT assisted pt amb down all [place] steps Manual Intervention ([phone]) Min: 10     Pat mobs    PROM  *supine flex  *supine ext     STM       lat knee, lat hamstrings, lat calf x 10 min     Massage to distal ITB, lat HS and calf x 5 min w/ pt in L SL    Modalities  Min:15     IFC with CP 4 pads R knee x 15 min  - helps with soreness after exercise per pt   Pt supine with LE on wedge      Other Therapeutic Activities:   Patient was thoroughly educated on this date regarding prehabilitation goals, importance of PT sessions in improving overall ROM, strength and stability prior to surgery, and how prehabilitation will facilitate improved post-operative outcomes. The patient was educated on and instructed in HEP as listed. The patient was given a detailed handout for exercises to initiate in the hospital post-operatively as well as at home. The discharge plan from the hospital was reviewed with the patient; specifically, to reduce length of hospital stay and to minimize time before reinitiating outpatient physical therapy after surgery. Education regarding early mobility post-operatively in the hospital and emphasis on working with both physical therapy and nursing staff to achieve ambulation goal was provided. The patient was highly encouraged to attend joint class in hospital prior to surgery for further instructions on pre and post-surgical care. Also, education regarding goals and expectations was provided; specifically, knee flexion range of motion greater than or equal to 90 degrees and 0 degrees knee extension within 2 weeks to promote improved gait mechanics and ambulation. The patient was encouraged to utilize ice/cold pack after surgery to address pain, minimize swelling as often as possible.  It is in my medical opinion that this patient is clear from all physical barriers prior to consideration for surgery, activity modifications prior to and post operatively have been discussed with this patient as well as discharge planning and is cleared for surgery from physical therapy perspective. Home Exercise Program:  Patient instructed in the above exercises for HEP. Patient verbalized/demonstrated understanding and was issued written handout. 8/17: Supine roll ext str, long sit HSS, calf GSS  8/26: SAQ, standing HR, squat, march and abd     Therapeutic Exercise and NMR EXR  [x] (39136) Provided verbal/tactile cueing for activities related to strengthening, flexibility, endurance, ROM for improvements in LE, proximal hip, and core control with self care, mobility, lifting, ambulation.  [] (22072) Provided verbal/tactile cueing for activities related to improving balance, coordination, kinesthetic sense, posture, motor skill, proprioception  to assist with LE, proximal hip, and core control in self care, mobility, lifting, ambulation and eccentric single leg control.  [address] and Therapeutic Activities:    [x] (17467 or 63618) Provided verbal/tactile cueing for activities related to improving balance, coordination, kinesthetic sense, posture, motor skill, proprioception and motor activation to allow for proper function of core, proximal hip and LE with self care and ADLs and functional mobility.   [] (17103) Gait Re-education- Provided training and instruction to the patient for proper LE, core and proximal hip recruitment and positioning and eccentric body weight control with ambulation re-education including up and down stairs     Gait Training:  [] (55832) Provided training and instruction to the patient for proper postural muscle recruitment and positioning with ambulation re-education     Home Exercise Program:    [x] (89697) Reviewed/Progressed HEP activities related to strengthening, flexibility, endurance, ROM of core, proximal hip and LE for functional self-care, mobility, lifting and ambulation/stair navigation   [] (68741)Reviewed/Progressed HEP activities related to improving balance, coordination, kinesthetic sense, posture, motor skill, proprioception of core, proximal hip and LE for self care, mobility, lifting, and ambulation/stair navigation      Manual Treatments:  PROM / STM / Oscillations-Mobs:  G-I, II, III, IV (PA's, Inf., Post.)  [] (64962) Provided manual therapy to mobilize LE, proximal hip and/or LS spine soft tissue/joints for the purpose of modulating pain, promoting relaxation,  increasing ROM, reducing/eliminating soft tissue swelling/inflammation/restriction, improving soft tissue extensibility and allowing for proper ROM for normal function with self care, mobility, lifting and ambulation.      Charges:  Timed Code Treatment Minutes: 45   Total Treatment Minutes: 60      [] EVAL (LOW) 44573 (typically 20 minutes face-to-face)  [] EVAL (MOD) 56381 (typically 30 minutes face-to-face)  [] EVAL (HIGH) 92110 (typically 45 minutes face-to-face)  [] RE-EVAL     [x] HK(03305) x     [] Dry needle 1 or 2 Muscles (50301)  [] NMR (21143) x     [] Dry needle 3+ Muscles (98897)  [x] Manual (28659) x     [] Ultrasound (69997) x  [x] TA (99368) x     [] Mech Traction (42436)  [] ES(attended) (55855)     [x] ES (un) (21017):   [] Vasopump (36536) [] Ionto (25461)   [] Gait (81641)  [] Other:    GOALS:updated 10/6  * pt feels 70% improved overall  * pt notes improvement functionally - moving better, doing more ADLs easier, showering easier  * steps are still the most difficult   * states pain ranges anywhere from 0/10 to 4/10 depending on the day and her activity  * doing hep and icing daily   * amb with walker out in community and std cane at home, but doesn't feel quite steady with std cane out, so she is going to purchase a [employer] for community ambulation right now  * gross R LE strength 5/5    * R knee ROM 0-125    Patient stated goal: \"back to normal\"  Short Term Goals: 2 wks  1. Pt independent with hep  - met  -?x Progressing: -? Met: -? Not Met: -? Adjusted  2. Pt rates pain improvement of 20-30% overall for ease with sleep - met  -?x Progressing: -? Met: -? Not Met: -? Adjusted  3. R knee ROM -4 - 110 for ease with dressing - met  -?x Progressing: -? Met: -? Not Met: -? Adjusted  4. Pt amb with cane in community - progressing  -?x Progressing: -? Met: -? Not Met: -? Adjusted         Long Term Goals: at discharge   1. WOMAC score of 74 - met  -?x Progressing: -? Met: -? Not Met: -? Adjusted   2. Pt rates pain improvement of 40-50% overall for ease with sleep - met  -?x Progressing: -? Met: -? Not Met: -? Adjusted  3. R knee ROM -0 - 115 for ease with transfers - met  -?x Progressing: -? Met: -? Not Met: -? Adjusted   4. Pt amb without AD in community  - progressing   -?x Progressing: -? Met: -? Not Met: -? Adjusted   5. Pt demonstrates 5/5 MMT R knee for ease with steps  - progressing   -?x Progressing: -? Met: -? Not Met: -? Adjusted    ASSESSMENT:  See eval    Treatment/Activity Tolerance:  [x] Patient tolerated treatment well [] Patient limited by fatique  [x] Patient limited by pain  [] Patient limited by other medical complications  [x] Other:  Intermittent flare ups of pain and stiffness limit tolerance to and progression of exercises at times     Overall Progression Towards Functional goals/ Treatment Progress Update:  [x] Patient is progressing as expected towards functional goals listed. [] Progression is slowed due to complexities/Impairments listed. [] Progression has been slowed due to co-morbidities.   [] Plan just implemented, too soon to assess goals progression <30days   [] Goals require adjustment due to lack of progress  [] Patient is not progressing as expected and requires additional follow up with physician  [] Other    Prognosis for POC: [x] Good [] Fair  [] Poor    Patient requires continued skilled intervention: [] Yes  [x] No        PLAN:cont x 4 wks with dec exercises to calm down flare up and request for MD to alter RTW restrictions   [x] Continue per plan of care [] Alter current plan (see comments)  [] Plan of care initiated [] Hold pending upcoming surgery [] Discharge    Electronically signed by: Dorothy Monae, PT MPT [id number]      Note: If patient does not return for scheduled/recommended follow up visits, this note will serve as a discharge from care along with the most recent update on progress.

## 2021-10-15 ENCOUNTER — TELEPHONE (OUTPATIENT)
Dept: ORTHOPEDIC SURGERY | Age: 66
End: 2021-10-15

## 2021-10-15 NOTE — TELEPHONE ENCOUNTER
General Question     Subject: The patient is having some issues with her Rt Knee the patient is having severe pain and swelling.    Patient Renny Wells  Contact Number: [phone]

## 2021-10-15 NOTE — TELEPHONE ENCOUNTER
I spoke with patient. She said she has pain and swelling. I asked her if this started this week because she was seen last week and she was doing OK. She said yes. I told her that Dr Cal Hartley was gone for the day and to please elevate and use ice. Also she can take Tylenol. Dr Cal Hartley will see this message on Tuesday. She was [phone] Mariann Jones with that.

## 2021-10-19 ENCOUNTER — HOSPITAL ENCOUNTER (OUTPATIENT)
Dept: PHYSICAL THERAPY | Age: 66
Setting detail: THERAPIES SERIES
Discharge: HOME | End: 2021-10-19
Payer: MEDICARE

## 2021-10-19 PROCEDURE — 97112 NEUROMUSCULAR REEDUCATION: CPT

## 2021-10-19 PROCEDURE — 97110 THERAPEUTIC EXERCISES: CPT

## 2021-10-19 PROCEDURE — 97530 THERAPEUTIC ACTIVITIES: CPT

## 2021-10-19 PROCEDURE — G0283 ELEC STIM OTHER THAN WOUND: HCPCS

## 2021-10-19 NOTE — PLAN OF CARE
[facility], [facility]  [address] [facility], [facility]  Phone: [phone]   Fax:     [phone]                                                              Physical Therapy Treatment Note/ Progress Report:   Date:  10/19/2021    Patient Name:  Freedom Cat    :  1955  MRN: [id number]    Pertinent Medical History: HTN, DM, Cerebral Infarction ( R side weakness) , Cardiomyopathy, R foot arthrodesis, R shoulder RTC repair, RA, anxiety, depression      C-SSRS Triggered by Intake questionnaire: Pt has appt for psychiatric consult next month      YES NO   In the past month, have you wished you were dead or wished you could go to sleep and not wake up? X   In the past month, have you had any thoughts of killing yourself? X                    Lifetime   YES NO   Have you ever done anything, started to do anything, or prepared to do anything to end your life?    X             Past 3 months    X       Medical/Treatment Diagnosis Information:  · Diagnosis: R TKR  · Treatment Diagnosis: decreased mobility, strength    Insurance/Certification information:  PT Insurance Information: [facility] Medicare Dual / Arianne Suarez  Physician Information:  Referring Practitioner: Jania Mckinnon MD  Plan of care signed (Y/N):  Sent to inbox    Date of Patient follow up with Physician:       Progress Report: []  Yes 10/6  [x]  No     Date Range for reporting period:  Beginnin2021  Ending:      Progress report due (10 Rx/or 30 days whichever is less):      Recertification due (POC duration/ or 90 days whichever is less):     Visit # POC/Insurance Allowable Auth Needed    (post- op) BOMN []Yes   [x]No     Latex Allergy:  [x]NO      []YES  Preferred Language for Healthcare:   [x]English       []Other:    Functional Scale:       Date assessed: at eval  Test: WOMAC  Score: 74  *WOMAC 41 on 21    Pain level:  4/10     History of Injury: reports chronic history of R knee pain that recently got worse. Patient is scheduled for R TKR on 7/26/21. Patient currently lives with  who is trying to take off work following surgery to care for her. Daughter is also around who came come over for a few hours a day. SUBJECTIVE:    8/17: Pt to PT for Prehab reeval after having R TKR on 7/26/21. She is amb with wheeled walker and rating pain at 5/10. Pt states she is doing hep, icing, and elevating daily. Pt TTP all over R knee, incision healing well with steri strips in place. Patella mobility decreased with pain and edema. 8/19:  Not doing so good. Just over 3 weeks post-op. Did feel a little more flexible after last session and after doing the exercises  8/24: very sore and tender lateral and inferior knee  8/26: \"feeling much better today\"  8/31: it's getting better, pain is there but not severe   9/2: c/o inc in knee pain since Tues without known cause  9/7: pt feels cont pain but notes she is doing a lot at home and trying to walk around without AD; pt advised to dec activity levels at home so she can rest knee and dec pain levels **40 min into treatment pt expresses concerns about 2 slightly pink areas on knee incision and if they could be infected. She states that she has had chills and a fever as high as 103 since Friday. Pt denies any other symptoms. Pt has 2 slightly pink areas on incision (distal and mid incision) that are also slightly tender. There is no drainage or edema at either site. Pt states she has an appt with her PCP Dr. Rosana Jose tomorrow for her regular follow up. Pt advised to discuss current symptoms and concerns. Pt states she is has had her Covid vaccination. Pt told that she needs to be fever free x 24 hours before returning to PT and if she cont with fever 9/8 we will reschedule 9/9 visit.    9/14: Saw both PCP and Dr. Cristin Choudhary and was dx with a UTI and prescribed antibiotic from Dr. Rosana Jose and also Medrol dose pack from Dr. Cristin Choudhary for cont knee pain; pt states pain in the knee is still there but is not as sharp and is better   9/16: better than last week, but still having knee pain  9/21: feeling much better; low pain levels; amb to PT with std cane  9/23: \"bone pain\" today in knee per pt; possible from rain and colder temperatures; pt amb with wheeled walker today due to inc soreness; states tolerated last session well without c/o   9/28: \" I feel pressure, but I really don't have any pain\"  10/1/21 - Tired from having to come up to PT via the stairs  10/6: \"pressure\" and tightness in knee today; pt to PT using walker today b/c c/o feeling dizzy from low blood pressure and high blood sugar this morning though she states she did take all routine medication this morning; BP before starting /82   10/8: \"very stiff this morning\"; saw MD pleased with her progress, ok to return to work next week, cont for 2 more weeks of PT then d/c to hep   10/12: inc soreness today b/c it was her first day back to work   10/14: this is pt's first week back to work and she works 8 hour days x 4 days a week at [employer] where she has to stand the entire time; b/c of this pt is c/o an inc in pain, edema and tightness especially lateral knee, ITB, and calf; pt questioning whether to call MD or not and pt instructed that perhaps MD can create an order for pt to return to work half shifts where she only works 4 hours a day and then progresses up as tolerated   10/19: pt was off yesterday but worked today and states \" I feel much better \"; pt notes she has been allowed to leave early once her work is done and the past few shifts has only worked 5-6 hours; pt purchased and is wearing nyoprene knee brace that is helping some; pt notes she called MD and left a message regarding her pain flare up and is still waiting for a call back; WOMAC update next session     OBJECTIVE:   · Observation:  · Functional Mobility/Transfers: minimal squat on R, able to complete sit to stand and bed mobility independently.   · Bandages/Dressings/Incisions: NA  · Gait: (include devices/WB status) Patient ambulates without AD in clinic demonstrating mild antalgic gait on R.    Test measurements:    ROM:  Date           Knee Flexion       Knee Extension    Active Passive Active Passive    preop Eval 7/8/21 120  0    Post op 8/17 104  -8    8/26 117  -4    9/16 123  0    10/6 125  0               Strength:  Date Hip flexion Hip abduction Quad Hamstring Ankle DF Ankle PF   Eval 7/8 4+/5 4+/5 4/5 4+/5 5/5 5/5   8/17 3+  3- 4- 4+    9/16 5  5- 5- 5    10/6 5  5 5 5                 Functional testing Prehab        Date  7/8/21     4 week f/u   Date   8/24/21  8 week f/u  Date    9/20 D/C  Date               TUG 17.15 secs NT   NT     30 second sit to stand 6 reps NT   NT     6 minute walk 128 meters NT   NT                 Balance           Narrow DOMINIK 10  10  10     Semi tandem 10 10  10     Tandem  10  9  10     SLS 5  NT  10                 Knee AROM 0-120 -4 - 117 0 - 123      Knee Extension MMT R/L L=11#  R=14#    R=12#    R 21.7     Hip aBduction MMT R/L L=15#  R=17#    R=NT    R 25.2     WOMAC 83  74  41                 RESTRICTIONS/PRECAUTIONS: none    Exercises/Interventions:      Therapeutic Ex (35064)   Min: 22 Reps/Resistance Notes/CUES  R TKR 7/26   Nustep  L1 5 min  Seat #5   Step flex  Step ext str 2 x 30 sec   2 x 30sec    GSS incline str  2 x 30sec    TKE with TB  Lime 10 x 5 sec    HR/TR  Mini squat  x10 ea R>L        Standing march                  Hip abd  1# x 10 L/R  1# x 10 L/R              Leg press 40# 1 x 15 R>L    FAQ  HS curl 11# 1x10 R only   11# 1 x 10 R only                SAQ 1# 2 x 10     SLR flexion 1# 1 x 15     Supine heelslides  hep   Long sitting knee extension stretch hep   Calf stretch with strap hep   Supine ext str with heel on roll          NMR re-education (44268)  Min: 8     Balance:  * tandem stance  * SLS   X 30 sec (R behind)  X 30 sec R    Airex:  *NBOS EC   X 30 sec     SBA   Quad Set     Therapeutic Activity (11451)  Min: 10      Step up fwd               lat 6\" x 15  4\" x 10     Step down 6\" x 10 Min cues        ll bars:  * amb without HR  * retro/line walk   * lat amb with orange looped band X 2 lengths  X 2 lengths  X 2 lengths     reebok f/b s/s F/b s/s x 30 sec each    *PT assisted pt amb down all [employer] steps Manual Intervention (05962) Min: 5     Pat mobs    PROM  *supine flex  *supine ext     STM      Purple roller       Massage to distal ITB, lat HS and calf x 5 min w/ pt in L SL    Modalities  Min:15     IFC with CP 4 pads R knee x 15 min  - helps with soreness after exercise per pt   Pt supine with LE on wedge      Other Therapeutic Activities:   Patient was thoroughly educated on this date regarding prehabilitation goals, importance of PT sessions in improving overall ROM, strength and stability prior to surgery, and how prehabilitation will facilitate improved post-operative outcomes. The patient was educated on and instructed in HEP as listed. The patient was given a detailed handout for exercises to initiate in the hospital post-operatively as well as at home. The discharge plan from the hospital was reviewed with the patient; specifically, to reduce length of hospital stay and to minimize time before reinitiating outpatient physical therapy after surgery. Education regarding early mobility post-operatively in the hospital and emphasis on working with both physical therapy and nursing staff to achieve ambulation goal was provided. The patient was highly encouraged to attend joint class in hospital prior to surgery for further instructions on pre and post-surgical care. Also, education regarding goals and expectations was provided; specifically, knee flexion range of motion greater than or equal to 90 degrees and 0 degrees knee extension within 2 weeks to promote improved gait mechanics and ambulation.  The patient was encouraged to utilize ice/cold pack after surgery to address pain, minimize swelling as often as possible. It is in my medical opinion that this patient is clear from all physical barriers prior to consideration for surgery, activity modifications prior to and post operatively have been discussed with this patient as well as discharge planning and is cleared for surgery from physical therapy perspective. Home Exercise Program:  Patient instructed in the above exercises for HEP. Patient verbalized/demonstrated understanding and was issued written handout. 8/17: Supine roll ext str, long sit HSS, calf GSS  8/26: SAQ, standing HR, squat, march and abd     Therapeutic Exercise and NMR EXR  [x] (34987) Provided verbal/tactile cueing for activities related to strengthening, flexibility, endurance, ROM for improvements in LE, proximal hip, and core control with self care, mobility, lifting, ambulation.  [] (25103) Provided verbal/tactile cueing for activities related to improving balance, coordination, kinesthetic sense, posture, motor skill, proprioception  to assist with LE, proximal hip, and core control in self care, mobility, lifting, ambulation and eccentric single leg control.  [address] and Therapeutic Activities:    [x] (47011 or 53778) Provided verbal/tactile cueing for activities related to improving balance, coordination, kinesthetic sense, posture, motor skill, proprioception and motor activation to allow for proper function of core, proximal hip and LE with self care and ADLs and functional mobility.   [] (05014) Gait Re-education- Provided training and instruction to the patient for proper LE, core and proximal hip recruitment and positioning and eccentric body weight control with ambulation re-education including up and down stairs     Gait Training:  [] (35017) Provided training and instruction to the patient for proper postural muscle recruitment and positioning with ambulation re-education     Home Exercise Program: [x] (36518) Reviewed/Progressed HEP activities related to strengthening, flexibility, endurance, ROM of core, proximal hip and LE for functional self-care, mobility, lifting and ambulation/stair navigation   [] (40508)Reviewed/Progressed HEP activities related to improving balance, coordination, kinesthetic sense, posture, motor skill, proprioception of core, proximal hip and LE for self care, mobility, lifting, and ambulation/stair navigation      Manual Treatments:  PROM / STM / Oscillations-Mobs:  G-I, II, III, IV (PA's, Inf., Post.)  [] (26164) Provided manual therapy to mobilize LE, proximal hip and/or LS spine soft tissue/joints for the purpose of modulating pain, promoting relaxation,  increasing ROM, reducing/eliminating soft tissue swelling/inflammation/restriction, improving soft tissue extensibility and allowing for proper ROM for normal function with self care, mobility, lifting and ambulation.      Charges:  Timed Code Treatment Minutes: 45   Total Treatment Minutes: 60      [] EVAL (LOW) 54045 (typically 20 minutes face-to-face)  [] EVAL (MOD) 76410 (typically 30 minutes face-to-face)  [] EVAL (HIGH) 34178 (typically 45 minutes face-to-face)  [] RE-EVAL     [x] ZS(00042) x     [] Dry needle 1 or 2 Muscles (03657)  [x] NMR (22043) x     [] Dry needle 3+ Muscles (85664)  [] Manual (48599) x     [] Ultrasound (67521) x  [x] TA (67547) x     [] Mech Traction (45063)  [] ES(attended) (04919)     [x] ES (un) (32422):   [] Vasopump (86167) [] Ionto (92069)   [] Gait (15959)  [] Other:    GOALS:updated 10/6  * pt feels 70% improved overall  * pt notes improvement functionally - moving better, doing more ADLs easier, showering easier  * steps are still the most difficult   * states pain ranges anywhere from 0/10 to 4/10 depending on the day and her activity  * doing hep and icing daily   * amb with walker out in community and std cane at home, but doesn't feel quite steady with std cane out, so she is going to purchase a SBQC for community ambulation right now  * gross R LE strength 5/5    * R knee ROM 0-125    Patient stated goal: \"back to normal\"  Short Term Goals: 2 wks  1. Pt independent with hep  - met  -?x Progressing: -? Met: -? Not Met: -? Adjusted  2. Pt rates pain improvement of 20-30% overall for ease with sleep - met  -?x Progressing: -? Met: -? Not Met: -? Adjusted  3. R knee ROM -4 - 110 for ease with dressing - met  -?x Progressing: -? Met: -? Not Met: -? Adjusted  4. Pt amb with cane in community - progressing  -?x Progressing: -? Met: -? Not Met: -? Adjusted         Long Term Goals: at discharge   1. WOMAC score of 74 - met  -?x Progressing: -? Met: -? Not Met: -? Adjusted   2. Pt rates pain improvement of 40-50% overall for ease with sleep - met  -?x Progressing: -? Met: -? Not Met: -? Adjusted  3. R knee ROM -0 - 115 for ease with transfers - met  -?x Progressing: -? Met: -? Not Met: -? Adjusted   4. Pt amb without AD in community  - progressing   -?x Progressing: -? Met: -? Not Met: -? Adjusted   5. Pt demonstrates 5/5 MMT R knee for ease with steps  - progressing   -?x Progressing: -? Met: -? Not Met: -? Adjusted    ASSESSMENT:  See eval    Treatment/Activity Tolerance:  [x] Patient tolerated treatment well [] Patient limited by fatique  [x] Patient limited by pain  [] Patient limited by other medical complications  [x] Other:  Intermittent flare ups of pain and stiffness limit tolerance to and progression of exercises at times     Overall Progression Towards Functional goals/ Treatment Progress Update:  [x] Patient is progressing as expected towards functional goals listed. [] Progression is slowed due to complexities/Impairments listed. [] Progression has been slowed due to co-morbidities.   [] Plan just implemented, too soon to assess goals progression <30days   [] Goals require adjustment due to lack of progress  [] Patient is not progressing as expected and requires additional follow up with physician  [] Other    Prognosis for POC: [x] Good [] Fair  [] Poor    Patient requires continued skilled intervention: [] Yes  [x] No        PLAN:cont x 4 wks to return to prior levels before RTW flare up; pt might requested limited work hours from MD   [x] Continue per plan of care [] Alter current plan (see comments)  [] Plan of care initiated [] Hold pending upcoming surgery [] Discharge    Electronically signed by: Gallo Alcazar, PT MPT [id number]      Note: If patient does not return for scheduled/recommended follow up visits, this note will serve as a discharge from care along with the most recent update on progress.

## 2021-10-20 RX ORDER — IBUPROFEN 600 MG/1
600 TABLET ORAL 2 TIMES DAILY WITH MEALS
Qty: 60 TABLET | Refills: 1 | Status: SHIPPED | OUTPATIENT
Start: 2021-10-20 | End: 2021-12-20

## 2021-10-20 NOTE — TELEPHONE ENCOUNTER
Per Dr. Juan Diego Cruz, this increase in pain is most likely due to her return to work. She needs to rest, ice, take Ibuprofen (he will send a prescription to her pharmacy), and continue with the knee sleeve as stated in the therapist note. She needs to come in sooner if her pain worsens. The patient was informed.

## 2021-10-21 ENCOUNTER — HOSPITAL ENCOUNTER (OUTPATIENT)
Dept: PHYSICAL THERAPY | Age: 66
Setting detail: THERAPIES SERIES
Discharge: HOME | End: 2021-10-21
Payer: MEDICARE

## 2021-10-21 NOTE — FLOWSHEET NOTE
[facility], [facility]  [address] [place], [facility]  Phone: [phone]   Fax:     [phone]    Physical Therapy  Cancellation/No-show Note  Patient Name:  Sloane Nuñez  :  1955   Date:  10/21/2021  Cancelled visits to date: 1  No-shows to date: 0    Patient status for today's appointment patient:  [x]  Cancelled  []  Rescheduled appointment  []  No-show     Reason given by patient:  [x]  Patient ill  []  Conflicting appointment  []  No transportation    []  Conflict with work  []  No reason given  []  Other:     Comments:      Phone call information:   []  Phone call made today to patient at _ time at number provided:      []  Patient answered, conversation as follows:    []  Patient did not answer, message left as follows:  [x]  Phone call not made today    Electronically signed by:  Rigo Marques, [id number]

## 2021-10-26 ENCOUNTER — VIRTUAL VISIT (OUTPATIENT)
Dept: FAMILY MEDICINE CLINIC | Age: 66
End: 2021-10-26
Payer: MEDICARE

## 2021-10-26 ENCOUNTER — HOSPITAL ENCOUNTER (OUTPATIENT)
Dept: PHYSICAL THERAPY | Age: 66
Setting detail: THERAPIES SERIES
Discharge: HOME | End: 2021-10-26
Payer: MEDICARE

## 2021-10-26 DIAGNOSIS — F41.9 ANXIETY AND DEPRESSION: ICD-10-CM

## 2021-10-26 DIAGNOSIS — Z13.31 POSITIVE DEPRESSION SCREENING: ICD-10-CM

## 2021-10-26 DIAGNOSIS — E11.8 TYPE 2 DIABETES MELLITUS WITH COMPLICATION, WITHOUT LONG-TERM CURRENT USE OF INSULIN (HCC): ICD-10-CM

## 2021-10-26 DIAGNOSIS — F32.A ANXIETY AND DEPRESSION: ICD-10-CM

## 2021-10-26 DIAGNOSIS — I10 ESSENTIAL HYPERTENSION: ICD-10-CM

## 2021-10-26 DIAGNOSIS — Z98.890 HISTORY OF COLONOSCOPY: ICD-10-CM

## 2021-10-26 DIAGNOSIS — Z00.00 ROUTINE GENERAL MEDICAL EXAMINATION AT A HEALTH CARE FACILITY: Primary | ICD-10-CM

## 2021-10-26 DIAGNOSIS — G81.91 RIGHT HEMIPARESIS (HCC): ICD-10-CM

## 2021-10-26 PROCEDURE — 1123F ACP DISCUSS/DSCN MKR DOCD: CPT | Performed by: FAMILY MEDICINE

## 2021-10-26 PROCEDURE — G0283 ELEC STIM OTHER THAN WOUND: HCPCS

## 2021-10-26 PROCEDURE — 3051F HG A1C>EQUAL 7.0%<8.0%: CPT | Performed by: FAMILY MEDICINE

## 2021-10-26 PROCEDURE — 4040F PNEUMOC VAC/ADMIN/RCVD: CPT | Performed by: FAMILY MEDICINE

## 2021-10-26 PROCEDURE — 97530 THERAPEUTIC ACTIVITIES: CPT

## 2021-10-26 PROCEDURE — G8484 FLU IMMUNIZE NO ADMIN: HCPCS | Performed by: FAMILY MEDICINE

## 2021-10-26 PROCEDURE — G8431 POS CLIN DEPRES SCRN F/U DOC: HCPCS | Performed by: FAMILY MEDICINE

## 2021-10-26 PROCEDURE — 97110 THERAPEUTIC EXERCISES: CPT

## 2021-10-26 PROCEDURE — 97112 NEUROMUSCULAR REEDUCATION: CPT

## 2021-10-26 PROCEDURE — 3017F COLORECTAL CA SCREEN DOC REV: CPT | Performed by: FAMILY MEDICINE

## 2021-10-26 PROCEDURE — G0438 PPPS, INITIAL VISIT: HCPCS | Performed by: FAMILY MEDICINE

## 2021-10-26 RX ORDER — VALSARTAN 80 MG/1
40 TABLET ORAL DAILY
Qty: 45 TABLET | Refills: 0 | Status: SHIPPED | OUTPATIENT
Start: 2021-10-26 | End: 2022-01-03

## 2021-10-26 RX ORDER — ONDANSETRON 4 MG/1
4-8 TABLET, ORALLY DISINTEGRATING ORAL EVERY 12 HOURS PRN
Qty: 12 TABLET | Refills: 0 | Status: SHIPPED | OUTPATIENT
Start: 2021-10-26 | End: 2022-01-24

## 2021-10-26 ASSESSMENT — LIFESTYLE VARIABLES: HOW OFTEN DO YOU HAVE A DRINK CONTAINING ALCOHOL: 0

## 2021-10-26 ASSESSMENT — PATIENT HEALTH QUESTIONNAIRE - PHQ9
SUM OF ALL RESPONSES TO PHQ QUESTIONS 1-9: 10
8. MOVING OR SPEAKING SO SLOWLY THAT OTHER PEOPLE COULD HAVE NOTICED. OR THE OPPOSITE, BEING SO FIGETY OR RESTLESS THAT YOU HAVE BEEN MOVING AROUND A LOT MORE THAN USUAL: 0
5. POOR APPETITE OR OVEREATING: 1
9. THOUGHTS THAT YOU WOULD BE BETTER OFF DEAD, OR OF HURTING YOURSELF: 0
1. LITTLE INTEREST OR PLEASURE IN DOING THINGS: 2
SUM OF ALL RESPONSES TO PHQ9 QUESTIONS 1 & 2: 4
4. FEELING TIRED OR HAVING LITTLE ENERGY: 2
2. FEELING DOWN, DEPRESSED OR HOPELESS: 2
SUM OF ALL RESPONSES TO PHQ QUESTIONS 1-9: 10
SUM OF ALL RESPONSES TO PHQ QUESTIONS 1-9: 10
7. TROUBLE CONCENTRATING ON THINGS, SUCH AS READING THE NEWSPAPER OR WATCHING TELEVISION: 1
3. TROUBLE FALLING OR STAYING ASLEEP: 2
6. FEELING BAD ABOUT YOURSELF - OR THAT YOU ARE A FAILURE OR HAVE LET YOURSELF OR YOUR FAMILY DOWN: 0
10. IF YOU CHECKED OFF ANY PROBLEMS, HOW DIFFICULT HAVE THESE PROBLEMS MADE IT FOR YOU TO DO YOUR WORK, TAKE CARE OF THINGS AT HOME, OR GET ALONG WITH OTHER PEOPLE: 0

## 2021-10-26 ASSESSMENT — COLUMBIA-SUICIDE SEVERITY RATING SCALE - C-SSRS
1. WITHIN THE PAST MONTH, HAVE YOU WISHED YOU WERE DEAD OR WISHED YOU COULD GO TO SLEEP AND NOT WAKE UP?: NO
6. HAVE YOU EVER DONE ANYTHING, STARTED TO DO ANYTHING, OR PREPARED TO DO ANYTHING TO END YOUR LIFE?: NO
2. HAVE YOU ACTUALLY HAD ANY THOUGHTS OF KILLING YOURSELF?: NO

## 2021-10-26 NOTE — PATIENT INSTRUCTIONS
Personalized Preventive Plan for John Wood - 10/26/2021  Medicare offers a range of preventive health benefits. Some of the tests and screenings are paid in full while other may be subject to a deductible, co-insurance, and/or copay. Some of these benefits include a comprehensive review of your medical history including lifestyle, illnesses that may run in your family, and various assessments and screenings as appropriate. After reviewing your medical record and screening and assessments performed today your provider may have ordered immunizations, labs, imaging, and/or referrals for you. A list of these orders (if applicable) as well as your Preventive Care list are included within your After Visit Summary for your review. Other Preventive Recommendations:    · A preventive eye exam performed by an eye specialist is recommended every 1-2 years to screen for glaucoma; cataracts, macular degeneration, and other eye disorders. · A preventive dental visit is recommended every 6 months. · Try to get at least 150 minutes of exercise per week or 10,000 steps per day on a pedometer . · Order or download the FREE \"Exercise & Physical Activity: Your Everyday Guide\" from [employer]. Call 0-885.265.9688 or search [employer] online. · You need 2990-7345 mg of calcium and 9798-9832 IU of vitamin D per day. It is possible to meet your calcium requirement with diet alone, but a vitamin D supplement is usually necessary to meet this goal.  · When exposed to the sun, use a sunscreen that protects against both UVA and UVB radiation with an SPF of 30 or greater. Reapply every 2 to 3 hours or after sweating, drying off with a towel, or swimming. · Always wear a seat belt when traveling in a car. Always wear a helmet when riding a bicycle or motorcycle.

## 2021-10-26 NOTE — LETTER
[facility] [address] [address]  Phone: [phone]  Fax: [phone]    Viky Hancock MD        October 26, 2021     Patient: Sam Wheeler   YOB: 1955   Date of Visit: 10/26/2021       To Whom it May Concern:    Wade Kim was seen by me on 10/26/2021. She suffers from osteoarthritis and has had a prior stroke, warranting a handicap parking placard. Duration: 2 years    If you have any questions or concerns, please don't hesitate to call.     Sincerely,         Viky Hancock MD

## 2021-10-26 NOTE — PLAN OF CARE
[facility], [facility]  [address] [facility] [place], [facility]  Phone: [phone]   Fax:     [phone]                                                              Physical Therapy Treatment Note/ Progress Report:   Date:  10/26/2021    Patient Name:  Julissa Christensen    :  1955  MRN: [id number]    Pertinent Medical History: HTN, DM, Cerebral Infarction ( R side weakness) , Cardiomyopathy, R foot arthrodesis, R shoulder RTC repair, RA, anxiety, depression      C-SSRS Triggered by Intake questionnaire: Pt has appt for psychiatric consult next month      YES NO   In the past month, have you wished you were dead or wished you could go to sleep and not wake up? X   In the past month, have you had any thoughts of killing yourself? X                    Lifetime   YES NO   Have you ever done anything, started to do anything, or prepared to do anything to end your life?    X             Past 3 months    X       Medical/Treatment Diagnosis Information:  · Diagnosis: R TKR  · Treatment Diagnosis: decreased mobility, strength    Insurance/Certification information:  PT Insurance Information: [facility] Medicare Dual / Loli Oshea  Physician Information:  Referring Practitioner: Es Sargent MD  Plan of care signed (Y/N):  Sent to inbox    Date of Patient follow up with Physician:       Progress Report: []  Yes 10/6  [x]  No     Date Range for reporting period:  Beginnin2021  Ending:      Progress report due (10 Rx/or 30 days whichever is less):      Recertification due (POC duration/ or 90 days whichever is less):     Visit # POC/Insurance Allowable Auth Needed    (post- op) BOMN []Yes   [x]No     Latex Allergy:  [x]NO      []YES  Preferred Language for Healthcare:   [x]English       []Other:    Functional Scale:       Date assessed: at eval  Test: Tashia  Score: 74  *WOMAC 41 on 21  WOMAC on 10/26/21 20th visit: 49    Pain level:  3/10     History of Injury: reports chronic history of R knee pain that recently got worse. Patient is scheduled for R TKR on 7/26/21. Patient currently lives with  who is trying to take off work following surgery to care for her. Daughter is also around who came come over for a few hours a day. SUBJECTIVE:    8/17: Pt to PT for Prehab reeval after having R TKR on 7/26/21. She is amb with wheeled walker and rating pain at 5/10. Pt states she is doing hep, icing, and elevating daily. Pt TTP all over R knee, incision healing well with steri strips in place. Patella mobility decreased with pain and edema. 8/19:  Not doing so good. Just over 3 weeks post-op. Did feel a little more flexible after last session and after doing the exercises  8/24: very sore and tender lateral and inferior knee  8/26: \"feeling much better today\"  8/31: it's getting better, pain is there but not severe   9/2: c/o inc in knee pain since Tues without known cause  9/7: pt feels cont pain but notes she is doing a lot at home and trying to walk around without AD; pt advised to dec activity levels at home so she can rest knee and dec pain levels **40 min into treatment pt expresses concerns about 2 slightly pink areas on knee incision and if they could be infected. She states that she has had chills and a fever as high as 103 since Friday. Pt denies any other symptoms. Pt has 2 slightly pink areas on incision (distal and mid incision) that are also slightly tender. There is no drainage or edema at either site. Pt states she has an appt with her PCP Dr. Rafia Moise tomorrow for her regular follow up. Pt advised to discuss current symptoms and concerns. Pt states she is has had her Covid vaccination. Pt told that she needs to be fever free x 24 hours before returning to PT and if she cont with fever 9/8 we will reschedule 9/9 visit.    9/14: Saw both PCP and Dr. Puentes First and was dx with a UTI and prescribed antibiotic from Dr. Rafia Moise and also Medrol dose pack from Dr. Evelia Godoy for cont knee pain; pt states pain in the knee is still there but is not as sharp and is better   9/16: better than last week, but still having knee pain  9/21: feeling much better; low pain levels; amb to PT with std cane  9/23: \"bone pain\" today in knee per pt; possible from rain and colder temperatures; pt amb with wheeled walker today due to inc soreness; states tolerated last session well without c/o   9/28: \" I feel pressure, but I really don't have any pain\"  10/1/21 - Tired from having to come up to PT via the stairs  10/6: \"pressure\" and tightness in knee today; pt to PT using walker today b/c c/o feeling dizzy from low blood pressure and high blood sugar this morning though she states she did take all routine medication this morning; BP before starting /82   10/8: \"very stiff this morning\"; saw MD pleased with her progress, ok to return to work next week, cont for 2 more weeks of PT then d/c to hep   10/12: inc soreness today b/c it was her first day back to work   10/14: this is pt's first week back to work and she works 8 hour days x 4 days a week at [employer] where she has to stand the entire time; b/c of this pt is c/o an inc in pain, edema and tightness especially lateral knee, ITB, and calf; pt questioning whether to call MD or not and pt instructed that perhaps MD can create an order for pt to return to work half shifts where she only works 4 hours a day and then progresses up as tolerated   10/19: pt was off yesterday but worked today and states \" I feel much better \"; pt notes she has been allowed to leave early once her work is done and the past few shifts has only worked 5-6 hours; pt purchased and is wearing nyoprene knee brace that is helping some; pt notes she called MD and left a message regarding her pain flare up and is still waiting for a call back; WOMAC update next session   10/26: \"doing better\"; pt wearing brace and states she wears this at work, but not all of the time at home     OBJECTIVE:   · Observation:  · Functional Mobility/Transfers: minimal squat on R, able to complete sit to stand and bed mobility independently.   · Bandages/Dressings/Incisions: NA  · Gait: (include devices/WB status) Patient ambulates without AD in clinic demonstrating mild antalgic gait on R.    Test measurements:    ROM:  Date           Knee Flexion       Knee Extension    Active Passive Active Passive    preop Eval 7/8/21 120  0    Post op 8/17 104  -8    8/26 117  -4    9/16 123  0    10/6 125  0               Strength:  Date Hip flexion Hip abduction Quad Hamstring Ankle DF Ankle PF   Eval 7/8 4+/5 4+/5 4/5 4+/5 5/5 5/5   8/17 3+  3- 4- 4+    9/16 5  5- 5- 5    10/6 5  5 5 5                 Functional testing Prehab        Date  7/8/21     4 week f/u   Date   8/24/21  8 week f/u  Date    9/20 D/C  Date               TUG 17.15 secs NT   NT     30 second sit to stand 6 reps NT   NT     6 minute walk 128 meters NT   NT                 Balance           Narrow DOMINIK 10  10  10     Semi tandem 10 10  10     Tandem  10  9  10     SLS 5  NT  10                 Knee AROM 0-120 -4 - 117 0 - 123      Knee Extension MMT R/L L=11#  R=14#    R=12#    R 21.7     Hip aBduction MMT R/L L=15#  R=17#    R=NT    R 25.2     WOMAC 83  74  41                 RESTRICTIONS/PRECAUTIONS: none    Exercises/Interventions:      Therapeutic Ex (52588)   Min: 27 Reps/Resistance Notes/CUES  R TKR 7/26   Nustep  L1 5 min  Seat #5   Step flex  Step ext str 2 x 30 sec   2 x 30sec    GSS incline str  2 x 30sec    TKE with TB  Blue 10 x 5 sec    HR/TR  Mini squat  x10 ea R>L        Standing march                  Hip abd  1# x 10 L/R  1# x 10 L/R              Leg press 40# 1 x 15 R>L    FAQ  HS curl 11# 1x10 R only   11# 1 x 10 R only   10/26 - Popping and crepitus felt with each rep of FAQ - pt states this happens intermittently               SAQ 1# 2 x 10     SLR flexion 1# 1 x 15     Supine heelslides  hep   Long sitting knee extension stretch hep   Calf stretch with strap hep   Supine ext str with heel on roll          NMR re-education (56587)  Min: 8     Balance:  * tandem stance  * SLS   X 30 sec (R behind)  X 30 sec R    Airex:  *NBOS EC   X 30 sec     SBA   Quad Set     Therapeutic Activity (83769)  Min: 10      Step up fwd               lat 6\" x 15  4\" x 10     Step down 6\" x 5 Inc pain today 10/26         ll bars:  * amb without HR  * retro/line walk   * lat amb with orange looped band X 2 lengths  X 2 lengths  X 4 lengths     reebok f/b s/s F/b s/s x 30 sec each    *PT assisted pt amb down all [employer] steps Manual Intervention (41742) Min:      Pat mobs    PROM  *supine flex  *supine ext     STM      Purple roller              Held per pt request    Modalities  Min:15     IFC with CP 4 pads R knee x 15 min  - helps with soreness after exercise per pt   Pt supine with LE on wedge      Other Therapeutic Activities:   Patient was thoroughly educated on this date regarding prehabilitation goals, importance of PT sessions in improving overall ROM, strength and stability prior to surgery, and how prehabilitation will facilitate improved post-operative outcomes. The patient was educated on and instructed in HEP as listed. The patient was given a detailed handout for exercises to initiate in the hospital post-operatively as well as at home. The discharge plan from the hospital was reviewed with the patient; specifically, to reduce length of hospital stay and to minimize time before reinitiating outpatient physical therapy after surgery. Education regarding early mobility post-operatively in the hospital and emphasis on working with both physical therapy and nursing staff to achieve ambulation goal was provided. The patient was highly encouraged to attend joint class in hospital prior to surgery for further instructions on pre and post-surgical care.  Also, education regarding goals and expectations was provided; specifically, knee flexion range of motion greater than or equal to 90 degrees and 0 degrees knee extension within 2 weeks to promote improved gait mechanics and ambulation. The patient was encouraged to utilize ice/cold pack after surgery to address pain, minimize swelling as often as possible. It is in my medical opinion that this patient is clear from all physical barriers prior to consideration for surgery, activity modifications prior to and post operatively have been discussed with this patient as well as discharge planning and is cleared for surgery from physical therapy perspective. Home Exercise Program:  Patient instructed in the above exercises for HEP. Patient verbalized/demonstrated understanding and was issued written handout. 8/17: Supine roll ext str, long sit HSS, calf GSS  8/26: SAQ, standing HR, squat, march and abd     Therapeutic Exercise and NMR EXR  [x] (05211) Provided verbal/tactile cueing for activities related to strengthening, flexibility, endurance, ROM for improvements in LE, proximal hip, and core control with self care, mobility, lifting, ambulation.  [] (35582) Provided verbal/tactile cueing for activities related to improving balance, coordination, kinesthetic sense, posture, motor skill, proprioception  to assist with LE, proximal hip, and core control in self care, mobility, lifting, ambulation and eccentric single leg control.  [address] and Therapeutic Activities:    [x] (70272 or 90778) Provided verbal/tactile cueing for activities related to improving balance, coordination, kinesthetic sense, posture, motor skill, proprioception and motor activation to allow for proper function of core, proximal hip and LE with self care and ADLs and functional mobility.   [] (31909) Gait Re-education- Provided training and instruction to the patient for proper LE, core and proximal hip recruitment and positioning and eccentric body weight control with ambulation re-education including up and 0/10 to 4/10 depending on the day and her activity  * doing hep and icing daily   * amb with walker out in community and std cane at home, but doesn't feel quite steady with std cane out, so she is going to purchase a [employer] for community ambulation right now  * gross R LE strength 5/5    * R knee ROM 0-125    Patient stated goal: \"back to normal\"  Short Term Goals: 2 wks  1. Pt independent with hep  - met  -?x Progressing: -? Met: -? Not Met: -? Adjusted  2. Pt rates pain improvement of 20-30% overall for ease with sleep - met  -?x Progressing: -? Met: -? Not Met: -? Adjusted  3. R knee ROM -4 - 110 for ease with dressing - met  -?x Progressing: -? Met: -? Not Met: -? Adjusted  4. Pt amb with cane in community - progressing  -?x Progressing: -? Met: -? Not Met: -? Adjusted         Long Term Goals: at discharge   1. WOMAC score of 74 - met  -?x Progressing: -? Met: -? Not Met: -? Adjusted   2. Pt rates pain improvement of 40-50% overall for ease with sleep - met  -?x Progressing: -? Met: -? Not Met: -? Adjusted  3. R knee ROM -0 - 115 for ease with transfers - met  -?x Progressing: -? Met: -? Not Met: -? Adjusted   4. Pt amb without AD in community  - progressing   -?x Progressing: -? Met: -? Not Met: -? Adjusted   5. Pt demonstrates 5/5 MMT R knee for ease with steps  - progressing   -?x Progressing: -? Met: -? Not Met: -? Adjusted    ASSESSMENT:  Fair prosper to exercise today; tolerating work duties better with use of brace     Treatment/Activity Tolerance:  [x] Patient tolerated treatment well [] Patient limited by fatique  [x] Patient limited by pain  [] Patient limited by other medical complications  [x] Other:  Intermittent flare ups of pain and stiffness limit tolerance to and progression of exercises at times     Overall Progression Towards Functional goals/ Treatment Progress Update:  [x] Patient is progressing as expected towards functional goals listed.     [] Progression is slowed due to complexities/Impairments listed. [] Progression has been slowed due to co-morbidities. [] Plan just implemented, too soon to assess goals progression <30days   [] Goals require adjustment due to lack of progress  [] Patient is not progressing as expected and requires additional follow up with physician  [x] Other: progress beginning to plateau    Prognosis for POC: [x] Good [] Fair  [] Poor    Patient requires continued skilled intervention: [] Yes  [x] No        PLAN:cont x 4 wks to return to prior levels before RTW flare up; pt might requested limited work hours from MD   [x] Continue per plan of care [] Alter current plan (see comments)  [] Plan of care initiated [] Hold pending upcoming surgery [] Discharge    Electronically signed by: Page Lopez, PT MPT [id number]      Note: If patient does not return for scheduled/recommended follow up visits, this note will serve as a discharge from care along with the most recent update on progress.

## 2021-10-26 NOTE — PROGRESS NOTES
Medicare Annual Wellness Visit  Name: Rony Aggarwal Date: 10/26/2021   MRN: [id number] Sex: Female   Age: 76 y.o. Ethnicity:  /    : 1955 Race: Other      Leslie Gandhi is here for Medicare AWV    Screenings for behavioral, psychosocial and functional/safety risks, and cognitive dysfunction are all negative except as indicated below. These results, as well as other patient data from the [address] form, are documented in Flowsheets linked to this Encounter. Allergies   Allergen Reactions    Codeine Nausea And Vomiting and Rash    Vicodin [Hydrocodone-Acetaminophen] Nausea And Vomiting    Lipitor [Atorvastatin]      Myalgias    Oxycontin [Oxycodone Hcl] Itching    Tramadol Itching and Swelling         Prior to Visit Medications    Medication Sig Taking? Authorizing Provider   ibuprofen (ADVIL;MOTRIN) 600 MG tablet Take 1 tablet by mouth 2 times daily (with meals) Yes Kecia Blankenship MD   omeprazole (PRILOSEC) 40 MG delayed release capsule Take 1 capsule by mouth every morning (before breakfast) Yes Laly Finnegan MD   ondansetron (ZOFRAN ODT) 4 MG disintegrating tablet Take 1-2 tablets by mouth every 12 hours as needed for Nausea or Vomiting May Sub regular tablet (non-ODT) if insurance does not cover ODT.  Yes JEISON Marte - CNP   insulin glargine (LANTUS SOLOSTAR) 100 UNIT/ML injection pen Patient stated she is taking  12 UNITS UNDER THE SKIN ONCE NIGHTLY Yes Laly Finnegan MD   traZODone (DESYREL) 100 MG tablet TAKE 1 TABLET BY MOUTH AT  NIGHT Yes Laly Finnegan MD   DULoxetine (CYMBALTA) 60 MG extended release capsule TAKE 1 CAPSULE BY MOUTH  DAILY Yes Laly Finnegan MD   furosemide (LASIX) 20 MG tablet TAKE 1 TABLET BY MOUTH  DAILY AS NEEDED FOR LEG  SWELLING Yes Laly Finnegan MD   dextran 70-hypromellose (TEARS NATURALE) 0.1-0.3 % SOLN opthalmic solution as needed for itching Yes Historical Provider, MD   valsartan (DIOVAN) 80 MG tablet Take 0.5 tablets by mouth daily Yes Sam Finnegan MD   Dulaglutide (TRULICITY) 1.5 JA/4.5UF SOPN INJECT THE CONTENTS OF ONE  PEN SUBCUTANEOUSLY WEEKLY Yes Sam Finnegan MD   latanoprost (XALATAN) 0.005 % ophthalmic solution Place 1 drop into both eyes nightly  Yes Historical Provider, MD   erythromycin (ROMYCIN) 5 MG/GM ophthalmic ointment nightly Yes Historical Provider, MD   aspirin 81 MG EC tablet Take 1 tablet by mouth 2 times daily for 14 days Take twice a day for 14 days after knee surgery then can resume daily dosing Yes Jacqui Henriquez, APRN - CNP   FANAPT 1 MG TABS tablet Take 1 tablet by mouth nightly Yes Sam Finnegan MD   lidocaine 4 % external patch Apply patch to lower mid back. Leave on for 12 hours and remove. Leave off for 12 hours before applying another one. Yes Isidro Colon PA-C   Insulin Pen Needle (PEN NEEDLES) 31G X 6 MM MISC USE TO INJECT INSULIN DAILY Yes Sam Finnegan MD   metFORMIN (GLUCOPHAGE) 500 MG tablet TAKE TWO TABLETS BY MOUTH TWICE A DAY WITH MEALS Yes Sam Finnegan MD   blood glucose test strips (ONETOUCH ULTRA) strip TEST TWO TIMES A DAY Yes Sam Finnegan MD   albuterol sulfate HFA (PROVENTIL HFA) 108 (90 Base) MCG/ACT inhaler Inhale 2 puffs into the lungs every 4 hours as needed for Wheezing or Shortness of Breath May substitute ProAir MDI Yes Ismael Vega MD   ipratropium-albuterol (DUONEB) 0.5-2.5 (3) MG/3ML SOLN nebulizer solution Inhale 3 mLs into the lungs every 4 hours as needed for Shortness of Breath (wheezing coughing) Yes Ismael Vega MD   ammonium lactate (LAC-HYDRIN) 12 % lotion Apply topically daily.  Yes Bigg Lucero MD   blood glucose test strips (FREESTYLE LITE) strip TEST BLOOD SUGAR TWO TIMES A DAY Diag: E11.9 Yes Sam Finnegan MD   Blood Glucose Monitoring Suppl (TRUE METRIX AIR GLUCOSE METER) w/Device KIT 1 each by Does not apply route daily Yes Bigg Lucero MD   FREESTYLE LANCETS MISC USE ONE LANCET TO TEST BLOOD SUGAR TWICE Brooksie Pour Yes Vicente Guzman MD         Past Medical History:   Diagnosis Date    Anesthesia complication     \" shaking\"    Arthritis     Cardiomyopathy ([address]     Diabetes     GERD (gastroesophageal reflux disease)     Hyperlipidemia     Hypertension     Lumbar disc disease     Prolonged emergence from general anesthesia     Sleep apnea     pt states does use a Cpap machine at night    Unspecified cerebral artery occlusion with cerebral infarction 2014    right side weak    Wears glasses        Past Surgical History:   Procedure Laterality Date    ARTHRODESIS Right 9/17/2020    (RIGHT) REMOVAL OF BONE SPURRING AND OSSEOUS PROMINENCE FIRST METATARSAL, ARTHRODESIS OF FIRST METATARSOPHALANGEAL JOINT, BONE MARROW HARVEST AND CONCENTRATION RIGHT TIBIA performed by Selene Galeas DPM at [address] Left 9/3/15    CARPAL TUNNEL RELEASE Right 9/22/15    COLONOSCOPY      FINGER TRIGGER RELEASE Left 9/3/15    middle and ring fingers    FINGER TRIGGER RELEASE Right 9/22/15    middle and ring fingers    FOOT SURGERY Bilateral     litteltoe    HAND SURGERY Right     Ligament    KNEE ARTHROPLASTY Right 7/26/2021    ROBOTIC ASSISTED TOTAL RIGHT KNEE REPLACEMENT performed by Thom Lazaro MD at [address] [place] [address]  08/2003    SHOULDER ARTHROSCOPY Right 06/20/2018    Diagnostic scope, rcr, open [place]         Family History   Problem Relation Age of Onset    Heart Disease Mother     High Blood Pressure Mother     Stroke Mother     Cancer Brother         lung cancer       CareTeam (Including outside providers/suppliers regularly involved in providing care):   Patient Care Team:  Cecilia Celis MD as PCP - [address] [place], [place] [address]aylin Finnegan MD as PCP - [facility] Empaneled Provider    Wt Readings from Last 3 Encounters:   10/07/21 194 lb (88 kg)   10/01/21 194 lb (88 kg)   09/10/21 196 lb (88.9 kg)     Patient-Reported Vitals 1/25/2021   Patient-Reported Weight 213 lb   Patient-Reported Height 5 4   Patient-Reported Systolic 359 Patient-Reported Diastolic 70   Patient-Reported Temperature 97.0      There is no height or weight on file to calculate BMI. Based upon direct observation of the patient, evaluation of cognition reveals no memory concerns        Patient's complete Health Risk Assessment and screening values have been reviewed and are found in Flowsheets. The following problems were reviewed today and where indicated follow up appointments were made and/or referrals ordered. Positive Risk Factor Screenings with Interventions:       Depression:  PHQ-2 Score: 4  PHQ-9 Total Score: 10    Severity:1-4 = minimal depression, 5-9 = mild depression, 10-14 = moderate depression, 15-19 = moderately severe depression, 20-27 = severe depression  Depression Interventions:  · She reports her depression is actually improving. Especially now that her knee has been replaced and her pain is getting better. General Health and ACP:  General  In general, how would you say your health is?: Fair  In the past 7 days, have you experienced any of the following? New or Increased Pain, New or Increased Fatigue, Loneliness, Social Isolation, Stress or Anger?: None of These  Do you get the social and emotional support that you need?: Yes  Do you have a Living Will?: (!) No  Advance Directives     Power of  Living Will ACP-Advance Directive ACP-Power of     Not on File Filed on 09/19/13 Filed Not on File      General Health Risk Interventions:  · Discussed doing a Living Will. She would like to do this.  We will look into referring her to our health care planning team to assist her and her  with this    Health Habits/Nutrition:  Health Habits/Nutrition  Do you exercise for at least 20 minutes 2-3 times per week?: Yes  Have you lost any weight without trying in the past 3 months?: No  Do you eat only one meal per day?: No  Have you seen the dentist within the past year?: Yes     Health Habits/Nutrition Interventions:  · Reports mobility is imrpoving after knee replacement       Personalized Preventive Plan   Current Health Maintenance Status  Immunization History   Administered Date(s) Administered    COVID-19Christopher, PF, 30mcg/0.3mL 03/06/2021, 04/07/2021    Influenza 09/16/2011, 10/05/2012, 09/16/2013    Influenza Virus Vaccine 11/03/2015, 10/30/2017    Influenza, Intradermal, Preservative free 10/29/2014    Influenza, Intradermal, Quadrivalent, Preservative Free 10/24/2016    Influenza, Quadv, IM, PF (6 mo and older Fluzone, Flulaval, Fluarix, and 3 yrs and older Afluria) 10/28/2019    Influenza, Quadv, Recombinant, IM PF (Flublok 18 yrs and older) 10/04/2018    Influenza, Quadv, adjuvanted, 65 yrs +, IM, PF (Fluad) 11/19/2020    Pneumococcal Polysaccharide ([id number]) 01/23/2007, 05/01/2013, 11/21/2019        Health Maintenance   Topic Date Due    DTaP/Tdap/Td vaccine (1 - Tdap) Never done    Shingles Vaccine (1 of 2) Never done    Colon Cancer Screen FIT/FOBT  10/18/2020    Diabetic foot exam  11/21/2020    Diabetic microalbuminuria test  01/15/2021    Annual Wellness Visit (AWV)  Never done    Lipid screen  02/21/2021    Flu vaccine (1) 09/01/2021    COVID-19 Vaccine (3 - Pfizer booster) 10/07/2021    Diabetic retinal exam  06/29/2022    A1C test (Diabetic or Prediabetic)  07/26/2022    Potassium monitoring  09/25/2022    Creatinine monitoring  09/25/2022    Breast cancer screen  02/23/2023    DEXA (modify frequency per FRAX score)  Completed    Pneumococcal 65+ years Vaccine  Completed    Hepatitis C screen  Completed    Hepatitis A vaccine  Aged Out    Hib vaccine  Aged Out    Meningococcal (ACWY) vaccine  Aged Out     Recommendations for [employer] Due: see orders and patient instructions/AVS.  .   Recommended screening schedule for the next 5-10 years is provided to the patient in written form: see Patient Perlita Jaeger was seen today for medicare fito.    Diagnoses and all orders for this visit:    Routine general medical examination at a health care facility    Essential hypertension    Positive depression screening  -     Positive Screen for Clinical Depression with a Documented Follow-up Plan     History of colonoscopy - 2021, rpt 2026    Right hemiparesis (HCC)    Anxiety and depression    Type 2 diabetes mellitus with complication, without long-term current use of insulin ([address]    Her chronic conditions are stable. Depression is actually improving - it was situationally worsened after knee replacement due to normal challenges of recovery. Colonoscopy is UTD and we will put this in her chart  F/u with me in 1-2 months for diabetes check               Mckenzie Lux is a 76 y.o. female being evaluated by a Virtual Visit (video and audio) encounter to address concerns as mentioned above. A caregiver was present when appropriate. Due to this being a TeleHealth encounter (During [place] [address] public health emergency), evaluation of the following organ systems was limited: Vitals/Constitutional/EENT/Resp/CV/GI//MS/Neuro/Skin/Heme-Lymph-Imm. Pursuant to the emergency declaration under the [address] [place] [address], [address] [place] [address] authority and the [employer] Act, this Virtual Visit was conducted with patient's (and/or legal guardian's) consent, to reduce the patient's risk of exposure to COVID-19 and provide necessary medical care. The patient (and/or legal guardian) has also been advised to contact this office for worsening conditions or problems, and seek emergency medical treatment and/or call 1 if deemed necessary. Patient identification was verified at the start of the visit: Yes    Services were provided through a video synchronous discussion virtually to substitute for in-person clinic visit. Patient and provider were located at their individual homes.     --Yi Finnegan, MD on 10/26/2021 at 12:00 PM    An electronic signature was used to authenticate this note.

## 2021-10-28 ENCOUNTER — HOSPITAL ENCOUNTER (OUTPATIENT)
Dept: PHYSICAL THERAPY | Age: 66
Setting detail: THERAPIES SERIES
Discharge: HOME | End: 2021-10-28
Payer: MEDICARE

## 2021-10-28 PROCEDURE — 97110 THERAPEUTIC EXERCISES: CPT

## 2021-10-28 PROCEDURE — 97530 THERAPEUTIC ACTIVITIES: CPT

## 2021-10-28 PROCEDURE — G0283 ELEC STIM OTHER THAN WOUND: HCPCS

## 2021-10-28 NOTE — PLAN OF CARE
[facility], [facility]  [address] [facility] [facility] [facility] [place], [facility]  Phone: [phone]   Fax:     [phone]                                                              Physical Therapy Treatment Note/ Progress Report:   Date:  10/28/2021    Patient Name:  Nick Johansen    :  1955  MRN: [id number]    Pertinent Medical History: HTN, DM, Cerebral Infarction ( R side weakness) , Cardiomyopathy, R foot arthrodesis, R shoulder RTC repair, RA, anxiety, depression      C-SSRS Triggered by Intake questionnaire: Pt has appt for psychiatric consult next month      YES NO   In the past month, have you wished you were dead or wished you could go to sleep and not wake up? X   In the past month, have you had any thoughts of killing yourself? X                    Lifetime   YES NO   Have you ever done anything, started to do anything, or prepared to do anything to end your life?    X             Past 3 months    X       Medical/Treatment Diagnosis Information:  · Diagnosis: R TKR  · Treatment Diagnosis: decreased mobility, strength    Insurance/Certification information:  PT Insurance Information: [facility] Medicare Dual / [employer]  Physician Information:  Referring Practitioner: Toshia Magaña MD  Plan of care signed (Y/N):  Sent to inbox    Date of Patient follow up with Physician:       Progress Report: []  Yes 10/6  [x]  No     Date Range for reporting period:  Beginnin2021  Ending:      Progress report due (10 Rx/or 30 days whichever is less):      Recertification due (POC duration/ or 90 days whichever is less):     Visit # POC/Insurance Allowable Auth Needed    (post- op) BOMN []Yes   [x]No     Latex Allergy:  [x]NO      []YES  Preferred Language for Healthcare:   [x]English       []Other:    Functional Scale:       Date assessed: at eval  Test: Tashia  Score: 74  *WOMAC 41 on 21  WOMAC on 10/26/21 20th visit: 49    Pain level:  5/10     History of from Dr. Earl Whitmore for cont knee pain; pt states pain in the knee is still there but is not as sharp and is better   9/16: better than last week, but still having knee pain  9/21: feeling much better; low pain levels; amb to PT with std cane  9/23: \"bone pain\" today in knee per pt; possible from rain and colder temperatures; pt amb with wheeled walker today due to inc soreness; states tolerated last session well without c/o   9/28: \" I feel pressure, but I really don't have any pain\"  10/1/21 - Tired from having to come up to PT via the stairs  10/6: \"pressure\" and tightness in knee today; pt to PT using walker today b/c c/o feeling dizzy from low blood pressure and high blood sugar this morning though she states she did take all routine medication this morning; BP before starting /82   10/8: \"very stiff this morning\"; saw MD pleased with her progress, ok to return to work next week, cont for 2 more weeks of PT then d/c to hep   10/12: inc soreness today b/c it was her first day back to work   10/14: this is pt's first week back to work and she works 8 hour days x 4 days a week at [employer] where she has to stand the entire time; b/c of this pt is c/o an inc in pain, edema and tightness especially lateral knee, ITB, and calf; pt questioning whether to call MD or not and pt instructed that perhaps MD can create an order for pt to return to work half shifts where she only works 4 hours a day and then progresses up as tolerated   10/19: pt was off yesterday but worked today and states \" I feel much better \"; pt notes she has been allowed to leave early once her work is done and the past few shifts has only worked 5-6 hours; pt purchased and is wearing nyoprene knee brace that is helping some; pt notes she called MD and left a message regarding her pain flare up and is still waiting for a call back; WOMAC update next session   10/26: \"doing better\"; pt wearing brace and states she wears this at work, but not all of the time at home   10/28: pt c/o inc pain since Tues, but thinks it is b/c she has been trying to do steps reciprocally more often; edu pt how to gradually progress steps      OBJECTIVE:   · Observation:  · Functional Mobility/Transfers: minimal squat on R, able to complete sit to stand and bed mobility independently.   · Bandages/Dressings/Incisions: NA  · Gait: (include devices/WB status) Patient ambulates without AD in clinic demonstrating mild antalgic gait on R.    Test measurements:    ROM:  Date           Knee Flexion       Knee Extension    Active Passive Active Passive    preop Eval 7/8/21 120  0    Post op 8/17 104  -8    8/26 117  -4    9/16 123  0    10/6 125  0               Strength:  Date Hip flexion Hip abduction Quad Hamstring Ankle DF Ankle PF   Eval 7/8 4+/5 4+/5 4/5 4+/5 5/5 5/5   8/17 3+  3- 4- 4+    9/16 5  5- 5- 5    10/6 5  5 5 5                 Functional testing Prehab        Date  7/8/21     4 week f/u   Date   8/24/21  8 week f/u  Date    9/20 D/C  Date               TUG 17.15 secs NT   NT     30 second sit to stand 6 reps NT   NT     6 minute walk 128 meters NT   NT                 Balance           Narrow DOMINIK 10  10  10     Semi tandem 10 10  10     Tandem  10  9  10     SLS 5  NT  10                 Knee AROM 0-120 -4 - 117 0 - 123      Knee Extension MMT R/L L=11#  R=14#    R=12#    R 21.7     Hip aBduction MMT R/L L=15#  R=17#    R=NT    R 25.2     WOMAC 83  74  41                 RESTRICTIONS/PRECAUTIONS: none    Exercises/Interventions:  10/28 - some ex modified or held due to pain c/o today     Therapeutic Ex (38941)   Min: 22 Reps/Resistance Notes/CUES  R TKR 7/26   Nustep   L0 5 min  Seat #5   Step flex  Step ext str 2 x 30 sec   2 x 30sec    GSS incline str  2 x 30sec    TKE with TB  Blue 10 x 5 sec    HR/TR  Mini squat  x10 ea R>L        Standing march                  Hip abd  1# x 10  R only  1# x 10  R only               Leg press 40# 1 x 15 R>L   FAQ  HS curl 10/26 - Popping and crepitus felt with each rep of FAQ - pt states this happens intermittently               SAQ 1# 2 x 10     SLR flexion 1# 1 x 15     Supine heelslides  hep   Long sitting knee extension stretch hep   Calf stretch with strap hep   Supine ext str with heel on roll          NMR re-education (89645)  Min: 5     Balance:  * tandem stance  * SLS   X 30 sec (R behind)      Airex:  *NBOS EC   X 30 sec     SBA   Quad Set     Therapeutic Activity (75774)  Min: 8      Step up fwd               lat    Step down Inc pain today 10/26         ll bars:  * amb without HR  * retro/line walk   * lat amb with orange looped band X 2 lengths  X 2 lengths  X 4 lengths     reebok f/b s/s F/b s/s x 30 sec each    *PT assisted pt amb down all [employer] steps Manual Intervention (98251) Min:      Pat mobs    PROM  *supine flex  *supine ext     STM      Purple roller              Held per pt request -   10/28 - pt states the lat thigh/knee/calf pain is much better    Modalities  Min:15     IFC with CP 4 pads R knee x 15 min  - helps with soreness after exercise per pt   Pt supine with LE on wedge      Other Therapeutic Activities:   Patient was thoroughly educated on this date regarding prehabilitation goals, importance of PT sessions in improving overall ROM, strength and stability prior to surgery, and how prehabilitation will facilitate improved post-operative outcomes. The patient was educated on and instructed in HEP as listed. The patient was given a detailed handout for exercises to initiate in the hospital post-operatively as well as at home. The discharge plan from the hospital was reviewed with the patient; specifically, to reduce length of hospital stay and to minimize time before reinitiating outpatient physical therapy after surgery. Education regarding early mobility post-operatively in the hospital and emphasis on working with both physical therapy and nursing staff to achieve ambulation goal was provided.  The patient was highly encouraged to attend joint class in hospital prior to surgery for further instructions on pre and post-surgical care. Also, education regarding goals and expectations was provided; specifically, knee flexion range of motion greater than or equal to 90 degrees and 0 degrees knee extension within 2 weeks to promote improved gait mechanics and ambulation. The patient was encouraged to utilize ice/cold pack after surgery to address pain, minimize swelling as often as possible. It is in my medical opinion that this patient is clear from all physical barriers prior to consideration for surgery, activity modifications prior to and post operatively have been discussed with this patient as well as discharge planning and is cleared for surgery from physical therapy perspective. Home Exercise Program:  Patient instructed in the above exercises for HEP. Patient verbalized/demonstrated understanding and was issued written handout. 8/17: Supine roll ext str, long sit HSS, calf GSS  8/26: SAQ, standing HR, squat, march and abd     Therapeutic Exercise and NMR EXR  [x] (99820) Provided verbal/tactile cueing for activities related to strengthening, flexibility, endurance, ROM for improvements in LE, proximal hip, and core control with self care, mobility, lifting, ambulation.  [] (71003) Provided verbal/tactile cueing for activities related to improving balance, coordination, kinesthetic sense, posture, motor skill, proprioception  to assist with LE, proximal hip, and core control in self care, mobility, lifting, ambulation and eccentric single leg control.  [address] and Therapeutic Activities:    [x] (10364 or 98854) Provided verbal/tactile cueing for activities related to improving balance, coordination, kinesthetic sense, posture, motor skill, proprioception and motor activation to allow for proper function of core, proximal hip and LE with self care and ADLs and functional mobility.   [] (69437) Gait Re-education- Provided training and instruction to the patient for proper LE, core and proximal hip recruitment and positioning and eccentric body weight control with ambulation re-education including up and down stairs     Gait Training:  [] (94731) Provided training and instruction to the patient for proper postural muscle recruitment and positioning with ambulation re-education     Home Exercise Program:    [x] (55599) Reviewed/Progressed HEP activities related to strengthening, flexibility, endurance, ROM of core, proximal hip and LE for functional self-care, mobility, lifting and ambulation/stair navigation   [] (23081)Reviewed/Progressed HEP activities related to improving balance, coordination, kinesthetic sense, posture, motor skill, proprioception of core, proximal hip and LE for self care, mobility, lifting, and ambulation/stair navigation      Manual Treatments:  PROM / STM / Oscillations-Mobs:  G-I, II, III, IV (PA's, Inf., Post.)  [] (74822) Provided manual therapy to mobilize LE, proximal hip and/or LS spine soft tissue/joints for the purpose of modulating pain, promoting relaxation,  increasing ROM, reducing/eliminating soft tissue swelling/inflammation/restriction, improving soft tissue extensibility and allowing for proper ROM for normal function with self care, mobility, lifting and ambulation.      Charges:  Timed Code Treatment Minutes: 35   Total Treatment Minutes: 50      [] EVAL (LOW) 73466 (typically 20 minutes face-to-face)  [] EVAL (MOD) 63179 (typically 30 minutes face-to-face)  [] EVAL (HIGH) 53704 (typically 45 minutes face-to-face)  [] RE-EVAL     [x] DW(20972) x     [] Dry needle 1 or 2 Muscles (93850)  [] NMR (08380) x     [] Dry needle 3+ Muscles (52389)  [] Manual (25719) x     [] Ultrasound (04656) x  [x] TA (89550) x     [] Mech Traction (25016)  [] ES(attended) (99071)     [x] ES (un) (30020):   [] Vasopump (61426) [] Ionto (79657)   [] Gait (64056)  [] Other:    GOALS:updated 10/6  * pt feels 70% improved overall  * pt notes improvement functionally - moving better, doing more ADLs easier, showering easier  * steps are still the most difficult   * states pain ranges anywhere from 0/10 to 4/10 depending on the day and her activity  * doing hep and icing daily   * amb with walker out in community and std cane at home, but doesn't feel quite steady with std cane out, so she is going to purchase a [employer] for community ambulation right now  * gross R LE strength 5/5    * R knee ROM 0-125    Patient stated goal: \"back to normal\"  Short Term Goals: 2 wks  1. Pt independent with hep  - met  -?x Progressing: -? Met: -? Not Met: -? Adjusted  2. Pt rates pain improvement of 20-30% overall for ease with sleep - met  -?x Progressing: -? Met: -? Not Met: -? Adjusted  3. R knee ROM -4 - 110 for ease with dressing - met  -?x Progressing: -? Met: -? Not Met: -? Adjusted  4. Pt amb with cane in community - progressing  -?x Progressing: -? Met: -? Not Met: -? Adjusted         Long Term Goals: at discharge   1. WOMAC score of 74 - met  -?x Progressing: -? Met: -? Not Met: -? Adjusted   2. Pt rates pain improvement of 40-50% overall for ease with sleep - met  -?x Progressing: -? Met: -? Not Met: -? Adjusted  3. R knee ROM -0 - 115 for ease with transfers - met  -?x Progressing: -? Met: -? Not Met: -? Adjusted   4. Pt amb without AD in community  - progressing   -?x Progressing: -? Met: -? Not Met: -? Adjusted   5. Pt demonstrates 5/5 MMT R knee for ease with steps  - progressing   -?x Progressing: -? Met: -? Not Met: -?  Adjusted    ASSESSMENT:  Pt's progression cont to fluctuate with good/bad days; overall progress is slow and beginning to plateau      Treatment/Activity Tolerance:  [x] Patient tolerated treatment well [] Patient limited by fatique  [x] Patient limited by pain  [] Patient limited by other medical complications  [x] Other:  Intermittent flare ups of pain and stiffness limit tolerance to and progression of exercises at times     Overall Progression Towards Functional goals/ Treatment Progress Update:  [x] Patient is progressing as expected towards functional goals listed. [] Progression is slowed due to complexities/Impairments listed. [] Progression has been slowed due to co-morbidities. [] Plan just implemented, too soon to assess goals progression <30days   [] Goals require adjustment due to lack of progress  [] Patient is not progressing as expected and requires additional follow up with physician  [x] Other: progress beginning to plateau    Prognosis for POC: [x] Good [] Fair  [] Poor    Patient requires continued skilled intervention: [] Yes  [x] No        PLAN:cont as pt is able to prosper  [x] Continue per plan of care [] Alter current plan (see comments)  [] Plan of care initiated [] Hold pending upcoming surgery [] Discharge    Electronically signed by: Page Lopez, PT MPT [id number]      Note: If patient does not return for scheduled/recommended follow up visits, this note will serve as a discharge from care along with the most recent update on progress.

## 2021-11-02 ENCOUNTER — OFFICE VISIT (OUTPATIENT)
Dept: ORTHOPEDIC SURGERY | Age: 66
End: 2021-11-02
Payer: MEDICARE

## 2021-11-02 ENCOUNTER — HOSPITAL ENCOUNTER (OUTPATIENT)
Dept: PHYSICAL THERAPY | Age: 66
Setting detail: THERAPIES SERIES
Discharge: HOME | End: 2021-11-02
Payer: MEDICARE

## 2021-11-02 VITALS — HEIGHT: 64 IN | WEIGHT: 194 LBS | BODY MASS INDEX: 33.12 KG/M2

## 2021-11-02 DIAGNOSIS — Z96.651 S/P TOTAL KNEE ARTHROPLASTY, RIGHT: Primary | ICD-10-CM

## 2021-11-02 PROCEDURE — G8417 CALC BMI ABV UP PARAM F/U: HCPCS | Performed by: ORTHOPAEDIC SURGERY

## 2021-11-02 PROCEDURE — 4040F PNEUMOC VAC/ADMIN/RCVD: CPT | Performed by: ORTHOPAEDIC SURGERY

## 2021-11-02 PROCEDURE — 1090F PRES/ABSN URINE INCON ASSESS: CPT | Performed by: ORTHOPAEDIC SURGERY

## 2021-11-02 PROCEDURE — G8428 CUR MEDS NOT DOCUMENT: HCPCS | Performed by: ORTHOPAEDIC SURGERY

## 2021-11-02 PROCEDURE — 1123F ACP DISCUSS/DSCN MKR DOCD: CPT | Performed by: ORTHOPAEDIC SURGERY

## 2021-11-02 PROCEDURE — 1036F TOBACCO NON-USER: CPT | Performed by: ORTHOPAEDIC SURGERY

## 2021-11-02 PROCEDURE — G0283 ELEC STIM OTHER THAN WOUND: HCPCS

## 2021-11-02 PROCEDURE — 97140 MANUAL THERAPY 1/> REGIONS: CPT

## 2021-11-02 PROCEDURE — G8484 FLU IMMUNIZE NO ADMIN: HCPCS | Performed by: ORTHOPAEDIC SURGERY

## 2021-11-02 PROCEDURE — 3017F COLORECTAL CA SCREEN DOC REV: CPT | Performed by: ORTHOPAEDIC SURGERY

## 2021-11-02 PROCEDURE — 99213 OFFICE O/P EST LOW 20 MIN: CPT | Performed by: ORTHOPAEDIC SURGERY

## 2021-11-02 PROCEDURE — G8399 PT W/DXA RESULTS DOCUMENT: HCPCS | Performed by: ORTHOPAEDIC SURGERY

## 2021-11-02 PROCEDURE — 97530 THERAPEUTIC ACTIVITIES: CPT

## 2021-11-02 PROCEDURE — 97110 THERAPEUTIC EXERCISES: CPT

## 2021-11-02 RX ORDER — METHYLPREDNISOLONE 4 MG/1
TABLET ORAL
Qty: 1 KIT | Refills: 0 | Status: SHIPPED | OUTPATIENT
Start: 2021-11-02 | End: 2021-11-08

## 2021-11-02 NOTE — PROGRESS NOTES
Derrick AGGARWAL [facility]  [id number]  November 2, 2021    Chief Complaint   Patient presents with    Follow-up     7/26/21 R TKA             History: The patient is here in follow-up regarding her right knee. She is now over 3 months status post right total knee arthroplasty. She was doing rather well. She has noted increased pain over the past week. She denies any specific injury. She does have some balance issues and she continues to use her cane. She has returned to work and is working 5-hour days. The patient's  past medical history, medications, allergies,  family history, social history, and review of systems have been reviewed, and dated and are recorded in the chart. Ht 5' 4\" (1.626 m)   Wt 194 lb (88 kg)   BMI 33.30 kg/m²     Physical: Ms. Monica Alarcon appears well, she is in no apparent distress, she demonstrates appropriate mood & affect. She is alert and oriented to person, place and time. She has moderate swelling. There is No evidence of DVT seen on physical exam. She is neurovascularly intact distally. Range of motion is from 0 degrees to 120 degrees. The incision is  clean, dry and intact and without erythema. Strength in the knee is 4+/5. There is no instability with varus and valgus stressing of the knee. There is no pain with range of motion of the hips. She does have diffuse tenderness to palpation over the patellar tendon. She has mild pain with resisted right knee extension. She does have mild patellofemoral crepitus. Xrays: Three views of the right knee obtained in the office today were extensively reviewed. The prosthesis is well aligned and there is no evidence of loosening. Impression: Status post right Total Knee Arthroplasty,Doing well postoperatively. Plan:  She will continue to work aggressively on range of motion and strengthening: Natural history and expected course discussed. Questions answered. Quad strengthening exercises.   The patient does work at [address] [place] [address] and typically works at [employer]. She was given a note to remain off work until Monday. She was given a Medrol Dosepak. She was encouraged to modify her activities. She has been having left knee pain. If she continues to have left knee pain, she was instructed to call for an appointment.

## 2021-11-02 NOTE — FLOWSHEET NOTE
[facility]  [address] [facility]  Phone: [phone]   Fax:     [phone]                                                              Physical Therapy Treatment Note/ Progress Report:   Date:  2021    Patient Name:  Aminata Adler    :  1955  MRN: [id number]    Pertinent Medical History: HTN, DM, Cerebral Infarction ( R side weakness) , Cardiomyopathy, R foot arthrodesis, R shoulder RTC repair, RA, anxiety, depression      C-SSRS Triggered by Intake questionnaire: Pt has appt for psychiatric consult next month      YES NO   In the past month, have you wished you were dead or wished you could go to sleep and not wake up? X   In the past month, have you had any thoughts of killing yourself? X                    Lifetime   YES NO   Have you ever done anything, started to do anything, or prepared to do anything to end your life?    X             Past 3 months    X       Medical/Treatment Diagnosis Information:  · Diagnosis: R TKR  · Treatment Diagnosis: decreased mobility, strength    Insurance/Certification information:  PT Insurance Information: [facility] Medicare Dual / [facility]  Physician Information:  Referring Practitioner: Sari Sheehan MD  Plan of care signed (Y/N):  Sent to inbox    Date of Patient follow up with Physician:       Progress Report: []  Yes 10/6  [x]  No     Date Range for reporting period:  Beginnin2021  Ending:      Progress report due (10 Rx/or 30 days whichever is less):      Recertification due (POC duration/ or 90 days whichever is less):     Visit # POC/Insurance Allowable Auth Needed    (post- op) BOMN []Yes   [x]No     Latex Allergy:  [x]NO      []YES  Preferred Language for Healthcare:   [x]English       []Other:    Functional Scale:       Date assessed: at eval  Test: Tashia  Score: 74  *WOMAC 41 on 21  WOMAC on 10/26/21 20th visit: 49    Pain level:  6/10     History of Injury: reports chronic history of R knee pain that recently got worse. Patient is scheduled for R TKR on 7/26/21. Patient currently lives with  who is trying to take off work following surgery to care for her. Daughter is also around who came come over for a few hours a day. SUBJECTIVE:    8/17: Pt to PT for Prehab reeval after having R TKR on 7/26/21. She is amb with wheeled walker and rating pain at 5/10. Pt states she is doing hep, icing, and elevating daily. Pt TTP all over R knee, incision healing well with steri strips in place. Patella mobility decreased with pain and edema. 8/19:  Not doing so good. Just over 3 weeks post-op. Did feel a little more flexible after last session and after doing the exercises  8/24: very sore and tender lateral and inferior knee  8/26: \"feeling much better today\"  8/31: it's getting better, pain is there but not severe   9/2: c/o inc in knee pain since Tues without known cause  9/7: pt feels cont pain but notes she is doing a lot at home and trying to walk around without AD; pt advised to dec activity levels at home so she can rest knee and dec pain levels **40 min into treatment pt expresses concerns about 2 slightly pink areas on knee incision and if they could be infected. She states that she has had chills and a fever as high as 103 since Friday. Pt denies any other symptoms. Pt has 2 slightly pink areas on incision (distal and mid incision) that are also slightly tender. There is no drainage or edema at either site. Pt states she has an appt with her PCP Dr. Jovita Olea tomorrow for her regular follow up. Pt advised to discuss current symptoms and concerns. Pt states she is has had her Covid vaccination. Pt told that she needs to be fever free x 24 hours before returning to PT and if she cont with fever 9/8 we will reschedule 9/9 visit.    9/14: Saw both PCP and Dr. Resendiz Shown and was dx with a UTI and prescribed antibiotic from Dr. Jovita Olea and also Medrol dose pack from Dr. Adrian Davidson for cont knee pain; pt states pain in the knee is still there but is not as sharp and is better   9/16: better than last week, but still having knee pain  9/21: feeling much better; low pain levels; amb to PT with std cane  9/23: \"bone pain\" today in knee per pt; possible from rain and colder temperatures; pt amb with wheeled walker today due to inc soreness; states tolerated last session well without c/o   9/28: \" I feel pressure, but I really don't have any pain\"  10/1/21 - Tired from having to come up to PT via the stairs  10/6: \"pressure\" and tightness in knee today; pt to PT using walker today b/c c/o feeling dizzy from low blood pressure and high blood sugar this morning though she states she did take all routine medication this morning; BP before starting /82   10/8: \"very stiff this morning\"; saw MD pleased with her progress, ok to return to work next week, cont for 2 more weeks of PT then d/c to hep   10/12: inc soreness today b/c it was her first day back to work   10/14: this is pt's first week back to work and she works 8 hour days x 4 days a week at [employer] where she has to stand the entire time; b/c of this pt is c/o an inc in pain, edema and tightness especially lateral knee, ITB, and calf; pt questioning whether to call MD or not and pt instructed that perhaps MD can create an order for pt to return to work half shifts where she only works 4 hours a day and then progresses up as tolerated   10/19: pt was off yesterday but worked today and states \" I feel much better \"; pt notes she has been allowed to leave early once her work is done and the past few shifts has only worked 5-6 hours; pt purchased and is wearing nyoprene knee brace that is helping some; pt notes she called MD and left a message regarding her pain flare up and is still waiting for a call back; WOMAC update next session   10/26: \"doing better\"; pt wearing brace and states she wears this at work, but not all of the time at home   10/28: pt c/o inc pain since Tues, but thinks it is b/c she has been trying to do steps reciprocally more often; edu pt how to gradually progress steps    11/2: saw MD today for cont pain c/o; rating pain at 6/10 all weekend and amb with std cane antalgic gait; per MD note x-rays negative and he prescribed Medrol dose pack and 1 week off work; pt to  prescription tonight     OBJECTIVE:   · Observation:  · Functional Mobility/Transfers: minimal squat on R, able to complete sit to stand and bed mobility independently.   · Bandages/Dressings/Incisions: NA  · Gait: (include devices/WB status) Patient ambulates without AD in clinic demonstrating mild antalgic gait on R.    Test measurements:    ROM:  Date           Knee Flexion       Knee Extension    Active Passive Active Passive    preop Eval 7/8/21 120  0    Post op 8/17 104  -8    8/26 117  -4    9/16 123  0    10/6 125  0               Strength:  Date Hip flexion Hip abduction Quad Hamstring Ankle DF Ankle PF   Eval 7/8 4+/5 4+/5 4/5 4+/5 5/5 5/5   8/17 3+  3- 4- 4+    9/16 5  5- 5- 5    10/6 5  5 5 5                 Functional testing Prehab        Date  7/8/21     4 week f/u   Date   8/24/21  8 week f/u  Date    9/20 D/C  Date               TUG 17.15 secs NT   NT     30 second sit to stand 6 reps NT   NT     6 minute walk 128 meters NT   NT                 Balance           Narrow DOMINIK 10  10  10     Semi tandem 10 10  10     Tandem  10  9  10     SLS 5  NT  10                 Knee AROM 0-120 -4 - 117 0 - 123      Knee Extension MMT R/L L=11#  R=14#    R=12#    R 21.7     Hip aBduction MMT R/L L=15#  R=17#    R=NT    R 25.2     WOMAC 83  74  41                 RESTRICTIONS/PRECAUTIONS: none    Exercises/Interventions:  11/2 - most ex held due to pain c/o today     Therapeutic Ex (58257)   Min: 10 Reps/Resistance Notes/CUES  R TKR 7/26   Nustep   L0 5 min  Seat #5   Step flex  Step ext str 2 x 30 sec   2 x 30sec    GSS incline str  2 x 30sec    TKE with TB     HR/TR  Mini squat       Standing march                  Hip abd             Leg press    FAQ  HS curl 10/26 - Popping and crepitus felt with each rep of FAQ - pt states this happens intermittently               SAQ    SLR flexion    Supine heelslides hep   Long sitting knee extension stretch hep   Calf stretch with strap hep   Supine ext str with heel on roll        NMR re-education (61151)  Min:     Balance:  * tandem stance  * SLS    Airex:  *NBOS EC    SBA   Quad Set     Therapeutic Activity (02017)  Min: 10 Discussed MD appt today and concerns with cont pain. Decided with pt to hold PT x 2 weeks while pt is also off work and taking Medrol dose pack for rest and healing of knee and will be reassessed after. Step up fwd               lat    Step down Inc pain today 10/26         ll bars:  * amb without HR  * retro/line walk   * lat amb with orange looped band      reebok f/b s/s    *PT assisted pt amb down all [employer] steps Manual Intervention (79961) Min: 10     Pat mobs    PROM  *supine flex  *supine ext     STM      Purple roller  R knee x 10 min            Held per pt request -   10/28 - pt states the lat thigh/knee/calf pain is much better    Modalities  Min:20     IFC with CP 4 pads R knee x 20 min  - helps with soreness after exercise per pt   Pt supine with LE on wedge      Other Therapeutic Activities:   Patient was thoroughly educated on this date regarding prehabilitation goals, importance of PT sessions in improving overall ROM, strength and stability prior to surgery, and how prehabilitation will facilitate improved post-operative outcomes. The patient was educated on and instructed in HEP as listed. The patient was given a detailed handout for exercises to initiate in the hospital post-operatively as well as at home.  The discharge plan from the hospital was reviewed with the patient; specifically, to reduce length of hospital stay and to minimize time before reinitiating outpatient physical therapy after surgery. Education regarding early mobility post-operatively in the hospital and emphasis on working with both physical therapy and nursing staff to achieve ambulation goal was provided. The patient was highly encouraged to attend joint class in hospital prior to surgery for further instructions on pre and post-surgical care. Also, education regarding goals and expectations was provided; specifically, knee flexion range of motion greater than or equal to 90 degrees and 0 degrees knee extension within 2 weeks to promote improved gait mechanics and ambulation. The patient was encouraged to utilize ice/cold pack after surgery to address pain, minimize swelling as often as possible. It is in my medical opinion that this patient is clear from all physical barriers prior to consideration for surgery, activity modifications prior to and post operatively have been discussed with this patient as well as discharge planning and is cleared for surgery from physical therapy perspective. Home Exercise Program:  Patient instructed in the above exercises for HEP. Patient verbalized/demonstrated understanding and was issued written handout. 8/17: Supine roll ext str, long sit HSS, calf GSS  8/26: SAQ, standing HR, squat, march and abd     Therapeutic Exercise and NMR EXR  [x] (90892) Provided verbal/tactile cueing for activities related to strengthening, flexibility, endurance, ROM for improvements in LE, proximal hip, and core control with self care, mobility, lifting, ambulation.  [] (10349) Provided verbal/tactile cueing for activities related to improving balance, coordination, kinesthetic sense, posture, motor skill, proprioception  to assist with LE, proximal hip, and core control in self care, mobility, lifting, ambulation and eccentric single leg control.  [address] and Therapeutic Activities:    [x] (92198 or 93703) Provided verbal/tactile cueing for activities related to improving balance, coordination, kinesthetic sense, posture, motor skill, proprioception and motor activation to allow for proper function of core, proximal hip and LE with self care and ADLs and functional mobility.   [] (27644) Gait Re-education- Provided training and instruction to the patient for proper LE, core and proximal hip recruitment and positioning and eccentric body weight control with ambulation re-education including up and down stairs     Gait Training:  [] (41127) Provided training and instruction to the patient for proper postural muscle recruitment and positioning with ambulation re-education     Home Exercise Program:    [x] (40566) Reviewed/Progressed HEP activities related to strengthening, flexibility, endurance, ROM of core, proximal hip and LE for functional self-care, mobility, lifting and ambulation/stair navigation   [] (52580)Reviewed/Progressed HEP activities related to improving balance, coordination, kinesthetic sense, posture, motor skill, proprioception of core, proximal hip and LE for self care, mobility, lifting, and ambulation/stair navigation      Manual Treatments:  PROM / STM / Oscillations-Mobs:  G-I, II, III, IV (PA's, Inf., Post.)  [] (57578) Provided manual therapy to mobilize LE, proximal hip and/or LS spine soft tissue/joints for the purpose of modulating pain, promoting relaxation,  increasing ROM, reducing/eliminating soft tissue swelling/inflammation/restriction, improving soft tissue extensibility and allowing for proper ROM for normal function with self care, mobility, lifting and ambulation.      Charges:  Timed Code Treatment Minutes: 30   Total Treatment Minutes: 50      [] EVAL (LOW) 59984 (typically 20 minutes face-to-face)  [] EVAL (MOD) 19460 (typically 30 minutes face-to-face)  [] EVAL (HIGH) 34874 (typically 45 minutes face-to-face)  [] RE-EVAL     [] MQ(41171) x     [] Dry needle 1 or 2 Muscles (06654)  [] NMR (39527) x     [] Dry needle 3+ Muscles (57453)  [x] Manual (37358) x     [] Ultrasound (35109) x  [x] TA (27888) x     [] Mech Traction (17385)  [] ES(attended) (77189)     [x] ES (un) (25642):   [] Vasopump (98907) [] Ionto (74427)   [] Gait (86541)  [] Other:    GOALS:updated 10/6  * pt feels 70% improved overall  * pt notes improvement functionally - moving better, doing more ADLs easier, showering easier  * steps are still the most difficult   * states pain ranges anywhere from 0/10 to 4/10 depending on the day and her activity  * doing hep and icing daily   * amb with walker out in community and std cane at home, but doesn't feel quite steady with std cane out, so she is going to purchase a Treatful for community ambulation right now  * gross R LE strength 5/5    * R knee ROM 0-125    Patient stated goal: \"back to normal\"  Short Term Goals: 2 wks  1. Pt independent with hep  - met  -?x Progressing: -? Met: -? Not Met: -? Adjusted  2. Pt rates pain improvement of 20-30% overall for ease with sleep - met  -?x Progressing: -? Met: -? Not Met: -? Adjusted  3. R knee ROM -4 - 110 for ease with dressing - met  -?x Progressing: -? Met: -? Not Met: -? Adjusted  4. Pt amb with cane in community - progressing  -?x Progressing: -? Met: -? Not Met: -? Adjusted         Long Term Goals: at discharge   1. WOMAC score of 74 - met  -?x Progressing: -? Met: -? Not Met: -? Adjusted   2. Pt rates pain improvement of 40-50% overall for ease with sleep - met  -?x Progressing: -? Met: -? Not Met: -? Adjusted  3. R knee ROM -0 - 115 for ease with transfers - met  -?x Progressing: -? Met: -? Not Met: -? Adjusted   4. Pt amb without AD in community  - progressing   -?x Progressing: -? Met: -? Not Met: -? Adjusted   5. Pt demonstrates 5/5 MMT R knee for ease with steps  - progressing   -?x Progressing: -? Met: -? Not Met: -?  Adjusted    ASSESSMENT:  Current flare up limiting strength and functional progression     Treatment/Activity Tolerance:  [x] Patient tolerated treatment well [] Patient limited by fatique  [x] Patient limited by pain  [] Patient limited by other medical complications  [x] Other:  Intermittent flare ups of pain and stiffness limit tolerance to and progression of exercises at times     Overall Progression Towards Functional goals/ Treatment Progress Update:  [x] Patient is progressing as expected towards functional goals listed. [] Progression is slowed due to complexities/Impairments listed. [] Progression has been slowed due to co-morbidities. [] Plan just implemented, too soon to assess goals progression <30days   [] Goals require adjustment due to lack of progress  [] Patient is not progressing as expected and requires additional follow up with physician  [x] Other: progress beginning to plateau    Prognosis for POC: [x] Good [] Fair  [] Poor    Patient requires continued skilled intervention: [] Yes  [x] No        PLAN:hold x 2 wks for rest and will reassess  [x] Continue per plan of care [] Alter current plan (see comments)  [] Plan of care initiated [] Hold pending upcoming surgery [] Discharge    Electronically signed by: Kaitlyn Davila, PT MPT [id number]      Note: If patient does not return for scheduled/recommended follow up visits, this note will serve as a discharge from care along with the most recent update on progress.

## 2021-11-02 NOTE — LETTER
[facility]  [address] [place] [address] [address] [address]  Phone: [phone]  Fax: [phone]    Cristine Homans, MD        November 2, 2021     Patient: Juan Talavera   YOB: 1955   Date of Visit: 11/2/2021       To Whom It May Concern: It is my medical opinion that Sheron Thomas should remain out of work until 11/8/21. If you have any questions or concerns, please don't hesitate to call.     Sincerely,          Cristine Homans, MD

## 2021-11-04 ENCOUNTER — APPOINTMENT (OUTPATIENT)
Dept: PHYSICAL THERAPY | Age: 66
End: 2021-11-04
Payer: MEDICARE

## 2021-11-08 ENCOUNTER — TELEPHONE (OUTPATIENT)
Dept: FAMILY MEDICINE CLINIC | Age: 66
End: 2021-11-08

## 2021-11-08 NOTE — TELEPHONE ENCOUNTER
Patient came into office, and dropped off form for application for disability placards.      Printed letter for handicap placard, also needs this form completed     Please give patient a call back when completed will come    Placed in Dr. Kj Harley folder  Please advise

## 2021-11-09 NOTE — TELEPHONE ENCOUNTER
After looking in her arturo it seems that she received a letter from DR. Toribio Reading on 10/07/2021 for handicap placement card  Would pt need to contact that office   Please advise

## 2021-11-16 ENCOUNTER — HOSPITAL ENCOUNTER (OUTPATIENT)
Dept: PHYSICAL THERAPY | Age: 66
Setting detail: THERAPIES SERIES
Discharge: HOME | End: 2021-11-16
Payer: MEDICARE

## 2021-11-16 PROCEDURE — 97110 THERAPEUTIC EXERCISES: CPT

## 2021-11-16 PROCEDURE — G0283 ELEC STIM OTHER THAN WOUND: HCPCS

## 2021-11-16 PROCEDURE — 97530 THERAPEUTIC ACTIVITIES: CPT

## 2021-11-23 ENCOUNTER — HOSPITAL ENCOUNTER (OUTPATIENT)
Dept: PHYSICAL THERAPY | Age: 66
Setting detail: THERAPIES SERIES
Discharge: HOME | End: 2021-11-23
Payer: MEDICARE

## 2021-11-23 ENCOUNTER — TELEPHONE (OUTPATIENT)
Dept: ORTHOPEDIC SURGERY | Age: 66
End: 2021-11-23

## 2021-11-23 ENCOUNTER — OFFICE VISIT (OUTPATIENT)
Dept: ORTHOPEDIC SURGERY | Age: 66
End: 2021-11-23
Payer: MEDICARE

## 2021-11-23 VITALS — RESPIRATION RATE: 15 BRPM | WEIGHT: 194 LBS | BODY MASS INDEX: 33.12 KG/M2 | HEIGHT: 64 IN

## 2021-11-23 DIAGNOSIS — M16.11 PRIMARY OSTEOARTHRITIS OF RIGHT HIP: Primary | ICD-10-CM

## 2021-11-23 PROCEDURE — 1123F ACP DISCUSS/DSCN MKR DOCD: CPT | Performed by: ORTHOPAEDIC SURGERY

## 2021-11-23 PROCEDURE — G0283 ELEC STIM OTHER THAN WOUND: HCPCS

## 2021-11-23 PROCEDURE — 3017F COLORECTAL CA SCREEN DOC REV: CPT | Performed by: ORTHOPAEDIC SURGERY

## 2021-11-23 PROCEDURE — 1090F PRES/ABSN URINE INCON ASSESS: CPT | Performed by: ORTHOPAEDIC SURGERY

## 2021-11-23 PROCEDURE — 97110 THERAPEUTIC EXERCISES: CPT

## 2021-11-23 PROCEDURE — 4040F PNEUMOC VAC/ADMIN/RCVD: CPT | Performed by: ORTHOPAEDIC SURGERY

## 2021-11-23 PROCEDURE — G8484 FLU IMMUNIZE NO ADMIN: HCPCS | Performed by: ORTHOPAEDIC SURGERY

## 2021-11-23 PROCEDURE — 1036F TOBACCO NON-USER: CPT | Performed by: ORTHOPAEDIC SURGERY

## 2021-11-23 PROCEDURE — 97140 MANUAL THERAPY 1/> REGIONS: CPT

## 2021-11-23 PROCEDURE — 99213 OFFICE O/P EST LOW 20 MIN: CPT | Performed by: ORTHOPAEDIC SURGERY

## 2021-11-23 PROCEDURE — G8399 PT W/DXA RESULTS DOCUMENT: HCPCS | Performed by: ORTHOPAEDIC SURGERY

## 2021-11-23 PROCEDURE — G8417 CALC BMI ABV UP PARAM F/U: HCPCS | Performed by: ORTHOPAEDIC SURGERY

## 2021-11-23 PROCEDURE — G8427 DOCREV CUR MEDS BY ELIG CLIN: HCPCS | Performed by: ORTHOPAEDIC SURGERY

## 2021-11-23 RX ORDER — OXYCODONE HYDROCHLORIDE 5 MG/1
5 TABLET ORAL EVERY 6 HOURS PRN
Qty: 24 TABLET | Refills: 0 | Status: SHIPPED | OUTPATIENT
Start: 2021-11-23 | End: 2021-11-30

## 2021-11-23 NOTE — PROGRESS NOTES
[facility] AGGARWAL [facility]  [id number]  November 23, 2021    Chief Complaint   Patient presents with    Hip Pain     right hip       History: The patient is a 43-year-old female who is here for evaluation of her right hip. She did receive a right hip intra-articular injection back in December 2020. She responded rather favorably. The pain returned last week. She denies any specific injury. The pain is affecting her daily activities. It should be noted she did undergo a right total knee arthroplasty approximately 4 months ago. She does take ibuprofen 600 mg twice a day with food. The patient's  past medical history, medications, allergies,  family history, social history, and have been reviewed, and dated and are recorded in the chart. Pertinent items are noted in HPI. Review of systems reviewed from Pertinent History Form dated on 11/23/2021 and available in the patient's chart under the Media tab. Vitals:  Resp 15   Ht 5' 4\" (1.626 m)   Wt 194 lb (88 kg)   BMI 33.30 kg/m²     Physical: Physical: Ms. Karly Parish appears well, she is in no apparent distress, she demonstrates appropriate mood & affect. She is alert and oriented to person, place and time. She has  severe pain with internal rotation of the right hip. Range of motion of the right hip is : 40 degrees abduction, 30 degrees adduction, 35 degrees of external rotation and 5 degrees of internal rotation. Range of motion of the opposite hip is full. She is non tender laterally about the hips. Trendelenburg test is negative bilaterally. Deliah Lowers test is negative bilaterally. She is non tender about the Sacroiliac joint bilaterally. Leg length discrepancy: none. Examination of the skin reveals no rashes, ulcerations, or lesions, bilaterally in the lower extremities. Sensation to both lower extremities is grossly intact. Exam of both feet reveals pedal pulses intact and brisk cap refill. Patient is able to dorsiflex and wiggle all toes.   Deep tendon reflexes of the lower extremities are normal and symmetric. She is non tender to palpation of the lumbar spine. X-rays: AP pelvis and 2 views of the right hip were obtained and demonstrate moderate to severe osteoarthritis. Impression: right Hip Osteoarthritis        Plan: At this time, we will send her back to Dr. Landon Bryce for a right hip intra-articular injection. She will continue taking the ibuprofen 600 mg twice a day. She was given a prescription for oxycodone. She will follow-up with me in 6 weeks and we will reassess her then. If she continues to have severe right hip pain, we will consider total hip arthroplasty.

## 2021-11-23 NOTE — FLOWSHEET NOTE
[facility] [place] [facility], [facility]  [address] [facility] [facility] [place], [facility]  Phone: [phone]   Fax:     [phone]                                                              Physical Therapy Treatment Note/ Progress Report:   Date:  2021    Patient Name:  Monica Alarcon    :  1955  MRN: [id number]    Pertinent Medical History: HTN, DM, Cerebral Infarction ( R side weakness) , Cardiomyopathy, R foot arthrodesis, R shoulder RTC repair, RA, anxiety, depression      C-SSRS Triggered by Intake questionnaire: Pt has appt for psychiatric consult next month      YES NO   In the past month, have you wished you were dead or wished you could go to sleep and not wake up? X   In the past month, have you had any thoughts of killing yourself? X                    Lifetime   YES NO   Have you ever done anything, started to do anything, or prepared to do anything to end your life?    X             Past 3 months    X       Medical/Treatment Diagnosis Information:  · Diagnosis: R TKR  · Treatment Diagnosis: decreased mobility, strength    Insurance/Certification information:  PT Insurance Information: [facility] Medicare Dual / [place] [place]  Physician Information:  Referring Practitioner: Jose Manuel Stovall MD  Plan of care signed (Y/N):  Sent to inbox    Date of Patient follow up with Physician:       Progress Report: [x]  Yes   []  No     Date Range for reporting period:  Beginnin2021  Ending:      Progress report due (10 Rx/or 30 days whichever is less):      Recertification due (POC duration/ or 90 days whichever is less):     Visit # POC/Insurance Allowable Auth Needed    (post- op) BOMN []Yes   [x]No     Latex Allergy:  [x]NO      []YES  Preferred Language for Healthcare:   [x]English       []Other:    Functional Scale:       Date assessed: at [facility]  Test: Tashia  Score: 74  *WOMAC 41 on 21  WOMAC on 10/26/21 20th visit: 49    Pain level:  3/10     History of Injury: reports chronic history of R knee pain that recently got worse. Patient is scheduled for R TKR on 7/26/21. Patient currently lives with  who is trying to take off work following surgery to care for her. Daughter is also around who came come over for a few hours a day. SUBJECTIVE:    8/17: Pt to PT for Prehab reeval after having R TKR on 7/26/21. She is amb with wheeled walker and rating pain at 5/10. Pt states she is doing hep, icing, and elevating daily. Pt TTP all over R knee, incision healing well with steri strips in place. Patella mobility decreased with pain and edema. 8/19:  Not doing so good. Just over 3 weeks post-op. Did feel a little more flexible after last session and after doing the exercises  8/24: very sore and tender lateral and inferior knee  8/26: \"feeling much better today\"  8/31: it's getting better, pain is there but not severe   9/2: c/o inc in knee pain since Tues without known cause  9/7: pt feels cont pain but notes she is doing a lot at home and trying to walk around without AD; pt advised to dec activity levels at home so she can rest knee and dec pain levels **40 min into treatment pt expresses concerns about 2 slightly pink areas on knee incision and if they could be infected. She states that she has had chills and a fever as high as 103 since Friday. Pt denies any other symptoms. Pt has 2 slightly pink areas on incision (distal and mid incision) that are also slightly tender. There is no drainage or edema at either site. Pt states she has an appt with her PCP Dr. Stas Grullon tomorrow for her regular follow up. Pt advised to discuss current symptoms and concerns. Pt states she is has had her Covid vaccination. Pt told that she needs to be fever free x 24 hours before returning to PT and if she cont with fever 9/8 we will reschedule 9/9 visit.    9/14: Saw both PCP and Dr. Adrian Davidson and was dx with a UTI and prescribed antibiotic from Dr. Stas Grullon and also Medrol dose pack from Dr. Christiana Rodriguez for cont knee pain; pt states pain in the knee is still there but is not as sharp and is better   9/16: better than last week, but still having knee pain  9/21: feeling much better; low pain levels; amb to PT with std cane  9/23: \"bone pain\" today in knee per pt; possible from rain and colder temperatures; pt amb with wheeled walker today due to inc soreness; states tolerated last session well without c/o   9/28: \" I feel pressure, but I really don't have any pain\"  10/1/21 - Tired from having to come up to PT via the stairs  10/6: \"pressure\" and tightness in knee today; pt to PT using walker today b/c c/o feeling dizzy from low blood pressure and high blood sugar this morning though she states she did take all routine medication this morning; BP before starting /82   10/8: \"very stiff this morning\"; saw MD pleased with her progress, ok to return to work next week, cont for 2 more weeks of PT then d/c to hep   10/12: inc soreness today b/c it was her first day back to work   10/14: this is pt's first week back to work and she works 8 hour days x 4 days a week at [employer] where she has to stand the entire time; b/c of this pt is c/o an inc in pain, edema and tightness especially lateral knee, ITB, and calf; pt questioning whether to call MD or not and pt instructed that perhaps MD can create an order for pt to return to work half shifts where she only works 4 hours a day and then progresses up as tolerated   10/19: pt was off yesterday but worked today and states \" I feel much better \"; pt notes she has been allowed to leave early once her work is done and the past few shifts has only worked 5-6 hours; pt purchased and is wearing nyoprene knee brace that is helping some; pt notes she called MD and left a message regarding her pain flare up and is still waiting for a call back; WOMAC update next session   10/26: \"doing better\"; pt wearing brace and states she wears this at work, but not all of the time at home   10/28: pt c/o inc pain since Tues, but thinks it is b/c she has been trying to do steps reciprocally more often; edu pt how to gradually progress steps    11/2: saw MD today for cont pain c/o; rating pain at 6/10 all weekend and amb with std cane antalgic gait; per MD note x-rays negative and he prescribed Medrol dose pack and 1 week off work; pt to  prescription tonight   11/16: doing somewhat better; see PN   11/23 -  Right knee 4/10 - right hip 7/10 - Saw Dr. Keyur Arguello today referred back to Dr. Ace Taylor for intra-articular injection. OBJECTIVE:   · Observation:  · Functional Mobility/Transfers: minimal squat on R, able to complete sit to stand and bed mobility independently.   · Bandages/Dressings/Incisions: NA  · Gait: (include devices/WB status) Patient ambulates without AD in clinic demonstrating mild antalgic gait on R.    Test measurements:    ROM:  Date           Knee Flexion       Knee Extension    Active Passive Active Passive    preop Eval 7/8/21 120  0    Post op 8/17 104  -8    8/26 117  -4    9/16 123  0    10/6 125  0               Strength:  Date Hip flexion Hip abduction Quad Hamstring Ankle DF Ankle PF   Eval 7/8 4+/5 4+/5 4/5 4+/5 5/5 5/5   8/17 3+  3- 4- 4+    9/16 5  5- 5- 5    10/6 5  5 5 5                 Functional testing Prehab        Date  7/8/21     4 week f/u   Date   8/24/21  8 week f/u  Date    9/20 D/C  Date               TUG 17.15 secs NT   NT     30 second sit to stand 6 reps NT   NT     6 minute walk 128 meters NT   NT                 Balance           Narrow DOMINIK 10  10  10     Semi tandem 10 10  10     Tandem  10  9  10     SLS 5  NT  10                 Knee AROM 0-120 -4 - 117 0 - 123      Knee Extension MMT R/L L=11#  R=14#    R=12#    R 21.7     Hip aBduction MMT R/L L=15#  R=17#    R=NT    R 25.2     WOMAC 83  74  41                 RESTRICTIONS/PRECAUTIONS: none    Exercises/Interventions:       Therapeutic Ex (47676)   Min: 25 Reps/Resistance Notes/CUES  R TKR 7/26   Nustep   L0 5 min  Seat #5   Step flex  Step ext str 2 x 30 sec   2 x 30sec    GSS incline str  2 x 30sec    TKE with TB  lime grn 10 x 5 sec   HR/TR  Mini squat       Standing march                  Hip abd             Leg press w/ ball 40# 1 x 15 R>L   ANKITA w/ ball 90/60  HS curl 10 x 5 sec hold each11# 1 x 10 R only  10/26 - Popping and crepitus felt with each rep of FAQ - pt states this happens intermittently     FAQ 2# 2 x 10 Pt felt bad today - to MD this PM ammended ex to seated and supine with limited resistance   Seated knee flexion  Lime TBand  2 x 10    Calf stretch seated with strap 10 sec x 6    HR/TR Seated x 10 ea    Add sets Ball 5 sec hold x 10    Isometric abd sets seated Manual resistance x 10 5 sec hold    Supine heel slides X 10    Supine hp abd   x 10    Glut sets X 10    SAQ 2x# 2 x 10    Supine ext stretch PROM x 10                   SAQ 1# 2 x 10     SLR flexion 1# 1 x 15     Supine heelslides hep   Long sitting knee extension stretch hep   Calf stretch with strap hep   Supine ext str with heel on roll        NMR re-education (35439)  Min: 2    Balance:  * tandem stance  * SLS    Airex:  *NBOS EC   X 30 sec    SBA   Quad Set     Therapeutic Activity (36787)  Min: 18 See below    Step up fwd               lat 6\" x 10   Step down Inc pain today 10/26         ll bars:  * amb without HR  * retro/line walk   * lat amb with orange looped band      reebok f/b s/s F/b s/s x 30 sec each    *PT assisted pt amb down all [employer] steps Manual Intervention (85588) Min: 10     Pat mobs X 2 min     PROM  *supine flex  *supine ext    X 3 min     STM      Purple roller  R knee x 5 min            Held per pt request -   10/28 - pt states the lat thigh/knee/calf pain is much better    Modalities  Min:15     IFC with CP 4 pads R knee x 20 min  - helps with soreness after exercise per pt   Pt supine with LE on wedge      Other Therapeutic Activities:   Patient was thoroughly educated on this date regarding prehabilitation goals, importance of PT sessions in improving overall ROM, strength and stability prior to surgery, and how prehabilitation will facilitate improved post-operative outcomes. The patient was educated on and instructed in HEP as listed. The patient was given a detailed handout for exercises to initiate in the hospital post-operatively as well as at home. The discharge plan from the hospital was reviewed with the patient; specifically, to reduce length of hospital stay and to minimize time before reinitiating outpatient physical therapy after surgery. Education regarding early mobility post-operatively in the hospital and emphasis on working with both physical therapy and nursing staff to achieve ambulation goal was provided. The patient was highly encouraged to attend joint class in hospital prior to surgery for further instructions on pre and post-surgical care. Also, education regarding goals and expectations was provided; specifically, knee flexion range of motion greater than or equal to 90 degrees and 0 degrees knee extension within 2 weeks to promote improved gait mechanics and ambulation. The patient was encouraged to utilize ice/cold pack after surgery to address pain, minimize swelling as often as possible. It is in my medical opinion that this patient is clear from all physical barriers prior to consideration for surgery, activity modifications prior to and post operatively have been discussed with this patient as well as discharge planning and is cleared for surgery from physical therapy perspective. Home Exercise Program:  Patient instructed in the above exercises for HEP. Patient verbalized/demonstrated understanding and was issued written handout.       8/17: Supine roll ext str, long sit HSS, calf GSS  8/26: SAQ, standing HR, squat, march and abd     Therapeutic Exercise and NMR EXR  [x] (58169) Provided verbal/tactile cueing for activities related to strengthening, flexibility, endurance, ROM for improvements in LE, proximal hip, and core control with self care, mobility, lifting, ambulation.  [] (73820) Provided verbal/tactile cueing for activities related to improving balance, coordination, kinesthetic sense, posture, motor skill, proprioception  to assist with LE, proximal hip, and core control in self care, mobility, lifting, ambulation and eccentric single leg control.  [address] and Therapeutic Activities:    [x] (35029 or 06502) Provided verbal/tactile cueing for activities related to improving balance, coordination, kinesthetic sense, posture, motor skill, proprioception and motor activation to allow for proper function of core, proximal hip and LE with self care and ADLs and functional mobility.   [] (71017) Gait Re-education- Provided training and instruction to the patient for proper LE, core and proximal hip recruitment and positioning and eccentric body weight control with ambulation re-education including up and down stairs     Gait Training:  [] (07914) Provided training and instruction to the patient for proper postural muscle recruitment and positioning with ambulation re-education     Home Exercise Program:    [x] (55603) Reviewed/Progressed HEP activities related to strengthening, flexibility, endurance, ROM of core, proximal hip and LE for functional self-care, mobility, lifting and ambulation/stair navigation   [] (54185)Reviewed/Progressed HEP activities related to improving balance, coordination, kinesthetic sense, posture, motor skill, proprioception of core, proximal hip and LE for self care, mobility, lifting, and ambulation/stair navigation      Manual Treatments:  PROM / STM / Oscillations-Mobs:  G-I, II, III, IV (PA's, Inf., Post.)  [] (84437) Provided manual therapy to mobilize LE, proximal hip and/or LS spine soft tissue/joints for the purpose of modulating pain, promoting relaxation,  increasing ROM, reducing/eliminating soft tissue swelling/inflammation/restriction, improving soft tissue extensibility and allowing for proper ROM for normal function with self care, mobility, lifting and ambulation. Charges:  Timed Code Treatment Minutes: 40   Total Treatment Minutes: 55      [] EVAL (LOW) 18739 (typically 20 minutes face-to-face)  [] EVAL (MOD) 20045 (typically 30 minutes face-to-face)  [] EVAL (HIGH) 99531 (typically 45 minutes face-to-face)  [] RE-EVAL     [x] RK(10297) x2     [] Dry needle 1 or 2 Muscles (24725)  [] NMR (39889) x     [] Dry needle 3+ Muscles (11945)  [x] Manual (96581) x  1   [] Ultrasound (49446) x  [] TA (03161) x     [] Mech Traction (55289)  [] ES(attended) (00760)     [x] ES (un) (69036):   [] Vasopump (64367) [] Ionto (80972)   [] Gait (30541)  [] Other:    Assessment - Knee is doing well, , limiting factor at this time appears to be hip pain. Recommended use of RW to decrease load bearing on the right hip. Bautista knee mat exercises well today  Held off on more 2/2 hip aggravated by MD visit. Intra articular shot pending - t schedule with Dr. Forrest David 11/16  **Pt recently off work x 1.5 weeks and off PT x 2 weeks for rest due to recent R knee flare up of pain. During this time pt finished a Medrol dose pack and is reporting a dec in pain to 3/10. *ROM 0-120      Patient stated goal: \"back to normal\"  Short Term Goals: 2 wks  1. Pt independent with hep  - met  -?x Progressing: -? Met: -? Not Met: -? Adjusted  2. Pt rates pain improvement of 20-30% overall for ease with sleep - met  -?x Progressing: -? Met: -? Not Met: -? Adjusted  3. R knee ROM -4 - 110 for ease with dressing - met  -?x Progressing: -? Met: -? Not Met: -? Adjusted  4. Pt amb with cane in community - progressing  -?x Progressing: -? Met: -? Not Met: -? Adjusted         Long Term Goals: at discharge   1. WOMAC score of 74 - met  -?x Progressing: -? Met: -? Not Met: -? Adjusted   2.  Pt rates pain improvement of 40-50% overall for ease with sleep - met  -?x Progressing: -? Met: -? Not Met: -? Adjusted  3. R knee ROM -0 - 115 for ease with transfers - met  -?x Progressing: -? Met: -? Not Met: -? Adjusted   4. Pt amb without AD in community  - progressing   -?x Progressing: -? Met: -? Not Met: -? Adjusted   5. Pt demonstrates 5/5 MMT R knee for ease with steps  - progressing   -?x Progressing: -? Met: -? Not Met: -? Adjusted    ASSESSMENT:  Current flare up limiting strength and functional progression     Treatment/Activity Tolerance:  [x] Patient tolerated treatment well [] Patient limited by fatique  [x] Patient limited by pain  [] Patient limited by other medical complications  [x] Other:  Intermittent flare ups of pain and stiffness limit tolerance to and progression of exercises at times     Overall Progression Towards Functional goals/ Treatment Progress Update:  [x] Patient is progressing as expected towards functional goals listed. [] Progression is slowed due to complexities/Impairments listed. [] Progression has been slowed due to co-morbidities.   [] Plan just implemented, too soon to assess goals progression <30days   [] Goals require adjustment due to lack of progress  [] Patient is not progressing as expected and requires additional follow up with physician  [x] Other: progress beginning to plateau    Prognosis for POC: [x] Good [] Fair  [] Poor    Patient requires continued skilled intervention: [] Yes  [x] No        PLAN:cont 1 x wk x 3-4 weeks for gradual functional strength with select exercises only so as not to flare up pain again   [x] Continue per plan of care [] Alter current plan (see comments)  [] Plan of care initiated [] Hold pending upcoming surgery [] Discharge    Electronically signed by: Carmita Abbasi, PT DPT MS  [id number]      Note: If patient does not return for scheduled/recommended follow up visits, this note will serve as a discharge from care along with the most recent update on progress.

## 2021-11-23 NOTE — LETTER
[facility]  [address] [address]  Phone: [phone]  Fax: [phone]    Filiberto Amanda MD        November 23, 2021     Patient: Sudeep Haro   YOB: 1955   Date of Visit: 11/23/2021       To Whom It May Concern: It is my medical opinion that Wellington Santillan should remain out of work until 12/1/21. If you have any questions or concerns, please don't hesitate to call.     Sincerely,          Filiberto Amanda MD

## 2021-11-30 ENCOUNTER — HOSPITAL ENCOUNTER (OUTPATIENT)
Dept: PHYSICAL THERAPY | Age: 66
Setting detail: THERAPIES SERIES
Discharge: HOME | End: 2021-11-30
Payer: MEDICARE

## 2021-11-30 PROCEDURE — G0283 ELEC STIM OTHER THAN WOUND: HCPCS

## 2021-11-30 PROCEDURE — 97530 THERAPEUTIC ACTIVITIES: CPT

## 2021-11-30 NOTE — FLOWSHEET NOTE
[facility] [place]  [address] [facility]  Phone: [phone]   Fax:     [phone]                                                         Physical Therapy Discharge Summary    Dear  Dr. Leonie Mireles,    We had the pleasure of treating the following patient for physical therapy services at [address]. A summary of our findings can be found in the discharge summary below. If you have any questions or concerns regarding these findings, please do not hesitate to contact me at the office phone number above. Thank you for the referral.     Physician Signature:________________________________Date:__________________  By signing above (or electronic signature), therapists plan is approved by physician      Overall Response to Treatment:   []Patient is responding well to treatment and improvement is noted with regards  to goals   []Patient should continue to improve in reasonable time if they continue HEP   []Patient has plateaued and is no longer responding to skilled PT intervention    []Patient is getting worse and would benefit from return to referring MD   []Patient unable to adhere to initial POC   [x]Other: Pt's recent c/o horrible (7/10) R hip pain from OA is significantly limiting PT progression for R knee. PT suggested possible trial of Aquatic PT but pt is unable to do this due to work schedule. PT's recommendation at this time is to d/c.      Date range of Visits: 21-21  Total Visits: 25          Physical Therapy Treatment Note/ Progress Report:   Date:  2021    Patient Name:  Mustapah Graham    :  1955  MRN: [id number]    Pertinent Medical History: HTN, DM, Cerebral Infarction ( R side weakness) , Cardiomyopathy, R foot arthrodesis, R shoulder RTC repair, RA, anxiety, depression      C-SSRS Triggered by Intake questionnaire: Pt has appt for psychiatric consult next month      YES NO   In the past month, have you wished you were dead or wished you could go to sleep and not wake up? X   In the past month, have you had any thoughts of killing yourself? X                    Lifetime   YES NO   Have you ever done anything, started to do anything, or prepared to do anything to end your life? X             Past 3 months    X       Medical/Treatment Diagnosis Information:  · Diagnosis: R TKR  · Treatment Diagnosis: decreased mobility, strength    Insurance/Certification information:  PT Insurance Information: [facility] Medicare Dual / Ethelda Maximiliano  Physician Information:  Referring Practitioner: Demetrio Lao MD  Plan of care signed (Y/N):  Sent to inbox    Date of Patient follow up with Physician:       Progress Report: [x]  Yes  []  No     Date Range for reporting period:  Beginnin2021  Ending:      Progress report due (10 Rx/or 30 days whichever is less):      Recertification due (POC duration/ or 90 days whichever is less):     Visit # POC/Insurance Allowable Auth Needed    (post- op) BOMN []Yes   [x]No     Latex Allergy:  [x]NO      []YES  Preferred Language for Healthcare:   [x]English       []Other:    Functional Scale:       Date assessed: at [facility]  Test: [facility]  Score: 74  *WOMAC 41 on 21  WOMAC on 10/26/21 20th visit: 49    Pain level:  4/10 in knee and 7/10 R hip     History of Injury: reports chronic history of R knee pain that recently got worse. Patient is scheduled for R TKR on 21. Patient currently lives with  who is trying to take off work following surgery to care for her. Daughter is also around who came come over for a few hours a day. SUBJECTIVE:    : Pt to PT for Prehab reeval after having R TKR on 21. She is amb with wheeled walker and rating pain at 5/10. Pt states she is doing hep, icing, and elevating daily. Pt TTP all over R knee, incision healing well with steri strips in place. Patella mobility decreased with pain and edema.   : Not doing so good. Just over 3 weeks post-op. Did feel a little more flexible after last session and after doing the exercises  8/24: very sore and tender lateral and inferior knee  8/26: \"feeling much better today\"  8/31: it's getting better, pain is there but not severe   9/2: c/o inc in knee pain since Tues without known cause  9/7: pt feels cont pain but notes she is doing a lot at home and trying to walk around without AD; pt advised to dec activity levels at home so she can rest knee and dec pain levels **40 min into treatment pt expresses concerns about 2 slightly pink areas on knee incision and if they could be infected. She states that she has had chills and a fever as high as 103 since Friday. Pt denies any other symptoms. Pt has 2 slightly pink areas on incision (distal and mid incision) that are also slightly tender. There is no drainage or edema at either site. Pt states she has an appt with her PCP Dr. Ange Branham tomorrow for her regular follow up. Pt advised to discuss current symptoms and concerns. Pt states she is has had her Covid vaccination. Pt told that she needs to be fever free x 24 hours before returning to PT and if she cont with fever 9/8 we will reschedule 9/9 visit.    9/14: Saw both PCP and Dr. Pj Liu and was dx with a UTI and prescribed antibiotic from Dr. Ange Branham and also Medrol dose pack from Dr. Pj Liu for cont knee pain; pt states pain in the knee is still there but is not as sharp and is better   9/16: better than last week, but still having knee pain  9/21: feeling much better; low pain levels; amb to PT with std cane  9/23: \"bone pain\" today in knee per pt; possible from rain and colder temperatures; pt amb with wheeled walker today due to inc soreness; states tolerated last session well without c/o   9/28: \" I feel pressure, but I really don't have any pain\"  10/1/21 - Tired from having to come up to PT via the stairs  10/6: \"pressure\" and tightness in knee today; pt to PT using walker today b/c c/o feeling dizzy from low blood pressure and high blood sugar this morning though she states she did take all routine medication this morning; BP before starting /82   10/8: \"very stiff this morning\"; saw MD pleased with her progress, ok to return to work next week, cont for 2 more weeks of PT then d/c to hep   10/12: inc soreness today b/c it was her first day back to work   10/14: this is pt's first week back to work and she works 8 hour days x 4 days a week at [employer] where she has to stand the entire time; b/c of this pt is c/o an inc in pain, edema and tightness especially lateral knee, ITB, and calf; pt questioning whether to call MD or not and pt instructed that perhaps MD can create an order for pt to return to work half shifts where she only works 4 hours a day and then progresses up as tolerated   10/19: pt was off yesterday but worked today and states \" I feel much better \"; pt notes she has been allowed to leave early once her work is done and the past few shifts has only worked 5-6 hours; pt purchased and is wearing nyoprene knee brace that is helping some; pt notes she called MD and left a message regarding her pain flare up and is still waiting for a call back; WOMAC update next session   10/26: \"doing better\"; pt wearing brace and states she wears this at work, but not all of the time at home   10/28: pt c/o inc pain since Tues, but thinks it is b/c she has been trying to do steps reciprocally more often; edu pt how to gradually progress steps    11/2: saw MD today for cont pain c/o; rating pain at 6/10 all weekend and amb with std cane antalgic gait; per MD note x-rays negative and he prescribed Medrol dose pack and 1 week off work; pt to  prescription tonight   11/16: doing somewhat better; see PN   11/23 -  Right knee 4/10 - right hip 7/10 - Saw Dr. Leonie Mireles today referred back to Dr. Petra Kirby for intra-articular injection.   11/30: \"horrible pain\" in R thigh, hip, groin with amb and ADLS; see goal reassessment           OBJECTIVE:   · Observation:  · Functional Mobility/Transfers: minimal squat on R, able to complete sit to stand and bed mobility independently.   · Bandages/Dressings/Incisions: NA  · Gait: (include devices/WB status) Patient ambulates without AD in clinic demonstrating mild antalgic gait on R.    Test measurements:    ROM:  Date           Knee Flexion       Knee Extension    Active Passive Active Passive    preop Eval 7/8/21 120  0    Post op 8/17 104  -8    8/26 117  -4    9/16 123  0    10/6 125  0               Strength:  Date Hip flexion Hip abduction Quad Hamstring Ankle DF Ankle PF   Eval 7/8 4+/5 4+/5 4/5 4+/5 5/5 5/5   8/17 3+  3- 4- 4+    9/16 5  5- 5- 5    10/6 5  5 5 5                 Functional testing Prehab        Date  7/8/21     4 week f/u   Date   8/24/21  8 week f/u  Date    9/20 D/C  Date  NT due to significant functional limitations from hip OA               TUG 17.15 secs NT   NT     30 second sit to stand 6 reps NT   NT     6 minute walk 128 meters NT   NT                 Balance           Narrow DOMINIK 10  10  10     Semi tandem 10 10  10     Tandem  10  9  10     SLS 5  NT  10                 Knee AROM 0-120 -4 - 117 0 - 123      Knee Extension MMT R/L L=11#  R=14#    R=12#    R 21.7     Hip aBduction MMT R/L L=15#  R=17#    R=NT    R 25.2     WOMAC 83  74  41                 RESTRICTIONS/PRECAUTIONS: none    Exercises/Interventions:       Therapeutic Ex (05247)   Min: Reps/Resistance Notes/CUES  R TKR 7/26   Nustep  Seat #5   Step flex  Step ext str    GSS incline str     TKE with TB     HR/TR  Mini squat       Standing march                  Hip abd             Leg press w/ ball    ANKITA w/ ball 90/60  HS curl 10/26 - Popping and crepitus felt with each rep of FAQ - pt states this happens intermittently     FAQ Pt felt bad today - to MD this PM ammended ex to seated and supine with limited resistance   Seated knee flexion     Calf stretch seated with strap    HR/TR    Add sets    Isometric abd sets seated    Supine heel slides    Supine hp abd     Glut sets    SAQ    Supine ext stretch                   SAQ    SLR flexion    Supine heelslides hep   Long sitting knee extension stretch hep   Calf stretch with strap hep   Supine ext str with heel on roll        NMR re-education (18455)  Min:     Balance:  * tandem stance  * SLS    Airex:  *NBOS EC   X 30 sec    SBA   Quad Set     Therapeutic Activity (31805)  Min: 22 See goal reassessment below    Step up fwd               lat    Step down Inc pain today 10/26         ll bars:  * amb without HR  * retro/line walk   * lat amb with orange looped band      reebok f/b s/s     *PT assisted pt amb down all [employer] steps Manual Intervention (58330) Min: 3     Pat mobs    PROM  *supine flex  *supine ext     STM      Purple roller        Held per pt request -   10/28 - pt states the lat thigh/knee/calf pain is much better    R hip long axis distraction X 3 min     Modalities  Min:20     IFC with CP 4 pads R knee/thigh x 20 min  - helps with soreness after exercise per pt   Pt supine with LE on wedge      Other Therapeutic Activities:   Patient was thoroughly educated on this date regarding prehabilitation goals, importance of PT sessions in improving overall ROM, strength and stability prior to surgery, and how prehabilitation will facilitate improved post-operative outcomes. The patient was educated on and instructed in HEP as listed. The patient was given a detailed handout for exercises to initiate in the hospital post-operatively as well as at home. The discharge plan from the hospital was reviewed with the patient; specifically, to reduce length of hospital stay and to minimize time before reinitiating outpatient physical therapy after surgery.  Education regarding early mobility post-operatively in the hospital and emphasis on working with both physical therapy and nursing staff to achieve ambulation goal was provided. The patient was highly encouraged to attend joint class in hospital prior to surgery for further instructions on pre and post-surgical care. Also, education regarding goals and expectations was provided; specifically, knee flexion range of motion greater than or equal to 90 degrees and 0 degrees knee extension within 2 weeks to promote improved gait mechanics and ambulation. The patient was encouraged to utilize ice/cold pack after surgery to address pain, minimize swelling as often as possible. It is in my medical opinion that this patient is clear from all physical barriers prior to consideration for surgery, activity modifications prior to and post operatively have been discussed with this patient as well as discharge planning and is cleared for surgery from physical therapy perspective. Home Exercise Program:  Patient instructed in the above exercises for HEP. Patient verbalized/demonstrated understanding and was issued written handout. 8/17: Supine roll ext str, long sit HSS, calf GSS  8/26: SAQ, standing HR, squat, march and abd     Therapeutic Exercise and NMR EXR  [x] (59272) Provided verbal/tactile cueing for activities related to strengthening, flexibility, endurance, ROM for improvements in LE, proximal hip, and core control with self care, mobility, lifting, ambulation.  [] (98259) Provided verbal/tactile cueing for activities related to improving balance, coordination, kinesthetic sense, posture, motor skill, proprioception  to assist with LE, proximal hip, and core control in self care, mobility, lifting, ambulation and eccentric single leg control.  [address] and Therapeutic Activities:    [x] (77065 or 52057) Provided verbal/tactile cueing for activities related to improving balance, coordination, kinesthetic sense, posture, motor skill, proprioception and motor activation to allow for proper function of core, proximal hip and LE with self care and ADLs and functional mobility.   [] (48550) Gait Re-education- Provided training and instruction to the patient for proper LE, core and proximal hip recruitment and positioning and eccentric body weight control with ambulation re-education including up and down stairs     Gait Training:  [] (56755) Provided training and instruction to the patient for proper postural muscle recruitment and positioning with ambulation re-education     Home Exercise Program:    [x] (21187) Reviewed/Progressed HEP activities related to strengthening, flexibility, endurance, ROM of core, proximal hip and LE for functional self-care, mobility, lifting and ambulation/stair navigation   [] (88828)Reviewed/Progressed HEP activities related to improving balance, coordination, kinesthetic sense, posture, motor skill, proprioception of core, proximal hip and LE for self care, mobility, lifting, and ambulation/stair navigation      Manual Treatments:  PROM / STM / Oscillations-Mobs:  G-I, II, III, IV (PA's, Inf., Post.)  [] (50944) Provided manual therapy to mobilize LE, proximal hip and/or LS spine soft tissue/joints for the purpose of modulating pain, promoting relaxation,  increasing ROM, reducing/eliminating soft tissue swelling/inflammation/restriction, improving soft tissue extensibility and allowing for proper ROM for normal function with self care, mobility, lifting and ambulation.      Charges:  Timed Code Treatment Minutes: 25   Total Treatment Minutes: 45      [] EVAL (LOW) 72134 (typically 20 minutes face-to-face)  [] EVAL (MOD) 79230 (typically 30 minutes face-to-face)  [] EVAL (HIGH) 55143 (typically 45 minutes face-to-face)  [] RE-EVAL     [] OO(87335) x     [] Dry needle 1 or 2 Muscles (75081)  [] NMR (33116) x     [] Dry needle 3+ Muscles (37904)  [] Manual (22706) x     [] Ultrasound (12654) x  [x] TA (52730) x 2    [] Mech Traction (28983)  [] ES(attended) (73504)     [x] ES (un) (35105):   [] Vasopump (88416) [] Ionto (79050)   [] Gait (03622)  [] Other:    Assessment - Pt significantly limited due to R hip OA pain; pt unable to try Aquatic therapy at this time due to work schedule; will d/c at this time    GOALS:updated 11/30  * new onset on R hip OA pain   * Pt to have right hip intra-articular injection on 12/7 and f/u w/ MD in 6 weeks with possible THR if severe pain continues    * pt feels 75% improved overall with R knee  * R knee lat pain and tightness is \"a whole lot better\"  * R knee pain averages from 1-4/10 depending on activity* pt notes improvement functionally - moving better, doing more ADLs easier, showering easier* pt has resumed use of with walker out in community and std cane at home due to hip pain and weakness now  * gross R LE strength 5/5    * R knee ROM 0-125    Patient stated goal: \"back to normal\"  Short Term Goals: 2 wks  1. Pt independent with hep  - met  -?x Progressing: -? Met: -? Not Met: -? Adjusted  2. Pt rates pain improvement of 20-30% overall for ease with sleep - met  -?x Progressing: -? Met: -? Not Met: -? Adjusted  3. R knee ROM -4 - 110 for ease with dressing - met  -?x Progressing: -? Met: -? Not Met: -? Adjusted  4. Pt amb with cane in community - progressing  -?x Progressing: -? Met: -? Not Met: -? Adjusted         Long Term Goals: at discharge   1. WOMAC score of 74 - met  -?x Progressing: -? Met: -? Not Met: -? Adjusted   2. Pt rates pain improvement of 40-50% overall for ease with sleep - met  -?x Progressing: -? Met: -? Not Met: -? Adjusted  3. R knee ROM -0 - 115 for ease with transfers - met  -?x Progressing: -? Met: -? Not Met: -? Adjusted   4. Pt amb without AD in community  - progressing   -?x Progressing: -? Met: -? Not Met: -? Adjusted   5. Pt demonstrates 5/5 MMT R knee for ease with steps  - progressing   -?x Progressing: -? Met: -? Not Met: -?  Adjusted         Treatment/Activity Tolerance:  [x] Patient tolerated treatment well [] Patient limited by fatique  [x] Patient limited by pain  [] Patient limited by other medical complications  [x] Other:  Intermittent flare ups of pain and stiffness limit tolerance to and progression of exercises at times     Overall Progression Towards Functional goals/ Treatment Progress Update:  [x] Patient is progressing as expected towards functional goals listed. [] Progression is slowed due to complexities/Impairments listed. [] Progression has been slowed due to co-morbidities. [] Plan just implemented, too soon to assess goals progression <30days   [] Goals require adjustment due to lack of progress  [] Patient is not progressing as expected and requires additional follow up with physician  [x] Other: progress beginning to plateau    Prognosis for POC: [x] Good [] Fair  [] Poor    Patient requires continued skilled intervention: [] Yes  [x] No        PLAN:d/c due to R hip OA pain limiting progression on knee therapy   [x] Continue per plan of care [] Alter current plan (see comments)  [] Plan of care initiated [] Hold pending upcoming surgery [] Discharge    Electronically signed by: Jessica Marr, PT MPT [id number]      Note: If patient does not return for scheduled/recommended follow up visits, this note will serve as a discharge from care along with the most recent update on progress.

## 2021-12-02 ENCOUNTER — IMMUNIZATION (OUTPATIENT)
Dept: FAMILY MEDICINE CLINIC | Age: 66
End: 2021-12-02
Payer: MEDICARE

## 2021-12-02 DIAGNOSIS — Z23 NEED FOR INFLUENZA VACCINATION: Primary | ICD-10-CM

## 2021-12-02 PROCEDURE — G0008 ADMIN INFLUENZA VIRUS VAC: HCPCS | Performed by: FAMILY MEDICINE

## 2021-12-02 PROCEDURE — 90694 VACC AIIV4 NO PRSRV 0.5ML IM: CPT | Performed by: FAMILY MEDICINE

## 2021-12-07 ENCOUNTER — OFFICE VISIT (OUTPATIENT)
Dept: ORTHOPEDIC SURGERY | Age: 66
End: 2021-12-07
Payer: MEDICARE

## 2021-12-07 VITALS — WEIGHT: 194 LBS | BODY MASS INDEX: 33.12 KG/M2 | HEIGHT: 64 IN

## 2021-12-07 DIAGNOSIS — M16.11 PRIMARY OSTEOARTHRITIS OF RIGHT HIP: Primary | ICD-10-CM

## 2021-12-07 PROCEDURE — 20611 DRAIN/INJ JOINT/BURSA W/US: CPT | Performed by: ORTHOPAEDIC SURGERY

## 2021-12-07 RX ORDER — METHYLPREDNISOLONE ACETATE 40 MG/ML
80 INJECTION, SUSPENSION INTRA-ARTICULAR; INTRALESIONAL; INTRAMUSCULAR; SOFT TISSUE ONCE
Status: COMPLETED | OUTPATIENT
Start: 2021-12-07 | End: 2021-12-07

## 2021-12-07 RX ORDER — LIDOCAINE HYDROCHLORIDE 10 MG/ML
5 INJECTION, SOLUTION INFILTRATION; PERINEURAL ONCE
Status: COMPLETED | OUTPATIENT
Start: 2021-12-07 | End: 2021-12-07

## 2021-12-07 RX ADMIN — METHYLPREDNISOLONE ACETATE 80 MG: 40 INJECTION, SUSPENSION INTRA-ARTICULAR; INTRALESIONAL; INTRAMUSCULAR; SOFT TISSUE at 15:17

## 2021-12-07 RX ADMIN — LIDOCAINE HYDROCHLORIDE 5 ML: 10 INJECTION, SOLUTION INFILTRATION; PERINEURAL at 15:17

## 2021-12-07 NOTE — PROGRESS NOTES
ULTRASOUND GUIDED HIP INJECTION    Michaellory Eldridgeradhames    December 7, 2021    Chief Complaint   Patient presents with    Follow-up     Rt Hip ultrasound injection       Ht 5' 4\" (1.626 m)   Wt 194 lb (88 kg)   BMI 33.30 kg/m²     Micheal Leavitt is now 78years of age and is referred back to my office by Dr. Juan Diego Cruz for an ultrasound directed intra-articular steroid injection of her right hip. This is the fourth time she has been in my office for this reason. He had 2 injections in 2018 and one in 2020. She states those injections definitely helped over recently her pain is returned. She did have a right total knee replacement performed by Dr. Juan Diego Cruz on 7/26/2021. She initially progressed from a walker to a cane but then as her right hip became more painful progressed back to a walker which she utilizes at this time. The pain became suddenly worse about 3 weeks ago prompting attention to Dr. Uzma Bethea office on November 23, 2021. Because of radiographic changes and her symptom complex she was sent back to our office for repeat injection. She complains of constant pain up to 7 at rest and 7 with activities. She is limited in ambulation to just one block with her walker because of her right groin pain. I have today reviewed with Sloane Nuñez the clinically relevant past medical history, medications, allergies, family history, and Review of Systems from the patients most recent history form, and I have documented any details relevant to today's presenting complaints in my history above. The patient's self recorded documents concerning the above have been scanned  into the chart under the \"Media\" tab. X-rays of the right hip performed on November 23, 2021 were reviewed and do show degenerative changes of the acetabulum primarily inferior. There is actually slightly more joint space loss in the left hip than the right. A CT scan of the abdomen performed on September 25, 2021 do show the problem more clearly.  She has cystic and osteophytic changes of the acetabulum both anterior and posterior on the sagittal view of the CT scan. Physical Exam:   Examination of the righthip shows a positive logroll. No Lesion of the skin is noted over the injection site    Impression:  right hip osteoarthritis. Plan:  1. Injection with cortisone was recommended into  right   hip joint using ultrasound visualization. She understands the risks and benefits of the injection and describes no potential allergies. Time out was performed to verify correct person, correct procedure, and correct site. 2. Ultrasound visualization was first performed utilizing the [facility] Ultrasound unit using an 5/2 MHz Curved Probe. finding the femoral neck head junction. Next the femoral artery and vein were visualized with ultrasound medial to the femoral head. The location of the needle insertion was determined well lateral to the neurovascular structures and the site was anesthetized with 3 mL of 1% Lidocaine. The site was then prepped with ChloraPrep. A sterile cover was placed over the transducer head and the femoral head neck junction once again visualized. A 20-gauge spinal needle was then introduced under ultrasound control to the head neck junction. Once the needle was intracapsular the hip joint was injected with 2 mL  of 1% Lidocaine mixed with 2 ml of 40 mg Depo Medrol. John tolerated the procedure well and  did report 3% relief of  hip pain within 5 minutes of the injection. 3.  Because of her level of pain I did recommend that she stay off work until December 13 to allow her time to recover from her hip arthritis and injection. A note was given to the patient. 4.  She is return to [facility]. [facility] sandee.     Bong Clark MD  12/7/2021

## 2021-12-07 NOTE — LETTER
[facility]  [place] [address] [address] [address]  Phone: [phone]  Fax: [phone]    Levon Bird MD        December 7, 2021     Patient: Sonam Bajwa   YOB: 1955   Date of Visit: 12/7/2021       To Whom It May Concern: It is my medical opinion that Tyree Zhang is to be off work until December 13,2021. If you have any questions or concerns, please don't hesitate to call.     Sincerely,        Levon Bird MD

## 2021-12-07 NOTE — PATIENT INSTRUCTIONS
Impression:  right hip osteoarthritis. Plan:  1. Injection with cortisone was recommended into  right   hip joint using ultrasound visualization. She understands the risks and benefits of the injection and describes no potential allergies. Time out was performed to verify correct person, correct procedure, and correct site. 2. Ultrasound visualization was first performed utilizing the [facility] Ultrasound unit using an 5/2 MHz Curved Probe. finding the femoral neck head junction. Next the femoral artery and vein were visualized with ultrasound medial to the femoral head. The location of the needle insertion was determined well lateral to the neurovascular structures and the site was anesthetized with 3 mL of 1% Lidocaine. The site was then prepped with ChloraPrep. A sterile cover was placed over the transducer head and the femoral head neck junction once again visualized. A 20-gauge spinal needle was then introduced under ultrasound control to the head neck junction. Once the needle was intracapsular the hip joint was injected with 2 mL  of 1% Lidocaine mixed with 2 ml of 40 mg Depo Medrol. Jhon tolerated the procedure well and  did report 3% relief of  hip pain within 5 minutes of the injection. 3.  Because of her level of pain I did recommend that she stay off work until December 13 to allow her time to recover from her hip arthritis and injection. A note was given to the patient.     Melba Ochoa MD  12/7/2021

## 2021-12-20 RX ORDER — IBUPROFEN 600 MG/1
TABLET ORAL
Qty: 60 TABLET | Refills: 1 | Status: SHIPPED | OUTPATIENT
Start: 2021-12-20 | End: 2022-09-11

## 2022-01-03 DIAGNOSIS — I10 ESSENTIAL HYPERTENSION: ICD-10-CM

## 2022-01-03 RX ORDER — VALSARTAN 80 MG/1
TABLET ORAL
Qty: 45 TABLET | Refills: 0 | Status: SHIPPED | OUTPATIENT
Start: 2022-01-03 | End: 2022-03-16 | Stop reason: SDUPTHER

## 2022-01-04 ENCOUNTER — OFFICE VISIT (OUTPATIENT)
Dept: ORTHOPEDIC SURGERY | Age: 67
End: 2022-01-04
Payer: MEDICARE

## 2022-01-04 VITALS — WEIGHT: 205 LBS | BODY MASS INDEX: 35 KG/M2 | HEIGHT: 64 IN

## 2022-01-04 DIAGNOSIS — M16.11 PRIMARY OSTEOARTHRITIS OF RIGHT HIP: ICD-10-CM

## 2022-01-04 DIAGNOSIS — Z96.651 S/P TOTAL KNEE ARTHROPLASTY, RIGHT: Primary | ICD-10-CM

## 2022-01-04 DIAGNOSIS — M24.661 ARTHROFIBROSIS OF KNEE JOINT, RIGHT: ICD-10-CM

## 2022-01-04 DIAGNOSIS — Z01.818 PREOP TESTING: ICD-10-CM

## 2022-01-04 PROCEDURE — 99214 OFFICE O/P EST MOD 30 MIN: CPT | Performed by: ORTHOPAEDIC SURGERY

## 2022-01-04 PROCEDURE — G8484 FLU IMMUNIZE NO ADMIN: HCPCS | Performed by: ORTHOPAEDIC SURGERY

## 2022-01-04 PROCEDURE — 1123F ACP DISCUSS/DSCN MKR DOCD: CPT | Performed by: ORTHOPAEDIC SURGERY

## 2022-01-04 PROCEDURE — G8417 CALC BMI ABV UP PARAM F/U: HCPCS | Performed by: ORTHOPAEDIC SURGERY

## 2022-01-04 PROCEDURE — 4040F PNEUMOC VAC/ADMIN/RCVD: CPT | Performed by: ORTHOPAEDIC SURGERY

## 2022-01-04 PROCEDURE — G8427 DOCREV CUR MEDS BY ELIG CLIN: HCPCS | Performed by: ORTHOPAEDIC SURGERY

## 2022-01-04 PROCEDURE — 1090F PRES/ABSN URINE INCON ASSESS: CPT | Performed by: ORTHOPAEDIC SURGERY

## 2022-01-04 PROCEDURE — G8399 PT W/DXA RESULTS DOCUMENT: HCPCS | Performed by: ORTHOPAEDIC SURGERY

## 2022-01-04 PROCEDURE — 1036F TOBACCO NON-USER: CPT | Performed by: ORTHOPAEDIC SURGERY

## 2022-01-04 PROCEDURE — 3017F COLORECTAL CA SCREEN DOC REV: CPT | Performed by: ORTHOPAEDIC SURGERY

## 2022-01-04 NOTE — LETTER
[facility]  [address] [address]  Phone: [phone]  Fax: [phone]    Ripley Rinne, MD        January 4, 2022     Patient: Orquidea Wood   YOB: 1955   Date of Visit: 1/4/2022       To Whom It May Concern:    Please excuse Riya Neha from work on 1/4/22. If you have any questions or concerns, please don't hesitate to call.     Sincerely,          Ripley Rinne, MD

## 2022-01-04 NOTE — LETTER
administered unless a D.A. W. is written with the medication order  All orders without checkboxes will processed automatically unless crossed out. Orders with checkboxes MUST be checked in order to be carried out. John Eldridgeradhames                                                        1955  Date of Procedure/Surgery: 2/2/22  1. H & P for ALL procedure/surgery completed within 30 days, to be updated day of procedure/surgery  2. [ ]Consults: PT/OT evaluation  3. [X ]Defer to Anesthesia for Pre-Admission testing orders  4. Diagnostic Tests:  [ ]EKG (if not completed in last 3 months) IF: (check all that apply)   Other:  [ Luwana Tamy (if not completed in last 6 months) IF: (check all that apply)   Hx Malignancy   Hx CVS/Thoracic Surg   CVS Disease   Hx Pneumonia last 3 months   Chronic Pulm Disease  Other:  [ ]Radiology   Knee Right Left Standing AP knee, hip to ankle on scoliosis film   Other:  5. Labs  [ ]Basic Metabolic Profile  IF: (check all that apply)  [ ]Albumin    Other:     __________________________________________________________________  [ ]CBC without diff   Pre op exam      __________________________________________________________________  [ ]PT/INR  PTT   Pre op exam         __________________________________________________________________  [ ]Type & Screen   Surg with potiential for signif.  blood loss  [ ]Type & cross match 2 units         __________________________________________________________________  [ ]Urine routine reflex to culture (UAR) If cultures positive, repeat on                  admission [ Mario Newer catch urine  [ ]Nasal culture for MRSA   [ ]Nasal MRSA culture  __________________________________________________________________  6. [ ]Other:      TO: ___________________________________________ Date: ____________ Time: _________    Physician Signature:          1/4/2022 3:44 PM                   Pre-operative Physician Prophylaxis Orders      John Wood  1955    All orders without checkboxes will be processed automatically unless crossed out. Orders with checkboxes MUST be checked in order to be carried out. 1. Allergies: Codeine, Vicodin [hydrocodone-acetaminophen], Lipitor [atorvastatin], Oxycontin [oxycodone hcl], and Tramadol    2. Procedure: Arthroscopy with lysis of adhesions- right knee            Ht Readings from Last 1 Encounters:   01/04/22 5' 4\" (1.626 m)               Wt Readings from Last 1 Encounters:   01/04/22 205 lb (93 kg)     4. [ ] DVT Prophylaxis-Contraindication (Do not give)  Allergy to heparin Currently on anticoagulation  Not indicated, low risk patient  Thrombocytopenia Admitted for bleeding diagnosis Surgery or invasive procedure  [ ] Sequential Compression Boots to unaffected or bilateral extremities  [ ] Venous Compression Hose (EMIR hose) to unaffected or bilateral extremities        Knee high  [ ] Heparin 5,000 units subcutaneously 1-2 hours pre op into the thigh opposite of operative site    5.   [ ] Beta Blocker  Patient to take beta blocker the morning of surgery with a sip of water as prescribed at home  Other:    6. Jonatan.Leas ] Antimicrobial Prophylaxis (Consensus Guideline Recommendations) for       S.C.I. P. procedures  All antibiotics to be initiated 30 minutes pre-op (prior to leaving pre-op hold area/ACU) EXCEPT: Vancomycin and Ciprofloxacin. Initiate Vancomycin and Ciprofloxacin 2 hours pre-op. For combination antibiotic orders, start Ciprofloxacin first, then start second antibiotic ordered. In house patients, send pre-op antibiotics with patient to pre-op hold, do not initiate.     Surgical Procedure Routine pre-op Antibiotic  Penicillin or Cephalosporin Allergy  Orthopaedic  X Cefazolin (Ancef) 2 gm IVPB x1 X Clindamycin 900 mg IVPB x1    or     If > 80 kg Cefazolin 2 gm IVPB x1 Vancomycin 1 gm IVPB x1  Approved indications for Vancomycin:  MRSA related chronic wound dialysis  Other antibiotic to be given:    TO: _______________________________________________________ Date: ____________ Time: _______    Physician Signature:           Date: 1/4/2022  Time: 3:44 PM    Faxed to Pharmacy RN Signature: _________________________________ Date: _________ Time: _______

## 2022-01-04 NOTE — PROGRESS NOTES
Quyen AGGARWAL [facility]  [id number]  January 4, 2022    Chief Complaint   Patient presents with    Follow-up     Right hip and Right knee             History: The patient is here in follow-up regarding her right knee. She is now approximately 6 months status post right total knee arthroplasty. She was progressing rather well but has reached a plateau. She reports moderate to severe pain in the right knee with activities. She also has significant catching and popping. She did receive a right hip injection on 12/7. Her right hip is feeling much better. She continues to ambulate with a cane. She is originally from the [facility] and would like to return to her home country to visit family. The patient's  past medical history, medications, allergies,  family history, social history, and review of systems have been reviewed, and dated and are recorded in the chart. Ht 5' 4\" (1.626 m)   Wt 205 lb (93 kg)   BMI 35.19 kg/m²     Physical: Ms. Dorina Armendariz appears well, she is in no apparent distress, she demonstrates appropriate mood & affect. She is alert and oriented to person, place and time. She has moderate swelling. There is No evidence of DVT seen on physical exam. She is neurovascularly intact distally. Range of motion is from 0 degrees to 120 degrees. The incision is  clean, dry and intact and without erythema. Strength in the knee is 4+/5. There is no instability with varus and valgus stressing of the knee. There is no pain with range of motion of the hips. She does have diffuse tenderness to palpation over the patellar tendon. She has mild pain with resisted right knee extension. She does have moderate patellofemoral crepitus. Xrays: Three views of the right knee obtained in the office at last visit were extensively reviewed. The prosthesis is well aligned and there is no evidence of loosening.      Impression: Status post right Total Knee Arthroplasty #2 arthrofibrosis right knee #3 right hip osteoarthritis      Plan:  She will continue to work aggressively on range of motion and strengthening: Natural history and expected course discussed. Questions answered. Quad strengthening exercises. The patient does work at [address] [place] [address] and typically works at [employer]. She was in such severe pain today but she had to leave work early. At this time, the patient will be scheduled for a right knee arthroscopy with lysis of adhesions. All risks including but not limited to blood loss, infection, persistent pain,stiffness, weakness,deep vein thrombosis,neurovascular injury, and the risks of anesthesia were discussed. The patient understands all risks and benefits of the procedure and agrees to proceed. The patient will see her primary care physician,Lavell Finnegan MD, for medical clearance. If the patient is on NSAIDS or blood thinners these medications will need to be discontinued one week prior to surgery.

## 2022-01-06 ENCOUNTER — TELEPHONE (OUTPATIENT)
Dept: FAMILY MEDICINE CLINIC | Age: 67
End: 2022-01-06

## 2022-01-06 ENCOUNTER — TELEPHONE (OUTPATIENT)
Dept: ORTHOPEDIC SURGERY | Age: 67
End: 2022-01-06

## 2022-01-06 NOTE — TELEPHONE ENCOUNTER
Called pt to r/s appt scheduled with Dr. Tj Damon on 1.10.2022. Stated that she would be okay with seeing a different provider. Stated that she went to see a surgeon on Monday 1.3.2022 and had to scheduled surgery for 1.12.2022. Stated that she will need a pre op appt before Wednesday. Looked and no open pre ops with any provider before Wednesday. Canceled appt on Monday with Dr. Tj Damon. Advised pt I would send a message    Please Advise.   Pt can be reached at [phone]

## 2022-01-06 NOTE — TELEPHONE ENCOUNTER
CPT: 34127  BODY PART: right knee  STATUS: outpatient  AUTHORIZATION: NPR    Submitted online with [facility], Notification or Prior Authorization is not required for the requested services  Decision ID #:[id number]

## 2022-01-07 ENCOUNTER — OFFICE VISIT (OUTPATIENT)
Dept: FAMILY MEDICINE CLINIC | Age: 67
End: 2022-01-07
Payer: MEDICARE

## 2022-01-07 VITALS
HEIGHT: 64 IN | OXYGEN SATURATION: 98 % | BODY MASS INDEX: 34.66 KG/M2 | RESPIRATION RATE: 18 BRPM | HEART RATE: 78 BPM | WEIGHT: 203 LBS | SYSTOLIC BLOOD PRESSURE: 118 MMHG | TEMPERATURE: 97.9 F | DIASTOLIC BLOOD PRESSURE: 80 MMHG

## 2022-01-07 DIAGNOSIS — Z01.818 PREOP TESTING: ICD-10-CM

## 2022-01-07 DIAGNOSIS — G47.33 OBSTRUCTIVE SLEEP APNEA: ICD-10-CM

## 2022-01-07 DIAGNOSIS — K21.9 GASTROESOPHAGEAL REFLUX DISEASE, UNSPECIFIED WHETHER ESOPHAGITIS PRESENT: ICD-10-CM

## 2022-01-07 DIAGNOSIS — M24.661 ARTHROFIBROSIS OF KNEE JOINT, RIGHT: ICD-10-CM

## 2022-01-07 DIAGNOSIS — Z01.818 PREOP EXAMINATION: Primary | ICD-10-CM

## 2022-01-07 DIAGNOSIS — Z01.818 PREOP EXAMINATION: ICD-10-CM

## 2022-01-07 DIAGNOSIS — I10 ESSENTIAL HYPERTENSION: ICD-10-CM

## 2022-01-07 DIAGNOSIS — G81.91 RIGHT HEMIPARESIS (HCC): ICD-10-CM

## 2022-01-07 DIAGNOSIS — E11.8 TYPE 2 DIABETES MELLITUS WITH COMPLICATION, WITHOUT LONG-TERM CURRENT USE OF INSULIN (HCC): ICD-10-CM

## 2022-01-07 DIAGNOSIS — I69.30 HISTORY OF CVA WITH RESIDUAL DEFICIT: ICD-10-CM

## 2022-01-07 LAB
BASOPHILS ABSOLUTE: 0 K/UL (ref 0–0.2)
BASOPHILS RELATIVE PERCENT: 0.5 %
EOSINOPHILS ABSOLUTE: 0.2 K/UL (ref 0–0.6)
EOSINOPHILS RELATIVE PERCENT: 3.5 %
HBA1C MFR BLD: 7.4 %
HCT VFR BLD CALC: 38 % (ref 36–48)
HEMOGLOBIN: 12.4 G/DL (ref 12–16)
LYMPHOCYTES ABSOLUTE: 1.9 K/UL (ref 1–5.1)
LYMPHOCYTES RELATIVE PERCENT: 35.8 %
MCH RBC QN AUTO: 25.9 PG (ref 26–34)
MCHC RBC AUTO-ENTMCNC: 32.6 G/DL (ref 31–36)
MCV RBC AUTO: 79.5 FL (ref 80–100)
MONOCYTES ABSOLUTE: 0.5 K/UL (ref 0–1.3)
MONOCYTES RELATIVE PERCENT: 10 %
NEUTROPHILS ABSOLUTE: 2.7 K/UL (ref 1.7–7.7)
NEUTROPHILS RELATIVE PERCENT: 50.2 %
PDW BLD-RTO: 15.1 % (ref 12.4–15.4)
PLATELET # BLD: 300 K/UL (ref 135–450)
PMV BLD AUTO: 7.7 FL (ref 5–10.5)
RBC # BLD: 4.77 M/UL (ref 4–5.2)
WBC # BLD: 5.3 K/UL (ref 4–11)

## 2022-01-07 PROCEDURE — 3017F COLORECTAL CA SCREEN DOC REV: CPT | Performed by: NURSE PRACTITIONER

## 2022-01-07 PROCEDURE — 93000 ELECTROCARDIOGRAM COMPLETE: CPT | Performed by: NURSE PRACTITIONER

## 2022-01-07 PROCEDURE — 1123F ACP DISCUSS/DSCN MKR DOCD: CPT | Performed by: NURSE PRACTITIONER

## 2022-01-07 PROCEDURE — 99214 OFFICE O/P EST MOD 30 MIN: CPT | Performed by: NURSE PRACTITIONER

## 2022-01-07 PROCEDURE — G8427 DOCREV CUR MEDS BY ELIG CLIN: HCPCS | Performed by: NURSE PRACTITIONER

## 2022-01-07 PROCEDURE — G8399 PT W/DXA RESULTS DOCUMENT: HCPCS | Performed by: NURSE PRACTITIONER

## 2022-01-07 PROCEDURE — G8484 FLU IMMUNIZE NO ADMIN: HCPCS | Performed by: NURSE PRACTITIONER

## 2022-01-07 PROCEDURE — 83036 HEMOGLOBIN GLYCOSYLATED A1C: CPT | Performed by: NURSE PRACTITIONER

## 2022-01-07 PROCEDURE — 4040F PNEUMOC VAC/ADMIN/RCVD: CPT | Performed by: NURSE PRACTITIONER

## 2022-01-07 PROCEDURE — 1036F TOBACCO NON-USER: CPT | Performed by: NURSE PRACTITIONER

## 2022-01-07 PROCEDURE — 2022F DILAT RTA XM EVC RTNOPTHY: CPT | Performed by: NURSE PRACTITIONER

## 2022-01-07 PROCEDURE — 3046F HEMOGLOBIN A1C LEVEL >9.0%: CPT | Performed by: NURSE PRACTITIONER

## 2022-01-07 PROCEDURE — G8417 CALC BMI ABV UP PARAM F/U: HCPCS | Performed by: NURSE PRACTITIONER

## 2022-01-07 PROCEDURE — 1090F PRES/ABSN URINE INCON ASSESS: CPT | Performed by: NURSE PRACTITIONER

## 2022-01-07 NOTE — LETTER
[address] [facility] ([place], [place])  Phone: [phone]  Fax: [phone]    JEISON Mckenzie CNP        January 7, 2022     Patient: Nathen Wood   YOB: 1955   Date of Visit: 1/7/2022       To Whom it May Concern:    Prosper Roche was seen in my clinic on 1/7/2022. Please excuse her from work on 1/6-1/7. If you have any questions or concerns, please don't hesitate to call.     Sincerely,     JEISON Mckenzie CNP

## 2022-01-07 NOTE — PROGRESS NOTES
Preoperative Consultation      John Wood  YOB: 1955    Date of Service:  1/7/2022    Vitals:    01/07/22 1408   BP: 118/80   Site: Right Upper Arm   Position: Sitting   Cuff Size: Large Adult   Pulse: 78   Resp: 18   Temp: 97.9 °F (36.6 °C)   TempSrc: Oral   SpO2: 98%   Weight: 203 lb (92.1 kg)   Height: 5' 4\" (1.626 m)      Wt Readings from Last 2 Encounters:   01/07/22 203 lb (92.1 kg)   01/04/22 205 lb (93 kg)     BP Readings from Last 3 Encounters:   01/07/22 118/80   10/01/21 (!) 130/98   09/25/21 (!) 103/56        Chief Complaint   Patient presents with   Ronda Ma Pre-op Exam     Pt is having a totel knee replacement on the right leg on 01/12/2022. Dr. Kirk Lr will be doing the surgery. Allergies   Allergen Reactions    Codeine Nausea And Vomiting and Rash    Vicodin [Hydrocodone-Acetaminophen] Nausea And Vomiting    Lipitor [Atorvastatin]      Myalgias    Oxycontin [Oxycodone Hcl] Itching    Tramadol Itching and Swelling     Outpatient Medications Marked as Taking for the 1/7/22 encounter (Office Visit) with JEISON Toledo CNP   Medication Sig Dispense Refill    valsartan (DIOVAN) 80 MG tablet TAKE 1/2 TABLET BY MOUTH DAILY 45 tablet 0    ibuprofen (ADVIL;MOTRIN) 600 MG tablet TAKE 1 TABLET BY MOUTH TWICE DAILY WITH MEALS 60 tablet 1    ondansetron (ZOFRAN ODT) 4 MG disintegrating tablet Take 1-2 tablets by mouth every 12 hours as needed for Nausea or Vomiting May Sub regular tablet (non-ODT) if insurance does not cover ODT.  12 tablet 0    metFORMIN (GLUCOPHAGE) 500 MG tablet TAKE TWO TABLETS BY MOUTH TWICE A DAY WITH MEALS 360 tablet 0    omeprazole (PRILOSEC) 40 MG delayed release capsule Take 1 capsule by mouth every morning (before breakfast) 90 capsule 1    insulin glargine (LANTUS SOLOSTAR) 100 UNIT/ML injection pen Patient stated she is taking  12 UNITS UNDER THE SKIN ONCE NIGHTLY 15 mL 1    traZODone (DESYREL) 100 MG tablet TAKE 1 TABLET BY MOUTH AT  NIGHT 90 tablet 1    DULoxetine (CYMBALTA) 60 MG extended release capsule TAKE 1 CAPSULE BY MOUTH  DAILY 90 capsule 1    furosemide (LASIX) 20 MG tablet TAKE 1 TABLET BY MOUTH  DAILY AS NEEDED FOR LEG  SWELLING 90 tablet 3    dextran 70-hypromellose (TEARS NATURALE) 0.1-0.3 % SOLN opthalmic solution as needed for itching      Dulaglutide (TRULICITY) 1.5 CF/3.9NY SOPN INJECT THE CONTENTS OF ONE  PEN SUBCUTANEOUSLY WEEKLY 6 mL 3    aspirin 81 MG EC tablet Take 1 tablet by mouth 2 times daily for 14 days Take twice a day for 14 days after knee surgery then can resume daily dosing 28 tablet 0    FANAPT 1 MG TABS tablet Take 1 tablet by mouth nightly 90 tablet 0    lidocaine 4 % external patch Apply patch to lower mid back. Leave on for 12 hours and remove. Leave off for 12 hours before applying another one. 5 patch 0    Insulin Pen Needle (PEN NEEDLES) 31G X 6 MM MISC USE TO INJECT INSULIN DAILY 100 each 1    blood glucose test strips (ONETOUCH ULTRA) strip TEST TWO TIMES A  strip 4    albuterol sulfate HFA (PROVENTIL HFA) 108 (90 Base) MCG/ACT inhaler Inhale 2 puffs into the lungs every 4 hours as needed for Wheezing or Shortness of Breath May substitute ProAir MDI 1 Inhaler 5    ipratropium-albuterol (DUONEB) 0.5-2.5 (3) MG/3ML SOLN nebulizer solution Inhale 3 mLs into the lungs every 4 hours as needed for Shortness of Breath (wheezing coughing) 360 mL 5    ammonium lactate (LAC-HYDRIN) 12 % lotion Apply topically daily.  1 Bottle 1    blood glucose test strips (FREESTYLE LITE) strip TEST BLOOD SUGAR TWO TIMES A DAY Diag: E11.9 100 each 4    Blood Glucose Monitoring Suppl (TRUE METRIX AIR GLUCOSE METER) w/Device KIT 1 each by Does not apply route daily 1 kit 0    FREESTYLE LANCETS MISC USE ONE LANCET TO TEST BLOOD SUGAR TWICE A  each 3       This patient presents to the office today for a preoperative consultation at the request of surgeon, Dr. Ángel Rivas, who plans on performing right arthroscopy with lysis of adhesions on January 12, 20[facility] at [facility].     She is 6 months s/p right TKR (7/26/21). She continues with significant pain in right knee with activities. Feels that knee is catching. Taking ibuprofen 600 mg BID. HTN- tolerating medications well. DM- fasting BS has been running between 140-160     DIPTI- on cpap nightly     History of CVA with residual deficit- in 2014 has chronic right sided weakness. GERD- symptoms controlled on daily PPI.      Planned anesthesia: General   Known anesthesia problems: None   Bleeding risk: No recent or remote history of abnormal bleeding  Personal or FH of DVT/PE: No    Patient objection to receiving blood products: No    Patient Active Problem List   Diagnosis    Diabetes mellitus ([address]    Obesity    Dystonia    Cerebral thrombosis with cerebral infarction ([address]    Obstructive sleep apnea    Right hemiparesis ([address]    Trigger finger, acquired    Elevated CK    Primary osteoarthritis of both knees    Mood disorder ([address] - follows with Psych Dr. Joe Barnes Calcific tendonitis of right shoulder    Essential hypertension    Mixed hyperlipidemia    Chronic low back pain without sciatica    History of CVA with residual deficit    Anxiety and depression    Chest pain    Myalgia due to statin    Palpitations    Primary osteoarthritis of right knee    History of colonoscopy - 2021, rpt 2026       Past Medical History:   Diagnosis Date    Anesthesia complication     \" shaking\"    Arthritis     Cardiomyopathy ([address]     Diabetes     GERD (gastroesophageal reflux disease)     Hyperlipidemia     Hypertension     Lumbar disc disease     Prolonged emergence from general anesthesia     Sleep apnea     pt states does use a Cpap machine at night    Unspecified cerebral artery occlusion with cerebral infarction 2014    right side weak    Wears glasses      Past Surgical History:   Procedure Laterality Date    ARTHRODESIS Right 2020    (RIGHT) REMOVAL OF BONE SPURRING AND OSSEOUS PROMINENCE FIRST METATARSAL, ARTHRODESIS OF FIRST METATARSOPHALANGEAL JOINT, BONE MARROW HARVEST AND CONCENTRATION RIGHT TIBIA performed by Twyal Deng DPM at [address] Left 9/3/15    CARPAL TUNNEL RELEASE Right 9/22/15    COLONOSCOPY      FINGER TRIGGER RELEASE Left 9/3/15    middle and ring fingers    FINGER TRIGGER RELEASE Right 9/22/15    middle and ring fingers    FOOT SURGERY Bilateral     litteltoe    HAND SURGERY Right     Ligament    KNEE ARTHROPLASTY Right 2021    ROBOTIC ASSISTED TOTAL RIGHT KNEE REPLACEMENT performed by Norma Fox MD at [address] [place] [address]  2003    SHOULDER ARTHROSCOPY Right 2018    Diagnostic scope, rcr, open Radha     Family History   Problem Relation Age of Onset    Heart Disease Mother     High Blood Pressure Mother     Stroke Mother     Cancer Brother         lung cancer     Social History     Socioeconomic History    Marital status:      Spouse name: Kaylee Rosas, seen here    Number of children: 3    Years of education: Not on file    Highest education level: Not on file   Occupational History    Not on file   Tobacco Use    Smoking status: Former Smoker     Packs/day: 1.50     Years: 3.00     Pack years: 4.50     Quit date: 1992     Years since quittin.0    Smokeless tobacco: Never Used   Vaping Use    Vaping Use: Never used   Substance and Sexual Activity    Alcohol use: No    Drug use: No     Review of Systems  A comprehensive review of systems was negative except for what was noted in the HPI. Physical Exam   Constitutional: She is oriented to person, place, and time. She appears well-developed and well-nourished. No distress. HENT:   Head: Normocephalic and atraumatic.    Mouth/Throat: Uvula is midline, oropharynx is clear and moist and mucous membranes are normal.   Eyes: Conjunctivae and EOM are normal. Pupils are equal, round, and reactive to light. Neck: Trachea normal and normal range of motion. Neck supple. No JVD present. Carotid bruit is not present. Cardiovascular: Normal rate, regular rhythm, normal heart sounds and intact distal pulses. Exam reveals no gallop and no friction rub. No murmur heard. Pulmonary/Chest: Effort normal and breath sounds normal. No respiratory distress. She has no wheezes. She has no rales. Musculoskeletal: She exhibits no edema. Right knee with decreased ROM. Positive for patellofemoral crepitus. Neurological: She is alert and oriented to person, place, and time. She has normal strength. No cranial nerve deficit or sensory deficit. Coordination and gait normal.   Skin: Skin is warm and dry. No rash noted. No erythema. Psychiatric: She has a normal mood and affect. Her behavior is normal.     EKG Interpretation:  normal sinus rhythm, unchanged from previous tracing (9/25/21). Lab Review   Lab Results   Component Value Date     09/25/2021    K 5.1 09/25/2021    CL 97 09/25/2021    CO2 25 09/25/2021    BUN 14 09/25/2021    CREATININE 1.0 09/25/2021    GLUCOSE 129 09/25/2021    CALCIUM 10.2 09/25/2021     Lab Results   Component Value Date    WBC 7.0 09/25/2021    HGB 13.3 09/25/2021    HCT 41.7 09/25/2021    MCV 82.8 09/25/2021     09/25/2021      Lab Results   Component Value Date    LABA1C 7.0 07/26/2021     Lab Results   Component Value Date    .2 07/26/2021        Assessment:       78 y.o. patient with planned surgery as above. Known risk factors for perioperative complications: Diabetes mellitus, Hypertension, Obstructive sleep apnea  Current medications which may produce withdrawal symptoms if withheld perioperatively: none      Plan:     1. Preoperative workup as follows: ECG, hemoglobin, hematocrit, electrolytes, creatinine  2.  Change in medication regimen before surgery: Take omeprazole on morning of surgery with sip of water, and hold all other medications until after surgery. Will d/c ASA and NSAID 7 days before procedure. 3. Prophylaxis for cardiac events with perioperative beta-blockers: Not indicated  ACC/AHA indications for pre-operative beta-blocker use:    · Vascular surgery with history of postitive stress test  · Intermediate or high risk surgery with history of CAD   · Intermediate or high risk surgery with multiple clinical predictors of CAD- 2 of the following: history of compensated or prior heart failure, history of cerebrovascular disease, DM, or renal insufficiency    Routine administration of higher-dose, long-acting metoprolol in beta-blocker-naïve patients on the day of surgery, and in the absence of dose titration is associated with an overall increase in mortality. Beta-blockers should be started days to weeks prior to surgery and titrated to pulse < 70.  4. Deep vein thrombosis prophylaxis: regimen to be chosen by surgical team  5. No contraindications to planned surgery of right arthroscopy with lysis of adhesions with Dr. Montenegro Back on 1/12/22. 6. HTN- controlled current medications. 7. DM- HgA1c today is 7.4%. Advised to take lantus 1/2 regular dose (6 units) night before procedure. She will hold AM medications. Can restart regular medications once able to tolerate POs. Discussed if fasting BS is consistently running above 150 after surgery, she will call with BS readings so lantus dose can be titrated. 8. DIPTI- stable on cpap nightly. Advised to take her machine with her day of procedure. 9. History of CVA in 2014 with right hemiparesis- stable. 10. GERD- stable on daily PPI. 11. Had COVID-19 screening completed today. Not having any COVID-19 symptoms.

## 2022-01-08 LAB
ANION GAP SERPL CALCULATED.3IONS-SCNC: 19 MMOL/L (ref 3–16)
BUN BLDV-MCNC: 16 MG/DL (ref 7–20)
CALCIUM SERPL-MCNC: 11 MG/DL (ref 8.3–10.6)
CHLORIDE BLD-SCNC: 101 MMOL/L (ref 99–110)
CO2: 21 MMOL/L (ref 21–32)
CREAT SERPL-MCNC: 1 MG/DL (ref 0.6–1.2)
GFR AFRICAN AMERICAN: >60
GFR NON-AFRICAN AMERICAN: 55
GLUCOSE BLD-MCNC: 105 MG/DL (ref 70–99)
POTASSIUM SERPL-SCNC: 5.3 MMOL/L (ref 3.5–5.1)
SARS-COV-2: DETECTED
SODIUM BLD-SCNC: 141 MMOL/L (ref 136–145)

## 2022-01-10 ENCOUNTER — TELEPHONE (OUTPATIENT)
Dept: FAMILY MEDICINE CLINIC | Age: 67
End: 2022-01-10

## 2022-01-10 ENCOUNTER — TELEPHONE (OUTPATIENT)
Dept: ORTHOPEDIC SURGERY | Age: 67
End: 2022-01-10

## 2022-01-10 DIAGNOSIS — E87.5 HYPERKALEMIA: Primary | ICD-10-CM

## 2022-01-10 NOTE — TELEPHONE ENCOUNTER
Since she feels well ok to give general quarantine guidelines. Since no symptoms her first day would be day she got swabbed. I believe she is vaccinated which is very helpful.  If concerning symptoms arise let us know

## 2022-01-10 NOTE — TELEPHONE ENCOUNTER
I tried calling pt to find out where she got her Covid test done. Phone just rings and rings with no way to leave a VM.

## 2022-01-10 NOTE — TELEPHONE ENCOUNTER
General Question     Subject: patient states that she has got her covid test but cannot get the results. Please advise.    Patient: Joselo Caceres  Contact Number: [phone]

## 2022-01-10 NOTE — TELEPHONE ENCOUNTER
I spoke to pt. She went to [facility] testing site Thursday. Her results are in Epic and she tested positive for Covid.  I let pt know Kayla Hernandez will call her when she returns to the office, to reschedule her Sx.

## 2022-01-10 NOTE — TELEPHONE ENCOUNTER
Pt is positive for covid pt is not sure what to do? Pt has no symptoms ?  Feels fine   Pt was supposed to have surgery weds states she has not heard from Dr Montenegro Back office advised the pot to contact them     Please advise

## 2022-01-11 NOTE — TELEPHONE ENCOUNTER
The patient was informed that per preadmission testing, she does not have to have another COVID test within 90 days of her positive test as long as she does not develop symptoms by her next surgery date.

## 2022-01-24 ENCOUNTER — TELEPHONE (OUTPATIENT)
Dept: ORTHOPEDIC SURGERY | Age: 67
End: 2022-01-24

## 2022-01-24 NOTE — PROGRESS NOTES
Unfortunately due the rise in covid cases, staffing crisis and hospital capacity, your procedure may need to be rescheduled--if this were to happen your Surgeons office will notify you ASAP    Covid testing to be done- N/A for DOS- pt states she tested positive on 1/7/22 and then negative on 1/19/22- she states she is asymptomatic  If positive---Pt instructed to notify MD ASAP     If negative--pt was instructed to bring results DOP/DOS      Preoperative Screening for Elective Surgery/Invasive Procedures While COVID-19 present in the community     1. Have you tested positive or have been told to self-isolate for COVID-19 like symptoms within the past 28 days?no  2. Do you currently have any of the following symptoms?no  ? Fever >100.0 F or 99.9 F in immunocompromised patients?n  ? New onset cough, shortness of breath or difficulty breathing?n  ? New onset sore throat, myalgia (muscle aches and pains), headache, loss of taste/smell or diarrhea?n  3. Have you had a potential exposure to COVID-19 within the past 14 days by:no  ? Close contact with a confirmed case?n  ? Close contact with a healthcare worker,  or essential infrastructure worker (grocery store, [employer], gas station, public utilities or transportation)? yes  ? Do you reside in a congregate setting such as; skilled nursing facility, adult home, correctional facility, homeless shelter or other institutional setting?n  ? Have you had recent travel to a known COVID-19 hotspot?n     Indicate if the patient has a positive screen by answering yes to one or more of the above questions.

## 2022-01-24 NOTE — TELEPHONE ENCOUNTER
Scheduled for R knee scope on 2/2/22. Even though pt is not having a joint replacement, Dr Earlene Vega may prefer pt wait to have any dental procedures until after her Sx. We will ask Dr Earlene Vega when he returns to the office tomorrow. M for Yessi to call back.

## 2022-01-24 NOTE — LETTER
[facility]  [address] [address]  Phone: [phone]  Fax: [phone]    Lane Cotton MD        January 25, 2022     Patient: Hallie Wood   YOB: 1955   Date of Visit: 1/24/2022       To Whom It May Concern: It is my medical opinion that Mina Pak requires antibiotics for 2 years from her total joint surgery on 7/26/21. The patient is to take Amoxicillin 500 mg 4 tablets 1 hour prior to a dental procedure. If you have any questions or concerns, please don't hesitate to call.     Sincerely,          Lane Cotton MD

## 2022-01-24 NOTE — PROGRESS NOTES
C-Difficile admission screening and protocol:     * Admitted with diarrhea? n     *Prior history of C-Diff. In last 3 months?n     *Antibiotic use in the past 6-8 weeks?n     *Prior hospitalization or nursing home in the last month? n  SAFETY FIRST. .call before you fall  [address] time____11        Surgery time_____1300_    Take the following medications with a sip of water:    Do not eat or drink anything after 12:00 midnight prior to your surgery. This includes water chewing gum, mints and ice chips. You may brush your teeth and gargle the morning of your surgery, but do not swallow the water     Please see your family doctor/pediatrician for a history and physical and/or concerning medications. Bring any test results/reports from your physicians office. If you are under the care of a heart doctor or specialist doctor, please be aware that you may be asked to them for clearance    You may be asked to stop blood thinners such as Coumadin, Plavix, Fragmin, Lovenox, etc., or any anti-inflammatories such as:  Aspirin, Ibuprofen, Advil, Naproxen prior to your surgery. We also ask that you stop any OTC medications such as fish oil, vitamin E, glucosamine, garlic, Multivitamins, COQ 10, etc.    We ask that you do not smoke 24 hours prior to surgery  We ask that you do not  drink any alcoholic beverages 24 hours prior to surgery     You must make arrangements for a responsible adult to take you home after your surgery. For your safety you will not be allowed to leave alone or drive yourself home. Your surgery will be cancelled if you do not have a ride home. Also for your safety, it is strongly suggested that someone stay with you the first 24 hours after your surgery. A parent or legal guardian must accompany a child scheduled for surgery and plan to stay at the hospital until the child is discharged.     Please do not bring other children with you.    For your comfort, please wear simple loose fitting clothing to the hospital.  Please do not bring valuables. Do not wear any make-up or nail polish on your fingers or toes      For your safety, please do not wear any jewelry or body piercing's on the day of surgery. All jewelry must be removed. If you have dentures, they will be removed before going to operating room. For your convenience, we will provide you with a container. If you wear contact lenses or glasses, they will be removed, please bring a case for them. If you have a living will and a durable power of  for healthcare, please bring in a copy. As part of our patient safety program to minimize surgical site infections, we ask you to do the following:    · Please notify your surgeon if you develop any illness between         now and the  day of your surgery. · This includes a cough, cold, fever, sore throat, nausea,         or vomiting, and diarrhea, etc.  ·  Please notify your surgeon if you experience dizziness, shortness         of breath or blurred vision between now and the time of your surgery. Do not shave your operative site 96 hours prior to surgery. For face and neck surgery, men may use an electric razor 48 hours   prior to surgery. You may shower the night before surgery or the morning of   your surgery with an antibacterial soap. You will need to bring a photo ID and insurance card    [facility] has an onsite pharmacy, would you like to utilize our pharmacy     If you will be staying overnight and use a C-pap machine, please bring   your C-pap to hospital     Our goal is to provide you with excellent care, therefore, visitors will be limited to two(2) in the room at a time so that we may focus on providing this care for you. Please contact pre-admission testing if you have any further questions.                  [facility] phone number:  [address] PAT fax number: [phone]  Please note these are generalized instructions for all surgical cases, you may be provided with more specific instructions according to your surgery.

## 2022-01-28 DIAGNOSIS — E11.8 TYPE 2 DIABETES MELLITUS WITH COMPLICATION, WITHOUT LONG-TERM CURRENT USE OF INSULIN (HCC): ICD-10-CM

## 2022-01-28 DIAGNOSIS — F39 MOOD DISORDER (HCC): ICD-10-CM

## 2022-01-28 RX ORDER — DULOXETIN HYDROCHLORIDE 60 MG/1
60 CAPSULE, DELAYED RELEASE ORAL DAILY
Qty: 90 CAPSULE | Refills: 1 | Status: SHIPPED | OUTPATIENT
Start: 2022-01-28 | End: 2022-06-23 | Stop reason: SDUPTHER

## 2022-01-28 RX ORDER — TRAZODONE HYDROCHLORIDE 100 MG/1
TABLET ORAL
Qty: 90 TABLET | Refills: 1 | Status: SHIPPED | OUTPATIENT
Start: 2022-01-28 | End: 2022-03-16 | Stop reason: SDUPTHER

## 2022-01-28 NOTE — TELEPHONE ENCOUNTER
[facility] calling stating that pt is needing some medications refilled. Stated the pt told her she is having a right knee division next week on 2.2.2022.     Pharm Confirmed - Wil[facility] N[facility]

## 2022-02-01 ENCOUNTER — ANESTHESIA EVENT (OUTPATIENT)
Dept: OPERATING ROOM | Age: 67
End: 2022-02-01
Payer: MEDICARE

## 2022-02-01 RX ORDER — BLOOD SUGAR DIAGNOSTIC
STRIP MISCELLANEOUS
Qty: 100 STRIP | Refills: 4 | Status: SHIPPED | OUTPATIENT
Start: 2022-02-01 | End: 2022-03-16 | Stop reason: SDUPTHER

## 2022-02-02 ENCOUNTER — HOSPITAL ENCOUNTER (OUTPATIENT)
Age: 67
Setting detail: OUTPATIENT SURGERY
Discharge: HOME | End: 2022-02-02
Attending: ORTHOPAEDIC SURGERY | Admitting: ORTHOPAEDIC SURGERY
Payer: MEDICARE

## 2022-02-02 ENCOUNTER — ANESTHESIA (OUTPATIENT)
Dept: OPERATING ROOM | Age: 67
End: 2022-02-02
Payer: MEDICARE

## 2022-02-02 VITALS
RESPIRATION RATE: 3 BRPM | TEMPERATURE: 97.9 F | DIASTOLIC BLOOD PRESSURE: 60 MMHG | OXYGEN SATURATION: 88 % | SYSTOLIC BLOOD PRESSURE: 122 MMHG

## 2022-02-02 VITALS
RESPIRATION RATE: 16 BRPM | TEMPERATURE: 97.6 F | DIASTOLIC BLOOD PRESSURE: 80 MMHG | SYSTOLIC BLOOD PRESSURE: 142 MMHG | BODY MASS INDEX: 34.66 KG/M2 | OXYGEN SATURATION: 95 % | HEART RATE: 81 BPM | WEIGHT: 203 LBS | HEIGHT: 64 IN

## 2022-02-02 DIAGNOSIS — Z98.890 STATUS POST ARTHROSCOPY OF RIGHT KNEE: Primary | ICD-10-CM

## 2022-02-02 LAB
GLUCOSE BLD-MCNC: 102 MG/DL (ref 70–99)
GLUCOSE BLD-MCNC: 106 MG/DL (ref 70–99)
PERFORMED ON: ABNORMAL
PERFORMED ON: ABNORMAL

## 2022-02-02 PROCEDURE — 6360000002 HC RX W HCPCS: Performed by: NURSE ANESTHETIST, CERTIFIED REGISTERED

## 2022-02-02 PROCEDURE — A4217 STERILE WATER/SALINE, 500 ML: HCPCS | Performed by: ORTHOPAEDIC SURGERY

## 2022-02-02 PROCEDURE — 6360000002 HC RX W HCPCS: Performed by: ORTHOPAEDIC SURGERY

## 2022-02-02 PROCEDURE — 2580000003 HC RX 258: Performed by: ORTHOPAEDIC SURGERY

## 2022-02-02 PROCEDURE — 7100000000 HC PACU RECOVERY - FIRST 15 MIN: Performed by: ORTHOPAEDIC SURGERY

## 2022-02-02 PROCEDURE — 7100000011 HC PHASE II RECOVERY - ADDTL 15 MIN: Performed by: ORTHOPAEDIC SURGERY

## 2022-02-02 PROCEDURE — 3600000004 HC SURGERY LEVEL 4 BASE: Performed by: ORTHOPAEDIC SURGERY

## 2022-02-02 PROCEDURE — 3700000001 HC ADD 15 MINUTES (ANESTHESIA): Performed by: ORTHOPAEDIC SURGERY

## 2022-02-02 PROCEDURE — 3600000014 HC SURGERY LEVEL 4 ADDTL 15MIN: Performed by: ORTHOPAEDIC SURGERY

## 2022-02-02 PROCEDURE — 2709999900 HC NON-CHARGEABLE SUPPLY: Performed by: ORTHOPAEDIC SURGERY

## 2022-02-02 PROCEDURE — 3700000000 HC ANESTHESIA ATTENDED CARE: Performed by: ORTHOPAEDIC SURGERY

## 2022-02-02 PROCEDURE — 7100000001 HC PACU RECOVERY - ADDTL 15 MIN: Performed by: ORTHOPAEDIC SURGERY

## 2022-02-02 PROCEDURE — 2500000003 HC RX 250 WO HCPCS: Performed by: NURSE ANESTHETIST, CERTIFIED REGISTERED

## 2022-02-02 PROCEDURE — 7100000010 HC PHASE II RECOVERY - FIRST 15 MIN: Performed by: ORTHOPAEDIC SURGERY

## 2022-02-02 PROCEDURE — 2500000003 HC RX 250 WO HCPCS: Performed by: ORTHOPAEDIC SURGERY

## 2022-02-02 PROCEDURE — 2580000003 HC RX 258: Performed by: NURSE ANESTHETIST, CERTIFIED REGISTERED

## 2022-02-02 RX ORDER — FENTANYL CITRATE 50 UG/ML
25 INJECTION, SOLUTION INTRAMUSCULAR; INTRAVENOUS EVERY 5 MIN PRN
Status: DISCONTINUED | OUTPATIENT
Start: 2022-02-02 | End: 2022-02-02 | Stop reason: HOSPADM

## 2022-02-02 RX ORDER — PHENYLEPHRINE HCL IN 0.9% NACL 1 MG/10 ML
SYRINGE (ML) INTRAVENOUS PRN
Status: DISCONTINUED | OUTPATIENT
Start: 2022-02-02 | End: 2022-02-02 | Stop reason: SDUPTHER

## 2022-02-02 RX ORDER — HYDROCODONE BITARTRATE AND ACETAMINOPHEN 5; 325 MG/1; MG/1
1 TABLET ORAL EVERY 6 HOURS PRN
Qty: 28 TABLET | Refills: 0 | Status: SHIPPED | OUTPATIENT
Start: 2022-02-02 | End: 2022-02-02 | Stop reason: SDUPTHER

## 2022-02-02 RX ORDER — LIDOCAINE HYDROCHLORIDE 20 MG/ML
INJECTION, SOLUTION EPIDURAL; INFILTRATION; INTRACAUDAL; PERINEURAL PRN
Status: DISCONTINUED | OUTPATIENT
Start: 2022-02-02 | End: 2022-02-02 | Stop reason: SDUPTHER

## 2022-02-02 RX ORDER — GLYCOPYRROLATE 0.2 MG/ML
INJECTION INTRAMUSCULAR; INTRAVENOUS PRN
Status: DISCONTINUED | OUTPATIENT
Start: 2022-02-02 | End: 2022-02-02 | Stop reason: SDUPTHER

## 2022-02-02 RX ORDER — LABETALOL HYDROCHLORIDE 5 MG/ML
5 INJECTION, SOLUTION INTRAVENOUS EVERY 10 MIN PRN
Status: DISCONTINUED | OUTPATIENT
Start: 2022-02-02 | End: 2022-02-02 | Stop reason: HOSPADM

## 2022-02-02 RX ORDER — HYDROCODONE BITARTRATE AND ACETAMINOPHEN 5; 325 MG/1; MG/1
1 TABLET ORAL ONCE
Status: DISCONTINUED | OUTPATIENT
Start: 2022-02-02 | End: 2022-02-02 | Stop reason: HOSPADM

## 2022-02-02 RX ORDER — PROPOFOL 10 MG/ML
INJECTION, EMULSION INTRAVENOUS PRN
Status: DISCONTINUED | OUTPATIENT
Start: 2022-02-02 | End: 2022-02-02 | Stop reason: SDUPTHER

## 2022-02-02 RX ORDER — MIDAZOLAM HYDROCHLORIDE 1 MG/ML
INJECTION INTRAMUSCULAR; INTRAVENOUS PRN
Status: DISCONTINUED | OUTPATIENT
Start: 2022-02-02 | End: 2022-02-02 | Stop reason: SDUPTHER

## 2022-02-02 RX ORDER — KETOROLAC TROMETHAMINE 30 MG/ML
INJECTION, SOLUTION INTRAMUSCULAR; INTRAVENOUS PRN
Status: DISCONTINUED | OUTPATIENT
Start: 2022-02-02 | End: 2022-02-02 | Stop reason: SDUPTHER

## 2022-02-02 RX ORDER — SODIUM CHLORIDE 0.9 % (FLUSH) 0.9 %
5-40 SYRINGE (ML) INJECTION PRN
Status: DISCONTINUED | OUTPATIENT
Start: 2022-02-02 | End: 2022-02-02 | Stop reason: HOSPADM

## 2022-02-02 RX ORDER — SODIUM CHLORIDE 9 MG/ML
25 INJECTION, SOLUTION INTRAVENOUS PRN
Status: DISCONTINUED | OUTPATIENT
Start: 2022-02-02 | End: 2022-02-02 | Stop reason: HOSPADM

## 2022-02-02 RX ORDER — FENTANYL CITRATE 50 UG/ML
INJECTION, SOLUTION INTRAMUSCULAR; INTRAVENOUS PRN
Status: DISCONTINUED | OUTPATIENT
Start: 2022-02-02 | End: 2022-02-02 | Stop reason: SDUPTHER

## 2022-02-02 RX ORDER — HYDROCODONE BITARTRATE AND ACETAMINOPHEN 5; 325 MG/1; MG/1
1 TABLET ORAL EVERY 6 HOURS PRN
Qty: 28 TABLET | Refills: 0 | Status: SHIPPED | OUTPATIENT
Start: 2022-02-02 | End: 2022-02-09

## 2022-02-02 RX ORDER — MAGNESIUM HYDROXIDE 1200 MG/15ML
LIQUID ORAL CONTINUOUS PRN
Status: COMPLETED | OUTPATIENT
Start: 2022-02-02 | End: 2022-02-02

## 2022-02-02 RX ORDER — PROMETHAZINE HYDROCHLORIDE 25 MG/ML
6.25 INJECTION, SOLUTION INTRAMUSCULAR; INTRAVENOUS
Status: DISCONTINUED | OUTPATIENT
Start: 2022-02-02 | End: 2022-02-02 | Stop reason: HOSPADM

## 2022-02-02 RX ORDER — ONDANSETRON 2 MG/ML
INJECTION INTRAMUSCULAR; INTRAVENOUS PRN
Status: DISCONTINUED | OUTPATIENT
Start: 2022-02-02 | End: 2022-02-02 | Stop reason: SDUPTHER

## 2022-02-02 RX ORDER — EPHEDRINE SULFATE/0.9% NACL/PF 50 MG/5 ML
SYRINGE (ML) INTRAVENOUS PRN
Status: DISCONTINUED | OUTPATIENT
Start: 2022-02-02 | End: 2022-02-02 | Stop reason: SDUPTHER

## 2022-02-02 RX ORDER — SODIUM CHLORIDE 0.9 % (FLUSH) 0.9 %
5-40 SYRINGE (ML) INJECTION EVERY 12 HOURS SCHEDULED
Status: DISCONTINUED | OUTPATIENT
Start: 2022-02-02 | End: 2022-02-02 | Stop reason: HOSPADM

## 2022-02-02 RX ORDER — DEXAMETHASONE SODIUM PHOSPHATE 4 MG/ML
INJECTION, SOLUTION INTRA-ARTICULAR; INTRALESIONAL; INTRAMUSCULAR; INTRAVENOUS; SOFT TISSUE PRN
Status: DISCONTINUED | OUTPATIENT
Start: 2022-02-02 | End: 2022-02-02 | Stop reason: SDUPTHER

## 2022-02-02 RX ORDER — SODIUM CHLORIDE 9 MG/ML
INJECTION, SOLUTION INTRAVENOUS CONTINUOUS PRN
Status: DISCONTINUED | OUTPATIENT
Start: 2022-02-02 | End: 2022-02-02 | Stop reason: SDUPTHER

## 2022-02-02 RX ADMIN — Medication 10 MG: at 13:14

## 2022-02-02 RX ADMIN — FENTANYL CITRATE 25 MCG: 50 INJECTION INTRAMUSCULAR; INTRAVENOUS at 12:54

## 2022-02-02 RX ADMIN — Medication 100 MCG: at 12:58

## 2022-02-02 RX ADMIN — Medication 200 MCG: at 12:07

## 2022-02-02 RX ADMIN — Medication 10 MG: at 13:24

## 2022-02-02 RX ADMIN — CEFAZOLIN 2000 MG: 10 INJECTION, POWDER, FOR SOLUTION INTRAVENOUS at 12:46

## 2022-02-02 RX ADMIN — PROPOFOL 150 MG: 10 INJECTION, EMULSION INTRAVENOUS at 12:34

## 2022-02-02 RX ADMIN — Medication 100 MCG: at 12:49

## 2022-02-02 RX ADMIN — DEXAMETHASONE SODIUM PHOSPHATE 4 MG: 4 INJECTION, SOLUTION INTRA-ARTICULAR; INTRALESIONAL; INTRAMUSCULAR; INTRAVENOUS; SOFT TISSUE at 12:46

## 2022-02-02 RX ADMIN — KETOROLAC TROMETHAMINE 30 MG: 30 INJECTION, SOLUTION INTRAMUSCULAR at 12:07

## 2022-02-02 RX ADMIN — FENTANYL CITRATE 25 MCG: 50 INJECTION INTRAMUSCULAR; INTRAVENOUS at 12:50

## 2022-02-02 RX ADMIN — Medication 10 MG: at 13:19

## 2022-02-02 RX ADMIN — SODIUM CHLORIDE: 9 INJECTION, SOLUTION INTRAVENOUS at 12:31

## 2022-02-02 RX ADMIN — MIDAZOLAM 2 MG: 1 INJECTION INTRAMUSCULAR; INTRAVENOUS at 12:34

## 2022-02-02 RX ADMIN — LIDOCAINE HYDROCHLORIDE 100 MG: 20 INJECTION, SOLUTION EPIDURAL; INFILTRATION; INTRACAUDAL; PERINEURAL at 12:34

## 2022-02-02 RX ADMIN — ONDANSETRON 4 MG: 2 INJECTION INTRAMUSCULAR; INTRAVENOUS at 12:46

## 2022-02-02 RX ADMIN — Medication 200 MCG: at 13:01

## 2022-02-02 RX ADMIN — GLYCOPYRROLATE 0.2 MG: 0.2 INJECTION, SOLUTION INTRAMUSCULAR; INTRAVENOUS at 12:34

## 2022-02-02 ASSESSMENT — PULMONARY FUNCTION TESTS
PIF_VALUE: 7
PIF_VALUE: 4
PIF_VALUE: 17
PIF_VALUE: 3
PIF_VALUE: 17
PIF_VALUE: 2
PIF_VALUE: 12
PIF_VALUE: 17
PIF_VALUE: 1
PIF_VALUE: 14
PIF_VALUE: 5
PIF_VALUE: 4
PIF_VALUE: 2
PIF_VALUE: 17
PIF_VALUE: 5
PIF_VALUE: 17
PIF_VALUE: 4
PIF_VALUE: 17
PIF_VALUE: 18
PIF_VALUE: 10
PIF_VALUE: 0
PIF_VALUE: 17
PIF_VALUE: 5
PIF_VALUE: 15
PIF_VALUE: 0
PIF_VALUE: 20
PIF_VALUE: 17
PIF_VALUE: 5
PIF_VALUE: 13
PIF_VALUE: 17
PIF_VALUE: 5
PIF_VALUE: 1
PIF_VALUE: 1
PIF_VALUE: 6
PIF_VALUE: 2
PIF_VALUE: 4
PIF_VALUE: 14
PIF_VALUE: 5
PIF_VALUE: 17
PIF_VALUE: 17
PIF_VALUE: 4
PIF_VALUE: 17
PIF_VALUE: 17
PIF_VALUE: 5
PIF_VALUE: 5
PIF_VALUE: 12
PIF_VALUE: 4
PIF_VALUE: 15
PIF_VALUE: 5
PIF_VALUE: 17
PIF_VALUE: 17

## 2022-02-02 ASSESSMENT — PAIN DESCRIPTION - ORIENTATION
ORIENTATION: RIGHT

## 2022-02-02 ASSESSMENT — PAIN DESCRIPTION - FREQUENCY
FREQUENCY: CONTINUOUS
FREQUENCY: CONTINUOUS

## 2022-02-02 ASSESSMENT — PAIN SCALES - GENERAL
PAINLEVEL_OUTOF10: 3
PAINLEVEL_OUTOF10: 3
PAINLEVEL_OUTOF10: 6

## 2022-02-02 ASSESSMENT — PAIN - FUNCTIONAL ASSESSMENT: PAIN_FUNCTIONAL_ASSESSMENT: 0-10

## 2022-02-02 ASSESSMENT — PAIN DESCRIPTION - LOCATION
LOCATION: KNEE

## 2022-02-02 ASSESSMENT — PAIN DESCRIPTION - PAIN TYPE
TYPE: SURGICAL PAIN

## 2022-02-02 ASSESSMENT — PAIN DESCRIPTION - ONSET
ONSET: ON-GOING

## 2022-02-02 ASSESSMENT — PAIN DESCRIPTION - PROGRESSION
CLINICAL_PROGRESSION: NOT CHANGED
CLINICAL_PROGRESSION: GRADUALLY IMPROVING

## 2022-02-02 ASSESSMENT — PAIN SCALES - WONG BAKER: WONGBAKER_NUMERICALRESPONSE: 2

## 2022-02-02 ASSESSMENT — PAIN DESCRIPTION - DESCRIPTORS
DESCRIPTORS: DISCOMFORT
DESCRIPTORS: DISCOMFORT
DESCRIPTORS: ACHING

## 2022-02-02 NOTE — OP NOTE
Operative Note      Patient: Catalina Gaviria  YOB: 1955  MRN: [id number]    Date of Procedure: 2/2/2022    Pre-Op Diagnosis: ARTHROFIBROSIS OF KNEE, RIGHT    Post-Op Diagnosis: Same       Procedure(s):  ARTHROSCOPY WITH LYSIS OF ADHESIONS, RIGHT KNEE    Surgeon(s):  Mónica Vasques MD    Assistant:   * No surgical staff found *    Anesthesia: General    Estimated Blood Loss (mL): less than 50     Complications: None    Specimens:   * No specimens in log *    Implants:  * No implants in log *      Drains: * No LDAs found *    Findings: Moderate to severe peripatellar scar tissue noted. Detailed Description of Procedure:   PATIENT NAME:        John Wood  YOB: 1955   MEDICAL RECORD# [id number]  SURGERY DATE:      2/2/2022  SURGEON:                Mónica Vasques MD      PREOPERATIVE DIAGNOSES:   1. Right knee arthrofibrosis, status post right total knee arthroplasty          POSTOPERATIVE DIAGNOSES:   1. Right knee arthrofibrosis, status post right total knee arthroplasty. PROCEDURE:   1. Right knee diagnostic arthroscopy. 2. Right knee arthroscopy with lysis of adhesions. ANESTHESIA: General anesthesia. IV FLUIDS: Crystalloid. ESTIMATED BLOOD LOSS: Minimal.     COMPLICATIONS: None. The patient tolerated the procedure quite well. INDICATIONS: The patient is a 78 y.o. female who underwent a right total knee arthroplasty over 6 months ago. She initially was doing very well. She did have some difficulties regaining her motion and pain control was initially a problem. The patient ultimately regained a great deal of her motion. She then was seen in my office and found to have rather severe scar tissue and patellofemoral crepitus. This was beginning to affect her day-to-day activities. She was having severe pain. She was also starting to lose some motion.   Due to these factors, the patient was ultimately cleared and scheduled for right knee arthroscopy. OPERATIVE REPORT: The patient was identified preoperatively. The patient   was then taken to the operating room and general anesthesia was administered   by Anesthesia. A nonsterile tourniquet was applied to the right thigh. The   right lower extremity was then prepped in a standard fashion with ChloraPrep. We then draped out in a standard fashion. We elevated the tourniquet to 250   mmHg. Appropriate preoperative antibiotics were then administered. We then   began diagnostic arthroscopy with the anterolateral portal. We viewed the   suprapatellar pouch; there were no loose bodies. We then viewed the medial   and lateral gutters; there were no loose bodies. It should be noted the patient had circumferential adhesions/scar tissue around the patella. This clearly was being entrapped within the patellofemoral joint. We then passed our scope medially and started an anteromedial working portal.  There is mild scar tissue overlying the medial joint line. There is mild scar tissue within the notch as well. There was a very small portion of scar tissue entrapped within the lateral compartment. We then passed our arthroscopic shaver up into the the patellofemoral compartment and debrided all scar tissue around the patella. We also used our ArthroCare TurboVac 90 to finish out the debridement. We then passed our scope into the medial compartment and removed all excess scar tissue medially. We then passed our scope laterally and used the shaver to remove the small portion of scar tissue within the joint. There was a small portion of scar tissue within the notch and this was debrided with an arthroscopic shaver. We put the knee through full range of motion and there was no catching. The patella tracked rather well. We then went ahead and expressed all fluid out of the knee. We injected the knee with a Marcaine/morphine/epinephrine solution.  We closed our portal sites with interrupted simple 4-0 nylon stitches. Sterile dressings were applied. There were no complications.        Electronically signed by Silvano Catherine MD on 2/2/2022 at 12:15 PM

## 2022-02-02 NOTE — ANESTHESIA POSTPROCEDURE EVALUATION
Department of Anesthesiology  Postprocedure Note    Patient: Catalina Gaviria  MRN: [id number]  YOB: 1955  Date of evaluation: 2/2/2022  Time:  3:09 PM     Procedure Summary     Date: 02/02/22 Room / Location: [facility] [address] [place] [address]    Anesthesia Start: 1234 Anesthesia Stop: 6782    Procedure: ARTHROSCOPY WITH LYSIS OF ADHESIONS, RIGHT KNEE (Right Knee) Diagnosis:       Arthrofibrosis of knee joint, right      (ARTHROFIBROSIS OF KNEE, RIGHT)    Surgeons: Mónica Vasques MD Responsible Provider: Megan Monsalve MD    Anesthesia Type: general ASA Status: 3          Anesthesia Type: general    Mayo Phase I: Mayo Score: 9    Mayo Phase II:      Last vitals: Reviewed and per EMR flowsheets.        Anesthesia Post Evaluation    Patient location during evaluation: PACU  Level of consciousness: awake and alert  Airway patency: patent  Nausea & Vomiting: no nausea and no vomiting  Complications: no  Cardiovascular status: hemodynamically stable  Respiratory status: acceptable  Hydration status: stable

## 2022-02-02 NOTE — ANESTHESIA PRE PROCEDURE
[facility] Department of Anesthesiology  Pre-Anesthesia Evaluation/Consultation       Name:  Dariusz Smith  : 1955  Age:  78 y.o.                                            MRN:  [id number]  Date: 2022           Surgeon: Surgeon(s):  Mariana Lowe MD    Procedure: Procedure(s):  ARTHROSCOPY WITH LYSIS OF ADHESIONS, RIGHT KNEE     Allergies   Allergen Reactions    Codeine Nausea And Vomiting and Rash    Vicodin [Hydrocodone-Acetaminophen] Nausea And Vomiting    Lipitor [Atorvastatin]      Myalgias    Oxycontin [Oxycodone Hcl] Itching    Tramadol Itching and Swelling     Patient Active Problem List   Diagnosis    Diabetes mellitus ([address]    Obesity    Dystonia    Cerebral thrombosis with cerebral infarction ([address]    Obstructive sleep apnea    Right hemiparesis ([address]    Trigger finger, acquired    Elevated CK    Primary osteoarthritis of both knees    Mood disorder ([address] - follows with Psych Dr. Luis Krishna Calcific tendonitis of right shoulder    Essential hypertension    Mixed hyperlipidemia    Chronic low back pain without sciatica    History of CVA with residual deficit    Anxiety and depression    Chest pain    Myalgia due to statin    Palpitations    Primary osteoarthritis of right knee    History of colonoscopy - , rpt     Gastroesophageal reflux disease     Past Medical History:   Diagnosis Date    Anesthesia complication     \" shaking\"    Arthritis     [facility]     COVID-19     2022    Diabetes     GERD (gastroesophageal reflux disease)     Hyperlipidemia     Hypertension     Lumbar disc disease     Prolonged emergence from general anesthesia     Sleep apnea     pt states does use a Cpap machine at night    Unspecified cerebral artery occlusion with cerebral infarction     right side weak    Wears glasses      Past Surgical History:   Procedure Laterality Date    ARTHRODESIS Right 2020    (RIGHT) REMOVAL OF BONE SPURRING AND OSSEOUS PROMINENCE FIRST METATARSAL, ARTHRODESIS OF FIRST METATARSOPHALANGEAL JOINT, BONE MARROW HARVEST AND CONCENTRATION RIGHT TIBIA performed by Nam Barker DPM at [address] Left 9/3/15    CARPAL TUNNEL RELEASE Right 9/22/15    COLONOSCOPY      FINGER TRIGGER RELEASE Left 9/3/15    middle and ring fingers    FINGER TRIGGER RELEASE Right 9/22/15    middle and ring fingers    FOOT SURGERY Bilateral     litteltoe    HAND SURGERY Right     Ligament    KNEE ARTHROPLASTY Right 2021    ROBOTIC ASSISTED TOTAL RIGHT KNEE REPLACEMENT performed by Ripley Rinne, MD at [address] [place] [address]  2003    SHOULDER ARTHROSCOPY Right 2018    Diagnostic scope, rcr, open [place]     Social History     Tobacco Use    Smoking status: Former Smoker     Packs/day: 1.50     Years: 3.00     Pack years: 4.50     Quit date: 1992     Years since quittin.1    Smokeless tobacco: Never Used   Vaping Use    Vaping Use: Never used   Substance Use Topics    Alcohol use: No    Drug use: No     Medications  No current facility-administered medications on file prior to encounter.      Current Outpatient Medications on File Prior to Encounter   Medication Sig Dispense Refill    valsartan (DIOVAN) 80 MG tablet TAKE 1/2 TABLET BY MOUTH DAILY 45 tablet 0    ibuprofen (ADVIL;MOTRIN) 600 MG tablet TAKE 1 TABLET BY MOUTH TWICE DAILY WITH MEALS 60 tablet 1    omeprazole (PRILOSEC) 40 MG delayed release capsule Take 1 capsule by mouth every morning (before breakfast) 90 capsule 1    insulin glargine (LANTUS SOLOSTAR) 100 UNIT/ML injection pen Patient stated she is taking  12 UNITS UNDER THE SKIN ONCE NIGHTLY 15 mL 1    furosemide (LASIX) 20 MG tablet TAKE 1 TABLET BY MOUTH  DAILY AS NEEDED FOR LEG  SWELLING 90 tablet 3    dextran 70-hypromellose (TEARS NATURALE) 0.1-0.3 % SOLN opthalmic solution as needed for itching      Dulaglutide (TRULICITY) 1.5 HE/5.7IA SOPN INJECT THE CONTENTS OF ONE  PEN SUBCUTANEOUSLY WEEKLY 6 mL 3    latanoprost (XALATAN) 0.005 % ophthalmic solution Place 1 drop into both eyes nightly       FANAPT 1 MG TABS tablet Take 1 tablet by mouth nightly 90 tablet 0    albuterol sulfate HFA (PROVENTIL HFA) 108 (90 Base) MCG/ACT inhaler Inhale 2 puffs into the lungs every 4 hours as needed for Wheezing or Shortness of Breath May substitute ProAir MDI 1 Inhaler 5    ipratropium-albuterol (DUONEB) 0.5-2.5 (3) MG/3ML SOLN nebulizer solution Inhale 3 mLs into the lungs every 4 hours as needed for Shortness of Breath (wheezing coughing) 360 mL 5    ammonium lactate (LAC-HYDRIN) 12 % lotion Apply topically daily. (Patient taking differently: as needed Apply topically daily. ) 1 Bottle 1    lidocaine 4 % external patch Apply patch to lower mid back. Leave on for 12 hours and remove. Leave off for 12 hours before applying another one. (Patient taking differently: as needed Apply patch to lower mid back. Leave on for 12 hours and remove. Leave off for 12 hours before applying another one.) 5 patch 0     Current Facility-Administered Medications   Medication Dose Route Frequency Provider Last Rate Last Admin    ceFAZolin (ANCEF) 2000 mg in dextrose 5 % 100 mL IVPB  2,000 mg IntraVENous Once Angelica Tamayo MD        sodium chloride flush 0.9 % injection 5-40 mL  5-40 mL IntraVENous 2 times per day Yoshi Schmitz MD        sodium chloride flush 0.9 % injection 5-40 mL  5-40 mL IntraVENous PRN Yoshi Schmitz MD        0.9 % sodium chloride infusion  25 mL IntraVENous PRN Yoshi Schmitz MD         Vital Signs (Current) There were no vitals filed for this visit. Vital Signs Statistics (for past 48 hrs)     No data recorded    BP Readings from Last 3 Encounters:   01/07/22 118/80   10/01/21 (!) 130/98   09/25/21 (!) 103/56     BMI  Body mass index is 34.84 kg/m².   Estimated body mass index is 34.84 kg/m² as calculated from the following:    Height as of this encounter: 5' 4\" (1.626 m). Weight as of this encounter: 203 lb (92.1 kg). CBC   Lab Results   Component Value Date    WBC 5.3 01/07/2022    RBC 4.77 01/07/2022    HGB 12.4 01/07/2022    HCT 38.0 01/07/2022    MCV 79.5 01/07/2022    RDW 15.1 01/07/2022     01/07/2022     CMP    Lab Results   Component Value Date     01/07/2022    K 5.3 01/07/2022    K 5.1 09/25/2021     01/07/2022    CO2 21 01/07/2022    BUN 16 01/07/2022    CREATININE 1.0 01/07/2022    GFRAA >60 01/07/2022    GFRAA >60 01/14/2013    AGRATIO 1.2 09/25/2021    LABGLOM 55 01/07/2022    GLUCOSE 105 01/07/2022    PROT 7.8 09/25/2021    PROT 7.6 06/21/2012    CALCIUM 11.0 01/07/2022    BILITOT <0.2 09/25/2021    ALKPHOS 92 09/25/2021    AST 23 09/25/2021    ALT 13 09/25/2021     BMP    Lab Results   Component Value Date     01/07/2022    K 5.3 01/07/2022    K 5.1 09/25/2021     01/07/2022    CO2 21 01/07/2022    BUN 16 01/07/2022    CREATININE 1.0 01/07/2022    CALCIUM 11.0 01/07/2022    GFRAA >60 01/07/2022    GFRAA >60 01/14/2013    LABGLOM 55 01/07/2022    GLUCOSE 105 01/07/2022     POCGlucose  No results for input(s): GLUCOSE in the last 72 hours.    Coags    Lab Results   Component Value Date    PROTIME 10.1 07/12/2021    INR 0.90 07/12/2021    APTT 27.3 75/90/1302     HCG (If Applicable) No results found for: PREGTESTUR, PREGSERUM, HCG, HCGQUANT   ABGs No results found for: PHART, PO2ART, BMV6YOG, YRE1QNP, BEART, T8SRGSMD   Type & Screen (If Applicable)  No results found for: LABABO, LABRH                         BMI: Wt Readings from Last 3 Encounters:       NPO Status: 8 HOURS                        Anesthesia Evaluation  Patient summary reviewed no history of anesthetic complications:   Airway: Mallampati: III  TM distance: >3 FB   Neck ROM: full   Dental:    (+) upper dentures and partials      Pulmonary:normal exam    (+) sleep apnea:                             Cardiovascular:  Exercise tolerance: good (>4 METS),

## 2022-02-09 ENCOUNTER — TELEPHONE (OUTPATIENT)
Dept: ORTHOPEDIC SURGERY | Age: 67
End: 2022-02-09

## 2022-02-09 NOTE — TELEPHONE ENCOUNTER
[facility] CALLED, PATIENT PRESCRIBED [address], SHE HAS ALLERGIES CODIENE AND TRAMODOL.   PLEASE CALL PHARMACY TO LET THEM KNOW WHAT TO DO.

## 2022-02-10 ENCOUNTER — OFFICE VISIT (OUTPATIENT)
Dept: ORTHOPEDIC SURGERY | Age: 67
End: 2022-02-10

## 2022-02-10 VITALS — BODY MASS INDEX: 34.15 KG/M2 | HEIGHT: 64 IN | WEIGHT: 200 LBS

## 2022-02-10 DIAGNOSIS — Z98.890 S/P RIGHT KNEE ARTHROSCOPY: ICD-10-CM

## 2022-02-10 DIAGNOSIS — Z96.651 S/P TOTAL KNEE ARTHROPLASTY, RIGHT: Primary | ICD-10-CM

## 2022-02-10 PROCEDURE — 99024 POSTOP FOLLOW-UP VISIT: CPT | Performed by: ORTHOPAEDIC SURGERY

## 2022-02-10 NOTE — TELEPHONE ENCOUNTER
I called [facility] and informed them Dr. Dawit Leblanc is ok with the patient taking Norco with her allergies listed.

## 2022-02-10 NOTE — LETTER
[facility]  [place] [address] [address] [address]  Phone: [phone]  Fax: [phone]    Orquidea Anthony MD        February 10, 2022     Patient: Perez Cardenas   YOB: 1955   Date of Visit: 2/10/2022       To Whom It May Concern: It is my medical opinion that Nick Majano may return to work full duty on 3/14/2022. If you have any questions or concerns, please don't hesitate to call.     Sincerely,        Orquidea Anthony MD

## 2022-02-10 NOTE — PROGRESS NOTES
Eder AGGARWAL [facility]  [id number]  February 10, 2022    Chief Complaint   Patient presents with    Post-Op Check     Rt Knee Post Arthroscopy performed 2/2/22       History: The patient is here in follow-up regarding her right knee. She underwent a right knee arthroscopy 8 days ago. She reports mild pain. Physical: She demonstrates appropriate mood and affect. She is alert and oriented to person, place and time. The portal sites are clean, dry and intact. She has mild swelling. There is No evidence of DVT seen on physical exam.. She is neurovascularly intact distally. Range of motion is from 0 degrees to 120 degrees. There is no atrophy. Strength in the knee is 5/5. Impression: Status post  right knee arthroscopy #2 status post right total knee arthroplasty    Plan: At this time, the patient will continue to work on range of motion and strengthening. She will modify her activities. The patient will follow up with me in 3 months and we will reassess her then. At follow-up, three views of the right knee will be obtained. The patient may return to full duty work in 1 month.

## 2022-03-03 ENCOUNTER — OFFICE VISIT (OUTPATIENT)
Dept: SLEEP MEDICINE | Age: 67
End: 2022-03-03
Payer: MEDICARE

## 2022-03-03 VITALS
OXYGEN SATURATION: 98 % | HEART RATE: 97 BPM | BODY MASS INDEX: 36.02 KG/M2 | WEIGHT: 211 LBS | HEIGHT: 64 IN | DIASTOLIC BLOOD PRESSURE: 60 MMHG | RESPIRATION RATE: 18 BRPM | TEMPERATURE: 96.9 F | SYSTOLIC BLOOD PRESSURE: 100 MMHG

## 2022-03-03 DIAGNOSIS — E66.01 SEVERE OBESITY (BMI 35.0-39.9) WITH COMORBIDITY (HCC): ICD-10-CM

## 2022-03-03 DIAGNOSIS — Z99.89 DEPENDENCE ON OTHER ENABLING MACHINES AND DEVICES: ICD-10-CM

## 2022-03-03 DIAGNOSIS — Z99.89 OSA ON CPAP: Primary | ICD-10-CM

## 2022-03-03 DIAGNOSIS — G47.33 OSA ON CPAP: Primary | ICD-10-CM

## 2022-03-03 PROCEDURE — 3017F COLORECTAL CA SCREEN DOC REV: CPT | Performed by: PSYCHIATRY & NEUROLOGY

## 2022-03-03 PROCEDURE — G8427 DOCREV CUR MEDS BY ELIG CLIN: HCPCS | Performed by: PSYCHIATRY & NEUROLOGY

## 2022-03-03 PROCEDURE — 1123F ACP DISCUSS/DSCN MKR DOCD: CPT | Performed by: PSYCHIATRY & NEUROLOGY

## 2022-03-03 PROCEDURE — G8417 CALC BMI ABV UP PARAM F/U: HCPCS | Performed by: PSYCHIATRY & NEUROLOGY

## 2022-03-03 PROCEDURE — 1036F TOBACCO NON-USER: CPT | Performed by: PSYCHIATRY & NEUROLOGY

## 2022-03-03 PROCEDURE — 4040F PNEUMOC VAC/ADMIN/RCVD: CPT | Performed by: PSYCHIATRY & NEUROLOGY

## 2022-03-03 PROCEDURE — G8399 PT W/DXA RESULTS DOCUMENT: HCPCS | Performed by: PSYCHIATRY & NEUROLOGY

## 2022-03-03 PROCEDURE — 99214 OFFICE O/P EST MOD 30 MIN: CPT | Performed by: PSYCHIATRY & NEUROLOGY

## 2022-03-03 PROCEDURE — 1090F PRES/ABSN URINE INCON ASSESS: CPT | Performed by: PSYCHIATRY & NEUROLOGY

## 2022-03-03 PROCEDURE — G8484 FLU IMMUNIZE NO ADMIN: HCPCS | Performed by: PSYCHIATRY & NEUROLOGY

## 2022-03-03 ASSESSMENT — SLEEP AND FATIGUE QUESTIONNAIRES
HOW LIKELY ARE YOU TO NOD OFF OR FALL ASLEEP WHILE SITTING QUIETLY AFTER LUNCH WITHOUT ALCOHOL: 2
HOW LIKELY ARE YOU TO NOD OFF OR FALL ASLEEP WHILE SITTING AND READING: 2
HOW LIKELY ARE YOU TO NOD OFF OR FALL ASLEEP WHILE WATCHING TV: 2
HOW LIKELY ARE YOU TO NOD OFF OR FALL ASLEEP IN A CAR, WHILE STOPPED FOR A FEW MINUTES IN TRAFFIC: 2
HOW LIKELY ARE YOU TO NOD OFF OR FALL ASLEEP WHEN YOU ARE A PASSENGER IN A CAR FOR AN HOUR WITHOUT A BREAK: 2
HOW LIKELY ARE YOU TO NOD OFF OR FALL ASLEEP WHILE LYING DOWN TO REST IN THE AFTERNOON WHEN CIRCUMSTANCES PERMIT: 2
HOW LIKELY ARE YOU TO NOD OFF OR FALL ASLEEP WHILE SITTING AND TALKING TO SOMEONE: 2
ESS TOTAL SCORE: 16
HOW LIKELY ARE YOU TO NOD OFF OR FALL ASLEEP WHILE SITTING INACTIVE IN A PUBLIC PLACE: 2

## 2022-03-03 ASSESSMENT — ENCOUNTER SYMPTOMS: APNEA: 1

## 2022-03-03 NOTE — PROGRESS NOTES
MD GASTON Norman Board Certified in Sleep Medicine  Certified in [address] [place] [address] Certified in Neurology [address] [facility]  1000 [facility] [place] [address] 1400 [facility],  Dae Kwon [phone]   [address] [place] [address] [address]                      [address]  [address] [facility] [address] [phone]    Subjective:     Patient ID: Kolby Merritt is a 78 y.o. female. Chief Complaint   Patient presents with    Follow-up    Sleep Apnea       HPI:        Kolby Merritt is a 78 y.o. female was seen today as a follow for mild obstructive sleep apnea with an AHI - 12.9/hr with Low SaO2 - 76% and time below 90% of 3.5 min. Patient is using the PAP machine about 37% of the time, more than 4 hours a nightabout  23 %, in total average of 4:49 hours a night in last 90 days. Currently on PAP at 12.8 cm (5-12), the AHI is only 4.6 events per hour at this pressure. The Patient scored   on Happy Jack Sleepiness Scale ( more than 10 is indicative of daytime sleepiness)   Feels the pressure is too strong ,also has dry mouth   Uses nasal pillow.      DOT/CDL - N/A        Previous Report(s)Reviewed: historical medical records         Social History     Socioeconomic History    Marital status:      Spouse name: Giles Marquez, seen here    Number of children: 3    Years of education: Not on file    Highest education level: Not on file   Occupational History    Not on file   Tobacco Use    Smoking status: Former Smoker     Packs/day: 1.50     Years: 3.00     Pack years: 4.50     Quit date: 1992     Years since quittin.1    Smokeless tobacco: Never Used   Vaping Use    Vaping Use: Never used   Substance and Sexual Activity    Alcohol use: No    Drug use: No    Sexual activity: Yes     Partners: Male   Other Topics Concern    Not on file   Social History Narrative    [place]     is seen here    10 grandchildren     Social Determinants of Health     Financial Resource Strain: Low Risk     Difficulty of Paying Living Expenses: Not very hard   Food Insecurity: No Food Insecurity    Worried About Running Out of Food in the Last Year: Never true    Megha of Food in the Last Year: Never true   Transportation Needs:     Lack of Transportation (Medical): Not on file    Lack of Transportation (Non-Medical): Not on file   Physical Activity:     Days of Exercise per Week: Not on file    Minutes of Exercise per Session: Not on file   Stress:     Feeling of Stress : Not on file   Social Connections:     Frequency of Communication with Friends and Family: Not on file    Frequency of Social Gatherings with Friends and Family: Not on file    Attends Holiness Services: Not on file    Active Member of [address] [place] [address] or Organizations: Not on file    Attends Club or Organization Meetings: Not on file    Marital Status: Not on file   Intimate Partner Violence:     Fear of Current or Ex-Partner: Not on file    Emotionally Abused: Not on file    Physically Abused: Not on file    Sexually Abused: Not on file   Housing Stability:     Unable to Pay for Housing in the Last Year: Not on file    Number of [place] in the Last Year: Not on file    Unstable Housing in the Last Year: Not on file       Prior to Admission medications    Medication Sig Start Date End Date Taking?  Authorizing Provider   [facility] ULTRA strip TEST TWO TIMES A DAY 2/1/22  Yes Agustina Finnegan MD   metFORMIN (GLUCOPHAGE) 500 MG tablet TAKE 2 TABLETS BY MOUTH TWICE DAILY WITH MEALS 1/28/22  Yes Agustina Finnegan MD   DULoxetine (CYMBALTA) 60 MG extended release capsule Take 1 capsule by mouth daily 1/28/22  Yes Agustina Finnegan MD   traZODone (DESYREL) 100 MG tablet TAKE 1 TABLET BY MOUTH AT  NIGHT 1/28/22  Yes Agustina Finnegan MD   valsartan (DIOVAN) 80 MG tablet TAKE 1/2 TABLET BY MOUTH DAILY 1/3/22  Yes Teressa Finnegan MD   ibuprofen (ADVIL;MOTRIN) 600 MG tablet TAKE 1 TABLET BY MOUTH TWICE DAILY WITH MEALS 12/20/21  Yes Laly Louise MD   omeprazole (PRILOSEC) 40 MG delayed release capsule Take 1 capsule by mouth every morning (before breakfast) 10/1/21  Yes Teressa Finnegan MD   insulin glargine (LANTUS SOLOSTAR) 100 UNIT/ML injection pen Patient stated she is taking  12 UNITS UNDER THE SKIN ONCE NIGHTLY 9/22/21  Yes Teressa Finnegan MD   furosemide (LASIX) 20 MG tablet TAKE 1 TABLET BY MOUTH  DAILY AS NEEDED FOR LEG  SWELLING 9/13/21  Yes Teressa Finnegan MD   dextran 70-hypromellose (TEARS NATURALE) 0.1-0.3 % SOLN opthalmic solution as needed for itching   Yes Historical Provider, MD   Dulaglutide (TRULICITY) 1.5 IB/4.2GJ SOPN INJECT THE CONTENTS OF ONE  PEN SUBCUTANEOUSLY WEEKLY 9/1/21  Yes Teressa Finnegan MD   latanoprost (XALATAN) 0.005 % ophthalmic solution Place 1 drop into both eyes nightly    Yes Historical Provider, MD   FANAPT 1 MG TABS tablet Take 1 tablet by mouth nightly 7/15/21  Yes Teressa Finnegan MD   lidocaine 4 % external patch Apply patch to lower mid back. Leave on for 12 hours and remove. Leave off for 12 hours before applying another one. Patient taking differently: as needed Apply patch to lower mid back. Leave on for 12 hours and remove. Leave off for 12 hours before applying another one. 6/5/21  Yes Filemon Roach PA-C   albuterol sulfate HFA (PROVENTIL HFA) 108 (90 Base) MCG/ACT inhaler Inhale 2 puffs into the lungs every 4 hours as needed for Wheezing or Shortness of Breath May substitute ProAir MDI 1/5/21  Yes Angela Cordero MD   ipratropium-albuterol (DUONEB) 0.5-2.5 (3) MG/3ML SOLN nebulizer solution Inhale 3 mLs into the lungs every 4 hours as needed for Shortness of Breath (wheezing coughing) 1/5/21  Yes Angela Cordero MD   ammonium lactate (LAC-HYDRIN) 12 % lotion Apply topically daily.   Patient taking differently: as needed Apply topically daily.  11/21/19  Yes Millicent Fowler MD       Allergies as of 03/03/2022 - Fully Reviewed 03/03/2022   Allergen Reaction Noted    Codeine Nausea And Vomiting and Rash 02/15/2011    Vicodin [hydrocodone-acetaminophen] Nausea And Vomiting 02/15/2011    Lipitor [atorvastatin]  05/19/2021    Oxycontin [oxycodone hcl] Itching 04/19/2016    Tramadol Itching and Swelling 06/05/2019       Patient Active Problem List   Diagnosis    Diabetes mellitus ([address]    Obesity    Dystonia    Cerebral thrombosis with cerebral infarction ([address]    Obstructive sleep apnea    Right hemiparesis (HCC)    Trigger finger, acquired    Elevated CK    Primary osteoarthritis of both knees    Mood disorder ([address] - follows with Psych Dr. Charline Reynolds Calcific tendonitis of right shoulder    Essential hypertension    Mixed hyperlipidemia    Chronic low back pain without sciatica    History of CVA with residual deficit    Anxiety and depression    Chest pain    Myalgia due to statin    Palpitations    Primary osteoarthritis of right knee    History of colonoscopy - 2021, rpt 2026    Gastroesophageal reflux disease       Past Medical History:   Diagnosis Date    Anesthesia complication     \" shaking\"    Arthritis     Cardiomyopathy ([address]     COVID-19     1/7/2022    Diabetes     GERD (gastroesophageal reflux disease)     Hyperlipidemia     Hypertension     Lumbar disc disease     Prolonged emergence from general anesthesia     Sleep apnea     pt states does use a Cpap machine at night    Unspecified cerebral artery occlusion with cerebral infarction 2014    right side weak    Wears glasses        Past Surgical History:   Procedure Laterality Date    ARTHRODESIS Right 9/17/2020    (RIGHT) REMOVAL OF BONE SPURRING AND OSSEOUS PROMINENCE FIRST METATARSAL, ARTHRODESIS OF FIRST METATARSOPHALANGEAL JOINT, BONE MARROW HARVEST AND CONCENTRATION RIGHT TIBIA performed by Mayco Taylor DPM at [place] [address] RELEASE Left 9/3/15    CARPAL TUNNEL RELEASE Right 9/22/15    COLONOSCOPY      FINGER TRIGGER RELEASE Left 9/3/15    middle and ring fingers    FINGER TRIGGER RELEASE Right 9/22/15    middle and ring fingers    FOOT SURGERY Bilateral     litteltoe    HAND SURGERY Right     Ligament    KNEE ARTHROPLASTY Right 7/26/2021    ROBOTIC ASSISTED TOTAL RIGHT KNEE REPLACEMENT performed by Cathi Villanueva MD at [facility] ARTHROSCOPY Right 2/2/2022    ARTHROSCOPY WITH LYSIS OF ADHESIONS, RIGHT KNEE performed by Cathi Villanueva MD at [address] [place] [address]  08/2003    SHOULDER ARTHROSCOPY Right 06/20/2018    Diagnostic scope, rcr, open Radha       Family History   Problem Relation Age of Onset    Heart Disease Mother     High Blood Pressure Mother     Stroke Mother     Cancer Brother         lung cancer       Review of Systems   Constitutional: Positive for fatigue. Respiratory: Positive for apnea. Cardiovascular: Negative for leg swelling. Objective:     Vitals:  Weight BMI Neck circumference    Wt Readings from Last 3 Encounters:   03/03/22 211 lb (95.7 kg)   02/10/22 200 lb (90.7 kg)   02/02/22 203 lb (92.1 kg)    Body mass index is 36.22 kg/m². BP HR SaO2   BP Readings from Last 3 Encounters:   03/03/22 100/60   02/02/22 122/60   02/02/22 (!) 142/80    Pulse Readings from Last 3 Encounters:   03/03/22 97   02/02/22 81   01/07/22 78    SpO2 Readings from Last 3 Encounters:   03/03/22 98%   02/02/22 (!) 88%   02/02/22 95%        Themandibular molar Class :   [x]1 []2 []3      Mallampati I Airway Classification:   []1 []2 []3 [x]4      Physical Exam  Vitals and nursing note reviewed. Constitutional:       Appearance: Normal appearance. HENT:      Head: Atraumatic. Cardiovascular:      Rate and Rhythm: Normal rate and regular rhythm. Pulmonary:      Breath sounds: Normal breath sounds.    Skin:     General: Skin is warm.         :   Mild Obstructive Sleep Apnea/Hypopnea Syndrome, so far intolerant to APAP     Diagnosis Orders   1. DIPTI on CPAP  Sleep Study with PAP Titration   2. Dependence on other enabling machines and devices     3. Severe obesity (BMI 35.0-39. 9) with comorbidity ([address]       Plan:      I encouraged the patient to use the PAP on nightly basis. I adjusted the PAP pressure to the PAP pressure of 6 and 13 cmwp. I educated the Patient about the PAP machine including how to change the heated humidifier. I educated to use the PAP every night more than 4 hours at least.  I will order PAP supplies, mask, filters. ... If she still can not use the current APAP , will try the BiPAP. Orders Placed This Encounter   Procedures    Sleep Study with PAP Titration       Return in about 3 months (around 6/3/2022) for F/U between 31 and 90 days [facility]     Indu Duke MD  Medical Director - [facility]

## 2022-03-03 NOTE — PATIENT INSTRUCTIONS
Orders Placed This Encounter   Procedures    Sleep Study with PAP Titration     Standing Status:   Future     Standing Expiration Date:   3/3/2023     Scheduling Instructions:      intolerant to the APAP     Order Specific Question:   Sleep Study Titration Type     Answer:   BIPAP     Order Specific Question:   Location For Sleep Study     Answer:   [place]     Order Specific Question:   Select Sleep Lab Location     Answer:   [facility]        Patient Education        Learning About Bilevel Positive Airway Pressure (BPAP)  What is BPAP? BPAP stands for bilevel positive airway pressure. (You might also hear it called BiPAP.) It's a machine that gives you air through your nose, mouth, or both. It uses different pressures when you breathe in and out. Higher pressure is used when you breathe in. It may have a mask that covers your nose and mouth (this may be called a full face mask) or a mask that covers only your nose. It could also have a nasal pillow that covers only the openings of your nose. Why is it used? BPAP is used to treat conditions that make it hard to breathe. It can be used to treat obesity hypoventilation syndrome and certain lung conditions. Some people with obstructive sleep apnea use it instead of CPAP. It's sometimes used instead of a machine that requires a breathing tube in your windpipe. How can you care for yourself at home? · Be sure the mask, nasal mask, or nasal pillow fits well. · If needed, see if the doctor can adjust the pressure of your BPAP. Some have air pressure that adjusts on its own. · If your nose or mouth is dry:  ? Set the machine to deliver warmer or wetter air. ? Use a room humidifier or raise the room temperature. ? Try using a mask that covers your nose and mouth, if you're not already doing so.  ? Try heated tubing. · If your nose is runny or stuffy, talk to your doctor about using a decongestant medicine or steroid nasal spray.  Be safe with medicines. Read and follow all instructions on the label. Do not use the medicine longer than the label says. · Your doctor may be able to help you with other problems like swallowing air, gas pain, or bloating. You can also get help with claustrophobia. · Talk to your doctor if you're still having problems. You may be able to try a different mask or make other changes to help you sleep through the night. If these things don't help, you might try a different type of machine. Current as of: June 21, 2021               Content Version: 13.1  © 2008-9775 [employer]wise, [employer]. Care instructions adapted under license by [facility] If you have questions about a medical condition or this instruction, always ask your healthcare professional. [place] [address] any warranty or liability for your use of this information.

## 2022-03-08 ENCOUNTER — OFFICE VISIT (OUTPATIENT)
Dept: ORTHOPEDIC SURGERY | Age: 67
End: 2022-03-08
Payer: MEDICARE

## 2022-03-08 VITALS — HEIGHT: 64 IN | WEIGHT: 200 LBS | BODY MASS INDEX: 34.15 KG/M2

## 2022-03-08 DIAGNOSIS — M16.11 PRIMARY OSTEOARTHRITIS OF RIGHT HIP: Primary | ICD-10-CM

## 2022-03-08 PROCEDURE — 20611 DRAIN/INJ JOINT/BURSA W/US: CPT | Performed by: ORTHOPAEDIC SURGERY

## 2022-03-08 RX ORDER — METHYLPREDNISOLONE ACETATE 40 MG/ML
80 INJECTION, SUSPENSION INTRA-ARTICULAR; INTRALESIONAL; INTRAMUSCULAR; SOFT TISSUE ONCE
Status: COMPLETED | OUTPATIENT
Start: 2022-03-08 | End: 2022-03-08

## 2022-03-08 RX ORDER — LIDOCAINE HYDROCHLORIDE 10 MG/ML
5 INJECTION, SOLUTION INFILTRATION; PERINEURAL ONCE
Status: COMPLETED | OUTPATIENT
Start: 2022-03-08 | End: 2022-03-08

## 2022-03-08 RX ADMIN — LIDOCAINE HYDROCHLORIDE 5 ML: 10 INJECTION, SOLUTION INFILTRATION; PERINEURAL at 17:13

## 2022-03-08 RX ADMIN — METHYLPREDNISOLONE ACETATE 80 MG: 40 INJECTION, SUSPENSION INTRA-ARTICULAR; INTRALESIONAL; INTRAMUSCULAR; SOFT TISSUE at 17:14

## 2022-03-08 NOTE — PROGRESS NOTES
ULTRASOUND GUIDED HIP INJECTION    John Wood    March 8, 2022    Chief Complaint   Patient presents with    Hip Pain     RT Hip Pain       Ht 5' 4\" (1.626 m)   Wt 200 lb (90.7 kg)   BMI 34.33 kg/m²     John returns after 3 months requesting a repeat ultrasound directed intra-articular steroid injection of her right hip. She states that she did not get as much relief from her most recent injection performed on 12/7/2021 that she had previously. However because she is quite severe she requests that I repeat the injection. I have reviewed her x-rays and CT scan. The CT scan shows more evidence of arthritis with cystic changes of both the femoral head and acetabulum. She complains of constant pain up to 8 at rest and up to 10 with transferring sit to stand and walking. The pain is in her groin radiating to her lateral right hip. There is no associated numbness. She does have some chronic back pain but believes that the pain is different than her back pain. She is limited in ambulation and does require a cane. I have today reviewed with Dia Rodriguez the clinically relevant past medical history, medications, allergies, family history, and Review of Systems from the patients most recent history form, and I have documented any details relevant to today's presenting complaints in my history above. The patient's self recorded documents concerning the above have been scanned  into the chart under the \"Media\" tab. Physical Exam:   Examination of the righthip shows a positive logroll. No Lesion of the skin is noted over the injection site    Impression:  right hip osteoarthritis. Plan:  1. Injection with cortisone was recommended into  right   hip joint using ultrasound visualization. She understands the risks and benefits of the injection and describes no potential allergies. Time out was performed to verify correct person, correct procedure, and correct site.   2. Ultrasound visualization was first performed utilizing the [facility] Ultrasound unit using an 5/2 MHz Curved Probe. finding the femoral neck head junction. Next the femoral artery and vein were visualized with ultrasound medial to the femoral head. The location of the needle insertion was determined well lateral to the neurovascular structures and the site was anesthetized with 3 mL of 1% Lidocaine. The site was then prepped with ChloraPrep. A sterile cover was placed over the transducer head and the femoral head neck junction once again visualized. A 20-gauge spinal needle was then introduced under ultrasound control to the head neck junction. Once the needle was intracapsular the hip joint was injected with 2 mL  of 1% Lidocaine mixed with 2 ml of 40 mg Depo Medrol. John tolerated the procedure well and  did report 60% relief of  hip pain within 5 minutes of the injection. 3.  She is return to Dr. Geoff ignacio. I told her if the pain relief does not last an MRI scan may need to be performed to make sure no other pathology is present.     Candie Moon MD  3/8/2022

## 2022-03-08 NOTE — PATIENT INSTRUCTIONS
Impression:  right hip osteoarthritis. Plan:  1. Injection with cortisone was recommended into  right   hip joint using ultrasound visualization. She understands the risks and benefits of the injection and describes no potential allergies. Time out was performed to verify correct person, correct procedure, and correct site. 2. Ultrasound visualization was first performed utilizing the [facility] Ultrasound unit using an 5/2 MHz Curved Probe. finding the femoral neck head junction. Next the femoral artery and vein were visualized with ultrasound medial to the femoral head. The location of the needle insertion was determined well lateral to the neurovascular structures and the site was anesthetized with 3 mL of 1% Lidocaine. The site was then prepped with ChloraPrep. A sterile cover was placed over the transducer head and the femoral head neck junction once again visualized. A 20-gauge spinal needle was then introduced under ultrasound control to the head neck junction. Once the needle was intracapsular the hip joint was injected with 2 mL  of 1% Lidocaine mixed with 2 ml of 40 mg Depo Medrol. John tolerated the procedure well and  did report 60% relief of  hip pain within 5 minutes of the injection. 3.  She is return to Dr. rAt [facility]. I told her if the pain relief does not last an MRI scan may need to be performed to make sure no other pathology is present.     Rosibel Guzman MD  3/8/2022

## 2022-03-09 ENCOUNTER — OFFICE VISIT (OUTPATIENT)
Dept: ORTHOPEDIC SURGERY | Age: 67
End: 2022-03-09

## 2022-03-09 VITALS — HEIGHT: 64 IN | WEIGHT: 207 LBS | BODY MASS INDEX: 35.34 KG/M2

## 2022-03-09 DIAGNOSIS — Z96.651 S/P TOTAL KNEE ARTHROPLASTY, RIGHT: Primary | ICD-10-CM

## 2022-03-09 DIAGNOSIS — Z98.890 S/P RIGHT KNEE ARTHROSCOPY: ICD-10-CM

## 2022-03-09 PROCEDURE — 99024 POSTOP FOLLOW-UP VISIT: CPT | Performed by: ORTHOPAEDIC SURGERY

## 2022-03-09 NOTE — PROGRESS NOTES
Joel Wood  [id number]  March 9, 2022    Chief Complaint   Patient presents with    Follow-up     Right knee. S/P TKA; 2/2/22. History: The patient is here in follow-up regarding her right knee. She underwent a right knee arthroscopy 5 weeks ago. She initially was doing extremely well. She now reports an increase in pain. She denies any injury. Physical: She demonstrates appropriate mood and affect. She is alert and oriented to person, place and time. The portal sites are clean, dry and intact. She has minimal to no swelling. There is No evidence of DVT seen on physical exam. She is neurovascularly intact distally. Range of motion is from 0 degrees to 120 degrees. There is no atrophy. Strength in the knee is 5/5. There is no instability with varus or valgus stressing of the knee. She does have some mild lateral tenderness. X-rays: 3 views of the right knee obtained previously were extensively reviewed. The prosthesis is well aligned. There is no evidence of fracture or loosening. There is no evidence of dislocation. Impression: Status post  right knee arthroscopy #2 status post right total knee arthroplasty    Plan: At this time, the patient will continue to work on range of motion and strengthening. She will modify her activities. The patient will follow up with me in 3 months and we will reassess her then. At follow-up, three views of the right knee will be obtained. The patient does plan on traveling internationally in the near future. I do feel a great deal of her issues are related to other medical comorbidities including mood disorder, anxiety, depression, CVA with residual right hemiparesis and diabetes. We did offer the patient a referral to pain management, but she declined. We offered the patient a prescription for physical therapy, but she declined.

## 2022-03-16 DIAGNOSIS — E11.8 TYPE 2 DIABETES MELLITUS WITH COMPLICATION, WITHOUT LONG-TERM CURRENT USE OF INSULIN (HCC): ICD-10-CM

## 2022-03-16 DIAGNOSIS — F39 MOOD DISORDER (HCC): ICD-10-CM

## 2022-03-16 DIAGNOSIS — I10 ESSENTIAL HYPERTENSION: ICD-10-CM

## 2022-03-16 PROCEDURE — 3051F HG A1C>EQUAL 7.0%<8.0%: CPT | Performed by: FAMILY MEDICINE

## 2022-03-16 RX ORDER — TRAZODONE HYDROCHLORIDE 100 MG/1
TABLET ORAL
Qty: 90 TABLET | Refills: 1 | Status: SHIPPED | OUTPATIENT
Start: 2022-03-16 | End: 2022-06-23 | Stop reason: SDUPTHER

## 2022-03-16 RX ORDER — VALSARTAN 80 MG/1
TABLET ORAL
Qty: 45 TABLET | Refills: 0 | Status: SHIPPED | OUTPATIENT
Start: 2022-03-16 | End: 2022-06-17

## 2022-03-16 RX ORDER — BLOOD SUGAR DIAGNOSTIC
STRIP MISCELLANEOUS
Qty: 100 STRIP | Refills: 4 | Status: SHIPPED | OUTPATIENT
Start: 2022-03-16

## 2022-03-16 RX ORDER — ILOPERIDONE 1 MG/1
1 TABLET ORAL NIGHTLY
Qty: 90 TABLET | Refills: 0 | Status: SHIPPED | OUTPATIENT
Start: 2022-03-16 | End: 2022-08-16

## 2022-03-16 RX ORDER — ONDANSETRON 4 MG/1
4-8 TABLET, ORALLY DISINTEGRATING ORAL EVERY 12 HOURS PRN
Qty: 12 TABLET | Refills: 0 | Status: SHIPPED | OUTPATIENT
Start: 2022-03-16

## 2022-03-16 NOTE — TELEPHONE ENCOUNTER
Patient would like to refill these medications as she is going out of town for 2 months.  She would like more test strips than normal because she tests her sugar 3X a day    Please Advise

## 2022-03-23 ENCOUNTER — HOSPITAL ENCOUNTER (OUTPATIENT)
Dept: SLEEP CENTER | Age: 67
Discharge: HOME | End: 2022-03-23
Payer: MEDICARE

## 2022-03-23 DIAGNOSIS — Z99.89 OSA ON CPAP: ICD-10-CM

## 2022-03-23 DIAGNOSIS — G47.33 OSA ON CPAP: ICD-10-CM

## 2022-03-23 PROCEDURE — 95811 POLYSOM 6/>YRS CPAP 4/> PARM: CPT

## 2022-03-24 ENCOUNTER — HOSPITAL ENCOUNTER (OUTPATIENT)
Dept: WOMENS IMAGING | Age: 67
Discharge: HOME | End: 2022-03-24
Payer: MEDICARE

## 2022-03-24 DIAGNOSIS — Z12.31 BREAST CANCER SCREENING BY MAMMOGRAM: ICD-10-CM

## 2022-03-24 PROCEDURE — 77063 BREAST TOMOSYNTHESIS BI: CPT

## 2022-03-24 NOTE — PROGRESS NOTES
John Wood         : 1955  [x] [address]     [] [address]      [] [employer]     [][employer]    [] [employer]  [] [employer]   [] Other:  Diagnosis: [x] DIPTI (G47.33) [] CSA (G47.31) [] Apnea (G47.30)   Length of Need: [] 12 Months [x] 99 Months [] Other:    Machine (MEGAN!): [] Respironics Dream Station      Auto [x] ResMed AirSense     Auto [] Other:     [x]  CPAP () [x] Bilevel ()   Mode: [x] Auto [] Spontaneous    Mode: [] Auto [] Spontaneous           Between 5 and 20 cm          15/11 cm     Comfort Settings:   - Ramp Pressure: 5 cmH2O                                        - Ramp time: 15 min                                     -  Flex/EPR - 3 full time                                    - For ResMed Bilevel (TiMax-4 sec   TiMin- 0.2 sec)     Humidifier: [x] Heated ()        [x] Water chamber replacement ()/ 1 per 6 months        Mask:  Please always start with the mask the patient used during the titraion   [x] Nasal () /1 per 3 months [x] Full Face () /1 per 3 months   [x] Patient choice -Size and fit mask [x] Patient Choice - Size and fit mask   [] Dispense:  [] Dispense:   small Simplus full face    [x] Headgear () / 1 per 3 months [x] Headgear () / 1 per 3 months   [x] Replacement Nasal Cushion ()/2 per month [x] Interface Replacement ()/1 per month   [x] Replacement Nasal Pillows ()/2 per month         Tubing: [x] Heated ()/1 per 3 months    [] Standard ()/1 per 3 months [] Other:           Filters: [x] Non-disposable ()/1 per 6 months     [x] Ultra-Fine, Disposable ()/2 per month        Miscellaneous: [x] Chin Strap ()/ 1 per 6 months [] O2 bleed-in:       LPM   [] Oximetry on CPAP/Bilevel []  Other:          Start Order Date: 22    MEDICAL JUSTIFICATION:  I, the undersigned, certify that the above prescribed supplies are medically necessary for this patients wellbeing.   In my opinion, the supplies are both reasonable and necessary in reference to accepted standards of medicalpractice in treatment of this patients condition.     Jeremi Dc MD      NPI: [id number]       Order Signed Date: 03/24/22    Electronically signed by Jeremi Dc MD on 3/24/2022 at 12:45 PM

## 2022-03-25 ENCOUNTER — TELEPHONE (OUTPATIENT)
Dept: PULMONOLOGY | Age: 67
End: 2022-03-25

## 2022-03-25 PROCEDURE — 95811 POLYSOM 6/>YRS CPAP 4/> PARM: CPT | Performed by: PSYCHIATRY & NEUROLOGY

## 2022-03-25 NOTE — TELEPHONE ENCOUNTER
Sleep results    Mild DIPTI  AHI 12.9  Lowest o2 76%  Adequate control of the events at at BiPap of 15/11cm     Recommends    BiPap 15/11 cm suggested  F/u physician    Pt notified  States she will be out of [place] till May 31,22    And she will call back

## 2022-04-02 DIAGNOSIS — K21.9 GASTROESOPHAGEAL REFLUX DISEASE, UNSPECIFIED WHETHER ESOPHAGITIS PRESENT: ICD-10-CM

## 2022-04-02 DIAGNOSIS — R10.13 EPIGASTRIC PAIN: ICD-10-CM

## 2022-04-04 RX ORDER — OMEPRAZOLE 40 MG/1
CAPSULE, DELAYED RELEASE ORAL
Qty: 90 CAPSULE | Refills: 1 | Status: SHIPPED | OUTPATIENT
Start: 2022-04-04 | End: 2022-10-24 | Stop reason: SDUPTHER

## 2022-04-12 DIAGNOSIS — E11.8 TYPE 2 DIABETES MELLITUS WITH COMPLICATION, WITHOUT LONG-TERM CURRENT USE OF INSULIN (HCC): ICD-10-CM

## 2022-04-13 RX ORDER — INSULIN GLARGINE 100 [IU]/ML
INJECTION, SOLUTION SUBCUTANEOUS
Qty: 15 ML | Refills: 3 | Status: SHIPPED | OUTPATIENT
Start: 2022-04-13

## 2022-04-27 DIAGNOSIS — E11.8 TYPE 2 DIABETES MELLITUS WITH COMPLICATION, WITHOUT LONG-TERM CURRENT USE OF INSULIN (HCC): ICD-10-CM

## 2022-06-17 DIAGNOSIS — I10 ESSENTIAL HYPERTENSION: ICD-10-CM

## 2022-06-17 RX ORDER — VALSARTAN 80 MG/1
TABLET ORAL
Qty: 45 TABLET | Refills: 0 | Status: SHIPPED | OUTPATIENT
Start: 2022-06-17 | End: 2022-07-11

## 2022-06-23 ENCOUNTER — OFFICE VISIT (OUTPATIENT)
Dept: FAMILY MEDICINE CLINIC | Age: 67
End: 2022-06-23
Payer: MEDICARE

## 2022-06-23 VITALS
DIASTOLIC BLOOD PRESSURE: 76 MMHG | HEIGHT: 64 IN | SYSTOLIC BLOOD PRESSURE: 118 MMHG | BODY MASS INDEX: 35.55 KG/M2 | HEART RATE: 95 BPM | WEIGHT: 208.2 LBS | OXYGEN SATURATION: 97 % | TEMPERATURE: 98.9 F | RESPIRATION RATE: 16 BRPM

## 2022-06-23 DIAGNOSIS — Z79.4 TYPE 2 DIABETES MELLITUS WITH DIABETIC POLYNEUROPATHY, WITH LONG-TERM CURRENT USE OF INSULIN (HCC): Primary | ICD-10-CM

## 2022-06-23 DIAGNOSIS — R39.198 SUBJECTIVE CHANGE IN URINATION: ICD-10-CM

## 2022-06-23 DIAGNOSIS — F39 MOOD DISORDER (HCC): ICD-10-CM

## 2022-06-23 DIAGNOSIS — N18.31 STAGE 3A CHRONIC KIDNEY DISEASE (HCC): ICD-10-CM

## 2022-06-23 DIAGNOSIS — E11.42 TYPE 2 DIABETES MELLITUS WITH DIABETIC POLYNEUROPATHY, WITH LONG-TERM CURRENT USE OF INSULIN (HCC): Primary | ICD-10-CM

## 2022-06-23 PROBLEM — N18.30 CHRONIC RENAL DISEASE, STAGE III (HCC): Status: ACTIVE | Noted: 2022-06-23

## 2022-06-23 LAB — HBA1C MFR BLD: 7.6 %

## 2022-06-23 PROCEDURE — 1090F PRES/ABSN URINE INCON ASSESS: CPT | Performed by: FAMILY MEDICINE

## 2022-06-23 PROCEDURE — 2022F DILAT RTA XM EVC RTNOPTHY: CPT | Performed by: FAMILY MEDICINE

## 2022-06-23 PROCEDURE — G8427 DOCREV CUR MEDS BY ELIG CLIN: HCPCS | Performed by: FAMILY MEDICINE

## 2022-06-23 PROCEDURE — 99214 OFFICE O/P EST MOD 30 MIN: CPT | Performed by: FAMILY MEDICINE

## 2022-06-23 PROCEDURE — 83036 HEMOGLOBIN GLYCOSYLATED A1C: CPT | Performed by: FAMILY MEDICINE

## 2022-06-23 PROCEDURE — 1036F TOBACCO NON-USER: CPT | Performed by: FAMILY MEDICINE

## 2022-06-23 PROCEDURE — 3017F COLORECTAL CA SCREEN DOC REV: CPT | Performed by: FAMILY MEDICINE

## 2022-06-23 PROCEDURE — 3051F HG A1C>EQUAL 7.0%<8.0%: CPT | Performed by: FAMILY MEDICINE

## 2022-06-23 PROCEDURE — G8417 CALC BMI ABV UP PARAM F/U: HCPCS | Performed by: FAMILY MEDICINE

## 2022-06-23 PROCEDURE — 1123F ACP DISCUSS/DSCN MKR DOCD: CPT | Performed by: FAMILY MEDICINE

## 2022-06-23 PROCEDURE — G8399 PT W/DXA RESULTS DOCUMENT: HCPCS | Performed by: FAMILY MEDICINE

## 2022-06-23 RX ORDER — DULOXETIN HYDROCHLORIDE 60 MG/1
60 CAPSULE, DELAYED RELEASE ORAL DAILY
Qty: 90 CAPSULE | Refills: 1 | Status: SHIPPED | OUTPATIENT
Start: 2022-06-23 | End: 2022-07-29

## 2022-06-23 RX ORDER — TRAZODONE HYDROCHLORIDE 100 MG/1
TABLET ORAL
Qty: 90 TABLET | Refills: 1 | Status: SHIPPED | OUTPATIENT
Start: 2022-06-23

## 2022-06-23 ASSESSMENT — PATIENT HEALTH QUESTIONNAIRE - PHQ9
7. TROUBLE CONCENTRATING ON THINGS, SUCH AS READING THE NEWSPAPER OR WATCHING TELEVISION: 1
8. MOVING OR SPEAKING SO SLOWLY THAT OTHER PEOPLE COULD HAVE NOTICED. OR THE OPPOSITE, BEING SO FIGETY OR RESTLESS THAT YOU HAVE BEEN MOVING AROUND A LOT MORE THAN USUAL: 0
5. POOR APPETITE OR OVEREATING: 1
SUM OF ALL RESPONSES TO PHQ QUESTIONS 1-9: 8
10. IF YOU CHECKED OFF ANY PROBLEMS, HOW DIFFICULT HAVE THESE PROBLEMS MADE IT FOR YOU TO DO YOUR WORK, TAKE CARE OF THINGS AT HOME, OR GET ALONG WITH OTHER PEOPLE: 1
SUM OF ALL RESPONSES TO PHQ9 QUESTIONS 1 & 2: 2
4. FEELING TIRED OR HAVING LITTLE ENERGY: 3
SUM OF ALL RESPONSES TO PHQ QUESTIONS 1-9: 8
9. THOUGHTS THAT YOU WOULD BE BETTER OFF DEAD, OR OF HURTING YOURSELF: 0
SUM OF ALL RESPONSES TO PHQ QUESTIONS 1-9: 8
2. FEELING DOWN, DEPRESSED OR HOPELESS: 1
6. FEELING BAD ABOUT YOURSELF - OR THAT YOU ARE A FAILURE OR HAVE LET YOURSELF OR YOUR FAMILY DOWN: 0
1. LITTLE INTEREST OR PLEASURE IN DOING THINGS: 1
SUM OF ALL RESPONSES TO PHQ QUESTIONS 1-9: 8
3. TROUBLE FALLING OR STAYING ASLEEP: 1

## 2022-06-23 ASSESSMENT — COLUMBIA-SUICIDE SEVERITY RATING SCALE - C-SSRS
1. WITHIN THE PAST MONTH, HAVE YOU WISHED YOU WERE DEAD OR WISHED YOU COULD GO TO SLEEP AND NOT WAKE UP?: NO
2. HAVE YOU ACTUALLY HAD ANY THOUGHTS OF KILLING YOURSELF?: NO
6. HAVE YOU EVER DONE ANYTHING, STARTED TO DO ANYTHING, OR PREPARED TO DO ANYTHING TO END YOUR LIFE?: NO

## 2022-06-23 NOTE — PROGRESS NOTES
6/23/2022    This is a 78 y.o. female   Chief Complaint   Patient presents with    Diabetes     sugar has been good.  Flank Pain     rt side. sharp pain. no urinary symptoms     HPI    Diabetes  Lab Results   Component Value Date    LABA1C 7.4 01/07/2022     Lab Results   Component Value Date    .2 07/26/2021   A1c today 7.6  On metformin, Trulicity. Increased her Lantus from 12-16 units at night because she ran out of metformin and Trulicity   - fasting in the morning level is 140-160  - very rare lows    Notes decreased frequency of urination. Going about twice per day. Urine looks more yellow to her. No medication changes. Drinking her usual amount of water (4 Gatorade bottles per day)  - drinks coffee in AM. Rare soda    Mood  - reports doing well on Fanapt and Cymbalta    Review of Systems     Current Outpatient Medications   Medication Sig Dispense Refill    DULoxetine (CYMBALTA) 60 MG extended release capsule Take 1 capsule by mouth daily 90 capsule 1    traZODone (DESYREL) 100 MG tablet TAKE 1 TABLET BY MOUTH AT  NIGHT 90 tablet 1    valsartan (DIOVAN) 80 MG tablet TAKE 1/2 TABLET BY MOUTH DAILY 45 tablet 0    metFORMIN (GLUCOPHAGE) 500 MG tablet TAKE 2 TABLETS BY MOUTH TWICE DAILY WITH MEALS 360 tablet 0    insulin glargine (LANTUS SOLOSTAR) 100 UNIT/ML injection pen INJECT SUBCUTANEOUSLY 12  UNITS ONCE NIGHTLY 15 mL 3    omeprazole (PRILOSEC) 40 MG delayed release capsule TAKE 1 CAPSULE BY MOUTH EVERY MORNING BEFORE BREAKFAST 90 capsule 1    FANAPT 1 MG TABS tablet Take 1 tablet by mouth nightly 90 tablet 0    ondansetron (ZOFRAN ODT) 4 MG disintegrating tablet Take 1-2 tablets by mouth every 12 hours as needed for Nausea or Vomiting May Sub regular tablet (non-ODT) if insurance does not cover ODT. 12 tablet 0    blood glucose test strips (ONETOUCH ULTRA) strip As needed.  100 strip 4    ibuprofen (ADVIL;MOTRIN) 600 MG tablet TAKE 1 TABLET BY MOUTH TWICE DAILY WITH MEALS 60 tablet 1  furosemide (LASIX) 20 MG tablet TAKE 1 TABLET BY MOUTH  DAILY AS NEEDED FOR LEG  SWELLING 90 tablet 3    dextran 70-hypromellose (TEARS NATURALE) 0.1-0.3 % SOLN opthalmic solution as needed for itching      Dulaglutide (TRULICITY) 1.5 HI/7.9WP SOPN INJECT THE CONTENTS OF ONE  PEN SUBCUTANEOUSLY WEEKLY 6 mL 3    latanoprost (XALATAN) 0.005 % ophthalmic solution Place 1 drop into both eyes nightly       lidocaine 4 % external patch Apply patch to lower mid back. Leave on for 12 hours and remove. Leave off for 12 hours before applying another one. (Patient taking differently: as needed Apply patch to lower mid back. Leave on for 12 hours and remove. Leave off for 12 hours before applying another one.) 5 patch 0    albuterol sulfate HFA (PROVENTIL HFA) 108 (90 Base) MCG/ACT inhaler Inhale 2 puffs into the lungs every 4 hours as needed for Wheezing or Shortness of Breath May substitute ProAir MDI 1 Inhaler 5    ipratropium-albuterol (DUONEB) 0.5-2.5 (3) MG/3ML SOLN nebulizer solution Inhale 3 mLs into the lungs every 4 hours as needed for Shortness of Breath (wheezing coughing) 360 mL 5    ammonium lactate (LAC-HYDRIN) 12 % lotion Apply topically daily. (Patient taking differently: as needed Apply topically daily. ) 1 Bottle 1     No current facility-administered medications for this visit. /76 (Site: Left Upper Arm, Position: Sitting, Cuff Size: Medium Adult)   Pulse 95   Temp 98.9 °F (37.2 °C) (Oral)   Resp 16   Ht 5' 4\" (1.626 m)   Wt 208 lb 3.2 oz (94.4 kg)   SpO2 97%   BMI 35.74 kg/m²     Physical Exam    Wt Readings from Last 3 Encounters:   06/23/22 208 lb 3.2 oz (94.4 kg)   03/09/22 207 lb (93.9 kg)   03/08/22 200 lb (90.7 kg)     BP Readings from Last 3 Encounters:   06/23/22 118/76   03/03/22 100/60   02/02/22 122/60     Assessment/Plan:  1.  Type 2 diabetes mellitus with diabetic polyneuropathy, with long-term current use of insulin (HCC)  Monitor AM glucose and increase Lantus to 15 units if consistently elevated  Continue metformin and Trulicity  - POCT glycosylated hemoglobin (Hb A1C)    2. Subjective change in urination  Etiology unclear. Possible mild dehydration. Check labs as below  - Urinalysis; Future  - Basic Metabolic Panel; Future    3. Stage 3a chronic kidney disease ([facility] [address]  Monitor renal function. Already on ARB    4. Mood disorder (HCC)  Stable on current regimen. Refills sent.  Needs a new Psychiatrist, referral placed  - External Referral to Psychiatry    F/u 3 months diabetes

## 2022-06-24 ENCOUNTER — OFFICE VISIT (OUTPATIENT)
Dept: ORTHOPEDIC SURGERY | Age: 67
End: 2022-06-24

## 2022-06-24 VITALS — HEIGHT: 64 IN | WEIGHT: 203 LBS | BODY MASS INDEX: 34.66 KG/M2

## 2022-06-24 DIAGNOSIS — Z96.651 S/P TOTAL KNEE ARTHROPLASTY, RIGHT: Primary | ICD-10-CM

## 2022-06-24 DIAGNOSIS — M76.51 PATELLAR TENDONITIS OF RIGHT KNEE: ICD-10-CM

## 2022-06-24 DIAGNOSIS — R39.198 SUBJECTIVE CHANGE IN URINATION: ICD-10-CM

## 2022-06-24 LAB
ANION GAP SERPL CALCULATED.3IONS-SCNC: 13 MMOL/L (ref 3–16)
BILIRUBIN URINE: NEGATIVE
BLOOD, URINE: NEGATIVE
BUN BLDV-MCNC: 10 MG/DL (ref 7–20)
CALCIUM SERPL-MCNC: 10.1 MG/DL (ref 8.3–10.6)
CHLORIDE BLD-SCNC: 100 MMOL/L (ref 99–110)
CLARITY: CLEAR
CO2: 23 MMOL/L (ref 21–32)
COLOR: YELLOW
CREAT SERPL-MCNC: 0.8 MG/DL (ref 0.6–1.2)
GFR AFRICAN AMERICAN: >60
GFR NON-AFRICAN AMERICAN: >60
GLUCOSE BLD-MCNC: 238 MG/DL (ref 70–99)
GLUCOSE URINE: NEGATIVE MG/DL
KETONES, URINE: NEGATIVE MG/DL
LEUKOCYTE ESTERASE, URINE: NEGATIVE
MICROSCOPIC EXAMINATION: NORMAL
NITRITE, URINE: NEGATIVE
PH UA: 7 (ref 5–8)
POTASSIUM SERPL-SCNC: 4.5 MMOL/L (ref 3.5–5.1)
PROTEIN UA: NEGATIVE MG/DL
SODIUM BLD-SCNC: 136 MMOL/L (ref 136–145)
SPECIFIC GRAVITY UA: 1.01 (ref 1–1.03)
URINE TYPE: NORMAL
UROBILINOGEN, URINE: 0.2 E.U./DL

## 2022-06-24 PROCEDURE — G8399 PT W/DXA RESULTS DOCUMENT: HCPCS | Performed by: ORTHOPAEDIC SURGERY

## 2022-06-24 PROCEDURE — 99213 OFFICE O/P EST LOW 20 MIN: CPT | Performed by: ORTHOPAEDIC SURGERY

## 2022-06-24 PROCEDURE — 1123F ACP DISCUSS/DSCN MKR DOCD: CPT | Performed by: ORTHOPAEDIC SURGERY

## 2022-06-24 PROCEDURE — 1036F TOBACCO NON-USER: CPT | Performed by: ORTHOPAEDIC SURGERY

## 2022-06-24 PROCEDURE — 1090F PRES/ABSN URINE INCON ASSESS: CPT | Performed by: ORTHOPAEDIC SURGERY

## 2022-06-24 PROCEDURE — 3017F COLORECTAL CA SCREEN DOC REV: CPT | Performed by: ORTHOPAEDIC SURGERY

## 2022-06-24 PROCEDURE — G8427 DOCREV CUR MEDS BY ELIG CLIN: HCPCS | Performed by: ORTHOPAEDIC SURGERY

## 2022-06-24 PROCEDURE — G8417 CALC BMI ABV UP PARAM F/U: HCPCS | Performed by: ORTHOPAEDIC SURGERY

## 2022-06-27 DIAGNOSIS — E11.8 TYPE 2 DIABETES MELLITUS WITH COMPLICATION, WITHOUT LONG-TERM CURRENT USE OF INSULIN (HCC): ICD-10-CM

## 2022-06-28 RX ORDER — DULAGLUTIDE 1.5 MG/.5ML
INJECTION, SOLUTION SUBCUTANEOUS
Qty: 6 ML | Refills: 3 | Status: SHIPPED | OUTPATIENT
Start: 2022-06-28

## 2022-06-30 ENCOUNTER — OFFICE VISIT (OUTPATIENT)
Dept: SLEEP MEDICINE | Age: 67
End: 2022-06-30
Payer: MEDICARE

## 2022-06-30 VITALS
TEMPERATURE: 96.9 F | RESPIRATION RATE: 18 BRPM | OXYGEN SATURATION: 99 % | SYSTOLIC BLOOD PRESSURE: 120 MMHG | HEIGHT: 64 IN | BODY MASS INDEX: 35.58 KG/M2 | DIASTOLIC BLOOD PRESSURE: 80 MMHG | HEART RATE: 92 BPM | WEIGHT: 208.4 LBS

## 2022-06-30 DIAGNOSIS — Z99.89 OSA ON CPAP: Primary | ICD-10-CM

## 2022-06-30 DIAGNOSIS — G47.33 OSA ON CPAP: Primary | ICD-10-CM

## 2022-06-30 DIAGNOSIS — E66.9 OBESITY (BMI 30.0-34.9): ICD-10-CM

## 2022-06-30 DIAGNOSIS — Z99.89 DEPENDENCE ON OTHER ENABLING MACHINES AND DEVICES: ICD-10-CM

## 2022-06-30 PROCEDURE — 99213 OFFICE O/P EST LOW 20 MIN: CPT | Performed by: PSYCHIATRY & NEUROLOGY

## 2022-06-30 PROCEDURE — 1090F PRES/ABSN URINE INCON ASSESS: CPT | Performed by: PSYCHIATRY & NEUROLOGY

## 2022-06-30 PROCEDURE — 1123F ACP DISCUSS/DSCN MKR DOCD: CPT | Performed by: PSYCHIATRY & NEUROLOGY

## 2022-06-30 PROCEDURE — 1036F TOBACCO NON-USER: CPT | Performed by: PSYCHIATRY & NEUROLOGY

## 2022-06-30 PROCEDURE — G8417 CALC BMI ABV UP PARAM F/U: HCPCS | Performed by: PSYCHIATRY & NEUROLOGY

## 2022-06-30 PROCEDURE — G8427 DOCREV CUR MEDS BY ELIG CLIN: HCPCS | Performed by: PSYCHIATRY & NEUROLOGY

## 2022-06-30 PROCEDURE — 3017F COLORECTAL CA SCREEN DOC REV: CPT | Performed by: PSYCHIATRY & NEUROLOGY

## 2022-06-30 PROCEDURE — G8399 PT W/DXA RESULTS DOCUMENT: HCPCS | Performed by: PSYCHIATRY & NEUROLOGY

## 2022-06-30 ASSESSMENT — SLEEP AND FATIGUE QUESTIONNAIRES
HOW LIKELY ARE YOU TO NOD OFF OR FALL ASLEEP WHILE WATCHING TV: 3
HOW LIKELY ARE YOU TO NOD OFF OR FALL ASLEEP WHILE SITTING INACTIVE IN A PUBLIC PLACE: 3
HOW LIKELY ARE YOU TO NOD OFF OR FALL ASLEEP WHILE SITTING AND TALKING TO SOMEONE: 3
HOW LIKELY ARE YOU TO NOD OFF OR FALL ASLEEP WHEN YOU ARE A PASSENGER IN A CAR FOR AN HOUR WITHOUT A BREAK: 0
HOW LIKELY ARE YOU TO NOD OFF OR FALL ASLEEP WHILE SITTING AND READING: 2
HOW LIKELY ARE YOU TO NOD OFF OR FALL ASLEEP IN A CAR, WHILE STOPPED FOR A FEW MINUTES IN TRAFFIC: 0
ESS TOTAL SCORE: 13
HOW LIKELY ARE YOU TO NOD OFF OR FALL ASLEEP WHILE LYING DOWN TO REST IN THE AFTERNOON WHEN CIRCUMSTANCES PERMIT: 0
HOW LIKELY ARE YOU TO NOD OFF OR FALL ASLEEP WHILE SITTING QUIETLY AFTER LUNCH WITHOUT ALCOHOL: 2

## 2022-06-30 NOTE — PATIENT INSTRUCTIONS
Patient Education        Learning About Bilevel Positive Airway Pressure (BPAP)  What is BPAP? BPAP stands for bilevel positive airway pressure. (You might also hear it called BiPAP.) It's a machine that gives you air through your nose, mouth, or both. It uses different pressures when you breathe in and out. Higher pressure is used when you breathe in. It may have a mask that covers your nose and mouth (this may be called a full face mask) or a mask that covers only your nose. Itcould also have a nasal pillow that covers only the openings of your nose. Why is it used? BPAP is used to treat conditions that make it hard to breathe. It can be used to treat obesity hypoventilation syndrome and certain lung conditions. Some people with obstructive sleep apnea use it instead of CPAP. It's sometimes usedinstead of a machine that requires a breathing tube in your windpipe. How can you care for yourself at home?  Be sure the mask, nasal mask, or nasal pillow fits well.  If needed, see if the doctor can adjust the pressure of your BPAP. Some have air pressure that adjusts on its own.  If your nose or mouth is dry:  ? Set the machine to deliver warmer or wetter air. ? Use a room humidifier or raise the room temperature. ? Try using a mask that covers your nose and mouth, if you're not already doing so.  ? Try heated tubing.  If your nose is runny or stuffy, talk to your doctor about using a decongestant medicine or steroid nasal spray. Be safe with medicines. Read and follow all instructions on the label. Do not use the medicine longer than the label says.  Your doctor may be able to help you with other problems like swallowing air, gas pain, or bloating. You can also get help with claustrophobia.  Talk to your doctor if you're still having problems. You may be able to try a different mask or make other changes to help you sleep through the night.  If these things don't help, you might try a different type of machine. Current as of: March 1, 2022               Content Version: 13.3  © 3757-0473 [employer], [employer]orporated. Care instructions adapted under license by [facility] ([facility]). If you have questions about a medical condition or this instruction, always ask your healthcare professional. [address] any warranty or liability for your use of this information.

## 2022-06-30 NOTE — PROGRESS NOTES
John Wood         : 1955  [x] [facility]     [] [facility]      [] [facility]     [][facility]    [] [facility]  [] [facility]   [] Other:  Diagnosis: [x] DIPTI (G47.33) [] CSA (G47.31) [] Apnea (G47.30)   Length of Need: [] 12 Months [x] 99 Months [] Other:    Machine (MEGAN!): [x] Jenniffer [x] ResMed AirSense     Auto [] Other:     []  CPAP () [x] Bilevel ()   Mode: [] Auto [] Spontaneous    Mode: [] Auto [] Spontaneous                15/11 cm        Comfort Settings:   - Ramp Pressure: 5 cmH2O                                        - Ramp time: 15 min                                     -  Flex/EPR - 3 full time                                    - For ResMed Bilevel (TiMax-4 sec   TiMin- 0.2 sec)     Humidifier: [x] Heated ()        [x] Water chamber replacement ()/ 1 per 6 months        Mask:  Please always start with the mask the patient used during the titraion   [x] Nasal () /1 per 3 months [x] Full Face () /1 per 3 months   [x] Patient choice -Size and fit mask [x] Patient Choice - Size and fit mask   [] Dispense:  [] Dispense:    [x] Headgear () / 1 per 3 months [x] Headgear () / 1 per 3 months   [x] Replacement Nasal Cushion ()/2 per month [x] Interface Replacement ()/1 per month   [x] Replacement Nasal Pillows ()/2 per month         Tubing: [x] Heated ()/1 per 3 months    [] Standard ()/1 per 3 months [] Other:           Filters: [x] Non-disposable ()/1 per 6 months     [x] Ultra-Fine, Disposable ()/2 per month        Miscellaneous: [x] Chin Strap ()/ 1 per 6 months [] O2 bleed-in:       LPM   [] Oximetry on CPAP/Bilevel []  Other:          Start Order Date: 22    MEDICAL JUSTIFICATION:  I, the undersigned, certify that the above prescribed supplies are medically necessary for this patients wellbeing.   In my opinion, the supplies are both reasonable and necessary in reference to accepted standards of medicalpractice in treatment of this patients condition.     Jack Flores MD      NPI: [id number]       Order Signed Date: 06/30/22    Electronically signed by Jack Flores MD on 6/30/2022 at 3:22 PM

## 2022-06-30 NOTE — PROGRESS NOTES
MD GASTON Moreno Board Certified in Sleep Medicine  Certified in [address] [place] [address] Certified in Neurology [address]  [address] [facility] [address], [address], [address]  [phone]   [address] [place] [address], [address] [place] [address] Ave [address]                      [address]  [address] [place], [place] [address]  [phone]    Subjective:     Patient ID: Dharmesh Maier is a 78 y.o. female. Chief Complaint   Patient presents with    Follow-up    Sleep Apnea     not using machine       HPI:        Dharmesh Maier is a 78 y.o. female was seen today as a follow for mild obstructive sleep apnea with an AHI - 12.9/hr with Low SaO2 - 76% and time below 90% of 3.5 min. Had BiPAP titration on 2022, but has not received BiPAP machine yet. The Patient scored Total score: 13 on [place] Sleepiness Scale ( more than 10 is indicative of daytime sleepiness)     Lost 8 pounds since last visit.    DOT/CDL - N/A        Previous Report(s)Reviewed: historical medical records         Social History     Socioeconomic History    Marital status:      Spouse name: Marie Breaux, seen here    Number of children: 3    Years of education: Not on file    Highest education level: Not on file   Occupational History    Not on file   Tobacco Use    Smoking status: Former Smoker     Packs/day: 1.50     Years: 3.00     Pack years: 4.50     Quit date: 1992     Years since quittin.5    Smokeless tobacco: Never Used   Vaping Use    Vaping Use: Never used   Substance and Sexual Activity    Alcohol use: No    Drug use: No    Sexual activity: Yes     Partners: Male   Other Topics Concern    Not on file   Social History Narrative    Originally from [facility]     is seen here    10 grandchildren     Social Determinants of Health     Financial Resource Strain:     Difficulty of Paying Living Expenses: Not on file   Food Insecurity:     Worried About Running Out of Food in the Last Year: Not on file    Megha of Food in the Last Year: Not on file   Transportation Needs:     Lack of Transportation (Medical): Not on file    Lack of Transportation (Non-Medical): Not on file   Physical Activity:     Days of Exercise per Week: Not on file    Minutes of Exercise per Session: Not on file   Stress:     Feeling of Stress : Not on file   Social Connections:     Frequency of Communication with Friends and Family: Not on file    Frequency of Social Gatherings with Friends and Family: Not on file    Attends Zoroastrian Services: Not on file    Active Member of [address] [place] [address] or Organizations: Not on file    Attends Club or Organization Meetings: Not on file    Marital Status: Not on file   Intimate Partner Violence:     Fear of Current or Ex-Partner: Not on file    Emotionally Abused: Not on file    Physically Abused: Not on file    Sexually Abused: Not on file   Housing Stability:     Unable to Pay for Housing in the Last Year: Not on file    Number of [place] in the Last Year: Not on file    Unstable Housing in the Last Year: Not on file       Prior to Admission medications    Medication Sig Start Date End Date Taking?  Authorizing Provider   TRULICITY 1.5 QC/5.5[facility] THE CONTENTS OF ONE  PEN SUBCUTANEOUSLY WEEKLY 6/28/22  Yes Concepcion Finnegan MD   DULoxetine (CYMBALTA) 60 MG extended release capsule Take 1 capsule by mouth daily 6/23/22  Yes Concepcion Finnegan MD   traZODone (DESYREL) 100 MG tablet TAKE 1 TABLET BY MOUTH AT  NIGHT 6/23/22  Yes Concepcion Finnegan MD   valsartan (DIOVAN) 80 MG tablet TAKE 1/2 TABLET BY MOUTH DAILY 6/17/22  Yes JEISON Rider CNP   metFORMIN (GLUCOPHAGE) 500 MG tablet TAKE 2 TABLETS BY MOUTH TWICE DAILY WITH MEALS 4/27/22  Yes Concepcino Finnegan MD   insulin glargine (LANTUS SOLOSTAR) 100 UNIT/ML injection pen INJECT SUBCUTANEOUSLY 12  UNITS ONCE NIGHTLY 4/13/22  Yes Nan Finnegan MD   omeprazole (PRILOSEC) 40 MG delayed release capsule TAKE 1 CAPSULE BY MOUTH EVERY MORNING BEFORE BREAKFAST 4/4/22  Yes Nan Finnegan MD   FANAPT 1 MG TABS tablet Take 1 tablet by mouth nightly 3/16/22  Yes Nan Finnegan MD   ondansetron (ZOFRAN ODT) 4 MG disintegrating tablet Take 1-2 tablets by mouth every 12 hours as needed for Nausea or Vomiting May Sub regular tablet (non-ODT) if insurance does not cover ODT. 3/16/22  Yes Rodney Blackwell MD   blood glucose test strips (ONETOUCH ULTRA) strip As needed. 3/16/22  Yes Nan Finnegan MD   ibuprofen (ADVIL;MOTRIN) 600 MG tablet TAKE 1 TABLET BY MOUTH TWICE DAILY WITH MEALS 12/20/21  Yes Kelly Dillon MD   furosemide (LASIX) 20 MG tablet TAKE 1 TABLET BY MOUTH  DAILY AS NEEDED FOR LEG  SWELLING 9/13/21  Yes Nan Finnegan MD   dextran 70-hypromellose (TEARS NATURALE) 0.1-0.3 % SOLN opthalmic solution as needed for itching   Yes Historical Provider, MD   latanoprost (XALATAN) 0.005 % ophthalmic solution Place 1 drop into both eyes nightly    Yes Historical Provider, MD   albuterol sulfate HFA (PROVENTIL HFA) 108 (90 Base) MCG/ACT inhaler Inhale 2 puffs into the lungs every 4 hours as needed for Wheezing or Shortness of Breath May substitute ProAir MDI 1/5/21  Yes Bard Werner MD   ipratropium-albuterol (DUONEB) 0.5-2.5 (3) MG/3ML SOLN nebulizer solution Inhale 3 mLs into the lungs every 4 hours as needed for Shortness of Breath (wheezing coughing) 1/5/21  Yes Bard Werner MD   ammonium lactate (LAC-HYDRIN) 12 % lotion Apply topically daily. Patient taking differently: as needed Apply topically daily.  11/21/19  Yes Rodney Blackwell MD       Allergies as of 06/30/2022 - Fully Reviewed 06/30/2022   Allergen Reaction Noted    Codeine Nausea And Vomiting and Rash 02/15/2011    Vicodin [hydrocodone-acetaminophen] Nausea And Vomiting 02/15/2011    Lipitor [atorvastatin]  05/19/2021    Oxycontin [oxycodone hcl] Itching 04/19/2016    Tramadol Itching and Swelling 06/05/2019       Patient Active Problem List   Diagnosis    Diabetes mellitus ([address]    Obesity    Dystonia    Cerebral thrombosis with cerebral infarction ([address]    DIPTI (obstructive sleep apnea)    Right hemiparesis (HCC)    Trigger finger, acquired    Elevated CK    Primary osteoarthritis of both knees    Mood disorder (HCC)    Calcific tendonitis of right shoulder    Essential hypertension    Mixed hyperlipidemia    Chronic low back pain without sciatica    History of CVA with residual deficit    Anxiety and depression    Chest pain    Myalgia due to statin    Palpitations    Primary osteoarthritis of right knee    History of colonoscopy - 2021, rpt 2026    Gastroesophageal reflux disease    Chronic renal disease, stage III ([address] [[id number]]       Past Medical History:   Diagnosis Date    Anesthesia complication     \" shaking\"    Arthritis     Cardiomyopathy ([address]     COVID-19     1/7/2022    Diabetes     GERD (gastroesophageal reflux disease)     Hyperlipidemia     Hypertension     Lumbar disc disease     Prolonged emergence from general anesthesia     Sleep apnea     pt states does use a Cpap machine at night    Unspecified cerebral artery occlusion with cerebral infarction 2014    right side weak    Wears glasses        Past Surgical History:   Procedure Laterality Date    ARTHRODESIS Right 9/17/2020    (RIGHT) REMOVAL OF BONE SPURRING AND OSSEOUS PROMINENCE FIRST METATARSAL, ARTHRODESIS OF FIRST METATARSOPHALANGEAL JOINT, BONE MARROW HARVEST AND CONCENTRATION RIGHT TIBIA performed by Charito Brown DPM at [address] Left 9/3/15    CARPAL TUNNEL RELEASE Right 9/22/15    COLONOSCOPY      FINGER TRIGGER RELEASE Left 9/3/15    middle and ring fingers    FINGER TRIGGER RELEASE Right 9/22/15    middle and ring fingers    FOOT SURGERY Bilateral     litteltoe    HAND SURGERY Right     Ligament    KNEE ARTHROPLASTY Right 7/26/2021    ROBOTIC ASSISTED TOTAL RIGHT KNEE REPLACEMENT performed by Edward Bruno MD at [facility] ARTHROSCOPY Right 2/2/2022    ARTHROSCOPY WITH LYSIS OF ADHESIONS, RIGHT KNEE performed by Edward Bruno MD at [address] [place] [address] (CERVIX NOT REMOVED)  08/2003    SHOULDER ARTHROSCOPY Right 06/20/2018    Diagnostic scope, rcr, open Radha       Family History   Problem Relation Age of Onset    Heart Disease Mother     High Blood Pressure Mother     Stroke Mother     Cancer Brother         lung cancer       Review of Systems    Objective:     Vitals:  Weight BMI Neck circumference    Wt Readings from Last 3 Encounters:   06/30/22 208 lb 6.4 oz (94.5 kg)   06/24/22 203 lb (92.1 kg)   06/23/22 208 lb 3.2 oz (94.4 kg)    Body mass index is 35.77 kg/m². BP HR SaO2   BP Readings from Last 3 Encounters:   06/30/22 120/80   06/23/22 118/76   03/03/22 100/60    Pulse Readings from Last 3 Encounters:   06/30/22 92   06/23/22 95   03/03/22 97    SpO2 Readings from Last 3 Encounters:   06/30/22 99%   06/23/22 97%   03/03/22 98%        Themandibular molar Class :   [x]1 []2 []3      Mallampati I Airway Classification:   []1 []2 []3 [x]4      Physical Exam  Vitals and nursing note reviewed. Constitutional:       Appearance: Normal appearance. HENT:      Nose: Nose normal.   Cardiovascular:      Rate and Rhythm: Normal rate and regular rhythm. Pulmonary:      Effort: Pulmonary effort is normal.      Breath sounds: Normal breath sounds. Musculoskeletal:      Right lower leg: No edema. Left lower leg: No edema.         :   Mild Obstructive Sleep Apnea/Hypopnea Syndrome. Diagnosis Orders   1. DIPTI on CPAP     2. Dependence on other enabling machines and devices     3. Obesity (BMI 30.0-34. 9)       Plan:     I reorder the BiPAP at 15/11 cm. Weight loss: The proportionality between weight and AHI.   With 10% weight change, the AHI has a 27% proportionate change. With 20% weight change, the AHI has a 45-50% proportionate change. No orders of the defined types were placed in this encounter. Return for F/U between 31 and 90 days from the recieving BiPAP.     Mandy Patton MD  Medical Director - [facility] [place]

## 2022-07-11 ENCOUNTER — OFFICE VISIT (OUTPATIENT)
Dept: CARDIOLOGY CLINIC | Age: 67
End: 2022-07-11
Payer: MEDICARE

## 2022-07-11 VITALS
DIASTOLIC BLOOD PRESSURE: 90 MMHG | HEIGHT: 64 IN | SYSTOLIC BLOOD PRESSURE: 152 MMHG | WEIGHT: 204 LBS | BODY MASS INDEX: 34.83 KG/M2 | HEART RATE: 94 BPM

## 2022-07-11 DIAGNOSIS — R06.02 SHORTNESS OF BREATH: Primary | ICD-10-CM

## 2022-07-11 DIAGNOSIS — I10 ESSENTIAL HYPERTENSION: ICD-10-CM

## 2022-07-11 DIAGNOSIS — R06.02 SOB (SHORTNESS OF BREATH): ICD-10-CM

## 2022-07-11 DIAGNOSIS — I69.30 HISTORY OF CVA WITH RESIDUAL DEFICIT: ICD-10-CM

## 2022-07-11 DIAGNOSIS — E78.2 MIXED HYPERLIPIDEMIA: ICD-10-CM

## 2022-07-11 DIAGNOSIS — R07.9 CHEST PAIN, UNSPECIFIED TYPE: ICD-10-CM

## 2022-07-11 DIAGNOSIS — R00.2 PALPITATIONS: ICD-10-CM

## 2022-07-11 DIAGNOSIS — G47.33 OSA (OBSTRUCTIVE SLEEP APNEA): ICD-10-CM

## 2022-07-11 PROCEDURE — 1036F TOBACCO NON-USER: CPT | Performed by: INTERNAL MEDICINE

## 2022-07-11 PROCEDURE — 1090F PRES/ABSN URINE INCON ASSESS: CPT | Performed by: INTERNAL MEDICINE

## 2022-07-11 PROCEDURE — 93000 ELECTROCARDIOGRAM COMPLETE: CPT | Performed by: INTERNAL MEDICINE

## 2022-07-11 PROCEDURE — 1123F ACP DISCUSS/DSCN MKR DOCD: CPT | Performed by: INTERNAL MEDICINE

## 2022-07-11 PROCEDURE — G8399 PT W/DXA RESULTS DOCUMENT: HCPCS | Performed by: INTERNAL MEDICINE

## 2022-07-11 PROCEDURE — 99214 OFFICE O/P EST MOD 30 MIN: CPT | Performed by: INTERNAL MEDICINE

## 2022-07-11 PROCEDURE — G8417 CALC BMI ABV UP PARAM F/U: HCPCS | Performed by: INTERNAL MEDICINE

## 2022-07-11 PROCEDURE — 3017F COLORECTAL CA SCREEN DOC REV: CPT | Performed by: INTERNAL MEDICINE

## 2022-07-11 PROCEDURE — G8427 DOCREV CUR MEDS BY ELIG CLIN: HCPCS | Performed by: INTERNAL MEDICINE

## 2022-07-11 RX ORDER — VALSARTAN 80 MG/1
80 TABLET ORAL DAILY
Qty: 30 TABLET | Refills: 5 | Status: SHIPPED | OUTPATIENT
Start: 2022-07-11 | End: 2022-07-21

## 2022-07-11 ASSESSMENT — ENCOUNTER SYMPTOMS
ABDOMINAL PAIN: 0
BLOOD IN STOOL: 0
BACK PAIN: 0
EYE DISCHARGE: 0
COUGH: 0
VOMITING: 0
COLOR CHANGE: 0
SHORTNESS OF BREATH: 0
FACIAL SWELLING: 0
ABDOMINAL DISTENTION: 0
WHEEZING: 0
CHEST TIGHTNESS: 0

## 2022-07-11 NOTE — ASSESSMENT & PLAN NOTE
Uncontrolled. Increase Diovan to 80 mg.   Follow-up with [facility] in 2 weeks for further medication adjustments

## 2022-07-11 NOTE — PROGRESS NOTES
[address] [facility]     Outpatient Cardiology         Chief Complaint   Patient presents with    Hypertension    Fatigue    Shortness of Breath     when walking    Gait Problem     loses balance       HPI     John Clarke a 78 y.o. female here for follow up for palpations. Hx of DM II, HTN, HLD, DIPTI. Hypertension, uncontrolled. Will adjust medications  Hyperlipidemia on a statin, no side effects. Sleep apnea, using her CPAP most nights. Palpitations, somewhat better  Still complains of shortness of breath. Work-up unremarkable from a cardiovascular perspective. I wonder if she is having some reactive airway disease.       PMH  Past Medical History:   Diagnosis Date    Anesthesia complication     \" shaking\"    Arthritis     Cardiomyopathy ([address]     COVID-19     1/7/2022    Diabetes     GERD (gastroesophageal reflux disease)     Hyperlipidemia     Hypertension     Lumbar disc disease     Prolonged emergence from general anesthesia     Sleep apnea     pt states does use a Cpap machine at night    Unspecified cerebral artery occlusion with cerebral infarction 2014    right side weak    Wears glasses        PSH  Past Surgical History:   Procedure Laterality Date    ARTHRODESIS Right 9/17/2020    (RIGHT) REMOVAL OF BONE SPURRING AND OSSEOUS PROMINENCE FIRST METATARSAL, ARTHRODESIS OF FIRST METATARSOPHALANGEAL JOINT, BONE MARROW HARVEST AND CONCENTRATION RIGHT TIBIA performed by Nataly Salazar DPM at [place] [address] Left 9/3/15    CARPAL TUNNEL RELEASE Right 9/22/15    COLONOSCOPY      FINGER TRIGGER RELEASE Left 9/3/15    middle and ring fingers    FINGER TRIGGER RELEASE Right 9/22/15    middle and ring fingers    FOOT SURGERY Bilateral     litteltoe    HAND SURGERY Right     Ligament    KNEE ARTHROPLASTY Right 7/26/2021    ROBOTIC ASSISTED TOTAL RIGHT KNEE REPLACEMENT performed by Kecia Blankenship MD at [facility] Right 2/2/2022 ARTHROSCOPY WITH LYSIS OF ADHESIONS, RIGHT KNEE performed by Kadeem Almodovar MD at [address] [place] [address] (CERVIX NOT REMOVED)  2003    SHOULDER ARTHROSCOPY Right 2018    Diagnostic scope, rcr, open [place]        Social HIstory  Social History     Tobacco Use    Smoking status: Former Smoker     Packs/day: 1.50     Years: 3.00     Pack years: 4.50     Quit date: 1992     Years since quittin.5    Smokeless tobacco: Never Used   Vaping Use    Vaping Use: Never used   Substance Use Topics    Alcohol use: No    Drug use: No       Family History  Family History   Problem Relation Age of Onset    Heart Disease Mother     High Blood Pressure Mother     Stroke Mother     Cancer Brother         lung cancer       Allergies   Allergies   Allergen Reactions    Codeine Nausea And Vomiting and Rash    Vicodin [Hydrocodone-Acetaminophen] Nausea And Vomiting    Lipitor [Atorvastatin]      Myalgias    Oxycontin [Oxycodone Hcl] Itching    Tramadol Itching and Swelling       Medications:     Home Medications:  Were reviewed and are listed in nursing record. and/or listed below    Prior to Admission medications    Medication Sig Start Date End Date Taking?  Authorizing Provider   valsartan (DIOVAN) 80 MG tablet Take 1 tablet by mouth daily 22  Yes Devonte Jackson MD   TRULICITY 1.5 EI/7.0[facility] INJECT THE CONTENTS OF ONE  PEN SUBCUTANEOUSLY WEEKLY 22  Yes Merritt Finnegan MD   DULoxetine (CYMBALTA) 60 MG extended release capsule Take 1 capsule by mouth daily 22  Yes Merritt Finnegan MD   traZODone (DESYREL) 100 MG tablet TAKE 1 TABLET BY MOUTH AT  NIGHT 22  Yes Merritt Finnegan MD   metFORMIN (GLUCOPHAGE) 500 MG tablet TAKE 2 TABLETS BY MOUTH TWICE DAILY WITH MEALS 22  Yes Merritt Finnegan MD   insulin glargine (LANTUS SOLOSTAR) 100 UNIT/ML injection pen INJECT SUBCUTANEOUSLY 12  UNITS ONCE NIGHTLY 22  Yes Merritt Finnegan MD   omeprazole (PRILOSEC) 40 MG delayed release Positive for chest pain and palpitations. Negative for leg swelling. Gastrointestinal: Negative for abdominal distention, abdominal pain, blood in stool and vomiting. Endocrine: Negative for cold intolerance, heat intolerance and polyuria. Genitourinary: Negative for difficulty urinating, dysuria, frequency and hematuria. Musculoskeletal: Negative for back pain, joint swelling, myalgias and neck pain. Skin: Negative for color change, pallor and rash. Allergic/Immunologic: Negative for immunocompromised state. Neurological: Negative for dizziness, syncope, weakness, light-headedness, numbness and headaches. Hematological: Negative for adenopathy. Does not bruise/bleed easily. Psychiatric/Behavioral: Negative for behavioral problems, confusion, decreased concentration and suicidal ideas. The patient is not nervous/anxious. Vitals:    07/11/22 1529   BP: (!) 152/90   Pulse: 94    Weight: 204 lb (92.5 kg)       Vitals:    07/11/22 1524 07/11/22 1529   BP: (!) 162/100 (!) 152/90   Site: Left Upper Arm Left Upper Arm   Position: Sitting Sitting   Cuff Size: Medium Adult Medium Adult   Pulse: 90 94   Weight: 204 lb (92.5 kg)    Height: 5' 4\" (1.626 m)        BP Readings from Last 3 Encounters:   07/11/22 (!) 152/90   06/30/22 120/80   06/23/22 118/76       Wt Readings from Last 3 Encounters:   07/11/22 204 lb (92.5 kg)   06/30/22 208 lb 6.4 oz (94.5 kg)   06/24/22 203 lb (92.1 kg)       Physical Exam  Constitutional:       General: She is not in acute distress. Appearance: She is well-developed. She is not diaphoretic. HENT:      Head: Normocephalic and atraumatic. Eyes:      Pupils: Pupils are equal, round, and reactive to light. Neck:      Thyroid: No thyromegaly. Vascular: No JVD. Cardiovascular:      Rate and Rhythm: Normal rate and regular rhythm. Chest Wall: PMI is not displaced. Heart sounds: Normal heart sounds, S1 normal and S2 normal. No murmur heard.   No 70 02/21/2020    LDLCALC 73 10/19/2019    LDLCALC 90 12/07/2018     Lab Results   Component Value Date    LABVLDL 18 02/21/2020    LABVLDL 15 10/19/2019    LABVLDL 19 12/07/2018     No results found for: [facility]    Lab Results   Component Value Date    INR 0.90 07/12/2021    INR 0.94 10/17/2019    INR 0.87 04/19/2016    PROTIME 10.1 07/12/2021    PROTIME 10.7 10/17/2019    PROTIME 9.9 04/19/2016       The ASCVD Risk score (Jeremias Booker, et al., 2013) failed to calculate for the following reasons: The patient has a prior MI or stroke diagnosis      Imaging:       Last ECG (if available):  NSR, NSST changes     Last Monitor/Holter    Last Stress (if available): 5/12/21  Summary    Pharmacological Stress/MPI Results:        1. Technically a satisfactory study.    2. No evidence of Ischemia by Myocardial Perfusion Imaging.    3. Gated Study shows normal LV size and Systolic function; EF is 70 %. Last Cath (if available):    Last TTE/TUSHAR(if available): 10/17/19   Summary   There is mildly increased left ventricular wall thickness. Left ventricular cavity size is normal.   Ejection fraction is visually estimated to be 55-60%. Normal diastolic function. The aortic valve is structurally normal.   No evidence of aortic valve regurgitation. The mitral valve is normal in structure and function. Tricuspid valve is structurally normal.   No evidence of tricuspid regurgitation. Last CMR  (if available):      Assessment / Plan:     Essential hypertension  Uncontrolled. Increase Diovan to 80 mg. Follow-up with Larissa in 2 weeks for further medication adjustments    SOB (shortness of breath)  Unlikely to be cardiac. Normal echo and nuclear. referred to pulm    Chest pain  Non cardiac  Normal stress    Follow up in 6 months. I had the opportunity to review the clinical symptoms and presentation of Abby Cruz Dr. Patient's allergies and medications were reviewed and updated.  Patient's past medical, surgical, social and family history were reviewed and updated. Patient's testing including laboratory, ECGs, monitor, imaging (TTE,TUSHAR,CMR,cath) were reviewed. All questions and concerns were addressed to the patient/family. Alternatives to my treatment were discussed. The note was completed using EMR. Every effort wasmade to ensure accuracy; however, inadvertent computerized transcription errors may be present. Thank you for allowing me to participate in thecare or Daphnie Price R.  Adriana Nick MD, [facility], [facility]

## 2022-07-21 DIAGNOSIS — I10 ESSENTIAL HYPERTENSION: ICD-10-CM

## 2022-07-21 RX ORDER — VALSARTAN 80 MG/1
80 TABLET ORAL DAILY
Qty: 90 TABLET | Refills: 3 | Status: SHIPPED | OUTPATIENT
Start: 2022-07-21 | End: 2022-07-25 | Stop reason: DRUGHIGH

## 2022-07-25 ENCOUNTER — OFFICE VISIT (OUTPATIENT)
Dept: CARDIOLOGY CLINIC | Age: 67
End: 2022-07-25
Payer: MEDICARE

## 2022-07-25 VITALS
BODY MASS INDEX: 35.02 KG/M2 | SYSTOLIC BLOOD PRESSURE: 136 MMHG | DIASTOLIC BLOOD PRESSURE: 84 MMHG | OXYGEN SATURATION: 96 % | WEIGHT: 204 LBS | HEART RATE: 91 BPM

## 2022-07-25 DIAGNOSIS — E78.2 MIXED HYPERLIPIDEMIA: ICD-10-CM

## 2022-07-25 DIAGNOSIS — R00.2 PALPITATIONS: ICD-10-CM

## 2022-07-25 DIAGNOSIS — G47.33 OSA (OBSTRUCTIVE SLEEP APNEA): Primary | ICD-10-CM

## 2022-07-25 DIAGNOSIS — I10 ESSENTIAL HYPERTENSION: ICD-10-CM

## 2022-07-25 PROCEDURE — 1123F ACP DISCUSS/DSCN MKR DOCD: CPT | Performed by: NURSE PRACTITIONER

## 2022-07-25 PROCEDURE — 99214 OFFICE O/P EST MOD 30 MIN: CPT | Performed by: NURSE PRACTITIONER

## 2022-07-25 RX ORDER — VALSARTAN 160 MG/1
160 TABLET ORAL DAILY
Qty: 30 TABLET | Refills: 1 | Status: SHIPPED | OUTPATIENT
Start: 2022-07-25 | End: 2022-09-12 | Stop reason: SDUPTHER

## 2022-07-25 NOTE — PROGRESS NOTES
CC HTN    HPI:  78 y.o. patient of Dr Shandra Peace with atypical chest pain, SOB, fatigue, HTN, HLD and DIPTI who is here for BP med titration. She continues to have her a typical chest pain radiating to the back. She has been referred to pulmonology for further evaluation. She denies LH/dizziness, palpitations, syncope or falls. No LE edema, orthopnea, PND or weight gain. No n/v/d, fever or GI/ bleeding.      Past Medical History:   Diagnosis Date    Anesthesia complication     \" shaking\"    Arthritis     Cardiomyopathy ([address]     COVID-19     1/7/2022    Diabetes     GERD (gastroesophageal reflux disease)     Hyperlipidemia     Hypertension     Lumbar disc disease     Prolonged emergence from general anesthesia     Sleep apnea     pt states does use a Cpap machine at night    Unspecified cerebral artery occlusion with cerebral infarction 2014    right side weak    Wears glasses      Past Surgical History:   Procedure Laterality Date    ARTHRODESIS Right 9/17/2020    (RIGHT) REMOVAL OF BONE SPURRING AND OSSEOUS PROMINENCE FIRST METATARSAL, ARTHRODESIS OF FIRST METATARSOPHALANGEAL JOINT, BONE MARROW HARVEST AND CONCENTRATION RIGHT TIBIA performed by Heidy Salas DPM at [address] Left 9/3/15    CARPAL TUNNEL RELEASE Right 9/22/15    COLONOSCOPY      FINGER TRIGGER RELEASE Left 9/3/15    middle and ring fingers    FINGER TRIGGER RELEASE Right 9/22/15    middle and ring fingers    FOOT SURGERY Bilateral     litteltoe    HAND SURGERY Right     Ligament    KNEE ARTHROPLASTY Right 7/26/2021    ROBOTIC ASSISTED TOTAL RIGHT KNEE REPLACEMENT performed by Theresa Donnelly MD at [address] ARTHROSCOPY Right 2/2/2022    ARTHROSCOPY WITH LYSIS OF ADHESIONS, RIGHT KNEE performed by Theresa Donnelly MD at [facility] (CERVIX NOT REMOVED)  08/2003    SHOULDER ARTHROSCOPY Right 06/20/2018    Diagnostic scope, rcr, open [place]     Family History   Problem Relation Age of Onset Heart Disease Mother     High Blood Pressure Mother     Stroke Mother     Cancer Brother         lung cancer     Social History     Tobacco Use    Smoking status: Former     Packs/day: 1.50     Years: 3.00     Pack years: 4.50     Types: Cigarettes     Quit date: 1992     Years since quittin.5    Smokeless tobacco: Never   Vaping Use    Vaping Use: Never used   Substance Use Topics    Alcohol use: No    Drug use: No     Allergies:Codeine, Vicodin [hydrocodone-acetaminophen], Lipitor [atorvastatin], Oxycontin [oxycodone hcl], and Tramadol    Review of Systems  General: No changes in weight, fatigue, or night sweats. HEENT: No blurry or decreased vision. No changes in hearing, nasal discharge or sore throat. Cardiovascular:  See HPI. Respiratory: No cough, hemoptysis, or wheezing. Gastrointestinal:  No abdominal pain, hematochezia, melana, constipation, diarrhea, or history of GI ulcers. Genito-Urinary: No dysuria or hematuria. No urgency or polyuria. Musculoskeletal:  No complaints of joint pain, joint swelling or muscular weakness/soreness. Neurological:  No dizziness, headaches, numbness/tingling, speech problems or weakness. Psychological:  No anxiety or depression. Hematological and Lymphatic: No abnormal bleeding or bruising, blood clots, jaundice or swollen lymph nodes. Endocrine:   No malaise/lethargy, palpitations, polydipsia/polyuria, temperature intolerance or unexpected weight changes  Skin:  No rashes or non-healing ulcers. Physical Exam:  /84   Pulse 91   Wt 204 lb (92.5 kg)   SpO2 96%   BMI 35.02 kg/m²    Repeat 148/88  General (appearance):  No acute distress  Eyes: anicteric   Neck: soft, No JVD  Ears/Nose/Mouth/Thorat: No cyanosis  CV: RRR   Respiratory:  clear, normal effort  GI: soft, non-tender, non-distended  Skin: Warm, dry. No rashes  Neuro/Psych: Alert and oriented x 3. Appropriate behavior  Ext:  No c/c.  No edema  Pulses:  2+ radial     Weight  Wt Readings from Last 3 Encounters:   22 204 lb (92.5 kg)   22 208 lb 6.4 oz (94.5 kg)   22 203 lb (92.1 kg)          CBC:   Lab Results   Component Value Date    WBC 5.3 2022    HGB 12.4 2022    HCT 38.0 2022    MCV 79.5 (L) 2022     2022     BMP:  Lab Results   Component Value Date    CREATININE 0.8 2022    BUN 10 2022     2022    K 4.5 2022     2022    CO2 23 2022     Mag:   Lab Results   Component Value Date/Time    MG 1.70 2021 01:50 PM     LIVER PROFILE:   Lab Results   Component Value Date    ALT 13 2021    AST 23 2021    ALKPHOS 92 2021    BILITOT <0.2 2021     PT/INR:   Lab Results   Component Value Date    INR 0.90 2021    INR 0.94 10/17/2019    INR 0.87 2016    PROTIME 10.1 2021    PROTIME 10.7 10/17/2019    PROTIME 9.9 2016     Pro-BNP   Lab Results   Component Value Date/Time    PROBNP 25 2021 08:26 AM    PROBNP 14 2021 07:57 AM    PROBNP 49 2021 11:50 AM     LIPIDS:  No components found for: [facility]LPL  Lab Results   Component Value Date    TRIG 92 2020    TRIG 73 10/19/2019    TRIG 94 2018     Lab Results   Component Value Date    HDL 57 2020    HDL 56 10/19/2019    HDL 57 2018     Lab Results   Component Value Date    LDLCALC 70 2020    [address] 10/19/2019    LDLCALC 90 2018     Lab Results   Component Value Date    LABVLDL 18 2020    LABVLDL 15 10/19/2019    LABVLDL 19 2018     TSH:  Lab Results   Component Value Date    TSH 3.97 2017       IMAGIN CTA chest     No pulmonary embolus identified. There is mild coronary artery calcification. Mild bronchiectasis. Scattered subpleural reticular opacities are also seen,   suggesting scarring or nonspecific pulmonary fibrosis.        Abdomen and pelvis:       No acute intra-abdominal abnormality     2021 Nuc stress 1. Technically a satisfactory study. 2. No evidence of Ischemia by Myocardial Perfusion Imaging. 3. Gated Study shows normal LV size and Systolic function; EF is 70 %. 2019 Echo     There is mildly increased left ventricular wall thickness. Left ventricular cavity size is normal.   Ejection fraction is visually estimated to be 55-60%. Normal diastolic function. The aortic valve is structurally normal.   No evidence of aortic valve regurgitation. The mitral valve is normal in structure and function. Tricuspid valve is structurally normal.   No evidence of tricuspid regurgitation. Medications:   Current Outpatient Medications   Medication Sig Dispense Refill    valsartan (DIOVAN) 80 MG tablet TAKE 1 TABLET BY MOUTH DAILY 90 tablet 3    TRULICITY 1.5 BR/9.7SC SOPN INJECT THE CONTENTS OF ONE  PEN SUBCUTANEOUSLY WEEKLY 6 mL 3    DULoxetine (CYMBALTA) 60 MG extended release capsule Take 1 capsule by mouth daily 90 capsule 1    traZODone (DESYREL) 100 MG tablet TAKE 1 TABLET BY MOUTH AT  NIGHT 90 tablet 1    metFORMIN (GLUCOPHAGE) 500 MG tablet TAKE 2 TABLETS BY MOUTH TWICE DAILY WITH MEALS 360 tablet 0    insulin glargine (LANTUS SOLOSTAR) 100 UNIT/ML injection pen INJECT SUBCUTANEOUSLY 12  UNITS ONCE NIGHTLY 15 mL 3    omeprazole (PRILOSEC) 40 MG delayed release capsule TAKE 1 CAPSULE BY MOUTH EVERY MORNING BEFORE BREAKFAST 90 capsule 1    FANAPT 1 MG TABS tablet Take 1 tablet by mouth nightly 90 tablet 0    ondansetron (ZOFRAN ODT) 4 MG disintegrating tablet Take 1-2 tablets by mouth every 12 hours as needed for Nausea or Vomiting May Sub regular tablet (non-ODT) if insurance does not cover ODT. 12 tablet 0    blood glucose test strips (ONETOUCH ULTRA) strip As needed.  100 strip 4    ibuprofen (ADVIL;MOTRIN) 600 MG tablet TAKE 1 TABLET BY MOUTH TWICE DAILY WITH MEALS 60 tablet 1    furosemide (LASIX) 20 MG tablet TAKE 1 TABLET BY MOUTH  DAILY AS NEEDED FOR LEG  SWELLING 90 tablet 3    dextran 70-hypromellose (TEARS NATURALE) 0.1-0.3 % SOLN opthalmic solution as needed for itching      latanoprost (XALATAN) 0.005 % ophthalmic solution Place 1 drop into both eyes nightly       albuterol sulfate HFA (PROVENTIL HFA) 108 (90 Base) MCG/ACT inhaler Inhale 2 puffs into the lungs every 4 hours as needed for Wheezing or Shortness of Breath May substitute ProAir MDI 1 Inhaler 5    ipratropium-albuterol (DUONEB) 0.5-2.5 (3) MG/3ML SOLN nebulizer solution Inhale 3 mLs into the lungs every 4 hours as needed for Shortness of Breath (wheezing coughing) 360 mL 5    ammonium lactate (LAC-HYDRIN) 12 % lotion Apply topically daily. (Patient taking differently: as needed Apply topically daily. ) 1 Bottle 1     No current facility-administered medications for this visit. Assessment:  1. DIPTI (obstructive sleep apnea)    2. Essential hypertension    3. Palpitations    4.  Mixed hyperlipidemia        Plan:    HTN: acute   BP remains elevated     Increase valsartan to 160 mg po daily   BMP  HLD: stable   Intolerant of statin  DIPTI: stable    CPAP   Pulmonology consult  Palpitations: stable    No c/o       Reviewed most recent: CBC, BMP, LFT, Lipids, Mag, PT/INR, BNP  Reviewed most recent: ECG, Echo, Nuc stress test,  CTA,

## 2022-07-29 RX ORDER — DULOXETIN HYDROCHLORIDE 60 MG/1
60 CAPSULE, DELAYED RELEASE ORAL DAILY
Qty: 90 CAPSULE | Refills: 1 | Status: SHIPPED | OUTPATIENT
Start: 2022-07-29

## 2022-07-29 RX ORDER — FUROSEMIDE 20 MG/1
TABLET ORAL
Qty: 90 TABLET | Refills: 3 | Status: SHIPPED | OUTPATIENT
Start: 2022-07-29

## 2022-07-30 DIAGNOSIS — E11.8 TYPE 2 DIABETES MELLITUS WITH COMPLICATION, WITHOUT LONG-TERM CURRENT USE OF INSULIN (HCC): ICD-10-CM

## 2022-08-05 DIAGNOSIS — I10 ESSENTIAL HYPERTENSION: ICD-10-CM

## 2022-08-05 LAB
ANION GAP SERPL CALCULATED.3IONS-SCNC: 12 MMOL/L (ref 3–16)
BUN BLDV-MCNC: 14 MG/DL (ref 7–20)
CALCIUM SERPL-MCNC: 9.7 MG/DL (ref 8.3–10.6)
CHLORIDE BLD-SCNC: 108 MMOL/L (ref 99–110)
CO2: 23 MMOL/L (ref 21–32)
CREAT SERPL-MCNC: 0.9 MG/DL (ref 0.6–1.2)
GFR AFRICAN AMERICAN: >60
GFR NON-AFRICAN AMERICAN: >60
GLUCOSE BLD-MCNC: 157 MG/DL (ref 70–99)
POTASSIUM SERPL-SCNC: 4.4 MMOL/L (ref 3.5–5.1)
SODIUM BLD-SCNC: 143 MMOL/L (ref 136–145)

## 2022-08-16 DIAGNOSIS — F39 MOOD DISORDER (HCC): ICD-10-CM

## 2022-08-16 RX ORDER — ILOPERIDONE 1 MG/1
TABLET ORAL
Qty: 90 TABLET | Refills: 0 | Status: SHIPPED | OUTPATIENT
Start: 2022-08-16

## 2022-08-17 ENCOUNTER — TELEPHONE (OUTPATIENT)
Dept: FAMILY MEDICINE CLINIC | Age: 67
End: 2022-08-17

## 2022-08-17 RX ORDER — AMOXICILLIN 500 MG/1
1000 CAPSULE ORAL ONCE
Qty: 2 CAPSULE | Refills: 0 | Status: SHIPPED | OUTPATIENT
Start: 2022-08-17 | End: 2022-08-17

## 2022-08-17 NOTE — TELEPHONE ENCOUNTER
Pt called in stated she is having a dental procedure tomorrow wants a antibiotic called in   Advised messages take 24-48 hrs     Please advise

## 2022-08-19 ENCOUNTER — TELEPHONE (OUTPATIENT)
Dept: CARDIOLOGY CLINIC | Age: 67
End: 2022-08-19

## 2022-08-19 NOTE — TELEPHONE ENCOUNTER
----- Message from JEISON Cintron - CNP sent at 8/19/2022  9:11 AM EDT -----  Labs stable.    Cont valsartan

## 2022-09-09 ENCOUNTER — HOSPITAL ENCOUNTER (INPATIENT)
Age: 67
LOS: 2 days | Discharge: HOME | DRG: 305 | End: 2022-09-11
Attending: EMERGENCY MEDICINE | Admitting: INTERNAL MEDICINE
Payer: MEDICARE

## 2022-09-09 ENCOUNTER — APPOINTMENT (OUTPATIENT)
Dept: CT IMAGING | Age: 67
DRG: 305 | End: 2022-09-09
Payer: MEDICARE

## 2022-09-09 ENCOUNTER — APPOINTMENT (OUTPATIENT)
Dept: GENERAL RADIOLOGY | Age: 67
DRG: 305 | End: 2022-09-09
Payer: MEDICARE

## 2022-09-09 DIAGNOSIS — I10 HYPERTENSION, UNSPECIFIED TYPE: ICD-10-CM

## 2022-09-09 DIAGNOSIS — R07.9 CHEST PAIN, UNSPECIFIED TYPE: Primary | ICD-10-CM

## 2022-09-09 DIAGNOSIS — E11.8 TYPE 2 DIABETES MELLITUS WITH COMPLICATION, WITHOUT LONG-TERM CURRENT USE OF INSULIN (HCC): ICD-10-CM

## 2022-09-09 LAB
A/G RATIO: 1.3 (ref 1.1–2.2)
ALBUMIN SERPL-MCNC: 3.8 G/DL (ref 3.4–5)
ALP BLD-CCNC: 91 U/L (ref 40–129)
ALT SERPL-CCNC: 15 U/L (ref 10–40)
ANION GAP SERPL CALCULATED.3IONS-SCNC: 9 MMOL/L (ref 3–16)
AST SERPL-CCNC: 25 U/L (ref 15–37)
BASOPHILS ABSOLUTE: 0 K/UL (ref 0–0.2)
BASOPHILS RELATIVE PERCENT: 0.9 %
BILIRUB SERPL-MCNC: <0.2 MG/DL (ref 0–1)
BUN BLDV-MCNC: 7 MG/DL (ref 7–20)
CALCIUM SERPL-MCNC: 9.3 MG/DL (ref 8.3–10.6)
CHLORIDE BLD-SCNC: 108 MMOL/L (ref 99–110)
CO2: 26 MMOL/L (ref 21–32)
CREAT SERPL-MCNC: 0.9 MG/DL (ref 0.6–1.2)
EOSINOPHILS ABSOLUTE: 0.3 K/UL (ref 0–0.6)
EOSINOPHILS RELATIVE PERCENT: 6.1 %
GFR AFRICAN AMERICAN: >60
GFR NON-AFRICAN AMERICAN: >60
GLUCOSE BLD-MCNC: 132 MG/DL (ref 70–99)
GLUCOSE BLD-MCNC: 184 MG/DL (ref 70–99)
HCT VFR BLD CALC: 34.9 % (ref 36–48)
HEMOGLOBIN: 11.3 G/DL (ref 12–16)
LYMPHOCYTES ABSOLUTE: 1.9 K/UL (ref 1–5.1)
LYMPHOCYTES RELATIVE PERCENT: 45.2 %
MCH RBC QN AUTO: 26.7 PG (ref 26–34)
MCHC RBC AUTO-ENTMCNC: 32.4 G/DL (ref 31–36)
MCV RBC AUTO: 82.3 FL (ref 80–100)
MONOCYTES ABSOLUTE: 0.3 K/UL (ref 0–1.3)
MONOCYTES RELATIVE PERCENT: 7.2 %
NEUTROPHILS ABSOLUTE: 1.7 K/UL (ref 1.7–7.7)
NEUTROPHILS RELATIVE PERCENT: 40.6 %
PDW BLD-RTO: 14.5 % (ref 12.4–15.4)
PERFORMED ON: ABNORMAL
PLATELET # BLD: 300 K/UL (ref 135–450)
PMV BLD AUTO: 7.3 FL (ref 5–10.5)
POTASSIUM REFLEX MAGNESIUM: 4.1 MMOL/L (ref 3.5–5.1)
PRO-BNP: 116 PG/ML (ref 0–124)
RBC # BLD: 4.25 M/UL (ref 4–5.2)
SODIUM BLD-SCNC: 143 MMOL/L (ref 136–145)
TOTAL PROTEIN: 6.7 G/DL (ref 6.4–8.2)
TROPONIN: <0.01 NG/ML
WBC # BLD: 4.3 K/UL (ref 4–11)

## 2022-09-09 PROCEDURE — 80053 COMPREHEN METABOLIC PANEL: CPT

## 2022-09-09 PROCEDURE — 6370000000 HC RX 637 (ALT 250 FOR IP): Performed by: EMERGENCY MEDICINE

## 2022-09-09 PROCEDURE — 6370000000 HC RX 637 (ALT 250 FOR IP): Performed by: INTERNAL MEDICINE

## 2022-09-09 PROCEDURE — 2580000003 HC RX 258: Performed by: INTERNAL MEDICINE

## 2022-09-09 PROCEDURE — 83036 HEMOGLOBIN GLYCOSYLATED A1C: CPT

## 2022-09-09 PROCEDURE — 71275 CT ANGIOGRAPHY CHEST: CPT

## 2022-09-09 PROCEDURE — 96375 TX/PRO/DX INJ NEW DRUG ADDON: CPT

## 2022-09-09 PROCEDURE — 6360000002 HC RX W HCPCS: Performed by: EMERGENCY MEDICINE

## 2022-09-09 PROCEDURE — 1200000000 HC SEMI PRIVATE

## 2022-09-09 PROCEDURE — 93005 ELECTROCARDIOGRAM TRACING: CPT | Performed by: EMERGENCY MEDICINE

## 2022-09-09 PROCEDURE — 96374 THER/PROPH/DIAG INJ IV PUSH: CPT

## 2022-09-09 PROCEDURE — 99285 EMERGENCY DEPT VISIT HI MDM: CPT

## 2022-09-09 PROCEDURE — 84484 ASSAY OF TROPONIN QUANT: CPT

## 2022-09-09 PROCEDURE — 6360000002 HC RX W HCPCS: Performed by: INTERNAL MEDICINE

## 2022-09-09 PROCEDURE — 2580000003 HC RX 258: Performed by: EMERGENCY MEDICINE

## 2022-09-09 PROCEDURE — 83880 ASSAY OF NATRIURETIC PEPTIDE: CPT

## 2022-09-09 PROCEDURE — 6360000004 HC RX CONTRAST MEDICATION: Performed by: EMERGENCY MEDICINE

## 2022-09-09 PROCEDURE — 71045 X-RAY EXAM CHEST 1 VIEW: CPT

## 2022-09-09 PROCEDURE — 85025 COMPLETE CBC W/AUTO DIFF WBC: CPT

## 2022-09-09 RX ORDER — ACETAMINOPHEN 325 MG/1
650 TABLET ORAL EVERY 6 HOURS PRN
Status: DISCONTINUED | OUTPATIENT
Start: 2022-09-09 | End: 2022-09-11 | Stop reason: HOSPADM

## 2022-09-09 RX ORDER — POTASSIUM CHLORIDE 20 MEQ/1
40 TABLET, EXTENDED RELEASE ORAL PRN
Status: DISCONTINUED | OUTPATIENT
Start: 2022-09-09 | End: 2022-09-11 | Stop reason: HOSPADM

## 2022-09-09 RX ORDER — IPRATROPIUM BROMIDE AND ALBUTEROL SULFATE 2.5; .5 MG/3ML; MG/3ML
1 SOLUTION RESPIRATORY (INHALATION) EVERY 4 HOURS PRN
Status: DISCONTINUED | OUTPATIENT
Start: 2022-09-09 | End: 2022-09-11 | Stop reason: HOSPADM

## 2022-09-09 RX ORDER — SODIUM CHLORIDE 0.9 % (FLUSH) 0.9 %
10 SYRINGE (ML) INJECTION PRN
Status: DISCONTINUED | OUTPATIENT
Start: 2022-09-09 | End: 2022-09-11 | Stop reason: HOSPADM

## 2022-09-09 RX ORDER — TRAZODONE HYDROCHLORIDE 100 MG/1
100 TABLET ORAL NIGHTLY
Status: DISCONTINUED | OUTPATIENT
Start: 2022-09-09 | End: 2022-09-11 | Stop reason: HOSPADM

## 2022-09-09 RX ORDER — HYDRALAZINE HYDROCHLORIDE 20 MG/ML
10 INJECTION INTRAMUSCULAR; INTRAVENOUS EVERY 6 HOURS PRN
Status: DISCONTINUED | OUTPATIENT
Start: 2022-09-09 | End: 2022-09-11 | Stop reason: HOSPADM

## 2022-09-09 RX ORDER — POTASSIUM CHLORIDE 7.45 MG/ML
10 INJECTION INTRAVENOUS PRN
Status: DISCONTINUED | OUTPATIENT
Start: 2022-09-09 | End: 2022-09-11 | Stop reason: HOSPADM

## 2022-09-09 RX ORDER — MAGNESIUM SULFATE IN WATER 40 MG/ML
2000 INJECTION, SOLUTION INTRAVENOUS PRN
Status: DISCONTINUED | OUTPATIENT
Start: 2022-09-09 | End: 2022-09-11 | Stop reason: HOSPADM

## 2022-09-09 RX ORDER — ENOXAPARIN SODIUM 100 MG/ML
40 INJECTION SUBCUTANEOUS DAILY
Status: DISCONTINUED | OUTPATIENT
Start: 2022-09-09 | End: 2022-09-11 | Stop reason: HOSPADM

## 2022-09-09 RX ORDER — MULTIVITAMIN WITH IRON
100 TABLET ORAL DAILY
COMMUNITY

## 2022-09-09 RX ORDER — DEXTROSE MONOHYDRATE 100 MG/ML
INJECTION, SOLUTION INTRAVENOUS CONTINUOUS PRN
Status: DISCONTINUED | OUTPATIENT
Start: 2022-09-09 | End: 2022-09-11 | Stop reason: HOSPADM

## 2022-09-09 RX ORDER — VALSARTAN 80 MG/1
160 TABLET ORAL DAILY
Status: DISCONTINUED | OUTPATIENT
Start: 2022-09-10 | End: 2022-09-11 | Stop reason: HOSPADM

## 2022-09-09 RX ORDER — CHLORTHALIDONE 25 MG/1
25 TABLET ORAL DAILY
Status: DISCONTINUED | OUTPATIENT
Start: 2022-09-10 | End: 2022-09-11 | Stop reason: HOSPADM

## 2022-09-09 RX ORDER — ASPIRIN 81 MG/1
81 TABLET ORAL DAILY
COMMUNITY

## 2022-09-09 RX ORDER — DIPHENHYDRAMINE HYDROCHLORIDE 50 MG/ML
25 INJECTION INTRAMUSCULAR; INTRAVENOUS ONCE
Status: COMPLETED | OUTPATIENT
Start: 2022-09-09 | End: 2022-09-09

## 2022-09-09 RX ORDER — MORPHINE SULFATE 4 MG/ML
4 INJECTION, SOLUTION INTRAMUSCULAR; INTRAVENOUS
Status: COMPLETED | OUTPATIENT
Start: 2022-09-09 | End: 2022-09-10

## 2022-09-09 RX ORDER — DULOXETIN HYDROCHLORIDE 60 MG/1
60 CAPSULE, DELAYED RELEASE ORAL DAILY
Status: DISCONTINUED | OUTPATIENT
Start: 2022-09-10 | End: 2022-09-11 | Stop reason: HOSPADM

## 2022-09-09 RX ORDER — METOCLOPRAMIDE HYDROCHLORIDE 5 MG/ML
10 INJECTION INTRAMUSCULAR; INTRAVENOUS ONCE
Status: COMPLETED | OUTPATIENT
Start: 2022-09-09 | End: 2022-09-09

## 2022-09-09 RX ORDER — INSULIN GLARGINE 100 [IU]/ML
12 INJECTION, SOLUTION SUBCUTANEOUS NIGHTLY
Status: DISCONTINUED | OUTPATIENT
Start: 2022-09-09 | End: 2022-09-11 | Stop reason: HOSPADM

## 2022-09-09 RX ORDER — SODIUM CHLORIDE 0.9 % (FLUSH) 0.9 %
10 SYRINGE (ML) INJECTION EVERY 12 HOURS SCHEDULED
Status: DISCONTINUED | OUTPATIENT
Start: 2022-09-09 | End: 2022-09-11 | Stop reason: HOSPADM

## 2022-09-09 RX ORDER — LANOLIN ALCOHOL/MO/W.PET/CERES
100 CREAM (GRAM) TOPICAL DAILY
Status: DISCONTINUED | OUTPATIENT
Start: 2022-09-10 | End: 2022-09-11 | Stop reason: HOSPADM

## 2022-09-09 RX ORDER — ONDANSETRON 2 MG/ML
4 INJECTION INTRAMUSCULAR; INTRAVENOUS EVERY 6 HOURS PRN
Status: DISCONTINUED | OUTPATIENT
Start: 2022-09-09 | End: 2022-09-11 | Stop reason: HOSPADM

## 2022-09-09 RX ORDER — SODIUM CHLORIDE, SODIUM LACTATE, POTASSIUM CHLORIDE, AND CALCIUM CHLORIDE .6; .31; .03; .02 G/100ML; G/100ML; G/100ML; G/100ML
2000 INJECTION, SOLUTION INTRAVENOUS ONCE
Status: COMPLETED | OUTPATIENT
Start: 2022-09-09 | End: 2022-09-09

## 2022-09-09 RX ORDER — PSYLLIUM HUSK 0.4 G
1 CAPSULE ORAL DAILY
COMMUNITY

## 2022-09-09 RX ORDER — PANTOPRAZOLE SODIUM 40 MG/1
40 TABLET, DELAYED RELEASE ORAL
Refills: 1 | Status: DISCONTINUED | OUTPATIENT
Start: 2022-09-10 | End: 2022-09-11 | Stop reason: HOSPADM

## 2022-09-09 RX ORDER — FUROSEMIDE 20 MG/1
20 TABLET ORAL DAILY
Status: DISCONTINUED | OUTPATIENT
Start: 2022-09-10 | End: 2022-09-11 | Stop reason: HOSPADM

## 2022-09-09 RX ORDER — ASPIRIN 81 MG/1
81 TABLET ORAL DAILY
Status: DISCONTINUED | OUTPATIENT
Start: 2022-09-10 | End: 2022-09-11 | Stop reason: HOSPADM

## 2022-09-09 RX ORDER — INSULIN LISPRO 100 [IU]/ML
0-8 INJECTION, SOLUTION INTRAVENOUS; SUBCUTANEOUS
Status: DISCONTINUED | OUTPATIENT
Start: 2022-09-10 | End: 2022-09-11 | Stop reason: HOSPADM

## 2022-09-09 RX ORDER — ALBUTEROL SULFATE 90 UG/1
2 AEROSOL, METERED RESPIRATORY (INHALATION) EVERY 4 HOURS PRN
Status: DISCONTINUED | OUTPATIENT
Start: 2022-09-09 | End: 2022-09-11 | Stop reason: HOSPADM

## 2022-09-09 RX ORDER — LABETALOL HYDROCHLORIDE 5 MG/ML
10 INJECTION, SOLUTION INTRAVENOUS EVERY 4 HOURS PRN
Status: DISCONTINUED | OUTPATIENT
Start: 2022-09-09 | End: 2022-09-11 | Stop reason: HOSPADM

## 2022-09-09 RX ORDER — INSULIN LISPRO 100 [IU]/ML
0-4 INJECTION, SOLUTION INTRAVENOUS; SUBCUTANEOUS NIGHTLY
Status: DISCONTINUED | OUTPATIENT
Start: 2022-09-09 | End: 2022-09-11 | Stop reason: HOSPADM

## 2022-09-09 RX ORDER — ACETAMINOPHEN 650 MG/1
650 SUPPOSITORY RECTAL EVERY 6 HOURS PRN
Status: DISCONTINUED | OUTPATIENT
Start: 2022-09-09 | End: 2022-09-11 | Stop reason: HOSPADM

## 2022-09-09 RX ORDER — PROMETHAZINE HYDROCHLORIDE 25 MG/1
12.5 TABLET ORAL EVERY 6 HOURS PRN
Status: DISCONTINUED | OUTPATIENT
Start: 2022-09-09 | End: 2022-09-11 | Stop reason: HOSPADM

## 2022-09-09 RX ORDER — ASPIRIN 81 MG/1
324 TABLET, CHEWABLE ORAL ONCE
Status: COMPLETED | OUTPATIENT
Start: 2022-09-09 | End: 2022-09-09

## 2022-09-09 RX ORDER — SODIUM CHLORIDE 9 MG/ML
INJECTION, SOLUTION INTRAVENOUS PRN
Status: DISCONTINUED | OUTPATIENT
Start: 2022-09-09 | End: 2022-09-11 | Stop reason: HOSPADM

## 2022-09-09 RX ADMIN — INSULIN GLARGINE 12 UNITS: 100 INJECTION, SOLUTION SUBCUTANEOUS at 20:46

## 2022-09-09 RX ADMIN — SODIUM CHLORIDE, PRESERVATIVE FREE 10 ML: 5 INJECTION INTRAVENOUS at 20:43

## 2022-09-09 RX ADMIN — DIPHENHYDRAMINE HYDROCHLORIDE 25 MG: 50 INJECTION INTRAMUSCULAR; INTRAVENOUS at 13:37

## 2022-09-09 RX ADMIN — MORPHINE SULFATE 4 MG: 4 INJECTION, SOLUTION INTRAMUSCULAR; INTRAVENOUS at 12:19

## 2022-09-09 RX ADMIN — NITROGLYCERIN 1 INCH: 20 OINTMENT TOPICAL at 15:27

## 2022-09-09 RX ADMIN — SODIUM CHLORIDE, POTASSIUM CHLORIDE, SODIUM LACTATE AND CALCIUM CHLORIDE 2000 ML: 600; 310; 30; 20 INJECTION, SOLUTION INTRAVENOUS at 12:41

## 2022-09-09 RX ADMIN — MORPHINE SULFATE 4 MG: 4 INJECTION, SOLUTION INTRAMUSCULAR; INTRAVENOUS at 20:46

## 2022-09-09 RX ADMIN — TRAZODONE HYDROCHLORIDE 100 MG: 100 TABLET ORAL at 20:42

## 2022-09-09 RX ADMIN — ENOXAPARIN SODIUM 40 MG: 100 INJECTION SUBCUTANEOUS at 18:09

## 2022-09-09 RX ADMIN — ASPIRIN 81 MG 324 MG: 81 TABLET ORAL at 15:27

## 2022-09-09 RX ADMIN — METOCLOPRAMIDE 10 MG: 5 INJECTION, SOLUTION INTRAMUSCULAR; INTRAVENOUS at 13:36

## 2022-09-09 RX ADMIN — IOPAMIDOL 75 ML: 755 INJECTION, SOLUTION INTRAVENOUS at 13:59

## 2022-09-09 ASSESSMENT — PAIN DESCRIPTION - ONSET
ONSET: ON-GOING

## 2022-09-09 ASSESSMENT — PAIN SCALES - GENERAL
PAINLEVEL_OUTOF10: 4
PAINLEVEL_OUTOF10: 6
PAINLEVEL_OUTOF10: 5
PAINLEVEL_OUTOF10: 3
PAINLEVEL_OUTOF10: 7
PAINLEVEL_OUTOF10: 3
PAINLEVEL_OUTOF10: 6
PAINLEVEL_OUTOF10: 9
PAINLEVEL_OUTOF10: 4

## 2022-09-09 ASSESSMENT — PAIN DESCRIPTION - ORIENTATION
ORIENTATION: POSTERIOR

## 2022-09-09 ASSESSMENT — PAIN DESCRIPTION - DESCRIPTORS
DESCRIPTORS: ACHING;POUNDING;THROBBING
DESCRIPTORS: ACHING;POUNDING;THROBBING
DESCRIPTORS: ACHING
DESCRIPTORS: POUNDING;ACHING;THROBBING
DESCRIPTORS: ACHING;POUNDING;THROBBING

## 2022-09-09 ASSESSMENT — PAIN DESCRIPTION - LOCATION
LOCATION: BACK;HEAD
LOCATION: BACK;HEAD
LOCATION: HEAD
LOCATION: BACK;HEAD

## 2022-09-09 ASSESSMENT — PAIN SCALES - WONG BAKER: WONGBAKER_NUMERICALRESPONSE: 0

## 2022-09-09 ASSESSMENT — PAIN - FUNCTIONAL ASSESSMENT
PAIN_FUNCTIONAL_ASSESSMENT: 0-10
PAIN_FUNCTIONAL_ASSESSMENT: PREVENTS OR INTERFERES SOME ACTIVE ACTIVITIES AND ADLS
PAIN_FUNCTIONAL_ASSESSMENT: PREVENTS OR INTERFERES SOME ACTIVE ACTIVITIES AND ADLS
PAIN_FUNCTIONAL_ASSESSMENT: 0-10
PAIN_FUNCTIONAL_ASSESSMENT: PREVENTS OR INTERFERES SOME ACTIVE ACTIVITIES AND ADLS

## 2022-09-09 ASSESSMENT — PAIN DESCRIPTION - PAIN TYPE
TYPE: ACUTE PAIN
TYPE: ACUTE PAIN;CHRONIC PAIN
TYPE: ACUTE PAIN
TYPE: ACUTE PAIN

## 2022-09-09 ASSESSMENT — PAIN DESCRIPTION - FREQUENCY
FREQUENCY: CONTINUOUS

## 2022-09-09 ASSESSMENT — HEART SCORE: ECG: 0

## 2022-09-09 NOTE — ED PROVIDER NOTES
Emergency Physician Note        Note Open Time: 11:23 AM EDT    Chief Complaint  Chest Pain (Since 4 am) and Hypertension       History of Present Illness  John Wood is a 78 y.o. female who presents to the ED for chest pain. Patient reports she woke up this morning and had chest pain in middle of her chest that felt sharp and was moderate to severe in intensity. It does not radiate. Associate with some slight shortness of breath but no diaphoresis or palpitations or nausea. She does have a headache that is posterior and has been intermittent off and on for the last few days. Her blood pressure today at home was 200/100 which is part of the reason for presentation. She states she has a history of an enlarged heart but no history of MI or arterial blockage in the heart. She states she does have remote history of a stroke however. She states she took her medicines this morning as prescribed. 10 systems reviewed, pertinent positives per HPI otherwise noted to be negative    I have reviewed the following from the nursing documentation:      Prior to Admission medications    Medication Sig Start Date End Date Taking?  Authorizing Provider   FANAPT 1 MG TABS tablet TAKE 1 TABLET BY MOUTH EVERY NIGHT 8/16/22   Derek Finnegan MD   metFORMIN (GLUCOPHAGE) 500 MG tablet TAKE 2 TABLETS BY MOUTH TWICE DAILY WITH MEALS 8/1/22   Derek Finnegan MD   furosemide (LASIX) 20 MG tablet TAKE 1 TABLET BY MOUTH  DAILY AS NEEDED FOR LEG  SWELLING 7/29/22   Derek Finnegan MD   DULoxetine (CYMBALTA) 60 MG extended release capsule TAKE 1 CAPSULE BY MOUTH DAILY 7/29/22   Derek Finnegan MD   valsartan (DIOVAN) 160 MG tablet Take 1 tablet by mouth in the morning. 7/25/22   JEISON Caceres - CNP   TRULICITY 1.5 ES/6.6GK [facility] THE CONTENTS OF ONE  PEN SUBCUTANEOUSLY WEEKLY 6/28/22   Derek Finnegan MD   traZODone (DESYREL) 100 MG tablet TAKE 1 TABLET BY MOUTH AT  NIGHT 6/23/22   Derek Finnegan MD   insulin glargine (LANTUS SOLOSTAR) 100 UNIT/ML injection pen INJECT SUBCUTANEOUSLY 12  UNITS ONCE NIGHTLY 4/13/22   Yi Finnegan MD   omeprazole (PRILOSEC) 40 MG delayed release capsule TAKE 1 CAPSULE BY MOUTH EVERY MORNING BEFORE BREAKFAST 4/4/22   Yi Finnegan MD   ondansetron (ZOFRAN ODT) 4 MG disintegrating tablet Take 1-2 tablets by mouth every 12 hours as needed for Nausea or Vomiting May Sub regular tablet (non-ODT) if insurance does not cover ODT. 3/16/22   Mark Welch MD   blood glucose test strips (ONETOUCH ULTRA) strip As needed. 3/16/22   Yi Finnegan MD   ibuprofen (ADVIL;MOTRIN) 600 MG tablet TAKE 1 TABLET BY MOUTH TWICE DAILY WITH MEALS 12/20/21   Theresa Donnelly MD   dextran 70-hypromellose (TEARS NATURALE) 0.1-0.3 % SOLN opthalmic solution as needed for itching    Historical Provider, MD   latanoprost (XALATAN) 0.005 % ophthalmic solution Place 1 drop into both eyes nightly     Historical Provider, MD   albuterol sulfate HFA (PROVENTIL HFA) 108 (90 Base) MCG/ACT inhaler Inhale 2 puffs into the lungs every 4 hours as needed for Wheezing or Shortness of Breath May substitute ProAir MDI 1/5/21   Vidal Tovar MD   ipratropium-albuterol (DUONEB) 0.5-2.5 (3) MG/3ML SOLN nebulizer solution Inhale 3 mLs into the lungs every 4 hours as needed for Shortness of Breath (wheezing coughing) 1/5/21   Vidal Tovar MD   ammonium lactate (LAC-HYDRIN) 12 % lotion Apply topically daily. Patient taking differently: as needed Apply topically daily.  11/21/19   Mark Welch MD       Allergies as of 09/09/2022 - Fully Reviewed 09/09/2022   Allergen Reaction Noted    Codeine Nausea And Vomiting and Rash 02/15/2011    Vicodin [hydrocodone-acetaminophen] Nausea And Vomiting 02/15/2011    Lipitor [atorvastatin]  05/19/2021    Oxycontin [oxycodone hcl] Itching 04/19/2016    Tramadol Itching and Swelling 06/05/2019       Past Medical History:   Diagnosis Date    Anesthesia complication     \" shaking\"    Arthritis     Cardiomyopathy ([facility] [address]     COVID-19 1/7/2022    Diabetes     GERD (gastroesophageal reflux disease)     Hyperlipidemia     Hypertension     Lumbar disc disease     Prolonged emergence from general anesthesia     Sleep apnea     pt states does use a Cpap machine at night    Unspecified cerebral artery occlusion with cerebral infarction 2014    right side weak    Wears glasses         Surgical History:   Past Surgical History:   Procedure Laterality Date    ARTHRODESIS Right 9/17/2020    (RIGHT) REMOVAL OF BONE SPURRING AND OSSEOUS PROMINENCE FIRST METATARSAL, ARTHRODESIS OF FIRST METATARSOPHALANGEAL JOINT, BONE MARROW HARVEST AND CONCENTRATION RIGHT TIBIA performed by Enrrique Doyle DPM at [address] Left 9/3/15    CARPAL TUNNEL RELEASE Right 9/22/15    COLONOSCOPY      FINGER TRIGGER RELEASE Left 9/3/15    middle and ring fingers    FINGER TRIGGER RELEASE Right 9/22/15    middle and ring fingers    FOOT SURGERY Bilateral     litteltoe    HAND SURGERY Right     Ligament    KNEE ARTHROPLASTY Right 7/26/2021    ROBOTIC ASSISTED TOTAL RIGHT KNEE REPLACEMENT performed by Venessa Mendez MD at [address] [facility] ARTHROSCOPY Right 2/2/2022    ARTHROSCOPY WITH LYSIS OF ADHESIONS, RIGHT KNEE performed by [facility]enessa Mendez MD [facility] [address] [facility] (CERVIX NOT REMOVED)  08/2003    SHOULDER ARTHROSCOPY Right 06/20/2018    Diagnostic scope, rcr, open [place]        Family History:    Family History   Problem Relation Age of Onset    Heart Disease Mother     High Blood Pressure Mother     Stroke Mother     Cancer Brother         lung cancer       Social History     Socioeconomic History    Marital status:      Spouse name: Carloz Gusman, seen here    Number of children: 3    Years of education: Not on file    Highest education level: Not on file   Occupational History    Not on file   Tobacco Use    Smoking status: Former     Packs/day: 1.50     Years: 3.00     Pack years: 4.50     Types: Cigarettes     Quit date: 1/1/1992 Years since quittin.7    Smokeless tobacco: Never   Vaping Use    Vaping Use: Never used   Substance and Sexual Activity    Alcohol use: No    Drug use: No    Sexual activity: Yes     Partners: Male   Other Topics Concern    Not on file   Social History Narrative    Originally from [facility]     is seen here    10 grandchildren     Social Determinants of Health     Financial Resource Strain: Not on file   Food Insecurity: Not on file   Transportation Needs: Not on file   Physical Activity: Not on file   Stress: Not on file   Social Connections: Not on file   Intimate Partner Violence: Not on file   Housing Stability: Not on file       Nursing notes reviewed. ED Triage Vitals [22 1044]   Enc Vitals Group      BP (!) 154/91      Heart Rate 79      Resp 16      Temp       Temp Source Oral      SpO2 98 %      Weight 204 lb 9.4 oz (92.8 kg)      Height       Head Circumference       Peak Flow       Pain Score       Pain Loc       Pain Edu? Excl. in [address]? GENERAL:  Awake, alert. Well developed, well nourished with mild distress due to pain HENT:  Normocephalic, Atraumatic, moist mucous membranes. EYES:  Pupils equal round and reactive to light, Conjunctiva normal, extraocular movements normal.  NECK:  No meningeal signs, Supple. CHEST:  Regular rate and rhythm, chest wall non-tender. LUNGS:  Clear to auscultation bilaterally. ABDOMEN:  Soft, non-tender, no rebound, rigidity or guarding, non-distended, normal bowel sounds. No costovertebral angle tenderness to palpation. BACK:  No tenderness. EXTREMITIES:  Normal range of motion, no edema, no bony tenderness, no deformity, distal pulses present. SKIN: Warm, dry and intact. NEUROLOGIC: Normal mental status. Moving all extremities to command.        LABS and DIAGNOSTIC RESULTS  EKG  The Ekg interpreted by me shows  normal sinus rhythm with a rate of 78  Axis is   Normal  QTc is  normal  Intervals and Durations are unremarkable. ST Segments: no acute change  Delta waves, Brugada Syndrome, and Short LA are not present. No significant change from prior EKG dated 25 sep 2021    RADIOLOGY  X-RAYS:  I have reviewed radiologic plain film image(s). ALL OTHER NON-PLAIN FILM IMAGES SUCH AS CT, ULTRASOUND AND MRI HAVE BEEN READ BY THE RADIOLOGIST. CTA CHEST W CONTRAST   Final Result   1. No acute vascular abnormality; no aorta aneurysm/dissection or acute   pulmonary embolism. 2. No definite acute pulmonary disease. Minimal subsegmental atelectasis   medial at the lung bases. 3. Mild calcific atherosclerosis aorta and coronary arteries. XR CHEST PORTABLE   Final Result   No acute disease              LABS  Labs Reviewed   CBC WITH AUTO DIFFERENTIAL - Abnormal; Notable for the following components:       Result Value    Hemoglobin 11.3 (*)     Hematocrit 34.9 (*)     All other components within normal limits   COMPREHENSIVE METABOLIC PANEL W/ REFLEX TO MG FOR LOW K - Abnormal; Notable for the following components:    Glucose 132 (*)     All other components within normal limits   TROPONIN   BRAIN NATRIURETIC PEPTIDE         MEDICAL DECISION MAKING  Heart Score for chest pain patients  History: Moderately Suspicious  ECG: Normal  Patient Age: > 65 years  *Risk factors for Atherosclerotic disease: Hypertension, Hypercholesterolemia, Obesity  Risk Factors: > 3 Risk factors or history of atherosclerotic disease*  Troponin: < 1X normal limit  Heart Score Total: 5       Patient did have significant relief with medications given in the ER but continues to have chest pain which is reason for admission. The total Critical Care time is 30 minutes which excludes separately billable procedures. The critical care was concerning treatment of chest pain with morphine, aspirin and nitroglycerin. .  This time is exclusive of any time documented by any other providers. I spoke with Dr. Neli Alvarez.  We thoroughly discussed the history, physical exam, laboratory and imaging studies, as well as, emergency department course. Based upon that discussion, we've decided to admit [address] for further observation and evaluation of John Wood's chest pain. As I have deemed necessary from their history, physical, and studies, I have considered and evaluated [address] for the following diagnoses:  ACUTE CORONARY SYNDROME, PERICARDIAL TAMPONADE, PNEUMOTHORAX, PULMONARY EMBOLISM, and THORACIC DISSECTION. FINAL IMPRESSION  1. Chest pain, unspecified type    2. Hypertension, unspecified type        Vitals:  Blood pressure (!) 163/95, pulse 65, temperature 98.7 °F (37.1 °C), temperature source Oral, resp. rate 18, height 5' 4\" (1.626 m), weight 204 lb 9.4 oz (92.8 kg), SpO2 99 %, not currently breastfeeding. Disposition  Pt is in stable condition upon Admit to telemetry. This chart was generated using the [address] [place] [address] dictation system. I created this record but it may contain dictation errors.           Suresh Palacios MD  09/09/22 [id number]

## 2022-09-09 NOTE — PROGRESS NOTES
Patient was prepped for a cystoscopy. Procedure was explained to the patient and all questions were answered appropriately. Patient verbalized understanding and had no questions. Consent signed.     A time out took place prior to the procedure in order to confirm patient identity, intended procedure, location, laterality, and medication to be used.    Patient prepped by Treva LOWRY MA   Per order, bladder scan with 299ml of urine. Pt has not voided yet and states that she doesn't need to at this time. Pt denies pain upon palpation. Will continue to monitor. Pt's /75, MD made aware via secure message. Pt is sinus rhythm on tele monitor.

## 2022-09-09 NOTE — LETTER
[address]  Phone: [phone]    No name on file. September 11, 2022     Patient: Bailee Wood   YOB: 1955   Date of Visit: 9/9/2022       To Whom It May Concern: It is my medical opinion that Balbina Irwin may return to work on Friday, September 16. If you have any questions or concerns, please don't hesitate to call.     Sincerely,        [facility]

## 2022-09-09 NOTE — H&P
Hospital Medicine History & Physical      PCP: Dorothy Juárez MD    Date of Admission: 9/9/2022    Chief Complaint: Elevated BP    History Of Present Illness:    Patient is a 69-year-old female with past medical history of hypertension, cardiomyopathy hypertension hyperlipidemia who presents to the hospital for chest pain. According to the patient she has chest pain in the center of her chest, she has some shortness of breath but no associated nausea vomiting radiation. She also has headache. According to the patient she checked her blood pressure and her blood pressure was in 200s at home which made her come to the hospital.  Otherwise denied fevers chills nausea vomiting diarrhea constipation dysuria.       Past Medical History:          Diagnosis Date    Anesthesia complication     \" shaking\"    Arthritis     Cardiomyopathy ([address]     COVID-19     1/7/2022    Diabetes     GERD (gastroesophageal reflux disease)     Hyperlipidemia     Hypertension     Lumbar disc disease     Prolonged emergence from general anesthesia     Sleep apnea     pt states does use a Cpap machine at night    Unspecified cerebral artery occlusion with cerebral infarction 2014    right side weak    Wears glasses        Past Surgical History:          Procedure Laterality Date    ARTHRODESIS Right 9/17/2020    (RIGHT) REMOVAL OF BONE SPURRING AND OSSEOUS PROMINENCE FIRST METATARSAL, ARTHRODESIS OF FIRST METATARSOPHALANGEAL JOINT, BONE MARROW HARVEST AND CONCENTRATION RIGHT TIBIA performed by Yue De La Torre DPM at [address] Left 9/3/15    CARPAL TUNNEL RELEASE Right 9/22/15    COLONOSCOPY      FINGER TRIGGER RELEASE Left 9/3/15    middle and ring fingers    FINGER TRIGGER RELEASE Right 9/22/15    middle and ring fingers    FOOT SURGERY Bilateral     litteltoe    HAND SURGERY Right     Ligament    KNEE ARTHROPLASTY Right 7/26/2021    ROBOTIC ASSISTED TOTAL RIGHT KNEE REPLACEMENT performed by Kadeem Almodovar MD at [facility] OR    KNEE ARTHROSCOPY Right 2/2/2022    ARTHROSCOPY WITH LYSIS OF ADHESIONS, RIGHT KNEE performed by Cristine Homans, MD at [address] [facility] (CERVIX NOT REMOVED)  08/2003    SHOULDER ARTHROSCOPY Right 06/20/2018    Diagnostic scope, rcr, open [place]       Medications Prior to Admission:      Prior to Admission medications    Medication Sig Start Date End Date Taking?  Authorizing Provider   aspirin 81 MG EC tablet Take 81 mg by mouth daily   Yes Historical Provider, MD   Psyllium (METAMUCIL) 0.36 g CAPS Take 1 capsule by mouth daily   Yes Historical Provider, MD   vitamin B-6 (PYRIDOXINE) 100 MG tablet Take 100 mg by mouth daily   Yes Historical Provider, MD   FANAPT 1 MG TABS tablet TAKE 1 TABLET BY MOUTH EVERY NIGHT 8/16/22   Adriana Sleet Day, MD   metFORMIN (GLUCOPHAGE) 500 MG tablet TAKE 2 TABLETS BY MOUTH TWICE DAILY WITH MEALS 8/1/22   Adriana Sleet Day, MD   furosemide (LASIX) 20 MG tablet TAKE 1 TABLET BY MOUTH  DAILY AS NEEDED FOR LEG  SWELLING 7/29/22   Adriana Sleet Day, MD   DULoxetine (CYMBALTA) 60 MG extended release capsule TAKE 1 CAPSULE BY MOUTH DAILY 7/29/22   Adriana Sleet Day, MD   valsartan (DIOVAN) 160 MG tablet Take 1 tablet by mouth in the morning. 7/25/22   Eliverto Primrose, APRN - CNP   TRULICITY 1.5 ZO/6.7PI [facility] INJECT THE CONTENTS OF ONE  PEN SUBCUTANEOUSLY WEEKLY 6/28/22   Adriana Sleet Day, MD   traZODone (DESYREL) 100 MG tablet TAKE 1 TABLET BY MOUTH AT  NIGHT 6/23/22   Adriana Sleet Day, MD   insulin glargine (LANTUS SOLOSTAR) 100 UNIT/ML injection pen INJECT SUBCUTANEOUSLY 12  UNITS ONCE NIGHTLY 4/13/22   Adriana Sleet Day, MD   omeprazole (PRILOSEC) 40 MG delayed release capsule TAKE 1 CAPSULE BY MOUTH EVERY MORNING BEFORE BREAKFAST 4/4/22   Adriana Sleet Day, MD   ondansetron (ZOFRAN ODT) 4 MG disintegrating tablet Take 1-2 tablets by mouth every 12 hours as needed for Nausea or Vomiting May Sub regular tablet (non-ODT) if insurance does not cover ODT. 3/16/22   Swetha Garcias MD   blood glucose test strips (ONETOUCH ULTRA) strip As needed. 3/16/22   Allison Finnegan MD   ibuprofen (ADVIL;MOTRIN) 600 MG tablet TAKE 1 TABLET BY MOUTH TWICE DAILY WITH MEALS 12/20/21   Robinson Bryant MD   dextran 70-hypromellose (TEARS NATURALE) 0.1-0.3 % SOLN opthalmic solution as needed for itching    Historical Provider, MD   albuterol sulfate HFA (PROVENTIL HFA) 108 (90 Base) MCG/ACT inhaler Inhale 2 puffs into the lungs every 4 hours as needed for Wheezing or Shortness of Breath May substitute ProAir MDI 1/5/21   Meseret Nick MD   ipratropium-albuterol (DUONEB) 0.5-2.5 (3) MG/3ML SOLN nebulizer solution Inhale 3 mLs into the lungs every 4 hours as needed for Shortness of Breath (wheezing coughing) 1/5/21   Meseret Nick MD   ammonium lactate (LAC-HYDRIN) 12 % lotion Apply topically daily. Patient taking differently: as needed Apply topically daily. 11/21/19   Allison Finnegan MD       Allergies:  Codeine, Vicodin [hydrocodone-acetaminophen], Lipitor [atorvastatin], Oxycontin [oxycodone hcl], and Tramadol    Social History:      TOBACCO:   reports that she quit smoking about 30 years ago. Her smoking use included cigarettes. She has a 4.50 pack-year smoking history. She has never used smokeless tobacco.  ETOH:   reports no history of alcohol use. Family History:       Reviewed in detail and non contributory          Problem Relation Age of Onset    Heart Disease Mother     High Blood Pressure Mother     Stroke Mother     Cancer Brother         lung cancer       REVIEW OF SYSTEMS:   Pertinent positives as noted in the HPI. All other systems reviewed and negative. PHYSICAL EXAM PERFORMED:    BP (!) 170/75   Pulse 72   Temp 97.5 °F (36.4 °C) (Oral)   Resp 12   Ht 5' 4\" (1.626 m)   Wt 204 lb 9.4 oz (92.8 kg)   SpO2 97%   BMI 35.12 kg/m²     General appearance:  No apparent distress, cooperative. HEENT:  Normal cephalic, atraumatic without obvious deformity. Conjunctivae/corneas clear.   Neck: Supple, with full range of motion. No cervical lymphadenopathy  Respiratory:  Normal respiratory effort. Clear to auscultation, bilaterally without Rales/Wheezes/Rhonchi. Cardiovascular:  Regular rate and rhythm with normal S1/S2 without murmurs, rubs or gallops. Abdomen: Soft, non-tender, non-distended, normal bowel sounds. Musculoskeletal:  No edema noted bilaterally. No tenderness on palpation   Skin: no rash visible  Neurologic:  Neurologically intact without any focal sensory/motor deficits. grossly non-focal.  Psychiatric:  Alert and oriented, normal mood  Peripheral Pulses: +2 palpable, equal bilaterally       Labs:     Recent Labs     09/09/22  1143   WBC 4.3   HGB 11.3*   HCT 34.9*        Recent Labs     09/09/22  1143      K 4.1      CO2 26   BUN 7   CREATININE 0.9   CALCIUM 9.3     Recent Labs     09/09/22  1143   AST 25   ALT 15   BILITOT <0.2   ALKPHOS 91     No results for input(s): INR in the last 72 hours. Recent Labs     09/09/22  1143   TROPONINI <0.01       Urinalysis:      Lab Results   Component Value Date/Time    NITRU Negative 06/24/2022 08:54 AM    WBCUA 1 09/25/2021 10:17 AM    BACTERIA 3+ 04/22/2014 09:45 AM    RBCUA 1 09/25/2021 10:17 AM    BLOODU Negative 06/24/2022 08:54 AM    SPECGRAV 1.007 06/24/2022 08:54 AM    GLUCOSEU Negative 06/24/2022 08:54 AM       Radiology:       CTA CHEST W CONTRAST   Final Result   1. No acute vascular abnormality; no aorta aneurysm/dissection or acute   pulmonary embolism. 2. No definite acute pulmonary disease. Minimal subsegmental atelectasis   medial at the lung bases. 3. Mild calcific atherosclerosis aorta and coronary arteries.          XR CHEST PORTABLE   Final Result   No acute disease                 Active Hospital Problems    Diagnosis Date Noted    Uncontrolled hypertension [I10] 09/09/2022     Priority: Medium       Patient is a 25-year-old female with past medical history of hypertension, cardiomyopathy hypertension hyperlipidemia who presents to the hospital for chest pain. According to the patient she has chest pain in the center of her chest, she has some shortness of breath but no associated nausea vomiting radiation. She also has headache. According to the patient she checked her blood pressure and her blood pressure was in 200s at home which made her come to the hospital.  Otherwise denied fevers chills nausea vomiting diarrhea constipation dysuria. Assessment  Chest pain, rule out ACS  Uncontrolled hypertension  Hyperlipidemia  Diabetes mellitus    Plan  Start as needed labetalol  Monitor on cardiac telemetry  Monitor troponin  Consult cardiology  Insulin sliding scale  Resume home medications  DVT prophylaxis of Lovenox  CTA chest performed in ED-negative for PE, negative for acute pulmonary process  DVT prophylaxis-Lovenox  Diet: ADULT DIET; Regular; 4 carb choices (60 gm/meal); Low Sodium (2 gm)  Code Status: Full Code    PT/OT Eval Status: ordered    Dispo - pending clinical improvement       Fran Duke MD    The note was completed using EMR and Dragon dictation system. Every effort was made to ensure accuracy; however, inadvertent computerized transcription errors may be present. Thank you Linh Liu MD for the opportunity to be involved in this patient's care. If you have any questions or concerns please feel free to contact me at [phone].     Fran Duke MD

## 2022-09-09 NOTE — PROGRESS NOTES
Admitted patient to room [phone] from ED. VS obtained (see flowsheet). Oriented to room, call light, TV, phone, patient rights and responsibilities. Bed in lowest position and locked, exit alarm in place. Non-slip socks on. ID bracelet on and correct per pt verbally reporting name and date of birth. Call light within reach. Needed items within reach. Pt's family member at bedside.

## 2022-09-09 NOTE — ED NOTES
ED SBAR report provider to \[facility]\"". Patient to be transported to Room 4122 via stretcher by transport tech. Patient transported with bedside cardiac monitor and with IV medications infusing. IV site clean, dry, and intact. MEWS score and pain assessed as 4/10 and documented. Updated patient and family on plan of care.      Richar Mallory RN  09/09/22 [id number]
intact

## 2022-09-09 NOTE — PROGRESS NOTES
4 Eyes Skin Assessment     NAME:  John Wood  YOB: 1955  MEDICAL RECORD NUMBER:  [id number]    The patient is being assess for  Admission    I agree that 2 RN's have performed a thorough Head to Toe Skin Assessment on the patient. ALL assessment sites listed below have been assessed. Areas assessed by both nurses:    Head, Face, Ears, Shoulders, Back, Chest, Arms, Elbows, Hands, Sacrum. Buttock, Coccyx, Ischium, and Legs. Feet and Heels        Does the Patient have a Wound?  No noted wound(s)       [place] Prevention initiated:  NA   Wound Care Orders initiated:  NA    Pressure Injury (Stage 3,4, Unstageable, DTI, NWPT, and Complex wounds) if present place referral/consult order under [de-identified] No    New and Established Ostomies if present place consult order under : No      Nurse 1 eSignature: Electronically signed by Milo Schwartz RN on 9/9/22 at 6:23 PM EDT    **SHARE this note so that the co-signing nurse is able to place an eSignature**    Nurse 2 eSignature: {Esignature:[id number]}

## 2022-09-09 NOTE — PROGRESS NOTES
Medication Reconciliation    List of medications patient is currently taking is complete. Source of information: 1. Conversation with patient at bedside                                      2. EPIC records      Allergies  Codeine, Vicodin [hydrocodone-acetaminophen], Lipitor [atorvastatin], Oxycontin [oxycodone hcl], and Tramadol     Notes regarding home medications:   1. Patient took their valsartan, duloxetine, omeprazole, and the 1st dose of her metformin today before arriving to the ER.      Rick Garza PharmD Candidate 9/9/2022 3:32 PM

## 2022-09-10 ENCOUNTER — APPOINTMENT (OUTPATIENT)
Dept: CT IMAGING | Age: 67
DRG: 305 | End: 2022-09-10
Payer: MEDICARE

## 2022-09-10 PROBLEM — I25.84 CORONARY ARTERY CALCIFICATION: Status: ACTIVE | Noted: 2022-09-10

## 2022-09-10 PROBLEM — R07.89 ATYPICAL CHEST PAIN: Status: ACTIVE | Noted: 2021-05-11

## 2022-09-10 PROBLEM — I70.0 AORTIC ATHEROSCLEROSIS (HCC): Status: ACTIVE | Noted: 2022-09-10

## 2022-09-10 PROBLEM — I25.10 CORONARY ARTERY CALCIFICATION: Status: ACTIVE | Noted: 2022-09-10

## 2022-09-10 LAB
ANION GAP SERPL CALCULATED.3IONS-SCNC: 8 MMOL/L (ref 3–16)
BASOPHILS ABSOLUTE: 0.1 K/UL (ref 0–0.2)
BASOPHILS RELATIVE PERCENT: 1.3 %
BUN BLDV-MCNC: 9 MG/DL (ref 7–20)
CALCIUM SERPL-MCNC: 9.6 MG/DL (ref 8.3–10.6)
CHLORIDE BLD-SCNC: 107 MMOL/L (ref 99–110)
CHOLESTEROL, TOTAL: 189 MG/DL (ref 0–199)
CO2: 26 MMOL/L (ref 21–32)
CREAT SERPL-MCNC: 1 MG/DL (ref 0.6–1.2)
EKG ATRIAL RATE: 78 BPM
EKG DIAGNOSIS: NORMAL
EKG P AXIS: 60 DEGREES
EKG P-R INTERVAL: 180 MS
EKG Q-T INTERVAL: 392 MS
EKG QRS DURATION: 76 MS
EKG QTC CALCULATION (BAZETT): 446 MS
EKG R AXIS: 41 DEGREES
EKG T AXIS: 50 DEGREES
EKG VENTRICULAR RATE: 78 BPM
EOSINOPHILS ABSOLUTE: 0.3 K/UL (ref 0–0.6)
EOSINOPHILS RELATIVE PERCENT: 4.4 %
ESTIMATED AVERAGE GLUCOSE: 159.9 MG/DL
GFR AFRICAN AMERICAN: >60
GFR NON-AFRICAN AMERICAN: 55
GLUCOSE BLD-MCNC: 101 MG/DL (ref 70–99)
GLUCOSE BLD-MCNC: 116 MG/DL (ref 70–99)
GLUCOSE BLD-MCNC: 132 MG/DL (ref 70–99)
GLUCOSE BLD-MCNC: 156 MG/DL (ref 70–99)
GLUCOSE BLD-MCNC: 90 MG/DL (ref 70–99)
HBA1C MFR BLD: 7.2 %
HCT VFR BLD CALC: 32.4 % (ref 36–48)
HDLC SERPL-MCNC: 55 MG/DL (ref 40–60)
HEMOGLOBIN: 10.5 G/DL (ref 12–16)
LDL CHOLESTEROL CALCULATED: 104 MG/DL
LYMPHOCYTES ABSOLUTE: 2.7 K/UL (ref 1–5.1)
LYMPHOCYTES RELATIVE PERCENT: 37.9 %
MCH RBC QN AUTO: 27 PG (ref 26–34)
MCHC RBC AUTO-ENTMCNC: 32.5 G/DL (ref 31–36)
MCV RBC AUTO: 83.2 FL (ref 80–100)
MONOCYTES ABSOLUTE: 0.3 K/UL (ref 0–1.3)
MONOCYTES RELATIVE PERCENT: 4.9 %
NEUTROPHILS ABSOLUTE: 3.6 K/UL (ref 1.7–7.7)
NEUTROPHILS RELATIVE PERCENT: 51.5 %
PDW BLD-RTO: 14.6 % (ref 12.4–15.4)
PERFORMED ON: ABNORMAL
PERFORMED ON: NORMAL
PLATELET # BLD: 287 K/UL (ref 135–450)
PMV BLD AUTO: 8.1 FL (ref 5–10.5)
POTASSIUM REFLEX MAGNESIUM: 4.5 MMOL/L (ref 3.5–5.1)
RBC # BLD: 3.9 M/UL (ref 4–5.2)
SARS-COV-2, NAAT: NOT DETECTED
SODIUM BLD-SCNC: 141 MMOL/L (ref 136–145)
T4 FREE: 0.9 NG/DL (ref 0.9–1.8)
TRIGL SERPL-MCNC: 149 MG/DL (ref 0–150)
TROPONIN: <0.01 NG/ML
TSH REFLEX FT4: 6.69 UIU/ML (ref 0.27–4.2)
VLDLC SERPL CALC-MCNC: 30 MG/DL
WBC # BLD: 7 K/UL (ref 4–11)

## 2022-09-10 PROCEDURE — 6370000000 HC RX 637 (ALT 250 FOR IP): Performed by: INTERNAL MEDICINE

## 2022-09-10 PROCEDURE — 97530 THERAPEUTIC ACTIVITIES: CPT

## 2022-09-10 PROCEDURE — 1200000000 HC SEMI PRIVATE

## 2022-09-10 PROCEDURE — 36415 COLL VENOUS BLD VENIPUNCTURE: CPT

## 2022-09-10 PROCEDURE — 80048 BASIC METABOLIC PNL TOTAL CA: CPT

## 2022-09-10 PROCEDURE — 80061 LIPID PANEL: CPT

## 2022-09-10 PROCEDURE — 84443 ASSAY THYROID STIM HORMONE: CPT

## 2022-09-10 PROCEDURE — 85025 COMPLETE CBC W/AUTO DIFF WBC: CPT

## 2022-09-10 PROCEDURE — 6360000002 HC RX W HCPCS: Performed by: EMERGENCY MEDICINE

## 2022-09-10 PROCEDURE — 2500000003 HC RX 250 WO HCPCS: Performed by: INTERNAL MEDICINE

## 2022-09-10 PROCEDURE — 93010 ELECTROCARDIOGRAM REPORT: CPT | Performed by: INTERNAL MEDICINE

## 2022-09-10 PROCEDURE — 6360000002 HC RX W HCPCS: Performed by: INTERNAL MEDICINE

## 2022-09-10 PROCEDURE — 70450 CT HEAD/BRAIN W/O DYE: CPT

## 2022-09-10 PROCEDURE — 94760 N-INVAS EAR/PLS OXIMETRY 1: CPT

## 2022-09-10 PROCEDURE — 99223 1ST HOSP IP/OBS HIGH 75: CPT | Performed by: INTERNAL MEDICINE

## 2022-09-10 PROCEDURE — 97162 PT EVAL MOD COMPLEX 30 MIN: CPT

## 2022-09-10 PROCEDURE — 87635 SARS-COV-2 COVID-19 AMP PRB: CPT

## 2022-09-10 PROCEDURE — 84484 ASSAY OF TROPONIN QUANT: CPT

## 2022-09-10 PROCEDURE — 84439 ASSAY OF FREE THYROXINE: CPT

## 2022-09-10 PROCEDURE — 2580000003 HC RX 258: Performed by: INTERNAL MEDICINE

## 2022-09-10 RX ORDER — ROSUVASTATIN CALCIUM 10 MG/1
5 TABLET, COATED ORAL DAILY
Status: DISCONTINUED | OUTPATIENT
Start: 2022-09-10 | End: 2022-09-11 | Stop reason: HOSPADM

## 2022-09-10 RX ADMIN — MORPHINE SULFATE 4 MG: 4 INJECTION, SOLUTION INTRAMUSCULAR; INTRAVENOUS at 03:45

## 2022-09-10 RX ADMIN — LABETALOL HYDROCHLORIDE 10 MG: 5 INJECTION INTRAVENOUS at 03:49

## 2022-09-10 RX ADMIN — SODIUM CHLORIDE, PRESERVATIVE FREE 10 ML: 5 INJECTION INTRAVENOUS at 22:31

## 2022-09-10 RX ADMIN — PYRIDOXINE HCL TAB 50 MG 100 MG: 50 TAB at 09:34

## 2022-09-10 RX ADMIN — ACETAMINOPHEN 650 MG: 325 TABLET, FILM COATED ORAL at 12:33

## 2022-09-10 RX ADMIN — SODIUM CHLORIDE, PRESERVATIVE FREE 10 ML: 5 INJECTION INTRAVENOUS at 09:36

## 2022-09-10 RX ADMIN — PANTOPRAZOLE SODIUM 40 MG: 40 TABLET, DELAYED RELEASE ORAL at 06:19

## 2022-09-10 RX ADMIN — ACETAMINOPHEN 650 MG: 325 TABLET, FILM COATED ORAL at 06:23

## 2022-09-10 RX ADMIN — ASPIRIN 81 MG: 81 TABLET, COATED ORAL at 09:34

## 2022-09-10 RX ADMIN — CHLORTHALIDONE 25 MG: 25 TABLET ORAL at 09:35

## 2022-09-10 RX ADMIN — INSULIN GLARGINE 12 UNITS: 100 INJECTION, SOLUTION SUBCUTANEOUS at 22:31

## 2022-09-10 RX ADMIN — ENOXAPARIN SODIUM 40 MG: 100 INJECTION SUBCUTANEOUS at 09:35

## 2022-09-10 RX ADMIN — FUROSEMIDE 20 MG: 20 TABLET ORAL at 09:34

## 2022-09-10 RX ADMIN — VALSARTAN 160 MG: 80 TABLET, FILM COATED ORAL at 09:35

## 2022-09-10 RX ADMIN — ACETAMINOPHEN 650 MG: 325 TABLET, FILM COATED ORAL at 22:31

## 2022-09-10 RX ADMIN — DULOXETINE HYDROCHLORIDE 60 MG: 60 CAPSULE, DELAYED RELEASE ORAL at 09:35

## 2022-09-10 RX ADMIN — PSYLLIUM HUSK 1 PACKET: 3.4 POWDER ORAL at 09:35

## 2022-09-10 RX ADMIN — ROSUVASTATIN CALCIUM 5 MG: 10 TABLET, FILM COATED ORAL at 14:26

## 2022-09-10 RX ADMIN — TRAZODONE HYDROCHLORIDE 100 MG: 100 TABLET ORAL at 22:31

## 2022-09-10 ASSESSMENT — PAIN SCALES - GENERAL
PAINLEVEL_OUTOF10: 8
PAINLEVEL_OUTOF10: 10
PAINLEVEL_OUTOF10: 5
PAINLEVEL_OUTOF10: 7

## 2022-09-10 NOTE — PLAN OF CARE
Problem: Discharge Planning  Goal: Discharge to home or other facility with appropriate resources  9/10/2022 1242 by Haylee Means RN  Outcome: Progressing     Problem: Pain  Goal: Verbalizes/displays adequate comfort level or baseline comfort level  9/10/2022 1242 by Haylee Means RN  Outcome: Progressing     Problem: Safety - Adult  Goal: Free from fall injury  9/10/2022 1242 by Haylee Means RN  Outcome: Progressing     Problem: ABCDS Injury Assessment  Goal: Absence of physical injury  9/10/2022 1242 by Haylee Means RN  Outcome: Progressing     Problem: Nutrition Deficit:  Goal: Optimize nutritional status  Outcome: Progressing

## 2022-09-10 NOTE — PROGRESS NOTES
Hospitalist Progress Note      PCP: Jhoana Anderson MD    Date of Admission: 9/9/2022      Subjective: Complaining of some headache, shoulder pain bilateral.  Worse when she moves her head. Denies blurry or double vision no chest pain or shortness of breath. No abdominal pain nausea vomiting.       Medications:  Reviewed    Infusion Medications    sodium chloride      dextrose       Scheduled Medications    rosuvastatin  5 mg Oral Daily    sodium chloride flush  10 mL IntraVENous 2 times per day    enoxaparin  40 mg SubCUTAneous Daily    aspirin  81 mg Oral Daily    DULoxetine  60 mg Oral Daily    iloperidone  1 mg Oral BID    furosemide  20 mg Oral Daily    insulin glargine  12 Units SubCUTAneous Nightly    pantoprazole  40 mg Oral QAM AC    psyllium  1 packet Oral Daily    traZODone  100 mg Oral Nightly    valsartan  160 mg Oral Daily    vitamin B-6  100 mg Oral Daily    insulin lispro  0-8 Units SubCUTAneous TID WC    insulin lispro  0-4 Units SubCUTAneous Nightly    chlorthalidone  25 mg Oral Daily     PRN Meds: sodium chloride flush, sodium chloride, potassium chloride **OR** potassium alternative oral replacement **OR** potassium chloride, potassium chloride, magnesium sulfate, promethazine **OR** ondansetron, magnesium hydroxide, acetaminophen **OR** acetaminophen, labetalol, hydrALAZINE, albuterol sulfate HFA, polyvinyl alcohol-povidone, ipratropium-albuterol, glucose, dextrose bolus **OR** dextrose bolus, glucagon (rDNA), dextrose      Intake/Output Summary (Last 24 hours) at 9/10/2022 1421  Last data filed at 9/10/2022 1201  Gross per 24 hour   Intake --   Output 1401 ml   Net -1401 ml       Physical Exam Performed:    BP (!) 150/83   Pulse 66   Temp 100 °F (37.8 °C) (Oral)   Resp 16   Ht 5' 4\" (1.626 m)   Wt 200 lb 13.4 oz (91.1 kg)   SpO2 98%   BMI 34.47 kg/m²     General appearance: No apparent distress  Neck: Some tenderness in posterior musculature over both shoulders  Respiratory:  Normal respiratory effort. Clear to auscultation, bilaterally without Rales/Wheezes/Rhonchi. Cardiovascular: Regular rate and rhythm with normal S1/S2 without murmurs, rubs or gallops. Abdomen: Soft, non-tender  Musculoskeletal: No clubbing, cyanosis   Skin: Skin color, texture, turgor normal.  No rashes or lesions. Neurologic:  No focal weakness  Psychiatric: Alert and oriented  Capillary Refill: Brisk, 3 seconds, normal   Peripheral Pulses: +2 palpable, equal bilaterally       Labs:   Recent Labs     09/09/22  1143 09/10/22  0438   WBC 4.3 7.0   HGB 11.3* 10.5*   HCT 34.9* 32.4*    287     Recent Labs     09/09/22  1143 09/10/22  0438    141   K 4.1 4.5    107   CO2 26 26   BUN 7 9   CREATININE 0.9 1.0   CALCIUM 9.3 9.6     Recent Labs     09/09/22  1143   AST 25   ALT 15   BILITOT <0.2   ALKPHOS 91     No results for input(s): INR in the last 72 hours. Recent Labs     09/09/22  1143 09/10/22  0125   TROPONINI <0.01 <0.01       Urinalysis:      Lab Results   Component Value Date/Time    NITRU Negative 06/24/2022 08:54 AM    WBCUA 1 09/25/2021 10:17 AM    BACTERIA 3+ 04/22/2014 09:45 AM    RBCUA 1 09/25/2021 10:17 AM    BLOODU Negative 06/24/2022 08:54 AM    SPECGRAV 1.007 06/24/2022 08:54 AM    GLUCOSEU Negative 06/24/2022 08:54 AM       Radiology:  CTA CHEST W CONTRAST   Final Result   1. No acute vascular abnormality; no aorta aneurysm/dissection or acute   pulmonary embolism. 2. No definite acute pulmonary disease. Minimal subsegmental atelectasis   medial at the lung bases. 3. Mild calcific atherosclerosis aorta and coronary arteries.          XR CHEST PORTABLE   Final Result   No acute disease                 Assessment/Plan:    Active Hospital Problems    Diagnosis     Aortic atherosclerosis (HCC) [I70.0]      Priority: Medium    Coronary artery calcification [I25.10, I25.84]      Priority: Medium    Uncontrolled hypertension [I10]      Priority: Medium    Atypical chest pain [R07.89] Chest pain, appear noncardiac, troponin negative, cardiology consulted  Uncontrolled hypertension, added IV as needed medications restarted on p.o. medication, cardiology added chlorthalidone. Diabetes mellitus, sliding scale  Hyperlipidemia on statin  Low-grade fever up to 100, 1 reading, monitor for now, will check COVID test  Headache, appears musculoskeletal in origin but with her elevated blood pressure I will check a CT scan to exclude any potential bleeding      Diet: ADULT DIET; Regular; 4 carb choices (60 gm/meal);  Low Sodium (2 gm)  Code Status: Full Code        Kj Cotton MD

## 2022-09-10 NOTE — CONSULTS
Referring Physician: Dr. Rae Kirby  Reason for Consultation: chest pain  Chief Complaint: chest pain and headaches    Subjective:   History of Present Illness:  Mustapha Graham is a 78 y.o. patient who presented to the hospital with complaints of chest pains and headache. She describes the chest pain as a sharp left-sided discomfort worse with deep breaths. She denies radiation of the pain. Her troponins are normal.  She also reports a severe headache but denies focal weakness, vision changes, slurred speech, or facial droop. She checked her blood pressure at home which she reports is being > 200 and sought medical attention in the emergency department. In general, she denies exertional chest pain/pressure. She reports compliance with her medications. Past Medical History:   has a past medical history of Anesthesia complication, Arthritis, Cardiomyopathy ([address] COVID-19, Diabetes, GERD (gastroesophageal reflux disease), Hyperlipidemia, Hypertension, Lumbar disc disease, Prolonged emergence from general anesthesia, Sleep apnea, Unspecified cerebral artery occlusion with cerebral infarction, and Wears glasses. Surgical History:   has a past surgical history that includes Partial hysterectomy (08/2003); Hand surgery (Right); Foot surgery (Bilateral); Carpal tunnel release (Left, 9/3/15); Finger trigger release (Left, 9/3/15); Carpal tunnel release (Right, 9/22/15); Finger trigger release (Right, 9/22/15); Shoulder arthroscopy (Right, 06/20/2018); Colonoscopy; arthrodesis (Right, 9/17/2020); Knee Arthroplasty (Right, 7/26/2021); and Knee arthroscopy (Right, 2/2/2022). Social History:   reports that she quit smoking about 30 years ago. Her smoking use included cigarettes. She has a 4.50 pack-year smoking history. She has never used smokeless tobacco. She reports that she does not drink alcohol and does not use drugs.      Family History:  family history includes Cancer in her brother; Heart Disease in her mother; High Blood Pressure in her mother; Stroke in her mother. Home Medications:  Were reviewed and are listed in nursing record and/or below  Prior to Admission medications    Medication Sig Start Date End Date Taking? Authorizing Provider   aspirin 81 MG EC tablet Take 81 mg by mouth daily   Yes Historical Provider, MD   Psyllium (METAMUCIL) 0.36 g CAPS Take 1 capsule by mouth daily   Yes Historical Provider, MD   vitamin B-6 (PYRIDOXINE) 100 MG tablet Take 100 mg by mouth daily   Yes Historical Provider, MD   FANAPT 1 MG TABS tablet TAKE 1 TABLET BY MOUTH EVERY NIGHT 8/16/22   Aaronyl Kyler Finnegan, MD   metFORMIN (GLUCOPHAGE) 500 MG tablet TAKE 2 TABLETS BY MOUTH TWICE DAILY WITH MEALS 8/1/22   Aaronyl San Sivan, MD   furosemide (LASIX) 20 MG tablet TAKE 1 TABLET BY MOUTH  DAILY AS NEEDED FOR LEG  SWELLING 7/29/22   Aaronyl Chávez Day, MD   DULoxetine (CYMBALTA) 60 MG extended release capsule TAKE 1 CAPSULE BY MOUTH DAILY 7/29/22   Aaronyl Chávez Day, MD   valsartan (DIOVAN) 160 MG tablet Take 1 tablet by mouth in the morning. 7/25/22   Graciela Julio, APRN - CNP   TRULICITY 1.5 GO/7.1HS [facility] INJECT THE CONTENTS OF ONE  PEN SUBCUTANEOUSLY WEEKLY 6/28/22   Aaronyl San Sivan, MD   traZODone (DESYREL) 100 MG tablet TAKE 1 TABLET BY MOUTH AT  NIGHT 6/23/22 Deryl San Sivan, MD   insulin glargine (LANTUS SOLOSTAR) 100 UNIT/ML injection pen INJECT SUBCUTANEOUSLY 12  UNITS ONCE NIGHTLY 4/13/22   Hannah Chávez Sivan, MD   omeprazole (PRILOSEC) 40 MG delayed release capsule TAKE 1 CAPSULE BY MOUTH EVERY MORNING BEFORE BREAKFAST 4/4/22 Deryl San Sivan, MD   ondansetron (ZOFRAN ODT) 4 MG disintegrating tablet Take 1-2 tablets by mouth every 12 hours as needed for Nausea or Vomiting May Sub regular tablet (non-ODT) if insurance does not cover ODT. 3/16/22   Hannah Chávez Sivan, MD   blood glucose test strips (ONETOUCH ULTRA) strip As needed.  3/16/22   Hannah Chávez MD Sivan   ibuprofen (ADVIL;MOTRIN) 600 MG tablet TAKE 1 TABLET BY MOUTH TWICE DAILY WITH MEALS 12/20/21   Hilaria Blanchard Cassandra Levi MD   dextran 70-hypromellose (TEARS NATURALE) 0.1-0.3 % SOLN opthalmic solution as needed for itching    Historical Provider, MD   albuterol sulfate HFA (PROVENTIL HFA) 108 (90 Base) MCG/ACT inhaler Inhale 2 puffs into the lungs every 4 hours as needed for Wheezing or Shortness of Breath May substitute ProAir MDI 1/5/21   Vidal Tovar MD   ipratropium-albuterol (DUONEB) 0.5-2.5 (3) MG/3ML SOLN nebulizer solution Inhale 3 mLs into the lungs every 4 hours as needed for Shortness of Breath (wheezing coughing) 1/5/21   Vidal Tovar MD   ammonium lactate (LAC-HYDRIN) 12 % lotion Apply topically daily. Patient taking differently: as needed Apply topically daily.  11/21/19   Yi Finnegan MD        CURRENT Medications:  sodium chloride flush 0.9 % injection 10 mL, 2 times per day  sodium chloride flush 0.9 % injection 10 mL, PRN  0.9 % sodium chloride infusion, PRN  potassium chloride (KLOR-CON M) extended release tablet 40 mEq, PRN   Or  potassium bicarb-citric acid (EFFER-K) effervescent tablet 40 mEq, PRN   Or  potassium chloride 10 mEq/100 mL IVPB (Peripheral Line), PRN  potassium chloride 10 mEq/100 mL IVPB (Peripheral Line), PRN  magnesium sulfate 2000 mg in 50 mL IVPB premix, PRN  enoxaparin (LOVENOX) injection 40 mg, Daily  promethazine (PHENERGAN) tablet 12.5 mg, Q6H PRN   Or  ondansetron (ZOFRAN) injection 4 mg, Q6H PRN  magnesium hydroxide (MILK OF MAGNESIA) 400 MG/5ML suspension 30 mL, Daily PRN  acetaminophen (TYLENOL) tablet 650 mg, Q6H PRN   Or  acetaminophen (TYLENOL) suppository 650 mg, Q6H PRN  labetalol (NORMODYNE;TRANDATE) injection 10 mg, Q4H PRN  hydrALAZINE (APRESOLINE) injection 10 mg, Q6H PRN  albuterol sulfate HFA (PROVENTIL;VENTOLIN;PROAIR) 108 (90 Base) MCG/ACT inhaler 2 puff, Q4H PRN  aspirin EC tablet 81 mg, Daily  polyvinyl alcohol-povidone 5-6 MG/ML opthalmic solution 1 drop, Q4H PRN  DULoxetine (CYMBALTA) extended release capsule 60 mg, Daily  iloperidone (FANAPT) tablet 1 mg (Patient Supplied), BID  furosemide (LASIX) tablet 20 mg, Daily  insulin glargine (LANTUS) injection vial 12 Units, Nightly  ipratropium-albuterol (DUONEB) nebulizer solution 3 mL, Q4H PRN  pantoprazole (PROTONIX) tablet 40 mg, QAM AC  psyllium (METAMUCIL SMOOTH TEXTURE) 28 % packet 1 packet, Daily  traZODone (DESYREL) tablet 100 mg, Nightly  valsartan (DIOVAN) tablet 160 mg, Daily  vitamin B-6 (PYRIDOXINE) tablet 100 mg, Daily  glucose chewable tablet 16 g, PRN  dextrose bolus 10% 125 mL, PRN   Or  dextrose bolus 10% 250 mL, PRN  glucagon (rDNA) injection 1 mg, PRN  dextrose 10 % infusion, Continuous PRN  insulin lispro (HUMALOG) injection vial 0-8 Units, TID WC  insulin lispro (HUMALOG) injection vial 0-4 Units, Nightly  chlorthalidone (HYGROTON) tablet 25 mg, Daily    Allergies:  Codeine, Vicodin [hydrocodone-acetaminophen], Lipitor [atorvastatin], Oxycontin [oxycodone hcl], and Tramadol     Review of Systems:   Constitutional: no unanticipated weight loss. There's been no change in energy level, sleep pattern, or activity level. No fevers, chills. Eyes: No visual changes or diplopia. No scleral icterus. ENT: No Headaches, hearing loss or vertigo. No mouth sores or sore throat. Cardiovascular: as reviewed in HPI  Respiratory: No cough or wheezing, no sputum production. No hemoptysis. Gastrointestinal: No abdominal pain, appetite loss, blood in stools. No change in bowel or bladder habits. Genitourinary: No dysuria, trouble voiding, or hematuria. Musculoskeletal:  No gait disturbance, no joint complaints. Integumentary: No rash or pruritis. Neurological: No headache, diplopia, change in muscle strength, numbness or tingling. Psychiatric: No anxiety or depression. Endocrine: No temperature intolerance. No excessive thirst, fluid intake, or urination. No tremor. Hematologic/Lymphatic: No abnormal bruising or bleeding, blood clots or swollen lymph nodes.   Allergic/Immunologic: No nasal congestion or hives.    Objective:   PHYSICAL EXAM:    Vitals:    09/10/22 0822   BP: (!) 153/83   Pulse: 81   Resp: 18   Temp: 98.2 °F (36.8 °C)   SpO2: 95%    Weight: 200 lb 13.4 oz (91.1 kg)       General Appearance:  Alert, cooperative, no distress, appears stated age. Head:  Normocephalic, without obvious abnormality, atraumatic. Eyes:  Pupils equal and round. No scleral icterus. Mouth: Moist mucosa, no pharyngeal erythema. Nose: Nares normal. No drainage or sinus tenderness. Neck: Supple, symmetrical, trachea midline. No adenopathy. No tenderness/mass/nodules. No carotid bruit or elevated JVD. Lungs:   Clear to auscultation bilaterally, respirations unlabored. No wheeze, rales, or rhonchi. Chest Wall:  No tenderness or deformity. Heart:  Regular rate. S1/S2 normal. No murmur, rub, or gallop. Abdomen:   Soft, non-tender, bowel sounds active. Musculoskeletal: No muscle wasting or digital clubbing. Extremities: Extremities normal, atraumatic. No cyanosis or edema. Pulses: 2+ radial and carotid pulses, symmetric. Skin: No rashes or lesions. Pysch: Normal mood and affect. Alert and oriented x 4.    Neurologic: Normal gross motor and sensory exam.       Labs     CBC:   Lab Results   Component Value Date/Time    WBC 7.0 09/10/2022 04:38 AM    RBC 3.90 09/10/2022 04:38 AM    HGB 10.5 09/10/2022 04:38 AM    HCT 32.4 09/10/2022 04:38 AM    MCV 83.2 09/10/2022 04:38 AM    RDW 14.6 09/10/2022 04:38 AM     09/10/2022 04:38 AM     CMP:  Lab Results   Component Value Date/Time     09/10/2022 04:38 AM    K 4.5 09/10/2022 04:38 AM     09/10/2022 04:38 AM    CO2 26 09/10/2022 04:38 AM    BUN 9 09/10/2022 04:38 AM    CREATININE 1.0 09/10/2022 04:38 AM    GFRAA >60 09/10/2022 04:38 AM    GFRAA >60 01/14/2013 12:28 PM    AGRATIO 1.3 09/09/2022 11:43 AM    LABGLOM 55 09/10/2022 04:38 AM    GLUCOSE 116 09/10/2022 04:38 AM    PROT 6.7 09/09/2022 11:43 AM    PROT 7.6 06/21/2012 01:16 PM    CALCIUM 9.6 09/10/2022 04:38 AM    BILITOT <0.2 09/09/2022 11:43 AM    ALKPHOS 91 09/09/2022 11:43 AM    AST 25 09/09/2022 11:43 AM    ALT 15 09/09/2022 11:43 AM     PT/INR:  No results found for: PTINR  HgBA1c:  Lab Results   Component Value Date    LABA1C 7.6 06/23/2022     Lab Results   Component Value Date    CKTOTAL 272 (H) 04/20/2021    TROPONINI <0.01 09/10/2022       Cardiac Data     EKG: Personally interpreted. 9/9/2022. Normal sinus rhythm. Normal ECG. Echo: 10/2019  There is mildly increased left ventricular wall thickness. Left ventricular cavity size is normal. Ejection fraction is visually estimated to be 55-60%. Normal diastolic function. No significant valve disease. Stress Test: 5/12/21  1. Technically a satisfactory study. 2. No evidence of Ischemia by Myocardial Perfusion Imaging. 3. Gated Study shows normal LV size and Systolic function; EF is 70 %. Cath: 4/2016  Right dominant system  Left Main:  Minimal luminal irregularities. Left Anterior Descending:  No significant CAD  Circumflex:  No significant CAD  Right Coronary:  No significant CAD  Left Ventriculogram:  LVEF 55-60%    Telemetry: Personally interpreted. Sinus. Assessment and Plan   1) Atypical chest pain. Description sounds noncardiac. Patient ruled out for ACS with normal troponins. Stress test last year without evidence of ischemia. Angiogram 6 years ago without significant CAD. No plans for repeat stress testing or angiography. 2) Essential hypertension. Uncontrolled. Goal BP <130/80. Will start chlorthalidone 25 mg daily. Continue valsartan 160 mg and uptitrate as needed. Encourage patient to follow-up with her PCP for additional medication titration and if still uncontrolled would consider addition of CCB. 3) CAD risk equivalent type 2 diabetes mellitus requiring insulin. Diabetes management per primary team.  Will start statin therapy. 4) Coronary artery calcification/aortic atherosclerosis.   Continue medical management and risk factor modification. Check lipid panel but start statin therapy with Crestor 5 mg daily. Reported intolerance to Lipitor in past with myalgias. 5) Headache. Will defer management to primary team.      Overall, the problems requiring hospitalization are high in severity. Will sign off. Call with questions. Thank you for allowing us to participate in the care of [address] John Triplett.  Lorrie Brady, [address]  9/10/2022 8:46 AM

## 2022-09-10 NOTE — PROGRESS NOTES
Comprehensive Nutrition Assessment    Type and Reason for Visit:  Positive Nutrition Screen    Nutrition Recommendations/Plan:   Continue carb control, Low Na diet. Monitor need for ONS. Malnutrition Assessment:  Malnutrition Status:  No malnutrition (09/10/22 1013)    Context:  Acute Illness     Findings of the 6 clinical characteristics of malnutrition:  Energy Intake:  Unable to assess  Weight Loss:  No significant weight loss     Body Fat Loss:  No significant body fat loss     Muscle Mass Loss:  No significant muscle mass loss    Fluid Accumulation:  No significant fluid accumulation     Strength:  Not Performed    Nutrition Assessment:    + nutrition screen. Pt presented with CP and HTN. Pt sleeping upon visit. Pt appears well nourished. Breakfast at bedside, untouched. Per EMR, pt WZA=463# and has for the past 9 months. No wt loss noted. Unsure of PO intakes PTA. Will montior need for ONS if intakes poor. Nutrition Related Findings:    trace BLE edema; BM 9/8; Glu 116 Wound Type: None       Current Nutrition Intake & Therapies:    Average Meal Intake: Unable to assess  Average Supplements Intake: None Ordered  ADULT DIET; Regular; 4 carb choices (60 gm/meal); Low Sodium (2 gm)    Anthropometric Measures:  Height: 5' 4\" (162.6 cm)  Ideal Body Weight (IBW): 120 lbs (55 kg)       Current Body Weight: 200 lb (90.7 kg), 166.7 % IBW. Weight Source: Standing Scale  Current BMI (kg/m2): 34.3                          BMI Categories: Obese Class 1 (BMI 30.0-34. 9)    Estimated Daily Nutrient Needs:        Energy (kcal/day): 5433-1802  kcal (15-18 kcal/kg 91 kg CBW)     Protein (g/day): 68-82 gm (1.2-1.5 gm/kg IBW)     Fluid (ml/day): 1 mL/kcal    Nutrition Diagnosis:   No nutrition diagnosis at this time    Nutrition Interventions:   Food and/or Nutrient Delivery: Continue Current Diet  Nutrition Education/Counseling: No recommendation at this time  Coordination of Nutrition Care: Continue to monitor while inpatient       Goals:     Goals: PO intake 50% or greater, by next RD assessment       Nutrition Monitoring and Evaluation:      Food/Nutrient Intake Outcomes: Food and Nutrient Intake  Physical Signs/Symptoms Outcomes: Biochemical Data, Weight, Skin, Nutrition Focused Physical Findings    Discharge Planning:     Too soon to determine     Meli Esparza RD, LD  Contact: [phone]

## 2022-09-10 NOTE — ACP (ADVANCE CARE PLANNING)
Advance Care Planning     Advance Care Planning Inpatient Note  [facility] Department    Today's Date: 9/10/2022  Unit: [facility] 4W MED SURG    Received request from IDT Member. Upon review of chart and communication with care team, patient's decision making abilities are not in question. . Patient was/were present in the room during visit. Goals of ACP Conversation:  Discuss advance care planning documents    Health Care Decision Makers:       Primary Decision Maker: Hilda Montez - Child - [phone]  Summary:  Documented Next of Kin, per patient report    Advance Care Planning Documents (Patient Wishes):  None     Assessment:   provided education on HCPOA and Living Will forms. Ms. Candace Delgado would like to discuss with family and will complete independently.     Interventions:  Provided education on documents for clarity and greater understanding  Discussed and provided education on state decision maker hierarchy  Encouraged ongoing ACP conversation with future decision makers and loved ones    Care Preferences Communicated:   No    Outcomes/Plan:  ACP Discussion: Completed    Electronically signed by India Cooley, [employer] on 9/10/2022 at 2:08 PM

## 2022-09-10 NOTE — PROGRESS NOTES
Physical Therapy  Facility/Department: [facility]  Physical Therapy Initial Assessment    Name: Bailee Wood  : 1955  MRN: [id number]  Date of Service: 9/10/2022    Discharge Recommendations:  Home with assist PRN, Home with Home health PT   PT Equipment Recommendations  Equipment Needed: Amy Wood scored a 19/24 on the AM-PAC short mobility form. Current research shows that an AM-PAC score of 18 or greater is typically associated with a discharge to the patient's home setting. Based on the patient's AM-PAC score and their current functional mobility deficits, it is recommended that the patient have 2-3 sessions per week of Physical Therapy at d/c to increase the patient's independence. At this time, this patient demonstrates the endurance and safety to discharge home with home health PT level 1  (home vs OP services) and a follow up treatment frequency of 2-3x/wk. Please see assessment section for further patient specific details. If patient discharges prior to next session this note will serve as a discharge summary. Please see below for the latest assessment towards goals. Patient Diagnosis(es): The primary encounter diagnosis was Chest pain, unspecified type. A diagnosis of Hypertension, unspecified type was also pertinent to this visit. Past Medical History:  has a past medical history of Anesthesia complication, Arthritis, Cardiomyopathy ([address] COVID-19, Diabetes, GERD (gastroesophageal reflux disease), Hyperlipidemia, Hypertension, Lumbar disc disease, Prolonged emergence from general anesthesia, Sleep apnea, Unspecified cerebral artery occlusion with cerebral infarction, and Wears glasses. Past Surgical History:  has a past surgical history that includes Partial hysterectomy (2003); Hand surgery (Right); Foot surgery (Bilateral); Carpal tunnel release (Left, 9/3/15); Finger trigger release (Left, 9/3/15); Carpal tunnel release (Right, 9/22/15);  Finger trigger release (Right, 9/22/15); Shoulder arthroscopy (Right, 06/20/2018); Colonoscopy; arthrodesis (Right, 9/17/2020); Knee Arthroplasty (Right, 7/26/2021); and Knee arthroscopy (Right, 2/2/2022). Assessment   Body Structures, Functions, Activity Limitations Requiring Skilled Therapeutic Intervention: Decreased functional mobility ; Decreased endurance;Decreased balance;Decreased strength;Decreased posture; Increased pain  Assessment: Pt 77 y/o female admitted due to elevated HR and significant headache at this time. Pt independent with functional mobility tasks at home primary hurricane with intermittent use of RW when increased fatigue/pain. Pt this date SBA for household mobility tasks with increased effort and time. Pt with appropriate assistance with spouse and grandson at home, predict slight difficulty with steps to enter home but states she always has someone assisting prior. Recommend home with assistance PRN from family, home health PT level 1 to promote increased strength, activity tolerance, and ability to complete functional tasks for safe transition to home. Treatment Diagnosis: Decreased activity tolerance  Therapy Prognosis: Good  Decision Making: Medium Complexity  History: see below  Exam: see below  Clinical Presentation: evolving  Barriers to Learning: none  Requires PT Follow-Up: Yes  Activity Tolerance  Activity Tolerance: Patient tolerated evaluation without incident;Patient limited by fatigue;Patient limited by endurance  Activity Tolerance Comments: limited due to complaints of headache     Plan   Plan  Plan: 3-5 times per week  Current Treatment Recommendations: Strengthening, Balance training, Gait training, Functional mobility training, Transfer training, Endurance training, Home exercise program, Safety education & training  Safety Devices  Type of Devices:  All fall risk precautions in place, Call light within reach, Chair alarm in place, Gait belt, Patient at risk for falls, Left in chair Restrictions  Restrictions/Precautions  Restrictions/Precautions: Up as Tolerated, Fall Risk, General Precautions  Position Activity Restriction  Other position/activity restrictions: ADULT DIET; Regular; 4 carb choices (60 gm/meal); Low Sodium     Subjective   General  Chart Reviewed: Yes  Patient assessed for rehabilitation services?: Yes  Additional Pertinent Hx: \"Patient is a 39-year-old female with past medical history of hypertension, cardiomyopathy hypertension hyperlipidemia who presents to the hospital for chest pain. According to the patient she has chest pain in the center of her chest, she has some shortness of breath but no associated nausea vomiting radiation. She also has headache. According to the patient she checked her blood pressure and her blood pressure was in 200s at home which made her come to the hospital.  Otherwise denied fevers chills nausea vomiting diarrhea constipation dysuria. \" (Dr. Gaines Spar 9/9/22)  Response To Previous Treatment: Not applicable  Family / Caregiver Present: No  Referring Practitioner: Kendra Andrew MD  Referral Date : 09/09/22  Diagnosis: Uncontrolled Hypetension  Follows Commands: Within Functional Limits  General Comment  Comments: Pt requesting cold cloth/ice pack, provided with small ice pack and education. Subjective  Subjective: Reports 8/10 headache this morning up overall feeling ok. Pt agreeable to PT evaluation.          Social/Functional History  Social/Functional History  Lives With: Family (  and grandson)  Type of Home: House  Home Layout: Two level, Bed/Bath upstairs (stair lift)  Home Access: Stairs to enter with rails  Entrance Stairs - Number of Steps: 10  Entrance Stairs - Rails: Left (ascending)  Bathroom Shower/Tub: Walk-in shower, Shower chair with back, Curtain  Bathroom Toilet: Standard  Bathroom Equipment: Grab bars in shower  Home Equipment: [employer], Walker, rolling, Grab bars, Alert Button  Has the patient had two or more falls in

## 2022-09-11 VITALS
SYSTOLIC BLOOD PRESSURE: 113 MMHG | TEMPERATURE: 98.5 F | BODY MASS INDEX: 34.74 KG/M2 | HEIGHT: 64 IN | HEART RATE: 83 BPM | WEIGHT: 203.48 LBS | RESPIRATION RATE: 12 BRPM | DIASTOLIC BLOOD PRESSURE: 81 MMHG | OXYGEN SATURATION: 98 %

## 2022-09-11 LAB
ANION GAP SERPL CALCULATED.3IONS-SCNC: 8 MMOL/L (ref 3–16)
BASOPHILS ABSOLUTE: 0 K/UL (ref 0–0.2)
BASOPHILS RELATIVE PERCENT: 0.8 %
BUN BLDV-MCNC: 14 MG/DL (ref 7–20)
CALCIUM SERPL-MCNC: 10.5 MG/DL (ref 8.3–10.6)
CHLORIDE BLD-SCNC: 101 MMOL/L (ref 99–110)
CO2: 26 MMOL/L (ref 21–32)
CREAT SERPL-MCNC: 0.8 MG/DL (ref 0.6–1.2)
EOSINOPHILS ABSOLUTE: 0.2 K/UL (ref 0–0.6)
EOSINOPHILS RELATIVE PERCENT: 3.7 %
GFR AFRICAN AMERICAN: >60
GFR NON-AFRICAN AMERICAN: >60
GLUCOSE BLD-MCNC: 131 MG/DL (ref 70–99)
GLUCOSE BLD-MCNC: 145 MG/DL (ref 70–99)
GLUCOSE BLD-MCNC: 201 MG/DL (ref 70–99)
HCT VFR BLD CALC: 38.7 % (ref 36–48)
HEMOGLOBIN: 12.6 G/DL (ref 12–16)
LYMPHOCYTES ABSOLUTE: 1.9 K/UL (ref 1–5.1)
LYMPHOCYTES RELATIVE PERCENT: 40.1 %
MCH RBC QN AUTO: 27.1 PG (ref 26–34)
MCHC RBC AUTO-ENTMCNC: 32.6 G/DL (ref 31–36)
MCV RBC AUTO: 83 FL (ref 80–100)
MONOCYTES ABSOLUTE: 0.3 K/UL (ref 0–1.3)
MONOCYTES RELATIVE PERCENT: 6.1 %
NEUTROPHILS ABSOLUTE: 2.3 K/UL (ref 1.7–7.7)
NEUTROPHILS RELATIVE PERCENT: 49.3 %
PDW BLD-RTO: 14.2 % (ref 12.4–15.4)
PERFORMED ON: ABNORMAL
PERFORMED ON: ABNORMAL
PLATELET # BLD: 303 K/UL (ref 135–450)
PMV BLD AUTO: 7.9 FL (ref 5–10.5)
POTASSIUM REFLEX MAGNESIUM: 4.4 MMOL/L (ref 3.5–5.1)
RBC # BLD: 4.66 M/UL (ref 4–5.2)
SODIUM BLD-SCNC: 135 MMOL/L (ref 136–145)
WBC # BLD: 4.6 K/UL (ref 4–11)

## 2022-09-11 PROCEDURE — 94760 N-INVAS EAR/PLS OXIMETRY 1: CPT

## 2022-09-11 PROCEDURE — 6370000000 HC RX 637 (ALT 250 FOR IP): Performed by: INTERNAL MEDICINE

## 2022-09-11 PROCEDURE — 36415 COLL VENOUS BLD VENIPUNCTURE: CPT

## 2022-09-11 PROCEDURE — 2580000003 HC RX 258: Performed by: INTERNAL MEDICINE

## 2022-09-11 PROCEDURE — 85025 COMPLETE CBC W/AUTO DIFF WBC: CPT

## 2022-09-11 PROCEDURE — 80048 BASIC METABOLIC PNL TOTAL CA: CPT

## 2022-09-11 PROCEDURE — 6360000002 HC RX W HCPCS: Performed by: INTERNAL MEDICINE

## 2022-09-11 RX ORDER — CHLORTHALIDONE 25 MG/1
25 TABLET ORAL DAILY
Qty: 30 TABLET | Refills: 0 | Status: SHIPPED | OUTPATIENT
Start: 2022-09-12

## 2022-09-11 RX ORDER — ROSUVASTATIN CALCIUM 5 MG/1
5 TABLET, COATED ORAL DAILY
Qty: 30 TABLET | Refills: 0 | Status: SHIPPED | OUTPATIENT
Start: 2022-09-12 | End: 2022-09-22

## 2022-09-11 RX ADMIN — PANTOPRAZOLE SODIUM 40 MG: 40 TABLET, DELAYED RELEASE ORAL at 05:50

## 2022-09-11 RX ADMIN — ROSUVASTATIN CALCIUM 5 MG: 10 TABLET, FILM COATED ORAL at 08:31

## 2022-09-11 RX ADMIN — ENOXAPARIN SODIUM 40 MG: 100 INJECTION SUBCUTANEOUS at 08:31

## 2022-09-11 RX ADMIN — VALSARTAN 160 MG: 80 TABLET, FILM COATED ORAL at 08:32

## 2022-09-11 RX ADMIN — PSYLLIUM HUSK 1 PACKET: 3.4 POWDER ORAL at 08:31

## 2022-09-11 RX ADMIN — FUROSEMIDE 20 MG: 20 TABLET ORAL at 08:31

## 2022-09-11 RX ADMIN — SODIUM CHLORIDE, PRESERVATIVE FREE 10 ML: 5 INJECTION INTRAVENOUS at 08:32

## 2022-09-11 RX ADMIN — DULOXETINE HYDROCHLORIDE 60 MG: 60 CAPSULE, DELAYED RELEASE ORAL at 08:31

## 2022-09-11 RX ADMIN — PYRIDOXINE HCL TAB 50 MG 100 MG: 50 TAB at 08:31

## 2022-09-11 RX ADMIN — ASPIRIN 81 MG: 81 TABLET, COATED ORAL at 08:31

## 2022-09-11 RX ADMIN — CHLORTHALIDONE 25 MG: 25 TABLET ORAL at 08:31

## 2022-09-11 RX ADMIN — ACETAMINOPHEN 650 MG: 325 TABLET, FILM COATED ORAL at 05:50

## 2022-09-11 NOTE — DISCHARGE SUMMARY
Hospital Medicine Discharge Summary    Patient ID: Jennifer Bonilla      Patient's PCP: Kirt Hubbard MD    Admit Date: 9/9/2022     Discharge Date:   09/11/2022      Admitting Provider: Rohini Rogers MD     Discharge Provider: Kenyatta Nguyen MD     Discharge Diagnoses: Active Hospital Problems    Diagnosis     Aortic atherosclerosis (HCC) [I70.0]      Priority: Medium    Coronary artery calcification [I25.10, I25.84]      Priority: Medium    Uncontrolled hypertension [I10]      Priority: Medium    Atypical chest pain [R07.89]        The patient was seen and examined on day of discharge and this discharge summary is in conjunction with any daily progress note from day of discharge. Hospital Course:     From HPI:\"Patient is a 68-year-old female with past medical history of hypertension, cardiomyopathy hypertension hyperlipidemia who presents to the hospital for chest pain. According to the patient she has chest pain in the center of her chest, she has some shortness of breath but no associated nausea vomiting radiation. She also has headache. According to the patient she checked her blood pressure and her blood pressure was in 200s at home which made her come to the hospital.  Otherwise denied fevers chills nausea vomiting diarrhea constipation dysuria. \"      Chest pain, appear noncardiac, troponin negative, cardiology consulted no further episodes. Uncontrolled hypertension, added IV as needed medications restarted on p.o. medication, cardiology added chlorthalidone. Much better controlled.   Follow-up with primary care physician  Diabetes mellitus, resume home regimen  Hyperlipidemia on statin  Low-grade fever up to 100, resolved asymptomatic  Headache, appears musculoskeletal in origin but with her elevated blood pressure, CT head negative, improved follow-up with primary care  Chronic facial twitching patient stated that she has had ADHD for years now  Elevated TSH but with normal FT4, likely due to acute stress and hospital stay, will defer to primary care physician for further work-up if deemed necessary    Physical Exam Performed:     BP (!) 149/76   Pulse 72   Temp 98.2 °F (36.8 °C) (Oral)   Resp 18   Ht 5' 4\" (1.626 m)   Wt 203 lb 7.8 oz (92.3 kg)   SpO2 97%   BMI 34.93 kg/m²       General appearance:  No apparent distress  Neck: Supple  Respiratory:  Normal respiratory effort. Clear to auscultation, bilaterally without Rales/Wheezes/Rhonchi. Cardiovascular:  Regular rate and rhythm with normal S1/S2 without murmurs, rubs or gallops. Abdomen: Soft, non-tender  Musculoskeletal:  No clubbing, cyanosis  Skin: Skin color, texture, turgor normal.  No rashes or lesions. Neurologic:  NO FOCAL WEAKNESS  Psychiatric:  Alert and oriented  Capillary Refill: Brisk,< 3 seconds   Peripheral Pulses: +2 palpable, equal bilaterally       Labs: For convenience and continuity at follow-up the following most recent labs are provided:      CBC:    Lab Results   Component Value Date/Time    WBC 7.0 09/10/2022 04:38 AM    HGB 10.5 09/10/2022 04:38 AM    HCT 32.4 09/10/2022 04:38 AM     09/10/2022 04:38 AM       Renal:    Lab Results   Component Value Date/Time     09/10/2022 04:38 AM    K 4.5 09/10/2022 04:38 AM     09/10/2022 04:38 AM    CO2 26 09/10/2022 04:38 AM    BUN 9 09/10/2022 04:38 AM    CREATININE 1.0 09/10/2022 04:38 AM    CALCIUM 9.6 09/10/2022 04:38 AM         Significant Diagnostic Studies    Radiology:   CT HEAD WO CONTRAST   Final Result   No acute intracranial abnormality. CTA CHEST W CONTRAST   Final Result   1. No acute vascular abnormality; no aorta aneurysm/dissection or acute   pulmonary embolism. 2. No definite acute pulmonary disease. Minimal subsegmental atelectasis   medial at the lung bases. 3. Mild calcific atherosclerosis aorta and coronary arteries.          XR CHEST PORTABLE   Final Result   No acute disease                Consults:     IP CONSULT TO HOSPITALIST  IP CONSULT TO SPIRITUAL SERVICES  IP CONSULT TO CARDIOLOGY    Disposition:  HOME     Condition at Discharge: Stable    Discharge Instructions/Follow-up:  PCP    Code Status:  Full Code     Activity: activity as tolerated    Diet: cardiac diet and diabetic diet      Discharge Medications:     Current Discharge Medication List             Details   rosuvastatin (CRESTOR) 5 MG tablet Take 1 tablet by mouth daily  Qty: 30 tablet, Refills: 0      chlorthalidone (HYGROTON) 25 MG tablet Take 1 tablet by mouth daily  Qty: 30 tablet, Refills: 0                Details   aspirin 81 MG EC tablet Take 81 mg by mouth daily      Psyllium (METAMUCIL) 0.36 g CAPS Take 1 capsule by mouth daily      vitamin B-6 (PYRIDOXINE) 100 MG tablet Take 100 mg by mouth daily      FANAPT 1 MG TABS tablet TAKE 1 TABLET BY MOUTH EVERY NIGHT  Qty: 90 tablet, Refills: 0    Associated Diagnoses: Mood disorder ([facility])      metFORMIN (GLUCOPHAGE) 500 MG tablet TAKE 2 TABLETS BY MOUTH TWICE DAILY WITH MEALS  Qty: 360 tablet, Refills: 0    Associated Diagnoses: Type 2 diabetes mellitus with complication, without long-term current use of insulin ([facility])      furosemide (LASIX) 20 MG tablet TAKE 1 TABLET BY MOUTH  DAILY AS NEEDED FOR LEG  SWELLING  Qty: 90 tablet, Refills: 3    Comments: Requesting 1 year supply      DULoxetine (CYMBALTA) 60 MG extended release capsule TAKE 1 CAPSULE BY MOUTH DAILY  Qty: 90 capsule, Refills: 1      valsartan (DIOVAN) 160 MG tablet Take 1 tablet by mouth in the morning.   Qty: 30 tablet, Refills: 1      TRULICITY 1.5 UG/6.8CW SOPN INJECT THE CONTENTS OF ONE  PEN SUBCUTANEOUSLY WEEKLY  Qty: 6 mL, Refills: 3    Comments: Requesting 1 year supply  Associated Diagnoses: Type 2 diabetes mellitus with complication, without long-term current use of insulin ([facility])      traZODone (DESYREL) 100 MG tablet TAKE 1 TABLET BY MOUTH AT  NIGHT  Qty: 90 tablet, Refills: 1    Comments: Requesting 1 year supply      insulin glargine (LANTUS SOLOSTAR) 100 UNIT/ML injection pen INJECT SUBCUTANEOUSLY 12  UNITS ONCE NIGHTLY  Qty: 15 mL, Refills: 3    Comments: Requesting 1 year supply  Associated Diagnoses: Type 2 diabetes mellitus with complication, without long-term current use of insulin (HCC)      omeprazole (PRILOSEC) 40 MG delayed release capsule TAKE 1 CAPSULE BY MOUTH EVERY MORNING BEFORE BREAKFAST  Qty: 90 capsule, Refills: 1    Associated Diagnoses: Epigastric pain; Gastroesophageal reflux disease, unspecified whether esophagitis present      ondansetron (ZOFRAN ODT) 4 MG disintegrating tablet Take 1-2 tablets by mouth every 12 hours as needed for Nausea or Vomiting May Sub regular tablet (non-ODT) if insurance does not cover ODT. Qty: 12 tablet, Refills: 0      blood glucose test strips (ONETOUCH ULTRA) strip As needed. Qty: 100 strip, Refills: 4      dextran 70-hypromellose (TEARS NATURALE) 0.1-0.3 % SOLN opthalmic solution as needed for itching      albuterol sulfate HFA (PROVENTIL HFA) 108 (90 Base) MCG/ACT inhaler Inhale 2 puffs into the lungs every 4 hours as needed for Wheezing or Shortness of Breath May substitute ProAir MDI  Qty: 1 Inhaler, Refills: 5      ipratropium-albuterol (DUONEB) 0.5-2.5 (3) MG/3ML SOLN nebulizer solution Inhale 3 mLs into the lungs every 4 hours as needed for Shortness of Breath (wheezing coughing)  Qty: 360 mL, Refills: 5      ammonium lactate (LAC-HYDRIN) 12 % lotion Apply topically daily. Qty: 1 Bottle, Refills: 1    Associated Diagnoses: Dry skin             Time Spent on discharge is more than 45 minutes in the examination, evaluation, counseling and review of medications and discharge plan. Signed:    Shant Rodríguez MD   9/11/2022      Thank you Bigg Lucero MD for the opportunity to be involved in this patient's care. If you have any questions or concerns, please feel free to contact me at [phone].

## 2022-09-11 NOTE — PROGRESS NOTES
Physician Progress Note      PATIENTEldred Shilo  [facility] #:                  [id number]  :                       1955  ADMIT DATE:       2022 10:41 AM  DISCH DATE:  RESPONDING  PROVIDER #:        Collette Odell MD          QUERY TEXT:    Pt admitted with CP. Pt noted to have uncontrolled HTN. If possible, please   document in progress notes and discharge summary if you are evaluating and/or   treating any of the following: The medical record reflects the following:  Risk Factors: HTN  Clinical Indicators:  ---181/DBP 98---118  Treatment: In ED Morphine IV x 1, Nitropaste 1\", Labetalol 10mg IV x 1 ,   Hygroton and Diovan oral daily    Hypertensive Urgency: Defined as SBP of >180 OR DBP >120 w/o associated organ   damage. S/s may or may not be present, but can include severe headache, SOB,   epistaxis, severe anxiety. Tx: adjustment of oral BP medication; IV meds not   usually required. Hypertensive Emergency: SBP of >180 OR DBP >120 w/ associated organ damage   (CVA, MI, acute CHF, FRIDA, encephalopathy, pulmonary edema, and unstable   angina). Documentation should include specific organ affected. Requires   immediate treatment, usually with IV meds. Hypertensive Crisis, unspecified: SBP of > 180 OR DBP > 120 and includes   damage to blood vessels, including inflammation, leakage of fluid or blood and   can cause stroke, headache, heart failure and eclampsia. Source: [employer] and Quick Reference Guide  Options provided:  -- Hypertensive Crisis  -- Hypertensive Emergency  -- Hypertensive Urgency  -- Other - I will add my own diagnosis  -- Disagree - Not applicable / Not valid  -- Disagree - Clinically unable to determine / Unknown  -- Refer to Clinical Documentation Reviewer    PROVIDER RESPONSE TEXT:    This patient has hypertensive urgency.     Query created by: Oma Cuenca on 9/10/2022 8:37 AM      Electronically signed by:  Collette Odell MD 9/10/2022 8:28 PM

## 2022-09-11 NOTE — CARE COORDINATION
CASE MANAGEMENT DISCHARGE SUMMARY:No Needs    DISCHARGE DATE: 9/11/22    DISCHARGED TO HOME     TRANSPORTATION: Family            Electronically signed by HOSEA Abraham on 9/11/2022 at 12:47 PM    [phone]

## 2022-09-11 NOTE — PROGRESS NOTES
Pt claims to have taken home dose of fanapt. I did not witness her taking it, nor do I see a pill bottle in her room. I marked it as taken in the [facility], but unsure if she actually took it.

## 2022-09-11 NOTE — PLAN OF CARE
Problem: Discharge Planning  Goal: Discharge to home or other facility with appropriate resources  9/11/2022 1029 by Ruslan Feliciano, RN  Outcome: Progressing  9/11/2022 1027 by Ruslan Feliciano, RN  Outcome: Progressing     Problem: Pain  Goal: Verbalizes/displays adequate comfort level or baseline comfort level  9/11/2022 1029 by Ruslan Feliciano, RN  Outcome: Progressing  9/11/2022 1027 by Ruslan Feliciano, RN  Outcome: Progressing     Problem: Safety - Adult  Goal: Free from fall injury  9/11/2022 1029 by Ruslan Feliciano, RN  Outcome: Progressing  9/11/2022 1027 by Ruslan Feliciano, RN  Outcome: Progressing     Problem: ABCDS Injury Assessment  Goal: Absence of physical injury  9/11/2022 1029 by Ruslan Feliciano, RN  Outcome: Progressing  9/11/2022 1027 by Ruslan Feliciano, RN  Outcome: Progressing     Problem: Nutrition Deficit:  Goal: Optimize nutritional status  9/11/2022 1029 by Ruslan Feliciano, RN  Outcome: Progressing  9/11/2022 1027 by Ruslan Feliciano, RN  Outcome: Progressing

## 2022-09-12 ENCOUNTER — CARE COORDINATION (OUTPATIENT)
Dept: CASE MANAGEMENT | Age: 67
End: 2022-09-12

## 2022-09-12 DIAGNOSIS — I10 ESSENTIAL HYPERTENSION: ICD-10-CM

## 2022-09-12 RX ORDER — VALSARTAN 160 MG/1
160 TABLET ORAL DAILY
Qty: 90 TABLET | Refills: 1 | Status: SHIPPED | OUTPATIENT
Start: 2022-09-12 | End: 2022-10-24 | Stop reason: SDUPTHER

## 2022-09-12 RX ORDER — AMOXICILLIN 500 MG/1
1000 CAPSULE ORAL ONCE
Qty: 2 CAPSULE | Refills: 0 | Status: SHIPPED | OUTPATIENT
Start: 2022-09-12 | End: 2022-09-12

## 2022-09-12 RX ORDER — VALSARTAN 80 MG/1
TABLET ORAL
Qty: 45 TABLET | Refills: 0 | OUTPATIENT
Start: 2022-09-12

## 2022-09-12 NOTE — TELEPHONE ENCOUNTER
Pt is going to the dentist on Thursday needs a prescription to avoid infection she is going for  dental cleaning     Pt is was released from hospital yesterday has appointment on 9/22/2022 20 mins next hfu is 9/29/2022 pt wants to know can she keep that appointment?

## 2022-09-13 ENCOUNTER — CARE COORDINATION (OUTPATIENT)
Dept: CASE MANAGEMENT | Age: 67
End: 2022-09-13

## 2022-09-13 NOTE — CARE COORDINATION
Ema [facility] Transitions Initial Follow Up Call    Call within 2 business days of discharge: Yes    Patient: Miranda Bey Patient : 1955   MRN: [id number]  Reason for Admission: CP  Discharge Date: 22 RARS: Readmission Risk Score: 10      Last Discharge [facility]       Date Complaint Diagnosis Description Type Department Provider    22 Chest Pain; Hypertension Chest pain, unspecified type . .. ED to Hosp-Admission (Discharged) (ADMITTED) Crow Godfrey MD; Woody Garcia. .. Spoke with: no one    Facility: [facility] [address] [place] [address] Transitions 24 Hour Call    Care Transitions Interventions         Follow Up  Future Appointments   Date Time Provider Maximo Bradford   9/15/2022  9:20 AM Torey Diaz MD [address] [place] [address]   2022  7:40 AM Stephany Finnegan MD [facility] AT [facility]   2022  3:30 PM MD Maggy Koch   2023  3:30 PM MD Maggy Koch       Second and final outreach call attempt, no answer. CTN left  with contact information and request for return call. CTN will resolve episode and remain available. PCP office has already been in discussion about HFU.      ABRAM Arnold, RN   Care Transition Nurse  Mobile: [phone]

## 2022-09-22 ENCOUNTER — OFFICE VISIT (OUTPATIENT)
Dept: FAMILY MEDICINE CLINIC | Age: 67
End: 2022-09-22
Payer: MEDICARE

## 2022-09-22 VITALS
BODY MASS INDEX: 33.97 KG/M2 | HEIGHT: 64 IN | RESPIRATION RATE: 16 BRPM | SYSTOLIC BLOOD PRESSURE: 122 MMHG | HEART RATE: 86 BPM | OXYGEN SATURATION: 97 % | DIASTOLIC BLOOD PRESSURE: 80 MMHG | WEIGHT: 199 LBS

## 2022-09-22 DIAGNOSIS — I70.0 AORTIC ATHEROSCLEROSIS (HCC): ICD-10-CM

## 2022-09-22 DIAGNOSIS — Z09 HOSPITAL DISCHARGE FOLLOW-UP: Primary | ICD-10-CM

## 2022-09-22 DIAGNOSIS — I25.84 CORONARY ARTERY CALCIFICATION: ICD-10-CM

## 2022-09-22 DIAGNOSIS — I25.10 CORONARY ARTERY CALCIFICATION: ICD-10-CM

## 2022-09-22 DIAGNOSIS — I10 ESSENTIAL HYPERTENSION: ICD-10-CM

## 2022-09-22 DIAGNOSIS — E78.2 MIXED HYPERLIPIDEMIA: ICD-10-CM

## 2022-09-22 DIAGNOSIS — T46.6X5A MYALGIA DUE TO STATIN: ICD-10-CM

## 2022-09-22 DIAGNOSIS — M79.10 MYALGIA DUE TO STATIN: ICD-10-CM

## 2022-09-22 DIAGNOSIS — E11.9 TYPE 2 DIABETES MELLITUS WITHOUT COMPLICATION, WITHOUT LONG-TERM CURRENT USE OF INSULIN (HCC): ICD-10-CM

## 2022-09-22 LAB
ANION GAP SERPL CALCULATED.3IONS-SCNC: 13 MMOL/L (ref 3–16)
BUN BLDV-MCNC: 10 MG/DL (ref 7–20)
CALCIUM SERPL-MCNC: 10.5 MG/DL (ref 8.3–10.6)
CHLORIDE BLD-SCNC: 103 MMOL/L (ref 99–110)
CO2: 25 MMOL/L (ref 21–32)
CREAT SERPL-MCNC: 0.8 MG/DL (ref 0.6–1.2)
GFR AFRICAN AMERICAN: >60
GFR NON-AFRICAN AMERICAN: >60
GLUCOSE BLD-MCNC: 100 MG/DL (ref 70–99)
POTASSIUM SERPL-SCNC: 4 MMOL/L (ref 3.5–5.1)
SODIUM BLD-SCNC: 141 MMOL/L (ref 136–145)

## 2022-09-22 PROCEDURE — 99495 TRANSJ CARE MGMT MOD F2F 14D: CPT | Performed by: FAMILY MEDICINE

## 2022-09-22 PROCEDURE — 1111F DSCHRG MED/CURRENT MED MERGE: CPT | Performed by: FAMILY MEDICINE

## 2022-09-22 RX ORDER — PRAVASTATIN SODIUM 20 MG
20 TABLET ORAL DAILY
Qty: 30 TABLET | Refills: 2 | Status: SHIPPED | OUTPATIENT
Start: 2022-09-22

## 2022-09-22 RX ORDER — PRAVASTATIN SODIUM 20 MG
20 TABLET ORAL DAILY
Qty: 90 TABLET | OUTPATIENT
Start: 2022-09-22

## 2022-09-22 SDOH — ECONOMIC STABILITY: FOOD INSECURITY: WITHIN THE PAST 12 MONTHS, THE FOOD YOU BOUGHT JUST DIDN'T LAST AND YOU DIDN'T HAVE MONEY TO GET MORE.: NEVER TRUE

## 2022-09-22 SDOH — ECONOMIC STABILITY: FOOD INSECURITY: WITHIN THE PAST 12 MONTHS, YOU WORRIED THAT YOUR FOOD WOULD RUN OUT BEFORE YOU GOT MONEY TO BUY MORE.: NEVER TRUE

## 2022-09-22 ASSESSMENT — SOCIAL DETERMINANTS OF HEALTH (SDOH): HOW HARD IS IT FOR YOU TO PAY FOR THE VERY BASICS LIKE FOOD, HOUSING, MEDICAL CARE, AND HEATING?: NOT HARD AT ALL

## 2022-09-22 NOTE — PROGRESS NOTES
negative workup after Cardiology consultation    Inpatient course: Discharge summary reviewed- see chart. Interval history/Current status:     Taking BP meds as prescribed  BP Readings from Last 3 Encounters:   09/22/22 122/80   09/11/22 113/81   07/25/22 136/84   On valsartan and chlorthalidone    Diabetes  Lab Results   Component Value Date    LABA1C 7.2 09/09/2022     Lab Results   Component Value Date    .9 09/09/2022   On metformin, Trulicity, Lantus. Typically 12 units, but takes a little more if her glucose is higher than usual Reports glucose is typically 110-130s when checking in the morning, 160-170 after dinner in the evening. No lows. Patient Active Problem List   Diagnosis    Diabetes mellitus ([facility]    Obesity    Dystonia    Cerebral thrombosis with cerebral infarction (HCC)    DIPTI (obstructive sleep apnea)    Right hemiparesis (HCC)    Trigger finger, acquired    Elevated CK    Primary osteoarthritis of both knees    Mood disorder (HCC)    Calcific tendonitis of right shoulder    Essential hypertension    Mixed hyperlipidemia    Chronic low back pain without sciatica    History of CVA with residual deficit    Anxiety and depression    Atypical chest pain    Myalgia due to statin    Palpitations    Primary osteoarthritis of right knee    History of colonoscopy - 2021, rpt 2026    Gastroesophageal reflux disease    Chronic renal disease, stage III (HCC) [093138]    SOB (shortness of breath)    Aortic atherosclerosis (HCC)    Coronary artery calcification       Medications listed as ordered at the time of discharge from hospital     Medication List            Accurate as of September 22, 2022  8:12 AM. If you have any questions, ask your nurse or doctor.                 START taking these medications      pravastatin 20 MG tablet  Commonly known as: PRAVACHOL  Take 1 tablet by mouth daily  Started by: Susi Peter MD            CHANGE how you take these medications      ammonium lactate 12 % lotion  Commonly known as: Lac-Hydrin  Apply topically daily. What changed:   when to take this  reasons to take this            CONTINUE taking these medications      albuterol sulfate  (90 Base) MCG/ACT inhaler  Commonly known as: Proventil HFA  Inhale 2 puffs into the lungs every 4 hours as needed for Wheezing or Shortness of Breath May substitute ProAir MDI     aspirin 81 MG EC tablet     chlorthalidone 25 MG tablet  Commonly known as: HYGROTON  Take 1 tablet by mouth daily     dextran 70-hypromellose 0.1-0.3 % Soln opthalmic solution  Commonly known as: TEARS NATURALE     DULoxetine 60 MG extended release capsule  Commonly known as: CYMBALTA  TAKE 1 CAPSULE BY MOUTH DAILY     Fanapt 1 MG Tabs tablet  Generic drug: iloperidone  TAKE 1 TABLET BY MOUTH EVERY NIGHT     furosemide 20 MG tablet  Commonly known as: LASIX  TAKE 1 TABLET BY MOUTH  DAILY AS NEEDED FOR LEG  SWELLING     ipratropium-albuterol 0.5-2.5 (3) MG/3ML Soln nebulizer solution  Commonly known as: DUONEB  Inhale 3 mLs into the lungs every 4 hours as needed for Shortness of Breath (wheezing coughing)     Lantus SoloStar 100 UNIT/ML injection pen  Generic drug: insulin glargine  INJECT SUBCUTANEOUSLY 12  UNITS ONCE NIGHTLY     Metamucil 0.36 g Caps  Generic drug: Psyllium     metFORMIN 500 MG tablet  Commonly known as: GLUCOPHAGE  TAKE 2 TABLETS BY MOUTH TWICE DAILY WITH MEALS     omeprazole 40 MG delayed release capsule  Commonly known as: PRILOSEC  TAKE 1 CAPSULE BY MOUTH EVERY MORNING BEFORE BREAKFAST     ondansetron 4 MG disintegrating tablet  Commonly known as: Zofran ODT  Take 1-2 tablets by mouth every 12 hours as needed for Nausea or Vomiting May Sub regular tablet (non-ODT) if insurance does not cover ODT. OneTouch Ultra strip  Generic drug: blood glucose test strips  As needed.      traZODone 100 MG tablet  Commonly known as: DESYREL  TAKE 1 TABLET BY MOUTH AT  NIGHT     Trulicity 1.5 RS/8.8MX Sopn  Generic drug: MG disintegrating tablet Take 1-2 tablets by mouth every 12 hours as needed for Nausea or Vomiting May Sub regular tablet (non-ODT) if insurance does not cover ODT. 12 tablet 0    blood glucose test strips (ONETOUCH ULTRA) strip As needed. 100 strip 4    dextran 70-hypromellose (TEARS NATURALE) 0.1-0.3 % SOLN opthalmic solution as needed for itching      albuterol sulfate HFA (PROVENTIL HFA) 108 (90 Base) MCG/ACT inhaler Inhale 2 puffs into the lungs every 4 hours as needed for Wheezing or Shortness of Breath May substitute ProAir MDI 1 Inhaler 5    ipratropium-albuterol (DUONEB) 0.5-2.5 (3) MG/3ML SOLN nebulizer solution Inhale 3 mLs into the lungs every 4 hours as needed for Shortness of Breath (wheezing coughing) 360 mL 5    ammonium lactate (LAC-HYDRIN) 12 % lotion Apply topically daily. (Patient taking differently: as needed Apply topically daily. ) 1 Bottle 1        Medications patient taking as of now reconciled against medications ordered at time of hospital discharge: Yes    A comprehensive review of systems was negative except for what was noted in the HPI. Objective:    /80 (Site: Right Upper Arm, Position: Sitting, Cuff Size: Large Adult)   Pulse 86   Resp 16   Ht 5' 4\" (1.626 m)   Wt 199 lb (90.3 kg)   SpO2 97%   BMI 34.16 kg/m²         An electronic signature was used to authenticate this note.   --Carl Marrero MD

## 2022-09-29 ENCOUNTER — OFFICE VISIT (OUTPATIENT)
Dept: PULMONOLOGY | Age: 67
End: 2022-09-29
Payer: MEDICARE

## 2022-09-29 VITALS
DIASTOLIC BLOOD PRESSURE: 70 MMHG | HEIGHT: 64 IN | HEART RATE: 87 BPM | WEIGHT: 198 LBS | OXYGEN SATURATION: 100 % | BODY MASS INDEX: 33.8 KG/M2 | SYSTOLIC BLOOD PRESSURE: 98 MMHG | RESPIRATION RATE: 18 BRPM | TEMPERATURE: 97.6 F

## 2022-09-29 DIAGNOSIS — R06.02 SOB (SHORTNESS OF BREATH): ICD-10-CM

## 2022-09-29 DIAGNOSIS — R09.1 PLEURISY: ICD-10-CM

## 2022-09-29 DIAGNOSIS — R09.1 PLEURISY: Primary | ICD-10-CM

## 2022-09-29 LAB
C-REACTIVE PROTEIN: <3 MG/L (ref 0–5.1)
RHEUMATOID FACTOR: <10 IU/ML

## 2022-09-29 PROCEDURE — 1036F TOBACCO NON-USER: CPT | Performed by: INTERNAL MEDICINE

## 2022-09-29 PROCEDURE — 1124F ACP DISCUSS-NO DSCNMKR DOCD: CPT | Performed by: INTERNAL MEDICINE

## 2022-09-29 PROCEDURE — 3017F COLORECTAL CA SCREEN DOC REV: CPT | Performed by: INTERNAL MEDICINE

## 2022-09-29 PROCEDURE — G8427 DOCREV CUR MEDS BY ELIG CLIN: HCPCS | Performed by: INTERNAL MEDICINE

## 2022-09-29 PROCEDURE — 1111F DSCHRG MED/CURRENT MED MERGE: CPT | Performed by: INTERNAL MEDICINE

## 2022-09-29 PROCEDURE — G8399 PT W/DXA RESULTS DOCUMENT: HCPCS | Performed by: INTERNAL MEDICINE

## 2022-09-29 PROCEDURE — 1090F PRES/ABSN URINE INCON ASSESS: CPT | Performed by: INTERNAL MEDICINE

## 2022-09-29 PROCEDURE — G8417 CALC BMI ABV UP PARAM F/U: HCPCS | Performed by: INTERNAL MEDICINE

## 2022-09-29 PROCEDURE — 99204 OFFICE O/P NEW MOD 45 MIN: CPT | Performed by: INTERNAL MEDICINE

## 2022-09-29 NOTE — PROGRESS NOTES
Pulmonary consult            REASON FOR CONSULTATION:  Chief Complaint   Patient presents with    Follow-up    Shortness of Breath        Consult at request of Aashish Flynn MD     PCP: Aashish Flynn MD        Assessment and Plan:   Diagnosis Orders   1. Pleurisy  Full PFT Study With Bronchodilator    SANCHO Reflex to Antibody Cascade    C-Reactive Protein    Rheumatoid Factor    Anti SSA    Anti SSB    Cyclic Citrul Peptide Antibody, IgG    Anti-Scleroderma Antibody      2. SOB (shortness of breath)  Full PFT Study With Bronchodilator    Allergen, Respiratory, Region 5 Panel          Plan:  Her symptoms are vague and it could be related to chronic pleurisy we will evaluate with serology. She stated that her GERD is adequately controlled she was instructed to follow strict antireflux precautions and continue PPI. PFT to evaluate lung function. HISTORY OF PRESENT ILLNESS: Jocy Mcpherson is very pleasant 78y.o. year old lady with medical history stated below significant for former smoker quit long time ago, history of diabetes mellitus type 2, hypertension, obstructive sleep apnea on BiPAP 15/11 therapy compliant,  Was referred to us for shortness of breath with any exertion, associated with some chest tightness that increases with breathing no associated wheezing chronic cough or sputum production, no prior history of asthma or COPD. She had a CT of the chest done 9/9/2022 that showed no acute PE, no infiltrate or effusions. There are mild calcifications in the coronaries. Most recent echocardiogram 2019 with normal ejection fraction and normal diastolic function as well as valvular evaluation    Stress test done May 2021 was negative. She has a history of GERD currently on PPIs.     Past Medical History:   Diagnosis Date    Anesthesia complication     \" shaking\"    Arthritis     Cardiomyopathy ([address] COVID-19     1/7/2022    Diabetes     GERD (gastroesophageal reflux disease)     Hyperlipidemia     Hypertension     Lumbar disc disease     Prolonged emergence from general anesthesia     Sleep apnea     pt states does use a Cpap machine at night    Unspecified cerebral artery occlusion with cerebral infarction 2014    right side weak    Wears glasses        Past Surgical History:   Procedure Laterality Date    ARTHRODESIS Right 9/17/2020    (RIGHT) REMOVAL OF BONE SPURRING AND OSSEOUS PROMINENCE FIRST METATARSAL, ARTHRODESIS OF FIRST METATARSOPHALANGEAL JOINT, BONE MARROW HARVEST AND CONCENTRATION RIGHT TIBIA performed by Bell Renner DPM at [address] Left 9/3/15    CARPAL TUNNEL RELEASE Right 9/22/15    COLONOSCOPY      FINGER TRIGGER RELEASE Left 9/3/15    middle and ring fingers    FINGER TRIGGER RELEASE Right 9/22/15    middle and ring fingers    FOOT SURGERY Bilateral     litteltoe    HAND SURGERY Right     Ligament    KNEE ARTHROPLASTY Right 7/26/2021    ROBOTIC ASSISTED TOTAL RIGHT KNEE REPLACEMENT performed by Levi Cota MD at [facility] ARTHROSCOPY Right 2/2/2022    ARTHROSCOPY WITH LYSIS OF ADHESIONS, RIGHT KNEE performed by Levi Cota MD at [facility] (CERVIX NOT REMOVED)  08/2003    SHOULDER ARTHROSCOPY Right 06/20/2018    Diagnostic scope, rcr, open Viky Aguilar       family history includes Cancer in her brother; Heart Disease in her mother; High Blood Pressure in her mother; Stroke in her mother. SOCIAL HISTORY:   reports that she quit smoking about 30 years ago. Her smoking use included cigarettes. She has a 4.50 pack-year smoking history. She has been exposed to tobacco smoke. She has never used smokeless tobacco.      ALLERGIES:  Patient is allergic to codeine, vicodin [hydrocodone-acetaminophen], lipitor [atorvastatin], oxycontin [oxycodone hcl], and tramadol.     REVIEW OF SYSTEMS:  Constitutional: Negative for fever, no wt loss, no night sweats   HENT: Negative for sore throat, difficulty swallowing,   Eyes: Negative for redness, no discharge   Respiratory: Shortness of breath with exertion, atypical chest pain/tightness  Cardiovascular: Negative for chest pain, no palpitations   Gastrointestinal: Negative for vomiting, diarrhea   Genitourinary: Negative for hematuria, no dysuria    Musculoskeletal: Negative for arthralgias, no joint swelling   Skin: Negative for rash  LE: no edema   Neurological: Negative for syncope, no tremor, no focal weakness or dysarthria   Hematological: Negative for adenopathy, or bleeding   Psychiatric/Behavorial: Negative for anxiety,    Objective:   PHYSICAL EXAM:  Blood pressure 98/70, pulse 87, temperature 97.6 °F (36.4 °C), resp. rate 18, height 5' 4\" (1.626 m), weight 198 lb (89.8 kg), SpO2 100 %, not currently breastfeeding.'  Gen: No acute distress  Eyes: PERRL. No sclera icterus. No conjunctival injection. ENT: No discharge. Pharynx clear. External appearance of ears and nose normal.  Neck: Trachea midline. No obvious mass. Resp: No accessory muscle use. No crackles. No wheezes. No rhonchi. CV: Regular rate. Regular rhythm. No murmur or rub. No edema. GI: Non-tender. Non-distended. No hernia. Skin: Warm, dry, normal texture and turgor. No nodule on exposed extremities. Lymph: No cervical LAD. M/S: No cyanosis. No clubbing. No joint deformity. LE:  no edema   Neuro: no tremor, no focal deficit, awake and alert   Psych: ntact judgement and insight.     Current Outpatient Medications   Medication Sig Dispense Refill    pravastatin (PRAVACHOL) 20 MG tablet Take 1 tablet by mouth daily 30 tablet 2    valsartan (DIOVAN) 160 MG tablet Take 1 tablet by mouth daily 90 tablet 1    chlorthalidone (HYGROTON) 25 MG tablet Take 1 tablet by mouth daily 30 tablet 0    aspirin 81 MG EC tablet Take 81 mg by mouth daily      Psyllium (METAMUCIL) 0.36 g CAPS Take 1 capsule by mouth daily      vitamin B-6 (PYRIDOXINE) 100 MG tablet Take 100 mg by mouth daily      FANAPT 1 MG TABS tablet TAKE 1 TABLET BY MOUTH EVERY NIGHT 90 tablet 0    metFORMIN (GLUCOPHAGE) 500 MG tablet TAKE 2 TABLETS BY MOUTH TWICE DAILY WITH MEALS 360 tablet 0    furosemide (LASIX) 20 MG tablet TAKE 1 TABLET BY MOUTH  DAILY AS NEEDED FOR LEG  SWELLING 90 tablet 3    DULoxetine (CYMBALTA) 60 MG extended release capsule TAKE 1 CAPSULE BY MOUTH DAILY 90 capsule 1    TRULICITY 1.5 FP/6.2QQ SOPN INJECT THE CONTENTS OF ONE  PEN SUBCUTANEOUSLY WEEKLY 6 mL 3    traZODone (DESYREL) 100 MG tablet TAKE 1 TABLET BY MOUTH AT  NIGHT 90 tablet 1    insulin glargine (LANTUS SOLOSTAR) 100 UNIT/ML injection pen INJECT SUBCUTANEOUSLY 12  UNITS ONCE NIGHTLY 15 mL 3    omeprazole (PRILOSEC) 40 MG delayed release capsule TAKE 1 CAPSULE BY MOUTH EVERY MORNING BEFORE BREAKFAST 90 capsule 1    ondansetron (ZOFRAN ODT) 4 MG disintegrating tablet Take 1-2 tablets by mouth every 12 hours as needed for Nausea or Vomiting May Sub regular tablet (non-ODT) if insurance does not cover ODT. 12 tablet 0    blood glucose test strips (ONETOUCH ULTRA) strip As needed. 100 strip 4    dextran 70-hypromellose (TEARS NATURALE) 0.1-0.3 % SOLN opthalmic solution as needed for itching      albuterol sulfate HFA (PROVENTIL HFA) 108 (90 Base) MCG/ACT inhaler Inhale 2 puffs into the lungs every 4 hours as needed for Wheezing or Shortness of Breath May substitute ProAir MDI 1 Inhaler 5    ipratropium-albuterol (DUONEB) 0.5-2.5 (3) MG/3ML SOLN nebulizer solution Inhale 3 mLs into the lungs every 4 hours as needed for Shortness of Breath (wheezing coughing) 360 mL 5    ammonium lactate (LAC-HYDRIN) 12 % lotion Apply topically daily. (Patient taking differently: as needed Apply topically daily. ) 1 Bottle 1     No current facility-administered medications for this visit.        Data Reviewed:   CBC and Renal reviewed  Last CBC  Lab Results   Component Value Date/Time    WBC 4.6 09/11/2022 08:49 AM    RBC 4.66 09/11/2022 08:49 AM    HGB 12.6 09/11/2022 08:49 AM    MCV 83.0 09/11/2022 08:49 AM     09/11/2022 08:49 AM     Last Renal  Lab Results   Component Value Date/Time     09/22/2022 08:55 AM    K 4.0 09/22/2022 08:55 AM    K 4.4 09/11/2022 08:49 AM     09/22/2022 08:55 AM    CO2 25 09/22/2022 08:55 AM    CO2 26 09/11/2022 08:49 AM    CO2 26 09/10/2022 04:38 AM    BUN 10 09/22/2022 08:55 AM    CREATININE 0.8 09/22/2022 08:55 AM    GLUCOSE 100 09/22/2022 08:55 AM    CALCIUM 10.5 09/22/2022 08:55 AM       Last ABG  POC Blood Gas: No results found for: POCPH, POCPCO2, POCPO2, POCHCO3, NBEA, ANYJ7RCR  No results for input(s): PH, PCO2, PO2, HCO3, BE, O2SAT in the last 72 hours. Radiology Review:  Pertinent images / reports were reviewed as a part of this visit. CT Chest w/ contrast: No results found for this or any previous visit. CT Chest w/o contrast: Results for orders placed during the hospital encounter of 08/29/19    CT CHEST WO CONTRAST    Narrative  EXAMINATION:  CT OF THE CHEST WITHOUT CONTRAST 8/29/2019 4:46 pm    TECHNIQUE:  CT of the chest was performed without the administration of intravenous  contrast. Multiplanar reformatted images are provided for review. Dose  modulation, iterative reconstruction, and/or weight based adjustment of the  mA/kV was utilized to reduce the radiation dose to as low as reasonably  achievable. COMPARISON:  Chest CTPA 10/26/2017    HISTORY:  ORDERING SYSTEM PROVIDED HISTORY: Abnormal CT scan of lung  TECHNOLOGIST PROVIDED HISTORY:  Reason for Exam: family hx of lung ca trouble breathing  Acuity: Acute  Type of Exam: Initial    FINDINGS:  Mediastinum:    1. There is no hilar or mediastinal adenopathy. There is some limitation  without intravenous contrast.  2. No significant cardiac enlargement or pericardial effusion. There is  minimal coronary calcification, proximal LAD.   3. Vasculature is unremarkable for age on noncontrast imaging. 4. A small hiatal hernia is unchanged. Lungs/pleura:    1. There is no airspace disease, mass or nodule. No pleural effusion. 2. Airways are unremarkable. 3. Patchy density is seen inferior medial lower lungs bilaterally, paraspinal  likely atelectasis or scarring. Incidental calcified granuloma inferior  medial right lower lobe. Upper Abdomen: Unremarkable. Adrenal glands normal.    Soft Tissues/Bones: Unremarkable. Impression  No acute abnormality with the above findings. CTPA: Results for orders placed during the hospital encounter of 09/25/21    CT CHEST PULMONARY EMBOLISM W CONTRAST    Narrative  EXAMINATION:  CT OF THE ABDOMEN AND PELVIS WITH CONTRAST; CTA OF THE CHEST 9/25/2021 10:30  am    TECHNIQUE:  CT of the abdomen and pelvis was performed with the administration of  intravenous contrast. Multiplanar reformatted images are provided for review. Dose modulation, iterative reconstruction, and/or weight based adjustment of  the mA/kV was utilized to reduce the radiation dose to as low as reasonably  achievable.; CTA of the chest was performed after the administration of  intravenous contrast.  Multiplanar reformatted images are provided for  review. MIP images are provided for review. Dose modulation, iterative  reconstruction, and/or weight based adjustment of the mA/kV was utilized to  reduce the radiation dose to as low as reasonably achievable.     COMPARISON:  None    Abdominal CT April 2021    HISTORY:  ORDERING SYSTEM PROVIDED HISTORY: upper abd pain  TECHNOLOGIST PROVIDED HISTORY:  Additional Contrast?->None  Reason for exam:->upper abd pain  Decision Support Exception - unselect if not a suspected or confirmed  emergency medical condition->Emergency Medical Condition (MA)  Reason for Exam: abd pain cp  Acuity: Acute  Type of Exam: Initial; ORDERING SYSTEM PROVIDED HISTORY: sob, elevated ddimer  TECHNOLOGIST PROVIDED HISTORY:  Reason for exam:->sob, elevated ddimer  Decision Support Exception - unselect if not a suspected or confirmed  emergency medical condition->Emergency Medical Condition (MA)  Reason for Exam: abd pain cp  Acuity: Acute  Type of Exam: Initial    FINDINGS:    Chest:    Mediastinum: No aortic aneurysm identified. No intimal flap is seen. Thyroid gland appears normal.  Mild coronary artery calcification is seen. Trace pericardial fluid is seen. Small hiatal hernia is seen. There is  nonspecific thickening at the GE junction. No embolus is seen in the  central, right, or left main pulmonary artery. No embolus is seen in the  segmental branches. Subsegmental branches not well evaluated secondary to  motion. .  No significant mediastinal adenopathy    Lungs/pleura: Respiratory motion artifact limits evaluation of fine pulmonary  parenchymal change. Mild bronchiectatic changes are seen. A few subpleural  reticular opacities are seen in the left lower lobe and right lower lobe. No  focal lung consolidation is seen. No pneumothorax. No significant pleural  fluid    Soft Tissues/Bones: Spurring is seen in the spine. Spurring is seen in the  shoulder joints      Abdomen/Pelvis:    Organs: No splenomegaly. No perisplenic fluid    Adrenal glands appear normal    No hydronephrosis on right. No hydronephrosis on left. Punctate hypodense  nodule seen in the left kidney, too small to characterize, likely cyst.  No  peripancreatic fluid. No peripancreatic inflammatory change. No intrahepatic ductal dilatation. No perihepatic fluid. No inflammatory  stranding surrounding the gallbladder    GI/Bowel:    No significant small bowel distention noted. Moderate stool seen in the  colon. Appendix is identified and normal in caliber. No significant small  bowel wall thickening. No significant pericolonic fat stranding. Pelvis: No free fluid in pelvis. No pelvic adenopathy.   Bladder is  incompletely distended accentuating its wall thickness. Peritoneum/Retroperitoneum: Athero sclerotic change seen in aorta. No  aneurysm. No retroperitoneal adenopathy. Bones/Soft Tissues: Tiny Bethany umbilical hernia containing fat is seen. Spurring is seen in the spine. Spurring is seen in the hips    Impression  Chest:    No pulmonary embolus identified. There is mild coronary artery calcification. Mild bronchiectasis. Scattered subpleural reticular opacities are also seen,  suggesting scarring or nonspecific pulmonary fibrosis. Abdomen and pelvis:    No acute intra-abdominal abnormality      CXR PA/LAT: Results for orders placed during the hospital encounter of 05/11/21    XR CHEST (2 VW)    Narrative  EXAMINATION:  TWO XRAY VIEWS OF THE CHEST    5/11/2021 4:17 pm    COMPARISON:  04/20/2021 radiograph    HISTORY:  ORDERING SYSTEM PROVIDED HISTORY: Chest Pain  TECHNOLOGIST PROVIDED HISTORY:  Reason for exam:->Chest Pain  Reason for Exam: chest pain; sob    FINDINGS:  The heart, mediastinum and pulmonary vascularity are normal.  Lungs are  well-expanded and clear. No skeletal abnormalities are present in the chest.    Impression  No significant findings in the chest.        Pulmonary function testing  None on file        This note was transcribed using [id number] [employer]. Please disregard any translational errors.     Adan Ramires MD  [facility]

## 2022-09-30 LAB
ANTI-NUCLEAR ANTIBODY (ANA): NEGATIVE
ANTI-SCL70 IGG: <0.2 AI (ref 0–0.9)
ANTI-SS-A IGG: <0.2 AI (ref 0–0.9)
ANTI-SS-B IGG: <0.2 AI (ref 0–0.9)
CYCLIC CITRULLINATED PEPTIDE ANTIBODY IGG: <0.5 U/ML (ref 0–2.9)

## 2022-10-04 LAB
2000687N OAK TREE IGE: <0.1 KU/L (ref 0–0.34)
ALLERGEN BERMUDA GRASS IGE: <0.1 KU/L (ref 0–0.34)
ALLERGEN BIRCH IGE: <0.1 KU/L (ref 0–0.34)
ALLERGEN DOG DANDER IGE: <0.1 KU/L (ref 0–0.34)
ALLERGEN GERMAN COCKROACH IGE: <0.1 KU/L (ref 0–0.34)
ALLERGEN HORMODENDRUM IGE: <0.1 KUL/L (ref 0–0.34)
ALLERGEN MOUSE EPITHELIA IGE: <0.1 KU/L (ref 0–0.34)
ALLERGEN PECAN TREE IGE: <0.1 KU/L (ref 0–0.34)
ALLERGEN PIGWEED ROUGH IGE: <0.1 KU/L (ref 0–0.34)
ALLERGEN SHEEP SORREL (W18) IGE: <0.1 KU/L (ref 0–0.34)
ALLERGEN TREE SYCAMORE: <0.1 KU/L (ref 0–0.34)
ALLERGEN WALNUT TREE IGE: <0.1 KU/L (ref 0–0.34)
ALLERGEN WHITE MULBERRY TREE, IGE: <0.1 KU/L (ref 0–0.34)
ALLERGEN, TREE, WHITE ASH IGE: <0.1 KU/L (ref 0–0.34)
ALTERNARIA ALTERNATA: <0.1 KU/L (ref 0–0.34)
ASPERGILLUS FUMIGATUS: <0.1 KU/L (ref 0–0.34)
CAT DANDER ANTIBODY: <0.1 KU/L (ref 0–0.34)
COTTONWOOD TREE: <0.1 KU/L (ref 0–0.34)
D. FARINAE: <0.1 KU/L (ref 0–0.34)
D. PTERONYSSINUS: <0.1 KU/L (ref 0–0.34)
ELM TREE: <0.1 KU/L (ref 0–0.34)
IGE: 14 IU/ML
MAPLE/BOXELDER TREE: <0.1 KU/L (ref 0–0.34)
MOUNTAIN CEDAR TREE: <0.1 KU/L (ref 0–0.34)
MUCOR RACEMOSUS: <0.1 KU/L (ref 0–0.34)
P. NOTATUM: <0.1 KU/L (ref 0–0.34)
RUSSIAN THISTLE: <0.1 KU/L (ref 0–0.34)
SHORT RAGWD(A ARTEMIS.) IGE: <0.1 KU/L (ref 0–0.34)
TIMOTHY GRASS: <0.1 KU/L (ref 0–0.34)

## 2022-10-18 ENCOUNTER — TELEPHONE (OUTPATIENT)
Dept: PULMONOLOGY | Age: 67
End: 2022-10-18

## 2022-10-18 NOTE — PROGRESS NOTES
John Wood         : 1955  [x] [address]     [] [address]      [] [facility]     [][facility]    [] [facility]  [] [facility]  [] [facility]   [] [facility] [] Other:  Diagnosis: [x] DIPTI (G47.33) [] CSA (G47.31) [] Apnea (G47.30)   Length of Need: [] 12 Months [x] 99 Months [] Other:    Machine (MEGAN!):  [x] ResMed AirSense     Auto [] Other:     []  CPAP () [] Bilevel ()   Mode: [] Auto [] Spontaneous    Mode: [] Auto [] Spontaneous                        15/11 cm     Comfort Settings:   - Ramp Pressure: 5 cmH2O                                        - Ramp time: 15 min                                     -  Flex/EPR - 3 full time                                    - For ResMed Bilevel (TiMax-4 sec   TiMin- 0.2 sec)     Humidifier: [x] Heated ()        [x] Water chamber replacement ()/ 1 per 6 months        Mask:  Please always start with the mask the patient used during the titraion   [x] Nasal () /1 per 3 months [x] Full Face () /1 per 3 months   [x] Patient choice -Size and fit mask [x] Patient Choice - Size and fit mask   [] Dispense:  [] Dispense:    [x] Headgear () / 1 per 3 months [x] Headgear () / 1 per 3 months   [x] Replacement Nasal Cushion ()/2 per month [x] Interface Replacement ()/1 per month   [x] Replacement Nasal Pillows ()/2 per month         Tubing: [x] Heated ()/1 per 3 months    [] Standard ()/1 per 3 months [] Other:           Filters: [x] Non-disposable ()/1 per 6 months     [x] Ultra-Fine, Disposable ()/2 per month        Miscellaneous: [x] Chin Strap ()/ 1 per 6 months [] O2 bleed-in:       LPM   [] Oximetry on CPAP/Bilevel []  Other:          Start Order Date: 10/18/22    MEDICAL JUSTIFICATION:  I, the undersigned, certify that the above prescribed supplies are medically necessary for this patients wellbeing.   In my opinion, the supplies are both reasonable and necessary in reference to accepted standards of medicalpractice in treatment of this patients condition.     Pauline Bagley MD      NPI: [id number]       Order Signed Date: 10/18/22    Electronically signed by Pauline Bagley MD on 10/18/2022 at 2:47 PM

## 2022-10-18 NOTE — TELEPHONE ENCOUNTER
Davis at [facility] needs an updated order in order to bill Sidia for the BiPap they already gave her. The orders are only good for 90 days.   Fax to [phone]

## 2022-10-24 ENCOUNTER — OFFICE VISIT (OUTPATIENT)
Dept: PULMONOLOGY | Age: 67
End: 2022-10-24
Payer: MEDICARE

## 2022-10-24 VITALS
WEIGHT: 195 LBS | HEART RATE: 78 BPM | RESPIRATION RATE: 18 BRPM | DIASTOLIC BLOOD PRESSURE: 78 MMHG | HEIGHT: 64 IN | BODY MASS INDEX: 33.29 KG/M2 | TEMPERATURE: 97.9 F | OXYGEN SATURATION: 97 % | SYSTOLIC BLOOD PRESSURE: 124 MMHG

## 2022-10-24 DIAGNOSIS — K21.9 GASTROESOPHAGEAL REFLUX DISEASE, UNSPECIFIED WHETHER ESOPHAGITIS PRESENT: ICD-10-CM

## 2022-10-24 DIAGNOSIS — R10.13 EPIGASTRIC PAIN: ICD-10-CM

## 2022-10-24 PROCEDURE — 3017F COLORECTAL CA SCREEN DOC REV: CPT | Performed by: INTERNAL MEDICINE

## 2022-10-24 PROCEDURE — 1124F ACP DISCUSS-NO DSCNMKR DOCD: CPT | Performed by: INTERNAL MEDICINE

## 2022-10-24 PROCEDURE — G8484 FLU IMMUNIZE NO ADMIN: HCPCS | Performed by: INTERNAL MEDICINE

## 2022-10-24 PROCEDURE — G8427 DOCREV CUR MEDS BY ELIG CLIN: HCPCS | Performed by: INTERNAL MEDICINE

## 2022-10-24 PROCEDURE — G8399 PT W/DXA RESULTS DOCUMENT: HCPCS | Performed by: INTERNAL MEDICINE

## 2022-10-24 PROCEDURE — 99214 OFFICE O/P EST MOD 30 MIN: CPT | Performed by: INTERNAL MEDICINE

## 2022-10-24 PROCEDURE — 1036F TOBACCO NON-USER: CPT | Performed by: INTERNAL MEDICINE

## 2022-10-24 PROCEDURE — 1090F PRES/ABSN URINE INCON ASSESS: CPT | Performed by: INTERNAL MEDICINE

## 2022-10-24 PROCEDURE — G8417 CALC BMI ABV UP PARAM F/U: HCPCS | Performed by: INTERNAL MEDICINE

## 2022-10-24 RX ORDER — OMEPRAZOLE 40 MG/1
40 CAPSULE, DELAYED RELEASE ORAL 2 TIMES DAILY
Qty: 160 CAPSULE | Refills: 5 | Status: SHIPPED | OUTPATIENT
Start: 2022-10-24

## 2022-10-24 RX ORDER — OMEPRAZOLE 40 MG/1
40 CAPSULE, DELAYED RELEASE ORAL 2 TIMES DAILY
Qty: 180 CAPSULE | OUTPATIENT
Start: 2022-10-24

## 2022-10-24 RX ORDER — VALSARTAN 160 MG/1
160 TABLET ORAL DAILY
Qty: 90 TABLET | Refills: 1 | Status: SHIPPED | OUTPATIENT
Start: 2022-10-24

## 2022-10-24 NOTE — PATIENT INSTRUCTIONS
Omeprazole dose increased to twice per day  Advised to take tums 2 tab po three times per day for GERD  PFT still pending  Follow up in one month

## 2022-10-24 NOTE — PROGRESS NOTES
Pulmonary Progress note             REASON FOR CONSULTATION:  Chief Complaint   Patient presents with    Follow-up        Consult at request of Gustavo Marquez MD     PCP: Gustavo Marquez MD        Assessment and Plan:   Diagnosis Orders   1. Epigastric pain  omeprazole (PRILOSEC) 40 MG delayed release capsule      2. Gastroesophageal reflux disease, unspecified whether esophagitis present  omeprazole (PRILOSEC) 40 MG delayed release capsule            Plan:  I suspect active GERD is the cause of her episodic chest tightness/pain her PPI dose was increased to twice a day, she was instructed to take Tums with active GERD symptoms. Strict antireflux precautions. PFT to evaluate lung function. HISTORY OF PRESENT ILLNESS: Earlis Naples is very pleasant 78y.o. year old lady with medical history stated below significant for former smoker quit long time ago, history of diabetes mellitus type 2, hypertension, obstructive sleep apnea on BiPAP 15/11 therapy compliant,  Was referred to us for shortness of breath with any exertion, associated with some chest tightness that increases with breathing no associated wheezing chronic cough or sputum production, no prior history of asthma or COPD. She had a CT of the chest done 9/9/2022 that showed no acute PE, no infiltrate or effusions. There are mild calcifications in the coronaries. Most recent echocardiogram 2019 with normal ejection fraction and normal diastolic function as well as valvular evaluation    Stress test done May 2021 was negative. She has a history of GERD currently on PPIs.     Past Medical History:   Diagnosis Date    Anesthesia complication     \" shaking\"    Arthritis     Cardiomyopathy ([facility] [address]     COVID-19     1/7/2022    Diabetes     GERD (gastroesophageal reflux disease)     Hyperlipidemia     Hypertension     Lumbar disc disease     Prolonged emergence from general anesthesia     Sleep apnea     pt states does use a Cpap machine at night    Unspecified cerebral artery occlusion with cerebral infarction 2014    right side weak    Wears glasses        Past Surgical History:   Procedure Laterality Date    ARTHRODESIS Right 9/17/2020    (RIGHT) REMOVAL OF BONE SPURRING AND OSSEOUS PROMINENCE FIRST METATARSAL, ARTHRODESIS OF FIRST METATARSOPHALANGEAL JOINT, BONE MARROW HARVEST AND CONCENTRATION RIGHT TIBIA performed by Rosey Cunha DPM at [address] Left 9/3/15    CARPAL TUNNEL RELEASE Right 9/22/15    COLONOSCOPY      FINGER TRIGGER RELEASE Left 9/3/15    middle and ring fingers    FINGER TRIGGER RELEASE Right 9/22/15    middle and ring fingers    FOOT SURGERY Bilateral     litteltoe    HAND SURGERY Right     Ligament    KNEE ARTHROPLASTY Right 7/26/2021    ROBOTIC ASSISTED TOTAL RIGHT KNEE REPLACEMENT performed by Silvano Marr MD at [address] [facility] ARTHROSCOPY Right 2/2/2022    ARTHROSCOPY WITH LYSIS OF ADHESIONS, RIGHT KNEE performed by Silvano Marr MD at [facility] (CERVIX NOT REMOVED)  08/2003    SHOULDER ARTHROSCOPY Right 06/20/2018    Diagnostic scope, rcr, open [facility] [place]       family history includes Cancer in her brother; Heart Disease in her mother; High Blood Pressure in her mother; Stroke in her mother. SOCIAL HISTORY:   reports that she quit smoking about 30 years ago. Her smoking use included cigarettes. She has a 4.50 pack-year smoking history. She has been exposed to tobacco smoke. She has never used smokeless tobacco.      ALLERGIES:  Patient is allergic to codeine, vicodin [hydrocodone-acetaminophen], lipitor [atorvastatin], oxycontin [oxycodone hcl], and tramadol.     REVIEW OF SYSTEMS:  Constitutional: Negative for fever, no wt loss, no night sweats   HENT: Negative for sore throat, difficulty swallowing,   Eyes: Negative for redness, no discharge   Respiratory: Shortness of breath with exertion, atypical chest pain/tightness  Cardiovascular: Negative for chest pain, no palpitations   Gastrointestinal: Negative for vomiting, diarrhea   Genitourinary: Negative for hematuria, no dysuria    Musculoskeletal: Negative for arthralgias, no joint swelling   Skin: Negative for rash  LE: no edema   Neurological: Negative for syncope, no tremor, no focal weakness or dysarthria   Hematological: Negative for adenopathy, or bleeding   Psychiatric/Behavorial: Negative for anxiety,    Objective:   PHYSICAL EXAM:  Blood pressure 124/78, pulse 78, temperature 97.9 °F (36.6 °C), resp. rate 18, height 5' 4\" (1.626 m), weight 195 lb (88.5 kg), SpO2 97 %, not currently breastfeeding.'  Gen: No acute distress  Eyes: PERRL. No sclera icterus. No conjunctival injection. ENT: No discharge. Pharynx clear. External appearance of ears and nose normal.  Neck: Trachea midline. No obvious mass. Resp: No accessory muscle use. No crackles. No wheezes. No rhonchi. CV: Regular rate. Regular rhythm. No murmur or rub. No edema. GI: Non-tender. Non-distended. No hernia. Skin: Warm, dry, normal texture and turgor. No nodule on exposed extremities. Lymph: No cervical LAD. M/S: No cyanosis. No clubbing. No joint deformity. LE:  no edema   Neuro: no tremor, no focal deficit, awake and alert   Psych: ntact judgement and insight.     Current Outpatient Medications   Medication Sig Dispense Refill    omeprazole (PRILOSEC) 40 MG delayed release capsule Take 1 capsule by mouth in the morning and at bedtime 160 capsule 5    pravastatin (PRAVACHOL) 20 MG tablet Take 1 tablet by mouth daily 30 tablet 2    valsartan (DIOVAN) 160 MG tablet Take 1 tablet by mouth daily 90 tablet 1    chlorthalidone (HYGROTON) 25 MG tablet Take 1 tablet by mouth daily 30 tablet 0    aspirin 81 MG EC tablet Take 81 mg by mouth daily      Psyllium (METAMUCIL) 0.36 g CAPS Take 1 capsule by mouth daily      vitamin B-6 (PYRIDOXINE) 100 MG tablet Take 100 mg by mouth daily      FANAPT 1 MG TABS tablet TAKE 1 TABLET BY MOUTH EVERY NIGHT 90 tablet 0    metFORMIN (GLUCOPHAGE) 500 MG tablet TAKE 2 TABLETS BY MOUTH TWICE DAILY WITH MEALS 360 tablet 0    furosemide (LASIX) 20 MG tablet TAKE 1 TABLET BY MOUTH  DAILY AS NEEDED FOR LEG  SWELLING 90 tablet 3    DULoxetine (CYMBALTA) 60 MG extended release capsule TAKE 1 CAPSULE BY MOUTH DAILY 90 capsule 1    TRULICITY 1.5 LO/4.8IN SOPN INJECT THE CONTENTS OF ONE  PEN SUBCUTANEOUSLY WEEKLY 6 mL 3    traZODone (DESYREL) 100 MG tablet TAKE 1 TABLET BY MOUTH AT  NIGHT 90 tablet 1    insulin glargine (LANTUS SOLOSTAR) 100 UNIT/ML injection pen INJECT SUBCUTANEOUSLY 12  UNITS ONCE NIGHTLY 15 mL 3    ondansetron (ZOFRAN ODT) 4 MG disintegrating tablet Take 1-2 tablets by mouth every 12 hours as needed for Nausea or Vomiting May Sub regular tablet (non-ODT) if insurance does not cover ODT. 12 tablet 0    blood glucose test strips (ONETOUCH ULTRA) strip As needed. 100 strip 4    dextran 70-hypromellose (TEARS NATURALE) 0.1-0.3 % SOLN opthalmic solution as needed for itching      albuterol sulfate HFA (PROVENTIL HFA) 108 (90 Base) MCG/ACT inhaler Inhale 2 puffs into the lungs every 4 hours as needed for Wheezing or Shortness of Breath May substitute ProAir MDI 1 Inhaler 5    ipratropium-albuterol (DUONEB) 0.5-2.5 (3) MG/3ML SOLN nebulizer solution Inhale 3 mLs into the lungs every 4 hours as needed for Shortness of Breath (wheezing coughing) 360 mL 5    ammonium lactate (LAC-HYDRIN) 12 % lotion Apply topically daily. (Patient taking differently: as needed Apply topically daily. ) 1 Bottle 1     No current facility-administered medications for this visit.        Data Reviewed:   CBC and Renal reviewed  Last CBC  Lab Results   Component Value Date/Time    WBC 4.6 09/11/2022 08:49 AM    RBC 4.66 09/11/2022 08:49 AM    HGB 12.6 09/11/2022 08:49 AM    MCV 83.0 09/11/2022 08:49 AM     09/11/2022 08:49 AM     Last Renal  Lab Results Component Value Date/Time     09/22/2022 08:55 AM    K 4.0 09/22/2022 08:55 AM    K 4.4 09/11/2022 08:49 AM     09/22/2022 08:55 AM    CO2 25 09/22/2022 08:55 AM    CO2 26 09/11/2022 08:49 AM    CO2 26 09/10/2022 04:38 AM    BUN 10 09/22/2022 08:55 AM    CREATININE 0.8 09/22/2022 08:55 AM    GLUCOSE 100 09/22/2022 08:55 AM    CALCIUM 10.5 09/22/2022 08:55 AM       Last ABG  POC Blood Gas: No results found for: POCPH, POCPCO2, POCPO2, POCHCO3, NBEA, [id number]  No results for input(s): PH, PCO2, PO2, HCO3, BE, O2SAT in the last 72 hours. Radiology Review:  Pertinent images / reports were reviewed as a part of this visit. CT Chest w/ contrast: No results found for this or any previous visit. CT Chest w/o contrast: Results for orders placed during the hospital encounter of 08/29/19    CT CHEST WO CONTRAST    Narrative  EXAMINATION:  CT OF THE CHEST WITHOUT CONTRAST 8/29/2019 4:46 pm    TECHNIQUE:  CT of the chest was performed without the administration of intravenous  contrast. Multiplanar reformatted images are provided for review. Dose  modulation, iterative reconstruction, and/or weight based adjustment of the  mA/kV was utilized to reduce the radiation dose to as low as reasonably  achievable. COMPARISON:  Chest CTPA 10/26/2017    HISTORY:  ORDERING SYSTEM PROVIDED HISTORY: Abnormal CT scan of lung  TECHNOLOGIST PROVIDED HISTORY:  Reason for Exam: family hx of lung ca trouble breathing  Acuity: Acute  Type of Exam: Initial    FINDINGS:  Mediastinum:    1. There is no hilar or mediastinal adenopathy. There is some limitation  without intravenous contrast.  2. No significant cardiac enlargement or pericardial effusion. There is  minimal coronary calcification, proximal LAD. 3. Vasculature is unremarkable for age on noncontrast imaging. 4. A small hiatal hernia is unchanged. Lungs/pleura:    1. There is no airspace disease, mass or nodule. No pleural effusion.   2. Airways are unremarkable. 3. Patchy density is seen inferior medial lower lungs bilaterally, paraspinal  likely atelectasis or scarring. Incidental calcified granuloma inferior  medial right lower lobe. Upper Abdomen: Unremarkable. Adrenal glands normal.    Soft Tissues/Bones: Unremarkable. Impression  No acute abnormality with the above findings. CTPA: Results for orders placed during the hospital encounter of 09/25/21    CT CHEST PULMONARY EMBOLISM W CONTRAST    Narrative  EXAMINATION:  CT OF THE ABDOMEN AND PELVIS WITH CONTRAST; CTA OF THE CHEST 9/25/2021 10:30  am    TECHNIQUE:  CT of the abdomen and pelvis was performed with the administration of  intravenous contrast. Multiplanar reformatted images are provided for review. Dose modulation, iterative reconstruction, and/or weight based adjustment of  the mA/kV was utilized to reduce the radiation dose to as low as reasonably  achievable.; CTA of the chest was performed after the administration of  intravenous contrast.  Multiplanar reformatted images are provided for  review. MIP images are provided for review. Dose modulation, iterative  reconstruction, and/or weight based adjustment of the mA/kV was utilized to  reduce the radiation dose to as low as reasonably achievable.     COMPARISON:  None    Abdominal CT April 2021    HISTORY:  ORDERING SYSTEM PROVIDED HISTORY: upper abd pain  TECHNOLOGIST PROVIDED HISTORY:  Additional Contrast?->None  Reason for exam:->upper abd pain  Decision Support Exception - unselect if not a suspected or confirmed  emergency medical condition->Emergency Medical Condition (MA)  Reason for Exam: abd pain cp  Acuity: Acute  Type of Exam: Initial; ORDERING SYSTEM PROVIDED HISTORY: sob, elevated ddimer  TECHNOLOGIST PROVIDED HISTORY:  Reason for exam:->sob, elevated ddimer  Decision Support Exception - unselect if not a suspected or confirmed  emergency medical condition->Emergency Medical Condition (MA)  Reason for Exam: abd pain cp  Acuity: Acute  Type of Exam: Initial    FINDINGS:    Chest:    Mediastinum: No aortic aneurysm identified. No intimal flap is seen. Thyroid gland appears normal.  Mild coronary artery calcification is seen. Trace pericardial fluid is seen. Small hiatal hernia is seen. There is  nonspecific thickening at the GE junction. No embolus is seen in the  central, right, or left main pulmonary artery. No embolus is seen in the  segmental branches. Subsegmental branches not well evaluated secondary to  motion. .  No significant mediastinal adenopathy    Lungs/pleura: Respiratory motion artifact limits evaluation of fine pulmonary  parenchymal change. Mild bronchiectatic changes are seen. A few subpleural  reticular opacities are seen in the left lower lobe and right lower lobe. No  focal lung consolidation is seen. No pneumothorax. No significant pleural  fluid    Soft Tissues/Bones: Spurring is seen in the spine. Spurring is seen in the  shoulder joints      Abdomen/Pelvis:    Organs: No splenomegaly. No perisplenic fluid    Adrenal glands appear normal    No hydronephrosis on right. No hydronephrosis on left. Punctate hypodense  nodule seen in the left kidney, too small to characterize, likely cyst.  No  peripancreatic fluid. No peripancreatic inflammatory change. No intrahepatic ductal dilatation. No perihepatic fluid. No inflammatory  stranding surrounding the gallbladder    GI/Bowel:    No significant small bowel distention noted. Moderate stool seen in the  colon. Appendix is identified and normal in caliber. No significant small  bowel wall thickening. No significant pericolonic fat stranding. Pelvis: No free fluid in pelvis. No pelvic adenopathy. Bladder is  incompletely distended accentuating its wall thickness. Peritoneum/Retroperitoneum: Athero sclerotic change seen in aorta. No  aneurysm. No retroperitoneal adenopathy.     Bones/Soft Tissues: Tiny Bethany umbilical hernia containing fat is seen. Spurring is seen in the spine. Spurring is seen in the hips    Impression  Chest:    No pulmonary embolus identified. There is mild coronary artery calcification. Mild bronchiectasis. Scattered subpleural reticular opacities are also seen,  suggesting scarring or nonspecific pulmonary fibrosis. Abdomen and pelvis:    No acute intra-abdominal abnormality      CXR PA/LAT: Results for orders placed during the hospital encounter of 05/11/21    XR CHEST (2 VW)    Narrative  EXAMINATION:  TWO XRAY VIEWS OF THE CHEST    5/11/2021 4:17 pm    COMPARISON:  04/20/2021 radiograph    HISTORY:  ORDERING SYSTEM PROVIDED HISTORY: Chest Pain  TECHNOLOGIST PROVIDED HISTORY:  Reason for exam:->Chest Pain  Reason for Exam: chest pain; sob    FINDINGS:  The heart, mediastinum and pulmonary vascularity are normal.  Lungs are  well-expanded and clear. No skeletal abnormalities are present in the chest.    Impression  No significant findings in the chest.        Pulmonary function testing  None on file        This note was transcribed using [id number] [employer]. Please disregard any translational errors.     Sheila Gunter MD  [facility] Pulmonary, Sleep and Critical Care

## 2022-10-26 ENCOUNTER — APPOINTMENT (OUTPATIENT)
Dept: CT IMAGING | Age: 67
End: 2022-10-26
Payer: MEDICARE

## 2022-10-26 ENCOUNTER — HOSPITAL ENCOUNTER (EMERGENCY)
Age: 67
Discharge: HOME | End: 2022-10-26
Payer: MEDICARE

## 2022-10-26 VITALS
WEIGHT: 195.55 LBS | BODY MASS INDEX: 33.38 KG/M2 | HEIGHT: 64 IN | DIASTOLIC BLOOD PRESSURE: 103 MMHG | RESPIRATION RATE: 23 BRPM | TEMPERATURE: 98.1 F | HEART RATE: 73 BPM | SYSTOLIC BLOOD PRESSURE: 159 MMHG | OXYGEN SATURATION: 100 %

## 2022-10-26 DIAGNOSIS — N30.00 ACUTE CYSTITIS WITHOUT HEMATURIA: ICD-10-CM

## 2022-10-26 DIAGNOSIS — R10.13 ABDOMINAL PAIN, EPIGASTRIC: Primary | ICD-10-CM

## 2022-10-26 LAB
A/G RATIO: 1.5 (ref 1.1–2.2)
ALBUMIN SERPL-MCNC: 4.5 G/DL (ref 3.4–5)
ALP BLD-CCNC: 81 U/L (ref 40–129)
ALT SERPL-CCNC: 19 U/L (ref 10–40)
ANION GAP SERPL CALCULATED.3IONS-SCNC: 9 MMOL/L (ref 3–16)
AST SERPL-CCNC: 31 U/L (ref 15–37)
BACTERIA: NORMAL /HPF
BASOPHILS ABSOLUTE: 0 K/UL (ref 0–0.2)
BASOPHILS RELATIVE PERCENT: 0.4 %
BILIRUB SERPL-MCNC: <0.2 MG/DL (ref 0–1)
BILIRUBIN URINE: NEGATIVE
BLOOD, URINE: NEGATIVE
BUN BLDV-MCNC: 11 MG/DL (ref 7–20)
CALCIUM SERPL-MCNC: 10.1 MG/DL (ref 8.3–10.6)
CHLORIDE BLD-SCNC: 103 MMOL/L (ref 99–110)
CLARITY: CLEAR
CO2: 26 MMOL/L (ref 21–32)
COLOR: YELLOW
COMMENT UA: NORMAL
CREAT SERPL-MCNC: 0.7 MG/DL (ref 0.6–1.2)
EOSINOPHILS ABSOLUTE: 0.1 K/UL (ref 0–0.6)
EOSINOPHILS RELATIVE PERCENT: 2.2 %
EPITHELIAL CELLS, UA: 2 /HPF (ref 0–5)
GFR SERPL CREATININE-BSD FRML MDRD: >60 ML/MIN/{1.73_M2}
GLUCOSE BLD-MCNC: 81 MG/DL (ref 70–99)
GLUCOSE URINE: NEGATIVE MG/DL
HCT VFR BLD CALC: 35.5 % (ref 36–48)
HEMOGLOBIN: 11.6 G/DL (ref 12–16)
HYALINE CASTS: 0 /LPF (ref 0–8)
KETONES, URINE: NEGATIVE MG/DL
LEUKOCYTE ESTERASE, URINE: ABNORMAL
LIPASE: 66 U/L (ref 13–60)
LYMPHOCYTES ABSOLUTE: 1.5 K/UL (ref 1–5.1)
LYMPHOCYTES RELATIVE PERCENT: 22.1 %
MCH RBC QN AUTO: 26.8 PG (ref 26–34)
MCHC RBC AUTO-ENTMCNC: 32.7 G/DL (ref 31–36)
MCV RBC AUTO: 82.1 FL (ref 80–100)
MICROSCOPIC EXAMINATION: YES
MONOCYTES ABSOLUTE: 0.3 K/UL (ref 0–1.3)
MONOCYTES RELATIVE PERCENT: 4.4 %
NEUTROPHILS ABSOLUTE: 4.9 K/UL (ref 1.7–7.7)
NEUTROPHILS RELATIVE PERCENT: 70.9 %
NITRITE, URINE: NEGATIVE
PDW BLD-RTO: 14.2 % (ref 12.4–15.4)
PH UA: 6 (ref 5–8)
PLATELET # BLD: 299 K/UL (ref 135–450)
PMV BLD AUTO: 7.6 FL (ref 5–10.5)
POTASSIUM SERPL-SCNC: 3.8 MMOL/L (ref 3.5–5.1)
PROTEIN UA: NEGATIVE MG/DL
RBC # BLD: 4.33 M/UL (ref 4–5.2)
RBC UA: 2 /HPF (ref 0–4)
SODIUM BLD-SCNC: 138 MMOL/L (ref 136–145)
SPECIFIC GRAVITY UA: 1.02 (ref 1–1.03)
TOTAL PROTEIN: 7.5 G/DL (ref 6.4–8.2)
URINE REFLEX TO CULTURE: ABNORMAL
URINE TYPE: ABNORMAL
UROBILINOGEN, URINE: 0.2 E.U./DL
WBC # BLD: 6.9 K/UL (ref 4–11)
WBC UA: NORMAL /HPF (ref 0–5)

## 2022-10-26 PROCEDURE — 99285 EMERGENCY DEPT VISIT HI MDM: CPT

## 2022-10-26 PROCEDURE — 96374 THER/PROPH/DIAG INJ IV PUSH: CPT

## 2022-10-26 PROCEDURE — 6360000002 HC RX W HCPCS: Performed by: PHYSICIAN ASSISTANT

## 2022-10-26 PROCEDURE — 80053 COMPREHEN METABOLIC PANEL: CPT

## 2022-10-26 PROCEDURE — 74177 CT ABD & PELVIS W/CONTRAST: CPT

## 2022-10-26 PROCEDURE — 81001 URINALYSIS AUTO W/SCOPE: CPT

## 2022-10-26 PROCEDURE — 96375 TX/PRO/DX INJ NEW DRUG ADDON: CPT

## 2022-10-26 PROCEDURE — 83690 ASSAY OF LIPASE: CPT

## 2022-10-26 PROCEDURE — 6360000004 HC RX CONTRAST MEDICATION: Performed by: PHYSICIAN ASSISTANT

## 2022-10-26 PROCEDURE — 6370000000 HC RX 637 (ALT 250 FOR IP): Performed by: PHYSICIAN ASSISTANT

## 2022-10-26 PROCEDURE — 85025 COMPLETE CBC W/AUTO DIFF WBC: CPT

## 2022-10-26 RX ORDER — CEFUROXIME AXETIL 250 MG/1
250 TABLET ORAL 2 TIMES DAILY
Qty: 10 TABLET | Refills: 0 | Status: SHIPPED | OUTPATIENT
Start: 2022-10-26 | End: 2022-10-31

## 2022-10-26 RX ORDER — DIPHENHYDRAMINE HYDROCHLORIDE 50 MG/ML
12.5 INJECTION INTRAMUSCULAR; INTRAVENOUS ONCE
Status: COMPLETED | OUTPATIENT
Start: 2022-10-26 | End: 2022-10-26

## 2022-10-26 RX ORDER — OMEPRAZOLE 40 MG/1
40 CAPSULE, DELAYED RELEASE ORAL
Qty: 30 CAPSULE | Refills: 2 | Status: SHIPPED | OUTPATIENT
Start: 2022-10-26

## 2022-10-26 RX ORDER — ONDANSETRON 2 MG/ML
4 INJECTION INTRAMUSCULAR; INTRAVENOUS ONCE
Status: COMPLETED | OUTPATIENT
Start: 2022-10-26 | End: 2022-10-26

## 2022-10-26 RX ORDER — MORPHINE SULFATE 2 MG/ML
2 INJECTION, SOLUTION INTRAMUSCULAR; INTRAVENOUS ONCE
Status: COMPLETED | OUTPATIENT
Start: 2022-10-26 | End: 2022-10-26

## 2022-10-26 RX ADMIN — LIDOCAINE HYDROCHLORIDE: 20 SOLUTION ORAL; TOPICAL at 14:36

## 2022-10-26 RX ADMIN — DIPHENHYDRAMINE HYDROCHLORIDE 12.5 MG: 50 INJECTION, SOLUTION INTRAMUSCULAR; INTRAVENOUS at 11:14

## 2022-10-26 RX ADMIN — ONDANSETRON 4 MG: 2 INJECTION INTRAMUSCULAR; INTRAVENOUS at 11:14

## 2022-10-26 RX ADMIN — IOPAMIDOL 75 ML: 755 INJECTION, SOLUTION INTRAVENOUS at 11:47

## 2022-10-26 RX ADMIN — MORPHINE SULFATE 2 MG: 2 INJECTION, SOLUTION INTRAMUSCULAR; INTRAVENOUS at 11:14

## 2022-10-26 ASSESSMENT — PAIN - FUNCTIONAL ASSESSMENT: PAIN_FUNCTIONAL_ASSESSMENT: 0-10

## 2022-10-26 ASSESSMENT — PAIN SCALES - GENERAL
PAINLEVEL_OUTOF10: 8
PAINLEVEL_OUTOF10: 7
PAINLEVEL_OUTOF10: 8

## 2022-10-26 ASSESSMENT — ENCOUNTER SYMPTOMS
SHORTNESS OF BREATH: 0
NAUSEA: 1
VOMITING: 1
EYE PAIN: 0
SORE THROAT: 0
COUGH: 0
BACK PAIN: 0
ABDOMINAL PAIN: 1

## 2022-10-26 ASSESSMENT — PAIN DESCRIPTION - FREQUENCY: FREQUENCY: CONTINUOUS

## 2022-10-26 ASSESSMENT — PAIN DESCRIPTION - PAIN TYPE: TYPE: ACUTE PAIN

## 2022-10-26 ASSESSMENT — PAIN DESCRIPTION - LOCATION
LOCATION: ABDOMEN
LOCATION: ABDOMEN

## 2022-10-26 ASSESSMENT — PAIN DESCRIPTION - ORIENTATION: ORIENTATION: RIGHT

## 2022-10-26 NOTE — Clinical Note
Yousif Whalen was seen and treated in our emergency department on 10/26/2022. She may return to work on 10/31/2022. If you have any questions or concerns, please don't hesitate to call.       Stiven Segovia PA-C

## 2022-10-26 NOTE — ED PROVIDER NOTES
**ADVANCED PRACTICE PROVIDER, I HAVE EVALUATED THIS PATIENT**        [address]      Pt Name: Annette Horner  UTK:[id number]  [place] 1955  Date of evaluation: 10/26/2022  Provider: Carl Rivera PA-C      Chief Complaint:    Chief Complaint   Patient presents with    Abdominal Pain    Back Pain    Emesis     Pt arrives ambulatory for eval of abdominal pain onset last night, took pain medicine for knee pain last night and now pt is vomiting and itching         Nursing Notes, Past Medical Hx, Past Surgical Hx, Social Hx, Allergies, and Family Hx were all reviewed and agreed with or any disagreements were addressed in the HPI.    HPI: (Location, Duration, Timing, Severity, Quality, Assoc Sx, Context, Modifying factors)    Chief Complaint of epigastric and left lower quadrant abdominal pain. Nausea vomiting. Says she threw up 5 times this morning. She took a hydrocodone for her knee pain and started having abdominal pain and nausea vomiting and itching. She says hydrocodone normally causes itching. She denies shortness of breath, no chest pain, no back pain, no lightheaded or dizziness. No tongue or lip swelling. No other complaints. Thinks she might had allergic reaction to the hydrocodone. Although she has been on it in the past.    This is a  78 y.o. female who presents to the emergency room with the above complaint.     PastMedical/Surgical History:      Diagnosis Date    Anesthesia complication     \" shaking\"    Arthritis     Cardiomyopathy ([facility] [address]     COVID-19     1/7/2022    Diabetes     GERD (gastroesophageal reflux disease)     Hyperlipidemia     Hypertension     Lumbar disc disease     Prolonged emergence from general anesthesia     Sleep apnea     pt states does use a Cpap machine at night    Unspecified cerebral artery occlusion with cerebral infarction 2014    right side weak    Wears glasses          Procedure Laterality Date    ARTHRODESIS Right 9/17/2020    (RIGHT) REMOVAL OF BONE SPURRING AND OSSEOUS PROMINENCE FIRST METATARSAL, ARTHRODESIS OF FIRST METATARSOPHALANGEAL JOINT, BONE MARROW HARVEST AND CONCENTRATION RIGHT TIBIA performed by Agueda Olivera DPM at [address] Left 9/3/15    CARPAL TUNNEL RELEASE Right 9/22/15    COLONOSCOPY      FINGER TRIGGER RELEASE Left 9/3/15    middle and ring fingers    FINGER TRIGGER RELEASE Right 9/22/15    middle and ring fingers    FOOT SURGERY Bilateral     litteltoe    HAND SURGERY Right     Ligament    KNEE ARTHROPLASTY Right 7/26/2021    ROBOTIC ASSISTED TOTAL RIGHT KNEE REPLACEMENT performed by Lynn Arredondo MD at [address] ARTHROSCOPY Right 2/2/2022    ARTHROSCOPY WITH LYSIS OF ADHESIONS, RIGHT KNEE performed by Lynn Arredondo MD at [address] (CERVIX NOT REMOVED)  08/2003    SHOULDER ARTHROSCOPY Right 06/20/2018    Diagnostic scope, rcr, open [place]       Medications:  Previous Medications    ALBUTEROL SULFATE HFA (PROVENTIL HFA) 108 (90 BASE) MCG/ACT INHALER    Inhale 2 puffs into the lungs every 4 hours as needed for Wheezing or Shortness of Breath May substitute ProAir MDI    AMMONIUM LACTATE (LAC-HYDRIN) 12 % LOTION    Apply topically daily. ASPIRIN 81 MG EC TABLET    Take 81 mg by mouth daily    BLOOD GLUCOSE TEST STRIPS (ONETOUCH ULTRA) STRIP    As needed.     CHLORTHALIDONE (HYGROTON) 25 MG TABLET    Take 1 tablet by mouth daily    DEXTRAN 70-HYPROMELLOSE (TEARS NATURALE) 0.1-0.3 % SOLN OPTHALMIC SOLUTION    as needed for itching    DULOXETINE (CYMBALTA) 60 MG EXTENDED RELEASE CAPSULE    TAKE 1 CAPSULE BY MOUTH DAILY    FANAPT 1 MG TABS TABLET    TAKE 1 TABLET BY MOUTH EVERY NIGHT    FUROSEMIDE (LASIX) 20 MG TABLET    TAKE 1 TABLET BY MOUTH  DAILY AS NEEDED FOR LEG  SWELLING    INSULIN GLARGINE (LANTUS SOLOSTAR) 100 UNIT/ML INJECTION PEN    INJECT SUBCUTANEOUSLY 12  UNITS ONCE NIGHTLY IPRATROPIUM-ALBUTEROL (DUONEB) 0.5-2.5 (3) MG/3ML SOLN NEBULIZER SOLUTION    Inhale 3 mLs into the lungs every 4 hours as needed for Shortness of Breath (wheezing coughing)    METFORMIN (GLUCOPHAGE) 500 MG TABLET    TAKE 2 TABLETS BY MOUTH TWICE DAILY WITH MEALS    OMEPRAZOLE (PRILOSEC) 40 MG DELAYED RELEASE CAPSULE    Take 1 capsule by mouth in the morning and at bedtime    ONDANSETRON (ZOFRAN ODT) 4 MG DISINTEGRATING TABLET    Take 1-2 tablets by mouth every 12 hours as needed for Nausea or Vomiting May Sub regular tablet (non-ODT) if insurance does not cover ODT. PRAVASTATIN (PRAVACHOL) 20 MG TABLET    Take 1 tablet by mouth daily    PSYLLIUM (METAMUCIL) 0.36 G CAPS    Take 1 capsule by mouth daily    TRAZODONE (DESYREL) 100 MG TABLET    TAKE 1 TABLET BY MOUTH AT  NIGHT    TRULICITY 1.5 BJ/7.3RI SOPN    INJECT THE CONTENTS OF ONE  PEN SUBCUTANEOUSLY WEEKLY    VALSARTAN (DIOVAN) 160 MG TABLET    Take 1 tablet by mouth daily    VITAMIN B-6 (PYRIDOXINE) 100 MG TABLET    Take 100 mg by mouth daily         Review of Systems:  (2-9 systems needed)  Review of Systems   Constitutional:  Negative for chills and fever. HENT:  Negative for congestion and sore throat. Eyes:  Negative for pain and visual disturbance. Respiratory:  Negative for cough and shortness of breath. Cardiovascular:  Negative for chest pain and leg swelling. Gastrointestinal:  Positive for abdominal pain, nausea and vomiting. Genitourinary:  Negative for dysuria and frequency. Musculoskeletal:  Negative for back pain and neck pain. Skin:  Negative for rash and wound. Neurological:  Positive for headaches. Negative for dizziness and light-headedness. \"Positives and Pertinent negatives as per HPI\"    Physical Exam:  Physical Exam  Constitutional:       Appearance: She is well-developed. She is not diaphoretic. HENT:      Head: Normocephalic and atraumatic.       Nose: Nose normal.      Mouth/Throat:      Mouth: Mucous membranes are moist.      Pharynx: Oropharynx is clear. No oropharyngeal exudate or posterior oropharyngeal erythema. Eyes:      General:         Right eye: No discharge. Left eye: No discharge. Extraocular Movements: Extraocular movements intact. Conjunctiva/sclera: Conjunctivae normal.      Pupils: Pupils are equal, round, and reactive to light. Cardiovascular:      Rate and Rhythm: Normal rate and regular rhythm. Heart sounds: Normal heart sounds. No murmur heard. No friction rub. No gallop. Pulmonary:      Effort: Pulmonary effort is normal. No respiratory distress. Breath sounds: Normal breath sounds. No wheezing or rales. Chest:      Chest wall: No tenderness. Abdominal:      General: Bowel sounds are normal. There is no distension. Palpations: Abdomen is soft. There is no mass. Tenderness: There is abdominal tenderness in the epigastric area and left lower quadrant. There is no guarding or rebound. Musculoskeletal:         General: Normal range of motion. Cervical back: Normal range of motion and neck supple. Skin:     General: Skin is warm and dry. Neurological:      General: No focal deficit present. Mental Status: She is alert and oriented to person, place, and time.    Psychiatric:         Behavior: Behavior normal.       MEDICAL DECISION MAKING    Vitals:    Vitals:    10/26/22 1444 10/26/22 1459 10/26/22 1514 10/26/22 1529   BP: (!) 168/92 (!) 181/88 (!) 163/98 (!) 183/88   Pulse: 67 76 72 71   Resp: 12 17 14 11   Temp:       TempSrc:       SpO2: 100% 98% 100% 95%   Weight:       Height:           LABS:  Labs Reviewed   CBC WITH AUTO DIFFERENTIAL - Abnormal; Notable for the following components:       Result Value    Hemoglobin 11.6 (*)     Hematocrit 35.5 (*)     All other components within normal limits   LIPASE - Abnormal; Notable for the following components:    Lipase 66.0 (*)     All other components within normal limits URINALYSIS WITH REFLEX TO CULTURE - Abnormal; Notable for the following components:    Leukocyte Esterase, Urine MODERATE (*)     All other components within normal limits   COMPREHENSIVE METABOLIC PANEL   MICROSCOPIC URINALYSIS        Remainder of labs reviewed and were negative at this time or not returned at the time of this note. RADIOLOGY:   Non-plain film images such as CT, Ultrasound and MRI are read by the radiologist. Charles Aj PA-C have directly visualized the radiologic plain film image(s) with the below findings:      Interpretation per the Radiologist below, if available at the time of this note:    CT ABDOMEN PELVIS W IV CONTRAST Additional Contrast? None   Final Result   1. Bladder wall thickening raising the question of cystitis. 2. Dilation of the pancreatic duct measuring up to 6 mm without a focal   pancreatic lesion identified. 3. No acute findings elsewhere in the abdomen or pelvis. No results found. MEDICAL DECISION MAKING / ED COURSE:      PROCEDURES:   Procedures    None    Patient was given:  Medications   ondansetron (ZOFRAN) injection 4 mg (4 mg IntraVENous Given 10/26/22 1114)   morphine (PF) injection 2 mg (2 mg IntraVENous Given 10/26/22 1114)   diphenhydrAMINE (BENADRYL) injection 12.5 mg (12.5 mg IntraVENous Given 10/26/22 1114)   iopamidol (ISOVUE-370) 76 % injection 75 mL (75 mLs IntraVENous Given 10/26/22 1147)   aluminum & magnesium hydroxide-simethicone (MAALOX) 30 mL, lidocaine viscous hcl (XYLOCAINE) 5 mL (GI COCKTAIL) ( Oral Given 10/26/22 1436)     Emergency room course: Patient on exam pupils are equal round and reactive to light extraocular movement is intact. Throat is clear. Nonerythematous no exudate. Tongue and lips show no swelling. Throat is clear. Uvula midline without swelling. Neck is supple full range of motion without tenderness. No midline to cervical, thoracic or lumbar spine.   Cardiovascular regular rhythm, lungs are clear.  No wheeze, rales or rhonchi noted. Abdomen shows some mild epigastric and left lower quadrant abdominal tenderness with palpation. Normal bowel sounds all 4 quadrant. No palpable mass. No CVA or flank tenderness. Patient has no rash noted. She has full range of motion all extremity. Alert oriented x4. Does not appear to be in acute distress. I have read from today shows:  CBC unremarkable. CMP unremarkable  Lipase 66.0  Urinalysis show moderate leukocytes, negative for nitrites, negative for blood, negative ketones. Microscopically WBC is 3-5, bacteria is none seen, epithelial cells of 2. CT scan show as above. 6 mm dilation of her pancreatic duct. I did discuss this with the patient. Also place a consult out to GI spoke with Dr. Wilfrid Evans. He said is just on the high end of normal.  There is nothing the patient needs to be admitted for with her epigastric pain it could be some gastritis as well. So given the patient a GI cocktail. After the GI cocktail she did improve pain has went away. So I discussed putting her on Prilosec. Have her follow-up with Dr. Wilfrid Evans. As well as treat her for UTI based on the CT scan she has gallbladder wall thickening consistent with cystitis even though the white count in the urine is not showing UTI. And since she did have some suprapubic tenderness and left lower quadrant tenderness I will treat her for that as well. So she will be placed on Ceftin and given prescription for Prilosec follow-up with gastroenterologist and she was okay with this plan. She will be discharged stable condition. The patient tolerated their visit well. I evaluated the patient. The physician was available for consultation as needed. The patient and / or the family were informed of the results of any tests, a time was given to answer questions, a plan was proposed and they agreed with plan. I am the Primary Clinician of Record. CLINICAL IMPRESSION:  1.  Abdominal pain, epigastric    2.  Acute cystitis without hematuria        DISPOSITION Decision To Discharge 10/26/2022 03:43:46 PM      PATIENT REFERRED TO:  MD Bernie Duarte [facility] [address]  [phone]    Call in 1 day      Elizabeth Figueredo, [address] [facility] [address]  [phone]    Call in 1 day      DISCHARGE MEDICATIONS:  New Prescriptions    CEFUROXIME (CEFTIN) 250 MG TABLET    Take 1 tablet by mouth 2 times daily for 5 days    OMEPRAZOLE (PRILOSEC) 40 MG DELAYED RELEASE CAPSULE    Take 1 capsule by mouth every morning (before breakfast)       DISCONTINUED MEDICATIONS:  Discontinued Medications    No medications on file              (Please note the MDM and HPI sections of this note were completed with a voice recognition program.  Efforts were made to edit the dictations but occasionally words are mis-transcribed.)    Electronically signed, Romy Goel PA-C,          Romy Goel PA-C  10/26/22 [id number]

## 2022-10-26 NOTE — DISCHARGE INSTRUCTIONS
Follow-up with your primary care physician for UTI  Follow-up with Dr. Page [facility] gastroenterologist for your epigastric abdominal pain and possible gastritis  Take prescribed medication as prescribed only  Return emergency room for any worsening

## 2022-10-26 NOTE — ED TRIAGE NOTES
Pt arrives ambulatory for eval of abdominal pain onset last night, took pain medicine for knee pain last night and now pt is vomiting and itching. Pt sts she is allergic to Vicodin and it causes vomiting and itching and that is what she took. Pt is a/ox4,rsp nonlabored and pwd.

## 2022-10-26 NOTE — Clinical Note
Edgardo Orellana was seen and treated in our emergency department on 10/26/2022. She may return to work on 10/31/2022. If you have any questions or concerns, please don't hesitate to call.       David Bullard PA-C

## 2022-10-27 ENCOUNTER — OFFICE VISIT (OUTPATIENT)
Dept: ORTHOPEDIC SURGERY | Age: 67
End: 2022-10-27
Payer: MEDICARE

## 2022-10-27 ENCOUNTER — NURSE ONLY (OUTPATIENT)
Dept: FAMILY MEDICINE CLINIC | Age: 67
End: 2022-10-27
Payer: MEDICARE

## 2022-10-27 VITALS — HEIGHT: 64 IN | BODY MASS INDEX: 33.29 KG/M2 | WEIGHT: 195 LBS

## 2022-10-27 DIAGNOSIS — Z96.651 S/P TOTAL KNEE ARTHROPLASTY, RIGHT: Primary | ICD-10-CM

## 2022-10-27 PROCEDURE — 3017F COLORECTAL CA SCREEN DOC REV: CPT | Performed by: ORTHOPAEDIC SURGERY

## 2022-10-27 PROCEDURE — G0008 ADMIN INFLUENZA VIRUS VAC: HCPCS | Performed by: FAMILY MEDICINE

## 2022-10-27 PROCEDURE — 90694 VACC AIIV4 NO PRSRV 0.5ML IM: CPT | Performed by: FAMILY MEDICINE

## 2022-10-27 PROCEDURE — 99214 OFFICE O/P EST MOD 30 MIN: CPT | Performed by: ORTHOPAEDIC SURGERY

## 2022-10-27 PROCEDURE — G8484 FLU IMMUNIZE NO ADMIN: HCPCS | Performed by: ORTHOPAEDIC SURGERY

## 2022-10-27 PROCEDURE — 1124F ACP DISCUSS-NO DSCNMKR DOCD: CPT | Performed by: ORTHOPAEDIC SURGERY

## 2022-10-27 PROCEDURE — 1036F TOBACCO NON-USER: CPT | Performed by: ORTHOPAEDIC SURGERY

## 2022-10-27 PROCEDURE — 1090F PRES/ABSN URINE INCON ASSESS: CPT | Performed by: ORTHOPAEDIC SURGERY

## 2022-10-27 PROCEDURE — G8417 CALC BMI ABV UP PARAM F/U: HCPCS | Performed by: ORTHOPAEDIC SURGERY

## 2022-10-27 PROCEDURE — G8428 CUR MEDS NOT DOCUMENT: HCPCS | Performed by: ORTHOPAEDIC SURGERY

## 2022-10-27 PROCEDURE — G8399 PT W/DXA RESULTS DOCUMENT: HCPCS | Performed by: ORTHOPAEDIC SURGERY

## 2022-10-27 RX ORDER — GABAPENTIN 300 MG/1
300 CAPSULE ORAL 3 TIMES DAILY
Qty: 90 CAPSULE | Refills: 0 | Status: SHIPPED | OUTPATIENT
Start: 2022-10-27 | End: 2022-11-23

## 2022-10-27 NOTE — PROGRESS NOTES
[place]  [id number]  October 27, 2022    Chief Complaint   Patient presents with    Follow-up     2/2/22 R TKA       History: The patient is here in follow-up regarding her right knee. He underwent a right total knee arthroplasty in July 2021. She did have severe postoperative stiffness. She ultimately required a right knee arthroscopy in February 2022 and underwent lysis of adhesions. She recovered uneventfully. She is now reporting constant pain laterally. She was recently instructed to stop taking anti-inflammatories. The patient does take Tylenol for the pain. Physical: She demonstrates appropriate mood and affect. She is alert and oriented to person, place and time. The portal sites are clean, dry and intact. She has no swelling. There is No evidence of DVT seen on physical exam. She is neurovascularly intact distally. Range of motion is from 0 degrees to 120 degrees. There is no atrophy. Strength in the knee is 5/5. There is no instability with varus or valgus stressing of the knee. The patient is no longer tender over the patellar tendon. She has mild pain with resisted right knee extension. She does have nonspecific tenderness laterally. X-rays: 3 views of the right knee obtained in office today were extensively reviewed. The prosthesis is well aligned. There is no evidence of fracture or loosening. There is no evidence of dislocation. Impression: Status post  right knee arthroscopy #2 status post right total knee arthroplasty #3 chronic pain syndrome/neurogenic pain    Plan: At this time, the patient will continue to work on range of motion and strengthening. We will go ahead and get the patient into a formal physical therapy program.  The patient will modify her activities. The patient was given a prescription for Neurontin. Patient was encouraged on weight loss. She does have a brace and she may intermittently wear the brace at home.   I do feel some of her issues are related to her prior CVA. If the patient were to continue having pain, we certainly could consider the [place] procedure.

## 2022-10-28 ENCOUNTER — CARE COORDINATION (OUTPATIENT)
Dept: CARE COORDINATION | Age: 67
End: 2022-10-28

## 2022-11-02 ENCOUNTER — HOSPITAL ENCOUNTER (OUTPATIENT)
Dept: PULMONOLOGY | Age: 67
Discharge: HOME | End: 2022-11-02
Payer: MEDICAID

## 2022-11-02 PROCEDURE — 94060 EVALUATION OF WHEEZING: CPT

## 2022-11-02 PROCEDURE — 6370000000 HC RX 637 (ALT 250 FOR IP): Performed by: INTERNAL MEDICINE

## 2022-11-02 PROCEDURE — 94726 PLETHYSMOGRAPHY LUNG VOLUMES: CPT

## 2022-11-02 PROCEDURE — 94640 AIRWAY INHALATION TREATMENT: CPT

## 2022-11-02 RX ORDER — ALBUTEROL SULFATE 90 UG/1
4 AEROSOL, METERED RESPIRATORY (INHALATION) ONCE
Status: COMPLETED | OUTPATIENT
Start: 2022-11-02 | End: 2022-11-02

## 2022-11-02 RX ADMIN — ALBUTEROL SULFATE 4 PUFF: 90 AEROSOL, METERED RESPIRATORY (INHALATION) at 07:52

## 2022-11-03 ENCOUNTER — TELEPHONE (OUTPATIENT)
Dept: PULMONOLOGY | Age: 67
End: 2022-11-03

## 2022-11-03 ENCOUNTER — HOSPITAL ENCOUNTER (OUTPATIENT)
Dept: PHYSICAL THERAPY | Age: 67
Setting detail: THERAPIES SERIES
Discharge: HOME | End: 2022-11-03
Payer: MEDICARE

## 2022-11-03 PROCEDURE — 97162 PT EVAL MOD COMPLEX 30 MIN: CPT

## 2022-11-03 PROCEDURE — 97530 THERAPEUTIC ACTIVITIES: CPT

## 2022-11-03 NOTE — PLAN OF CARE
[facility], [facility]  [address] [facility] [facility] [place], [facility]  Phone: [phone]   Fax:     [phone]                                                       Physical Therapy Certification    Dear Brit Washington MD,    We had the pleasure of evaluating the following patient for physical therapy services at [facility]. A summary of our findings can be found in the initial assessment below. This includes our plan of care. If you have any questions or concerns regarding these findings, please do not hesitate to contact me at the office phone number checked above. Thank you for the referral.       Physician Signature:_______________________________Date:__________________  By signing above (or electronic signature), therapists plan is approved by physician        Patient: Pawan Cardenas   : 1955   MRN: [id number]  Referring Physician: Brit Washington MD      Evaluation Date: 2022      Medical Diagnosis Information:  Medical Diagnosis: S/P total knee arthroplasty, right [Z96.651]                                             Insurance information:      Precautions/ Contra-indications: hx CVA  Latex Allergy:  [x]NO      []YES  Preferred Language for Healthcare:   [x]English       []other:    C-SSRS Triggered by Intake questionnaire (Past 2 wk assessment ):   [x] No, Questionnaire did not trigger screening.   [] Yes, Patient intake triggered C-SSRS Screening      [] C-SSRS Screening completed  [] PCP notified via Epic     SUBJECTIVE: Patient stated complaint: (pt arrived late, and was filling out paperwork at least 15min into her appmt. Eval will be limited due to time and limited intake paperwork filled out). Pt here for evaluation of pain in R knee. She had TKA in 2021, and then had scope in 2022 and lysis of adhesions.  She states that she has always had pain in her knee since first surgery, and even has more pain since the second surgery. She states her surgeon is frustrated with her and doesn't know why she is in pain. She also has had some work-up on R hip and arthritis was found, she has had a few intra-articular injections here with some relief. She also has R sided LBP as well. Since her last surgery, she walks with cane. Denies any sharp radiating pain into lower leg. Med hx: CVA w/ R sided deficits, R foot first ray fusion?,     She reports the painful area as distal thigh, more laterally, and up towards her R hip. 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 M/Th she can come to PT before 9:30. Otherwise, she needs after 4:30.      *problems:  - R back/ hip/ knee pain  - walking/ standing (tolerance?)  - stairs    Relevant Medical History:   Functional Scale/Score: FOTO = 37     Pain Scale: 6/10  Easing factors: rest, meds  Provocative factors: standing, walking, stairs     Type: [x]Constant   []Intermittent  []Radiating [x]Localized []other:     Numbness/Tingling: not reported    Occupation/School:standing job @ clothing store    Living Status/Prior Level of Function: Independent with ADLs and IADLs,     OBJECTIVE:   Palpation: tender R hip greater troch, mod tender R lumbar facets: worse lower    Functional Mobility/Transfers:     Posture:     Bandages/Dressings/Incisions:     Gait: (include devices/WB status) mild stiff hip/ knee gait on R    ROM LEFT RIGHT    HIP MARIANO WFL Mod limit all planes w/ pain in R hip into back and also R thigh     HIP Abd WFL, no  pain Mod limit w/ familiar hip/ back pain    HIP Ext      HIP IR      HIP ER      Knee ext 0 Lacks 5*    Knee Flex 140 120* w/P    Ankle PF      Ankle DF      Ankle In      Ankle Ev      Strength  LEFT RIGHT    HIP Flexors      HIP Abductors      HIP Ext      Hip ER      Knee EXT (quad) 5/5 5/5    Knee Flex (HS) 5/5 4/5     Ankle DF   Able to heel/ toe walk   Ankle PF      Ankle Inv      Ankle EV            Circumference  Mid apex  7 cm prox              Reflexes/Sensation:    [x]Dermatomes/Myotomes intact    [x]Reflexes equal and normal bilaterally   []Other:    Joint mobility:    []Normal    [x]Hypo   []Hyper    Orthopedic Special Tests:                        [x] Patient history, allergies, meds reviewed. Medical chart reviewed. See intake form. Review Of Systems (ROS):  [x]Performed Review of systems (Integumentary, CardioPulmonary, Neurological) by intake and observation. Intake form has been scanned into medical record. Patient has been instructed to contact their primary care physician regarding ROS issues if not already being addressed at this time. Co-morbidities/Complexities (which will affect course of rehabilitation):  has a past medical history of Anesthesia complication, Arthritis, Cardiomyopathy ([address] COVID-19, Diabetes, GERD (gastroesophageal reflux disease), Hyperlipidemia, Hypertension, Lumbar disc disease, Prolonged emergence from general anesthesia, Sleep apnea, Unspecified cerebral artery occlusion with cerebral infarction, and Wears glasses.     []None           Arthritic conditions   []Rheumatoid arthritis (M05.9)  []Osteoarthritis (M19.91)   Cardiovascular conditions   []Hypertension (I10)  []Hyperlipidemia (E78.5)  []Angina pectoris (I20)  []Atherosclerosis (I70)  []CVA Musculoskeletal conditions   []Disc pathology   []Congenital spine pathologies   []Prior surgical intervention  []Osteoporosis (M81.8)  []Osteopenia (M85.8)   Endocrine conditions   []Hypothyroid (E03.9)  []Hyperthyroid Gastrointestinal conditions   []Constipation (Y17.60)   Metabolic conditions   []Morbid obesity (E66.01)  []Diabetes type 1(E10.65) or 2 (E11.65)   []Neuropathy (G60.9)     Pulmonary conditions   []Asthma (J45)  []Coughing   []COPD (J44.9)   Psychological Disorders  []Anxiety (F41.9)  []Depression (F32.9)   []Other:   [x]Other:     See above Barriers to/and or personal factors that will affect rehab potential:              [x]Age  []Sex    []Smoker              []Motivation/Lack of Motivation                        [x]Co-Morbidities              [x]Cognitive Function, education/learning barriers              [x]Environmental, home barriers              [x]profession/work barriers  [x]past PT/medical experience  []other:  Justification:     Falls Risk Assessment (30 days):   [x] Falls Risk assessed and no intervention required. [] Falls Risk assessed and Patient requires intervention due to being higher risk   TUG score (>12s at risk):     [] Falls education provided, including         ASSESSMENT:   Functional Impairments:     [x]Noted lumbar/proximal hip/LE hypomobility   [x]Decreased LE functional ROM   [x]Decreased core/proximal hip strength and neuromuscular control   [x]Decreased LE functional strength   []Reduced balance/proprioceptive control   []other:      Functional Activity Limitations (from functional questionnaire and intake)   [x]Reduced ability to tolerate prolonged functional positions   [x]Reduced ability or difficulty with changes of positions or transfers between positions   [x]Reduced ability to maintain good posture and demonstrate good body mechanics with sitting, bending, and lifting   [x]Reduced ability to sleep   [] Reduced ability or tolerance with driving and/or computer work   [x]Reduced ability to perform lifting, carrying tasks   [x]Reduced ability to squat   [x]Reduced ability to forward bend   [x]Reduced ability to ambulate prolonged functional periods/distances/surfaces   [x]Reduced ability to ascend/descend stairs   []Reduced ability to run, hop or jump   []other:     Participation Restrictions   [x]Reduced participation in self care activities   [x]Reduced participation in home management activities   [x]Reduced participation in work activities   [x]Reduced participation in social activities.    []Reduced participation in sport activities. Classification :    []Signs/symptoms consistent with post-surgical status including decreased ROM, strength and function. []Signs/symptoms consistent with joint sprain/strain   []Signs/symptoms consistent with patella-femoral syndrome   [x]Signs/symptoms consistent with knee OA/hip OA   []Signs/symptoms consistent with internal derangement of knee/Hip   [x]Signs/symptoms consistent with functional hip weakness/NMR control      []Signs/symptoms consistent with tendinitis/tendinosis    [x]signs/symptoms consistent with pathology which may benefit from Dry needling      []other:      Prognosis/Rehab Potential:      []Excellent   []Good    [x]Fair   []Poor    Tolerance of evaluation/treatment:    []Excellent   []Good    [x]Fair   []Poor    Physical Therapy Evaluation Complexity Justification  [x] A history of present problem with:  [] no personal factors and/or comorbidities that impact the plan of care;  []1-2 personal factors and/or comorbidities that impact the plan of care  [x]3 personal factors and/or comorbidities that impact the plan of care  [x] An examination of body systems using standardized tests and measures addressing any of the following: body structures and functions (impairments), activity limitations, and/or participation restrictions;:  [] a total of 1-2 or more elements   [x] a total of 3 or more elements   [] a total of 4 or more elements   [x] A clinical presentation with:  [] stable and/or uncomplicated characteristics   [x] evolving clinical presentation with changing characteristics  [] unstable and unpredictable characteristics;   [x] Clinical decision making of [] low, [] moderate, [] high complexity using standardized patient assessment instrument and/or measurable assessment of functional outcome.     [] EVAL (LOW) 19444 (typically 20 minutes face-to-face)  [x] EVAL (MOD) 71834 (typically 30 minutes face-to-face)  [] EVAL (HIGH) 13702 (typically 45 minutes face-to-face)  [] RE-EVAL     PLAN:  Frequency/Duration:  up to 2 days per week for up to 6-12 Weeks:  Interventions:  [x]  Therapeutic exercise including: strength training, ROM, for Lower extremity and core   [x]  NMR activation and proprioception for LE, Glutes and Core   [x]  Manual therapy as indicated for LE, Hip and spine to include: Dry Needling/IASTM, STM, PROM, Gr I-IV mobilizations, manipulation. [x] Modalities as needed that may include: thermal agents, E-stim, Biofeedback, US, iontophoresis as indicated  [x] Patient education on joint protection, postural re-education, activity modification, progression of HEP. [x] Aquatic exercise including: strength training, ROM, and balance for Lower extremity and core     HEP instruction: deferred    GOALS:  Patient stated goal: able to stand at least an hour w/o severe pain. [] Progressing: [] Met: [] Not Met: [] Adjusted    Therapist goals for Patient:   Short Term Goals: To be achieved in: 2 weeks  1. Independent in HEP and progression per patient tolerance, in order to prevent re-injury. [] Progressing: [] Met: [] Not Met: [] Adjusted  2. Patient will have a decrease in pain to facilitate improvement in movement, function, and ADLs as indicated by Functional Deficits. [] Progressing: [] Met: [] Not Met: [] Adjusted    Long Term Goals: To be achieved in: up to 12 weeks  1. Increase FOTO functional outcome score from 36 to 46 to assist with reaching prior level of function. [] Progressing: [] Met: [] Not Met: [] Adjusted  2. Patient will demonstrate increased AROM to WILLIAM/[employer] [facility] [place] for R hip to allow for proper joint functioning as indicated by patients Functional Deficits. [] Progressing: [] Met: [] Not Met: [] Adjusted  3. Patient will demonstrate an increase in Strength to good proximal hip strength and control, within 5lb HHD in LE to allow for proper functional mobility as indicated by patients Functional Deficits.    [] Progressing: [] Met: [] Not Met: [] Adjusted  4. Patient will be able to ambulate stairs reciprocally w/o severe pain. [] Progressing: [] Met: [] Not Met: [] Adjusted  5. Pt will achieve at least 11 reps of sit to  30sec to align with normative values. [] Progressing: [] Met: [] Not Met: [] Adjusted     Electronically signed by: Iron Benitez PT , DPT  #[id number]        Note: If patient does not return for scheduled/recommended follow up visits, this note will serve as a discharge from care along with the most recent update on progress.

## 2022-11-03 NOTE — TELEPHONE ENCOUNTER
Dr. Patricia Daniel I did not see the order for the VQ scan in her chart? Can you put this order in before I call the patient?   Thank you  Message routed to Dr. Patricia Daniel

## 2022-11-03 NOTE — TELEPHONE ENCOUNTER
----- Message from Adan Ramires MD sent at 11/2/2022  2:53 PM EDT -----  Please let her know that her breathing test showed some reduction in her diffusion capacity for that we would like to rule out any blood clots by ordering VQ scan.

## 2022-11-03 NOTE — FLOWSHEET NOTE
[facility], [facility]  [address] [facility] [place], [facility]  Phone: [phone]   Fax:     [phone]    Physical Therapy Treatment Note/ Progress Report:     Date:  2022    Patient Name:  Carmen Hu    :  1955  MRN: [id number]    Pertinent Medical History:      Medical/Treatment Diagnosis Information:  Medical Diagnosis: S/P total knee arthroplasty, right [Z96.651]  Treatment Diagnosis: back/hip/ knee pain; limited function/ mobility    Insurance/Certification information:     Physician Information:  Navid Michel MD  Plan of care signed (Y/N):     Date of Patient follow up with Physician:      Progress Report: []  Yes  [x]  No     Date Range for reporting period:  Beginnin/3/2022  Ending:    Progress report due (10 Rx/or 30 days whichever is less): #13     Recertification due (POC duration/ or 90 days whichever is less):     Visit # POC/ Insurance Allowable Auth Needed    [facility] []Yes    []No     Functional Outcomes Measure:   Date Assessed: at eval  Test:FOTO  Score:    Pain level:  /10     History of Injury: Pt here for evaluation of pain in R knee. She had TKA in 2021, and then had scope in 2022 and lysis of adhesions. She states that she has always had pain in her knee since first surgery, and even has more pain since the second surgery. She states her surgeon is frustrated with her and doesn't know why she is in pain. She also has had some work-up on R hip and arthritis was found, she has had a few intra-articular injections here with some relief. She also has R sided LBP as well. Since her last surgery, she walks with cane. Denies any sharp radiating pain into lower leg. Med hx: CVA w/ R sided deficits, R foot first ray fusion?,      She reports the painful area as distal thigh, more laterally, and up towards her R hip.       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 M/Th she can come to PT before 9:30. Otherwise, she needs after 4:30. *problems:  - R back/ hip/ knee pain  - walking/ standing (tolerance?)  - stairs    SUBJECTIVE:  See eval    OBJECTIVE:   Observation:   Test measurements:      RESTRICTIONS/PRECAUTIONS:     Exercises/Interventions:     Therapeutic Ex (04171)   Min: Resistance/Reps Notes/Cues        Hip/ lumbar ex's >    Functional lower quarter ex's >         Therapeutic Activity (50408) Min:      Pt edu Pathology, confounding factors of back/ hip/ CVA, role of PT, prognosis, goals                   NMR re-education (28135)   Min:                         Manual Intervention (82783) Min:      Hip manual >    Lumbar manual >                   Modalities  Min:             Other Therapeutic Activities:  Pt was educated on PT POC, Diagnosis, Prognosis, pathomechanics as well as frequency and duration of scheduling future physical therapy appointments. Time was also taken on this day to answer all patient questions and participation in PT. Reviewed appointment policy in detail with patient and patient verbalized understanding. Home Exercise Program: Patient instructed in the following for HEP:   . Patient verbalized/demonstrated understanding and was issued written handout. Therapeutic Exercise and NMR EXR  [x] (63698) Provided verbal/tactile cueing for activities related to strengthening, flexibility, endurance, ROM  for improvements in proximal hip and core control with self care, mobility, lifting and ambulation. [x] (91943) Provided verbal/tactile cueing for activities related to improving balance, coordination, kinesthetic sense, posture, motor skill, proprioception  to assist with core control in self care, mobility, lifting, and ambulation.      Therapeutic Activities:    [x] (81327 or 59589) Provided verbal/tactile cueing for activities related to improving balance, coordination, kinesthetic sense, posture, motor skill, proprioception and motor activation to allow for proper function  with self care and ADLs  [x] (60463) Provided training and instruction to the patient for proper core and proximal hip recruitment and positioning with ambulation re-education     Home Exercise Program:    [x] (94430) Reviewed/Progressed HEP activities related to strengthening, flexibility, endurance, ROM of core, proximal hip and LE for functional self-care, mobility, lifting and ambulation   [x] (67448) Reviewed/Progressed HEP activities related to improving balance, coordination, kinesthetic sense, posture, motor skill, proprioception of core, proximal hip and LE for self care, mobility, lifting, and ambulation      Manual Treatments:  PROM / STM / Oscillations-Mobs:  G-I, II, III, IV (PA's, Inf., Post.)  [x] (87589) Provided manual therapy to mobilize proximal hip and LS spine soft tissue/joints for the purpose of modulating pain, promoting relaxation,  increasing ROM, reducing/eliminating soft tissue swelling/inflammation/restriction, improving soft tissue extensibility and allowing for proper ROM for normal function with self care, mobility, lifting and ambulation. Approval Dates:  CPT Code Units Approved Units Used  Date Updated:                     Charges:  Timed Code Treatment Minutes: 25   Total Treatment Minutes: 35     [] EVAL (LOW) 42592 (typically 20 minutes face-to-face)  [x] EVAL (MOD) 55652 (typically 30 minutes face-to-face)  [] EVAL (HIGH) 03805 (typically 45 minutes face-to-face)  [] RE-EVAL     [] CO(73566) x     [] Dry needle 1 or 2 Muscles (08669)  [] NMR (22569) x     [] Dry needle 3+ Muscles (41469)  [] Manual (59006) x     [] Ultrasound (44293) x  [x] TA (24832) x     [] Mech Traction (34333)  [] ES(attended) (84431)     [] ES (un) (29529):   [] Vasopump (45477) [] Ionto (03360)   [] Other:    Martha Barnes stated goal: able to stand at least an hour w/o severe pain.    [] Progressing: [] Met: [] Not Met: [] Adjusted    Therapist goals for Patient:   Short Term Goals: To be achieved in: 2 weeks  1. Independent in HEP and progression per patient tolerance, in order to prevent re-injury. [] Progressing: [] Met: [] Not Met: [] Adjusted  2. Patient will have a decrease in pain to facilitate improvement in movement, function, and ADLs as indicated by Functional Deficits. [] Progressing: [] Met: [] Not Met: [] Adjusted    Long Term Goals: To be achieved in: up to 12 weeks  1. Increase FOTO functional outcome score from 36 to 46 to assist with reaching prior level of function. [] Progressing: [] Met: [] Not Met: [] Adjusted  2. Patient will demonstrate increased AROM to WILLIAM/[facility] [employer] for R hip to allow for proper joint functioning as indicated by patients Functional Deficits. [] Progressing: [] Met: [] Not Met: [] Adjusted  3. Patient will demonstrate an increase in Strength to good proximal hip strength and control, within 5lb HHD in LE to allow for proper functional mobility as indicated by patients Functional Deficits. [] Progressing: [] Met: [] Not Met: [] Adjusted  4. Patient will be able to ambulate stairs reciprocally w/o severe pain. [] Progressing: [] Met: [] Not Met: [] Adjusted  5. Pt will achieve at least 11 reps of sit to  30sec to align with normative values. [] Progressing: [] Met: [] Not Met: [] Adjusted     ASSESSMENT:  See eval    Treatment/Activity Tolerance:  [x] Patient tolerated treatment well [] Patient limited by fatique  [] Patient limited by pain  [] Patient limited by other medical complications  [] Other:     Overall Progression Towards Functional goals/ Treatment Progress Update:  [] Patient is progressing as expected towards functional goals listed. [] Progression is slowed due to complexities/Impairments listed. [] Progression has been slowed due to co-morbidities.   [x] Plan just implemented, too soon to assess goals progression <30days   [] Goals require adjustment due to lack of progress  [] Patient is not progressing as expected and requires additional follow up with physician  [] Other:    Prognosis for POC: [x] Good [] Fair  [] Poor    Patient requires continued skilled intervention: [x] Yes  [] No        PLAN: See eval  [] Continue per plan of care [] Alter current plan (see comments)  [x] Plan of care initiated [] Hold pending MD visit [] Discharge    Electronically signed by: Keagan Balderas PT , DPT  #[id number]      Note: If patient does not return for scheduled/recommended follow up visits, this note will serve as a discharge from care along with the most recent update on progress.

## 2022-11-07 ENCOUNTER — HOSPITAL ENCOUNTER (OUTPATIENT)
Dept: PHYSICAL THERAPY | Age: 67
Setting detail: THERAPIES SERIES
Discharge: HOME | End: 2022-11-07
Payer: MEDICARE

## 2022-11-07 PROCEDURE — 97530 THERAPEUTIC ACTIVITIES: CPT

## 2022-11-07 PROCEDURE — 97140 MANUAL THERAPY 1/> REGIONS: CPT

## 2022-11-07 NOTE — FLOWSHEET NOTE
[facility], [facility]  [address] [facility] [facility] [place], [facility]  Phone: [phone]   Fax:     [phone]    Physical Therapy Treatment Note/ Progress Report:     Date:  2022    Patient Name:  Мария Jean-Baptiste    :  1955  MRN: [id number]    Pertinent Medical History:      Medical/Treatment Diagnosis Information:  Medical Diagnosis: S/P total knee arthroplasty, right [Z96.651]  Treatment Diagnosis: back/hip/ knee pain; limited function/ mobility    Insurance/Certification information:     Physician Information:  Caio Johnson MD  Plan of care signed (Y/N):     Date of Patient follow up with Physician:      Progress Report: []  Yes  [x]  No     Date Range for reporting period:  Beginnin/3/2022  Ending:    Progress report due (10 Rx/or 30 days whichever is less): #54     Recertification due (POC duration/ or 90 days whichever is less):     Visit # POC/ Insurance Allowable Auth Needed   2 /  [facility] []Yes    []No     Functional Outcomes Measure:   Date Assessed: at eval  Test:FOTO  Score:    Pain level:  /10     History of Injury: Pt here for evaluation of pain in R knee. She had TKA in 2021, and then had scope in 2022 and lysis of adhesions. She states that she has always had pain in her knee since first surgery, and even has more pain since the second surgery. She states her surgeon is frustrated with her and doesn't know why she is in pain. She also has had some work-up on R hip and arthritis was found, she has had a few intra-articular injections here with some relief. She also has R sided LBP as well. Since her last surgery, she walks with cane. Denies any sharp radiating pain into lower leg. Med hx: CVA w/ R sided deficits, R foot first ray fusion?,      She reports the painful area as distal thigh, more laterally, and up towards her R hip.       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 M/Th she can come to PT before 9:30. Otherwise, she needs after 4:30. *problems:  - R back/ hip/ knee pain  - walking/ standing (tolerance?)  - stairs w/ rail (1 side)    SUBJECTIVE:    11/7: pt states pain is 6/10; from right lower back to lateral hip down to lateral knee. OBJECTIVE:   Observation:   Test measurements:    ROM  11/7       Hip global   Mod limit on R all planes                                           Strength                                                      TESTS/ OTHER         TUG  W/cane:  24s w/o lift    21s w/ lift                                                   RESTRICTIONS/PRECAUTIONS:     Exercises/Interventions:     Therapeutic Ex (09869)   Min: Resistance/Reps Notes/Cues        Hip/ lumbar ex's >    Functional lower quarter ex's >    Hip abd Manually assisted sidelying x 8 R         stairs 3 trips - alternating w/ bilat rails Iniitally ascended w/ R only, and descended w/ R leading; good tolerance to reciprocal         Therapeutic Activity (31325) Min:      Pt edu Pathology, confounding factors of back/ hip/ CVA, role of PT, prognosis, goals    Leg length assessment/ heel lift trial W/ TUG test Decr pain and 2 sec impr w/ heel lift             NMR re-education (80854)   Min:                         Manual Intervention (46517) Min:      Hip manual Supine: R LAD, post hip glides    Sidely: medial/ ant glides Low to mid grade per prosper   Lumbar manual >                   Modalities  Min:             Other Therapeutic Activities:  Pt was educated on PT POC, Diagnosis, Prognosis, pathomechanics as well as frequency and duration of scheduling future physical therapy appointments. Time was also taken on this day to answer all patient questions and participation in PT. Reviewed appointment policy in detail with patient and patient verbalized understanding.      Home Exercise Program: Patient instructed in the following for HEP:      11/7: amb stairs reciprocally, purchase heel lift       . Patient verbalized/demonstrated understanding and was issued written handout. Therapeutic Exercise and NMR EXR  [x] (78854) Provided verbal/tactile cueing for activities related to strengthening, flexibility, endurance, ROM  for improvements in proximal hip and core control with self care, mobility, lifting and ambulation. [x] (38955) Provided verbal/tactile cueing for activities related to improving balance, coordination, kinesthetic sense, posture, motor skill, proprioception  to assist with core control in self care, mobility, lifting, and ambulation.      Therapeutic Activities:    [x] (94504 or 20836) Provided verbal/tactile cueing for activities related to improving balance, coordination, kinesthetic sense, posture, motor skill, proprioception and motor activation to allow for proper function  with self care and ADLs  [x] (88807) Provided training and instruction to the patient for proper core and proximal hip recruitment and positioning with ambulation re-education     Home Exercise Program:    [x] (26622) Reviewed/Progressed HEP activities related to strengthening, flexibility, endurance, ROM of core, proximal hip and LE for functional self-care, mobility, lifting and ambulation   [x] (82151) Reviewed/Progressed HEP activities related to improving balance, coordination, kinesthetic sense, posture, motor skill, proprioception of core, proximal hip and LE for self care, mobility, lifting, and ambulation      Manual Treatments:  PROM / STM / Oscillations-Mobs:  G-I, II, III, IV (PA's, Inf., Post.)  [x] (72951) Provided manual therapy to mobilize proximal hip and LS spine soft tissue/joints for the purpose of modulating pain, promoting relaxation,  increasing ROM, reducing/eliminating soft tissue swelling/inflammation/restriction, improving soft tissue extensibility and allowing for proper ROM for normal function with self care, mobility, lifting and ambulation. Approval Dates:  CPT Code Units Approved Units Used  Date Updated:                     Charges:  Timed Code Treatment Minutes: 40   Total Treatment Minutes: 40     [] EVAL (LOW) 74976 (typically 20 minutes face-to-face)  [] EVAL (MOD) 10924 (typically 30 minutes face-to-face)  [] EVAL (HIGH) 94068 (typically 45 minutes face-to-face)  [] RE-EVAL     [] OM(05661) x     [] Dry needle 1 or 2 Muscles (25639)  [] NMR (69270) x     [] Dry needle 3+ Muscles (89402)  [x] Manual (01113) x   2  [] Ultrasound (36016) x  [x] TA (86064) x     [] Mech Traction (09416)  [] ES(attended) (58656)     [] ES (un) (66134):   [] Vasopump (34898) [] Ionto (63542)   [] Other:    Sobeida Lujan stated goal: able to stand at least an hour w/o severe pain. [] Progressing: [] Met: [] Not Met: [] Adjusted    Therapist goals for Patient:   Short Term Goals: To be achieved in: 2 weeks  1. Independent in HEP and progression per patient tolerance, in order to prevent re-injury. [] Progressing: [] Met: [] Not Met: [] Adjusted  2. Patient will have a decrease in pain to facilitate improvement in movement, function, and ADLs as indicated by Functional Deficits. [] Progressing: [] Met: [] Not Met: [] Adjusted    Long Term Goals: To be achieved in: up to 12 weeks  1. Increase FOTO functional outcome score from 36 to 46 to assist with reaching prior level of function. [] Progressing: [] Met: [] Not Met: [] Adjusted  2. Patient will demonstrate [facility] to allow for proper joint functioning as indicated by patients Functional Deficits. [] Progressing: [] Met: [] Not Met: [] Adjusted  3. Patient will demonstrate an increase in Strength to good proximal hip strength and control, within 5lb HHD in LE to allow for proper functional mobility as indicated by patients Functional Deficits. [] Progressing: [] Met: [] Not Met: [] Adjusted  4. Patient will be able to ambulate stairs reciprocally w/o severe pain.   [] Progressing: [] Met: [] Not Met: [] Adjusted  5. Pt will achieve at least 11 reps of sit to  30sec to align with normative values. [] Progressing: [] Met: [] Not Met: [] Adjusted     ASSESSMENT:    Bautista session well. Most deficits seem related to R hip and most benefit may be achieved by working this area. Heel lift seemed to give her quite a bit of symptom improvement. Treatment/Activity Tolerance:  [x] Patient tolerated treatment well [] Patient limited by fatique  [] Patient limited by pain  [] Patient limited by other medical complications  [] Other:     Overall Progression Towards Functional goals/ Treatment Progress Update:  [] Patient is progressing as expected towards functional goals listed. [] Progression is slowed due to complexities/Impairments listed. [] Progression has been slowed due to co-morbidities. [x] Plan just implemented, too soon to assess goals progression <30days   [] Goals require adjustment due to lack of progress  [] Patient is not progressing as expected and requires additional follow up with physician  [] Other:    Prognosis for POC: [x] Good [] Fair  [] Poor    Patient requires continued skilled intervention: [x] Yes  [] No        PLAN: See eval  [] Continue per plan of care [] Alter current plan (see comments)  [x] Plan of care initiated [] Hold pending MD visit [] Discharge    Electronically signed by: Hermes Pitts PT , DPT  #[id number]      Note: If patient does not return for scheduled/recommended follow up visits, this note will serve as a discharge from care along with the most recent update on progress.

## 2022-11-08 ENCOUNTER — APPOINTMENT (OUTPATIENT)
Dept: PHYSICAL THERAPY | Age: 67
End: 2022-11-08
Payer: MEDICARE

## 2022-11-09 LAB
CATARACTS: POSITIVE
DIABETIC RETINOPATHY: NEGATIVE
GLAUCOMA: POSITIVE
INTRAOCULAR PRESSURE EYE: NORMAL
VISUAL ACUITY DISTANCE LEFT EYE: NORMAL
VISUAL ACUITY DISTANCE RIGHT EYE: NORMAL

## 2022-11-10 ENCOUNTER — HOSPITAL ENCOUNTER (OUTPATIENT)
Dept: PHYSICAL THERAPY | Age: 67
Setting detail: THERAPIES SERIES
Discharge: HOME | End: 2022-11-10
Payer: MEDICARE

## 2022-11-10 PROCEDURE — 97110 THERAPEUTIC EXERCISES: CPT

## 2022-11-10 PROCEDURE — 97140 MANUAL THERAPY 1/> REGIONS: CPT

## 2022-11-10 NOTE — FLOWSHEET NOTE
[facility], [facility]  [address] [place], [facility]  Phone: [phone]   Fax:     [phone]    Physical Therapy Treatment Note/ Progress Report:     Date:  11/10/2022    Patient Name:  Carmen Hu    :  1955  MRN: [id number]    Pertinent Medical History:      Medical/Treatment Diagnosis Information:  Medical Diagnosis: S/P total knee arthroplasty, right [Z96.651]  Treatment Diagnosis: back/hip/ knee pain; limited function/ mobility    Insurance/Certification information:     Physician Information:  Navid Michel MD  Plan of care signed (Y/N):     Date of Patient follow up with Physician:      Progress Report: []  Yes  [x]  No     Date Range for reporting period:  Beginnin/3/2022  Ending:    Progress report due (10 Rx/or 30 days whichever is less): #68     Recertification due (POC duration/ or 90 days whichever is less):     Visit # POC/ Insurance Allowable Auth Needed   3 /  [facility]ulk[facility] - medicare []Yes    []No     Functional Outcomes Measure:   Date Assessed: at eval  Test:FOTO  Score:    Pain level:  /10     History of Injury: Pt here for evaluation of pain in R knee. She had TKA in 2021, and then had scope in 2022 and lysis of adhesions. She states that she has always had pain in her knee since first surgery, and even has more pain since the second surgery. She states her surgeon is frustrated with her and doesn't know why she is in pain. She also has had some work-up on R hip and arthritis was found, she has had a few intra-articular injections here with some relief. She also has R sided LBP as well. Since her last surgery, she walks with cane. Denies any sharp radiating pain into lower leg. Med hx: CVA w/ R sided deficits, R foot first ray fusion?,      She reports the painful area as distal thigh, more laterally, and up towards her R hip.       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 M/Th she can come to PT before 9:30. Otherwise, she needs after 4:30. *problems:  - R back/ hip/ knee pain  - walking/ standing (tolerance?)  - stairs w/ rail (1 side)    SUBJECTIVE:    11/7: pt states pain is 6/10; from right lower back to lateral hip down to lateral knee. 11/10: pt states leg pain is somewhat better, now 4/10, she can tell its different. Most of the pain is located R hip posteriorly and wraps around anteriorly. Still has significant pain when first getting out of a chair.      OBJECTIVE:   Observation:   Test measurements:    ROM  11/7 11/10      Hip global   Mod limit on R all planes       Hip mobility   R mod limit w/ ER and medial glide and ext                                 Strength                                                      TESTS/ OTHER         TUG  W/cane:  24s w/o lift    21s w/ lift       Lumbar quadrant   Mildly painful on R, not as bad as hip PROM      Lumbar PA   Mild pain on R lower facets      Hip PROM w/ overpressure   Mod/ severe pain on R w/ familiar pain                   RESTRICTIONS/PRECAUTIONS:     Exercises/Interventions:     Therapeutic Ex (25412)   Min: Resistance/Reps Notes/Cues   Dynamic warmup 2 laps amb w/ cane,   2 laps lateral stepping Cues for slightly increased step length   clamshell X10 R Cues for full ROM        Hip abd Manually assisted sidelying x 8 R    Lateral walk w/ band Grn @ knees, 25' x  2 laps Cues for slight incr in step width   Hip ext EOB 2x10 w/ Grn @ knees         steps 6\" fwd w/ bal pt  X10 R,L ea    stairs Iniitally ascended w/ R only, and descended w/ R leading; good tolerance to reciprocal    Pt edu Updated HEP w/ handout    Therapeutic Activity (71330) Min:      Pt edu Reviewed role of PT to improve hip mobility/ functional strength, again reviewed diff dx lumbar vs hip problem    Leg length assessment/ heel lift trial Decr pain and 2 sec impr w/ heel lift             NMR re-education (47400)   Min:                         Manual Intervention (25443) Min:      Hip manual Supine: R LAD, light ER str, post hip glides    Sidely: medial/ ant glides, contract relax adductors into abd Low to mid grade per prosper   Lumbar manual >                   Modalities  Min:             Other Therapeutic Activities:  Pt was educated on PT POC, Diagnosis, Prognosis, pathomechanics as well as frequency and duration of scheduling future physical therapy appointments. Time was also taken on this day to answer all patient questions and participation in PT. Reviewed appointment policy in detail with patient and patient verbalized understanding. Home Exercise Program: Patient instructed in the following for HEP:      11/7: amb stairs reciprocally, purchase heel lift  11/10: Access Code: [email or web address]  URL: [email or web address]  Date: 11/10/2022  Prepared by: Pradeep Check    Exercises  Side Stepping with Resistance at Thighs - 2-3 x daily - 4-6 x weekly - 2-3 sets - 10-15 reps  Prone Hip Extension on Table - 2-3 x daily - 4-6 x weekly - 2-3 sets - 10-15 reps         . Patient verbalized/demonstrated understanding and was issued written handout. Therapeutic Exercise and NMR EXR  [x] (92759) Provided verbal/tactile cueing for activities related to strengthening, flexibility, endurance, ROM  for improvements in proximal hip and core control with self care, mobility, lifting and ambulation. [x] (23220) Provided verbal/tactile cueing for activities related to improving balance, coordination, kinesthetic sense, posture, motor skill, proprioception  to assist with core control in self care, mobility, lifting, and ambulation.      Therapeutic Activities:    [x] (19875 or 42064) Provided verbal/tactile cueing for activities related to improving balance, coordination, kinesthetic sense, posture, motor skill, proprioception and motor activation to allow for proper function  with self care and ADLs  [x] (39725) Provided training and instruction to the patient for proper core and proximal hip recruitment and positioning with ambulation re-education     Home Exercise Program:    [x] (82057) Reviewed/Progressed HEP activities related to strengthening, flexibility, endurance, ROM of core, proximal hip and LE for functional self-care, mobility, lifting and ambulation   [x] (90468) Reviewed/Progressed HEP activities related to improving balance, coordination, kinesthetic sense, posture, motor skill, proprioception of core, proximal hip and LE for self care, mobility, lifting, and ambulation      Manual Treatments:  PROM / STM / Oscillations-Mobs:  G-I, II, III, IV (PA's, Inf., Post.)  [x] (90255) Provided manual therapy to mobilize proximal hip and LS spine soft tissue/joints for the purpose of modulating pain, promoting relaxation,  increasing ROM, reducing/eliminating soft tissue swelling/inflammation/restriction, improving soft tissue extensibility and allowing for proper ROM for normal function with self care, mobility, lifting and ambulation. Approval Dates:  CPT Code Units Approved Units Used  Date Updated:                     Charges:  Timed Code Treatment Minutes: 53   Total Treatment Minutes: 53     [] EVAL (LOW) 55605 (typically 20 minutes face-to-face)  [] EVAL (MOD) 95559 (typically 30 minutes face-to-face)  [] EVAL (HIGH) 97456 (typically 45 minutes face-to-face)  [] RE-EVAL     [x] CC(67149) x 2    [] Dry needle 1 or 2 Muscles (03972)  [] NMR (98961) x     [] Dry needle 3+ Muscles (94193)  [x] Manual (02502) x   2  [] Ultrasound (79695) x  [] TA (27450) x     [] Mech Traction (23773)  [] ES(attended) (09727)     [] ES (un) (75051):   [] Vasopump (20126) [] Ionto (78388)   [] Other:    Avel Flynn stated goal: able to stand at least an hour w/o severe pain. [] Progressing: [] Met: [] Not Met: [] Adjusted    Therapist goals for Patient:   Short Term Goals: To be achieved in: 2 weeks  1. Independent in HEP and progression per patient tolerance, in order to prevent re-injury. [] Progressing: [] Met: [] Not Met: [] Adjusted  2. Patient will have a decrease in pain to facilitate improvement in movement, function, and ADLs as indicated by Functional Deficits. [] Progressing: [] Met: [] Not Met: [] Adjusted    Long Term Goals: To be achieved in: up to 12 weeks  1. Increase FOTO functional outcome score from 36 to 46 to assist with reaching prior level of function. [] Progressing: [] Met: [] Not Met: [] Adjusted  2. Patient will demonstrate increased [facility] for R hip to allow for proper joint functioning as indicated by patients Functional Deficits. [] Progressing: [] Met: [] Not Met: [] Adjusted  3. Patient will demonstrate an increase in Strength to good proximal hip strength and control, within 5lb HHD in LE to allow for proper functional mobility as indicated by patients Functional Deficits. [] Progressing: [] Met: [] Not Met: [] Adjusted  4. Patient will be able to ambulate stairs reciprocally w/o severe pain. [] Progressing: [] Met: [] Not Met: [] Adjusted  5. Pt will achieve at least 11 reps of sit to  30sec to align with normative values. [] Progressing: [] Met: [] Not Met: [] Adjusted     ASSESSMENT:  Pt requires skilled PT to restore mobility of hip/lumbar and lower quarter and to facilitate improved function w/ less pain. Pt cont's to respond fairly well to treatment targeted at R hip. She is fairly sore during hip mobs, but she states she feels much better after. Treatment/Activity Tolerance:  [x] Patient tolerated treatment well [] Patient limited by fatique  [] Patient limited by pain  [] Patient limited by other medical complications  [] Other:     Overall Progression Towards Functional goals/ Treatment Progress Update:  [] Patient is progressing as expected towards functional goals listed.     [] Progression is slowed due to complexities/Impairments listed. [] Progression has been slowed due to co-morbidities. [x] Plan just implemented, too soon to assess goals progression <30days   [] Goals require adjustment due to lack of progress  [] Patient is not progressing as expected and requires additional follow up with physician  [] Other:    Prognosis for POC: [x] Good [] Fair  [] Poor    Patient requires continued skilled intervention: [x] Yes  [] No        PLAN: See eval  [] Continue per plan of care [] Alter current plan (see comments)  [x] Plan of care initiated [] Hold pending MD visit [] Discharge    Electronically signed by: Lizeth Jo PT , DPT  #[id number]      Note: If patient does not return for scheduled/recommended follow up visits, this note will serve as a discharge from care along with the most recent update on progress.

## 2022-11-14 ENCOUNTER — HOSPITAL ENCOUNTER (OUTPATIENT)
Dept: PHYSICAL THERAPY | Age: 67
Setting detail: THERAPIES SERIES
Discharge: HOME | End: 2022-11-14
Payer: MEDICARE

## 2022-11-14 PROCEDURE — 97110 THERAPEUTIC EXERCISES: CPT

## 2022-11-14 PROCEDURE — 97140 MANUAL THERAPY 1/> REGIONS: CPT

## 2022-11-14 NOTE — FLOWSHEET NOTE
[facility]  [address] [facility]  Phone: [phone]   Fax:     [phone]    Physical Therapy Treatment Note/ Progress Report:     Date:  2022    Patient Name:  Anai Toussaint    :  1955  MRN: [id number]    Pertinent Medical History:      Medical/Treatment Diagnosis Information:  Medical Diagnosis: S/P total knee arthroplasty, right [Z96.651]  Treatment Diagnosis: back/hip/ knee pain; limited function/ mobility    Insurance/Certification information:     Physician Information:  Elmer Smith MD  Plan of care signed (Y/N):     Date of Patient follow up with Physician:      Progress Report: []  Yes  [x]  No     Date Range for reporting period:  Beginnin/3/2022  Ending:    Progress report due (10 Rx/or 30 days whichever is less): #68     Recertification due (POC duration/ or 90 days whichever is less):     Visit # POC/ Insurance Allowable Auth Needed   4 /  [facility] - medicare []Yes    []No     Functional Outcomes Measure:   Date Assessed: at eval  Test:FOTO  Score:    Pain level:  /10     History of Injury: Pt here for evaluation of pain in R knee. She had TKA in 2021, and then had scope in 2022 and lysis of adhesions. She states that she has always had pain in her knee since first surgery, and even has more pain since the second surgery. She states her surgeon is frustrated with her and doesn't know why she is in pain. She also has had some work-up on R hip and arthritis was found, she has had a few intra-articular injections here with some relief. She also has R sided LBP as well. Since her last surgery, she walks with cane. Denies any sharp radiating pain into lower leg. Med hx: CVA w/ R sided deficits, R foot first ray fusion?,      She reports the painful area as distal thigh, more laterally, and up towards her R hip.       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 M/Th she can come to PT before 9:30. Otherwise, she needs after 4:30. *problems:  - R back/ hip/ knee pain  - R hip to knee \"burning\" pain at night. (Most nights; especially worse when she sits more during the day). - walking/ standing (tolerance?)  - stairs w/ rail (1 side)  - painful to exit car    SUBJECTIVE:    11/7: pt states pain is 6/10; from right lower back to lateral hip down to lateral knee. 11/10: pt states leg pain is somewhat better, now 4/10, she can tell its different. Most of the pain is located R hip posteriorly and wraps around anteriorly. Still has significant pain when first getting out of a chair. 11/14: pt states she still feels a little better. Less pain in leg and getting out of a chair, but still a fair amount of pain at night from thigh to knee. OBJECTIVE:   Observation:   Test measurements:    ROM  11/7 11/10      Hip global   Mod limit on R all planes       Hip mobility   R mod limit w/ ER and medial glide and ext                                 Strength                                                      TESTS/ OTHER         TUG  W/cane:  24s w/o lift    21s w/ lift       Lumbar quadrant   Mildly painful on R, not as bad as hip PROM      Lumbar PA   Mild pain on R lower facets      Hip PROM w/ overpressure   Mod/ severe pain on R w/ familiar pain                   RESTRICTIONS/PRECAUTIONS:     Exercises/Interventions:     Therapeutic Ex (79983)   Min: Resistance/Reps Notes/Cues   Dynamic warmup TM x5'  1. 2mph- cues for incr step length   clamshell Cues for full ROM        Hip abd    Lateral walk w/ band Grn @ knees, 25' x  2 laps Cues for slight incr in step width   Hip ext EOB 2x10 w/ Grn @ knees         steps 6\" fwd w/ bal pt  X10 R,L ea    stairs Iniitally ascended w/ R only, and descended w/ R leading; good tolerance to reciprocal    Pt edu reviewed 11/14: suggested trying PT ex's before bed to see if it helps her R leg sx   Therapeutic Activity (21004) Min:      Pt edu Reviewed importance of restoring hip mobility during gait to avoid excessive knee strain, more thorough review of symptomology to determine root source of sx. Worse w/ prolonged sitting w/ groin sx   Leg length assessment/ heel lift trial Decr pain and 2 sec impr w/ heel lift             NMR re-education (85155)   Min:                         Manual Intervention (66484) Min:      Hip manual Supine: R LAD, light ER str, post hip glides    Sidely: medial/ ant glides, contract relax adductors into abd Low to mid grade per prosper   Lumbar manual >                   Modalities  Min:             Other Therapeutic Activities:  Pt was educated on PT POC, Diagnosis, Prognosis, pathomechanics as well as frequency and duration of scheduling future physical therapy appointments. Time was also taken on this day to answer all patient questions and participation in PT. Reviewed appointment policy in detail with patient and patient verbalized understanding. Home Exercise Program: Patient instructed in the following for HEP:      11/7: amb stairs reciprocally, purchase heel lift  11/10: Access Code: [email or web address]  URL: [email or web address]  Date: 11/10/2022  Prepared by: Guido Rosa    Exercises  Side Stepping with Resistance at Thighs - 2-3 x daily - 4-6 x weekly - 2-3 sets - 10-15 reps  Prone Hip Extension on Table - 2-3 x daily - 4-6 x weekly - 2-3 sets - 10-15 reps         . Patient verbalized/demonstrated understanding and was issued written handout. Therapeutic Exercise and NMR EXR  [x] (85331) Provided verbal/tactile cueing for activities related to strengthening, flexibility, endurance, ROM  for improvements in proximal hip and core control with self care, mobility, lifting and ambulation.   [x] (21039) Provided verbal/tactile cueing for activities related to improving balance, coordination, kinesthetic sense, posture, motor skill, proprioception  to assist with core control in self care, mobility, lifting, and ambulation. Therapeutic Activities:    [x] (67001 or 34381) Provided verbal/tactile cueing for activities related to improving balance, coordination, kinesthetic sense, posture, motor skill, proprioception and motor activation to allow for proper function  with self care and ADLs  [x] (53021) Provided training and instruction to the patient for proper core and proximal hip recruitment and positioning with ambulation re-education     Home Exercise Program:    [x] (93378) Reviewed/Progressed HEP activities related to strengthening, flexibility, endurance, ROM of core, proximal hip and LE for functional self-care, mobility, lifting and ambulation   [x] (56908) Reviewed/Progressed HEP activities related to improving balance, coordination, kinesthetic sense, posture, motor skill, proprioception of core, proximal hip and LE for self care, mobility, lifting, and ambulation      Manual Treatments:  PROM / STM / Oscillations-Mobs:  G-I, II, III, IV (PA's, Inf., Post.)  [x] (59316) Provided manual therapy to mobilize proximal hip and LS spine soft tissue/joints for the purpose of modulating pain, promoting relaxation,  increasing ROM, reducing/eliminating soft tissue swelling/inflammation/restriction, improving soft tissue extensibility and allowing for proper ROM for normal function with self care, mobility, lifting and ambulation.        Approval Dates:  CPT Code Units Approved Units Used  Date Updated:                     Charges:  Timed Code Treatment Minutes: 40   Total Treatment Minutes: 40     [] EVAL (LOW) 04404 (typically 20 minutes face-to-face)  [] EVAL (MOD) 26735 (typically 30 minutes face-to-face)  [] EVAL (HIGH) 22644 (typically 45 minutes face-to-face)  [] RE-EVAL     [x] ZH(07961) x 2   [] Dry needle 1 or 2 Muscles (25345)  [] NMR (62433) x     [] Dry needle 3+ Muscles (91348)  [x] Manual (89762) x     [] Ultrasound (20667) x  [] TA (35948) x [] [facility] (58487)  [] ES(attended) (25241)     [] ES (un) (24979):   [] Vasopump (84147) [] Ionto (86612)   [] Other:    Angie Gomez stated goal: able to stand at least an hour w/o severe pain. [] Progressing: [] Met: [] Not Met: [] Adjusted    Therapist goals for Patient:   Short Term Goals: To be achieved in: 2 weeks  1. Independent in HEP and progression per patient tolerance, in order to prevent re-injury. [] Progressing: [] Met: [] Not Met: [] Adjusted  2. Patient will have a decrease in pain to facilitate improvement in movement, function, and ADLs as indicated by Functional Deficits. [] Progressing: [] Met: [] Not Met: [] Adjusted    Long Term Goals: To be achieved in: up to 12 weeks  1. Increase FOTO functional outcome score from 36 to 46 to assist with reaching prior level of function. [] Progressing: [] Met: [] Not Met: [] Adjusted  2. Patient will demonstrate increased AROM to [facility] [employer] for R hip to allow for proper joint functioning as indicated by patients Functional Deficits. [] Progressing: [] Met: [] Not Met: [] Adjusted  3. Patient will demonstrate an increase in Strength to good proximal hip strength and control, within 5lb HHD in LE to allow for proper functional mobility as indicated by patients Functional Deficits. [] Progressing: [] Met: [] Not Met: [] Adjusted  4. Patient will be able to ambulate stairs reciprocally w/o severe pain. [] Progressing: [] Met: [] Not Met: [] Adjusted  5. Pt will achieve at least 11 reps of sit to  30sec to align with normative values. [] Progressing: [] Met: [] Not Met: [] Adjusted     ASSESSMENT:  Pt requires skilled PT to restore mobility of hip/lumbar and lower quarter and to facilitate improved function w/ less pain. Bautista session well. Still somewhat sore with hip mobs and PROM reproducing her familiar pain. We will cont to attempt to improve hip mobility and function and strength.      Treatment/Activity Tolerance:  [x] Patient tolerated treatment well [] Patient limited by fatique  [] Patient limited by pain  [] Patient limited by other medical complications  [] Other:     Overall Progression Towards Functional goals/ Treatment Progress Update:  [] Patient is progressing as expected towards functional goals listed. [] Progression is slowed due to complexities/Impairments listed. [] Progression has been slowed due to co-morbidities. [x] Plan just implemented, too soon to assess goals progression <30days   [] Goals require adjustment due to lack of progress  [] Patient is not progressing as expected and requires additional follow up with physician  [] Other:    Prognosis for POC: [x] Good [] Fair  [] Poor    Patient requires continued skilled intervention: [x] Yes  [] No        PLAN: See eval  [] Continue per plan of care [] Alter current plan (see comments)  [x] Plan of care initiated [] Hold pending MD visit [] Discharge    Electronically signed by: Tarah Davidson PT , DPT  #[id number]      Note: If patient does not return for scheduled/recommended follow up visits, this note will serve as a discharge from care along with the most recent update on progress.

## 2022-11-15 DIAGNOSIS — E11.8 TYPE 2 DIABETES MELLITUS WITH COMPLICATION, WITHOUT LONG-TERM CURRENT USE OF INSULIN (HCC): ICD-10-CM

## 2022-11-17 ENCOUNTER — HOSPITAL ENCOUNTER (OUTPATIENT)
Dept: PHYSICAL THERAPY | Age: 67
Setting detail: THERAPIES SERIES
Discharge: HOME | End: 2022-11-17
Payer: MEDICARE

## 2022-11-17 PROCEDURE — 97110 THERAPEUTIC EXERCISES: CPT

## 2022-11-17 PROCEDURE — 97530 THERAPEUTIC ACTIVITIES: CPT

## 2022-11-17 PROCEDURE — 97140 MANUAL THERAPY 1/> REGIONS: CPT

## 2022-11-17 NOTE — FLOWSHEET NOTE
[facility]  [address] [facility]  Phone: [phone]   Fax:     [phone]    Physical Therapy Treatment Note/ Progress Report:     Date:  2022    Patient Name:  Levie Crigler    :  1955  MRN: [id number]    Pertinent Medical History:      Medical/Treatment Diagnosis Information:  Medical Diagnosis: S/P total knee arthroplasty, right [Z96.651]  Treatment Diagnosis: back/hip/ knee pain; limited function/ mobility    Insurance/Certification information:     Physician Information:  Soledad Sears MD  Plan of care signed (Y/N):     Date of Patient follow up with Physician:      Progress Report: []  Yes  [x]  No     Date Range for reporting period:  Beginnin/3/2022  Ending:    Progress report due (10 Rx/or 30 days whichever is less): #43     Recertification due (POC duration/ or 90 days whichever is less):     Visit # POC/ Insurance Allowable Auth Needed   5 /  [place] [employer] - medicare []Yes    []No     Functional Outcomes Measure:   Date Assessed: at eval  Test:FOTO  Score:    Pain level:  /10     History of Injury: Pt here for evaluation of pain in R knee. She had TKA in 2021, and then had scope in 2022 and lysis of adhesions. She states that she has always had pain in her knee since first surgery, and even has more pain since the second surgery. She states her surgeon is frustrated with her and doesn't know why she is in pain. She also has had some work-up on R hip and arthritis was found, she has had a few intra-articular injections here with some relief. She also has R sided LBP as well. Since her last surgery, she walks with cane. Denies any sharp radiating pain into lower leg. Med hx: CVA w/ R sided deficits, R foot first ray fusion?,      She reports the painful area as distal thigh, more laterally, and up towards her R hip.       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 M/Th she can come to PT before 9:30. Otherwise, she needs after 4:30. *problems:  - R back/ hip/ knee pain  - R hip to knee \"burning\" pain at night. (Most nights; especially worse when she sits more during the day). - walking/ standing (tolerance?)  - stairs w/ rail (1 side)  - painful to exit car    SUBJECTIVE:    11/7: pt states pain is 6/10; from right lower back to lateral hip down to lateral knee. 11/10: pt states leg pain is somewhat better, now 4/10, she can tell its different. Most of the pain is located R hip posteriorly and wraps around anteriorly. Still has significant pain when first getting out of a chair. 11/14: pt states she still feels a little better. Less pain in leg and getting out of a chair, but still a fair amount of pain at night from thigh to knee. 11/17: pt reports approx 75% improvement overall, but still painful to get out of a car. Also still has fairly consistent pain at lateral knee to lateral hip and into her lower back region. Given language barrier, its a bit difficult for her to exactly describe her symptom pattern. With further discussion, she also has some pain still located anterior shin around her robotic incision. *needling?     OBJECTIVE:   Observation:   Test measurements:    ROM  11/7 11/10 11/17     Hip global   Mod limit on R all planes       Hip mobility   R mod limit w/ ER and medial glide and ext                                 Strength                                                      TESTS/ OTHER         TUG  W/cane:  24s w/o lift    21s w/ lift       Lumbar quadrant   Mildly painful on R, not as bad as hip PROM      Lumbar PA   Mild pain on R lower facets Mod pain lower R lumbar and T/L jxn on R     Hip PROM w/ overpressure   Mod/ severe pain on R w/ familiar pain Mod limit on R; supine and prone; R groin pain w/ Prone IR OP                  RESTRICTIONS/PRECAUTIONS:     Exercises/Interventions: Therapeutic Ex (99641)   Min: Resistance/Reps Notes/Cues   Dynamic warmup   Long stride walking, lateral walk, monster walk   x 2 laps ea clamshell Cues for full ROM        Hip abd    Lateral walk w/ band Grn @ knees, 25' x  2 laps Cues for slight incr in step width   Hip ext EOB 2x10 w/ Grn @ knees         steps Resume; up w/ balance?   stairs    Pt edu Reviewed HEP, role of regular HEP to manage pain Therapeutic Activity (48216) Min:      Pt edu Reviewed gait mechanics and potential implications with amb w/ excessive hip ER, benefit of incr stride length to restore gait mechanics and decr strain on lumbar spine    Leg length assessment/ heel lift trial Reviewed/ assessed again Pt had simple insert on R shoe, whereas that leg appears 1/4 to 1/2\" longer. Gait and pain better with moved to [address]             NMR re-education (62428)   Min:                         Manual Intervention (14114) Min:      Hip manual Supine: R LAD, light ER str, post hip glides    Prone: ant mobs, low grade ER/IR mobs Low to mid grade per prosper   Lumbar manual >                   Modalities  Min:             Other Therapeutic Activities:  Pt was educated on PT POC, Diagnosis, Prognosis, pathomechanics as well as frequency and duration of scheduling future physical therapy appointments. Time was also taken on this day to answer all patient questions and participation in PT. Reviewed appointment policy in detail with patient and patient verbalized understanding. Home Exercise Program: Patient instructed in the following for HEP:      11/7: amb stairs reciprocally, purchase heel lift  11/10: Access Code: [email or web address]  URL: [email or web address]  Date: 11/10/2022  Prepared by: Malcom Kehr    Exercises  Side Stepping with Resistance at Thighs - 2-3 x daily - 4-6 x weekly - 2-3 sets - 10-15 reps  Prone Hip Extension on Table - 2-3 x daily - 4-6 x weekly - 2-3 sets - 10-15 reps         .  Patient verbalized/demonstrated understanding and was issued written handout. Therapeutic Exercise and NMR EXR  [x] (77717) Provided verbal/tactile cueing for activities related to strengthening, flexibility, endurance, ROM  for improvements in proximal hip and core control with self care, mobility, lifting and ambulation. [x] (90139) Provided verbal/tactile cueing for activities related to improving balance, coordination, kinesthetic sense, posture, motor skill, proprioception  to assist with core control in self care, mobility, lifting, and ambulation. Therapeutic Activities:    [x] (89642 or 04523) Provided verbal/tactile cueing for activities related to improving balance, coordination, kinesthetic sense, posture, motor skill, proprioception and motor activation to allow for proper function  with self care and ADLs  [x] (39847) Provided training and instruction to the patient for proper core and proximal hip recruitment and positioning with ambulation re-education     Home Exercise Program:    [x] (50214) Reviewed/Progressed HEP activities related to strengthening, flexibility, endurance, ROM of core, proximal hip and LE for functional self-care, mobility, lifting and ambulation   [x] (87277) Reviewed/Progressed HEP activities related to improving balance, coordination, kinesthetic sense, posture, motor skill, proprioception of core, proximal hip and LE for self care, mobility, lifting, and ambulation      Manual Treatments:  PROM / STM / Oscillations-Mobs:  G-I, II, III, IV (PA's, Inf., Post.)  [x] (31892) Provided manual therapy to mobilize proximal hip and LS spine soft tissue/joints for the purpose of modulating pain, promoting relaxation,  increasing ROM, reducing/eliminating soft tissue swelling/inflammation/restriction, improving soft tissue extensibility and allowing for proper ROM for normal function with self care, mobility, lifting and ambulation.        Approval Dates:  CPT Code Units Approved Units Used  Date Updated: Charges:  Timed Code Treatment Minutes: 40   Total Treatment Minutes: 40     [] EVAL (LOW) 43340 (typically 20 minutes face-to-face)  [] EVAL (MOD) 27595 (typically 30 minutes face-to-face)  [] EVAL (HIGH) 43155 (typically 45 minutes face-to-face)  [] RE-EVAL     [x] EZ(52060) x    [] Dry needle 1 or 2 Muscles (09884)  [] NMR (98790) x     [] Dry needle 3+ Muscles (92755)  [x] Manual (04273) x     [] Ultrasound (84414) x  [x] TA (30075) x     [] Mech Traction (91923)  [] ES(attended) (13391)     [] ES (un) (90115):   [] Vasopump (56331) [] Ionto (69412)   [] Other:    Cora Right stated goal: able to stand at least an hour w/o severe pain. [] Progressing: [] Met: [] Not Met: [] Adjusted    Therapist goals for Patient:   Short Term Goals: To be achieved in: 2 weeks  1. Independent in HEP and progression per patient tolerance, in order to prevent re-injury. [] Progressing: [] Met: [] Not Met: [] Adjusted  2. Patient will have a decrease in pain to facilitate improvement in movement, function, and ADLs as indicated by Functional Deficits. [] Progressing: [] Met: [] Not Met: [] Adjusted    Long Term Goals: To be achieved in: up to 12 weeks  1. Increase FOTO functional outcome score from 36 to 46 to assist with reaching prior level of function. [] Progressing: [] Met: [] Not Met: [] Adjusted  2. Patient will demonstrate increased AROM to [facility] [place] for R hip to allow for proper joint functioning as indicated by patients Functional Deficits. [] Progressing: [] Met: [] Not Met: [] Adjusted  3. Patient will demonstrate an increase in Strength to good proximal hip strength and control, within 5lb HHD in LE to allow for proper functional mobility as indicated by patients Functional Deficits. [] Progressing: [] Met: [] Not Met: [] Adjusted  4. Patient will be able to ambulate stairs reciprocally w/o severe pain. [] Progressing: [] Met: [] Not Met: [] Adjusted  5.  Pt will achieve at least 11 reps of sit to  30sec to align with normative values. [] Progressing: [] Met: [] Not Met: [] Adjusted     ASSESSMENT:  Pt requires skilled PT to restore mobility of hip/lumbar and lower quarter and to facilitate improved function w/ less pain. Pt seems to have seen some improvement with PT with less pain. With further discussion, still reports she is still in a fair amount of pain through back/ hip/ knee areas. Fairly sore with light/ medium palpation which may indicate some central sensitization. She may benefit from needling, and more comprehensive pain neuroscience education to help her understand her pain. Treatment/Activity Tolerance:  [x] Patient tolerated treatment well [] Patient limited by fatique  [] Patient limited by pain  [] Patient limited by other medical complications  [] Other:     Overall Progression Towards Functional goals/ Treatment Progress Update:  [] Patient is progressing as expected towards functional goals listed. [] Progression is slowed due to complexities/Impairments listed. [] Progression has been slowed due to co-morbidities. [x] Plan just implemented, too soon to assess goals progression <30days   [] Goals require adjustment due to lack of progress  [] Patient is not progressing as expected and requires additional follow up with physician  [] Other:    Prognosis for POC: [x] Good [] Fair  [] Poor    Patient requires continued skilled intervention: [x] Yes  [] No        PLAN: See eval  [] Continue per plan of care [] Alter current plan (see comments)  [x] Plan of care initiated [] Hold pending MD visit [] Discharge    Electronically signed by: Juancho Huber PT , DPT  #[id number]      Note: If patient does not return for scheduled/recommended follow up visits, this note will serve as a discharge from care along with the most recent update on progress.

## 2022-11-17 NOTE — PLAN OF CARE
[address], [facility]  [address] [place] [address]  Phone: [phone]   Fax:     [phone]    Physical Therapy Prescription    Date: 2022    Patient Name: Jocy Mcpherson  : 1955  MRN: [id number]    Diagnosis:S/P total knee arthroplasty, right [Z96.651]  Treatment Diagnosis:      Referring Physician: Michelle Coleman MD    Drug Allergies:    With your approval we would like to add the following to the patient's current PT treatment:    [x] DRY NEEDLING   [] TENS Unit        [] [facility] Cervical Traction Unit        [] [facility] Lumbar Traction Unit    [] Suhail Bridge        [] Rolling/Standard Sherl Casey         [] Iontophoresis: Dexamethasone 4mg/ml injectable-30 ml vial     Quantity: 1 vial for iontophoresis     As needed        Electronically signed by: Jas Hirsch PT , DPT  #[id number]      If you have any questions or concerns, please don't hesitate to call.   Thank you for your referral.    Physician Signature:________________________________Date:__________________  By signing above, therapists plan is approved by physician

## 2022-11-18 DIAGNOSIS — F39 MOOD DISORDER (HCC): ICD-10-CM

## 2022-11-18 RX ORDER — ILOPERIDONE 1 MG/1
TABLET ORAL
Qty: 90 TABLET | Refills: 0 | Status: SHIPPED | OUTPATIENT
Start: 2022-11-18

## 2022-11-22 ENCOUNTER — TELEPHONE (OUTPATIENT)
Dept: ORTHOPEDIC SURGERY | Age: 67
End: 2022-11-22

## 2022-11-22 ENCOUNTER — HOSPITAL ENCOUNTER (EMERGENCY)
Age: 67
Discharge: HOME | End: 2022-11-22
Attending: EMERGENCY MEDICINE
Payer: MEDICARE

## 2022-11-22 ENCOUNTER — APPOINTMENT (OUTPATIENT)
Dept: CT IMAGING | Age: 67
End: 2022-11-22
Payer: MEDICARE

## 2022-11-22 ENCOUNTER — APPOINTMENT (OUTPATIENT)
Dept: PHYSICAL THERAPY | Age: 67
End: 2022-11-22
Payer: MEDICARE

## 2022-11-22 VITALS
RESPIRATION RATE: 16 BRPM | OXYGEN SATURATION: 99 % | TEMPERATURE: 97.9 F | DIASTOLIC BLOOD PRESSURE: 76 MMHG | BODY MASS INDEX: 34.36 KG/M2 | WEIGHT: 201.28 LBS | HEIGHT: 64 IN | HEART RATE: 95 BPM | SYSTOLIC BLOOD PRESSURE: 106 MMHG

## 2022-11-22 DIAGNOSIS — M54.31 SCIATICA OF RIGHT SIDE: Primary | ICD-10-CM

## 2022-11-22 DIAGNOSIS — G89.29 CHRONIC PAIN OF RIGHT KNEE: ICD-10-CM

## 2022-11-22 DIAGNOSIS — M25.561 CHRONIC PAIN OF RIGHT KNEE: ICD-10-CM

## 2022-11-22 DIAGNOSIS — R10.31 ABDOMINAL PAIN, RIGHT LOWER QUADRANT: ICD-10-CM

## 2022-11-22 LAB
ALBUMIN SERPL-MCNC: 3.9 G/DL (ref 3.4–5)
ALP BLD-CCNC: 83 U/L (ref 40–129)
ALT SERPL-CCNC: 15 U/L (ref 10–40)
ANION GAP SERPL CALCULATED.3IONS-SCNC: 9 MMOL/L (ref 3–16)
AST SERPL-CCNC: 23 U/L (ref 15–37)
BASOPHILS ABSOLUTE: 0.1 K/UL (ref 0–0.2)
BASOPHILS RELATIVE PERCENT: 0.9 %
BILIRUB SERPL-MCNC: <0.2 MG/DL (ref 0–1)
BILIRUBIN DIRECT: <0.2 MG/DL (ref 0–0.3)
BILIRUBIN URINE: NEGATIVE
BILIRUBIN, INDIRECT: NORMAL MG/DL (ref 0–1)
BLOOD, URINE: NEGATIVE
BUN BLDV-MCNC: 13 MG/DL (ref 7–20)
CALCIUM SERPL-MCNC: 10.4 MG/DL (ref 8.3–10.6)
CHLORIDE BLD-SCNC: 104 MMOL/L (ref 99–110)
CLARITY: CLEAR
CO2: 27 MMOL/L (ref 21–32)
COLOR: YELLOW
CREAT SERPL-MCNC: 0.7 MG/DL (ref 0.6–1.2)
EOSINOPHILS ABSOLUTE: 0.2 K/UL (ref 0–0.6)
EOSINOPHILS RELATIVE PERCENT: 3.8 %
GFR SERPL CREATININE-BSD FRML MDRD: >60 ML/MIN/{1.73_M2}
GLUCOSE BLD-MCNC: 78 MG/DL (ref 70–99)
GLUCOSE URINE: NEGATIVE MG/DL
HCT VFR BLD CALC: 36.4 % (ref 36–48)
HEMOGLOBIN: 11.6 G/DL (ref 12–16)
KETONES, URINE: NEGATIVE MG/DL
LACTIC ACID: 1.8 MMOL/L (ref 0.4–2)
LEUKOCYTE ESTERASE, URINE: NEGATIVE
LIPASE: 86 U/L (ref 13–60)
LYMPHOCYTES ABSOLUTE: 2.6 K/UL (ref 1–5.1)
LYMPHOCYTES RELATIVE PERCENT: 44.4 %
MCH RBC QN AUTO: 27.1 PG (ref 26–34)
MCHC RBC AUTO-ENTMCNC: 32 G/DL (ref 31–36)
MCV RBC AUTO: 84.6 FL (ref 80–100)
MICROSCOPIC EXAMINATION: NORMAL
MONOCYTES ABSOLUTE: 0.4 K/UL (ref 0–1.3)
MONOCYTES RELATIVE PERCENT: 7.2 %
NEUTROPHILS ABSOLUTE: 2.6 K/UL (ref 1.7–7.7)
NEUTROPHILS RELATIVE PERCENT: 43.7 %
NITRITE, URINE: NEGATIVE
PDW BLD-RTO: 14.7 % (ref 12.4–15.4)
PH UA: 7 (ref 5–8)
PLATELET # BLD: 283 K/UL (ref 135–450)
PMV BLD AUTO: 7.8 FL (ref 5–10.5)
POTASSIUM SERPL-SCNC: 4.2 MMOL/L (ref 3.5–5.1)
PROTEIN UA: NEGATIVE MG/DL
RBC # BLD: 4.3 M/UL (ref 4–5.2)
SODIUM BLD-SCNC: 140 MMOL/L (ref 136–145)
SPECIFIC GRAVITY UA: 1.02 (ref 1–1.03)
TOTAL PROTEIN: 7.2 G/DL (ref 6.4–8.2)
URINE REFLEX TO CULTURE: NORMAL
URINE TYPE: NORMAL
UROBILINOGEN, URINE: 0.2 E.U./DL
WBC # BLD: 5.8 K/UL (ref 4–11)

## 2022-11-22 PROCEDURE — 96375 TX/PRO/DX INJ NEW DRUG ADDON: CPT

## 2022-11-22 PROCEDURE — 81003 URINALYSIS AUTO W/O SCOPE: CPT

## 2022-11-22 PROCEDURE — 6360000002 HC RX W HCPCS: Performed by: NURSE PRACTITIONER

## 2022-11-22 PROCEDURE — 99285 EMERGENCY DEPT VISIT HI MDM: CPT

## 2022-11-22 PROCEDURE — 96374 THER/PROPH/DIAG INJ IV PUSH: CPT

## 2022-11-22 PROCEDURE — 83690 ASSAY OF LIPASE: CPT

## 2022-11-22 PROCEDURE — 85025 COMPLETE CBC W/AUTO DIFF WBC: CPT

## 2022-11-22 PROCEDURE — 83605 ASSAY OF LACTIC ACID: CPT

## 2022-11-22 PROCEDURE — 74177 CT ABD & PELVIS W/CONTRAST: CPT

## 2022-11-22 PROCEDURE — 6360000004 HC RX CONTRAST MEDICATION: Performed by: NURSE PRACTITIONER

## 2022-11-22 PROCEDURE — 80076 HEPATIC FUNCTION PANEL: CPT

## 2022-11-22 PROCEDURE — 6370000000 HC RX 637 (ALT 250 FOR IP): Performed by: NURSE PRACTITIONER

## 2022-11-22 PROCEDURE — 2580000003 HC RX 258: Performed by: NURSE PRACTITIONER

## 2022-11-22 PROCEDURE — 80048 BASIC METABOLIC PNL TOTAL CA: CPT

## 2022-11-22 RX ORDER — CYCLOBENZAPRINE HCL 10 MG
10 TABLET ORAL ONCE
Status: COMPLETED | OUTPATIENT
Start: 2022-11-22 | End: 2022-11-22

## 2022-11-22 RX ORDER — IBUPROFEN 600 MG/1
600 TABLET ORAL 3 TIMES DAILY PRN
Qty: 15 TABLET | Refills: 0 | Status: SHIPPED | OUTPATIENT
Start: 2022-11-22 | End: 2022-11-27

## 2022-11-22 RX ORDER — CYCLOBENZAPRINE HCL 10 MG
10 TABLET ORAL 3 TIMES DAILY PRN
Qty: 21 TABLET | Refills: 0 | Status: SHIPPED | OUTPATIENT
Start: 2022-11-22 | End: 2022-12-02

## 2022-11-22 RX ORDER — 0.9 % SODIUM CHLORIDE 0.9 %
1000 INTRAVENOUS SOLUTION INTRAVENOUS ONCE
Status: COMPLETED | OUTPATIENT
Start: 2022-11-22 | End: 2022-11-22

## 2022-11-22 RX ORDER — LIDOCAINE 4 G/G
1 PATCH TOPICAL DAILY
Status: DISCONTINUED | OUTPATIENT
Start: 2022-11-22 | End: 2022-11-22 | Stop reason: HOSPADM

## 2022-11-22 RX ORDER — FENTANYL CITRATE 50 UG/ML
25 INJECTION, SOLUTION INTRAMUSCULAR; INTRAVENOUS ONCE
Status: COMPLETED | OUTPATIENT
Start: 2022-11-22 | End: 2022-11-22

## 2022-11-22 RX ORDER — ONDANSETRON 4 MG/1
4-8 TABLET, ORALLY DISINTEGRATING ORAL EVERY 12 HOURS PRN
Qty: 12 TABLET | Refills: 0 | Status: SHIPPED | OUTPATIENT
Start: 2022-11-22

## 2022-11-22 RX ORDER — ONDANSETRON 2 MG/ML
4 INJECTION INTRAMUSCULAR; INTRAVENOUS ONCE
Status: COMPLETED | OUTPATIENT
Start: 2022-11-22 | End: 2022-11-22

## 2022-11-22 RX ORDER — ACETAMINOPHEN 500 MG
500 TABLET ORAL 4 TIMES DAILY PRN
Qty: 28 TABLET | Refills: 0 | Status: SHIPPED | OUTPATIENT
Start: 2022-11-22 | End: 2022-11-29

## 2022-11-22 RX ORDER — LIDOCAINE 50 MG/G
1 PATCH TOPICAL DAILY
Qty: 10 PATCH | Refills: 0 | Status: SHIPPED | OUTPATIENT
Start: 2022-11-22 | End: 2022-12-02

## 2022-11-22 RX ADMIN — CYCLOBENZAPRINE 10 MG: 10 TABLET, FILM COATED ORAL at 14:42

## 2022-11-22 RX ADMIN — ONDANSETRON 4 MG: 2 INJECTION INTRAMUSCULAR; INTRAVENOUS at 12:16

## 2022-11-22 RX ADMIN — SODIUM CHLORIDE 1000 ML: 9 INJECTION, SOLUTION INTRAVENOUS at 12:23

## 2022-11-22 RX ADMIN — FENTANYL CITRATE 25 MCG: 50 INJECTION, SOLUTION INTRAMUSCULAR; INTRAVENOUS at 12:16

## 2022-11-22 RX ADMIN — IOPAMIDOL 75 ML: 755 INJECTION, SOLUTION INTRAVENOUS at 13:05

## 2022-11-22 ASSESSMENT — PAIN DESCRIPTION - LOCATION
LOCATION: KNEE;HIP
LOCATION: ABDOMEN;BACK;LEG
LOCATION: HIP;KNEE

## 2022-11-22 ASSESSMENT — PAIN DESCRIPTION - ORIENTATION
ORIENTATION: RIGHT

## 2022-11-22 ASSESSMENT — PAIN SCALES - GENERAL
PAINLEVEL_OUTOF10: 10
PAINLEVEL_OUTOF10: 9

## 2022-11-22 ASSESSMENT — PAIN DESCRIPTION - PAIN TYPE: TYPE: ACUTE PAIN

## 2022-11-22 ASSESSMENT — PAIN - FUNCTIONAL ASSESSMENT: PAIN_FUNCTIONAL_ASSESSMENT: 0-10

## 2022-11-22 ASSESSMENT — PAIN DESCRIPTION - FREQUENCY: FREQUENCY: CONTINUOUS

## 2022-11-22 NOTE — DISCHARGE INSTRUCTIONS
Utilize your walker and/or cane at home. Keep your physical therapy appointments as scheduled. Follow-up by calling Dr. Shawn Mcguire tomorrow to schedule an appointment for reevaluation to be seen in the next 2-3 days. Return to emergency room for new or worsening symptoms including, not limited to, developing loss of bowel or bladder control, numbness or ting between your legs or any backside, inability to ambulate as is your baseline with cane/walker, developing fever, chills, sweats, inability to tolerate food or drink, inability urinate, seeing blood in your vomit or stool, or other symptoms/concerns. You state that you are unable to take tramadol, Vicodin, or Percocet and therefore were prescribed ibuprofen, Tylenol, and lidocaine patches for discomfort.

## 2022-11-22 NOTE — TELEPHONE ENCOUNTER
Did leave message regarding ED referral for a f/u appointment. Upon return call please schedule with Dr. Darling Menard.

## 2022-11-22 NOTE — ED PROVIDER NOTES
[address] [place] [address]      Pt Name: Dagoberto Castano  MRN: [id number]  [place] 1955  Date of evaluation: 11/22/2022  Provider: Wiley Bass, [address] [place] [address]  Chief Complaint   Patient presents with    Back Pain     Pt arrives ambulatory with cane for eval of right side sciatic pain onset \"a while ago\" Pt sts no relief with tylenol, heat etc  Pt sts lower right quad abdominal pain as well. Pt sts nausea present. Pt sts last bm yesterday. Pt sts problems holding urine. I have fully participated in the care of Dagoberto Castano and have had a face-to-face evaluation. I have reviewed and agree with all pertinent clinical information, and midlevel provider's history, and physical exam. I have also reviewed the labs and imaging studies and treatment plan. I have also reviewed and agree with the medications, allergies and past medical history section for this John [place]ford. I agree with the diagnosis, and I concur. This patient is at risk for COVID-19 viral infection. Therefore, personal protection equipment consisting of a mask and gloves was worn for the exam.    Past Medical History:   Diagnosis Date    Anesthesia complication     \" shaking\"    Arthritis     Cardiomyopathy ([facility] [address]     COVID-19     1/7/2022    Diabetes     GERD (gastroesophageal reflux disease)     Hyperlipidemia     Hypertension     Lumbar disc disease     Prolonged emergence from general anesthesia     Sleep apnea     pt states does use a Cpap machine at night    Unspecified cerebral artery occlusion with cerebral infarction 2014    right side weak    Wears glasses        MDM:  Dagoberto Castano is a 75 y.o. female who presents with right-sided sciatic pain that started a long time ago. She states she has right lower quadrant abdominal pain as well. She denies any epigastric pain. She denies any fevers or chills. She denies any dysuria nocturia hematuria.   She denies loss of bowel or bladder nothing makes it better or worse. She describes it as moderate. Physical exam revealed tenderness in right lower quadrant. There is slight tenderness noted on the right. There is no weakness. Sensation was intact to light touch. Her work-up revealed an elevated lipase. However, it is always elevated. Has been higher than today. Today is about average. Therefore, I do not feel that this is significant because she does not have any findings to go along with it. Therefore, she was discharged with symptomatic treatment for sciatica right knee pain and stomach pain. She was instructed to follow-up with  In 3 to 5 days and return if any problems. Vitals:    11/22/22 1028   BP: 106/76   Pulse: 95   Resp: 16   Temp: 97.9 °F (36.6 °C)   SpO2: 99%       Lab results  Labs Reviewed   LIPASE - Abnormal; Notable for the following components:       Result Value    Lipase 86.0 (*)     All other components within normal limits   CBC WITH AUTO DIFFERENTIAL - Abnormal; Notable for the following components:    Hemoglobin 11.6 (*)     All other components within normal limits   LACTIC ACID   BASIC METABOLIC PANEL   HEPATIC FUNCTION PANEL   URINALYSIS WITH REFLEX TO CULTURE       Radiology results  CT ABDOMEN PELVIS W IV CONTRAST Additional Contrast? None   Final Result   No acute abdominopelvic abnormality. See discharge instructions for specific medications, discharge information, and treatments. They were verbally instructed to return to emergency if any problems.     Medications   fentaNYL (SUBLIMAZE) injection 25 mcg (25 mcg IntraVENous Given 11/22/22 1216)   ondansetron (ZOFRAN) injection 4 mg (4 mg IntraVENous Given 11/22/22 1216)   0.9 % sodium chloride bolus (0 mLs IntraVENous Stopped 11/22/22 1439)   iopamidol (ISOVUE-370) 76 % injection 75 mL (75 mLs IntraVENous Given 11/22/22 1305)   cyclobenzaprine (FLEXERIL) tablet 10 mg (10 mg Oral Given 11/22/22 1442) Discharge Medication List as of 11/22/2022  3:10 PM        START taking these medications    Details   acetaminophen (TYLENOL) 500 MG tablet Take 1 tablet by mouth 4 times daily as needed for Pain, Disp-28 tablet, R-0Print      cyclobenzaprine (FLEXERIL) 10 MG tablet Take 1 tablet by mouth 3 times daily as needed for Muscle spasms, Disp-21 tablet, R-0Print      lidocaine (LIDODERM) 5 % Place 1 patch onto the skin daily for 10 days 12 hours on, 12 hours off., Disp-10 patch, R-0Print             The patient's blood pressure was not found to be elevated according to CMS/Medicare and the Affordable Care Act/ObamaCare criteria. IMPRESSIONS:  1. Sciatica of right side    2. Chronic pain of right knee    3.  Abdominal pain, right lower quadrant               Carina Boogie DO  11/24/22 1810

## 2022-11-22 NOTE — ED TRIAGE NOTES
=Pt arrives ambulatory with cane for eval of right side sciatic pain onset \"a while ago\" Pt sts no relief with tylenol, heat etc  Pt sts lower right quad abdominal pain as well. Pt sts nausea present. Pt sts last bm yesterday. Pt sts problems holding urine. Pt is a/ox4, rsp nonlabored and pwd.

## 2022-11-23 ENCOUNTER — TELEPHONE (OUTPATIENT)
Dept: ORTHOPEDIC SURGERY | Age: 67
End: 2022-11-23

## 2022-11-23 DIAGNOSIS — Z96.651 S/P TOTAL KNEE ARTHROPLASTY, RIGHT: ICD-10-CM

## 2022-11-23 RX ORDER — GABAPENTIN 300 MG/1
CAPSULE ORAL
Qty: 90 CAPSULE | Refills: 0 | Status: SHIPPED | OUTPATIENT
Start: 2022-11-23 | End: 2022-12-20 | Stop reason: SDUPTHER

## 2022-11-23 NOTE — TELEPHONE ENCOUNTER
Did speak with patient regarding ED referral. Patient does have a f/u appt with Dr. eLisa Avelar on 12/8 and is wanting to keep this. If she needs seen sooner she will call for an appointment.

## 2022-11-28 ENCOUNTER — HOSPITAL ENCOUNTER (OUTPATIENT)
Dept: PHYSICAL THERAPY | Age: 67
Setting detail: THERAPIES SERIES
Discharge: HOME | End: 2022-11-28
Payer: MEDICARE

## 2022-11-28 PROCEDURE — 97110 THERAPEUTIC EXERCISES: CPT

## 2022-11-28 NOTE — FLOWSHEET NOTE
hip.      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 M/Th she can come to PT before 9:30. Otherwise, she needs after 4:30. *problems:  - R back/ hip/ knee pain  - R hip to knee \"burning\" pain at night. (Most nights; especially worse when she sits more during the day). - walking/ standing (tolerance?)  - stairs w/ rail (1 side)  - painful to exit car    SUBJECTIVE:    11/7: pt states pain is 6/10; from right lower back to lateral hip down to lateral knee. 11/10: pt states leg pain is somewhat better, now 4/10, she can tell its different. Most of the pain is located R hip posteriorly and wraps around anteriorly. Still has significant pain when first getting out of a chair. 11/14: pt states she still feels a little better. Less pain in leg and getting out of a chair, but still a fair amount of pain at night from thigh to knee. 11/17: pt reports approx 75% improvement overall, but still painful to get out of a car. Also still has fairly consistent pain at lateral knee to lateral hip and into her lower back region. Given language barrier, its a bit difficult for her to exactly describe her symptom pattern. With further discussion, she also has some pain still located anterior shin around her robotic incision. *needling? 11-28-22 15 min late. C/o of L groin, hip , L LE to ankle.           OBJECTIVE:   Observation:   Test measurements:    ROM  11/7 11/10 11/17     Hip global   Mod limit on R all planes       Hip mobility   R mod limit w/ ER and medial glide and ext                                 Strength                                                      TESTS/ OTHER         TUG  W/cane:  24s w/o lift    21s w/ lift       Lumbar quadrant   Mildly painful on R, not as bad as hip PROM      Lumbar PA   Mild pain on R lower facets Mod pain lower R lumbar and T/L jxn on R     Hip PROM w/ overpressure   Mod/ severe pain on R w/ familiar pain Mod limit on R; supine and prone; R groin pain w/ Prone IR OP                  RESTRICTIONS/PRECAUTIONS:     Exercises/Interventions:     Therapeutic Ex (23393)   Min: Resistance/Reps Notes/Cues   Dynamic warmup   Long stride walking, lateral walk, monster walk   x 2 laps ea Cues for tech   clamshell Cues for full ROM        Hip abd    Lateral walk w/ band Grn @ knees, 25' x  2 laps Cues for slight incr in step width   Hip ext EOB 2x10  R/L w/ Grn @ knees         steps Resume; up w/ balance?   stairs    Pt edu Reviewed HEP, role of regular HEP to manage pain Therapeutic Activity (40123) Min:      Pt edu Leg length assessment/ heel lift trial           NMR re-education (32144)   Min:                         Manual Intervention (60265) Min:      Hip manual   PLow to mid grade per [employer] resume              resume   Lumbar manual >                   Modalities  Min:             Other Therapeutic Activities:  Pt was educated on PT POC, Diagnosis, Prognosis, pathomechanics as well as frequency and duration of scheduling future physical therapy appointments. Time was also taken on this day to answer all patient questions and participation in PT. Reviewed appointment policy in detail with patient and patient verbalized understanding. Home Exercise Program: Patient instructed in the following for HEP:      11/7: amb stairs reciprocally, purchase heel lift  11/10: Access Code: [email or web address]  URL: [email or web address]  Date: 11/10/2022  Prepared by: Edgar Oneil    Exercises  Side Stepping with Resistance at Thighs - 2-3 x daily - 4-6 x weekly - 2-3 sets - 10-15 reps  Prone Hip Extension on Table - 2-3 x daily - 4-6 x weekly - 2-3 sets - 10-15 reps         . Patient verbalized/demonstrated understanding and was issued written handout.       Therapeutic Exercise and NMR EXR  [x] (59176) Provided verbal/tactile cueing for activities related to strengthening, flexibility, endurance, ROM  for improvements in proximal hip and core control with self care, mobility, lifting and ambulation. [x] (20501) Provided verbal/tactile cueing for activities related to improving balance, coordination, kinesthetic sense, posture, motor skill, proprioception  to assist with core control in self care, mobility, lifting, and ambulation. Therapeutic Activities:    [x] (91497 or 11590) Provided verbal/tactile cueing for activities related to improving balance, coordination, kinesthetic sense, posture, motor skill, proprioception and motor activation to allow for proper function  with self care and ADLs  [x] (53592) Provided training and instruction to the patient for proper core and proximal hip recruitment and positioning with ambulation re-education     Home Exercise Program:    [x] (33429) Reviewed/Progressed HEP activities related to strengthening, flexibility, endurance, ROM of core, proximal hip and LE for functional self-care, mobility, lifting and ambulation   [x] (30871) Reviewed/Progressed HEP activities related to improving balance, coordination, kinesthetic sense, posture, motor skill, proprioception of core, proximal hip and LE for self care, mobility, lifting, and ambulation      Manual Treatments:  PROM / STM / Oscillations-Mobs:  G-I, II, III, IV (PA's, Inf., Post.)  [x] (39301) Provided manual therapy to mobilize proximal hip and LS spine soft tissue/joints for the purpose of modulating pain, promoting relaxation,  increasing ROM, reducing/eliminating soft tissue swelling/inflammation/restriction, improving soft tissue extensibility and allowing for proper ROM for normal function with self care, mobility, lifting and ambulation.        Approval Dates:  CPT Code Units Approved Units Used  Date Updated:                     Charges:  Timed Code Treatment Minutes: 30   Total Treatment Minutes: 30     [] EVAL (LOW) 47693 (typically 20 minutes face-to-face)  [] EVAL (MOD) 16512 (typically 30 minutes face-to-face)  [] EVAL (HIGH) 22242 (typically 45 minutes face-to-face)  [] RE-EVAL     [x] ZX(00572) x  2  [] Dry needle 1 or 2 Muscles (21045)  [] NMR (37733) x     [] Dry needle 3+ Muscles (15483)  [] Manual (49782) x     [] Ultrasound (13319) x  [] TA (01384) x     [] Mech Traction (22869)  [] ES(attended) (50354)     [] ES (un) (24472):   [] Vasopump (45242) [] Ionto (20114)   [] Other:    Ilir Maryann stated goal: able to stand at least an hour w/o severe pain. [] Progressing: [] Met: [] Not Met: [] Adjusted    Therapist goals for Patient:   Short Term Goals: To be achieved in: 2 weeks  1. Independent in HEP and progression per patient tolerance, in order to prevent re-injury. [] Progressing: [] Met: [] Not Met: [] Adjusted  2. Patient will have a decrease in pain to facilitate improvement in movement, function, and ADLs as indicated by Functional Deficits. [] Progressing: [] Met: [] Not Met: [] Adjusted    Long Term Goals: To be achieved in: up to 12 weeks  1. Increase FOTO functional outcome score from 36 to 46 to assist with reaching prior level of function. [] Progressing: [] Met: [] Not Met: [] Adjusted  2. Patient will demonstrate increased AROM to [facility] for R hip to allow for proper joint functioning as indicated by patients Functional Deficits. [] Progressing: [] Met: [] Not Met: [] Adjusted  3. Patient will demonstrate an increase in Strength to good proximal hip strength and control, within 5lb HHD in LE to allow for proper functional mobility as indicated by patients Functional Deficits. [] Progressing: [] Met: [] Not Met: [] Adjusted  4. Patient will be able to ambulate stairs reciprocally w/o severe pain. [] Progressing: [] Met: [] Not Met: [] Adjusted  5. Pt will achieve at least 11 reps of sit to  30sec to align with normative values. [] Progressing: [] Met: [] Not Met: [] Adjusted     ASSESSMENT:  Pt requires skilled PT to restore mobility of hip/lumbar and lower quarter and to facilitate improved function w/ less pain.      Pt seems to have seen some improvement with PT with less pain. With further discussion, still reports she is still in a fair amount of pain through back/ hip/ knee areas. Fairly sore with light/ medium palpation which may indicate some central sensitization. She may benefit from needling, and more comprehensive pain neuroscience education to help her understand her pain. 11-28-22 pain the same after Rx, but feels looser. Treatment/Activity Tolerance:  [x] Patient tolerated treatment well [] Patient limited by fatique  [] Patient limited by pain  [] Patient limited by other medical complications  [] Other:     Overall Progression Towards Functional goals/ Treatment Progress Update:  [] Patient is progressing as expected towards functional goals listed. [] Progression is slowed due to complexities/Impairments listed. [] Progression has been slowed due to co-morbidities. [x] Plan just implemented, too soon to assess goals progression <30days   [] Goals require adjustment due to lack of progress  [] Patient is not progressing as expected and requires additional follow up with physician  [] Other:    Prognosis for POC: [x] Good [] Fair  [] Poor    Patient requires continued skilled intervention: [x] Yes  [] No        PLAN: See eval  [] Continue per plan of care [] Alter current plan (see comments)  [x] Plan of care initiated [] Hold pending MD visit [] Discharge    Electronically signed by: Robert Vyas, PTA  [id number]      Note: If patient does not return for scheduled/recommended follow up visits, this note will serve as a discharge from care along with the most recent update on progress.

## 2022-11-29 ASSESSMENT — ENCOUNTER SYMPTOMS
SHORTNESS OF BREATH: 0
VOMITING: 0
EYE PAIN: 0
BACK PAIN: 1
SORE THROAT: 0
NAUSEA: 1
ANAL BLEEDING: 0
COUGH: 0
ABDOMINAL PAIN: 1

## 2022-11-29 NOTE — ED PROVIDER NOTES
[address]  [address] [address]  Dept: [phone]  Loc: [address] ENCOUNTER        This patient was not seen or evaluated by the attending physician. I evaluated this patient, the attending physician was available for consultation. CHIEF COMPLAINT    Chief Complaint   Patient presents with    Back Pain     Pt arrives ambulatory with cane for eval of right side sciatic pain onset \"a while ago\" Pt sts no relief with tylenol, heat etc  Pt sts lower right quad abdominal pain as well. Pt sts nausea present. Pt sts last bm yesterday. Pt sts problems holding urine. HPI    Conner Ocasio is a 75 y.o. female who presents with abdominal pain localized in the RLQ of the abdomen. Onset was 1 day ago. The duration has been constant since the onset. The pain is associated with chronic right-sided lumbar back and a separate chronic right knee pain, nausea, chills, urinary frequency, urgency, and dysuria. The pain is 9/10 in severity. The quality of the pain is \"stabbing\". The context is that the symptoms started spontaneously. There are no alleviating factors. Denies saddle anesthesia, incontinence of urine or stool, urinary retention, lower extremity weakness, inability ambulate, vomiting, fever, sweats, chest pain, fevers, sweats, or other symptoms/concerns. Review of Systems   Constitutional:  Positive for chills. Negative for diaphoresis, fatigue and fever. HENT:  Negative for congestion and sore throat. Eyes:  Negative for pain and visual disturbance. Respiratory:  Negative for cough and shortness of breath. Cardiovascular:  Negative for chest pain and leg swelling. Gastrointestinal:  Positive for abdominal pain and nausea. Negative for anal bleeding and vomiting. Genitourinary:  Positive for dysuria, frequency and urgency. Negative for difficulty urinating. Musculoskeletal:  Positive for back pain. Negative for neck pain. Skin:  Negative for rash and wound. Neurological:  Negative for dizziness and light-headedness. Hematological: Negative. Psychiatric/Behavioral: Negative.          PAST MEDICAL & SURGICAL HISTORY    Past Medical History:   Diagnosis Date    Anesthesia complication     \" shaking\"    Arthritis     Cardiomyopathy ([address]     COVID-19     1/7/2022    Diabetes     GERD (gastroesophageal reflux disease)     Hyperlipidemia     Hypertension     Lumbar disc disease     Prolonged emergence from general anesthesia     Sleep apnea     pt states does use a Cpap machine at night    Unspecified cerebral artery occlusion with cerebral infarction 2014    right side weak    Wears glasses      Past Surgical History:   Procedure Laterality Date    ARTHRODESIS Right 9/17/2020    (RIGHT) REMOVAL OF BONE SPURRING AND OSSEOUS PROMINENCE FIRST METATARSAL, ARTHRODESIS OF FIRST METATARSOPHALANGEAL JOINT, BONE MARROW HARVEST AND CONCENTRATION RIGHT TIBIA performed by Jaqui Cameron DPM at [address] [place] [address] Left 9/3/15    CARPAL TUNNEL RELEASE Right 9/22/15    COLONOSCOPY      FINGER TRIGGER RELEASE Left 9/3/15    middle and ring fingers    FINGER TRIGGER RELEASE Right 9/22/15    middle and ring fingers    FOOT SURGERY Bilateral     litteltoe    HAND SURGERY Right     Ligament    KNEE ARTHROPLASTY Right 7/26/2021    ROBOTIC ASSISTED TOTAL RIGHT KNEE REPLACEMENT performed by Carlota Shen MD at [facility] ARTHROSCOPY Right 2/2/2022    ARTHROSCOPY WITH LYSIS OF ADHESIONS, RIGHT KNEE performed by Carlota Shen MD at [facility] (CERVIX NOT REMOVED)  08/2003    SHOULDER ARTHROSCOPY Right 06/20/2018    Diagnostic scope, rcr, open [address]  (may include discharge medications prescribed in the ED)  Current Outpatient Rx   Medication Sig Dispense Refill    ibuprofen (ADVIL;MOTRIN) 600 MG tablet Take 1 tablet by mouth 3 times daily as needed for Pain With meals 15 tablet 0    acetaminophen (TYLENOL) 500 MG tablet Take 1 tablet by mouth 4 times daily as needed for Pain 28 tablet 0    cyclobenzaprine (FLEXERIL) 10 MG tablet Take 1 tablet by mouth 3 times daily as needed for Muscle spasms 21 tablet 0    lidocaine (LIDODERM) 5 % Place 1 patch onto the skin daily for 10 days 12 hours on, 12 hours off. 10 patch 0    ondansetron (ZOFRAN ODT) 4 MG disintegrating tablet Take 1-2 tablets by mouth every 12 hours as needed for Nausea May Sub regular tablet (non-ODT) if insurance does not cover ODT.  12 tablet 0    valsartan (DIOVAN) 160 MG tablet Take 1 tablet by mouth daily 90 tablet 1    gabapentin (NEURONTIN) 300 MG capsule TAKE 1 CAPSULE BY MOUTH THREE TIMES DAILY 90 capsule 0    FANAPT 1 MG TABS tablet TAKE 1 TABLET BY MOUTH EVERY NIGHT 90 tablet 0    metFORMIN (GLUCOPHAGE) 500 MG tablet TAKE 2 TABLETS BY MOUTH TWICE DAILY WITH MEALS 360 tablet 1    omeprazole (PRILOSEC) 40 MG delayed release capsule Take 1 capsule by mouth every morning (before breakfast) 30 capsule 2    omeprazole (PRILOSEC) 40 MG delayed release capsule Take 1 capsule by mouth in the morning and at bedtime 160 capsule 5    pravastatin (PRAVACHOL) 20 MG tablet Take 1 tablet by mouth daily 30 tablet 2    chlorthalidone (HYGROTON) 25 MG tablet Take 1 tablet by mouth daily 30 tablet 0    aspirin 81 MG EC tablet Take 81 mg by mouth daily      Psyllium (METAMUCIL) 0.36 g CAPS Take 1 capsule by mouth daily      vitamin B-6 (PYRIDOXINE) 100 MG tablet Take 100 mg by mouth daily      furosemide (LASIX) 20 MG tablet TAKE 1 TABLET BY MOUTH  DAILY AS NEEDED FOR LEG  SWELLING 90 tablet 3    DULoxetine (CYMBALTA) 60 MG extended release capsule TAKE 1 CAPSULE BY MOUTH DAILY 90 capsule 1    TRULICITY 1.4 YO/6.5JX SOPN INJECT THE CONTENTS OF ONE  PEN SUBCUTANEOUSLY WEEKLY 6 mL 3    traZODone (DESYREL) 100 MG tablet TAKE 1 TABLET BY MOUTH AT  NIGHT 90 tablet 1    insulin glargine (LANTUS SOLOSTAR) 100 UNIT/ML injection pen INJECT SUBCUTANEOUSLY 12  UNITS ONCE NIGHTLY 15 mL 3    blood glucose test strips (ONETOUCH ULTRA) strip As needed. 100 strip 4    dextran 70-hypromellose (TEARS NATURALE) 0.1-0.3 % SOLN opthalmic solution as needed for itching      albuterol sulfate HFA (PROVENTIL HFA) 108 (90 Base) MCG/ACT inhaler Inhale 2 puffs into the lungs every 4 hours as needed for Wheezing or Shortness of Breath May substitute ProAir MDI 1 Inhaler 5    ipratropium-albuterol (DUONEB) 0.5-2.5 (3) MG/3ML SOLN nebulizer solution Inhale 3 mLs into the lungs every 4 hours as needed for Shortness of Breath (wheezing coughing) 360 mL 5    ammonium lactate (LAC-HYDRIN) 12 % lotion Apply topically daily. (Patient taking differently: as needed Apply topically daily. ) 1 Bottle 1       ALLERGIES    Allergies   Allergen Reactions    Codeine Nausea And Vomiting and Rash    Vicodin [Hydrocodone-Acetaminophen] Nausea And Vomiting    Lipitor [Atorvastatin]      Myalgias    Oxycontin [Oxycodone Hcl] Itching    Tramadol Itching and Swelling       SOCIAL AND FAMILY HISTORY    Social History     Socioeconomic History    Marital status:      Spouse name: Roselyn Florentino, seen here    Number of children: 3   Tobacco Use    Smoking status: Former     Packs/day: 1.50     Years: 3.00     Pack years: 4.50     Types: Cigarettes     Quit date: 1992     Years since quittin.9     Passive exposure: Past    Smokeless tobacco: Never   Vaping Use    Vaping Use: Never used   Substance and Sexual Activity    Alcohol use: No    Drug use: No    Sexual activity: Yes     Partners: Male   Social History Narrative    [place]     is seen here    10 grandchildren     Social Determinants of Health     Financial Resource Strain: Low Risk     Difficulty of Paying Living Expenses: Not hard at all   Food Insecurity: No Food Insecurity    Worried About [address] in the Last Year: Never true    Ran Out of Food in the Last Year: Never true     Family History   Problem Relation Age of Onset    Heart Disease Mother     High Blood Pressure Mother     Stroke Mother     Cancer Brother         lung cancer       PHYSICAL EXAM    VITAL SIGNS: /76   Pulse 95   Temp 97.9 °F (36.6 °C) (Oral)   Resp 16   Ht 5' 4\" (1.626 m)   Wt 201 lb 4.5 oz (91.3 kg)   SpO2 99%   BMI 34.55 kg/m²   Constitutional:  Well developed, well nourished, + appearing in acute distress  Eyes:  Sclera nonicteric, conjunctiva moist  HENT:  Atraumatic, nose normal  Neck: no JVD  Respiratory:  No retractions, no accessory muscle use, normal breath sounds   Cardiovascular: regular rate, no murmurs  GI:  + RLQ abdominal tenderness to palpation, soft, no guarding, bowel sounds present, no audible bruits or palpable pulsatile masses  Back: No CVA tenderness  Musculoskeletal:  No edema, no acute deformities  Vascular: DP pulses 2+ and equal bilaterally  Integument: No rashes, skin dry  Neurologic:  Alert & oriented, no slurred speech  Psychiatric: Cooperative, pleasant affect     RADIOLOGY/PROCEDURES    CT ABDOMEN PELVIS W IV CONTRAST Additional Contrast? None   Final Result   No acute abdominopelvic abnormality. ED COURSE & MEDICAL DECISION MAKING    Pertinent Labs & Imaging studies reviewed and interpreted. (See chart for details)     See chart for details of medications given during the ED stay. Vitals:    11/22/22 1028   BP: 106/76   Pulse: 95   Resp: 16   Temp: 97.9 °F (36.6 °C)   TempSrc: Oral   SpO2: 99%   Weight: 201 lb 4.5 oz (91.3 kg)   Height: 5' 4\" (1.626 m)       Differential diagnosis: Abdominal Aortic Aneurysm, Ischemic Bowel, Bowel Obstruction, Appendicitis, Diverticulitis, Pyelonephritis, UTI, STD, Ureterolithiasis, Colitis, Gonad Torsion, other    Patient presents emerged department with a 1 day history of right lower quadrant abdominal pain accompanied by nausea and chills.   Patient also endorses urinary frequency, urgency, dysuria, and chronic right knee and right sided sciatica that started \"a while ago\". We will obtain basic abdominal labs including CBC, BMP, hepatic function panel, lipase, lactic acid, urine reflexive culture, and CT A/P to rule out intra-abdominal pathology. Work-up pending.   Patient medicated with Flexeril, Zofran, fentanyl, 1 L bolus normal saline to induce diuresis after CT A/P with contrast.      Labs reviewed:  I have reviewed and interpreted all of the currently available lab results from this visit:  Results for orders placed or performed during the hospital encounter of 11/22/22   Lipase   Result Value Ref Range    Lipase 86.0 (H) 13.0 - 60.0 U/L   Lactic Acid   Result Value Ref Range    Lactic Acid 1.8 0.4 - 2.0 mmol/L   CBC with Auto Differential   Result Value Ref Range    WBC 5.8 4.0 - 11.0 K/uL    RBC 4.30 4.00 - 5.20 M/uL    Hemoglobin 11.6 (L) 12.0 - 16.0 g/dL    Hematocrit 36.4 36.0 - 48.0 %    MCV 84.6 80.0 - 100.0 fL    MCH 27.1 26.0 - 34.0 pg    MCHC 32.0 31.0 - 36.0 g/dL    RDW 14.7 12.4 - 15.4 %    Platelets 737 846 - 742 K/uL    MPV 7.8 5.0 - 10.5 fL    Neutrophils % 43.7 %    Lymphocytes % 44.4 %    Monocytes % 7.2 %    Eosinophils % 3.8 %    Basophils % 0.9 %    Neutrophils Absolute 2.6 1.7 - 7.7 K/uL    Lymphocytes Absolute 2.6 1.0 - 5.1 K/uL    Monocytes Absolute 0.4 0.0 - 1.3 K/uL    Eosinophils Absolute 0.2 0.0 - 0.6 K/uL    Basophils Absolute 0.1 0.0 - 0.2 K/uL   Basic Metabolic Panel   Result Value Ref Range    Sodium 140 136 - 145 mmol/L    Potassium 4.2 3.5 - 5.1 mmol/L    Chloride 104 99 - 110 mmol/L    CO2 27 21 - 32 mmol/L    Anion Gap 9 3 - 16    Glucose 78 70 - 99 mg/dL    BUN 13 7 - 20 mg/dL    Creatinine 0.7 0.6 - 1.2 mg/dL    Est, Glom Filt Rate >60 >60    Calcium 10.4 8.3 - 10.6 mg/dL   Hepatic Function Panel   Result Value Ref Range    Total Protein 7.2 6.4 - 8.2 g/dL    Albumin 3.9 3.4 - 5.0 g/dL    Alkaline Phosphatase 83 40 - 129 U/L    ALT 15 10 - 40 U/L    AST 23 15 - 37 U/L    Total Bilirubin <0.2 0.0 - 1.0 mg/dL    Bilirubin, Direct <0.2 0.0 - 0.3 mg/dL    Bilirubin, Indirect see below 0.0 - 1.0 mg/dL   Urinalysis with Reflex to Culture    Specimen: Urine   Result Value Ref Range    Color, UA Yellow Straw/Yellow    Clarity, UA Clear Clear    Glucose, Ur Negative Negative mg/dL    Bilirubin Urine Negative Negative    Ketones, Urine Negative Negative mg/dL    Specific Gravity, UA 1.020 1.005 - 1.030    Blood, Urine Negative Negative    pH, UA 7.0 5.0 - 8.0    Protein, UA Negative Negative mg/dL    Urobilinogen, Urine 0.2 <2.0 E.U./dL    Nitrite, Urine Negative Negative    Leukocyte Esterase, Urine Negative Negative    Microscopic Examination Not Indicated     Urine Type NotGiven     Urine Reflex to Culture Not Indicated      Imaging reviewed:  CT ABDOMEN PELVIS W IV CONTRAST Additional Contrast? None    Result Date: 11/22/2022  No acute abdominopelvic abnormality. Work-up reveals:  Lipase elevated 86  Lactic acid [email or web address]  CBC: Negative for leukocytosis or significant anemia with hemoglobin of 11.6, normal RBC and hematocrit, otherwise unremarkable  Metabolic panel: No electrolyte derangement, renal dysfunction, elevation LFTs  Urinalysis reflex to culture: Negative for nitrite, leukocyte esterase, glucose, bilirubin, ketones, unremarkable and without infection  CT A/P: As noted above  No acute abdominal pelvic abnormality. The patient was instructed to follow up as an outpatient in 1-2 days with PCP and orthopedics. Medication as prescribed. The patient was instructed to return to the ED immediately for any new or worsening symptoms. The patient verbalized understanding and is agreeable to plan for discharge and follow-up. FINAL IMPRESSION    1. Sciatica of right side    2. Chronic pain of right knee    3.  Abdominal pain, right lower quadrant        PLAN  Discharge with outpatient follow-up    (Please note that this note was completed with a voice recognition program.  Every attempt was made to edit the dictations, but inevitably there remain words that are mis-transcribed.)      Gabby Huggins, JEISON - ALLEN  11/29/22 2050

## 2022-12-01 ENCOUNTER — APPOINTMENT (OUTPATIENT)
Dept: PHYSICAL THERAPY | Age: 67
End: 2022-12-01
Payer: MEDICARE

## 2022-12-02 ENCOUNTER — TELEPHONE (OUTPATIENT)
Dept: FAMILY MEDICINE CLINIC | Age: 67
End: 2022-12-02

## 2022-12-02 NOTE — TELEPHONE ENCOUNTER
Dr Kalin Lal office called said pt has mild swelling on lymph nodes but pt should touch base with pcp to see if any further testing needed   Pt had hip mri yesterday     Dr Kalin Lal wanted Dr Finnegan to check in care everywhere for the test results   Dr is in the or today     Any questions call Pamela Face

## 2022-12-02 NOTE — TELEPHONE ENCOUNTER
Reviewed MRI  Please have Sidia schedule appt in 6-8 weeks and we will repeat some imaging to take a closer look at this.

## 2022-12-02 NOTE — TELEPHONE ENCOUNTER
Patient has an AWV on Dec 22. Does she need another appointment or can the issue be discussed at that appointment?     Please Advise

## 2022-12-05 ENCOUNTER — OFFICE VISIT (OUTPATIENT)
Dept: SLEEP MEDICINE | Age: 67
End: 2022-12-05
Payer: MEDICARE

## 2022-12-05 VITALS
TEMPERATURE: 97.1 F | SYSTOLIC BLOOD PRESSURE: 125 MMHG | DIASTOLIC BLOOD PRESSURE: 70 MMHG | BODY MASS INDEX: 33.26 KG/M2 | RESPIRATION RATE: 18 BRPM | WEIGHT: 194.8 LBS | HEART RATE: 80 BPM | OXYGEN SATURATION: 99 % | HEIGHT: 64 IN

## 2022-12-05 DIAGNOSIS — Z86.73 H/O: STROKE: ICD-10-CM

## 2022-12-05 DIAGNOSIS — E66.9 OBESITY (BMI 30.0-34.9): ICD-10-CM

## 2022-12-05 DIAGNOSIS — Z99.89 DEPENDENCE ON OTHER ENABLING MACHINES AND DEVICES: ICD-10-CM

## 2022-12-05 DIAGNOSIS — G47.33 OSA TREATED WITH BIPAP: Primary | ICD-10-CM

## 2022-12-05 PROCEDURE — 99214 OFFICE O/P EST MOD 30 MIN: CPT | Performed by: PSYCHIATRY & NEUROLOGY

## 2022-12-05 PROCEDURE — G8399 PT W/DXA RESULTS DOCUMENT: HCPCS | Performed by: PSYCHIATRY & NEUROLOGY

## 2022-12-05 PROCEDURE — 1124F ACP DISCUSS-NO DSCNMKR DOCD: CPT | Performed by: PSYCHIATRY & NEUROLOGY

## 2022-12-05 PROCEDURE — 3078F DIAST BP <80 MM HG: CPT | Performed by: PSYCHIATRY & NEUROLOGY

## 2022-12-05 PROCEDURE — 3017F COLORECTAL CA SCREEN DOC REV: CPT | Performed by: PSYCHIATRY & NEUROLOGY

## 2022-12-05 PROCEDURE — 1090F PRES/ABSN URINE INCON ASSESS: CPT | Performed by: PSYCHIATRY & NEUROLOGY

## 2022-12-05 PROCEDURE — G8484 FLU IMMUNIZE NO ADMIN: HCPCS | Performed by: PSYCHIATRY & NEUROLOGY

## 2022-12-05 PROCEDURE — G8417 CALC BMI ABV UP PARAM F/U: HCPCS | Performed by: PSYCHIATRY & NEUROLOGY

## 2022-12-05 PROCEDURE — 1036F TOBACCO NON-USER: CPT | Performed by: PSYCHIATRY & NEUROLOGY

## 2022-12-05 PROCEDURE — G8427 DOCREV CUR MEDS BY ELIG CLIN: HCPCS | Performed by: PSYCHIATRY & NEUROLOGY

## 2022-12-05 PROCEDURE — 3074F SYST BP LT 130 MM HG: CPT | Performed by: PSYCHIATRY & NEUROLOGY

## 2022-12-05 ASSESSMENT — SLEEP AND FATIGUE QUESTIONNAIRES
HOW LIKELY ARE YOU TO NOD OFF OR FALL ASLEEP WHILE LYING DOWN TO REST IN THE AFTERNOON WHEN CIRCUMSTANCES PERMIT: 1
HOW LIKELY ARE YOU TO NOD OFF OR FALL ASLEEP WHILE SITTING AND READING: 1
HOW LIKELY ARE YOU TO NOD OFF OR FALL ASLEEP WHILE SITTING QUIETLY AFTER LUNCH WITHOUT ALCOHOL: 1
ESS TOTAL SCORE: 4
HOW LIKELY ARE YOU TO NOD OFF OR FALL ASLEEP WHILE SITTING AND TALKING TO SOMEONE: 0
HOW LIKELY ARE YOU TO NOD OFF OR FALL ASLEEP WHILE WATCHING TV: 1
HOW LIKELY ARE YOU TO NOD OFF OR FALL ASLEEP IN A CAR, WHILE STOPPED FOR A FEW MINUTES IN TRAFFIC: 0
HOW LIKELY ARE YOU TO NOD OFF OR FALL ASLEEP WHILE SITTING INACTIVE IN A PUBLIC PLACE: 0
HOW LIKELY ARE YOU TO NOD OFF OR FALL ASLEEP WHEN YOU ARE A PASSENGER IN A CAR FOR AN HOUR WITHOUT A BREAK: 0

## 2022-12-05 NOTE — PROGRESS NOTES
MD GASTON Laguna Board Certified in Sleep Medicine  Certified in [address] [place] [address] Certified in Neurology [address]  [address]  [facility] [address] [place] [address]  [phone]   [address] [place] [address], [address] [place] [address]                      [facility]  [address] [address] [phone]    Subjective:     Patient ID: Dagoberto Castano is a 75 y.o. female. Chief Complaint   Patient presents with    Follow-up    Sleep Apnea         HPI:        Dagoberto Castano is a 75 y.o. female was seen today as a follow for  mild obstructive sleep apnea with an AHI - 12.9/hr with Low SaO2 - 76% and time below 90% of 3.5 min. Got the BiPAP machine. Patient is using the PAP machine about 88% of the time, more than 4 hours a nightabout  80 %, in total average of 6:44 hours a night in last 50 days. Currently on PAP at 15/11 cm (), the AHI is only 2.3 events per hour at this pressure. Patient improved regarding daytime sleepiness and fatigue, wakes up refreshed in the morning. The Patient scored Austin Sleepiness Score: 4 on Austin Sleepiness Scale ( more than 10 is indicative of daytime sleepiness)   Patient has no problem with PAP pressure or mask, uses FFM. Wakes up in the morning with dry mouth, the setting of the heated humidifier at # auto. Stroke is stable. Has lost 14 pounds since last 6 months.    DOT/CDL - N/A        Previous Report(s)Reviewed: historical medical records         Social History     Socioeconomic History    Marital status:      Spouse name: Salo Cano, seen here    Number of children: 3    Years of education: Not on file    Highest education level: Not on file   Occupational History    Not on file   Tobacco Use    Smoking status: Former     Packs/day: 1.50     Years: 3.00     Pack years: 4.50 Types: Cigarettes     Quit date: 1992     Years since quittin.9     Passive exposure: Past    Smokeless tobacco: Never   Vaping Use    Vaping Use: Never used   Substance and Sexual Activity    Alcohol use: No    Drug use: No    Sexual activity: Yes     Partners: Male   Other Topics Concern    Not on file   Social History Narrative    Originally from [facility]     is seen here    10 grandchildren     Social Determinants of Health     Financial Resource Strain: Low Risk     Difficulty of Paying Living Expenses: Not hard at all   Food Insecurity: No Food Insecurity    Worried About Running Out of Food in the Last Year: Never true    Ran Out of Food in the Last Year: Never true   Transportation Needs: Not on file   Physical Activity: Not on file   Stress: Not on file   Social Connections: Not on file   Intimate Partner Violence: Not on file   Housing Stability: Not on file       Prior to Admission medications    Medication Sig Start Date End Date Taking?  Authorizing Provider   gabapentin (NEURONTIN) 300 MG capsule TAKE 1 CAPSULE BY MOUTH THREE TIMES DAILY 22 Yes Garrick Diallo MD   acetaminophen (TYLENOL) 500 MG tablet Take 1 tablet by mouth 4 times daily as needed for Pain 22 Yes JEISON Mayfield CNP   ondansetron (ZOFRAN ODT) 4 MG disintegrating tablet Take 1-2 tablets by mouth every 12 hours as needed for Nausea May Sub regular tablet (non-ODT) if insurance does not cover ODT. 22  Yes JEISON Mayfield CNP   FANAPT 1 MG TABS tablet TAKE 1 TABLET BY MOUTH EVERY NIGHT 22  Yes Jacquelin Finnegan MD   metFORMIN (GLUCOPHAGE) 500 MG tablet TAKE 2 TABLETS BY MOUTH TWICE DAILY WITH MEALS 11/15/22  Yes Jacquelin Finnegan MD   omeprazole (PRILOSEC) 40 MG delayed release capsule Take 1 capsule by mouth every morning (before breakfast) 10/26/22  Yes Gail Khalil PA-C   omeprazole (PRILOSEC) 40 MG delayed release capsule Take 1 capsule by mouth in the morning and at bedtime 10/24/22  Yes Cecelia Oleary MD   valsartan (DIOVAN) 160 MG tablet Take 1 tablet by mouth daily 10/24/22  Yes Foster Finnegan MD   pravastatin (PRAVACHOL) 20 MG tablet Take 1 tablet by mouth daily 9/22/22  Yes Foster Finnegan MD   chlorthalidone (HYGROTON) 25 MG tablet Take 1 tablet by mouth daily 9/12/22  Yes Manuel Merino MD   aspirin 81 MG EC tablet Take 81 mg by mouth daily   Yes Historical Provider, MD   Psyllium (METAMUCIL) 0.36 g CAPS Take 1 capsule by mouth daily   Yes Historical Provider, MD   vitamin B-6 (PYRIDOXINE) 100 MG tablet Take 100 mg by mouth daily   Yes Historical Provider, MD   furosemide (LASIX) 20 MG tablet TAKE 1 TABLET BY MOUTH  DAILY AS NEEDED FOR LEG  SWELLING 7/29/22  Yes Foster Finnegan MD   DULoxetine (CYMBALTA) 60 MG extended release capsule TAKE 1 CAPSULE BY MOUTH DAILY 7/29/22  Yes Foster Finnegan MD   TRULICITY 1.5 QV/6.0KC [facility] INJECT THE CONTENTS OF ONE  PEN SUBCUTANEOUSLY WEEKLY 6/28/22  Yes Foster Finnegan MD   traZODone (DESYREL) 100 MG tablet TAKE 1 TABLET BY MOUTH AT  NIGHT 6/23/22  Yes Foster Finnegan MD   insulin glargine (LANTUS SOLOSTAR) 100 UNIT/ML injection pen INJECT SUBCUTANEOUSLY 12  UNITS ONCE NIGHTLY 4/13/22  Yes Foster Finnegan MD   blood glucose test strips (ONETOUCH ULTRA) strip As needed. 3/16/22  Yes Foster Finnegan MD   dextran 70-hypromellose (TEARS NATURALE) 0.1-0.3 % SOLN opthalmic solution as needed for itching   Yes Historical Provider, MD   albuterol sulfate HFA (PROVENTIL HFA) 108 (90 Base) MCG/ACT inhaler Inhale 2 puffs into the lungs every 4 hours as needed for Wheezing or Shortness of Breath May substitute ProAir MDI 1/5/21  Yes Jacob Metzger MD   ipratropium-albuterol (DUONEB) 0.5-2.5 (3) MG/3ML SOLN nebulizer solution Inhale 3 mLs into the lungs every 4 hours as needed for Shortness of Breath (wheezing coughing) 1/5/21  Yes Bud MD Yassine   ammonium lactate (LAC-HYDRIN) 12 % lotion Apply topically daily.   Patient taking differently: as needed Apply topically daily.  11/21/19  Yes Kiel Petty MD   ibuprofen (ADVIL;MOTRIN) 600 MG tablet Take 1 tablet by mouth 3 times daily as needed for Pain With meals 11/22/22 11/27/22  JEISON Sandhu - CNP       Allergies as of 12/05/2022 - Fully Reviewed 12/05/2022   Allergen Reaction Noted    Codeine Nausea And Vomiting and Rash 02/15/2011    Vicodin [hydrocodone-acetaminophen] Nausea And Vomiting 02/15/2011    Lipitor [atorvastatin]  05/19/2021    Oxycontin [oxycodone hcl] Itching 04/19/2016    Tramadol Itching and Swelling 06/05/2019       Patient Active Problem List   Diagnosis    Diabetes mellitus ([facility]    Obesity    Dystonia    Cerebral thrombosis with cerebral infarction ([facility]    DIPTI (obstructive sleep apnea)    Right hemiparesis (HCC)    Trigger finger, acquired    Elevated CK    Primary osteoarthritis of both knees    Mood disorder (HCC)    Calcific tendonitis of right shoulder    Essential hypertension    Mixed hyperlipidemia    Chronic low back pain without sciatica    History of CVA with residual deficit    Anxiety and depression    Atypical chest pain    Myalgia due to statin    Palpitations    Primary osteoarthritis of right knee    History of colonoscopy - 2021, rpt 2026    Gastroesophageal reflux disease    Chronic renal disease, stage III (HCC) [798572]    SOB (shortness of breath)    Aortic atherosclerosis (HCC)    Coronary artery calcification       Past Medical History:   Diagnosis Date    Anesthesia complication     \" shaking\"    Arthritis     Cardiomyopathy ([facility]     COVID-19     1/7/2022    Diabetes     GERD (gastroesophageal reflux disease)     Hyperlipidemia     Hypertension     Lumbar disc disease     Prolonged emergence from general anesthesia     Sleep apnea     pt states does use a Cpap machine at night    Unspecified cerebral artery occlusion with cerebral infarction 2014    right side weak    Wears glasses        Past Surgical History:   Procedure Laterality Date ARTHRODESIS Right 9/17/2020    (RIGHT) REMOVAL OF BONE SPURRING AND OSSEOUS PROMINENCE FIRST METATARSAL, ARTHRODESIS OF FIRST METATARSOPHALANGEAL JOINT, BONE MARROW HARVEST AND CONCENTRATION RIGHT TIBIA performed by Fadumo Encinas DPM at [address] Left 9/3/15    CARPAL TUNNEL RELEASE Right 9/22/15    COLONOSCOPY      FINGER TRIGGER RELEASE Left 9/3/15    middle and ring fingers    FINGER TRIGGER RELEASE Right 9/22/15    middle and ring fingers    FOOT SURGERY Bilateral     litteltoe    HAND SURGERY Right     Ligament    KNEE ARTHROPLASTY Right 7/26/2021    ROBOTIC ASSISTED TOTAL RIGHT KNEE REPLACEMENT performed by Dakota Ortiz MD at [address] ARTHROSCOPY Right 2/2/2022    ARTHROSCOPY WITH LYSIS OF ADHESIONS, RIGHT KNEE performed by Dakota Ortiz MD at [facility] (CERVIX NOT REMOVED)  08/2003    SHOULDER ARTHROSCOPY Right 06/20/2018    Diagnostic scope, rcr, open Radha       Family History   Problem Relation Age of Onset    Heart Disease Mother     High Blood Pressure Mother     Stroke Mother     Cancer Brother         lung cancer       Review of Systems    Objective:     Vitals:  Weight BMI Neck circumference    Wt Readings from Last 3 Encounters:   12/05/22 194 lb 12.8 oz (88.4 kg)   11/22/22 201 lb 4.5 oz (91.3 kg)   10/27/22 195 lb (88.5 kg)    Body mass index is 33.44 kg/m². BP HR SaO2   BP Readings from Last 3 Encounters:   12/05/22 125/70   11/22/22 106/76   10/26/22 (!) 159/103    Pulse Readings from Last 3 Encounters:   12/05/22 80   11/22/22 95   10/26/22 73    SpO2 Readings from Last 3 Encounters:   12/05/22 99%   11/22/22 99%   10/26/22 100%        Themandibular molar Class :   [x]1 []2 []3      Mallampati I Airway Classification:   []1 []2 []3 [x]4      Physical Exam  Vitals and nursing note reviewed. Constitutional:       Appearance: Normal appearance. Cardiovascular:      Rate and Rhythm: Normal rate and regular rhythm. Pulmonary:      Effort: Pulmonary effort is normal.      Breath sounds: Normal breath sounds. Musculoskeletal:      Right lower leg: No edema. Left lower leg: No edema.       :   Mild Obstructive Sleep Apnea/Hypopnea Syndrome under good control on PAP at 15/11 cmwp. Diagnosis Orders   1. DIPTI treated with BiPAP        2. Dependence on other enabling machines and devices        3. H/O: stroke        4. Obesity (BMI 30.0-34. 9)          Plan: Will continue the PAP at 15/11 cmwp. I educated the Patient about the PAP machine including how to change the heated humidifier. I will order PAP supplies, mask, filters. ... Most likely treating the DIPTI will have positive impact on stroke control. Weight loss: The proportionality between weight and AHI. With 10% weight change, the AHI has a 27% proportionate change. With 20% weight change, the AHI has a 45-50% proportionate change. No orders of the defined types were placed in this encounter. Return for Reveiwing BiPAP usage and compliance report and tro.     Rosmery Liu MD  Medical Director - [facility]

## 2022-12-08 ENCOUNTER — OFFICE VISIT (OUTPATIENT)
Dept: ORTHOPEDIC SURGERY | Age: 67
End: 2022-12-08

## 2022-12-08 VITALS — WEIGHT: 194 LBS | BODY MASS INDEX: 33.12 KG/M2 | HEIGHT: 64 IN

## 2022-12-08 DIAGNOSIS — Z98.890 S/P RIGHT KNEE ARTHROSCOPY: ICD-10-CM

## 2022-12-08 DIAGNOSIS — Z96.651 S/P TOTAL KNEE ARTHROPLASTY, RIGHT: Primary | ICD-10-CM

## 2022-12-08 DIAGNOSIS — G89.4 CHRONIC PAIN SYNDROME: ICD-10-CM

## 2022-12-08 NOTE — PROGRESS NOTES
Zac [facility]  [id number]  December 8, 2022    Chief Complaint   Patient presents with    Follow-up     R TKA on 2/2/22       History: The patient is here in follow-up regarding her right knee. She underwent a right total knee arthroplasty in July 2021. She did have severe postoperative stiffness. She ultimately required a right knee arthroscopy in February 2022 and underwent lysis of adhesions. She recovered uneventfully. Since returning to physical therapy, she has improved significantly. Her range of motion has improved. She is pleased with her improvement. Physical: She demonstrates appropriate mood and affect. She is alert and oriented to person, place and time. The portal sites are clean, dry and intact. She has no swelling. There is No evidence of DVT seen on physical exam. She is neurovascularly intact distally. Range of motion is from 0 degrees to 120 degrees. There is no atrophy. Strength in the knee is 5/5. There is no instability with varus or valgus stressing of the knee. The patient is no longer tender over the patellar tendon. She has mild pain with resisted right knee extension. She does have nonspecific tenderness laterally. X-rays: 3 views of the right knee obtained in office today were extensively reviewed. The prosthesis is well aligned. There is no evidence of fracture or loosening. There is no evidence of dislocation. Impression: Status post  right knee arthroscopy #2 status post right total knee arthroplasty #3 chronic pain syndrome/neurogenic pain    Plan: At this time, we will continue our current treatment. The patient may wear the brace as needed. She will continue the formal physical therapy program for the next few weeks. She will continue to work on range of motion and strengthening. The patient may continue taking the gabapentin. The patient will follow up with me at some point in July and we will reassess her then.   I do not feel there is a need for the Coolief procedure at this time.

## 2022-12-12 ENCOUNTER — HOSPITAL ENCOUNTER (OUTPATIENT)
Dept: PHYSICAL THERAPY | Age: 67
Setting detail: THERAPIES SERIES
Discharge: HOME | End: 2022-12-12
Payer: MEDICARE

## 2022-12-12 PROCEDURE — 97530 THERAPEUTIC ACTIVITIES: CPT

## 2022-12-12 PROCEDURE — 97110 THERAPEUTIC EXERCISES: CPT

## 2022-12-12 NOTE — PROGRESS NOTES
[facility], [facility]  [address] [facility] [place], [facility]  Phone: [phone]   Fax:     [phone]    Physical Therapy Treatment Note/ Progress Report:     Date:  2022    Patient Name:  Мария Jean-Baptiste    :  1955  MRN: [id number]    Pertinent Medical History:      Medical/Treatment Diagnosis Information:  Medical Diagnosis: S/P total knee arthroplasty, right [Z96.651]  Treatment Diagnosis: back/hip/ knee pain; limited function/ mobility    Insurance/Certification information:     Physician Information:  Caio Johnson MD  Plan of care signed (Y/N):     Date of Patient follow up with Physician:      Progress Report: []  Yes  [x]  No     Date Range for reporting period:  Beginnin/3/2022,   Ending:    Progress report due (10 Rx/or 30 days whichever is less): #66 , #81    Recertification due (POC duration/ or 90 days whichever is less):     Visit # POC/ Insurance Allowable Auth Needed    [address] - medicare []Yes    []No     Functional Outcomes Measure:   Date Assessed: at eval  Test:FOTO  Score:  : 38 pts    Pain level:   /10  R TKR        R hip/ groin    History of Injury: Pt here for evaluation of pain in R knee. She had TKA in 2021, and then had scope in 2022 and lysis of adhesions. She states that she has always had pain in her knee since first surgery, and even has more pain since the second surgery. She states her surgeon is frustrated with her and doesn't know why she is in pain. She also has had some work-up on R hip and arthritis was found, she has had a few intra-articular injections here with some relief. She also has R sided LBP as well. Since her last surgery, she walks with cane. Denies any sharp radiating pain into lower leg.       Med hx: CVA w/ R sided deficits, R foot first ray fusion?,      She reports the painful area as distal thigh, more laterally, and up towards her R hip. 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 M/Th she can come to PT before 9:30. Otherwise, she needs after 4:30. *problems:  - R back/ hip/ knee pain  - R hip to knee \"burning\" pain at night. (Most nights; especially worse when she sits more during the day). - walking/ standing (tolerance?)  - stairs w/ rail (1 side)  - painful to exit car    SUBJECTIVE:    11/7: pt states pain is 6/10; from right lower back to lateral hip down to lateral knee. 11/10: pt states leg pain is somewhat better, now 4/10, she can tell its different. Most of the pain is located R hip posteriorly and wraps around anteriorly. Still has significant pain when first getting out of a chair. 11/14: pt states she still feels a little better. Less pain in leg and getting out of a chair, but still a fair amount of pain at night from thigh to knee. 11/17: pt reports approx 75% improvement overall, but still painful to get out of a car. Also still has fairly consistent pain at lateral knee to lateral hip and into her lower back region. Given language barrier, its a bit difficult for her to exactly describe her symptom pattern. With further discussion, she also has some pain still located anterior shin around her robotic incision. *needling? 11-28-22 15 min late. C/o of L groin, hip , L LE to ankle. 12/12/22: Pt states she is \"much better\";  no longer using her cane for ambulation. She is still using the heel lift which she still feels helps her ambulate more smoothly. She has seen another ortho for opinion, and she had x-rays and MRI of her hip which were clear from acute pathology. She also saw Dr. Renato Carvajal who is pleased overall and recommended she cont PT a few more weeks. Upon further discussion; she states that she is still nowhere near where she was prior to her TKA surgery.  She still has some cracking in her knee at times and also has signficant hip pain when getting out of a chair. She then states that since this last episode of PT, she can walk better, and can get out of a chair easier. She states she wants to be pain free and normal again; however she has a hard time describing what that would look like. OBJECTIVE:   Observation:   Test measurements:    ROM  11/7 11/10 11/17 12/12    Hip global   Mod limit on R all planes       Hip mobility   R mod limit w/ ER and medial glide and ext                                 Strength         Knee ext     R=27#  L=36#    Knee flex     R=25#  L=33#    Hip abd     R=16#  L=23#                      TESTS/ OTHER         TUG  W/cane:  24s w/o lift    21s w/ lift   18. 5sec    Lumbar quadrant   Mildly painful on R, not as bad as hip PROM      Lumbar PA   Mild pain on R lower facets Mod pain lower R lumbar and T/L jxn on R     Hip PROM w/ overpressure   Mod/ severe pain on R w/ familiar pain Mod limit on R; supine and prone; R groin pain w/ Prone IR OP     30s STS     5x        RESTRICTIONS/PRECAUTIONS:     Exercises/Interventions:     Therapeutic Ex (87012)   Min: Resistance/Reps Notes/Cues   Dynamic warmup Cues for tech   clamshell Cues for full ROM   lunge Retro w/ slider x 10 ea    Sit to stand x30sec    Hip abd    Lateral walk w/ band Cues for slight incr in step width   Hip ext         steps Resume; up w/ balance?   stairs    Pt edu Thoroughly reviewed HEP w/ handout Therapeutic Activity (79439) Min:      Pt edu Reviewed pathology; implications from hx of CVA, TKA and R hip OA; role of PT, role of restoring functional strength Leg length assessment/ heel lift trial           NMR re-education (23340)   Min:                         Manual Intervention (77832) Min:      Hip manual   PLow to mid grade per [employer] resume              resume   Lumbar manual >                   Modalities  Min:             Other Therapeutic Activities:  Pt was educated on PT POC, Diagnosis, Prognosis, pathomechanics as well as frequency and duration of scheduling future physical therapy appointments. Time was also taken on this day to answer all patient questions and participation in PT. Reviewed appointment policy in detail with patient and patient verbalized understanding. Home Exercise Program: Patient instructed in the following for HEP:      11/7: amb stairs reciprocally, purchase heel lift  11/10: Access Code: [email or web address]  URL: [email or web address]  Date: 11/10/2022  Prepared by: Pilar Marquez    Exercises  Side Stepping with Resistance at Thighs - 2-3 x daily - 4-6 x weekly - 2-3 sets - 10-15 reps  Prone Hip Extension on Table - 2-3 x daily - 4-6 x weekly - 2-3 sets - 10-15 reps         . Patient verbalized/demonstrated understanding and was issued written handout. Therapeutic Exercise and NMR EXR  [x] (78759) Provided verbal/tactile cueing for activities related to strengthening, flexibility, endurance, ROM  for improvements in proximal hip and core control with self care, mobility, lifting and ambulation. [x] (03081) Provided verbal/tactile cueing for activities related to improving balance, coordination, kinesthetic sense, posture, motor skill, proprioception  to assist with core control in self care, mobility, lifting, and ambulation.      Therapeutic Activities:    [x] (52780 or 68054) Provided verbal/tactile cueing for activities related to improving balance, coordination, kinesthetic sense, posture, motor skill, proprioception and motor activation to allow for proper function  with self care and ADLs  [x] (71602) Provided training and instruction to the patient for proper core and proximal hip recruitment and positioning with ambulation re-education     Home Exercise Program:    [x] (49917) Reviewed/Progressed HEP activities related to strengthening, flexibility, endurance, ROM of core, proximal hip and LE for functional self-care, mobility, lifting and ambulation   [x] (20131) Reviewed/Progressed HEP activities related to improving balance, coordination, kinesthetic sense, posture, motor skill, proprioception of core, proximal hip and LE for self care, mobility, lifting, and ambulation      Manual Treatments:  PROM / STM / Oscillations-Mobs:  G-I, II, III, IV (PA's, Inf., Post.)  [x] (76533) Provided manual therapy to mobilize proximal hip and LS spine soft tissue/joints for the purpose of modulating pain, promoting relaxation,  increasing ROM, reducing/eliminating soft tissue swelling/inflammation/restriction, improving soft tissue extensibility and allowing for proper ROM for normal function with self care, mobility, lifting and ambulation. Approval Dates:  CPT Code Units Approved Units Used  Date Updated:                     Charges:  Timed Code Treatment Minutes: 45   Total Treatment Minutes: 45     [] EVAL (LOW) 91814 (typically 20 minutes face-to-face)  [] EVAL (MOD) 98509 (typically 30 minutes face-to-face)  [] EVAL (HIGH) 40540 (typically 45 minutes face-to-face)  [] RE-EVAL     [x] TX(82650) x    [] Dry needle 1 or 2 Muscles (12338)  [] NMR (94097) x     [] Dry needle 3+ Muscles (28837)  [] Manual (47700) x     [] Ultrasound (05711) x  [x] TA (29372) x   2  [] Mech Traction (40809)  [] ES(attended) (23115)     [] ES (un) (12092):   [] Vasopump (77960) [] Ionto (14214)   [] Other:    Magalys Wise stated goal: able to stand at least an hour w/o severe pain. [x] Progressing: [] Met: [] Not Met: [] Adjusted    Therapist goals for Patient:   Short Term Goals: To be achieved in: 2 weeks  1. Independent in HEP and progression per patient tolerance, in order to prevent re-injury. [x] Progressing: [] Met: [] Not Met: [] Adjusted  2. Patient will have a decrease in pain to facilitate improvement in movement, function, and ADLs as indicated by Functional Deficits. [x] Progressing: [] Met: [] Not Met: [] Adjusted    Long Term Goals: To be achieved in: up to 12 weeks  1.  Increase FOTO functional outcome score from 36 to 46 to assist with reaching prior level of function. [x] Progressing: [] Met: [] Not Met: [] Adjusted  2. Patient will demonstrate increased AROM to [facility] for R hip to allow for proper joint functioning as indicated by patients Functional Deficits. [x] Progressing: [] Met: [] Not Met: [] Adjusted  3. Patient will demonstrate an increase in Strength to good proximal hip strength and control, within 5lb HHD in LE to allow for proper functional mobility as indicated by patients Functional Deficits. [x] Progressing: [] Met: [] Not Met: [] Adjusted  4. Patient will be able to ambulate stairs reciprocally w/o severe pain. [x] Progressing: [] Met: [] Not Met: [] Adjusted  5. Pt will achieve at least 11 reps of sit to  30sec to align with normative values. [x] Progressing: [] Met: [] Not Met: [] Adjusted     ASSESSMENT:  Pt requires skilled PT to restore mobility of hip/lumbar and lower quarter and to facilitate improved function w/ less pain. Pt subjectively seems fairly pleased with progress, however FOTO score only improved 2 points. I do think a good portion of her pain is related to stroke deficits and may not be able to be overcome. I do think she has some room for improvement in RLE strength and function which should help some of her pain. Treatment/Activity Tolerance:  [x] Patient tolerated treatment well [] Patient limited by fatique  [] Patient limited by pain  [] Patient limited by other medical complications  [] Other:     Overall Progression Towards Functional goals/ Treatment Progress Update:  [] Patient is progressing as expected towards functional goals listed. [] Progression is slowed due to complexities/Impairments listed. [] Progression has been slowed due to co-morbidities.   [x] Plan just implemented, too soon to assess goals progression <30days   [] Goals require adjustment due to lack of progress  [] Patient is not progressing as expected and requires additional follow up with physician  [] Other:    Prognosis for POC: [x] Good [] Fair  [] Poor    Patient requires continued skilled intervention: [x] Yes  [] No        PLAN: See eval  12/12: cont 2x/wk for a couple more weeks and taper to 1x/wk for a few weeks after that. [] Continue per plan of care [] Alter current plan (see comments)  [x] Plan of care initiated [] Hold pending MD visit [] Discharge    Electronically signed by: Aydin Dawn, PT  , DPT  #[id number]        Note: If patient does not return for scheduled/recommended follow up visits, this note will serve as a discharge from care along with the most recent update on progress.

## 2022-12-15 ENCOUNTER — HOSPITAL ENCOUNTER (OUTPATIENT)
Dept: PHYSICAL THERAPY | Age: 67
Setting detail: THERAPIES SERIES
Discharge: HOME | End: 2022-12-15
Payer: MEDICARE

## 2022-12-15 NOTE — FLOWSHEET NOTE
[facility], [facility]  [address] [facility] [place], [facility]  Phone: [phone]   Fax:     [phone]    Physical Therapy  Cancellation/No-show Note  Patient Name:  Joyce Cerda  :  1955   Date:  12/15/2022  Cancelled visits to date: 1  No-shows to date: 0    Patient status for today's appointment patient:  [x]  Cancelled  []  Rescheduled appointment  []  No-show     Reason given by patient:  [x]  Patient ill  []  Conflicting appointment  []  No transportation    []  Conflict with work  []  No reason given  []  Other:     Comments:      Phone call information:   []  Phone call made today to patient at _ time at number provided:      []  Patient answered, conversation as follows:    []  Patient did not answer, message left as follows:  []  Phone call not made today    Electronically signed by:   Rubens Amezcua, PT

## 2022-12-19 ENCOUNTER — HOSPITAL ENCOUNTER (OUTPATIENT)
Dept: PHYSICAL THERAPY | Age: 67
Setting detail: THERAPIES SERIES
Discharge: HOME | End: 2022-12-19
Payer: MEDICARE

## 2022-12-20 ENCOUNTER — TELEMEDICINE (OUTPATIENT)
Dept: FAMILY MEDICINE CLINIC | Age: 67
End: 2022-12-20
Payer: MEDICARE

## 2022-12-20 DIAGNOSIS — R59.0 INGUINAL LYMPHADENOPATHY: ICD-10-CM

## 2022-12-20 DIAGNOSIS — Z96.651 S/P TOTAL KNEE ARTHROPLASTY, RIGHT: ICD-10-CM

## 2022-12-20 DIAGNOSIS — J20.9 ACUTE BRONCHITIS, UNSPECIFIED ORGANISM: Primary | ICD-10-CM

## 2022-12-20 DIAGNOSIS — M25.551 RIGHT HIP PAIN: ICD-10-CM

## 2022-12-20 PROCEDURE — G8399 PT W/DXA RESULTS DOCUMENT: HCPCS | Performed by: FAMILY MEDICINE

## 2022-12-20 PROCEDURE — 3017F COLORECTAL CA SCREEN DOC REV: CPT | Performed by: FAMILY MEDICINE

## 2022-12-20 PROCEDURE — 99214 OFFICE O/P EST MOD 30 MIN: CPT | Performed by: FAMILY MEDICINE

## 2022-12-20 PROCEDURE — 1090F PRES/ABSN URINE INCON ASSESS: CPT | Performed by: FAMILY MEDICINE

## 2022-12-20 PROCEDURE — G8427 DOCREV CUR MEDS BY ELIG CLIN: HCPCS | Performed by: FAMILY MEDICINE

## 2022-12-20 PROCEDURE — 1124F ACP DISCUSS-NO DSCNMKR DOCD: CPT | Performed by: FAMILY MEDICINE

## 2022-12-20 RX ORDER — ALBUTEROL SULFATE 90 UG/1
2 AEROSOL, METERED RESPIRATORY (INHALATION) EVERY 4 HOURS PRN
Qty: 1 EACH | Refills: 5 | Status: SHIPPED | OUTPATIENT
Start: 2022-12-20

## 2022-12-20 RX ORDER — GABAPENTIN 300 MG/1
CAPSULE ORAL
Qty: 90 CAPSULE | Refills: 2 | Status: SHIPPED | OUTPATIENT
Start: 2022-12-20 | End: 2022-12-21 | Stop reason: SDUPTHER

## 2022-12-20 RX ORDER — PREDNISONE 10 MG/1
TABLET ORAL
Qty: 12 TABLET | Refills: 0 | Status: SHIPPED | OUTPATIENT
Start: 2022-12-20

## 2022-12-20 RX ORDER — AZITHROMYCIN 250 MG/1
TABLET, FILM COATED ORAL
Qty: 6 TABLET | Refills: 0 | Status: SHIPPED | OUTPATIENT
Start: 2022-12-20

## 2022-12-20 ASSESSMENT — PATIENT HEALTH QUESTIONNAIRE - PHQ9
6. FEELING BAD ABOUT YOURSELF - OR THAT YOU ARE A FAILURE OR HAVE LET YOURSELF OR YOUR FAMILY DOWN: 0
9. THOUGHTS THAT YOU WOULD BE BETTER OFF DEAD, OR OF HURTING YOURSELF: 0
8. MOVING OR SPEAKING SO SLOWLY THAT OTHER PEOPLE COULD HAVE NOTICED. OR THE OPPOSITE, BEING SO FIGETY OR RESTLESS THAT YOU HAVE BEEN MOVING AROUND A LOT MORE THAN USUAL: 0
SUM OF ALL RESPONSES TO PHQ QUESTIONS 1-9: 7
SUM OF ALL RESPONSES TO PHQ QUESTIONS 1-9: 7
2. FEELING DOWN, DEPRESSED OR HOPELESS: 1
5. POOR APPETITE OR OVEREATING: 2
SUM OF ALL RESPONSES TO PHQ QUESTIONS 1-9: 7
7. TROUBLE CONCENTRATING ON THINGS, SUCH AS READING THE NEWSPAPER OR WATCHING TELEVISION: 0
3. TROUBLE FALLING OR STAYING ASLEEP: 2
1. LITTLE INTEREST OR PLEASURE IN DOING THINGS: 0
4. FEELING TIRED OR HAVING LITTLE ENERGY: 2
SUM OF ALL RESPONSES TO PHQ9 QUESTIONS 1 & 2: 1
10. IF YOU CHECKED OFF ANY PROBLEMS, HOW DIFFICULT HAVE THESE PROBLEMS MADE IT FOR YOU TO DO YOUR WORK, TAKE CARE OF THINGS AT HOME, OR GET ALONG WITH OTHER PEOPLE: 0
SUM OF ALL RESPONSES TO PHQ QUESTIONS 1-9: 7

## 2022-12-20 NOTE — FLOWSHEET NOTE
[facility], [facility]  [address] [facility] [place], [facility]  Phone: [phone]   Fax:     [phone]    Physical Therapy  Cancellation/No-show Note  Patient Name:  Antonino Hernandez  :  1955   Date:  2022  Cancelled visits to date: 1  No-shows to date: 1    Patient status for today's appointment patient:  []  Cancelled  []  Rescheduled appointment  [x]  No-show     Reason given by patient:  []  Patient ill  []  Conflicting appointment  []  No transportation    []  Conflict with work  []  No reason given  []  Other:     Comments:      Phone call information:   []  Phone call made today to patient at _ time at number provided:      []  Patient answered, conversation as follows:    []  Patient did not answer, message left as follows:  [x]  Phone call not made today    Electronically signed by:   Anthony Allred PT

## 2022-12-20 NOTE — PROGRESS NOTES
12/20/2022    This is a 75 y.o. female   Chief Complaint   Patient presents with    Cough     Cough for going on 2 weeks     HPI    Virtual visit    Has had a cough for over a week. Reports it is not resolved Productive of phlegm. Minimal sinus pain/pressure, but this is improving. No sore throat or ear pain  - throat feels like it itches and sometimes seems to cause the cough  - had a fever last week for 3 days that resolved. She reports a fever again last night of 101. report  - home COVID test was negative  - notes breathing feels a little tight  - no known sick contacts    Had MRI for her hip through Ortho, incidentally noted to have lymphadenopathy    Notes continued R leg pain and would like something for relief    Review of Systems     Current Outpatient Medications   Medication Sig Dispense Refill    gabapentin (NEURONTIN) 300 MG capsule TAKE 1 CAPSULE BY MOUTH THREE TIMES DAILY 90 capsule 2    albuterol sulfate HFA (PROVENTIL HFA) 108 (90 Base) MCG/ACT inhaler Inhale 2 puffs into the lungs every 4 hours as needed for Wheezing or Shortness of Breath May substitute ProAir MDI 1 each 5    azithromycin (ZITHROMAX) 250 MG tablet Take 2 tabs by mouth once on day 1, followed by 1 tab by mouth daily for four days 6 tablet 0    predniSONE (DELTASONE) 10 MG tablet Take 3 tabs for 2 days, 2 tabs for 2 days, 1 tab for 2 days 12 tablet 0    ibuprofen (ADVIL;MOTRIN) 600 MG tablet Take 1 tablet by mouth 3 times daily as needed for Pain With meals 15 tablet 0    acetaminophen (TYLENOL) 500 MG tablet Take 1 tablet by mouth 4 times daily as needed for Pain 28 tablet 0    ondansetron (ZOFRAN ODT) 4 MG disintegrating tablet Take 1-2 tablets by mouth every 12 hours as needed for Nausea May Sub regular tablet (non-ODT) if insurance does not cover ODT.  12 tablet 0    FANAPT 1 MG TABS tablet TAKE 1 TABLET BY MOUTH EVERY NIGHT 90 tablet 0    metFORMIN (GLUCOPHAGE) 500 MG tablet TAKE 2 TABLETS BY MOUTH TWICE DAILY WITH MEALS 360 tablet 1    omeprazole (PRILOSEC) 40 MG delayed release capsule Take 1 capsule by mouth in the morning and at bedtime 160 capsule 5    valsartan (DIOVAN) 160 MG tablet Take 1 tablet by mouth daily 90 tablet 1    pravastatin (PRAVACHOL) 20 MG tablet Take 1 tablet by mouth daily 30 tablet 2    chlorthalidone (HYGROTON) 25 MG tablet Take 1 tablet by mouth daily 30 tablet 0    aspirin 81 MG EC tablet Take 81 mg by mouth daily      Psyllium (METAMUCIL) 0.36 g CAPS Take 1 capsule by mouth daily      vitamin B-6 (PYRIDOXINE) 100 MG tablet Take 100 mg by mouth daily      furosemide (LASIX) 20 MG tablet TAKE 1 TABLET BY MOUTH  DAILY AS NEEDED FOR LEG  SWELLING 90 tablet 3    DULoxetine (CYMBALTA) 60 MG extended release capsule TAKE 1 CAPSULE BY MOUTH DAILY 90 capsule 1    TRULICITY 1.5 AG/9.5XU SOPN INJECT THE CONTENTS OF ONE  PEN SUBCUTANEOUSLY WEEKLY 6 mL 3    traZODone (DESYREL) 100 MG tablet TAKE 1 TABLET BY MOUTH AT  NIGHT 90 tablet 1    insulin glargine (LANTUS SOLOSTAR) 100 UNIT/ML injection pen INJECT SUBCUTANEOUSLY 12  UNITS ONCE NIGHTLY 15 mL 3    blood glucose test strips (ONETOUCH ULTRA) strip As needed. 100 strip 4    dextran 70-hypromellose (TEARS NATURALE) 0.1-0.3 % SOLN opthalmic solution as needed for itching      ipratropium-albuterol (DUONEB) 0.5-2.5 (3) MG/3ML SOLN nebulizer solution Inhale 3 mLs into the lungs every 4 hours as needed for Shortness of Breath (wheezing coughing) 360 mL 5    ammonium lactate (LAC-HYDRIN) 12 % lotion Apply topically daily. (Patient taking differently: as needed Apply topically daily. ) 1 Bottle 1     No current facility-administered medications for this visit. There were no vitals taken for this visit.     Physical Exam    Wt Readings from Last 3 Encounters:   12/08/22 194 lb (88 kg)   12/05/22 194 lb 12.8 oz (88.4 kg)   11/22/22 201 lb 4.5 oz (91.3 kg)       BP Readings from Last 3 Encounters:   12/05/22 125/70   11/22/22 106/76   10/26/22 (!) 159/103 Assessment/Plan:  1. Acute bronchitis, unspecified organism  Now has recurrent fever and worsening symptoms after being febrile last week (suspect initial influenza based on description of illness). Higher risk for superimposed bacterial infection. Treat as below  Call if symptoms are not improving this week for in person eval.  - azithromycin (ZITHROMAX) 250 MG tablet; Take 2 tabs by mouth once on day 1, followed by 1 tab by mouth daily for four days  Dispense: 6 tablet; Refill: 0  - predniSONE (DELTASONE) 10 MG tablet; Take 3 tabs for 2 days, 2 tabs for 2 days, 1 tab for 2 days  Dispense: 12 tablet; Refill: 0    2. S/P total knee arthroplasty, right  - gabapentin (NEURONTIN) 300 MG capsule; TAKE 1 CAPSULE BY MOUTH THREE TIMES DAILY  Dispense: 90 capsule; Refill: 2    3. Inguinal lymphadenopathy  Incidentally noted on hip MRI. We will check a CT to further characterize  - CT ABDOMEN PELVIS W WO CONTRAST Additional Contrast? Radiologist Recommendation; Future    4. Right hip pain  Discussed prednisone will raise her blood glucose  - predniSONE (DELTASONE) 10 MG tablet; Take 3 tabs for 2 days, 2 tabs for 2 days, 1 tab for 2 days  Dispense: 12 tablet; Refill: 0    John Wood, was evaluated through a synchronous (real-time) audio-video encounter. The patient (or guardian if applicable) is aware that this is a billable service, which includes applicable co-pays. This Virtual Visit was conducted with patient's (and/or legal guardian's) consent. The visit was conducted pursuant to the emergency declaration under the [address] [place] [address] and the [employer] General Act. Patient identification was verified, and a caregiver was present when appropriate. The patient was located at Home: [place] [id number]. Provider was located at Home ([address]: [place].       Total time spent for this encounter: Not billed by time    --Swati Vargas MD on 12/20/2022 at 2:12 PM    An electronic signature was used to authenticate this note.

## 2022-12-21 ENCOUNTER — TELEPHONE (OUTPATIENT)
Dept: FAMILY MEDICINE CLINIC | Age: 67
End: 2022-12-21

## 2022-12-21 DIAGNOSIS — Z96.651 S/P TOTAL KNEE ARTHROPLASTY, RIGHT: ICD-10-CM

## 2022-12-21 RX ORDER — GABAPENTIN 300 MG/1
CAPSULE ORAL
Qty: 270 CAPSULE | Refills: 1 | Status: SHIPPED | OUTPATIENT
Start: 2022-12-21 | End: 2023-03-21

## 2022-12-21 NOTE — TELEPHONE ENCOUNTER
[facility] called in regards to the gabapentin  How the script is written it is for 30 caps with 2 refills  Pt was wanting to know if they can get the 90 day supply at one time  Dr. Shirin Ramírez would need to Resend a 90 day supply with or wo refills to the pharm    Please advise

## 2022-12-27 DIAGNOSIS — I25.84 CORONARY ARTERY CALCIFICATION: ICD-10-CM

## 2022-12-27 DIAGNOSIS — I70.0 AORTIC ATHEROSCLEROSIS (HCC): ICD-10-CM

## 2022-12-27 DIAGNOSIS — I25.10 CORONARY ARTERY CALCIFICATION: ICD-10-CM

## 2022-12-27 DIAGNOSIS — E78.2 MIXED HYPERLIPIDEMIA: ICD-10-CM

## 2022-12-27 RX ORDER — PRAVASTATIN SODIUM 20 MG
20 TABLET ORAL DAILY
Qty: 90 TABLET | Refills: 0 | Status: SHIPPED | OUTPATIENT
Start: 2022-12-27

## 2022-12-29 ENCOUNTER — HOSPITAL ENCOUNTER (OUTPATIENT)
Dept: PHYSICAL THERAPY | Age: 67
Setting detail: THERAPIES SERIES
Discharge: HOME | End: 2022-12-29
Payer: MEDICARE

## 2022-12-29 PROCEDURE — 97530 THERAPEUTIC ACTIVITIES: CPT

## 2022-12-29 NOTE — PROGRESS NOTES
[facility], [facility]  [address] [place], [facility]  Phone: [phone]   Fax:     [phone]    Physical Therapy Treatment Note/ Progress Report:     Date:  2022    Patient Name:  Martín Velasquez    :  1955  MRN: [id number]    Pertinent Medical History:      Medical/Treatment Diagnosis Information:  Medical Diagnosis: S/P total knee arthroplasty, right [Z96.651]  Treatment Diagnosis: back/hip/ knee pain; limited function/ mobility    Insurance/Certification information:     Physician Information:  Avery Diallo MD  Plan of care signed (Y/N):     Date of Patient follow up with Physician:      Progress Report: []  Yes  [x]  No     Date Range for reporting period:  Beginnin/3/2022,   Ending:    Progress report due (10 Rx/or 30 days whichever is less): #20 , #77    Recertification due (POC duration/ or 90 days whichever is less):     Visit # POC/ Insurance Allowable Auth Needed    [address] - medicare []Yes    []No     Functional Outcomes Measure:   Date Assessed: at eval  Test:FOTO  Score:  : 38 pts    Pain level:   /10  R TKR        R hip/ groin    History of Injury: Pt here for evaluation of pain in R knee. She had TKA in 2021, and then had scope in 2022 and lysis of adhesions. She states that she has always had pain in her knee since first surgery, and even has more pain since the second surgery. She states her surgeon is frustrated with her and doesn't know why she is in pain. She also has had some work-up on R hip and arthritis was found, she has had a few intra-articular injections here with some relief. She also has R sided LBP as well. Since her last surgery, she walks with cane. Denies any sharp radiating pain into lower leg.       Med hx: CVA w/ R sided deficits, R foot first ray fusion?,      She reports the painful area as distal thigh, more laterally, and up towards her R hip. 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 M/Th she can come to PT before 9:30. Otherwise, she needs after 4:30. *problems:  - R back/ hip/ knee pain  - R hip to knee \"burning\" pain at night. (Most nights; especially worse when she sits more during the day). - walking/ standing (tolerance?)  - stairs w/ rail (1 side)  - painful to exit car    SUBJECTIVE:    11/7: pt states pain is 6/10; from right lower back to lateral hip down to lateral knee. 11/10: pt states leg pain is somewhat better, now 4/10, she can tell its different. Most of the pain is located R hip posteriorly and wraps around anteriorly. Still has significant pain when first getting out of a chair. 11/14: pt states she still feels a little better. Less pain in leg and getting out of a chair, but still a fair amount of pain at night from thigh to knee. 11/17: pt reports approx 75% improvement overall, but still painful to get out of a car. Also still has fairly consistent pain at lateral knee to lateral hip and into her lower back region. Given language barrier, its a bit difficult for her to exactly describe her symptom pattern. With further discussion, she also has some pain still located anterior shin around her robotic incision. *needling? 11-28-22 15 min late. C/o of L groin, hip , L LE to ankle. 12/12/22: Pt states she is \"much better\";  no longer using her cane for ambulation. She is still using the heel lift which she still feels helps her ambulate more smoothly. She has seen another ortho for opinion, and she had x-rays and MRI of her hip which were clear from acute pathology. She also saw Dr. Jesica Silverman who is pleased overall and recommended she cont PT a few more weeks. Upon further discussion; she states that she is still nowhere near where she was prior to her TKA surgery.  She still has some cracking in her knee at times and also has signficant hip pain when getting out of a chair. She then states that since this last episode of PT, she can walk better, and can get out of a chair easier. She states she wants to be pain free and normal again; however she has a hard time describing what that would look like. 12/28: Pt here stating she missed some time due to being very ill. States that overall, she has less widespread pain from her back to her knee and entire RLE. Now her pain has mostly localized to the anterior hip area. She states that she was recently told that she has a cyst on her hip and she is scheduled to get a CT scan in a few weeks to further assess. She can now stand longer, walk easier, and enter/ exit car easier, but still has some pain mostly local to hip area. OBJECTIVE:   Observation:   Test measurements:    ROM  11/7 11/10 11/17 12/12    Hip global   Mod limit on R all planes       Hip mobility   R mod limit w/ ER and medial glide and ext                                 Strength         Knee ext     R=27#  L=36#    Knee flex     R=25#  L=33#    Hip abd     R=16#  L=23#                      TESTS/ OTHER         TUG  W/cane:  24s w/o lift    21s w/ lift   18. 5sec    Lumbar quadrant   Mildly painful on R, not as bad as hip PROM      Lumbar PA   Mild pain on R lower facets Mod pain lower R lumbar and T/L jxn on R     Hip PROM w/ overpressure   Mod/ severe pain on R w/ familiar pain Mod limit on R; supine and prone; R groin pain w/ Prone IR OP     30s STS     5x        RESTRICTIONS/PRECAUTIONS:     Exercises/Interventions:     Therapeutic Ex (40024)   Min: Resistance/Reps Notes/Cues   Dynamic warmup Cues for tech   clamshell Cues for full ROM   lunge    Sit to stand    Hip abd    Lateral walk w/ band Cues for slight incr in step width   Hip ext         steps Resume; up w/ balance?   stairs    Pt edu Thoroughly reviewed HEP  Therapeutic Activity (86688) Min:      Pt edu Reviewed pathology, briefly summarized MRI findings, role of awaiting direction after CT scan to better target treatment as specific as possible. Leg length assessment/ heel lift trial           NMR re-education (84137)   Min:                         Manual Intervention (36654) Min:      Hip manual   PLow to mid grade per [employer] resume              resume   Lumbar manual >                   Modalities  Min:             Other Therapeutic Activities:  Pt was educated on PT POC, Diagnosis, Prognosis, pathomechanics as well as frequency and duration of scheduling future physical therapy appointments. Time was also taken on this day to answer all patient questions and participation in PT. Reviewed appointment policy in detail with patient and patient verbalized understanding. Home Exercise Program: Patient instructed in the following for HEP:      11/7: amb stairs reciprocally, purchase heel lift  11/10: Access Code: [email or web address]  URL: [email or web address]  Date: 11/10/2022  Prepared by: Trav Mckoy    Exercises  Side Stepping with Resistance at Thighs - 2-3 x daily - 4-6 x weekly - 2-3 sets - 10-15 reps  Prone Hip Extension on Table - 2-3 x daily - 4-6 x weekly - 2-3 sets - 10-15 reps         . Patient verbalized/demonstrated understanding and was issued written handout. Therapeutic Exercise and NMR EXR  [x] (24490) Provided verbal/tactile cueing for activities related to strengthening, flexibility, endurance, ROM  for improvements in proximal hip and core control with self care, mobility, lifting and ambulation. [x] (46442) Provided verbal/tactile cueing for activities related to improving balance, coordination, kinesthetic sense, posture, motor skill, proprioception  to assist with core control in self care, mobility, lifting, and ambulation.      Therapeutic Activities:    [x] (02500 or 17171) Provided verbal/tactile cueing for activities related to improving balance, coordination, kinesthetic sense, posture, motor skill, proprioception and motor activation to allow for proper function  with self care and ADLs  [x] (76979) Provided training and instruction to the patient for proper core and proximal hip recruitment and positioning with ambulation re-education     Home Exercise Program:    [x] (00508) Reviewed/Progressed HEP activities related to strengthening, flexibility, endurance, ROM of core, proximal hip and LE for functional self-care, mobility, lifting and ambulation   [x] (05158) Reviewed/Progressed HEP activities related to improving balance, coordination, kinesthetic sense, posture, motor skill, proprioception of core, proximal hip and LE for self care, mobility, lifting, and ambulation      Manual Treatments:  PROM / STM / Oscillations-Mobs:  G-I, II, III, IV (PA's, Inf., Post.)  [x] (94347) Provided manual therapy to mobilize proximal hip and LS spine soft tissue/joints for the purpose of modulating pain, promoting relaxation,  increasing ROM, reducing/eliminating soft tissue swelling/inflammation/restriction, improving soft tissue extensibility and allowing for proper ROM for normal function with self care, mobility, lifting and ambulation. Approval Dates:  CPT Code Units Approved Units Used  Date Updated:                     Charges:  Timed Code Treatment Minutes: 25   Total Treatment Minutes: 25     [] EVAL (LOW) 64078 (typically 20 minutes face-to-face)  [] EVAL (MOD) 83017 (typically 30 minutes face-to-face)  [] EVAL (HIGH) 02591 (typically 45 minutes face-to-face)  [] RE-EVAL     [] VV(63196) x    [] Dry needle 1 or 2 Muscles (60160)  [] NMR (99279) x     [] Dry needle 3+ Muscles (56011)  [] Manual (34822) x     [] Ultrasound (71879) x  [x] TA (71078) x   2  [] Mech Traction (43077)  [] ES(attended) (64557)     [] ES (un) (03639):   [] Vasopump (31720) [] Ionto (95726)   [] Other:    Desi Tonja stated goal: able to stand at least an hour w/o severe pain.    [x] Progressing: [] Met: [] Not Met: [] Adjusted    Therapist goals for Patient:   Short Term Goals: To be achieved in: 2 weeks  1. Independent in HEP and progression per patient tolerance, in order to prevent re-injury. [x] Progressing: [] Met: [] Not Met: [] Adjusted  2. Patient will have a decrease in pain to facilitate improvement in movement, function, and ADLs as indicated by Functional Deficits. [x] Progressing: [] Met: [] Not Met: [] Adjusted    Long Term Goals: To be achieved in: up to 12 weeks  1. Increase FOTO functional outcome score from 36 to 46 to assist with reaching prior level of function. [x] Progressing: [x] Met: [] Not Met: [] Adjusted  2. Patient will demonstrate increased AROM to [facility] for R hip to allow for proper joint functioning as indicated by patients Functional Deficits. [x] Progressing: [] Met: [] Not Met: [] Adjusted  3. Patient will demonstrate an increase in Strength to good proximal hip strength and control, within 5lb HHD in LE to allow for proper functional mobility as indicated by patients Functional Deficits. [x] Progressing: [] Met: [] Not Met: [] Adjusted  4. Patient will be able to ambulate stairs reciprocally w/o severe pain. [x] Progressing: [] Met: [] Not Met: [] Adjusted  5. Pt will achieve at least 11 reps of sit to  30sec to align with normative values. [x] Progressing: [] Met: [] Not Met: [] Adjusted     ASSESSMENT:  Pt requires skilled PT to restore mobility of hip/lumbar and lower quarter and to facilitate improved function w/ less pain. Pts FOTO score had exceeded the predicted score. She has less back and knee and general leg pain and more focal hip pain now. She could benefit from more PT, but at this time given upcoming CT scan, I think it would be helpful to await updated to plan of care till after imaging incase she needs to have something else done for that \"cyst\".      Treatment/Activity Tolerance:  [x] Patient tolerated treatment well [] Patient limited by fatique  [] Patient limited by pain  [] Patient limited by other medical complications  [] Other:     Overall Progression Towards Functional goals/ Treatment Progress Update:  [] Patient is progressing as expected towards functional goals listed. [] Progression is slowed due to complexities/Impairments listed. [] Progression has been slowed due to co-morbidities. [x] Plan just implemented, too soon to assess goals progression <30days   [] Goals require adjustment due to lack of progress  [] Patient is not progressing as expected and requires additional follow up with physician  [] Other:    Prognosis for POC: [x] Good [] Fair  [] Poor    Patient requires continued skilled intervention: [x] Yes  [] No        PLAN: See eval  12/12: cont 2x/wk for a couple more weeks and taper to 1x/wk for a few weeks after that. [] Continue per plan of care [] Alter current plan (see comments)  [x] Plan of care initiated [] Hold pending MD visit [] Discharge    Electronically signed by: Tiffani Lindo PT  , DPT  #[id number]        Note: If patient does not return for scheduled/recommended follow up visits, this note will serve as a discharge from care along with the most recent update on progress.

## 2023-01-05 ENCOUNTER — APPOINTMENT (OUTPATIENT)
Dept: PHYSICAL THERAPY | Age: 68
End: 2023-01-05
Payer: MEDICARE

## 2023-01-09 ENCOUNTER — APPOINTMENT (OUTPATIENT)
Dept: PHYSICAL THERAPY | Age: 68
End: 2023-01-09
Payer: MEDICARE

## 2023-01-10 NOTE — TELEPHONE ENCOUNTER
Looks like there is a June 7th note stating she needs a prior [facility]. I'm not sure if she qualifies or not - is she wanting the  [facility] for post-op care, or for PT? She has shoulder surgery tomorrow. If it is for that we can do it.
Please Advise     Thank You
Spoke to pt she has a f/u with her Surgeon this Friday.
Initial (On Arrival)

## 2023-01-12 ENCOUNTER — APPOINTMENT (OUTPATIENT)
Dept: PHYSICAL THERAPY | Age: 68
End: 2023-01-12
Payer: MEDICARE

## 2023-01-13 ENCOUNTER — OFFICE VISIT (OUTPATIENT)
Dept: FAMILY MEDICINE CLINIC | Age: 68
End: 2023-01-13
Payer: MEDICARE

## 2023-01-13 ENCOUNTER — HOSPITAL ENCOUNTER (OUTPATIENT)
Dept: CT IMAGING | Age: 68
Discharge: HOME | End: 2023-01-13
Payer: MEDICARE

## 2023-01-13 VITALS
OXYGEN SATURATION: 99 % | HEIGHT: 64 IN | SYSTOLIC BLOOD PRESSURE: 146 MMHG | BODY MASS INDEX: 33.3 KG/M2 | DIASTOLIC BLOOD PRESSURE: 90 MMHG | HEART RATE: 77 BPM | RESPIRATION RATE: 16 BRPM

## 2023-01-13 DIAGNOSIS — R59.0 INGUINAL LYMPHADENOPATHY: ICD-10-CM

## 2023-01-13 DIAGNOSIS — M79.604 RIGHT LEG PAIN: ICD-10-CM

## 2023-01-13 DIAGNOSIS — M16.11 PRIMARY OSTEOARTHRITIS OF RIGHT HIP: Primary | ICD-10-CM

## 2023-01-13 LAB
GFR SERPL CREATININE-BSD FRML MDRD: >60 ML/MIN/{1.73_M2}
PERFORMED ON: NORMAL
POC CREATININE: 0.9 MG/DL (ref 0.6–1.2)
POC SAMPLE TYPE: NORMAL

## 2023-01-13 PROCEDURE — G8427 DOCREV CUR MEDS BY ELIG CLIN: HCPCS | Performed by: FAMILY MEDICINE

## 2023-01-13 PROCEDURE — 3017F COLORECTAL CA SCREEN DOC REV: CPT | Performed by: FAMILY MEDICINE

## 2023-01-13 PROCEDURE — G8484 FLU IMMUNIZE NO ADMIN: HCPCS | Performed by: FAMILY MEDICINE

## 2023-01-13 PROCEDURE — 6360000004 HC RX CONTRAST MEDICATION: Performed by: FAMILY MEDICINE

## 2023-01-13 PROCEDURE — 74177 CT ABD & PELVIS W/CONTRAST: CPT

## 2023-01-13 PROCEDURE — 1036F TOBACCO NON-USER: CPT | Performed by: FAMILY MEDICINE

## 2023-01-13 PROCEDURE — 3080F DIAST BP >= 90 MM HG: CPT | Performed by: FAMILY MEDICINE

## 2023-01-13 PROCEDURE — G8399 PT W/DXA RESULTS DOCUMENT: HCPCS | Performed by: FAMILY MEDICINE

## 2023-01-13 PROCEDURE — G8417 CALC BMI ABV UP PARAM F/U: HCPCS | Performed by: FAMILY MEDICINE

## 2023-01-13 PROCEDURE — 1124F ACP DISCUSS-NO DSCNMKR DOCD: CPT | Performed by: FAMILY MEDICINE

## 2023-01-13 PROCEDURE — 99213 OFFICE O/P EST LOW 20 MIN: CPT | Performed by: FAMILY MEDICINE

## 2023-01-13 PROCEDURE — 3077F SYST BP >= 140 MM HG: CPT | Performed by: FAMILY MEDICINE

## 2023-01-13 PROCEDURE — 82565 ASSAY OF CREATININE: CPT

## 2023-01-13 PROCEDURE — 1090F PRES/ABSN URINE INCON ASSESS: CPT | Performed by: FAMILY MEDICINE

## 2023-01-13 RX ORDER — NAPROXEN 500 MG/1
500 TABLET ORAL 2 TIMES DAILY WITH MEALS
Qty: 60 TABLET | Refills: 0 | Status: SHIPPED | OUTPATIENT
Start: 2023-01-13

## 2023-01-13 RX ADMIN — IOPAMIDOL 50 ML: 612 INJECTION, SOLUTION INTRAVENOUS at 07:22

## 2023-01-13 RX ADMIN — IOPAMIDOL 75 ML: 755 INJECTION, SOLUTION INTRAVENOUS at 07:22

## 2023-01-13 ASSESSMENT — PATIENT HEALTH QUESTIONNAIRE - PHQ9
10. IF YOU CHECKED OFF ANY PROBLEMS, HOW DIFFICULT HAVE THESE PROBLEMS MADE IT FOR YOU TO DO YOUR WORK, TAKE CARE OF THINGS AT HOME, OR GET ALONG WITH OTHER PEOPLE: 0
SUM OF ALL RESPONSES TO PHQ QUESTIONS 1-9: 0
7. TROUBLE CONCENTRATING ON THINGS, SUCH AS READING THE NEWSPAPER OR WATCHING TELEVISION: 0
2. FEELING DOWN, DEPRESSED OR HOPELESS: 0
9. THOUGHTS THAT YOU WOULD BE BETTER OFF DEAD, OR OF HURTING YOURSELF: 0
SUM OF ALL RESPONSES TO PHQ QUESTIONS 1-9: 0
6. FEELING BAD ABOUT YOURSELF - OR THAT YOU ARE A FAILURE OR HAVE LET YOURSELF OR YOUR FAMILY DOWN: 0
8. MOVING OR SPEAKING SO SLOWLY THAT OTHER PEOPLE COULD HAVE NOTICED. OR THE OPPOSITE, BEING SO FIGETY OR RESTLESS THAT YOU HAVE BEEN MOVING AROUND A LOT MORE THAN USUAL: 0
SUM OF ALL RESPONSES TO PHQ QUESTIONS 1-9: 0
4. FEELING TIRED OR HAVING LITTLE ENERGY: 0
SUM OF ALL RESPONSES TO PHQ QUESTIONS 1-9: 0
3. TROUBLE FALLING OR STAYING ASLEEP: 0
5. POOR APPETITE OR OVEREATING: 0

## 2023-01-13 NOTE — PROGRESS NOTES
1/13/2023    This is a 75 y.o. female   Chief Complaint   Patient presents with    Leg Pain     Pts right leg is bothering her. It has gotten worse and worse. She had her right knee operated on in 2001 and it has hurt since then. Any kind of movement she does it hurts really bad. She has been taking ib profen it is a sharp pain and is keeping her up at night      HPI    Here for concerns for leg pain  - going on for months. She was seen virtually for this about a month ago. Prednisone helped for a while, but pain is back  - she reports most of her pain is in her R groin/hip area, also goes down to her knee and involves her lower leg, also her R buttock. Diffuse overall. She underwent a right total knee arthroplasty in July 2021. She did have severe postoperative stiffness. She ultimately required a right knee arthroscopy in February 2022 and underwent lysis of adhesions. Seeing Ortho. R hip MRI from 12/1/22: IMPRESSION:           Mild to moderate stress edema in the anterior acetabula with no discrete fracture line. Mild to moderate osteoarthritis of the right hip joint. Mild right inguinal lymphadenopathy which is nonspecific in etiology and may be reactive to an    inflammatory, infectious, or posttraumatic process. Metastatic lymphadenopathy is considered    less likely but is possible in the appropriate clinical setting. If further evaluation of these lymph nodes is clinically desired, consider ultrasound-guided    biopsy.            Review of Systems     Current Outpatient Medications   Medication Sig Dispense Refill    naproxen (NAPROSYN) 500 MG tablet Take 1 tablet by mouth 2 times daily (with meals) 60 tablet 0    pravastatin (PRAVACHOL) 20 MG tablet TAKE 1 TABLET BY MOUTH DAILY 90 tablet 0    gabapentin (NEURONTIN) 300 MG capsule TAKE 1 CAPSULE BY MOUTH THREE TIMES DAILY 270 capsule 1    albuterol sulfate HFA (PROVENTIL HFA) 108 (90 Base) MCG/ACT inhaler Inhale 2 puffs into the lungs every 4 hours as needed for Wheezing or Shortness of Breath May substitute ProAir MDI 1 each 5    azithromycin (ZITHROMAX) 250 MG tablet Take 2 tabs by mouth once on day 1, followed by 1 tab by mouth daily for four days 6 tablet 0    acetaminophen (TYLENOL) 500 MG tablet Take 1 tablet by mouth 4 times daily as needed for Pain 28 tablet 0    ondansetron (ZOFRAN ODT) 4 MG disintegrating tablet Take 1-2 tablets by mouth every 12 hours as needed for Nausea May Sub regular tablet (non-ODT) if insurance does not cover ODT. 12 tablet 0    FANAPT 1 MG TABS tablet TAKE 1 TABLET BY MOUTH EVERY NIGHT 90 tablet 0    metFORMIN (GLUCOPHAGE) 500 MG tablet TAKE 2 TABLETS BY MOUTH TWICE DAILY WITH MEALS 360 tablet 1    omeprazole (PRILOSEC) 40 MG delayed release capsule Take 1 capsule by mouth in the morning and at bedtime 160 capsule 5    valsartan (DIOVAN) 160 MG tablet Take 1 tablet by mouth daily 90 tablet 1    chlorthalidone (HYGROTON) 25 MG tablet Take 1 tablet by mouth daily 30 tablet 0    aspirin 81 MG EC tablet Take 81 mg by mouth daily      Psyllium (METAMUCIL) 0.36 g CAPS Take 1 capsule by mouth daily      vitamin B-6 (PYRIDOXINE) 100 MG tablet Take 100 mg by mouth daily      furosemide (LASIX) 20 MG tablet TAKE 1 TABLET BY MOUTH  DAILY AS NEEDED FOR LEG  SWELLING 90 tablet 3    DULoxetine (CYMBALTA) 60 MG extended release capsule TAKE 1 CAPSULE BY MOUTH DAILY 90 capsule 1    TRULICITY 1.5 ZE/5.2QQ SOPN INJECT THE CONTENTS OF ONE  PEN SUBCUTANEOUSLY WEEKLY 6 mL 3    traZODone (DESYREL) 100 MG tablet TAKE 1 TABLET BY MOUTH AT  NIGHT 90 tablet 1    insulin glargine (LANTUS SOLOSTAR) 100 UNIT/ML injection pen INJECT SUBCUTANEOUSLY 12  UNITS ONCE NIGHTLY 15 mL 3    blood glucose test strips (ONETOUCH ULTRA) strip As needed.  100 strip 4    dextran 70-hypromellose (TEARS NATURALE) 0.1-0.3 % SOLN opthalmic solution as needed for itching      ipratropium-albuterol (DUONEB) 0.5-2.5 (3) MG/3ML SOLN nebulizer solution Inhale 3 mLs into the lungs every 4 hours as needed for Shortness of Breath (wheezing coughing) 360 mL 5    ammonium lactate (LAC-HYDRIN) 12 % lotion Apply topically daily. (Patient taking differently: as needed Apply topically daily. ) 1 Bottle 1     No current facility-administered medications for this visit. BP (!) 146/90   Pulse 77   Resp 16   Ht 5' 4\" (1.626 m)   SpO2 99%   BMI 33.30 kg/m²     Physical Exam  Musculoskeletal:      Comments: Right leg  Diffuse tenderness to palpation. Present on the calf, outside of the right knee joint, right groin area, right buttock  Difficult to tolerate much provocative testing due to pain  Positive logroll test  Pain with flexion extension of the lower leg against resistance     Wt Readings from Last 3 Encounters:   12/08/22 194 lb (88 kg)   12/05/22 194 lb 12.8 oz (88.4 kg)   11/22/22 201 lb 4.5 oz (91.3 kg)     BP Readings from Last 3 Encounters:   01/13/23 (!) 146/90   12/05/22 125/70   11/22/22 106/76     Assessment/Plan:  1. Primary osteoarthritis of right hip  - naproxen (NAPROSYN) 500 MG tablet; Take 1 tablet by mouth 2 times daily (with meals)  Dispense: 60 tablet; Refill: 0  - Sheila Torre MD, Orthopedic Surgery (Primary Care Sports Medicine), [facility]    2. Right leg pain  - Sheila Torre MD, Orthopedic Surgery (Primary Care Sports Medicine), [facility] does have pain in the groin and a positive logroll test consistent with pain from hip osteoarthritis. She did have a recent MRI and is following with orthopedics. She also has somewhat otherwise diffuse pain in the right leg. This is difficult to localize overall. There is also superimposed chronic pain syndrome. We will switch from ibuprofen to naproxen. Although not ideal to be on NSAIDs with her other comorbidities, she is needing some form of pain relief. Continue gabapentin as needed we discussed potential side effects of this.   We discussed physical therapy, she just completed a course recently. I encouraged her to follow-up with orthopedics.   A work note was written

## 2023-01-13 NOTE — LETTER
[address] [facility] [address] [address]  Phone: [phone]  Fax: [phone]    Andrew Bergman MD        January 13, 2023     Patient: Swathi Campbell   YOB: 1955   Date of Visit: 1/13/2023       To Whom It May Concern: It is my medical opinion that Oscar Rhoades may return to work on 01/17/2023. If you have any questions or concerns, please don't hesitate to call.     Sincerely,        Andrew Bergman MD

## 2023-01-24 ENCOUNTER — OFFICE VISIT (OUTPATIENT)
Dept: ORTHOPEDIC SURGERY | Age: 68
End: 2023-01-24

## 2023-01-24 ENCOUNTER — TELEPHONE (OUTPATIENT)
Dept: ORTHOPEDIC SURGERY | Age: 68
End: 2023-01-24

## 2023-01-24 VITALS — WEIGHT: 190 LBS | HEIGHT: 64 IN | BODY MASS INDEX: 32.44 KG/M2

## 2023-01-24 DIAGNOSIS — M16.11 PRIMARY OSTEOARTHRITIS OF RIGHT HIP: ICD-10-CM

## 2023-01-24 DIAGNOSIS — M51.36 DDD (DEGENERATIVE DISC DISEASE), LUMBAR: ICD-10-CM

## 2023-01-24 DIAGNOSIS — M79.604 PAIN OF RIGHT LOWER EXTREMITY: ICD-10-CM

## 2023-01-24 DIAGNOSIS — M54.50 LOW BACK PAIN, UNSPECIFIED BACK PAIN LATERALITY, UNSPECIFIED CHRONICITY, UNSPECIFIED WHETHER SCIATICA PRESENT: Primary | ICD-10-CM

## 2023-01-24 DIAGNOSIS — M25.551 RIGHT HIP PAIN: ICD-10-CM

## 2023-01-24 DIAGNOSIS — R29.898 RIGHT LEG WEAKNESS: ICD-10-CM

## 2023-01-24 PROBLEM — M51.369 DDD (DEGENERATIVE DISC DISEASE), LUMBAR: Status: ACTIVE | Noted: 2023-01-24

## 2023-01-24 RX ORDER — METHYLPREDNISOLONE 4 MG/1
TABLET ORAL
Qty: 21 KIT | Refills: 0 | Status: SHIPPED | OUTPATIENT
Start: 2023-01-24

## 2023-01-24 NOTE — TELEPHONE ENCOUNTER
General Question     Subject: MRI ORDERS  Patient and /or Facility Request: [address]  Contact Number: [phone]    PATIENT CALLED IN TO SEE IF SHE CAN GET HER MRI SENT OVER FOR HER R HIP. .. PLEASE CALL PATIENT BACK AT THE ABOVE NUMBER. ..

## 2023-01-24 NOTE — PROGRESS NOTES
Chief Complaint  Hip Pain and Back Pain      Initial consultation ongoing right hip and right-sided back pain with episodic right leg pain    History of Present Illness:  Sonam Bajwa is a 75 y.o. female who is a very pleasant female who is a very nice patient of Dr. Bri Finnegan who is being seen today in kind consultation from Dr. Verona Bentley for evaluation and second opinion consultation regarding her right hip. She does have a history of knee arthroplasty last year with Dr. Ernie Russo and does have a history of mechanical back pain requiring epidurals in the past and has been seen several other physicians including Dr. Ernie Russo and Dr. Marilyn Montes for her right hip. She continues to have pain in most of her ongoing knee symptoms post knee arthroplasty in summer 2021 had focused on therapy although shortly thereafter she has had pain into her right hip and groin. This is become increasingly painful for her since her knee replacement in July 2022. She did have an MRI performed of her hip which was performed at [facility] on 12/1/2022 which did show evidence of mild to moderate right hip osteoarthritis with some mild stress edema to the acetabulum without evidence of high-grade soft tissue injury. She is complaining of pain into her groin and does have weakness but once again most of her therapy has been associated in December with her knee. She is also been complaining of increasing back pain and does have some achiness and dysesthesias in her right lower lumbar spine. As noted she did have epidurals in the remote past but has not had these in a number of years and there certainly may be some correlation. She does feel stiff and weak and has difficulty with positional changes and has continued with her gabapentin. She does have a history of stage III renal disease limiting her ability to take oral anti-inflammatories. She also takes Cymbalta.   Due to persistence of her pain Dr. Verona Bentley asked us to see her today for second opinion consultation regarding her hip. She did have ultrasound-guided steroid injections to her hip remotely with Dr. Jose Cuellar with the last one being performed on 3/8/2022 with Dr. Jose Cuellar which did not help nearly as well as previous ones had. She is being seen today for orthopedic and sports consultation with review of her imaging. Pain Assessment  Location of Pain: Hip  Location Modifiers: Right  Severity of Pain: 8  Quality of Pain: Aching  Duration of Pain: Persistent  Frequency of Pain: Constant  Date Pain First Started:  (Onset few years increase last 1 year)  Aggravating Factors: Walking  Limiting Behavior: Yes  Relieving Factors: Rest  Result of Injury: No  Work-Related Injury: No  Are there other pain locations you wish to document?: No       Medical History  Patient's medications, allergies, past medical, surgical, social and family histories were reviewed and updated as appropriate. Review of Systems  Pertinent items are noted in HPI  Review of systems reviewed from Patient History Form dated on 1/24/2023 and available in the patient's chart under the Media tab. Vital Signs  There were no vitals filed for this visit. General Exam:     Constitutional: Patient is adequately groomed with no evidence of malnutrition  DTRs: Deep tendon reflexes are intact  Mental Status: The patient is oriented to time, place and person. The patient's mood and affect are appropriate. Lymphatic: The lymphatic examination bilaterally reveals all areas to be without enlargement or induration. Vascular: Examination reveals no swelling or calf tenderness. Peripheral pulses are palpable and 2+. Neurological: The patient has good coordination. There is no weakness or sensory deficit.       Lumbar/Sacral Spine Examination  Inspection: There is no high-grade deformity soft tissue swelling or high-grade scoliosis      Palpation: Does have diffuse lumbar paraspinal lower lumbar facet tenderness with some central gluteal tenderness right greater than left       Rang of Motion: Does complain of back pain with flexion but has had more pain with extension and facet loading to the right which produces primarily gluteal pain without high-grade radiation of pain into her lower extremities. Her extension also produces groin pain. Strength: She does have pain associated hip weakness 4 out of 5 with hip flexion and abduction. Special Tests: I would call her straight leg raising equivocal on the right with tightness to her hamstrings and IT bands. Negative straight leg raising on the left. She does have some pain with logroll testing and hip impingement testing on the right as well. Skin: There are no rashes, ulcerations or lesions. Distal motor sensory and vascular exams intact. Gait: Moderate antalgia. Using a cane. Reflexes:  Symmetrically preserved     Additional Comments: Evaluation the right hip does reveal developed function anterior lateral hip discomfort with some pain reproduced with logroll testing and hip impingement testing. Additional Examinations:  Right Lower Extremity: Examination of the right lower extremity does not show any tenderness, deformity or injury. Range of motion is unremarkable. There is no gross instability. There are no rashes, ulcerations or lesions. Strength and tone are normal.  Left Lower Extremity: Examination of the left lower extremity does not show any tenderness, deformity or injury. Range of motion is unremarkable. There is no gross instability. There are no rashes, ulcerations or lesions. Strength and tone are normal.  Contralateral left hip exam appears benign.     Diagnostic Test Findings: Right hip MRI obtained at [facility] 12/1/2022 is listed above  Narrative   Site: [facility] #: [id number] #: [id number]: Mariana Rodriguez #: [de-identified] #: [id number]: [id number]: MRI HIP RIGHT WO CONExam Date/Time: 12/01/2022 12:00 PMAdmitting Diagnosis: Fracture, hipReason for Exam:    Fracture, hip       Dictated by: Khoa Pierre SHREYAS: 12/01/2022 09:54 AMT: This document is confidential medical information. Unauthorized disclosure or use of this information is prohibited by law. If you are not the intended recipient of this document, please advise us by    calling immediately [phone]. Impression/Conclusion below       HISTORY:  Fracture, hip   rule out mass, aneurysm, stress fracture  Right hip pain  Pain in right hip; Other chronic pain   COMPARISON: MRI right hip 10/31/2016, right hip radiographs 11/28/2022   TECHNIQUE: Multiplanar multisequence MRI images of the hip with limited views of the bony    pelvis and contralateral hip   NOTE:  If there are questions about the content of this report, please contact [address] [place] [address]    radiology by calling [phone]   NOTE:  Report marked for documented transmission to the ordering physician per [address] [place] [address]    Radiology department protocol. FINDINGS:    BONES:  Mild subchondral bone marrow edema in the right femoral head. Mild to moderate bone    marrow edema in the anterior acetabula. No discrete fracture line. HIP JOINT:  Mild to moderate chondral thinning. Mild degenerative tearing of the labrum. Small    to moderate-sized joint effusion containing debris/synovitis. PUBIC SYMPHYSIS:  Unremarkable   SACRUM:  Mild subchondral bone marrow edema about these sacroiliac joints, consistent with mild    osteoarthritis. LOWER LUMBAR SPINE:  Degenerative changes of the lower lumbar spine. PELVIC SOFT TISSUES:  No acute intra-pelvic abnormality   SOFT TISSUES:  Mild bilateral greater trochanteric bursal inflammation with no focal fluid    collection. ANTERIOR COMPARTMENT MUSCLES AND TENDONS:   Unremarkable     GLUTEAL MUSCLES AND TENDONS:   Mild bilateral distal gluteal insertional tendinopathy.     HAMSTRING MUSCLES AND TENDONS:  Mild bilateral hamstring origin tendinopathy. MEDIAL COMPARTMENT MUSCLES AND TENDONS:  Unremarkable        CONTRALATERAL HIP:  Mild osteoarthritis. OTHER:  Several mildly enlarged right inguinal lymph nodes. IMPRESSION:           Mild to moderate stress edema in the anterior acetabula with no discrete fracture line. Mild to moderate osteoarthritis of the right hip joint. Mild right inguinal lymphadenopathy which is nonspecific in etiology and may be reactive to an    inflammatory, infectious, or posttraumatic process. Metastatic lymphadenopathy is considered    less likely but is possible in the appropriate clinical setting. If further evaluation of these lymph nodes is clinically desired, consider ultrasound-guided    biopsy. No evidence of mass or aneurysm in this noncontrast exam.   SIGNED BY: Gay Mon MD on 12/1/2022  9:51 AM         [address] [facility] [phone] -  [address]: [phone]             Assessment: #1. Chronic right hip pain with MRI documented in mild to moderate right hip osteoarthritis with hip weakness. 2.  Mechanical low back pain with lumbar degenerative disc disease and facet arthropathy with possible occult right L5 radiculopathy and history of remote epidurals. #3.  6-1/2-month status post right total knee arthroplasty per Dr. Jessica Rainey:  Encounter Diagnoses   Name Primary?     Low back pain, unspecified back pain laterality, unspecified chronicity, unspecified whether sciatica present Yes    Pain of right lower extremity     DDD (degenerative disc disease), lumbar     Primary osteoarthritis of right hip     Right hip pain     Right leg weakness        Office Procedures:  Orders Placed This Encounter   Procedures    XR LUMBAR SPINE (2-3 VIEWS)     2V Lumbar     Standing Status:   Future     Number of Occurrences:   1     Standing Expiration Date:   2/24/2023     Order Specific Question:   Reason for exam:     Answer: LBP    MRI LUMBAR SPINE WO CONTRAST     Standing Status:   Future     Standing Expiration Date:   1/24/2024     Scheduling Instructions:      [facility] [place] [address]      [phone]            AUTH #:      TIME AND DATE TBD      PLEASE CALL PATIENT ONCE APPROVED TO SCHEDULE             Remember that it may take several business days to pre-cert your MRI through your insurance. Our office will contact you as soon as we have the approval.             We will not give any test results over the phone. Please call [phone] once you have your test day and time to schedule a follow up with Dr. Patrick Presley Specific Question:   Reason for exam:     Answer:   rule out disc herniation and foraminal narrowing     Order Specific Question:   What is the sedation requirement? Answer:   Marissa Moore MD, Orthopedics and Sports Medicine (Hip; Knee; Shoulder), [facility]     Referral Priority:   Routine     Referral Type:   Eval and Treat     Referral Reason:   Specialty Services Required     Referred to Provider:   Alma Delia Claire MD     Requested Specialty:   Orthopedic Surgery     Number of Visits Requested:   1    [address] (Ortho & Sports)-[facility]     Referral Priority:   Routine     Referral Type:   Eval and Treat     Referral Reason:   Specialty Services Required     Requested Specialty:   Physical Therapist     Number of Visits Requested:   1       Treatment Plan:  Treatment options were discussed with John Wood. We did review her current plain films and exam findings as well as her MRI recently of her right hip on 12/1/2022. While she does have mild to moderate hip osteoarthritis she does have considerable hip weakness and I am concerned about the potential for concomitant right-sided disc herniation on the right L5 radiculopathy. We did place her in a warm-and-form belt.   We will set her up for lumbar spine MRI to evaluate degree of her lumbar spondylitic changes and rule out a right-sided L4-5/L5-S1 disc herniation and/or stenosis. She once again did have remote epidurals which she cannot remember who performed. I would like for her to go back to physical therapy for dedicated back and hip rehabilitation. We did place her on a Medrol Dosepak and will monitor for transient hyperglycemia and given her history of kidney dysfunction, we use Voltaren gel. May she continue with her chronic Cymbalta. We will see her back post MRI of her lumbar spine and for hip consultation, I like for her to see Dr. Ellie Cuba for potential repeat diagnostic and therapeutic hip injection. I am not entirely sure her hip is her major pain generator although may be contributory. She will contact us in the interim with questions or concerns    CC: Dr. Petrona Finnegan      This dictation was performed with a verbal recognition program [facility]) and it was checked for errors. It is possible that there are still dictated errors within this office note. If so, please bring any errors to my attention for an addendum. All efforts were made to ensure that this office note is accurate.

## 2023-01-25 NOTE — TELEPHONE ENCOUNTER
Left voicemail for patient that their MRI has been authorized and that they can call and schedule scan at their convenience. Also told them that they can call and schedule a f/u with Dr. Shira Holm once they have MRI scheduled, leaving at least 2-3 days for our office to receive their results.

## 2023-01-26 ENCOUNTER — APPOINTMENT (OUTPATIENT)
Dept: PHYSICAL THERAPY | Age: 68
End: 2023-01-26
Payer: MEDICARE

## 2023-01-26 ENCOUNTER — OFFICE VISIT (OUTPATIENT)
Dept: CARDIOLOGY CLINIC | Age: 68
End: 2023-01-26
Payer: MEDICARE

## 2023-01-26 VITALS
OXYGEN SATURATION: 90 % | DIASTOLIC BLOOD PRESSURE: 70 MMHG | HEART RATE: 92 BPM | SYSTOLIC BLOOD PRESSURE: 122 MMHG | WEIGHT: 196.2 LBS | BODY MASS INDEX: 33.68 KG/M2

## 2023-01-26 DIAGNOSIS — I70.0 AORTIC ATHEROSCLEROSIS (HCC): ICD-10-CM

## 2023-01-26 DIAGNOSIS — I10 ESSENTIAL HYPERTENSION: ICD-10-CM

## 2023-01-26 DIAGNOSIS — E78.2 MIXED HYPERLIPIDEMIA: ICD-10-CM

## 2023-01-26 DIAGNOSIS — G47.33 OSA (OBSTRUCTIVE SLEEP APNEA): ICD-10-CM

## 2023-01-26 PROCEDURE — 99214 OFFICE O/P EST MOD 30 MIN: CPT | Performed by: INTERNAL MEDICINE

## 2023-01-26 PROCEDURE — 3017F COLORECTAL CA SCREEN DOC REV: CPT | Performed by: INTERNAL MEDICINE

## 2023-01-26 PROCEDURE — 1124F ACP DISCUSS-NO DSCNMKR DOCD: CPT | Performed by: INTERNAL MEDICINE

## 2023-01-26 PROCEDURE — 3074F SYST BP LT 130 MM HG: CPT | Performed by: INTERNAL MEDICINE

## 2023-01-26 PROCEDURE — G8427 DOCREV CUR MEDS BY ELIG CLIN: HCPCS | Performed by: INTERNAL MEDICINE

## 2023-01-26 PROCEDURE — G8484 FLU IMMUNIZE NO ADMIN: HCPCS | Performed by: INTERNAL MEDICINE

## 2023-01-26 PROCEDURE — 1036F TOBACCO NON-USER: CPT | Performed by: INTERNAL MEDICINE

## 2023-01-26 PROCEDURE — 1090F PRES/ABSN URINE INCON ASSESS: CPT | Performed by: INTERNAL MEDICINE

## 2023-01-26 PROCEDURE — G8399 PT W/DXA RESULTS DOCUMENT: HCPCS | Performed by: INTERNAL MEDICINE

## 2023-01-26 PROCEDURE — G8417 CALC BMI ABV UP PARAM F/U: HCPCS | Performed by: INTERNAL MEDICINE

## 2023-01-26 PROCEDURE — 3078F DIAST BP <80 MM HG: CPT | Performed by: INTERNAL MEDICINE

## 2023-01-26 ASSESSMENT — ENCOUNTER SYMPTOMS
BACK PAIN: 0
ABDOMINAL PAIN: 0
WHEEZING: 0
COUGH: 0
SHORTNESS OF BREATH: 0
ABDOMINAL DISTENTION: 0
CHEST TIGHTNESS: 0
EYE DISCHARGE: 0
VOMITING: 0
FACIAL SWELLING: 0
COLOR CHANGE: 0
BLOOD IN STOOL: 0

## 2023-01-26 NOTE — PROGRESS NOTES
[facility]     Outpatient Cardiology         Chief Complaint   Patient presents with    Hyperlipidemia       HPI     John Clarke a 75 y.o. female here for follow up for HTN. Recently in the hospital for chest pain, shortness of breath, and HTN. Hx of DM II, HTN, HLD, DIPTI. Hypertension, well-controlled current dose of valsartan  Hyperlipidemia, on pravastatin. No side effects  Sleep apnea, using her CPAP most nights. Palpitations, no recurrence. Doing okay. Overall doing well from a cardiovascular perspective. Having some knee discomfort. Otherwise no chest pain or any other symptoms concerning for angina or heart failure.       PMH  Past Medical History:   Diagnosis Date    Anesthesia complication     \" shaking\"    Arthritis     Cardiomyopathy ([facility] [address]     COVID-19     1/7/2022    Diabetes     GERD (gastroesophageal reflux disease)     Hyperlipidemia     Hypertension     Lumbar disc disease     Prolonged emergence from general anesthesia     Sleep apnea     pt states does use a Cpap machine at night    Unspecified cerebral artery occlusion with cerebral infarction 2014    right side weak    Wears glasses        PSH  Past Surgical History:   Procedure Laterality Date    ARTHRODESIS Right 9/17/2020    (RIGHT) REMOVAL OF BONE SPURRING AND OSSEOUS PROMINENCE FIRST METATARSAL, ARTHRODESIS OF FIRST METATARSOPHALANGEAL JOINT, BONE MARROW HARVEST AND CONCENTRATION RIGHT TIBIA performed by Michelle Krishna DPM at [address] Left 9/3/15    CARPAL TUNNEL RELEASE Right 9/22/15    COLONOSCOPY      FINGER TRIGGER RELEASE Left 9/3/15    middle and ring fingers    FINGER TRIGGER RELEASE Right 9/22/15    middle and ring fingers    FOOT SURGERY Bilateral     litteltoe    HAND SURGERY Right     Ligament    KNEE ARTHROPLASTY Right 7/26/2021    ROBOTIC ASSISTED TOTAL RIGHT KNEE REPLACEMENT performed by Zoraida Rutherford MD at [address] [place] [address] Right 2/2/2022 ARTHROSCOPY WITH LYSIS OF ADHESIONS, RIGHT KNEE performed by Robinson Bryant MD at [address] (CERVIX NOT REMOVED)  2003    SHOULDER ARTHROSCOPY Right 2018    Diagnostic scope, rcr, open [place]        Social HIstory  Social History     Tobacco Use    Smoking status: Former     Packs/day: 1.50     Years: 3.00     Pack years: 4.50     Types: Cigarettes     Quit date: 1992     Years since quittin.0     Passive exposure: Past    Smokeless tobacco: Never   Vaping Use    Vaping Use: Never used   Substance Use Topics    Alcohol use: No    Drug use: No       Family History  Family History   Problem Relation Age of Onset    Heart Disease Mother     High Blood Pressure Mother     Stroke Mother     Cancer Brother         lung cancer       Allergies   Allergies   Allergen Reactions    Codeine Nausea And Vomiting and Rash    Vicodin [Hydrocodone-Acetaminophen] Nausea And Vomiting    Lipitor [Atorvastatin]      Myalgias    Oxycontin [Oxycodone Hcl] Itching    Tramadol Itching and Swelling       Medications:     Home Medications:  Were reviewed and are listed in nursing record. and/or listed below    Prior to Admission medications    Medication Sig Start Date End Date Taking? Authorizing Provider   diclofenac sodium (VOLTAREN) 1 % GEL Apply 4 g topically 4 times daily as needed for Pain 23  Yes Bety Whitmore MD   methylPREDNISolone (MEDROL DOSEPACK) 4 MG tablet Take by mouth as directed.  23  Yes Bety Whitmore MD   naproxen (NAPROSYN) 500 MG tablet Take 1 tablet by mouth 2 times daily (with meals) 23  Yes Allison Finnegan MD   pravastatin (PRAVACHOL) 20 MG tablet TAKE 1 TABLET BY MOUTH DAILY 22  Yes Rebeca Yeboah MD   gabapentin (NEURONTIN) 300 MG capsule TAKE 1 CAPSULE BY MOUTH THREE TIMES DAILY 12/21/22 3/21/23 Yes Allison Finnegan MD   albuterol sulfate HFA (PROVENTIL HFA) 108 (90 Base) MCG/ACT inhaler Inhale 2 puffs into the lungs every 4 hours as needed for Wheezing or Shortness of Breath May substitute ProAir MDI 12/20/22  Yes Amari Finnegan MD   acetaminophen (TYLENOL) 500 MG tablet Take 1 tablet by mouth 4 times daily as needed for Pain 11/22/22 6/13/24 Yes JEISON Aponte CNP   ondansetron (ZOFRAN ODT) 4 MG disintegrating tablet Take 1-2 tablets by mouth every 12 hours as needed for Nausea May Sub regular tablet (non-ODT) if insurance does not cover ODT. 11/22/22  Yes JEISON Aponte CNP   FANAPT 1 MG TABS tablet TAKE 1 TABLET BY MOUTH EVERY NIGHT 11/18/22  Yes Amari Finnegan MD   metFORMIN (GLUCOPHAGE) 500 MG tablet TAKE 2 TABLETS BY MOUTH TWICE DAILY WITH MEALS 11/15/22  Yes Amari Finnegan MD   omeprazole (PRILOSEC) 40 MG delayed release capsule Take 1 capsule by mouth in the morning and at bedtime 10/24/22  Yes Ne Guzman MD   valsartan (DIOVAN) 160 MG tablet Take 1 tablet by mouth daily 10/24/22  Yes Amari Finnegan MD   chlorthalidone (HYGROTON) 25 MG tablet Take 1 tablet by mouth daily 9/12/22  Yes Arcadio Willoughby MD   aspirin 81 MG EC tablet Take 81 mg by mouth daily   Yes Historical Provider, MD   Psyllium (METAMUCIL) 0.36 g CAPS Take 1 capsule by mouth daily   Yes Historical Provider, MD   vitamin B-6 (PYRIDOXINE) 100 MG tablet Take 100 mg by mouth daily   Yes Historical Provider, MD   furosemide (LASIX) 20 MG tablet TAKE 1 TABLET BY MOUTH  DAILY AS NEEDED FOR LEG  SWELLING 7/29/22  Yes Amari Finnegan MD   DULoxetine (CYMBALTA) 60 MG extended release capsule TAKE 1 CAPSULE BY MOUTH DAILY 7/29/22  Yes MD CHARLEY Moss 1.5 EF/4.0XL [facility] INJECT THE CONTENTS OF ONE  PEN SUBCUTANEOUSLY WEEKLY 6/28/22  Yes Amari Finnegan MD   traZODone (DESYREL) 100 MG tablet TAKE 1 TABLET BY MOUTH AT  NIGHT 6/23/22  Yes Amari Finnegan MD   insulin glargine (LANTUS SOLOSTAR) 100 UNIT/ML injection pen INJECT SUBCUTANEOUSLY 12  UNITS ONCE NIGHTLY 4/13/22  Yes Amari Finnegan MD   blood glucose test strips (ONETOUCH ULTRA) strip As needed.  3/16/22  Yes Severino Collins MD   dextran 70-hypromellose (TEARS NATURALE) 0.1-0.3 % SOLN opthalmic solution as needed for itching   Yes Historical Provider, MD   ipratropium-albuterol (DUONEB) 0.5-2.5 (3) MG/3ML SOLN nebulizer solution Inhale 3 mLs into the lungs every 4 hours as needed for Shortness of Breath (wheezing coughing) 1/5/21  Yes Sy Barrientos MD   ammonium lactate (LAC-HYDRIN) 12 % lotion Apply topically daily. Patient taking differently: as needed Apply topically daily. 11/21/19  Yes Deborah Garcia MD        Review of Systems   Constitutional:  Negative for activity change, appetite change, diaphoresis, fatigue, fever and unexpected weight change. HENT:  Negative for congestion, facial swelling, mouth sores and nosebleeds. Eyes:  Negative for discharge and visual disturbance. Respiratory:  Negative for cough, chest tightness, shortness of breath and wheezing. Cardiovascular:  Negative for chest pain, palpitations and leg swelling. Gastrointestinal:  Negative for abdominal distention, abdominal pain, blood in stool and vomiting. Endocrine: Negative for cold intolerance, heat intolerance and polyuria. Genitourinary:  Negative for difficulty urinating, dysuria, frequency and hematuria. Musculoskeletal:  Negative for back pain, joint swelling, myalgias and neck pain. Skin:  Negative for color change, pallor and rash. Allergic/Immunologic: Negative for immunocompromised state. Neurological:  Negative for dizziness, syncope, weakness, light-headedness, numbness and headaches. Hematological:  Negative for adenopathy. Does not bruise/bleed easily. Psychiatric/Behavioral:  Negative for behavioral problems, confusion, decreased concentration and suicidal ideas. The patient is not nervous/anxious.       Vitals:    01/26/23 1536   BP: 122/70   Pulse: 92   SpO2: 90%    Weight: 196 lb 3.2 oz (89 kg)       Vitals:    01/26/23 1536   BP: 122/70   Site: Left Upper Arm   Position: Sitting   Cuff Size: Medium Adult   Pulse: 92 SpO2: 90%   Weight: 196 lb 3.2 oz (89 kg)       BP Readings from Last 3 Encounters:   01/26/23 122/70   01/13/23 (!) 146/90   12/05/22 125/70       Wt Readings from Last 3 Encounters:   01/26/23 196 lb 3.2 oz (89 kg)   01/24/23 190 lb (86.2 kg)   12/08/22 194 lb (88 kg)       Physical Exam  Constitutional:       General: She is not in acute distress. Appearance: She is well-developed. She is not diaphoretic. HENT:      Head: Normocephalic and atraumatic. Eyes:      Pupils: Pupils are equal, round, and reactive to light. Neck:      Thyroid: No thyromegaly. Vascular: No JVD. Cardiovascular:      Rate and Rhythm: Normal rate and regular rhythm. Chest Wall: PMI is not displaced. Heart sounds: Normal heart sounds, S1 normal and S2 normal. No murmur heard. No friction rub. No gallop. Pulmonary:      Effort: Pulmonary effort is normal. No respiratory distress. Breath sounds: Normal breath sounds. No stridor. No wheezing or rales. Chest:      Chest wall: No tenderness. Abdominal:      General: Bowel sounds are normal. There is no distension. Palpations: Abdomen is soft. Tenderness: There is no abdominal tenderness. There is no guarding or rebound. Musculoskeletal:         General: No tenderness. Normal range of motion. Cervical back: Normal range of motion. Lymphadenopathy:      Cervical: No cervical adenopathy. Skin:     General: Skin is warm and dry. Findings: No erythema or rash. Neurological:      Mental Status: She is alert and oriented to person, place, and time. Coordination: Coordination normal.   Psychiatric:         Behavior: Behavior normal.         Thought Content:  Thought content normal.         Judgment: Judgment normal.       Labs:       Lab Results   Component Value Date    WBC 5.8 11/22/2022    HGB 11.6 (L) 11/22/2022    HCT 36.4 11/22/2022    MCV 84.6 11/22/2022     11/22/2022     Lab Results   Component Value Date     11/22/2022    K 4.2 11/22/2022     11/22/2022    CO2 27 11/22/2022    BUN 13 11/22/2022    CREATININE 0.9 01/13/2023    GLUCOSE 78 11/22/2022    CALCIUM 10.4 11/22/2022    PROT 7.2 11/22/2022    LABALBU 3.9 11/22/2022    BILITOT <0.2 11/22/2022    ALKPHOS 83 11/22/2022    AST 23 11/22/2022    ALT 15 11/22/2022    LABGLOM >60 01/13/2023    GFRAA >60 09/22/2022    AGRATIO 1.5 10/26/2022    GLOB 3.6 09/25/2021         Lab Results   Component Value Date    CHOL 189 09/10/2022    CHOL 145 02/21/2020    CHOL 144 10/19/2019     Lab Results   Component Value Date    TRIG 149 09/10/2022    TRIG 92 02/21/2020    TRIG 73 10/19/2019     Lab Results   Component Value Date    HDL 55 09/10/2022    HDL 57 02/21/2020    HDL 56 10/19/2019     Lab Results   Component Value Date    LDLCALC 104 (H) 09/10/2022    LDLCALC 70 02/21/2020    LDLCALC 73 10/19/2019     Lab Results   Component Value Date    LABVLDL 30 09/10/2022    LABVLDL 18 02/21/2020    LABVLDL 15 10/19/2019     No results found for: CHOLHDLRATIO    Lab Results   Component Value Date    INR 0.90 07/12/2021    INR 0.94 10/17/2019    INR 0.87 04/19/2016    PROTIME 10.1 07/12/2021    PROTIME 10.7 10/17/2019    PROTIME 9.9 04/19/2016       The ASCVD Risk score (Darío DK, et al., 2019) failed to calculate for the following reasons: The patient has a prior MI or stroke diagnosis      Imaging:       Last ECG (if available):  NSR, NSST changes     Last Monitor/Holter    Last Stress (if available): 5/12/21  Summary    Pharmacological Stress/MPI Results:        1. Technically a satisfactory study. 2. No evidence of Ischemia by Myocardial Perfusion Imaging. 3. Gated Study shows normal LV size and Systolic function; EF is 70 %. Last Cath (if available):Cath: 4/2016  Right dominant system  Left Main:  Minimal luminal irregularities.   Left Anterior Descending:  No significant CAD  Circumflex:  No significant CAD  Right Coronary:  No significant CAD  Left Ventriculogram:  LVEF 55-60%    Last TTE/TUSHAR(if available): 10/17/19   Summary   There is mildly increased left ventricular wall thickness. Left ventricular cavity size is normal.   Ejection fraction is visually estimated to be 55-60%. Normal diastolic function. The aortic valve is structurally normal.   No evidence of aortic valve regurgitation. The mitral valve is normal in structure and function. Tricuspid valve is structurally normal.   No evidence of tricuspid regurgitation. Last CMR  (if available):      Assessment / Plan:     Essential hypertension  Well-controlled on valsartan    Mixed hyperlipidemia  Continue pravastatin    DIPTI (obstructive sleep apnea)  Compliant with CPAP      Follow up in 12 months. I had the opportunity to review the clinical symptoms and presentation of [address] Patient's allergies and medications were reviewed and updated. Patient's past medical, surgical, social and family history were reviewed and updated. Patient's testing including laboratory, ECGs, monitor, imaging (TTE,TUSHAR,CMR,cath) were reviewed. All questions and concerns were addressed to the patient/family. Alternatives to my treatment were discussed. The note was completed using EMR. Every effort wasmade to ensure accuracy; however, inadvertent computerized transcription errors may be present. Thank you for allowing me to participate in thecare or [address].  [address] Miguel Toscano MD, [facility], [facility]

## 2023-01-30 ENCOUNTER — OFFICE VISIT (OUTPATIENT)
Dept: PULMONOLOGY | Age: 68
End: 2023-01-30
Payer: MEDICARE

## 2023-01-30 VITALS
RESPIRATION RATE: 18 BRPM | WEIGHT: 193 LBS | SYSTOLIC BLOOD PRESSURE: 102 MMHG | BODY MASS INDEX: 32.95 KG/M2 | HEART RATE: 95 BPM | DIASTOLIC BLOOD PRESSURE: 64 MMHG | TEMPERATURE: 97.7 F | OXYGEN SATURATION: 97 % | HEIGHT: 64 IN

## 2023-01-30 DIAGNOSIS — K21.9 GASTROESOPHAGEAL REFLUX DISEASE, UNSPECIFIED WHETHER ESOPHAGITIS PRESENT: Primary | ICD-10-CM

## 2023-01-30 DIAGNOSIS — R06.02 SOB (SHORTNESS OF BREATH): ICD-10-CM

## 2023-01-30 DIAGNOSIS — J31.0 CHRONIC RHINITIS: ICD-10-CM

## 2023-01-30 PROCEDURE — 3078F DIAST BP <80 MM HG: CPT | Performed by: INTERNAL MEDICINE

## 2023-01-30 PROCEDURE — G8399 PT W/DXA RESULTS DOCUMENT: HCPCS | Performed by: INTERNAL MEDICINE

## 2023-01-30 PROCEDURE — G8427 DOCREV CUR MEDS BY ELIG CLIN: HCPCS | Performed by: INTERNAL MEDICINE

## 2023-01-30 PROCEDURE — 99214 OFFICE O/P EST MOD 30 MIN: CPT | Performed by: INTERNAL MEDICINE

## 2023-01-30 PROCEDURE — G8417 CALC BMI ABV UP PARAM F/U: HCPCS | Performed by: INTERNAL MEDICINE

## 2023-01-30 PROCEDURE — G8484 FLU IMMUNIZE NO ADMIN: HCPCS | Performed by: INTERNAL MEDICINE

## 2023-01-30 PROCEDURE — 1036F TOBACCO NON-USER: CPT | Performed by: INTERNAL MEDICINE

## 2023-01-30 PROCEDURE — 3074F SYST BP LT 130 MM HG: CPT | Performed by: INTERNAL MEDICINE

## 2023-01-30 PROCEDURE — 1090F PRES/ABSN URINE INCON ASSESS: CPT | Performed by: INTERNAL MEDICINE

## 2023-01-30 PROCEDURE — 3017F COLORECTAL CA SCREEN DOC REV: CPT | Performed by: INTERNAL MEDICINE

## 2023-01-30 PROCEDURE — 1124F ACP DISCUSS-NO DSCNMKR DOCD: CPT | Performed by: INTERNAL MEDICINE

## 2023-01-30 RX ORDER — FLUTICASONE PROPIONATE 50 MCG
2 SPRAY, SUSPENSION (ML) NASAL DAILY
Qty: 3 EACH | Refills: 5 | Status: SHIPPED | OUTPATIENT
Start: 2023-01-30

## 2023-01-30 NOTE — PROGRESS NOTES
Pulmonary Progress note             REASON FOR CONSULTATION:  Chief Complaint   Patient presents with    Follow-up    Shortness of Breath        Consult at request of Celso Silva MD     PCP: Celso Silva MD        Assessment and Plan:   Diagnosis Orders   1. Gastroesophageal reflux disease, unspecified whether esophagitis present        2. SOB (shortness of breath)        3. Chronic rhinitis                Plan:  Flonase was prescribed for chronic rhinitis and postnasal drainage. Continue strict antireflux precautions and PPI. Advised to exercise regularly          HISTORY OF PRESENT ILLNESS: Chanell Dahl is very pleasant 75y.o. year old lady with medical history stated below significant for former smoker quit long time ago, history of diabetes mellitus type 2, hypertension, obstructive sleep apnea on BiPAP 15/11 therapy compliant,  Was referred to us for shortness of breath with any exertion, associated with some chest tightness that increases with breathing no associated wheezing chronic cough or sputum production, no prior history of asthma or COPD. She had a CT of the chest done 9/9/2022 that showed no acute PE, minimal subsegmental atelectasis medially at the bases. There are mild calcifications in the coronaries. Most recent echocardiogram 2019 with normal ejection fraction and normal diastolic function as well as valvular evaluation    Stress test done May 2021 was negative. She has a history of GERD currently on PPIs. 1/30/2023:  Here for follow-up, stated that her episodic retrosternal tightness and shortness of breath has significantly improved, she is complaining of significant amount of sputum and cough throughout the day, she stated that she has been on Flonase in the past  PFT with no active airflow obstruction. There is mild reduction in DLCO.     Complaining of severe right hip pain interfering with her exercise abilities. Past Medical History:   Diagnosis Date    Anesthesia complication     \" shaking\"    Arthritis     Cardiomyopathy ([address]     COVID-19     1/7/2022    Diabetes     GERD (gastroesophageal reflux disease)     Hyperlipidemia     Hypertension     Lumbar disc disease     Prolonged emergence from general anesthesia     Sleep apnea     pt states does use a Cpap machine at night    Unspecified cerebral artery occlusion with cerebral infarction 2014    right side weak    Wears glasses        Past Surgical History:   Procedure Laterality Date    ARTHRODESIS Right 9/17/2020    (RIGHT) REMOVAL OF BONE SPURRING AND OSSEOUS PROMINENCE FIRST METATARSAL, ARTHRODESIS OF FIRST METATARSOPHALANGEAL JOINT, BONE MARROW HARVEST AND CONCENTRATION RIGHT TIBIA performed by Jqaui Cameron DPM at [facility] [place] Left 9/3/15    CARPAL TUNNEL RELEASE Right 9/22/15    COLONOSCOPY      FINGER TRIGGER RELEASE Left 9/3/15    middle and ring fingers    FINGER TRIGGER RELEASE Right 9/22/15    middle and ring fingers    FOOT SURGERY Bilateral     litteltoe    HAND SURGERY Right     Ligament    KNEE ARTHROPLASTY Right 7/26/2021    ROBOTIC ASSISTED TOTAL RIGHT KNEE REPLACEMENT performed by Carlota Shen MD at [facility] ARTHROSCOPY Right 2/2/2022    ARTHROSCOPY WITH LYSIS OF ADHESIONS, RIGHT KNEE performed by Carlota Shen MD at [facility] (CERVIX NOT REMOVED)  08/2003    SHOULDER ARTHROSCOPY Right 06/20/2018    Diagnostic scope, rcr, open Reina Carter       family history includes Cancer in her brother; Heart Disease in her mother; High Blood Pressure in her mother; Stroke in her mother. SOCIAL HISTORY:   reports that she quit smoking about 31 years ago. Her smoking use included cigarettes. She started smoking about 54 years ago. She has a 4.50 pack-year smoking history. She has been exposed to tobacco smoke.  She has never used smokeless tobacco.      ALLERGIES:  Patient is allergic to codeine, vicodin [hydrocodone-acetaminophen], lipitor [atorvastatin], oxycontin [oxycodone hcl], and tramadol. REVIEW OF SYSTEMS:  Constitutional: Negative for fever, no wt loss, no night sweats   HENT: Negative for sore throat, difficulty swallowing,   Eyes: Negative for redness, no discharge   Respiratory: Shortness of breath with exertion, atypical chest pain/tightness  Cardiovascular: Negative for chest pain, no palpitations   Gastrointestinal: Negative for vomiting, diarrhea   Genitourinary: Negative for hematuria, no dysuria    Musculoskeletal: Right hip and knee pain  Skin: Negative for rash  LE: no edema   Neurological: Negative for syncope, no tremor, no focal weakness or dysarthria   Hematological: Negative for adenopathy, or bleeding   Psychiatric/Behavorial: Negative for anxiety,    Objective:   PHYSICAL EXAM:  Blood pressure 102/64, pulse 95, temperature 97.7 °F (36.5 °C), resp. rate 18, height 5' 4\" (1.626 m), weight 193 lb (87.5 kg), SpO2 97 %, not currently breastfeeding.'  Gen: No acute distress  Eyes: PERRL. No sclera icterus. No conjunctival injection. ENT: No discharge. Pharynx clear. External appearance of ears and nose normal.  Neck: Trachea midline. No obvious mass. Resp: No accessory muscle use. No crackles. No wheezes. No rhonchi. CV: Regular rate. Regular rhythm. No murmur or rub. No edema. GI: Non-tender. Non-distended. No hernia. Skin: Warm, dry, normal texture and turgor. No nodule on exposed extremities. Lymph: No cervical LAD. M/S: No cyanosis. No clubbing. No joint deformity. LE:  no edema   Neuro: no tremor, no focal deficit, awake and alert   Psych: ntact judgement and insight.     Current Outpatient Medications   Medication Sig Dispense Refill    fluticasone (FLONASE) 50 MCG/ACT nasal spray 2 sprays by Each Nostril route daily 3 each 5    diclofenac sodium (VOLTAREN) 1 % GEL Apply 4 g topically 4 times daily as needed for Pain 100 g 3    methylPREDNISolone (MEDROL DOSEPACK) 4 MG tablet Take by mouth as directed. 21 kit 0    naproxen (NAPROSYN) 500 MG tablet Take 1 tablet by mouth 2 times daily (with meals) 60 tablet 0    pravastatin (PRAVACHOL) 20 MG tablet TAKE 1 TABLET BY MOUTH DAILY 90 tablet 0    gabapentin (NEURONTIN) 300 MG capsule TAKE 1 CAPSULE BY MOUTH THREE TIMES DAILY 270 capsule 1    albuterol sulfate HFA (PROVENTIL HFA) 108 (90 Base) MCG/ACT inhaler Inhale 2 puffs into the lungs every 4 hours as needed for Wheezing or Shortness of Breath May substitute ProAir MDI 1 each 5    acetaminophen (TYLENOL) 500 MG tablet Take 1 tablet by mouth 4 times daily as needed for Pain 28 tablet 0    ondansetron (ZOFRAN ODT) 4 MG disintegrating tablet Take 1-2 tablets by mouth every 12 hours as needed for Nausea May Sub regular tablet (non-ODT) if insurance does not cover ODT.  12 tablet 0    FANAPT 1 MG TABS tablet TAKE 1 TABLET BY MOUTH EVERY NIGHT 90 tablet 0    metFORMIN (GLUCOPHAGE) 500 MG tablet TAKE 2 TABLETS BY MOUTH TWICE DAILY WITH MEALS 360 tablet 1    omeprazole (PRILOSEC) 40 MG delayed release capsule Take 1 capsule by mouth in the morning and at bedtime 160 capsule 5    valsartan (DIOVAN) 160 MG tablet Take 1 tablet by mouth daily 90 tablet 1    chlorthalidone (HYGROTON) 25 MG tablet Take 1 tablet by mouth daily 30 tablet 0    aspirin 81 MG EC tablet Take 81 mg by mouth daily      Psyllium (METAMUCIL) 0.36 g CAPS Take 1 capsule by mouth daily      vitamin B-6 (PYRIDOXINE) 100 MG tablet Take 100 mg by mouth daily      furosemide (LASIX) 20 MG tablet TAKE 1 TABLET BY MOUTH  DAILY AS NEEDED FOR LEG  SWELLING 90 tablet 3    DULoxetine (CYMBALTA) 60 MG extended release capsule TAKE 1 CAPSULE BY MOUTH DAILY 90 capsule 1    TRULICITY 1.5 GZ/7.1VK SOPN INJECT THE CONTENTS OF ONE  PEN SUBCUTANEOUSLY WEEKLY 6 mL 3    traZODone (DESYREL) 100 MG tablet TAKE 1 TABLET BY MOUTH AT  NIGHT 90 tablet 1    insulin glargine (LANTUS SOLOSTAR) 100 UNIT/ML injection pen INJECT SUBCUTANEOUSLY 12  UNITS ONCE NIGHTLY 15 mL 3    blood glucose test strips (ONETOUCH ULTRA) strip As needed. 100 strip 4    dextran 70-hypromellose (TEARS NATURALE) 0.1-0.3 % SOLN opthalmic solution as needed for itching      ipratropium-albuterol (DUONEB) 0.5-2.5 (3) MG/3ML SOLN nebulizer solution Inhale 3 mLs into the lungs every 4 hours as needed for Shortness of Breath (wheezing coughing) 360 mL 5    ammonium lactate (LAC-HYDRIN) 12 % lotion Apply topically daily. (Patient taking differently: as needed Apply topically daily. ) 1 Bottle 1     No current facility-administered medications for this visit. Data Reviewed:   CBC and Renal reviewed  Last CBC  Lab Results   Component Value Date/Time    WBC 5.8 11/22/2022 11:29 AM    RBC 4.30 11/22/2022 11:29 AM    HGB 11.6 11/22/2022 11:29 AM    MCV 84.6 11/22/2022 11:29 AM     11/22/2022 11:29 AM     Last Renal  Lab Results   Component Value Date/Time     11/22/2022 11:29 AM    K 4.2 11/22/2022 11:29 AM    K 4.4 09/11/2022 08:49 AM     11/22/2022 11:29 AM    CO2 27 11/22/2022 11:29 AM    CO2 26 10/26/2022 11:07 AM    CO2 25 09/22/2022 08:55 AM    BUN 13 11/22/2022 11:29 AM    CREATININE 0.9 01/13/2023 07:22 AM    CREATININE 0.7 11/22/2022 11:29 AM    GLUCOSE 78 11/22/2022 11:29 AM    CALCIUM 10.4 11/22/2022 11:29 AM       Last ABG  POC Blood Gas: No results found for: POCPH, POCPCO2, POCPO2, POCHCO3, NBEA, XAGM3MSF  No results for input(s): PH, PCO2, PO2, HCO3, BE, O2SAT in the last 72 hours. Radiology Review:  Pertinent images / reports were reviewed as a part of this visit. CT Chest w/ contrast: No results found for this or any previous visit.       CT Chest w/o contrast: Results for orders placed during the hospital encounter of 08/29/19    CT CHEST WO CONTRAST    Narrative  EXAMINATION:  CT OF THE CHEST WITHOUT CONTRAST 8/29/2019 4:46 pm    TECHNIQUE:  CT of the chest was performed without the administration of intravenous  contrast. Multiplanar reformatted images are provided for review. Dose  modulation, iterative reconstruction, and/or weight based adjustment of the  mA/kV was utilized to reduce the radiation dose to as low as reasonably  achievable. COMPARISON:  Chest CTPA 10/26/2017    HISTORY:  ORDERING SYSTEM PROVIDED HISTORY: Abnormal CT scan of lung  TECHNOLOGIST PROVIDED HISTORY:  Reason for Exam: family hx of lung ca trouble breathing  Acuity: Acute  Type of Exam: Initial    FINDINGS:  Mediastinum:    1. There is no hilar or mediastinal adenopathy. There is some limitation  without intravenous contrast.  2. No significant cardiac enlargement or pericardial effusion. There is  minimal coronary calcification, proximal LAD. 3. Vasculature is unremarkable for age on noncontrast imaging. 4. A small hiatal hernia is unchanged. Lungs/pleura:    1. There is no airspace disease, mass or nodule. No pleural effusion. 2. Airways are unremarkable. 3. Patchy density is seen inferior medial lower lungs bilaterally, paraspinal  likely atelectasis or scarring. Incidental calcified granuloma inferior  medial right lower lobe. Upper Abdomen: Unremarkable. Adrenal glands normal.    Soft Tissues/Bones: Unremarkable. Impression  No acute abnormality with the above findings. CTPA: Results for orders placed during the hospital encounter of 09/25/21    CT CHEST PULMONARY EMBOLISM W CONTRAST    Narrative  EXAMINATION:  CT OF THE ABDOMEN AND PELVIS WITH CONTRAST; CTA OF THE CHEST 9/25/2021 10:30  am    TECHNIQUE:  CT of the abdomen and pelvis was performed with the administration of  intravenous contrast. Multiplanar reformatted images are provided for review.   Dose modulation, iterative reconstruction, and/or weight based adjustment of  the mA/kV was utilized to reduce the radiation dose to as low as reasonably  achievable.; CTA of the chest was performed after the administration of  intravenous contrast.  Multiplanar reformatted images are provided for  review. MIP images are provided for review. Dose modulation, iterative  reconstruction, and/or weight based adjustment of the mA/kV was utilized to  reduce the radiation dose to as low as reasonably achievable. COMPARISON:  None    Abdominal CT April 2021    HISTORY:  ORDERING SYSTEM PROVIDED HISTORY: upper abd pain  TECHNOLOGIST PROVIDED HISTORY:  Additional Contrast?->None  Reason for exam:->upper abd pain  Decision Support Exception - unselect if not a suspected or confirmed  emergency medical condition->Emergency Medical Condition (MA)  Reason for Exam: abd pain cp  Acuity: Acute  Type of Exam: Initial; ORDERING SYSTEM PROVIDED HISTORY: sob, elevated ddimer  TECHNOLOGIST PROVIDED HISTORY:  Reason for exam:->sob, elevated ddimer  Decision Support Exception - unselect if not a suspected or confirmed  emergency medical condition->Emergency Medical Condition (MA)  Reason for Exam: abd pain cp  Acuity: Acute  Type of Exam: Initial    FINDINGS:    Chest:    Mediastinum: No aortic aneurysm identified. No intimal flap is seen. Thyroid gland appears normal.  Mild coronary artery calcification is seen. Trace pericardial fluid is seen. Small hiatal hernia is seen. There is  nonspecific thickening at the GE junction. No embolus is seen in the  central, right, or left main pulmonary artery. No embolus is seen in the  segmental branches. Subsegmental branches not well evaluated secondary to  motion. .  No significant mediastinal adenopathy    Lungs/pleura: Respiratory motion artifact limits evaluation of fine pulmonary  parenchymal change. Mild bronchiectatic changes are seen. A few subpleural  reticular opacities are seen in the left lower lobe and right lower lobe. No  focal lung consolidation is seen. No pneumothorax. No significant pleural  fluid    Soft Tissues/Bones: Spurring is seen in the spine.   Spurring is seen in the  shoulder joints      Abdomen/Pelvis:    Organs: No splenomegaly. No perisplenic fluid    Adrenal glands appear normal    No hydronephrosis on right. No hydronephrosis on left. Punctate hypodense  nodule seen in the left kidney, too small to characterize, likely cyst.  No  peripancreatic fluid. No peripancreatic inflammatory change. No intrahepatic ductal dilatation. No perihepatic fluid. No inflammatory  stranding surrounding the gallbladder    GI/Bowel:    No significant small bowel distention noted. Moderate stool seen in the  colon. Appendix is identified and normal in caliber. No significant small  bowel wall thickening. No significant pericolonic fat stranding. Pelvis: No free fluid in pelvis. No pelvic adenopathy. Bladder is  incompletely distended accentuating its wall thickness. Peritoneum/Retroperitoneum: Athero sclerotic change seen in aorta. No  aneurysm. No retroperitoneal adenopathy. Bones/Soft Tissues: Tiny Bethany umbilical hernia containing fat is seen. Spurring is seen in the spine. Spurring is seen in the hips    Impression  Chest:    No pulmonary embolus identified. There is mild coronary artery calcification. Mild bronchiectasis. Scattered subpleural reticular opacities are also seen,  suggesting scarring or nonspecific pulmonary fibrosis. Abdomen and pelvis:    No acute intra-abdominal abnormality      CXR PA/LAT: Results for orders placed during the hospital encounter of 05/11/21    XR CHEST (2 VW)    Narrative  EXAMINATION:  TWO XRAY VIEWS OF THE CHEST    5/11/2021 4:17 pm    COMPARISON:  04/20/2021 radiograph    HISTORY:  ORDERING SYSTEM PROVIDED HISTORY: Chest Pain  TECHNOLOGIST PROVIDED HISTORY:  Reason for exam:->Chest Pain  Reason for Exam: chest pain; sob    FINDINGS:  The heart, mediastinum and pulmonary vascularity are normal.  Lungs are  well-expanded and clear.  No skeletal abnormalities are present in the chest.    Impression  No significant findings in the chest.        Pulmonary function testing  None on file        This note was transcribed using [id number] [employer]. Please disregard any translational errors.     Sheila Gunter MD  [facility] Pul[facility], Sleep and Critical Care

## 2023-02-07 ENCOUNTER — OFFICE VISIT (OUTPATIENT)
Dept: ORTHOPEDIC SURGERY | Age: 68
End: 2023-02-07
Payer: MEDICARE

## 2023-02-07 VITALS — WEIGHT: 193 LBS | HEIGHT: 63 IN | BODY MASS INDEX: 34.2 KG/M2

## 2023-02-07 DIAGNOSIS — M54.50 LOW BACK PAIN, UNSPECIFIED BACK PAIN LATERALITY, UNSPECIFIED CHRONICITY, UNSPECIFIED WHETHER SCIATICA PRESENT: ICD-10-CM

## 2023-02-07 DIAGNOSIS — M54.16 RIGHT LUMBAR RADICULITIS: Primary | ICD-10-CM

## 2023-02-07 DIAGNOSIS — M51.36 DDD (DEGENERATIVE DISC DISEASE), LUMBAR: ICD-10-CM

## 2023-02-07 DIAGNOSIS — M51.26 HNP (HERNIATED NUCLEUS PULPOSUS), LUMBAR: ICD-10-CM

## 2023-02-07 PROCEDURE — 1124F ACP DISCUSS-NO DSCNMKR DOCD: CPT | Performed by: FAMILY MEDICINE

## 2023-02-07 PROCEDURE — 3017F COLORECTAL CA SCREEN DOC REV: CPT | Performed by: FAMILY MEDICINE

## 2023-02-07 PROCEDURE — 1036F TOBACCO NON-USER: CPT | Performed by: FAMILY MEDICINE

## 2023-02-07 PROCEDURE — G8427 DOCREV CUR MEDS BY ELIG CLIN: HCPCS | Performed by: FAMILY MEDICINE

## 2023-02-07 PROCEDURE — 1090F PRES/ABSN URINE INCON ASSESS: CPT | Performed by: FAMILY MEDICINE

## 2023-02-07 PROCEDURE — G8484 FLU IMMUNIZE NO ADMIN: HCPCS | Performed by: FAMILY MEDICINE

## 2023-02-07 PROCEDURE — 99213 OFFICE O/P EST LOW 20 MIN: CPT | Performed by: FAMILY MEDICINE

## 2023-02-07 PROCEDURE — G8399 PT W/DXA RESULTS DOCUMENT: HCPCS | Performed by: FAMILY MEDICINE

## 2023-02-07 PROCEDURE — G8417 CALC BMI ABV UP PARAM F/U: HCPCS | Performed by: FAMILY MEDICINE

## 2023-02-07 NOTE — PROGRESS NOTES
Chief Complaint  Follow-up ([facility]  MRI done on 1/30/23)      FU  ongoing right hip and right-sided back pain with episodic right leg pain. With history of lumbar degenerative disc disease and spondylolysis. Review of lumbar imaging. History of Present Illness:  Juan Payton is a 75 y.o. female who is a very pleasant female who is a very nice patient of Dr. Bria Finnegan who is being seen today in kind consultation from Dr. Caryle Buerger for evaluation and second opinion consultation regarding her right hip. She does have a history of knee arthroplasty last year with Dr. Kranthi Green and does have a history of mechanical back pain requiring epidurals in the past and has been seen several other physicians including Dr. Kranthi Green and Dr. Tayler Rodriguez for her right hip. She continues to have pain in most of her ongoing knee symptoms post knee arthroplasty in summer 2021 had focused on therapy although shortly thereafter she has had pain into her right hip and groin. This is become increasingly painful for her since her knee replacement in July 2022. She did have an MRI performed of her hip which was performed at [facility] on 12/1/2022 which did show evidence of mild to moderate right hip osteoarthritis with some mild stress edema to the acetabulum without evidence of high-grade soft tissue injury. She is complaining of pain into her groin and does have weakness but once again most of her therapy has been associated in December with her knee. She is also been complaining of increasing back pain and does have some achiness and dysesthesias in her right lower lumbar spine. As noted she did have epidurals in the remote past but has not had these in a number of years and there certainly may be some correlation. She does feel stiff and weak and has difficulty with positional changes and has continued with her gabapentin. She does have a history of stage III renal disease limiting her ability to take oral anti-inflammatories.   She also takes Cymbalta. Due to persistence of her pain Dr. Lia Harding asked us to see her today for second opinion consultation regarding her hip. She did have ultrasound-guided steroid injections to her hip remotely with Dr. Chalo Douglass with the last one being performed on 3/8/2022 with Dr. Chalo Douglass which did not help nearly as well as previous ones had. She is being seen today for orthopedic and sports consultation with review of her imaging. We last saw Calista Herrera in the office on 1/24/2023 after longstanding treatment of both hip and back pain and was seen in consultation from Dr. Lia Harding. On questioning her and pursuing a work-up. It was felt that while she did have mild to moderate hip osteoarthritis there may be some degree of chronic radiculopathy. We did send her for lumbar MRI which was obtained at [facility] on 1/30/2023 and did show evidence of a central and right-sided 3 to 4 mm disc herniation extending into the neural foramen resulting in moderate right foraminal stenosis which is likely a concomitant pain generator. She did have more of a left-sided smaller disc herniation at L3-4 favoring the left-hand side. She did also have incidental renal cyst which ultrasound was recommended for her. She did get a transient partial improvement on her oral steroids and has been using Voltaren gel and is set up later this week to begin physical therapy. She has been using her home brace. She states that while she was on the steroid pack she did notice less actual leg pain which is more in the L5 distribution. She is trying to work on her home exercises for her hip and once again has never really had any type of back rehab which will start on 12/9/2023. Medical History  Patient's medications, allergies, past medical, surgical, social and family histories were reviewed and updated as appropriate.     Review of Systems  Pertinent items are noted in HPI  Review of systems reviewed from Patient History Form dated on 1/24/2023 and available in the patient's chart under the Media tab. Vital Signs  There were no vitals filed for this visit. General Exam:     Constitutional: Patient is adequately groomed with no evidence of malnutrition  DTRs: Deep tendon reflexes are intact  Mental Status: The patient is oriented to time, place and person. The patient's mood and affect are appropriate. Lymphatic: The lymphatic examination bilaterally reveals all areas to be without enlargement or induration. Vascular: Examination reveals no swelling or calf tenderness. Peripheral pulses are palpable and 2+. Neurological: The patient has good coordination. There is no weakness or sensory deficit. Lumbar/Sacral Spine Examination  Inspection: There is no high-grade deformity soft tissue swelling or high-grade scoliosis      Palpation: Does have diffuse lumbar paraspinal lower lumbar facet tenderness with some central gluteal tenderness right greater than left       Rang of Motion: Does complain of back pain with flexion but has had more pain with extension and facet loading to the right which produces primarily gluteal pain without high-grade radiation of pain into her lower extremities. Her extension also produces groin pain. Strength: She does have pain associated hip weakness 4 out of 5 with hip flexion and abduction. Special Tests: I would call her straight leg raising equivocal on the right with tightness to her hamstrings and IT bands. Negative straight leg raising on the left. She does have some pain with logroll testing and hip impingement testing on the right as well. Skin: There are no rashes, ulcerations or lesions. Distal motor sensory and vascular exams intact. Gait: Moderate antalgia. Using a cane.     Reflexes:  Symmetrically preserved     Additional Comments: Evaluation the right hip does reveal developed function anterior lateral hip discomfort with some pain reproduced with logroll testing and hip impingement testing. Additional Examinations:  Right Lower Extremity: Examination of the right lower extremity does not show any tenderness, deformity or injury. Range of motion is unremarkable. There is no gross instability. There are no rashes, ulcerations or lesions. Strength and tone are normal.  Left Lower Extremity: Examination of the left lower extremity does not show any tenderness, deformity or injury. Range of motion is unremarkable. There is no gross instability. There are no rashes, ulcerations or lesions. Strength and tone are normal.  Contralateral left hip exam appears benign. Diagnostic Test Findings: Right hip MRI obtained at [facility] 12/1/2022 is listed above  Narrative   Site: [facility] #: [id number] #: [id number]: Bri Krishnan #: [de-identified] #: [id number] #: [id number]: MRI HIP RIGHT WO CONExam Date/Time: 12/01/2022 12:00 PMAdmitting Diagnosis: Fracture, hipReason for Exam:    Fracture, hip       Dictated by: Vianca Rider SHREYAS: 12/01/2022 09:54 AMT: This document is confidential medical information. Unauthorized disclosure or use of this information is prohibited by law. If you are not the intended recipient of this document, please advise us by    calling immediately [phone]. Impression/Conclusion below       HISTORY:  Fracture, hip   rule out mass, aneurysm, stress fracture  Right hip pain  Pain in right hip; Other chronic pain   COMPARISON: MRI right hip 10/31/2016, right hip radiographs 11/28/2022   TECHNIQUE: Multiplanar multisequence MRI images of the hip with limited views of the bony    pelvis and contralateral hip   NOTE:  If there are questions about the content of this report, please contact [facility]    radiology by calling [phone]   NOTE:  Report marked for documented transmission to the ordering physician per [facility]    Radiology department protocol.        FINDINGS:    BONES:  Mild subchondral bone marrow edema in the right femoral head. Mild to moderate bone    marrow edema in the anterior acetabula. No discrete fracture line. HIP JOINT:  Mild to moderate chondral thinning. Mild degenerative tearing of the labrum. Small    to moderate-sized joint effusion containing debris/synovitis. PUBIC SYMPHYSIS:  Unremarkable   SACRUM:  Mild subchondral bone marrow edema about these sacroiliac joints, consistent with mild    osteoarthritis. LOWER LUMBAR SPINE:  Degenerative changes of the lower lumbar spine. PELVIC SOFT TISSUES:  No acute intra-pelvic abnormality   SOFT TISSUES:  Mild bilateral greater trochanteric bursal inflammation with no focal fluid    collection. ANTERIOR COMPARTMENT MUSCLES AND TENDONS:   Unremarkable     GLUTEAL MUSCLES AND TENDONS:   Mild bilateral distal gluteal insertional tendinopathy. HAMSTRING MUSCLES AND TENDONS:  Mild bilateral hamstring origin tendinopathy. MEDIAL COMPARTMENT MUSCLES AND TENDONS:  Unremarkable        CONTRALATERAL HIP:  Mild osteoarthritis. OTHER:  Several mildly enlarged right inguinal lymph nodes. IMPRESSION:           Mild to moderate stress edema in the anterior acetabula with no discrete fracture line. Mild to moderate osteoarthritis of the right hip joint. Mild right inguinal lymphadenopathy which is nonspecific in etiology and may be reactive to an    inflammatory, infectious, or posttraumatic process. Metastatic lymphadenopathy is considered    less likely but is possible in the appropriate clinical setting. If further evaluation of these lymph nodes is clinically desired, consider ultrasound-guided    biopsy.        No evidence of mass or aneurysm in this noncontrast exam.   SIGNED BY: Hernesto Mcintosh MD on 12/1/2022  9:51 AM         [address] [facility] [phone] -  [address]: [phone]           Lumbar spine MRI obtained at [address] [place] [address] 1/30/2023 as listed above Site: [employer] [facility] #: [id number] #: [id number] Procedure: MR Lumbar Spine w/o Contrast ; Reason for Exam: rule out disc herniation and foraminal narrowing; low back pain, unspecified back pain laterality; DDD, lumbar   This document is confidential medical information. Unauthorized disclosure or use of this information is prohibited by law. If you are not the intended recipient of this document, please advise us by calling immediately [phone]. [address] [facility], [address] [place] [address]           Patient Name: Rossy Parker   Case ID: [id number]   Patient : 1955   Referring Physician: Wood Potts MD   Exam Date: 2023   Exam Description: MR Lumbar Spine w/o Contrast            HISTORY:  Evaluate for disc herniation and foraminal narrowing. Low back pain, unspecified    back pain laterality, degenerative disc disease, lumbar. No injury. Anterior right thigh and    right buttock pain for 1.5 years. Diabetic. No cancer or surgery. TECHNICAL FACTORS:  Long- and short-axis fat- and water-weighted images were performed. COMPARISON:  None. FINDINGS:   L5-S1:  No focal protrusion or stenosis. L4-5:  Central rightward 3-4 mm protrusion-type herniation extends into the right foramen    contributing to moderate right foraminal stenosis. Either side may be affected. Mild to    moderate bilateral foraminal stenosis due to a combination of facet disease and mixed    disc-osteophyte complex(es). Worse on the right where it is moderate. L3-4:  Minimal anterolisthesis. Moderate left foraminal stenosis due to 2 mm leftward    intraforaminal protrusion-type herniation. L2-3:  No focal protrusion or stenosis. L1-2:  No focal protrusion or stenosis. Scoliosis. Marrow signal suggesting osteopenia/osteoporosis. Renal cyst(s) or cyst-like mass(es). The remaining cord signal intensity is unremarkable. The remaining vertebral body marrow signal    is unremarkable. The remaining prevertebral soft tissues are unremarkable. No significant    ligamentum flavum hypertrophy unless detailed in specific level above. CONCLUSION:   1. L4-5 central rightward 3-4 mm protrusion-type herniation extends into the right foramen    contributing to moderate right foraminal stenosis. Either side may be affected. 2. L3-4 minimal anterolisthesis. Moderate left foraminal stenosis due to 2 mm leftward    intraforaminal protrusion-type herniation. 3. Consider ultrasound to confirm simple renal cysts. Thank you for the opportunity to provide your interpretation. Paulie Maldonado. Gin Webb MD       A: SHEILA/[email or web address] 01/31/2023 10:32 AM   T: JOLENE 01/31/2023 9:33 AM         Assessment: #1. Chronic right hip pain with MRI documented in mild to moderate right hip osteoarthritis with hip weakness. 2.  Mechanical low back pain with lumbar degenerative disc disease and facet arthropathy with MRI documented L4-5 3 to 4 mm disc herniation with right foraminal narrowing narrowing and likely right-sided L5 radiculopathy   #3. 7 month status post right total knee arthroplasty per Dr. Shannan Christian:  Encounter Diagnoses   Name Primary? Right lumbar radiculitis Yes    HNP (herniated nucleus pulposus), lumbar     Low back pain, unspecified back pain laterality, unspecified chronicity, unspecified whether sciatica present     DDD (degenerative disc disease), lumbar        Office Procedures:  No orders of the defined types were placed in this encounter. Treatment Plan:  Treatment options were discussed with John Wood. We did review her current plain films, recent lumbar spine MRI and exam findings as well as her MRI recently of her right hip on 12/1/2022.   While she does have mild to moderate hip osteoarthritis she does have considerable hip weakness and I am concerned about the potential for concomitant right-sided disc herniation on the right L5 radiculopathy. Her lumbar MRI does seem to suggest a disc herniation at L4-5 with right foraminal narrowing which likely would indicate a concomitant right L5 radiculopathy. She did notice a partial improvement while on a Medrol pack and has been using her Voltaren gel. She is set up to begin physical therapy specifically for her back and also to emphasize her home-based exercise program for her right hip on 2/9/2023. She may continue with her chronic Cymbalta and does have an appointment to see Dr. Moi King for repeat diagnostic and therapeutic hip steroid injection. Some little unclear as to whether or not she has actually had lumbar epidurals or if she was confusing her ultrasound-guided hip injections without actual epidural.  Regardless I would like for her to consultation with Dr. Jannette Kendrick she may benefit from epidural trials. Continue with her back brace. We did asked that she sees Dr. Quinn Navarro for evaluation of her suspected renal cyst that she may need an ultrasound. This did not show aggressive characteristics or mass formation or likely simple renal cyst.  We will see her back as needed and she will continue with her Voltaren gel. Icing and activity modification and use of her home brace recommended. She will see us back as needed. They will contact us in the interim with questions or concerns       CC: Dr. Zeno Kanner Day      This dictation was performed with a verbal recognition program [facility]) and it was checked for errors. It is possible that there are still dictated errors within this office note. If so, please bring any errors to my attention for an addendum. All efforts were made to ensure that this office note is accurate.

## 2023-02-09 ENCOUNTER — HOSPITAL ENCOUNTER (OUTPATIENT)
Dept: PHYSICAL THERAPY | Age: 68
Setting detail: THERAPIES SERIES
Discharge: HOME | End: 2023-02-09
Payer: MEDICARE

## 2023-02-09 DIAGNOSIS — M16.11 PRIMARY OSTEOARTHRITIS OF RIGHT HIP: ICD-10-CM

## 2023-02-09 PROCEDURE — 97162 PT EVAL MOD COMPLEX 30 MIN: CPT

## 2023-02-09 PROCEDURE — 97530 THERAPEUTIC ACTIVITIES: CPT

## 2023-02-09 PROCEDURE — 97110 THERAPEUTIC EXERCISES: CPT

## 2023-02-09 RX ORDER — NAPROXEN 500 MG/1
TABLET ORAL
Qty: 60 TABLET | Refills: 0 | Status: SHIPPED | OUTPATIENT
Start: 2023-02-09

## 2023-02-09 NOTE — FLOWSHEET NOTE
[address] [facility], [place]                                                         Physical Therapy Daily Treatment Note  Date:  2023    Patient Name:  Lynne Esquivel    :  1955  MRN: [id number]    Medical/Treatment Diagnosis Information:  Diagnosis: M54.50 (ICD-10-CM) - Low back pain, unspecified back pain laterality, unspecified chronicity, unspecified whether sciatica present  M79.604 (ICD-10-CM) - Pain of right lower extremity  M51.36 (ICD-10-CM) - DDD (degenerative disc disease), lumbar  M16.11 (ICD-10-CM) - Primary osteoarthritis of right hip  M25.551 (ICD-10-CM) - Right hip pain  R29.898 (ICD-10-CM) - Right leg weakness  Treatment Diagnosis: M54.50 (ICD-10-CM) - Low back pain, unspecified,  M25.551 (ICD-10-CM) - Right hip pain  Insurance/Certification information:  PT Insurance Information: [facility] Dual  Physician Information:  Referring Provider (secondary): Dr. Linh Pak  Has the plan of care been signed (Y/N):        []  Yes  [x]  No     Date of Patient follow up with Physician:       Is this a Progress Report:     []  Yes  [x]  No        If Yes:  Date Range for reporting period:  Beginning- 2023   Ending    Progress report will be due (10 Rx or 30 days whichever is less):  3/9       Recertification will be due (POC Duration  / 90 days whichever is less): as above        Visit # Insurance Allowable Auth Required   1 BMN []  Yes [x]  No        Functional Scale:     OUTCOME MEASURE DATE DEFICIT   WOMAC  IE 73% Deficit         Latex Allergy:  [x]NO      []YES  Preferred Language for Healthcare:   [x]English       []other:      Pain level:  7/10  on 2023    SUBJECTIVE:  See eval    OBJECTIVE:   See eval  Standing Exam Normal Abnormal N/A Comments   Toe walk             Heel Walk           Side bending 50% limitation and pain (to the L)         Pelvic Height           Fwd Bend- (aberrant juttering or innominate mvmt) 50% limitation, \"stretch\"         Extension 25% limitation, pain         Trendelenburg           Kemps/Quadrant           Stork           Rotation 25% limiation          ROM LEFT RIGHT Comments   Hip Flexion 108 73    Hip Abd      Hip ER      Hip IR      Hip Extension      Knee Ext      Knee Flex      Piriformis      Hamstring 50% limitation  75% limitation 90/90 position                 Strength LEFT RIGHT Comments   Multifidus      Transverse Ab      Hip Flexors 4+ 3-    Hip Abductors      Hip Extensors      Knee flex 4+ 3+    Knee extn 4+ 3+          RESTRICTIONS/PRECAUTIONS: DM type 2, OA, HTN, hx of stroke in 2016, CKD (per chart review, though she denies), Cardiomyopathy, R foot arthrodesis, R shoulder RTC repair, anxiety, depression    Exercises/Interventions:   Exercise Resistance/Repetitions Other comments   Stretching:      Hamstring stretch 5x10\" Supine   Piriformis stretch- figure 4 4x15\"    Knee to opposite shoulder     LTR     Open book     [address]     Child's pose     Prone prop on elbow     Prone extn to outstretched hands     Standing lumbar extn     Seated 3 way SB rollout flexion           Seated forward flexion     Cat/camel     Hip flexor stretch half kneeling     Tomas stretch          Neural mobilization:     Sciatic nerve glides          Strengthening/stabilization:     Posterior pelvic tilts 10x5\"    TrA contraction     TrA with ball squeeze     TrA contraction with alt marches x10    TrA with alt 90/90 heel taps          Prone alt UE lift     Prone alt UE/LE lift     Prone towel squeeze with B leg lift   Pillow under hips to avoid lumbar hyperextn   Prone heel clicks     Prone leaning over edge of table hip extn     Supine B hip abd with TB     clams     Standing clam with back foot against wall     Bridge- DL     Cook bridge     SL bridge     Bridge on [facility]          Quadruped position with knee on airex with opposite hip lift     Bird/dog     Quadruped with hip abd     Quadruped with hip circles     Quadruped with foot against ball on wall     Opposite UE to LE isometric core     Leg drop hold     [place] TKE with glute set     Half kneeling on airex glute set + stabilization exercise          Hip abduction isometric into wall standing     Tandem stance + stabilization exercise     SLS  [place] LE at 90   High march with opposite hip stabilization standing     TrA/mutlifidi walk outs     TrA/multifidi pallof press     Scapular retraction     Seated SB marches          squats     Leg press     Lateral band walks     SB pikes     SB knee tucks          planks     Side planks          Frozen Bears               Manual treatment:                                Patient education: Pt was educated on PT diagnosis, prognosis, and plan of care. Pt was educated on the use of ice throughout the day. Therapeutic Exercise and NMR EXR  [x] (02156) Provided verbal/tactile cueing for activities related to strengthening, flexibility, endurance, ROM  for improvements in proximal hip and core control with self care, mobility, lifting and ambulation.  [] (23729) Provided verbal/tactile cueing for activities related to improving balance, coordination, kinesthetic sense, posture, motor skill, proprioception  to assist with core control in self care, mobility, lifting, and ambulation.      Therapeutic Activities:    [x] (16306 or 64982) Provided verbal/tactile cueing for activities related to improving balance, coordination, kinesthetic sense, posture, motor skill, proprioception and motor activation to allow for proper function  with self care and ADLs  [] (49680) Provided training and instruction to the patient for proper core and proximal hip recruitment and positioning with ambulation re-education     Home Exercise Program:    [x] (80267) Reviewed/Progressed HEP activities related to strengthening, flexibility, endurance, ROM of core, proximal hip and LE for functional self-care, mobility, lifting and ambulation   [] (46472) Reviewed/Progressed HEP activities related to improving balance, coordination, kinesthetic sense, posture, motor skill, proprioception of core, proximal hip and LE for self care, mobility, lifting, and ambulation      Manual Treatments:  PROM / STM / Oscillations-Mobs:  G-I, II, III, IV (PA's, Inf., Post.)  [] (48352) Provided manual therapy to mobilize proximal hip and LS spine soft tissue/joints for the purpose of modulating pain, promoting relaxation,  increasing ROM, reducing/eliminating soft tissue swelling/inflammation/restriction, improving soft tissue extensibility and allowing for proper ROM for normal function with self care, mobility, lifting and ambulation. Modalities:       Charges:  Timed Code Treatment Minutes: 23'   Total Treatment Minutes: 37'     [] EVAL (LOW) 455 [id number]   [x] EVAL (MOD) 80531   [] EVAL (HIGH) 89051   [] RE-EVAL   [x] LQ(11760) x  1   [] IONTO  [] NMR (24146) x     [] VASO  [] Manual (01139) x      [] Other:  [x] TA x  1    [] Mech Traction (43158)  [] ES(attended) (70633)      [] ES (un) (57088):     HEP instruction:   Access Code: [email or web address]  URL: [email or web address]  Date: 02/09/2023  Prepared by: Emiliano Lacey  (See scanned forms)      GOALS:  Patient stated goal: decrease pain     Therapist goals for Patient:      Short Term Goals: To be achieved in: 2 weeks  1. Independent in HEP and progression per patient tolerance, in order to prevent re-injury. [] Progressing: [] Met: [] Not Met: [] Adjusted   2. Patient will have a decrease in pain to facilitate improvement in movement, function, and ADLs as indicated by Functional Deficits. [] Progressing: [] Met: [] Not Met: [] Adjusted     Long Term Goals: To be achieved in: 6 weeks  1. Disability index score of 36% or less for the [facility] to assist with reaching prior level of function. [] Progressing: [] Met: [] Not Met: [] Adjusted  2.  Patient will demonstrate increased [facility] and pain free to allow for proper joint functioning as indicated by patients Functional Deficits. [] Progressing: [] Met: [] Not Met: [] Adjusted  3. Patient will demonstrate an increase in strength to > or = to 4+/5 to allow for proper functional mobility as indicated by patients Functional Deficits. [] Progressing: [] Met: [] Not Met: [] Adjusted  4. Patient will be able to stand from a seated position without increased symptoms or restriction. [] Progressing: [] Met: [] Not Met: [] Adjusted  5. Patient will be able to stand for 10 minutes without increased symptoms or restriction. [] Progressing: [] Met: [] Not Met: [] Adjusted             Overall Progression Towards Functional goals/ Treatment Progress Update:  [] Patient is progressing as expected towards functional goals listed. [] Progression is slowed due to complexities/Impairments listed. [] Progression has been slowed due to co-morbidities. [x] Plan just implemented, too soon to assess goals progression <30days   [] Goals require adjustment due to lack of progress  [] Patient is not progressing as expected and requires additional follow up with physician  [] Other    Prognosis for POC: [x] Good [] Fair  [] Poor      Patient requires continued skilled intervention: [x] Yes  [] No      ASSESSMENT:  See eval    Treatment/Activity Tolerance:  [x] Patient tolerated treatment well [] Patient limited by fatique  [] Patient limited by pain  [] Patient limited by other medical complications  [x] Other: Patient requiring education throughout session on the importance of pushing into a stretch with her history of TKA with a scope and lysis of adhesions following. Patient demonstrates significant mobility deficits this date, likely leading to her recent increase in hip and low back pain. She was educated if a stretch pain dissipates when out of the position, the stretch is safe to complete.          PLAN: See eval  [] Continue per plan of care [] Alter current plan (see comments above)  [x] Plan of care initiated [] Hold pending MD visit [] Discharge    Note: If patient does not return for scheduled/ recommended follow up visits, this note will serve as a discharge from care along with most recent update on progress.      Electronically signed by: Fabian Perez PT, DPT

## 2023-02-09 NOTE — PLAN OF CARE
250mg Caffine infused over 5 minutes [address] [facility],[address], [address] [facility], [address] [place] [address]  Phone: [phone]   Fax: [phone]                                                          Physical Therapy Certification    Dear Referring Provider (secondary): Dr. Cristina Lopez,    We had the pleasure of evaluating the following patient for physical therapy services at [address] [place] [address]. A summary of our findings can be found in the initial assessment below. This includes our plan of care. If you have any questions or concerns regarding these findings, please do not hesitate to contact me at the office phone number checked above.   Thank you for the referral.       Physician Signature:_______________________________Date:__________________  By signing above (or electronic signature), therapists plan is approved by physician      Patient: Mary Campbell   : 1955   MRN: [id number]  Referring Physician: Referring Provider (secondary): Dr. Cristina Lopez      Evaluation Date: 2023      Medical Diagnosis Information:  Diagnosis: M54.50 (ICD-10-CM) - Low back pain, unspecified back pain laterality, unspecified chronicity, unspecified whether sciatica present  M79.604 (ICD-10-CM) - Pain of right lower extremity  M51.36 (ICD-10-CM) - DDD (degenerative disc disease), lumbar  M16.11 (ICD-10-CM) - Primary osteoarthritis of right hip  M25.551 (ICD-10-CM) - Right hip pain  R29.898 (ICD-10-CM) - Right leg weakness   Treatment Diagnosis: M54.50 (ICD-10-CM) - Low back pain, unspecified,  M25.551 (ICD-10-CM) - Right hip pain                                           Precautions/ Contra-indications: DM type 2, OA, HTN, hx of stroke in 2016, CKD (per chart review, though she denies), Cardiomyopathy, R foot arthrodesis, R shoulder RTC repair, anxiety, depression    C-SSRS Triggered by Intake questionnaire (Past 2 wk assessment):   [x] No, Questionnaire did not trigger screening.   [] Yes, Patient intake triggered further evaluation      [] C-SSRS Screening completed  [] PCP notified via Plan of Care  [] Emergency services notified     Latex Allergy:  [x]NO      []YES  Preferred Language for Healthcare:   [x]English       []other:    SUBJECTIVE: Patient stated complaint: low back and right hip pain (anterior aspect). This worsened over a year ago. She denies an MELANI. She had an angiogram in 2016 and states she has had the pain in the hip since this occurrence. She has had physical therapy and injections in the past for the hip. Hx of TKA on the R in 2022, and that her pain has increased through this period. She has followed up with Dr. Pedro De Leon and has received a back brace. She is following up with epidurals for her back pain (2/20). She is unsure if the back brace has helped her pain. Hx from chart review: We last saw Bridger Simon in the office on 1/24/2023 after longstanding treatment of both hip and back pain and was seen in consultation from Dr. Brii Saez On questioning her and pursuing a work-up. It was felt that while she did have mild to moderate hip osteoarthritis there may be some degree of chronic radiculopathy. We did send her for lumbar MRI which was obtained at [facility] on 1/30/2023 and did show evidence of a central and right-sided 3 to 4 mm disc herniation extending into the neural foramen resulting in moderate right foraminal stenosis which is likely a concomitant pain generator. She did have more of a left-sided smaller disc herniation at L3-4 favoring the left-hand side. She did also have incidental renal cyst which ultrasound was recommended for her. She did get a transient partial improvement on her oral steroids and has been using Voltaren gel and is set up later this week to begin physical therapy. She has been using her home brace. She states that while she was on the steroid pack she did notice less actual leg pain which is more in the L5 distribution.   She is trying to work on her home exercises for her hip and once again has never really had any type of back rehab which will start on 2/9/2023. CONCLUSION:   1. L4-5 central rightward 3-4 mm protrusion-type herniation extends into the right foramen    contributing to moderate right foraminal stenosis. Either side may be affected. 2. L3-4 minimal anterolisthesis. Moderate left foraminal stenosis due to 2 mm leftward    intraforaminal protrusion-type herniation. 3. Consider ultrasound to confirm simple renal cysts. Thank you for the opportunity to provide your interpretation. Relevant Medical History: DM type 2, OA, HTN, hx of stroke in 2016, CKD (per chart review, though she denies), Cardiomyopathy, R foot arthrodesis, R shoulder RTC repair, anxiety, depression    Functional Disability Index: WOMAC 73% Deficit     Current pain:  7/10  Pain at worst:  7/10  Pain at best:  7/10    Easing factors: heat, ice  Provocative factors: walking, getting in and out car, standing from a sitting position      Type: [x]Constant   []Intermittent  []Radiating []Localized []other:    Characteristics: deep, bone, stabbing      Numbness/Tingling: denies    Functional Limitations/Impairments: [x]Sitting [x]Standing [x]Walking    []Squatting/bending  []Stairs           []ADL's  [x]Transfers []Sports/Recreation []Other:    Occupation/School:  works in store with clothing     Living Status/Prior Level of Function: Independent with ADLs and IADLs.        OBJECTIVE:     Standing Exam Normal Abnormal N/A Comments   Toe walk         Heel Walk       Side bending 50% limitation and pain (to the L)      Pelvic Height       Fwd Bend- (aberrant juttering or innominate mvmt) 50% limitation, \"stretch\"      Extension 25% limitation, pain      Trendelenburg       Kemps/Quadrant       Stork       Rotation 25% limiation              Repeated movements       Seated Exam Normal Abnormal N/A Comments   Pelvic Height       Seated Rotation       Seated flexion B hip IR                     Supine Exam Normal Abnormal N/A Comments   Hip flexion       Abduction       ER       IR       MARIANO/Chay       Hip scour       SLR       Crossed SLR       Supine to sit       SI distraction       Thigh thrust       Side-lying SI compression              Prone Exam Normal Abnormal N/A Comments   Prone knee bend       Prone hip IR       B Achilles reflex/Pheasant       PA/Spring       Prone Instability test       Sacral Spring/thrust                       ROM LEFT RIGHT Comments   Hip Flexion 108 73    Hip Abd      Hip ER      Hip IR      Hip Extension      Knee Ext      Knee Flex      Piriformis      Hamstring 50% limitation  75% limitation 90/90 position                 Strength LEFT RIGHT Comments   Multifidus      Transverse Ab      Hip Flexors 4+ 3-    Hip Abductors      Hip Extensors      Knee flex 4+ 3+    Knee extn 4+ 3+       Myotomes Normal Abnormal Comments   Hip flexion (L1-L2)      Knee extension (L2-L4)      Dorsiflexion (L4-L5)      Great Toe Ext (L5)      Ankle Eversion (S1-S2)      Ankle PF(S1-S2)          Dermatomes Normal Abnormal Comments   inguinal area (L1)       anterior mid-thigh (L2)      distal ant thigh/med knee (L3)      medial lower leg and foot (L4)      lateral lower leg and foot (L5)      posterior calf (S1)      medial calcaneus (S2)        Neural dynamic tension testing Normal Abnormal Comments   Slump Test  - Degree of knee flexion:       SLR       0-30      30-70      Femoral nerve (L2-4)        Reflexes Normal Abnormal Comments   S1-2 Seated achilles      S1-2 Prone knee bend      L3-4 Patellar tendon      C5-6 Biceps      C6 Brachioradialis      C7-8 Triceps      Clonus      Babinski      Little's        Joint mobility: deferred   []Normal    []Hypo   []Hyper    Palpation: deferred    Functional Mobility/Transfers: modified independent, using LLE to assist with RLE transfers    Posture: decreased lumbar lordosis, increased thoracic kyphosis Gait: (include devices/WB status) lateral shift to L with SPC    Bandages/Dressings/Incisions: n/a      TA Muscle Contraction Scale    Criteria Score  Quality of Contraction   Not Present [] 0   Rapid, Superificial [] 1   Just Perceptible [] 2     Gentle, Slow [] 3    Substitution   Resting  [] 0   Moderate to Strong [] 1    Subtle Perceptible [] 2   None [] 3    Symmetry of Contraction   Unilateral  [] 0   Bilateral/Asymmetrical  [] 1   Symmetrical  [] 2    Breathing     Inability/Difficulty Breathing during contraction [] 0   Able to hold contraction while Breathing [] 1    Holding   Able to Hold Contraction <10 s [] 0   Able to Hold Contraction >10 s [] 1      __/10  Adapted from Erilnda peterson, Copyright 2009      [facility] Criteria - SI Component (2/5 for positive)   []Distraction  []Thigh Thrust  []Gaenslen's test  []Compression  []Sacral thrust     [place] Criteria - Pelvic component  (3/4 for positive or 2/4 + Kunal's Sign)   []Standing Flexion test  []Seated Pelvic Landmarks   []Supine Long Sit Test  []Prone Knee Flexion Test  []Kunal's Sign       Classification driving today's treatment approach and dosing:  - Prevailing signs and symptoms consistent with:  []Symptom Modulation Approach   [] \"Directional preference subgroup\"    []Flexion Based      []age >50      []imaging evidence of lumbar stenosis     []preference for flexion     []Extension Based     []symptoms distal to buttock     []symptoms centralize with lumbar extension     []symptoms peripheralize with lumbar flexion     []directional preference for extension    []Lateral Shift     []visible frontal plane deviation     []directional preference for lateral translation   [] \"Manipulation subgroup\"  (3/4 meets manipulation criteria)     []symptoms less than 16 days    []FABQ less than 19    []Hypomobility of lumbar spine    []IR of at least 1 hip >35 degrees    Other Factors to consider:    []no symptoms distal to the knee    []no red flags and minimal yellow flags    []no contraindications to manipulation   [] \"Traction subgroup\"      []signs and symptoms of nerve root compression (radiculopathy)     []unable to centralize distal symptoms with movement    []pain or numbness in the lumbar spine, buttock, and/or LE    []peripheralization with extension movements    []+ SLR or + crossed SLR (symptoms less than 70 degrees)  []Movement Control Approach   []\"Stabilization subgroup\"    []younger age (less than [address])     []greater general flexibility (post-partum, SLR >90)    []abberant movements, painful arc, or [place]'s sign with flex/ext ROM    []positive prone instability test  []Functional Optimization Approach   []\"unspecified subgroup\"    []lower disability scores     []lower fear avoidance scores     []longer duration of symptoms (chronic)    []prior episodes of low back pain    []older age                         [x]Patient history, allergies, meds reviewed. Medical chart reviewed. See intake form. Review Of Systems (ROS):  [x]Performed Review of systems (Integumentary, CardioPulmonary, Neurological) by intake and observation. Intake form has been scanned into medical record. Patient has been instructed to contact their primary care physician regarding ROS issues if not already being addressed at this time.       Co-morbidities/Complexities (which will affect course of rehabilitation):   []None           Arthritic conditions   []Rheumatoid arthritis (M05.9)  [x]Osteoarthritis (M19.91)   Cardiovascular conditions   [x]Hypertension (I10)  []Hyperlipidemia (E78.5)  []Angina pectoris (I20)  []Atherosclerosis (I70)   Musculoskeletal conditions   []Disc pathology   []Congenital spine pathologies   []Prior surgical intervention  []Osteoporosis (M81.8)  []Osteopenia (M85.8)   Endocrine conditions   []Hypothyroid (E03.9)  []Hyperthyroid Gastrointestinal conditions   []Constipation (D75.14)   Metabolic conditions   []Morbid obesity (E66.01)  [x]Diabetes type 1(E10.65) or 2 (E11.65)   []Neuropathy (G60.9)     Pulmonary conditions   []Asthma (J45)  []Coughing   []COPD (J44.9)   Psychological Disorders  [x]Anxiety (F41.9)  [x]Depression (F32.9)   []Other:   [x]Other:     - Stroke   - Cardiomyopathy   - R foot arthrodesis   - R shoulder RTC repair     Barriers to/and or personal factors that will affect rehab potential:              [x]Age  []Sex              [x]Motivation/Lack of Motivation                        [x]Co-Morbidities              [x]Cognitive Function, education/learning barriers              []Environmental, home barriers              []profession/work barriers  []past PT/medical experience  []other:  Justification:     Falls Risk Assessment (30 days):   [x] Falls Risk assessed and no intervention required.   [] Falls Risk assessed and Patient requires intervention due to being higher risk   TUG score (>12s at risk):     [] Falls education provided, including         ASSESSMENT:     Functional Impairments:     []Noted lumbar/proximal hip hypomobility   []Noted lumbosacral and/or generalized hypermobility   [x]Decreased Lumbosacral/hip/LE functional ROM   []Decreased core/proximal hip strength and neuromuscular control    [x]Decreased LE functional strength    []Abnormal reflexes/sensation/myotomal/dermatomal deficits  []Reduced balance/proprioceptive control    []other:      Functional Activity Limitations (from functional questionnaire and intake)   [x]Reduced ability to tolerate prolonged functional positions   [x]Reduced ability or difficulty with changes of positions or transfers between positions   [x]Reduced ability to maintain good posture and demonstrate good body mechanics with sitting, bending, and lifting   [x]Reduced ability to sleep   [x] Reduced ability or tolerance with driving and/or computer work   [x]Reduced ability to perform lifting, reaching, carrying tasks   [x]Reduced ability to squat   [x]Reduced ability to forward bend   [x]Reduced ability to ambulate prolonged functional periods/distances/surfaces   [x]Reduced ability to ascend/descend stairs   []other:       Participation Restrictions     []Reduced participation in self care activities   [x]Reduced participation in home management activities   [x]Reduced participation in work activities   [x]Reduced participation in social activities. []Reduced participation in sport/recreation activities. Classification:   [x]Signs/symptoms consistent with Lumbar instability/stabilization subgroup. []Signs/symptoms consistent with Lumbar mobilization/manipulation subgroup, myotomes and dermatomes intact. Meets manipulation criteria. []Signs/symptoms consistent with Lumbar direction specific/centralization subgroup   []Signs/symptoms consistent with Lumbar traction subgroup     []Signs/symptoms consistent with lumbar facet dysfunction   []Signs/symptoms consistent with lumbar stenosis type dysfunction   []Signs/symptoms consistent with nerve root involvement including myotome & dermatome dysfunction   []Signs/symptoms consistent with post-surgical status including: decreased ROM, strength and function. []signs/symptoms consistent with pathology which may benefit from Dry needling     []other:      Prognosis/Rehab Potential:      []Excellent   [x]Good    []Fair   []Poor    Tolerance of evaluation/treatment:    []Excellent   []Good    [x]Fair   []Poor    PLAN:    Frequency/Duration:  1-2 days per week for 6 Weeks:  Interventions:  [x]  Therapeutic exercise including: strength training, ROM, for LE, Glutes and core   [x]  NMR activation and proprioception for glutes , LE and Core   [x]  Manual therapy as indicated for Hip complex, LE and spine to include: Dry Needling/IASTM, STM, PROM, Gr I-IV mobilizations, manipulation.    [x]  Modalities as needed that may include: thermal agents, E-stim, Biofeedback, US, iontophoresis as indicated  [x]  Patient education on joint protection, postural re-education, activity modification, progression of HEP. HEP instruction:   Access Code: [email or web address]  URL: [email or web address]  Date: 02/09/2023  Prepared by: Ricci Garica  (See scanned forms)     GOALS:  Patient stated goal: decrease pain    Therapist goals for Patient:     Short Term Goals: To be achieved in: 2 weeks  1. Independent in HEP and progression per patient tolerance, in order to prevent re-injury. [] Progressing: [] Met: [] Not Met: [] Adjusted   2. Patient will have a decrease in pain to facilitate improvement in movement, function, and ADLs as indicated by Functional Deficits. [] Progressing: [] Met: [] Not Met: [] Adjusted    Long Term Goals: To be achieved in: 6 weeks  1. Disability index score of 36% or less for the [facility] to assist with reaching prior level of function. [] Progressing: [] Met: [] Not Met: [] Adjusted  2. Patient will demonstrate increased lumbar AROM to [facility] and pain free to allow for proper joint functioning as indicated by patients Functional Deficits. [] Progressing: [] Met: [] Not Met: [] Adjusted  3. Patient will demonstrate an increase in strength to > or = to 4+/5 to allow for proper functional mobility as indicated by patients Functional Deficits. [] Progressing: [] Met: [] Not Met: [] Adjusted  4. Patient will be able to stand from a seated position without increased symptoms or restriction. [] Progressing: [] Met: [] Not Met: [] Adjusted  5. Patient will be able to stand for 10 minutes without increased symptoms or restriction.      [] Progressing: [] Met: [] Not Met: [] Adjusted       Physical Therapy Evaluation Complexity Justification  [x] A history of present problem with:  [] no personal factors and/or comorbidities that impact the plan of care;  []1-2 personal factors and/or comorbidities that impact the plan of care  [x]3 personal factors and/or comorbidities that impact the plan of care  [x] An examination of body systems using standardized tests and measures addressing any of the following: body structures and functions (impairments), activity limitations, and/or participation restrictions;:  [] a total of 1-2 or more elements   [] a total of 3 or more elements   [x] a total of 4 or more elements   [x] A clinical presentation with:  [] stable and/or uncomplicated characteristics   [x] evolving clinical presentation with changing characteristics  [] unstable and unpredictable characteristics;   [x] Clinical decision making of [] low, [x] moderate, [] high complexity using standardized patient assessment instrument and/or measurable assessment of functional outcome.     [] EVAL (LOW) 65829 (typically 20 minutes face-to-face)  [x] EVAL (MOD) 06520 (typically 30 minutes face-to-face)  [] EVAL (HIGH) 93559 (typically 45 minutes face-to-face)  [] RE-EVAL 08795     Electronically signed by:  Ashwin Carter, PT, DPT

## 2023-02-15 ENCOUNTER — TELEMEDICINE (OUTPATIENT)
Dept: FAMILY MEDICINE CLINIC | Age: 68
End: 2023-02-15
Payer: MEDICARE

## 2023-02-15 DIAGNOSIS — M79.604 RIGHT LEG PAIN: ICD-10-CM

## 2023-02-15 DIAGNOSIS — R93.7 ABNORMAL MRI, LUMBAR SPINE: Primary | ICD-10-CM

## 2023-02-15 PROBLEM — N18.30 CHRONIC RENAL DISEASE, STAGE III (HCC): Status: RESOLVED | Noted: 2022-06-23 | Resolved: 2023-02-15

## 2023-02-15 PROCEDURE — 1124F ACP DISCUSS-NO DSCNMKR DOCD: CPT | Performed by: FAMILY MEDICINE

## 2023-02-15 PROCEDURE — G8427 DOCREV CUR MEDS BY ELIG CLIN: HCPCS | Performed by: FAMILY MEDICINE

## 2023-02-15 PROCEDURE — G8399 PT W/DXA RESULTS DOCUMENT: HCPCS | Performed by: FAMILY MEDICINE

## 2023-02-15 PROCEDURE — 3017F COLORECTAL CA SCREEN DOC REV: CPT | Performed by: FAMILY MEDICINE

## 2023-02-15 PROCEDURE — 1090F PRES/ABSN URINE INCON ASSESS: CPT | Performed by: FAMILY MEDICINE

## 2023-02-15 PROCEDURE — 99213 OFFICE O/P EST LOW 20 MIN: CPT | Performed by: FAMILY MEDICINE

## 2023-02-15 SDOH — ECONOMIC STABILITY: FOOD INSECURITY: WITHIN THE PAST 12 MONTHS, YOU WORRIED THAT YOUR FOOD WOULD RUN OUT BEFORE YOU GOT MONEY TO BUY MORE.: NEVER TRUE

## 2023-02-15 SDOH — ECONOMIC STABILITY: INCOME INSECURITY: HOW HARD IS IT FOR YOU TO PAY FOR THE VERY BASICS LIKE FOOD, HOUSING, MEDICAL CARE, AND HEATING?: NOT HARD AT ALL

## 2023-02-15 SDOH — ECONOMIC STABILITY: HOUSING INSECURITY
IN THE LAST 12 MONTHS, WAS THERE A TIME WHEN YOU DID NOT HAVE A STEADY PLACE TO SLEEP OR SLEPT IN A SHELTER (INCLUDING NOW)?: NO

## 2023-02-15 SDOH — ECONOMIC STABILITY: FOOD INSECURITY: WITHIN THE PAST 12 MONTHS, THE FOOD YOU BOUGHT JUST DIDN'T LAST AND YOU DIDN'T HAVE MONEY TO GET MORE.: NEVER TRUE

## 2023-02-15 NOTE — PROGRESS NOTES
2/15/2023    This is a 75 y.o. female   Chief Complaint   Patient presents with    Results     Would like to go over mri results      HPI    Virtual visit to discuss pain and recent MRI results    MRI lumbar spine 1/31/23:  CONCLUSION:   1. L4-5 central rightward 3-4 mm protrusion-type herniation extends into the right foramen    contributing to moderate right foraminal stenosis. Either side may be affected. 2. L3-4 minimal anterolisthesis. Moderate left foraminal stenosis due to 2 mm leftward    intraforaminal protrusion-type herniation. 3. Consider ultrasound to confirm simple renal cysts. She is seeing Dr. Calvin Allen. She is also following up to see about these possible renal cysts. She actually had a recent CT abdomen/pelvis that did not show any renal abnormalities    Her pain is mostly in her R leg. She recently re-established with PT to help address this. She is also establishing with a specialist to consider injections in the lumbar spine to help with her pain    Review of Systems     Current Outpatient Medications   Medication Sig Dispense Refill    naproxen (NAPROSYN) 500 MG tablet TAKE 1 TABLET BY MOUTH TWICE DAILY WITH MEALS 60 tablet 0    fluticasone (FLONASE) 50 MCG/ACT nasal spray 2 sprays by Each Nostril route daily 3 each 5    diclofenac sodium (VOLTAREN) 1 % GEL Apply 4 g topically 4 times daily as needed for Pain 100 g 3    methylPREDNISolone (MEDROL DOSEPACK) 4 MG tablet Take by mouth as directed.  21 kit 0    pravastatin (PRAVACHOL) 20 MG tablet TAKE 1 TABLET BY MOUTH DAILY 90 tablet 0    gabapentin (NEURONTIN) 300 MG capsule TAKE 1 CAPSULE BY MOUTH THREE TIMES DAILY 270 capsule 1    albuterol sulfate HFA (PROVENTIL HFA) 108 (90 Base) MCG/ACT inhaler Inhale 2 puffs into the lungs every 4 hours as needed for Wheezing or Shortness of Breath May substitute ProAir MDI 1 each 5    acetaminophen (TYLENOL) 500 MG tablet Take 1 tablet by mouth 4 times daily as needed for Pain 28 tablet 0 ondansetron (ZOFRAN ODT) 4 MG disintegrating tablet Take 1-2 tablets by mouth every 12 hours as needed for Nausea May Sub regular tablet (non-ODT) if insurance does not cover ODT. 12 tablet 0    FANAPT 1 MG TABS tablet TAKE 1 TABLET BY MOUTH EVERY NIGHT 90 tablet 0    metFORMIN (GLUCOPHAGE) 500 MG tablet TAKE 2 TABLETS BY MOUTH TWICE DAILY WITH MEALS 360 tablet 1    omeprazole (PRILOSEC) 40 MG delayed release capsule Take 1 capsule by mouth in the morning and at bedtime 160 capsule 5    valsartan (DIOVAN) 160 MG tablet Take 1 tablet by mouth daily 90 tablet 1    chlorthalidone (HYGROTON) 25 MG tablet Take 1 tablet by mouth daily 30 tablet 0    aspirin 81 MG EC tablet Take 81 mg by mouth daily      Psyllium (METAMUCIL) 0.36 g CAPS Take 1 capsule by mouth daily      vitamin B-6 (PYRIDOXINE) 100 MG tablet Take 100 mg by mouth daily      furosemide (LASIX) 20 MG tablet TAKE 1 TABLET BY MOUTH  DAILY AS NEEDED FOR LEG  SWELLING 90 tablet 3    DULoxetine (CYMBALTA) 60 MG extended release capsule TAKE 1 CAPSULE BY MOUTH DAILY 90 capsule 1    TRULICITY 1.5 JU/3.4YD SOPN INJECT THE CONTENTS OF ONE  PEN SUBCUTANEOUSLY WEEKLY 6 mL 3    traZODone (DESYREL) 100 MG tablet TAKE 1 TABLET BY MOUTH AT  NIGHT 90 tablet 1    insulin glargine (LANTUS SOLOSTAR) 100 UNIT/ML injection pen INJECT SUBCUTANEOUSLY 12  UNITS ONCE NIGHTLY 15 mL 3    blood glucose test strips (ONETOUCH ULTRA) strip As needed. 100 strip 4    dextran 70-hypromellose (TEARS NATURALE) 0.1-0.3 % SOLN opthalmic solution as needed for itching      ipratropium-albuterol (DUONEB) 0.5-2.5 (3) MG/3ML SOLN nebulizer solution Inhale 3 mLs into the lungs every 4 hours as needed for Shortness of Breath (wheezing coughing) 360 mL 5    ammonium lactate (LAC-HYDRIN) 12 % lotion Apply topically daily. (Patient taking differently: as needed Apply topically daily. ) 1 Bottle 1     No current facility-administered medications for this visit.      There were no vitals taken for this visit.    Physical Exam    Wt Readings from Last 3 Encounters:   02/07/23 193 lb (87.5 kg)   01/30/23 193 lb (87.5 kg)   01/26/23 196 lb 3.2 oz (89 kg)     BP Readings from Last 3 Encounters:   01/30/23 102/64   01/26/23 122/70   01/13/23 (!) 146/90     Assessment/Plan:  1. Abnormal MRI, lumbar spine    2. Right leg pain    John had a recent MRI of her lumbar spine showing concern for possible renal cysts. Fortunately, she very recently had a CT of her abdomen and pelvis that showed no renal abnormalities. We will hold off on further renal imaging due to this. Her chart also showed a diagnosis of CKD stage III. Reviewing her recent GFR's, this was inaccurate and removed. For her right leg pain, I encouraged her to continue to follow-up with physical therapy, she also has an upcoming appointment with a specialist to consider injections. John Wood, was evaluated through a synchronous (real-time) audio-video encounter. The patient (or guardian if applicable) is aware that this is a billable service, which includes applicable co-pays. This Virtual Visit was conducted with patient's (and/or legal guardian's) consent. The visit was conducted pursuant to the emergency declaration under the [address] [place] [address] authority and the [employer] [place] General Act. Patient identification was verified, and a caregiver was present when appropriate. The patient was located at Home: [place] [address]  Provider was located at Home ([address]: [place]         Total time spent for this encounter:  21 minutes    --Dorothy Juárez MD on 2/15/2023 at 7:59 AM    An [address] [facility] was used to authenticate this note.

## 2023-02-22 ENCOUNTER — TELEPHONE (OUTPATIENT)
Dept: ORTHOPEDIC SURGERY | Age: 68
End: 2023-02-22

## 2023-02-22 NOTE — TELEPHONE ENCOUNTER
Notified [place] office regarding the medical records for patient     Records = All orthopaedic records on file.

## 2023-02-27 ENCOUNTER — HOSPITAL ENCOUNTER (OUTPATIENT)
Dept: PHYSICAL THERAPY | Age: 68
Setting detail: THERAPIES SERIES
Discharge: HOME | End: 2023-02-27
Payer: MEDICARE

## 2023-02-27 NOTE — FLOWSHEET NOTE
[facility], [facility]  [address] [place], [facility]  Phone: [phone]   Fax:     [phone]    Physical Therapy  Cancellation/No-show Note  Patient Name:  Pan Hong  :  1955   Date:  2023  Cancelled visits to date: 1  No-shows to date: 0    Patient status for today's appointment patient:  [x]  Cancelled  []  Rescheduled appointment  []  No-show     Reason given by patient:  [x]  Patient ill  []  Conflicting appointment  []  No transportation    []  Conflict with work  []  No reason given  []  Other:     Comments:      Phone call information:   []  Phone call made today to patient at _ time at number provided:      []  Patient answered, conversation as follows:    []  Patient did not answer, message left as follows:  []  Phone call not made today    Electronically signed by:   Mellissa Weinberg PT

## 2023-03-08 RX ORDER — PEN NEEDLE, DIABETIC 29 G X1/2"
NEEDLE, DISPOSABLE MISCELLANEOUS
Qty: 100 EACH | Refills: 5 | Status: SHIPPED | OUTPATIENT
Start: 2023-03-08

## 2023-03-16 NOTE — PROGRESS NOTES
[facility] [place] [facility]  PRE-PROCEDURE INSTRUCTIONS    Procedure date_3/17/2023________  Arrival time___1045_________          Procedure time__1145__________       Screening questions for Pain procedures:  Do you have a current infection? __n_______  Are you currently taking an antibiotic?__n____  Are you taking a blood thinner?__n______    It is not necessary to stop eating or drinking prior to this procedure. We would like you to take your medications for blood pressure as usual.  You may be asked to stop blood thinners such as Coumadin, Plavix, Fragmin, Lovenox, etc., or any anti-inflammatories such as:  Aspirin, Ibuprofen, Advil, Naproxen prior to your procedure. We also ask that you stop any OTC medications that cause additional bleeding    You must make arrangements for a responsible adult to arrive with you and stay in our waiting area during your procedure. They will also need to take you home after your procedure. For your safety you will not be allowed to leave alone or drive yourself home. Also for your safety, it is strongly suggested that someone stay with you the first 24 hours after your procedure. For your comfort, please wear simple loose fitting clothing to the center. Please do not bring valuables. If you have a living will and a durable power of  for healthcare, please bring in a copy.     You will need to bring a photo ID and insurance card    Our goal is to provide you with excellent care so if you have any questions, please contact us at the [facility] at [phone]

## 2023-03-17 ENCOUNTER — APPOINTMENT (OUTPATIENT)
Dept: INTERVENTIONAL RADIOLOGY/VASCULAR | Age: 68
End: 2023-03-17
Attending: ANESTHESIOLOGY
Payer: MEDICARE

## 2023-03-17 ENCOUNTER — HOSPITAL ENCOUNTER (OUTPATIENT)
Age: 68
Setting detail: OUTPATIENT SURGERY
Discharge: HOME | End: 2023-03-17
Attending: ANESTHESIOLOGY | Admitting: ANESTHESIOLOGY
Payer: MEDICARE

## 2023-03-17 VITALS
HEART RATE: 70 BPM | RESPIRATION RATE: 14 BRPM | BODY MASS INDEX: 34.91 KG/M2 | HEIGHT: 63 IN | TEMPERATURE: 96.2 F | SYSTOLIC BLOOD PRESSURE: 122 MMHG | DIASTOLIC BLOOD PRESSURE: 93 MMHG | OXYGEN SATURATION: 100 % | WEIGHT: 197 LBS

## 2023-03-17 PROCEDURE — 3610000054 HC PAIN LEVEL 3 BASE (NON-OR): Performed by: ANESTHESIOLOGY

## 2023-03-17 PROCEDURE — 6360000004 HC RX CONTRAST MEDICATION: Performed by: ANESTHESIOLOGY

## 2023-03-17 PROCEDURE — 2709999900 HC NON-CHARGEABLE SUPPLY: Performed by: ANESTHESIOLOGY

## 2023-03-17 PROCEDURE — 6360000002 HC RX W HCPCS: Performed by: ANESTHESIOLOGY

## 2023-03-17 RX ORDER — METHYLPREDNISOLONE ACETATE 80 MG/ML
INJECTION, SUSPENSION INTRA-ARTICULAR; INTRALESIONAL; INTRAMUSCULAR; SOFT TISSUE
Status: COMPLETED | OUTPATIENT
Start: 2023-03-17 | End: 2023-03-17

## 2023-03-17 ASSESSMENT — PAIN - FUNCTIONAL ASSESSMENT
PAIN_FUNCTIONAL_ASSESSMENT: PREVENTS OR INTERFERES WITH MANY ACTIVE NOT PASSIVE ACTIVITIES
PAIN_FUNCTIONAL_ASSESSMENT: 0-10

## 2023-03-17 ASSESSMENT — PAIN DESCRIPTION - DESCRIPTORS: DESCRIPTORS: STABBING

## 2023-03-17 NOTE — PROGRESS NOTES
[facility]   [address], [address] [facility]     Patient:  Fidelina Patel  YOB: 1955  Medical Record #:  [id number]   Place: [facility]  Date:  3/17/2023   Physician:  Lorena Montez MD    Procedure Performed: Procedure(s):  LUMBAR EPIDURAL STEROID INJECTION - RIGHT L4-5     Discharge Instructions    Notify Pain Management Services if any of the following occur:    Redness/Swelling at the injection site lasting longer than 2 days  Fever (with redness, swelling, or drainage at the injection site)  Drainage at the injection site  New weakness/ numbness  Severe headache   Loss of bowel/ bladder function    General Instructions: You may experience numbness for several hours following your treatment. You should be cautious using those areas which are numb. Once the numbness wears off, you may apply ice or heat to injection site, if needed. Do not return to work or drive today    Rest today and return to normal activities tomorrow. On average, the steroid takes about 1 week to work and can be up to 2 weeks    You should continue to depend on your primary physician for your medical management of conditions not related to your pain management treatment. Continue to take all your other medications, including blood thinners, as directed by your primary physician unless otherwise instructed. Any problem which relates specifically to a treatment or procedure performed today should be directed to the [facility].        [facility]  Division of Interventional Pain Management  [facility] [address], [address] [facility] [address], [address]  [phone]

## 2023-03-17 NOTE — H&P
Patient:  Candace Labor  YOB: 1955  Medical Record #:  [id number]   Place: [address] [facility] [address] [facility] [address]  Date:  3/17/2023   Physician:  Heber Daniel MD    History Obtained From: electronic medical record    HISTORY OF PRESENT ILLNESS    Past Medical History:        Diagnosis Date    Anesthesia complication     \" shaking\"    Arthritis     Cardiomyopathy ([address]     COVID-19     1/7/2022    Diabetes     GERD (gastroesophageal reflux disease)     Hyperlipidemia     Hypertension     Lumbar disc disease     Prolonged emergence from general anesthesia     Sleep apnea     pt states does use a Cpap machine at night    Unspecified cerebral artery occlusion with cerebral infarction 2014    right side weak    Wears glasses      Past Surgical History:        Procedure Laterality Date    ARTHRODESIS Right 9/17/2020    (RIGHT) REMOVAL OF BONE SPURRING AND OSSEOUS PROMINENCE FIRST METATARSAL, ARTHRODESIS OF FIRST METATARSOPHALANGEAL JOINT, BONE MARROW HARVEST AND CONCENTRATION RIGHT TIBIA performed by Erik Johns DPM at [address] Left 9/3/15    CARPAL TUNNEL RELEASE Right 9/22/15    COLONOSCOPY      FINGER TRIGGER RELEASE Left 9/3/15    middle and ring fingers    FINGER TRIGGER RELEASE Right 9/22/15    middle and ring fingers    FOOT SURGERY Bilateral     litteltoe    HAND SURGERY Right     Ligament    KNEE ARTHROPLASTY Right 7/26/2021    ROBOTIC ASSISTED TOTAL RIGHT KNEE REPLACEMENT performed by Camryn Lorenzo MD at [address] [facility] ARTHROSCOPY Right 2/2/2022    ARTHROSCOPY WITH LYSIS OF ADHESIONS, RIGHT KNEE performed by Camryn Lorenzo MD at [facility] (CERVIX NOT REMOVED)  08/2003    SHOULDER ARTHROSCOPY Right 06/20/2018    Diagnostic scope, rcr, open [place]     Medications Prior to Admission:   No current facility-administered medications on file prior to encounter.      Current Outpatient Medications on File Prior to Encounter   Medication Sig Dispense Refill    [employer] MINI PEN NEEDLES 31G X 6 MM MISC USE TO INJECT INSULIN DAILY 100 each 5    naproxen (NAPROSYN) 500 MG tablet TAKE 1 TABLET BY MOUTH TWICE DAILY WITH MEALS 60 tablet 0    fluticasone (FLONASE) 50 MCG/ACT nasal spray 2 sprays by Each Nostril route daily 3 each 5    diclofenac sodium (VOLTAREN) 1 % GEL Apply 4 g topically 4 times daily as needed for Pain 100 g 3    pravastatin (PRAVACHOL) 20 MG tablet TAKE 1 TABLET BY MOUTH DAILY 90 tablet 0    gabapentin (NEURONTIN) 300 MG capsule TAKE 1 CAPSULE BY MOUTH THREE TIMES DAILY 270 capsule 1    albuterol sulfate HFA (PROVENTIL HFA) 108 (90 Base) MCG/ACT inhaler Inhale 2 puffs into the lungs every 4 hours as needed for Wheezing or Shortness of Breath May substitute ProAir MDI 1 each 5    acetaminophen (TYLENOL) 500 MG tablet Take 1 tablet by mouth 4 times daily as needed for Pain (Patient not taking: Reported on 3/17/2023) 28 tablet 0    ondansetron (ZOFRAN ODT) 4 MG disintegrating tablet Take 1-2 tablets by mouth every 12 hours as needed for Nausea May Sub regular tablet (non-ODT) if insurance does not cover ODT.  12 tablet 0    FANAPT 1 MG TABS tablet TAKE 1 TABLET BY MOUTH EVERY NIGHT 90 tablet 0    metFORMIN (GLUCOPHAGE) 500 MG tablet TAKE 2 TABLETS BY MOUTH TWICE DAILY WITH MEALS 360 tablet 1    omeprazole (PRILOSEC) 40 MG delayed release capsule Take 1 capsule by mouth in the morning and at bedtime 160 capsule 5    valsartan (DIOVAN) 160 MG tablet Take 1 tablet by mouth daily 90 tablet 1    chlorthalidone (HYGROTON) 25 MG tablet Take 1 tablet by mouth daily 30 tablet 0    aspirin 81 MG EC tablet Take 81 mg by mouth daily      Psyllium (METAMUCIL) 0.36 g CAPS Take 1 capsule by mouth daily      vitamin B-6 (PYRIDOXINE) 100 MG tablet Take 100 mg by mouth daily      furosemide (LASIX) 20 MG tablet TAKE 1 TABLET BY MOUTH  DAILY AS NEEDED FOR LEG  SWELLING 90 tablet 3    DULoxetine (CYMBALTA) 60 MG extended release capsule TAKE 1 CAPSULE BY MOUTH DAILY 90 capsule 1    TRULICITY 1.5 MD/0.8LM SOPN INJECT THE CONTENTS OF ONE  PEN SUBCUTANEOUSLY WEEKLY 6 mL 3    traZODone (DESYREL) 100 MG tablet TAKE 1 TABLET BY MOUTH AT  NIGHT 90 tablet 1    insulin glargine (LANTUS SOLOSTAR) 100 UNIT/ML injection pen INJECT SUBCUTANEOUSLY 12  UNITS ONCE NIGHTLY 15 mL 3    blood glucose test strips (ONETOUCH ULTRA) strip As needed. 100 strip 4    dextran 70-hypromellose (TEARS NATURALE) 0.1-0.3 % SOLN opthalmic solution as needed for itching      ipratropium-albuterol (DUONEB) 0.5-2.5 (3) MG/3ML SOLN nebulizer solution Inhale 3 mLs into the lungs every 4 hours as needed for Shortness of Breath (wheezing coughing) 360 mL 5    ammonium lactate (LAC-HYDRIN) 12 % lotion Apply topically daily. (Patient taking differently: as needed Apply topically daily. ) 1 Bottle 1     Allergies:  Codeine, Vicodin [hydrocodone-acetaminophen], Lipitor [atorvastatin], Oxycontin [oxycodone hcl], and Tramadol  Social History     Socioeconomic History    Marital status:      Spouse name: Fred Lozada, seen here    Number of children: 3    Years of education: Not on file    Highest education level: Not on file   Occupational History    Not on file   Tobacco Use    Smoking status: Former     Packs/day: 1.50     Years: 3.00     Pack years: 4.50     Types: Cigarettes     Start date: 1969     Quit date: 1992     Years since quittin.2     Passive exposure: Past    Smokeless tobacco: Never   Vaping Use    Vaping Use: Never used   Substance and Sexual Activity    Alcohol use: No    Drug use: No    Sexual activity: Yes     Partners: Male   Other Topics Concern    Not on file   Social History Narrative    Originally from [facility]     is seen here    10 grandchildren     Social Determinants of Health     Financial Resource Strain: Low Risk     Difficulty of Paying Living Expenses: Not hard at all   Food Insecurity: No Food Insecurity    Worried About [address] in the Last Year: Never true    [address] in the Last Year: Never true   Transportation Needs: Unknown    Lack of Transportation (Medical): Not on file    Lack of Transportation (Non-Medical): No   Physical Activity: Not on file   Stress: Not on file   Social Connections: Not on file   Intimate Partner Violence: Not on file   Housing Stability: Unknown    Unable to Pay for Housing in the Last Year: Not on file    Number of Places Lived in the Last Year: Not on file    Unstable Housing in the Last Year: No     Family History   Problem Relation Age of Onset    Heart Disease Mother     High Blood Pressure Mother     Stroke Mother     Cancer Brother         lung cancer         PHYSICAL EXAM:      /88   Pulse 80   Temp 97 °F (36.1 °C) (Temporal)   Resp 14   Ht 5' 3\" (1.6 m)   Wt 197 lb (89.4 kg)   SpO2 97%   BMI 34.90 kg/m²  I            ASSESSMENT AND PLAN:    1. Procedure. options, risks and benefits reviewed with patient and expresses understanding.

## 2023-03-17 NOTE — OP NOTE
Patient:  Mattie Perez  YOB: 1955  Medical Record #:  [id number]   Place: [address] [facility]  Date:  3/17/2023   Physician:  Hassell Cockayne, MD      PRE-PROCEDURE DIAGNOSIS: M54.16    POST-PROCEDURE DIAGNOSIS: M54.16    PROCEDURE:  Midline interlaminar right  L4-5 epidural steroid injection with fluoroscopy and epidurography. BRIEF HISTORY:  The patient presents today to [facility] for a scheduled lumbar epidural steroid injection procedure. The patient was re-evaluated today and is clinically unchanged as compared to my previous evaluation. The patient is clinically stable to proceed with the procedure. PROCEDURE NOTE:  The procedure was again explained to the patient and the previously distributed pre-procedure literature was reviewed. The options, rationale, and benefits of the procedure including pain relief, functional improvement, and increased mobility, as well as the risks of the procedure including but not limited to infection, bleeding, paresthesia, pain, failure to relieve pain, increased pain, headache, allergic reaction, neurologic impairment, local anesthetic, toxicity, and side effects and the potential side effects of corticosteroids were discussed with the patient and informed written consent was obtained from the patient. The patient was positioned in the prone position on the fluoroscopy table. The skin overlying the lumbosacral vertebrae was prepped using Chloraprep and draped in the usual sterile fashion. The L4-5 lumbar intervertebral level was identified using intermittent AP fluoroscopy. The previously identified projection of overlying skin was anesthetized using 2 cc of buffered 1% lidocaine with a 27 gauge needle. A 4.25\" 22g Touhy needle was advanced through a small skin nick in the AP view towards the interlaminar and epidural space. The epidural space was easily identified using loss of resistance to saline.   No difficulty, paresthesia or occurrence of pain was encountered. Careful aspiration was negative for CSF and blood. A total of 1 cc Isovue 300 was injected yielding an epidurogram.    FLUOROSCOPY:  A fluoroscopy unit was utilized to obtain fluoroscopic images for intra-procedural use and assistance. Fluoroscopy was utilized to identify anatomic and radiographic landmarks for the accompanying procedure guidance and not for diagnostic purposes. After negative aspiration 4 cc of therapeutic injectate containing 1 cc of Depo-Medrol 80 mg/cc and 3 ml of lidocaine 1% was injected slowly in aliquots while clinically observing and monitoring the patient with negative aspiration demonstrated between aliquots of injections. At this time no paresthesia or occurrence of pain was present. The needle was then removed, the area was cleansed and a Band-Aid was placed over the injection site. There were no complications. The patient tolerated the procedure well. The procedure was performed using local anesthesia. The patient was transferred by wheelchair with accompaniment to the  Recovery Area and was monitored per protocol. The vital signs remained stable. The patient was discharged in stable condition accompanied by an escort with written instructions after fulfilling the standard discharge criteria. Written follow up instructions were given to the patient.     Estimated Blood Loss: 0ml    Plan:  Follow up in 6-8 weeks      Office: [phone]

## 2023-03-17 NOTE — DISCHARGE INSTRUCTIONS
[facility]   [address]  [phone]      Patient:  Kali Casillas  YOB: 1955  Medical Record #:  [id number]   Date:  3/17/2023   Physician:  Steffanie Chavira MD    Procedure Performed: [unfilled]     Discharge Instructions    Notify Pain Management Services if any of the following occur:    Redness/Swelling at the injection site lasting longer than 2 days  Fever (with redness, swelling, or drainage at the injection site)  Drainage at the injection site  New weakness/ numbness  Severe headache   Loss of bowel/ bladder function    General Instructions: You may experience numbness for several hours following your treatment. You should be cautious using those areas which are numb. Once the numbness wears off, you may apply ice or heat to injection site, if needed. Do not return to work or drive today    Rest today and return to normal activities tomorrow. On average, the steroid takes about 1 week to work and can be up to 2 weeks    You should continue to depend on your primary physician for your medical management of conditions not related to your pain management treatment. Continue to take all your other medications, including blood thinners, as directed by your primary physician unless otherwise instructed. NO changes have been made to your medications. Any changes listed on the discharge are based on patient self reporting. Any EMR warnings regarding drug/drug interactions were dismissed based on current use by the patient, management by prescribing physician and pharmacist and not managed by this physician. Any problem which relates specifically to a treatment or procedure performed today should be directed to the [facility].        [facility]  Division of Interventional Pain [address], [address]  [phone]

## 2023-03-22 DIAGNOSIS — Z96.651 S/P TOTAL KNEE ARTHROPLASTY, RIGHT: ICD-10-CM

## 2023-03-22 DIAGNOSIS — M16.11 PRIMARY OSTEOARTHRITIS OF RIGHT HIP: ICD-10-CM

## 2023-03-22 RX ORDER — NAPROXEN 500 MG/1
TABLET ORAL
Qty: 60 TABLET | Refills: 0 | Status: SHIPPED | OUTPATIENT
Start: 2023-03-22

## 2023-03-22 RX ORDER — GABAPENTIN 300 MG/1
CAPSULE ORAL
Qty: 90 CAPSULE | Refills: 2 | Status: SHIPPED | OUTPATIENT
Start: 2023-03-22 | End: 2023-06-20

## 2023-03-28 DIAGNOSIS — E11.8 TYPE 2 DIABETES MELLITUS WITH COMPLICATION, WITHOUT LONG-TERM CURRENT USE OF INSULIN (HCC): ICD-10-CM

## 2023-03-28 RX ORDER — INSULIN GLARGINE 100 [IU]/ML
INJECTION, SOLUTION SUBCUTANEOUS
Qty: 15 ML | Refills: 3 | Status: SHIPPED | OUTPATIENT
Start: 2023-03-28

## 2023-03-29 RX ORDER — ALBUTEROL SULFATE 90 UG/1
AEROSOL, METERED RESPIRATORY (INHALATION)
Qty: 6.7 G | Refills: 5 | Status: SHIPPED | OUTPATIENT
Start: 2023-03-29

## 2023-03-30 NOTE — PROGRESS NOTES
[facility] ENDOSCOPY AND OUTPATIENT  PRE-PROCEDURE INSTRUCTIONS    Procedure date__03/31/2023_______Arrival time__0745__________        Procedure time___0845_________       Screening questions for Pain procedures:  Do you have a current infection? ___N______  Are you currently taking an antibiotic?___N___  Are you taking a blood thinner?__N______    It is not necessary to stop eating or drinking prior to this procedure. We would like you to take your medications for blood pressure as usual.  You may be asked to stop blood thinners such as Coumadin, Plavix, Fragmin, Lovenox, etc., or any anti-inflammatories such as:  Aspirin, Ibuprofen, Advil, Naproxen prior to your procedure. We also ask that you stop any OTC medications that cause additional bleeding    You must make arrangements for a responsible adult to arrive with you and stay in our waiting area during your procedure. They will also need to take you home after your procedure. For your safety you will not be allowed to leave alone or drive yourself home. Also for your safety, it is strongly suggested that someone stay with you the first 24 hours after your procedure. For your comfort, please wear simple loose fitting clothing to the center. Please do not bring valuables. If you have a living will and a durable power of  for healthcare, please bring in a copy.     You will need to bring a photo ID and insurance card    Our goal is to provide you with excellent care so if you have any questions, please contact us at the [facility] at [phone]

## 2023-03-31 ENCOUNTER — HOSPITAL ENCOUNTER (OUTPATIENT)
Age: 68
Setting detail: OUTPATIENT SURGERY
Discharge: HOME | End: 2023-03-31
Attending: ANESTHESIOLOGY | Admitting: ANESTHESIOLOGY
Payer: MEDICARE

## 2023-03-31 ENCOUNTER — APPOINTMENT (OUTPATIENT)
Dept: INTERVENTIONAL RADIOLOGY/VASCULAR | Age: 68
End: 2023-03-31
Attending: ANESTHESIOLOGY
Payer: MEDICARE

## 2023-03-31 VITALS
WEIGHT: 194.4 LBS | HEIGHT: 63 IN | RESPIRATION RATE: 16 BRPM | BODY MASS INDEX: 34.45 KG/M2 | DIASTOLIC BLOOD PRESSURE: 78 MMHG | HEART RATE: 82 BPM | OXYGEN SATURATION: 97 % | TEMPERATURE: 96.8 F | SYSTOLIC BLOOD PRESSURE: 116 MMHG

## 2023-03-31 PROCEDURE — 2500000003 HC RX 250 WO HCPCS: Performed by: ANESTHESIOLOGY

## 2023-03-31 PROCEDURE — 3610000054 HC PAIN LEVEL 3 BASE (NON-OR): Performed by: ANESTHESIOLOGY

## 2023-03-31 PROCEDURE — 6360000002 HC RX W HCPCS: Performed by: ANESTHESIOLOGY

## 2023-03-31 PROCEDURE — 2709999900 HC NON-CHARGEABLE SUPPLY: Performed by: ANESTHESIOLOGY

## 2023-03-31 RX ORDER — LIDOCAINE HYDROCHLORIDE 10 MG/ML
INJECTION, SOLUTION EPIDURAL; INFILTRATION; INTRACAUDAL; PERINEURAL
Status: COMPLETED | OUTPATIENT
Start: 2023-03-31 | End: 2023-03-31

## 2023-03-31 RX ORDER — BUPIVACAINE HYDROCHLORIDE 5 MG/ML
INJECTION, SOLUTION EPIDURAL; INTRACAUDAL
Status: COMPLETED | OUTPATIENT
Start: 2023-03-31 | End: 2023-03-31

## 2023-03-31 RX ORDER — METHYLPREDNISOLONE ACETATE 80 MG/ML
INJECTION, SUSPENSION INTRA-ARTICULAR; INTRALESIONAL; INTRAMUSCULAR; SOFT TISSUE
Status: COMPLETED | OUTPATIENT
Start: 2023-03-31 | End: 2023-03-31

## 2023-03-31 ASSESSMENT — PAIN SCALES - GENERAL
PAINLEVEL_OUTOF10: 0
PAINLEVEL_OUTOF10: 0

## 2023-03-31 ASSESSMENT — PAIN - FUNCTIONAL ASSESSMENT: PAIN_FUNCTIONAL_ASSESSMENT: 0-10

## 2023-03-31 ASSESSMENT — PAIN DESCRIPTION - DESCRIPTORS: DESCRIPTORS: POUNDING

## 2023-03-31 NOTE — H&P
Patient:  Valerio Espinosa  YOB: 1955  Medical Record #:  [id number]   Place: [address]  Date:  3/31/2023   Physician:  Royal Stefan MD    History Obtained From: electronic medical record    HISTORY OF PRESENT ILLNESS    Past Medical History:        Diagnosis Date    Anesthesia complication     \" shaking\"    Arthritis     Cardiomyopathy ([address]     COVID-19     1/7/2022    Diabetes     GERD (gastroesophageal reflux disease)     Hyperlipidemia     Hypertension     Lumbar disc disease     Prolonged emergence from general anesthesia     Sleep apnea     pt states does use a Cpap machine at night    Unspecified cerebral artery occlusion with cerebral infarction 2014    right side weak    Wears glasses      Past Surgical History:        Procedure Laterality Date    ARTHRODESIS Right 9/17/2020    (RIGHT) REMOVAL OF BONE SPURRING AND OSSEOUS PROMINENCE FIRST METATARSAL, ARTHRODESIS OF FIRST METATARSOPHALANGEAL JOINT, BONE MARROW HARVEST AND CONCENTRATION RIGHT TIBIA performed by Carey Mars DPM at [address] Left 9/3/15    CARPAL TUNNEL RELEASE Right 9/22/15    COLONOSCOPY      FINGER TRIGGER RELEASE Left 9/3/15    middle and ring fingers    FINGER TRIGGER RELEASE Right 9/22/15    middle and ring fingers    FOOT SURGERY Bilateral     litteltoe    HAND SURGERY Right     Ligament    KNEE ARTHROPLASTY Right 7/26/2021    ROBOTIC ASSISTED TOTAL RIGHT KNEE REPLACEMENT performed by Mavis Moreno MD at [address] [facility] ARTHROSCOPY Right 2/2/2022    ARTHROSCOPY WITH LYSIS OF ADHESIONS, RIGHT KNEE performed by Mavis Moreno MD at [address] [facility] Right 3/17/2023    LUMBAR EPIDURAL STEROID INJECTION - RIGHT L4-5 performed by Jc Cedeno MD at [place] [address] (CERVIX NOT REMOVED)  08/2003    SHOULDER ARTHROSCOPY Right 06/20/2018    Diagnostic scope, rcr, open [place]     Medications Prior to Admission:   No

## 2023-03-31 NOTE — OP NOTE
Patient:  Yaneth Guzman  YOB: 1955  Medical Record #:  [id number]   Date:  3/31/2023   Physician:  Ramiro Bhakta MD      PRE-PROCEDURE DIAGNOSIS:  Right sciatic neuropathy (G57.01)    POST-PROCEDURE DIAGNOSIS:  Right sciatic neuropathy (G57.01)    PROCEDURE: RIGHT neelima-sciatic nerve block, with fluoroscopy. BRIEF HISTORY:  The patient presents today to [facility] for a scheduled sciatic nerve block procedure. The patient was re-evaluated today and is clinically unchanged as compared to my previous evaluation. The patient is clinically stable to proceed with the procedure. PROCEDURE NOTE:  The procedure was again explained to the patient and the previously distributed pre-procedure literature was reviewed. The options, rationale, and benefits of the procedure including pain relief, functional improvement, and increased mobility, as well as the risks of the procedure including but not limited to infection, bleeding, paresthesia, pain, failure to relieve pain, increased pain, headache, allergic reaction, neurologic impairment, local anesthetic, toxicity, and side effects and the potential side effects of corticosteroids were discussed with the patient and informed written consent was obtained from the patient. The patient was brought to the fluoroscopy suite and placed in the prone position. The RIGHT sacral and gluteal area was prepped in the usual sterile fashion with Chloraprep and then draped. Intermittent AP fluoroscopy was utilized to localize the radiographic landmarks. A standard perisciatic nerve block approach was used. The sciatic notch, at its superolateral border was localized at its junction with the ilium rostral to the acetabulum and lateral to the SI joint and lateral to the sciatic nerve. It was localized at the radiographic marker of the overlying sciatic nerve between the lateral aspect of the sacrum and the greater trochanter.    The superficial skin

## 2023-03-31 NOTE — DISCHARGE INSTRUCTIONS
Management  [address] [place] [address], [address]  [phone]

## 2023-04-06 ENCOUNTER — OFFICE VISIT (OUTPATIENT)
Dept: FAMILY MEDICINE CLINIC | Age: 68
End: 2023-04-06
Payer: MEDICARE

## 2023-04-06 VITALS
BODY MASS INDEX: 34.02 KG/M2 | RESPIRATION RATE: 16 BRPM | HEART RATE: 93 BPM | SYSTOLIC BLOOD PRESSURE: 118 MMHG | DIASTOLIC BLOOD PRESSURE: 78 MMHG | HEIGHT: 63 IN | OXYGEN SATURATION: 96 % | WEIGHT: 192 LBS

## 2023-04-06 DIAGNOSIS — I25.10 CORONARY ARTERY CALCIFICATION: ICD-10-CM

## 2023-04-06 DIAGNOSIS — I25.84 CORONARY ARTERY CALCIFICATION: ICD-10-CM

## 2023-04-06 DIAGNOSIS — G89.29 CHRONIC RIGHT-SIDED LOW BACK PAIN WITH RIGHT-SIDED SCIATICA: ICD-10-CM

## 2023-04-06 DIAGNOSIS — G81.91 RIGHT HEMIPARESIS (HCC): ICD-10-CM

## 2023-04-06 DIAGNOSIS — Z79.4 TYPE 2 DIABETES MELLITUS WITHOUT COMPLICATION, WITH LONG-TERM CURRENT USE OF INSULIN (HCC): Primary | ICD-10-CM

## 2023-04-06 DIAGNOSIS — E78.2 MIXED HYPERLIPIDEMIA: ICD-10-CM

## 2023-04-06 DIAGNOSIS — F39 MOOD DISORDER (HCC): ICD-10-CM

## 2023-04-06 DIAGNOSIS — M54.41 CHRONIC RIGHT-SIDED LOW BACK PAIN WITH RIGHT-SIDED SCIATICA: ICD-10-CM

## 2023-04-06 DIAGNOSIS — E11.9 TYPE 2 DIABETES MELLITUS WITHOUT COMPLICATION, WITH LONG-TERM CURRENT USE OF INSULIN (HCC): ICD-10-CM

## 2023-04-06 DIAGNOSIS — I70.0 AORTIC ATHEROSCLEROSIS (HCC): ICD-10-CM

## 2023-04-06 DIAGNOSIS — Z79.4 TYPE 2 DIABETES MELLITUS WITHOUT COMPLICATION, WITH LONG-TERM CURRENT USE OF INSULIN (HCC): ICD-10-CM

## 2023-04-06 DIAGNOSIS — E11.9 TYPE 2 DIABETES MELLITUS WITHOUT COMPLICATION, WITH LONG-TERM CURRENT USE OF INSULIN (HCC): Primary | ICD-10-CM

## 2023-04-06 DIAGNOSIS — I10 ESSENTIAL HYPERTENSION: ICD-10-CM

## 2023-04-06 LAB
EST. AVERAGE GLUCOSE BLD GHB EST-MCNC: 157.1 MG/DL
HBA1C MFR BLD: 7.1 %

## 2023-04-06 PROCEDURE — 3078F DIAST BP <80 MM HG: CPT | Performed by: FAMILY MEDICINE

## 2023-04-06 PROCEDURE — G8399 PT W/DXA RESULTS DOCUMENT: HCPCS | Performed by: FAMILY MEDICINE

## 2023-04-06 PROCEDURE — 99214 OFFICE O/P EST MOD 30 MIN: CPT | Performed by: FAMILY MEDICINE

## 2023-04-06 PROCEDURE — 3074F SYST BP LT 130 MM HG: CPT | Performed by: FAMILY MEDICINE

## 2023-04-06 PROCEDURE — 2022F DILAT RTA XM EVC RTNOPTHY: CPT | Performed by: FAMILY MEDICINE

## 2023-04-06 PROCEDURE — 3017F COLORECTAL CA SCREEN DOC REV: CPT | Performed by: FAMILY MEDICINE

## 2023-04-06 PROCEDURE — G8417 CALC BMI ABV UP PARAM F/U: HCPCS | Performed by: FAMILY MEDICINE

## 2023-04-06 PROCEDURE — G8427 DOCREV CUR MEDS BY ELIG CLIN: HCPCS | Performed by: FAMILY MEDICINE

## 2023-04-06 PROCEDURE — 1090F PRES/ABSN URINE INCON ASSESS: CPT | Performed by: FAMILY MEDICINE

## 2023-04-06 PROCEDURE — 1036F TOBACCO NON-USER: CPT | Performed by: FAMILY MEDICINE

## 2023-04-06 PROCEDURE — 1124F ACP DISCUSS-NO DSCNMKR DOCD: CPT | Performed by: FAMILY MEDICINE

## 2023-04-06 PROCEDURE — 3046F HEMOGLOBIN A1C LEVEL >9.0%: CPT | Performed by: FAMILY MEDICINE

## 2023-04-06 PROCEDURE — 36415 COLL VENOUS BLD VENIPUNCTURE: CPT | Performed by: FAMILY MEDICINE

## 2023-04-06 RX ORDER — TRAZODONE HYDROCHLORIDE 100 MG/1
TABLET ORAL
Qty: 90 TABLET | Refills: 1 | Status: SHIPPED | OUTPATIENT
Start: 2023-04-06

## 2023-04-06 RX ORDER — ILOPERIDONE 1 MG/1
1 TABLET ORAL NIGHTLY
Qty: 90 TABLET | Refills: 0 | Status: SHIPPED | OUTPATIENT
Start: 2023-04-06

## 2023-04-06 RX ORDER — PRAVASTATIN SODIUM 40 MG
40 TABLET ORAL DAILY
Qty: 90 TABLET | Refills: 1 | Status: SHIPPED | OUTPATIENT
Start: 2023-04-06

## 2023-04-06 NOTE — PROGRESS NOTES
04/06/23 118/78   03/31/23 116/78   03/17/23 (!) 122/93     Assessment/Plan:  1. Type 2 diabetes mellitus without complication, with long-term current use of insulin (HCC)  We will check an A1c. If above 7.5 will add [place] and we discussed potential  side effects  - Hemoglobin A1C; Future  - metFORMIN (GLUCOPHAGE) 500 MG tablet; Take 2 tablets by mouth 2 times daily (with meals)  Dispense: 360 tablet; Refill: 1    2. Mood disorder ([address] - follows with Psych Dr. Wilson Miramontes  Her Psychiatrist no longer accept her insurance. I will send in  refills until she establishes with a new one, referral placed  - FANAPT 1 MG TABS tablet; Take 1 tablet by mouth nightly  Dispense: 90 tablet; Refill: 0  - traZODone (DESYREL) 100 MG tablet; TAKE 1 TABLET BY MOUTH AT  NIGHT  Dispense: 90 tablet; Refill: 1  - External Referral to Psychiatry    3. Essential hypertension  Well controlled on current regimen. No side effects from medications. Advise low salt diet and routine exercise    4. Mixed hyperlipidemia  Last LDL was up a bit. Sending in a higher dose of pravastatin  - pravastatin (PRAVACHOL) 40 MG tablet; Take 1 tablet by mouth daily  Dispense: 90 tablet; Refill: 1    5. Right hemiparesis (HCC)  Reports strength is slowly improving    6. Aortic atherosclerosis (HCC)  As above, increase statin  - pravastatin (PRAVACHOL) 40 MG tablet; Take 1 tablet by mouth daily  Dispense: 90 tablet; Refill: 1    7. Coronary artery calcification  - pravastatin (PRAVACHOL) 40 MG tablet; Take 1 tablet by mouth daily  Dispense: 90 tablet;  Refill: 1    F/u 3 months diabetes

## 2023-04-20 ENCOUNTER — HOSPITAL ENCOUNTER (OUTPATIENT)
Dept: PHYSICAL THERAPY | Age: 68
Setting detail: THERAPIES SERIES
Discharge: HOME | End: 2023-04-20
Payer: MEDICARE

## 2023-04-20 PROCEDURE — 97162 PT EVAL MOD COMPLEX 30 MIN: CPT

## 2023-04-20 PROCEDURE — 97530 THERAPEUTIC ACTIVITIES: CPT

## 2023-04-20 RX ORDER — FUROSEMIDE 20 MG/1
TABLET ORAL
Qty: 90 TABLET | Refills: 1 | Status: SHIPPED | OUTPATIENT
Start: 2023-04-20

## 2023-04-20 NOTE — FLOWSHEET NOTE
[facility], [facility]  [address] [place], [facility]  Phone: [phone]   Fax:     [phone]    Physical Therapy Treatment Note/ Progress Report:     Date:  2023    Patient Name:  Steve Rivero    :  1955  MRN: [id number]    Pertinent Medical History:      Medical/Treatment Diagnosis Information:  Medical Diagnosis: Chronic right-sided low back pain with right-sided sciatica [M54.41, G89.29]  Treatment Diagnosis: hip/knee/ back pain, limited mobility, function, activity tolerance    Insurance/Certification information:     Physician Information:  Reed Atkins MD  Plan of care signed (Y/N):     Date of Patient follow up with Physician:      Progress Report: []  Yes  [x]  No     Date Range for reporting period:  Beginnin2023  Ending:    Progress report due (10 Rx/or 30 days whichever is less): #77     Recertification due (POC duration/ or 90 days whichever is less):     Visit # POC/ Insurance Allowable Auth Needed   1 /  [facility] []Yes    []No     Functional Outcomes Measure:     Test: [facility]  Date Assessed Score   eval 57%           Pain level:  /10     History of Injury: Pt here for evaluation for R hip/ back/ knee pain. She has had a complex course with a few a few bouts of PT in the past year or so. I saw her in the end of  for 8 visits and she reports moderate improvement from that course, in which we targeted her R hip and lumbar area, but the pain returned shortly after stopping therapy. She has since seen a couple orthopedic doctors who felt her hip OA was not significant enough for intervention and she ended up having lumbar work-up and had 2 recent injections in her back; 1 epidural, and 1 sciatic nerve block. Her pain is much better now, but not gone. She rates her pain at 4-6/10.       She subjectively rates her RLE strength/ function at 85% (post stroke), and now she rates

## 2023-04-20 NOTE — PLAN OF CARE
[facility], [facility]  [address] [place], [facility]  Phone: [phone]   Fax:     [phone]                                                       Physical Therapy Certification    Dear Kamari León MD,    We had the pleasure of evaluating the following patient for physical therapy services at [facility]. A summary of our findings can be found in the initial assessment below. This includes our plan of care. If you have any questions or concerns regarding these findings, please do not hesitate to contact me at the office phone number checked above. Thank you for the referral.       Physician Signature:_______________________________Date:__________________  By signing above (or electronic signature), therapists plan is approved by physician        Patient: Frankey Canavan   : 1955   MRN: [id number]  Referring Physician: Kamari León MD      Evaluation Date: 2023      Medical Diagnosis Information:  Medical Diagnosis: Chronic right-sided low back pain with right-sided sciatica [M54.41, G89.29]   Treatment Diagnosis: hip/knee/ back pain, limited mobility, function, activity tolerance                                         Insurance information:         Precautions/ Contra-indications:   Latex Allergy:  [x]NO      []YES  Preferred Language for Healthcare:   [x]English       []other:    C-SSRS Triggered by Intake questionnaire (Past 2 wk assessment ):   [x] No, Questionnaire did not trigger screening.   [] Yes, Patient intake triggered C-SSRS Screening      [] C-SSRS Screening completed  [] PCP notified via Epic     SUBJECTIVE: Patient stated complaint: Pt here for evaluation for R hip/ back/ knee pain. She has had a complex course with a few a few bouts of PT in the past year or so.  I saw her in the end of  for 8 visits and she reports moderate improvement

## 2023-04-24 ENCOUNTER — HOSPITAL ENCOUNTER (OUTPATIENT)
Dept: PHYSICAL THERAPY | Age: 68
Setting detail: THERAPIES SERIES
Discharge: HOME | End: 2023-04-24
Payer: MEDICARE

## 2023-04-24 PROCEDURE — 97110 THERAPEUTIC EXERCISES: CPT

## 2023-04-24 PROCEDURE — 97140 MANUAL THERAPY 1/> REGIONS: CPT

## 2023-04-24 NOTE — FLOWSHEET NOTE
[facility], [facility]  [address] [place], [facility]  Phone: [phone]   Fax:     [phone]    Physical Therapy Treatment Note/ Progress Report:     Date:  2023    Patient Name:  Iveth Alvarado    :  1955  MRN: [id number]    Pertinent Medical History:      Medical/Treatment Diagnosis Information:  Medical Diagnosis: Chronic right-sided low back pain with right-sided sciatica [M54.41, G89.29]  Treatment Diagnosis: hip/knee/ back pain, limited mobility, function, activity tolerance    Insurance/Certification information:     Physician Information:  Duaine Heimlich, MD  Plan of care signed (Y/N):     Date of Patient follow up with Physician:      Progress Report: []  Yes  [x]  No     Date Range for reporting period:  Beginnin2023  Ending:    Progress report due (10 Rx/or 30 days whichever is less): #73     Recertification due (POC duration/ or 90 days whichever is less):     Visit # POC/ Insurance Allowable Auth Needed   2 /  [facility] []Yes    []No     Functional Outcomes Measure:     Test: [facility]  Date Assessed Score   eval 57%           Pain level:  5 /10     History of Injury: Pt here for evaluation for R hip/ back/ knee pain. She has had a complex course with a few a few bouts of PT in the past year or so. I saw her in the end of  for 8 visits and she reports moderate improvement from that course, in which we targeted her R hip and lumbar area, but the pain returned shortly after stopping therapy. She has since seen a couple orthopedic doctors who felt her hip OA was not significant enough for intervention and she ended up having lumbar work-up and had 2 recent injections in her back; 1 epidural, and 1 sciatic nerve block. Her pain is much better now, but not gone. She rates her pain at 4-6/10.       She subjectively rates her RLE strength/ function at 85% (post stroke), and now she

## 2023-04-25 DIAGNOSIS — M16.11 PRIMARY OSTEOARTHRITIS OF RIGHT HIP: ICD-10-CM

## 2023-04-25 RX ORDER — NAPROXEN 500 MG/1
TABLET ORAL
Qty: 60 TABLET | Refills: 0 | Status: SHIPPED | OUTPATIENT
Start: 2023-04-25

## 2023-04-27 ENCOUNTER — HOSPITAL ENCOUNTER (OUTPATIENT)
Dept: PHYSICAL THERAPY | Age: 68
Setting detail: THERAPIES SERIES
End: 2023-04-27
Payer: MEDICARE

## 2023-05-01 ENCOUNTER — HOSPITAL ENCOUNTER (OUTPATIENT)
Dept: PHYSICAL THERAPY | Age: 68
Setting detail: THERAPIES SERIES
Discharge: HOME | End: 2023-05-01
Payer: MEDICARE

## 2023-05-01 PROCEDURE — 97140 MANUAL THERAPY 1/> REGIONS: CPT

## 2023-05-01 PROCEDURE — 97110 THERAPEUTIC EXERCISES: CPT

## 2023-05-01 PROCEDURE — 97530 THERAPEUTIC ACTIVITIES: CPT

## 2023-05-01 NOTE — FLOWSHEET NOTE
[facility], [facility]  [address] [place], [facility]  Phone: [phone]   Fax:     [phone]    Physical Therapy Treatment Note/ Progress Report:     Date:  2023    Patient Name:  Lore Eli    :  1955  MRN: [id number]    Pertinent Medical History:      Medical/Treatment Diagnosis Information:  Medical Diagnosis: Chronic right-sided low back pain with right-sided sciatica [M54.41, G89.29]  Treatment Diagnosis: hip/knee/ back pain, limited mobility, function, activity tolerance    Insurance/Certification information:     Physician Information:  Bradley Fuentes MD  Plan of care signed (Y/N):     Date of Patient follow up with Physician:      Progress Report: []  Yes  [x]  No     Date Range for reporting period:  Beginnin2023  Ending:    Progress report due (10 Rx/or 30 days whichever is less): #30     Recertification due (POC duration/ or 90 days whichever is less):     Visit # POC/ Insurance Allowable Auth Needed   3 /  [facility] []Yes    []No     Functional Outcomes Measure:     Test: [facility]  Date Assessed Score   eval 57%           Pain level:  \"not much\" /10     History of Injury: Pt here for evaluation for R hip/ back/ knee pain. She has had a complex course with a few a few bouts of PT in the past year or so. I saw her in the end of  for 8 visits and she reports moderate improvement from that course, in which we targeted her R hip and lumbar area, but the pain returned shortly after stopping therapy. She has since seen a couple orthopedic doctors who felt her hip OA was not significant enough for intervention and she ended up having lumbar work-up and had 2 recent injections in her back; 1 epidural, and 1 sciatic nerve block. Her pain is much better now, but not gone. She rates her pain at 4-6/10.       She subjectively rates her RLE strength/ function at 85% (post stroke), and now

## 2023-05-04 ENCOUNTER — HOSPITAL ENCOUNTER (OUTPATIENT)
Dept: PHYSICAL THERAPY | Age: 68
Setting detail: THERAPIES SERIES
Discharge: HOME | End: 2023-05-04
Payer: MEDICARE

## 2023-05-04 VITALS — SYSTOLIC BLOOD PRESSURE: 130 MMHG | DIASTOLIC BLOOD PRESSURE: 80 MMHG

## 2023-05-04 PROCEDURE — 97530 THERAPEUTIC ACTIVITIES: CPT

## 2023-05-04 PROCEDURE — 97140 MANUAL THERAPY 1/> REGIONS: CPT

## 2023-05-04 PROCEDURE — 97110 THERAPEUTIC EXERCISES: CPT

## 2023-05-04 NOTE — FLOWSHEET NOTE
[facility], [facility]  [address] [place], [facility]  Phone: [phone]   Fax:     [phone]    Physical Therapy Treatment Note/ Progress Report:     Date:  2023    Patient Name:  Paul Valverde    :  1955  MRN: [id number]    Pertinent Medical History:      Medical/Treatment Diagnosis Information:  Medical Diagnosis: Chronic right-sided low back pain with right-sided sciatica [M54.41, G89.29]  Treatment Diagnosis: hip/knee/ back pain, limited mobility, function, activity tolerance    Insurance/Certification information:     Physician Information:  Adenike James MD  Plan of care signed (Y/N):     Date of Patient follow up with Physician:      Progress Report: []  Yes  [x]  No     Date Range for reporting period:  Beginnin2023  Ending:    Progress report due (10 Rx/or 30 days whichever is less): #28     Recertification due (POC duration/ or 90 days whichever is less):     Visit # POC/ Insurance Allowable Auth Needed   4 /  [facility] []Yes    []No     Functional Outcomes Measure:     Test: [facility]  Date Assessed Score   eval 57%           Pain level:  \"not much\" /10     History of Injury: Pt here for evaluation for R hip/ back/ knee pain. She has had a complex course with a few a few bouts of PT in the past year or so. I saw her in the end of  for 8 visits and she reports moderate improvement from that course, in which we targeted her R hip and lumbar area, but the pain returned shortly after stopping therapy. She has since seen a couple orthopedic doctors who felt her hip OA was not significant enough for intervention and she ended up having lumbar work-up and had 2 recent injections in her back; 1 epidural, and 1 sciatic nerve block. Her pain is much better now, but not gone. She rates her pain at 4-6/10.       She subjectively rates her RLE strength/ function at 85% (post stroke), and now

## 2023-05-08 ENCOUNTER — HOSPITAL ENCOUNTER (OUTPATIENT)
Dept: PHYSICAL THERAPY | Age: 68
Setting detail: THERAPIES SERIES
Discharge: HOME | End: 2023-05-08
Payer: MEDICARE

## 2023-05-08 PROCEDURE — 97110 THERAPEUTIC EXERCISES: CPT

## 2023-05-08 PROCEDURE — 97140 MANUAL THERAPY 1/> REGIONS: CPT

## 2023-05-08 NOTE — FLOWSHEET NOTE
[facility], [facility]  [address] [place], [facility]  Phone: [phone]   Fax:     [phone]    Physical Therapy Treatment Note/ Progress Report:     Date:  2023    Patient Name:  Reynaldo Alvarado    :  1955  MRN: [id number]    Pertinent Medical History:      Medical/Treatment Diagnosis Information:  Medical Diagnosis: Chronic right-sided low back pain with right-sided sciatica [M54.41, G89.29]  Treatment Diagnosis: hip/knee/ back pain, limited mobility, function, activity tolerance    Insurance/Certification information:     Physician Information:  Dariusz Claire MD  Plan of care signed (Y/N):     Date of Patient follow up with Physician:      Progress Report: []  Yes  [x]  No     Date Range for reporting period:  Beginnin2023  Ending:    Progress report due (10 Rx/or 30 days whichever is less): #75     Recertification due (POC duration/ or 90 days whichever is less):     Visit # POC/ Insurance Allowable Auth Needed   5 /  [facility] []Yes    []No     Functional Outcomes Measure:     Test: [facility]  Date Assessed Score   eval 57%           Pain level:    310     History of Injury: Pt here for evaluation for R hip/ back/ knee pain. She has had a complex course with a few a few bouts of PT in the past year or so. I saw her in the end of  for 8 visits and she reports moderate improvement from that course, in which we targeted her R hip and lumbar area, but the pain returned shortly after stopping therapy. She has since seen a couple orthopedic doctors who felt her hip OA was not significant enough for intervention and she ended up having lumbar work-up and had 2 recent injections in her back; 1 epidural, and 1 sciatic nerve block. Her pain is much better now, but not gone. She rates her pain at 4-6/10.       She subjectively rates her RLE strength/ function at 85% (post stroke), and now she

## 2023-05-11 ENCOUNTER — HOSPITAL ENCOUNTER (OUTPATIENT)
Dept: PHYSICAL THERAPY | Age: 68
Setting detail: THERAPIES SERIES
Discharge: HOME | End: 2023-05-11
Payer: MEDICARE

## 2023-05-11 DIAGNOSIS — E11.8 TYPE 2 DIABETES MELLITUS WITH COMPLICATION, WITHOUT LONG-TERM CURRENT USE OF INSULIN (HCC): ICD-10-CM

## 2023-05-11 PROCEDURE — 97110 THERAPEUTIC EXERCISES: CPT

## 2023-05-11 PROCEDURE — 97140 MANUAL THERAPY 1/> REGIONS: CPT

## 2023-05-11 RX ORDER — DULAGLUTIDE 1.5 MG/.5ML
INJECTION, SOLUTION SUBCUTANEOUS
Qty: 6 ML | Refills: 3 | Status: SHIPPED | OUTPATIENT
Start: 2023-05-11

## 2023-05-11 NOTE — FLOWSHEET NOTE
mild relief, and also followed up with ex's targeting functional hip strength and motor control. 5/4: prosper session well. Mod fatigue with program. She became moderately dizzy after 2 laps of stairs in the gym, however she later tells me she didn't eat breakfast and didn't take her medication this AM. She was given water and was able to complete the session and left under her own power and feeling better. 5-8-23 no c/o of pain after Rx. Requires frequent cues to perform ex correctly and proper gt sequence with stairs using st cane and single rail. 5/11: improved pain after session             Treatment/Activity Tolerance:  [x] Patient tolerated treatment well [] Patient limited by fatique  [] Patient limited by pain  [] Patient limited by other medical complications  [] Other:     Overall Progression Towards Functional goals/ Treatment Progress Update:  [] Patient is progressing as expected towards functional goals listed. [] Progression is slowed due to complexities/Impairments listed. [] Progression has been slowed due to co-morbidities. [x] Plan just implemented, too soon to assess goals progression <30days   [] Goals require adjustment due to lack of progress  [] Patient is not progressing as expected and requires additional follow up with physician  [] Other:    Prognosis for POC: [x] Good [] Fair  [] Poor    Patient requires continued skilled intervention: [x] Yes  [] No        PLAN: See eval  [x] Continue per plan of care [] Alter current plan (see comments)  [] Plan of care initiated [] Hold pending MD visit [] Discharge    Electronically signed by: Genaro Zelaya, PT ,MPT [id number]      Note: If patient does not return for scheduled/recommended follow up visits, this note will serve as a discharge from care along with the most recent update on progress.

## 2023-05-15 ENCOUNTER — HOSPITAL ENCOUNTER (OUTPATIENT)
Dept: PHYSICAL THERAPY | Age: 68
Setting detail: THERAPIES SERIES
Discharge: HOME | End: 2023-05-15
Payer: MEDICARE

## 2023-05-15 PROCEDURE — 97110 THERAPEUTIC EXERCISES: CPT

## 2023-05-15 PROCEDURE — 97140 MANUAL THERAPY 1/> REGIONS: CPT

## 2023-05-15 NOTE — FLOWSHEET NOTE
[facility], [facility]  [address] [place], [facility]  Phone: [phone]   Fax:     [phone]    Physical Therapy Treatment Note/ Progress Report:     Date:  05/15/2023    Patient Name:  Payal Monk    :  1955  MRN: [id number]    Pertinent Medical History:      Medical/Treatment Diagnosis Information:  Medical Diagnosis: Chronic right-sided low back pain with right-sided sciatica [M54.41, G89.29]  Treatment Diagnosis: hip/knee/ back pain, limited mobility, function, activity tolerance    Insurance/Certification information:     Physician Information:  Meera Nicole MD  Plan of care signed (Y/N):     Date of Patient follow up with Physician:      Progress Report: []  Yes  [x]  No     Date Range for reporting period:  Beginnin2023  Ending:    Progress report due (10 Rx/or 30 days whichever is less): #81     Recertification due (POC duration/ or 90 days whichever is less):     Visit # POC/ Insurance Allowable Auth Needed    [address] []Yes    []No     Functional Outcomes Measure:     Test: [facility]  Date Assessed Score   eval 57%           Pain level:    3-4/10     History of Injury: Pt here for evaluation for R hip/ back/ knee pain. She has had a complex course with a few a few bouts of PT in the past year or so. I saw her in the end of  for 8 visits and she reports moderate improvement from that course, in which we targeted her R hip and lumbar area, but the pain returned shortly after stopping therapy. She has since seen a couple orthopedic doctors who felt her hip OA was not significant enough for intervention and she ended up having lumbar work-up and had 2 recent injections in her back; 1 epidural, and 1 sciatic nerve block. Her pain is much better now, but not gone. She rates her pain at 4-6/10.       She subjectively rates her RLE strength/ function at 85% (post stroke), and now she

## 2023-05-18 ENCOUNTER — HOSPITAL ENCOUNTER (OUTPATIENT)
Dept: PHYSICAL THERAPY | Age: 68
Setting detail: THERAPIES SERIES
Discharge: HOME | End: 2023-05-18
Payer: MEDICARE

## 2023-05-18 ENCOUNTER — OFFICE VISIT (OUTPATIENT)
Dept: ORTHOPEDIC SURGERY | Age: 68
End: 2023-05-18
Payer: MEDICARE

## 2023-05-18 VITALS — HEIGHT: 64 IN | WEIGHT: 199 LBS | BODY MASS INDEX: 33.97 KG/M2

## 2023-05-18 DIAGNOSIS — M16.11 PRIMARY OSTEOARTHRITIS OF RIGHT HIP: Primary | ICD-10-CM

## 2023-05-18 PROCEDURE — 97110 THERAPEUTIC EXERCISES: CPT

## 2023-05-18 PROCEDURE — G8428 CUR MEDS NOT DOCUMENT: HCPCS | Performed by: ORTHOPAEDIC SURGERY

## 2023-05-18 PROCEDURE — G8417 CALC BMI ABV UP PARAM F/U: HCPCS | Performed by: ORTHOPAEDIC SURGERY

## 2023-05-18 PROCEDURE — 1090F PRES/ABSN URINE INCON ASSESS: CPT | Performed by: ORTHOPAEDIC SURGERY

## 2023-05-18 PROCEDURE — 3017F COLORECTAL CA SCREEN DOC REV: CPT | Performed by: ORTHOPAEDIC SURGERY

## 2023-05-18 PROCEDURE — 97530 THERAPEUTIC ACTIVITIES: CPT

## 2023-05-18 PROCEDURE — 1124F ACP DISCUSS-NO DSCNMKR DOCD: CPT | Performed by: ORTHOPAEDIC SURGERY

## 2023-05-18 PROCEDURE — 97140 MANUAL THERAPY 1/> REGIONS: CPT

## 2023-05-18 PROCEDURE — 1036F TOBACCO NON-USER: CPT | Performed by: ORTHOPAEDIC SURGERY

## 2023-05-18 PROCEDURE — G8399 PT W/DXA RESULTS DOCUMENT: HCPCS | Performed by: ORTHOPAEDIC SURGERY

## 2023-05-18 PROCEDURE — 99214 OFFICE O/P EST MOD 30 MIN: CPT | Performed by: ORTHOPAEDIC SURGERY

## 2023-05-18 NOTE — PROGRESS NOTES
ulcerations, or lesions, bilaterally in the lower extremities. Sensation to both lower extremities is grossly intact. Exam of both feet reveals pedal pulses intact and brisk cap refill. Patient is able to dorsiflex and wiggle all toes. Deep tendon reflexes of the lower extremities are normal and symmetric. She is non tender to palpation of the lumbar spine. X-rays: AP pelvis and 2 views of the right hip obtained in the office today were extensively reviewed. The x-rays confirm moderate to severe osteoarthritis of the right hip. There has been a slight progression of her arthritis since the MRI performed back in December 2022. Impression: right Hip Osteoarthritis     Plan: At this time, we will go ahead and send the patient over to interventional radiology for a right hip intra-articular injection. The patient is aware that if this does not alleviate her symptoms, she will likely require total hip arthroplasty. She was encouraged to modify her activities. We instructed her on a weight loss program.  She will follow-up with me in 6 weeks and we will reassess her then.     Orders Placed This Encounter   Procedures    XR HIP 2-3 VW W PELVIS RIGHT     Rm 24     Standing Status:   Future     Number of Occurrences:   1     Standing Expiration Date:   6/18/2023     Order Specific Question:   Reason for exam:     Answer:   pain

## 2023-05-18 NOTE — PLAN OF CARE
[facility], [facility]  [address] [facility], [place]  Phone: [phone]   Fax:     [phone]      Physical Therapy Re-Certification Plan of Care/MD UPDATE      Dear Steven Palma MD,    We had the pleasure of treating the following patient for physical therapy services at [address]. A summary of our findings can be found in the updated assessment below. This includes our plan of care. If you have any questions or concerns regarding these findings, please do not hesitate to contact me at the office phone number checked above.   Thank you for the referral.     Physician Signature:________________________________Date:__________________  By signing above (or electronic signature), therapists plan is approved by physician    Date Range Of Visits: 4/20/23-5/18/23  Total Visits to Date: 8  Overall Response to Treatment:   [x]Patient is responding well to treatment and improvement is noted with regards  to goals   []Patient should continue to improve in reasonable time if they continue HEP   []Patient has plateaued and is no longer responding to skilled PT intervention    []Patient is getting worse and would benefit from return to referring MD   []Patient unable to adhere to initial POC   [x]Other: Updated on 5/18/23  *pain with typical ADLs is 3-4/10  *overall feels PT helpful and is 70% improved  *functional improvement: transfers easier, but in/out of car is still difficult; walking is better and hip feels looser  *states inc pain with bad weather/rain  *Pt states MD says hip OA is not bad enough for THR yet; thinks some of the hip pain could be coming from her back  *to see Dr. Janeth Rogers today 5/18 for possible hip injection; and to have JNAETH in back after insurance approves   *practices steps and doing them reciprocally, but often does them 1 at a time still due to fear and pain in R

## 2023-05-22 ENCOUNTER — HOSPITAL ENCOUNTER (OUTPATIENT)
Dept: PHYSICAL THERAPY | Age: 68
Setting detail: THERAPIES SERIES
Discharge: HOME | End: 2023-05-22
Payer: MEDICAID

## 2023-05-22 PROCEDURE — 97140 MANUAL THERAPY 1/> REGIONS: CPT

## 2023-05-22 PROCEDURE — 97110 THERAPEUTIC EXERCISES: CPT

## 2023-05-22 PROCEDURE — 97530 THERAPEUTIC ACTIVITIES: CPT

## 2023-05-22 NOTE — FLOWSHEET NOTE
step ups. Treatment/Activity Tolerance:  [x] Patient tolerated treatment well [] Patient limited by fatique  [] Patient limited by pain  [] Patient limited by other medical complications  [] Other:     Overall Progression Towards Functional goals/ Treatment Progress Update:  [] Patient is progressing as expected towards functional goals listed. [] Progression is slowed due to complexities/Impairments listed. [] Progression has been slowed due to co-morbidities. [x] Plan just implemented, too soon to assess goals progression <30days   [] Goals require adjustment due to lack of progress  [] Patient is not progressing as expected and requires additional follow up with physician  [] Other:    Prognosis for POC: [x] Good [] Fair  [] Poor    Patient requires continued skilled intervention: [x] Yes  [] No        PLAN: will hold pending ortho consult for R hip and possibility of injections back/hip due to progress made but cont pain c/o    [] Continue per plan of care [] Alter current plan (see comments)  [] Plan of care initiated [x] Hold pending MD visit [] Discharge    Electronically signed by: Jade Martines, PTA  [id number]      Note: If patient does not return for scheduled/recommended follow up visits, this note will serve as a discharge from care along with the most recent update on progress.

## 2023-05-23 ENCOUNTER — HOSPITAL ENCOUNTER (OUTPATIENT)
Dept: GENERAL RADIOLOGY | Age: 68
Discharge: HOME | End: 2023-05-23
Payer: MEDICAID

## 2023-05-23 DIAGNOSIS — M16.11 PRIMARY OSTEOARTHRITIS OF RIGHT HIP: ICD-10-CM

## 2023-05-23 PROCEDURE — 20610 DRAIN/INJ JOINT/BURSA W/O US: CPT

## 2023-05-23 PROCEDURE — 2500000003 HC RX 250 WO HCPCS

## 2023-05-23 PROCEDURE — 6360000004 HC RX CONTRAST MEDICATION: Performed by: ORTHOPAEDIC SURGERY

## 2023-05-23 PROCEDURE — 77002 NEEDLE LOCALIZATION BY XRAY: CPT

## 2023-05-23 PROCEDURE — 6360000002 HC RX W HCPCS

## 2023-05-23 RX ADMIN — IOPAMIDOL 3 ML: 510 INJECTION, SOLUTION INTRAVASCULAR at 10:16

## 2023-05-23 NOTE — BRIEF OP NOTE
Brief Postoperative Note    John Wood  YOB: 1955  [id number]    Pre-operative Diagnosis: right hip pain due to osteoarthritis    Post-operative Diagnosis: Same    Procedure: image guided hip injection    Anesthesia: Local    Surgeons/Assistants: Dawit Irby MD    Estimated Blood Loss: less than 5    Complications: None    Specimens: Was Not Obtained    Findings: successful injection of right hip steroid and marcaine.     Electronically signed by Dawit Irby MD on 5/23/2023 at 10:02 AM

## 2023-06-21 NOTE — PROGRESS NOTES
[facility]  PRE-PROCEDURE INSTRUCTIONS    Procedure date____6/23/23_____Arrival time____700________        Procedure time______800______       Screening questions for Pain procedures:  Do you have a current infection? ___n______  Are you currently taking an antibiotic?____n__  Are you taking a blood thinner?____n____    It is not necessary to stop eating or drinking prior to this procedure. We would like you to take your medications for blood pressure as usual.  You may be asked to stop blood thinners such as Coumadin, Plavix, Fragmin, Lovenox, etc., or any anti-inflammatories such as:  Aspirin, Ibuprofen, Advil, Naproxen prior to your procedure. We also ask that you stop any OTC medications that cause additional bleeding    You must make arrangements for a responsible adult to arrive with you and stay in our waiting area during your procedure. They will also need to take you home after your procedure. For your safety you will not be allowed to leave alone or drive yourself home. Also for your safety, it is strongly suggested that someone stay with you the first 24 hours after your procedure. For your comfort, please wear simple loose fitting clothing to the center. Please do not bring valuables. If you have a living will and a durable power of  for healthcare, please bring in a copy.     You will need to bring a photo ID and insurance card    Our goal is to provide you with excellent care so if you have any questions, please contact us at the [facility] at [phone]

## 2023-06-23 ENCOUNTER — APPOINTMENT (OUTPATIENT)
Dept: INTERVENTIONAL RADIOLOGY/VASCULAR | Age: 68
End: 2023-06-23
Attending: ANESTHESIOLOGY
Payer: MEDICARE

## 2023-06-23 ENCOUNTER — HOSPITAL ENCOUNTER (OUTPATIENT)
Age: 68
Setting detail: OUTPATIENT SURGERY
Discharge: HOME | End: 2023-06-23
Attending: ANESTHESIOLOGY | Admitting: ANESTHESIOLOGY
Payer: MEDICARE

## 2023-06-23 VITALS
WEIGHT: 194 LBS | SYSTOLIC BLOOD PRESSURE: 121 MMHG | HEART RATE: 67 BPM | BODY MASS INDEX: 33.12 KG/M2 | TEMPERATURE: 96.4 F | RESPIRATION RATE: 16 BRPM | HEIGHT: 64 IN | DIASTOLIC BLOOD PRESSURE: 76 MMHG | OXYGEN SATURATION: 100 %

## 2023-06-23 PROCEDURE — 6360000004 HC RX CONTRAST MEDICATION: Performed by: ANESTHESIOLOGY

## 2023-06-23 PROCEDURE — 2709999900 HC NON-CHARGEABLE SUPPLY: Performed by: ANESTHESIOLOGY

## 2023-06-23 PROCEDURE — 3610000054 HC PAIN LEVEL 3 BASE (NON-OR): Performed by: ANESTHESIOLOGY

## 2023-06-23 PROCEDURE — 6360000002 HC RX W HCPCS: Performed by: ANESTHESIOLOGY

## 2023-06-23 RX ORDER — METHYLPREDNISOLONE ACETATE 80 MG/ML
INJECTION, SUSPENSION INTRA-ARTICULAR; INTRALESIONAL; INTRAMUSCULAR; SOFT TISSUE
Status: COMPLETED | OUTPATIENT
Start: 2023-06-23 | End: 2023-06-23

## 2023-06-23 ASSESSMENT — PAIN - FUNCTIONAL ASSESSMENT: PAIN_FUNCTIONAL_ASSESSMENT: NONE - DENIES PAIN

## 2023-06-23 ASSESSMENT — PAIN SCALES - GENERAL: PAINLEVEL_OUTOF10: 0

## 2023-06-23 NOTE — DISCHARGE INSTRUCTIONS
[facility]   [address] [place], [address] [place] [address]   [address], [address] [place] [address]  [phone]      Patient:  Veena Franklin  YOB: 1955  Medical Record #:  [id number]   Date:  6/23/2023   Physician:  Abi Arguello MD    Procedure Performed: [unfilled]     Discharge Instructions    Notify Pain Management Services if any of the following occur:    Redness/Swelling at the injection site lasting longer than 2 days  Fever (with redness, swelling, or drainage at the injection site)  Drainage at the injection site  New weakness/ numbness  Severe headache   Loss of bowel/ bladder function    General Instructions: You may experience numbness for several hours following your treatment. You should be cautious using those areas which are numb. Once the numbness wears off, you may apply ice or heat to injection site, if needed. Do not return to work or drive today    Rest today and return to normal activities tomorrow. On average, the steroid takes about 1 week to work and can be up to 2 weeks    You should continue to depend on your primary physician for your medical management of conditions not related to your pain management treatment. Continue to take all your other medications, including blood thinners, as directed by your primary physician unless otherwise instructed. NO changes have been made to your medications. Any changes listed on the discharge are based on patient self reporting. Any EMR warnings regarding drug/drug interactions were dismissed based on current use by the patient, management by prescribing physician and pharmacist and not managed by this physician. Any problem which relates specifically to a treatment or procedure performed today should be directed to the [facility].        [facility]  Division of Interventional Pain

## 2023-06-23 NOTE — PROGRESS NOTES
Went over discharge information with patient. Patient understood discharge information. Patient discharged to home.      Merrilee Apley

## 2023-06-23 NOTE — H&P
here    10 grandchildren     Social Determinants of Health     Financial Resource Strain: Low Risk     Difficulty of Paying Living Expenses: Not hard at all   Food Insecurity: No Food Insecurity    Worried About [address] in the Last Year: Never true    [address] [employer] in the Last Year: Never true   Transportation Needs: Unknown    Lack of Transportation (Medical): Not on file    Lack of Transportation (Non-Medical): No   Physical Activity: Not on file   Stress: Not on file   Social Connections: Not on file   Intimate Partner Violence: Not on file   Housing Stability: Unknown    Unable to Pay for Housing in the Last Year: Not on file    Number of Places Lived in the Last Year: Not on file    Unstable Housing in the Last Year: No     Family History   Problem Relation Age of Onset    Heart Disease Mother     High Blood Pressure Mother     Stroke Mother     Cancer Brother         lung cancer         PHYSICAL EXAM:      /73   Pulse 73   Temp 96.8 °F (36 °C) (Temporal)   Resp 16   Ht 5' 4\" (1.626 m)   Wt 194 lb (88 kg)   SpO2 98%   BMI 33.30 kg/m²  I            ASSESSMENT AND PLAN:    1. Procedure. options, risks and benefits reviewed with patient and expresses understanding.

## 2023-06-23 NOTE — OP NOTE
Patient:  Romain Labor  YOB: 1955  Medical Record #:  [id number]   Place: [facility]  Date:  6/23/2023   Physician:  Cristhian Reed MD      PRE-PROCEDURE DIAGNOSIS: M54.16    POST-PROCEDURE DIAGNOSIS: M54.16    PROCEDURE:  Midline interlaminar right  L4-5 epidural steroid injection with fluoroscopy and epidurography. BRIEF HISTORY:  The patient presents today to [facility] for a scheduled lumbar epidural steroid injection procedure. The patient was re-evaluated today and is clinically unchanged as compared to my previous evaluation. The patient is clinically stable to proceed with the procedure. PROCEDURE NOTE:  The procedure was again explained to the patient and the previously distributed pre-procedure literature was reviewed. The options, rationale, and benefits of the procedure including pain relief, functional improvement, and increased mobility, as well as the risks of the procedure including but not limited to infection, bleeding, paresthesia, pain, failure to relieve pain, increased pain, headache, allergic reaction, neurologic impairment, local anesthetic, toxicity, and side effects and the potential side effects of corticosteroids were discussed with the patient and informed written consent was obtained from the patient. The patient was positioned in the prone position on the fluoroscopy table. The skin overlying the lumbosacral vertebrae was prepped using Chloraprep and draped in the usual sterile fashion. The L4-5 lumbar intervertebral level was identified using intermittent AP fluoroscopy. The previously identified projection of overlying skin was anesthetized using 2 cc of buffered 1% lidocaine with a 27 gauge needle. A 4.25\" 22g Touhy needle was advanced through a small skin nick in the AP view towards the interlaminar and epidural space. The epidural space was easily identified using loss of resistance to saline.   No difficulty,

## 2023-06-30 ENCOUNTER — TELEPHONE (OUTPATIENT)
Dept: FAMILY MEDICINE CLINIC | Age: 68
End: 2023-06-30

## 2023-06-30 NOTE — TELEPHONE ENCOUNTER
Edgar Khan from [place] calling to give Dr. Lyle Rm an Update  Pt called in and they did a video visit with the pt  Pt was complaining of body aches, fever, cough and sore throat  They did a home covid test and it came back negative  She is getting treated for a URI  She was started on Augmenton, Flonase and tylenol and was told to use her at home inhaler

## 2023-07-05 DIAGNOSIS — F39 MOOD DISORDER (HCC): ICD-10-CM

## 2023-07-05 RX ORDER — ILOPERIDONE 1 MG/1
TABLET ORAL
Qty: 90 TABLET | Refills: 0 | Status: SHIPPED | OUTPATIENT
Start: 2023-07-05

## 2023-07-06 ENCOUNTER — HOSPITAL ENCOUNTER (OUTPATIENT)
Dept: CT IMAGING | Age: 68
Discharge: HOME | End: 2023-07-06
Attending: FAMILY MEDICINE
Payer: MEDICARE

## 2023-07-06 ENCOUNTER — OFFICE VISIT (OUTPATIENT)
Dept: FAMILY MEDICINE CLINIC | Age: 68
End: 2023-07-06
Payer: MEDICARE

## 2023-07-06 VITALS
RESPIRATION RATE: 16 BRPM | OXYGEN SATURATION: 97 % | SYSTOLIC BLOOD PRESSURE: 118 MMHG | WEIGHT: 193 LBS | HEART RATE: 83 BPM | BODY MASS INDEX: 32.95 KG/M2 | HEIGHT: 64 IN | DIASTOLIC BLOOD PRESSURE: 80 MMHG

## 2023-07-06 DIAGNOSIS — R10.32 LLQ ABDOMINAL PAIN: ICD-10-CM

## 2023-07-06 DIAGNOSIS — E11.8 TYPE 2 DIABETES MELLITUS WITH COMPLICATION, WITHOUT LONG-TERM CURRENT USE OF INSULIN (HCC): ICD-10-CM

## 2023-07-06 DIAGNOSIS — Z00.00 MEDICARE ANNUAL WELLNESS VISIT, SUBSEQUENT: Primary | ICD-10-CM

## 2023-07-06 LAB
ALBUMIN SERPL-MCNC: 4.7 G/DL (ref 3.4–5)
ALBUMIN/GLOB SERPL: 1.6 {RATIO} (ref 1.1–2.2)
ALP SERPL-CCNC: 84 U/L (ref 40–129)
ALT SERPL-CCNC: 18 U/L (ref 10–40)
ANION GAP SERPL CALCULATED.3IONS-SCNC: 9 MMOL/L (ref 3–16)
AST SERPL-CCNC: 25 U/L (ref 15–37)
BASOPHILS # BLD: 0 K/UL (ref 0–0.2)
BASOPHILS NFR BLD: 0.4 %
BILIRUB SERPL-MCNC: <0.2 MG/DL (ref 0–1)
BILIRUBIN, POC: NEGATIVE
BLOOD URINE, POC: NEGATIVE
BUN SERPL-MCNC: 12 MG/DL (ref 7–20)
CALCIUM SERPL-MCNC: 11.2 MG/DL (ref 8.3–10.6)
CHLORIDE SERPL-SCNC: 102 MMOL/L (ref 99–110)
CLARITY, POC: CLEAR
CO2 SERPL-SCNC: 31 MMOL/L (ref 21–32)
COLOR, POC: YELLOW
CREAT SERPL-MCNC: 0.9 MG/DL (ref 0.6–1.2)
DEPRECATED RDW RBC AUTO: 13.5 % (ref 12.4–15.4)
EOSINOPHIL # BLD: 0.2 K/UL (ref 0–0.6)
EOSINOPHIL NFR BLD: 3 %
GFR SERPLBLD CREATININE-BSD FMLA CKD-EPI: >60 ML/MIN/{1.73_M2}
GLUCOSE SERPL-MCNC: 72 MG/DL (ref 70–99)
GLUCOSE URINE, POC: NEGATIVE
HCT VFR BLD AUTO: 37.2 % (ref 36–48)
HGB BLD-MCNC: 12.2 G/DL (ref 12–16)
KETONES, POC: NEGATIVE
LEUKOCYTE EST, POC: NEGATIVE
LYMPHOCYTES # BLD: 2.9 K/UL (ref 1–5.1)
LYMPHOCYTES NFR BLD: 41 %
MCH RBC QN AUTO: 27.7 PG (ref 26–34)
MCHC RBC AUTO-ENTMCNC: 32.8 G/DL (ref 31–36)
MCV RBC AUTO: 84.3 FL (ref 80–100)
MONOCYTES # BLD: 0.5 K/UL (ref 0–1.3)
MONOCYTES NFR BLD: 6.5 %
NEUTROPHILS # BLD: 3.4 K/UL (ref 1.7–7.7)
NEUTROPHILS NFR BLD: 49.1 %
NITRITE, POC: NEGATIVE
PERFORMED ON: NORMAL
PH, POC: 7
PLATELET # BLD AUTO: 308 K/UL (ref 135–450)
PMV BLD AUTO: 7.9 FL (ref 5–10.5)
POC CREATININE: 0.7 MG/DL (ref 0.6–1.2)
POC SAMPLE TYPE: NORMAL
POTASSIUM SERPL-SCNC: 4.6 MMOL/L (ref 3.5–5.1)
PROT SERPL-MCNC: 7.7 G/DL (ref 6.4–8.2)
PROTEIN, POC: NEGATIVE
RBC # BLD AUTO: 4.41 M/UL (ref 4–5.2)
SODIUM SERPL-SCNC: 142 MMOL/L (ref 136–145)
SPECIFIC GRAVITY, POC: 1.02
UROBILINOGEN, POC: NORMAL
WBC # BLD AUTO: 7 K/UL (ref 4–11)

## 2023-07-06 PROCEDURE — 74177 CT ABD & PELVIS W/CONTRAST: CPT

## 2023-07-06 PROCEDURE — G8399 PT W/DXA RESULTS DOCUMENT: HCPCS | Performed by: FAMILY MEDICINE

## 2023-07-06 PROCEDURE — 3079F DIAST BP 80-89 MM HG: CPT | Performed by: FAMILY MEDICINE

## 2023-07-06 PROCEDURE — 3017F COLORECTAL CA SCREEN DOC REV: CPT | Performed by: FAMILY MEDICINE

## 2023-07-06 PROCEDURE — 1124F ACP DISCUSS-NO DSCNMKR DOCD: CPT | Performed by: FAMILY MEDICINE

## 2023-07-06 PROCEDURE — G8417 CALC BMI ABV UP PARAM F/U: HCPCS | Performed by: FAMILY MEDICINE

## 2023-07-06 PROCEDURE — 2022F DILAT RTA XM EVC RTNOPTHY: CPT | Performed by: FAMILY MEDICINE

## 2023-07-06 PROCEDURE — 1036F TOBACCO NON-USER: CPT | Performed by: FAMILY MEDICINE

## 2023-07-06 PROCEDURE — 81002 URINALYSIS NONAUTO W/O SCOPE: CPT | Performed by: FAMILY MEDICINE

## 2023-07-06 PROCEDURE — G0439 PPPS, SUBSEQ VISIT: HCPCS | Performed by: FAMILY MEDICINE

## 2023-07-06 PROCEDURE — 99213 OFFICE O/P EST LOW 20 MIN: CPT | Performed by: FAMILY MEDICINE

## 2023-07-06 PROCEDURE — 6360000004 HC RX CONTRAST MEDICATION: Performed by: FAMILY MEDICINE

## 2023-07-06 PROCEDURE — 3051F HG A1C>EQUAL 7.0%<8.0%: CPT | Performed by: FAMILY MEDICINE

## 2023-07-06 PROCEDURE — G8427 DOCREV CUR MEDS BY ELIG CLIN: HCPCS | Performed by: FAMILY MEDICINE

## 2023-07-06 PROCEDURE — 1090F PRES/ABSN URINE INCON ASSESS: CPT | Performed by: FAMILY MEDICINE

## 2023-07-06 PROCEDURE — 82565 ASSAY OF CREATININE: CPT

## 2023-07-06 PROCEDURE — 3074F SYST BP LT 130 MM HG: CPT | Performed by: FAMILY MEDICINE

## 2023-07-06 RX ADMIN — IOPAMIDOL 50 ML: 612 INJECTION, SOLUTION INTRAVENOUS at 09:44

## 2023-07-06 RX ADMIN — IOPAMIDOL 75 ML: 755 INJECTION, SOLUTION INTRAVENOUS at 09:45

## 2023-07-06 ASSESSMENT — PATIENT HEALTH QUESTIONNAIRE - PHQ9
6. FEELING BAD ABOUT YOURSELF - OR THAT YOU ARE A FAILURE OR HAVE LET YOURSELF OR YOUR FAMILY DOWN: 0
SUM OF ALL RESPONSES TO PHQ QUESTIONS 1-9: 4
SUM OF ALL RESPONSES TO PHQ QUESTIONS 1-9: 4
5. POOR APPETITE OR OVEREATING: 0
9. THOUGHTS THAT YOU WOULD BE BETTER OFF DEAD, OR OF HURTING YOURSELF: 0
SUM OF ALL RESPONSES TO PHQ QUESTIONS 1-9: 4
10. IF YOU CHECKED OFF ANY PROBLEMS, HOW DIFFICULT HAVE THESE PROBLEMS MADE IT FOR YOU TO DO YOUR WORK, TAKE CARE OF THINGS AT HOME, OR GET ALONG WITH OTHER PEOPLE: 0
SUM OF ALL RESPONSES TO PHQ9 QUESTIONS 1 & 2: 2
3. TROUBLE FALLING OR STAYING ASLEEP: 1
SUM OF ALL RESPONSES TO PHQ QUESTIONS 1-9: 4
1. LITTLE INTEREST OR PLEASURE IN DOING THINGS: 1
7. TROUBLE CONCENTRATING ON THINGS, SUCH AS READING THE NEWSPAPER OR WATCHING TELEVISION: 0
8. MOVING OR SPEAKING SO SLOWLY THAT OTHER PEOPLE COULD HAVE NOTICED. OR THE OPPOSITE, BEING SO FIGETY OR RESTLESS THAT YOU HAVE BEEN MOVING AROUND A LOT MORE THAN USUAL: 0
4. FEELING TIRED OR HAVING LITTLE ENERGY: 1
2. FEELING DOWN, DEPRESSED OR HOPELESS: 1

## 2023-07-06 ASSESSMENT — LIFESTYLE VARIABLES
HOW MANY STANDARD DRINKS CONTAINING ALCOHOL DO YOU HAVE ON A TYPICAL DAY: PATIENT DOES NOT DRINK
HOW OFTEN DO YOU HAVE A DRINK CONTAINING ALCOHOL: NEVER

## 2023-07-06 NOTE — PROGRESS NOTES
[facility]  PRE-PROCEDURE INSTRUCTIONS    Procedure date_7/7/2023________  Arrival time_0700___________          Procedure time___0800_________       Screening questions for Pain procedures:  Do you have a current infection? ___n______  Are you currently taking an antibiotic?___n___  Are you taking a blood thinner?__n______    It is not necessary to stop eating or drinking prior to this procedure. We would like you to take your medications for blood pressure as usual.  You may be asked to stop blood thinners such as Coumadin, Plavix, Fragmin, Lovenox, etc., or any anti-inflammatories such as:  Aspirin, Ibuprofen, Advil, Naproxen prior to your procedure. We also ask that you stop any OTC medications that cause additional bleeding    You must make arrangements for a responsible adult to arrive with you and stay in our waiting area during your procedure. They will also need to take you home after your procedure. For your safety you will not be allowed to leave alone or drive yourself home. Also for your safety, it is strongly suggested that someone stay with you the first 24 hours after your procedure. For your comfort, please wear simple loose fitting clothing to the center. Please do not bring valuables. If you have a living will and a durable power of  for healthcare, please bring in a copy.     You will need to bring a photo ID and insurance card    Our goal is to provide you with excellent care so if you have any questions, please contact us at the [address] [facility] at [phone]

## 2023-07-06 NOTE — PROGRESS NOTES
7/6/2023    This is a 75 y.o. female   Chief Complaint   Patient presents with    Medicare [address] and notes progressively worsening left lower quadrant abdominal pain going on for the past 10 days or so. -She reports the pain last night was moderate to severe.   She has not noticed any significant bowel movement changes.  -She notes some occasional urinary symptoms that are inconsistent.  -No known fever, chills, or systemic symptoms    Review of Systems     Current Outpatient Medications   Medication Sig Dispense Refill    FANAPT 1 MG TABS tablet TAKE 1 TABLET BY MOUTH EVERY NIGHT 90 tablet 0    blood glucose test strips (ONETOUCH ULTRA) strip USE AS NEEDED 100 strip 4    dulaglutide (TRULICITY) 1.5 TV/1.7MM SC injection INJECT THE CONTENTS OF ONE  PEN SUBCUTANEOUSLY WEEKLY  AS DIRECTED 6 mL 3    naproxen (NAPROSYN) 500 MG tablet TAKE 1 TABLET BY MOUTH TWICE DAILY WITH MEALS 60 tablet 0    furosemide (LASIX) 20 MG tablet TAKE 1 TABLET BY MOUTH  DAILY AS NEEDED FOR LEG  SWELLING 90 tablet 1    metFORMIN (GLUCOPHAGE) 500 MG tablet Take 2 tablets by mouth 2 times daily (with meals) 360 tablet 1    traZODone (DESYREL) 100 MG tablet TAKE 1 TABLET BY MOUTH AT  NIGHT 90 tablet 1    pravastatin (PRAVACHOL) 40 MG tablet Take 1 tablet by mouth daily 90 tablet 1    albuterol sulfate HFA (PROVENTIL;VENTOLIN;PROAIR) 108 (90 Base) MCG/ACT inhaler INHALE 2 PUFFS INTO THE LUNGS EVERY 4 HOURS AS NEEDED FOR WHEEZING OR SHORTNESS OF BREATH 6.7 g 5    LANTUS SOLOSTAR 100 UNIT/ML injection pen INJECT SUBCUTANEOUSLY 12  UNITS ONCE NIGHTLY 15 mL 3    gabapentin (NEURONTIN) 300 MG capsule TAKE 1 CAPSULE BY MOUTH THREE TIMES DAILY 90 capsule 2    ULTICARE MINI PEN NEEDLES 31G X 6 MM MISC USE TO INJECT INSULIN DAILY 100 each 5    fluticasone (FLONASE) 50 MCG/ACT nasal spray 2 sprays by Each Nostril route daily 3 each 5    diclofenac sodium (VOLTAREN) 1 % GEL Apply 4 g topically 4 times daily as needed for Pain 100 g 3
daily. Patient taking differently: as needed Apply topically daily.  Yes Marium Castaneda MD       CareTeam (Including outside providers/suppliers regularly involved in providing care):   Patient Care Team:  Marium Castaneda MD as PCP - Favio Whitley MD as PCP - Empaneled Provider     Reviewed and updated this visit:  Tobacco  Allergies  Meds  Med Hx  Surg Hx  Soc Hx  Fam Hx

## 2023-07-07 ENCOUNTER — HOSPITAL ENCOUNTER (OUTPATIENT)
Age: 68
Setting detail: OUTPATIENT SURGERY
Discharge: HOME | End: 2023-07-07
Attending: ANESTHESIOLOGY | Admitting: ANESTHESIOLOGY
Payer: MEDICARE

## 2023-07-07 ENCOUNTER — TELEPHONE (OUTPATIENT)
Dept: FAMILY MEDICINE CLINIC | Age: 68
End: 2023-07-07

## 2023-07-07 ENCOUNTER — APPOINTMENT (OUTPATIENT)
Dept: INTERVENTIONAL RADIOLOGY/VASCULAR | Age: 68
End: 2023-07-07
Attending: ANESTHESIOLOGY
Payer: MEDICARE

## 2023-07-07 VITALS
SYSTOLIC BLOOD PRESSURE: 104 MMHG | TEMPERATURE: 96.9 F | OXYGEN SATURATION: 98 % | HEIGHT: 64 IN | BODY MASS INDEX: 32.98 KG/M2 | DIASTOLIC BLOOD PRESSURE: 81 MMHG | RESPIRATION RATE: 16 BRPM | HEART RATE: 87 BPM | WEIGHT: 193.2 LBS

## 2023-07-07 PROCEDURE — 3610000054 HC PAIN LEVEL 3 BASE (NON-OR): Performed by: ANESTHESIOLOGY

## 2023-07-07 PROCEDURE — 6360000002 HC RX W HCPCS: Performed by: ANESTHESIOLOGY

## 2023-07-07 PROCEDURE — 2500000003 HC RX 250 WO HCPCS: Performed by: ANESTHESIOLOGY

## 2023-07-07 PROCEDURE — 2709999900 HC NON-CHARGEABLE SUPPLY: Performed by: ANESTHESIOLOGY

## 2023-07-07 RX ORDER — BUPIVACAINE HYDROCHLORIDE 5 MG/ML
INJECTION, SOLUTION EPIDURAL; INTRACAUDAL
Status: COMPLETED | OUTPATIENT
Start: 2023-07-07 | End: 2023-07-07

## 2023-07-07 RX ORDER — LIDOCAINE HYDROCHLORIDE 10 MG/ML
INJECTION, SOLUTION EPIDURAL; INFILTRATION; INTRACAUDAL; PERINEURAL
Status: COMPLETED | OUTPATIENT
Start: 2023-07-07 | End: 2023-07-07

## 2023-07-07 RX ORDER — METHYLPREDNISOLONE ACETATE 80 MG/ML
INJECTION, SUSPENSION INTRA-ARTICULAR; INTRALESIONAL; INTRAMUSCULAR; SOFT TISSUE
Status: COMPLETED | OUTPATIENT
Start: 2023-07-07 | End: 2023-07-07

## 2023-07-07 ASSESSMENT — PAIN - FUNCTIONAL ASSESSMENT
PAIN_FUNCTIONAL_ASSESSMENT: 0-10
PAIN_FUNCTIONAL_ASSESSMENT: PREVENTS OR INTERFERES SOME ACTIVE ACTIVITIES AND ADLS

## 2023-07-07 ASSESSMENT — PAIN DESCRIPTION - DESCRIPTORS: DESCRIPTORS: DISCOMFORT;ACHING

## 2023-07-07 ASSESSMENT — PAIN SCALES - GENERAL
PAINLEVEL_OUTOF10: 0
PAINLEVEL_OUTOF10: 0

## 2023-07-07 NOTE — DISCHARGE INSTRUCTIONS
[facility]   [address] [place], [address]  [place], [address]   [address] [facility]  [address] [facility]  [phone]      Patient:  Marivel Canales  YOB: 1955  Medical Record #:  [id number]   Date:  7/7/2023   Physician:  Leonel Herrera MD    Procedure Performed: [unfilled]     Discharge Instructions    Notify Pain Management Services if any of the following occur:    Redness/Swelling at the injection site lasting longer than 2 days  Fever (with redness, swelling, or drainage at the injection site)  Drainage at the injection site  New weakness/ numbness  Severe headache   Loss of bowel/ bladder function    General Instructions: You may experience numbness for several hours following your treatment. You should be cautious using those areas which are numb. Once the numbness wears off, you may apply ice or heat to injection site, if needed. Do not return to work or drive today    Rest today and return to normal activities tomorrow. On average, the steroid takes about 1 week to work and can be up to 2 weeks    You should continue to depend on your primary physician for your medical management of conditions not related to your pain management treatment. Continue to take all your other medications, including blood thinners, as directed by your primary physician unless otherwise instructed. NO changes have been made to your medications. Any changes listed on the discharge are based on patient self reporting. Any EMR warnings regarding drug/drug interactions were dismissed based on current use by the patient, management by prescribing physician and pharmacist and not managed by this physician. Any problem which relates specifically to a treatment or procedure performed today should be directed to the [facility].        [facility]  Division of Interventional Pain

## 2023-07-07 NOTE — TELEPHONE ENCOUNTER
Please let Paolo Aguilar know, ok to take pepto-bismol for discomfort  Call if pain is worsening or if having fever

## 2023-07-07 NOTE — OP NOTE
Patient:  Jace Kelley  YOB: 1955  Medical Record #:  [id number]   Date:  7/7/2023   Physician:  Nilson Toro MD      PRE-PROCEDURE DIAGNOSIS:  Right sciatic neuropathy (G57.01)    POST-PROCEDURE DIAGNOSIS:  Right sciatic neuropathy (G57.01)    PROCEDURE: RIGHT neelima-sciatic nerve block, with fluoroscopy. BRIEF HISTORY:  The patient presents today to [facility] for a scheduled sciatic nerve block procedure. The patient was re-evaluated today and is clinically unchanged as compared to my previous evaluation. The patient is clinically stable to proceed with the procedure. PROCEDURE NOTE:  The procedure was again explained to the patient and the previously distributed pre-procedure literature was reviewed. The options, rationale, and benefits of the procedure including pain relief, functional improvement, and increased mobility, as well as the risks of the procedure including but not limited to infection, bleeding, paresthesia, pain, failure to relieve pain, increased pain, headache, allergic reaction, neurologic impairment, local anesthetic, toxicity, and side effects and the potential side effects of corticosteroids were discussed with the patient and informed written consent was obtained from the patient. The patient was brought to the fluoroscopy suite and placed in the prone position. The RIGHT sacral and gluteal area was prepped in the usual sterile fashion with Chloraprep and then draped. Intermittent AP fluoroscopy was utilized to localize the radiographic landmarks. A standard perisciatic nerve block approach was used. The sciatic notch, at its superolateral border was localized at its junction with the ilium rostral to the acetabulum and lateral to the SI joint and lateral to the sciatic nerve. It was localized at the radiographic marker of the overlying sciatic nerve between the lateral aspect of the sacrum and the greater trochanter.    The superficial skin projection was anesthetized with buffered lidocaine 1% 2 cc using a 27-gauge needle. A spinal needle was then inserted slowly under direct intermittent AP fluoroscopy towards the ilium near the exiting sciatic nerve. Negative aspiration was re-demonstrated and therapeutic injectate consisting of 6 cc of lidocaine 1%, 1 cc of bupivacaine 0.5% and 1 cc of Depo-Medrol 40 mg/cc was injected without pain, paresthesia, difficulty, or complaints. The needle was removed, the area cleansed, a Band-Aid placed over the injection site. Patient tolerated the procedure well. There were no complications. The patient was transferred, by wheelchair or stretcher, with accompaniment to the recovery area where the vital signs remained stable. There was sensory or motor blockade in the lower extremity. This procedure was done using local anesthesia only. The patient was discharged in stable condition accompanied with an escort after fulfilling standard discharge criteria. FLUOROSCOPY:  A fluoroscopy unit was utilized to obtain fluoroscopic images for intra-procedural use and assistance. Fluoroscopy was utilized to identify anatomic and radiographic landmarks for the accompanying procedure guidance and not for diagnostic purposes.       Estimated Blood Loss: 0ml      Plan:  Follow up in 4-6 weeks      Office: [phone]

## 2023-07-13 ENCOUNTER — OFFICE VISIT (OUTPATIENT)
Dept: ORTHOPEDIC SURGERY | Age: 68
End: 2023-07-13
Payer: MEDICARE

## 2023-07-13 VITALS — WEIGHT: 193 LBS | HEIGHT: 64 IN | BODY MASS INDEX: 32.95 KG/M2

## 2023-07-13 DIAGNOSIS — M16.11 PRIMARY OSTEOARTHRITIS OF RIGHT HIP: Primary | ICD-10-CM

## 2023-07-13 PROCEDURE — G8427 DOCREV CUR MEDS BY ELIG CLIN: HCPCS | Performed by: ORTHOPAEDIC SURGERY

## 2023-07-13 PROCEDURE — G8399 PT W/DXA RESULTS DOCUMENT: HCPCS | Performed by: ORTHOPAEDIC SURGERY

## 2023-07-13 PROCEDURE — 1124F ACP DISCUSS-NO DSCNMKR DOCD: CPT | Performed by: ORTHOPAEDIC SURGERY

## 2023-07-13 PROCEDURE — 1090F PRES/ABSN URINE INCON ASSESS: CPT | Performed by: ORTHOPAEDIC SURGERY

## 2023-07-13 PROCEDURE — 99213 OFFICE O/P EST LOW 20 MIN: CPT | Performed by: ORTHOPAEDIC SURGERY

## 2023-07-13 PROCEDURE — G8417 CALC BMI ABV UP PARAM F/U: HCPCS | Performed by: ORTHOPAEDIC SURGERY

## 2023-07-13 PROCEDURE — 3017F COLORECTAL CA SCREEN DOC REV: CPT | Performed by: ORTHOPAEDIC SURGERY

## 2023-07-13 PROCEDURE — 1036F TOBACCO NON-USER: CPT | Performed by: ORTHOPAEDIC SURGERY

## 2023-07-20 ENCOUNTER — HOSPITAL ENCOUNTER (EMERGENCY)
Age: 68
Discharge: HOME | End: 2023-07-21
Attending: EMERGENCY MEDICINE
Payer: MEDICARE

## 2023-07-20 ENCOUNTER — APPOINTMENT (OUTPATIENT)
Dept: CT IMAGING | Age: 68
End: 2023-07-20
Payer: MEDICARE

## 2023-07-20 DIAGNOSIS — R10.32 ABDOMINAL PAIN, LEFT LOWER QUADRANT: Primary | ICD-10-CM

## 2023-07-20 DIAGNOSIS — K52.9 COLITIS: ICD-10-CM

## 2023-07-20 LAB
ALBUMIN SERPL-MCNC: 3.8 G/DL (ref 3.4–5)
ALBUMIN/GLOB SERPL: 1.2 {RATIO} (ref 1.1–2.2)
ALP SERPL-CCNC: 69 U/L (ref 40–129)
ALT SERPL-CCNC: 16 U/L (ref 10–40)
ANION GAP SERPL CALCULATED.3IONS-SCNC: 9 MMOL/L (ref 3–16)
AST SERPL-CCNC: 32 U/L (ref 15–37)
BASOPHILS # BLD: 0.1 K/UL (ref 0–0.2)
BASOPHILS NFR BLD: 0.8 %
BILIRUB SERPL-MCNC: <0.2 MG/DL (ref 0–1)
BILIRUB UR QL STRIP.AUTO: NEGATIVE
BUN SERPL-MCNC: 13 MG/DL (ref 7–20)
CALCIUM SERPL-MCNC: 10.4 MG/DL (ref 8.3–10.6)
CHLORIDE SERPL-SCNC: 101 MMOL/L (ref 99–110)
CLARITY UR: CLEAR
CO2 SERPL-SCNC: 24 MMOL/L (ref 21–32)
COLOR UR: YELLOW
CREAT SERPL-MCNC: 0.9 MG/DL (ref 0.6–1.2)
DEPRECATED RDW RBC AUTO: 13.7 % (ref 12.4–15.4)
EOSINOPHIL # BLD: 0.2 K/UL (ref 0–0.6)
EOSINOPHIL NFR BLD: 3.1 %
GFR SERPLBLD CREATININE-BSD FMLA CKD-EPI: >60 ML/MIN/{1.73_M2}
GLUCOSE SERPL-MCNC: 115 MG/DL (ref 70–99)
GLUCOSE UR STRIP.AUTO-MCNC: NEGATIVE MG/DL
HCT VFR BLD AUTO: 35.3 % (ref 36–48)
HGB BLD-MCNC: 11.7 G/DL (ref 12–16)
HGB UR QL STRIP.AUTO: NEGATIVE
KETONES UR STRIP.AUTO-MCNC: NEGATIVE MG/DL
LEUKOCYTE ESTERASE UR QL STRIP.AUTO: NEGATIVE
LIPASE SERPL-CCNC: 96 U/L (ref 13–60)
LYMPHOCYTES # BLD: 3 K/UL (ref 1–5.1)
LYMPHOCYTES NFR BLD: 43.3 %
MCH RBC QN AUTO: 27.8 PG (ref 26–34)
MCHC RBC AUTO-ENTMCNC: 33.2 G/DL (ref 31–36)
MCV RBC AUTO: 83.8 FL (ref 80–100)
MONOCYTES # BLD: 0.4 K/UL (ref 0–1.3)
MONOCYTES NFR BLD: 5.9 %
NEUTROPHILS # BLD: 3.3 K/UL (ref 1.7–7.7)
NEUTROPHILS NFR BLD: 46.9 %
NITRITE UR QL STRIP.AUTO: NEGATIVE
PH UR STRIP.AUTO: 7 [PH] (ref 5–8)
PLATELET # BLD AUTO: 283 K/UL (ref 135–450)
PMV BLD AUTO: 8.3 FL (ref 5–10.5)
POTASSIUM SERPL-SCNC: 4.5 MMOL/L (ref 3.5–5.1)
PROT SERPL-MCNC: 6.9 G/DL (ref 6.4–8.2)
PROT UR STRIP.AUTO-MCNC: NEGATIVE MG/DL
RBC # BLD AUTO: 4.21 M/UL (ref 4–5.2)
SODIUM SERPL-SCNC: 134 MMOL/L (ref 136–145)
SP GR UR STRIP.AUTO: 1.01 (ref 1–1.03)
UA COMPLETE W REFLEX CULTURE PNL UR: NORMAL
UA DIPSTICK W REFLEX MICRO PNL UR: NORMAL
URN SPEC COLLECT METH UR: NORMAL
UROBILINOGEN UR STRIP-ACNC: 0.2 E.U./DL
WBC # BLD AUTO: 7 K/UL (ref 4–11)

## 2023-07-20 PROCEDURE — 6360000002 HC RX W HCPCS: Performed by: EMERGENCY MEDICINE

## 2023-07-20 PROCEDURE — 36415 COLL VENOUS BLD VENIPUNCTURE: CPT

## 2023-07-20 PROCEDURE — 85025 COMPLETE CBC W/AUTO DIFF WBC: CPT

## 2023-07-20 PROCEDURE — 83690 ASSAY OF LIPASE: CPT

## 2023-07-20 PROCEDURE — 6360000004 HC RX CONTRAST MEDICATION: Performed by: EMERGENCY MEDICINE

## 2023-07-20 PROCEDURE — 99285 EMERGENCY DEPT VISIT HI MDM: CPT

## 2023-07-20 PROCEDURE — 96375 TX/PRO/DX INJ NEW DRUG ADDON: CPT

## 2023-07-20 PROCEDURE — 74177 CT ABD & PELVIS W/CONTRAST: CPT

## 2023-07-20 PROCEDURE — 96374 THER/PROPH/DIAG INJ IV PUSH: CPT

## 2023-07-20 PROCEDURE — 6370000000 HC RX 637 (ALT 250 FOR IP): Performed by: EMERGENCY MEDICINE

## 2023-07-20 PROCEDURE — 81003 URINALYSIS AUTO W/O SCOPE: CPT

## 2023-07-20 PROCEDURE — 80053 COMPREHEN METABOLIC PANEL: CPT

## 2023-07-20 RX ORDER — ONDANSETRON HYDROCHLORIDE 8 MG/1
8 TABLET, FILM COATED ORAL EVERY 8 HOURS PRN
Qty: 20 TABLET | Refills: 0 | Status: SHIPPED | OUTPATIENT
Start: 2023-07-20

## 2023-07-20 RX ORDER — ONDANSETRON 2 MG/ML
4 INJECTION INTRAMUSCULAR; INTRAVENOUS ONCE
Status: COMPLETED | OUTPATIENT
Start: 2023-07-20 | End: 2023-07-20

## 2023-07-20 RX ORDER — CIPROFLOXACIN 500 MG/1
500 TABLET, FILM COATED ORAL ONCE
Status: COMPLETED | OUTPATIENT
Start: 2023-07-21 | End: 2023-07-20

## 2023-07-20 RX ORDER — ACETAMINOPHEN 500 MG
1000 TABLET ORAL 3 TIMES DAILY PRN
Qty: 30 TABLET | Refills: 0 | Status: SHIPPED | OUTPATIENT
Start: 2023-07-20

## 2023-07-20 RX ORDER — CIPROFLOXACIN 500 MG/1
500 TABLET, FILM COATED ORAL 2 TIMES DAILY
Qty: 14 TABLET | Refills: 0 | Status: SHIPPED | OUTPATIENT
Start: 2023-07-20 | End: 2023-07-27

## 2023-07-20 RX ORDER — METRONIDAZOLE 500 MG/1
500 TABLET ORAL ONCE
Status: COMPLETED | OUTPATIENT
Start: 2023-07-21 | End: 2023-07-20

## 2023-07-20 RX ORDER — IBUPROFEN 600 MG/1
600 TABLET ORAL 3 TIMES DAILY PRN
Qty: 30 TABLET | Refills: 0 | Status: SHIPPED | OUTPATIENT
Start: 2023-07-20

## 2023-07-20 RX ORDER — METRONIDAZOLE 500 MG/1
500 TABLET ORAL 2 TIMES DAILY
Qty: 14 TABLET | Refills: 0 | Status: SHIPPED | OUTPATIENT
Start: 2023-07-20 | End: 2023-07-27

## 2023-07-20 RX ORDER — DIPHENHYDRAMINE HYDROCHLORIDE 50 MG/ML
25 INJECTION INTRAMUSCULAR; INTRAVENOUS ONCE
Status: COMPLETED | OUTPATIENT
Start: 2023-07-20 | End: 2023-07-20

## 2023-07-20 RX ORDER — MORPHINE SULFATE 4 MG/ML
4 INJECTION, SOLUTION INTRAMUSCULAR; INTRAVENOUS
Status: COMPLETED | OUTPATIENT
Start: 2023-07-20 | End: 2023-07-20

## 2023-07-20 RX ADMIN — METRONIDAZOLE 500 MG: 500 TABLET ORAL at 23:57

## 2023-07-20 RX ADMIN — CIPROFLOXACIN 500 MG: 500 TABLET, FILM COATED ORAL at 23:57

## 2023-07-20 RX ADMIN — DIPHENHYDRAMINE HYDROCHLORIDE 25 MG: 50 INJECTION, SOLUTION INTRAMUSCULAR; INTRAVENOUS at 23:17

## 2023-07-20 RX ADMIN — MORPHINE SULFATE 4 MG: 4 INJECTION, SOLUTION INTRAMUSCULAR; INTRAVENOUS at 21:56

## 2023-07-20 RX ADMIN — ONDANSETRON 4 MG: 2 INJECTION INTRAMUSCULAR; INTRAVENOUS at 21:55

## 2023-07-20 RX ADMIN — IOPAMIDOL 75 ML: 755 INJECTION, SOLUTION INTRAVENOUS at 21:30

## 2023-07-20 ASSESSMENT — PAIN DESCRIPTION - PAIN TYPE: TYPE: ACUTE PAIN

## 2023-07-20 ASSESSMENT — PAIN DESCRIPTION - ORIENTATION: ORIENTATION: LEFT

## 2023-07-20 ASSESSMENT — PAIN SCALES - GENERAL
PAINLEVEL_OUTOF10: 8
PAINLEVEL_OUTOF10: 3

## 2023-07-20 ASSESSMENT — PAIN DESCRIPTION - DESCRIPTORS: DESCRIPTORS: STABBING

## 2023-07-20 ASSESSMENT — PAIN DESCRIPTION - DIRECTION: RADIATING_TOWARDS: FLANK

## 2023-07-20 ASSESSMENT — PAIN - FUNCTIONAL ASSESSMENT: PAIN_FUNCTIONAL_ASSESSMENT: 0-10

## 2023-07-20 ASSESSMENT — PAIN DESCRIPTION - FREQUENCY: FREQUENCY: CONTINUOUS

## 2023-07-20 ASSESSMENT — LIFESTYLE VARIABLES: HOW OFTEN DO YOU HAVE A DRINK CONTAINING ALCOHOL: NEVER

## 2023-07-20 ASSESSMENT — PAIN DESCRIPTION - LOCATION: LOCATION: ABDOMEN

## 2023-07-21 VITALS
SYSTOLIC BLOOD PRESSURE: 181 MMHG | BODY MASS INDEX: 33.12 KG/M2 | RESPIRATION RATE: 18 BRPM | OXYGEN SATURATION: 95 % | DIASTOLIC BLOOD PRESSURE: 102 MMHG | HEART RATE: 78 BPM | WEIGHT: 194 LBS | HEIGHT: 64 IN | TEMPERATURE: 97.7 F

## 2023-07-21 NOTE — ED NOTES
Discharge and education instructions reviewed. Patient verbalized understanding, teach-back successful. Patient denied questions at this time. No acute distress noted. Patient instructed to follow-up as noted - return to emergency department if symptoms worsen. Patient verbalized understanding. Discharged per EDMD with discharge instructions.        Riya Torres RN  07/21/23 6660

## 2023-07-21 NOTE — DISCHARGE INSTRUCTIONS
Take metronidazole and ciprofloxacin antibiotics twice daily for your colon infection. Drink plenty of fluids. Seek medical attention for worsening symptoms, fever >101, inability to tolerate medications or fluids. Take ondansetron as needed for nausea. Take acetaminophen as needed for pain. For severe pain that is not controlled with acetaminophen you can also take ibuprofen.

## 2023-07-21 NOTE — ED PROVIDER NOTES
[facility] EMERGENCY DEPARTMENT    CHIEF COMPLAINT  Abdominal Pain (LUQ radiating to left flank starting one week ago. Seen pcp, rx medication with no relief. Pain worse after urinating. Denies hematuria. Pt states also having diarrhea ) and Nausea       HISTORY OF PRESENT ILLNESS  John Wood is a 75 y.o. female who presents to the ED LLQ/L flank pain x 1 wk. It has been worsening. She feels like it is getting hard. Fever 101 overnight last night   Has some increase of pain in her abdomen with urination. Denies burning with urination, hematuria or skin lesions or abnormal discharge. Reports diarrhea. Denies constipation    She saw her doctor last week and CT ordered and was normal but pain has been worsening since then. Doctor told her to take pepto bismuth which did not improve her symptoms. She had some dark stool after taking Pepto-Bismol; denies bleeding.     I have reviewed the following from the nursing documentation:    Past Medical History:   Diagnosis Date    Anesthesia complication     \" shaking\"    Arthritis     Cardiomyopathy ([address])     COVID-19     1/7/2022    Diabetes     GERD (gastroesophageal reflux disease)     Hyperlipidemia     Hypertension     Lumbar disc disease     Prolonged emergence from general anesthesia     Sleep apnea     pt states does use a Cpap machine at night    Unspecified cerebral artery occlusion with cerebral infarction 2014    right side weak    Wears glasses      Past Surgical History:   Procedure Laterality Date    ARTHRODESIS Right 9/17/2020    (RIGHT) REMOVAL OF BONE SPURRING AND OSSEOUS PROMINENCE FIRST METATARSAL, ARTHRODESIS OF FIRST METATARSOPHALANGEAL JOINT, BONE MARROW HARVEST AND CONCENTRATION RIGHT TIBIA performed by Ashley Grant DPM at [facility] Left 9/3/15    CARPAL TUNNEL RELEASE Right 9/22/15    COLONOSCOPY      FINGER TRIGGER RELEASE Left 9/3/15    middle and ring fingers    FINGER TRIGGER RELEASE Right 9/22/15 accuracy, however some errors may be present due to limitations of this technology and occasionally words are not transcribed correctly.         Di Mariscal MD  07/21/23 6538

## 2023-07-24 ENCOUNTER — CARE COORDINATION (OUTPATIENT)
Dept: CARE COORDINATION | Age: 68
End: 2023-07-24

## 2023-07-24 NOTE — CARE COORDINATION
ACM outreach completed r/t [facility] Outreach ED f/U.  1st outreach attempt. HIPAA compliant message left. ACM will continue to follow and will attempt to reach patient at a later time.

## 2023-07-25 ENCOUNTER — CARE COORDINATION (OUTPATIENT)
Dept: CARE COORDINATION | Age: 68
End: 2023-07-25

## 2023-07-25 NOTE — CARE COORDINATION
ACM outreach completed r/t [facility] Outreach ED f/u.  2nd and 3rd outreach attempt. HIPAA compliant message left. MyChart message sent.   Should pt call back and be in agreement to care coordination, ACM will assist.

## 2023-08-02 ENCOUNTER — OFFICE VISIT (OUTPATIENT)
Dept: FAMILY MEDICINE CLINIC | Age: 68
End: 2023-08-02
Payer: MEDICARE

## 2023-08-02 VITALS
OXYGEN SATURATION: 97 % | SYSTOLIC BLOOD PRESSURE: 114 MMHG | HEART RATE: 82 BPM | WEIGHT: 198 LBS | RESPIRATION RATE: 16 BRPM | DIASTOLIC BLOOD PRESSURE: 80 MMHG | BODY MASS INDEX: 33.8 KG/M2 | HEIGHT: 64 IN

## 2023-08-02 DIAGNOSIS — K92.1 BLACK STOOLS: ICD-10-CM

## 2023-08-02 DIAGNOSIS — K52.9 COLITIS: ICD-10-CM

## 2023-08-02 DIAGNOSIS — R10.32 LLQ ABDOMINAL PAIN: Primary | ICD-10-CM

## 2023-08-02 LAB
BASOPHILS # BLD: 0 K/UL (ref 0–0.2)
BASOPHILS NFR BLD: 0.4 %
DEPRECATED RDW RBC AUTO: 13.8 % (ref 12.4–15.4)
EOSINOPHIL # BLD: 0.2 K/UL (ref 0–0.6)
EOSINOPHIL NFR BLD: 3.5 %
HCT VFR BLD AUTO: 36.4 % (ref 36–48)
HGB BLD-MCNC: 11.8 G/DL (ref 12–16)
LYMPHOCYTES # BLD: 2.8 K/UL (ref 1–5.1)
LYMPHOCYTES NFR BLD: 43.8 %
MCH RBC QN AUTO: 27.4 PG (ref 26–34)
MCHC RBC AUTO-ENTMCNC: 32.5 G/DL (ref 31–36)
MCV RBC AUTO: 84.1 FL (ref 80–100)
MONOCYTES # BLD: 0.4 K/UL (ref 0–1.3)
MONOCYTES NFR BLD: 6.3 %
NEUTROPHILS # BLD: 2.9 K/UL (ref 1.7–7.7)
NEUTROPHILS NFR BLD: 46 %
PLATELET # BLD AUTO: 257 K/UL (ref 135–450)
PMV BLD AUTO: 8.1 FL (ref 5–10.5)
RBC # BLD AUTO: 4.32 M/UL (ref 4–5.2)
WBC # BLD AUTO: 6.4 K/UL (ref 4–11)

## 2023-08-02 PROCEDURE — 1124F ACP DISCUSS-NO DSCNMKR DOCD: CPT | Performed by: FAMILY MEDICINE

## 2023-08-02 PROCEDURE — G8427 DOCREV CUR MEDS BY ELIG CLIN: HCPCS | Performed by: FAMILY MEDICINE

## 2023-08-02 PROCEDURE — 1090F PRES/ABSN URINE INCON ASSESS: CPT | Performed by: FAMILY MEDICINE

## 2023-08-02 PROCEDURE — 3074F SYST BP LT 130 MM HG: CPT | Performed by: FAMILY MEDICINE

## 2023-08-02 PROCEDURE — 3079F DIAST BP 80-89 MM HG: CPT | Performed by: FAMILY MEDICINE

## 2023-08-02 PROCEDURE — G8417 CALC BMI ABV UP PARAM F/U: HCPCS | Performed by: FAMILY MEDICINE

## 2023-08-02 PROCEDURE — G8399 PT W/DXA RESULTS DOCUMENT: HCPCS | Performed by: FAMILY MEDICINE

## 2023-08-02 PROCEDURE — 99214 OFFICE O/P EST MOD 30 MIN: CPT | Performed by: FAMILY MEDICINE

## 2023-08-02 PROCEDURE — 1036F TOBACCO NON-USER: CPT | Performed by: FAMILY MEDICINE

## 2023-08-02 PROCEDURE — 3017F COLORECTAL CA SCREEN DOC REV: CPT | Performed by: FAMILY MEDICINE

## 2023-08-02 RX ORDER — ACETAMINOPHEN 500 MG
1000 TABLET ORAL 3 TIMES DAILY PRN
Qty: 180 TABLET | Refills: 2 | Status: SHIPPED | OUTPATIENT
Start: 2023-08-02

## 2023-08-02 NOTE — PATIENT INSTRUCTIONS
For now, stop all NSAIDs (ibuprofen, naproxen)  For now, stop Trulicity injections    Take Tylenol for pain    Increase omeprazole to twice daily instead of once daily    Call the specialist to schedule the scope procedure    Check blood work

## 2023-08-02 NOTE — PROGRESS NOTES
8/2/2023    This is a 75 y.o. female   Chief Complaint   Patient presents with    Follow-up     Pt went to the ed and they said she has colitis. They gave her cipro and medication for the nausea  and methocarbamol for pain and metronidazole pt is still not feeling well. Pt didn't have a bm for 8 days she took milk of magnesia and that helped      HPI    Here for follow up  - has had LLQ pain going on now for about a month. SHe had a normal stat CT on 7/6. She went to the ER on 7/20 and her CT scan was notable for possible early colitis  - she took cipro and flagyl. Reports these helped slightly. She is continuing to struggle with pain  - she notes her stools are often black. No diarrhea. She is not on iron.  Has a BM every few days, taking stool softener to help with this  - feels like she is having to make herself eat    Review of Systems     Current Outpatient Medications   Medication Sig Dispense Refill    acetaminophen (TYLENOL) 500 MG tablet Take 2 tablets by mouth 3 times daily as needed for Pain 180 tablet 2    ondansetron (ZOFRAN) 8 MG tablet Take 1 tablet by mouth every 8 hours as needed for Nausea 20 tablet 0    ibuprofen (ADVIL;MOTRIN) 600 MG tablet Take 1 tablet by mouth 3 times daily as needed for Pain 30 tablet 0    FANAPT 1 MG TABS tablet TAKE 1 TABLET BY MOUTH EVERY NIGHT 90 tablet 0    blood glucose test strips (ONETOUCH ULTRA) strip USE AS NEEDED 100 strip 4    dulaglutide (TRULICITY) 1.5 ZQ/8.6YH SC injection INJECT THE CONTENTS OF ONE  PEN SUBCUTANEOUSLY WEEKLY  AS DIRECTED 6 mL 3    furosemide (LASIX) 20 MG tablet TAKE 1 TABLET BY MOUTH  DAILY AS NEEDED FOR LEG  SWELLING 90 tablet 1    metFORMIN (GLUCOPHAGE) 500 MG tablet Take 2 tablets by mouth 2 times daily (with meals) 360 tablet 1    traZODone (DESYREL) 100 MG tablet TAKE 1 TABLET BY MOUTH AT  NIGHT 90 tablet 1    pravastatin (PRAVACHOL) 40 MG tablet Take 1 tablet by mouth daily 90 tablet 1    albuterol sulfate HFA

## 2023-08-03 LAB — FERRITIN SERPL IA-MCNC: 20.4 NG/ML (ref 15–150)

## 2023-08-03 RX ORDER — FERROUS SULFATE 325(65) MG
325 TABLET ORAL EVERY OTHER DAY
Qty: 45 TABLET | Refills: 1 | Status: SHIPPED | OUTPATIENT
Start: 2023-08-03

## 2023-08-14 ENCOUNTER — OFFICE VISIT (OUTPATIENT)
Dept: PULMONOLOGY | Age: 68
End: 2023-08-14
Payer: MEDICARE

## 2023-08-14 VITALS
SYSTOLIC BLOOD PRESSURE: 120 MMHG | DIASTOLIC BLOOD PRESSURE: 82 MMHG | BODY MASS INDEX: 33.46 KG/M2 | TEMPERATURE: 97.8 F | RESPIRATION RATE: 18 BRPM | WEIGHT: 196 LBS | HEIGHT: 64 IN | OXYGEN SATURATION: 98 % | HEART RATE: 84 BPM

## 2023-08-14 DIAGNOSIS — J31.0 CHRONIC RHINITIS: ICD-10-CM

## 2023-08-14 DIAGNOSIS — K21.9 GASTROESOPHAGEAL REFLUX DISEASE, UNSPECIFIED WHETHER ESOPHAGITIS PRESENT: Primary | ICD-10-CM

## 2023-08-14 PROCEDURE — 1124F ACP DISCUSS-NO DSCNMKR DOCD: CPT | Performed by: INTERNAL MEDICINE

## 2023-08-14 PROCEDURE — G8427 DOCREV CUR MEDS BY ELIG CLIN: HCPCS | Performed by: INTERNAL MEDICINE

## 2023-08-14 PROCEDURE — 99213 OFFICE O/P EST LOW 20 MIN: CPT | Performed by: INTERNAL MEDICINE

## 2023-08-14 PROCEDURE — 3074F SYST BP LT 130 MM HG: CPT | Performed by: INTERNAL MEDICINE

## 2023-08-14 PROCEDURE — G8417 CALC BMI ABV UP PARAM F/U: HCPCS | Performed by: INTERNAL MEDICINE

## 2023-08-14 PROCEDURE — 1036F TOBACCO NON-USER: CPT | Performed by: INTERNAL MEDICINE

## 2023-08-14 PROCEDURE — 1090F PRES/ABSN URINE INCON ASSESS: CPT | Performed by: INTERNAL MEDICINE

## 2023-08-14 PROCEDURE — G8399 PT W/DXA RESULTS DOCUMENT: HCPCS | Performed by: INTERNAL MEDICINE

## 2023-08-14 PROCEDURE — 3079F DIAST BP 80-89 MM HG: CPT | Performed by: INTERNAL MEDICINE

## 2023-08-14 PROCEDURE — 3017F COLORECTAL CA SCREEN DOC REV: CPT | Performed by: INTERNAL MEDICINE

## 2023-08-14 NOTE — PROGRESS NOTES
clear. No skeletal abnormalities are present in the chest.    Impression  No significant findings in the chest.        Pulmonary function testing  None on file        This note was transcribed using [employer]. Please disregard any translational errors.     Parker Singletary MD  [facility] Pulmonary, Sleep and Critical Care

## 2023-09-05 RX ORDER — DULOXETIN HYDROCHLORIDE 60 MG/1
60 CAPSULE, DELAYED RELEASE ORAL DAILY
Qty: 90 CAPSULE | Refills: 1 | Status: SHIPPED | OUTPATIENT
Start: 2023-09-05

## 2023-09-11 ENCOUNTER — APPOINTMENT (OUTPATIENT)
Dept: GENERAL RADIOLOGY | Age: 68
End: 2023-09-11
Payer: MEDICARE

## 2023-09-11 ENCOUNTER — HOSPITAL ENCOUNTER (EMERGENCY)
Age: 68
Discharge: HOME | End: 2023-09-11
Attending: EMERGENCY MEDICINE
Payer: MEDICARE

## 2023-09-11 VITALS
HEART RATE: 73 BPM | WEIGHT: 190 LBS | TEMPERATURE: 97.5 F | HEIGHT: 64 IN | DIASTOLIC BLOOD PRESSURE: 94 MMHG | RESPIRATION RATE: 12 BRPM | OXYGEN SATURATION: 100 % | BODY MASS INDEX: 32.44 KG/M2 | SYSTOLIC BLOOD PRESSURE: 147 MMHG

## 2023-09-11 DIAGNOSIS — R68.83 CHILLS: ICD-10-CM

## 2023-09-11 DIAGNOSIS — N64.59 OTHER SIGNS AND SYMPTOMS IN BREAST: ICD-10-CM

## 2023-09-11 DIAGNOSIS — N63.20 MASS OF LEFT BREAST, UNSPECIFIED QUADRANT: Primary | ICD-10-CM

## 2023-09-11 DIAGNOSIS — F39 MOOD DISORDER (HCC): ICD-10-CM

## 2023-09-11 DIAGNOSIS — N63.20 MASS OF LEFT BREAST, UNSPECIFIED QUADRANT: ICD-10-CM

## 2023-09-11 DIAGNOSIS — R52 BODY ACHES: Primary | ICD-10-CM

## 2023-09-11 LAB
ALBUMIN SERPL-MCNC: 4.2 G/DL (ref 3.4–5)
ALBUMIN/GLOB SERPL: 1.3 {RATIO} (ref 1.1–2.2)
ALP SERPL-CCNC: 85 U/L (ref 40–129)
ALT SERPL-CCNC: 20 U/L (ref 10–40)
ANION GAP SERPL CALCULATED.3IONS-SCNC: 9 MMOL/L (ref 3–16)
AST SERPL-CCNC: 28 U/L (ref 15–37)
BASOPHILS # BLD: 0 K/UL (ref 0–0.2)
BASOPHILS NFR BLD: 0.8 %
BILIRUB SERPL-MCNC: <0.2 MG/DL (ref 0–1)
BILIRUB UR QL STRIP.AUTO: NEGATIVE
BUN SERPL-MCNC: 21 MG/DL (ref 7–20)
CALCIUM SERPL-MCNC: 11 MG/DL (ref 8.3–10.6)
CHLORIDE SERPL-SCNC: 103 MMOL/L (ref 99–110)
CK SERPL-CCNC: 284 U/L (ref 26–192)
CLARITY UR: CLEAR
CO2 SERPL-SCNC: 24 MMOL/L (ref 21–32)
COLOR UR: YELLOW
CREAT SERPL-MCNC: 0.8 MG/DL (ref 0.6–1.2)
DEPRECATED RDW RBC AUTO: 14.4 % (ref 12.4–15.4)
EOSINOPHIL # BLD: 0.3 K/UL (ref 0–0.6)
EOSINOPHIL NFR BLD: 6.2 %
GFR SERPLBLD CREATININE-BSD FMLA CKD-EPI: >60 ML/MIN/{1.73_M2}
GLUCOSE SERPL-MCNC: 197 MG/DL (ref 70–99)
GLUCOSE UR STRIP.AUTO-MCNC: NEGATIVE MG/DL
HCT VFR BLD AUTO: 38.2 % (ref 36–48)
HGB BLD-MCNC: 12.6 G/DL (ref 12–16)
HGB UR QL STRIP.AUTO: NEGATIVE
KETONES UR STRIP.AUTO-MCNC: NEGATIVE MG/DL
LEUKOCYTE ESTERASE UR QL STRIP.AUTO: NEGATIVE
LYMPHOCYTES # BLD: 2.3 K/UL (ref 1–5.1)
LYMPHOCYTES NFR BLD: 41.4 %
MCH RBC QN AUTO: 27.6 PG (ref 26–34)
MCHC RBC AUTO-ENTMCNC: 33 G/DL (ref 31–36)
MCV RBC AUTO: 83.8 FL (ref 80–100)
MONOCYTES # BLD: 0.3 K/UL (ref 0–1.3)
MONOCYTES NFR BLD: 6 %
NEUTROPHILS # BLD: 2.5 K/UL (ref 1.7–7.7)
NEUTROPHILS NFR BLD: 45.6 %
NITRITE UR QL STRIP.AUTO: NEGATIVE
PH UR STRIP.AUTO: 6 [PH] (ref 5–8)
PLATELET # BLD AUTO: 311 K/UL (ref 135–450)
PMV BLD AUTO: 8 FL (ref 5–10.5)
POTASSIUM SERPL-SCNC: 4.3 MMOL/L (ref 3.5–5.1)
PROT SERPL-MCNC: 7.5 G/DL (ref 6.4–8.2)
PROT UR STRIP.AUTO-MCNC: NEGATIVE MG/DL
RBC # BLD AUTO: 4.56 M/UL (ref 4–5.2)
SARS-COV-2 RDRP RESP QL NAA+PROBE: NOT DETECTED
SODIUM SERPL-SCNC: 136 MMOL/L (ref 136–145)
SP GR UR STRIP.AUTO: 1.02 (ref 1–1.03)
UA COMPLETE W REFLEX CULTURE PNL UR: NORMAL
UA DIPSTICK W REFLEX MICRO PNL UR: NORMAL
URN SPEC COLLECT METH UR: NORMAL
UROBILINOGEN UR STRIP-ACNC: 1 E.U./DL
WBC # BLD AUTO: 5.6 K/UL (ref 4–11)

## 2023-09-11 PROCEDURE — 96374 THER/PROPH/DIAG INJ IV PUSH: CPT

## 2023-09-11 PROCEDURE — 82550 ASSAY OF CK (CPK): CPT

## 2023-09-11 PROCEDURE — 6360000002 HC RX W HCPCS: Performed by: PHYSICIAN ASSISTANT

## 2023-09-11 PROCEDURE — 73130 X-RAY EXAM OF HAND: CPT

## 2023-09-11 PROCEDURE — 87635 SARS-COV-2 COVID-19 AMP PRB: CPT

## 2023-09-11 PROCEDURE — 85025 COMPLETE CBC W/AUTO DIFF WBC: CPT

## 2023-09-11 PROCEDURE — 81003 URINALYSIS AUTO W/O SCOPE: CPT

## 2023-09-11 PROCEDURE — 73552 X-RAY EXAM OF FEMUR 2/>: CPT

## 2023-09-11 PROCEDURE — 80053 COMPREHEN METABOLIC PANEL: CPT

## 2023-09-11 PROCEDURE — 36415 COLL VENOUS BLD VENIPUNCTURE: CPT

## 2023-09-11 PROCEDURE — 99284 EMERGENCY DEPT VISIT MOD MDM: CPT

## 2023-09-11 PROCEDURE — 6370000000 HC RX 637 (ALT 250 FOR IP): Performed by: PHYSICIAN ASSISTANT

## 2023-09-11 PROCEDURE — 71046 X-RAY EXAM CHEST 2 VIEWS: CPT

## 2023-09-11 RX ORDER — TRAZODONE HYDROCHLORIDE 100 MG/1
TABLET ORAL
Qty: 90 TABLET | Refills: 1 | Status: SHIPPED | OUTPATIENT
Start: 2023-09-11

## 2023-09-11 RX ORDER — KETOROLAC TROMETHAMINE 15 MG/ML
15 INJECTION, SOLUTION INTRAMUSCULAR; INTRAVENOUS ONCE
Status: DISCONTINUED | OUTPATIENT
Start: 2023-09-11 | End: 2023-09-11

## 2023-09-11 RX ORDER — ACETAMINOPHEN 325 MG/1
650 TABLET ORAL ONCE
Status: COMPLETED | OUTPATIENT
Start: 2023-09-11 | End: 2023-09-11

## 2023-09-11 RX ORDER — KETOROLAC TROMETHAMINE 30 MG/ML
15 INJECTION, SOLUTION INTRAMUSCULAR; INTRAVENOUS ONCE
Status: COMPLETED | OUTPATIENT
Start: 2023-09-11 | End: 2023-09-11

## 2023-09-11 RX ADMIN — ACETAMINOPHEN 650 MG: 325 TABLET ORAL at 12:24

## 2023-09-11 RX ADMIN — KETOROLAC TROMETHAMINE 15 MG: 30 INJECTION, SOLUTION INTRAMUSCULAR; INTRAVENOUS at 08:41

## 2023-09-11 ASSESSMENT — PAIN DESCRIPTION - LOCATION: LOCATION: GENERALIZED

## 2023-09-11 ASSESSMENT — PAIN DESCRIPTION - ORIENTATION: ORIENTATION: OTHER (COMMENT)

## 2023-09-11 ASSESSMENT — PAIN - FUNCTIONAL ASSESSMENT: PAIN_FUNCTIONAL_ASSESSMENT: ACTIVITIES ARE NOT PREVENTED

## 2023-09-11 ASSESSMENT — PAIN DESCRIPTION - FREQUENCY
FREQUENCY: CONTINUOUS
FREQUENCY: CONTINUOUS

## 2023-09-11 ASSESSMENT — PAIN DESCRIPTION - PAIN TYPE
TYPE: ACUTE PAIN
TYPE: ACUTE PAIN

## 2023-09-11 ASSESSMENT — PAIN SCALES - GENERAL
PAINLEVEL_OUTOF10: 8
PAINLEVEL_OUTOF10: 3
PAINLEVEL_OUTOF10: 3

## 2023-09-11 ASSESSMENT — PAIN DESCRIPTION - DESCRIPTORS: DESCRIPTORS: ACHING

## 2023-09-11 ASSESSMENT — PAIN DESCRIPTION - ONSET: ONSET: ON-GOING

## 2023-09-11 NOTE — ED PROVIDER NOTES
[address] [facility] [address] [address]        Pt Name: Edward Dumont  MRN: [id number]  [address] [facility] [place] 1955  Date of evaluation: 9/11/2023  Provider: Shanika Marley PA-C  PCP: Rubin Oliver MD  Note Started: 12:09 PM EDT 9/11/23       I have seen and evaluated this patient with my supervising physician Dr. Mil Mosley       Chief Complaint   Patient presents with    Generalized Body Aches     Generalized body aches since last Thursday. Denies sick contacts. Denies nausea or vomiting. Denies chest pain or SOB. Denies urinary symptoms. HISTORY OF PRESENT ILLNESS: 1 or more Elements     History From: patient  Limitations to history : None    John Wood is a 75 y.o. female who presents to the emergency department by private vehicle. Patient is complaining of generalized body aches and intermittent chills for the past month. Pain is currently everywhere, seems most prominent in the knees, hips, neck and thumbs bilaterally. Has been taking Tylenol for symptoms with mild temporary relief. Patient denies any cough, fever, chills, rhinorrhea, congestion, headache. No known sick contacts. She does have mild left lower abdominal pain, this is been present for the past month since previous bout of colitis. States this has improved significantly, minimal at this time. Denies any nausea, vomiting, diarrhea. Additionally complaining of a mass in her left breast.  States she had a normal mammogram just over a year ago. She tried to get another mammogram but was informed she will need her doctor to order diagnostic mammogram.  No new medications. Nursing Notes were all reviewed and agreed with or any disagreements were addressed in the HPI. REVIEW OF SYSTEMS :      Review of Systems    Positives and Pertinent negatives as per HPI.      SURGICAL HISTORY     Past Surgical History:   Procedure Laterality Date    ARTHRODESIS Right 9/17/2020
this.      For further details of the patient's emergency department visit, please see the advanced practice provider's documentation. Effie Baldwin MD     This report has been produced using speech recognition software and may contain errors related to that system including errors in grammar, punctuation, and spelling, as well as words and phrases that may be inappropriate. If there are any questions or concerns please feel free to contact the dictating provider for clarification.        Effie Baldwin MD  09/11/23 0378

## 2023-09-11 NOTE — ED NOTES
Pt transferred to ED rm 7 from triage at this time. Pt to ED with c/o generalized body aches \"all over\" and fatigue. States symptoms started 1 month ago.        Laxmi Cook RN  09/11/23 0905       Urvashi Mcnamara RN  09/11/23 0984

## 2023-09-11 NOTE — PROGRESS NOTES
Please inform John that diagnostic mammogram has been ordered based on mass/lump she has noticed on the left

## 2023-09-13 ENCOUNTER — OFFICE VISIT (OUTPATIENT)
Dept: FAMILY MEDICINE CLINIC | Age: 68
End: 2023-09-13
Payer: MEDICARE

## 2023-09-13 VITALS
HEART RATE: 71 BPM | RESPIRATION RATE: 16 BRPM | DIASTOLIC BLOOD PRESSURE: 74 MMHG | HEIGHT: 64 IN | SYSTOLIC BLOOD PRESSURE: 118 MMHG | OXYGEN SATURATION: 98 % | WEIGHT: 193 LBS | BODY MASS INDEX: 32.95 KG/M2

## 2023-09-13 DIAGNOSIS — R10.32 LLQ ABDOMINAL PAIN: Primary | ICD-10-CM

## 2023-09-13 DIAGNOSIS — R53.81 MALAISE: ICD-10-CM

## 2023-09-13 DIAGNOSIS — M79.10 MYALGIA: ICD-10-CM

## 2023-09-13 LAB
CK SERPL-CCNC: 296 U/L (ref 26–192)
CRP SERPL-MCNC: <3 MG/L (ref 0–5.1)
ERYTHROCYTE [SEDIMENTATION RATE] IN BLOOD BY WESTERGREN METHOD: 35 MM/HR (ref 0–30)

## 2023-09-13 PROCEDURE — 99214 OFFICE O/P EST MOD 30 MIN: CPT | Performed by: FAMILY MEDICINE

## 2023-09-13 PROCEDURE — G8399 PT W/DXA RESULTS DOCUMENT: HCPCS | Performed by: FAMILY MEDICINE

## 2023-09-13 PROCEDURE — G8427 DOCREV CUR MEDS BY ELIG CLIN: HCPCS | Performed by: FAMILY MEDICINE

## 2023-09-13 PROCEDURE — 3074F SYST BP LT 130 MM HG: CPT | Performed by: FAMILY MEDICINE

## 2023-09-13 PROCEDURE — G8417 CALC BMI ABV UP PARAM F/U: HCPCS | Performed by: FAMILY MEDICINE

## 2023-09-13 PROCEDURE — 1036F TOBACCO NON-USER: CPT | Performed by: FAMILY MEDICINE

## 2023-09-13 PROCEDURE — 3078F DIAST BP <80 MM HG: CPT | Performed by: FAMILY MEDICINE

## 2023-09-13 PROCEDURE — 1124F ACP DISCUSS-NO DSCNMKR DOCD: CPT | Performed by: FAMILY MEDICINE

## 2023-09-13 PROCEDURE — 1090F PRES/ABSN URINE INCON ASSESS: CPT | Performed by: FAMILY MEDICINE

## 2023-09-13 PROCEDURE — 3017F COLORECTAL CA SCREEN DOC REV: CPT | Performed by: FAMILY MEDICINE

## 2023-09-13 RX ORDER — AMOXICILLIN AND CLAVULANATE POTASSIUM 875; 125 MG/1; MG/1
1 TABLET, FILM COATED ORAL 2 TIMES DAILY
Qty: 14 TABLET | Refills: 0 | Status: SHIPPED | OUTPATIENT
Start: 2023-09-13 | End: 2023-09-20

## 2023-09-13 NOTE — PROGRESS NOTES
9/13/2023    This is a 75 y.o. female   Chief Complaint   Patient presents with    Follow-up     Was seen in ED for aches and pains pt has a lump on her breast she is worried about. She feels a little better but still in pain      HPI    Here for concerns for not feeling well  - she notes having diffuse pain throughout her body  - overall this has been going on for about 2 weeks  - typically worse at night  - she feels likes it is both muscles and bone  - reports temp at night is sometimes elevated    No recent travel  No recent medication changes    Separately, she has intermittent LLQ discomfort. Dx with colitis previously. - saw Dr. Guzman Pierson on 8/22 and was prescribed Augmentin and a stat CT was ordered. She reports the pharmacy did not have this. She was unable to do the CT  - overall she notes the pain is improved from what it had been. She notes her bowel have been better on metamucil 2x per day.  Not having black stools like she had been during prior flare up 6-8 weeks ago    Review of Systems     Current Outpatient Medications   Medication Sig Dispense Refill    amoxicillin-clavulanate (AUGMENTIN) 875-125 MG per tablet Take 1 tablet by mouth 2 times daily for 7 days 14 tablet 0    traZODone (DESYREL) 100 MG tablet TAKE 1 TABLET BY MOUTH AT NIGHT 90 tablet 1    DULoxetine (CYMBALTA) 60 MG extended release capsule TAKE 1 CAPSULE BY MOUTH DAILY 90 capsule 1    ferrous sulfate (IRON 325) 325 (65 Fe) MG tablet Take 1 tablet by mouth every other day 45 tablet 1    acetaminophen (TYLENOL) 500 MG tablet Take 2 tablets by mouth 3 times daily as needed for Pain 180 tablet 2    ondansetron (ZOFRAN) 8 MG tablet Take 1 tablet by mouth every 8 hours as needed for Nausea 20 tablet 0    ibuprofen (ADVIL;MOTRIN) 600 MG tablet Take 1 tablet by mouth 3 times daily as needed for Pain 30 tablet 0    FANAPT 1 MG TABS tablet TAKE 1 TABLET BY MOUTH EVERY NIGHT 90 tablet 0    blood glucose test strips (ONETOUCH ULTRA) strip USE AS

## 2023-09-14 ENCOUNTER — OFFICE VISIT (OUTPATIENT)
Dept: ORTHOPEDIC SURGERY | Age: 68
End: 2023-09-14

## 2023-09-14 VITALS — BODY MASS INDEX: 32.61 KG/M2 | WEIGHT: 191 LBS | HEIGHT: 64 IN

## 2023-09-14 DIAGNOSIS — Z96.651 S/P TOTAL KNEE ARTHROPLASTY, RIGHT: Primary | ICD-10-CM

## 2023-09-14 DIAGNOSIS — G89.4 CHRONIC PAIN SYNDROME: ICD-10-CM

## 2023-10-05 DIAGNOSIS — I25.10 CORONARY ARTERY CALCIFICATION: ICD-10-CM

## 2023-10-05 DIAGNOSIS — I70.0 AORTIC ATHEROSCLEROSIS (HCC): ICD-10-CM

## 2023-10-05 DIAGNOSIS — I25.84 CORONARY ARTERY CALCIFICATION: ICD-10-CM

## 2023-10-05 DIAGNOSIS — E78.2 MIXED HYPERLIPIDEMIA: ICD-10-CM

## 2023-10-05 RX ORDER — PRAVASTATIN SODIUM 40 MG
40 TABLET ORAL DAILY
Qty: 90 TABLET | Refills: 1 | Status: SHIPPED | OUTPATIENT
Start: 2023-10-05

## 2023-10-06 DIAGNOSIS — F39 MOOD DISORDER (HCC): ICD-10-CM

## 2023-10-06 RX ORDER — ILOPERIDONE 1 MG/1
TABLET ORAL
Qty: 90 TABLET | Refills: 0 | Status: SHIPPED | OUTPATIENT
Start: 2023-10-06

## 2023-10-08 DIAGNOSIS — Z79.4 TYPE 2 DIABETES MELLITUS WITHOUT COMPLICATION, WITH LONG-TERM CURRENT USE OF INSULIN (HCC): ICD-10-CM

## 2023-10-08 DIAGNOSIS — E11.9 TYPE 2 DIABETES MELLITUS WITHOUT COMPLICATION, WITH LONG-TERM CURRENT USE OF INSULIN (HCC): ICD-10-CM

## 2023-10-19 ENCOUNTER — OFFICE VISIT (OUTPATIENT)
Dept: FAMILY MEDICINE CLINIC | Age: 68
End: 2023-10-19
Payer: MEDICARE

## 2023-10-19 VITALS
TEMPERATURE: 97.8 F | SYSTOLIC BLOOD PRESSURE: 122 MMHG | HEART RATE: 102 BPM | BODY MASS INDEX: 33.29 KG/M2 | OXYGEN SATURATION: 97 % | HEIGHT: 64 IN | DIASTOLIC BLOOD PRESSURE: 80 MMHG | WEIGHT: 195 LBS | RESPIRATION RATE: 16 BRPM

## 2023-10-19 DIAGNOSIS — R10.32 LLQ ABDOMINAL PAIN: ICD-10-CM

## 2023-10-19 DIAGNOSIS — R19.7 DIARRHEA, UNSPECIFIED TYPE: Primary | ICD-10-CM

## 2023-10-19 DIAGNOSIS — M65.311 TRIGGER FINGER OF RIGHT THUMB: ICD-10-CM

## 2023-10-19 PROCEDURE — 1090F PRES/ABSN URINE INCON ASSESS: CPT | Performed by: FAMILY MEDICINE

## 2023-10-19 PROCEDURE — 99213 OFFICE O/P EST LOW 20 MIN: CPT | Performed by: FAMILY MEDICINE

## 2023-10-19 PROCEDURE — 1036F TOBACCO NON-USER: CPT | Performed by: FAMILY MEDICINE

## 2023-10-19 PROCEDURE — G8427 DOCREV CUR MEDS BY ELIG CLIN: HCPCS | Performed by: FAMILY MEDICINE

## 2023-10-19 PROCEDURE — G8417 CALC BMI ABV UP PARAM F/U: HCPCS | Performed by: FAMILY MEDICINE

## 2023-10-19 PROCEDURE — 3074F SYST BP LT 130 MM HG: CPT | Performed by: FAMILY MEDICINE

## 2023-10-19 PROCEDURE — 1124F ACP DISCUSS-NO DSCNMKR DOCD: CPT | Performed by: FAMILY MEDICINE

## 2023-10-19 PROCEDURE — 3017F COLORECTAL CA SCREEN DOC REV: CPT | Performed by: FAMILY MEDICINE

## 2023-10-19 PROCEDURE — 3078F DIAST BP <80 MM HG: CPT | Performed by: FAMILY MEDICINE

## 2023-10-19 PROCEDURE — G8399 PT W/DXA RESULTS DOCUMENT: HCPCS | Performed by: FAMILY MEDICINE

## 2023-10-19 PROCEDURE — G8484 FLU IMMUNIZE NO ADMIN: HCPCS | Performed by: FAMILY MEDICINE

## 2023-10-19 RX ORDER — AMOXICILLIN AND CLAVULANATE POTASSIUM 875; 125 MG/1; MG/1
1 TABLET, FILM COATED ORAL 2 TIMES DAILY
Qty: 14 TABLET | Refills: 0 | Status: SHIPPED | OUTPATIENT
Start: 2023-10-19 | End: 2023-10-26

## 2023-10-19 NOTE — PROGRESS NOTES
10/19/2023    This is a 75 y.o. female   Chief Complaint   Patient presents with    Fever     Pt is having fever body chills body aches diahrrea not feeling good coughing      HPI    Here for concerns for not feeling well  Going on since Monday (now on about day 4 of symptoms)  - feels heat and chills  - also having diarrhea  - notes LLQ abdominal pain.  This worsens with eating  - nausea, no vomiting  - reports appetite is low  - hx of probable colitis in the past about 3 months ago (wall thickening of the descending colon)    Review of Systems     Current Outpatient Medications   Medication Sig Dispense Refill    amoxicillin-clavulanate (AUGMENTIN) 875-125 MG per tablet Take 1 tablet by mouth 2 times daily for 7 days 14 tablet 0    hydrocortisone 2.5 % cream Apply topically 2 times daily for no more than 10 consecutive days 1 each 0    metFORMIN (GLUCOPHAGE) 500 MG tablet TAKE 2 TABLETS BY MOUTH TWICE DAILY WITH MEALS 360 tablet 1    FANAPT 1 MG TABS tablet TAKE 1 TABLET BY MOUTH EVERY NIGHT 90 tablet 0    pravastatin (PRAVACHOL) 40 MG tablet TAKE 1 TABLET BY MOUTH DAILY 90 tablet 1    traZODone (DESYREL) 100 MG tablet TAKE 1 TABLET BY MOUTH AT NIGHT 90 tablet 1    DULoxetine (CYMBALTA) 60 MG extended release capsule TAKE 1 CAPSULE BY MOUTH DAILY 90 capsule 1    ferrous sulfate (IRON 325) 325 (65 Fe) MG tablet Take 1 tablet by mouth every other day 45 tablet 1    acetaminophen (TYLENOL) 500 MG tablet Take 2 tablets by mouth 3 times daily as needed for Pain 180 tablet 2    ondansetron (ZOFRAN) 8 MG tablet Take 1 tablet by mouth every 8 hours as needed for Nausea 20 tablet 0    ibuprofen (ADVIL;MOTRIN) 600 MG tablet Take 1 tablet by mouth 3 times daily as needed for Pain 30 tablet 0    blood glucose test strips (ONETOUCH ULTRA) strip USE AS NEEDED 100 strip 4    dulaglutide (TRULICITY) 1.5 JF/2.1YO SC injection INJECT THE CONTENTS OF ONE  PEN SUBCUTANEOUSLY WEEKLY  AS DIRECTED 6 mL 3    furosemide (LASIX) 20 MG

## 2023-10-20 ENCOUNTER — HOSPITAL ENCOUNTER (OUTPATIENT)
Dept: CT IMAGING | Age: 68
Discharge: HOME | End: 2023-10-20
Attending: FAMILY MEDICINE
Payer: MEDICARE

## 2023-10-20 DIAGNOSIS — R19.7 DIARRHEA, UNSPECIFIED TYPE: ICD-10-CM

## 2023-10-20 DIAGNOSIS — R10.32 LLQ ABDOMINAL PAIN: ICD-10-CM

## 2023-10-20 PROCEDURE — 74177 CT ABD & PELVIS W/CONTRAST: CPT

## 2023-10-20 PROCEDURE — 6360000004 HC RX CONTRAST MEDICATION: Performed by: FAMILY MEDICINE

## 2023-10-20 RX ADMIN — IOPAMIDOL 50 ML: 612 INJECTION, SOLUTION INTRAVENOUS at 09:06

## 2023-10-20 RX ADMIN — IOPAMIDOL 75 ML: 755 INJECTION, SOLUTION INTRAVENOUS at 09:06

## 2023-11-03 ENCOUNTER — OFFICE VISIT (OUTPATIENT)
Dept: ORTHOPEDIC SURGERY | Age: 68
End: 2023-11-03

## 2023-11-03 VITALS — RESPIRATION RATE: 16 BRPM | WEIGHT: 195 LBS | HEIGHT: 64 IN | BODY MASS INDEX: 33.29 KG/M2

## 2023-11-03 DIAGNOSIS — M65.30 TRIGGER FINGER, ACQUIRED: Primary | ICD-10-CM

## 2023-11-03 RX ORDER — TRIAMCINOLONE ACETONIDE 40 MG/ML
20 INJECTION, SUSPENSION INTRA-ARTICULAR; INTRAMUSCULAR ONCE
Status: COMPLETED | OUTPATIENT
Start: 2023-11-03 | End: 2023-11-03

## 2023-11-03 RX ADMIN — TRIAMCINOLONE ACETONIDE 20 MG: 40 INJECTION, SUSPENSION INTRA-ARTICULAR; INTRAMUSCULAR at 13:26

## 2023-11-03 NOTE — PROGRESS NOTES
Administrations This Visit       triamcinolone acetonide (KENALOG-40) injection 20 mg       Admin Date  11/03/2023  13:26 Action  Given Dose  20 mg Route  Intra-artICUlar Site  Finger (See Comments) Administered By  Katya Mcintyre MA    Ordering Provider: Power Quintanilla MD    NDC: 1112-0695-46    Lot#: [id number]    : [employer] (PRIMARY CARE)    Patient Supplied?: No    Comments: Right Thumb

## 2023-11-03 NOTE — PROGRESS NOTES
Ms. Clare Huang is a 75 y.o. right handed woman  who is seen today in Hand Surgical Consultation at the request of Angelica Mccain MD.    She presents today regarding right symptoms which have been present for approximately 2 months. A history of antecedent trauma or injury is Absent. She reports symptoms to include moderate pain and stiffness with frequent popping, catching or locking of the right Thumb. Finger symptoms are Frequently worse in the morning or overnight. She reports moderate pain located at the base of the symptomatic finger(s). Symptoms are worsening over time. Previous treatment has included conservative measures. She does not claim relation of her symptoms to her required work activities. She has undergone any form of testing. I have today reviewed with Clare Huang the clinically relevant, past medical history, medications, allergies,  family history, social history, and Review Of Systems & I have documented any details relevant to today's presenting complaints in my history above. Ms. Gayatri Jacobson self-reported past medical history, medications, allergies,  family history, social history, and Review Of Systems have been scanned into the chart under the \"Media\" tab. Physical Exam:  Ms. Gayatri Wood's most recent vitals:  Vitals  Respirations: 16  Height: 162.6 cm (5' 4\")  Weight - Scale: 88.5 kg (195 lb)    She is well nourished, oriented to person, place & time. She demonstrates appropriate mood and affect as well as normal gait and station. Skin: Normal in appearance, Normal Color, and Free of Lesions Bilaterally   Digital range of motion is without significant limitation bilaterally. Active triggering is noted upon flexion in the right Thumb. There is not an associated flexion contracture of the PIP joint. No other digit demonstrates evidence for stenosing tenosynovitis bilaterally. Wrist range of motion is Full bilaterally.   Sensation is normal in the

## 2023-11-08 RX ORDER — BLOOD SUGAR DIAGNOSTIC
STRIP MISCELLANEOUS
Qty: 100 STRIP | Refills: 4 | Status: SHIPPED | OUTPATIENT
Start: 2023-11-08

## 2023-11-21 ENCOUNTER — TELEPHONE (OUTPATIENT)
Dept: FAMILY MEDICINE CLINIC | Age: 68
End: 2023-11-21

## 2023-11-21 NOTE — TELEPHONE ENCOUNTER
Patient called in stating that she had her Colonoscopy on 10/27/2023 at [facility]. When she called Dr. Martinez's office to get the results they told her that we need to call over there for them to send us the results [phone]    She has an appointment scheduled already with Dr. Finnegan on 12/8/2023 to discuss the results    Please Advise

## 2023-11-21 NOTE — TELEPHONE ENCOUNTER
Called to get colonosocpy resutls sent over but pt should calll them and have that dr go over there results since they did the testing she just has to call and ask for a call with results

## 2023-12-01 ENCOUNTER — HOSPITAL ENCOUNTER (OUTPATIENT)
Dept: ULTRASOUND IMAGING | Age: 68
Discharge: HOME | End: 2023-12-01
Payer: MEDICARE

## 2023-12-01 ENCOUNTER — HOSPITAL ENCOUNTER (OUTPATIENT)
Dept: WOMENS IMAGING | Age: 68
Discharge: HOME | End: 2023-12-01
Payer: MEDICARE

## 2023-12-01 VITALS — BODY MASS INDEX: 31.58 KG/M2 | WEIGHT: 185 LBS | HEIGHT: 64 IN

## 2023-12-01 DIAGNOSIS — N63.20 MASS OF LEFT BREAST, UNSPECIFIED QUADRANT: ICD-10-CM

## 2023-12-01 DIAGNOSIS — N64.59 OTHER SIGNS AND SYMPTOMS IN BREAST: ICD-10-CM

## 2023-12-01 PROCEDURE — G0279 TOMOSYNTHESIS, MAMMO: HCPCS

## 2023-12-01 PROCEDURE — 76642 ULTRASOUND BREAST LIMITED: CPT

## 2023-12-08 ENCOUNTER — OFFICE VISIT (OUTPATIENT)
Dept: FAMILY MEDICINE CLINIC | Age: 68
End: 2023-12-08
Payer: MEDICARE

## 2023-12-08 VITALS
SYSTOLIC BLOOD PRESSURE: 136 MMHG | HEART RATE: 70 BPM | WEIGHT: 195 LBS | DIASTOLIC BLOOD PRESSURE: 80 MMHG | HEIGHT: 64 IN | RESPIRATION RATE: 16 BRPM | BODY MASS INDEX: 33.29 KG/M2 | OXYGEN SATURATION: 97 %

## 2023-12-08 DIAGNOSIS — R25.2 MUSCLE CRAMPS: ICD-10-CM

## 2023-12-08 DIAGNOSIS — I25.10 CORONARY ARTERY CALCIFICATION: ICD-10-CM

## 2023-12-08 DIAGNOSIS — I25.84 CORONARY ARTERY CALCIFICATION: ICD-10-CM

## 2023-12-08 DIAGNOSIS — F39 MOOD DISORDER (HCC): ICD-10-CM

## 2023-12-08 DIAGNOSIS — E61.1 IRON DEFICIENCY: ICD-10-CM

## 2023-12-08 DIAGNOSIS — E78.2 MIXED HYPERLIPIDEMIA: ICD-10-CM

## 2023-12-08 DIAGNOSIS — I70.0 AORTIC ATHEROSCLEROSIS (HCC): ICD-10-CM

## 2023-12-08 DIAGNOSIS — K52.9 COLITIS: ICD-10-CM

## 2023-12-08 DIAGNOSIS — I10 ESSENTIAL HYPERTENSION: ICD-10-CM

## 2023-12-08 DIAGNOSIS — R41.3 MEMORY CHANGE: ICD-10-CM

## 2023-12-08 DIAGNOSIS — E11.8 TYPE 2 DIABETES MELLITUS WITH COMPLICATION, WITHOUT LONG-TERM CURRENT USE OF INSULIN (HCC): Primary | ICD-10-CM

## 2023-12-08 LAB
ANION GAP SERPL CALCULATED.3IONS-SCNC: 6 MMOL/L (ref 3–16)
BUN SERPL-MCNC: 13 MG/DL (ref 7–20)
CALCIUM SERPL-MCNC: 10.5 MG/DL (ref 8.3–10.6)
CHLORIDE SERPL-SCNC: 105 MMOL/L (ref 99–110)
CO2 SERPL-SCNC: 30 MMOL/L (ref 21–32)
CREAT SERPL-MCNC: 0.8 MG/DL (ref 0.6–1.2)
FERRITIN SERPL IA-MCNC: 18.4 NG/ML (ref 15–150)
GFR SERPLBLD CREATININE-BSD FMLA CKD-EPI: >60 ML/MIN/{1.73_M2}
GLUCOSE SERPL-MCNC: 121 MG/DL (ref 70–99)
MAGNESIUM SERPL-MCNC: 1.8 MG/DL (ref 1.8–2.4)
POTASSIUM SERPL-SCNC: 4.7 MMOL/L (ref 3.5–5.1)
SODIUM SERPL-SCNC: 141 MMOL/L (ref 136–145)

## 2023-12-08 PROCEDURE — 90694 VACC AIIV4 NO PRSRV 0.5ML IM: CPT | Performed by: FAMILY MEDICINE

## 2023-12-08 PROCEDURE — 3079F DIAST BP 80-89 MM HG: CPT | Performed by: FAMILY MEDICINE

## 2023-12-08 PROCEDURE — 1090F PRES/ABSN URINE INCON ASSESS: CPT | Performed by: FAMILY MEDICINE

## 2023-12-08 PROCEDURE — 83036 HEMOGLOBIN GLYCOSYLATED A1C: CPT | Performed by: FAMILY MEDICINE

## 2023-12-08 PROCEDURE — 3017F COLORECTAL CA SCREEN DOC REV: CPT | Performed by: FAMILY MEDICINE

## 2023-12-08 PROCEDURE — G8484 FLU IMMUNIZE NO ADMIN: HCPCS | Performed by: FAMILY MEDICINE

## 2023-12-08 PROCEDURE — 2022F DILAT RTA XM EVC RTNOPTHY: CPT | Performed by: FAMILY MEDICINE

## 2023-12-08 PROCEDURE — G0008 ADMIN INFLUENZA VIRUS VAC: HCPCS | Performed by: FAMILY MEDICINE

## 2023-12-08 PROCEDURE — 1124F ACP DISCUSS-NO DSCNMKR DOCD: CPT | Performed by: FAMILY MEDICINE

## 2023-12-08 PROCEDURE — G8427 DOCREV CUR MEDS BY ELIG CLIN: HCPCS | Performed by: FAMILY MEDICINE

## 2023-12-08 PROCEDURE — G8399 PT W/DXA RESULTS DOCUMENT: HCPCS | Performed by: FAMILY MEDICINE

## 2023-12-08 PROCEDURE — G8417 CALC BMI ABV UP PARAM F/U: HCPCS | Performed by: FAMILY MEDICINE

## 2023-12-08 PROCEDURE — 99214 OFFICE O/P EST MOD 30 MIN: CPT | Performed by: FAMILY MEDICINE

## 2023-12-08 PROCEDURE — 1036F TOBACCO NON-USER: CPT | Performed by: FAMILY MEDICINE

## 2023-12-08 PROCEDURE — 3051F HG A1C>EQUAL 7.0%<8.0%: CPT | Performed by: FAMILY MEDICINE

## 2023-12-08 PROCEDURE — 3075F SYST BP GE 130 - 139MM HG: CPT | Performed by: FAMILY MEDICINE

## 2023-12-08 RX ORDER — INSULIN GLARGINE 100 [IU]/ML
INJECTION, SOLUTION SUBCUTANEOUS
Qty: 15 ML | Refills: 3 | Status: SHIPPED
Start: 2023-12-08

## 2023-12-08 RX ORDER — CLOTRIMAZOLE AND BETAMETHASONE DIPROPIONATE 10; .64 MG/G; MG/G
CREAM TOPICAL
Qty: 45 G | Refills: 0 | Status: SHIPPED | OUTPATIENT
Start: 2023-12-08

## 2023-12-08 RX ORDER — TRAZODONE HYDROCHLORIDE 100 MG/1
100 TABLET ORAL NIGHTLY
Qty: 90 TABLET | Refills: 1 | Status: SHIPPED | OUTPATIENT
Start: 2023-12-08

## 2023-12-08 NOTE — PROGRESS NOTES
USE AS DIRECTED 100 strip 4    hydrocortisone 2.5 % cream Apply topically 2 times daily for no more than 10 consecutive days 1 each 0    metFORMIN (GLUCOPHAGE) 500 MG tablet TAKE 2 TABLETS BY MOUTH TWICE DAILY WITH MEALS 360 tablet 1    FANAPT 1 MG TABS tablet TAKE 1 TABLET BY MOUTH EVERY NIGHT 90 tablet 0    traZODone (DESYREL) 100 MG tablet TAKE 1 TABLET BY MOUTH AT NIGHT 90 tablet 1    DULoxetine (CYMBALTA) 60 MG extended release capsule TAKE 1 CAPSULE BY MOUTH DAILY 90 capsule 1    ferrous sulfate (IRON 325) 325 (65 Fe) MG tablet Take 1 tablet by mouth every other day 45 tablet 1    acetaminophen (TYLENOL) 500 MG tablet Take 2 tablets by mouth 3 times daily as needed for Pain 180 tablet 2    ondansetron (ZOFRAN) 8 MG tablet Take 1 tablet by mouth every 8 hours as needed for Nausea 20 tablet 0    ibuprofen (ADVIL;MOTRIN) 600 MG tablet Take 1 tablet by mouth 3 times daily as needed for Pain 30 tablet 0    furosemide (LASIX) 20 MG tablet TAKE 1 TABLET BY MOUTH  DAILY AS NEEDED FOR LEG  SWELLING 90 tablet 1    albuterol sulfate HFA (PROVENTIL;VENTOLIN;PROAIR) 108 (90 Base) MCG/ACT inhaler INHALE 2 PUFFS INTO THE LUNGS EVERY 4 HOURS AS NEEDED FOR WHEEZING OR SHORTNESS OF BREATH 6.7 g 5    ULTICARE MINI PEN NEEDLES 31G X 6 MM MISC USE TO INJECT INSULIN DAILY 100 each 5    fluticasone (FLONASE) 50 MCG/ACT nasal spray 2 sprays by Each Nostril route daily 3 each 5    diclofenac sodium (VOLTAREN) 1 % GEL Apply 4 g topically 4 times daily as needed for Pain 100 g 3    omeprazole (PRILOSEC) 40 MG delayed release capsule Take 1 capsule by mouth in the morning and at bedtime 160 capsule 5    valsartan (DIOVAN) 160 MG tablet Take 1 tablet by mouth daily 90 tablet 1    aspirin 81 MG EC tablet Take 1 tablet by mouth daily      Psyllium (METAMUCIL) 0.36 g CAPS Take 1 capsule by mouth daily      vitamin B-6 (PYRIDOXINE) 100 MG tablet Take 1 tablet by mouth daily      dextran 70-hypromellose (TEARS NATURALE) 0.1-0.3 % SOLN

## 2023-12-11 ENCOUNTER — OFFICE VISIT (OUTPATIENT)
Dept: SLEEP MEDICINE | Age: 68
End: 2023-12-11
Payer: MEDICARE

## 2023-12-11 VITALS
BODY MASS INDEX: 33.19 KG/M2 | HEART RATE: 85 BPM | TEMPERATURE: 97.8 F | WEIGHT: 194.4 LBS | RESPIRATION RATE: 20 BRPM | DIASTOLIC BLOOD PRESSURE: 60 MMHG | HEIGHT: 64 IN | OXYGEN SATURATION: 98 % | SYSTOLIC BLOOD PRESSURE: 120 MMHG

## 2023-12-11 DIAGNOSIS — R09.81 NASAL CONGESTION: ICD-10-CM

## 2023-12-11 DIAGNOSIS — Z99.89 DEPENDENCE ON OTHER ENABLING MACHINES AND DEVICES: ICD-10-CM

## 2023-12-11 DIAGNOSIS — E11.8 TYPE 2 DIABETES MELLITUS WITH COMPLICATION, WITHOUT LONG-TERM CURRENT USE OF INSULIN (HCC): ICD-10-CM

## 2023-12-11 DIAGNOSIS — G47.33 OSA TREATED WITH BIPAP: Primary | ICD-10-CM

## 2023-12-11 DIAGNOSIS — I10 ESSENTIAL HYPERTENSION: ICD-10-CM

## 2023-12-11 DIAGNOSIS — I63.30 CEREBRAL THROMBOSIS WITH CEREBRAL INFARCTION (HCC): ICD-10-CM

## 2023-12-11 PROCEDURE — 3074F SYST BP LT 130 MM HG: CPT | Performed by: PSYCHIATRY & NEUROLOGY

## 2023-12-11 PROCEDURE — 3017F COLORECTAL CA SCREEN DOC REV: CPT | Performed by: PSYCHIATRY & NEUROLOGY

## 2023-12-11 PROCEDURE — 2022F DILAT RTA XM EVC RTNOPTHY: CPT | Performed by: PSYCHIATRY & NEUROLOGY

## 2023-12-11 PROCEDURE — 3078F DIAST BP <80 MM HG: CPT | Performed by: PSYCHIATRY & NEUROLOGY

## 2023-12-11 PROCEDURE — 1036F TOBACCO NON-USER: CPT | Performed by: PSYCHIATRY & NEUROLOGY

## 2023-12-11 PROCEDURE — G8417 CALC BMI ABV UP PARAM F/U: HCPCS | Performed by: PSYCHIATRY & NEUROLOGY

## 2023-12-11 PROCEDURE — 1090F PRES/ABSN URINE INCON ASSESS: CPT | Performed by: PSYCHIATRY & NEUROLOGY

## 2023-12-11 PROCEDURE — 1124F ACP DISCUSS-NO DSCNMKR DOCD: CPT | Performed by: PSYCHIATRY & NEUROLOGY

## 2023-12-11 PROCEDURE — G8399 PT W/DXA RESULTS DOCUMENT: HCPCS | Performed by: PSYCHIATRY & NEUROLOGY

## 2023-12-11 PROCEDURE — 3051F HG A1C>EQUAL 7.0%<8.0%: CPT | Performed by: PSYCHIATRY & NEUROLOGY

## 2023-12-11 PROCEDURE — 99214 OFFICE O/P EST MOD 30 MIN: CPT | Performed by: PSYCHIATRY & NEUROLOGY

## 2023-12-11 PROCEDURE — G8484 FLU IMMUNIZE NO ADMIN: HCPCS | Performed by: PSYCHIATRY & NEUROLOGY

## 2023-12-11 PROCEDURE — G8427 DOCREV CUR MEDS BY ELIG CLIN: HCPCS | Performed by: PSYCHIATRY & NEUROLOGY

## 2023-12-11 RX ORDER — CETIRIZINE HYDROCHLORIDE 10 MG/1
10 TABLET ORAL DAILY
Qty: 30 TABLET | Refills: 11 | Status: SHIPPED | OUTPATIENT
Start: 2023-12-11 | End: 2024-12-05

## 2023-12-11 ASSESSMENT — SLEEP AND FATIGUE QUESTIONNAIRES
HOW LIKELY ARE YOU TO NOD OFF OR FALL ASLEEP WHEN YOU ARE A PASSENGER IN A CAR FOR AN HOUR WITHOUT A BREAK: 0
ESS TOTAL SCORE: 8
HOW LIKELY ARE YOU TO NOD OFF OR FALL ASLEEP WHILE WATCHING TV: 3
HOW LIKELY ARE YOU TO NOD OFF OR FALL ASLEEP WHILE SITTING QUIETLY AFTER LUNCH WITHOUT ALCOHOL: 1
HOW LIKELY ARE YOU TO NOD OFF OR FALL ASLEEP WHILE SITTING INACTIVE IN A PUBLIC PLACE: 0
HOW LIKELY ARE YOU TO NOD OFF OR FALL ASLEEP WHILE SITTING AND READING: 2
HOW LIKELY ARE YOU TO NOD OFF OR FALL ASLEEP IN A CAR, WHILE STOPPED FOR A FEW MINUTES IN TRAFFIC: 0
HOW LIKELY ARE YOU TO NOD OFF OR FALL ASLEEP WHILE LYING DOWN TO REST IN THE AFTERNOON WHEN CIRCUMSTANCES PERMIT: 1
HOW LIKELY ARE YOU TO NOD OFF OR FALL ASLEEP WHILE SITTING AND TALKING TO SOMEONE: 1

## 2023-12-11 NOTE — PROGRESS NOTES
MD GASTON De La Torre Board Certified in Sleep Medicine  Certified in [address] [place], [place] [address]y Certified in Neurology [address] [facility] 140[address] [facility], [address] [place] [phone]   [address] [place] [address], [facility] [address]                      [address]  [address]  [address] [place] [id number] [phone]    Subjective:     Patient ID: Laura Chan is a 70 y.o. female. Chief Complaint   Patient presents with    Follow-up    Sleep Apnea       HPI:        Laura Chan is a 70 y.o. female was seen today as a follow for mild obstructive sleep apnea with an AHI - 12.9/hr with Low SaO2 - 76% and time below 90% of 3.5 min. Patient is using the PAP machine about 7% of the time, more than 4 hours a nightabout  7 %, in total average of 6:54 hours a night in last 45 days. Currently on PAP at 15/11 cm (), the AHI is only 2.1 events per hour at this pressure. No clear reason why she does not use the BiPAP every night except she said she feels congested and can not use it. Patient improved regarding daytime sleepiness and fatigue, wakes up refreshed in the morning. The Patient scored Conneaut Sleepiness Score: 8 on Conneaut Sleepiness Scale ( more than 10 is indicative of daytime sleepiness)   Patient has no problem with PAP pressure or mask. uses nasal pillow mask. Wakes up in the morning with dry mouth, the setting of the heated humidifier. BP, DM, and stroke are stable. Has not gained weight pounds since last visit.  194  DOT/CDL - N/A      Previous Report(s)Reviewed: historical medical records         Social History     Socioeconomic History    Marital status:      Spouse name: Northwood Neighbor, seen here    Number of children: 3    Years of education: Not on file    Highest education level: Not on file   Occupational History    Not

## 2023-12-12 ENCOUNTER — TELEPHONE (OUTPATIENT)
Dept: FAMILY MEDICINE CLINIC | Age: 68
End: 2023-12-12

## 2023-12-12 NOTE — TELEPHONE ENCOUNTER
Pt called in to speak to [facility] on her lab results from 12/8/2023      Please advise

## 2023-12-26 RX ORDER — VALSARTAN 160 MG/1
160 TABLET ORAL DAILY
Qty: 90 TABLET | Refills: 1 | Status: SHIPPED | OUTPATIENT
Start: 2023-12-26

## 2024-01-09 DIAGNOSIS — F39 MOOD DISORDER (HCC): ICD-10-CM

## 2024-01-09 RX ORDER — ILOPERIDONE 1 MG/1
TABLET ORAL
Qty: 90 TABLET | Refills: 0 | Status: SHIPPED | OUTPATIENT
Start: 2024-01-09

## 2024-02-01 RX ORDER — FLUTICASONE PROPIONATE 50 MCG
2 SPRAY, SUSPENSION (ML) NASAL DAILY
Qty: 3 EACH | Refills: 5 | Status: SHIPPED | OUTPATIENT
Start: 2024-02-01

## 2024-02-02 DIAGNOSIS — K21.9 GASTROESOPHAGEAL REFLUX DISEASE, UNSPECIFIED WHETHER ESOPHAGITIS PRESENT: ICD-10-CM

## 2024-02-02 DIAGNOSIS — R10.13 EPIGASTRIC PAIN: ICD-10-CM

## 2024-02-02 RX ORDER — DULOXETIN HYDROCHLORIDE 60 MG/1
60 CAPSULE, DELAYED RELEASE ORAL DAILY
Qty: 90 CAPSULE | Refills: 1 | Status: SHIPPED | OUTPATIENT
Start: 2024-02-02

## 2024-02-05 ENCOUNTER — TELEPHONE (OUTPATIENT)
Dept: FAMILY MEDICINE CLINIC | Age: 69
End: 2024-02-05

## 2024-02-05 DIAGNOSIS — K21.9 GASTROESOPHAGEAL REFLUX DISEASE, UNSPECIFIED WHETHER ESOPHAGITIS PRESENT: ICD-10-CM

## 2024-02-05 DIAGNOSIS — R10.13 EPIGASTRIC PAIN: ICD-10-CM

## 2024-02-05 RX ORDER — OMEPRAZOLE 40 MG/1
40 CAPSULE, DELAYED RELEASE ORAL 2 TIMES DAILY
Qty: 180 CAPSULE | OUTPATIENT
Start: 2024-02-05

## 2024-02-05 RX ORDER — OMEPRAZOLE 40 MG/1
40 CAPSULE, DELAYED RELEASE ORAL 2 TIMES DAILY
Qty: 160 CAPSULE | Refills: 5 | Status: SHIPPED | OUTPATIENT
Start: 2024-02-05

## 2024-02-05 NOTE — TELEPHONE ENCOUNTER
Pt called in has acid reflux it has been about a week she has tried things over the counter noting is helping   Pt needs appointment after 3 pm ok to schedule ? I offered with SURESH Mera tomorrow pt declined says she works until 3 pm       Please advise

## 2024-02-08 ENCOUNTER — OFFICE VISIT (OUTPATIENT)
Dept: FAMILY MEDICINE CLINIC | Age: 69
End: 2024-02-08
Payer: MEDICARE

## 2024-02-08 VITALS
RESPIRATION RATE: 16 BRPM | HEART RATE: 93 BPM | HEIGHT: 64 IN | BODY MASS INDEX: 32.27 KG/M2 | OXYGEN SATURATION: 97 % | WEIGHT: 189 LBS

## 2024-02-08 DIAGNOSIS — K21.9 GASTROESOPHAGEAL REFLUX DISEASE, UNSPECIFIED WHETHER ESOPHAGITIS PRESENT: Primary | ICD-10-CM

## 2024-02-08 DIAGNOSIS — F39 MOOD DISORDER (HCC): ICD-10-CM

## 2024-02-08 DIAGNOSIS — R53.83 OTHER FATIGUE: ICD-10-CM

## 2024-02-08 DIAGNOSIS — F32.A ANXIETY AND DEPRESSION: ICD-10-CM

## 2024-02-08 DIAGNOSIS — R63.0 DECREASED APPETITE: ICD-10-CM

## 2024-02-08 DIAGNOSIS — F41.9 ANXIETY AND DEPRESSION: ICD-10-CM

## 2024-02-08 DIAGNOSIS — E61.1 IRON DEFICIENCY: ICD-10-CM

## 2024-02-08 DIAGNOSIS — E11.8 TYPE 2 DIABETES MELLITUS WITH COMPLICATION, WITHOUT LONG-TERM CURRENT USE OF INSULIN (HCC): ICD-10-CM

## 2024-02-08 PROCEDURE — 3017F COLORECTAL CA SCREEN DOC REV: CPT | Performed by: FAMILY MEDICINE

## 2024-02-08 PROCEDURE — 2022F DILAT RTA XM EVC RTNOPTHY: CPT | Performed by: FAMILY MEDICINE

## 2024-02-08 PROCEDURE — G8417 CALC BMI ABV UP PARAM F/U: HCPCS | Performed by: FAMILY MEDICINE

## 2024-02-08 PROCEDURE — 99214 OFFICE O/P EST MOD 30 MIN: CPT | Performed by: FAMILY MEDICINE

## 2024-02-08 PROCEDURE — G8484 FLU IMMUNIZE NO ADMIN: HCPCS | Performed by: FAMILY MEDICINE

## 2024-02-08 PROCEDURE — 1124F ACP DISCUSS-NO DSCNMKR DOCD: CPT | Performed by: FAMILY MEDICINE

## 2024-02-08 PROCEDURE — 3046F HEMOGLOBIN A1C LEVEL >9.0%: CPT | Performed by: FAMILY MEDICINE

## 2024-02-08 PROCEDURE — 1090F PRES/ABSN URINE INCON ASSESS: CPT | Performed by: FAMILY MEDICINE

## 2024-02-08 PROCEDURE — G8427 DOCREV CUR MEDS BY ELIG CLIN: HCPCS | Performed by: FAMILY MEDICINE

## 2024-02-08 PROCEDURE — 1036F TOBACCO NON-USER: CPT | Performed by: FAMILY MEDICINE

## 2024-02-08 PROCEDURE — G8399 PT W/DXA RESULTS DOCUMENT: HCPCS | Performed by: FAMILY MEDICINE

## 2024-02-08 RX ORDER — TRAZODONE HYDROCHLORIDE 100 MG/1
100 TABLET ORAL NIGHTLY
Qty: 90 TABLET | Refills: 1 | Status: SHIPPED | OUTPATIENT
Start: 2024-02-08

## 2024-02-08 RX ORDER — DULOXETIN HYDROCHLORIDE 60 MG/1
60 CAPSULE, DELAYED RELEASE ORAL DAILY
Qty: 90 CAPSULE | Refills: 1 | Status: SHIPPED | OUTPATIENT
Start: 2024-02-08

## 2024-02-08 RX ORDER — METHOCARBAMOL 500 MG/1
TABLET, FILM COATED ORAL
COMMUNITY
Start: 2024-01-16

## 2024-02-08 NOTE — PROGRESS NOTES
more than 10 consecutive days 45 g 0    Dulaglutide 0.75 MG/0.5ML SOPN Inject 0.75 mg into the skin once a week 4 Adjustable Dose Pre-filled Pen Syringe 2    blood glucose test strips (ONETOUCH ULTRA) strip USE AS DIRECTED 100 strip 4    hydrocortisone 2.5 % cream Apply topically 2 times daily for no more than 10 consecutive days 1 each 0    metFORMIN (GLUCOPHAGE) 500 MG tablet TAKE 2 TABLETS BY MOUTH TWICE DAILY WITH MEALS 360 tablet 1    pravastatin (PRAVACHOL) 40 MG tablet TAKE 1 TABLET BY MOUTH DAILY 90 tablet 1    ferrous sulfate (IRON 325) 325 (65 Fe) MG tablet Take 1 tablet by mouth every other day 45 tablet 1    acetaminophen (TYLENOL) 500 MG tablet Take 2 tablets by mouth 3 times daily as needed for Pain 180 tablet 2    ibuprofen (ADVIL;MOTRIN) 600 MG tablet Take 1 tablet by mouth 3 times daily as needed for Pain 30 tablet 0    furosemide (LASIX) 20 MG tablet TAKE 1 TABLET BY MOUTH  DAILY AS NEEDED FOR LEG  SWELLING 90 tablet 1    albuterol sulfate HFA (PROVENTIL;VENTOLIN;PROAIR) 108 (90 Base) MCG/ACT inhaler INHALE 2 PUFFS INTO THE LUNGS EVERY 4 HOURS AS NEEDED FOR WHEEZING OR SHORTNESS OF BREATH 6.7 g 5    ULTICARE MINI PEN NEEDLES 31G X 6 MM MISC USE TO INJECT INSULIN DAILY 100 each 5    diclofenac sodium (VOLTAREN) 1 % GEL Apply 4 g topically 4 times daily as needed for Pain 100 g 3    aspirin 81 MG EC tablet Take 1 tablet by mouth daily      Psyllium (METAMUCIL) 0.36 g CAPS Take 1 capsule by mouth daily      vitamin B-6 (PYRIDOXINE) 100 MG tablet Take 1 tablet by mouth daily      dextran 70-hypromellose (TEARS NATURALE) 0.1-0.3 % SOLN opthalmic solution as needed for itching      ipratropium-albuterol (DUONEB) 0.5-2.5 (3) MG/3ML SOLN nebulizer solution Inhale 3 mLs into the lungs every 4 hours as needed for Shortness of Breath (wheezing coughing) 360 mL 5    ammonium lactate (LAC-HYDRIN) 12 % lotion Apply topically daily. (Patient taking differently: as needed Apply topically daily.) 1 Bottle 1

## 2024-02-09 ENCOUNTER — APPOINTMENT (OUTPATIENT)
Dept: CT IMAGING | Age: 69
End: 2024-02-09
Payer: MEDICARE

## 2024-02-09 ENCOUNTER — HOSPITAL ENCOUNTER (EMERGENCY)
Age: 69
Discharge: HOME | End: 2024-02-09
Attending: EMERGENCY MEDICINE
Payer: MEDICARE

## 2024-02-09 VITALS
HEIGHT: 64 IN | RESPIRATION RATE: 18 BRPM | OXYGEN SATURATION: 100 % | BODY MASS INDEX: 32.67 KG/M2 | HEART RATE: 66 BPM | WEIGHT: 191.36 LBS | SYSTOLIC BLOOD PRESSURE: 133 MMHG | TEMPERATURE: 98.3 F | DIASTOLIC BLOOD PRESSURE: 71 MMHG

## 2024-02-09 DIAGNOSIS — F41.9 ANXIETY AND DEPRESSION: ICD-10-CM

## 2024-02-09 DIAGNOSIS — E61.1 IRON DEFICIENCY: ICD-10-CM

## 2024-02-09 DIAGNOSIS — F32.A ANXIETY AND DEPRESSION: ICD-10-CM

## 2024-02-09 DIAGNOSIS — R53.83 OTHER FATIGUE: ICD-10-CM

## 2024-02-09 DIAGNOSIS — R10.13 ABDOMINAL PAIN, EPIGASTRIC: Primary | ICD-10-CM

## 2024-02-09 DIAGNOSIS — R11.2 NAUSEA AND VOMITING, UNSPECIFIED VOMITING TYPE: ICD-10-CM

## 2024-02-09 LAB
ALBUMIN SERPL-MCNC: 3.9 G/DL (ref 3.4–5)
ALBUMIN SERPL-MCNC: 4.1 G/DL (ref 3.4–5)
ALBUMIN/GLOB SERPL: 1.3 {RATIO} (ref 1.1–2.2)
ALBUMIN/GLOB SERPL: 2.2 {RATIO} (ref 1.1–2.2)
ALP SERPL-CCNC: 78 U/L (ref 40–129)
ALP SERPL-CCNC: 80 U/L (ref 40–129)
ALT SERPL-CCNC: 14 U/L (ref 10–40)
ALT SERPL-CCNC: 7 U/L (ref 10–40)
ANION GAP SERPL CALCULATED.3IONS-SCNC: 11 MMOL/L (ref 3–16)
ANION GAP SERPL CALCULATED.3IONS-SCNC: 12 MMOL/L (ref 3–16)
AST SERPL-CCNC: 23 U/L (ref 15–37)
AST SERPL-CCNC: 25 U/L (ref 15–37)
BASOPHILS # BLD: 0 K/UL (ref 0–0.2)
BASOPHILS # BLD: 0 K/UL (ref 0–0.2)
BASOPHILS NFR BLD: 0.2 %
BASOPHILS NFR BLD: 0.4 %
BILIRUB SERPL-MCNC: <0.2 MG/DL (ref 0–1)
BILIRUB SERPL-MCNC: <0.2 MG/DL (ref 0–1)
BILIRUB UR QL STRIP.AUTO: NEGATIVE
BUN SERPL-MCNC: 11 MG/DL (ref 7–20)
BUN SERPL-MCNC: 12 MG/DL (ref 7–20)
CALCIUM SERPL-MCNC: 10.3 MG/DL (ref 8.3–10.6)
CALCIUM SERPL-MCNC: 9.8 MG/DL (ref 8.3–10.6)
CHLORIDE SERPL-SCNC: 104 MMOL/L (ref 99–110)
CHLORIDE SERPL-SCNC: 107 MMOL/L (ref 99–110)
CLARITY UR: CLEAR
CO2 SERPL-SCNC: 23 MMOL/L (ref 21–32)
CO2 SERPL-SCNC: 25 MMOL/L (ref 21–32)
COLOR UR: YELLOW
CREAT SERPL-MCNC: 0.7 MG/DL (ref 0.6–1.2)
CREAT SERPL-MCNC: 0.8 MG/DL (ref 0.6–1.2)
DEPRECATED RDW RBC AUTO: 13.3 % (ref 12.4–15.4)
DEPRECATED RDW RBC AUTO: 13.6 % (ref 12.4–15.4)
EOSINOPHIL # BLD: 0.1 K/UL (ref 0–0.6)
EOSINOPHIL # BLD: 0.3 K/UL (ref 0–0.6)
EOSINOPHIL NFR BLD: 1.2 %
EOSINOPHIL NFR BLD: 5.4 %
FERRITIN SERPL IA-MCNC: 40.1 NG/ML (ref 15–150)
FLUAV RNA UPPER RESP QL NAA+PROBE: NEGATIVE
FLUBV AG NPH QL: NEGATIVE
GFR SERPLBLD CREATININE-BSD FMLA CKD-EPI: >60 ML/MIN/{1.73_M2}
GFR SERPLBLD CREATININE-BSD FMLA CKD-EPI: >60 ML/MIN/{1.73_M2}
GLUCOSE SERPL-MCNC: 143 MG/DL (ref 70–99)
GLUCOSE SERPL-MCNC: 155 MG/DL (ref 70–99)
GLUCOSE UR STRIP.AUTO-MCNC: NEGATIVE MG/DL
HCT VFR BLD AUTO: 36.9 % (ref 36–48)
HCT VFR BLD AUTO: 38.4 % (ref 36–48)
HGB BLD-MCNC: 12 G/DL (ref 12–16)
HGB BLD-MCNC: 12.4 G/DL (ref 12–16)
HGB UR QL STRIP.AUTO: NEGATIVE
KETONES UR STRIP.AUTO-MCNC: NEGATIVE MG/DL
LEUKOCYTE ESTERASE UR QL STRIP.AUTO: NEGATIVE
LIPASE SERPL-CCNC: 52 U/L (ref 13–60)
LYMPHOCYTES # BLD: 2 K/UL (ref 1–5.1)
LYMPHOCYTES # BLD: 2.5 K/UL (ref 1–5.1)
LYMPHOCYTES NFR BLD: 29.9 %
LYMPHOCYTES NFR BLD: 45.7 %
MCH RBC QN AUTO: 27.8 PG (ref 26–34)
MCH RBC QN AUTO: 28.2 PG (ref 26–34)
MCHC RBC AUTO-ENTMCNC: 32.2 G/DL (ref 31–36)
MCHC RBC AUTO-ENTMCNC: 32.6 G/DL (ref 31–36)
MCV RBC AUTO: 86.4 FL (ref 80–100)
MCV RBC AUTO: 86.7 FL (ref 80–100)
MONOCYTES # BLD: 0.3 K/UL (ref 0–1.3)
MONOCYTES # BLD: 0.4 K/UL (ref 0–1.3)
MONOCYTES NFR BLD: 4 %
MONOCYTES NFR BLD: 6.7 %
NEUTROPHILS # BLD: 2.2 K/UL (ref 1.7–7.7)
NEUTROPHILS # BLD: 4.4 K/UL (ref 1.7–7.7)
NEUTROPHILS NFR BLD: 41.8 %
NEUTROPHILS NFR BLD: 64.7 %
NITRITE UR QL STRIP.AUTO: NEGATIVE
PH UR STRIP.AUTO: 6 [PH] (ref 5–8)
PLATELET # BLD AUTO: 264 K/UL (ref 135–450)
PLATELET # BLD AUTO: 279 K/UL (ref 135–450)
PMV BLD AUTO: 8 FL (ref 5–10.5)
PMV BLD AUTO: 8.8 FL (ref 5–10.5)
POTASSIUM SERPL-SCNC: 4.4 MMOL/L (ref 3.5–5.1)
POTASSIUM SERPL-SCNC: 4.5 MMOL/L (ref 3.5–5.1)
PROLACTIN SERPL IA-MCNC: 13.7 NG/ML
PROT SERPL-MCNC: 6 G/DL (ref 6.4–8.2)
PROT SERPL-MCNC: 6.8 G/DL (ref 6.4–8.2)
PROT UR STRIP.AUTO-MCNC: NEGATIVE MG/DL
RBC # BLD AUTO: 4.25 M/UL (ref 4–5.2)
RBC # BLD AUTO: 4.45 M/UL (ref 4–5.2)
SARS-COV-2 RDRP RESP QL NAA+PROBE: NOT DETECTED
SODIUM SERPL-SCNC: 141 MMOL/L (ref 136–145)
SODIUM SERPL-SCNC: 141 MMOL/L (ref 136–145)
SP GR UR STRIP.AUTO: 1.02 (ref 1–1.03)
T4 FREE SERPL-MCNC: 1 NG/DL (ref 0.9–1.8)
TSH SERPL DL<=0.005 MIU/L-ACNC: 5.41 UIU/ML (ref 0.27–4.2)
UA COMPLETE W REFLEX CULTURE PNL UR: NORMAL
UA DIPSTICK W REFLEX MICRO PNL UR: NORMAL
URN SPEC COLLECT METH UR: NORMAL
UROBILINOGEN UR STRIP-ACNC: 0.2 E.U./DL
WBC # BLD AUTO: 5.4 K/UL (ref 4–11)
WBC # BLD AUTO: 6.7 K/UL (ref 4–11)

## 2024-02-09 PROCEDURE — 6360000004 HC RX CONTRAST MEDICATION: Performed by: EMERGENCY MEDICINE

## 2024-02-09 PROCEDURE — 83690 ASSAY OF LIPASE: CPT

## 2024-02-09 PROCEDURE — 80053 COMPREHEN METABOLIC PANEL: CPT

## 2024-02-09 PROCEDURE — 87635 SARS-COV-2 COVID-19 AMP PRB: CPT

## 2024-02-09 PROCEDURE — 2580000003 HC RX 258: Performed by: EMERGENCY MEDICINE

## 2024-02-09 PROCEDURE — 6370000000 HC RX 637 (ALT 250 FOR IP): Performed by: EMERGENCY MEDICINE

## 2024-02-09 PROCEDURE — 81003 URINALYSIS AUTO W/O SCOPE: CPT

## 2024-02-09 PROCEDURE — 74177 CT ABD & PELVIS W/CONTRAST: CPT

## 2024-02-09 PROCEDURE — 36415 COLL VENOUS BLD VENIPUNCTURE: CPT

## 2024-02-09 PROCEDURE — 87804 INFLUENZA ASSAY W/OPTIC: CPT

## 2024-02-09 PROCEDURE — 85025 COMPLETE CBC W/AUTO DIFF WBC: CPT

## 2024-02-09 PROCEDURE — 99285 EMERGENCY DEPT VISIT HI MDM: CPT | Performed by: EMERGENCY MEDICINE

## 2024-02-09 PROCEDURE — 6360000002 HC RX W HCPCS: Performed by: EMERGENCY MEDICINE

## 2024-02-09 PROCEDURE — 96374 THER/PROPH/DIAG INJ IV PUSH: CPT | Performed by: EMERGENCY MEDICINE

## 2024-02-09 RX ORDER — ONDANSETRON 2 MG/ML
4 INJECTION INTRAMUSCULAR; INTRAVENOUS ONCE
Status: COMPLETED | OUTPATIENT
Start: 2024-02-09 | End: 2024-02-09

## 2024-02-09 RX ORDER — ONDANSETRON 4 MG/1
4 TABLET, ORALLY DISINTEGRATING ORAL 3 TIMES DAILY PRN
Qty: 21 TABLET | Refills: 0 | Status: SHIPPED | OUTPATIENT
Start: 2024-02-09

## 2024-02-09 RX ORDER — DICYCLOMINE HYDROCHLORIDE 10 MG/1
10 CAPSULE ORAL EVERY 6 HOURS PRN
Qty: 20 CAPSULE | Refills: 0 | Status: SHIPPED | OUTPATIENT
Start: 2024-02-09

## 2024-02-09 RX ORDER — 0.9 % SODIUM CHLORIDE 0.9 %
1000 INTRAVENOUS SOLUTION INTRAVENOUS ONCE
Status: COMPLETED | OUTPATIENT
Start: 2024-02-09 | End: 2024-02-09

## 2024-02-09 RX ORDER — ACETAMINOPHEN 325 MG/1
650 TABLET ORAL ONCE
Status: COMPLETED | OUTPATIENT
Start: 2024-02-09 | End: 2024-02-09

## 2024-02-09 RX ADMIN — IOMEPROL INJECTION 100 ML: 714 INJECTION, SOLUTION INTRAVASCULAR at 14:36

## 2024-02-09 RX ADMIN — ACETAMINOPHEN 325MG 650 MG: 325 TABLET ORAL at 13:50

## 2024-02-09 RX ADMIN — SODIUM CHLORIDE 1000 ML: 9 INJECTION, SOLUTION INTRAVENOUS at 13:50

## 2024-02-09 RX ADMIN — ONDANSETRON 4 MG: 2 INJECTION INTRAMUSCULAR; INTRAVENOUS at 13:46

## 2024-02-09 NOTE — DISCHARGE INSTRUCTIONS
Call today or tomorrow to follow up with Lavell Finnegan MD  in 4-5 days.    Avoid eating any spicy food and milk type products, drinks that have caffeine in it.  Use ibuprofen or Tylenol (unless prescribed medications that have Tylenol in it) for pain.  You can take over the counter Ibuprofen (advil) tablets (4 every 8 hours or 3 every 6 hours or 2 every 4 hours)    Return to the Emergency Department within 8 - 12 hours if you have any of the following: worsening of pain in your abdomen, no food sounds good to you, you continue to vomit, you develop a fever, pain goes to your back, or you have pain in the abdomen when going over a bump in the car or when you jump up and down, or you develop vaginal bleeding or discharge, or for any other concerns you may have.

## 2024-02-09 NOTE — ED PROVIDER NOTES
[facility]     EMERGENCY DEPARTMENT ENCOUNTER            Pt Name: John Wood   MRN: [id number]   Birthdate 1955   Date of evaluation: 2/9/2024   Provider: Bryson Rasheed MD   PCP: Lavell Finnegan MD   Note Started: 12:58 PM EST 2/9/24          CHIEF COMPLAINT     Chief Complaint   Patient presents with    Abdominal Pain     Patient presents to ED complaining of shooting abdominal pain and nausea with emesis which started around 0800 this AM. Denies diarrhea, denies genitourinary symptoms.           HISTORY OF PRESENT ILLNESS:   History from : Patient   Limitations to history : None     John Wood is a 68 y.o. female who presents complaining of nausea and vomiting as well as abdominal pain that started this morning around 8 AM.  Patient states that she went and had blood drawn around 6:30 AM and was feeling well at that time.  States when she got home she started to feel nauseous and subsequently had 5 episodes of emesis.  Denies any bilious emesis or hematemesis.  States she is now only retching.  States she had some epigastric abdominal pain and is also having intermittent stabbing pain generally throughout her abdomen.  States she did have some soft stool but denies any diarrhea.  Denies any melanotic stool or bright red blood per rectum.  Denies any fevers.  States she has had some chills and general body aches.  States he is having normal urinary habits.  Denies any known sick contacts.  No recent travel.    Nursing Notes were all reviewed and agreed with, or any disagreements were addressed in the HPI.     REVIEW OF SYSTEMS :    Positives and Pertinent negatives as per HPI.      MEDICAL HISTORY   has a past medical history of Anesthesia complication, Arthritis, Cardiomyopathy (HCC), COVID-19, Diabetes, GERD (gastroesophageal reflux disease), Hyperlipidemia, Hypertension, Lumbar disc disease, Prolonged emergence from general anesthesia, Sleep apnea, Unspecified

## 2024-02-09 NOTE — ED NOTES
Patient ambulatory from ED. AVS provided and discussed with patient. All questions answered. Patient verbalizes understanding of discharge instructions. Respirations even and easy. No obvious distress at this time.

## 2024-02-15 ENCOUNTER — TELEPHONE (OUTPATIENT)
Dept: FAMILY MEDICINE CLINIC | Age: 69
End: 2024-02-15

## 2024-03-11 ENCOUNTER — OFFICE VISIT (OUTPATIENT)
Dept: PULMONOLOGY | Age: 69
End: 2024-03-11
Payer: MEDICARE

## 2024-03-11 VITALS
DIASTOLIC BLOOD PRESSURE: 74 MMHG | OXYGEN SATURATION: 90 % | RESPIRATION RATE: 18 BRPM | TEMPERATURE: 96.8 F | HEIGHT: 64 IN | BODY MASS INDEX: 31.86 KG/M2 | SYSTOLIC BLOOD PRESSURE: 118 MMHG | HEART RATE: 73 BPM | WEIGHT: 186.6 LBS

## 2024-03-11 DIAGNOSIS — K21.9 GASTROESOPHAGEAL REFLUX DISEASE, UNSPECIFIED WHETHER ESOPHAGITIS PRESENT: ICD-10-CM

## 2024-03-11 DIAGNOSIS — J31.0 CHRONIC RHINITIS: Primary | ICD-10-CM

## 2024-03-11 PROCEDURE — G8399 PT W/DXA RESULTS DOCUMENT: HCPCS | Performed by: INTERNAL MEDICINE

## 2024-03-11 PROCEDURE — G8427 DOCREV CUR MEDS BY ELIG CLIN: HCPCS | Performed by: INTERNAL MEDICINE

## 2024-03-11 PROCEDURE — 99213 OFFICE O/P EST LOW 20 MIN: CPT | Performed by: INTERNAL MEDICINE

## 2024-03-11 PROCEDURE — 3017F COLORECTAL CA SCREEN DOC REV: CPT | Performed by: INTERNAL MEDICINE

## 2024-03-11 PROCEDURE — 1036F TOBACCO NON-USER: CPT | Performed by: INTERNAL MEDICINE

## 2024-03-11 PROCEDURE — 3078F DIAST BP <80 MM HG: CPT | Performed by: INTERNAL MEDICINE

## 2024-03-11 PROCEDURE — G8417 CALC BMI ABV UP PARAM F/U: HCPCS | Performed by: INTERNAL MEDICINE

## 2024-03-11 PROCEDURE — 1090F PRES/ABSN URINE INCON ASSESS: CPT | Performed by: INTERNAL MEDICINE

## 2024-03-11 PROCEDURE — 3074F SYST BP LT 130 MM HG: CPT | Performed by: INTERNAL MEDICINE

## 2024-03-11 PROCEDURE — G8484 FLU IMMUNIZE NO ADMIN: HCPCS | Performed by: INTERNAL MEDICINE

## 2024-03-11 PROCEDURE — 1124F ACP DISCUSS-NO DSCNMKR DOCD: CPT | Performed by: INTERNAL MEDICINE

## 2024-03-11 RX ORDER — FLUTICASONE PROPIONATE 50 MCG
2 SPRAY, SUSPENSION (ML) NASAL DAILY
Qty: 3 EACH | Refills: 5 | Status: SHIPPED | OUTPATIENT
Start: 2024-03-11

## 2024-03-11 NOTE — PROGRESS NOTES
Pulmonary Progress note             REASON FOR CONSULTATION:  Chief Complaint   Patient presents with    Follow-up    Shortness of Breath        Consult at request of Lavell Finnegan MD     PCP: Lavell Finnegan MD        Assessment and Plan:   Diagnosis Orders   1. Chronic rhinitis  fluticasone (FLONASE) 50 MCG/ACT nasal spray      2. Gastroesophageal reflux disease, unspecified whether esophagitis present                Plan:  Flonase for chronic rhinitis and postnasal drainage.  Continue strict antireflux precautions and PPI.  Advised to exercise regularly          HISTORY OF PRESENT ILLNESS: John Wood is very pleasant 68 y.o. year old lady with medical history stated below significant for former smoker quit long time ago, history of diabetes mellitus type 2, hypertension, obstructive sleep apnea on BiPAP 15/11 therapy compliant,  Was referred to us for shortness of breath with any exertion, associated with some chest tightness that increases with breathing no associated wheezing chronic cough or sputum production, no prior history of asthma or COPD.    She had a CT of the chest done 9/9/2022 that showed no acute PE, minimal subsegmental atelectasis medially at the bases.  There are mild calcifications in the coronaries.  Most recent echocardiogram 2019 with normal ejection fraction and normal diastolic function as well as valvular evaluation    Stress test done May 2021 was negative.    She has a history of GERD currently on PPIs.    PFT with no active airflow obstruction.  There is mild reduction in DLCO.      3/11/2024  Cough is better  Still has some active GERD      Past Medical History:   Diagnosis Date    Anesthesia complication     \" shaking\"    Arthritis     Cardiomyopathy (HCC)     COVID-19     1/7/2022    Diabetes     GERD (gastroesophageal reflux disease)     Hyperlipidemia     Hypertension     Lumbar

## 2024-03-21 ENCOUNTER — OFFICE VISIT (OUTPATIENT)
Dept: FAMILY MEDICINE CLINIC | Age: 69
End: 2024-03-21
Payer: MEDICARE

## 2024-03-21 VITALS
SYSTOLIC BLOOD PRESSURE: 128 MMHG | HEART RATE: 82 BPM | HEIGHT: 64 IN | WEIGHT: 186 LBS | BODY MASS INDEX: 31.76 KG/M2 | DIASTOLIC BLOOD PRESSURE: 76 MMHG | RESPIRATION RATE: 16 BRPM | OXYGEN SATURATION: 98 %

## 2024-03-21 DIAGNOSIS — E61.1 IRON DEFICIENCY: ICD-10-CM

## 2024-03-21 DIAGNOSIS — G47.62 NOCTURNAL LEG CRAMPS: ICD-10-CM

## 2024-03-21 DIAGNOSIS — G81.91 RIGHT HEMIPARESIS (HCC): ICD-10-CM

## 2024-03-21 DIAGNOSIS — F39 MOOD DISORDER (HCC): ICD-10-CM

## 2024-03-21 DIAGNOSIS — I70.0 AORTIC ATHEROSCLEROSIS (HCC): ICD-10-CM

## 2024-03-21 DIAGNOSIS — Z79.4 TYPE 2 DIABETES MELLITUS WITH DIABETIC POLYNEUROPATHY, WITH LONG-TERM CURRENT USE OF INSULIN (HCC): Primary | ICD-10-CM

## 2024-03-21 DIAGNOSIS — E11.42 TYPE 2 DIABETES MELLITUS WITH DIABETIC POLYNEUROPATHY, WITH LONG-TERM CURRENT USE OF INSULIN (HCC): Primary | ICD-10-CM

## 2024-03-21 PROBLEM — R06.02 SOB (SHORTNESS OF BREATH): Status: RESOLVED | Noted: 2022-07-11 | Resolved: 2024-03-21

## 2024-03-21 PROBLEM — R07.89 ATYPICAL CHEST PAIN: Status: RESOLVED | Noted: 2021-05-11 | Resolved: 2024-03-21

## 2024-03-21 LAB
ALBUMIN SERPL-MCNC: 4.3 G/DL (ref 3.4–5)
ALBUMIN/GLOB SERPL: 1.9 {RATIO} (ref 1.1–2.2)
ALP SERPL-CCNC: 69 U/L (ref 40–129)
ALT SERPL-CCNC: 20 U/L (ref 10–40)
ANION GAP SERPL CALCULATED.3IONS-SCNC: 10 MMOL/L (ref 3–16)
AST SERPL-CCNC: 23 U/L (ref 15–37)
BILIRUB SERPL-MCNC: <0.2 MG/DL (ref 0–1)
BUN SERPL-MCNC: 17 MG/DL (ref 7–20)
CALCIUM SERPL-MCNC: 10.5 MG/DL (ref 8.3–10.6)
CHLORIDE SERPL-SCNC: 105 MMOL/L (ref 99–110)
CO2 SERPL-SCNC: 26 MMOL/L (ref 21–32)
CREAT SERPL-MCNC: 0.8 MG/DL (ref 0.6–1.2)
FERRITIN SERPL IA-MCNC: 40.2 NG/ML (ref 15–150)
GFR SERPLBLD CREATININE-BSD FMLA CKD-EPI: >60 ML/MIN/{1.73_M2}
GLUCOSE SERPL-MCNC: 94 MG/DL (ref 70–99)
HBA1C MFR BLD: 7.5 %
IRON SATN MFR SERPL: 21 % (ref 15–50)
IRON SERPL-MCNC: 83 UG/DL (ref 37–145)
MAGNESIUM SERPL-MCNC: 1.9 MG/DL (ref 1.8–2.4)
POTASSIUM SERPL-SCNC: 5 MMOL/L (ref 3.5–5.1)
PROLACTIN SERPL IA-MCNC: 5.8 NG/ML
PROT SERPL-MCNC: 6.6 G/DL (ref 6.4–8.2)
SODIUM SERPL-SCNC: 141 MMOL/L (ref 136–145)
TIBC SERPL-MCNC: 389 UG/DL (ref 260–445)

## 2024-03-21 PROCEDURE — 3017F COLORECTAL CA SCREEN DOC REV: CPT | Performed by: FAMILY MEDICINE

## 2024-03-21 PROCEDURE — 3051F HG A1C>EQUAL 7.0%<8.0%: CPT | Performed by: FAMILY MEDICINE

## 2024-03-21 PROCEDURE — G8484 FLU IMMUNIZE NO ADMIN: HCPCS | Performed by: FAMILY MEDICINE

## 2024-03-21 PROCEDURE — G8427 DOCREV CUR MEDS BY ELIG CLIN: HCPCS | Performed by: FAMILY MEDICINE

## 2024-03-21 PROCEDURE — 83036 HEMOGLOBIN GLYCOSYLATED A1C: CPT | Performed by: FAMILY MEDICINE

## 2024-03-21 PROCEDURE — 1090F PRES/ABSN URINE INCON ASSESS: CPT | Performed by: FAMILY MEDICINE

## 2024-03-21 PROCEDURE — 3074F SYST BP LT 130 MM HG: CPT | Performed by: FAMILY MEDICINE

## 2024-03-21 PROCEDURE — G8417 CALC BMI ABV UP PARAM F/U: HCPCS | Performed by: FAMILY MEDICINE

## 2024-03-21 PROCEDURE — 1124F ACP DISCUSS-NO DSCNMKR DOCD: CPT | Performed by: FAMILY MEDICINE

## 2024-03-21 PROCEDURE — 99214 OFFICE O/P EST MOD 30 MIN: CPT | Performed by: FAMILY MEDICINE

## 2024-03-21 PROCEDURE — 2022F DILAT RTA XM EVC RTNOPTHY: CPT | Performed by: FAMILY MEDICINE

## 2024-03-21 PROCEDURE — 3078F DIAST BP <80 MM HG: CPT | Performed by: FAMILY MEDICINE

## 2024-03-21 PROCEDURE — 1036F TOBACCO NON-USER: CPT | Performed by: FAMILY MEDICINE

## 2024-03-21 PROCEDURE — G8399 PT W/DXA RESULTS DOCUMENT: HCPCS | Performed by: FAMILY MEDICINE

## 2024-03-21 RX ORDER — ILOPERIDONE 1 MG/1
1 TABLET ORAL NIGHTLY
Qty: 90 TABLET | Refills: 0 | Status: SHIPPED | OUTPATIENT
Start: 2024-03-21

## 2024-03-21 SDOH — ECONOMIC STABILITY: INCOME INSECURITY: HOW HARD IS IT FOR YOU TO PAY FOR THE VERY BASICS LIKE FOOD, HOUSING, MEDICAL CARE, AND HEATING?: NOT HARD AT ALL

## 2024-03-21 SDOH — ECONOMIC STABILITY: FOOD INSECURITY: WITHIN THE PAST 12 MONTHS, YOU WORRIED THAT YOUR FOOD WOULD RUN OUT BEFORE YOU GOT MONEY TO BUY MORE.: NEVER TRUE

## 2024-03-21 SDOH — ECONOMIC STABILITY: FOOD INSECURITY: WITHIN THE PAST 12 MONTHS, THE FOOD YOU BOUGHT JUST DIDN'T LAST AND YOU DIDN'T HAVE MONEY TO GET MORE.: NEVER TRUE

## 2024-03-21 NOTE — PROGRESS NOTES
3/21/2024    This is a 68 y.o. female   Chief Complaint   Patient presents with    Diabetes     Pt had injection in her hip and knee and it caused her bp and sugars to go crazy      HPI      Diabetes  Last A1c was 7.8  A1c today is 7.5  On metformin and takes 12 units of insulin at night  Trulicity 0.75mg once per week. Manageable GI side effects  Notes recent steroid injections for orthopedic issues are impacting her blood sugars (had some in the 300s so increased her Lantus for a short period of time)    HTN  BP Readings from Last 3 Encounters:   03/21/24 128/76   03/11/24 118/74   02/09/24 133/71     HLD  No longer taking pravastatin. Gets myalgias   Lab Results   Component Value Date    LDLCALC 104 (H) 09/10/2022     Notes significant leg cramps mostly at night. Going on for the past 2 weeks or so. Sometimes keeping her awake.     Review of Systems     Current Outpatient Medications   Medication Sig Dispense Refill    fluticasone (FLONASE) 50 MCG/ACT nasal spray 2 sprays by Each Nostril route daily Shake liquid 3 each 5    ondansetron (ZOFRAN-ODT) 4 MG disintegrating tablet Take 1 tablet by mouth 3 times daily as needed for Nausea or Vomiting 21 tablet 0    dicyclomine (BENTYL) 10 MG capsule Take 1 capsule by mouth every 6 hours as needed (abdominal cramping) 20 capsule 0    methocarbamol (ROBAXIN) 500 MG tablet       DULoxetine (CYMBALTA) 60 MG extended release capsule Take 1 capsule by mouth daily 90 capsule 1    traZODone (DESYREL) 100 MG tablet Take 1 tablet by mouth nightly 90 tablet 1    omeprazole (PRILOSEC) 40 MG delayed release capsule TAKE 1 CAPSULE BY MOUTH IN THE MORNING AND AT BEDTIME 160 capsule 5    FANAPT 1 MG TABS tablet TAKE 1 TABLET BY MOUTH EVERY NIGHT 90 tablet 0    valsartan (DIOVAN) 160 MG tablet TAKE 1 TABLET BY MOUTH DAILY 90 tablet 1    cetirizine (ZYRTEC) 10 MG tablet Take 1 tablet by mouth daily 30 tablet 11    insulin glargine (LANTUS SOLOSTAR) 100 UNIT/ML injection pen 8 units subq

## 2024-03-22 RX ORDER — MAGNESIUM OXIDE 400 MG/1
400 TABLET ORAL NIGHTLY
Qty: 30 TABLET | Refills: 5 | Status: SHIPPED | OUTPATIENT
Start: 2024-03-22

## 2024-03-31 ENCOUNTER — APPOINTMENT (OUTPATIENT)
Dept: GENERAL RADIOLOGY | Age: 69
DRG: 093 | End: 2024-03-31
Payer: MEDICARE

## 2024-03-31 ENCOUNTER — HOSPITAL ENCOUNTER (INPATIENT)
Age: 69
LOS: 1 days | Discharge: HOME | DRG: 093 | End: 2024-04-02
Attending: EMERGENCY MEDICINE | Admitting: INTERNAL MEDICINE
Payer: MEDICARE

## 2024-03-31 ENCOUNTER — APPOINTMENT (OUTPATIENT)
Dept: CT IMAGING | Age: 69
DRG: 093 | End: 2024-03-31
Payer: MEDICARE

## 2024-03-31 DIAGNOSIS — R41.82 ALTERED MENTAL STATUS, UNSPECIFIED ALTERED MENTAL STATUS TYPE: Primary | ICD-10-CM

## 2024-03-31 DIAGNOSIS — Z71.89 GOALS OF CARE, COUNSELING/DISCUSSION: ICD-10-CM

## 2024-03-31 DIAGNOSIS — R78.0 ELEVATED BLOOD ALCOHOL LEVEL, BLOOD ALCOHOL LEVEL NOT SPECIFIED: ICD-10-CM

## 2024-03-31 LAB
ALBUMIN SERPL-MCNC: 3.9 G/DL (ref 3.4–5)
ALBUMIN/GLOB SERPL: 1.5 {RATIO} (ref 1.1–2.2)
ALP SERPL-CCNC: 69 U/L (ref 40–129)
ALT SERPL-CCNC: 22 U/L (ref 10–40)
AMPHETAMINES UR QL SCN>1000 NG/ML: NORMAL
ANION GAP SERPL CALCULATED.3IONS-SCNC: 12 MMOL/L (ref 3–16)
AST SERPL-CCNC: 34 U/L (ref 15–37)
BARBITURATES UR QL SCN>200 NG/ML: NORMAL
BASOPHILS # BLD: 0 K/UL (ref 0–0.2)
BASOPHILS NFR BLD: 0.5 %
BENZODIAZ UR QL SCN>200 NG/ML: NORMAL
BILIRUB SERPL-MCNC: <0.2 MG/DL (ref 0–1)
BUN SERPL-MCNC: 15 MG/DL (ref 7–20)
CALCIUM SERPL-MCNC: 10.2 MG/DL (ref 8.3–10.6)
CANNABINOIDS UR QL SCN>50 NG/ML: NORMAL
CHLORIDE SERPL-SCNC: 105 MMOL/L (ref 99–110)
CO2 SERPL-SCNC: 23 MMOL/L (ref 21–32)
COCAINE UR QL SCN: NORMAL
CREAT SERPL-MCNC: 0.9 MG/DL (ref 0.6–1.2)
DEPRECATED RDW RBC AUTO: 13.2 % (ref 12.4–15.4)
DRUG SCREEN COMMENT UR-IMP: NORMAL
EOSINOPHIL # BLD: 0.3 K/UL (ref 0–0.6)
EOSINOPHIL NFR BLD: 4.7 %
ETHANOLAMINE SERPL-MCNC: 16 MG/DL (ref 0–0.08)
FENTANYL SCREEN, URINE: NORMAL
FLUAV RNA UPPER RESP QL NAA+PROBE: NEGATIVE
FLUBV AG NPH QL: NEGATIVE
GFR SERPLBLD CREATININE-BSD FMLA CKD-EPI: 69 ML/MIN/{1.73_M2}
GLUCOSE BLD-MCNC: 102 MG/DL (ref 70–99)
GLUCOSE SERPL-MCNC: 103 MG/DL (ref 70–99)
HCT VFR BLD AUTO: 35.9 % (ref 36–48)
HGB BLD-MCNC: 11.9 G/DL (ref 12–16)
LYMPHOCYTES # BLD: 2.5 K/UL (ref 1–5.1)
LYMPHOCYTES NFR BLD: 39.9 %
MCH RBC QN AUTO: 28.3 PG (ref 26–34)
MCHC RBC AUTO-ENTMCNC: 33.1 G/DL (ref 31–36)
MCV RBC AUTO: 85.4 FL (ref 80–100)
METHADONE UR QL SCN>300 NG/ML: NORMAL
MONOCYTES # BLD: 0.5 K/UL (ref 0–1.3)
MONOCYTES NFR BLD: 7.9 %
NEUTROPHILS # BLD: 2.9 K/UL (ref 1.7–7.7)
NEUTROPHILS NFR BLD: 47 %
OPIATES UR QL SCN>300 NG/ML: NORMAL
OXYCODONE UR QL SCN: NORMAL
PCP UR QL SCN>25 NG/ML: NORMAL
PERFORMED ON: ABNORMAL
PH UR STRIP: 5 [PH]
PLATELET # BLD AUTO: 276 K/UL (ref 135–450)
PMV BLD AUTO: 8.2 FL (ref 5–10.5)
POTASSIUM SERPL-SCNC: 4.5 MMOL/L (ref 3.5–5.1)
PROT SERPL-MCNC: 6.5 G/DL (ref 6.4–8.2)
RBC # BLD AUTO: 4.2 M/UL (ref 4–5.2)
SARS-COV-2 RDRP RESP QL NAA+PROBE: NOT DETECTED
SODIUM SERPL-SCNC: 140 MMOL/L (ref 136–145)
WBC # BLD AUTO: 6.3 K/UL (ref 4–11)

## 2024-03-31 PROCEDURE — 70450 CT HEAD/BRAIN W/O DYE: CPT

## 2024-03-31 PROCEDURE — 71046 X-RAY EXAM CHEST 2 VIEWS: CPT

## 2024-03-31 PROCEDURE — 85025 COMPLETE CBC W/AUTO DIFF WBC: CPT

## 2024-03-31 PROCEDURE — 93005 ELECTROCARDIOGRAM TRACING: CPT | Performed by: STUDENT IN AN ORGANIZED HEALTH CARE EDUCATION/TRAINING PROGRAM

## 2024-03-31 PROCEDURE — 96374 THER/PROPH/DIAG INJ IV PUSH: CPT

## 2024-03-31 PROCEDURE — 96376 TX/PRO/DX INJ SAME DRUG ADON: CPT

## 2024-03-31 PROCEDURE — 36415 COLL VENOUS BLD VENIPUNCTURE: CPT

## 2024-03-31 PROCEDURE — 84145 PROCALCITONIN (PCT): CPT

## 2024-03-31 PROCEDURE — 80053 COMPREHEN METABOLIC PANEL: CPT

## 2024-03-31 PROCEDURE — 87804 INFLUENZA ASSAY W/OPTIC: CPT

## 2024-03-31 PROCEDURE — 96361 HYDRATE IV INFUSION ADD-ON: CPT

## 2024-03-31 PROCEDURE — 80307 DRUG TEST PRSMV CHEM ANLYZR: CPT

## 2024-03-31 PROCEDURE — 99285 EMERGENCY DEPT VISIT HI MDM: CPT

## 2024-03-31 PROCEDURE — 87635 SARS-COV-2 COVID-19 AMP PRB: CPT

## 2024-03-31 PROCEDURE — 82077 ASSAY SPEC XCP UR&BREATH IA: CPT

## 2024-03-31 PROCEDURE — 81003 URINALYSIS AUTO W/O SCOPE: CPT

## 2024-03-31 ASSESSMENT — PAIN - FUNCTIONAL ASSESSMENT: PAIN_FUNCTIONAL_ASSESSMENT: NONE - DENIES PAIN

## 2024-04-01 ENCOUNTER — APPOINTMENT (OUTPATIENT)
Dept: MRI IMAGING | Age: 69
DRG: 093 | End: 2024-04-01
Payer: MEDICARE

## 2024-04-01 PROBLEM — R41.82 AMS (ALTERED MENTAL STATUS): Status: ACTIVE | Noted: 2024-04-01

## 2024-04-01 PROBLEM — R41.0 CONFUSION WITH NON-FOCAL NEURO EXAM: Status: ACTIVE | Noted: 2024-04-01

## 2024-04-01 LAB
ALBUMIN SERPL-MCNC: 3.4 G/DL (ref 3.4–5)
ALBUMIN/GLOB SERPL: 1.5 {RATIO} (ref 1.1–2.2)
ALP SERPL-CCNC: 62 U/L (ref 40–129)
ALT SERPL-CCNC: 17 U/L (ref 10–40)
AMMONIA PLAS-SCNC: 26 UMOL/L (ref 11–51)
ANION GAP SERPL CALCULATED.3IONS-SCNC: 8 MMOL/L (ref 3–16)
APAP SERPL-MCNC: 7 UG/ML (ref 10–30)
AST SERPL-CCNC: 24 U/L (ref 15–37)
BASE EXCESS BLDV CALC-SCNC: 2.2 MMOL/L
BASOPHILS # BLD: 0 K/UL (ref 0–0.2)
BASOPHILS NFR BLD: 0.4 %
BILIRUB SERPL-MCNC: <0.2 MG/DL (ref 0–1)
BILIRUB UR QL STRIP.AUTO: NEGATIVE
BUN SERPL-MCNC: 17 MG/DL (ref 7–20)
CALCIUM SERPL-MCNC: 9.7 MG/DL (ref 8.3–10.6)
CHLORIDE SERPL-SCNC: 108 MMOL/L (ref 99–110)
CLARITY UR: CLEAR
CO2 BLDV-SCNC: 30 MMOL/L
CO2 SERPL-SCNC: 24 MMOL/L (ref 21–32)
COHGB MFR BLDV: 1.3 %
COLOR UR: YELLOW
CREAT SERPL-MCNC: 0.9 MG/DL (ref 0.6–1.2)
DEPRECATED RDW RBC AUTO: 13 % (ref 12.4–15.4)
EKG ATRIAL RATE: 76 BPM
EKG DIAGNOSIS: NORMAL
EKG P AXIS: 44 DEGREES
EKG P-R INTERVAL: 186 MS
EKG Q-T INTERVAL: 382 MS
EKG QRS DURATION: 72 MS
EKG QTC CALCULATION (BAZETT): 429 MS
EKG R AXIS: 32 DEGREES
EKG T AXIS: 33 DEGREES
EKG VENTRICULAR RATE: 76 BPM
EOSINOPHIL # BLD: 0.3 K/UL (ref 0–0.6)
EOSINOPHIL NFR BLD: 5.4 %
EST. AVERAGE GLUCOSE BLD GHB EST-MCNC: 157.1 MG/DL
GFR SERPLBLD CREATININE-BSD FMLA CKD-EPI: 69 ML/MIN/{1.73_M2}
GLUCOSE BLD-MCNC: 101 MG/DL (ref 70–99)
GLUCOSE BLD-MCNC: 121 MG/DL (ref 70–99)
GLUCOSE BLD-MCNC: 85 MG/DL (ref 70–99)
GLUCOSE BLD-MCNC: 92 MG/DL (ref 70–99)
GLUCOSE BLD-MCNC: 93 MG/DL (ref 70–99)
GLUCOSE SERPL-MCNC: 110 MG/DL (ref 70–99)
GLUCOSE UR STRIP.AUTO-MCNC: NEGATIVE MG/DL
HBA1C MFR BLD: 7.1 %
HCO3 BLDV-SCNC: 29 MMOL/L (ref 23–29)
HCT VFR BLD AUTO: 32.9 % (ref 36–48)
HGB BLD-MCNC: 11 G/DL (ref 12–16)
HGB UR QL STRIP.AUTO: NEGATIVE
KETONES UR STRIP.AUTO-MCNC: NEGATIVE MG/DL
LEUKOCYTE ESTERASE UR QL STRIP.AUTO: NEGATIVE
LYMPHOCYTES # BLD: 2.2 K/UL (ref 1–5.1)
LYMPHOCYTES NFR BLD: 42.7 %
MCH RBC QN AUTO: 28.4 PG (ref 26–34)
MCHC RBC AUTO-ENTMCNC: 33.4 G/DL (ref 31–36)
MCV RBC AUTO: 85 FL (ref 80–100)
METHGB MFR BLDV: 0.8 %
MONOCYTES # BLD: 0.4 K/UL (ref 0–1.3)
MONOCYTES NFR BLD: 7.7 %
NEUTROPHILS # BLD: 2.3 K/UL (ref 1.7–7.7)
NEUTROPHILS NFR BLD: 43.8 %
NITRITE UR QL STRIP.AUTO: NEGATIVE
O2 THERAPY: ABNORMAL
OSMOLALITY SERPL: 310 MOSM/KG (ref 280–301)
PCO2 BLDV: 50.4 MMHG (ref 40–50)
PERFORMED ON: ABNORMAL
PERFORMED ON: ABNORMAL
PERFORMED ON: NORMAL
PH BLDV: 7.36 [PH] (ref 7.35–7.45)
PH UR STRIP.AUTO: 6 [PH] (ref 5–8)
PLATELET # BLD AUTO: 219 K/UL (ref 135–450)
PMV BLD AUTO: 7.5 FL (ref 5–10.5)
PO2 BLDV: 44 MMHG
POTASSIUM SERPL-SCNC: 4.5 MMOL/L (ref 3.5–5.1)
PROCALCITONIN SERPL IA-MCNC: 0.03 NG/ML (ref 0–0.15)
PROCALCITONIN SERPL IA-MCNC: 0.04 NG/ML (ref 0–0.15)
PROT SERPL-MCNC: 5.6 G/DL (ref 6.4–8.2)
PROT UR STRIP.AUTO-MCNC: NEGATIVE MG/DL
RBC # BLD AUTO: 3.87 M/UL (ref 4–5.2)
SALICYLATES SERPL-MCNC: <0.3 MG/DL (ref 15–30)
SAO2 % BLDV: 77 %
SODIUM SERPL-SCNC: 140 MMOL/L (ref 136–145)
SP GR UR STRIP.AUTO: 1.01 (ref 1–1.03)
TROPONIN, HIGH SENSITIVITY: 9 NG/L (ref 0–14)
UA COMPLETE W REFLEX CULTURE PNL UR: NORMAL
UA DIPSTICK W REFLEX MICRO PNL UR: NORMAL
URN SPEC COLLECT METH UR: NORMAL
UROBILINOGEN UR STRIP-ACNC: 0.2 E.U./DL
WBC # BLD AUTO: 5.2 K/UL (ref 4–11)

## 2024-04-01 PROCEDURE — 82803 BLOOD GASES ANY COMBINATION: CPT

## 2024-04-01 PROCEDURE — 6370000000 HC RX 637 (ALT 250 FOR IP): Performed by: NURSE PRACTITIONER

## 2024-04-01 PROCEDURE — 2580000003 HC RX 258: Performed by: INTERNAL MEDICINE

## 2024-04-01 PROCEDURE — 6370000000 HC RX 637 (ALT 250 FOR IP)

## 2024-04-01 PROCEDURE — 6360000002 HC RX W HCPCS: Performed by: INTERNAL MEDICINE

## 2024-04-01 PROCEDURE — 2580000003 HC RX 258: Performed by: NURSE PRACTITIONER

## 2024-04-01 PROCEDURE — 97530 THERAPEUTIC ACTIVITIES: CPT

## 2024-04-01 PROCEDURE — 70553 MRI BRAIN STEM W/O & W/DYE: CPT

## 2024-04-01 PROCEDURE — 83036 HEMOGLOBIN GLYCOSYLATED A1C: CPT

## 2024-04-01 PROCEDURE — 80053 COMPREHEN METABOLIC PANEL: CPT

## 2024-04-01 PROCEDURE — 84484 ASSAY OF TROPONIN QUANT: CPT

## 2024-04-01 PROCEDURE — 80143 DRUG ASSAY ACETAMINOPHEN: CPT

## 2024-04-01 PROCEDURE — 97166 OT EVAL MOD COMPLEX 45 MIN: CPT

## 2024-04-01 PROCEDURE — 6370000000 HC RX 637 (ALT 250 FOR IP): Performed by: INTERNAL MEDICINE

## 2024-04-01 PROCEDURE — 2500000003 HC RX 250 WO HCPCS: Performed by: NURSE PRACTITIONER

## 2024-04-01 PROCEDURE — 93005 ELECTROCARDIOGRAM TRACING: CPT | Performed by: NURSE PRACTITIONER

## 2024-04-01 PROCEDURE — 80179 DRUG ASSAY SALICYLATE: CPT

## 2024-04-01 PROCEDURE — 82140 ASSAY OF AMMONIA: CPT

## 2024-04-01 PROCEDURE — 94760 N-INVAS EAR/PLS OXIMETRY 1: CPT

## 2024-04-01 PROCEDURE — 84145 PROCALCITONIN (PCT): CPT

## 2024-04-01 PROCEDURE — 6370000000 HC RX 637 (ALT 250 FOR IP): Performed by: EMERGENCY MEDICINE

## 2024-04-01 PROCEDURE — 2580000003 HC RX 258: Performed by: EMERGENCY MEDICINE

## 2024-04-01 PROCEDURE — 97162 PT EVAL MOD COMPLEX 30 MIN: CPT

## 2024-04-01 PROCEDURE — 6360000002 HC RX W HCPCS: Performed by: EMERGENCY MEDICINE

## 2024-04-01 PROCEDURE — 85025 COMPLETE CBC W/AUTO DIFF WBC: CPT

## 2024-04-01 PROCEDURE — 93010 ELECTROCARDIOGRAM REPORT: CPT | Performed by: INTERNAL MEDICINE

## 2024-04-01 PROCEDURE — A9577 INJ MULTIHANCE: HCPCS | Performed by: INTERNAL MEDICINE

## 2024-04-01 PROCEDURE — 36415 COLL VENOUS BLD VENIPUNCTURE: CPT

## 2024-04-01 PROCEDURE — 1200000000 HC SEMI PRIVATE

## 2024-04-01 PROCEDURE — 6360000004 HC RX CONTRAST MEDICATION: Performed by: INTERNAL MEDICINE

## 2024-04-01 PROCEDURE — 6360000002 HC RX W HCPCS

## 2024-04-01 PROCEDURE — 83930 ASSAY OF BLOOD OSMOLALITY: CPT

## 2024-04-01 RX ORDER — SODIUM CHLORIDE 0.9 % (FLUSH) 0.9 %
10 SYRINGE (ML) INJECTION EVERY 12 HOURS SCHEDULED
Status: DISCONTINUED | OUTPATIENT
Start: 2024-04-01 | End: 2024-04-02 | Stop reason: HOSPADM

## 2024-04-01 RX ORDER — HYDRALAZINE HYDROCHLORIDE 20 MG/ML
10 INJECTION INTRAMUSCULAR; INTRAVENOUS EVERY 8 HOURS PRN
Status: DISCONTINUED | OUTPATIENT
Start: 2024-04-01 | End: 2024-04-02

## 2024-04-01 RX ORDER — BUTALBITAL, ACETAMINOPHEN AND CAFFEINE 50; 325; 40 MG/1; MG/1; MG/1
2 TABLET ORAL ONCE
Status: COMPLETED | OUTPATIENT
Start: 2024-04-01 | End: 2024-04-01

## 2024-04-01 RX ORDER — MAGNESIUM SULFATE IN WATER 40 MG/ML
2000 INJECTION, SOLUTION INTRAVENOUS PRN
Status: DISCONTINUED | OUTPATIENT
Start: 2024-04-01 | End: 2024-04-02 | Stop reason: HOSPADM

## 2024-04-01 RX ORDER — NALOXONE HYDROCHLORIDE 0.4 MG/ML
0.4 INJECTION, SOLUTION INTRAMUSCULAR; INTRAVENOUS; SUBCUTANEOUS ONCE
Status: COMPLETED | OUTPATIENT
Start: 2024-04-01 | End: 2024-04-01

## 2024-04-01 RX ORDER — POTASSIUM CHLORIDE 7.45 MG/ML
10 INJECTION INTRAVENOUS PRN
Status: DISCONTINUED | OUTPATIENT
Start: 2024-04-01 | End: 2024-04-02 | Stop reason: HOSPADM

## 2024-04-01 RX ORDER — GLUCAGON 1 MG/ML
1 KIT INJECTION PRN
Status: DISCONTINUED | OUTPATIENT
Start: 2024-04-01 | End: 2024-04-02 | Stop reason: HOSPADM

## 2024-04-01 RX ORDER — DULAGLUTIDE 1.5 MG/.5ML
1.5 INJECTION, SOLUTION SUBCUTANEOUS WEEKLY
COMMUNITY

## 2024-04-01 RX ORDER — VALSARTAN 80 MG/1
160 TABLET ORAL DAILY
Status: DISCONTINUED | OUTPATIENT
Start: 2024-04-01 | End: 2024-04-02 | Stop reason: HOSPADM

## 2024-04-01 RX ORDER — DEXTROSE MONOHYDRATE 100 MG/ML
INJECTION, SOLUTION INTRAVENOUS CONTINUOUS PRN
Status: DISCONTINUED | OUTPATIENT
Start: 2024-04-01 | End: 2024-04-02 | Stop reason: HOSPADM

## 2024-04-01 RX ORDER — NICOTINE 21 MG/24HR
1 PATCH, TRANSDERMAL 24 HOURS TRANSDERMAL DAILY
Status: DISCONTINUED | OUTPATIENT
Start: 2024-04-01 | End: 2024-04-02 | Stop reason: HOSPADM

## 2024-04-01 RX ORDER — LANOLIN ALCOHOL/MO/W.PET/CERES
3 CREAM (GRAM) TOPICAL NIGHTLY
Status: DISCONTINUED | OUTPATIENT
Start: 2024-04-01 | End: 2024-04-02 | Stop reason: HOSPADM

## 2024-04-01 RX ORDER — KETOROLAC TROMETHAMINE 15 MG/ML
15 INJECTION, SOLUTION INTRAMUSCULAR; INTRAVENOUS ONCE
Status: COMPLETED | OUTPATIENT
Start: 2024-04-01 | End: 2024-04-01

## 2024-04-01 RX ORDER — ACETAMINOPHEN 650 MG/1
650 SUPPOSITORY RECTAL EVERY 6 HOURS PRN
Status: DISCONTINUED | OUTPATIENT
Start: 2024-04-01 | End: 2024-04-02 | Stop reason: HOSPADM

## 2024-04-01 RX ORDER — INSULIN LISPRO 100 [IU]/ML
0-4 INJECTION, SOLUTION INTRAVENOUS; SUBCUTANEOUS
Status: DISCONTINUED | OUTPATIENT
Start: 2024-04-01 | End: 2024-04-02 | Stop reason: HOSPADM

## 2024-04-01 RX ORDER — DULAGLUTIDE 0.75 MG/.5ML
0.75 INJECTION, SOLUTION SUBCUTANEOUS WEEKLY
COMMUNITY
End: 2024-04-01 | Stop reason: CLARIF

## 2024-04-01 RX ORDER — ENOXAPARIN SODIUM 100 MG/ML
40 INJECTION SUBCUTANEOUS DAILY
Status: DISCONTINUED | OUTPATIENT
Start: 2024-04-01 | End: 2024-04-02 | Stop reason: HOSPADM

## 2024-04-01 RX ORDER — 0.9 % SODIUM CHLORIDE 0.9 %
1000 INTRAVENOUS SOLUTION INTRAVENOUS ONCE
Status: COMPLETED | OUTPATIENT
Start: 2024-04-01 | End: 2024-04-01

## 2024-04-01 RX ORDER — POTASSIUM CHLORIDE 20 MEQ/1
40 TABLET, EXTENDED RELEASE ORAL PRN
Status: DISCONTINUED | OUTPATIENT
Start: 2024-04-01 | End: 2024-04-02 | Stop reason: HOSPADM

## 2024-04-01 RX ORDER — HYDROXYZINE PAMOATE 25 MG/1
50 CAPSULE ORAL ONCE
Status: COMPLETED | OUTPATIENT
Start: 2024-04-01 | End: 2024-04-01

## 2024-04-01 RX ORDER — SODIUM CHLORIDE, SODIUM LACTATE, POTASSIUM CHLORIDE, AND CALCIUM CHLORIDE .6; .31; .03; .02 G/100ML; G/100ML; G/100ML; G/100ML
1000 INJECTION, SOLUTION INTRAVENOUS ONCE
Status: DISCONTINUED | OUTPATIENT
Start: 2024-04-01 | End: 2024-04-01

## 2024-04-01 RX ORDER — PROMETHAZINE HYDROCHLORIDE 25 MG/1
12.5 TABLET ORAL EVERY 6 HOURS PRN
Status: DISCONTINUED | OUTPATIENT
Start: 2024-04-01 | End: 2024-04-02 | Stop reason: HOSPADM

## 2024-04-01 RX ORDER — ACETAMINOPHEN 325 MG/1
650 TABLET ORAL EVERY 6 HOURS PRN
Status: DISCONTINUED | OUTPATIENT
Start: 2024-04-01 | End: 2024-04-02 | Stop reason: HOSPADM

## 2024-04-01 RX ORDER — SODIUM CHLORIDE 0.9 % (FLUSH) 0.9 %
10 SYRINGE (ML) INJECTION PRN
Status: DISCONTINUED | OUTPATIENT
Start: 2024-04-01 | End: 2024-04-02 | Stop reason: HOSPADM

## 2024-04-01 RX ORDER — SODIUM CHLORIDE 9 MG/ML
INJECTION, SOLUTION INTRAVENOUS CONTINUOUS
Status: DISCONTINUED | OUTPATIENT
Start: 2024-04-01 | End: 2024-04-01

## 2024-04-01 RX ORDER — VALSARTAN 80 MG/1
160 TABLET ORAL DAILY
Status: DISCONTINUED | OUTPATIENT
Start: 2024-04-02 | End: 2024-04-01

## 2024-04-01 RX ORDER — ONDANSETRON 2 MG/ML
4 INJECTION INTRAMUSCULAR; INTRAVENOUS EVERY 6 HOURS PRN
Status: DISCONTINUED | OUTPATIENT
Start: 2024-04-01 | End: 2024-04-02 | Stop reason: HOSPADM

## 2024-04-01 RX ORDER — SODIUM CHLORIDE 9 MG/ML
INJECTION, SOLUTION INTRAVENOUS PRN
Status: DISCONTINUED | OUTPATIENT
Start: 2024-04-01 | End: 2024-04-02 | Stop reason: HOSPADM

## 2024-04-01 RX ORDER — INSULIN LISPRO 100 [IU]/ML
0-4 INJECTION, SOLUTION INTRAVENOUS; SUBCUTANEOUS NIGHTLY
Status: DISCONTINUED | OUTPATIENT
Start: 2024-04-01 | End: 2024-04-02 | Stop reason: HOSPADM

## 2024-04-01 RX ADMIN — SODIUM CHLORIDE: 9 INJECTION, SOLUTION INTRAVENOUS at 05:59

## 2024-04-01 RX ADMIN — HYDRALAZINE HYDROCHLORIDE 10 MG: 20 INJECTION, SOLUTION INTRAMUSCULAR; INTRAVENOUS at 18:53

## 2024-04-01 RX ADMIN — SODIUM CHLORIDE 1000 ML: 9 INJECTION, SOLUTION INTRAVENOUS at 01:07

## 2024-04-01 RX ADMIN — KETOROLAC TROMETHAMINE 15 MG: 15 INJECTION, SOLUTION INTRAMUSCULAR; INTRAVENOUS at 03:32

## 2024-04-01 RX ADMIN — BUTALBITAL, ACETAMINOPHEN AND CAFFEINE 2 TABLET: 325; 50; 40 TABLET ORAL at 03:30

## 2024-04-01 RX ADMIN — HYDROXYZINE PAMOATE 50 MG: 25 CAPSULE ORAL at 20:28

## 2024-04-01 RX ADMIN — SODIUM CHLORIDE, POTASSIUM CHLORIDE, SODIUM LACTATE AND CALCIUM CHLORIDE 1000 ML: 600; 310; 30; 20 INJECTION, SOLUTION INTRAVENOUS at 04:24

## 2024-04-01 RX ADMIN — Medication 3 MG: at 20:28

## 2024-04-01 RX ADMIN — ALUMINUM HYDROXIDE, MAGNESIUM HYDROXIDE, DIMETHICONE: 400; 400; 40 SUSPENSION ORAL at 20:58

## 2024-04-01 RX ADMIN — Medication 10 ML: at 08:34

## 2024-04-01 RX ADMIN — NALXONE HYDROCHLORIDE 0.4 MG: 0.4 INJECTION INTRAMUSCULAR; INTRAVENOUS; SUBCUTANEOUS at 01:08

## 2024-04-01 RX ADMIN — ONDANSETRON 4 MG: 2 INJECTION INTRAMUSCULAR; INTRAVENOUS at 19:29

## 2024-04-01 RX ADMIN — NALXONE HYDROCHLORIDE 0.4 MG: 0.4 INJECTION INTRAMUSCULAR; INTRAVENOUS; SUBCUTANEOUS at 02:37

## 2024-04-01 RX ADMIN — Medication 10 ML: at 20:28

## 2024-04-01 RX ADMIN — ENOXAPARIN SODIUM 40 MG: 100 INJECTION SUBCUTANEOUS at 08:33

## 2024-04-01 RX ADMIN — GADOBENATE DIMEGLUMINE 15 ML: 529 INJECTION, SOLUTION INTRAVENOUS at 18:15

## 2024-04-01 RX ADMIN — KETOROLAC TROMETHAMINE 15 MG: 15 INJECTION, SOLUTION INTRAMUSCULAR; INTRAVENOUS at 18:52

## 2024-04-01 RX ADMIN — SODIUM CHLORIDE, PRESERVATIVE FREE 20 MG: 5 INJECTION INTRAVENOUS at 20:59

## 2024-04-01 RX ADMIN — VALSARTAN 160 MG: 80 TABLET ORAL at 11:57

## 2024-04-01 ASSESSMENT — PAIN SCALES - GENERAL
PAINLEVEL_OUTOF10: 7
PAINLEVEL_OUTOF10: 6
PAINLEVEL_OUTOF10: 6
PAINLEVEL_OUTOF10: 8

## 2024-04-01 ASSESSMENT — PAIN DESCRIPTION - LOCATION
LOCATION: HEAD
LOCATION: HEAD;NECK

## 2024-04-01 ASSESSMENT — PAIN DESCRIPTION - DESCRIPTORS: DESCRIPTORS: POUNDING

## 2024-04-01 NOTE — ED PROVIDER NOTES
Thought Content: Thought content does not include suicidal ideation. Thought content does not include suicidal plan.       DIAGNOSTIC RESULTS   EKG: interpreted by the Emergency Department Physician who either signs or Co-signs this chart in the absence of a cardiologist.  Indication: Altered mental status  Interpretation: Rate 76, rhythm sinus, axis normal.  VT/QRS/QTc within normal limits.  No T/ST changes consistent with acute ischemia.  Comparison to a prior EKG from September 9, 2022 shows similar morphology with no acute ischemic changes noted.    RADIOLOGY:   Non-plain film images such as CT, Ultrasound and MRI are read by the radiologist.   Plain radiographic images are visualized and preliminarily interpreted by the ED Provider with the below findings:  -Chest x-ray with mild cardiomegaly, no signs of an acute infection     Interpretation per Radiologist below, if available at the time of this note:  XR CHEST (2 VW)   Final Result   Mild cardiomegaly and mild central pulmonary congestion which is more   prominent.      Hypoinflation with mild bibasilar atelectasis or early infiltrates which is   more prominent         CT HEAD WO CONTRAST   Final Result   Mild atrophy and mild chronic microischemic changes scattered in the deep   white matter which is unchanged with no acute abnormality seen.           LABS:  Labs Reviewed   CBC WITH AUTO DIFFERENTIAL - Abnormal; Notable for the following components:       Result Value    Hemoglobin 11.9 (*)     Hematocrit 35.9 (*)     All other components within normal limits   COMPREHENSIVE METABOLIC PANEL W/ REFLEX TO MG FOR LOW K - Abnormal; Notable for the following components:    Glucose 103 (*)     All other components within normal limits   POCT GLUCOSE - Abnormal; Notable for the following components:    POC Glucose 102 (*)     All other components within normal limits   RAPID INFLUENZA A/B ANTIGENS   COVID-19, RAPID   ETHANOL   URINE DRUG SCREEN   URINALYSIS WITH

## 2024-04-01 NOTE — CARE COORDINATION
Case Management Assessment  Initial Evaluation    Date/Time of Evaluation: 4/1/2024 9:52 AM  Assessment Completed by: AYLA Vera    If patient is discharged prior to next notation, then this note serves as note for discharge by case management.    Patient Name: John Wood                   YOB: 1955  Diagnosis: Confusion with non-focal neuro exam [R41.0]  Goals of care, counseling/discussion [Z71.89]  Altered mental status, unspecified altered mental status type [R41.82]  Elevated blood alcohol level, blood alcohol level not specified [R78.0]                   Date / Time: 3/31/2024 10:00 PM    Patient Admission Status: Observation   Readmission Risk (Low < 19, Mod (19-27), High > 27): No data recorded  Current PCP: Lavell Finnegan MD  PCP verified by CM? (P) Yes    Chart Reviewed: Yes      History Provided by: Patient, Spouse  Patient Orientation: Alert and Oriented, Person, Place    Patient Cognition: Alert    Hospitalization in the last 30 days (Readmission):  No    If yes, Readmission Assessment in  Navigator will be completed.    Advance Directives:      Code Status: Full Code   Patient's Primary Decision Maker is: (P) Legal Next of Kin    Primary Decision Maker: Rubin Lawson - Child - [phone]    Discharge Planning:    Patient lives with: (P) Spouse/Significant Other Type of Home: (P) House  Primary Care Giver: Self  Patient Support Systems include: (P) Spouse/Significant Other, Children   Current Financial resources: (P) None  Current community resources: (P) None  Current services prior to admission: (P) Durable Medical Equipment            Current DME: (P) [facility], [facility]            Type of Home Care services:  (P) None    ADLS  Prior functional level: (P) Independent in ADLs/IADLs  Current functional level: (P) Independent in ADLs/IADLs    PT AM-PAC:   /24  OT AM-PAC:   /24    Family can provide assistance at DC: (P) Yes  Would you like Case Management to discuss the

## 2024-04-01 NOTE — FLOWSHEET NOTE
Patient admitted from ED via stretcher and transferred to bed times 3 assist. Patient is alert to self, opens her eyes when spoken too in a loud voice and may speak a couple words and then sits back down and closes eyes. No S/S of pain an or discomfort. Tele-monitor applied per order and verified with [facility], Sinus rhythm, 80. Blood sugar 121. Admission questions answered via  using the , Patient speaks english but  does not speak English. Patient does not stay awake long enough to complete Neuro checks.

## 2024-04-01 NOTE — ACP (ADVANCE CARE PLANNING)
Advance Care Planning     Advance Care Planning Activator (Inpatient)  Conversation Note      Date of ACP Conversation: 4/1/2024     Conversation Conducted with: Patient with Decision Making Capacity    ACP Activator: AYLA Vera    Health Care Decision Maker:     Current Designated Health Care Decision Maker:     Primary Decision Maker: Rubin Lawson - Child - [phone]    Today we documented Decision Maker(s) consistent with Legal Next of Kin hierarchy.    Care Preferences    Ventilation:  \"If you were in your present state of health and suddenly became very ill and were unable to breathe on your own, what would your preference be about the use of a ventilator (breathing machine) if it were available to you?\"      Would the patient desire the use of ventilator (breathing machine)?: yes    \"If your health worsens and it becomes clear that your chance of recovery is unlikely, what would your preference be about the use of a ventilator (breathing machine) if it were available to you?\"     Would the patient desire the use of ventilator (breathing machine)?: Yes      Resuscitation  \"CPR works best to restart the heart when there is a sudden event, like a heart attack, in someone who is otherwise healthy. Unfortunately, CPR does not typically restart the heart for people who have serious health conditions or who are very sick.\"    \"In the event your heart stopped as a result of an underlying serious health condition, would you want attempts to be made to restart your heart (answer \"yes\" for attempt to resuscitate) or would you prefer a natural death (answer \"no\" for do not attempt to resuscitate)?\" yes       [] Yes   [] No   Educated Patient / Decision Maker regarding differences between Advance Directives and portable DNR orders.    Length of ACP Conversation in minutes:  2 min    Conversation Outcomes:  ACP discussion completed    Follow-up plan:    [] Schedule follow-up conversation to continue

## 2024-04-01 NOTE — SIGNIFICANT EVENT
Paged to admit.    On my evaluation patient was alert oriented x3. Per family at bedside patient recently started opiate after surgery.     Requested Narcan and bolus to be given and be reassessed if patient is encephalopathic that was not present on my assessment

## 2024-04-01 NOTE — H&P
Hospital Medicine History & Physical      PCP: Lavell Finnegan MD    Date of Admission: 04/01/2024    Date of Service: Pt seen/examined on 04/01/2024    Pt seen/examined face to face on and admitted as inpatient with expected LOS to be two days but can change depending on diagnostic work up and treatment response.     Chief Complaint:    Chief Complaint   Patient presents with    Altered Mental Status     Son states lkw 1830.  Responsive to verbal and painful stimuli. Patient found in recliner.  128/78,  70s,  spo2 98; Headache 1530.  Family denies fall.  Family states she was also complaining she could not sleep the previous night and had complaint of sob            ASSESSMENT AND PLAN:    Active Hospital Problems    Diagnosis Date Noted    Confusion with non-focal neuro exam [R41.0] 04/01/2024     Confusion nonfocal exam:  Patient is alert oriented but per family adamant that this is not her baseline  Labs reassuring  Suspected etoh intake with psych however limited accuracy given limited knowledge of the family.  MRI brain ordered    Chronic medical conditions:  Awaiting complete would like due to accuracy  Pharmacy consulted, appreciated    Type 2 Diabetes: Reviewed patient's medications. Plan is to hold oral medications and continued with reduced home dose of long acting and Insulin sliding scale  Hypertension  Hyperlipidemia  Sleep apnea: CPAP ordered  GERD: Continue home medication  Class II obesity:Complicating assessment and treatment. Placing patient at risk for multiple co-morbidities as well as early death and contributing to the patient's presentation. Education, and counseling      .Due to the above diagnosis makes the patient higher risk for morbidity and mortality requiring testing and treatment      Discussion with the primary ER physician in regards to symptoms, history, physical exam, diagnosis and treatment, collaborative decision was to admit the patient.    Diet: diabetic diet    DVT

## 2024-04-02 VITALS
RESPIRATION RATE: 19 BRPM | BODY MASS INDEX: 30.68 KG/M2 | WEIGHT: 190.92 LBS | TEMPERATURE: 97.9 F | OXYGEN SATURATION: 97 % | DIASTOLIC BLOOD PRESSURE: 88 MMHG | HEART RATE: 93 BPM | SYSTOLIC BLOOD PRESSURE: 137 MMHG | HEIGHT: 66 IN

## 2024-04-02 LAB
ALBUMIN SERPL-MCNC: 3.3 G/DL (ref 3.4–5)
ALBUMIN/GLOB SERPL: 1.3 {RATIO} (ref 1.1–2.2)
ALP SERPL-CCNC: 61 U/L (ref 40–129)
ALT SERPL-CCNC: 16 U/L (ref 10–40)
ANION GAP SERPL CALCULATED.3IONS-SCNC: 8 MMOL/L (ref 3–16)
AST SERPL-CCNC: 21 U/L (ref 15–37)
BASOPHILS # BLD: 0 K/UL (ref 0–0.2)
BASOPHILS NFR BLD: 0.3 %
BILIRUB SERPL-MCNC: <0.2 MG/DL (ref 0–1)
BUN SERPL-MCNC: 16 MG/DL (ref 7–20)
CALCIUM SERPL-MCNC: 9.9 MG/DL (ref 8.3–10.6)
CHLORIDE SERPL-SCNC: 108 MMOL/L (ref 99–110)
CO2 SERPL-SCNC: 25 MMOL/L (ref 21–32)
CREAT SERPL-MCNC: 0.7 MG/DL (ref 0.6–1.2)
DEPRECATED RDW RBC AUTO: 12.7 % (ref 12.4–15.4)
EKG ATRIAL RATE: 78 BPM
EKG DIAGNOSIS: NORMAL
EKG P AXIS: 54 DEGREES
EKG P-R INTERVAL: 194 MS
EKG Q-T INTERVAL: 394 MS
EKG QRS DURATION: 82 MS
EKG QTC CALCULATION (BAZETT): 449 MS
EKG R AXIS: 15 DEGREES
EKG T AXIS: 35 DEGREES
EKG VENTRICULAR RATE: 78 BPM
EOSINOPHIL # BLD: 0.1 K/UL (ref 0–0.6)
EOSINOPHIL NFR BLD: 1.6 %
GFR SERPLBLD CREATININE-BSD FMLA CKD-EPI: >90 ML/MIN/{1.73_M2}
GLUCOSE BLD-MCNC: 112 MG/DL (ref 70–99)
GLUCOSE BLD-MCNC: 127 MG/DL (ref 70–99)
GLUCOSE BLD-MCNC: 82 MG/DL (ref 70–99)
GLUCOSE SERPL-MCNC: 85 MG/DL (ref 70–99)
HCT VFR BLD AUTO: 34.3 % (ref 36–48)
HGB BLD-MCNC: 11.6 G/DL (ref 12–16)
LYMPHOCYTES # BLD: 2 K/UL (ref 1–5.1)
LYMPHOCYTES NFR BLD: 40.4 %
MCH RBC QN AUTO: 28.4 PG (ref 26–34)
MCHC RBC AUTO-ENTMCNC: 33.9 G/DL (ref 31–36)
MCV RBC AUTO: 83.8 FL (ref 80–100)
MONOCYTES # BLD: 0.3 K/UL (ref 0–1.3)
MONOCYTES NFR BLD: 6.6 %
NEUTROPHILS # BLD: 2.6 K/UL (ref 1.7–7.7)
NEUTROPHILS NFR BLD: 51.1 %
PERFORMED ON: ABNORMAL
PERFORMED ON: ABNORMAL
PERFORMED ON: NORMAL
PLATELET # BLD AUTO: 239 K/UL (ref 135–450)
PMV BLD AUTO: 7.8 FL (ref 5–10.5)
POTASSIUM SERPL-SCNC: 4.2 MMOL/L (ref 3.5–5.1)
PROT SERPL-MCNC: 5.9 G/DL (ref 6.4–8.2)
RBC # BLD AUTO: 4.1 M/UL (ref 4–5.2)
SODIUM SERPL-SCNC: 141 MMOL/L (ref 136–145)
TROPONIN, HIGH SENSITIVITY: 10 NG/L (ref 0–14)
WBC # BLD AUTO: 5.1 K/UL (ref 4–11)

## 2024-04-02 PROCEDURE — 97530 THERAPEUTIC ACTIVITIES: CPT

## 2024-04-02 PROCEDURE — 80053 COMPREHEN METABOLIC PANEL: CPT

## 2024-04-02 PROCEDURE — 6370000000 HC RX 637 (ALT 250 FOR IP)

## 2024-04-02 PROCEDURE — 6370000000 HC RX 637 (ALT 250 FOR IP): Performed by: INTERNAL MEDICINE

## 2024-04-02 PROCEDURE — 36415 COLL VENOUS BLD VENIPUNCTURE: CPT

## 2024-04-02 PROCEDURE — 93010 ELECTROCARDIOGRAM REPORT: CPT | Performed by: INTERNAL MEDICINE

## 2024-04-02 PROCEDURE — 6360000002 HC RX W HCPCS: Performed by: INTERNAL MEDICINE

## 2024-04-02 PROCEDURE — 94760 N-INVAS EAR/PLS OXIMETRY 1: CPT

## 2024-04-02 PROCEDURE — 85025 COMPLETE CBC W/AUTO DIFF WBC: CPT

## 2024-04-02 PROCEDURE — 2580000003 HC RX 258: Performed by: INTERNAL MEDICINE

## 2024-04-02 PROCEDURE — 97535 SELF CARE MNGMENT TRAINING: CPT

## 2024-04-02 RX ORDER — SUMATRIPTAN 50 MG/1
50 TABLET, FILM COATED ORAL ONCE
Status: COMPLETED | OUTPATIENT
Start: 2024-04-02 | End: 2024-04-02

## 2024-04-02 RX ORDER — SUMATRIPTAN 50 MG/1
50 TABLET, FILM COATED ORAL 2 TIMES DAILY PRN
Status: DISCONTINUED | OUTPATIENT
Start: 2024-04-02 | End: 2024-04-02 | Stop reason: HOSPADM

## 2024-04-02 RX ADMIN — SUMATRIPTAN SUCCINATE 50 MG: 50 TABLET ORAL at 10:44

## 2024-04-02 RX ADMIN — VALSARTAN 160 MG: 80 TABLET ORAL at 08:20

## 2024-04-02 RX ADMIN — SUMATRIPTAN SUCCINATE 50 MG: 50 TABLET ORAL at 16:59

## 2024-04-02 RX ADMIN — Medication 10 ML: at 08:21

## 2024-04-02 RX ADMIN — ACETAMINOPHEN 325MG 650 MG: 325 TABLET ORAL at 08:20

## 2024-04-02 RX ADMIN — ENOXAPARIN SODIUM 40 MG: 100 INJECTION SUBCUTANEOUS at 08:20

## 2024-04-02 ASSESSMENT — PAIN DESCRIPTION - LOCATION
LOCATION: HEAD

## 2024-04-02 ASSESSMENT — PAIN DESCRIPTION - DESCRIPTORS
DESCRIPTORS: POUNDING
DESCRIPTORS: POUNDING

## 2024-04-02 ASSESSMENT — PAIN SCALES - GENERAL
PAINLEVEL_OUTOF10: 3
PAINLEVEL_OUTOF10: 5
PAINLEVEL_OUTOF10: 7
PAINLEVEL_OUTOF10: 6

## 2024-04-02 NOTE — FLOWSHEET NOTE
Patient complained of nausea at start of shift and PRN Zofran was given per order. placed call light on and then a few seconds later bed alarm sounded, this RN entered room and  and son was assisting patient to toilet. Patient and family educated on safety and waiting for staff to come to assist and they stated they had called and \"No one came.\" Patient then acted as if she was going to fall off the toilet, Assisted back to bed via steady and 2 person assist as soon as we got patient in bed she started complaining of chest pain but pointed to her epigastric area. Provider notified via secure message with new orders for STAT EKG and Trop, EKG results NSR and trop was 9. Patient continue complaining provider notified with new orders for a GI cocktail and Vistaril times one dose for anxiety and Protonix, which were administered per orders. Patient is now resting in bed with closed at this time. Call light in reach and bed alarm on for safety.

## 2024-04-02 NOTE — DISCHARGE INSTR - COC
filed at 2024  Gross per 24 hour   Intake 120 ml   Output --   Net 120 ml     I/O last 3 completed shifts:  In: 999.3 [P.O.:420; I.V.:579.3]  Out: 700 [Urine:700]    Safety Concerns:     { KATIE Safety Concerns:215589357}    Impairments/Disabilities:      { KATIE Impairments/Disabilities:130899891}    Nutrition Therapy:  Current Nutrition Therapy:   { KATIE Diet List:681731843}    Routes of Feeding: {[facility] D[facility] Other Feedings:589437576}  Liquids: {Slp liquid thickness:92263}  Daily Fluid Restriction: {[facility] Yes amt example:596198669}  Last Modified Barium Swallow with Video (Video Swallowing Test): {Done Not Done Date:}    Treatments at the Time of Hospital Discharge:   Respiratory Treatments: ***  Oxygen Therapy:  {Therapy; copd oxygen:21226}  Ventilator:    { CC Vent List:226589393}    Rehab Therapies: {THERAPEUTIC INTERVENTION:7264649378}  Weight Bearing Status/Restrictions: {[facility] Weight Bearin}  Other Medical Equipment (for information only, NOT a DME order):  {EQUIPMENT:560007395}  Other Treatments: ***    Patient's personal belongings (please select all that are sent with patient):  {[facility] DME Belongings:272279848}    RN SIGNATURE:  {Esignature:339460037}    CASE MANAGEMENT/SOCIAL WORK SECTION    Inpatient Status Date: ***    Readmission Risk Assessment Score:  Readmission Risk              Risk of Unplanned Readmission:  12           Discharging to Facility/ Agency   Name:   Address:  Phone:  Fax:    Dialysis Facility (if applicable)   Name:  Address:  Dialysis Schedule:  Phone:  Fax:    / signature: {Esignature:295572893}    PHYSICIAN SECTION    Prognosis: {Prognosis:8040529960}    Condition at Discharge: { Patient Condition:241986209}    Rehab Potential (if transferring to Rehab): {Prognosis:2820199827}    Recommended Labs or Other Treatments After Discharge: ***    Physician Certification: I certify the above information and [facility]

## 2024-04-02 NOTE — CARE COORDINATION
Chart review done, nursing rounds completed. Pt is from home with . Was c/o really bad headaches, MRI was negative. Pt was given new meds to try, if she is okay, likely only needs 1-2 days.     Arnulfo Francis LMSW, [facility] Social Work Case Management   Phone: [phone]  Fax: [phone]

## 2024-04-02 NOTE — PLAN OF CARE
Problem: Discharge Planning  Goal: Discharge to home or other facility with appropriate resources  4/2/2024 0825 by Toi Latham RN  Outcome: Progressing  Flowsheets (Taken 4/2/2024 0824)  Discharge to home or other facility with appropriate resources: Arrange for needed discharge resources and transportation as appropriate  4/1/2024 2214 by Shanell Quinn RN  Outcome: Progressing     Problem: Safety - Adult  Goal: Free from fall injury  4/2/2024 0825 by Toi Latham RN  Outcome: Progressing  4/1/2024 2214 by Shanell Quinn RN  Outcome: Progressing     Problem: ABCDS Injury Assessment  Goal: Absence of physical injury  4/2/2024 0825 by Toi Latham RN  Outcome: Progressing  4/1/2024 2214 by Shanell Quinn RN  Outcome: Progressing     Problem: Skin/Tissue Integrity  Goal: Absence of new skin breakdown  Description: 1.  Monitor for areas of redness and/or skin breakdown  2.  Assess vascular access sites hourly  3.  Every 4-6 hours minimum:  Change oxygen saturation probe site  4.  Every 4-6 hours:  If on nasal continuous positive airway pressure, respiratory therapy assess nares and determine need for appliance change or resting period.  4/2/2024 0825 by Toi Latham RN  Outcome: Progressing  4/1/2024 2214 by Shanell Quinn RN  Outcome: Progressing     Problem: Confusion  Goal: Confusion, delirium, dementia, or psychosis is improved or at baseline  Description: INTERVENTIONS:  1. Assess for possible contributors to thought disturbance, including medications, impaired vision or hearing, underlying metabolic abnormalities, dehydration, psychiatric diagnoses, and notify attending LIP  2. [place] high risk fall precautions, as indicated  3. Provide frequent short contacts to provide reality reorientation, refocusing and direction  4. Decrease environmental stimuli, including noise as appropriate  5. Monitor and intervene to maintain adequate nutrition, hydration, 
  Problem: Discharge Planning  Goal: Discharge to home or other facility with appropriate resources  4/2/2024 1705 by Toi Latham RN  Outcome: Completed  4/2/2024 0825 by Toi Latham RN  Outcome: Progressing  Flowsheets (Taken 4/2/2024 0824)  Discharge to home or other facility with appropriate resources: Arrange for needed discharge resources and transportation as appropriate     Problem: Safety - Adult  Goal: Free from fall injury  4/2/2024 1705 by Toi Latham RN  Outcome: Completed  4/2/2024 0825 by Toi Latham RN  Outcome: Progressing     Problem: ABCDS Injury Assessment  Goal: Absence of physical injury  4/2/2024 1705 by Toi Latham RN  Outcome: Completed  4/2/2024 0825 by Toi Latham RN  Outcome: Progressing     Problem: Skin/Tissue Integrity  Goal: Absence of new skin breakdown  Description: 1.  Monitor for areas of redness and/or skin breakdown  2.  Assess vascular access sites hourly  3.  Every 4-6 hours minimum:  Change oxygen saturation probe site  4.  Every 4-6 hours:  If on nasal continuous positive airway pressure, respiratory therapy assess nares and determine need for appliance change or resting period.  4/2/2024 1705 by Toi Latham RN  Outcome: Completed  4/2/2024 0825 by Toi Latham RN  Outcome: Progressing     Problem: Confusion  Goal: Confusion, delirium, dementia, or psychosis is improved or at baseline  Description: INTERVENTIONS:  1. Assess for possible contributors to thought disturbance, including medications, impaired vision or hearing, underlying metabolic abnormalities, dehydration, psychiatric diagnoses, and notify attending LIP  2. [place] high risk fall precautions, as indicated  3. Provide frequent short contacts to provide reality reorientation, refocusing and direction  4. Decrease environmental stimuli, including noise as appropriate  5. Monitor and intervene to maintain adequate nutrition, hydration, 
Awaiting for MRI today.    Problem: Discharge Planning  Goal: Discharge to home or other facility with appropriate resources  4/1/2024 1033 by Toi Latham RN  Outcome: Progressing  Flowsheets (Taken 4/1/2024 0817)  Discharge to home or other facility with appropriate resources:   Identify barriers to discharge with patient and caregiver   Arrange for needed discharge resources and transportation as appropriate   Identify discharge learning needs (meds, wound care, etc)   Refer to discharge planning if patient needs post-hospital services based on physician order or complex needs related to functional status, cognitive ability or social support system  4/1/2024 0635 by Shanell Quinn, RN  Outcome: Progressing  Flowsheets (Taken 4/1/2024 0633)  Discharge to home or other facility with appropriate resources:   Identify barriers to discharge with patient and caregiver   Identify discharge learning needs (meds, wound care, etc)   Refer to discharge planning if patient needs post-hospital services based on physician order or complex needs related to functional status, cognitive ability or social support system   Arrange for needed discharge resources and transportation as appropriate   Arrange for interpreters to assist at discharge as needed     Problem: Safety - Adult  Goal: Free from fall injury  4/1/2024 1033 by Toi Latham RN  Outcome: Progressing  4/1/2024 0635 by Shanell Quinn RN  Outcome: Progressing     Problem: ABCDS Injury Assessment  Goal: Absence of physical injury  4/1/2024 1033 by Toi Latham RN  Outcome: Progressing  4/1/2024 0635 by Shanell Quinn, RN  Outcome: Progressing     Problem: Skin/Tissue Integrity  Goal: Absence of new skin breakdown  Description: 1.  Monitor for areas of redness and/or skin breakdown  2.  Assess vascular access sites hourly  3.  Every 4-6 hours minimum:  Change oxygen saturation probe site  4.  Every 4-6 hours:  If on nasal continuous positive 
elimination, sleep and activity  6. If unable to ensure safety without constant attention obtain sitter and review sitter guidelines with assigned personnel  7. Initiate Psychosocial CNS and Spiritual Care consult, as indicated  4/2/2024 1705 by Toi Latham RN  Outcome: Completed  4/2/2024 0825 by Toi Latham RN  Outcome: Progressing     Problem: Pain  Goal: Verbalizes/displays adequate comfort level or baseline comfort level  4/2/2024 1705 by Toi Latham RN  Outcome: Completed  4/2/2024 0825 by Toi Latham RN  Outcome: Progressing     Problem: Chronic Conditions and Co-morbidities  Goal: Patient's chronic conditions and co-morbidity symptoms are monitored and maintained or improved  4/2/2024 1705 by Toi Latham RN  Outcome: Completed  4/2/2024 0825 by Toi Latham RN  Outcome: Progressing     Problem: Neurosensory - Adult  Goal: Achieves stable or improved neurological status  4/2/2024 1705 by Toi Latham RN  Outcome: Completed  4/2/2024 0825 by Toi Latham RN  Outcome: Progressing  Goal: Absence of seizures  4/2/2024 1705 by Toi Latham RN  Outcome: Completed  4/2/2024 0825 by Toi Latham RN  Outcome: Progressing  Goal: Remains free of injury related to seizures activity  4/2/2024 1705 by Toi Latham RN  Outcome: Completed  4/2/2024 0825 by Toi Latham RN  Outcome: Progressing  Goal: Achieves maximal functionality and self care  4/2/2024 1705 by Toi Latham RN  Outcome: Completed  4/2/2024 0825 by Toi Latham RN  Outcome: Progressing     Problem: Cardiovascular - Adult  Goal: Maintains optimal cardiac output and hemodynamic stability  4/2/2024 1705 by Toi Latham RN  Outcome: Completed  4/2/2024 0825 by Toi Latham RN  Outcome: Progressing  Goal: Absence of cardiac dysrhythmias or at baseline  4/2/2024 1705 by Toi Latham, RN  Outcome: 
safety without constant attention obtain sitter and review sitter guidelines with assigned personnel  7. Initiate Psychosocial CNS and Spiritual Care consult, as indicated  Outcome: Progressing     
Completed  4/2/2024 0825 by Toi Latham RN  Outcome: Progressing     Problem: Musculoskeletal - Adult  Goal: Return mobility to safest level of function  4/2/2024 1705 by Toi Latham RN  Outcome: Completed  4/2/2024 0825 by Toi Latham RN  Outcome: Progressing  Goal: Maintain proper alignment of affected body part  4/2/2024 1705 by Toi Latham RN  Outcome: Completed  4/2/2024 0825 by Toi Latham RN  Outcome: Progressing  Goal: Return ADL status to a safe level of function  4/2/2024 1705 by Toi Latham RN  Outcome: Completed  4/2/2024 0825 by Toi Latham RN  Outcome: Progressing     Problem: Gastrointestinal - Adult  Goal: Minimal or absence of nausea and vomiting  4/2/2024 1705 by Toi Latham RN  Outcome: Completed  4/2/2024 0825 by Toi Latham RN  Outcome: Progressing  Goal: Maintains or returns to baseline bowel function  4/2/2024 1705 by Toi Latham RN  Outcome: Completed  4/2/2024 0825 by Toi Latham RN  Outcome: Progressing  Goal: Maintains adequate nutritional intake  4/2/2024 1705 by Toi Latham RN  Outcome: Completed  4/2/2024 0825 by Toi Latham RN  Outcome: Progressing     
symptoms are monitored and maintained or improved  4/1/2024 2214 by Shanell Quinn, RN  Outcome: Progressing  4/1/2024 1033 by Toi Latham RN  Outcome: Progressing     Problem: Neurosensory - Adult  Goal: Achieves stable or improved neurological status  Outcome: Progressing  Goal: Absence of seizures  Outcome: Progressing  Goal: Remains free of injury related to seizures activity  Outcome: Progressing  Goal: Achieves maximal functionality and self care  Outcome: Progressing

## 2024-04-02 NOTE — DISCHARGE INSTR - DIET

## 2024-04-02 NOTE — DISCHARGE SUMMARY
Hospital Medicine Discharge Summary    Patient ID: John AGGARWAL [facility]      Patient's PCP: Lavell Finnegan MD    Admit Date: 3/31/2024     Discharge Date:   4/2/24    Admitting Physician: Ita Barlow MD     Discharge Physician: Jaja Thurman PA-C     Discharge Diagnoses  Acute encephalopathy  Suspected migraine  Cephalgia  History of CVA  Insulin-dependent type 2 diabetes  Essential pretension  Anxiety/depression      Hospital Course: 68-year-old female who presented to the ED for concerns of altered mental status, somnolence by her family.  Per  at bedside, patient was hard to arouse yesterday after sitting in chair after [employer], states she was confused.  They are unsure what medications she took.  Patient states she was very tired/sleepy at [employer], had a headache. States she took some medication with after further interrogation and thinks it was methocarbamol  a muscle relaxant.  She denies any infectious symptoms and just states she feels weak and tired.  She was also recently started on Trulicity from her primary care doctor and states she did not eat much the day before.  She does have a history of a stroke per her .     Workup thus far with CT head with no acute abnormality, CXR showing mild cardiomegaly with hypoinflation.  UDS was negative, ammonia negative.  BMP and CBC mostly unremarkable despite mild anemia hemoglobin at 11.0.  UA noninfectious appearing, VBG with only very mild hypercapnia at 50.4 pCO2.  Rapid COVID/flu negative.  Procalcitonin negative, afebrile with no leukocytosis.     Due to history of stroke, BMRI ordered which was also unremarkable for any acute abnormality.     Patient does take methocarbamol per PDMP and states she did take this  PTA, could be secondary to polypharmacy.  Orthostatics negative.     Patient complained of headache with photophobia which improved with sumatriptan.  Likely having migraines as she states she has history of them a few years ago.

## 2024-04-03 ENCOUNTER — CARE COORDINATION (OUTPATIENT)
Dept: CASE MANAGEMENT | Age: 69
End: 2024-04-03

## 2024-04-03 NOTE — PROGRESS NOTES
Hospitalist Progress Note      PCP: Lavell Finnegan MD    Date of Admission: 3/31/2024    Chief Complaint: Altered mental status    Hospital Course: 68-year-old female who presented to the ED for concerns of altered mental status, somnolence by her family.  Per  at bedside, patient was hard to arouse yesterday after sitting in chair after Pentecostalism, states she was confused.  They are unsure what medications she took.  Patient states she was very tired/sleepy at Pent[employer]alism, had a headache. States she took some medication with after further interrogation and thinks it was methocarbamol  a muscle relaxant.  She denies any infectious symptoms and just states she feels weak and tired.  She was also recently started on Trulicity from her primary care doctor and states she did not eat much the day before.  She does have a history of a stroke per her .     Workup thus far with CT head with no acute abnormality, CXR showing mild cardiomegaly with hypoinflation.  UDS was negative, ammonia negative.  BMP and CBC mostly unremarkable despite mild anemia hemoglobin at 11.0.  UA noninfectious appearing, VBG with only very mild hypercapnia at 50.4 pCO2.  Rapid COVID/flu negative.  Procalcitonin negative, afebrile with no leukocytosis.     Due to history of stroke, BMRI ordered which was also unremarkable for any acute abnormality.     Patient does take methocarbamol per PDMP and states she did take this  PTA, could be secondary to polypharmacy.  Orthostatics negative.    Patient complained of headache with photophobia which improved with sumatriptan.  Likely having migraines.    Subjective: Patient seen sitting in chair, daughter and  at bedside.  Patient states she is having a pretty significant headache that wraps around the front of her head    Assessment/Plan:  Acute encephalopathy  -Likely related to polypharmacy, patient took muscle relaxer prior to admission.  -Workup with CT head unremarkable, BMRI 
  Physician Progress Note      PATIENT:               EUGENE GIRON  [facility] #:                  [id number]  :                       1955  ADMIT DATE:       3/31/2024 10:00 PM  DISCH DATE:        2024 5:16 PM  RESPONDING  PROVIDER #:        Jaja Thurman          QUERY TEXT:    Dear Jaja Thurman PA,  Patient admitted with confusion, AMS, noted to have acute encephalopathy   related to polypharmacy and noted to have taken a muscle relaxant prior to   admission. If possible, please document in progress notes and discharge   summary if you are evaluating and/or treating any of the following:    The medical record reflects the following:  Risk Factors: Hx CVA, GERD, DM, HTN, HLD, sleep apnea, took a \"muscle   relaxant\"  Clinical Indicators: 3/31 ED for AMS, fatigue, ED notes \"noted by son who had   come back to the bedside that the patient's new medication was oxycodone.\"   after IVF and Narcan \" the patient remained altered.\"  PCP notes \"Patient   states she was very tired/sleepy at [employer], had a headache.  States she took   some medication with after further interrogation and thinks it was   methocarbamol muscle relaxant.\" Patient does take methocarbamol per PDMP and   states she did take this yesterday, could be secondary to polypharmacy.     PCP-\"Acute encephalopathy-Likely related to polypharm  Treatment: Narcan, IVF, Tox screen, CT, MRI  Thank you,  Paris Fernandez RN, CDS  [email or web address]  Options provided:  -- Accidental overdose of muscle relaxant  -- Intentional overdose of muscle relaxant  -- Adverse effect of muscle relaxant, properly administered  -- Other - I will add my own diagnosis  -- Disagree - Not applicable / Not valid  -- Disagree - Clinically unable to determine / Unknown  -- Refer to Clinical Documentation Reviewer    PROVIDER RESPONSE TEXT:    Acute encephalopathy are due to an adverse effect of properly administered   muscle relaxant.    Query created by: [place] 
4 Eyes Skin Assessment     NAME:  John Wood  YOB: 1955  MEDICAL RECORD NUMBER:  [id number]    The patient is being assessed for  Admission    I agree that at least one RN has performed a thorough Head to Toe Skin Assessment on the patient. ALL assessment sites listed below have been assessed.      Areas assessed by both nurses:    Head, Face, Ears, Shoulders, Back, Chest, Arms, Elbows, Hands, Sacrum. Buttock, Coccyx, Ischium, Legs. Feet and Heels, and Under Medical Devices         Does the Patient have a Wound? No noted wound(s)       Dalton Prevention initiated by RN: No  Wound Care Orders initiated by RN: No    Pressure Injury (Stage 3,4, Unstageable, DTI, NWPT, and Complex wounds) if present, place Wound referral order by RN under : No    New Ostomies, if present place, Ostomy referral order under : No     Nurse 1 eSignature: Electronically signed by Toi Latham RN on 4/1/24 at 8:36 AM EDT    **SHARE this note so that the co-signing nurse can place an eSignature**    Nurse 2 eSignature: Electronically signed by June Baum RN on 4/1/24 at 9:47 AM EDT    
Alert and oriented X4. VSS; BP has improved significantly. Pt complained of headache 7/10, RN administered PRN tylenol per order. Will assess again. Neuro assessment completed. Pt is in bed. Skin assessed. Medication education completed with patient and . Pt is resting in bed. Call light within reach. Bed low and locked. All questions questions with no need at this time.   
Discharge instructions completed with patient. IV removed. Work note given to patient to return to work on Monday 4/8/24 per attending. All questions answered. Patient has been discharged home via wheelchair and private car with family.   
Occupational Therapy  Facility/Department: [address] MED SURG  Occupational Therapy Daily Treatment    Name: John Wood  : 1955  MRN: [id number]  Date of Service: 2024    Discharge Recommendations:  24 hour supervision or assist     John Wood scored a 20/24 on the AM-PAC ADL Inpatient form.  At this time, no further OT is recommended upon discharge due to pt nearing baseline function.  Recommend patient returns to prior setting with prior services.         Patient Diagnosis(es): The primary encounter diagnosis was Altered mental status, unspecified altered mental status type. Diagnoses of Goals of care, counseling/discussion and Elevated blood alcohol level, blood alcohol level not specified were also pertinent to this visit.  Past Medical History:  has a past medical history of Anesthesia complication, Arthritis, Cardiomyopathy (HCC), COVID-19, Diabetes, GERD (gastroesophageal reflux disease), Hyperlipidemia, Hypertension, Lumbar disc disease, Prolonged emergence from general anesthesia, Sleep apnea, Unspecified cerebral artery occlusion with cerebral infarction, and Wears glasses.  Past Surgical History:  has a past surgical history that includes Partial hysterectomy (2003); Hand surgery (Right); Foot surgery (Bilateral); Carpal tunnel release (Left, 2015); Finger trigger release (Left, 2015); Carpal tunnel release (Right, 2015); Finger trigger release (Right, 2015); Shoulder arthroscopy (Right, 2018); Colonoscopy; arthrodesis (Right, 2020); Knee Arthroplasty (Right, 2021); Knee arthroscopy (Right, 2022); Pain management procedure (Right, 2023); Nerve Block (Right, 2023); Pain management procedure (Right, 2023); Nerve Block (Right, 2023); and Hysterectomy.    Treatment Diagnosis: Decreased: ADls, functional transfers/mobility      Assessment   Performance deficits / Impairments: Decreased functional mobility 
Occupational Therapy  Facility/Department: [address] MED SURG  Occupational Therapy Initial Assessment    Name: John Wood  : 1955  MRN: [id number]  Date of Service: 2024    Discharge Recommendations:  24 hour supervision or assist     John Wood scored a 20/24 on the AM-PAC ADL Inpatient form.  At this time, no further OT is recommended upon discharge due to pt nearing baseline function.  Recommend patient returns to prior setting with prior services.       Patient Diagnosis(es): The primary encounter diagnosis was Altered mental status, unspecified altered mental status type. Diagnoses of Goals of care, counseling/discussion and Elevated blood alcohol level, blood alcohol level not specified were also pertinent to this visit.  Past Medical History:  has a past medical history of Anesthesia complication, Arthritis, Cardiomyopathy (HCC), COVID-19, Diabetes, GERD (gastroesophageal reflux disease), Hyperlipidemia, Hypertension, Lumbar disc disease, Prolonged emergence from general anesthesia, Sleep apnea, Unspecified cerebral artery occlusion with cerebral infarction, and Wears glasses.  Past Surgical History:  has a past surgical history that includes Partial hysterectomy (2003); Hand surgery (Right); Foot surgery (Bilateral); Carpal tunnel release (Left, 2015); Finger trigger release (Left, 2015); Carpal tunnel release (Right, 2015); Finger trigger release (Right, 2015); Shoulder arthroscopy (Right, 2018); Colonoscopy; arthrodesis (Right, 2020); Knee Arthroplasty (Right, 2021); Knee arthroscopy (Right, 2022); Pain management procedure (Right, 2023); Nerve Block (Right, 2023); Pain management procedure (Right, 2023); Nerve Block (Right, 2023); and Hysterectomy.    Treatment Diagnosis: Decreased: ADls, functional transfers/mobility      Assessment   Performance deficits / Impairments: Decreased functional mobility 
Orthostatic BP completed on patient per provider Jaja Thurman PA-C. RN notified her of the result.   
Patient is now awake. Alert and oriented X4. VSS except for BP slightly elevated. Pt answered all questions correctly however is slow at responding. Neuro assessment completed. Pt is in bed. Purewick placed. Skin assessed. Medication education completed with patient and . Pt is resting in bed.  
Physical Therapy  Facility/Department: [address] MED SURG  Physical Therapy Initial Assessment    Name: John Wood  : 1955  MRN: [id number]  Date of Service: 2024    Discharge Recommendations:  24 hour supervision or assist, Outpatient PT, Continue to assess pending progress          Patient Diagnosis(es): The primary encounter diagnosis was Altered mental status, unspecified altered mental status type. Diagnoses of Goals of care, counseling/discussion and Elevated blood alcohol level, blood alcohol level not specified were also pertinent to this visit.  Past Medical History:  has a past medical history of Anesthesia complication, Arthritis, Cardiomyopathy (HCC), COVID-19, Diabetes, GERD (gastroesophageal reflux disease), Hyperlipidemia, Hypertension, Lumbar disc disease, Prolonged emergence from general anesthesia, Sleep apnea, Unspecified cerebral artery occlusion with cerebral infarction, and Wears glasses.  Past Surgical History:  has a past surgical history that includes Partial hysterectomy (2003); Hand surgery (Right); Foot surgery (Bilateral); Carpal tunnel release (Left, 2015); Finger trigger release (Left, 2015); Carpal tunnel release (Right, 2015); Finger trigger release (Right, 2015); Shoulder arthroscopy (Right, 2018); Colonoscopy; arthrodesis (Right, 2020); Knee Arthroplasty (Right, 2021); Knee arthroscopy (Right, 2022); Pain management procedure (Right, 2023); Nerve Block (Right, 2023); Pain management procedure (Right, 2023); Nerve Block (Right, 2023); and Hysterectomy.    Assessment   Body Structures, Functions, Activity Limitations Requiring Skilled Therapeutic Intervention: Decreased functional mobility ;Decreased ADL status;Decreased strength;Decreased balance;Decreased endurance  Assessment: John Wood is a 68 y.o. female who presents to the emergency department on 3/31/24 secondary to concern for 
Pt came back from MRI with a high BP. 184/99 and pounding headache 8/10. RN notified SHANNAN Thurman. See orders.  
Pt is back from MRI.  
Pt transferred to MRI.   
Pt's family did not bring in home unit. Pt okay with O2.  
RN notified SHANNAN Wilkinson of BP. IV fluids now stopped. See MAR for med given for BP. RN updated patient and  on the changed. No more questions at this time. Pt is up in chair. Call light within reach. Chair alarm on. No need at this time.  
Report given to 4N ANNIE Reece.  No questions at this time. PCA to transport.   
Same-day progress note.  Patient admitted after midnight early this a.m.  H&P reviewed and patient seen at bedside.  68-year-old female who presented to the ED for concerns of altered mental status, somnolence by her family.  Per  at bedside, patient was hard to arouse yesterday after sitting in chair after Taoism, states she was confused.  They are unsure what medications she took.  Patient states she was very tired/sleepy at [employer], had a headache.  States she took some medication with after further interrogation and thinks it was methocarbamol muscle relaxant.  She denies any infectious symptoms and just states she feels weak and tired.  She was also recently started on Trulicity from her primary care doctor and states she did not eat much the day before.  She does have a history of a stroke per her .    Workup thus far with CT head with no acute abnormality, CXR showing mild cardiomegaly with hypoinflation.  UDS was negative, ammonia negative.  BMP and CBC mostly unremarkable despite mild anemia hemoglobin at 11.0.  UA noninfectious appearing, VBG with only very mild hypercapnia at 50.4 pCO2.  Rapid COVID/flu negative.  Procalcitonin negative, afebrile with no leukocytosis.    Due to history of stroke, BMRI ordered and will await results.    Patient does take methocarbamol per PDMP and states she did take this yesterday, could be secondary to polypharmacy.  Orthostatics negative, hypertensive this AM so will resume home BP meds 4/1.  
Telemetry to be discontinued per MD.   
states this has happened to her before when she has increased stress. Note that patient underwent right genicular nerve block in attempt to fix R knee pain on 2/20/24. She underwent left knee cortisone injection on 3/5/24. She was in the process of scheduling an injection for her R hip.  Response To Previous Treatment: Patient with no complaints from previous session.  Family / Caregiver Present: No  Referring Practitioner: Sheela Villagomez DO  Referral Date : 04/01/24  Diagnosis: confusion  Follows Commands: Within Functional Limits  Subjective  Subjective: Pt is agreeable to PT. More awake this date.         Social/Functional History  Social/Functional History  Lives With: Spouse  Type of Home: House  Home Layout: Multi-level, Able to Live on Main level with bedroom/bathroom, Laundry in basement, Performs ADL's on one level  Home Access: Stairs to enter with rails  Entrance Stairs - Number of Steps: 4+4  Entrance Stairs - Rails: Left  Bathroom Shower/Tub: Shower chair with back, Walk-in shower  Bathroom Toilet: Standard (RTS w/ arms)  Bathroom Equipment: Shower chair, Hand-held shower, Toilet raiser, Grab bars in shower  Bathroom Accessibility: Walker accessible  Home Equipment: Walker, 4 wheeled, Cane  Has the patient had two or more falls in the past year or any fall with injury in the past year?: No (but loses her balance a lot)  ADL Assistance: Needs assistance ( or dtr assist w/ shower or dressing as needed)  Homemaking Assistance: Needs assistance  Ambulation Assistance: Independent (uses cane in the house, 4WW in community)  Transfer Assistance: Independent  Occupation: Part time employment  Type of Occupation: Pricing, hanging clothes in the store    Cognition   Orientation  Overall Orientation Status: Within Functional Limits  Cognition  Overall Cognitive Status: WFL     Objective   Gross Assessment  Strength: Generally decreased, functional        Bed mobility  Supine to Sit: Unable to assess  Sit

## 2024-04-03 NOTE — CARE COORDINATION
Care Transitions Initial Follow Up Call    Call within 2 business days of discharge: Yes    Patient Current Location:  Home: [address] [place] [id number]    Care Transition Nurse contacted the patient by telephone to perform post hospital discharge assessment. Verified name and  with patient as identifiers. Provided introduction to self, and explanation of the Care Transition Nurse role.     Patient: John Wood Patient : 1955   MRN: [id number]  Reason for Admission: AMS  Discharge Date: 24 RARS: Readmission Risk Score: 15.6      Last Discharge Facility       Date Complaint Diagnosis Description Type Department Provider    3/31/24 Altered Mental Status Altered mental status, unspecified altered mental status type ... ED to Hosp-Admission (Discharged) (ADMITTED) Valente Ramirez MD; Jerica Cheek ...            Was this an external facility discharge? No Discharge Facility: [facility]    Challenges to be reviewed by the provider   Additional needs identified to be addressed with provider:   none               Method of communication with provider: none.    Initial attempt at CT discharge phone call. John states she is \"doing much better - just very weak.\" She states her most recent BG level = 80. John denies any H/A or photophobia at this time. She is able to carry on a conversation without difficulty. John denies any needs at this time.    Care Transition Nurse reviewed discharge instructions with patient who verbalized understanding. The patient was given an opportunity to ask questions and does not have any further questions or concerns at this time. Were discharge instructions available to patient? Yes. Reviewed appropriate site of care based on symptoms and resources available to patient including: PCP  When to call 911. The patient agrees to contact the PCP office for questions related to their healthcare.     Advance Care Planning:   Advance Care Planning

## 2024-04-04 ENCOUNTER — HOSPITAL ENCOUNTER (OUTPATIENT)
Dept: PHYSICAL THERAPY | Age: 69
Setting detail: THERAPIES SERIES
Discharge: HOME | End: 2024-04-04
Payer: MEDICARE

## 2024-04-04 DIAGNOSIS — R52 PAIN: ICD-10-CM

## 2024-04-04 DIAGNOSIS — R53.1 WEAKNESS: Primary | ICD-10-CM

## 2024-04-04 DIAGNOSIS — R26.89 FUNCTIONAL GAIT ABNORMALITY: ICD-10-CM

## 2024-04-04 DIAGNOSIS — Z74.09 DECREASED FUNCTIONAL MOBILITY AND ENDURANCE: ICD-10-CM

## 2024-04-04 PROCEDURE — 97140 MANUAL THERAPY 1/> REGIONS: CPT

## 2024-04-04 PROCEDURE — 97110 THERAPEUTIC EXERCISES: CPT

## 2024-04-04 PROCEDURE — 97161 PT EVAL LOW COMPLEX 20 MIN: CPT

## 2024-04-04 NOTE — PLAN OF CARE
(Mobilization/manipulation, manual lymphatic drainage, manual traction) for the purpose of modulating pain, promoting relaxation,  increasing ROM, reducing/eliminating soft tissue swelling/inflammation/restriction, improving soft tissue extensibility and allowing for proper ROM for normal function with self care, mobility, lifting and ambulation      GOALS     Patient stated goal: \" move without pain\"  Status: [] Progressing: [] Met: [] Not Met: [] Adjusted    Therapist goals for Patient:   Short Term Goals: To be achieved in: 2-4 weeks  Independent in HEP and progression per patient tolerance, in order to progress toward full function and prevent re-injury.    Status: [] Progressing: [] Met: [] Not Met: [] Adjusted  Patient will have a decrease in pain to 3-4/10 to help facilitate improvement in movement, function, and ADLs as indicated by functional deficits.   Status: [] Progressing: [] Met: [] Not Met: [] Adjusted    Long Term Goals: To be achieved in: at DC  Disability index score of 37 or less for the WOMAC to assist with return top prior level of function.   Status: [] Progressing: [] Met: [] Not Met: [] Adjusted  Pt to improve strength to 4+/5 or better of proximal hip and quadriceps to allow for proper muscle and joint use in functional mobility, ADLs and prior level of function   Status: [] Progressing: [] Met: [] Not Met: [] Adjusted  Patient will return to  Usual work, housework or activities  without increased symptoms or restriction to work towards return to prior level of function.     Status: [] Progressing: [] Met: [] Not Met: [] Adjusted  Patient will be able to perform x 8 reps of 30 sec sit-stand to work towards return to prior level of function.        Status: [] Progressing: [] Met: [] Not Met: [] Adjusted  Patient will be able to tolerate light-mod household chores and community ambulation without pain 50% of the time.        Status: [] Progressing: [] Met: [] Not Met: [] Adjusted    Overall

## 2024-04-05 ENCOUNTER — OFFICE VISIT (OUTPATIENT)
Dept: FAMILY MEDICINE CLINIC | Age: 69
End: 2024-04-05

## 2024-04-05 VITALS
RESPIRATION RATE: 16 BRPM | HEIGHT: 66 IN | HEART RATE: 86 BPM | DIASTOLIC BLOOD PRESSURE: 82 MMHG | BODY MASS INDEX: 29.73 KG/M2 | OXYGEN SATURATION: 96 % | SYSTOLIC BLOOD PRESSURE: 122 MMHG | WEIGHT: 185 LBS

## 2024-04-05 DIAGNOSIS — Z09 HOSPITAL DISCHARGE FOLLOW-UP: Primary | ICD-10-CM

## 2024-04-05 DIAGNOSIS — R53.83 FATIGUE, UNSPECIFIED TYPE: ICD-10-CM

## 2024-04-05 DIAGNOSIS — F32.A ANXIETY AND DEPRESSION: ICD-10-CM

## 2024-04-05 DIAGNOSIS — I70.0 AORTIC ATHEROSCLEROSIS (HCC): ICD-10-CM

## 2024-04-05 DIAGNOSIS — E78.2 MIXED HYPERLIPIDEMIA: ICD-10-CM

## 2024-04-05 DIAGNOSIS — I25.84 CORONARY ARTERY CALCIFICATION: ICD-10-CM

## 2024-04-05 DIAGNOSIS — E11.42 TYPE 2 DIABETES MELLITUS WITH DIABETIC POLYNEUROPATHY, WITH LONG-TERM CURRENT USE OF INSULIN (HCC): ICD-10-CM

## 2024-04-05 DIAGNOSIS — F41.9 ANXIETY AND DEPRESSION: ICD-10-CM

## 2024-04-05 DIAGNOSIS — I25.10 CORONARY ARTERY CALCIFICATION: ICD-10-CM

## 2024-04-05 DIAGNOSIS — Z79.4 TYPE 2 DIABETES MELLITUS WITH DIABETIC POLYNEUROPATHY, WITH LONG-TERM CURRENT USE OF INSULIN (HCC): ICD-10-CM

## 2024-04-05 PROBLEM — R41.82 AMS (ALTERED MENTAL STATUS): Status: RESOLVED | Noted: 2024-04-01 | Resolved: 2024-04-05

## 2024-04-05 PROBLEM — R41.0 CONFUSION WITH NON-FOCAL NEURO EXAM: Status: RESOLVED | Noted: 2024-04-01 | Resolved: 2024-04-05

## 2024-04-05 PROBLEM — M54.50 LOW BACK PAIN: Status: RESOLVED | Noted: 2019-09-18 | Resolved: 2024-04-05

## 2024-04-05 RX ORDER — PRAVASTATIN SODIUM 40 MG
40 TABLET ORAL DAILY
Qty: 90 TABLET | Refills: 1 | Status: SHIPPED | OUTPATIENT
Start: 2024-04-05

## 2024-04-05 NOTE — PROGRESS NOTES
needed for Shortness of Breath (wheezing coughing) 360 mL 5    ammonium lactate (LAC-HYDRIN) 12 % lotion Apply topically daily. (Patient taking differently: as needed Apply topically daily.) 1 Bottle 1        Medications patient taking as of now reconciled against medications ordered at time of hospital discharge: Yes        Objective:    /82   Pulse 86   Resp 16   Ht 1.676 m (5' 6\")   Wt 83.9 kg (185 lb)   SpO2 96%   BMI 29.86 kg/m²         An electronic signature was used to authenticate this note.  --Lavell Finnegan MD

## 2024-04-09 RX ORDER — BLOOD SUGAR DIAGNOSTIC
STRIP MISCELLANEOUS
Qty: 100 STRIP | Refills: 4 | Status: SHIPPED | OUTPATIENT
Start: 2024-04-09

## 2024-04-10 ENCOUNTER — CARE COORDINATION (OUTPATIENT)
Dept: CASE MANAGEMENT | Age: 69
End: 2024-04-10

## 2024-04-10 NOTE — CARE COORDINATION
Care Transitions Follow Up Call        Patient: John Wood  Patient : 1955   MRN: [id number]  Reason for Admission: AMS  Discharge Date: 24 RARS: Readmission Risk Score: 15.6      Needs to be reviewed by the provider   Additional needs identified to be addressed with provider: No               Method of communication with provider: none.    Unable to reach patient for CT follow up phone call. Left message. Contact info provided. Requested return call to CTN.      Follow Up  Future Appointments   Date Time Provider Department Center   2024  4:50 PM Knoll, Radha M, PT [facility]   2024  4:50 PM Knoll, Radha M, PT [facility]   2024  4:10 PM Knoll, Radha M, PT [facility]   2024  1:00 PM Isiah Adair, APRN - CNP  [facility]   2024  4:10 PM Knoll, Radha M, PT [facility]   2024  4:50 PM Knoll, Radha M, PT [facility]   2024  4:50 PM Knoll, Radha M, PT [facility]   2024  4:50 PM Knoll, Radha M, PT [facility]   2024  8:00 AM Sivan, Lavell KIMBLE MD [facility]   2024  8:00 AM [facility]   2024  8:00 AM Amna Owens MD [facility]          Care Transitions Subsequent and Final Call    Subsequent and Final Calls  Care Transitions Interventions  Other Interventions:           Kassy Riddle RN BSN  Care Transition Nurse  [phone]

## 2024-04-11 ENCOUNTER — HOSPITAL ENCOUNTER (OUTPATIENT)
Dept: PHYSICAL THERAPY | Age: 69
Setting detail: THERAPIES SERIES
End: 2024-04-11
Payer: MEDICARE

## 2024-04-16 ENCOUNTER — HOSPITAL ENCOUNTER (OUTPATIENT)
Dept: PHYSICAL THERAPY | Age: 69
Setting detail: THERAPIES SERIES
Discharge: HOME | End: 2024-04-16
Payer: MEDICARE

## 2024-04-16 ENCOUNTER — CARE COORDINATION (OUTPATIENT)
Dept: CARE COORDINATION | Age: 69
End: 2024-04-16

## 2024-04-16 PROCEDURE — 97140 MANUAL THERAPY 1/> REGIONS: CPT

## 2024-04-16 PROCEDURE — 97110 THERAPEUTIC EXERCISES: CPT

## 2024-04-16 NOTE — FLOWSHEET NOTE
[facility]- Outpatient Rehabilitation and Therapy [address] [place] [address] office: [phone] fax: [phone]         Physical Therapy: TREATMENT/PROGRESS NOTE   Patient: John Wood (68 y.o. female)   Examination Date: 2024   :  1955 MRN: [id number]   Visit #:   Insurance Allowable Auth Needed   BMN []Yes    [x]No    Insurance: Payor: [facility] MEDICARE / Plan: [employer] DUAL COMPLETE / Product Type: *No Product type* /   Insurance ID: [id number] - (Medicare Managed)  Secondary Insurance (if applicable): [facility]AE[facility] *   Treatment Diagnosis:     ICD-10-CM    1. Weakness  R53.1       2. Functional gait abnormality  R26.89       3. Decreased functional mobility and endurance  Z74.09       4. Pain  R52          Medical Diagnosis:  Right knee pain [M25.561]   Referring Physician: Jonathan Zhu MD  PCP: Lavell Finnegan MD     Plan of care signed (Y/N):     Date of Patient follow up with Physician:      Progress Report/POC: NO  POC update due: (10 visits /OR AUTH LIMITS, whichever is less)  5/3/2024                                             Precautions/ Contra-indications:           Latex allergy:  NO  Pacemaker:    NO  Contraindications for Manipulation: NA  Date of Surgery: R TKR on  2021  Other:    Red Flags:  None    C-SSRS Triggered by Intake questionnaire:   [] No, Questionnaire did not trigger screening.   [x] Yes, Patient intake triggered further evaluation      [x] C-SSRS Screening completed  [x] PCP notified via Plan of Care  [] Emergency services notified   C-SSRS Triggered by Intake questionnaire:     YES NO   In the past month, have you wished you were dead or wished you could go to sleep and not wake up?  X   In the past month, have you had any thoughts of killing yourself?  X                    Lifetime   YES NO   Have you ever done anything, started to do anything, or prepared to do anything to end your life?   X             Past 3 months

## 2024-04-16 NOTE — CARE COORDINATION
Care Transitions Follow Up Call    Patient Current Location:  Home: [address] [place] [id number]    LPN Care Coordinator contacted the patient by telephone to follow up after admission on 3/31-.  Verified name and  with patient as identifiers.    Patient: John Wood  Patient : 1955   MRN: [id number]  Reason for Admission: AMS  Discharge Date: 24 RARS: Readmission Risk Score: 15.6      Needs to be reviewed by the provider   Additional needs identified to be addressed with provider: Yes  Patient c/o ongoing weakness, sweating, blurry vision, poor appetite.              Method of communication with provider: none.    LPN CC spoke with patient. States she is about the same. Still has weakness that is unchanged over the last couple of weeks, neck & back pain, sweating, blurry vision. Denies HA, AMS, dizziness, N/V/D, palpitations.  this AM. No longer taking gabapentin or insulin per PCP instructions at HFU. Appetite is poor. Denies problems with bowels or bladder. Ice helps neck pain some. LPN CC to route note to PCP regarding patient complaints.   Danielle Jason LPN CC  Care Transitions  [phone]    Addressed changes since last contact:  none  Discussed follow-up appointments. If no appointment was previously scheduled, appointment scheduling offered: Yes.   Is follow up appointment scheduled within 7 days of discharge? Yes.    Follow Up  Future Appointments   Date Time Provider Department Center   2024  4:50 PM Radha Hernandez PT [facility]   2024  4:10 PM Radha Hernandez PT [facility] PT [facility]   2024  1:00 PM Isiah Adair APRN - CNP  [facility]   2024  4:10 PM Radha Hernandez PT [facility] PT [facility]   2024  4:50 PM Radha Hernandez PT [facility] PT [facility]   2024  4:50 PM Radha Hernandez PT [facility] PT [facility]   2024  4:50 PM Radha Hernandez PT [facility] PT [facility]   2024  8:00 AM Day, Lavell KIMBLE MD [facility]

## 2024-04-17 NOTE — TELEPHONE ENCOUNTER
Note below.  Please check in on Sidia to see if her symptoms are beginning to improve. We stopped/changed some medications last visit.

## 2024-04-18 ENCOUNTER — OFFICE VISIT (OUTPATIENT)
Dept: FAMILY MEDICINE CLINIC | Age: 69
End: 2024-04-18
Payer: MEDICARE

## 2024-04-18 ENCOUNTER — APPOINTMENT (OUTPATIENT)
Dept: PHYSICAL THERAPY | Age: 69
End: 2024-04-18
Payer: MEDICARE

## 2024-04-18 VITALS
BODY MASS INDEX: 30.18 KG/M2 | TEMPERATURE: 97.8 F | SYSTOLIC BLOOD PRESSURE: 134 MMHG | WEIGHT: 187.8 LBS | DIASTOLIC BLOOD PRESSURE: 80 MMHG | OXYGEN SATURATION: 95 % | HEART RATE: 99 BPM | HEIGHT: 66 IN | RESPIRATION RATE: 20 BRPM

## 2024-04-18 DIAGNOSIS — R25.2 BILATERAL LEG CRAMPS: ICD-10-CM

## 2024-04-18 DIAGNOSIS — R30.0 DYSURIA: Primary | ICD-10-CM

## 2024-04-18 PROCEDURE — 1123F ACP DISCUSS/DSCN MKR DOCD: CPT | Performed by: FAMILY MEDICINE

## 2024-04-18 PROCEDURE — 3079F DIAST BP 80-89 MM HG: CPT | Performed by: FAMILY MEDICINE

## 2024-04-18 PROCEDURE — G8427 DOCREV CUR MEDS BY ELIG CLIN: HCPCS | Performed by: FAMILY MEDICINE

## 2024-04-18 PROCEDURE — 3075F SYST BP GE 130 - 139MM HG: CPT | Performed by: FAMILY MEDICINE

## 2024-04-18 PROCEDURE — 1090F PRES/ABSN URINE INCON ASSESS: CPT | Performed by: FAMILY MEDICINE

## 2024-04-18 PROCEDURE — G8399 PT W/DXA RESULTS DOCUMENT: HCPCS | Performed by: FAMILY MEDICINE

## 2024-04-18 PROCEDURE — G8417 CALC BMI ABV UP PARAM F/U: HCPCS | Performed by: FAMILY MEDICINE

## 2024-04-18 PROCEDURE — 1036F TOBACCO NON-USER: CPT | Performed by: FAMILY MEDICINE

## 2024-04-18 PROCEDURE — 1111F DSCHRG MED/CURRENT MED MERGE: CPT | Performed by: FAMILY MEDICINE

## 2024-04-18 PROCEDURE — 99214 OFFICE O/P EST MOD 30 MIN: CPT | Performed by: FAMILY MEDICINE

## 2024-04-18 PROCEDURE — 3017F COLORECTAL CA SCREEN DOC REV: CPT | Performed by: FAMILY MEDICINE

## 2024-04-18 RX ORDER — VITAMIN B COMPLEX
1 TABLET ORAL DAILY
Qty: 30 CAPSULE | Refills: 0 | Status: SHIPPED | OUTPATIENT
Start: 2024-04-18

## 2024-04-18 RX ORDER — NITROFURANTOIN 25; 75 MG/1; MG/1
100 CAPSULE ORAL 2 TIMES DAILY
Qty: 14 CAPSULE | Refills: 0 | Status: SHIPPED | OUTPATIENT
Start: 2024-04-18 | End: 2024-04-25

## 2024-04-18 NOTE — PROGRESS NOTES
4/18/2024    This is a 68 y.o. female   Chief Complaint   Patient presents with    Leg Pain     Bilateral leg pain has gotten worse.    Dysuria     Over a week.  Burning. Frequency.  Pressure.     HPI    Urinary concern  - notes dysuria, frequency, burning, and pressure. Going on for about 5 days or so. Does not seem to be getting better. Mild suprapubic discomfort. No other abdominal pain    Leg cramping  - chronic. Worsening. Bad at night when trying to sleep. Both lower legs from calves to her toes.   - going on for months  - getting up and walking does not seem to help  - has tried cold water wraps with some minimal improvement    At our last visit, muscle relaxers and gabapentin were stopped due to side effects (this was about 2 weeks ago)  She is on iron and on magnesium    Review of Systems     Current Outpatient Medications   Medication Sig Dispense Refill    Coenzyme Q10 (COQ10) 100 MG CAPS Take 1 capsule by mouth daily 30 capsule 0    nitrofurantoin, macrocrystal-monohydrate, (MACROBID) 100 MG capsule Take 1 capsule by mouth 2 times daily for 7 days 14 capsule 0    ONETOUCH ULTRA strip USE AS DIRECTED 100 strip 4    pravastatin (PRAVACHOL) 40 MG tablet TAKE 1 TABLET BY MOUTH DAILY 90 tablet 1    dulaglutide (TRULICITY) 1.5 MG/0.5ML SC injection Inject 0.5 mLs into the skin once a week      FANAPT 1 MG TABS tablet Take 1 tablet by mouth nightly 90 tablet 0    fluticasone (FLONASE) 50 MCG/ACT nasal spray 2 sprays by Each Nostril route daily Shake liquid 3 each 5    ondansetron (ZOFRAN-ODT) 4 MG disintegrating tablet Take 1 tablet by mouth 3 times daily as needed for Nausea or Vomiting 21 tablet 0    dicyclomine (BENTYL) 10 MG capsule Take 1 capsule by mouth every 6 hours as needed (abdominal cramping) 20 capsule 0    DULoxetine (CYMBALTA) 60 MG extended release capsule Take 1 capsule by mouth daily 90 capsule 1    traZODone (DESYREL) 100 MG tablet Take 1 tablet by mouth nightly 90 tablet 1    omeprazole

## 2024-04-19 ENCOUNTER — CARE COORDINATION (OUTPATIENT)
Dept: CASE MANAGEMENT | Age: 69
End: 2024-04-19

## 2024-04-19 NOTE — CARE COORDINATION
[facility] Transitions Follow Up Call    2024    Patient: John Wood  Patient : 1955   MRN: [id number]  Reason for Admission: AMS   Discharge Date: 24 RARS: Readmission Risk Score: 15.6         Spoke with: je    [facility] attempted outreach for care transition follow up call. Left HIPPA compliant message and contact information for call back.     Care Transitions Subsequent and Final Call    Subsequent and Final Calls  Care Transitions Interventions  Other Interventions:             Follow Up  Future Appointments   Date Time Provider Department Center   2024  1:00 PM Isiah Adair, APRN - CNP  [facility]   2024  4:10 PM Radha Hernandez, PT [facility] PT [facility]   2024  4:50 PM Radha Hernandez, PT [facility] PT [facility]   2024  4:50 PM Radha Hernandez, PT [facility] PT [facility]   2024  4:50 PM Radha Hernandez, PT [facility] PT [facility]   2024  8:00 AM Sivan, Lavell KIMBLE MD [facility] [place] - DYD   2024  8:00 AM Archana Haskins MD [facility]   2024  8:00 AM Amna Owens MD [facility]       Anne Marie Welch LPN

## 2024-04-21 DIAGNOSIS — F39 MOOD DISORDER (HCC): ICD-10-CM

## 2024-04-22 RX ORDER — ILOPERIDONE 1 MG/1
1 TABLET ORAL NIGHTLY
Qty: 90 TABLET | Refills: 0 | Status: SHIPPED | OUTPATIENT
Start: 2024-04-22

## 2024-04-23 ENCOUNTER — OFFICE VISIT (OUTPATIENT)
Dept: PSYCHIATRY | Age: 69
End: 2024-04-23
Payer: MEDICARE

## 2024-04-23 ENCOUNTER — HOSPITAL ENCOUNTER (OUTPATIENT)
Dept: PHYSICAL THERAPY | Age: 69
Setting detail: THERAPIES SERIES
Discharge: HOME | End: 2024-04-23
Payer: MEDICARE

## 2024-04-23 VITALS — WEIGHT: 185.6 LBS | BODY MASS INDEX: 29.97 KG/M2

## 2024-04-23 DIAGNOSIS — F41.9 ANXIETY: ICD-10-CM

## 2024-04-23 DIAGNOSIS — F34.1 PERSISTENT DEPRESSIVE DISORDER: ICD-10-CM

## 2024-04-23 DIAGNOSIS — F20.5: Primary | ICD-10-CM

## 2024-04-23 DIAGNOSIS — G24.01 TARDIVE DYSKINESIA: ICD-10-CM

## 2024-04-23 PROCEDURE — 3017F COLORECTAL CA SCREEN DOC REV: CPT | Performed by: REGISTERED NURSE

## 2024-04-23 PROCEDURE — 1036F TOBACCO NON-USER: CPT | Performed by: REGISTERED NURSE

## 2024-04-23 PROCEDURE — 1090F PRES/ABSN URINE INCON ASSESS: CPT | Performed by: REGISTERED NURSE

## 2024-04-23 PROCEDURE — 97140 MANUAL THERAPY 1/> REGIONS: CPT

## 2024-04-23 PROCEDURE — G8399 PT W/DXA RESULTS DOCUMENT: HCPCS | Performed by: REGISTERED NURSE

## 2024-04-23 PROCEDURE — 97110 THERAPEUTIC EXERCISES: CPT

## 2024-04-23 PROCEDURE — 1123F ACP DISCUSS/DSCN MKR DOCD: CPT | Performed by: REGISTERED NURSE

## 2024-04-23 PROCEDURE — 99205 OFFICE O/P NEW HI 60 MIN: CPT | Performed by: REGISTERED NURSE

## 2024-04-23 PROCEDURE — 1111F DSCHRG MED/CURRENT MED MERGE: CPT | Performed by: REGISTERED NURSE

## 2024-04-23 PROCEDURE — G8417 CALC BMI ABV UP PARAM F/U: HCPCS | Performed by: REGISTERED NURSE

## 2024-04-23 PROCEDURE — G8428 CUR MEDS NOT DOCUMENT: HCPCS | Performed by: REGISTERED NURSE

## 2024-04-23 RX ORDER — DULOXETIN HYDROCHLORIDE 30 MG/1
30 CAPSULE, DELAYED RELEASE ORAL EVERY MORNING
Qty: 90 CAPSULE | Refills: 0 | Status: SHIPPED | OUTPATIENT
Start: 2024-04-23 | End: 2024-07-22

## 2024-04-23 RX ORDER — DULOXETIN HYDROCHLORIDE 60 MG/1
60 CAPSULE, DELAYED RELEASE ORAL NIGHTLY
Qty: 90 CAPSULE | Refills: 1 | Status: SHIPPED | OUTPATIENT
Start: 2024-04-23

## 2024-04-23 ASSESSMENT — PATIENT HEALTH QUESTIONNAIRE - PHQ9
7. TROUBLE CONCENTRATING ON THINGS, SUCH AS READING THE NEWSPAPER OR WATCHING TELEVISION: MORE THAN HALF THE DAYS
6. FEELING BAD ABOUT YOURSELF - OR THAT YOU ARE A FAILURE OR HAVE LET YOURSELF OR YOUR FAMILY DOWN: MORE THAN HALF THE DAYS
2. FEELING DOWN, DEPRESSED OR HOPELESS: SEVERAL DAYS
5. POOR APPETITE OR OVEREATING: SEVERAL DAYS
SUM OF ALL RESPONSES TO PHQ QUESTIONS 1-9: 13
4. FEELING TIRED OR HAVING LITTLE ENERGY: SEVERAL DAYS
1. LITTLE INTEREST OR PLEASURE IN DOING THINGS: MORE THAN HALF THE DAYS
9. THOUGHTS THAT YOU WOULD BE BETTER OFF DEAD, OR OF HURTING YOURSELF: NOT AT ALL
SUM OF ALL RESPONSES TO PHQ QUESTIONS 1-9: 13
SUM OF ALL RESPONSES TO PHQ QUESTIONS 1-9: 13
8. MOVING OR SPEAKING SO SLOWLY THAT OTHER PEOPLE COULD HAVE NOTICED. OR THE OPPOSITE, BEING SO FIGETY OR RESTLESS THAT YOU HAVE BEEN MOVING AROUND A LOT MORE THAN USUAL: MORE THAN HALF THE DAYS
3. TROUBLE FALLING OR STAYING ASLEEP: MORE THAN HALF THE DAYS
SUM OF ALL RESPONSES TO PHQ QUESTIONS 1-9: 13
SUM OF ALL RESPONSES TO PHQ9 QUESTIONS 1 & 2: 3

## 2024-04-23 ASSESSMENT — ANXIETY QUESTIONNAIRES
7. FEELING AFRAID AS IF SOMETHING AWFUL MIGHT HAPPEN: SEVERAL DAYS
2. NOT BEING ABLE TO STOP OR CONTROL WORRYING: NEARLY EVERY DAY
5. BEING SO RESTLESS THAT IT IS HARD TO SIT STILL: NEARLY EVERY DAY
6. BECOMING EASILY ANNOYED OR IRRITABLE: MORE THAN HALF THE DAYS
4. TROUBLE RELAXING: MORE THAN HALF THE DAYS
1. FEELING NERVOUS, ANXIOUS, OR ON EDGE: NEARLY EVERY DAY
3. WORRYING TOO MUCH ABOUT DIFFERENT THINGS: SEVERAL DAYS

## 2024-04-23 NOTE — FLOWSHEET NOTE
[facility]- Outpatient Rehabilitation and Therapy [address] [place] [address] office: [phone] fax: [phone]         Physical Therapy: TREATMENT/PROGRESS NOTE   Patient: John Wood (68 y.o. female)   Examination Date: 2024   :  1955 MRN: [id number]   Visit #: 3 /8  Insurance Allowable Auth Needed   BMN []Yes    [x]No    Insurance: Payor: [facility] MEDICARE / Plan: [employer] DUAL COMPLETE / Product Type: *No Product type* /   Insurance ID: [id number] - (Medicare Managed)  Secondary Insurance (if applicable): [employer] [facility] *   Treatment Diagnosis:     ICD-10-CM    1. Weakness  R53.1       2. Functional gait abnormality  R26.89       3. Decreased functional mobility and endurance  Z74.09       4. Pain  R52          Medical Diagnosis:  Right knee pain [M25.561]   Referring Physician: Jonathan Zhu MD  PCP: Lavell Finnegan MD     Plan of care signed (Y/N):     Date of Patient follow up with Physician:      Progress Report/POC: NO  POC update due: (10 visits /OR AUTH LIMITS, whichever is less)  5/3/2024                                             Precautions/ Contra-indications:           Latex allergy:  NO  Pacemaker:    NO  Contraindications for Manipulation: NA  Date of Surgery: R TKR on  2021  Other:    Red Flags:  None    C-SSRS Triggered by Intake questionnaire:   [] No, Questionnaire did not trigger screening.   [x] Yes, Patient intake triggered further evaluation      [x] C-SSRS Screening completed  [x] PCP notified via Plan of Care  [] Emergency services notified   C-SSRS Triggered by Intake questionnaire:     YES NO   In the past month, have you wished you were dead or wished you could go to sleep and not wake up?  X   In the past month, have you had any thoughts of killing yourself?  X                    Lifetime   YES NO   Have you ever done anything, started to do anything, or prepared to do anything to end your life?   X             Past 3 months

## 2024-04-23 NOTE — PROGRESS NOTES
topically daily. 19   Day, Lavell KIMBLE MD      Allergies  Allergies   Allergen Reactions    Codeine Nausea And Vomiting and Rash    Vicodin [Hydrocodone-Acetaminophen] Nausea And Vomiting    Lipitor [Atorvastatin]      Myalgias    Oxycontin [Oxycodone Hcl] Itching    Tramadol Itching and Swelling      Chemical Dependency History:   Social History     Tobacco Use    Smoking status: Former     Current packs/day: 0.00     Average packs/day: 1.5 packs/day for 23.0 years (34.5 ttl pk-yrs)     Types: Cigarettes     Start date: 1969     Quit date: 1992     Years since quittin.3     Passive exposure: Past    Smokeless tobacco: Never   Substance Use Topics    Alcohol use: No        Illicit: denies     ETOH: denies     Nicotine: former smoker     Caffeine: drinks 2-3 cups coffee daily     Family Hx:    Family History   Problem Relation Age of Onset    Heart Disease Mother     High Blood Pressure Mother     Stroke Mother     Cancer Brother         lung cancer        Social Hx:     Developmental: Born/raised in [place]. Came to the [place] when she was 25 years    Marital Status: 52 years     Children:  three children ( two boys and a girl), 10 grandchildren and one great grandchildren.      Housing: lives with .     Educational: Went to high school in [employer]     Latter day: Church. She goes to [employer]. Her son is a .     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    Current Medications Ordered:  Current Outpatient Medications on File Prior to Visit   Medication Sig Dispense Refill    FANAPT 1 MG TABS tablet TAKE 1 TABLET BY MOUTH EVERY NIGHT 90 tablet 0    Coenzyme Q10 (COQ10) 100 MG CAPS Take 1 capsule by mouth daily 30 capsule 0    nitrofurantoin, macrocrystal-monohydrate, (MACROBID) 100 MG capsule Take 1 capsule by mouth 2 times daily for 7 days 14 capsule 0    ONETOUCH ULTRA strip USE AS

## 2024-04-23 NOTE — PATIENT INSTRUCTIONS
Here are some of Psychiatric Emergency resources for you:     National suicide hotlines: 988, 9-012-723-TALK (1-281.832.8593) and 8-719-HMPIUUD (1-568.113.4591).   2.  Call 911 or go to any nearest emergency room   3.   Access the [facility] Emergency Psychiatry Services:     - Go to the  Psychiatric Emergency Services (PES) at [address] [address]   - Call the  PES at [phone].    - Call the  Mobile Crisis Team at [phone]     Anti-depressant drugs: This class of drugs can cause sedation, blurred vision, dry-mouth, constipation, postural hypotension, urinary retention, tachycardia, muscle tremors, agitation, headache, skin rash, photo sensitivity, excessive weight gain, glaucoma, heart disease, lowering seizure threshold, increase risk of suicidal thinking or ideations, excessive sweating, sextual dysfunction, insomnia, anxiety, bruxism, GI bleeding, pregnancy complications and birth defects, possible death, etc.      Anti-anxiety drugs: Sedation, morning hangover, ataxia, nausea, respiratory depression, decrease cognitive function, light-headiness, withdrawal effects, anterograde amnesia, possible death, etc.

## 2024-04-24 ENCOUNTER — CARE COORDINATION (OUTPATIENT)
Dept: CASE MANAGEMENT | Age: 69
End: 2024-04-24

## 2024-04-24 NOTE — CARE COORDINATION
Left HIPPA compliant message regarding the nature of the call and a request for a return call with my contact information      SILVIA LiuN, [phone]  [facility] Transition Nurse

## 2024-04-25 ENCOUNTER — CARE COORDINATION (OUTPATIENT)
Dept: CARE COORDINATION | Age: 69
End: 2024-04-25

## 2024-04-25 ENCOUNTER — HOSPITAL ENCOUNTER (OUTPATIENT)
Dept: PHYSICAL THERAPY | Age: 69
Setting detail: THERAPIES SERIES
Discharge: HOME | End: 2024-04-25
Payer: MEDICARE

## 2024-04-25 PROCEDURE — 97110 THERAPEUTIC EXERCISES: CPT

## 2024-04-25 PROCEDURE — 97140 MANUAL THERAPY 1/> REGIONS: CPT

## 2024-04-25 NOTE — CARE COORDINATION
Care Transitions Follow Up Call  Patient: John Wood  Patient : 1955   MRN: [id number]  Reason for Admission: AMS  Discharge Date: 24 RARS: Readmission Risk Score: 15.6    Attempted to reach pt for follow up call. Left message requesting call back. CT episode resolved due to inability to reach pt on multiple attempts.     Follow Up  Future Appointments   Date Time Provider Department Center   2024  4:50 PM Radha Hernandez, PT [facility] PT [facility]   2024  4:50 PM Radha Hernandez, PT [facility] PT [facility]   2024  4:50 PM Radha Hernandez, PT [facility] PT [facility]   2024  8:00 AM , Lavell KIMBLE MD [facility] [place] - DYD   2024  3:00 PM Isiah Adair, APRN - CNP  [facility]   2024  8:00 AM Archana Haskins MD  PUL[facility]   2024  8:00 AM Amna Owens MD [facility]

## 2024-04-30 ENCOUNTER — HOSPITAL ENCOUNTER (OUTPATIENT)
Dept: PHYSICAL THERAPY | Age: 69
Setting detail: THERAPIES SERIES
Discharge: HOME | End: 2024-04-30
Payer: MEDICARE

## 2024-04-30 PROCEDURE — 97110 THERAPEUTIC EXERCISES: CPT

## 2024-04-30 PROCEDURE — 97140 MANUAL THERAPY 1/> REGIONS: CPT

## 2024-04-30 PROCEDURE — 97530 THERAPEUTIC ACTIVITIES: CPT

## 2024-04-30 NOTE — FLOWSHEET NOTE
[facility] Outpatient Rehabilitation and Therapy [address] [place] [address] office: [phone] fax: [phone]         Physical Therapy: TREATMENT/PROGRESS NOTE   Patient: John Wood (68 y.o. female)   Examination Date: 2024   :  1955 MRN: [id number]   Visit #:   Insurance Allowable Auth Needed   BMN []Yes    [x]No    Insurance: Payor: [facility] MEDICARE / Plan: [employer] DUAL COMPLETE / Product Type: *No Product type* /   Insurance ID: [id number] - (Medicare Managed)  Secondary Insurance (if applicable): [employer] [facility] *   Treatment Diagnosis:     ICD-10-CM    1. Weakness  R53.1       2. Functional gait abnormality  R26.89       3. Decreased functional mobility and endurance  Z74.09       4. Pain  R52          Medical Diagnosis:  Right knee pain [M25.561]   Referring Physician: Jonathan Zhu MD  PCP: Lavell Finnegan MD     Plan of care signed (Y/N):     Date of Patient follow up with Physician:      Progress Report/POC: YES, Date Range for this report: eval  to 24  POC update due: (10 visits /OR AUTH LIMITS, whichever is less)  5/3/2024                                             Precautions/ Contra-indications:           Latex allergy:  NO  Pacemaker:    NO  Contraindications for Manipulation: NA  Date of Surgery: R TKR on  2021  Other:    Red Flags:  None    C-SSRS Triggered by Intake questionnaire:   [] No, Questionnaire did not trigger screening.   [x] Yes, Patient intake triggered further evaluation      [x] C-SSRS Screening completed  [x] PCP notified via Plan of Care  [] Emergency services notified   C-SSRS Triggered by Intake questionnaire:     YES NO   In the past month, have you wished you were dead or wished you could go to sleep and not wake up?  X   In the past month, have you had any thoughts of killing yourself?  X                    Lifetime   YES NO   Have you ever done anything, started to do anything, or prepared to do anything to

## 2024-05-02 ENCOUNTER — APPOINTMENT (OUTPATIENT)
Dept: PHYSICAL THERAPY | Age: 69
End: 2024-05-02
Payer: MEDICARE

## 2024-05-06 ENCOUNTER — HOSPITAL ENCOUNTER (OUTPATIENT)
Dept: PHYSICAL THERAPY | Age: 69
Setting detail: THERAPIES SERIES
Discharge: HOME | End: 2024-05-06
Payer: MEDICARE

## 2024-05-06 PROCEDURE — 97530 THERAPEUTIC ACTIVITIES: CPT

## 2024-05-06 PROCEDURE — 97140 MANUAL THERAPY 1/> REGIONS: CPT

## 2024-05-06 PROCEDURE — 97110 THERAPEUTIC EXERCISES: CPT

## 2024-05-06 NOTE — FLOWSHEET NOTE
[facility]- Outpatient Rehabilitation and Therapy [address] [place] [address] office: [phone] fax: [phone]         Physical Therapy: TREATMENT/PROGRESS NOTE   Patient: John Wood (68 y.o. female)   Examination Date: 2024   :  1955 MRN: [id number]   Visit #:   Insurance Allowable Auth Needed   BMN []Yes    [x]No    Insurance: Payor: [facility] MEDICARE / Plan: UNITEDHEALTHCARE DUAL COMPLETE / Product Type: *No Product type* /   Insurance ID: [id number] - (Medicare Managed)  Secondary Insurance (if applicable): [employer] [facility] *   Treatment Diagnosis:     ICD-10-CM    1. Weakness  R53.1       2. Functional gait abnormality  R26.89       3. Decreased functional mobility and endurance  Z74.09       4. Pain  R52          Medical Diagnosis:  Right knee pain [M25.561]   Referring Physician: Jonathan Zhu MD  PCP: Lavell Finnegan MD     Plan of care signed (Y/N):     Date of Patient follow up with Physician:      Progress Report/POC: YES, Date Range for this report: eval  to 24  POC update due: (10 visits /OR AUTH LIMITS, whichever is less)  5/3/2024                                             Precautions/ Contra-indications:           Latex allergy:  NO  Pacemaker:    NO  Contraindications for Manipulation: NA  Date of Surgery: R TKR on  2021  Other:    Red Flags:  None    C-SSRS Triggered by Intake questionnaire:   [] No, Questionnaire did not trigger screening.   [x] Yes, Patient intake triggered further evaluation      [x] C-SSRS Screening completed  [x] PCP notified via Plan of Care  [] Emergency services notified   C-SSRS Triggered by Intake questionnaire:     YES NO   In the past month, have you wished you were dead or wished you could go to sleep and not wake up?  X   In the past month, have you had any thoughts of killing yourself?  X                    Lifetime   YES NO   Have you ever done anything, started to do anything, or prepared to do anything to

## 2024-05-09 ENCOUNTER — HOSPITAL ENCOUNTER (OUTPATIENT)
Dept: PHYSICAL THERAPY | Age: 69
Setting detail: THERAPIES SERIES
Discharge: HOME | End: 2024-05-09
Payer: MEDICARE

## 2024-05-09 PROCEDURE — 97530 THERAPEUTIC ACTIVITIES: CPT

## 2024-05-09 PROCEDURE — 97140 MANUAL THERAPY 1/> REGIONS: CPT

## 2024-05-09 PROCEDURE — 97112 NEUROMUSCULAR REEDUCATION: CPT

## 2024-05-09 PROCEDURE — 97110 THERAPEUTIC EXERCISES: CPT

## 2024-05-09 NOTE — FLOWSHEET NOTE
Progression has been slowed due to co-morbidities.  [] Plan just implemented, too soon (<30days) to assess goals progression   [] Goals require adjustment due to lack of progress  [] Patient is not progressing as expected and requires additional follow up with physician  [] Other:     TREATMENT PLAN     Frequency/Duration: 2x/week for 4-6 weeks for the following treatment interventions:    Interventions:  [x] Therapeutic exercise including: strength training, ROM, including postural re-education.   [x] NMR activation and proprioception, including postural re-education.    [x] Manual therapy as indicated to include: PROM, Gr I-IV mobilizations, STM, and Dry Needling/IASTM  [x] Modalities as needed that may include: Cryotherapy, Electrical Stimulation, Thermal Agents, and Ultrasound  [x] Patient education on joint protection, postural re-education, activity modification, progression of HEP.        [x] Aquatic Therapy    Plan: Cont POC- Continue emphasis/focus on exercise progression, improving proper muscle recruitment and activation/motor control patterns, modulating pain, improving soft tissue extensibility, and static and dynamic balance. Next visit plan to progress and add ex as tolerated and indicated above. Will progress with strengthening and balance and taper off STM if pain levels remain low. P;thanh for reassessment in 1 week.       Electronically Signed by Radha Hernandez, PT MPT [id number] Date: 05/09/2024     Note: Portions of this note have been templated and/or copied from initial evaluation, reassessments and prior notes for documentation efficiency.

## 2024-05-14 ENCOUNTER — HOSPITAL ENCOUNTER (OUTPATIENT)
Dept: PHYSICAL THERAPY | Age: 69
Setting detail: THERAPIES SERIES
Discharge: HOME | End: 2024-05-14
Payer: MEDICARE

## 2024-05-14 PROCEDURE — 97110 THERAPEUTIC EXERCISES: CPT

## 2024-05-14 PROCEDURE — 97530 THERAPEUTIC ACTIVITIES: CPT

## 2024-05-14 NOTE — FLOWSHEET NOTE
[facility]- Outpatient Rehabilitation and Therapy [address] [place] [address] office: [phone] fax: [phone]         Physical Therapy: TREATMENT/PROGRESS NOTE   Patient: John Wood (68 y.o. female)   Examination Date: 2024   :  1955 MRN: [id number]   Visit #:   Insurance Allowable Auth Needed   BMN []Yes    [x]No    Insurance: Payor: [facility] MEDICARE / Plan: [employer] DUAL COMPLETE / Product Type: *No Product type* /   Insurance ID: [id number] - (Medicare Managed)  Secondary Insurance (if applicable): [facility] [facility] *   Treatment Diagnosis:     ICD-10-CM    1. Weakness  R53.1       2. Functional gait abnormality  R26.89       3. Decreased functional mobility and endurance  Z74.09       4. Pain  R52          Medical Diagnosis:  Right knee pain [M25.561]   Referring Physician: Jonathan Zhu MD  PCP: Lavell Finnegan MD     Plan of care signed (Y/N):     Date of Patient follow up with Physician:      Progress Report/POC: NO  POC update due: (10 visits /OR AUTH LIMITS, whichever is less)  5/3/2024                                             Precautions/ Contra-indications:           Latex allergy:  NO  Pacemaker:    NO  Contraindications for Manipulation: NA  Date of Surgery: R TKR on  2021  Other:    Red Flags:  None    C-SSRS Triggered by Intake questionnaire:   [] No, Questionnaire did not trigger screening.   [x] Yes, Patient intake triggered further evaluation      [x] C-SSRS Screening completed  [x] PCP notified via Plan of Care  [] Emergency services notified   C-SSRS Triggered by Intake questionnaire:     YES NO   In the past month, have you wished you were dead or wished you could go to sleep and not wake up?  X   In the past month, have you had any thoughts of killing yourself?  X                    Lifetime   YES NO   Have you ever done anything, started to do anything, or prepared to do anything to end your life?   X             Past 3 months

## 2024-05-16 ENCOUNTER — HOSPITAL ENCOUNTER (OUTPATIENT)
Dept: PHYSICAL THERAPY | Age: 69
Setting detail: THERAPIES SERIES
Discharge: HOME | End: 2024-05-16
Payer: MEDICARE

## 2024-05-16 PROCEDURE — 97530 THERAPEUTIC ACTIVITIES: CPT

## 2024-05-16 PROCEDURE — 97112 NEUROMUSCULAR REEDUCATION: CPT

## 2024-05-16 PROCEDURE — 97110 THERAPEUTIC EXERCISES: CPT

## 2024-05-16 NOTE — PLAN OF CARE
improve functional performance. (Ex include squatting, ascending/descending stairs, walking, bending, lifting, catching, throwing, pushing, pulling, jumping.)  Direct, one on one contact, billed in 15-minute increments.      GOALS     Updated 5/16/24  *overall 70% improved with therapy per pt   *walking better, steps better, and in/out of car better  *pain intensity is much better too with pt rating pain at 1-2/10 pressure in calves only now  *uses Icy Hot cream every night and hep daily   *MMT hip flex L  5/5 R 5/5; abd L 5/5 R 4/5 due to pain medial knee/hip adductors             QD L  5/5 R 5/5             HS L 5/5  R 5/5   *30 sec sit -stand: 8    *will DC to independence with hep at this time         Patient stated goal: \" move without pain\"  Status: [] Progressing: [x] Met: [] Not Met: [] Adjusted    Therapist goals for Patient:   Short Term Goals: To be achieved in: 2-4 weeks  Independent in HEP and progression per patient tolerance, in order to progress toward full function and prevent re-injury.    Status: [] Progressing: [x] Met: [] Not Met: [] Adjusted  Patient will have a decrease in pain to 3-4/10 to help facilitate improvement in movement, function, and ADLs as indicated by functional deficits.   Status: [] Progressing: [x] Met: [] Not Met: [] Adjusted    Long Term Goals: To be achieved in: at DC  Disability index score of 37 or less for the WOMAC to assist with return top prior level of function.   Status: [x] Progressing: [] Met: [] Not Met: [] Adjusted  Pt to improve strength to 4+/5 or better of proximal hip and quadriceps to allow for proper muscle and joint use in functional mobility, ADLs and prior level of function   Status: [x] Progressing: [] Met: [] Not Met: [] Adjusted  Patient will return to  Usual work, housework or activities  without increased symptoms or restriction to work towards return to prior level of function.     Status: [] Progressing: [x] Met: [] Not Met: [] Adjusted  Patient

## 2024-06-07 DIAGNOSIS — E11.9 TYPE 2 DIABETES MELLITUS WITHOUT COMPLICATION, WITH LONG-TERM CURRENT USE OF INSULIN (HCC): ICD-10-CM

## 2024-06-07 DIAGNOSIS — Z79.4 TYPE 2 DIABETES MELLITUS WITHOUT COMPLICATION, WITH LONG-TERM CURRENT USE OF INSULIN (HCC): ICD-10-CM

## 2024-07-02 ENCOUNTER — CLINICAL DOCUMENTATION (OUTPATIENT)
Dept: PSYCHIATRY | Age: 69
End: 2024-07-02

## 2024-07-02 NOTE — PROGRESS NOTES
Patient had NCNS for f/u appointment on 7/2/2024 with integrated behavioral health services. Patient needs to make f/u appointment before refill for any psychotropic medications will be done.

## 2024-07-22 NOTE — PROGRESS NOTES
PSYCHIATRY OUT PATIENT FOLLOW UP    Patient name: John Wood  : 1955  Date of service: 24  PCP: Lavell Finnegan MD    Dx:  1. Tardive dyskinesia  2. Schizophrenia, residual, in remission (HCC)  3. Persistent depressive disorder  4. Anxiety  5. Mood disorder (HCC) - follows with Psych Dr. Lynne  -     traZODone (DESYREL) 100 MG tablet; Take 1 tablet by mouth nightly, Disp-90 tablet, R-0Ok for early refill, patient is out of medication*Normal      Assessment and plan    Psychiatric   -- ^ Cymbalta 30 mg in QAM and 60 mg at HS.   - d/c Fanapt 1 mg nightly. Patient has weaned her self off for  awhile   - Continue Trazodone 100 mg nightly   - discussed holistic approaches and coping skills   - Discussed holistic approaches and coping skills to MH symptoms management.   -Medication R/B/SE discussed and patient gave verbal consent for tx.  -Practice complementary health approaches such as: self-management strategies, relaxation techniques, yoga, and physical exercise as tolerated.   2. Safety  -NO Imminent risk of danger to self/others based on today's assessment. Patient is appropriate for outpatient level of care.  Safety plan includes: 988, 911, PES, hotlines, and interventions discussed today.   3.  Psychotherapy  -defer   4.  Substance   - no reported substance abuse   5. Medical   - Follow with PCP  6. RTC in 3 months or earlier if your symptoms fail to improve or go to nearest ER if having active SI/HI.   Evaluated medications and assessed for side effects and effectiveness. Assessed patient's educational needs including reviewing plan of care, medications,diagnosis, treatment options, and prognosis. I reviewed the plan of care with the patient and the patient consented to the treatment interventions. Patient acknowledged, verbalized understanding, and agreed with plan of care.    Interval progress:   2024 here for a follow up. She has NCNS on 24. Continues to experience facial and lips

## 2024-07-23 ENCOUNTER — OFFICE VISIT (OUTPATIENT)
Dept: PSYCHIATRY | Age: 69
End: 2024-07-23
Payer: MEDICARE

## 2024-07-23 DIAGNOSIS — F20.5: ICD-10-CM

## 2024-07-23 DIAGNOSIS — G24.01 TARDIVE DYSKINESIA: Primary | ICD-10-CM

## 2024-07-23 DIAGNOSIS — F39 MOOD DISORDER (HCC): ICD-10-CM

## 2024-07-23 DIAGNOSIS — F34.1 PERSISTENT DEPRESSIVE DISORDER: ICD-10-CM

## 2024-07-23 DIAGNOSIS — F41.9 ANXIETY: ICD-10-CM

## 2024-07-23 PROCEDURE — G8428 CUR MEDS NOT DOCUMENT: HCPCS | Performed by: REGISTERED NURSE

## 2024-07-23 PROCEDURE — G8417 CALC BMI ABV UP PARAM F/U: HCPCS | Performed by: REGISTERED NURSE

## 2024-07-23 PROCEDURE — G8399 PT W/DXA RESULTS DOCUMENT: HCPCS | Performed by: REGISTERED NURSE

## 2024-07-23 PROCEDURE — 3017F COLORECTAL CA SCREEN DOC REV: CPT | Performed by: REGISTERED NURSE

## 2024-07-23 PROCEDURE — 1123F ACP DISCUSS/DSCN MKR DOCD: CPT | Performed by: REGISTERED NURSE

## 2024-07-23 PROCEDURE — 99213 OFFICE O/P EST LOW 20 MIN: CPT | Performed by: REGISTERED NURSE

## 2024-07-23 PROCEDURE — 1090F PRES/ABSN URINE INCON ASSESS: CPT | Performed by: REGISTERED NURSE

## 2024-07-23 PROCEDURE — 1036F TOBACCO NON-USER: CPT | Performed by: REGISTERED NURSE

## 2024-07-23 RX ORDER — TRAZODONE HYDROCHLORIDE 100 MG/1
100 TABLET ORAL NIGHTLY
Qty: 90 TABLET | Refills: 0 | Status: SHIPPED | OUTPATIENT
Start: 2024-07-23 | End: 2024-10-21

## 2024-07-23 RX ORDER — DULOXETIN HYDROCHLORIDE 60 MG/1
60 CAPSULE, DELAYED RELEASE ORAL NIGHTLY
Qty: 90 CAPSULE | Refills: 0 | Status: SHIPPED | OUTPATIENT
Start: 2024-07-23 | End: 2024-10-21

## 2024-07-23 RX ORDER — DULOXETIN HYDROCHLORIDE 30 MG/1
30 CAPSULE, DELAYED RELEASE ORAL EVERY MORNING
Qty: 90 CAPSULE | Refills: 0 | OUTPATIENT
Start: 2024-07-23

## 2024-07-23 NOTE — PATIENT INSTRUCTIONS
Here are some of Psychiatric Emergency resources for you:     National suicide hotlines: 988, 4-715-019-TALK (1-591.708.2696) and 0-190-CSVFKDW (1-748.747.2330).   2.  Call 911 or go to any nearest emergency room   3.   Access the [facility] Emergency Psychiatry Services:     - Go to the  Psychiatric Emergency Services (PES) at [address] [address]   - Call the  PES at [phone].    - Call the  Mobile Crisis Team at [phone]     Anti-depressant drugs: This class of drugs can cause sedation, blurred vision, dry-mouth, constipation, postural hypotension, urinary retention, tachycardia, muscle tremors, agitation, headache, skin rash, photo sensitivity, excessive weight gain, glaucoma, heart disease, lowering seizure threshold, increase risk of suicidal thinking or ideations, excessive sweating, sextual dysfunction, insomnia, anxiety, bruxism, GI bleeding, pregnancy complications and birth defects, possible death, etc.      Anti-anxiety drugs: Sedation, morning hangover, ataxia, nausea, respiratory depression, decrease cognitive function, light-headiness, withdrawal effects, anterograde amnesia, possible death, etc.

## 2024-08-05 RX ORDER — VALSARTAN 160 MG/1
160 TABLET ORAL DAILY
Qty: 90 TABLET | Refills: 1 | Status: SHIPPED | OUTPATIENT
Start: 2024-08-05

## 2024-08-27 ENCOUNTER — HOSPITAL ENCOUNTER (EMERGENCY)
Age: 69
Discharge: HOME | DRG: 948 | End: 2024-08-27
Attending: EMERGENCY MEDICINE
Payer: MEDICARE

## 2024-08-27 VITALS
DIASTOLIC BLOOD PRESSURE: 80 MMHG | HEART RATE: 92 BPM | RESPIRATION RATE: 16 BRPM | TEMPERATURE: 98.2 F | OXYGEN SATURATION: 97 % | SYSTOLIC BLOOD PRESSURE: 123 MMHG

## 2024-08-27 DIAGNOSIS — M54.41 ACUTE RIGHT-SIDED LOW BACK PAIN WITH RIGHT-SIDED SCIATICA: ICD-10-CM

## 2024-08-27 DIAGNOSIS — M79.2 RADICULAR PAIN OF RIGHT UPPER EXTREMITY: Primary | ICD-10-CM

## 2024-08-27 LAB
BACTERIA URNS QL MICRO: NORMAL /HPF
BILIRUB UR QL STRIP.AUTO: NEGATIVE
CLARITY UR: CLEAR
COLOR UR: YELLOW
EPI CELLS #/AREA URNS AUTO: 1 /HPF (ref 0–5)
GLUCOSE UR STRIP.AUTO-MCNC: NEGATIVE MG/DL
HGB UR QL STRIP.AUTO: NEGATIVE
HYALINE CASTS #/AREA URNS AUTO: 0 /LPF (ref 0–8)
KETONES UR STRIP.AUTO-MCNC: NEGATIVE MG/DL
LEUKOCYTE ESTERASE UR QL STRIP.AUTO: ABNORMAL
NITRITE UR QL STRIP.AUTO: NEGATIVE
PH UR STRIP.AUTO: 5.5 [PH] (ref 5–8)
PROT UR STRIP.AUTO-MCNC: NEGATIVE MG/DL
RBC CLUMPS #/AREA URNS AUTO: 0 /HPF (ref 0–4)
SP GR UR STRIP.AUTO: 1.01 (ref 1–1.03)
UA COMPLETE W REFLEX CULTURE PNL UR: ABNORMAL
UA DIPSTICK W REFLEX MICRO PNL UR: YES
URN SPEC COLLECT METH UR: ABNORMAL
UROBILINOGEN UR STRIP-ACNC: 0.2 E.U./DL
WBC #/AREA URNS AUTO: 4 /HPF (ref 0–5)

## 2024-08-27 PROCEDURE — 99283 EMERGENCY DEPT VISIT LOW MDM: CPT

## 2024-08-27 PROCEDURE — 81001 URINALYSIS AUTO W/SCOPE: CPT

## 2024-08-27 PROCEDURE — 6370000000 HC RX 637 (ALT 250 FOR IP): Performed by: EMERGENCY MEDICINE

## 2024-08-27 RX ORDER — CYCLOBENZAPRINE HCL 10 MG
10 TABLET ORAL ONCE
Status: COMPLETED | OUTPATIENT
Start: 2024-08-27 | End: 2024-08-27

## 2024-08-27 RX ORDER — LIDOCAINE 4 G/G
1 PATCH TOPICAL DAILY
Qty: 30 PATCH | Refills: 0 | Status: SHIPPED | OUTPATIENT
Start: 2024-08-27 | End: 2024-09-26

## 2024-08-27 RX ORDER — NAPROXEN 250 MG/1
500 TABLET ORAL ONCE
Status: COMPLETED | OUTPATIENT
Start: 2024-08-27 | End: 2024-08-27

## 2024-08-27 RX ORDER — METHYLPREDNISOLONE 4 MG
TABLET, DOSE PACK ORAL
Qty: 1 KIT | Refills: 0 | Status: ON HOLD | OUTPATIENT
Start: 2024-08-27 | End: 2024-09-05 | Stop reason: HOSPADM

## 2024-08-27 RX ORDER — CYCLOBENZAPRINE HCL 10 MG
10 TABLET ORAL 3 TIMES DAILY PRN
Qty: 15 TABLET | Refills: 0 | Status: SHIPPED | OUTPATIENT
Start: 2024-08-27 | End: 2024-09-01

## 2024-08-27 RX ORDER — PREDNISONE 20 MG/1
60 TABLET ORAL ONCE
Status: COMPLETED | OUTPATIENT
Start: 2024-08-27 | End: 2024-08-27

## 2024-08-27 RX ADMIN — NAPROXEN SODIUM 500 MG: 250 TABLET ORAL at 22:57

## 2024-08-27 RX ADMIN — CYCLOBENZAPRINE HYDROCHLORIDE 10 MG: 10 TABLET, FILM COATED ORAL at 22:57

## 2024-08-27 RX ADMIN — PREDNISONE 60 MG: 20 TABLET ORAL at 22:57

## 2024-08-27 ASSESSMENT — PAIN DESCRIPTION - ORIENTATION: ORIENTATION: RIGHT

## 2024-08-27 ASSESSMENT — PAIN DESCRIPTION - PAIN TYPE: TYPE: ACUTE PAIN

## 2024-08-27 ASSESSMENT — PAIN SCALES - GENERAL: PAINLEVEL_OUTOF10: 7

## 2024-08-27 ASSESSMENT — PAIN - FUNCTIONAL ASSESSMENT: PAIN_FUNCTIONAL_ASSESSMENT: PREVENTS OR INTERFERES SOME ACTIVE ACTIVITIES AND ADLS

## 2024-08-27 ASSESSMENT — PAIN DESCRIPTION - DESCRIPTORS: DESCRIPTORS: SHOOTING

## 2024-08-27 ASSESSMENT — PAIN DESCRIPTION - ONSET: ONSET: ON-GOING

## 2024-08-27 ASSESSMENT — PAIN DESCRIPTION - FREQUENCY: FREQUENCY: CONTINUOUS

## 2024-08-28 NOTE — ED TRIAGE NOTES
Patient to the ER with complaints of right sided neck pain that radiates down her right arm and right groin pain that radiates down her right leg.   Patient says pain started last night.   She does have hx of arthritis.   Denies any known injury.     Alert and oriented x4 at time of triage.

## 2024-08-28 NOTE — DISCHARGE INSTRUCTIONS
Call today or tomorrow to follow up with Lavell Finnegan MD  in 3-4 days.    Use your medications as prescribed.  You were discharged home with naproxen which is an anti-inflammatory medication similar to ibuprofen but stronger.  Do not take ibuprofen while taking naproxen.  You can take doses of Tylenol in between doses of naproxen.  Also do not drink alcohol or drive a vehicle while taking Flexeril which is a muscle relaxer and can make you tired.    You may need to have an outpatient MRI of the spine to rule out disc disease.    Return to the Emergency Department for increase in pain, change in sensation to your leg, increase in weakness, bowel or bladder incontinence, any other care or concern.

## 2024-08-28 NOTE — ED PROVIDER NOTES
EMERGENCY DEPARTMENT ENCOUNTER     [facility] EMERGENCY DEPARTMENT     Pt Name: John Wood   MRN: [id number]   Birthdate 1955   Date of evaluation: 8/27/2024   Provider: Bryson Rasheed MD   PCP: Lavell Finnegan MD   Note Started: 10:26 PM EDT 8/27/24     CHIEF COMPLAINT     Chief Complaint   Patient presents with    Neck Pain     Right side radiating down right arm     Groin Pain     Right groin radiating down right leg        HISTORY OF PRESENT ILLNESS:  History from : Patient   Limitations to history : None     Jonh Wood is a 68 y.o. female who presents to the emergency room complaining of right-sided neck pain that radiates on the right arm which has been intermittent over the past week.  Patient states she has had similar pain in the past and think she had a pinched nerve at that time.  Patient denies any falls or trauma.  She denies any numbness or weakness of the right upper extremity.  States that she has the pins and needle sensation occasionally.  Patient denies any previous spinal surgery or neck surgery.  Patient also complaining of some lower abdominal pain as a cramping sensation.  States she also has some pain in the right groin and the right lower back that radiates down the back of the right leg.  Denies any numbness or tingling of the right leg.  No weakness.  No falls or trauma.  Denies any bowel or bladder incontinence.  Patient states she has had some dysuria.  No hematuria.  No fevers or chills.  No manipulation of the spine or IV drug use.      Nursing Notes were all reviewed and agreed with or any disagreements were addressed in the HPI.     ROS: Positives and Pertinent negatives as per HPI.    PAST MEDICAL HISTORY     Past medical history:  has a past medical history of Anesthesia complication, Arthritis, Cardiomyopathy (HCC), COVID-19, Diabetes, GERD (gastroesophageal reflux disease), Hyperlipidemia, Hypertension, Lumbar disc disease, Prolonged emergence  Wears glasses.   Disposition Considerations: None      I am the primary physician of Record.     FINAL IMPRESSION    1. Radicular pain of right upper extremity    2. Acute right-sided low back pain with right-sided sciatica         DISPOSITION/PLAN   DISPOSITION Decision To Discharge 08/27/2024 11:47:01 PM  Condition at Disposition: Stable       PATIENT REFERRED TO:   Lavell Finnegan MD  [address] [address]  [phone]    Call in 1 day  For a follow up appointment.     DISCHARGE MEDICATIONS:   Discharge Medication List as of 8/27/2024 11:49 PM        START taking these medications    Details   cyclobenzaprine (FLEXERIL) 10 MG tablet Take 1 tablet by mouth 3 times daily as needed for Muscle spasms, Disp-15 tablet, R-0Normal      naproxen (NAPROSYN) 375 MG tablet Take 1 tablet by mouth every 12 hours as needed for Pain (take with food), Disp-60 tablet, R-0Normal      lidocaine 4 % external patch Place 1 patch onto the skin daily, TransDERmal, DAILY Starting Tue 8/27/2024, Until Thu 9/26/2024, For 30 days, Disp-30 patch, R-0, Normal      methylPREDNISolone (MEDROL, JUAN,) 4 MG tablet Take by mouth., Disp-1 kit, R-0Normal            DISCONTINUED MEDICATIONS:   Discharge Medication List as of 8/27/2024 11:49 PM        STOP taking these medications       ibuprofen (ADVIL;MOTRIN) 600 MG tablet Comments:   Reason for Stopping:         diclofenac sodium (VOLTAREN) 1 % GEL Comments:   Reason for Stopping:                    (Please note that portions of this note were completed with a voice recognition program.  Efforts were made to edit the dictations but occasionally words are mis-transcribed.)     Bryson Rasheed MD (electronically signed)         Bryson Rasheed MD  08/28/24 0637

## 2024-08-28 NOTE — ED NOTES
D/C: Order noted for d/c. Pt confirmed d/c paperwork has correct name. Discharge and education instructions reviewed with patient. Teach-back successful.  Pt verbalized understanding and denied questions at this time. No acute distress noted. Patient instructed to follow-up as noted - return to emergency department if symptoms worsen. Patient verbalized understanding. Discharged per EDMD with discharge instructions. Pt discharged to private vehicle. Patient stable upon departure. Thanked patient for [facility] for care. Provider aware of patient pain at time of discharge.

## 2024-08-30 ENCOUNTER — APPOINTMENT (OUTPATIENT)
Dept: CT IMAGING | Age: 69
DRG: 948 | End: 2024-08-30
Payer: MEDICARE

## 2024-08-30 ENCOUNTER — HOSPITAL ENCOUNTER (INPATIENT)
Age: 69
LOS: 4 days | Discharge: ANOTHER HOSPITAL | DRG: 948 | End: 2024-09-03
Attending: EMERGENCY MEDICINE | Admitting: INTERNAL MEDICINE
Payer: MEDICARE

## 2024-08-30 ENCOUNTER — APPOINTMENT (OUTPATIENT)
Dept: GENERAL RADIOLOGY | Age: 69
DRG: 948 | End: 2024-08-30
Payer: MEDICARE

## 2024-08-30 DIAGNOSIS — R53.1 ACUTE RIGHT-SIDED WEAKNESS: ICD-10-CM

## 2024-08-30 DIAGNOSIS — R47.01 APHASIA: Primary | ICD-10-CM

## 2024-08-30 PROBLEM — I63.9 ACUTE CVA (CEREBROVASCULAR ACCIDENT) (HCC): Status: ACTIVE | Noted: 2024-08-30

## 2024-08-30 LAB
ALBUMIN SERPL-MCNC: 3.9 G/DL (ref 3.4–5)
ALBUMIN/GLOB SERPL: 1.5 {RATIO} (ref 1.1–2.2)
ALP SERPL-CCNC: 68 U/L (ref 40–129)
ALT SERPL-CCNC: 19 U/L (ref 10–40)
ANION GAP SERPL CALCULATED.3IONS-SCNC: 11 MMOL/L (ref 3–16)
AST SERPL-CCNC: 24 U/L (ref 15–37)
BASOPHILS # BLD: 0.1 K/UL (ref 0–0.2)
BASOPHILS NFR BLD: 0.7 %
BILIRUB SERPL-MCNC: <0.2 MG/DL (ref 0–1)
BUN SERPL-MCNC: 20 MG/DL (ref 7–20)
CALCIUM SERPL-MCNC: 9.8 MG/DL (ref 8.3–10.6)
CHLORIDE SERPL-SCNC: 108 MMOL/L (ref 99–110)
CO2 SERPL-SCNC: 22 MMOL/L (ref 21–32)
CREAT SERPL-MCNC: 0.8 MG/DL (ref 0.6–1.2)
DEPRECATED RDW RBC AUTO: 14.1 % (ref 12.4–15.4)
EOSINOPHIL # BLD: 0.2 K/UL (ref 0–0.6)
EOSINOPHIL NFR BLD: 2 %
EST. AVERAGE GLUCOSE BLD GHB EST-MCNC: 142.7 MG/DL
GFR SERPLBLD CREATININE-BSD FMLA CKD-EPI: 79 ML/MIN/{1.73_M2}
GLUCOSE BLD-MCNC: 124 MG/DL (ref 70–99)
GLUCOSE BLD-MCNC: 146 MG/DL (ref 70–99)
GLUCOSE SERPL-MCNC: 130 MG/DL (ref 70–99)
HBA1C MFR BLD: 6.6 %
HCT VFR BLD AUTO: 39 % (ref 36–48)
HGB BLD-MCNC: 12.5 G/DL (ref 12–16)
INR PPP: 0.82 (ref 0.85–1.15)
LYMPHOCYTES # BLD: 2.9 K/UL (ref 1–5.1)
LYMPHOCYTES NFR BLD: 32.9 %
MCH RBC QN AUTO: 28 PG (ref 26–34)
MCHC RBC AUTO-ENTMCNC: 32.1 G/DL (ref 31–36)
MCV RBC AUTO: 87.2 FL (ref 80–100)
MONOCYTES # BLD: 0.5 K/UL (ref 0–1.3)
MONOCYTES NFR BLD: 5.6 %
NEUTROPHILS # BLD: 5.2 K/UL (ref 1.7–7.7)
NEUTROPHILS NFR BLD: 58.8 %
PERFORMED ON: ABNORMAL
PERFORMED ON: ABNORMAL
PLATELET # BLD AUTO: 289 K/UL (ref 135–450)
PMV BLD AUTO: 7.5 FL (ref 5–10.5)
POTASSIUM SERPL-SCNC: 4.7 MMOL/L (ref 3.5–5.1)
PROT SERPL-MCNC: 6.5 G/DL (ref 6.4–8.2)
PROTHROMBIN TIME: 11.6 SEC (ref 11.9–14.9)
RBC # BLD AUTO: 4.47 M/UL (ref 4–5.2)
SODIUM SERPL-SCNC: 141 MMOL/L (ref 136–145)
TROPONIN, HIGH SENSITIVITY: <6 NG/L (ref 0–14)
WBC # BLD AUTO: 8.8 K/UL (ref 4–11)

## 2024-08-30 PROCEDURE — 83036 HEMOGLOBIN GLYCOSYLATED A1C: CPT

## 2024-08-30 PROCEDURE — 2060000000 HC ICU INTERMEDIATE R&B

## 2024-08-30 PROCEDURE — 85025 COMPLETE CBC W/AUTO DIFF WBC: CPT

## 2024-08-30 PROCEDURE — 71045 X-RAY EXAM CHEST 1 VIEW: CPT

## 2024-08-30 PROCEDURE — 6360000004 HC RX CONTRAST MEDICATION: Performed by: EMERGENCY MEDICINE

## 2024-08-30 PROCEDURE — 80053 COMPREHEN METABOLIC PANEL: CPT

## 2024-08-30 PROCEDURE — 85610 PROTHROMBIN TIME: CPT

## 2024-08-30 PROCEDURE — 70450 CT HEAD/BRAIN W/O DYE: CPT

## 2024-08-30 PROCEDURE — 6370000000 HC RX 637 (ALT 250 FOR IP): Performed by: INTERNAL MEDICINE

## 2024-08-30 PROCEDURE — 94760 N-INVAS EAR/PLS OXIMETRY 1: CPT

## 2024-08-30 PROCEDURE — 84484 ASSAY OF TROPONIN QUANT: CPT

## 2024-08-30 PROCEDURE — 6360000002 HC RX W HCPCS: Performed by: INTERNAL MEDICINE

## 2024-08-30 PROCEDURE — 6370000000 HC RX 637 (ALT 250 FOR IP): Performed by: EMERGENCY MEDICINE

## 2024-08-30 PROCEDURE — 70496 CT ANGIOGRAPHY HEAD: CPT

## 2024-08-30 PROCEDURE — 93005 ELECTROCARDIOGRAM TRACING: CPT | Performed by: EMERGENCY MEDICINE

## 2024-08-30 PROCEDURE — 99285 EMERGENCY DEPT VISIT HI MDM: CPT

## 2024-08-30 RX ORDER — ALBUTEROL SULFATE 90 UG/1
2 INHALANT RESPIRATORY (INHALATION) EVERY 4 HOURS PRN
Status: DISCONTINUED | OUTPATIENT
Start: 2024-08-30 | End: 2024-09-03 | Stop reason: HOSPADM

## 2024-08-30 RX ORDER — ONDANSETRON 2 MG/ML
4 INJECTION INTRAMUSCULAR; INTRAVENOUS EVERY 6 HOURS PRN
Status: DISCONTINUED | OUTPATIENT
Start: 2024-08-30 | End: 2024-09-03 | Stop reason: HOSPADM

## 2024-08-30 RX ORDER — ASPIRIN 300 MG/1
300 SUPPOSITORY RECTAL DAILY
Status: DISCONTINUED | OUTPATIENT
Start: 2024-08-31 | End: 2024-09-03 | Stop reason: HOSPADM

## 2024-08-30 RX ORDER — FERROUS SULFATE 325(65) MG
325 TABLET ORAL EVERY OTHER DAY
Status: DISCONTINUED | OUTPATIENT
Start: 2024-08-30 | End: 2024-08-30

## 2024-08-30 RX ORDER — DULOXETIN HYDROCHLORIDE 60 MG/1
60 CAPSULE, DELAYED RELEASE ORAL 2 TIMES DAILY
Status: DISCONTINUED | OUTPATIENT
Start: 2024-08-30 | End: 2024-09-03 | Stop reason: HOSPADM

## 2024-08-30 RX ORDER — POLYETHYLENE GLYCOL 3350 17 G/17G
17 POWDER, FOR SOLUTION ORAL DAILY PRN
Status: DISCONTINUED | OUTPATIENT
Start: 2024-08-30 | End: 2024-09-03 | Stop reason: HOSPADM

## 2024-08-30 RX ORDER — PREGABALIN 75 MG/1
150 CAPSULE ORAL NIGHTLY
Status: DISCONTINUED | OUTPATIENT
Start: 2024-08-30 | End: 2024-09-03 | Stop reason: HOSPADM

## 2024-08-30 RX ORDER — BRIMONIDINE TARTRATE 2 MG/ML
1 SOLUTION/ DROPS OPHTHALMIC DAILY
Status: DISCONTINUED | OUTPATIENT
Start: 2024-08-31 | End: 2024-09-03 | Stop reason: HOSPADM

## 2024-08-30 RX ORDER — TRAZODONE HYDROCHLORIDE 100 MG/1
100 TABLET ORAL NIGHTLY
Status: DISCONTINUED | OUTPATIENT
Start: 2024-08-30 | End: 2024-09-03 | Stop reason: HOSPADM

## 2024-08-30 RX ORDER — DEXTROSE MONOHYDRATE 100 MG/ML
INJECTION, SOLUTION INTRAVENOUS CONTINUOUS PRN
Status: DISCONTINUED | OUTPATIENT
Start: 2024-08-30 | End: 2024-09-03 | Stop reason: HOSPADM

## 2024-08-30 RX ORDER — ASPIRIN 81 MG/1
324 TABLET, CHEWABLE ORAL ONCE
Status: COMPLETED | OUTPATIENT
Start: 2024-08-30 | End: 2024-08-30

## 2024-08-30 RX ORDER — INSULIN GLARGINE 100 [IU]/ML
8 INJECTION, SOLUTION SUBCUTANEOUS NIGHTLY
Status: DISCONTINUED | OUTPATIENT
Start: 2024-08-30 | End: 2024-09-03 | Stop reason: HOSPADM

## 2024-08-30 RX ORDER — ACETAMINOPHEN 325 MG/1
650 TABLET ORAL EVERY 4 HOURS PRN
Status: DISCONTINUED | OUTPATIENT
Start: 2024-08-30 | End: 2024-09-03 | Stop reason: HOSPADM

## 2024-08-30 RX ORDER — PANTOPRAZOLE SODIUM 40 MG/1
40 TABLET, DELAYED RELEASE ORAL
Status: DISCONTINUED | OUTPATIENT
Start: 2024-08-31 | End: 2024-09-03 | Stop reason: HOSPADM

## 2024-08-30 RX ORDER — DULOXETIN HYDROCHLORIDE 60 MG/1
60 CAPSULE, DELAYED RELEASE ORAL NIGHTLY
Status: DISCONTINUED | OUTPATIENT
Start: 2024-08-30 | End: 2024-08-30 | Stop reason: DRUGHIGH

## 2024-08-30 RX ORDER — ONDANSETRON 4 MG/1
4 TABLET, ORALLY DISINTEGRATING ORAL EVERY 8 HOURS PRN
Status: DISCONTINUED | OUTPATIENT
Start: 2024-08-30 | End: 2024-09-03 | Stop reason: HOSPADM

## 2024-08-30 RX ORDER — ENOXAPARIN SODIUM 100 MG/ML
40 INJECTION SUBCUTANEOUS DAILY
Status: DISCONTINUED | OUTPATIENT
Start: 2024-08-30 | End: 2024-09-03 | Stop reason: HOSPADM

## 2024-08-30 RX ORDER — FUROSEMIDE 20 MG
20 TABLET ORAL DAILY
Status: DISCONTINUED | OUTPATIENT
Start: 2024-08-30 | End: 2024-09-03 | Stop reason: HOSPADM

## 2024-08-30 RX ORDER — GLUCAGON 1 MG/ML
1 KIT INJECTION PRN
Status: DISCONTINUED | OUTPATIENT
Start: 2024-08-30 | End: 2024-09-03 | Stop reason: HOSPADM

## 2024-08-30 RX ORDER — LATANOPROST 50 UG/ML
1 SOLUTION/ DROPS OPHTHALMIC NIGHTLY
COMMUNITY
Start: 2024-06-27

## 2024-08-30 RX ORDER — SODIUM CHLORIDE 0.9 % (FLUSH) 0.9 %
5-40 SYRINGE (ML) INJECTION EVERY 12 HOURS SCHEDULED
Status: DISCONTINUED | OUTPATIENT
Start: 2024-08-30 | End: 2024-09-03 | Stop reason: HOSPADM

## 2024-08-30 RX ORDER — PRAVASTATIN SODIUM 40 MG
40 TABLET ORAL DAILY
Status: DISCONTINUED | OUTPATIENT
Start: 2024-08-31 | End: 2024-09-03 | Stop reason: HOSPADM

## 2024-08-30 RX ORDER — LATANOPROST 50 UG/ML
1 SOLUTION/ DROPS OPHTHALMIC NIGHTLY
Status: DISCONTINUED | OUTPATIENT
Start: 2024-08-30 | End: 2024-09-03 | Stop reason: HOSPADM

## 2024-08-30 RX ORDER — IOPAMIDOL 755 MG/ML
75 INJECTION, SOLUTION INTRAVASCULAR
Status: COMPLETED | OUTPATIENT
Start: 2024-08-30 | End: 2024-08-30

## 2024-08-30 RX ORDER — CETIRIZINE HYDROCHLORIDE 10 MG/1
10 TABLET ORAL DAILY PRN
Status: DISCONTINUED | OUTPATIENT
Start: 2024-08-30 | End: 2024-09-03 | Stop reason: HOSPADM

## 2024-08-30 RX ORDER — LANOLIN ALCOHOL/MO/W.PET/CERES
100 CREAM (GRAM) TOPICAL DAILY
Status: DISCONTINUED | OUTPATIENT
Start: 2024-08-31 | End: 2024-09-03 | Stop reason: HOSPADM

## 2024-08-30 RX ORDER — SODIUM CHLORIDE 0.9 % (FLUSH) 0.9 %
5-40 SYRINGE (ML) INJECTION PRN
Status: DISCONTINUED | OUTPATIENT
Start: 2024-08-30 | End: 2024-09-03 | Stop reason: HOSPADM

## 2024-08-30 RX ORDER — PREGABALIN 150 MG/1
150 CAPSULE ORAL NIGHTLY
COMMUNITY
Start: 2024-08-15

## 2024-08-30 RX ORDER — CYCLOBENZAPRINE HCL 10 MG
10 TABLET ORAL 3 TIMES DAILY PRN
Status: DISCONTINUED | OUTPATIENT
Start: 2024-08-30 | End: 2024-09-03 | Stop reason: HOSPADM

## 2024-08-30 RX ORDER — INSULIN LISPRO 100 [IU]/ML
0-4 INJECTION, SOLUTION INTRAVENOUS; SUBCUTANEOUS
Status: DISCONTINUED | OUTPATIENT
Start: 2024-08-30 | End: 2024-09-03 | Stop reason: HOSPADM

## 2024-08-30 RX ORDER — ASPIRIN 81 MG/1
81 TABLET, CHEWABLE ORAL DAILY
Status: DISCONTINUED | OUTPATIENT
Start: 2024-08-31 | End: 2024-09-03 | Stop reason: HOSPADM

## 2024-08-30 RX ORDER — BRIMONIDINE TARTRATE 2 MG/ML
1 SOLUTION/ DROPS OPHTHALMIC DAILY
COMMUNITY
Start: 2024-07-20

## 2024-08-30 RX ORDER — SODIUM CHLORIDE 9 MG/ML
INJECTION, SOLUTION INTRAVENOUS PRN
Status: DISCONTINUED | OUTPATIENT
Start: 2024-08-30 | End: 2024-09-03 | Stop reason: HOSPADM

## 2024-08-30 RX ORDER — INSULIN LISPRO 100 [IU]/ML
0-4 INJECTION, SOLUTION INTRAVENOUS; SUBCUTANEOUS NIGHTLY
Status: DISCONTINUED | OUTPATIENT
Start: 2024-08-30 | End: 2024-09-03 | Stop reason: HOSPADM

## 2024-08-30 RX ORDER — DICYCLOMINE HYDROCHLORIDE 10 MG/1
10 CAPSULE ORAL EVERY 6 HOURS PRN
Status: DISCONTINUED | OUTPATIENT
Start: 2024-08-30 | End: 2024-09-03 | Stop reason: HOSPADM

## 2024-08-30 RX ORDER — IPRATROPIUM BROMIDE AND ALBUTEROL SULFATE 2.5; .5 MG/3ML; MG/3ML
1 SOLUTION RESPIRATORY (INHALATION) EVERY 4 HOURS PRN
Status: DISCONTINUED | OUTPATIENT
Start: 2024-08-30 | End: 2024-09-03 | Stop reason: HOSPADM

## 2024-08-30 RX ORDER — FLUTICASONE PROPIONATE 50 MCG
2 SPRAY, SUSPENSION (ML) NASAL DAILY
Status: DISCONTINUED | OUTPATIENT
Start: 2024-08-31 | End: 2024-09-03 | Stop reason: HOSPADM

## 2024-08-30 RX ADMIN — ENOXAPARIN SODIUM 40 MG: 100 INJECTION SUBCUTANEOUS at 19:58

## 2024-08-30 RX ADMIN — PREGABALIN 150 MG: 75 CAPSULE ORAL at 21:10

## 2024-08-30 RX ADMIN — FUROSEMIDE 20 MG: 20 TABLET ORAL at 19:57

## 2024-08-30 RX ADMIN — LATANOPROST 1 DROP: 50 SOLUTION OPHTHALMIC at 22:04

## 2024-08-30 RX ADMIN — DULOXETINE HYDROCHLORIDE 60 MG: 60 CAPSULE, DELAYED RELEASE ORAL at 21:10

## 2024-08-30 RX ADMIN — TRAZODONE HYDROCHLORIDE 100 MG: 100 TABLET ORAL at 21:10

## 2024-08-30 RX ADMIN — ACETAMINOPHEN 325MG 650 MG: 325 TABLET ORAL at 19:57

## 2024-08-30 RX ADMIN — ASPIRIN 324 MG: 81 TABLET, CHEWABLE ORAL at 16:49

## 2024-08-30 RX ADMIN — INSULIN GLARGINE 8 UNITS: 100 INJECTION, SOLUTION SUBCUTANEOUS at 21:10

## 2024-08-30 RX ADMIN — IOPAMIDOL 75 ML: 755 INJECTION, SOLUTION INTRAVENOUS at 15:23

## 2024-08-30 ASSESSMENT — PAIN DESCRIPTION - LOCATION
LOCATION: HEAD;NECK
LOCATION: HEAD;NECK

## 2024-08-30 ASSESSMENT — PAIN DESCRIPTION - DESCRIPTORS
DESCRIPTORS: ACHING;SORE
DESCRIPTORS: STABBING

## 2024-08-30 ASSESSMENT — PAIN SCALES - GENERAL
PAINLEVEL_OUTOF10: 7
PAINLEVEL_OUTOF10: 8
PAINLEVEL_OUTOF10: 2

## 2024-08-30 NOTE — PROGRESS NOTES
Pharmacy Medication Reconciliation Note     List of medications patient is currently taking is complete.    Source of information:   1. Conversation with patient   2. EMR    Notes regarding home medications:   Patient had all of her morning home medication doses today before presenting to the ER.      Candace Kinsey PharmD  8/30/2024 5:49 PM

## 2024-08-30 NOTE — CONSULTS
Stroke Team Consult Note     The patient is a 68-year-old female who presented to the emergency department with concern for stroke-like symptoms including expressive aphasia along with right sided weakness.  The initial report was that the last known normal was 2:30 PM today, about an hour prior to be current evaluation.  Subsequently, however, the patient began speaking more and stated that she noted that her tongue felt abnormal this morning at about 6:30 AM, which was approximately 9 hours ago.    I evaluated the patient by telemedicine and noted that she was alert, answered questions appropriately, follow commands without difficulty.  The patient's speech was slower than normal, however she could be understood without difficulty, there were no word finding difficulty and the overall findings on physical exam were not consistent with aphasia.  There was no drift in the left upper extremity and minor drift in the right upper extremity after more than 5 seconds.  There was no drift in the left lower extremity, and the right lower extremity would demonstrate some bobbing movements, but the patient would repeatedly bring the extremity back to the initially noted height of extension.  There was no gaze deviation and the patient could look fully to the left and right without difficulty.    CT head without contrast showed no acute findings.  The patient is not a candidate for IV TNK considering the time from the onset of symptoms approximately 9 hours ago.  Further, the symptoms are substantially improving, particularly patient's speech, and the overall speech pattern on my exam is consistent with likely nonneurological speech.  CTA head and neck showed no large vessel occlusion, therefore, the patient is not a candidate for acute neurointerventional treatment.    Johnnie Livingston MD   Stroke Team

## 2024-08-30 NOTE — ED TRIAGE NOTES
Patient brought to the ER via EMS for complaints of seizure like activity that happened around 1430 today.  Patient was guided into a chair at that time.  When she came to, she was exhibiting slurred speech and aphasia.     Per EMS, the patient had a stroke 1 month ago and exhibited the same exact symptoms.  She was not left with any deficits.   She takes a daily aspirin, which EMS reports she did take today.  She also has hx of 3 heart attacks.     Patient is alert and able to follow commands at time of triage, but she is not able to form clear words.   Dr. Alanis at bedside doing NIH assessment.

## 2024-08-30 NOTE — H&P
Quit Date     Counseling Given Yes    Comments               Family History:      Reviewed and negative in regards to presenting illness/complaint.        Problem Relation Age of Onset    Heart Disease Mother     High Blood Pressure Mother     Stroke Mother     Cancer Brother         lung cancer       REVIEW OF SYSTEMS COMPLETED:   Pertinent positives as noted in the HPI. All other systems reviewed and negative.    PHYSICAL EXAM PERFORMED:    BP (!) 129/90   Pulse 78   Temp 98.2 °F (36.8 °C) (Oral)   Resp 13   Ht 1.676 m (5' 6\")   Wt 95 kg (209 lb 7 oz)   SpO2 98%   BMI 33.80 kg/m²     General appearance:  No apparent distress  HEENT:  Normal cephalic  Neck: Supple.  Respiratory:  Normal respiratory effort. Clear to auscultation, bilaterally without Rales/Wheezes/Rhonchi.  Cardiovascular:  Regular rate and rhythm with normal S1/S2 without murmurs, rubs or gallops.  Abdomen: Soft, non-tender, non-distended with normal bowel sounds.  Musculoskeletal:  No clubbing, cyanosis   Skin: Skin color, texture, turgor normal.  No rashes or lesions.  Neurologic: Aphasia, minimal right upper and right lower extremity weakness 4 out of 5 muscle strength minimal facial droop on the right  Psychiatric:  Alert and oriented  Capillary Refill: Brisk,3 seconds, normal  Peripheral Pulses: +2 palpable, equal bilaterally       Labs:     Recent Labs     08/30/24  1519   WBC 8.8   HGB 12.5   HCT 39.0        Recent Labs     08/30/24  1519      K 4.7      CO2 22   BUN 20   CREATININE 0.8   CALCIUM 9.8     Recent Labs     08/30/24  1519   AST 24   ALT 19   BILITOT <0.2   ALKPHOS 68     Recent Labs     08/30/24  1519   INR 0.82*     Recent Labs     08/30/24  1519   TROPHS <6       Urinalysis:      Lab Results   Component Value Date/Time    NITRU Negative 08/27/2024 11:17 PM    WBCUA 4 08/27/2024 11:17 PM    BACTERIA None Seen 08/27/2024 11:17 PM    RBCUA 0 08/27/2024 11:17 PM    BLOODU Negative 08/27/2024 11:17 PM     SPECGRAV 1.020 07/06/2023 02:58 PM    GLUCOSEU Negative 08/27/2024 11:17 PM       Radiology:       EKG:  I have reviewed the EKG with the following interpretation: No ST elevation    XR CHEST PORTABLE   Final Result   1.  No acute abnormality.         CT HEAD WO CONTRAST   Final Result   Addendum (preliminary) 1 of 1   ADDENDUM:   Findings were communicated to Dr. Mitchel Alanis by the CORE team on    8/30/2024   at 3:41 pm.         Final   No acute intracranial findings.         CTA HEAD NECK W CONTRAST   Final Result   Unremarkable CTA of the head and neck.             Consults:    IP CONSULT TO NEUROLOGY    ASSESSMENT:    Active Hospital Problems    Diagnosis Date Noted    Acute CVA (cerebrovascular accident) (HCC) [I63.9] 08/30/2024         PLAN:    Acute CVA, CT negative, admit to the hospital, telemetry monitoring, echo ordered MRI brain without contrast PT OT neurology consulted, CBC CMP in a.m. discussed with MD agree with plan as outlined above discussed with patient all questions answered.  Essential hypertension permissive hypertension at this time  Nausea likely due to above antiemetics  Headache improved likely due to above  Diabetes mellitus sliding scale  Home medication reviewed by myself  Goals of care full code  DVT Prophylaxis: Lovenox  Diet: Diet NPO  Code Status: Prior    PT/OT Eval Status: Ordered    Dispo -inpatient 2 to 3 days       Subhash Martínez MD    Thank you Day, Lavell KIMBLE MD for the opportunity to be involved in this patient's care. If you have any questions or concerns please feel free to contact me at [phone].    Comment: Please note this report has been produced using speech recognition software and may contain errors related to that system including errors in grammar, punctuation, and spelling, as well as words and phrases that may be inappropriate. If there are any questions or concerns, please feel free to contact the dictating provider for clarification.

## 2024-08-30 NOTE — ED NOTES
Called Stroke team at 1518 per stroke alert.  Dr. Livingston from stroke team called back at 1525 and spoke with Dr. Alanis.

## 2024-08-30 NOTE — ED PROVIDER NOTES
[facility] EMERGENCY DEPARTMENT  EMERGENCY DEPARTMENT ENCOUNTER      Pt Name: John Wood  MRN: [id number]  Birthdate 1955  Date of evaluation: 8/30/2024  Provider: ELOY DONOVAN DO    CHIEF COMPLAINT       Chief Complaint   Patient presents with    Aphasia         HISTORY OF PRESENT ILLNESS   (Location/Symptom, Timing/Onset, Context/Setting, Quality, Duration, Modifying Factors, Severity)  Note limiting factors.   John Wood is a 68 y.o. female who presents to the emergency department with complaint of sudden onset of right-sided weakness, right facial droop, and difficulty speaking at approximately 2:30 PM, while at work prior to arrival.  The patient is not able to give any significant historical information.  She is able to nod her head yes and no to questions but has significant aphasia.    She admits to a mild bifrontal occipital headache.  Time of onset is unknown.    Medical records indicate a history of prior stroke with no residual deficits.  Her  is at the bedside who provides some additional information.  He states that she has been fine recently.  He reports that she was fine earlier today.    She denies any fever chills cough cold symptoms.  She denies any nausea vomiting diarrhea.  She denies any dysuria or hematuria.  She denies any melena or medic easier.    She denies any chest pain heaviness pressure or tightness.  She denies any neck or back pain.  She denies any cough or cold symptoms.  She denies any abdominal pain.  No report of any chest pain heaviness pressure.    Medical history is significant for migraines, cardiomyopathy, obstructive sleep apnea, prior stroke, hypertension, hyperlipidemia, type 2 diabetes.    She does like aspirin 81 mg daily.  She does not take any anticoagulants    Nursing Notes were reviewed.    HPI        REVIEW OF SYSTEMS    (2-9 systems for level 4, 10 or more for level 5)       Constitutional: Negative for fever  within normal limits   PROTIME-INR - Abnormal; Notable for the following components:    Protime 11.6 (*)     INR 0.82 (*)     All other components within normal limits   POCT GLUCOSE - Abnormal; Notable for the following components:    POC Glucose 124 (*)     All other components within normal limits   CBC WITH AUTO DIFFERENTIAL   TROPONIN   POCT GLUCOSE       All other labs were within normal range or not returned as of this dictation.    EMERGENCY DEPARTMENT COURSE and DIFFERENTIAL DIAGNOSIS/ MEDICAL DECISION MAKING:   Vitals:    Vitals:    08/30/24 1545 08/30/24 1600 08/30/24 1615 08/30/24 1630   BP: 117/88 130/84 124/88 (!) 129/90   Pulse: 82 80 79 78   Resp: 18 17 18 13   Temp:       TempSrc:       SpO2: 96% 95% 97% 98%   Weight:       Height:             MDM      The patient presents to the emerged part with a complaint of sudden onset of difficulty speaking, right facial droop, right-sided weakness that began at 2:30 PM this afternoon while at work.  Symptoms were sudden onset.  At the time of my examination she has an expressive aphasia and is only able to get out an occasional word but not able to put together sentences.  There is also some dysarthria.  She does have mild right facial droop, mild right upper extremity weakness and mild right lower extremity weakness.  NIH stroke scale is 11.    She is afebrile and hemodynamically stable.    Stroke alert was initiated at the time of my examination.  She was sent for CT head without contrast and CTA head and neck.    3:28 PM: Care was discussed with Dr. Livingston from [facility] stroke team.  He reviewed her CT head and CTA and did not see any acute findings.  He will evaluate the patient by telemedicine after returning from CT.    3:35 PM: The patient was reexamined on return from CT.  She has had significant improvement in her speech and is now able to speak in sentences with some hesitation.  No significant dysarthria.  Subtle drift in the right

## 2024-08-31 ENCOUNTER — HOSPITAL ENCOUNTER (INPATIENT)
Age: 69
Discharge: HOME | DRG: 948 | End: 2024-09-02
Attending: INTERNAL MEDICINE
Payer: MEDICARE

## 2024-08-31 ENCOUNTER — APPOINTMENT (OUTPATIENT)
Dept: MRI IMAGING | Age: 69
DRG: 948 | End: 2024-08-31
Payer: MEDICARE

## 2024-08-31 VITALS — BODY MASS INDEX: 31.58 KG/M2 | WEIGHT: 185 LBS | HEIGHT: 64 IN

## 2024-08-31 LAB
CHOLEST SERPL-MCNC: 221 MG/DL (ref 0–199)
DEPRECATED RDW RBC AUTO: 13.8 % (ref 12.4–15.4)
ECHO AO ROOT DIAM: 2.9 CM
ECHO AO ROOT INDEX: 1.53 CM/M2
ECHO AV PEAK GRADIENT: 7 MMHG
ECHO AV PEAK VELOCITY: 1.3 M/S
ECHO BSA: 1.95 M2
ECHO LA AREA 2C: 15.8 CM2
ECHO LA AREA 4C: 11.4 CM2
ECHO LA DIAMETER INDEX: 1.43 CM/M2
ECHO LA DIAMETER: 2.7 CM
ECHO LA MAJOR AXIS: 5.5 CM
ECHO LA MINOR AXIS: 5.5 CM
ECHO LA TO AORTIC ROOT RATIO: 0.93
ECHO LA VOL BP: 27 ML (ref 22–52)
ECHO LA VOL MOD A2C: 37 ML (ref 22–52)
ECHO LA VOL MOD A4C: 19 ML (ref 22–52)
ECHO LA VOL/BSA BIPLANE: 14 ML/M2 (ref 16–34)
ECHO LA VOLUME INDEX MOD A2C: 20 ML/M2 (ref 16–34)
ECHO LA VOLUME INDEX MOD A4C: 10 ML/M2 (ref 16–34)
ECHO LV E' LATERAL VELOCITY: 9 CM/S
ECHO LV E' SEPTAL VELOCITY: 8 CM/S
ECHO LV EF PHYSICIAN: 63 %
ECHO LV FRACTIONAL SHORTENING: 53 % (ref 28–44)
ECHO LV INTERNAL DIMENSION DIASTOLE INDEX: 2.01 CM/M2
ECHO LV INTERNAL DIMENSION DIASTOLIC: 3.8 CM (ref 3.9–5.3)
ECHO LV INTERNAL DIMENSION SYSTOLIC INDEX: 0.95 CM/M2
ECHO LV INTERNAL DIMENSION SYSTOLIC: 1.8 CM
ECHO LV IVSD: 0.9 CM (ref 0.6–0.9)
ECHO LV MASS 2D: 101.1 G (ref 67–162)
ECHO LV MASS INDEX 2D: 53.5 G/M2 (ref 43–95)
ECHO LV POSTERIOR WALL DIASTOLIC: 0.9 CM (ref 0.6–0.9)
ECHO LV RELATIVE WALL THICKNESS RATIO: 0.47
ECHO MV A VELOCITY: 1.19 M/S
ECHO MV E DECELERATION TIME (DT): 246 MS
ECHO MV E VELOCITY: 0.75 M/S
ECHO MV E/A RATIO: 0.63
ECHO MV E/E' LATERAL: 8.33
ECHO MV E/E' RATIO (AVERAGED): 8.85
ECHO MV E/E' SEPTAL: 9.38
ECHO PV MAX VELOCITY: 0.8 M/S
ECHO PV PEAK GRADIENT: 3 MMHG
ECHO RA AREA 4C: 9.8 CM2
ECHO RA END SYSTOLIC VOLUME APICAL 4 CHAMBER INDEX BSA: 11 ML/M2
ECHO RA VOLUME: 21 ML
ECHO RV FREE WALL PEAK S': 14 CM/S
ECHO RV INTERNAL DIMENSION: 2.2 CM
ECHO RV TAPSE: 2.4 CM (ref 1.7–?)
EKG ATRIAL RATE: 85 BPM
EKG DIAGNOSIS: NORMAL
EKG P AXIS: 47 DEGREES
EKG P-R INTERVAL: 178 MS
EKG Q-T INTERVAL: 368 MS
EKG QRS DURATION: 74 MS
EKG QTC CALCULATION (BAZETT): 437 MS
EKG R AXIS: 37 DEGREES
EKG T AXIS: 36 DEGREES
EKG VENTRICULAR RATE: 85 BPM
EST. AVERAGE GLUCOSE BLD GHB EST-MCNC: 151.3 MG/DL
GLUCOSE BLD-MCNC: 121 MG/DL (ref 70–99)
GLUCOSE BLD-MCNC: 162 MG/DL (ref 70–99)
GLUCOSE BLD-MCNC: 168 MG/DL (ref 70–99)
GLUCOSE BLD-MCNC: 213 MG/DL (ref 70–99)
HBA1C MFR BLD: 6.9 %
HCT VFR BLD AUTO: 34.8 % (ref 36–48)
HDLC SERPL-MCNC: 68 MG/DL (ref 40–60)
HGB BLD-MCNC: 11.6 G/DL (ref 12–16)
LDLC SERPL CALC-MCNC: 125 MG/DL
MCH RBC QN AUTO: 28.6 PG (ref 26–34)
MCHC RBC AUTO-ENTMCNC: 33.4 G/DL (ref 31–36)
MCV RBC AUTO: 85.6 FL (ref 80–100)
PERFORMED ON: ABNORMAL
PLATELET # BLD AUTO: 250 K/UL (ref 135–450)
PMV BLD AUTO: 7.7 FL (ref 5–10.5)
RBC # BLD AUTO: 4.06 M/UL (ref 4–5.2)
TRIGL SERPL-MCNC: 140 MG/DL (ref 0–150)
VLDLC SERPL CALC-MCNC: 28 MG/DL
WBC # BLD AUTO: 6 K/UL (ref 4–11)

## 2024-08-31 PROCEDURE — 94760 N-INVAS EAR/PLS OXIMETRY 1: CPT

## 2024-08-31 PROCEDURE — 97116 GAIT TRAINING THERAPY: CPT | Performed by: PHYSICAL THERAPIST

## 2024-08-31 PROCEDURE — 85027 COMPLETE CBC AUTOMATED: CPT

## 2024-08-31 PROCEDURE — 2060000000 HC ICU INTERMEDIATE R&B

## 2024-08-31 PROCEDURE — 36415 COLL VENOUS BLD VENIPUNCTURE: CPT

## 2024-08-31 PROCEDURE — 6360000002 HC RX W HCPCS: Performed by: INTERNAL MEDICINE

## 2024-08-31 PROCEDURE — 92610 EVALUATE SWALLOWING FUNCTION: CPT

## 2024-08-31 PROCEDURE — 93010 ELECTROCARDIOGRAM REPORT: CPT | Performed by: INTERNAL MEDICINE

## 2024-08-31 PROCEDURE — 97535 SELF CARE MNGMENT TRAINING: CPT

## 2024-08-31 PROCEDURE — 97162 PT EVAL MOD COMPLEX 30 MIN: CPT | Performed by: PHYSICAL THERAPIST

## 2024-08-31 PROCEDURE — 93306 TTE W/DOPPLER COMPLETE: CPT

## 2024-08-31 PROCEDURE — 80061 LIPID PANEL: CPT

## 2024-08-31 PROCEDURE — 83036 HEMOGLOBIN GLYCOSYLATED A1C: CPT

## 2024-08-31 PROCEDURE — 93306 TTE W/DOPPLER COMPLETE: CPT | Performed by: INTERNAL MEDICINE

## 2024-08-31 PROCEDURE — 6370000000 HC RX 637 (ALT 250 FOR IP): Performed by: INTERNAL MEDICINE

## 2024-08-31 PROCEDURE — 70551 MRI BRAIN STEM W/O DYE: CPT

## 2024-08-31 PROCEDURE — 97166 OT EVAL MOD COMPLEX 45 MIN: CPT

## 2024-08-31 PROCEDURE — 2580000003 HC RX 258: Performed by: INTERNAL MEDICINE

## 2024-08-31 PROCEDURE — 92523 SPEECH SOUND LANG COMPREHEN: CPT

## 2024-08-31 PROCEDURE — 97530 THERAPEUTIC ACTIVITIES: CPT | Performed by: PHYSICAL THERAPIST

## 2024-08-31 PROCEDURE — 97530 THERAPEUTIC ACTIVITIES: CPT

## 2024-08-31 RX ADMIN — FUROSEMIDE 20 MG: 20 TABLET ORAL at 10:54

## 2024-08-31 RX ADMIN — PREGABALIN 150 MG: 75 CAPSULE ORAL at 20:58

## 2024-08-31 RX ADMIN — SODIUM CHLORIDE, PRESERVATIVE FREE 10 ML: 5 INJECTION INTRAVENOUS at 20:59

## 2024-08-31 RX ADMIN — PYRIDOXINE HCL TAB 50 MG 100 MG: 50 TAB at 10:54

## 2024-08-31 RX ADMIN — PRAVASTATIN SODIUM 40 MG: 40 TABLET ORAL at 10:54

## 2024-08-31 RX ADMIN — DULOXETINE HYDROCHLORIDE 60 MG: 60 CAPSULE, DELAYED RELEASE ORAL at 20:58

## 2024-08-31 RX ADMIN — SODIUM CHLORIDE, PRESERVATIVE FREE 10 ML: 5 INJECTION INTRAVENOUS at 10:57

## 2024-08-31 RX ADMIN — BRIMONIDINE TARTRATE 1 DROP: 2 SOLUTION/ DROPS OPHTHALMIC at 11:36

## 2024-08-31 RX ADMIN — TRAZODONE HYDROCHLORIDE 100 MG: 100 TABLET ORAL at 20:58

## 2024-08-31 RX ADMIN — DULOXETINE HYDROCHLORIDE 60 MG: 60 CAPSULE, DELAYED RELEASE ORAL at 10:54

## 2024-08-31 RX ADMIN — ACETAMINOPHEN 325MG 650 MG: 325 TABLET ORAL at 10:53

## 2024-08-31 RX ADMIN — ENOXAPARIN SODIUM 40 MG: 100 INJECTION SUBCUTANEOUS at 11:00

## 2024-08-31 RX ADMIN — LATANOPROST 1 DROP: 50 SOLUTION OPHTHALMIC at 20:59

## 2024-08-31 RX ADMIN — FLUTICASONE PROPIONATE 2 SPRAY: 50 SPRAY, METERED NASAL at 11:36

## 2024-08-31 RX ADMIN — ASPIRIN 81 MG: 81 TABLET, CHEWABLE ORAL at 10:54

## 2024-08-31 RX ADMIN — PANTOPRAZOLE SODIUM 40 MG: 40 TABLET, DELAYED RELEASE ORAL at 06:31

## 2024-08-31 RX ADMIN — INSULIN GLARGINE 8 UNITS: 100 INJECTION, SOLUTION SUBCUTANEOUS at 20:59

## 2024-08-31 ASSESSMENT — PAIN - FUNCTIONAL ASSESSMENT: PAIN_FUNCTIONAL_ASSESSMENT: ACTIVITIES ARE NOT PREVENTED

## 2024-08-31 ASSESSMENT — PAIN SCALES - GENERAL: PAINLEVEL_OUTOF10: 3

## 2024-08-31 ASSESSMENT — PAIN DESCRIPTION - LOCATION: LOCATION: HEAD;NECK

## 2024-08-31 ASSESSMENT — PAIN DESCRIPTION - DESCRIPTORS: DESCRIPTORS: ACHING

## 2024-08-31 NOTE — PROGRESS NOTES
Hospitalist Progress Note  8/31/2024 2:57 PM  Subjective:   Admit Date: 8/30/2024  PCP: Lavell Finnegan MD Status: Inpatient   Interval History: Hospital Day: 2, admitted with aphasia and right sided weakness with facial droop.   Stroke team evaluation and not a candidate for TNK.   CTA head and neck showed no large vessel occlusion, therefore, the patient is not a candidate for acute neurointerventional treatment.  She is on aspirin 81 mg and pravastatin 40 mg prior to admission.     Medical co-morbidities include type 2 diabetes, hypertension, dyslipidemia, sleep apnea, and cardiomyopathy.     Diet: regular  Right forearm peripheral IV (8/31, day #1)    Medications:     fluticasone  2 spray Each Nostril Daily   furosemide  20 mg Oral Daily   insulin glargine  8 Units SubCUTAneous Nightly   pantoprazole  40 mg Oral QAM AC   trazodone  100 mg Oral Nightly   vitamin B-6  100 mg Oral Daily   enoxaparin  40 mg SubCUTAneous Daily   pravastatin  40 mg Oral Daily   aspirin  81 mg Oral Daily   insulin lispro SSI  0-4 Units SubCUTAneous TID WC   pregabalin  150 mg Oral Nightly   latanoprost  1 drop Both Eyes Nightly   brimonidine  1 drop Both Eyes Daily   duloxetine  60 mg Oral BID       Labs:     Recent Labs     08/30/24  1519 08/31/24  0518   WBC 8.8 6.0   HGB 12.5 11.6*    250   MCV 87.2 85.6     --    K 4.7  --      --    CO2 22  --    BUN 20  --    CREATININE 0.8  --    GLUCOSE 130*  --    CALCIUM 9.8  --    ALBUMIN 3.9  --    INR 0.82*  --    AST 24  --    ALT 19  --    ALKPHOS 68  --      Lipid panel (8/31)   Cholesterol, Total 221 High    Triglycerides 140   HDL 68 High    LDL Cholesterol 125 High          POC Glucose:   Recent Labs     08/30/24  1515 08/30/24  2109 08/31/24  0816 08/31/24  1204   POCGLU 124* 146* 121* 162*     MRI brain (8/31)  No acute infarct or acute intracranial process identified.  Mild chronic small vessel ischemic changes.    Noncontrast CT head (8/30) No acute intracranial

## 2024-08-31 NOTE — PROGRESS NOTES
Comprehensive Nutrition Assessment    Type and Reason for Visit:  Positive Nutrition Screen    Nutrition Recommendations/Plan:   Continue current diet with Ensure TID.     Malnutrition Assessment:  Malnutrition Status:  No malnutrition (08/31/24 1324)    Context:  Acute Illness       Nutrition Assessment:    MST=2. Pt. admitted for acute CVA. MRI of brain today, results pending. Swallow eval ordered, planning to be completed today. Regular diet restarted, no meal intakes for review at this time. Ensure started TID. Will keep order in place for now and assess diet acceptance at follow up. Pt. reporting poor intake prior to admission. Weight loss noted in chart appears inaccurate, possibly d/t variance in scales. BMI 32. Weight trends appear appropriate at this time. No new interventions at this time, will await swallow study results and PO acceptance.    Nutrition Related Findings:    8/30 Nutrition labs reviewed. LBM PTA. Wound Type: None       Current Nutrition Intake & Therapies:    Average Meal Intake: Unable to assess  Average Supplements Intake: Unable to assess  ADULT DIET; Regular  ADULT ORAL NUTRITION SUPPLEMENT; Breakfast, Lunch, Dinner; Standard High Calorie/High Protein Oral Supplement    Anthropometric Measures:  Height: 162.6 cm (5' 4.02\")  Ideal Body Weight (IBW): 120 lbs (55 kg)       Current Body Weight: 84.2 kg (185 lb 10 oz), 154.7 % IBW.    Current BMI (kg/m2): 31.8                          BMI Categories: Obese Class 1 (BMI 30.0-34.9)    Estimated Daily Nutrient Needs:  Energy Requirements Based On: Kcal/kg  Weight Used for Energy Requirements: Ideal  Energy (kcal/day): 8630-1027 kcal (25-30 kcal/kg)  Weight Used for Protein Requirements: Current  Protein (g/day): 67-84 g (0.8-1g/kg)  Method Used for Fluid Requirements: 1 ml/kcal  Fluid (ml/day): or per provider    Nutrition Diagnosis:   Increased nutrient needs related to acute injury/trauma as evidenced by poor intake prior to

## 2024-08-31 NOTE — PROGRESS NOTES
Speech Therapy Note    Order received for SLP evaluation due to concern for CVA   SLP attempt 8/31/2024 at 10:20 am    RN reports-awaiting transport for MRI Brain;   Plan: Will re-attempt 8/31/2024 early afternoon    Signed   Magy Velasquez MS,CCC,SLP [id number]  Speech and Language Pathologist  8/31/2024 10:23 am

## 2024-08-31 NOTE — PROGRESS NOTES
Physical Therapy  Facility/Department: [address] PROGRESSIVE CARE  Physical Therapy Initial Assessment    Name: John Wood  : 1955  MRN: [id number]  Date of Service: 2024    Discharge Recommendations:  24 hour supervision or assist, Home with Home health PT   PT Equipment Recommendations  Equipment Needed: No      John Wood scored a 18/24 on the AM-PAC short mobility form. Current research shows that an AM-PAC score of 18 or greater is typically associated with a discharge to the patient's home setting. Based on the patient's AM-PAC score and their current functional mobility deficits, it is recommended that the patient have 2-3 sessions per week of Physical Therapy at d/c to increase the patient's independence.  At this time, this patient demonstrates the endurance and safety to discharge home with Home PT and a follow up treatment frequency of 2-3x/wk.  Please see assessment section for further patient specific details.    If patient discharges prior to next session this note will serve as a discharge summary.  Please see below for the latest assessment towards goals.       Patient Diagnosis(es): The primary encounter diagnosis was Aphasia. A diagnosis of Acute right-sided weakness was also pertinent to this visit.  Past Medical History:  has a past medical history of Anesthesia complication, Arthritis, Cardiomyopathy (HCC), COVID-19, Diabetes, GERD (gastroesophageal reflux disease), Hyperlipidemia, Hypertension, Lumbar disc disease, Prolonged emergence from general anesthesia, Sleep apnea, Unspecified cerebral artery occlusion with cerebral infarction, and Wears glasses.  Past Surgical History:  has a past surgical history that includes Partial hysterectomy (2003); Hand surgery (Right); Foot surgery (Bilateral); Carpal tunnel release (Left, 2015); Finger trigger release (Left, 2015); Carpal tunnel release (Right, 2015); Finger trigger release (Right, 2015);  her head yes and no as she had significant aphasia she was also complaining of headache she does have a history of stroke, denies abdominal pain having some nausea.  No fever chills no chest pain no shortness of breath in ED stroke protocol activated patient clinically improved her right-sided weakness improved her right-sided facial droop improved she still having aphasia with some improvement discussed with ED MD and agree with plan to admit for further management and treatment  Response To Previous Treatment: Not applicable  Family / Caregiver Present: Yes  Referring Practitioner: Dr Martínez  Referral Date : 08/30/24  Follows Commands: Within Functional Limits  Subjective  Subjective: pt very pleasant and agreeable to working with therapy         Social/Functional History  Social/Functional History  Lives With: Spouse  Type of Home: House  Home Layout: Multi-level, Able to Live on Main level with bedroom/bathroom, Laundry in basement, Performs ADL's on one level  Home Access: Stairs to enter with rails  Entrance Stairs - Number of Steps: 4+4  Entrance Stairs - Rails: Left  Bathroom Shower/Tub: Shower chair with back, Walk-in shower  Bathroom Toilet: Standard (with RTS with arms)  Bathroom Equipment: Shower chair, Hand-held shower, Toilet raiser, Grab bars in shower  Home Equipment: Cane, Walker - 4-Wheeled  Has the patient had two or more falls in the past year or any fall with injury in the past year?: No (but loses her balance a lot)  ADL Assistance: Needs assistance ( or daughter assist with shower/dressing as needed)  Homemaking Assistance: Needs assistance  Ambulation Assistance: Independent (uses cane in home and 4WW in community)  Transfer Assistance: Independent  Occupation: Part time employment  Additional Comments: hangs clothes on racks at [employer]  Vision/Hearing  Vision  Vision: Impaired  Vision Exceptions: Wears glasses at all times  Hearing  Hearing: Within functional limits   2: transfers [place]  Short Term Goal 3: amb 100' with LRAD SBA  Patient Goals   Patient Goals : to return home       Education  Patient Education  Education Given To: Patient  Education Provided: Role of Therapy  Education Provided Comments: reviewed call light and not getting up without assist  Education Method: Verbal  Education Outcome: Verbalized understanding      Therapy Time   Individual Concurrent Group Co-treatment   Time In 0813         Time Out 0900         Minutes 47                 ELYSIA ARIZMENDI PT     Electronically signed by ELYSIA ARIZMENDI PT on 8/31/2024 at 9:16 AM

## 2024-08-31 NOTE — PROGRESS NOTES
Occupational Therapy  Facility/Department: [facility] PROGRESSIVE CARE  Occupational Therapy Initial Assessment  Should patient be discharged prior to another treatment session, this note shall serve as the discharge summary.       Name: John Wood  : 1955  MRN: [id number]  Date of Service: 2024    Discharge Recommendations:  2-3 sessions per week, Patient would benefit from continued therapy after discharge          Patient Diagnosis(es): The primary encounter diagnosis was Aphasia. A diagnosis of Acute right-sided weakness was also pertinent to this visit.  Past Medical History:  has a past medical history of Anesthesia complication, Arthritis, Cardiomyopathy (HCC), COVID-19, Diabetes, GERD (gastroesophageal reflux disease), Hyperlipidemia, Hypertension, Lumbar disc disease, Prolonged emergence from general anesthesia, Sleep apnea, Unspecified cerebral artery occlusion with cerebral infarction, and Wears glasses.  Past Surgical History:  has a past surgical history that includes Partial hysterectomy (2003); Hand surgery (Right); Foot surgery (Bilateral); Carpal tunnel release (Left, 2015); Finger trigger release (Left, 2015); Carpal tunnel release (Right, 2015); Finger trigger release (Right, 2015); Shoulder arthroscopy (Right, 2018); Colonoscopy; arthrodesis (Right, 2020); Knee Arthroplasty (Right, 2021); Knee arthroscopy (Right, 2022); Pain management procedure (Right, 2023); Nerve Block (Right, 2023); Pain management procedure (Right, 2023); Nerve Block (Right, 2023); and Hysterectomy.           Assessment  Performance deficits / Impairments: Decreased functional mobility ;Decreased ADL status;Decreased ROM;Decreased strength;Decreased balance;Decreased high-level IADLs  Assessment: Pt presents with right side weakness and aphasia, symptoms have resolved per pt.  Pt has a history of CVA earlier this year.  Pt lives at

## 2024-08-31 NOTE — PLAN OF CARE
Problem: Discharge Planning  Goal: Discharge to home or other facility with appropriate resources  8/31/2024 1030 by Trupti Wan RN  Outcome: Progressing  8/31/2024 0131 by Matthew Rodriguez RN  Outcome: Progressing     Problem: Pain  Goal: Verbalizes/displays adequate comfort level or baseline comfort level  8/31/2024 1030 by Trupti Wan RN  Outcome: Progressing  8/31/2024 0131 by Matthew Rodriguez RN  Outcome: Progressing     Problem: Safety - Adult  Goal: Free from fall injury  8/31/2024 1030 by Trupti Wan RN  Outcome: Progressing  8/31/2024 0131 by Matthew Rodriguez RN  Outcome: Progressing     Problem: Skin/Tissue Integrity  Goal: Absence of new skin breakdown  Description: 1.  Monitor for areas of redness and/or skin breakdown  2.  Assess vascular access sites hourly  3.  Every 4-6 hours minimum:  Change oxygen saturation probe site  4.  Every 4-6 hours:  If on nasal continuous positive airway pressure, respiratory therapy assess nares and determine need for appliance change or resting period.  8/31/2024 1030 by Trupti Wan RN  Outcome: Progressing  8/31/2024 0131 by Matthew Rodriguez RN  Outcome: Progressing

## 2024-08-31 NOTE — PROGRESS NOTES
4 Eyes Skin Assessment     NAME:  John Wood  YOB: 1955  MEDICAL RECORD NUMBER:  [id number]    The patient is being assessed for  Admission    I agree that at least one RN has performed a thorough Head to Toe Skin Assessment on the patient. ALL assessment sites listed below have been assessed.      Areas assessed by both nurses:    Head, Face, Ears, Shoulders, Back, Chest, Arms, Elbows, Hands, Sacrum. Buttock, Coccyx, Ischium, Legs. Feet and Heels, and Under Medical Devices         Does the Patient have a Wound? No noted wound(s)       Dalton Prevention initiated by RN: Yes  Wound Care Orders initiated by RN: No    Pressure Injury (Stage 3,4, Unstageable, DTI, NWPT, and Complex wounds) if present, place Wound referral order by RN under : No    New Ostomies, if present place, Ostomy referral order under : No     Nurse 1 eSignature: Electronically signed by Matthew Rodriguez RN on 8/31/24 at 4:45 AM EDT    **SHARE this note so that the co-signing nurse can place an eSignature**    Nurse 2 eSignature: Electronically signed by Sherie Dunlap RN on 8/31/24 at 4:47 AM EDT

## 2024-08-31 NOTE — CONSULTS
Neurology Consult Note  Reason for Consult: acute CVA    Chief complaint: \"Headache\"    Dr Nagel, Jose Shin MD asked me to see John Wood in consultation for evaluation of acute CVA    History of Present Illness:  I obtained my information via the patient, supplemented with chart review.     John Wood is a 68 y.o. female with hx of DM, HTN, HLD, sleep apnea, cardiomyopathy, hx of ?stroke-reported by patient with right sided deficits and language difficulty.  See below for additional. She was admitted on 8/30/24 for evaluation of acute right sided weakness, right facial droop, and difficulty speaking. She also reported a headache.     Per my encounter, which is limited given concurrent speech encounter:   The patient recently had not been feeling well non specifically. She tells me she had a fever of 101 in the day(s) prior to presentation. She had recently had neck pain and stiffness different from what she has had in the past. The day of admission she was at work when her symptoms started. She was noted at some point to have developed right sided weakness and reported aphasia. She has difficulty with some specifics as she does have some impaired awareness to events. When she arrived to the ED she was unable to speak though could not her head yes/no. Her BP was 116/83. NIHSS 11.     MRI Brain was completed prior to encounter and was negative for acute intracranial process.     On review of chart the patient had a hospital encounter at  on 4/15/14 for with sided weakness and aphasia which she was treated with TPA. MRI brain was without evidence of acute stroke burden. Her symptoms improved with therapy. There was some concern for possibly psychogenic overlay. Additionally she has had hx of migraines previously treated with triptan.     Home medications include 81mg ASA, pravastatin 40mg.     She has improved but not back to baseline. NIHSS improving.         Medical History:  Past Medical

## 2024-08-31 NOTE — PLAN OF CARE
Problem: Skin/Tissue Integrity  Goal: Absence of new skin breakdown  Description: 1.  Monitor for areas of redness and/or skin breakdown  2.  Assess vascular access sites hourly  3.  Every 4-6 hours minimum:  Change oxygen saturation probe site  4.  Every 4-6 hours:  If on nasal continuous positive airway pressure, respiratory therapy assess nares and determine need for appliance change or resting period.  Outcome: Progressing     Problem: Safety - Adult  Goal: Free from fall injury  Outcome: Progressing     Problem: Pain  Goal: Verbalizes/displays adequate comfort level or baseline comfort level  Outcome: Progressing     Problem: Discharge Planning  Goal: Discharge to home or other facility with appropriate resources  Outcome: Progressing

## 2024-08-31 NOTE — PROGRESS NOTES
Facility/Department: [address] PROGRESSIVE CARE  SLP Clinical Swallow Evaluation and Speech Language Cognitive Assessment     Patient: John Wood   : 1955   MRN: [id number]      Evaluation Date: 2024      Admitting Dx:   Aphasia [R47.01]  Acute right-sided weakness [R53.1]  Acute CVA (cerebrovascular accident) (HCC) [I63.9]   has a past medical history of Anesthesia complication, Arthritis, Cardiomyopathy (HCC), COVID-19, Diabetes, GERD (gastroesophageal reflux disease), Hyperlipidemia, Hypertension, Lumbar disc disease, Prolonged emergence from general anesthesia, Sleep apnea, Unspecified cerebral artery occlusion with cerebral infarction (), and Wears glasses.   has a past surgical history that includes Partial hysterectomy (2003); Hand surgery (Right); Foot surgery (Bilateral); Carpal tunnel release (Left, 2015); Finger trigger release (Left, 2015); Carpal tunnel release (Right, 2015); Finger trigger release (Right, 2015); Shoulder arthroscopy (Right, 2018); Colonoscopy; arthrodesis (Right, 2020); Knee Arthroplasty (Right, 2021); Knee arthroscopy (Right, 2022); Pain management procedure (Right, 2023); Nerve Block (Right, 2023); Pain management procedure (Right, 2023); Nerve Block (Right, 2023); and Hysterectomy.  Allergies: Codiene; Vicodin, lipitor, oxcycontin; tramadol  Speech Therapy History: Pt reports assessment in the past; but this SLP as unable to locate in brief chart review  Dysphagia History including instrumental assessment:2021 Any GI or ENT history: EGD in the past                          Onset: 2024     Chart Review:  H&P: Per MD History and Physical Documentation  History Of Present Illness:     68 y.o. female who presented to [facility] with aphasia and right-sided weakness, patient presented to ED with sudden onset of right-sided weakness right facial droop and aphasia, at about 2:30 PM she  WFL  Oriented x4    Attention:  Deficit areas of concern with need for further assessment/treatment      Functional sustained/focused attention  Additional assessment of higher level attention    Memory: Deficit areas of concern with need for further assessment/treatment   Comment:ongoing assessment for STM    Problem Solving:  WFL for concrete; ongoing assessment    Math Language/numeric reasoning: DNT    Safety/Judgement: DNT        If patient discharges prior to next visit, this note will serve as discharge.     Time code minutes: 0  Total Time:  60    Electronically signed by:    Magy VelasquezMS,CCC,SLP [id number]  Speech and Language Pathologist  08/31/24 1:44 PM

## 2024-09-01 ENCOUNTER — APPOINTMENT (OUTPATIENT)
Dept: CT IMAGING | Age: 69
DRG: 948 | End: 2024-09-01
Payer: MEDICARE

## 2024-09-01 LAB
ALBUMIN SERPL-MCNC: 3.3 G/DL (ref 3.4–5)
ANION GAP SERPL CALCULATED.3IONS-SCNC: 12 MMOL/L (ref 3–16)
BUN SERPL-MCNC: 19 MG/DL (ref 7–20)
CALCIUM SERPL-MCNC: 9.9 MG/DL (ref 8.3–10.6)
CHLORIDE SERPL-SCNC: 106 MMOL/L (ref 99–110)
CO2 SERPL-SCNC: 20 MMOL/L (ref 21–32)
CREAT SERPL-MCNC: 0.7 MG/DL (ref 0.6–1.2)
GFR SERPLBLD CREATININE-BSD FMLA CKD-EPI: >90 ML/MIN/{1.73_M2}
GLUCOSE BLD-MCNC: 167 MG/DL (ref 70–99)
GLUCOSE SERPL-MCNC: 158 MG/DL (ref 70–99)
MAGNESIUM SERPL-MCNC: 1.83 MG/DL (ref 1.8–2.4)
PERFORMED ON: ABNORMAL
PHOSPHATE SERPL-MCNC: 3.2 MG/DL (ref 2.5–4.9)
POTASSIUM SERPL-SCNC: 4.6 MMOL/L (ref 3.5–5.1)
SODIUM SERPL-SCNC: 138 MMOL/L (ref 136–145)

## 2024-09-01 PROCEDURE — 2060000000 HC ICU INTERMEDIATE R&B

## 2024-09-01 PROCEDURE — 94760 N-INVAS EAR/PLS OXIMETRY 1: CPT

## 2024-09-01 PROCEDURE — 6370000000 HC RX 637 (ALT 250 FOR IP): Performed by: INTERNAL MEDICINE

## 2024-09-01 PROCEDURE — 2580000003 HC RX 258: Performed by: INTERNAL MEDICINE

## 2024-09-01 PROCEDURE — 72125 CT NECK SPINE W/O DYE: CPT

## 2024-09-01 PROCEDURE — 36415 COLL VENOUS BLD VENIPUNCTURE: CPT

## 2024-09-01 PROCEDURE — 6360000002 HC RX W HCPCS: Performed by: INTERNAL MEDICINE

## 2024-09-01 PROCEDURE — 80069 RENAL FUNCTION PANEL: CPT

## 2024-09-01 PROCEDURE — 83735 ASSAY OF MAGNESIUM: CPT

## 2024-09-01 RX ADMIN — BRIMONIDINE TARTRATE 1 DROP: 2 SOLUTION/ DROPS OPHTHALMIC at 09:03

## 2024-09-01 RX ADMIN — ACETAMINOPHEN 325MG 650 MG: 325 TABLET ORAL at 00:00

## 2024-09-01 RX ADMIN — ACETAMINOPHEN 325MG 650 MG: 325 TABLET ORAL at 16:20

## 2024-09-01 RX ADMIN — DULOXETINE HYDROCHLORIDE 60 MG: 60 CAPSULE, DELAYED RELEASE ORAL at 20:48

## 2024-09-01 RX ADMIN — ENOXAPARIN SODIUM 40 MG: 100 INJECTION SUBCUTANEOUS at 09:07

## 2024-09-01 RX ADMIN — FLUTICASONE PROPIONATE 2 SPRAY: 50 SPRAY, METERED NASAL at 09:02

## 2024-09-01 RX ADMIN — SODIUM CHLORIDE, PRESERVATIVE FREE 10 ML: 5 INJECTION INTRAVENOUS at 20:49

## 2024-09-01 RX ADMIN — PRAVASTATIN SODIUM 40 MG: 40 TABLET ORAL at 09:04

## 2024-09-01 RX ADMIN — PYRIDOXINE HCL TAB 50 MG 100 MG: 50 TAB at 09:04

## 2024-09-01 RX ADMIN — DULOXETINE HYDROCHLORIDE 60 MG: 60 CAPSULE, DELAYED RELEASE ORAL at 09:04

## 2024-09-01 RX ADMIN — INSULIN LISPRO 1 UNITS: 100 INJECTION, SOLUTION INTRAVENOUS; SUBCUTANEOUS at 18:27

## 2024-09-01 RX ADMIN — TRAZODONE HYDROCHLORIDE 100 MG: 100 TABLET ORAL at 20:48

## 2024-09-01 RX ADMIN — SODIUM CHLORIDE, PRESERVATIVE FREE 10 ML: 5 INJECTION INTRAVENOUS at 09:07

## 2024-09-01 RX ADMIN — ASPIRIN 81 MG: 81 TABLET, CHEWABLE ORAL at 09:04

## 2024-09-01 RX ADMIN — PREGABALIN 150 MG: 75 CAPSULE ORAL at 20:48

## 2024-09-01 RX ADMIN — LATANOPROST 1 DROP: 50 SOLUTION OPHTHALMIC at 20:49

## 2024-09-01 RX ADMIN — INSULIN GLARGINE 8 UNITS: 100 INJECTION, SOLUTION SUBCUTANEOUS at 20:48

## 2024-09-01 RX ADMIN — PANTOPRAZOLE SODIUM 40 MG: 40 TABLET, DELAYED RELEASE ORAL at 06:36

## 2024-09-01 RX ADMIN — FUROSEMIDE 20 MG: 20 TABLET ORAL at 09:04

## 2024-09-01 ASSESSMENT — PAIN DESCRIPTION - LOCATION
LOCATION: NECK
LOCATION: NECK;HEAD
LOCATION: NECK

## 2024-09-01 ASSESSMENT — PAIN DESCRIPTION - ORIENTATION: ORIENTATION: MID

## 2024-09-01 ASSESSMENT — PAIN SCALES - GENERAL
PAINLEVEL_OUTOF10: 7
PAINLEVEL_OUTOF10: 3
PAINLEVEL_OUTOF10: 3
PAINLEVEL_OUTOF10: 6

## 2024-09-01 ASSESSMENT — PAIN DESCRIPTION - DESCRIPTORS
DESCRIPTORS: ACHING
DESCRIPTORS: ACHING;SORE
DESCRIPTORS: ACHING;DISCOMFORT

## 2024-09-01 ASSESSMENT — PAIN - FUNCTIONAL ASSESSMENT
PAIN_FUNCTIONAL_ASSESSMENT: ACTIVITIES ARE NOT PREVENTED
PAIN_FUNCTIONAL_ASSESSMENT: ACTIVITIES ARE NOT PREVENTED

## 2024-09-01 NOTE — PLAN OF CARE
Problem: Discharge Planning  Goal: Discharge to home or other facility with appropriate resources  9/1/2024 0921 by Trupti Wan RN  Outcome: Progressing  9/1/2024 0114 by Matthew Rodriguez RN  Outcome: Progressing     Problem: Pain  Goal: Verbalizes/displays adequate comfort level or baseline comfort level  9/1/2024 0921 by Trupti Wan RN  Outcome: Progressing  9/1/2024 0114 by Matthew Rodriguez RN  Outcome: Progressing     Problem: Safety - Adult  Goal: Free from fall injury  9/1/2024 0921 by Trupti Wan RN  Outcome: Progressing  9/1/2024 0114 by Matthew Rodriguez RN  Outcome: Progressing     Problem: Skin/Tissue Integrity  Goal: Absence of new skin breakdown  Description: 1.  Monitor for areas of redness and/or skin breakdown  2.  Assess vascular access sites hourly  3.  Every 4-6 hours minimum:  Change oxygen saturation probe site  4.  Every 4-6 hours:  If on nasal continuous positive airway pressure, respiratory therapy assess nares and determine need for appliance change or resting period.  9/1/2024 0921 by Trupti Wan RN  Outcome: Progressing  9/1/2024 0114 by Matthew Rodriguez RN  Outcome: Progressing     Problem: Nutrition Deficit:  Goal: Optimize nutritional status  9/1/2024 0921 by Trupti Wan RN  Outcome: Progressing  9/1/2024 0114 by Matthew Rodriguez RN  Outcome: Progressing

## 2024-09-01 NOTE — PROGRESS NOTES
Hospitalist Progress Note  9/1/2024 11:19 AM  Subjective:   Admit Date: 8/30/2024  PCP: Lavell Finnegan MD Status: Inpatient   Interval History: Hospital Day: 3, neck pain overnight interrupting with sleep relieved with heat and acetaminophen.     History of present illness: Admitted with aphasia and right sided weakness with facial droop.   Stroke team evaluation and not a candidate for TNK.   CTA head and neck showed no large vessel occlusion, therefore, the patient is not a candidate for acute neurointerventional treatment.  She was on aspirin 81 mg and pravastatin 40 mg prior to admission.      Medical co-morbidities include type 2 diabetes, hypertension, dyslipidemia, sleep apnea, and cardiomyopathy.      Diet: regular  Right forearm peripheral IV (8/31, day #2)    Medications:     fluticasone  2 spray Each Nostril Daily   furosemide  20 mg Oral Daily   insulin glargine  8 Units SubCUTAneous Nightly   pantoprazole  40 mg Oral QAM AC   trazodone  100 mg Oral Nightly   vitamin B-6  100 mg Oral Daily   enoxaparin  40 mg SubCUTAneous Daily   pravastatin  40 mg Oral Daily   aspirin  81 mg Oral Daily   insulin lispro SSI  0-4 Units SubCUTAneous TID WC   pregabalin  150 mg Oral Nightly   latanoprost  1 drop Both Eyes Nightly   brimonidine  1 drop Both Eyes Daily   duloxetine  60 mg Oral BID       Labs:       08/30/24  1519 08/31/24  0518 09/01/24  0535   WBC 8.8 6.0  --    HGB 12.5 11.6*  --     250  --    MCV 87.2 85.6  --            --  138   K 4.7  --  4.6     --  106   CO2 22  --  20*   BUN 20  --  19   CREATININE 0.8  --  0.7   GLUCOSE 130*  --  158*         MG  --   --  1.83   PHOS  --   --  3.2   CALCIUM 9.8  --  9.9   ALBUMIN 3.9  --  3.3*   INR 0.82*  --   --    AST 24  --   --    ALT 19  --   --    ALKPHOS 68  --   --      Lipid panel (8/31)   Cholesterol, Total 221 High    Triglycerides 140   HDL 68 High    LDL Cholesterol 125 High         POC Glucose:   Recent Labs     08/31/24  0816

## 2024-09-01 NOTE — PLAN OF CARE
CT cervical spine shows No acute cervical spine fracture. Moderate multilevel degenerative disc disease which is most prominent C5-C6 and C6-C7 and mild-to-moderate multilevel facet arthropathy. Posterior central disc herniation at C4-C5 it causes moderate central  canal narrowing.  Disc bulging and of the hypertrophy also cause moderate  central canal narrowing at C5-C6 and C6-C7.  Multilevel osseous neural  foraminal narrowing is worst and moderate bilaterally at C5-C6 on the left at C6-C7.     We will consult neurosurgery for recommendations on moderate cental canal narrowing.      Paul Nagel MD

## 2024-09-01 NOTE — PLAN OF CARE
Problem: Discharge Planning  Goal: Discharge to home or other facility with appropriate resources  Outcome: Progressing     Problem: Pain  Goal: Verbalizes/displays adequate comfort level or baseline comfort level  Outcome: Progressing     Problem: Safety - Adult  Goal: Free from fall injury  Outcome: Progressing     Problem: Skin/Tissue Integrity  Goal: Absence of new skin breakdown  Description: 1.  Monitor for areas of redness and/or skin breakdown  2.  Assess vascular access sites hourly  3.  Every 4-6 hours minimum:  Change oxygen saturation probe site  4.  Every 4-6 hours:  If on nasal continuous positive airway pressure, respiratory therapy assess nares and determine need for appliance change or resting period.  Outcome: Progressing     Problem: Nutrition Deficit:  Goal: Optimize nutritional status  9/1/2024 0114 by Matthew Rodriguez RN  Outcome: Progressing

## 2024-09-02 LAB
ALBUMIN SERPL-MCNC: 3.7 G/DL (ref 3.4–5)
ALP SERPL-CCNC: 64 U/L (ref 40–129)
ALT SERPL-CCNC: 18 U/L (ref 10–40)
ANION GAP SERPL CALCULATED.3IONS-SCNC: 9 MMOL/L (ref 3–16)
AST SERPL-CCNC: 25 U/L (ref 15–37)
BASOPHILS # BLD: 0 K/UL (ref 0–0.2)
BASOPHILS NFR BLD: 0.7 %
BILIRUB DIRECT SERPL-MCNC: <0.1 MG/DL (ref 0–0.3)
BILIRUB INDIRECT SERPL-MCNC: ABNORMAL MG/DL (ref 0–1)
BILIRUB SERPL-MCNC: <0.2 MG/DL (ref 0–1)
BUN SERPL-MCNC: 18 MG/DL (ref 7–20)
CALCIUM SERPL-MCNC: 10.2 MG/DL (ref 8.3–10.6)
CHLORIDE SERPL-SCNC: 105 MMOL/L (ref 99–110)
CO2 SERPL-SCNC: 26 MMOL/L (ref 21–32)
CREAT SERPL-MCNC: 0.8 MG/DL (ref 0.6–1.2)
DEPRECATED RDW RBC AUTO: 13.7 % (ref 12.4–15.4)
EOSINOPHIL # BLD: 0.2 K/UL (ref 0–0.6)
EOSINOPHIL NFR BLD: 3.5 %
GFR SERPLBLD CREATININE-BSD FMLA CKD-EPI: 79 ML/MIN/{1.73_M2}
GLUCOSE BLD-MCNC: 133 MG/DL (ref 70–99)
GLUCOSE BLD-MCNC: 168 MG/DL (ref 70–99)
GLUCOSE BLD-MCNC: 209 MG/DL (ref 70–99)
GLUCOSE BLD-MCNC: 222 MG/DL (ref 70–99)
GLUCOSE SERPL-MCNC: 147 MG/DL (ref 70–99)
HCT VFR BLD AUTO: 36.5 % (ref 36–48)
HGB BLD-MCNC: 12.3 G/DL (ref 12–16)
LYMPHOCYTES # BLD: 2.4 K/UL (ref 1–5.1)
LYMPHOCYTES NFR BLD: 42.9 %
MAGNESIUM SERPL-MCNC: 1.71 MG/DL (ref 1.8–2.4)
MCH RBC QN AUTO: 29 PG (ref 26–34)
MCHC RBC AUTO-ENTMCNC: 33.8 G/DL (ref 31–36)
MCV RBC AUTO: 85.8 FL (ref 80–100)
MONOCYTES # BLD: 0.4 K/UL (ref 0–1.3)
MONOCYTES NFR BLD: 6.6 %
NEUTROPHILS # BLD: 2.6 K/UL (ref 1.7–7.7)
NEUTROPHILS NFR BLD: 46.3 %
PERFORMED ON: ABNORMAL
PHOSPHATE SERPL-MCNC: 3.7 MG/DL (ref 2.5–4.9)
PLATELET # BLD AUTO: 253 K/UL (ref 135–450)
PMV BLD AUTO: 7.5 FL (ref 5–10.5)
POTASSIUM SERPL-SCNC: 4.6 MMOL/L (ref 3.5–5.1)
PROT SERPL-MCNC: 6.2 G/DL (ref 6.4–8.2)
RBC # BLD AUTO: 4.25 M/UL (ref 4–5.2)
SODIUM SERPL-SCNC: 140 MMOL/L (ref 136–145)
WBC # BLD AUTO: 5.6 K/UL (ref 4–11)

## 2024-09-02 PROCEDURE — 80069 RENAL FUNCTION PANEL: CPT

## 2024-09-02 PROCEDURE — 80076 HEPATIC FUNCTION PANEL: CPT

## 2024-09-02 PROCEDURE — 83735 ASSAY OF MAGNESIUM: CPT

## 2024-09-02 PROCEDURE — 2060000000 HC ICU INTERMEDIATE R&B

## 2024-09-02 PROCEDURE — 2580000003 HC RX 258: Performed by: INTERNAL MEDICINE

## 2024-09-02 PROCEDURE — 6360000002 HC RX W HCPCS: Performed by: INTERNAL MEDICINE

## 2024-09-02 PROCEDURE — 85025 COMPLETE CBC W/AUTO DIFF WBC: CPT

## 2024-09-02 PROCEDURE — 6370000000 HC RX 637 (ALT 250 FOR IP): Performed by: INTERNAL MEDICINE

## 2024-09-02 PROCEDURE — 92526 ORAL FUNCTION THERAPY: CPT

## 2024-09-02 PROCEDURE — 36415 COLL VENOUS BLD VENIPUNCTURE: CPT

## 2024-09-02 RX ADMIN — SODIUM CHLORIDE, PRESERVATIVE FREE 10 ML: 5 INJECTION INTRAVENOUS at 09:22

## 2024-09-02 RX ADMIN — DULOXETINE HYDROCHLORIDE 60 MG: 60 CAPSULE, DELAYED RELEASE ORAL at 09:19

## 2024-09-02 RX ADMIN — SODIUM CHLORIDE, PRESERVATIVE FREE 5 ML: 5 INJECTION INTRAVENOUS at 21:42

## 2024-09-02 RX ADMIN — INSULIN LISPRO 1 UNITS: 100 INJECTION, SOLUTION INTRAVENOUS; SUBCUTANEOUS at 09:23

## 2024-09-02 RX ADMIN — FLUTICASONE PROPIONATE 2 SPRAY: 50 SPRAY, METERED NASAL at 09:21

## 2024-09-02 RX ADMIN — CYCLOBENZAPRINE HYDROCHLORIDE 10 MG: 10 TABLET, FILM COATED ORAL at 23:52

## 2024-09-02 RX ADMIN — PRAVASTATIN SODIUM 40 MG: 40 TABLET ORAL at 09:19

## 2024-09-02 RX ADMIN — FUROSEMIDE 20 MG: 20 TABLET ORAL at 09:18

## 2024-09-02 RX ADMIN — LATANOPROST 1 DROP: 50 SOLUTION OPHTHALMIC at 20:52

## 2024-09-02 RX ADMIN — DULOXETINE HYDROCHLORIDE 60 MG: 60 CAPSULE, DELAYED RELEASE ORAL at 21:08

## 2024-09-02 RX ADMIN — ASPIRIN 81 MG: 81 TABLET, CHEWABLE ORAL at 09:19

## 2024-09-02 RX ADMIN — PREGABALIN 150 MG: 75 CAPSULE ORAL at 21:08

## 2024-09-02 RX ADMIN — ENOXAPARIN SODIUM 40 MG: 100 INJECTION SUBCUTANEOUS at 09:23

## 2024-09-02 RX ADMIN — PANTOPRAZOLE SODIUM 40 MG: 40 TABLET, DELAYED RELEASE ORAL at 06:16

## 2024-09-02 RX ADMIN — TRAZODONE HYDROCHLORIDE 100 MG: 100 TABLET ORAL at 21:08

## 2024-09-02 RX ADMIN — INSULIN GLARGINE 8 UNITS: 100 INJECTION, SOLUTION SUBCUTANEOUS at 21:08

## 2024-09-02 RX ADMIN — ACETAMINOPHEN 325MG 650 MG: 325 TABLET ORAL at 09:16

## 2024-09-02 RX ADMIN — PYRIDOXINE HCL TAB 50 MG 100 MG: 50 TAB at 09:21

## 2024-09-02 RX ADMIN — BRIMONIDINE TARTRATE 1 DROP: 2 SOLUTION/ DROPS OPHTHALMIC at 09:22

## 2024-09-02 ASSESSMENT — PAIN DESCRIPTION - LOCATION
LOCATION: HEAD;NECK
LOCATION: NECK

## 2024-09-02 ASSESSMENT — PAIN DESCRIPTION - FREQUENCY: FREQUENCY: CONTINUOUS

## 2024-09-02 ASSESSMENT — PAIN DESCRIPTION - DESCRIPTORS
DESCRIPTORS: ACHING
DESCRIPTORS: ACHING

## 2024-09-02 ASSESSMENT — PAIN SCALES - GENERAL
PAINLEVEL_OUTOF10: 3
PAINLEVEL_OUTOF10: 7
PAINLEVEL_OUTOF10: 0
PAINLEVEL_OUTOF10: 3
PAINLEVEL_OUTOF10: 0

## 2024-09-02 ASSESSMENT — PAIN - FUNCTIONAL ASSESSMENT: PAIN_FUNCTIONAL_ASSESSMENT: ACTIVITIES ARE NOT PREVENTED

## 2024-09-02 ASSESSMENT — PAIN DESCRIPTION - ONSET: ONSET: ON-GOING

## 2024-09-02 ASSESSMENT — PAIN DESCRIPTION - ORIENTATION: ORIENTATION: MID

## 2024-09-02 NOTE — CONSULTS
[facility]   Neurosurgery Consultation        Patient: John Wood  Consultant: Lionel Fuller MD, PhD        HPI: John Wood is a 68 y.o. female patient being seen for complaints of transient episode of aphasia, right-sdied weakness and facial droop associated with hypotension with BP 80/60 DOA. Also c/o feeling poor in general with upset stomach, sweatiness, and fever to 101.    Past Medical History:   Diagnosis Date    Anesthesia complication     \" shaking\"    Arthritis     Cardiomyopathy (HCC)     COVID-19     1/7/2022    Diabetes     GERD (gastroesophageal reflux disease)     Hyperlipidemia     Hypertension     Lumbar disc disease     Prolonged emergence from general anesthesia     Sleep apnea     pt states does use a Cpap machine at night    Unspecified cerebral artery occlusion with cerebral infarction 2014    right side weak    Wears glasses      Past Surgical History:   Procedure Laterality Date    ARTHRODESIS Right 09/17/2020    (RIGHT) REMOVAL OF BONE SPURRING AND OSSEOUS PROMINENCE FIRST METATARSAL, ARTHRODESIS OF FIRST METATARSOPHALANGEAL JOINT, BONE MARROW HARVEST AND CONCENTRATION RIGHT TIBIA performed by Noah Fairchild DPM at [facility] OR    CARPAL TUNNEL RELEASE Left 09/03/2015    CARPAL TUNNEL RELEASE Right 09/22/2015    COLONOSCOPY      FINGER TRIGGER RELEASE Left 09/03/2015    middle and ring fingers    FINGER TRIGGER RELEASE Right 09/22/2015    middle and ring fingers    FOOT SURGERY Bilateral     litteltoe    HAND SURGERY Right     Ligament    HYSTERECTOMY (CERVIX STATUS UNKNOWN)      KNEE ARTHROPLASTY Right 07/26/2021    ROBOTIC ASSISTED TOTAL RIGHT KNEE REPLACEMENT performed by Francisco Javier Dalton MD at [facility] OR    KNEE ARTHROSCOPY Right 02/02/2022    ARTHROSCOPY WITH LYSIS OF ADHESIONS, RIGHT KNEE performed by Francisco Javier Dalton MD at [facility] OR    NERVE BLOCK Right 03/31/2023    RIGHT SCIATIC NERVE BLOCK performed by Ashley Gutierrez MD at [facility] ENDOSCOPY    NERVE BLOCK  1 drop at 24 0922    DULoxetine (CYMBALTA) extended release capsule 60 mg, 60 mg, Oral, BID, Subhash Martínez MD, 60 mg at 24 0919    acetaminophen (TYLENOL) tablet 650 mg, 650 mg, Oral, Q4H PRN, Subhash Martínez MD, 650 mg at 24 0916  Social History     Socioeconomic History    Marital status:      Spouse name: Darrius, seen here    Number of children: 3    Years of education: Not on file    Highest education level: Not on file   Occupational History    Not on file   Tobacco Use    Smoking status: Former     Current packs/day: 0.00     Average packs/day: 1.5 packs/day for 23.0 years (34.5 ttl pk-yrs)     Types: Cigarettes     Start date: 1969     Quit date: 1992     Years since quittin.6     Passive exposure: Past    Smokeless tobacco: Never   Vaping Use    Vaping status: Never Used   Substance and Sexual Activity    Alcohol use: No    Drug use: No    Sexual activity: Yes     Partners: Male   Other Topics Concern    Not on file   Social History Narrative    Originally from [place]     is seen here    10 grandchildren     Social Determinants of Health     Financial Resource Strain: Low Risk  (3/21/2024)    Overall Financial Resource Strain (CARDIA)     Difficulty of Paying Living Expenses: Not hard at all   Food Insecurity: Food Insecurity Present (2024)    Hunger Vital Sign     Worried About Running Out of Food in the Last Year: Sometimes true     Ran Out of Food in the Last Year: Sometimes true   Transportation Needs: No Transportation Needs (2024)    PRAPARE - Transportation     Lack of Transportation (Medical): No     Lack of Transportation (Non-Medical): No   Physical Activity: Inactive (2023)    Exercise Vital Sign     Days of Exercise per Week: 0 days     Minutes of Exercise per Session: 0 min   Stress: Not on file   Social Connections: Not on file   Intimate Partner Violence: Unknown (2024)    Received from [facility] , [facility]

## 2024-09-02 NOTE — PROGRESS NOTES
V2.0    [facility] [place]      Name:  John Wood /Age/Sex: 1955  (68 y.o. female)   MRN & CSN:  [id number] & [id number] Encounter Date/Time: 2024 1:55 PM EDT   Location:  [id number] PCP: Lavell Finnegan MD     Attending:Basilia Tejeda MD       Hospital Day: 4    Assessment and Recommendations   John Wood is a 68 y.o. female  who presents with Acute CVA (cerebrovascular accident) (HCC)      Plan:       Aphasia and right sided weakness with facial droop.   Stroke team evaluation and not a candidate for TNK.   CTA head and neck showed no large vessel occlusion, therefore, the patient is not a candidate for acute neurointerventional treatment.   Aspirin and high intensity statin.  PT/OT evaluation.  Neurology evaluation with no further neurologic workup, signed off.  Diffuse neck pain : CT neck showing moderate central canal narrowing. Neurosurgery following. MRI cervical spine ordered  Type 2 Diabetes (controlled, HgbA1c 6.9%).  Basal glargine insulin 8 units daily.  Cover with a \"sliding scale\" lispro moderate scale prandial correction insulin.    Primary hypertension.   Headache on admission improved.  Possible migraine per neurology and possibly hemiplegic component if her R sided weakness resolves entirely.    Gastric reflux and stress ulcer prophylaxis with pantoprazole (Protonix) 40 mg daily.       Disposition : Discharge pending MRI findings.  Diet ADULT ORAL NUTRITION SUPPLEMENT; Breakfast, Lunch, Dinner; Standard High Calorie/High Protein Oral Supplement  ADULT DIET; Dysphagia - Soft and Bite Sized; Mildly Thick (Nectar)   DVT Prophylaxis [] Lovenox, []  Heparin, [] SCDs, [] Ambulation,  [] Eliquis, [] Xarelto  [] Coumadin   Code Status Full Code         Subjective:     Chief Complaint:     Reports weakness still persistent but getting better. Neck pain same as before. Denies any other active complaints.       Review of Systems:      Pertinent positives and negatives  on the left at C6-C7.     Echo (TTE) complete (PRN contrast/bubble/strain/3D)    Result Date: 8/31/2024    Left Ventricle: Normal left ventricular systolic function with a visually estimated EF of 60 - 65%. Left ventricle size is normal. Normal wall thickness. Normal wall motion.   Right Ventricle: Right ventricle size is normal. Normal systolic function.   Interatrial Septum: Agitated saline study was negative with and without provocation.   No significant valvular abnormalities.     MRI brain without contrast    Result Date: 8/31/2024  EXAMINATION: MRI OF THE BRAIN WITHOUT CONTRAST  8/31/2024 10:26 am TECHNIQUE: Multiplanar multisequence MRI of the brain was performed without the administration of intravenous contrast. COMPARISON: CT brain/CT angiogram brain 08/30/2024. HISTORY: ORDERING SYSTEM PROVIDED HISTORY: cva TECHNOLOGIST PROVIDED HISTORY: Reason for exam:->cva What is the sedation requirement?->None Decision Support Exception - unselect if not a suspected or confirmed emergency medical condition->Emergency Medical Condition (MA) Reason for Exam: cva FINDINGS: INTRACRANIAL STRUCTURES/VENTRICLES: There are no areas of restricted diffusion identified to suggest an acute infarct.  There is no acute intracranial hemorrhage.  No mass effect or midline shift is present. Several foci of abnormal increased T2/FLAIR signal intensity are present within the periventricular, subcortical and deep white matter of both hemispheres. There is no sellar or suprasellar mass present.  There is no ventriculomegaly or abnormal extra-axial fluid collection present.  The proximal portions of the [place] demonstrate normal flow voids. ORBITS: Limited evaluation of the orbits is unremarkable. SINUSES: The paranasal sinuses and mastoid air cells are clear. BONES/SOFT TISSUES: Bone marrow signal intensity is normal.     1. No acute infarct or acute intracranial process identified. 2. Mild chronic small vessel ischemic  and mastoid air cells demonstrate no acute abnormality. SOFT TISSUES/SKULL:  No acute abnormality of the visualized skull or soft tissues.     No acute intracranial findings.       CBC:   Recent Labs     08/30/24  1519 08/31/24  0518 09/02/24  0536   WBC 8.8 6.0 5.6   HGB 12.5 11.6* 12.3    250 253     BMP:    Recent Labs     08/30/24  1519 09/01/24  0535 09/02/24  0536    138 140   K 4.7 4.6 4.6    106 105   CO2 22 20* 26   BUN 20 19 18   CREATININE 0.8 0.7 0.8   GLUCOSE 130* 158* 147*     Hepatic:   Recent Labs     08/30/24  1519 09/02/24  0536   AST 24 25   ALT 19 18   BILITOT <0.2 <0.2   ALKPHOS 68 64     Lipids:   Lab Results   Component Value Date/Time    CHOL 221 08/31/2024 05:19 AM    HDL 68 08/31/2024 05:19 AM    HDL 56 01/26/2012 12:48 PM    TRIG 140 08/31/2024 05:19 AM     Hemoglobin A1C:   Lab Results   Component Value Date/Time    LABA1C 6.9 08/31/2024 05:18 AM     TSH:   Lab Results   Component Value Date/Time    TSH 3.97 12/11/2017 08:50 AM    TSH 3.37 08/23/2016 02:13 PM     Troponin: No results found for: \"TROPONINT\"  Lactic Acid: No results for input(s): \"LACTA\" in the last 72 hours.  BNP: No results for input(s): \"PROBNP\" in the last 72 hours.  UA:  Lab Results   Component Value Date/Time    NITRU Negative 08/27/2024 11:17 PM    COLORU Yellow 08/27/2024 11:17 PM    PHUR 5.5 08/27/2024 11:17 PM    PHUR 5.0 03/31/2024 10:18 PM    PHUR 6.0 03/31/2024 10:18 PM    WBCUA 4 08/27/2024 11:17 PM    RBCUA 0 08/27/2024 11:17 PM    BACTERIA None Seen 08/27/2024 11:17 PM    CLARITYU Clear 08/27/2024 11:17 PM    SPECGRAV 1.020 07/06/2023 02:58 PM    LEUKOCYTESUR SMALL 08/27/2024 11:17 PM    UROBILINOGEN 0.2 08/27/2024 11:17 PM    BILIRUBINUR Negative 08/27/2024 11:17 PM    BLOODU Negative 08/27/2024 11:17 PM    GLUCOSEU Negative 08/27/2024 11:17 PM    KETUA Negative 08/27/2024 11:17 PM     Urine Cultures:   Lab Results   Component Value Date/Time    LABURIN 75,000 CFU/ml 09/08/2021 12:32 PM

## 2024-09-02 NOTE — PROGRESS NOTES
Facility/Department: [address] PROGRESSIVE CARE   DYSPHAGIA THERAPY and SPEECH / LANGUAGE / COGNITIVE-LINGUISTIC TREATMENT    Patient: John Wood   : 1955   MRN: [id number]      Treatment Date: 2024      Admitting Dx:   Aphasia [R47.01]  Acute right-sided weakness [R53.1]  Acute CVA (cerebrovascular accident) (HCC) [I63.9]   has a past medical history of Anesthesia complication, Arthritis, Cardiomyopathy (HCC), COVID-19, Diabetes, GERD (gastroesophageal reflux disease), Hyperlipidemia, Hypertension, Lumbar disc disease, Prolonged emergence from general anesthesia, Sleep apnea, Unspecified cerebral artery occlusion with cerebral infarction (), and Wears glasses.   has a past surgical history that includes Partial hysterectomy (2003); Hand surgery (Right); Foot surgery (Bilateral); Carpal tunnel release (Left, 2015); Finger trigger release (Left, 2015); Carpal tunnel release (Right, 2015); Finger trigger release (Right, 2015); Shoulder arthroscopy (Right, 2018); Colonoscopy; arthrodesis (Right, 2020); Knee Arthroplasty (Right, 2021); Knee arthroscopy (Right, 2022); Pain management procedure (Right, 2023); Nerve Block (Right, 2023); Pain management procedure (Right, 2023); Nerve Block (Right, 2023); and Hysterectomy.  ALLERGIES: medication allergies noted  History/Prior Level of Function: Living Status:  Lives with spouse; needs assist with dressing/bathing; has rolling walker and when outside RWW; was on disability after a previous stroke and then retired. 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 Regulars consistency as desired; but intermittent problems since prior stroke. English is second Language. [place], has an accent   Speech Therapy History: Pt reports assessment in the past; but this SLP as unable to locate in brief chart review  Dysphagia History including instrumental  assessment:2/12/2021 Any GI or ENT history: EGD in the past    Onset: 8/30/2024     Chart Review:   History Of Present Illness:     68 y.o. female who presented to [facility] with aphasia and right-sided weakness, patient presented to ED with sudden onset of right-sided weakness right facial droop and aphasia, at about 2:30 PM she was at work, initially on presentation to ED she was not able to speak she was able to nod her head yes and no as she had significant aphasia she was also complaining of headache she does have a history of stroke, denies abdominal pain having some nausea.  No fever chills no chest pain no shortness of breath in ED stroke protocol activated patient clinically improved her right-sided weakness improved her right-sided facial droop improved she still having aphasia with some improvement discussed with ED MD and agree with plan to admit for further management and treatment     Imaging:  Chest X-ray:  8/30/2024  IMPRESSION:  1.  No acute abnormality.   MRI: 8/31/2024  FINDINGS:  INTRACRANIAL STRUCTURES/VENTRICLES: There are no areas of restricted  diffusion identified to suggest an acute infarct.  There is no acute  intracranial hemorrhage.  No mass effect or midline shift is present. Several  foci of abnormal increased T2/FLAIR signal intensity are present within the  periventricular, subcortical and deep white matter of both hemispheres.   There is no sellar or suprasellar mass present.  There is no ventriculomegaly  or abnormal extra-axial fluid collection present.  The proximal portions of  the [place] [place] demonstrate normal flow voids.   ORBITS: Limited evaluation of the orbits is unremarkable.   SINUSES: The paranasal sinuses and mastoid air cells are clear.   BONES/SOFT TISSUES: Bone marrow signal intensity is normal.      SUBJECTIVE:   Current diet: Soft and Bite-sized texture, Mildly/Nectar Thick liquids     Pt/staff comment regarding toleration: nsg has not observed with liquids;  rom  Labial Symmetry: sligth right asym  Lingual ROM: Reduced  Lingual Strength: Reduced with R weakness noted (slight)  Velum: unable to assess due to tongue movement   Voltional cough: NT d/t pain  Volitional swallow:  delyaed and complaints of pain  Voice: audible /strong  Dentition:  appears adequate    Patient Positioning: Upright in chair  Feeding Assistance: Self-feed    PO TRIALS:   IDDSI 0 (thin): Unremarkable; thin by straw  IDDSI 2 (mildly thick): DNT  IDDSI 3 (moderately thick): DNT  IDDSI 4 (puree): DNT  IDDSI 5 (minced and moist): DNT  IDDSI 6 (soft and bite-sized): attempted; pt c/o pain in back of neck and takes only tiny bolus; task d/c'd  IDDSi 7 (regular): DNT    DYSPHAGIA TX:   Direct Dysphagia tx: improvement noted with rec for consideration for upgrade  Dysphagia ex: Not applicable  Training in compensatory strategies: Not completed  Pt response to ex/training: Needs continued reinforcement/education    COMPREHENSION   Auditory Comprehension:  Deficit areas of concern with need for further assessment/treatment   Impaired Conversation    Visual Language Processing: YEN      EXPRESSION  Verbal Expressive Language: Deficit areas of concern with need for further assessment/treatment   Comment: pt reports improvement on word finding     Written Expressive Language: YEN    Pragmatics/Social Functioning:WFL         MOTOR SPEECH/VOICE: WFL  Motor Speech:    Motor speech appeared to be grossly WNL with no dysarthria or apraxia assessed.  Voice:   WNL     COGNITION    Overall Orientation: WFL  Oriented x4    Attention: YEN         Memory: [facility]  Comment: based on pt's recall of her day and information she has been given    Problem Solving: YEN    Math Language/numeric reasoning: YEN      Safety/Judgement:YEN      If patient discharges prior to next visit, this note will serve as discharge.     Time code minutes:  5  Total Time:  15    Electronically signed by:    Merary Glass MS CCC/SLP [id number]  Speech Language

## 2024-09-02 NOTE — PLAN OF CARE
Problem: Discharge Planning  Goal: Discharge to home or other facility with appropriate resources  9/2/2024 1012 by Trupti Wan RN  Outcome: Progressing  9/2/2024 0232 by Matthew Rodriguez RN  Outcome: Progressing     Problem: Pain  Goal: Verbalizes/displays adequate comfort level or baseline comfort level  9/2/2024 1012 by Trupti Wan RN  Outcome: Progressing  9/2/2024 0232 by Matthew Rodriguez RN  Outcome: Progressing     Problem: Safety - Adult  Goal: Free from fall injury  9/2/2024 1012 by Trupti Wan RN  Outcome: Progressing  9/2/2024 0232 by Matthew Rodriguez RN  Outcome: Progressing     Problem: Skin/Tissue Integrity  Goal: Absence of new skin breakdown  Description: 1.  Monitor for areas of redness and/or skin breakdown  2.  Assess vascular access sites hourly  3.  Every 4-6 hours minimum:  Change oxygen saturation probe site  4.  Every 4-6 hours:  If on nasal continuous positive airway pressure, respiratory therapy assess nares and determine need for appliance change or resting period.  9/2/2024 1012 by Trupti Wan RN  Outcome: Progressing  9/2/2024 0232 by Matthew Rodriguez RN  Outcome: Progressing     Problem: Nutrition Deficit:  Goal: Optimize nutritional status  9/2/2024 1012 by Trupti Wan RN  Outcome: Progressing  9/2/2024 0232 by Matthew Rodriguez RN  Outcome: Progressing

## 2024-09-03 ENCOUNTER — APPOINTMENT (OUTPATIENT)
Dept: MRI IMAGING | Age: 69
DRG: 948 | End: 2024-09-03
Payer: MEDICARE

## 2024-09-03 ENCOUNTER — HOSPITAL ENCOUNTER (INPATIENT)
Age: 69
LOS: 2 days | Discharge: HOME HEALTH | DRG: 552 | End: 2024-09-05
Attending: STUDENT IN AN ORGANIZED HEALTH CARE EDUCATION/TRAINING PROGRAM | Admitting: INTERNAL MEDICINE
Payer: MEDICARE

## 2024-09-03 VITALS
RESPIRATION RATE: 18 BRPM | HEART RATE: 66 BPM | WEIGHT: 186.73 LBS | BODY MASS INDEX: 31.88 KG/M2 | TEMPERATURE: 98.1 F | HEIGHT: 64 IN | SYSTOLIC BLOOD PRESSURE: 138 MMHG | OXYGEN SATURATION: 98 % | DIASTOLIC BLOOD PRESSURE: 85 MMHG

## 2024-09-03 DIAGNOSIS — G95.20 CORD COMPRESSION (HCC): Primary | ICD-10-CM

## 2024-09-03 LAB
GLUCOSE BLD-MCNC: 168 MG/DL (ref 70–99)
GLUCOSE BLD-MCNC: 219 MG/DL (ref 70–99)
GLUCOSE BLD-MCNC: 261 MG/DL (ref 70–99)
GLUCOSE BLD-MCNC: 270 MG/DL (ref 70–99)
MAGNESIUM SERPL-MCNC: 1.71 MG/DL (ref 1.8–2.4)
PERFORMED ON: ABNORMAL

## 2024-09-03 PROCEDURE — 83735 ASSAY OF MAGNESIUM: CPT

## 2024-09-03 PROCEDURE — 2580000003 HC RX 258: Performed by: INTERNAL MEDICINE

## 2024-09-03 PROCEDURE — 1200000000 HC SEMI PRIVATE

## 2024-09-03 PROCEDURE — 92526 ORAL FUNCTION THERAPY: CPT

## 2024-09-03 PROCEDURE — 6370000000 HC RX 637 (ALT 250 FOR IP): Performed by: INTERNAL MEDICINE

## 2024-09-03 PROCEDURE — 94760 N-INVAS EAR/PLS OXIMETRY 1: CPT

## 2024-09-03 PROCEDURE — 6360000002 HC RX W HCPCS: Performed by: INTERNAL MEDICINE

## 2024-09-03 PROCEDURE — 72141 MRI NECK SPINE W/O DYE: CPT

## 2024-09-03 PROCEDURE — 97129 THER IVNTJ 1ST 15 MIN: CPT

## 2024-09-03 PROCEDURE — 36415 COLL VENOUS BLD VENIPUNCTURE: CPT

## 2024-09-03 PROCEDURE — G0378 HOSPITAL OBSERVATION PER HR: HCPCS

## 2024-09-03 PROCEDURE — G0379 DIRECT REFER HOSPITAL OBSERV: HCPCS

## 2024-09-03 RX ORDER — ACETAMINOPHEN 325 MG/1
650 TABLET ORAL EVERY 6 HOURS PRN
Status: DISCONTINUED | OUTPATIENT
Start: 2024-09-03 | End: 2024-09-04

## 2024-09-03 RX ORDER — LATANOPROST 50 UG/ML
1 SOLUTION/ DROPS OPHTHALMIC NIGHTLY
Status: DISCONTINUED | OUTPATIENT
Start: 2024-09-03 | End: 2024-09-05 | Stop reason: HOSPADM

## 2024-09-03 RX ORDER — ENOXAPARIN SODIUM 100 MG/ML
40 INJECTION SUBCUTANEOUS DAILY
Status: DISCONTINUED | OUTPATIENT
Start: 2024-09-04 | End: 2024-09-05 | Stop reason: HOSPADM

## 2024-09-03 RX ORDER — SODIUM CHLORIDE 0.9 % (FLUSH) 0.9 %
5-40 SYRINGE (ML) INJECTION PRN
Status: DISCONTINUED | OUTPATIENT
Start: 2024-09-03 | End: 2024-09-05 | Stop reason: HOSPADM

## 2024-09-03 RX ORDER — DULOXETIN HYDROCHLORIDE 60 MG/1
60 CAPSULE, DELAYED RELEASE ORAL 2 TIMES DAILY
Status: DISCONTINUED | OUTPATIENT
Start: 2024-09-03 | End: 2024-09-05 | Stop reason: HOSPADM

## 2024-09-03 RX ORDER — INSULIN LISPRO 100 [IU]/ML
0-4 INJECTION, SOLUTION INTRAVENOUS; SUBCUTANEOUS
Status: DISCONTINUED | OUTPATIENT
Start: 2024-09-03 | End: 2024-09-05 | Stop reason: HOSPADM

## 2024-09-03 RX ORDER — POLYETHYLENE GLYCOL 3350 17 G/17G
17 POWDER, FOR SOLUTION ORAL DAILY PRN
Status: DISCONTINUED | OUTPATIENT
Start: 2024-09-03 | End: 2024-09-05 | Stop reason: HOSPADM

## 2024-09-03 RX ORDER — SODIUM CHLORIDE 9 MG/ML
INJECTION, SOLUTION INTRAVENOUS PRN
Status: DISCONTINUED | OUTPATIENT
Start: 2024-09-03 | End: 2024-09-05 | Stop reason: HOSPADM

## 2024-09-03 RX ORDER — DEXTROSE MONOHYDRATE 100 MG/ML
INJECTION, SOLUTION INTRAVENOUS CONTINUOUS PRN
Status: DISCONTINUED | OUTPATIENT
Start: 2024-09-03 | End: 2024-09-05 | Stop reason: HOSPADM

## 2024-09-03 RX ORDER — PREGABALIN 150 MG/1
150 CAPSULE ORAL NIGHTLY
Status: DISCONTINUED | OUTPATIENT
Start: 2024-09-03 | End: 2024-09-05 | Stop reason: HOSPADM

## 2024-09-03 RX ORDER — INSULIN GLARGINE 100 [IU]/ML
8 INJECTION, SOLUTION SUBCUTANEOUS NIGHTLY
Status: DISCONTINUED | OUTPATIENT
Start: 2024-09-03 | End: 2024-09-05 | Stop reason: HOSPADM

## 2024-09-03 RX ORDER — BRIMONIDINE TARTRATE 2 MG/ML
1 SOLUTION/ DROPS OPHTHALMIC 2 TIMES DAILY
Status: DISCONTINUED | OUTPATIENT
Start: 2024-09-03 | End: 2024-09-05 | Stop reason: HOSPADM

## 2024-09-03 RX ORDER — POTASSIUM CHLORIDE 7.45 MG/ML
10 INJECTION INTRAVENOUS PRN
Status: DISCONTINUED | OUTPATIENT
Start: 2024-09-03 | End: 2024-09-05 | Stop reason: HOSPADM

## 2024-09-03 RX ORDER — POTASSIUM CHLORIDE 1500 MG/1
40 TABLET, EXTENDED RELEASE ORAL PRN
Status: DISCONTINUED | OUTPATIENT
Start: 2024-09-03 | End: 2024-09-05 | Stop reason: HOSPADM

## 2024-09-03 RX ORDER — FLUTICASONE PROPIONATE 50 MCG
2 SPRAY, SUSPENSION (ML) NASAL DAILY
Status: DISCONTINUED | OUTPATIENT
Start: 2024-09-04 | End: 2024-09-05 | Stop reason: HOSPADM

## 2024-09-03 RX ORDER — ONDANSETRON 4 MG/1
4 TABLET, ORALLY DISINTEGRATING ORAL EVERY 8 HOURS PRN
Status: DISCONTINUED | OUTPATIENT
Start: 2024-09-03 | End: 2024-09-05 | Stop reason: HOSPADM

## 2024-09-03 RX ORDER — PRAVASTATIN SODIUM 40 MG
40 TABLET ORAL DAILY
Status: DISCONTINUED | OUTPATIENT
Start: 2024-09-04 | End: 2024-09-05 | Stop reason: HOSPADM

## 2024-09-03 RX ORDER — ACETAMINOPHEN 650 MG/1
650 SUPPOSITORY RECTAL EVERY 6 HOURS PRN
Status: DISCONTINUED | OUTPATIENT
Start: 2024-09-03 | End: 2024-09-04

## 2024-09-03 RX ORDER — MULTIVITAMIN WITH IRON
100 TABLET ORAL DAILY
Status: DISCONTINUED | OUTPATIENT
Start: 2024-09-04 | End: 2024-09-05 | Stop reason: HOSPADM

## 2024-09-03 RX ORDER — INSULIN LISPRO 100 [IU]/ML
0-4 INJECTION, SOLUTION INTRAVENOUS; SUBCUTANEOUS NIGHTLY
Status: DISCONTINUED | OUTPATIENT
Start: 2024-09-03 | End: 2024-09-05 | Stop reason: HOSPADM

## 2024-09-03 RX ORDER — CYCLOBENZAPRINE HCL 10 MG
5 TABLET ORAL 2 TIMES DAILY PRN
Status: DISCONTINUED | OUTPATIENT
Start: 2024-09-03 | End: 2024-09-04

## 2024-09-03 RX ORDER — SODIUM CHLORIDE 0.9 % (FLUSH) 0.9 %
5-40 SYRINGE (ML) INJECTION EVERY 12 HOURS SCHEDULED
Status: DISCONTINUED | OUTPATIENT
Start: 2024-09-03 | End: 2024-09-05 | Stop reason: HOSPADM

## 2024-09-03 RX ORDER — MAGNESIUM SULFATE IN WATER 40 MG/ML
2000 INJECTION, SOLUTION INTRAVENOUS PRN
Status: DISCONTINUED | OUTPATIENT
Start: 2024-09-03 | End: 2024-09-05 | Stop reason: HOSPADM

## 2024-09-03 RX ORDER — ONDANSETRON 2 MG/ML
4 INJECTION INTRAMUSCULAR; INTRAVENOUS EVERY 6 HOURS PRN
Status: DISCONTINUED | OUTPATIENT
Start: 2024-09-03 | End: 2024-09-05 | Stop reason: HOSPADM

## 2024-09-03 RX ORDER — PANTOPRAZOLE SODIUM 40 MG/1
40 TABLET, DELAYED RELEASE ORAL
Status: DISCONTINUED | OUTPATIENT
Start: 2024-09-04 | End: 2024-09-05 | Stop reason: HOSPADM

## 2024-09-03 RX ORDER — GLUCAGON 1 MG/ML
1 KIT INJECTION PRN
Status: DISCONTINUED | OUTPATIENT
Start: 2024-09-03 | End: 2024-09-05 | Stop reason: HOSPADM

## 2024-09-03 RX ORDER — TRAZODONE HYDROCHLORIDE 100 MG/1
100 TABLET ORAL NIGHTLY
Status: DISCONTINUED | OUTPATIENT
Start: 2024-09-03 | End: 2024-09-05 | Stop reason: HOSPADM

## 2024-09-03 RX ADMIN — INSULIN LISPRO 2 UNITS: 100 INJECTION, SOLUTION INTRAVENOUS; SUBCUTANEOUS at 13:43

## 2024-09-03 RX ADMIN — PYRIDOXINE HCL TAB 50 MG 100 MG: 50 TAB at 08:16

## 2024-09-03 RX ADMIN — PRAVASTATIN SODIUM 40 MG: 40 TABLET ORAL at 08:16

## 2024-09-03 RX ADMIN — ACETAMINOPHEN 650 MG: 325 TABLET ORAL at 17:05

## 2024-09-03 RX ADMIN — SODIUM CHLORIDE, PRESERVATIVE FREE 10 ML: 5 INJECTION INTRAVENOUS at 21:37

## 2024-09-03 RX ADMIN — FUROSEMIDE 20 MG: 20 TABLET ORAL at 08:16

## 2024-09-03 RX ADMIN — PANTOPRAZOLE SODIUM 40 MG: 40 TABLET, DELAYED RELEASE ORAL at 05:41

## 2024-09-03 RX ADMIN — SODIUM CHLORIDE, PRESERVATIVE FREE 10 ML: 5 INJECTION INTRAVENOUS at 08:17

## 2024-09-03 RX ADMIN — DULOXETINE HYDROCHLORIDE 60 MG: 60 CAPSULE, DELAYED RELEASE ORAL at 08:16

## 2024-09-03 RX ADMIN — ENOXAPARIN SODIUM 40 MG: 100 INJECTION SUBCUTANEOUS at 08:17

## 2024-09-03 RX ADMIN — INSULIN GLARGINE 8 UNITS: 100 INJECTION, SOLUTION SUBCUTANEOUS at 21:31

## 2024-09-03 RX ADMIN — LATANOPROST 1 DROP: 50 SOLUTION OPHTHALMIC at 21:31

## 2024-09-03 RX ADMIN — TRAZODONE HYDROCHLORIDE 100 MG: 100 TABLET ORAL at 21:31

## 2024-09-03 RX ADMIN — BRIMONIDINE TARTRATE 1 DROP: 2 SOLUTION OPHTHALMIC at 21:31

## 2024-09-03 RX ADMIN — ASPIRIN 81 MG: 81 TABLET, CHEWABLE ORAL at 08:16

## 2024-09-03 RX ADMIN — DULOXETINE HYDROCHLORIDE 60 MG: 60 CAPSULE, DELAYED RELEASE ORAL at 21:30

## 2024-09-03 RX ADMIN — CYCLOBENZAPRINE 5 MG: 10 TABLET, FILM COATED ORAL at 17:59

## 2024-09-03 RX ADMIN — PREGABALIN 150 MG: 150 CAPSULE ORAL at 21:30

## 2024-09-03 ASSESSMENT — PAIN DESCRIPTION - LOCATION
LOCATION: NECK
LOCATION: NECK

## 2024-09-03 ASSESSMENT — PAIN SCALES - GENERAL
PAINLEVEL_OUTOF10: 0
PAINLEVEL_OUTOF10: 0
PAINLEVEL_OUTOF10: 6
PAINLEVEL_OUTOF10: 7

## 2024-09-03 ASSESSMENT — PAIN DESCRIPTION - DESCRIPTORS: DESCRIPTORS: ACHING;THROBBING;TENDER

## 2024-09-03 ASSESSMENT — PAIN - FUNCTIONAL ASSESSMENT
PAIN_FUNCTIONAL_ASSESSMENT: PREVENTS OR INTERFERES SOME ACTIVE ACTIVITIES AND ADLS
PAIN_FUNCTIONAL_ASSESSMENT: PREVENTS OR INTERFERES SOME ACTIVE ACTIVITIES AND ADLS

## 2024-09-03 ASSESSMENT — PAIN DESCRIPTION - ORIENTATION
ORIENTATION: POSTERIOR;MID
ORIENTATION: POSTERIOR;LOWER;MID

## 2024-09-03 NOTE — PROGRESS NOTES
Patient to be transferred to [facility]. Medical transport at bedside, report given to . RN called [facility] to give report to oncoming staff, report given to Danielle. All questions answered. Patient belongings packed and sent. Patient went to bathroom then to stretcher. Transported off unit with IV in RFA intact. Portable heart monitor removed. Call placed to Mirza Ernst, per patient request. Voicemail left for son with this RN call back number if wanted, and with [facility]  number and her room number if he would prefer to speak with them instead. No complications    Electronically signed by Maru Abdi RN on 9/3/2024 at 2:41 PM

## 2024-09-03 NOTE — H&P
maintained without evidence of an acute infarct.  There is no evidence of hydrocephalus. Mild volume loss and chronic microvascular ischemic changes appears similar to the prior study. ORBITS: The visualized portion of the orbits demonstrate no acute abnormality. SINUSES: The visualized paranasal sinuses and mastoid air cells demonstrate no acute abnormality. SOFT TISSUES/SKULL:  No acute abnormality of the visualized skull or soft tissues.     No acute intracranial findings.       PHYSICIAN CERTIFICATION    I certify that John Wood is expected to be hospitalized for >2  midnights based on the following assessment and plan:    Electronically signed by Natalie Millard MD on 9/3/2024 at 4:13 PM    Comment: Please note this report has been produced using speech recognition software and may contain errors related to that system including errors in grammar, punctuation, and spelling, as well as words and phrases that may be inappropriate. If there are any questions or concerns, please feel free to contact the dictating provider for clarification.

## 2024-09-03 NOTE — PROGRESS NOTES
Patient admitted to room 5523 via transport from [facility]. Report received from Sherly Mcclure RN VSS on room air. Patient oriented to room and call light. Rights and responsibilities provided to patient, and welcome packet provided to patient. 4 eyes skin assessment completed with 2nd RN.

## 2024-09-03 NOTE — PROGRESS NOTES
4 Eyes Skin Assessment     NAME:  John Wood  YOB: 1955  MEDICAL RECORD NUMBER:  [id number]    The patient is being assessed for  Admission    I agree that at least one RN has performed a thorough Head to Toe Skin Assessment on the patient. ALL assessment sites listed below have been assessed.      Areas assessed by both nurses:    Head, Face, Ears, Shoulders, Back, Chest, Arms, Elbows, Hands, Sacrum. Buttock, Coccyx, Ischium, Legs. Feet and Heels, and Under Medical Devices         Does the Patient have a Wound? No noted wound(s)       Dalton Prevention initiated by RN: No  Wound Care Orders initiated by RN: No    Pressure Injury (Stage 3,4, Unstageable, DTI, NWPT, and Complex wounds) if present, place Wound referral order by RN under : No    New Ostomies, if present place, Ostomy referral order under : No     Nurse 1 eSignature: Electronically signed by Danielle Casey RN on 9/3/24 at 3:56 PM EDT    **SHARE this note so that the co-signing nurse can place an eSignature**    Nurse 2 eSignature: Electronically signed by Farnaz Boggs RN on 9/3/24 at 6:24 PM EDT

## 2024-09-03 NOTE — PLAN OF CARE
Problem: Discharge Planning  Goal: Discharge to home or other facility with appropriate resources  9/3/2024 0825 by Maru Abdi RN  Outcome: Progressing     Problem: Pain  Goal: Verbalizes/displays adequate comfort level or baseline comfort level  9/3/2024 0825 by Maru Abdi RN  Outcome: Progressing     Problem: Safety - Adult  Goal: Free from fall injury  9/3/2024 0825 by Maru Abdi RN  Outcome: Progressing     Problem: Skin/Tissue Integrity  Goal: Absence of new skin breakdown  Description: 1.  Monitor for areas of redness and/or skin breakdown  2.  Assess vascular access sites hourly  3.  Every 4-6 hours minimum:  Change oxygen saturation probe site  4.  Every 4-6 hours:  If on nasal continuous positive airway pressure, respiratory therapy assess nares and determine need for appliance change or resting period.  9/3/2024 0825 by Maru Abdi RN  Outcome: Progressing     Problem: Nutrition Deficit:  Goal: Optimize nutritional status  9/3/2024 0825 by Maru Abdi RN  Outcome: Progressing

## 2024-09-03 NOTE — PLAN OF CARE
Problem: Discharge Planning  Goal: Discharge to home or other facility with appropriate resources  9/2/2024 2304 by Jayla Mendoza RN  Outcome: Progressing  9/2/2024 1012 by Trupti Wan RN  Outcome: Progressing     Problem: Pain  Goal: Verbalizes/displays adequate comfort level or baseline comfort level  9/2/2024 2304 by Jayla Mendoza RN  Outcome: Progressing  9/2/2024 1012 by Trupti Wan RN  Outcome: Progressing     Problem: Safety - Adult  Goal: Free from fall injury  9/2/2024 2304 by Jayla Mendoza RN  Outcome: Progressing  9/2/2024 1012 by Trupti Wan RN  Outcome: Progressing     Problem: Skin/Tissue Integrity  Goal: Absence of new skin breakdown  Description: 1.  Monitor for areas of redness and/or skin breakdown  2.  Assess vascular access sites hourly  3.  Every 4-6 hours minimum:  Change oxygen saturation probe site  4.  Every 4-6 hours:  If on nasal continuous positive airway pressure, respiratory therapy assess nares and determine need for appliance change or resting period.  9/2/2024 2304 by Jayla Mendoza RN  Outcome: Progressing  9/2/2024 1012 by Trupti Wan RN  Outcome: Progressing     Problem: Nutrition Deficit:  Goal: Optimize nutritional status  9/2/2024 2304 by Jayla Mendoza RN  Outcome: Progressing  9/2/2024 1012 by Trupti Wan RN  Outcome: Progressing

## 2024-09-03 NOTE — PLAN OF CARE
[facility] Hospitalist Transfer accept note  Transfer center PS received  Case reviewed with ER physician  Reason for Transfer:  John Eldridgeford 68 y.o. female presenting with generalized weakness with negative stroke workup at [facility].  Being transferred to [place] to be evaluated by neurosurgery with possible surgery in the setting of cervical cord compression.  Needs neurosurgery consultation upon arrival.      Patient has been accepted for transfer to [facility].   Once patient arrive please page ON CALL HOSPITALIST so patient can be seen.   If unable to reach physician on [employer] please call hospitalist phone (#[phone])     PCP: Lavell Finnegan MD     Thanks  Anatoliy Dave MD  Hospitalist

## 2024-09-03 NOTE — PROGRESS NOTES
Dr Marks , Hospitalist  at  [facility]  accepted patient   Straight cathed pt per MD. notified MD pt has reddened labia and  Moisture associated excoriation. Verbal order for Cream placed.

## 2024-09-03 NOTE — PROGRESS NOTES
V2.0    [facility] [place] Progress Note      Name:  John Wood /Age/Sex: 1955  (68 y.o. female)   MRN & CSN:  [id number] & [id number] Encounter Date/Time: 9/3/2024 1:55 PM EDT   Location:  [id number] PCP: Lavell Finnegan MD     Attending:Alethea Schmidt MD       Hospital Day: 5    Assessment and Recommendations   John Wood is a 68 y.o. female  who presents with Acute CVA (cerebrovascular accident) (HCC)      Plan:       Aphasia and right sided weakness with facial droop.   Stroke team evaluation and not a candidate for TNK.   CTA head and neck showed no large vessel occlusion, therefore, the patient is not a candidate for acute neurointerventional treatment.   Aspirin and high intensity statin.  PT/OT evaluation.  Neurology evaluation with no further neurologic workup, signed off.        9/3 /24     MRI Cervical spine +      C4-C5 large posterior disc protrusion causing central stenosis and cord  impingement.  Associated facet arthropathy and moderate foraminal narrowing.     Less advanced degenerative changes central stenosis and foraminal narrowing  throughout the rest of the spine as noted above.      Discussed with Dr Roman about MRI spine findings , she will get back to me with more instruction as per our conversation            Diffuse neck pain : CT neck showing moderate central canal narrowing. Neurosurgery following. MRI cervical spine ordered  Type 2 Diabetes (controlled, HgbA1c 6.9%).  Basal glargine insulin 8 units daily.  Cover with a \"sliding scale\" lispro moderate scale prandial correction insulin.    Primary hypertension.   Headache on admission improved.  Possible migraine per neurology and possibly hemiplegic component if her R sided weakness resolves entirely.    Gastric reflux and stress ulcer prophylaxis with pantoprazole (Protonix) 40 mg daily.       Disposition : Discharge pending MRI findings.  Diet ADULT ORAL NUTRITION SUPPLEMENT; Breakfast, Lunch, Dinner; Standard

## 2024-09-03 NOTE — PROGRESS NOTES
Call from MD with neuro surgery, reviewed scans and recommends patient to be seen at [facility] and transferred as patient would require surgery next week. Also to hold aspirin till further advised. Hospitalist updated.     Electronically signed by Maru Abdi RN on 9/3/2024 at 12:37 PM

## 2024-09-04 LAB
ANION GAP SERPL CALCULATED.3IONS-SCNC: 8 MMOL/L (ref 3–16)
BASOPHILS # BLD: 0 K/UL (ref 0–0.2)
BASOPHILS NFR BLD: 0.3 %
BUN SERPL-MCNC: 21 MG/DL (ref 7–20)
CALCIUM SERPL-MCNC: 9.7 MG/DL (ref 8.3–10.6)
CHLORIDE SERPL-SCNC: 99 MMOL/L (ref 99–110)
CO2 SERPL-SCNC: 26 MMOL/L (ref 21–32)
CREAT SERPL-MCNC: 0.8 MG/DL (ref 0.6–1.2)
DEPRECATED RDW RBC AUTO: 13.8 % (ref 12.4–15.4)
EOSINOPHIL # BLD: 0.2 K/UL (ref 0–0.6)
EOSINOPHIL NFR BLD: 2.8 %
GFR SERPLBLD CREATININE-BSD FMLA CKD-EPI: 79 ML/MIN/{1.73_M2}
GLUCOSE BLD-MCNC: 187 MG/DL (ref 70–99)
GLUCOSE BLD-MCNC: 195 MG/DL (ref 70–99)
GLUCOSE BLD-MCNC: 233 MG/DL (ref 70–99)
GLUCOSE BLD-MCNC: 331 MG/DL (ref 70–99)
GLUCOSE BLD-MCNC: 354 MG/DL (ref 70–99)
GLUCOSE SERPL-MCNC: 206 MG/DL (ref 70–99)
HCT VFR BLD AUTO: 36.5 % (ref 36–48)
HGB BLD-MCNC: 12 G/DL (ref 12–16)
LYMPHOCYTES # BLD: 3.1 K/UL (ref 1–5.1)
LYMPHOCYTES NFR BLD: 45.8 %
MCH RBC QN AUTO: 29 PG (ref 26–34)
MCHC RBC AUTO-ENTMCNC: 33 G/DL (ref 31–36)
MCV RBC AUTO: 87.8 FL (ref 80–100)
MONOCYTES # BLD: 0.5 K/UL (ref 0–1.3)
MONOCYTES NFR BLD: 7.2 %
NEUTROPHILS # BLD: 3 K/UL (ref 1.7–7.7)
NEUTROPHILS NFR BLD: 43.9 %
PERFORMED ON: ABNORMAL
PLATELET # BLD AUTO: 262 K/UL (ref 135–450)
PMV BLD AUTO: 7.5 FL (ref 5–10.5)
POTASSIUM SERPL-SCNC: 4.5 MMOL/L (ref 3.5–5.1)
RBC # BLD AUTO: 4.15 M/UL (ref 4–5.2)
SODIUM SERPL-SCNC: 133 MMOL/L (ref 136–145)
WBC # BLD AUTO: 6.8 K/UL (ref 4–11)

## 2024-09-04 PROCEDURE — 96365 THER/PROPH/DIAG IV INF INIT: CPT

## 2024-09-04 PROCEDURE — 97530 THERAPEUTIC ACTIVITIES: CPT

## 2024-09-04 PROCEDURE — 99232 SBSQ HOSP IP/OBS MODERATE 35: CPT | Performed by: NURSE PRACTITIONER

## 2024-09-04 PROCEDURE — 97535 SELF CARE MNGMENT TRAINING: CPT

## 2024-09-04 PROCEDURE — 97166 OT EVAL MOD COMPLEX 45 MIN: CPT

## 2024-09-04 PROCEDURE — 97116 GAIT TRAINING THERAPY: CPT

## 2024-09-04 PROCEDURE — 6360000002 HC RX W HCPCS: Performed by: NURSE PRACTITIONER

## 2024-09-04 PROCEDURE — 96372 THER/PROPH/DIAG INJ SC/IM: CPT

## 2024-09-04 PROCEDURE — 6370000000 HC RX 637 (ALT 250 FOR IP): Performed by: NURSE PRACTITIONER

## 2024-09-04 PROCEDURE — 6360000002 HC RX W HCPCS: Performed by: INTERNAL MEDICINE

## 2024-09-04 PROCEDURE — 1200000000 HC SEMI PRIVATE

## 2024-09-04 PROCEDURE — 6370000000 HC RX 637 (ALT 250 FOR IP): Performed by: INTERNAL MEDICINE

## 2024-09-04 PROCEDURE — 80048 BASIC METABOLIC PNL TOTAL CA: CPT

## 2024-09-04 PROCEDURE — 2580000003 HC RX 258: Performed by: INTERNAL MEDICINE

## 2024-09-04 PROCEDURE — 85025 COMPLETE CBC W/AUTO DIFF WBC: CPT

## 2024-09-04 PROCEDURE — 97162 PT EVAL MOD COMPLEX 30 MIN: CPT

## 2024-09-04 PROCEDURE — 2580000003 HC RX 258: Performed by: NURSE PRACTITIONER

## 2024-09-04 PROCEDURE — 96366 THER/PROPH/DIAG IV INF ADDON: CPT

## 2024-09-04 PROCEDURE — G0378 HOSPITAL OBSERVATION PER HR: HCPCS

## 2024-09-04 PROCEDURE — 36415 COLL VENOUS BLD VENIPUNCTURE: CPT

## 2024-09-04 RX ORDER — METHOCARBAMOL 750 MG/1
750 TABLET, FILM COATED ORAL EVERY 6 HOURS SCHEDULED
Status: DISCONTINUED | OUTPATIENT
Start: 2024-09-05 | End: 2024-09-05 | Stop reason: HOSPADM

## 2024-09-04 RX ORDER — DIAZEPAM 2 MG
2 TABLET ORAL EVERY 6 HOURS PRN
Status: DISCONTINUED | OUTPATIENT
Start: 2024-09-04 | End: 2024-09-05 | Stop reason: HOSPADM

## 2024-09-04 RX ORDER — ACETAMINOPHEN 325 MG/1
650 TABLET ORAL EVERY 6 HOURS SCHEDULED
Status: DISCONTINUED | OUTPATIENT
Start: 2024-09-04 | End: 2024-09-05 | Stop reason: HOSPADM

## 2024-09-04 RX ADMIN — BRIMONIDINE TARTRATE 1 DROP: 2 SOLUTION OPHTHALMIC at 08:01

## 2024-09-04 RX ADMIN — ACETAMINOPHEN 650 MG: 325 TABLET ORAL at 16:59

## 2024-09-04 RX ADMIN — DIAZEPAM 2 MG: 2 TABLET ORAL at 10:59

## 2024-09-04 RX ADMIN — FLUTICASONE PROPIONATE 2 SPRAY: 50 SPRAY, METERED NASAL at 08:00

## 2024-09-04 RX ADMIN — SODIUM CHLORIDE, PRESERVATIVE FREE 10 ML: 5 INJECTION INTRAVENOUS at 08:01

## 2024-09-04 RX ADMIN — TRAZODONE HYDROCHLORIDE 100 MG: 100 TABLET ORAL at 20:27

## 2024-09-04 RX ADMIN — ACETAMINOPHEN 650 MG: 325 TABLET ORAL at 10:58

## 2024-09-04 RX ADMIN — DULOXETINE HYDROCHLORIDE 60 MG: 60 CAPSULE, DELAYED RELEASE ORAL at 08:00

## 2024-09-04 RX ADMIN — BRIMONIDINE TARTRATE 1 DROP: 2 SOLUTION OPHTHALMIC at 20:27

## 2024-09-04 RX ADMIN — METHOCARBAMOL 1000 MG: 100 INJECTION INTRAMUSCULAR; INTRAVENOUS at 11:24

## 2024-09-04 RX ADMIN — Medication 100 MG: at 08:01

## 2024-09-04 RX ADMIN — CYCLOBENZAPRINE 5 MG: 10 TABLET, FILM COATED ORAL at 05:44

## 2024-09-04 RX ADMIN — ENOXAPARIN SODIUM 40 MG: 100 INJECTION SUBCUTANEOUS at 08:00

## 2024-09-04 RX ADMIN — LATANOPROST 1 DROP: 50 SOLUTION OPHTHALMIC at 20:27

## 2024-09-04 RX ADMIN — PREGABALIN 150 MG: 150 CAPSULE ORAL at 20:27

## 2024-09-04 RX ADMIN — PRAVASTATIN SODIUM 40 MG: 40 TABLET ORAL at 08:01

## 2024-09-04 RX ADMIN — DULOXETINE HYDROCHLORIDE 60 MG: 60 CAPSULE, DELAYED RELEASE ORAL at 20:27

## 2024-09-04 RX ADMIN — INSULIN LISPRO 4 UNITS: 100 INJECTION, SOLUTION INTRAVENOUS; SUBCUTANEOUS at 16:55

## 2024-09-04 RX ADMIN — PANTOPRAZOLE SODIUM 40 MG: 40 TABLET, DELAYED RELEASE ORAL at 05:44

## 2024-09-04 RX ADMIN — INSULIN LISPRO 1 UNITS: 100 INJECTION, SOLUTION INTRAVENOUS; SUBCUTANEOUS at 07:59

## 2024-09-04 RX ADMIN — INSULIN GLARGINE 8 UNITS: 100 INJECTION, SOLUTION SUBCUTANEOUS at 20:27

## 2024-09-04 RX ADMIN — METHOCARBAMOL 1000 MG: 100 INJECTION INTRAMUSCULAR; INTRAVENOUS at 18:19

## 2024-09-04 RX ADMIN — DIAZEPAM 2 MG: 2 TABLET ORAL at 16:59

## 2024-09-04 ASSESSMENT — PAIN DESCRIPTION - ONSET
ONSET: ON-GOING
ONSET: ON-GOING

## 2024-09-04 ASSESSMENT — PAIN DESCRIPTION - DESCRIPTORS
DESCRIPTORS: ACHING
DESCRIPTORS: ACHING

## 2024-09-04 ASSESSMENT — PAIN DESCRIPTION - LOCATION
LOCATION: NECK
LOCATION: NECK

## 2024-09-04 ASSESSMENT — PAIN DESCRIPTION - PAIN TYPE
TYPE: ACUTE PAIN
TYPE: ACUTE PAIN

## 2024-09-04 ASSESSMENT — PAIN SCALES - GENERAL
PAINLEVEL_OUTOF10: 4
PAINLEVEL_OUTOF10: 6
PAINLEVEL_OUTOF10: 0

## 2024-09-04 ASSESSMENT — PAIN DESCRIPTION - ORIENTATION
ORIENTATION: POSTERIOR
ORIENTATION: POSTERIOR;LOWER

## 2024-09-04 ASSESSMENT — PAIN DESCRIPTION - FREQUENCY
FREQUENCY: CONTINUOUS
FREQUENCY: CONTINUOUS

## 2024-09-04 ASSESSMENT — PAIN DESCRIPTION - DIRECTION: RADIATING_TOWARDS: RIGHT SIDE OF BACK

## 2024-09-04 NOTE — PLAN OF CARE
Problem: Discharge Planning  Goal: Discharge to home or other facility with appropriate resources  Outcome: Progressing  Pt involved in discharge planning. Barriers to discharge discussed with pt. Discharge learning needs identified. Discussed with ot any additional needed resources and transportation plans.      Problem: Safety - Adult  Goal: Free from fall injury  9/4/2024 0742 by Kait Velásquez, RN  Outcome: Progressing  All fall precautions in place. Bed locked and in lowest position with alarm on. Overbed table and personal belonings within reach. Call light within reach and patient instructed to use call light for assistance. Non-skid socks on.      Problem: Pain  Goal: Verbalizes/displays adequate comfort level or baseline comfort level  9/4/2024 0742 by Kait Velásquez, RN  Outcome: Progressing  Pt endorsing pain to posterior lower neck. Being treated with PRN pain medication, rest, and frequent repositioning with pillow support for comfort and pressure relief. Pt reports some relief from pain with above interventions.

## 2024-09-04 NOTE — PLAN OF CARE
Problem: Safety - Adult  Goal: Free from fall injury  9/4/2024 1938 by Ramiro Salguero, RN  Outcome: Progressing    Problem: Pain  Goal: Verbalizes/displays adequate comfort level or baseline comfort level  9/4/2024 1938 by Ramiro Salguero, RN  Outcome: Progressing

## 2024-09-04 NOTE — CARE COORDINATION
Case Management Assessment  Initial Evaluation    Date/Time of Evaluation: 9/4/2024 4:14 PM  Assessment Completed by: AYLA Padilla    If patient is discharged prior to next notation, then this note serves as note for discharge by case management.    Patient Name: John Wood                   YOB: 1955  Diagnosis: Cord compression (HCC) [G95.20]                   Date / Time: 9/3/2024  3:34 PM    Patient Admission Status: Inpatient   Readmission Risk (Low < 19, Mod (19-27), High > 27): Readmission Risk Score: 18.3    Current PCP: Lavell Finnegan MD  PCP verified by CM? Yes    Chart Reviewed: Yes      History Provided by: Patient  Patient Orientation: Alert and Oriented    Patient Cognition: Alert    Hospitalization in the last 30 days (Readmission):  No    If yes, Readmission Assessment in CM Navigator will be completed.    Advance Directives:      Code Status: Full Code   Patient's Primary Decision Maker is: Legal Next of Kin    Primary Decision Maker: Rubin Lawson  Child - [phone]    Discharge Planning:    Patient lives with: Spouse/Significant Other Type of Home: House  Primary Care Giver: Self  Patient Support Systems include: Spouse/Significant Other, Children   Current Financial resources: Medicare, Medicaid  Current community resources: None  Current services prior to admission: Durable Medical Equipment            Current DME: Cane, Other (Comment) (rollator and lift chairs for stairs at home)            Type of Home Care services:  None    ADLS  Prior functional level: Independent in ADLs/IADLs  Current functional level: Other (see comment) (tbd)    PT AM-PAC: 18 /24  OT AM-PAC: 17 /24    Family can provide assistance at DC: Yes  Would you like Case Management to discuss the discharge plan with any other family members/significant others, and if so, who? Yes  Plans to Return to Present Housing: Unknown at present  Other Identified Issues/Barriers to RETURNING to current

## 2024-09-04 NOTE — PROGRESS NOTES
Verbal;Demonstration  Barriers to Learning: None  Education Outcome: Verbalized understanding;Demonstrated understanding      Therapy Time   Individual Concurrent Group Co-treatment   Time In 0920         Time Out 1015         Minutes 55         Timed Code Treatment Minutes:  40     Total Treatment Minutes:  55    If the patient is discharged before the next treatment session, this note will serve as the discharge summary.     Lam De Luna, PT, DPT

## 2024-09-04 NOTE — PLAN OF CARE
Patient would like to delay any surgical intervention at this time. Dr. Roman is OK with deferral of surgery as patient's neuro exam is at her baseline. Patient will follow up with Dr. Roman in 1-2 weeks to plan for future surgical intervention. Recommend pain meds and muscle relaxer at discharge. Neurosurgery will sign off.    Electronically signed by JEISON Reyes CNP on 9/4/2024 at 5:13 PM

## 2024-09-04 NOTE — PROGRESS NOTES
V2.0    [facility] Progress Note      Name:  John Wood /Age/Sex: 1955  (68 y.o. female)   MRN & CSN:  [id number] & [id number] Encounter Date/Time: 2024 8:51 AM EDT   Location:  5523/5523-01 PCP: Lavell Finnegan MD     Attending:Natalie Millard MD       Hospital Day: 2    HPI:  John Wood is a 68 y.o. female with pmh of hypertension, hyperlipidemia, GERD, sleep apnea, remote history of possible CVA who transferred from [facility] for possible cord compression.  Patient was admitted on  with right-sided weakness and aphasia.  MRI at that time was unremarkable but CT cervical spine showed disc bulging with moderate canal narrowing.  MRI cervical spine showed a C4-C5 posterior disc protrusion with canal stenosis and cord impingement patient was transferred to [facility]     Patient states that she has chronic neck pain but since the last 2 weeks with severe pain radiating down her back and into her arms.  Went to the ED 2 weeks ago and was told to follow-up with her PCP.  Patient saw her PCP and was ordered an MRI but that was scheduled at a later date.  Patient was at work and felt really sick was feverish and the next thing she remembers was she was in an ambulance.  She states she had a stroke in  and has some right-sided weakness.  When she was sick the right-sided weakness got worse but now has resolved.  Patient also feels both her hands are weak.  It appears patient had some aphasia and facial droop at that time but this is resolved     At present patient still complaining of neck pain radiating down her arms and back       Assessment and Recommendations   John Wood is a 68 y.o. female with pmh of hypertension, hyperlipidemia, GERD, sleep apnea, remote history of possible CVA who transferred from [facility] for possible cord compression.  Patient was admitted on  with right-sided weakness and aphasia.  MRI at that time was unremarkable but CT cervical spine showed  disc bulging with moderate canal narrowing.  MRI cervical spine showed a C4-C5 posterior disc protrusion with canal stenosis and cord impingement patient was transferred to [facility]  Patient  cervical stenosis with cord impingement does not explain her aphasia but at the time of admission patient was hypotensive in the 80s possibly this caused some of her symptoms too       Plan:     Cervical disc herniation with central stenosis and cord impingement  -Possible surgery next week  -On Valium and Robaxin for pain control     Aphasia with right-sided weakness and facial droop  -Cause unclear.  Patient was hypotensive during that episode  -No large vessel occlusion  -Has a remote history of right-sided weakness due to CVA in 2014     Diabetes type 2  -A1c 6.9  -Lantus 8 units daily with sliding scale insulin  -Adjust insulin according to blood sugar  -Patient on metformin and Trulicity at home     Hypertension  -On Lasix at home.  Hold for now     History of migraine  -Neurology concerned that there is a possible hemiplegic component of her right-sided weakness due to her migraine     GERD  -Continue Protonix    I spent > 55  minutes in the care of this patient.  Over 50% of that time was in face-to-face counseling regarding disease process, diagnostic testing, preventative measures, and answering patient and family questions.     Diet ADULT DIET; Easy to Chew; 4 carb choices (60 gm/meal)   DVT Prophylaxis [] Lovenox, []  Heparin, [] SCDs, [] Ambulation,  [] Eliquis, [] Xarelto  [] Coumadin   Code Status Full Code   Disposition From: home  Expected Disposition: home  Estimated Date of Discharge: 2-3 days  Patient requires continued admission due to needs surgery    Surrogate Decision Maker/ POA       Personally reviewed Lab Studies and Imaging     Discussed management of the case with neurosurgery, recommend surgery possibly next week            Subjective:     Chief Complaint: Bilateral thumb pain    Sidia A

## 2024-09-04 NOTE — PROGRESS NOTES
Occupational Therapy  Facility/Department: [facility] 5T ORTHO/NEURO  Occupational Therapy Initial Assessment/Treatment    Name: John Wood  : 1955  MRN: [id number]  Date of Service: 2024    Discharge Recommendations:  IP Rehab (vs Home w/ 24hr A)  OT Equipment Recommendations  Equipment Needed: No  Other: defer to next level of care         Past Medical History:  has a past medical history of Anesthesia complication, Arthritis, Cardiomyopathy (HCC), COVID-19, Diabetes, GERD (gastroesophageal reflux disease), Hyperlipidemia, Hypertension, Lumbar disc disease, Prolonged emergence from general anesthesia, Sleep apnea, Unspecified cerebral artery occlusion with cerebral infarction, and Wears glasses.  Past Surgical History:  has a past surgical history that includes Partial hysterectomy (2003); Hand surgery (Right); Foot surgery (Bilateral); Carpal tunnel release (Left, 2015); Finger trigger release (Left, 2015); Carpal tunnel release (Right, 2015); Finger trigger release (Right, 2015); Shoulder arthroscopy (Right, 2018); Colonoscopy; arthrodesis (Right, 2020); Knee Arthroplasty (Right, 2021); Knee arthroscopy (Right, 2022); Pain management procedure (Right, 2023); Nerve Block (Right, 2023); Pain management procedure (Right, 2023); Nerve Block (Right, 2023); and Hysterectomy.    Treatment Diagnosis: impaired ADLs and functional mobility/transfers      Assessment  Performance deficits / Impairments: Decreased functional mobility ;Decreased ADL status;Decreased ROM;Decreased strength;Decreased balance;Decreased high-level IADLs  Assessment: Pt presents with right side weakness and aphasia, symptoms have resolved per pt.  Pt has a history of CVA earlier this year.  Pt lives at home with spouse and has assist of family members.  Currently, she was able to complete functional mobility with CGA with RW. She demonstrates some weakness in her  Term Goals: by dc  Short Term Goal 1: Pt will be mod I with functional transfers  Short Term Goal 2: Pt will be mod I with functional mobility  Short Term Goal 3: Pt will be mod I with toileting  Short Term Goal 4: Pt will be mod I with bed mobility  Short Term Goal 5: Pt will be mod I with bathing and dressing      Therapy Time   Individual Concurrent Group Co-treatment   Time In 0920         Time Out 1015         Minutes 55         Timed Code Treatment Minutes: 40 Minutes     Total Treatment Minutes: 55    Valeria friedman, OT

## 2024-09-04 NOTE — PROGRESS NOTES
Patient is alert and oriented. Vital signs are stable. Patient's pain is controlled with medication per MAR. Bed is in the lowest position. Bed alarm is activated. Call light is within reach. Will continue to monitor and reassess.

## 2024-09-04 NOTE — PROGRESS NOTES
Patient is alert and oriented x4. VSS on RA. Endorsing pain to posterior neck. Pain being managed with medications per MAR. Voiding via BRP. Tolerating diet appropriately. Standard safety precautions in place. Bed locked and in lowest position. Call light within reach. Denies needs at this time. Plan of care to continue.

## 2024-09-04 NOTE — PROGRESS NOTES
NEUROSURGERY PROGRESS NOTE       Patient Name: John Wood YOB: 1955   Sex: Female Age: 68 yrs     CC / Reason for Consult: Central cervical disc herniation    Subjective / ROS / Updates over last 24 hours:  C/o neck pain mostly localized to R side  R hemiparesis from prior stroke per patient report    ASSESSMENT & RECOMMENDATIONS   Patient is a 69 y/o F w/ severe R sided neck pain, R arm / leg weakness, & R arm / leg radicular pain. Initially worked up for stroke d/t difficulty speaking w/ R sided weaknes when first in hospital, this was all negative. Cervical MRI was obtained and showed large cervical central disc herniation w/ some cord compression. No cord signal abnormality noted.     Has reportedly had some R hemiparesis from prior stroke though has multiple prior admission to  w/ similar w/u and no strokes were noted on MRI's in 2012 & 2014. There was some concern for psychogenic overlay during the 2014 admission. Patient did have headache and hemiplegic migraines were also considered. Evaluated by Neurology at [facility] and it was felt w/ normal MRI & prior hx that events were less likely to be vascular related. MRI of brain shows no acute stroke, CTA head / neck was unremarkable. Patient was on ASA at home which has currently been held in preparation for possible OR for cervical decompression.     Plan  Cervical stenosis  Cervical central disc herniation    - continue to hold ASA   - Okay to continue working w/ PT/OT   - Plan for possible OR w/ Dr. Roman next week for anterior cervical decompression    - Pain control may be difficult she has n/v w/ narcotics. Will try scheduled tylenol & muscle relaxer schedule robaxin & low dose PRN valium   - Okay to have SQH or lovenox for DVT prophylaxis, will hold prior to OR   - We will follow. Please call with questions    Management and plan discussed with:  Nursing staff  Dr. Abel Butterfield, APRN - CNP

## 2024-09-05 VITALS
RESPIRATION RATE: 16 BRPM | TEMPERATURE: 97.8 F | HEIGHT: 64 IN | OXYGEN SATURATION: 97 % | WEIGHT: 186.73 LBS | BODY MASS INDEX: 31.88 KG/M2 | HEART RATE: 70 BPM | DIASTOLIC BLOOD PRESSURE: 89 MMHG | SYSTOLIC BLOOD PRESSURE: 136 MMHG

## 2024-09-05 LAB
ANION GAP SERPL CALCULATED.3IONS-SCNC: 9 MMOL/L (ref 3–16)
BUN SERPL-MCNC: 16 MG/DL (ref 7–20)
CALCIUM SERPL-MCNC: 9.6 MG/DL (ref 8.3–10.6)
CHLORIDE SERPL-SCNC: 106 MMOL/L (ref 99–110)
CO2 SERPL-SCNC: 24 MMOL/L (ref 21–32)
CREAT SERPL-MCNC: 0.8 MG/DL (ref 0.6–1.2)
GFR SERPLBLD CREATININE-BSD FMLA CKD-EPI: 79 ML/MIN/{1.73_M2}
GLUCOSE BLD-MCNC: 130 MG/DL (ref 70–99)
GLUCOSE SERPL-MCNC: 175 MG/DL (ref 70–99)
PERFORMED ON: ABNORMAL
POTASSIUM SERPL-SCNC: 4.7 MMOL/L (ref 3.5–5.1)
SODIUM SERPL-SCNC: 139 MMOL/L (ref 136–145)

## 2024-09-05 PROCEDURE — 6360000002 HC RX W HCPCS: Performed by: NURSE PRACTITIONER

## 2024-09-05 PROCEDURE — 97116 GAIT TRAINING THERAPY: CPT

## 2024-09-05 PROCEDURE — 6360000002 HC RX W HCPCS: Performed by: INTERNAL MEDICINE

## 2024-09-05 PROCEDURE — 36415 COLL VENOUS BLD VENIPUNCTURE: CPT

## 2024-09-05 PROCEDURE — G0378 HOSPITAL OBSERVATION PER HR: HCPCS

## 2024-09-05 PROCEDURE — 6370000000 HC RX 637 (ALT 250 FOR IP): Performed by: INTERNAL MEDICINE

## 2024-09-05 PROCEDURE — 96372 THER/PROPH/DIAG INJ SC/IM: CPT

## 2024-09-05 PROCEDURE — 6370000000 HC RX 637 (ALT 250 FOR IP): Performed by: NURSE PRACTITIONER

## 2024-09-05 PROCEDURE — 96366 THER/PROPH/DIAG IV INF ADDON: CPT

## 2024-09-05 PROCEDURE — 80048 BASIC METABOLIC PNL TOTAL CA: CPT

## 2024-09-05 PROCEDURE — 2580000003 HC RX 258: Performed by: NURSE PRACTITIONER

## 2024-09-05 RX ORDER — DIAZEPAM 2 MG
2 TABLET ORAL EVERY 6 HOURS PRN
Qty: 12 TABLET | Refills: 0 | Status: SHIPPED | OUTPATIENT
Start: 2024-09-05 | End: 2024-09-08

## 2024-09-05 RX ORDER — METHOCARBAMOL 750 MG/1
750 TABLET, FILM COATED ORAL 4 TIMES DAILY
Qty: 56 TABLET | Refills: 0 | Status: SHIPPED | OUTPATIENT
Start: 2024-09-05 | End: 2024-09-19

## 2024-09-05 RX ADMIN — ACETAMINOPHEN 650 MG: 325 TABLET ORAL at 06:30

## 2024-09-05 RX ADMIN — ENOXAPARIN SODIUM 40 MG: 100 INJECTION SUBCUTANEOUS at 08:18

## 2024-09-05 RX ADMIN — ACETAMINOPHEN 650 MG: 325 TABLET ORAL at 02:59

## 2024-09-05 RX ADMIN — PRAVASTATIN SODIUM 40 MG: 40 TABLET ORAL at 08:18

## 2024-09-05 RX ADMIN — DULOXETINE HYDROCHLORIDE 60 MG: 60 CAPSULE, DELAYED RELEASE ORAL at 08:18

## 2024-09-05 RX ADMIN — BRIMONIDINE TARTRATE 1 DROP: 2 SOLUTION OPHTHALMIC at 08:20

## 2024-09-05 RX ADMIN — ACETAMINOPHEN 650 MG: 325 TABLET ORAL at 10:48

## 2024-09-05 RX ADMIN — METHOCARBAMOL 750 MG: 750 TABLET ORAL at 10:48

## 2024-09-05 RX ADMIN — Medication 100 MG: at 08:18

## 2024-09-05 RX ADMIN — PANTOPRAZOLE SODIUM 40 MG: 40 TABLET, DELAYED RELEASE ORAL at 06:30

## 2024-09-05 RX ADMIN — METHOCARBAMOL 1000 MG: 100 INJECTION INTRAMUSCULAR; INTRAVENOUS at 02:59

## 2024-09-05 ASSESSMENT — PAIN SCALES - GENERAL
PAINLEVEL_OUTOF10: 4
PAINLEVEL_OUTOF10: 4
PAINLEVEL_OUTOF10: 0

## 2024-09-05 ASSESSMENT — PAIN DESCRIPTION - FREQUENCY: FREQUENCY: CONTINUOUS

## 2024-09-05 ASSESSMENT — PAIN DESCRIPTION - LOCATION: LOCATION: NECK

## 2024-09-05 ASSESSMENT — PAIN DESCRIPTION - ORIENTATION: ORIENTATION: POSTERIOR

## 2024-09-05 ASSESSMENT — PAIN DESCRIPTION - PAIN TYPE: TYPE: ACUTE PAIN

## 2024-09-05 ASSESSMENT — PAIN DESCRIPTION - ONSET: ONSET: ON-GOING

## 2024-09-05 ASSESSMENT — PAIN - FUNCTIONAL ASSESSMENT: PAIN_FUNCTIONAL_ASSESSMENT: ACTIVITIES ARE NOT PREVENTED

## 2024-09-05 ASSESSMENT — PAIN DESCRIPTION - DESCRIPTORS: DESCRIPTORS: ACHING

## 2024-09-05 NOTE — PROGRESS NOTES
Patient discharged home per MD order. Patient verbalizes understanding of all discharge instructions including importance of follow-up appointment. IV removed intact. All belongings with patient at discharge.

## 2024-09-05 NOTE — PROGRESS NOTES
V2.0    [facility] Progress Note      Name:  John Wood /Age/Sex: 1955  (68 y.o. female)   MRN & CSN:  [id number] & [id number] Encounter Date/Time: 2024 8:51 AM EDT   Location:  5523/5523-01 PCP: Lavell Finnegan MD     Attending:Natalie Millard MD       Hospital Day: 3    HPI:  John Wood is a 68 y.o. female with pmh of hypertension, hyperlipidemia, GERD, sleep apnea, remote history of possible CVA who transferred from [facility] for possible cord compression.  Patient was admitted on  with right-sided weakness and aphasia.  MRI at that time was unremarkable but CT cervical spine showed disc bulging with moderate canal narrowing.  MRI cervical spine showed a C4-C5 posterior disc protrusion with canal stenosis and cord impingement patient was transferred to [facility]     Patient states that she has chronic neck pain but since the last 2 weeks with severe pain radiating down her back and into her arms.  Went to the ED 2 weeks ago and was told to follow-up with her PCP.  Patient saw her PCP and was ordered an MRI but that was scheduled at a later date.  Patient was at work and felt really sick was feverish and the next thing she remembers was she was in an ambulance.  She states she had a stroke in  and has some right-sided weakness.  When she was sick the right-sided weakness got worse but now has resolved.  Patient also feels both her hands are weak.  It appears patient had some aphasia and facial droop at that time but this is resolved            Assessment and Recommendations     Hospital course:  John Wood is a 68 y.o. female with pmh of hypertension, hyperlipidemia, GERD, sleep apnea, remote history of possible CVA who transferred from [facility] for possible cord compression.  Patient was admitted on  with right-sided weakness and aphasia.  MRI at that time was unremarkable but CT cervical spine showed disc bulging with moderate canal narrowing.  MRI cervical spine

## 2024-09-05 NOTE — CARE COORDINATION
Case Management Assessment            Discharge Note                    Date / Time of Note: 9/5/2024 1:17 PM                  Discharge Note Completed by: AYLA Padilla    Patient Name: John Wood   YOB: 1955  Diagnosis: Cord compression (HCC) [G95.20]   Date / Time: 9/3/2024  3:34 PM    Current PCP: Lavell Finnegan MD  Clinic patient: No    Hospitalization in the last 30 days: No       Advance Directives:  Code Status: Full Code  [place] DNR form completed and on chart: Not Indicated    Financial:  Payor: [facility] MEDICARE / Plan: UNITEDHEALTHCARE DUAL COMPLETE / Product Type: *No Product type* /      Pharmacy:    [id number] - [place], [place] - [address] [facility] - P [phone] - F [phone]  [address] [facility] [address]  Phone: [phone] Fax: [phone]    [facility] - [place], [place] - [address] -  [phone] - F [phone]  [address]  [facility] [address]  Phone: [phone] Fax: [phone]      Assistance purchasing medications?: Potential Assistance Purchasing Medications: No  Assistance provided by Case Management: None at this time    Does patient want to participate in local refill/ meds to beds program?: Yes    Meds To Beds General Rules:  1. Can ONLY be done Monday- Friday between 8:30am-5pm  2. Prescription(s) must be in pharmacy by 3pm to be filled same day  3.Copy of patient's insurance/ prescription drug card and patient face sheet must be sent along with the prescription(s)  4. Cost of Rx cannot be added to hospital bill. If financial assistance is needed, please contact unit  or ;  or  CANNOT provide pharmacy voucher for patients co-pays  5. Patients can then  the prescription on their way out of the hospital at discharge, or pharmacy can deliver to the bedside if staff is available. (payment due at time of pick-up or delivery

## 2024-09-05 NOTE — DISCHARGE INSTR - COC
Continuity of Care Form    Patient Name: John Wood   :  1955  MRN:  [id number]    Admit date:  9/3/2024  Discharge date:  ***    Code Status Order: Full Code   Advance Directives:   Advance Care Flowsheet Documentation             Admitting Physician:  No admitting provider for patient encounter.  PCP: Lavell Finnegan MD    Discharging Nurse: ***  Discharging Hospital Unit/Room#: 5523/5523-01  Discharging Unit Phone Number: ***    Emergency Contact:   Extended Emergency Contact Information  Primary Emergency Contact: Klever Lawson   [facility]  Home Phone: [phone]  Relation: Child  Secondary Emergency Contact: Rubin Lawson   [facility]  Home Phone: [phone]  Relation: Child    Past Surgical History:  Past Surgical History:   Procedure Laterality Date    ARTHRODESIS Right 2020    (RIGHT) REMOVAL OF BONE SPURRING AND OSSEOUS PROMINENCE FIRST METATARSAL, ARTHRODESIS OF FIRST METATARSOPHALANGEAL JOINT, BONE MARROW HARVEST AND CONCENTRATION RIGHT TIBIA performed by Noah Fairchild DPM at [facility] OR    CARPAL TUNNEL RELEASE Left 2015    CARPAL TUNNEL RELEASE Right 2015    COLONOSCOPY      FINGER TRIGGER RELEASE Left 2015    middle and ring fingers    FINGER TRIGGER RELEASE Right 2015    middle and ring fingers    FOOT SURGERY Bilateral     litteltoe    HAND SURGERY Right     Ligament    HYSTERECTOMY (CERVIX STATUS UNKNOWN)      KNEE ARTHROPLASTY Right 2021    ROBOTIC ASSISTED TOTAL RIGHT KNEE REPLACEMENT performed by Francisco Javier Dalton MD at [facility] OR    KNEE ARTHROSCOPY Right 2022    ARTHROSCOPY WITH LYSIS OF ADHESIONS, RIGHT KNEE performed by Francisco Javier Dalton MD at [facility] OR    NERVE BLOCK Right 2023    RIGHT SCIATIC NERVE BLOCK performed by Ashley Gutierrez MD at [facility] ENDOSCOPY    NERVE BLOCK Right 2023    RIGHT SCIATIC NERVE BLOCK performed by Ashley Gutierrez MD at [facility] ENDOSCOPY    PAIN MANAGEMENT  Z98.890    Gastroesophageal reflux disease K21.9    Aortic atherosclerosis ([facility]) I70.0    Coronary artery calcification I25.10, I25.84    Right hip pain M25.551    DDD (degenerative disc disease), lumbar M51.36    Primary osteoarthritis of right hip M16.11    Right leg weakness R29.898    Right lumbar radiculitis M54.16    HNP (herniated nucleus pulposus), lumbar M51.26    Acute CVA (cerebrovascular accident) ([facility]) I63.9    Cord compression ([facility]) G95.20       Isolation/Infection:   Isolation            No Isolation          Patient Infection Status       Infection Onset Added Last Indicated Last Indicated By Review Planned Expiration Resolved Resolved By    None active    Resolved    C-diff Rule Out 23 Clostridium Difficile Toxin/Antigen   23 Rule-Out Test Canceled    COVID-19 22 COVID-19   22 Infection                        Nurse Assessment:  Last Vital Signs: /89   Pulse 70   Temp 97.8 °F (36.6 °C) (Oral)   Resp 16   Ht 1.626 m (5' 4\")   Wt 84.7 kg (186 lb 11.7 oz)   SpO2 97%   BMI 32.05 kg/m²     Last documented pain score (0-10 scale): Pain Level: 4  Last Weight:   Wt Readings from Last 1 Encounters:   24 84.7 kg (186 lb 11.7 oz)     Mental Status:  {IP PT MENTAL STATUS:18997}    IV Access:  { KATIE IV ACCESS:291582759}    Nursing Mobility/ADLs:  Walking   {P DME ADLs:842195026}  Transfer  {CHP DME ADLs:231352896}  Bathing  {CHP DME ADLs:131410596}  Dressing  {CHP DME ADLs:751282809}  Toileting  {CHP DME ADLs:732635646}  Feeding  {CHP DME ADLs:009931060}  Med Admin  {P DME ADLs:729819254}  Med Delivery   { KATIE MED Delivery:532456596}    Wound Care Documentation and Therapy:  Incision 21 Knee Anterior;Right (Active)   Number of days: 1137       Incision 22 Knee Right (Active)   Number of days: 946        Elimination:  Continence:   Bowel: {YES / NO:}  Bladder: {YES / NO:}  Urinary Catheter: {Urinary

## 2024-09-05 NOTE — PROGRESS NOTES
Physical Therapy  Facility/Department: [facility] 5T ORTHO/NEURO  Physical Therapy Treatment    Name: John Wood  : 1955  MRN: [id number]  Date of Service: 2024    Discharge Recommendations:  24 hour supervision or assist, Home with Home health PT   PT Equipment Recommendations  Equipment Needed: No      Patient Diagnosis(es): cord compression   Past Medical History:  has a past medical history of Anesthesia complication, Arthritis, Cardiomyopathy (HCC), COVID-19, Diabetes, GERD (gastroesophageal reflux disease), Hyperlipidemia, Hypertension, Lumbar disc disease, Prolonged emergence from general anesthesia, Sleep apnea, Unspecified cerebral artery occlusion with cerebral infarction, and Wears glasses.  Past Surgical History:  has a past surgical history that includes Partial hysterectomy (2003); Hand surgery (Right); Foot surgery (Bilateral); Carpal tunnel release (Left, 2015); Finger trigger release (Left, 2015); Carpal tunnel release (Right, 2015); Finger trigger release (Right, 2015); Shoulder arthroscopy (Right, 2018); Colonoscopy; arthrodesis (Right, 2020); Knee Arthroplasty (Right, 2021); Knee arthroscopy (Right, 2022); Pain management procedure (Right, 2023); Nerve Block (Right, 2023); Pain management procedure (Right, 2023); Nerve Block (Right, 2023); and Hysterectomy.    Assessment  Body Structures, Functions, Activity Limitations Requiring Skilled Therapeutic Intervention: Decreased functional mobility ;Decreased endurance;Increased pain  Assessment: Pt with imrproved pain control and functional mobility this session. Pt SBA for bed mobility, transfers and amb with RW. Pt remains below her baseline and would benefit from further skilled PT to maximize safety and independence wsith functional mobility. Pt reports planning to d/c home with A from . Will continue to follow.  Treatment Diagnosis: Decreased functional

## 2024-09-05 NOTE — PROGRESS NOTES
Patient is alert and oriented. Vital signs are stable. Patient's pain is controlled with medication per MAR. Patient ambulates x1 with a walker. Patient tolerates ambulation well. Bed is in the lowest position. Bed alarm is activated. Call light is within reach. Will continue to monitor and reassess.

## 2024-09-05 NOTE — DISCHARGE SUMMARY
HISTORY: Stroke Symptoms TECHNOLOGIST PROVIDED HISTORY: Has a \"code stroke\" or \"stroke alert\" been called?->Yes Reason for exam:->Stroke Symptoms Decision Support Exception - unselect if not a suspected or confirmed emergency medical condition->Emergency Medical Condition (MA) Reason for Exam: rt sided facial droop, slurred speech. lkn 2:30p FINDINGS: CTA NECK: AORTIC ARCH/ARCH VESSELS: No dissection or arterial injury.  No significant stenosis of the brachiocephalic or subclavian arteries. CAROTID ARTERIES: No dissection, arterial injury, or hemodynamically significant stenosis by NASCET criteria. VERTEBRAL ARTERIES: No dissection, arterial injury, or significant stenosis. SOFT TISSUES: The lung apices are clear.  No cervical or superior mediastinal lymphadenopathy.  The larynx and pharynx are unremarkable.  No acute abnormality of the salivary and thyroid glands. BONES: No acute osseous abnormality. CTA HEAD: ANTERIOR CIRCULATION: No significant stenosis of the intracranial internal carotid, anterior cerebral, or middle cerebral arteries. No aneurysm. POSTERIOR CIRCULATION: No significant stenosis of the vertebral, basilar, or posterior cerebral arteries. No aneurysm. OTHER: No dural venous sinus thrombosis on this non-dedicated study. BRAIN: No mass effect or midline shift. No extra-axial fluid collection. The gray-white differentiation is maintained.     Unremarkable CTA of the head and neck.     CT HEAD WO CONTRAST    Addendum Date: 8/30/2024    ADDENDUM: Findings were communicated to Dr. Mitchel Alanis by the CORE team on 8/30/2024 at 3:41 pm.     Result Date: 8/30/2024  EXAMINATION: CT OF THE HEAD WITHOUT CONTRAST  8/30/2024 2:19 pm TECHNIQUE: CT of the head was performed without the administration of intravenous contrast. Automated exposure control, iterative reconstruction, and/or weight based adjustment of the mA/kV was utilized to reduce the radiation dose to as low as reasonably achievable. COMPARISON:  03/31/2024. HISTORY: ORDERING SYSTEM PROVIDED HISTORY: Stroke Symptoms TECHNOLOGIST PROVIDED HISTORY: Has a \"code stroke\" or \"stroke alert\" been called?->Yes Reason for exam:->Stroke Symptoms Decision Support Exception - unselect if not a suspected or confirmed emergency medical condition->Emergency Medical Condition (MA) Reason for Exam: rt sided facial droop, slurred speech. lkn 2:30p FINDINGS: BRAIN/VENTRICLES: There is no acute intracranial hemorrhage, mass effect or midline shift.  No abnormal extra-axial fluid collection.  The gray-white differentiation is maintained without evidence of an acute infarct.  There is no evidence of hydrocephalus. Mild volume loss and chronic microvascular ischemic changes appears similar to the prior study. ORBITS: The visualized portion of the orbits demonstrate no acute abnormality. SINUSES: The visualized paranasal sinuses and mastoid air cells demonstrate no acute abnormality. SOFT TISSUES/SKULL:  No acute abnormality of the visualized skull or soft tissues.     No acute intracranial findings.       CBC:   Recent Labs     09/04/24  0456   WBC 6.8   HGB 12.0        BMP:    Recent Labs     09/04/24  0456 09/05/24  0541   * 139   K 4.5 4.7   CL 99 106   CO2 26 24   BUN 21* 16   CREATININE 0.8 0.8   GLUCOSE 206* 175*     Hepatic: No results for input(s): \"AST\", \"ALT\", \"BILITOT\", \"ALKPHOS\" in the last 72 hours.    Invalid input(s): \"ALB\"  Lipids:   Lab Results   Component Value Date/Time    CHOL 221 08/31/2024 05:19 AM    HDL 68 08/31/2024 05:19 AM    HDL 56 01/26/2012 12:48 PM    TRIG 140 08/31/2024 05:19 AM     Hemoglobin A1C:   Lab Results   Component Value Date/Time    LABA1C 6.9 08/31/2024 05:18 AM     TSH:   Lab Results   Component Value Date/Time    TSH 3.97 12/11/2017 08:50 AM    TSH 3.37 08/23/2016 02:13 PM     Troponin: No results found for: \"TROPONINT\"  Lactic Acid: No results for input(s): \"LACTA\" in the last 72 hours.  BNP: No results for input(s):

## 2024-09-06 ENCOUNTER — CARE COORDINATION (OUTPATIENT)
Dept: CASE MANAGEMENT | Age: 69
End: 2024-09-06

## 2024-09-06 ENCOUNTER — TELEPHONE (OUTPATIENT)
Dept: FAMILY MEDICINE CLINIC | Age: 69
End: 2024-09-06

## 2024-09-06 DIAGNOSIS — G95.20 CORD COMPRESSION (HCC): Primary | ICD-10-CM

## 2024-09-06 PROCEDURE — 1111F DSCHRG MED/CURRENT MED MERGE: CPT | Performed by: FAMILY MEDICINE

## 2024-09-06 NOTE — CARE COORDINATION
Medicine [phone]            Care Transition Nurse provided contact information.  Plan for follow-up call in 6-10 days based on severity of symptoms and risk factors.  Plan for next call: symptom management-any worsening pain, ongoing constipation, other post op complications?  self management-safety precautions, incision site monitoring  follow-up appointment-HFU with PCP has been scheduled?      Thank You,    Yessenia Duggan RN  Care Transition Coordinator  Contact Number:[phone]

## 2024-09-06 NOTE — DISCHARGE SUMMARY
Hospital Medicine Discharge Summary    Patient ID: John EldridgeFayette      Patient's PCP: Lavell Finnegan MD    Admit Date: 8/30/2024     Discharge Date: 9/3/2024      Admitting Physician: Subhash Martínez MD     Discharge Physician: Alethea Schmidt MD     Discharge Diagnoses:       Active Hospital Problems    Diagnosis     Acute CVA (cerebrovascular accident) (HCC) [I63.9]        The patient was seen and examined on day of discharge and this discharge summary is in conjunction with any daily progress note from day of discharge.    Hospital Course:     68 y.o. female who presented to [facility] with aphasia and right-sided weakness, patient presented to ED with sudden onset of right-sided weakness right facial droop and aphasia, at about 2:30 PM she was at work, initially on presentation to ED she was not able to speak she was able to nod her head yes and no as she had significant aphasia she was also complaining of headache she does have a history of stroke, denies abdominal pain having some nausea.  No fever chills no chest pain no shortness of breath in ED stroke protocol activated patient clinically improved her right-sided weakness improved her right-sided facial droop improved she still having aphasia with some improvement discussed with ED MD and agree with plan to admit for further management and treatment       Neurology eval :    AMS + R-sided weakness.  Low suspicion for an acute neurologic event that led to this.  With her reports of possible fever and hypotension the morning of admission, as well as prior neurology notes from 2012 and 2014 with similar presentations w/ negative workup, this may have been a stress-reaction to feeling medically poor (ie hypotension).  Also with her headaches there is a possibility of migraines (she had this considered in the past as well), and possibly hemiplegic component if her R sided weakness resolves entirely.      1) AMS  2) R sided weakness  3) HTN    reactive to light.  Extra ocular muscles intact. Conjunctivae/corneas clear.  Neck: Supple, with full range of motion. No jugular venous distention. Trachea midline.  Respiratory:  Normal respiratory effort. Clear to auscultation, bilaterally without Rales/Wheezes/Rhonchi.  Cardiovascular:  Regular rate and rhythm with normal S1/S2 without murmurs, rubs or gallops.    Peripheral Pulses: +2 palpable, equal bilaterally       Labs: For convenience and continuity at follow-up the following most recent labs are provided:      CBC:    Lab Results   Component Value Date/Time    WBC 6.8 09/04/2024 04:56 AM    HGB 12.0 09/04/2024 04:56 AM    HCT 36.5 09/04/2024 04:56 AM     09/04/2024 04:56 AM       Renal:    Lab Results   Component Value Date/Time     09/05/2024 05:41 AM    K 4.7 09/05/2024 05:41 AM     09/05/2024 05:41 AM    CO2 24 09/05/2024 05:41 AM    BUN 16 09/05/2024 05:41 AM    CREATININE 0.8 09/05/2024 05:41 AM    CALCIUM 9.6 09/05/2024 05:41 AM    PHOS 3.7 09/02/2024 05:36 AM         Significant Diagnostic Studies    Radiology:   MRI CERVICAL SPINE WO CONTRAST   Final Result   C4-C5 large posterior disc protrusion causing central stenosis and cord   impingement.  Associated facet arthropathy and moderate foraminal narrowing.      Less advanced degenerative changes central stenosis and foraminal narrowing   throughout the rest of the spine as noted above.         CT CERVICAL SPINE WO CONTRAST   Final Result   1. No acute cervical spine fracture.   2. Moderate multilevel degenerative disc disease which is most prominent   C5-C6 and C6-C7 and mild-to-moderate multilevel facet arthropathy.   3. Posterior central disc herniation at C4-C5 it causes moderate central   canal narrowing.  Disc bulging and of the hypertrophy also cause moderate   central canal narrowing at C5-C6 and C6-C7.  Multilevel osseous neural   foraminal narrowing is worst and moderate bilaterally at C5-C6 on the left at   C6-C7.     tablet Take 1 tablet by mouth nightly, Disp-90 tablet, R-0Ok for early refill, patient is out of medication*Normal      metFORMIN (GLUCOPHAGE) 500 MG tablet TAKE 2 TABLETS BY MOUTH TWICE DAILY WITH MEALS, Disp-360 tablet, R-1Normal      Coenzyme Q10 (COQ10) 100 MG CAPS Take 1 capsule by mouth daily, Disp-30 capsule, R-0Normal      ONETOUCH ULTRA strip USE AS DIRECTED, Disp-100 strip, R-4Normal      pravastatin (PRAVACHOL) 40 MG tablet TAKE 1 TABLET BY MOUTH DAILY, Disp-90 tablet, R-1Normal      dulaglutide (TRULICITY) 1.5 MG/0.5ML SC injection Inject 0.5 mLs into the skin once a weekHistorical Med      fluticasone (FLONASE) 50 MCG/ACT nasal spray 2 sprays by Each Nostril route daily Shake liquid, Disp-3 each, R-5Normal      ondansetron (ZOFRAN-ODT) 4 MG disintegrating tablet Take 1 tablet by mouth 3 times daily as needed for Nausea or Vomiting, Disp-21 tablet, R-0Normal      omeprazole (PRILOSEC) 40 MG delayed release capsule TAKE 1 CAPSULE BY MOUTH IN THE MORNING AND AT BEDTIME, Disp-160 capsule, R-5Normal      cetirizine (ZYRTEC) 10 MG tablet Take 1 tablet by mouth daily, Disp-30 tablet, R-11Normal      insulin glargine (LANTUS SOLOSTAR) 100 UNIT/ML injection pen 8 units subq nightly, Disp-15 mL, R-3Requesting 1 year supplyNO PRINT      clotrimazole-betamethasone (LOTRISONE) 1-0.05 % cream Apply topically 2 times daily for no more than 10 consecutive days, Disp-45 g, R-0, Normal      hydrocortisone 2.5 % cream Apply topically 2 times daily for no more than 10 consecutive days, Disp-1 each, R-0, Normal      acetaminophen (TYLENOL) 500 MG tablet Take 2 tablets by mouth 3 times daily as needed for Pain, Disp-180 tablet, R-2Normal      furosemide (LASIX) 20 MG tablet TAKE 1 TABLET BY MOUTH  DAILY AS NEEDED FOR LEG  SWELLING, Disp-90 tablet, R-1Requesting 1 year supplyNormal      albuterol sulfate HFA (PROVENTIL;VENTOLIN;PROAIR) 108 (90 Base) MCG/ACT inhaler INHALE 2 PUFFS INTO THE LUNGS EVERY 4 HOURS AS NEEDED FOR

## 2024-09-09 LAB
GLUCOSE BLD-MCNC: 172 MG/DL (ref 70–99)
GLUCOSE BLD-MCNC: 187 MG/DL (ref 70–99)
GLUCOSE BLD-MCNC: 231 MG/DL (ref 70–99)
PERFORMED ON: ABNORMAL

## 2024-09-13 ENCOUNTER — OFFICE VISIT (OUTPATIENT)
Dept: FAMILY MEDICINE CLINIC | Age: 69
End: 2024-09-13

## 2024-09-13 VITALS
DIASTOLIC BLOOD PRESSURE: 80 MMHG | HEIGHT: 64 IN | BODY MASS INDEX: 32.27 KG/M2 | HEART RATE: 87 BPM | WEIGHT: 189 LBS | SYSTOLIC BLOOD PRESSURE: 104 MMHG | OXYGEN SATURATION: 97 % | RESPIRATION RATE: 16 BRPM

## 2024-09-13 DIAGNOSIS — G95.20 CERVICAL SPINAL CORD COMPRESSION (HCC): ICD-10-CM

## 2024-09-13 DIAGNOSIS — Z09 HOSPITAL DISCHARGE FOLLOW-UP: Primary | ICD-10-CM

## 2024-09-13 DIAGNOSIS — Z79.4 TYPE 2 DIABETES MELLITUS WITHOUT COMPLICATION, WITH LONG-TERM CURRENT USE OF INSULIN (HCC): ICD-10-CM

## 2024-09-13 DIAGNOSIS — E11.9 TYPE 2 DIABETES MELLITUS WITHOUT COMPLICATION, WITH LONG-TERM CURRENT USE OF INSULIN (HCC): ICD-10-CM

## 2024-09-13 PROBLEM — I63.9 ACUTE CVA (CEREBROVASCULAR ACCIDENT) (HCC): Status: RESOLVED | Noted: 2024-08-30 | Resolved: 2024-09-13

## 2024-09-16 ENCOUNTER — OFFICE VISIT (OUTPATIENT)
Dept: SLEEP MEDICINE | Age: 69
End: 2024-09-16
Payer: MEDICARE

## 2024-09-16 VITALS
SYSTOLIC BLOOD PRESSURE: 112 MMHG | RESPIRATION RATE: 18 BRPM | OXYGEN SATURATION: 99 % | BODY MASS INDEX: 33.26 KG/M2 | TEMPERATURE: 97.4 F | WEIGHT: 194.8 LBS | DIASTOLIC BLOOD PRESSURE: 60 MMHG | HEART RATE: 91 BPM | HEIGHT: 64 IN

## 2024-09-16 DIAGNOSIS — I69.30 HISTORY OF CVA WITH RESIDUAL DEFICIT: ICD-10-CM

## 2024-09-16 DIAGNOSIS — Z99.89 DEPENDENCE ON OTHER ENABLING MACHINES AND DEVICES: ICD-10-CM

## 2024-09-16 DIAGNOSIS — G47.33 OSA TREATED WITH BIPAP: Primary | ICD-10-CM

## 2024-09-16 DIAGNOSIS — I10 ESSENTIAL HYPERTENSION: ICD-10-CM

## 2024-09-16 DIAGNOSIS — E66.9 OBESITY (BMI 30.0-34.9): ICD-10-CM

## 2024-09-16 PROCEDURE — 3017F COLORECTAL CA SCREEN DOC REV: CPT | Performed by: PSYCHIATRY & NEUROLOGY

## 2024-09-16 PROCEDURE — 1036F TOBACCO NON-USER: CPT | Performed by: PSYCHIATRY & NEUROLOGY

## 2024-09-16 PROCEDURE — 99214 OFFICE O/P EST MOD 30 MIN: CPT | Performed by: PSYCHIATRY & NEUROLOGY

## 2024-09-16 PROCEDURE — G8417 CALC BMI ABV UP PARAM F/U: HCPCS | Performed by: PSYCHIATRY & NEUROLOGY

## 2024-09-16 PROCEDURE — G8427 DOCREV CUR MEDS BY ELIG CLIN: HCPCS | Performed by: PSYCHIATRY & NEUROLOGY

## 2024-09-16 PROCEDURE — 3078F DIAST BP <80 MM HG: CPT | Performed by: PSYCHIATRY & NEUROLOGY

## 2024-09-16 PROCEDURE — G8399 PT W/DXA RESULTS DOCUMENT: HCPCS | Performed by: PSYCHIATRY & NEUROLOGY

## 2024-09-16 PROCEDURE — 1090F PRES/ABSN URINE INCON ASSESS: CPT | Performed by: PSYCHIATRY & NEUROLOGY

## 2024-09-16 PROCEDURE — 1111F DSCHRG MED/CURRENT MED MERGE: CPT | Performed by: PSYCHIATRY & NEUROLOGY

## 2024-09-16 PROCEDURE — 1123F ACP DISCUSS/DSCN MKR DOCD: CPT | Performed by: PSYCHIATRY & NEUROLOGY

## 2024-09-16 PROCEDURE — 3074F SYST BP LT 130 MM HG: CPT | Performed by: PSYCHIATRY & NEUROLOGY

## 2024-09-16 ASSESSMENT — SLEEP AND FATIGUE QUESTIONNAIRES
HOW LIKELY ARE YOU TO NOD OFF OR FALL ASLEEP WHILE SITTING QUIETLY AFTER LUNCH WITHOUT ALCOHOL: MODERATE CHANCE OF DOZING
HOW LIKELY ARE YOU TO NOD OFF OR FALL ASLEEP WHILE WATCHING TV: HIGH CHANCE OF DOZING
HOW LIKELY ARE YOU TO NOD OFF OR FALL ASLEEP WHILE LYING DOWN TO REST IN THE AFTERNOON WHEN CIRCUMSTANCES PERMIT: MODERATE CHANCE OF DOZING
HOW LIKELY ARE YOU TO NOD OFF OR FALL ASLEEP WHILE SITTING AND TALKING TO SOMEONE: SLIGHT CHANCE OF DOZING
HOW LIKELY ARE YOU TO NOD OFF OR FALL ASLEEP WHILE SITTING INACTIVE IN A PUBLIC PLACE: SLIGHT CHANCE OF DOZING
HOW LIKELY ARE YOU TO NOD OFF OR FALL ASLEEP WHILE SITTING AND READING: WOULD NEVER DOZE
HOW LIKELY ARE YOU TO NOD OFF OR FALL ASLEEP IN A CAR, WHILE STOPPED FOR A FEW MINUTES IN TRAFFIC: WOULD NEVER DOZE
ESS TOTAL SCORE: 9
HOW LIKELY ARE YOU TO NOD OFF OR FALL ASLEEP WHEN YOU ARE A PASSENGER IN A CAR FOR AN HOUR WITHOUT A BREAK: WOULD NEVER DOZE

## 2024-09-17 ENCOUNTER — CARE COORDINATION (OUTPATIENT)
Dept: CASE MANAGEMENT | Age: 69
End: 2024-09-17

## 2024-09-17 ENCOUNTER — TELEPHONE (OUTPATIENT)
Dept: FAMILY MEDICINE CLINIC | Age: 69
End: 2024-09-17

## 2024-09-24 ENCOUNTER — CARE COORDINATION (OUTPATIENT)
Dept: CASE MANAGEMENT | Age: 69
End: 2024-09-24

## 2024-09-25 NOTE — PROGRESS NOTES
Physician Progress Note      PATIENT:               EUGENE GIRON  CSN #:                  [id number]  :                       1955  ADMIT DATE:       2024 3:12 PM  DISCH DATE:        9/3/2024 2:52 PM  RESPONDING  PROVIDER #:        Alethea Schmidt MD          QUERY TEXT:    Pt admitted with hemiparesis and aphasia. Pt noted to have migraine, spinal   cord impingement and TIA. If possible, please document in progress notes and   discharge summary if you are evaluating and /or treating any of the following:    The medical record reflects the following:  Risk Factors: migraine, spinal cord compression    Clinical Indicators:  Neurosurgery consult- \"clinical symptoms as reported   most c/w TIA (hemiparesis and aphasia do not fit picture of cervical   myelopathy.\"   MRI CS- C4-C5 large posterior disc protrusion causing central stenosis and   cord impingement.   Neuro consult- \"possibly migraine are also considered.\"  9/3 DCS- \"Also with her headaches there is a possibility of migraines (she had   this considered in the past as well), and possibly hemiplegic component if   her R sided weakness resolves entirely.\"  Pt was transferred to [facility] for [facility] evaluation    Treatment: Imaging, Neuro and NS consults, Medrol dose yoli, PT/OT  Options provided:  -- Hemiparesis and aphasia due to Migraine, TIA ruled out after study  -- Hemiparesis and aphasia due to spinal cord impingement  -- Hemiparesis and aphasia due to migraine, spinal cord impingement and   possible TIA  -- Hemiparesis and aphasia due to, Please provide evidence  -- Other - I will add my own diagnosis  -- Disagree - Not applicable / Not valid  -- Disagree - Clinically unable to determine / Unknown  -- Refer to Clinical Documentation Reviewer    PROVIDER RESPONSE TEXT:    Hemiparesis and aphasia due to Migraine, TIA ruled out after study    Query created by: Radha Harvey on 2024 1:15 PM      Electronically signed by:

## 2024-09-26 ENCOUNTER — CARE COORDINATION (OUTPATIENT)
Dept: CASE MANAGEMENT | Age: 69
End: 2024-09-26

## 2024-09-30 DIAGNOSIS — F39 MOOD DISORDER (HCC): ICD-10-CM

## 2024-09-30 RX ORDER — TRAZODONE HYDROCHLORIDE 100 MG/1
100 TABLET ORAL NIGHTLY
Qty: 90 TABLET | Refills: 0 | Status: SHIPPED | OUTPATIENT
Start: 2024-09-30 | End: 2024-10-01 | Stop reason: SDUPTHER

## 2024-09-30 NOTE — TELEPHONE ENCOUNTER
Medication:   Requested Prescriptions     Pending Prescriptions Disp Refills    traZODone (DESYREL) 100 MG tablet [Pharmacy Med Name: TRAZODONE 100MG TABLETS] 90 tablet 0     Sig: TAKE 1 TABLET BY MOUTH EVERY NIGHT        Last Filled:      Patient Phone Number: [phone] (home)     Last appt: 7/23/2024   Next appt: 10/22/2024    Last OARRS:       8/13/2019     2:28 PM   RX Monitoring   Periodic Controlled Substance Monitoring No signs of potential drug abuse or diversion identified.         
oral

## 2024-10-01 ENCOUNTER — TELEPHONE (OUTPATIENT)
Dept: FAMILY MEDICINE CLINIC | Age: 69
End: 2024-10-01

## 2024-10-01 DIAGNOSIS — F39 MOOD DISORDER (HCC): ICD-10-CM

## 2024-10-01 RX ORDER — DULOXETIN HYDROCHLORIDE 60 MG/1
60 CAPSULE, DELAYED RELEASE ORAL 2 TIMES DAILY
Qty: 180 CAPSULE | Refills: 0 | Status: SHIPPED | OUTPATIENT
Start: 2024-10-01 | End: 2024-12-30

## 2024-10-01 RX ORDER — VALSARTAN 160 MG/1
160 TABLET ORAL DAILY
Qty: 90 TABLET | Refills: 1 | Status: SHIPPED | OUTPATIENT
Start: 2024-10-01

## 2024-10-01 RX ORDER — TRAZODONE HYDROCHLORIDE 100 MG/1
100 TABLET ORAL NIGHTLY
Qty: 90 TABLET | Refills: 0 | Status: SHIPPED | OUTPATIENT
Start: 2024-10-01

## 2024-10-01 NOTE — TELEPHONE ENCOUNTER
Refill sent  Please have Sidia monitor her blood pressure.  Last visit it was low. If her BP remains low, she should hold (not take) this medication. Please see if she is able to check daily and call us with her BPs in a week or so. Thank you

## 2024-10-02 NOTE — PROGRESS NOTES
PATIENT REACHED   YES_X___NO____    PREOP INSTRUCTIONS LEFT ON VM NUMBER_______________      DATE_10/08/24________ TIME__1415_______ARRIVAL_1315_______PLACE_[address]___________  NOTHING TO EAT OR DRINK  AFTER MIDNIGHT THE EVENING PRIOR OR AS INSTRUCTED BY YOUR DR.  YOU NEED A RESPONSIBLE ADULT AGE 18 OR OLDER TO DRIVE YOU HOME  PLEASE BRING INSURANCE CARD.PICTURE ID AND COMPLETE LIST OF MEDS  WEAR LOOSE COMFORTABLE CLOTHING  FOLLOW ANY INSTRUCTIONS YOUR DRS OFFICE HAS GIVEN YOU,INCLUDING WHAT MEDICATIONS TO TAKE THE AM OF PROCEDURE AND WHEN AND IF YOU NEED TO STOP ANY BLOOD THINNERS. IF YOU HAVE QUESTIONS REGARDING THIS CALL THE OFFICE  THE GOAL BLOOD SUGAR THE AM OF PROCEDURE  OR LESS ABOVE THAT THE PROCEDURE MAY BE CANCELLED  ANY QUESTIONS CALL YOUR DOCTOR.ALSO,PLEASE READ THE INSTRUCTION PACKET FROM YOUR DR IF YOU RECEIVED ONE.  SPINE INTERVENTION NUMBER [phone]      OTHER___________________________________      VISITOR POLICY(subject to change)    Current policy is 2 visitors per patient. No children. Masks are required.

## 2024-10-03 ENCOUNTER — CARE COORDINATION (OUTPATIENT)
Dept: CASE MANAGEMENT | Age: 69
End: 2024-10-03

## 2024-10-03 NOTE — CARE COORDINATION
Care Transitions Note      Follow Up Call     Patient Current Location:  Home: [address] [place] [id number]    LPN Care Coordinator contacted the patient by telephone. Verified name and  as identifiers.    Additional needs identified to be addressed with provider                     Method of communication with provider: .    Care Summary Note:   Spoke with patient. She stated she feels much better, but is feeling shaky. Her , /106. Denies ha, lh, dizziness, swelling, fever, chills, sob or increased back pain. I stated to patient her BP was high. She said she had taken it about 15 minutes ago and didn't want to repeat it. Appetite and fluid intake is good. She was eating at this time. Patient agreed to take her BP in one hour and I will call back.  No elimination problems.      Called patient to have her repeat BP.  Left voicemail message to return call with an update. Contact information provided.     Patient called back. She reported she feels better. The shaky feeling is gone. Her //90. She is going to rest for today. Encouraged her drink more water today. She stated she would.       Plan of care updates since last contact:  Review of patient management of conditions/medications: BP       Advance Care Planning:   Does patient have an Advance Directive: not on file.    Medication Review:  No changes since last call.     Remote Patient Monitoring:  Offered patient enrollment in the Remote Patient Monitoring (RPM) program for in-home monitoring: Yes, but did not enroll at this time: already monitoring with home equipment.    Assessments:  Care Transitions Subsequent and Final Call    Subsequent and Final Calls  Are you currently active with any services?: Outpatient/Community Services  Care Transitions Interventions  Other Interventions:              Follow Up Appointment:   VIVI appointment attended as scheduled   Future Appointments         Provider Specialty Dept Phone

## 2024-10-04 ENCOUNTER — CARE COORDINATION (OUTPATIENT)
Dept: CASE MANAGEMENT | Age: 69
End: 2024-10-04

## 2024-10-04 NOTE — CARE COORDINATION
Patient Current Location:  Home: [address] [place] [id number]    Care Transition Nurse contacted the patient by telephone. Verified name and  as identifiers.    Patient graduated from the Care Transitions program on 10/04/2024.  Patient/family verbalizes confidence in the ability to self-manage at this time. has the ability to self manage at this time..      Advance Care Planning:   Does patient have an Advance Directive: reviewed during previous call, see note. .    Handoff:   Patient was not referred to the ACM team due to no additional needs identified.       Care Summary Note: CTN spoke with patient this afternoon for final follow up CTN call.  Patient states she is doing much better.  Patient states BP's are running better.  BP today was high first thing this morning, but after taking medications it came down to 136/81.  Patient again instructed to make sure to check BP's before taking medications, then about 30-40 minutes after taking medications, to check effectiveness.  Patient denies having any fever, chills, nausea, vomiting, chest pain, SOB or cough.   Patient states FSBS was 270 this morning, all readings since have been low 100's, last being 98.  Patient encouraged to monitor sugar intake, as well as monitoring carb intake.  Patient overall states she feels good today, no other issues or concerns at this time.    Assessments:  Care Transitions Subsequent and Final Call    Schedule Follow Up Appointment with PCP: Declined  Subsequent and Final Calls  Do you have any ongoing symptoms?: No  Have your medications changed?: No  Do you have any questions related to your medications?: No  Do you currently have any active services?: No  Are you currently active with any services?: Outpatient/Community Services  Do you have any needs or concerns that I can assist you with?: No  Identified Barriers: None  Care Transitions Interventions  No Identified Needs  Other Interventions:

## 2024-10-07 ENCOUNTER — TELEPHONE (OUTPATIENT)
Dept: FAMILY MEDICINE CLINIC | Age: 69
End: 2024-10-07

## 2024-10-07 NOTE — TELEPHONE ENCOUNTER
BP readings are high. Is she symptomatic in any way? Should come in this week for visit and BP check.

## 2024-10-07 NOTE — TELEPHONE ENCOUNTER
Pt called in BP readings  10/2  135/159  Pulse 89    10/3  158/106  135/86  Pulse 106    10/4  151/81  136/91  Pulse 87    10/5  123/88  Pulse 91      10/6  127/96  Pulse 97    10/7  153/105  Pulse 93      FYI

## 2024-10-08 ENCOUNTER — APPOINTMENT (OUTPATIENT)
Dept: GENERAL RADIOLOGY | Age: 69
End: 2024-10-08
Attending: PHYSICAL MEDICINE & REHABILITATION
Payer: MEDICARE

## 2024-10-08 ENCOUNTER — OFFICE VISIT (OUTPATIENT)
Dept: FAMILY MEDICINE CLINIC | Age: 69
End: 2024-10-08
Payer: MEDICARE

## 2024-10-08 ENCOUNTER — HOSPITAL ENCOUNTER (OUTPATIENT)
Age: 69
Setting detail: OUTPATIENT SURGERY
Discharge: HOME | End: 2024-10-08
Attending: PHYSICAL MEDICINE & REHABILITATION | Admitting: PHYSICAL MEDICINE & REHABILITATION
Payer: MEDICARE

## 2024-10-08 VITALS
DIASTOLIC BLOOD PRESSURE: 82 MMHG | RESPIRATION RATE: 18 BRPM | HEIGHT: 64 IN | OXYGEN SATURATION: 97 % | WEIGHT: 196 LBS | HEART RATE: 91 BPM | SYSTOLIC BLOOD PRESSURE: 136 MMHG | BODY MASS INDEX: 33.46 KG/M2

## 2024-10-08 VITALS
HEART RATE: 81 BPM | TEMPERATURE: 96.9 F | DIASTOLIC BLOOD PRESSURE: 89 MMHG | OXYGEN SATURATION: 100 % | SYSTOLIC BLOOD PRESSURE: 126 MMHG | RESPIRATION RATE: 16 BRPM

## 2024-10-08 DIAGNOSIS — I10 ESSENTIAL HYPERTENSION: ICD-10-CM

## 2024-10-08 DIAGNOSIS — I10 ESSENTIAL HYPERTENSION: Primary | ICD-10-CM

## 2024-10-08 DIAGNOSIS — E11.42 TYPE 2 DIABETES MELLITUS WITH DIABETIC POLYNEUROPATHY, WITH LONG-TERM CURRENT USE OF INSULIN (HCC): ICD-10-CM

## 2024-10-08 DIAGNOSIS — Z79.4 TYPE 2 DIABETES MELLITUS WITH DIABETIC POLYNEUROPATHY, WITH LONG-TERM CURRENT USE OF INSULIN (HCC): ICD-10-CM

## 2024-10-08 DIAGNOSIS — G47.33 OSA (OBSTRUCTIVE SLEEP APNEA): ICD-10-CM

## 2024-10-08 DIAGNOSIS — E78.2 MIXED HYPERLIPIDEMIA: ICD-10-CM

## 2024-10-08 LAB
ALBUMIN SERPL-MCNC: 4.1 G/DL (ref 3.4–5)
ALBUMIN/GLOB SERPL: 1.8 {RATIO} (ref 1.1–2.2)
ALP SERPL-CCNC: 79 U/L (ref 40–129)
ALT SERPL-CCNC: 16 U/L (ref 10–40)
ANION GAP SERPL CALCULATED.3IONS-SCNC: 9 MMOL/L (ref 3–16)
AST SERPL-CCNC: 22 U/L (ref 15–37)
BASOPHILS # BLD: 0 K/UL (ref 0–0.2)
BASOPHILS NFR BLD: 0.5 %
BILIRUB SERPL-MCNC: <0.2 MG/DL (ref 0–1)
BUN SERPL-MCNC: 17 MG/DL (ref 7–20)
CALCIUM SERPL-MCNC: 10.7 MG/DL (ref 8.3–10.6)
CHLORIDE SERPL-SCNC: 101 MMOL/L (ref 99–110)
CO2 SERPL-SCNC: 26 MMOL/L (ref 21–32)
CREAT SERPL-MCNC: 0.8 MG/DL (ref 0.6–1.2)
DEPRECATED RDW RBC AUTO: 13.8 % (ref 12.4–15.4)
EOSINOPHIL # BLD: 0.2 K/UL (ref 0–0.6)
EOSINOPHIL NFR BLD: 3.9 %
GFR SERPLBLD CREATININE-BSD FMLA CKD-EPI: 79 ML/MIN/{1.73_M2}
GLUCOSE BLD-MCNC: 77 MG/DL (ref 70–99)
GLUCOSE SERPL-MCNC: 182 MG/DL (ref 70–99)
HCT VFR BLD AUTO: 37.6 % (ref 36–48)
HGB BLD-MCNC: 12 G/DL (ref 12–16)
LYMPHOCYTES # BLD: 2.2 K/UL (ref 1–5.1)
LYMPHOCYTES NFR BLD: 39.8 %
MCH RBC QN AUTO: 28.3 PG (ref 26–34)
MCHC RBC AUTO-ENTMCNC: 31.9 G/DL (ref 31–36)
MCV RBC AUTO: 88.8 FL (ref 80–100)
MONOCYTES # BLD: 0.4 K/UL (ref 0–1.3)
MONOCYTES NFR BLD: 6.5 %
NEUTROPHILS # BLD: 2.8 K/UL (ref 1.7–7.7)
NEUTROPHILS NFR BLD: 49.3 %
PERFORMED ON: NORMAL
PLATELET # BLD AUTO: 276 K/UL (ref 135–450)
PMV BLD AUTO: 8.2 FL (ref 5–10.5)
POTASSIUM SERPL-SCNC: 4.5 MMOL/L (ref 3.5–5.1)
PROT SERPL-MCNC: 6.4 G/DL (ref 6.4–8.2)
RBC # BLD AUTO: 4.24 M/UL (ref 4–5.2)
SODIUM SERPL-SCNC: 136 MMOL/L (ref 136–145)
TSH SERPL DL<=0.005 MIU/L-ACNC: 3.1 UIU/ML (ref 0.27–4.2)
WBC # BLD AUTO: 5.6 K/UL (ref 4–11)

## 2024-10-08 PROCEDURE — 99213 OFFICE O/P EST LOW 20 MIN: CPT

## 2024-10-08 PROCEDURE — G8427 DOCREV CUR MEDS BY ELIG CLIN: HCPCS

## 2024-10-08 PROCEDURE — 6360000002 HC RX W HCPCS: Performed by: PHYSICAL MEDICINE & REHABILITATION

## 2024-10-08 PROCEDURE — G8417 CALC BMI ABV UP PARAM F/U: HCPCS

## 2024-10-08 PROCEDURE — 77003 FLUOROGUIDE FOR SPINE INJECT: CPT

## 2024-10-08 PROCEDURE — 3017F COLORECTAL CA SCREEN DOC REV: CPT

## 2024-10-08 PROCEDURE — 3079F DIAST BP 80-89 MM HG: CPT

## 2024-10-08 PROCEDURE — 3610000054 HC PAIN LEVEL 3 BASE (NON-OR): Performed by: PHYSICAL MEDICINE & REHABILITATION

## 2024-10-08 PROCEDURE — 2022F DILAT RTA XM EVC RTNOPTHY: CPT

## 2024-10-08 PROCEDURE — 3075F SYST BP GE 130 - 139MM HG: CPT

## 2024-10-08 PROCEDURE — 2500000003 HC RX 250 WO HCPCS: Performed by: PHYSICAL MEDICINE & REHABILITATION

## 2024-10-08 PROCEDURE — G8399 PT W/DXA RESULTS DOCUMENT: HCPCS

## 2024-10-08 PROCEDURE — 99152 MOD SED SAME PHYS/QHP 5/>YRS: CPT | Performed by: PHYSICAL MEDICINE & REHABILITATION

## 2024-10-08 PROCEDURE — 3044F HG A1C LEVEL LT 7.0%: CPT

## 2024-10-08 PROCEDURE — 1090F PRES/ABSN URINE INCON ASSESS: CPT

## 2024-10-08 PROCEDURE — 1036F TOBACCO NON-USER: CPT

## 2024-10-08 PROCEDURE — 1123F ACP DISCUSS/DSCN MKR DOCD: CPT

## 2024-10-08 PROCEDURE — G8484 FLU IMMUNIZE NO ADMIN: HCPCS

## 2024-10-08 PROCEDURE — 2709999900 HC NON-CHARGEABLE SUPPLY: Performed by: PHYSICAL MEDICINE & REHABILITATION

## 2024-10-08 RX ORDER — VALSARTAN 160 MG/1
240 TABLET ORAL DAILY
Qty: 90 TABLET | Refills: 1 | Status: SHIPPED | OUTPATIENT
Start: 2024-10-08 | End: 2024-10-08 | Stop reason: SDUPTHER

## 2024-10-08 RX ORDER — VALSARTAN 160 MG/1
240 TABLET ORAL DAILY
Qty: 135 TABLET | OUTPATIENT
Start: 2024-10-08

## 2024-10-08 RX ORDER — VALSARTAN 160 MG/1
240 TABLET ORAL DAILY
Qty: 90 TABLET | Refills: 1
Start: 2024-10-08 | End: 2024-10-08 | Stop reason: SDUPTHER

## 2024-10-08 RX ORDER — MIDAZOLAM HYDROCHLORIDE 1 MG/ML
INJECTION INTRAMUSCULAR; INTRAVENOUS
Status: COMPLETED | OUTPATIENT
Start: 2024-10-08 | End: 2024-10-08

## 2024-10-08 RX ORDER — VALSARTAN 160 MG/1
240 TABLET ORAL DAILY
Qty: 135 TABLET | Refills: 1 | Status: SHIPPED | OUTPATIENT
Start: 2024-10-08

## 2024-10-08 RX ORDER — DEXAMETHASONE SODIUM PHOSPHATE 10 MG/ML
INJECTION INTRAMUSCULAR; INTRAVENOUS
Status: COMPLETED | OUTPATIENT
Start: 2024-10-08 | End: 2024-10-08

## 2024-10-08 RX ORDER — LIDOCAINE HYDROCHLORIDE 10 MG/ML
INJECTION, SOLUTION EPIDURAL; INFILTRATION; INTRACAUDAL; PERINEURAL
Status: COMPLETED | OUTPATIENT
Start: 2024-10-08 | End: 2024-10-08

## 2024-10-08 ASSESSMENT — PAIN - FUNCTIONAL ASSESSMENT
PAIN_FUNCTIONAL_ASSESSMENT: 0-10
PAIN_FUNCTIONAL_ASSESSMENT: 0-10

## 2024-10-08 ASSESSMENT — ENCOUNTER SYMPTOMS
ABDOMINAL PAIN: 0
SHORTNESS OF BREATH: 0

## 2024-10-08 ASSESSMENT — PAIN DESCRIPTION - DESCRIPTORS
DESCRIPTORS: PRESSURE
DESCRIPTORS: ACHING

## 2024-10-08 NOTE — PROGRESS NOTES
10/8/2024    This is a 68 y.o. female   Chief Complaint   Patient presents with    Hypertension     Pt has been getting high BP readings at home.    .    HPI    Here with complaints of elevated BP readings at home  140s-160s/80s-100s using an automatic upper arm cuff at home  She reports feeling dizzy, shaky and experiencing blurry vision when her blood pressure is high  She reports that it feels like her heart is pounding/going faster than normal  No headaches, shortness of breath, chest pain or edema  She is currently on valsartan 160 mg tablet daily     Patient Active Problem List   Diagnosis    Type 2 diabetes mellitus with diabetic polyneuropathy, with long-term current use of insulin (HCC)    Obesity    Dystonia    DIPTI (obstructive sleep apnea)    Right hemiparesis (HCC)    Trigger finger, acquired    Primary osteoarthritis of both knees    Mood disorder (HCC)    Calcific tendonitis of right shoulder    Essential hypertension    Mixed hyperlipidemia    History of CVA with residual deficit    Anxiety and depression    Myalgia due to statin    Palpitations    Primary osteoarthritis of right knee    History of colonoscopy - 2023, rpt 2033    Gastroesophageal reflux disease    Aortic atherosclerosis (HCC)    Coronary artery calcification    Right hip pain    DDD (degenerative disc disease), lumbar    Primary osteoarthritis of right hip    Right leg weakness    Right lumbar radiculitis    HNP (herniated nucleus pulposus), lumbar    Cord compression (HCC)       Current Outpatient Medications   Medication Sig Dispense Refill    valsartan (DIOVAN) 160 MG tablet Take 1.5 tablets by mouth daily 90 tablet 1    traZODone (DESYREL) 100 MG tablet Take 1 tablet by mouth nightly 90 tablet 0    DULoxetine (CYMBALTA) 60 MG extended release capsule Take 1 capsule by mouth 2 times daily TAKE ONE CAPSULE OF CYMBALTA  ER 60 MG TWICE DAILY. 180 capsule 0    metFORMIN (GLUCOPHAGE) 500 MG tablet Take 2 tablets by mouth with breakfast

## 2024-10-08 NOTE — PATIENT INSTRUCTIONS
Increase valsartan to 1.5 tablets daily  Get blood work completed today- [address]  Bring your blood pressure cuff with you on October 17th when you come in to see Dr. Finnegan

## 2024-10-08 NOTE — PROGRESS NOTES
C6-7 INTERLAMINAR EPIDURAL STEROID INJECTION WITH FLUOROSCOPY site observed and reported at handoff.    IV discontinued, catheter intact, and dressing applied.    Procedural dressing dry and intact.    Bilateral upper extremities equal in strength.    Discharge instructions reviewed with patient or responsible adult, signed and copy given.  All home medications have been reviewed.  All questions answered and patient or responsible adult verbalized understanding.  PAIN LEVEL AT DISCHARGE __4__

## 2024-10-08 NOTE — H&P
HISTORY AND PHYSICAL/PRE-SEDATION ASSESSMENT    Patient:  John Wood   :  1955  Medical Record No.:  [id number]   Date:  10/8/2024  Physician:  Wong Osuna MD  Facility: [facility]    HISTORY OF PRESENT ILLNESS:                 The patient is a 68 y.o. female whom presents with arm pain. Review of the imaging and physical exam of the patient confirmed the pre-procedure diagnosis.  After a thorough discussion of risks, benefits and alternatives informed consent was obtained.    Diagnosis:  Pre-Op Diagnosis Codes:      * Cervical radiculopathy [M54.12]    Past Medical History:   Past Medical History:   Diagnosis Date    Anesthesia complication     \" shaking\"    Arthritis     Cardiomyopathy (HCC)     COVID-19     2022    Diabetes     GERD (gastroesophageal reflux disease)     Hyperlipidemia     Hypertension     Lumbar disc disease     Prolonged emergence from general anesthesia     Sleep apnea     pt states does use a Cpap machine at night    Unspecified cerebral artery occlusion with cerebral infarction     right side weak    Wears glasses         Past Surgical History:     Past Surgical History:   Procedure Laterality Date    ARTHRODESIS Right 2020    (RIGHT) REMOVAL OF BONE SPURRING AND OSSEOUS PROMINENCE FIRST METATARSAL, ARTHRODESIS OF FIRST METATARSOPHALANGEAL JOINT, BONE MARROW HARVEST AND CONCENTRATION RIGHT TIBIA performed by Noah Fairchild DPM at [facility] OR    CARPAL TUNNEL RELEASE Left 2015    CARPAL TUNNEL RELEASE Right 2015    COLONOSCOPY      FINGER TRIGGER RELEASE Left 2015    middle and ring fingers    FINGER TRIGGER RELEASE Right 2015    middle and ring fingers    FOOT SURGERY Bilateral     litteltoe    HAND SURGERY Right     Ligament    HYSTERECTOMY (CERVIX STATUS UNKNOWN)      KNEE ARTHROPLASTY Right 2021    ROBOTIC ASSISTED TOTAL RIGHT KNEE REPLACEMENT performed by Francisco Javier Dalton MD at [facility] OR    KNEE

## 2024-10-08 NOTE — OP NOTE
PATIENT:  John Wood  AGE:  68 yrs  MEDICAL RECORD #:  [id number]  YOB: 1955     DATE:  10/8/2024  PHYSICIAN: Wong Osuna M.D.     PROCEDURE: C6-7 right paramedian interlaminar epidural steroid injection under fluoroscopy.     PRE-OP DIAGNOSIS:  Neck Pain/Radiculopathy     POST-OP DIAGNOSIS:  same     HISTORY OF PRESENT ILLNESS:  See office notes. Patient has failed previous less-invasive treatments.     ALLERGIES:  Codeine, Vicodin [hydrocodone-acetaminophen], Lipitor [atorvastatin], Oxycontin [oxycodone hcl], Tramadol, and Shellfish-derived products     MEDICATIONS:    No current facility-administered medications for this encounter.        PHYSICAL EXAMINATION:              General:  Awake, alert              Heart:  No audible murmurs, extremities well perfused              Lungs:  No increased WOB or audible wheezing              Extremities:  Normal tone. Warm. No swelling.      Anesthesia: 1 mg Versed and 0 mcg fentanyl    Estimated blood loss: None  Complications: None  Specimen: None    DESCRIPTION OF PROCEDURE:     Components of the procedure were again reviewed with the patient prior to the procedure.  She is aware of risks including infection, bleeding, allergic reaction, and nerve injury.  She had ample opportunity for additional questions.  She elected to proceed with treatment.     The patient was placed in the prone position.  Cardiovascular monitoring was initiated, and vital signs were stable prior to, during, and after the procedure.  Utilizing fluoroscopy, the C6-7 vertebrae were identified.  The area was sterilely prepped and draped. Skin anesthesia was achieved at the entry site using 1-2 cc of Lidocaine 1%. A 22 g 3.5 inch Touhy spinal needle was slowly inserted into the C6-7 interlaminar epidural space using AP, lateral and oblique fluoroscopic imaging and loss of resistance technique. Negative aspiration was confirmed. 1 cc Isovue-M 300 was injected showing contrast

## 2024-10-09 ENCOUNTER — CARE COORDINATION (OUTPATIENT)
Dept: CARE COORDINATION | Age: 69
End: 2024-10-09

## 2024-10-09 NOTE — CARE COORDINATION
Ambulatory Care Coordination Note     10/9/2024 3:39 PM     Patient outreach attempt by this AC today to offer care management services. [facility] was unable to reach the patient by telephone today; left voice message requesting a return phone call to this ACM.     ACM: Urvashi Dill, RN     Care Summary Note: UTRx1 - RPM referral    PCP/Specialist follow up:   Future Appointments         Provider Specialty Dept Phone    10/17/2024 8:20 AM Day, Lavell KIMBLE MD Family Medicine [phone]    10/17/2024 10:00 AM Archana Haskins MD Pulmonology [phone]    10/22/2024 3:30 PM Isiah Adair, APRN - CNP Psychiatry [phone]    9/18/2025 8:00 AM Amna Owens MD Sleep Medicine [phone]            Follow Up:   Plan for next AC outreach in approximately 1 week to complete:  - outreach attempt to offer care management services  - RPM.

## 2024-10-11 DIAGNOSIS — E83.52 HYPERCALCEMIA: Primary | ICD-10-CM

## 2024-10-14 NOTE — TELEPHONE ENCOUNTER
Patient calling stating that she needs a note for work. She is in too much pain and is unable to do her job. She is requesting to be off until she fully heals.      Please call to discuss    [phone] home  [phone] cell No

## 2024-10-17 ENCOUNTER — OFFICE VISIT (OUTPATIENT)
Dept: FAMILY MEDICINE CLINIC | Age: 69
End: 2024-10-17
Payer: MEDICARE

## 2024-10-17 ENCOUNTER — OFFICE VISIT (OUTPATIENT)
Dept: PULMONOLOGY | Age: 69
End: 2024-10-17
Payer: MEDICARE

## 2024-10-17 VITALS
HEART RATE: 80 BPM | DIASTOLIC BLOOD PRESSURE: 105 MMHG | RESPIRATION RATE: 18 BRPM | TEMPERATURE: 97.3 F | SYSTOLIC BLOOD PRESSURE: 180 MMHG | WEIGHT: 190 LBS | HEIGHT: 64 IN | BODY MASS INDEX: 32.44 KG/M2 | OXYGEN SATURATION: 94 %

## 2024-10-17 VITALS
HEART RATE: 90 BPM | SYSTOLIC BLOOD PRESSURE: 150 MMHG | HEIGHT: 64 IN | BODY MASS INDEX: 33.8 KG/M2 | OXYGEN SATURATION: 98 % | DIASTOLIC BLOOD PRESSURE: 84 MMHG | WEIGHT: 198 LBS | RESPIRATION RATE: 16 BRPM

## 2024-10-17 DIAGNOSIS — Z79.4 TYPE 2 DIABETES MELLITUS WITHOUT COMPLICATION, WITH LONG-TERM CURRENT USE OF INSULIN (HCC): ICD-10-CM

## 2024-10-17 DIAGNOSIS — J31.0 CHRONIC RHINITIS: Primary | ICD-10-CM

## 2024-10-17 DIAGNOSIS — I70.0 AORTIC ATHEROSCLEROSIS (HCC): ICD-10-CM

## 2024-10-17 DIAGNOSIS — E11.8 TYPE 2 DIABETES MELLITUS WITH COMPLICATION, WITHOUT LONG-TERM CURRENT USE OF INSULIN (HCC): ICD-10-CM

## 2024-10-17 DIAGNOSIS — E78.2 MIXED HYPERLIPIDEMIA: ICD-10-CM

## 2024-10-17 DIAGNOSIS — I10 ESSENTIAL HYPERTENSION: ICD-10-CM

## 2024-10-17 DIAGNOSIS — I25.10 CORONARY ARTERY CALCIFICATION: ICD-10-CM

## 2024-10-17 DIAGNOSIS — R05.3 CHRONIC COUGH: ICD-10-CM

## 2024-10-17 DIAGNOSIS — E83.52 HYPERCALCEMIA: ICD-10-CM

## 2024-10-17 DIAGNOSIS — E11.42 TYPE 2 DIABETES MELLITUS WITH DIABETIC POLYNEUROPATHY, WITH LONG-TERM CURRENT USE OF INSULIN (HCC): ICD-10-CM

## 2024-10-17 DIAGNOSIS — E11.9 TYPE 2 DIABETES MELLITUS WITHOUT COMPLICATION, WITH LONG-TERM CURRENT USE OF INSULIN (HCC): ICD-10-CM

## 2024-10-17 DIAGNOSIS — K21.9 GASTROESOPHAGEAL REFLUX DISEASE, UNSPECIFIED WHETHER ESOPHAGITIS PRESENT: ICD-10-CM

## 2024-10-17 DIAGNOSIS — Z79.4 TYPE 2 DIABETES MELLITUS WITH DIABETIC POLYNEUROPATHY, WITH LONG-TERM CURRENT USE OF INSULIN (HCC): ICD-10-CM

## 2024-10-17 DIAGNOSIS — Z00.00 MEDICARE ANNUAL WELLNESS VISIT, SUBSEQUENT: Primary | ICD-10-CM

## 2024-10-17 DIAGNOSIS — Z23 NEEDS FLU SHOT: ICD-10-CM

## 2024-10-17 LAB
CALCIUM SERPL-MCNC: 10.8 MG/DL (ref 8.3–10.6)
PTH-INTACT SERPL-MCNC: 69.1 PG/ML (ref 14–72)

## 2024-10-17 PROCEDURE — G8417 CALC BMI ABV UP PARAM F/U: HCPCS | Performed by: INTERNAL MEDICINE

## 2024-10-17 PROCEDURE — 3077F SYST BP >= 140 MM HG: CPT | Performed by: FAMILY MEDICINE

## 2024-10-17 PROCEDURE — G8482 FLU IMMUNIZE ORDER/ADMIN: HCPCS | Performed by: FAMILY MEDICINE

## 2024-10-17 PROCEDURE — 3080F DIAST BP >= 90 MM HG: CPT | Performed by: INTERNAL MEDICINE

## 2024-10-17 PROCEDURE — 1036F TOBACCO NON-USER: CPT | Performed by: INTERNAL MEDICINE

## 2024-10-17 PROCEDURE — 99213 OFFICE O/P EST LOW 20 MIN: CPT | Performed by: INTERNAL MEDICINE

## 2024-10-17 PROCEDURE — 3077F SYST BP >= 140 MM HG: CPT | Performed by: INTERNAL MEDICINE

## 2024-10-17 PROCEDURE — 3017F COLORECTAL CA SCREEN DOC REV: CPT | Performed by: FAMILY MEDICINE

## 2024-10-17 PROCEDURE — 3044F HG A1C LEVEL LT 7.0%: CPT | Performed by: FAMILY MEDICINE

## 2024-10-17 PROCEDURE — 3079F DIAST BP 80-89 MM HG: CPT | Performed by: FAMILY MEDICINE

## 2024-10-17 PROCEDURE — 90653 IIV ADJUVANT VACCINE IM: CPT | Performed by: FAMILY MEDICINE

## 2024-10-17 PROCEDURE — 3017F COLORECTAL CA SCREEN DOC REV: CPT | Performed by: INTERNAL MEDICINE

## 2024-10-17 PROCEDURE — G8399 PT W/DXA RESULTS DOCUMENT: HCPCS | Performed by: INTERNAL MEDICINE

## 2024-10-17 PROCEDURE — G8482 FLU IMMUNIZE ORDER/ADMIN: HCPCS | Performed by: INTERNAL MEDICINE

## 2024-10-17 PROCEDURE — 1090F PRES/ABSN URINE INCON ASSESS: CPT | Performed by: INTERNAL MEDICINE

## 2024-10-17 PROCEDURE — G0439 PPPS, SUBSEQ VISIT: HCPCS | Performed by: FAMILY MEDICINE

## 2024-10-17 PROCEDURE — 1123F ACP DISCUSS/DSCN MKR DOCD: CPT | Performed by: INTERNAL MEDICINE

## 2024-10-17 PROCEDURE — G0008 ADMIN INFLUENZA VIRUS VAC: HCPCS | Performed by: FAMILY MEDICINE

## 2024-10-17 PROCEDURE — G8427 DOCREV CUR MEDS BY ELIG CLIN: HCPCS | Performed by: INTERNAL MEDICINE

## 2024-10-17 PROCEDURE — 1123F ACP DISCUSS/DSCN MKR DOCD: CPT | Performed by: FAMILY MEDICINE

## 2024-10-17 RX ORDER — FLURBIPROFEN SODIUM 0.3 MG/ML
SOLUTION/ DROPS OPHTHALMIC
Qty: 100 EACH | Refills: 5 | Status: SHIPPED | OUTPATIENT
Start: 2024-10-17

## 2024-10-17 RX ORDER — LANCETS 30 GAUGE
1 EACH MISCELLANEOUS DAILY
Qty: 300 EACH | Refills: 5 | Status: SHIPPED | OUTPATIENT
Start: 2024-10-17

## 2024-10-17 RX ORDER — PRAVASTATIN SODIUM 40 MG
40 TABLET ORAL DAILY
Qty: 90 TABLET | Refills: 1 | Status: SHIPPED | OUTPATIENT
Start: 2024-10-17

## 2024-10-17 RX ORDER — DULAGLUTIDE 1.5 MG/.5ML
1.5 INJECTION, SOLUTION SUBCUTANEOUS WEEKLY
Qty: 6 ML | Refills: 1 | Status: SHIPPED | OUTPATIENT
Start: 2024-10-17

## 2024-10-17 RX ORDER — INSULIN GLARGINE 100 [IU]/ML
12 INJECTION, SOLUTION SUBCUTANEOUS NIGHTLY
Qty: 3 ADJUSTABLE DOSE PRE-FILLED PEN SYRINGE | Refills: 1 | Status: SHIPPED | OUTPATIENT
Start: 2024-10-17

## 2024-10-17 RX ORDER — BLOOD SUGAR DIAGNOSTIC
STRIP MISCELLANEOUS
Qty: 300 STRIP | Refills: 4 | Status: SHIPPED | OUTPATIENT
Start: 2024-10-17

## 2024-10-17 ASSESSMENT — PATIENT HEALTH QUESTIONNAIRE - PHQ9
SUM OF ALL RESPONSES TO PHQ QUESTIONS 1-9: 4
SUM OF ALL RESPONSES TO PHQ9 QUESTIONS 1 & 2: 2
1. LITTLE INTEREST OR PLEASURE IN DOING THINGS: SEVERAL DAYS
2. FEELING DOWN, DEPRESSED OR HOPELESS: SEVERAL DAYS
SUM OF ALL RESPONSES TO PHQ QUESTIONS 1-9: 4
7. TROUBLE CONCENTRATING ON THINGS, SUCH AS READING THE NEWSPAPER OR WATCHING TELEVISION: NOT AT ALL
4. FEELING TIRED OR HAVING LITTLE ENERGY: SEVERAL DAYS
5. POOR APPETITE OR OVEREATING: NOT AT ALL
6. FEELING BAD ABOUT YOURSELF - OR THAT YOU ARE A FAILURE OR HAVE LET YOURSELF OR YOUR FAMILY DOWN: NOT AT ALL
9. THOUGHTS THAT YOU WOULD BE BETTER OFF DEAD, OR OF HURTING YOURSELF: NOT AT ALL
8. MOVING OR SPEAKING SO SLOWLY THAT OTHER PEOPLE COULD HAVE NOTICED. OR THE OPPOSITE, BEING SO FIGETY OR RESTLESS THAT YOU HAVE BEEN MOVING AROUND A LOT MORE THAN USUAL: NOT AT ALL
SUM OF ALL RESPONSES TO PHQ QUESTIONS 1-9: 4
3. TROUBLE FALLING OR STAYING ASLEEP: SEVERAL DAYS
10. IF YOU CHECKED OFF ANY PROBLEMS, HOW DIFFICULT HAVE THESE PROBLEMS MADE IT FOR YOU TO DO YOUR WORK, TAKE CARE OF THINGS AT HOME, OR GET ALONG WITH OTHER PEOPLE: NOT DIFFICULT AT ALL
SUM OF ALL RESPONSES TO PHQ QUESTIONS 1-9: 4

## 2024-10-17 ASSESSMENT — LIFESTYLE VARIABLES
HOW OFTEN DO YOU HAVE A DRINK CONTAINING ALCOHOL: NEVER
HOW MANY STANDARD DRINKS CONTAINING ALCOHOL DO YOU HAVE ON A TYPICAL DAY: PATIENT DOES NOT DRINK

## 2024-10-17 NOTE — PROGRESS NOTES
K 4.5 10/08/2024 09:47 AM    K 4.7 09/05/2024 05:41 AM     10/08/2024 09:47 AM    CO2 26 10/08/2024 09:47 AM    CO2 24 09/05/2024 05:41 AM    CO2 26 09/04/2024 04:56 AM    BUN 17 10/08/2024 09:47 AM    CREATININE 0.8 10/08/2024 09:47 AM    GLUCOSE 182 10/08/2024 09:47 AM    CALCIUM 10.7 10/08/2024 09:47 AM       Last ABG  POC Blood Gas: No results found for: \"POCPH\", \"POCPCO2\", \"POCPO2\", \"POCHCO3\", \"NBEA\", \"RAZK4SHX\"  No results for input(s): \"PH\", \"PCO2\", \"PO2\", \"HCO3\", \"BE\", \"O2SAT\" in the last 72 hours.      Radiology Review:  Pertinent images / reports were reviewed as a part of this visit.    CT Chest w/ contrast: No results found for this or any previous visit.      CT Chest w/o contrast: Results for orders placed during the hospital encounter of 08/29/19    CT CHEST WO CONTRAST    Narrative  EXAMINATION:  CT OF THE CHEST WITHOUT CONTRAST 8/29/2019 4:46 pm    TECHNIQUE:  CT of the chest was performed without the administration of intravenous  contrast. Multiplanar reformatted images are provided for review. Dose  modulation, iterative reconstruction, and/or weight based adjustment of the  mA/kV was utilized to reduce the radiation dose to as low as reasonably  achievable.    COMPARISON:  Chest CTPA 10/26/2017    HISTORY:  ORDERING SYSTEM PROVIDED HISTORY: Abnormal CT scan of lung  TECHNOLOGIST PROVIDED HISTORY:  Reason for Exam: family hx of lung ca trouble breathing  Acuity: Acute  Type of Exam: Initial    FINDINGS:  Mediastinum:    1. There is no hilar or mediastinal adenopathy.  There is some limitation  without intravenous contrast.  2. No significant cardiac enlargement or pericardial effusion.There is  minimal coronary calcification, proximal LAD.  3. Vasculature is unremarkable for age on noncontrast imaging.  4. A small hiatal hernia is unchanged.  Lungs/pleura:    1. There is no airspace disease, mass or nodule.  No pleural effusion.  2. Airways are unremarkable.  3. Patchy density is seen

## 2024-10-17 NOTE — PATIENT INSTRUCTIONS
Learning About Emotional Support  When do you need emotional support?     You might find getting support from others helpful when you have a long-term health problem. Often people feel alone, confused, or scared when coping with an illness. But you aren't alone. Other people are going through the same thing you are and know how you feel.  Talking with others about your feelings can help you feel better.  Your family and friends can give you support. So can your doctor, a support group, or a Islam. If you have a support network, you will not feel as alone. You will learn new ways to deal with your situation, and you may try harder to overcome it.  Where you can get support  Family and friends: They can help you cope by giving you comfort and encouragement.  Counseling: Professional counseling can help you cope with situations that interfere with your life and cause stress. Counseling can help you understand and deal with your illness.  Your doctor: Find a doctor you trust and feel comfortable with. Be open and honest about your fears and concerns. Your doctor can help you get the right medical treatments, including counseling.  Spiritual or Temple groups: They can provide comfort and may be able to help you find counseling or other social support services.  Social groups: They can help you meet new people and get involved in activities you enjoy.  Community support groups: In a support group, you can talk to others who have dealt with the same problems or illness as you. You can encourage one another and learn ways to cope with tough emotions.  How can you find a support group?  Finding a support group that works for you may take time. There are many options. Some groups have a  who helps lead discussions or shares information. Others are less formal. Some meet in person, while others meet online.  Try using these resources to help you find the best support group for you.  Your doctor, [facility]

## 2024-10-17 NOTE — PROGRESS NOTES
the next 5-10 years is provided to the patient in written form: see Patient Instructions/AVS.     Return in about 4 months (around 2/17/2025) for dm f/u.     Subjective   The following acute and/or chronic problems were also addressed today:    She lost her sister earlier this year. Dealing with grief and feeling a bit down.    HTN  Valsartan was increased recently from 160mg to 240mg  Home BP readings have been 140s or so this week  BP Readings from Last 3 Encounters:   10/17/24 (!) 180/105   10/17/24 (!) 150/84   10/08/24 126/89     Diabetes  Hemoglobin A1C   Date Value Ref Range Status   08/31/2024 6.9 See comment % Final     Comment:     Comment:  Diagnosis of Diabetes: > or = 6.5%  Increased risk of diabetes (Prediabetes): 5.7-6.4%  Glycemic Control: Nonpregnant Adults: <7.0%                    Pregnant: <6.0%           Patient's complete Health Risk Assessment and screening values have been reviewed and are found in Flowsheets. The following problems were reviewed today and where indicated follow up appointments were made and/or referrals ordered.    Positive Risk Factor Screenings with Interventions:              Self-assessment of health:  In general, how would you say your health is?: (!) Poor    Interventions:  Patient declines any further evaluation or treatment    General HRA Questions:  Select all that apply: (!) New or Increased Pain, New or Increased Fatigue  Interventions - Pain:  Seeing a specialist for this  Interventions Fatigue:  See AVS for additional education material      Inactivity:  On average, how many days per week do you engage in moderate to strenuous exercise (like a brisk walk)?: 0 days (!) Abnormal  On average, how many minutes do you engage in exercise at this level?: 0 min  Interventions:  Discussed increasing walking     Abnormal BMI (obese):  Body mass index is 33.99 kg/m². (!) Abnormal  Interventions:  See AVS for additional education material           Safety:  Do you have any  stated

## 2024-10-21 ENCOUNTER — TELEPHONE (OUTPATIENT)
Dept: FAMILY MEDICINE CLINIC | Age: 69
End: 2024-10-21

## 2024-10-21 ENCOUNTER — CARE COORDINATION (OUTPATIENT)
Dept: CARE COORDINATION | Age: 69
End: 2024-10-21

## 2024-10-21 NOTE — TELEPHONE ENCOUNTER
Please let John know that her calcium level is elevated and her blood work.  She currently taking a calcium supplement or a multivitamin with calcium?  If so, I would recommend discontinuing that.  If not, then we need to repeat calcium in 1 or 2 months.  Let me know and I can put that order in.  Thank you

## 2024-10-21 NOTE — CARE COORDINATION
Ambulatory Care Coordination Note     10/21/2024 2:13 PM     ACM outreach attempt by this ACM today to offer care management services. ACM was unable to reach the patient by telephone today; left voice message requesting a return phone call to this ACM.     ACM: Urvashi Oliveros RN     Care Summary Note: UTRx2 RPM ref    PCP/Specialist follow up:   Future Appointments         Provider Specialty Dept Phone    10/22/2024 3:30 PM Isiah Adair, APRN - [facility] Psychiatry [phone]    9/18/2025 8:00 AM Amna Owens MD Sleep Medicine [phone]    10/27/2025 8:20 AM Archana Haskins MD Pulmonology [phone]            Follow Up:   Plan for next ACM outreach in approximately 1-2 days  to complete:  - outreach attempt to offer care management services  - RPM.

## 2024-10-22 ENCOUNTER — CARE COORDINATION (OUTPATIENT)
Dept: CARE COORDINATION | Age: 69
End: 2024-10-22

## 2024-10-22 ENCOUNTER — OFFICE VISIT (OUTPATIENT)
Dept: PSYCHIATRY | Age: 69
End: 2024-10-22
Payer: MEDICARE

## 2024-10-22 ENCOUNTER — TELEPHONE (OUTPATIENT)
Dept: FAMILY MEDICINE CLINIC | Age: 69
End: 2024-10-22

## 2024-10-22 DIAGNOSIS — E83.52 HYPERCALCEMIA: ICD-10-CM

## 2024-10-22 DIAGNOSIS — F41.9 ANXIETY DISORDER, UNSPECIFIED TYPE: ICD-10-CM

## 2024-10-22 DIAGNOSIS — F34.1 PERSISTENT DEPRESSIVE DISORDER: ICD-10-CM

## 2024-10-22 DIAGNOSIS — G24.01 TARDIVE DYSKINESIA: Primary | ICD-10-CM

## 2024-10-22 DIAGNOSIS — I10 ESSENTIAL HYPERTENSION: Primary | ICD-10-CM

## 2024-10-22 PROCEDURE — 99213 OFFICE O/P EST LOW 20 MIN: CPT | Performed by: REGISTERED NURSE

## 2024-10-22 PROCEDURE — 1090F PRES/ABSN URINE INCON ASSESS: CPT | Performed by: REGISTERED NURSE

## 2024-10-22 PROCEDURE — G8399 PT W/DXA RESULTS DOCUMENT: HCPCS | Performed by: REGISTERED NURSE

## 2024-10-22 PROCEDURE — G8417 CALC BMI ABV UP PARAM F/U: HCPCS | Performed by: REGISTERED NURSE

## 2024-10-22 PROCEDURE — 1036F TOBACCO NON-USER: CPT | Performed by: REGISTERED NURSE

## 2024-10-22 PROCEDURE — G8428 CUR MEDS NOT DOCUMENT: HCPCS | Performed by: REGISTERED NURSE

## 2024-10-22 PROCEDURE — 1123F ACP DISCUSS/DSCN MKR DOCD: CPT | Performed by: REGISTERED NURSE

## 2024-10-22 PROCEDURE — G8482 FLU IMMUNIZE ORDER/ADMIN: HCPCS | Performed by: REGISTERED NURSE

## 2024-10-22 PROCEDURE — 3017F COLORECTAL CA SCREEN DOC REV: CPT | Performed by: REGISTERED NURSE

## 2024-10-22 RX ORDER — TRAZODONE HYDROCHLORIDE 100 MG/1
100 TABLET ORAL NIGHTLY
Qty: 90 TABLET | Refills: 0 | Status: SHIPPED | OUTPATIENT
Start: 2024-10-22

## 2024-10-22 RX ORDER — VALSARTAN AND HYDROCHLOROTHIAZIDE 320; 12.5 MG/1; MG/1
1 TABLET, FILM COATED ORAL DAILY
Qty: 90 TABLET | Refills: 1 | Status: SHIPPED | OUTPATIENT
Start: 2024-10-22

## 2024-10-22 RX ORDER — DULOXETIN HYDROCHLORIDE 60 MG/1
60 CAPSULE, DELAYED RELEASE ORAL 2 TIMES DAILY
Qty: 180 CAPSULE | Refills: 0 | Status: SHIPPED | OUTPATIENT
Start: 2024-10-22 | End: 2025-01-20

## 2024-10-22 ASSESSMENT — PATIENT HEALTH QUESTIONNAIRE - PHQ9
SUM OF ALL RESPONSES TO PHQ QUESTIONS 1-9: 16
2. FEELING DOWN, DEPRESSED OR HOPELESS: SEVERAL DAYS
4. FEELING TIRED OR HAVING LITTLE ENERGY: MORE THAN HALF THE DAYS
5. POOR APPETITE OR OVEREATING: NEARLY EVERY DAY
SUM OF ALL RESPONSES TO PHQ QUESTIONS 1-9: 16
9. THOUGHTS THAT YOU WOULD BE BETTER OFF DEAD, OR OF HURTING YOURSELF: NOT AT ALL
SUM OF ALL RESPONSES TO PHQ QUESTIONS 1-9: 16
8. MOVING OR SPEAKING SO SLOWLY THAT OTHER PEOPLE COULD HAVE NOTICED. OR THE OPPOSITE, BEING SO FIGETY OR RESTLESS THAT YOU HAVE BEEN MOVING AROUND A LOT MORE THAN USUAL: MORE THAN HALF THE DAYS
SUM OF ALL RESPONSES TO PHQ9 QUESTIONS 1 & 2: 2
SUM OF ALL RESPONSES TO PHQ QUESTIONS 1-9: 16
6. FEELING BAD ABOUT YOURSELF - OR THAT YOU ARE A FAILURE OR HAVE LET YOURSELF OR YOUR FAMILY DOWN: MORE THAN HALF THE DAYS
7. TROUBLE CONCENTRATING ON THINGS, SUCH AS READING THE NEWSPAPER OR WATCHING TELEVISION: MORE THAN HALF THE DAYS
1. LITTLE INTEREST OR PLEASURE IN DOING THINGS: SEVERAL DAYS
3. TROUBLE FALLING OR STAYING ASLEEP: NEARLY EVERY DAY

## 2024-10-22 ASSESSMENT — ANXIETY QUESTIONNAIRES
1. FEELING NERVOUS, ANXIOUS, OR ON EDGE: SEVERAL DAYS
6. BECOMING EASILY ANNOYED OR IRRITABLE: NEARLY EVERY DAY
3. WORRYING TOO MUCH ABOUT DIFFERENT THINGS: SEVERAL DAYS
7. FEELING AFRAID AS IF SOMETHING AWFUL MIGHT HAPPEN: NEARLY EVERY DAY
2. NOT BEING ABLE TO STOP OR CONTROL WORRYING: SEVERAL DAYS
5. BEING SO RESTLESS THAT IT IS HARD TO SIT STILL: SEVERAL DAYS
4. TROUBLE RELAXING: SEVERAL DAYS
GAD7 TOTAL SCORE: 11

## 2024-10-22 NOTE — PROGRESS NOTES
126/89     Pulse Readings from Last 3 Encounters:   10/17/24 80   10/17/24 90   10/08/24 81     Lab Results   Component Value Date/Time    LABA1C 6.9 08/31/2024 05:18 AM    LABA1C 6.6 08/30/2024 03:19 PM    LABA1C 7.1 04/01/2024 06:25 AM     Cholesterol, Total (mg/dL)   Date Value   08/31/2024 221 (H)   09/10/2022 189   02/21/2020 145      No components found for: \"TSHREFLEX\"  Lab Results   Component Value Date/Time    TSH 3.10 10/08/2024 09:47 AM    TSH 3.97 12/11/2017 08:50 AM    TSH 3.37 08/23/2016 02:13 PM    TSH 4.17 02/02/2015 05:00 PM    TSH 2.31 07/16/2013 02:37 PM           7/23/2024     5:08 PM 4/23/2024     1:01 PM   REYNA 7 SCORE   REYNA-7 Total Score 18 15     Interpretation of REYNA-7 score: 5-9 = mild anxiety, 10-14 = moderate anxiety,   15+ = severe anxiety. Recommend referral to behavioral health for scores 10 or greater.        10/17/2024     8:11 AM 7/23/2024     5:07 PM 4/23/2024     1:01 PM 2/8/2024     4:24 PM 7/6/2023     8:02 AM 1/13/2023     8:21 AM 12/20/2022     1:40 PM   PHQ Scores   PHQ2 Score 2 5 3 0 2  1   PHQ9 Score 4 17 13 0 4 0 7       Interpretation of PHQ-9 score:  1-4 = minimal depression, 5-9 = mild depression,   10-14 = moderate depression; 15-19 = moderately severe depression, 20-27 = severe depression    Aims: 0  Labs: Up to date    Mental Status Exam:   Appearance    alert, cooperative  Motor: uses a cane for support when walking   Speech    slow  Mood/Affect    Anxious / anxiety  Thought Process    linear, goal directed, and coherent  Thought Content    intact , no suicidal ideation  Associations    logical connections  Attention/Concentration    intact  Memory    recent and remote memory intact  Insight/Judgement    Fair / Intact    Safety: 911, 988, PES, hotlines, and interventions discussed today.   Risk factors:  anxiety, depression, age > 55 yrs old   Protective factors: Denies current or recent active suicidal ideation, does not have lethal plan, patient is dacia for

## 2024-10-22 NOTE — CARE COORDINATION
Ambulatory Care Coordination Note     10/22/2024 4:09 PM     ACM outreach attempt by this ACM today to offer care management services. ACM was unable to reach the patient by telephone today; left voice message requesting a return phone call to this ACM.     ACM: Urvashi Oliveros RN     Care Summary Note: PCP ref RPM UTRx2    PCP/Specialist follow up:   Future Appointments         Provider Specialty Dept Phone    9/18/2025 8:00 AM Amna Owens MD Sleep Medicine [phone]    10/27/2025 8:20 AM Archana Haskins MD Pulmonology [phone]            Follow Up:   Plan for next ACM outreach in approximately 1-2 days  to complete:  - outreach attempt to offer care management services  - RPM.

## 2024-10-22 NOTE — TELEPHONE ENCOUNTER
New rx sent in to replace current valsartan (higher dose). Please ask if Sidia can check blood work after about one week of new valsartan dose. Be sure to stop old valsartan dose

## 2024-10-22 NOTE — TELEPHONE ENCOUNTER
Please ask John how her BP has been running since our most recent office appt.  I may need to send in a higher dose of her BP med depending on what she's been seeing.   If having difficulty with home checks, can come in for a nurse visit this week for a BP check. Thank you

## 2024-10-23 ENCOUNTER — CARE COORDINATION (OUTPATIENT)
Dept: CARE COORDINATION | Age: 69
End: 2024-10-23

## 2024-10-23 NOTE — CARE COORDINATION
Ambulatory Care Coordination Note     10/23/2024 10:56 AM     ACM outreach attempt by this ACM today to offer care management services. ACM was unable to reach the patient by telephone today; left voice message requesting a return phone call to this ACM.     ACM: Urvashi Oliveros RN     Care Summary Note: utrx3    PCP/Specialist follow up:   Future Appointments         Provider Specialty Dept Phone    2/11/2025 9:30 AM Isiah Adair, APRN - [facility] Psychiatry [phone]    9/18/2025 8:00 AM Amna Owens MD Sleep Medicine [phone]    10/27/2025 8:20 AM Archana Haskins MD Pulmonology [phone]            Follow Up:   Plan for next ACM outreach in approximately 1 week to complete:  - outreach attempt to offer care management services  - RPM.

## 2024-10-30 DIAGNOSIS — I10 ESSENTIAL HYPERTENSION: ICD-10-CM

## 2024-10-30 RX ORDER — VALSARTAN AND HYDROCHLOROTHIAZIDE 320; 12.5 MG/1; MG/1
1 TABLET, FILM COATED ORAL DAILY
Qty: 90 TABLET | Refills: 1 | Status: SHIPPED | OUTPATIENT
Start: 2024-10-30

## 2024-11-01 ENCOUNTER — TELEPHONE (OUTPATIENT)
Dept: FAMILY MEDICINE CLINIC | Age: 69
End: 2024-11-01

## 2024-11-01 NOTE — TELEPHONE ENCOUNTER
Pharmacy said it's too early to fill it, she has to wait till the 5th then she can  a 90 day supply please tell pt if she calls back in

## 2024-11-01 NOTE — TELEPHONE ENCOUNTER
Pt called in wanted to speak to Staci in regards to her medication she said the pharmacy did not received a order for her 90 day supply of medication called naima

## 2024-11-04 ENCOUNTER — CARE COORDINATION (OUTPATIENT)
Dept: CARE COORDINATION | Age: 69
End: 2024-11-04

## 2024-11-04 NOTE — CARE COORDINATION
Ambulatory Care Coordination Note     11/4/2024 2:13 PM     ACM outreach attempt by this ACM today to offer care management services. ACM was unable to reach the patient by telephone today; left voice message requesting a return phone call to this ACM.     ACM: Urvashi Oliveros RN     Care Summary Note: UTRx4, RPM ref by PCP    PCP/Specialist follow up:   Future Appointments         Provider Specialty Dept Phone    2/11/2025 9:30 AM Isiah Adair, APRN - [facility] Psychiatry [phone]    9/18/2025 8:00 AM Amna Owens MD Sleep Medicine [phone]    10/27/2025 8:20 AM Archana Haskins MD Pulmonology [phone]            Follow Up:   Plan for next ACM outreach in approximately 1 week to complete:  - outreach attempt to offer care management services  - RPM.

## 2024-11-13 ENCOUNTER — CARE COORDINATION (OUTPATIENT)
Dept: CARE COORDINATION | Age: 69
End: 2024-11-13

## 2024-11-13 NOTE — CARE COORDINATION
Ambulatory Care Coordination Note     11/13/2024 2:16 PM     patient outreach attempt by this ACM today to offer care management services. ACM was unable to reach the patient by telephone today;   left voice message requesting a return phone call to this ACM.     Patient closed (unable to reach patient) from the High Risk Care Management program on 11/13/2024.  Care management goals have been completed. No further Ambulatory Care Manager follow up scheduled.

## 2025-02-05 ENCOUNTER — TELEPHONE (OUTPATIENT)
Dept: FAMILY MEDICINE CLINIC | Age: 70
End: 2025-02-05

## 2025-02-05 NOTE — TELEPHONE ENCOUNTER
Calling in, stated that pt has had dizziness, nausea, hard to sleep x1 month off and on cramping in legs now. Pt is currently of out [place] and is flying back in [facility].   DOP will notify pt of appt to see if she can come in.    Please advise if pt should be seen sooner than 2/6/2025 at 4:00 pm

## 2025-02-06 ENCOUNTER — OFFICE VISIT (OUTPATIENT)
Dept: FAMILY MEDICINE CLINIC | Age: 70
End: 2025-02-06
Payer: MEDICAID

## 2025-02-06 VITALS
OXYGEN SATURATION: 98 % | WEIGHT: 198 LBS | RESPIRATION RATE: 12 BRPM | DIASTOLIC BLOOD PRESSURE: 80 MMHG | BODY MASS INDEX: 33.99 KG/M2 | HEART RATE: 97 BPM | SYSTOLIC BLOOD PRESSURE: 128 MMHG

## 2025-02-06 DIAGNOSIS — R10.13 EPIGASTRIC DISCOMFORT: Primary | ICD-10-CM

## 2025-02-06 DIAGNOSIS — G44.229 CHRONIC TENSION-TYPE HEADACHE, NOT INTRACTABLE: ICD-10-CM

## 2025-02-06 PROCEDURE — 99214 OFFICE O/P EST MOD 30 MIN: CPT | Performed by: FAMILY MEDICINE

## 2025-02-06 PROCEDURE — 1123F ACP DISCUSS/DSCN MKR DOCD: CPT | Performed by: FAMILY MEDICINE

## 2025-02-06 PROCEDURE — 3074F SYST BP LT 130 MM HG: CPT | Performed by: FAMILY MEDICINE

## 2025-02-06 PROCEDURE — G2211 COMPLEX E/M VISIT ADD ON: HCPCS | Performed by: FAMILY MEDICINE

## 2025-02-06 PROCEDURE — 3079F DIAST BP 80-89 MM HG: CPT | Performed by: FAMILY MEDICINE

## 2025-02-06 ASSESSMENT — ENCOUNTER SYMPTOMS
NAUSEA: 1
COUGH: 0
VOMITING: 1
CONSTIPATION: 0
DIARRHEA: 0

## 2025-02-06 NOTE — PATIENT INSTRUCTIONS
Try to avoid NSAIDs (ibuprofen, Aleve, Advil, naproxen)  Take Tylenol only (acetaminophen) for pain

## 2025-02-06 NOTE — PROGRESS NOTES
John Wood (:  1955) is a 69 y.o. female,Established patient, here for evaluation of the following chief complaint(s):  Headache (Headache for 1 week, not improving with Tylenol) and Nausea (Was in [place] 3 months, started feeling nauseous and having stomach pain for 1 week)         Assessment & Plan  Epigastric discomfort    This is likely due to NSAID use.  I advised to use Tylenol only.  She reports having an EGD last year, she was unaware that Advil was an NSAID.  We will continue her PPI.  I would like to rule out gallbladder pathology given her tenderness.  Notify us if symptoms worsen or fail to improve    Orders:    US ABDOMEN LIMITED; Future    Chronic tension-type headache, not intractable    Multifactorial.  She does have significant cervical spine disease for which she is following up with neurosurgery.  She will stop Advil as above and use Tylenol.  We also discussed local therapies such as massage and heat           No follow-ups on file.       Subjective   HPI  Patient presents with headache for two months. They are constant. She feels them across her head and also reports neck pain that radiates down her shoulder. When she is having headaches she has trouble sleeping. Associated symptoms include droopy eyes, as though she is tired. She also reports nausea and dizziness with the headaches. No vision changes with headaches.   - taking Advil regularly for headaches, usually 400mg BID    She also reports nausea after eating, to the point where she feels like passing out. She reports occasional vomiting. Pain is mostly epigastric. She has a history of GERD for which she takes omeprazole, she says this doesn't help significantly.     Review of Systems   Constitutional:  Negative for fever.   Respiratory:  Negative for cough.    Gastrointestinal:  Positive for nausea and vomiting. Negative for constipation and diarrhea.   Neurological:  Positive for dizziness and headaches.

## 2025-02-08 DIAGNOSIS — J31.0 CHRONIC RHINITIS: ICD-10-CM

## 2025-02-09 NOTE — H&P
The patient was interviewed and examined and there have been no changes since the documented History and Physical.  I have presented reasonable alternatives to the patient's proposed care, treatment and services. The discussion I have done encompassed risks, benefits and side effects related to the alternatives and the risks related to not receiving the proposed care, treatment and services.     Electronically signed by Neelima Redmond MD on 2/2/2022 at 12:14 PM No

## 2025-02-10 RX ORDER — FLUTICASONE PROPIONATE 50 MCG
2 SPRAY, SUSPENSION (ML) NASAL DAILY
Qty: 48 G | OUTPATIENT
Start: 2025-02-10

## 2025-02-10 NOTE — TELEPHONE ENCOUNTER
Last appt: 10/17/2024    Next appt: Visit date not found    Medication matches medication on Epic list

## 2025-02-11 ENCOUNTER — OFFICE VISIT (OUTPATIENT)
Dept: PSYCHIATRY | Age: 70
End: 2025-02-11
Payer: MEDICARE

## 2025-02-11 ENCOUNTER — HOSPITAL ENCOUNTER (OUTPATIENT)
Dept: ULTRASOUND IMAGING | Age: 70
Discharge: HOME | End: 2025-02-11
Attending: FAMILY MEDICINE
Payer: MEDICARE

## 2025-02-11 DIAGNOSIS — F34.1 PERSISTENT DEPRESSIVE DISORDER: ICD-10-CM

## 2025-02-11 DIAGNOSIS — G24.01 TARDIVE DYSKINESIA: Primary | ICD-10-CM

## 2025-02-11 DIAGNOSIS — R10.13 EPIGASTRIC DISCOMFORT: ICD-10-CM

## 2025-02-11 DIAGNOSIS — F41.9 ANXIETY DISORDER, UNSPECIFIED TYPE: ICD-10-CM

## 2025-02-11 PROCEDURE — 3017F COLORECTAL CA SCREEN DOC REV: CPT | Performed by: REGISTERED NURSE

## 2025-02-11 PROCEDURE — G8399 PT W/DXA RESULTS DOCUMENT: HCPCS | Performed by: REGISTERED NURSE

## 2025-02-11 PROCEDURE — 1090F PRES/ABSN URINE INCON ASSESS: CPT | Performed by: REGISTERED NURSE

## 2025-02-11 PROCEDURE — 99213 OFFICE O/P EST LOW 20 MIN: CPT | Performed by: REGISTERED NURSE

## 2025-02-11 PROCEDURE — 76705 ECHO EXAM OF ABDOMEN: CPT

## 2025-02-11 PROCEDURE — 1036F TOBACCO NON-USER: CPT | Performed by: REGISTERED NURSE

## 2025-02-11 PROCEDURE — G8417 CALC BMI ABV UP PARAM F/U: HCPCS | Performed by: REGISTERED NURSE

## 2025-02-11 PROCEDURE — G8428 CUR MEDS NOT DOCUMENT: HCPCS | Performed by: REGISTERED NURSE

## 2025-02-11 PROCEDURE — 1123F ACP DISCUSS/DSCN MKR DOCD: CPT | Performed by: REGISTERED NURSE

## 2025-02-11 RX ORDER — AMANTADINE HYDROCHLORIDE 100 MG/1
100 TABLET ORAL 2 TIMES DAILY
Qty: 180 TABLET | Refills: 0 | Status: SHIPPED | OUTPATIENT
Start: 2025-02-11 | End: 2025-05-12

## 2025-02-11 RX ORDER — TRAZODONE HYDROCHLORIDE 50 MG/1
100 TABLET, FILM COATED ORAL NIGHTLY
Qty: 90 TABLET | Refills: 1 | Status: SHIPPED | OUTPATIENT
Start: 2025-02-11

## 2025-02-11 ASSESSMENT — ANXIETY QUESTIONNAIRES
5. BEING SO RESTLESS THAT IT IS HARD TO SIT STILL: NEARLY EVERY DAY
2. NOT BEING ABLE TO STOP OR CONTROL WORRYING: NEARLY EVERY DAY
6. BECOMING EASILY ANNOYED OR IRRITABLE: NEARLY EVERY DAY
GAD7 TOTAL SCORE: 21
7. FEELING AFRAID AS IF SOMETHING AWFUL MIGHT HAPPEN: NEARLY EVERY DAY
1. FEELING NERVOUS, ANXIOUS, OR ON EDGE: NEARLY EVERY DAY
3. WORRYING TOO MUCH ABOUT DIFFERENT THINGS: NEARLY EVERY DAY
4. TROUBLE RELAXING: NEARLY EVERY DAY

## 2025-02-11 ASSESSMENT — PATIENT HEALTH QUESTIONNAIRE - PHQ9
2. FEELING DOWN, DEPRESSED OR HOPELESS: MORE THAN HALF THE DAYS
9. THOUGHTS THAT YOU WOULD BE BETTER OFF DEAD, OR OF HURTING YOURSELF: NOT AT ALL
7. TROUBLE CONCENTRATING ON THINGS, SUCH AS READING THE NEWSPAPER OR WATCHING TELEVISION: NEARLY EVERY DAY
6. FEELING BAD ABOUT YOURSELF - OR THAT YOU ARE A FAILURE OR HAVE LET YOURSELF OR YOUR FAMILY DOWN: MORE THAN HALF THE DAYS
SUM OF ALL RESPONSES TO PHQ QUESTIONS 1-9: 17
4. FEELING TIRED OR HAVING LITTLE ENERGY: NEARLY EVERY DAY
3. TROUBLE FALLING OR STAYING ASLEEP: SEVERAL DAYS
SUM OF ALL RESPONSES TO PHQ QUESTIONS 1-9: 17
5. POOR APPETITE OR OVEREATING: NEARLY EVERY DAY
SUM OF ALL RESPONSES TO PHQ9 QUESTIONS 1 & 2: 4
8. MOVING OR SPEAKING SO SLOWLY THAT OTHER PEOPLE COULD HAVE NOTICED. OR THE OPPOSITE, BEING SO FIGETY OR RESTLESS THAT YOU HAVE BEEN MOVING AROUND A LOT MORE THAN USUAL: SEVERAL DAYS
SUM OF ALL RESPONSES TO PHQ QUESTIONS 1-9: 17
SUM OF ALL RESPONSES TO PHQ QUESTIONS 1-9: 17
1. LITTLE INTEREST OR PLEASURE IN DOING THINGS: MORE THAN HALF THE DAYS

## 2025-02-11 NOTE — PROGRESS NOTES
PSYCHIATRY OUT PATIENT FOLLOW UP    Patient name: John Wood  : 1955  Date of service: 25  PCP: Lavell Finnegan MD    Dx:  1. Tardive dyskinesia  2. Anxiety disorder, unspecified type  3. Persistent depressive disorder    Assessment and plan    Psychiatric   -d/c  Cymbalta  60 mg BID. She has been off for months. Reports excessive sweating. Noted elevated bp.   - decrease Trazodone 100 mg nightly > 50 mg nightly ( having dry mouth )   - Start Amantadine/Symmetrel 100 mg BID. Advised to take 100 mg daily for a week then BID.   -Start Melatonin OTC 3-6 mg nightly for sleep aid.   - discussed holistic approaches and coping skills   - Discussed holistic approaches and coping skills to MH symptoms management.   -Medication R/B/SE discussed and patient gave verbal consent for tx.  -Practice complementary health approaches such as: self-management strategies, relaxation techniques, yoga, and physical exercise as tolerated.   2. Safety  -NO Imminent risk of danger to self/others based on today's assessment. Patient is appropriate for outpatient level of care.  Safety plan includes: 988, 911, PES, hotlines, and interventions discussed today.   3.  Psychotherapy  - Discussed coping skills   4.  Substance   - no reported substance abuse   5. Medical   - Follow with PCP  6. RTC in 3 months or earlier if your symptoms fail to improve or go to nearest ER if having active SI/HI.   Evaluated medications and assessed for side effects and effectiveness. Assessed patient's educational needs including reviewing plan of care, medications,diagnosis, treatment options, and prognosis. I reviewed the plan of care with the patient and the patient consented to the treatment interventions. Patient acknowledged, verbalized understanding, and agreed with plan of care.    Interval progress:   2025 Reports she has been having lots of nausea and abd discomfort. Had CT of abd done this morning. She went to [place] to

## 2025-02-19 ENCOUNTER — TELEPHONE (OUTPATIENT)
Dept: FAMILY MEDICINE CLINIC | Age: 70
End: 2025-02-19

## 2025-02-19 NOTE — TELEPHONE ENCOUNTER
Spoke to patient and gave results and instructions. She is still having nausea but no vomiting.  She would like suggestions.

## 2025-02-19 NOTE — TELEPHONE ENCOUNTER
Is currently still having nausea, I would ask if she is taking Trulicity and what her current doses.  This can be as common side effect of Trulicity and we can decrease the dose or discontinue the medicine if needed.

## 2025-02-20 NOTE — TELEPHONE ENCOUNTER
Pt given message and agrees with plan  Will call back in a few days w update  She is feeling a little better today but not much

## 2025-02-20 NOTE — TELEPHONE ENCOUNTER
I would have John decrease metformin to 1 pill twice daily instead of 2 pills twice daily to help with the nausea

## 2025-03-03 DIAGNOSIS — J31.0 CHRONIC RHINITIS: ICD-10-CM

## 2025-03-03 RX ORDER — AMANTADINE HYDROCHLORIDE 100 MG/1
100 TABLET ORAL 2 TIMES DAILY
Qty: 180 TABLET | Refills: 0 | OUTPATIENT
Start: 2025-03-03 | End: 2025-06-01

## 2025-03-03 RX ORDER — FLUTICASONE PROPIONATE 50 MCG
2 SPRAY, SUSPENSION (ML) NASAL DAILY
Qty: 3 EACH | Refills: 5 | Status: SHIPPED | OUTPATIENT
Start: 2025-03-03

## 2025-03-06 RX ORDER — AMANTADINE HYDROCHLORIDE 100 MG/1
100 TABLET ORAL 2 TIMES DAILY
Qty: 180 TABLET | Refills: 0 | OUTPATIENT
Start: 2025-03-06 | End: 2025-06-04

## 2025-03-12 RX ORDER — TRAZODONE HYDROCHLORIDE 50 MG/1
100 TABLET ORAL NIGHTLY
Qty: 90 TABLET | Refills: 1 | OUTPATIENT
Start: 2025-03-12

## 2025-03-12 RX ORDER — AMANTADINE HYDROCHLORIDE 100 MG/1
100 TABLET ORAL 2 TIMES DAILY
Qty: 180 TABLET | Refills: 0 | OUTPATIENT
Start: 2025-03-12 | End: 2025-06-10

## 2025-03-14 ENCOUNTER — HOSPITAL ENCOUNTER (OUTPATIENT)
Dept: WOMENS IMAGING | Age: 70
Discharge: HOME | End: 2025-03-14
Payer: MEDICARE

## 2025-03-14 ENCOUNTER — RESULTS FOLLOW-UP (OUTPATIENT)
Dept: WOMENS IMAGING | Age: 70
End: 2025-03-14

## 2025-03-14 VITALS — HEIGHT: 64 IN | WEIGHT: 189 LBS | BODY MASS INDEX: 32.27 KG/M2

## 2025-03-14 DIAGNOSIS — Z12.31 VISIT FOR SCREENING MAMMOGRAM: ICD-10-CM

## 2025-03-14 PROCEDURE — 77063 BREAST TOMOSYNTHESIS BI: CPT

## 2025-03-14 RX ORDER — TRAZODONE HYDROCHLORIDE 50 MG/1
TABLET ORAL
Qty: 90 TABLET | Refills: 11 | OUTPATIENT
Start: 2025-03-14

## 2025-03-18 ENCOUNTER — TELEPHONE (OUTPATIENT)
Dept: CARDIOLOGY CLINIC | Age: 70
End: 2025-03-18

## 2025-03-18 NOTE — TELEPHONE ENCOUNTER
Patient came into the office inquiring about getting an appt for a cardiac clearance.  She said her RT hip surgery date was April 21st but I couldn't get her schedule to see Enoch until April 30th.  Is there anyway she can get into see Enoch sooner?  Call back [phone]

## 2025-03-20 DIAGNOSIS — Z79.4 TYPE 2 DIABETES MELLITUS WITHOUT COMPLICATION, WITH LONG-TERM CURRENT USE OF INSULIN: ICD-10-CM

## 2025-03-20 DIAGNOSIS — E11.9 TYPE 2 DIABETES MELLITUS WITHOUT COMPLICATION, WITH LONG-TERM CURRENT USE OF INSULIN: ICD-10-CM

## 2025-03-25 ENCOUNTER — OFFICE VISIT (OUTPATIENT)
Dept: PULMONOLOGY | Age: 70
End: 2025-03-25
Payer: MEDICARE

## 2025-03-25 VITALS
RESPIRATION RATE: 18 BRPM | HEART RATE: 85 BPM | SYSTOLIC BLOOD PRESSURE: 120 MMHG | TEMPERATURE: 96.4 F | WEIGHT: 193.6 LBS | DIASTOLIC BLOOD PRESSURE: 62 MMHG | BODY MASS INDEX: 33.05 KG/M2 | OXYGEN SATURATION: 97 % | HEIGHT: 64 IN

## 2025-03-25 DIAGNOSIS — I10 ESSENTIAL HYPERTENSION: ICD-10-CM

## 2025-03-25 DIAGNOSIS — R05.3 CHRONIC COUGH: ICD-10-CM

## 2025-03-25 DIAGNOSIS — J31.0 CHRONIC RHINITIS: ICD-10-CM

## 2025-03-25 DIAGNOSIS — K21.9 GASTROESOPHAGEAL REFLUX DISEASE, UNSPECIFIED WHETHER ESOPHAGITIS PRESENT: Primary | ICD-10-CM

## 2025-03-25 PROCEDURE — G2211 COMPLEX E/M VISIT ADD ON: HCPCS | Performed by: INTERNAL MEDICINE

## 2025-03-25 PROCEDURE — 99214 OFFICE O/P EST MOD 30 MIN: CPT | Performed by: INTERNAL MEDICINE

## 2025-03-25 PROCEDURE — 3078F DIAST BP <80 MM HG: CPT | Performed by: INTERNAL MEDICINE

## 2025-03-25 PROCEDURE — 1159F MED LIST DOCD IN RCRD: CPT | Performed by: INTERNAL MEDICINE

## 2025-03-25 PROCEDURE — 3074F SYST BP LT 130 MM HG: CPT | Performed by: INTERNAL MEDICINE

## 2025-03-25 PROCEDURE — 1123F ACP DISCUSS/DSCN MKR DOCD: CPT | Performed by: INTERNAL MEDICINE

## 2025-03-25 RX ORDER — BLOOD SUGAR DIAGNOSTIC
STRIP MISCELLANEOUS
Qty: 300 STRIP | Refills: 4 | Status: SHIPPED | OUTPATIENT
Start: 2025-03-25

## 2025-03-25 RX ORDER — VALSARTAN AND HYDROCHLOROTHIAZIDE 320; 12.5 MG/1; MG/1
1 TABLET, FILM COATED ORAL DAILY
Qty: 90 TABLET | Refills: 1 | Status: SHIPPED | OUTPATIENT
Start: 2025-03-25

## 2025-03-25 RX ORDER — PANTOPRAZOLE SODIUM 40 MG/1
40 TABLET, DELAYED RELEASE ORAL
Qty: 90 TABLET | Refills: 3 | Status: SHIPPED | OUTPATIENT
Start: 2025-03-25

## 2025-03-25 NOTE — PROGRESS NOTES
Pulmonary Progress note             REASON FOR CONSULTATION:  Chief Complaint   Patient presents with    Other     Pulmonary clearance for surgery RTH        Consult at request of Lavell Finnegan MD     PCP: Lavell Finnegan MD        Assessment and Plan:   Diagnosis Orders   1. Gastroesophageal reflux disease, unspecified whether esophagitis present  pantoprazole (PROTONIX) 40 MG tablet      2. Chronic rhinitis        3. Chronic cough                    Plan:  Flonase for chronic rhinitis and postnasal drainage.  Continue strict antireflux precautions and PPI she was switched to Protonix from omeprazole due to lack of effectiveness.  Advised to exercise regularly            HISTORY OF PRESENT ILLNESS: John Wood is very pleasant 69 y.o. year old lady with medical history stated below significant for former smoker quit long time ago, history of diabetes mellitus type 2, hypertension, obstructive sleep apnea on BiPAP 15/11 therapy compliant,  Was referred to us for shortness of breath with any exertion, associated with some chest tightness that increases with breathing no associated wheezing chronic cough or sputum production, no prior history of asthma or COPD.    She had a CT of the chest done 9/9/2022 that showed no acute PE, minimal subsegmental atelectasis medially at the bases.  There are mild calcifications in the coronaries.  Most recent echocardiogram 2019 with normal ejection fraction and normal diastolic function as well as valvular evaluation    Stress test done May 2021 was negative    PFT with no active airflow obstruction.  There is mild reduction in DLCO.    Complaining of active GERD symptoms she has not taken omeprazole anymore because it was not effective according to the patient          Past Medical History:   Diagnosis Date    Anesthesia complication     \" shaking\"    Arthritis     Cardiomyopathy (HCC)     COVID-19     1/7/2022    Diabetes

## 2025-03-25 NOTE — PROGRESS NOTES
10.8 (H) 10/17/2024    BILITOT <0.2 10/08/2024    ALKPHOS 79 10/08/2024    AST 22 10/08/2024    ALT 16 10/08/2024    LABGLOM 79 10/08/2024    GFRAA >60 09/22/2022    AGRATIO 1.8 10/08/2024    GLOB 3.6 09/25/2021         Lab Results   Component Value Date    CHOL 221 (H) 08/31/2024    CHOL 189 09/10/2022    CHOL 145 02/21/2020     Lab Results   Component Value Date    TRIG 140 08/31/2024    TRIG 149 09/10/2022    TRIG 92 02/21/2020     Lab Results   Component Value Date    HDL 68 (H) 08/31/2024    HDL 55 09/10/2022    HDL 57 02/21/2020     No components found for: \"LDLCHOLESTEROL\", \"LDLCALC\"    Lab Results   Component Value Date    VLDL 28 08/31/2024    VLDL 30 09/10/2022    VLDL 18 02/21/2020     No results found for: \"CHOLHDLRATIO\"    Lab Results   Component Value Date    INR 0.82 (L) 08/30/2024    INR 0.90 07/12/2021    INR 0.94 10/17/2019    PROTIME 11.6 (L) 08/30/2024    PROTIME 10.1 07/12/2021    PROTIME 10.7 10/17/2019       The ASCVD Risk score (Darío DK, et al., 2019) failed to calculate for the following reasons:    Risk score cannot be calculated because patient has a medical history suggesting prior/existing ASCVD      Imaging:       Last ECG (if available):  NSR, NSST changes     Last Monitor/Holter    Last Stress (if available): 5/12/21  Summary    Pharmacological Stress/MPI Results:        1. Technically a satisfactory study.    2. No evidence of Ischemia by Myocardial Perfusion Imaging.    3. Gated Study shows normal LV size and Systolic function; EF is 70 %.     Last Cath (if available):Cath: 4/2016  Right dominant system  Left Main:  Minimal luminal irregularities.  Left Anterior Descending:  No significant CAD  Circumflex:  No significant CAD  Right Coronary:  No significant CAD  Left Ventriculogram:  LVEF 55-60%    Last TTE/TUSHAR(if available): 8/31/2024    Left Ventricle: Normal left ventricular systolic function with a visually estimated EF of 60 - 65%. Left ventricle size is normal. Normal wall

## 2025-03-31 ENCOUNTER — OFFICE VISIT (OUTPATIENT)
Dept: CARDIOLOGY CLINIC | Age: 70
End: 2025-03-31
Payer: MEDICARE

## 2025-03-31 VITALS
HEART RATE: 78 BPM | SYSTOLIC BLOOD PRESSURE: 130 MMHG | BODY MASS INDEX: 33.03 KG/M2 | DIASTOLIC BLOOD PRESSURE: 80 MMHG | WEIGHT: 192.4 LBS

## 2025-03-31 DIAGNOSIS — I25.10 CORONARY ARTERY CALCIFICATION: ICD-10-CM

## 2025-03-31 DIAGNOSIS — Z01.810 PREOP CARDIOVASCULAR EXAM: ICD-10-CM

## 2025-03-31 DIAGNOSIS — I10 ESSENTIAL HYPERTENSION: Primary | ICD-10-CM

## 2025-03-31 DIAGNOSIS — E78.2 MIXED HYPERLIPIDEMIA: ICD-10-CM

## 2025-03-31 DIAGNOSIS — I69.30 HISTORY OF CVA WITH RESIDUAL DEFICIT: ICD-10-CM

## 2025-03-31 PROCEDURE — 3079F DIAST BP 80-89 MM HG: CPT | Performed by: INTERNAL MEDICINE

## 2025-03-31 PROCEDURE — 1159F MED LIST DOCD IN RCRD: CPT | Performed by: INTERNAL MEDICINE

## 2025-03-31 PROCEDURE — 99214 OFFICE O/P EST MOD 30 MIN: CPT | Performed by: INTERNAL MEDICINE

## 2025-03-31 PROCEDURE — G2211 COMPLEX E/M VISIT ADD ON: HCPCS | Performed by: INTERNAL MEDICINE

## 2025-03-31 PROCEDURE — 1123F ACP DISCUSS/DSCN MKR DOCD: CPT | Performed by: INTERNAL MEDICINE

## 2025-03-31 PROCEDURE — 93000 ELECTROCARDIOGRAM COMPLETE: CPT | Performed by: INTERNAL MEDICINE

## 2025-03-31 PROCEDURE — 3075F SYST BP GE 130 - 139MM HG: CPT | Performed by: INTERNAL MEDICINE

## 2025-04-01 RX ORDER — TRAZODONE HYDROCHLORIDE 50 MG/1
50 TABLET ORAL NIGHTLY
Qty: 90 TABLET | Refills: 1 | Status: SHIPPED | OUTPATIENT
Start: 2025-04-01

## 2025-04-03 ENCOUNTER — OFFICE VISIT (OUTPATIENT)
Dept: ORTHOPEDIC SURGERY | Age: 70
End: 2025-04-03

## 2025-04-03 VITALS — HEIGHT: 64 IN | BODY MASS INDEX: 32.78 KG/M2 | WEIGHT: 192 LBS

## 2025-04-03 DIAGNOSIS — M65.30 TRIGGER FINGER, ACQUIRED: Primary | ICD-10-CM

## 2025-04-03 RX ORDER — LIDOCAINE HYDROCHLORIDE 10 MG/ML
0.5 INJECTION, SOLUTION INFILTRATION; PERINEURAL ONCE
Status: COMPLETED | OUTPATIENT
Start: 2025-04-03 | End: 2025-04-03

## 2025-04-03 RX ORDER — TRIAMCINOLONE ACETONIDE 40 MG/ML
20 INJECTION, SUSPENSION INTRA-ARTICULAR; INTRAMUSCULAR ONCE
Status: COMPLETED | OUTPATIENT
Start: 2025-04-03 | End: 2025-04-03

## 2025-04-03 RX ORDER — TRAZODONE HYDROCHLORIDE 50 MG/1
TABLET ORAL
Qty: 90 TABLET | Refills: 0 | OUTPATIENT
Start: 2025-04-03

## 2025-04-03 RX ADMIN — LIDOCAINE HYDROCHLORIDE 0.5 ML: 10 INJECTION, SOLUTION INFILTRATION; PERINEURAL at 10:38

## 2025-04-03 RX ADMIN — TRIAMCINOLONE ACETONIDE 20 MG: 40 INJECTION, SUSPENSION INTRA-ARTICULAR; INTRAMUSCULAR at 10:39

## 2025-04-03 NOTE — PROGRESS NOTES
This 69 y.o., right hand dominant retired woman is seen in referral for Lavell Finnegan MD  with a chief complaint of pain, swelling, stiffness and intermittant snapping of the right thumb.  Symptoms have been present for 3 months.  The digit is stiff, especially in the morning and will frequently stick in the palm when flexed and pop when extended.  This is often associated with pain.  Mild swelling has been noticed.  The patient denies discoloration or history of injury or overuse.  Treatment has been rendered (injection) several years ago.  The problem has worsened recently.  The pain assessment has been reviewed and is correct as stated..    The patient's social history, past medical history, family history, medications, allergies and review of systems, entered 4/3/25, have been reviewed, and dated and are recorded in the chart.    On examination the patient is Height: 162.6 cm (5' 4\") tall and weighs Weight - Scale: 87.1 kg (192 lb). Respirations are 18 per minute.  The patient is well nourished, is oriented to time and place, demonstrates appropriate mood and affect as well as normal gait and station.  There is mild soft tissue swelling of the digit.  There is no deformity. There is tenderness, thickening and nodularity at the base of the flexor tendon sheath.  Range of motion is slightly limited in flexion and extension.  The digit sticks in flexion and pops into extension, accompanied by some pain. Skin is intact without lesions.  Distal circulation and sensation are intact.  Muscle strength and coordination are normal.  Reflexes are present bilaterally.  Joints are stable.  There are no subcutaneous nodules or enlarged epitrochlear lymph nodes.    I have personally reviewed and interpreted all previous external imaging studies (DEXA Scans), laboratory tests (Glucose. CBC+Diff, CMP, TSH), diagnostic procedures and medical encounters pertinent to this patient's visit today.    Impression: Right thumb trigger

## 2025-04-09 ENCOUNTER — OFFICE VISIT (OUTPATIENT)
Dept: FAMILY MEDICINE CLINIC | Age: 70
End: 2025-04-09

## 2025-04-09 ENCOUNTER — RESULTS FOLLOW-UP (OUTPATIENT)
Dept: FAMILY MEDICINE CLINIC | Age: 70
End: 2025-04-09

## 2025-04-09 VITALS
HEART RATE: 77 BPM | DIASTOLIC BLOOD PRESSURE: 72 MMHG | WEIGHT: 191 LBS | SYSTOLIC BLOOD PRESSURE: 126 MMHG | OXYGEN SATURATION: 96 % | BODY MASS INDEX: 32.61 KG/M2 | HEIGHT: 64 IN | RESPIRATION RATE: 16 BRPM

## 2025-04-09 DIAGNOSIS — E11.42 TYPE 2 DIABETES MELLITUS WITH DIABETIC POLYNEUROPATHY, WITH LONG-TERM CURRENT USE OF INSULIN (HCC): ICD-10-CM

## 2025-04-09 DIAGNOSIS — E83.52 HYPERCALCEMIA: Primary | ICD-10-CM

## 2025-04-09 DIAGNOSIS — Z01.818 PREOP EXAMINATION: ICD-10-CM

## 2025-04-09 DIAGNOSIS — E83.52 HYPERCALCEMIA: ICD-10-CM

## 2025-04-09 DIAGNOSIS — M16.11 OSTEOARTHRITIS OF RIGHT HIP, UNSPECIFIED OSTEOARTHRITIS TYPE: ICD-10-CM

## 2025-04-09 DIAGNOSIS — M79.661 PAIN OF RIGHT LOWER LEG: ICD-10-CM

## 2025-04-09 DIAGNOSIS — E11.65 TYPE 2 DIABETES MELLITUS WITH HYPERGLYCEMIA, WITHOUT LONG-TERM CURRENT USE OF INSULIN (HCC): ICD-10-CM

## 2025-04-09 DIAGNOSIS — Z79.4 TYPE 2 DIABETES MELLITUS WITH DIABETIC POLYNEUROPATHY, WITH LONG-TERM CURRENT USE OF INSULIN (HCC): ICD-10-CM

## 2025-04-09 DIAGNOSIS — Z01.818 PREOP EXAMINATION: Primary | ICD-10-CM

## 2025-04-09 LAB
ANION GAP SERPL CALCULATED.3IONS-SCNC: 11 MMOL/L (ref 3–16)
APTT BLD: 22.9 SEC (ref 22.1–36.4)
BASOPHILS # BLD: 0 K/UL (ref 0–0.2)
BASOPHILS NFR BLD: 0.8 %
BUN SERPL-MCNC: 26 MG/DL (ref 7–20)
CALCIUM SERPL-MCNC: 11.1 MG/DL (ref 8.3–10.6)
CHLORIDE SERPL-SCNC: 103 MMOL/L (ref 99–110)
CO2 SERPL-SCNC: 26 MMOL/L (ref 21–32)
CREAT SERPL-MCNC: 0.9 MG/DL (ref 0.6–1.2)
DEPRECATED RDW RBC AUTO: 13.2 % (ref 12.4–15.4)
EOSINOPHIL # BLD: 0.2 K/UL (ref 0–0.6)
EOSINOPHIL NFR BLD: 4.3 %
GFR SERPLBLD CREATININE-BSD FMLA CKD-EPI: 69 ML/MIN/{1.73_M2}
GLUCOSE SERPL-MCNC: 110 MG/DL (ref 70–99)
HBA1C MFR BLD: 7 %
HCT VFR BLD AUTO: 40.2 % (ref 36–48)
HGB BLD-MCNC: 12.8 G/DL (ref 12–16)
INR PPP: 0.85 (ref 0.85–1.15)
LYMPHOCYTES # BLD: 2.7 K/UL (ref 1–5.1)
LYMPHOCYTES NFR BLD: 49.5 %
MCH RBC QN AUTO: 27.3 PG (ref 26–34)
MCHC RBC AUTO-ENTMCNC: 31.9 G/DL (ref 31–36)
MCV RBC AUTO: 85.6 FL (ref 80–100)
MONOCYTES # BLD: 0.3 K/UL (ref 0–1.3)
MONOCYTES NFR BLD: 6 %
NEUTROPHILS # BLD: 2.2 K/UL (ref 1.7–7.7)
NEUTROPHILS NFR BLD: 39.4 %
PLATELET # BLD AUTO: 332 K/UL (ref 135–450)
PMV BLD AUTO: 8.3 FL (ref 5–10.5)
POTASSIUM SERPL-SCNC: 4.5 MMOL/L (ref 3.5–5.1)
PROTHROMBIN TIME: 11.9 SEC (ref 11.9–14.9)
PTH-INTACT SERPL-MCNC: 68.3 PG/ML (ref 14–72)
RBC # BLD AUTO: 4.69 M/UL (ref 4–5.2)
SODIUM SERPL-SCNC: 140 MMOL/L (ref 136–145)
WBC # BLD AUTO: 5.5 K/UL (ref 4–11)

## 2025-04-09 NOTE — TELEPHONE ENCOUNTER
High calcium noted.  Please see if lab can add on, PTH and ionized calcium ordered today.  Thank you

## 2025-04-09 NOTE — PROGRESS NOTES
Subjective:     Chief Complaint   Patient presents with    Pre-op Exam     Pt is having \"Right Total Hip Arthoplasty\" 4/21/25 w Edward Abraham M.D.  Fax- [phone]      John Wood is a 69 y.o.  female who presents to the office today for a preoperative consultation at the request of surgeon Dr. Edward Abraham who plans on performing right total hip arthroplasty on 4/21/25    The problem warranting surgery is right hip pain and OA and has been going on for years    Risk factors include:    Diabetes: yes. Adequate control with A1c of 7.0  Coronary Artery Disease: yes, non-ischemic  COPD: no  Tobacco use? No  Hx of CVA: yes    Planned anesthesia is General.   History of anesthesia problems? No, has tolerated general anesthesia well in the past  No history of bleeding disorder or clotting disorder    Patient Active Problem List   Diagnosis    Type 2 diabetes mellitus with diabetic polyneuropathy, with long-term current use of insulin (HCC)    Obesity    Dystonia    DIPTI (obstructive sleep apnea)    Right hemiparesis (HCC)    Trigger finger, acquired    Primary osteoarthritis of both knees    Mood disorder    Calcific tendonitis of right shoulder    Essential hypertension    Mixed hyperlipidemia    History of CVA with residual deficit    Anxiety and depression    Myalgia due to statin    Palpitations    Primary osteoarthritis of right knee    History of colonoscopy - 2023, rpt 2033    Gastroesophageal reflux disease    Aortic atherosclerosis    Coronary artery calcification    Right hip pain    DDD (degenerative disc disease), lumbar    Primary osteoarthritis of right hip    Right leg weakness    Right lumbar radiculitis    HNP (herniated nucleus pulposus), lumbar    Cord compression (HCC)    Preop cardiovascular exam     Past Surgical History:   Procedure Laterality Date    ARTHRODESIS Right 09/17/2020    (RIGHT) REMOVAL OF BONE SPURRING AND OSSEOUS PROMINENCE FIRST METATARSAL, ARTHRODESIS OF FIRST

## 2025-04-09 NOTE — TELEPHONE ENCOUNTER
Thank you for adding that on.  She does not need to come back in for the repeat calcium.    Please ask John if she is taking any calcium supplements or Tums.  Her calcium level is high but her hormone that controls this is normal.

## 2025-04-18 NOTE — PROGRESS NOTES
Facility/Department: [address] PROGRESSIVE CARE   DYSPHAGIA THERAPY and SPEECH / LANGUAGE / COGNITIVE-LINGUISTIC TREATMENT    Patient: John Wood   : 1955   MRN: [id number]      Treatment Date: 9/3/2024      Admitting Dx:   Aphasia [R47.01]  Acute right-sided weakness [R53.1]  Acute CVA (cerebrovascular accident) (HCC) [I63.9]   has a past medical history of Anesthesia complication, Arthritis, Cardiomyopathy (HCC), COVID-19, Diabetes, GERD (gastroesophageal reflux disease), Hyperlipidemia, Hypertension, Lumbar disc disease, Prolonged emergence from general anesthesia, Sleep apnea, Unspecified cerebral artery occlusion with cerebral infarction (), and Wears glasses.   has a past surgical history that includes Partial hysterectomy (2003); Hand surgery (Right); Foot surgery (Bilateral); Carpal tunnel release (Left, 2015); Finger trigger release (Left, 2015); Carpal tunnel release (Right, 2015); Finger trigger release (Right, 2015); Shoulder arthroscopy (Right, 2018); Colonoscopy; arthrodesis (Right, 2020); Knee Arthroplasty (Right, 2021); Knee arthroscopy (Right, 2022); Pain management procedure (Right, 2023); Nerve Block (Right, 2023); Pain management procedure (Right, 2023); Nerve Block (Right, 2023); and Hysterectomy.  ALLERGIES: codeine, Vicodin (Hydrocodone-acetaminophen): Lipitor; OxyContin; Tramadol  History/Prior Level of Function: Living Status:  Lives with spouse; needs assist with dressing/bathing; has rolling walker and when outside RWW; was on disability after a previous stroke and then retired. 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 Regulars consistency as desired; but intermittent problems since prior stroke. English is second Language. [place] from [place], has an accent   Speech Therapy History: Pt reports assessment in the past; but this SLP was unable to locate in brief chart  after surgery.  Pending that decision, recommend SLP continue to see regarding s/s and determine or r/o eventual MBSS for more objective assessment of pharyngeal phase of the swallow.    Discussed with RN     2. Speech Diagnosis Therapy Impressions (English second language[place]-no need for  as pt speaks english but accent noted):   Functional concrete receptive and expressive language skills for concrete question and command processing and for expressing needs/wants. Pt with improved rate of responses.  Pt with overall improved motor speech regarding rate of speech. Pt demonstrated concrete VPS/PS and recall of daily events regarding testing.  Plan:   F/u x 1 unless otherwise notified  Discussed with RN    Recommended Diet and Intervention: Await MD order for upgrade  Diet Solids Recommendation:  Easy to Chew texture Liquid Consistency Recommendation:  Thin liquids   Recommended form of Meds: PO as tolerated      Compensatory Swallowing Strategies:  Upright as possible with all PO intake , Small bites/sips , Eat/feed slowly, Remain upright 30-45 min  GERD precautions    Continued Treatment recommendations: Receptive/Expressive Language tx, Cognitive-Linguistic tx, Dysphagia tx  Frequency of treatment: 3-5 times/week    SLP Therapeutic Interventions: Aphasia , Cognitive-Linguistic  Dysphagia Therapeutic Interventions: Diet Tolerance Monitoring , Therapeutic Trials with SLP     DYSPHAGIA GOALS:  1. Pt will participate in Modified Barium Swallow Study to further assess pharyngeal phase of swallow, assist with diet recommendations and to further direct plan of care . : not recommended at this time  2. Pt/caregiver will verbalize understanding of recent testing procedures, recommendations, compensatory swallow strategies, and penetration/aspiration risks/precautions with set-up, with <mild cues. Ongoing; goal appears met  3. Pt will functionally tolerate soft/bite size with thin liquid diet with  no

## 2025-04-23 ENCOUNTER — TELEPHONE (OUTPATIENT)
Dept: FAMILY MEDICINE CLINIC | Age: 70
End: 2025-04-23

## 2025-04-23 NOTE — TELEPHONE ENCOUNTER
Pt called in said pharmacy does not have metformin I called [facility] they said they will get prescription ready I tried to call the pt back no VM

## 2025-04-30 PROBLEM — Z01.810 PREOP CARDIOVASCULAR EXAM: Status: RESOLVED | Noted: 2025-03-31 | Resolved: 2025-04-30

## 2025-05-08 NOTE — TELEPHONE ENCOUNTER
Please ensure John is still taking this med at this dose and not having GI side effects. I am happy to send in refill if that is the case. Thanks.

## 2025-05-09 DIAGNOSIS — I10 ESSENTIAL HYPERTENSION: ICD-10-CM

## 2025-05-09 RX ORDER — VALSARTAN AND HYDROCHLOROTHIAZIDE 320; 12.5 MG/1; MG/1
1 TABLET, FILM COATED ORAL DAILY
Qty: 90 TABLET | Refills: 1 | Status: SHIPPED | OUTPATIENT
Start: 2025-05-09

## 2025-05-13 ENCOUNTER — CLINICAL DOCUMENTATION (OUTPATIENT)
Dept: PSYCHIATRY | Age: 70
End: 2025-05-13

## 2025-05-13 NOTE — PROGRESS NOTES
Patient had [facility] for f/u appointment on 5/13/2025 with [facility]. This is first occurrence. Patient needs to make f/u appointment for future refills.

## 2025-05-14 NOTE — TELEPHONE ENCOUNTER
Can we ask John if she is taking this? She was having GI side effects and it may have been discontinued.

## 2025-05-15 RX ORDER — DULAGLUTIDE 1.5 MG/.5ML
INJECTION, SOLUTION SUBCUTANEOUS
Qty: 6 ML | Refills: 0 | Status: SHIPPED | OUTPATIENT
Start: 2025-05-15

## 2025-05-19 RX ORDER — AMANTADINE HYDROCHLORIDE 100 MG/1
100 TABLET ORAL 2 TIMES DAILY
Qty: 60 TABLET | Refills: 0 | Status: SHIPPED | OUTPATIENT
Start: 2025-05-19

## 2025-06-12 ENCOUNTER — OFFICE VISIT (OUTPATIENT)
Dept: FAMILY MEDICINE CLINIC | Age: 70
End: 2025-06-12
Payer: MEDICARE

## 2025-06-12 VITALS
WEIGHT: 190.4 LBS | RESPIRATION RATE: 16 BRPM | HEIGHT: 64 IN | BODY MASS INDEX: 32.5 KG/M2 | DIASTOLIC BLOOD PRESSURE: 70 MMHG | OXYGEN SATURATION: 98 % | SYSTOLIC BLOOD PRESSURE: 110 MMHG | HEART RATE: 86 BPM

## 2025-06-12 DIAGNOSIS — Z79.4 TYPE 2 DIABETES MELLITUS WITH DIABETIC POLYNEUROPATHY, WITH LONG-TERM CURRENT USE OF INSULIN (HCC): ICD-10-CM

## 2025-06-12 DIAGNOSIS — E11.42 TYPE 2 DIABETES MELLITUS WITH DIABETIC POLYNEUROPATHY, WITH LONG-TERM CURRENT USE OF INSULIN (HCC): ICD-10-CM

## 2025-06-12 DIAGNOSIS — R10.9 ABDOMINAL DISCOMFORT: Primary | ICD-10-CM

## 2025-06-12 DIAGNOSIS — G81.91 RIGHT HEMIPARESIS (HCC): ICD-10-CM

## 2025-06-12 DIAGNOSIS — E83.52 HYPERCALCEMIA: ICD-10-CM

## 2025-06-12 LAB
25(OH)D3 SERPL-MCNC: 17.9 NG/ML
ANION GAP SERPL CALCULATED.3IONS-SCNC: 15 MMOL/L (ref 3–16)
BUN SERPL-MCNC: 14 MG/DL (ref 7–20)
CALCIUM SERPL-MCNC: 11 MG/DL (ref 8.3–10.6)
CHLORIDE SERPL-SCNC: 105 MMOL/L (ref 99–110)
CO2 SERPL-SCNC: 23 MMOL/L (ref 21–32)
CREAT SERPL-MCNC: 0.9 MG/DL (ref 0.6–1.2)
GFR SERPLBLD CREATININE-BSD FMLA CKD-EPI: 69 ML/MIN/{1.73_M2}
GLUCOSE SERPL-MCNC: 83 MG/DL (ref 70–99)
POTASSIUM SERPL-SCNC: 4.1 MMOL/L (ref 3.5–5.1)
SODIUM SERPL-SCNC: 143 MMOL/L (ref 136–145)

## 2025-06-12 PROCEDURE — 1159F MED LIST DOCD IN RCRD: CPT | Performed by: FAMILY MEDICINE

## 2025-06-12 PROCEDURE — 3078F DIAST BP <80 MM HG: CPT | Performed by: FAMILY MEDICINE

## 2025-06-12 PROCEDURE — 3074F SYST BP LT 130 MM HG: CPT | Performed by: FAMILY MEDICINE

## 2025-06-12 PROCEDURE — G2211 COMPLEX E/M VISIT ADD ON: HCPCS | Performed by: FAMILY MEDICINE

## 2025-06-12 PROCEDURE — 99214 OFFICE O/P EST MOD 30 MIN: CPT | Performed by: FAMILY MEDICINE

## 2025-06-12 PROCEDURE — 1123F ACP DISCUSS/DSCN MKR DOCD: CPT | Performed by: FAMILY MEDICINE

## 2025-06-12 PROCEDURE — 3051F HG A1C>EQUAL 7.0%<8.0%: CPT | Performed by: FAMILY MEDICINE

## 2025-06-12 NOTE — PROGRESS NOTES
John Wood (:  1955) is a 69 y.o. female,Established patient, here for evaluation of the following chief complaint(s):  Leg Pain (R leg pain after surgery ) and Vomiting (Pt has been vomiting after eating /Can't seem to hold fold food down since the surgery )    1. Abdominal discomfort  This is likely from Trulicity.  No red flags warranting imaging at this time.  Stop Trulicity    2. Right hemiparesis (HCC)  Chronic, stable    3. Hypercalcemia  Noted on labs.  Normal PTH.  Denies taking over-the-counter supplements, vitamins, Tums, or anything else with calcium in it.  Check labs as below to further delineate the cause  - CALCIUM IONIZED SERUM; Future  - Basic Metabolic Panel; Future  - PTH-RELATED PEPTIDE; Future  - Vitamin D 25 Hydroxy; Future    4. Type 2 diabetes mellitus with diabetic polyneuropathy, with long-term current use of insulin (HCC)  Stopping Trulicity due to side effects.  Start Januvia to replace  - SITagliptin (JANUVIA) 100 MG tablet; Take 1 tablet by mouth daily  Dispense: 90 tablet; Refill: 1      Return in about 3 months (around 2025) for dm f/u.    Subjective       HPI    Here for f/u    Had right total hip arthoplasty 25    Notes doesn't always have a good appetite, notably occurring after surgery about 6 weeks ago. Sometimes will have nausea to the point of vomiting. Notes post-op that the oxycodone made her nauseous so stopped this. Taking Tylenol. Not losing significant weight.   - reports normal, regular BMs and denies constipation  Hemoglobin A1C   Date Value Ref Range Status   2025 7.0 % Final       CT abd/pelvis 24  IMPRESSION:  1. No acute intra-abdominal or pelvic process.  2. Degenerative disc disease at L4-5 with a diffuse disc protrusion.  3. Small periumbilical hernia containing fat.    For pain, takes muscle relaxer and Tylenol as well as aspirin. Has pain in R leg, outside from hip down to knee. Reports stopped PT due to

## 2025-06-12 NOTE — PATIENT INSTRUCTIONS
[facility]   [address], [facility] [address]  [phone]  Tues, Wed, Fri: 9AM-4PM (Closed Noon-12:30pm for lunch)  Sat: 1PM-4PM

## 2025-06-14 LAB
CA-I ADJ PH7.4 SERPL-SCNC: 1.37 MMOL/L (ref 1.09–1.3)
CA-I SERPL ISE-SCNC: 1.4 MMOL/L (ref 1.09–1.3)

## 2025-06-17 LAB — PTH RELATED PROT SERPL-SCNC: 2.7 PMOL/L (ref 0–3.4)

## 2025-06-17 RX ORDER — AMANTADINE HYDROCHLORIDE 100 MG/1
100 TABLET ORAL 2 TIMES DAILY
Qty: 60 TABLET | Refills: 0 | Status: SHIPPED | OUTPATIENT
Start: 2025-06-17

## 2025-06-24 ENCOUNTER — RESULTS FOLLOW-UP (OUTPATIENT)
Dept: FAMILY MEDICINE CLINIC | Age: 70
End: 2025-06-24

## 2025-07-01 ENCOUNTER — OFFICE VISIT (OUTPATIENT)
Dept: FAMILY MEDICINE CLINIC | Age: 70
End: 2025-07-01

## 2025-07-01 VITALS
HEIGHT: 64 IN | HEART RATE: 90 BPM | TEMPERATURE: 97.9 F | WEIGHT: 193.6 LBS | DIASTOLIC BLOOD PRESSURE: 74 MMHG | SYSTOLIC BLOOD PRESSURE: 118 MMHG | OXYGEN SATURATION: 97 % | RESPIRATION RATE: 18 BRPM | BODY MASS INDEX: 33.05 KG/M2

## 2025-07-01 DIAGNOSIS — L21.9 SEBORRHEIC DERMATITIS: Primary | ICD-10-CM

## 2025-07-01 RX ORDER — KETOCONAZOLE 20 MG/ML
SHAMPOO, SUSPENSION TOPICAL
Qty: 1 EACH | Refills: 0 | Status: SHIPPED | OUTPATIENT
Start: 2025-07-01

## 2025-07-01 NOTE — PROGRESS NOTES
7/1/2025    This is a 69 y.o. female   Chief Complaint   Patient presents with    Skin Problem     X1 month.  Scalp is itching.  Has small bumps on the back of her head.  Has been losing hair.   .    HPI  History of Present Illness  The patient is a 69-year-old female who presents today with a 2-month history of scalp itching and hair loss.    She has been experiencing severe scalp itching for the past 2 months, which has led to significant hair loss, particularly at the back of her head. She also reports a burning sensation on her scalp, which intensifies at night. Despite changing her shampoo several times, she has found no relief. Her current shampoo is Pantene, and she has previously tried Head and Shoulders and Selsun Blue. She reports noticing a rash on her scalp, accompanied by intense itching.          Patient Active Problem List   Diagnosis    Type 2 diabetes mellitus with diabetic polyneuropathy, with long-term current use of insulin (HCC)    Obesity    Dystonia    DIPTI (obstructive sleep apnea)    Right hemiparesis (HCC)    Trigger finger, acquired    Primary osteoarthritis of both knees    Mood disorder    Calcific tendonitis of right shoulder    Essential hypertension    Mixed hyperlipidemia    History of CVA with residual deficit    Anxiety and depression    Myalgia due to statin    Palpitations    Primary osteoarthritis of right knee    History of colonoscopy - 2023, rpt 2033    Gastroesophageal reflux disease    Aortic atherosclerosis    Coronary artery calcification    Right hip pain    DDD (degenerative disc disease), lumbar    Primary osteoarthritis of right hip    Right leg weakness    Right lumbar radiculitis    HNP (herniated nucleus pulposus), lumbar    Cord compression (HCC)       Current Outpatient Medications   Medication Sig Dispense Refill    Amantadine (SYMMETREL) 100 MG TABS tablet TAKE 1 TABLET BY MOUTH TWICE DAILY 60 tablet 0    SITagliptin (JANUVIA) 100 MG tablet Take 1 tablet by

## 2025-07-08 ENCOUNTER — OFFICE VISIT (OUTPATIENT)
Dept: PSYCHIATRY | Age: 70
End: 2025-07-08
Payer: MEDICARE

## 2025-07-08 ENCOUNTER — TELEPHONE (OUTPATIENT)
Dept: FAMILY MEDICINE CLINIC | Age: 70
End: 2025-07-08

## 2025-07-08 VITALS
TEMPERATURE: 98.1 F | OXYGEN SATURATION: 98 % | DIASTOLIC BLOOD PRESSURE: 98 MMHG | HEART RATE: 82 BPM | SYSTOLIC BLOOD PRESSURE: 138 MMHG | BODY MASS INDEX: 33.63 KG/M2 | WEIGHT: 196 LBS

## 2025-07-08 DIAGNOSIS — L21.9 SEBORRHEIC DERMATITIS: ICD-10-CM

## 2025-07-08 DIAGNOSIS — F41.9 ANXIETY DISORDER, UNSPECIFIED TYPE: ICD-10-CM

## 2025-07-08 DIAGNOSIS — G24.01 TARDIVE DYSKINESIA: Primary | ICD-10-CM

## 2025-07-08 DIAGNOSIS — F34.1 PERSISTENT DEPRESSIVE DISORDER: ICD-10-CM

## 2025-07-08 PROCEDURE — 99213 OFFICE O/P EST LOW 20 MIN: CPT | Performed by: REGISTERED NURSE

## 2025-07-08 PROCEDURE — 3080F DIAST BP >= 90 MM HG: CPT | Performed by: REGISTERED NURSE

## 2025-07-08 PROCEDURE — 1123F ACP DISCUSS/DSCN MKR DOCD: CPT | Performed by: REGISTERED NURSE

## 2025-07-08 PROCEDURE — 1159F MED LIST DOCD IN RCRD: CPT | Performed by: REGISTERED NURSE

## 2025-07-08 PROCEDURE — 3075F SYST BP GE 130 - 139MM HG: CPT | Performed by: REGISTERED NURSE

## 2025-07-08 RX ORDER — DULAGLUTIDE 1.5 MG/.5ML
INJECTION, SOLUTION SUBCUTANEOUS
Qty: 6 ML | Refills: 0 | Status: CANCELLED | OUTPATIENT
Start: 2025-07-08

## 2025-07-08 RX ORDER — AMANTADINE HYDROCHLORIDE 100 MG/1
100 TABLET ORAL 2 TIMES DAILY
Qty: 180 TABLET | Refills: 1 | Status: SHIPPED | OUTPATIENT
Start: 2025-07-08

## 2025-07-08 RX ORDER — KETOCONAZOLE 20 MG/ML
SHAMPOO, SUSPENSION TOPICAL
Qty: 1 EACH | Refills: 0 | Status: SHIPPED | OUTPATIENT
Start: 2025-07-08

## 2025-07-08 RX ORDER — TRAZODONE HYDROCHLORIDE 50 MG/1
50 TABLET ORAL NIGHTLY
Qty: 90 TABLET | Refills: 1 | Status: SHIPPED | OUTPATIENT
Start: 2025-07-08

## 2025-07-08 ASSESSMENT — PATIENT HEALTH QUESTIONNAIRE - PHQ9
8. MOVING OR SPEAKING SO SLOWLY THAT OTHER PEOPLE COULD HAVE NOTICED. OR THE OPPOSITE, BEING SO FIGETY OR RESTLESS THAT YOU HAVE BEEN MOVING AROUND A LOT MORE THAN USUAL: MORE THAN HALF THE DAYS
SUM OF ALL RESPONSES TO PHQ QUESTIONS 1-9: 11
1. LITTLE INTEREST OR PLEASURE IN DOING THINGS: SEVERAL DAYS
2. FEELING DOWN, DEPRESSED OR HOPELESS: MORE THAN HALF THE DAYS
9. THOUGHTS THAT YOU WOULD BE BETTER OFF DEAD, OR OF HURTING YOURSELF: NOT AT ALL
SUM OF ALL RESPONSES TO PHQ QUESTIONS 1-9: 11
4. FEELING TIRED OR HAVING LITTLE ENERGY: SEVERAL DAYS
SUM OF ALL RESPONSES TO PHQ QUESTIONS 1-9: 11
6. FEELING BAD ABOUT YOURSELF - OR THAT YOU ARE A FAILURE OR HAVE LET YOURSELF OR YOUR FAMILY DOWN: MORE THAN HALF THE DAYS
5. POOR APPETITE OR OVEREATING: SEVERAL DAYS
7. TROUBLE CONCENTRATING ON THINGS, SUCH AS READING THE NEWSPAPER OR WATCHING TELEVISION: SEVERAL DAYS
3. TROUBLE FALLING OR STAYING ASLEEP: SEVERAL DAYS
SUM OF ALL RESPONSES TO PHQ QUESTIONS 1-9: 11

## 2025-07-08 ASSESSMENT — ANXIETY QUESTIONNAIRES
6. BECOMING EASILY ANNOYED OR IRRITABLE: NEARLY EVERY DAY
3. WORRYING TOO MUCH ABOUT DIFFERENT THINGS: SEVERAL DAYS
4. TROUBLE RELAXING: SEVERAL DAYS
5. BEING SO RESTLESS THAT IT IS HARD TO SIT STILL: MORE THAN HALF THE DAYS
1. FEELING NERVOUS, ANXIOUS, OR ON EDGE: SEVERAL DAYS
2. NOT BEING ABLE TO STOP OR CONTROL WORRYING: NEARLY EVERY DAY
7. FEELING AFRAID AS IF SOMETHING AWFUL MIGHT HAPPEN: MORE THAN HALF THE DAYS
GAD7 TOTAL SCORE: 13

## 2025-07-08 NOTE — PROGRESS NOTES
PSYCHIATRY OUT PATIENT FOLLOW UP    Patient name: John Wood  : 1955  Date of service: 25  PCP: Lavell Finnegan MD    Dx:  1. Tardive dyskinesia  2. Anxiety disorder, unspecified type  3. Persistent depressive disorder    Assessment and plan    Psychiatric     - continue Trazodone 50 mg nightly.   - continue Amantadine/Symmetrel 100 mg BID.   -continue  Melatonin OTC 3-6 mg nightly PRN  for sleep aid.   - discussed sleep hygiene    - discussed holistic approaches and coping skills   - Discussed holistic approaches and coping skills to MH symptoms management.   -Medication R/B/SE discussed and patient gave verbal consent for tx.  -Practice complementary health approaches such as: self-management strategies, relaxation techniques, yoga, and physical exercise as tolerated.   2. Safety  -NO Imminent risk of danger to self/others based on today's assessment. Patient is appropriate for outpatient level of care.  Safety plan includes: 988, 911, PES, hotlines, and interventions discussed today.   3.  Psychotherapy  - Discussed coping skills and relaxation techniques   4.  Substance   - no reported substance abuse   5. Medical   - Follow with PCP  6. RTC in 5 months or earlier if your symptoms fail to improve or go to nearest ER if having active SI/HI.   Evaluated medications and assessed for side effects and effectiveness. Assessed patient's educational needs including reviewing plan of care, medications,diagnosis, treatment options, and prognosis. I reviewed the plan of care with the patient and the patient consented to the treatment interventions. Patient acknowledged, verbalized understanding, and agreed with plan of care.    Interval progress:   2025 Here for a follow. Reports she is doing \" okay.\" States she has been dealing with chronic right knee to thigh pain.  She had right hip replacement few months ago. She has been out of work since 2024. Lots financial stress.  working FT

## 2025-07-08 NOTE — TELEPHONE ENCOUNTER
2 rx's sent  TRulicity stopped due to side effects - please let pharmacy know so they take off her list. Thank you

## 2025-07-08 NOTE — PATIENT INSTRUCTIONS
Insomnia : General Principles     You should sleep in only one location which is your bedroom. Do not watch TV, use computer or read in your bedroom. You want to associate your bedroom with sleep only. You should do these things in a different room     Your sleep environment should be dark with no light exposure     When you wake in the morning you should exposure yourself to bright light     Get into bed at the same time every night     Get up out of bed no later than 8 AM     No naps during daytime     Do not watch the clock     No caffeine after 2 PM      Try to exercise in the late afternoon or early evening as many days a week as possible

## 2025-07-09 ENCOUNTER — HOSPITAL ENCOUNTER (OUTPATIENT)
Dept: PHYSICAL THERAPY | Age: 70
Setting detail: THERAPIES SERIES
Discharge: HOME | End: 2025-07-09
Payer: MEDICARE

## 2025-07-09 DIAGNOSIS — M53.86 DECREASED RANGE OF MOTION OF LUMBAR SPINE: ICD-10-CM

## 2025-07-09 DIAGNOSIS — M25.561 RIGHT KNEE PAIN, UNSPECIFIED CHRONICITY: ICD-10-CM

## 2025-07-09 DIAGNOSIS — M25.551 PAIN OF RIGHT HIP: Primary | ICD-10-CM

## 2025-07-09 DIAGNOSIS — M25.651 DECREASED RANGE OF RIGHT HIP MOVEMENT: ICD-10-CM

## 2025-07-09 DIAGNOSIS — R29.898 DECREASED STRENGTH OF LOWER EXTREMITY: ICD-10-CM

## 2025-07-09 DIAGNOSIS — M54.50 RIGHT LOW BACK PAIN, UNSPECIFIED CHRONICITY, UNSPECIFIED WHETHER SCIATICA PRESENT: ICD-10-CM

## 2025-07-09 PROCEDURE — 97162 PT EVAL MOD COMPLEX 30 MIN: CPT

## 2025-07-09 PROCEDURE — 97110 THERAPEUTIC EXERCISES: CPT

## 2025-07-09 NOTE — PLAN OF CARE
LIMITS, whichever is less)  8/8/2025                                             Medical History:  Comorbidities:  Diabetes (Type I or II)  Hypertension  Stroke/TIA  Osteoarthritis  Anxiety  Depression  COVID-19  Relevant Medical History: R VIKTORIYA direct anterior 4/29/25 5/12/25 presented to ED for R LE pain and symptoms not consistent with DVT  CVA 2014 affecting the R side and is a bit weaker                                         Precautions/ Contra-indications:           Latex allergy:  assess next visit  Pacemaker:    assess next visit  Contraindications for Manipulation: NA  Date of Surgery: 4/29/25  Other:    Red Flags:  None  SOB with activity  Dizziness due to medications  Nausea & vomiting due to medications along with change in appetite  Weight loss then gain due to medication effects and loss of appetite after surgery  Loss of strength over time    Suicide Screening:   The patient did not verbalize a primary behavioral concern, suicidal ideation, suicidal intent, or demonstrate suicidal behaviors.    Preferred Language for Healthcare:   [x] English       [] other:    SUBJECTIVE EXAMINATION     Patient stated complaint: patient reports undergoing a R VIKTORIYA on 4/29/25 and up to this point had anterior, lateral, and posterior hip pain along with anterior knee pain. She also had some low back pain as well. 3 days after surgery she began to have pain down the R lateral thigh into the R lateral calf and foot. She reports difficulty with walking, sitting, transfers, and standing. She can sit for about 30 minutes and stand for 30 minutes. She will stand for relief from sitting. She reports pain is 7/10 and is after 30 minutes of sitting is aggravated and settles back down after standing for 10 minutes. At [facility] she will sit in the back so she can stand up and sit down throughout the service. Her goals are to be without pain and she would like to have better balance       Test used Initial score  7/9/25 07/09/2025

## 2025-07-11 ENCOUNTER — HOSPITAL ENCOUNTER (OUTPATIENT)
Dept: PHYSICAL THERAPY | Age: 70
Setting detail: THERAPIES SERIES
Discharge: HOME | End: 2025-07-11
Payer: MEDICARE

## 2025-07-11 PROCEDURE — 97110 THERAPEUTIC EXERCISES: CPT

## 2025-07-11 NOTE — FLOWSHEET NOTE
patient has functional impairments and/or activity limitations and would benefit from continued outpatient therapy services to address the deficits outlined in the patients goals    Return to Play: NA    Prognosis for POC: [x] Good [] Fair  [] Poor    Patient requires continued skilled intervention: [x] Yes  [] No      CHARGE CAPTURE     No CPT exclusions    PT CHARGE GRID   CPT Code (TIMED) minutes # CPT Code (UNTIMED) #     Therex (86891)  39 3  EVAL:MODERATE (58098 - Typically 30 minutes face-to-face)     Neuromusc. Re-ed (00086)    Re-Eval (25397)     Manual (92404)    Estim Unattended (30689)     Ther. Act (41022) 3   Mech. Traction (85397)     Gait (61681)    Dry Needle 1-2 muscle (53923)     Aquatic Therex (28904)    Dry Needle 3+ muscle (50005)     Iontophoresis (37975)    VASO (01589)     Ultrasound (81852)    Group Therapy (95288)     Estim Attended (96253)    Canalith Repositioning (30425)     Physical Performance Test (78117)    Custom orthotic ()     Other:    Other:    Total Timed Code Tx Minutes 42 3       Total Treatment Minutes 42        Charge Justification:  (06689) THERAPEUTIC EXERCISE - Provided verbal/tactile cueing for HEP and/or activities related to strengthening, flexibility, endurance, ROM performed to prevent loss of range of motion, maintain or improve muscular strength or increase flexibility, following either an injury or surgery.     GOALS     Patient stated goal: patient wants to be without pain and have better balance  [] Progressing: [] Met: [x] Not Met: [] Adjusted    Therapist goals for Patient:   Short Term Goals: To be achieved in: 2 weeks  Independent in HEP and progression per patient tolerance, in order to prevent re-injury.   [] Progressing: [] Met: [x] Not Met: [] Adjusted  Patient will have a decrease in pain to <5/10 to facilitate improvement in movement, function, and ADLs as indicated by Functional Deficits.  [] Progressing: [] Met: [x] Not Met: [] Adjusted    IF

## 2025-07-16 ENCOUNTER — HOSPITAL ENCOUNTER (OUTPATIENT)
Dept: PHYSICAL THERAPY | Age: 70
Setting detail: THERAPIES SERIES
Discharge: HOME | End: 2025-07-16
Payer: MEDICARE

## 2025-07-16 PROCEDURE — 97530 THERAPEUTIC ACTIVITIES: CPT

## 2025-07-16 PROCEDURE — 97110 THERAPEUTIC EXERCISES: CPT

## 2025-07-16 NOTE — FLOWSHEET NOTE
Pt. presents with the functional impairments and activity limitations listed below and would benefit from Outpatient PT to address the below impairments as well as improve pain, and restore function.     Today's Assessment: session focused on continuing to progress overall mobility via the nustep, seated hip IR/ER AROM, and stretching. Then continued to progress overall LE strengthening with addition of ankle weights to marches and hip abduction kickouts. Also added in sit to stands due to difficulty reported with this and using the Ue's. Patient reports appropriate fatigue at the end of the session. Patient will continue to benefit from skilled therapy to address deficits and to progress towards goals     Medical Necessity Documentation:  I certify that this patient meets the below criteria necessary for medical necessity for care and/or justification of therapy services:  The patient has functional impairments and/or activity limitations and would benefit from continued outpatient therapy services to address the deficits outlined in the patients goals    Return to Play: NA    Prognosis for POC: [x] Good [] Fair  [] Poor    Patient requires continued skilled intervention: [x] Yes  [] No      CHARGE CAPTURE     No CPT exclusions    PT CHARGE GRID   CPT Code (TIMED) minutes # CPT Code (UNTIMED) #     Therex (26200)  35 2  EVAL:MODERATE (13670 - Typically 30 minutes face-to-face)     Neuromusc. Re-ed (48342)    Re-Eval (67285)     Manual (38251)    Estim Unattended (45862)     Ther. Act (09933) 8 1  Mech. Traction (16654)     Gait (02389)    Dry Needle 1-2 muscle (31125)     Aquatic Therex (69164)    Dry Needle 3+ muscle (20561)     Iontophoresis (72397)    VASO (98977)     Ultrasound (33175)    Group Therapy (39281)     Estim Attended (75362)    Canalith Repositioning (02986)     Physical Performance Test (16636)    Custom orthotic ()     Other:    Other:    Total Timed Code Tx Minutes 43 3       Total

## 2025-07-18 ENCOUNTER — HOSPITAL ENCOUNTER (OUTPATIENT)
Dept: PHYSICAL THERAPY | Age: 70
Setting detail: THERAPIES SERIES
Discharge: HOME | End: 2025-07-18
Payer: MEDICARE

## 2025-07-18 PROCEDURE — 97530 THERAPEUTIC ACTIVITIES: CPT

## 2025-07-18 PROCEDURE — 97110 THERAPEUTIC EXERCISES: CPT

## 2025-07-18 NOTE — FLOWSHEET NOTE
[facility] - Outpatient Rehabilitation and Therapy: [address] [place] [address] office: [phone] fax: [phone]         Physical Therapy: TREATMENT/PROGRESS NOTE   Patient: John Wood (69 y.o. female)   Examination Date: 2025   :  1955 MRN: [id number]   Visit #: 4   Insurance Allowable Auth Needed   MN []Yes    [x]No    Insurance: Payor: T MEDICARE / Plan: [employer] MEDICARE ADVANTAGE HMO / Product Type: Medicare /   Insurance ID: [id number] - (Medicare Managed)  Secondary Insurance (if applicable):    Treatment Diagnosis:     ICD-10-CM    1. Pain of right hip  M25.551       2. Right low back pain, unspecified chronicity, unspecified whether sciatica present  M54.50       3. Right knee pain, unspecified chronicity  M25.561       4. Decreased range of right hip movement  M25.651       5. Decreased range of motion of lumbar spine  M53.86       6. Decreased strength of lower extremity  R29.898          Medical Diagnosis:  Pain in right hip [M25.551]  History of total right hip replacement [Z96.641]   Referring Physician: Edward Abraham MD  PCP: Lavell Finnegan MD     Plan of care signed (Y/N):     Date of Patient follow up with Physician:      Plan of Care Report: NO  POC update due: (10 visits /OR AUTH LIMITS, whichever is less)  2025                                             Medical History:  Comorbidities:  Diabetes (Type I or II)  Hypertension  Stroke/TIA  Osteoarthritis  Anxiety  Depression  COVID-19  Relevant Medical History: R VIKTORIYA direct anterior 25 presented to ED for R LE pain and symptoms not consistent with DVT  CVA  affecting the R side and is a bit weaker                                         Precautions/ Contra-indications:           Latex allergy:  assess next visit  Pacemaker:    assess next visit  Contraindications for Manipulation: NA  Date of Surgery: 25  Other:    Red Flags:  None  SOB with activity  Dizziness due

## 2025-07-21 ENCOUNTER — HOSPITAL ENCOUNTER (OUTPATIENT)
Dept: PHYSICAL THERAPY | Age: 70
Setting detail: THERAPIES SERIES
Discharge: HOME | End: 2025-07-21
Payer: MEDICARE

## 2025-07-21 PROCEDURE — 97530 THERAPEUTIC ACTIVITIES: CPT

## 2025-07-21 PROCEDURE — 97110 THERAPEUTIC EXERCISES: CPT

## 2025-07-21 NOTE — FLOWSHEET NOTE
[facility] - Outpatient Rehabilitation and Therapy: [address] [place] [address] office: [phone] fax: [phone]         Physical Therapy: TREATMENT/PROGRESS NOTE   Patient: John Wood (69 y.o. female)   Examination Date: 2025   :  1955 MRN: [id number]   Visit #: 5   Insurance Allowable Auth Needed   MN []Yes    [x]No    Insurance: Payor: T MEDICARE / Plan: [employer] MEDICARE ADVANTAGE HMO / Product Type: Medicare /   Insurance ID: [id number] - (Medicare Managed)  Secondary Insurance (if applicable):    Treatment Diagnosis:     ICD-10-CM    1. Pain of right hip  M25.551       2. Right low back pain, unspecified chronicity, unspecified whether sciatica present  M54.50       3. Right knee pain, unspecified chronicity  M25.561       4. Decreased range of right hip movement  M25.651       5. Decreased range of motion of lumbar spine  M53.86       6. Decreased strength of lower extremity  R29.898          Medical Diagnosis:  Pain in right hip [M25.551]  History of total right hip replacement [Z96.641]   Referring Physician: Edward Abraham MD  PCP: Lavell Finnegan MD     Plan of care signed (Y/N):     Date of Patient follow up with Physician:      Plan of Care Report: NO  POC update due: (10 visits /OR AUTH LIMITS, whichever is less)  2025                                             Medical History:  Comorbidities:  Diabetes (Type I or II)  Hypertension  Stroke/TIA  Osteoarthritis  Anxiety  Depression  COVID-19  Relevant Medical History: R VIKTORIYA direct anterior 25 presented to ED for R LE pain and symptoms not consistent with DVT  CVA  affecting the R side and is a bit weaker                                         Precautions/ Contra-indications:           Latex allergy:  assess next visit  Pacemaker:    assess next visit  Contraindications for Manipulation: NA  Date of Surgery: 25  Other:    Red Flags:  None  SOB with activity  Dizziness due

## 2025-07-23 ENCOUNTER — APPOINTMENT (OUTPATIENT)
Dept: PHYSICAL THERAPY | Age: 70
End: 2025-07-23
Payer: MEDICARE

## 2025-07-25 ENCOUNTER — HOSPITAL ENCOUNTER (OUTPATIENT)
Dept: PHYSICAL THERAPY | Age: 70
Setting detail: THERAPIES SERIES
Discharge: HOME | End: 2025-07-25
Payer: MEDICARE

## 2025-07-25 PROCEDURE — 97530 THERAPEUTIC ACTIVITIES: CPT

## 2025-07-25 PROCEDURE — 97110 THERAPEUTIC EXERCISES: CPT

## 2025-07-25 NOTE — FLOWSHEET NOTE
progressing as expected towards functional goals listed.    [] Progression is slowed due to complexities/Impairments listed.  [] Progression has been slowed due to co-morbidities.  [x] Plan just implemented, too soon (<30days) to assess goals progression   [] Goals require adjustment due to lack of progress  [] Patient is not progressing as expected and requires additional follow up with physician  [] Other:     TREATMENT PLAN     Frequency/Duration: 2x/week for 8 weeks for the following treatment interventions:    Interventions:  Therapeutic Exercise (65175) including: strength training, ROM, and functional mobility  Therapeutic Activities (58683) including: functional mobility training and education.  Neuromuscular Re-education (38136) activation and proprioception, including postural re-education.    Gait Training (19276) for normalization of ambulation patterns and AD training.   Manual Therapy (43999) as indicated to include: Passive Range of Motion, Gr I-IV mobilizations, and Soft Tissue Mobilization    Plan: work further into eccentric control for the R LE for steps    Electronically Signed by Rachid Kilgore PT  Date: 07/25/2025   Note: Portions of this note have been templated and/or copied from initial evaluation, reassessments and prior notes for documentation efficiency.  Note: If patient does not return for scheduled/recommended follow up visits, this note will serve as a discharge from care along with the most recent update on progress.    Ortho Evaluation

## 2025-07-29 ENCOUNTER — HOSPITAL ENCOUNTER (OUTPATIENT)
Dept: PHYSICAL THERAPY | Age: 70
Setting detail: THERAPIES SERIES
Discharge: HOME | End: 2025-07-29
Payer: MEDICARE

## 2025-07-29 PROCEDURE — 97110 THERAPEUTIC EXERCISES: CPT

## 2025-07-29 NOTE — FLOWSHEET NOTE
[facility] - Outpatient Rehabilitation and Therapy: [address] [place] [address] office: [phone] fax: [phone]         Physical Therapy: TREATMENT/PROGRESS NOTE   Patient: John Wood (69 y.o. female)   Examination Date: 2025   :  1955 MRN: [id number]   Visit #: 7   Insurance Allowable Auth Needed   MN []Yes    [x]No    Insurance: Payor: T MEDICARE / Plan: [employer]ET[employer] MEDICARE ADVANTAGE HMO / Product Type: Medicare /   Insurance ID: [id number] - (Medicare Managed)  Secondary Insurance (if applicable):    Treatment Diagnosis:     ICD-10-CM    1. Pain of right hip  M25.551       2. Right low back pain, unspecified chronicity, unspecified whether sciatica present  M54.50       3. Right knee pain, unspecified chronicity  M25.561       4. Decreased range of right hip movement  M25.651       5. Decreased range of motion of lumbar spine  M53.86       6. Decreased strength of lower extremity  R29.898          Medical Diagnosis:  Pain in right hip [M25.551]  History of total right hip replacement [Z96.641]   Referring Physician: Edward Abraham MD  PCP: Lavell Finnegan MD     Plan of care signed (Y/N):     Date of Patient follow up with Physician:      Plan of Care Report: NO  POC update due: (10 visits /OR AUTH LIMITS, whichever is less)  2025                                             Medical History:  Comorbidities:  Diabetes (Type I or II)  Hypertension  Stroke/TIA  Osteoarthritis  Anxiety  Depression  COVID-19  Relevant Medical History: R VIKTORIYA direct anterior 25 presented to ED for R LE pain and symptoms not consistent with DVT  CVA  affecting the R side and is a bit weaker                                         Precautions/ Contra-indications:           Latex allergy:  assess next visit  Pacemaker:    assess next visit  Contraindications for Manipulation: NA  Date of Surgery: 25  Other:    Red Flags:  None  SOB with activity  Dizziness due

## 2025-07-31 ENCOUNTER — APPOINTMENT (OUTPATIENT)
Dept: PHYSICAL THERAPY | Age: 70
End: 2025-07-31
Payer: MEDICARE

## 2025-08-01 ENCOUNTER — HOSPITAL ENCOUNTER (OUTPATIENT)
Dept: PHYSICAL THERAPY | Age: 70
Setting detail: THERAPIES SERIES
Discharge: HOME | End: 2025-08-01
Payer: MEDICARE

## 2025-08-01 PROCEDURE — 97110 THERAPEUTIC EXERCISES: CPT

## 2025-08-01 NOTE — FLOWSHEET NOTE
[facility] - Outpatient Rehabilitation and Therapy: [address] [place] [address] office: [phone] fax: [phone]         Physical Therapy: TREATMENT/PROGRESS NOTE   Patient: John Wood (69 y.o. female)   Examination Date: 2025   :  1955 MRN: [id number]   Visit #: 8   Insurance Allowable Auth Needed   MN []Yes    [x]No    Insurance: Payor: T MEDICARE / Plan: [employer]ET[employer] MEDICARE ADVANTAGE HMO / Product Type: Medicare /   Insurance ID: [id number] - (Medicare Managed)  Secondary Insurance (if applicable):    Treatment Diagnosis:     ICD-10-CM    1. Pain of right hip  M25.551       2. Right low back pain, unspecified chronicity, unspecified whether sciatica present  M54.50       3. Right knee pain, unspecified chronicity  M25.561       4. Decreased range of right hip movement  M25.651       5. Decreased range of motion of lumbar spine  M53.86       6. Decreased strength of lower extremity  R29.898          Medical Diagnosis:  Pain in right hip [M25.551]  History of total right hip replacement [Z96.641]   Referring Physician: Edward Abraham MD  PCP: Lavell Finnegan MD     Plan of care signed (Y/N):     Date of Patient follow up with Physician:      Plan of Care Report: NO  POC update due: (10 visits /OR AUTH LIMITS, whichever is less)  2025                                             Medical History:  Comorbidities:  Diabetes (Type I or II)  Hypertension  Stroke/TIA  Osteoarthritis  Anxiety  Depression  COVID-19  Relevant Medical History: R VIKTORIYA direct anterior 25 presented to ED for R LE pain and symptoms not consistent with DVT  CVA  affecting the R side and is a bit weaker                                         Precautions/ Contra-indications:           Latex allergy:  assess next visit  Pacemaker:    assess next visit  Contraindications for Manipulation: NA  Date of Surgery: 25  Other:    Red Flags:  None  SOB with activity  Dizziness due

## 2025-08-13 ENCOUNTER — OFFICE VISIT (OUTPATIENT)
Dept: FAMILY MEDICINE CLINIC | Age: 70
End: 2025-08-13

## 2025-08-13 VITALS
HEIGHT: 64 IN | OXYGEN SATURATION: 98 % | RESPIRATION RATE: 16 BRPM | WEIGHT: 192 LBS | BODY MASS INDEX: 32.78 KG/M2 | DIASTOLIC BLOOD PRESSURE: 78 MMHG | HEART RATE: 95 BPM | SYSTOLIC BLOOD PRESSURE: 118 MMHG

## 2025-08-13 DIAGNOSIS — E83.52 HYPERCALCEMIA: ICD-10-CM

## 2025-08-13 DIAGNOSIS — R11.0 NAUSEA: ICD-10-CM

## 2025-08-13 DIAGNOSIS — I10 ESSENTIAL HYPERTENSION: ICD-10-CM

## 2025-08-13 DIAGNOSIS — E11.42 TYPE 2 DIABETES MELLITUS WITH DIABETIC POLYNEUROPATHY, WITH LONG-TERM CURRENT USE OF INSULIN (HCC): Primary | ICD-10-CM

## 2025-08-13 DIAGNOSIS — L29.9 SCALP PRURITUS: ICD-10-CM

## 2025-08-13 DIAGNOSIS — Z79.4 TYPE 2 DIABETES MELLITUS WITH DIABETIC POLYNEUROPATHY, WITH LONG-TERM CURRENT USE OF INSULIN (HCC): Primary | ICD-10-CM

## 2025-08-13 DIAGNOSIS — G95.20 CERVICAL SPINAL CORD COMPRESSION (HCC): ICD-10-CM

## 2025-08-13 LAB — HBA1C MFR BLD: 7.1 %

## 2025-08-13 RX ORDER — CLOBETASOL PROPIONATE 0.5 MG/G
AEROSOL, FOAM TOPICAL
Qty: 1 EACH | Refills: 2 | Status: SHIPPED | OUTPATIENT
Start: 2025-08-13

## 2025-08-14 DIAGNOSIS — E83.52 HYPERCALCEMIA: ICD-10-CM

## 2025-08-14 LAB — PTH-INTACT SERPL-MCNC: 139 PG/ML (ref 14–72)

## 2025-08-15 LAB
ALBUMIN SERPL ELPH-MCNC: 3.2 G/DL (ref 3.1–4.9)
ALPHA1 GLOB SERPL ELPH-MCNC: 0.2 G/DL (ref 0.2–0.4)
ALPHA2 GLOB SERPL ELPH-MCNC: 1 G/DL (ref 0.4–1.1)
B-GLOBULIN SERPL ELPH-MCNC: 1.3 G/DL (ref 0.9–1.6)
GAMMA GLOB SERPL ELPH-MCNC: 1 G/DL (ref 0.6–1.8)
KAPPA LC FREE SER-MCNC: 19.9 MG/L (ref 2.37–20.73)
KAPPA LC FREE/LAMBDA FREE SER: 1.02 {RATIO} (ref 0.22–1.74)
LAMBDA LC FREE SERPL-MCNC: 19.5 MG/L (ref 4.23–27.69)
PROT SERPL-MCNC: 6.7 G/DL (ref 6.4–8.2)
RPT COMMENT: NORMAL
SPE/IFE INTERPRETATION: NORMAL

## 2025-08-16 LAB
CA-I ADJ PH7.4 SERPL-SCNC: 1.34 MMOL/L (ref 1.09–1.3)
CA-I SERPL ISE-SCNC: 1.36 MMOL/L (ref 1.09–1.3)

## (undated) DEVICE — SYRINGE IRRIG 60ML SFT PLIABLE BLB EZ TO GRP 1 HND USE W/

## (undated) DEVICE — PENCIL ES L3M BTTN SWCH S STL HEX LOK BLDE ELECTRD HOLSTER

## (undated) DEVICE — PAD,NON-ADHERENT,3X8,STERILE,LF,1/PK: Brand: MEDLINE

## (undated) DEVICE — DRAPE,REIN 53X77,STERILE: Brand: MEDLINE

## (undated) DEVICE — SUTURE VCRL SZ 1 L18IN ABSRB UD L36MM CT-1 1/2 CIR J841D

## (undated) DEVICE — SPONGE LAP W18XL18IN WHT COT 4 PLY FLD STRUNG RADPQ DISP ST

## (undated) DEVICE — BANDAGE COMPR W4INXL12FT E DISP ESMARCH EVEN

## (undated) DEVICE — BANDAGE,ELASTIC,ESMARK,STERILE,6"X9',LF: Brand: MEDLINE

## (undated) DEVICE — DRILL BIT

## (undated) DEVICE — 3M™ STERI-DRAPE™ U-DRAPE 1015: Brand: STERI-DRAPE™

## (undated) DEVICE — AVANOS* TUOHY EPIDURAL NEEDLE: Brand: AVANOS

## (undated) DEVICE — DECANTER BAG 9": Brand: MEDLINE INDUSTRIES, INC.

## (undated) DEVICE — COUNTERSINK: Brand: DART-FIRE

## (undated) DEVICE — EPIDURAL TRAY: Brand: MEDLINE INDUSTRIES, INC.

## (undated) DEVICE — DUAL CUT SAGITTAL BLADE

## (undated) DEVICE — SUTURE NONABSORBABLE MONOFILAMENT 4-0 FS-2 18 IN ETHILON 662H

## (undated) DEVICE — ZIMMER® STERILE DISPOSABLE TOURNIQUET CUFF WITH PLC, DUAL PORT, SINGLE BLADDER, 18 IN. (46 CM)

## (undated) DEVICE — SPONGE GZ W4XL4IN COT 12 PLY TYP VII WVN C FLD DSGN

## (undated) DEVICE — SHOE POSTOP M MAN 9-11 UNIV FOAM TRICOT SEMI FLX SKID

## (undated) DEVICE — SOLUTION IV IRRIG POUR BRL 0.9% SODIUM CHL 2F7124

## (undated) DEVICE — GLOVE,SURG,SENSICARE SLT,LF,PF,8: Brand: MEDLINE

## (undated) DEVICE — COTTON UNDERCAST PADDING,CRIMPED FINISH: Brand: WEBRIL

## (undated) DEVICE — SYRINGE,EAR/ULCER, 2 OZ, STERILE: Brand: MEDLINE

## (undated) DEVICE — PIN FIX L150MM DIA3.2MM FLUT CAS

## (undated) DEVICE — ROSA RBTC UNT 20 DRP

## (undated) DEVICE — BANDAGE COMPR W6INXL10YD ST M E WHITE/BEIGE

## (undated) DEVICE — 450 ML BOTTLE OF 0.05% CHLORHEXIDINE GLUCONATE IN 99.95% STERILE WATER FOR IRRIGATION, USP AND APPLICATOR.: Brand: IRRISEPT ANTIMICROBIAL WOUND LAVAGE

## (undated) DEVICE — SUTURE VCRL SZ 2-0 L18IN ABSRB UD CT-1 L36MM 1/2 CIR J839D

## (undated) DEVICE — 3M™ COBAN™ NL STERILE NON-LATEX SELF-ADHERENT WRAP, 2084S, 4 IN X 5 YD (10 CM X 4,5 M), 18 ROLLS/CASE: Brand: 3M™ COBAN™

## (undated) DEVICE — KNEE ARTHROSCOPY: Brand: MEDLINE INDUSTRIES, INC.

## (undated) DEVICE — SINGLE TROCAR WIRE
Type: IMPLANTABLE DEVICE | Site: FOOT | Status: NON-FUNCTIONAL
Brand: CHARLOTTE
Removed: 2020-09-17

## (undated) DEVICE — T-DRAPE,EXTREMITY,STERILE: Brand: MEDLINE

## (undated) DEVICE — NEEDLE SPNL L3.5IN PNK HUB S STL REG WALL FIT STYL W/ QNCKE

## (undated) DEVICE — DRILL BIT: Brand: DART-FIRE

## (undated) DEVICE — GOWN SIRUS NONREIN LG W/TWL: Brand: MEDLINE INDUSTRIES, INC.

## (undated) DEVICE — SUTURE VCRL SZ 5-0 L18IN ABSRB UD L13MM P-3 3/8 CIR PRIM J493G

## (undated) DEVICE — TOTAL KNEE: Brand: MEDLINE INDUSTRIES, INC.

## (undated) DEVICE — TUBING PMP L16FT MAIN DISP FOR AR-6400 AR-6475

## (undated) DEVICE — 3M™ STERI-STRIP™ COMPOUND BENZOIN TINCTURE 40 BAGS/CARTON 4 CARTONS/CASE C1544: Brand: 3M™ STERI-STRIP™

## (undated) DEVICE — Z DUP USE 2701075 SYSTEM SKIN CLSR 42CM DERMBND PRINEO

## (undated) DEVICE — TUBING, SUCTION, 1/4" X 12', STRAIGHT: Brand: MEDLINE

## (undated) DEVICE — NDL,TUOHY EPID,22GX3.5",METAL STYLET: Brand: MEDLINE

## (undated) DEVICE — ZIMMER® STERILE DISPOSABLE TOURNIQUET CUFF WITH PLC, DUAL PORT, SINGLE BLADDER, 34 IN. (86 CM)

## (undated) DEVICE — INSTRUMENT KIT ORTHOPEDIC KNEE NAVITRACK

## (undated) DEVICE — STERILE TOTAL KNEE DRAPE PACK: Brand: CARDINAL HEALTH

## (undated) DEVICE — SUTURE VCRL SZ 1 L27IN ABSRB UD CT-1 L36MM 1/2 CIR J261H

## (undated) DEVICE — COVER LT HNDL BLU PLAS

## (undated) DEVICE — KNEE HOLDER DISPOSABLE LINER: Brand: ALVARADO®  KNEE SUPPORT

## (undated) DEVICE — STANDARD HYPODERMIC NEEDLE,POLYPROPYLENE HUB: Brand: MONOJECT

## (undated) DEVICE — GOWN SIRUS NONREIN XL W/TWL: Brand: MEDLINE INDUSTRIES, INC.

## (undated) DEVICE — GLOVE SURG SZ 85 L12IN FNGR THK94MIL STD WHT LTX FREE

## (undated) DEVICE — DRAPE EQUIP FOR ROBOTIC UNIT ROSA DISP

## (undated) DEVICE — NEEDLE HYPO 22GA L1 1/2IN PIVOTING SHLD FOR LUERLOCK SYR

## (undated) DEVICE — K-WIRE BLUNT/TROCAR
Type: IMPLANTABLE DEVICE | Site: FOOT | Status: NON-FUNCTIONAL
Removed: 2020-09-17

## (undated) DEVICE — NEEDLE QUINCKE 22GX5": Brand: MEDLINE

## (undated) DEVICE — DRAPE C ARM UNIV W41XL74IN CLR PLAS XR VELC CLSR POLY STRP

## (undated) DEVICE — MERCY HEALTH WEST TURNOVER: Brand: MEDLINE INDUSTRIES, INC.

## (undated) DEVICE — GLOVE ORANGE PI 8 1/2   MSG9085

## (undated) DEVICE — TRAY ANES SUPP NO DRUG CUST

## (undated) DEVICE — IMPLANTABLE DEVICE
Type: IMPLANTABLE DEVICE | Site: FOOT | Status: NON-FUNCTIONAL
Brand: ORTHOLOC 3DI
Removed: 2020-09-17

## (undated) DEVICE — DRESSING,GAUZE,XEROFORM,CURAD,1"X8",ST: Brand: CURAD

## (undated) DEVICE — STRIP,CLOSURE,WOUND,MEDI-STRIP,1/2X4: Brand: MEDLINE

## (undated) DEVICE — MARKER SKIN MINI PUR FINE REGULAR TIP W RUL XL PREP RESIST

## (undated) DEVICE — INTENDED FOR TISSUE SEPARATION, AND OTHER PROCEDURES THAT REQUIRE A SHARP SURGICAL BLADE TO PUNCTURE OR CUT.: Brand: BARD-PARKER ® STAINLESS STEEL BLADES

## (undated) DEVICE — SYRINGE MED 3ML CLR PLAS LUERLOCK CONN VI ACCS INTLNK 15GA

## (undated) DEVICE — Z INACTIVE USE 2660664 SOLUTION IRRIG 3000ML 0.9% SOD CHL USP UROMATIC PLAS CONT

## (undated) DEVICE — SYRINGE MED 10ML LUERLOCK TIP W/O SFTY DISP

## (undated) DEVICE — SHEET,DRAPE,53X77,STERILE: Brand: MEDLINE

## (undated) DEVICE — CHLORAPREP 26ML ORANGE

## (undated) DEVICE — SYRINGE MED 3ML CLR PLAS STD N CTRL LUERLOCK TIP DISP

## (undated) DEVICE — AVANOS* EXTENSION SETS: Brand: EXTENSION SET, MINI BORE, 12" LENGTH, 0.50ML VOLUME 25

## (undated) DEVICE — 4-PORT MANIFOLD: Brand: NEPTUNE 2

## (undated) DEVICE — SOLUTION IRRIG 3000ML 0.9% SOD CHL USP UROMATIC PLAS CONT

## (undated) DEVICE — UNDERGLOVE SURG SZ 8 BLU LTX FREE SYN POLYISOPRENE POLYMER

## (undated) DEVICE — ELECTRODE PT RET AD L9FT HI MOIST COND ADH HYDRGEL CORDED

## (undated) DEVICE — STOCKINETTE,IMPERVIOUS,12X48,STERILE: Brand: MEDLINE

## (undated) DEVICE — BLADE RMR L41MM PAT PILOT H

## (undated) DEVICE — MTP CONE REAMER GEN 2

## (undated) DEVICE — PADDING CAST N ADH 12X6 IN CRIMPED FINISH 100% COTTON WBRLII

## (undated) DEVICE — PLATE TACK
Type: IMPLANTABLE DEVICE | Site: FOOT | Status: NON-FUNCTIONAL
Removed: 2020-09-17

## (undated) DEVICE — ANTI-EMBOLISM STOCKINGS,THIGH LENGTH,LARGE-LONG-SIZE J: Brand: T.E.D.

## (undated) DEVICE — DRAPE ROSA MONITOR

## (undated) DEVICE — NERVE BLOCK TRAY: Brand: MEDLINE INDUSTRIES, INC.

## (undated) DEVICE — NEEDLE HYPO 25GA L1.5IN BVL ORIENTED ECLIPSE

## (undated) DEVICE — MTP CUP REAMER GEN 2

## (undated) DEVICE — Device: Brand: POWER-FLO®

## (undated) DEVICE — SUTURE VCRL SZ 3-0 L18IN ABSRB UD L26MM SH 1/2 CIR J864D

## (undated) DEVICE — PAD,ABDOMINAL,8"X7.5",STERILE,LF,1/PK: Brand: MEDLINE

## (undated) DEVICE — PLATELET CONCENTRATION KIT ARTERIOCYTE SEP MAGELLAN PRP

## (undated) DEVICE — Z DISCONTINUED USE 2716304 SUTURE STRATAFIX SPRL SZ 3-0 L12IN ABSRB UD FS-1 L24MM 3/8

## (undated) DEVICE — APPLICATOR MEDICATED 26 CC SOLUTION HI LT ORNG CHLORAPREP

## (undated) DEVICE — THIN OFFSET (9.0 X 0.38 X 25.0MM)

## (undated) DEVICE — GLOVE ORANGE PI 8   MSG9080

## (undated) DEVICE — GARMENT COMPR STD FOR 17IN CALF UNIF THER FLOTRN

## (undated) DEVICE — MINOR SET UP PK

## (undated) DEVICE — PADDING UNDERCAST W4INXL4YD 100% COT CRIMPED FINISH WBRL II